# Patient Record
Sex: FEMALE | Race: BLACK OR AFRICAN AMERICAN | Employment: UNEMPLOYED | URBAN - METROPOLITAN AREA
[De-identification: names, ages, dates, MRNs, and addresses within clinical notes are randomized per-mention and may not be internally consistent; named-entity substitution may affect disease eponyms.]

---

## 2017-07-18 ENCOUNTER — HOSPITAL ENCOUNTER (INPATIENT)
Age: 52
LOS: 6 days | Discharge: HOME | DRG: 854 | End: 2017-07-24
Attending: EMERGENCY MEDICINE | Admitting: FAMILY MEDICINE
Payer: SUBSIDIZED

## 2017-07-18 ENCOUNTER — APPOINTMENT (OUTPATIENT)
Dept: GENERAL RADIOLOGY | Age: 52
DRG: 854 | End: 2017-07-18
Attending: PHYSICIAN ASSISTANT
Payer: SUBSIDIZED

## 2017-07-18 DIAGNOSIS — M86.9 TOE OSTEOMYELITIS, LEFT (HCC): Primary | ICD-10-CM

## 2017-07-18 DIAGNOSIS — Z79.4 TYPE 2 DIABETES MELLITUS WITH COMPLICATION, WITH LONG-TERM CURRENT USE OF INSULIN (HCC): ICD-10-CM

## 2017-07-18 DIAGNOSIS — E11.8 TYPE 2 DIABETES MELLITUS WITH COMPLICATION, WITH LONG-TERM CURRENT USE OF INSULIN (HCC): ICD-10-CM

## 2017-07-18 LAB
ANION GAP BLD CALC-SCNC: 11 MMOL/L (ref 3–18)
APPEARANCE UR: CLEAR
BASOPHILS # BLD AUTO: 0 K/UL (ref 0–0.06)
BASOPHILS # BLD: 0 % (ref 0–2)
BILIRUB UR QL: NEGATIVE
BUN SERPL-MCNC: 20 MG/DL (ref 7–18)
BUN/CREAT SERPL: 18 (ref 12–20)
CALCIUM SERPL-MCNC: 8.9 MG/DL (ref 8.5–10.1)
CHLORIDE SERPL-SCNC: 105 MMOL/L (ref 100–108)
CO2 SERPL-SCNC: 25 MMOL/L (ref 21–32)
COLOR UR: YELLOW
CREAT SERPL-MCNC: 1.13 MG/DL (ref 0.6–1.3)
DIFFERENTIAL METHOD BLD: ABNORMAL
EOSINOPHIL # BLD: 0.1 K/UL (ref 0–0.4)
EOSINOPHIL NFR BLD: 1 % (ref 0–5)
ERYTHROCYTE [DISTWIDTH] IN BLOOD BY AUTOMATED COUNT: 12.1 % (ref 11.6–14.5)
GLUCOSE BLD STRIP.AUTO-MCNC: 306 MG/DL (ref 70–110)
GLUCOSE SERPL-MCNC: 297 MG/DL (ref 74–99)
GLUCOSE UR STRIP.AUTO-MCNC: >1000 MG/DL
HCT VFR BLD AUTO: 30.2 % (ref 35–45)
HGB BLD-MCNC: 10.1 G/DL (ref 12–16)
HGB UR QL STRIP: NEGATIVE
KETONES UR QL STRIP.AUTO: NEGATIVE MG/DL
LACTATE BLD-SCNC: 1.4 MMOL/L (ref 0.4–2)
LEUKOCYTE ESTERASE UR QL STRIP.AUTO: NEGATIVE
LYMPHOCYTES # BLD AUTO: 22 % (ref 21–52)
LYMPHOCYTES # BLD: 3 K/UL (ref 0.9–3.6)
MCH RBC QN AUTO: 28.7 PG (ref 24–34)
MCHC RBC AUTO-ENTMCNC: 33.4 G/DL (ref 31–37)
MCV RBC AUTO: 85.8 FL (ref 74–97)
MONOCYTES # BLD: 0.7 K/UL (ref 0.05–1.2)
MONOCYTES NFR BLD AUTO: 5 % (ref 3–10)
NEUTS SEG # BLD: 10 K/UL (ref 1.8–8)
NEUTS SEG NFR BLD AUTO: 72 % (ref 40–73)
NITRITE UR QL STRIP.AUTO: NEGATIVE
PH UR STRIP: 5.5 [PH] (ref 5–8)
PLATELET # BLD AUTO: 281 K/UL (ref 135–420)
PMV BLD AUTO: 10.5 FL (ref 9.2–11.8)
POTASSIUM SERPL-SCNC: 4.1 MMOL/L (ref 3.5–5.5)
PROT UR STRIP-MCNC: NEGATIVE MG/DL
RBC # BLD AUTO: 3.52 M/UL (ref 4.2–5.3)
SODIUM SERPL-SCNC: 141 MMOL/L (ref 136–145)
SP GR UR REFRACTOMETRY: 1.02 (ref 1–1.03)
UROBILINOGEN UR QL STRIP.AUTO: 0.2 EU/DL (ref 0.2–1)
WBC # BLD AUTO: 13.9 K/UL (ref 4.6–13.2)

## 2017-07-18 PROCEDURE — 73630 X-RAY EXAM OF FOOT: CPT

## 2017-07-18 PROCEDURE — 87186 SC STD MICRODIL/AGAR DIL: CPT

## 2017-07-18 PROCEDURE — 80048 BASIC METABOLIC PNL TOTAL CA: CPT

## 2017-07-18 PROCEDURE — 65270000029 HC RM PRIVATE

## 2017-07-18 PROCEDURE — 74011250636 HC RX REV CODE- 250/636: Performed by: PHYSICIAN ASSISTANT

## 2017-07-18 PROCEDURE — 83605 ASSAY OF LACTIC ACID: CPT

## 2017-07-18 PROCEDURE — 74011000258 HC RX REV CODE- 258: Performed by: PHYSICIAN ASSISTANT

## 2017-07-18 PROCEDURE — 87077 CULTURE AEROBIC IDENTIFY: CPT

## 2017-07-18 PROCEDURE — 87040 BLOOD CULTURE FOR BACTERIA: CPT

## 2017-07-18 PROCEDURE — 85025 COMPLETE CBC W/AUTO DIFF WBC: CPT

## 2017-07-18 PROCEDURE — 99283 EMERGENCY DEPT VISIT LOW MDM: CPT

## 2017-07-18 PROCEDURE — 74011636637 HC RX REV CODE- 636/637: Performed by: PHYSICIAN ASSISTANT

## 2017-07-18 PROCEDURE — 81003 URINALYSIS AUTO W/O SCOPE: CPT

## 2017-07-18 PROCEDURE — 87086 URINE CULTURE/COLONY COUNT: CPT

## 2017-07-18 PROCEDURE — 82962 GLUCOSE BLOOD TEST: CPT

## 2017-07-18 PROCEDURE — 87070 CULTURE OTHR SPECIMN AEROBIC: CPT

## 2017-07-18 PROCEDURE — 87184 SC STD DISK METHOD PER PLATE: CPT

## 2017-07-18 RX ORDER — PAROXETINE 30 MG/1
30 TABLET, FILM COATED ORAL DAILY
Status: ON HOLD | COMMUNITY
End: 2017-07-23

## 2017-07-18 RX ORDER — HEPARIN SODIUM 5000 [USP'U]/ML
5000 INJECTION, SOLUTION INTRAVENOUS; SUBCUTANEOUS ONCE
Status: DISPENSED | OUTPATIENT
Start: 2017-07-19 | End: 2017-07-19

## 2017-07-18 RX ORDER — SODIUM CHLORIDE 450 MG/100ML
125 INJECTION, SOLUTION INTRAVENOUS CONTINUOUS
Status: DISCONTINUED | OUTPATIENT
Start: 2017-07-19 | End: 2017-07-19

## 2017-07-18 RX ORDER — DEXTROSE 50 % IN WATER (D50W) INTRAVENOUS SYRINGE
25-50 AS NEEDED
Status: DISCONTINUED | OUTPATIENT
Start: 2017-07-18 | End: 2017-07-21 | Stop reason: SDUPTHER

## 2017-07-18 RX ORDER — INSULIN LISPRO 100 [IU]/ML
INJECTION, SOLUTION INTRAVENOUS; SUBCUTANEOUS EVERY 6 HOURS
Status: DISCONTINUED | OUTPATIENT
Start: 2017-07-19 | End: 2017-07-21

## 2017-07-18 RX ORDER — QUINAPRIL 20 MG/1
20 TABLET ORAL
Status: DISCONTINUED | OUTPATIENT
Start: 2017-07-19 | End: 2017-07-21

## 2017-07-18 RX ORDER — MAGNESIUM SULFATE 100 %
4 CRYSTALS MISCELLANEOUS AS NEEDED
Status: DISCONTINUED | OUTPATIENT
Start: 2017-07-18 | End: 2017-07-21 | Stop reason: SDUPTHER

## 2017-07-18 RX ORDER — PANTOPRAZOLE SODIUM 40 MG/1
40 TABLET, DELAYED RELEASE ORAL DAILY
Status: DISCONTINUED | OUTPATIENT
Start: 2017-07-19 | End: 2017-07-24 | Stop reason: HOSPADM

## 2017-07-18 RX ORDER — ACETAMINOPHEN 650 MG/1
650 SUPPOSITORY RECTAL
Status: DISCONTINUED | OUTPATIENT
Start: 2017-07-18 | End: 2017-07-19

## 2017-07-18 RX ADMIN — INSULIN HUMAN 7 UNITS: 100 INJECTION, SOLUTION PARENTERAL at 18:16

## 2017-07-18 RX ADMIN — SODIUM CHLORIDE 1000 MG: 900 INJECTION, SOLUTION INTRAVENOUS at 18:56

## 2017-07-18 RX ADMIN — SODIUM CHLORIDE 1000 ML: 900 INJECTION, SOLUTION INTRAVENOUS at 18:11

## 2017-07-18 RX ADMIN — PIPERACILLIN AND TAZOBACTAM 3.38 G: 3; .375 INJECTION, POWDER, LYOPHILIZED, FOR SOLUTION INTRAVENOUS; PARENTERAL at 18:17

## 2017-07-18 NOTE — PROGRESS NOTES
SW received a voicemail from Salty Lai NP with [facility]. She stated they have been attempting to get the patient enrolled in the [facility] due to her financial status. She is a self-pay [place] resident. Patient has been non-complaint due to finances. Patient is currently at [facility] at the recommendation of PCP. Per chart they are consulting for admission as the patient appears to need surgery on her toe. Patient can also contact Dr. Tye Chin for further care as he sees self-pay patients at a reduced rate.      Grant Cavazos, [address], [phone]

## 2017-07-18 NOTE — IP AVS SNAPSHOT
Iain Vuong 
 
 
 [address] Patient: Otto Niño MRN: [id number] :1965 You are allergic to the following Allergen Reactions Promethazine Nausea and Vomiting \"the phenergan made me more nauseated\" Recent Documentation Height Weight BMI OB Status Smoking Status 1.727 m 81.2 kg 27.22 kg/m2 Menopause Former Smoker Unresulted Labs Order Current Status CULTURE, ANAEROBIC Preliminary result CULTURE, BLOOD Preliminary result CULTURE, BLOOD Preliminary result CULTURE, SURGICAL WOUND W GRAM STAIN Preliminary result Emergency Contacts Name Discharge Info Relation Home Work Mobile Fran Londono)  Other Relative [6] [id number] Aletha Console  Mother [14] [phone] About your hospitalization You were admitted on:  2017 You last received care in the:  [facility] [address] You were discharged on:  2017 Unit phone number:  [phone] Why you were hospitalized Your primary diagnosis was:  Sepsis (Hcc) Your diagnoses also included:  Osteomyelitis (Hcc), Foot Ulcer Due To Secondary Dm (Hcc), Essential Hypertension, Benign, Uncontrolled Type 2 Diabetes Mellitus With Chronic Kidney Disease (Hcc), Diabetic Retinopathy (Hcc), Hld (Hyperlipidemia), Gerd (Gastroesophageal Reflux Disease), Anxiety, Leukocytosis, Renal Insufficiency Providers Seen During Your Hospitalizations Provider Role Specialty Primary office phone Kahlil Espino MD Attending Provider Emergency Medicine [phone] Dede Martin MD Attending Provider Family Practice [phone] Joanne Zapata MD Attending Provider [facility] [phone] Your Primary Care Physician (PCP) Primary Care Physician Office Phone Office Fax José Child [phone] Follow-up Information Follow up With Details Comments Contact Info Joanne Watson NP  New patient. Patient has to bring proof of income and proof of where they reside. The patient will be set up with an follow-up appointment [address] [facility] 
[phone] Yeimi Pelaez DPM On 7/31/2017 Arrive by 8:00AM, Appt is at 8:30AM please bring $200 upfront for co-pay. 908 [facility] [address] [facility] 
[phone] Yeimi Pelaez DPM Go on 7/28/2017 For suture removal [address] [facility] [address] [facility] 
[phone] Current Discharge Medication List  
  
START taking these medications Dose & Instructions Dispensing Information Comments Morning Noon Evening Bedtime  
 acetaminophen 325 mg tablet Commonly known as:  TYLENOL Your last dose was: Your next dose is:    
   
   
 Dose:  650 mg Take 2 Tabs by mouth every six (6) hours as needed for up to 30 days. Quantity:  120 Tab Refills:  0  
     
   
   
   
  
 amoxicillin-clavulanate 875-125 mg per tablet Commonly known as:  AUGMENTIN Your last dose was: Your next dose is:    
   
   
 Dose:  1 Tab Take 1 Tab by mouth two (2) times a day for 7 days. Quantity:  14 Tab Refills:  0  
     
   
   
   
  
 atorvastatin 20 mg tablet Commonly known as:  LIPITOR Your last dose was: Your next dose is:    
   
   
 Dose:  20 mg Take 1 Tab by mouth daily for 30 days. Quantity:  30 Tab Refills:  0 Saccharomyces boulardii 250 mg capsule Commonly known as:  Yoan Controls Your last dose was: Your next dose is:    
   
   
 Dose:  250 mg Take 1 Cap by mouth two (2) times a day for 7 days. Quantity:  14 Cap Refills:  0  
     
   
   
   
  
 sodium hypochlorite 0.125 % Soln external solution Commonly known as:  [address]  
   
 Your last dose was: Your next dose is:    
   
   
 Dose:  1 Bottle Apply 473 mL to affected area two (2) times a day. Quantity:  1 Bottle Refills:  0  
     
   
   
   
  
 trimethoprim-sulfamethoxazole 160-800 mg per tablet Commonly known as:  BACTRIM DS Your last dose was: Your next dose is:    
   
   
 Dose:  1 Tab Take 1 Tab by mouth two (2) times a day for 7 days. Quantity:  14 Tab Refills:  0 CONTINUE these medications which have NOT CHANGED Dose & Instructions Dispensing Information Comments Morning Noon Evening Bedtime  
 esomeprazole 20 mg capsule Commonly known as:  NexIUM Your last dose was: Your next dose is:    
   
   
 Dose:  40 mg Take 2 Caps by mouth daily for 30 days. Quantity:  60 Cap Refills:  0  
     
   
   
   
  
 insulin NPH/insulin regular 100 unit/mL (70-30) injection Commonly known as:  NovoLIN 70/30 Your last dose was: Your next dose is:    
   
   
 Dose:  40 Units 40 Units by SubCUTAneous route two (2) times a day. Quantity:  2 Vial  
Refills:  2 PARoxetine 30 mg tablet Commonly known as:  PAXIL Your last dose was: Your next dose is:    
   
   
 Dose:  30 mg Take 1 Tab by mouth daily for 30 days. Quantity:  30 Tab Refills:  0  
     
   
   
   
  
 quinapril 20 mg tablet Commonly known as:  ACCUPRIL Your last dose was: Your next dose is:    
   
   
 Dose:  20 mg Take 1 Tab by mouth daily for 30 days. Quantity:  30 Tab Refills:  0 Where to Get Your Medications Information on where to get these meds will be given to you by the nurse or doctor. ! Ask your nurse or doctor about these medications  
  acetaminophen 325 mg tablet  
 amoxicillin-clavulanate 875-125 mg per tablet  
 atorvastatin 20 mg tablet  
 esomeprazole 20 mg capsule insulin NPH/insulin regular 100 unit/mL (70-30) injection PARoxetine 30 mg tablet  
 quinapril 20 mg tablet Saccharomyces boulardii 250 mg capsule  
 sodium hypochlorite 0.125 % Soln external solution  
 trimethoprim-sulfamethoxazole 160-800 mg per tablet Discharge Instructions Learning About Foot and Toenail Care Checking your loved one's feet and keeping them clean and soft can help prevent cracks and infection in the skin. This is especially important for people who have diabetes. Keeping toenails trimmedand polished if that's what the person likesalso helps the person feel well-groomed. If the person you care for has diabetes or has foot problems, such as bad bunions and corns, think about taking them to see a podiatrist. This is a doctor who specializes in the care of the feet. Sometimes a podiatrist will come to the home if the person can't go out for visits. Try to take the person for salon pedicures if that is what they want. It's a chance to get out and see people and continue a favorite activity. You can do basic nail care at home. Usually all you need to do is keep the nails clean and at a safe length. How do you trim someone's toenails? Try to trim the person's nails every week. Or check the nails each week to see if they need to be trimmed. It's easiest to trim nails after the person has had a shower or foot bath. It makes the nails softer and easier to trim. Start by gathering your supplies. You will need toenail clippers and a nail file. You may also need nail polish and nail polish remover. To trim the nails: 
1. Wash and dry your hands. You don't need to wear gloves. 2. Use nail polish remover to take off any polish. 3. Hold the person's foot and toe steady with one hand while you trim the nail with your other hand. Trim the nails straight across. Leave the nails a little longer at the corners so that the sharp ends don't cut into the skin. 4. Keep the nails no longer than the tip of the toes. 5. Let the nails dry if they are still damp and soft. 6. Use a nail file to gently smooth the edges of the nails, especially at the corners. They may be sharp after the nails are cut straight. 7. Apply nail polish, if the person wants it. If the person's nails are thick and discolored, it may be safest to have a podiatrist cut them. What else do you need to know? When you're caring for someone's nails, it is important to remember not to trim or cut the cuticles. A minor cut in a cuticle could lead to an infection. Wash the feet daily in the shower or bath or in a basin made for washing feet. It's extra important to wash the feet carefully if the person has diabetes. After washing the feet, dry gently. Put lotion on the feet, especially on the heels. But don't put it between the toes. If the person doesn't have diabetes and you see signs of athlete's foot (such as dry, cracking, or itchy skin between the toes), you can try an over-the-counter medicine. These medicines can kill the fungus that causes athlete's foot. If the problem doesn't go away, talk to the person's doctor. Look every day for cuts or signs of infection, such as pain, swelling, redness, or warmth. If you see any of these signsespecially in someone who has diabetescall the doctor. Where can you learn more? Go to http://linda-sivan.info/. Enter A726 in the search box to learn more about \"Learning About Foot and Toenail Care. \" Current as of: March 17, 2017 Content Version: 11.3 © 4649-2517 [employer]. Care instructions adapted under license by [employer] (which disclaims liability or warranty for this information). If you have questions about a medical condition or this instruction, always ask your healthcare professional. [place] [address] any warranty or liability for your use of this information. Diabetes Foot Health: Care Instructions Your Care Instructions When you have diabetes, your feet need extra care and attention. Diabetes can damage the nerve endings and blood vessels in your feet, making you less likely to notice when your feet are injured. Diabetes also limits your body's ability to fight infection and get blood to areas that need it. If you get a minor foot injury, it could become an ulcer or a serious infection. With good foot care, you can prevent most of these problems. Caring for your feet can be quick and easy. Most of the care can be done when you are bathing or getting ready for bed. Follow-up care is a key part of your treatment and safety. Be sure to make and go to all appointments, and call your doctor if you are having problems. Its also a good idea to know your test results and keep a list of the medicines you take. How can you care for yourself at home? · Keep your blood sugar close to normal by watching what and how much you eat, monitoring blood sugar, taking medicines if prescribed, and getting regular exercise. · Do not smoke. Smoking affects blood flow and can make foot problems worse. If you need help quitting, talk to your doctor about stop-smoking programs and medicines. These can increase your chances of quitting for good. · Eat a diet that is low in fats. High fat intake can cause fat to build up in your blood vessels and decrease blood flow. · Inspect your feet daily for blisters, cuts, cracks, or sores. If you cannot see well, use a mirror or have someone help you. · Take care of your feet: 
[facility] your feet every day. Use warm (not hot) water. Check the water temperature with your wrists or other part of your body, not your feet. ¨ Dry your feet well. Pat them dry. Do not rub the skin on your feet too hard. Dry well between your toes. If the skin on your feet stays moist, bacteria or a fungus can grow, which can lead to infection. ¨ Keep your skin soft. Use moisturizing skin cream to keep the skin on your feet soft and prevent calluses and cracks. But do not put the cream between your toes, and stop using any cream that causes a rash. ¨ Clean underneath your toenails carefully. Do not use a sharp object to clean underneath your toenails. Use the blunt end of a nail file or other rounded tool. ¨ Trim and file your toenails straight across to prevent ingrown toenails. Use a nail clipper, not scissors. Use an emery board to smooth the edges. · Change socks daily. Socks without seams are best, because seams often rub the feet. You can find socks for people with diabetes from specialty catalogs. · Look inside your shoes every day for things like gravel or torn linings, which could cause blisters or sores. · Buy shoes that fit well: 
¨ Look for shoes that have plenty of space around the toes. This helps prevent bunions and blisters. ¨ Try on shoes while wearing the kind of socks you will usually wear with the shoes. ¨ Avoid plastic shoes. They may rub your feet and cause blisters. Good shoes should be made of materials that are flexible and breathable, such as leather or cloth. ¨ Break in new shoes slowly by wearing them for no more than an hour a day for several days. Take extra time to check your feet for red areas, blisters, or other problems after you wear new shoes. · Do not go barefoot. Do not wear sandals, and do not wear shoes with very thin soles. Thin soles are easy to puncture. They also do not protect your feet from hot pavement or cold weather. · Have your doctor check your feet during each visit. If you have a foot problem, see your doctor. Do not try to treat an early foot problem at home. Home remedies or treatments that you can buy without a prescription (such as corn removers) can be harmful. · Always get early treatment for foot problems. A minor irritation can lead to a major problem if not properly cared for early. When should you call for help? Call your doctor now or seek immediate medical care if: 
· You have a foot sore, an ulcer or break in the skin that is not healing after 4 days, bleeding corns or calluses, or an ingrown toenail. · You have blue or black areas, which can mean bruising or blood flow problems. · You have peeling skin or tiny blisters between your toes or cracking or oozing of the skin. · You have a fever for more than 24 hours and a foot sore. · You have new numbness or tingling in your feet that does not go away after you move your feet or change positions. · You have unexplained or unusual swelling of the foot or ankle. Watch closely for changes in your health, and be sure to contact your doctor if: 
· You cannot do proper foot care. Where can you learn more? Go to http://linda-sivan.info/. Enter A739 in the search box to learn more about \"Diabetes Foot Health: Care Instructions. \" Current as of: March 13, 2017 Content Version: 11.3 © 6621-3324 [employer]. Care instructions adapted under license by [email or web address] (which disclaims liability or warranty for this information). If you have questions about a medical condition or this instruction, always ask your healthcare professional. [address] any warranty or liability for your use of this information. Giving a Mixed-Dose Insulin Shot: Care Instructions Your Care Instructions Insulin is normally made by the pancreas, a gland behind the stomach. In people with diabetes, the pancreas no longer makes enough insulin or it stops making it. Without insulin, your blood sugar level rises to dangerous levels. When this happens, you need insulin shots to keep your blood sugar in your target range. You may be nervous giving a shot at first. But soon, giving yourself a shot will become routine.  It is quite easy to learn how to draw up insulin into a syringe and give the shot. The needles you use to give the insulin injections are very thin, and most people who have diabetes say that they do not even feel the needle enter the skin. Even if you do feel the injection, the sting of the shot is not bad and does not last long. More than half a million people do it every day. You can too. Follow-up care is a key part of your treatment and safety. Be sure to make and go to all appointments, and call your doctor if you are having problems. It's also a good idea to know your test results and keep a list of the medicines you take. How can you care for yourself at home? Getting started · Gather your supplies. You will need an insulin syringe, your bottles of insulin, and an alcohol wipe or a cotton ball dipped in alcohol. Keep your supplies in a bag or kit so you can carry the supplies wherever you go. · Check the labels on the bottles and contents. Read and follow all instructions on the label, including how to store the insulin and how long the insulin will last. 
· Wash your hands with soap and running water. Dry them well. Preparing the shot For a mixed-dose insulin shot: 1. Roll the insulin bottles gently between your hands. This will warm the insulin if you have kept the bottle in the refrigerator. Roll the cloudy insulin bottle until the white powder has dissolved and the insulin is mixed. 2. Wipe the rubber lid of both insulin bottles with an alcohol wipe or a cotton ball dipped in alcohol. (If you are using a bottle for the first time, remove the protective cover over the rubber lid.) Let the top dry before you remove any insulin. 3. Remove the plastic cap from the needle on your insulin syringe. Take care not to touch the needle. 4. Pull the plunger back on your insulin syringe, and draw air into the syringe equal to the number of units of cloudy insulin to be given.  
5. Push the needle of the syringe into the rubber lid of the cloudy insulin bottle. Push the plunger of the syringe to force the air into the bottle. This equalizes the pressure in the bottle when you later remove the dose of insulin. Remove the needle from the bottle, but do not draw up any insulin. 6. Pull the plunger of the syringe back and draw air into the syringe equal to the number of units of clear insulin to be given. 7. Push the needle of the syringe into the rubber lid of the clear insulin bottle. Push the plunger to force the air into the bottle. Leave the needle in the bottle. 8. Turn the bottle and syringe upside down, and hold them in one hand. Position the tip of the needle so that it is below the surface of insulin in the bottle. Pull back the plunger to fill the syringe with slightly more than the correct number of units of clear insulin to be given. 9. Tap the outside (barrel) of the syringe so that trapped air bubbles move into the needle area. Push the air bubbles back into the bottle. Make sure that you have the correct number of units of insulin in your syringe. Remove the needle from the clear insulin bottle. 10. Insert the needle into the rubber lid of the cloudy insulin bottle. Do not push the plunger, because this would force clear insulin into your cloudy insulin bottle. If clear insulin is mixed in the bottle of cloudy, it will change the action of your other doses from that bottle. 11. Turn the bottle and syringe upside down and hold them in one hand. Position the tip of the needle so that it is below the surface of insulin in the bottle. Slowly pull back the plunger of the syringe to fill the syringe with the correct number of units of cloudy insulin to be given. This will keep air bubbles from entering the syringe. Remove the needle from the bottle. 12. You should now have the total number of units for the clear and cloudy insulin in your syringe.  For example, if 10 units of clear and 15 units of cloudy are needed, you should have 25 units in your syringe. Now you are ready to give the shot. Giving the shot Before giving your shot: 
1. Use alcohol to clean the skin before you give the shot. Let it dry. 2. Slightly pinch a fold of skin between your fingers and thumb of one hand. 3. Hold the syringe like a pencil close to the site, keeping your fingers off the plunger. It is usually recommended to place the syringe at a 90-degree angle to the shot site, standing straight up from the skin. 4. Bend your wrist, and quickly push the needle all the way into the pinched-up area. 5. Push the plunger of the syringe all the way in so the insulin goes into the fatty tissue. 6. Take the needle out at the same angle that you inserted it. If you bleed a little, apply pressure over the shot area with your finger, a cotton ball, or a piece of gauze. Do not rub the area. 7. Replace the cover over the needle and dispose of the needle safely. Do not use the same needle more than one time. Where to give the shot You can inject insulin into: · The belly, but at least 2 inches from the belly button. This is thought to be the best place to inject insulin. · The top outer part of the thighs. Insulin usually is absorbed more slowly from this site, unless you exercise soon after giving the shot. · The outside of the upper arms or the buttocks. You may need help giving shots in these areas. Your doctor may advise you to give your shots in different places on your body each day. This is called site rotation. Make sure you talk to your doctor about how to do this safely. If you rotate sites, use the same site at the same time of each day. For example, each day: · At breakfast, give the shot in one of your arms. · At lunch, give the shot in one of your legs. · At dinner, give the shot in your belly.  
Slightly change the spot where you give an insulin shot each time you do it. For example, use five different places on the right upper arm, then use five places on the left upper arm. Using the same spot every time can cause bumps or pits in the skin and make the shots hurt more. It may also slow down how the insulin is absorbed into your body. Where can you learn more? Go to http://linda-sivan.info/. Enter S197 in the search box to learn more about \"Giving a Mixed-Dose Insulin Shot: Care Instructions. \" Current as of: 2017 Content Version: 11.3 © 1808-9375 [employer]. Care instructions adapted under license by [employer] (which disclaims liability or warranty for this information). If you have questions about a medical condition or this instruction, always ask your healthcare professional. [address] any warranty or liability for your use of this information. Jugo Activation Thank you for requesting access to Jugo. Please follow the instructions below to securely access and download your online medical record. Jugo allows you to send messages to your doctor, view your test results, renew your prescriptions, schedule appointments, and more. How Do I Sign Up? 1. In your internet browser, go to [email or web address] 
2. Click on the First Time User? Click Here link in the Sign In box. You will be redirect to the New Member Sign Up page. 3. Enter your Jugo Access Code exactly as it appears below. You will not need to use this code after youve completed the sign-up process. If you do not sign up before the expiration date, you must request a new code. Jugo Access Code: [email or web address] Expires: 10/16/2017  4:37 PM (This is the date your Jugo access code will ) 4. Enter the last four digits of your Social Security Number (xxxx) and Date of Birth (mm/dd/yyyy) as indicated and click Submit. You will be taken to the next sign-up page. 5. Create a NoiseFreet ID. This will be your What the Trend login ID and cannot be changed, so think of one that is secure and easy to remember. 6. Create a What the Trend password. You can change your password at any time. 7. Enter your Password Reset Question and Answer. This can be used at a later time if you forget your password. 8. Enter your e-mail address. You will receive e-mail notification when new information is available in [address] 9. Click Sign Up. You can now view and download portions of your medical record. 10. Click the Download Summary menu link to download a portable copy of your medical information. Additional Information If you have questions, please visit the Frequently Asked Questions section of the What the Trend website at [email or web address] Remember, What the Trend is NOT to be used for urgent needs. For medical emergencies, dial 911. Patient armband removed and given to patient to take home. Patient was informed of the privacy risks if armband lost or stolen DISCHARGE SUMMARY from Nurse The following personal items are in your possession at time of discharge: 
 
Dental Appliances: None Visual Aid: None Home Medications: None Jewelry: None Clothing: Sarath Staggers Other Valuables: Purse (Umbrella) PATIENT INSTRUCTIONS: 
 
 
F-face looks uneven A-arms unable to move or move unevenly S-speech slurred or non-existent T-time-call 911 as soon as signs and symptoms begin-DO NOT go Back to bed or wait to see if you get better-TIME IS BRAIN. Warning Signs of HEART ATTACK Call 911 if you have these symptoms: ? Chest discomfort. Most heart attacks involve discomfort in the center of the chest that lasts more than a few minutes, or that goes away and comes back. It can feel like uncomfortable pressure, squeezing, fullness, or pain. ? Discomfort in other areas of the upper body. Symptoms can include pain or discomfort in one or both arms, the back, neck, jaw, or stomach. ? Shortness of breath with or without chest discomfort. ? Other signs may include breaking out in a cold sweat, nausea, or lightheadedness. Don't wait more than five minutes to call [address]! Fast action can save your life. Calling 911 is almost always the fastest way to get lifesaving treatment. Emergency Medical Services staff can begin treatment when they arrive  up to an hour sooner than if someone gets to the hospital by car. The discharge information has been reviewed with the patient. The patient verbalized understanding. Discharge medications reviewed with the patient and appropriate educational materials and side effects teaching were provided. Discharge Orders None [employer] Announcement We are excited to announce that we are making your provider's discharge notes available to you in [employer]. You will see these notes when they are completed and signed by the physician that discharged you from your recent hospital stay. If you have any questions or concerns about any information you see in [employer], please call the Health Information Department where you were seen or reach out to your Primary Care Provider for more information about your plan of care. Introducing [facility]! Yg Johnson introduces [employer] patient portal. Now you can access parts of your medical record, email your doctor's office, and request medication refills online. 1. In your internet browser, go to [email or web address] 2. Click on the First Time User? Click Here link in the Sign In box.  You will see the New Member Sign Up page. 3. Enter your [employer] Access Code exactly as it appears below. You will not need to use this code after youve completed the sign-up process. If you do not sign up before the expiration date, you must request a new code. · [employer] Access Code: [email or web address] Expires: 10/16/2017  4:37 PM 
 
4. Enter the last four digits of your Social Security Number (xxxx) and Date of Birth (mm/dd/yyyy) as indicated and click Submit. You will be taken to the next sign-up page. 5. Create a [employer]ikt ID. This will be your [employer]ikt login ID and cannot be changed, so think of one that is secure and easy to remember. 6. Create a [employer]ikt password. You can change your password at any time. 7. Enter your Password Reset Question and Answer. This can be used at a later time if you forget your password. 8. Enter your e-mail address. You will receive e-mail notification when new information is available in [address] 9. Click Sign Up. You can now view and download portions of your medical record. 10. Click the Download Summary menu link to download a portable copy of your medical information. If you have questions, please visit the Frequently Asked Questions section of the [employer] website. Remember, [employer] is NOT to be used for urgent needs. For medical emergencies, dial 911. Now available from your iPhone and Android! General Information Please provide this summary of care documentation to your next provider. Patient Signature:  ____________________________________________________________ Date:  ____________________________________________________________  
  
Shashank Adan Provider Signature:  ____________________________________________________________ Date:  ____________________________________________________________

## 2017-07-18 NOTE — H&P
Admission History and Physical  [facility]      Patient: Manpreet Pandya MRN: [id number]  CSN: [id number]    YOB: 1965  Age: 46 y.o. Sex: female      DOA: 7/18/2017       HPI:     Pt is 58 yo female with PMH of Diabetic foot ulcer, uncontrolled DM2, pancreatitis, and GERD present to the ED for left second toe swelling. Pt began to notice left second toe pain and swelling approx a week ago, and has progressively worsened, now with drainage from tip and erythema spreading to dorsum of foot. Toe is now turned purple. Foul smell from toe. Pt does not have podiatrist as [employer] does not provide this. ED Course: Afebrile on presentation with WBC:13.9.  1L NS Bolus, IV Zosyn 3.375G, IV VANC 1G    Review of Systems   Constitutional: Negative for chills, fever, malaise/fatigue and weight loss. HENT: Negative for hearing loss. Eyes: Positive for blurred vision. Negative for double vision, photophobia and pain. Hx of left retinal detachment with blindness   Respiratory: Negative for cough and hemoptysis. Cardiovascular: Negative for chest pain, palpitations and orthopnea. Gastrointestinal: Negative for abdominal pain, blood in stool, constipation, diarrhea, melena, nausea and vomiting. Last Bowel movement 2-3 days ago   Genitourinary: Negative for dysuria, flank pain and frequency. Skin: Negative for itching and rash. Neurological: Negative for sensory change, speech change, focal weakness and headaches. Endorses chronic problems with her balance due to her blindness, but no acute changes   Psychiatric/Behavioral: Negative for depression, hallucinations and substance abuse. The patient is nervous/anxious.          Nervous that she will lose her toe        Past Medical History:   Diagnosis Date    Diabetes ([address]     Gall stones     GERD (gastroesophageal reflux disease)     Hiatal hernia     Hypertension     Mass of abdomen     Pancreatitis        Past Surgical History:   Procedure Laterality Date    HX CHOLECYSTECTOMY  10/15/2014    HX HEENT      Sx for detached retina    HX MYOMECTOMY      x5 removed    HX OTHER SURGICAL      upper endoscopy       Family History   Problem Relation Age of Onset    Adopted: Yes    Heart Failure Mother        Social History     Social History    Marital status:      Spouse name: N/A    Number of children: N/A    Years of education: N/A     Social History Main Topics    Smoking status: Former Smoker     Packs/day: 0.20     Years: 8.00     Types: Cigarettes     Quit date: 12/3/1995    Smokeless tobacco: Never Used    Alcohol use No      Comment: Drinks 3-4xs year generally wine    Drug use: No    Sexual activity: Not Currently     Other Topics Concern    None     Social History Narrative    Lives with 16year old son         with ex         Allergies   Allergen Reactions    Promethazine Nausea and Vomiting     \"the phenergan made me more nauseated\"        Prior to Admission Medications   Prescriptions Last Dose Informant Patient Reported? Taking? PARoxetine (PAXIL) 30 mg tablet   Yes Yes   Sig: Take 30 mg by mouth daily. esomeprazole (NEXIUM) 20 mg capsule   Yes Yes   Sig: Take 40 mg by mouth daily. insulin NPH/insulin regular (NOVOLIN 70/30) 100 unit/mL (70-30) injection   Yes Yes   Si Units by SubCUTAneous route two (2) times a day. quinapril (ACCUPRIL) 20 mg tablet   No Yes   Sig: Take 1 Tab by mouth daily. Facility-Administered Medications: None       Physical Exam:     Patient Vitals for the past 24 hrs:   Temp Pulse Resp BP SpO2   17 1830 - - - (!) 167/91 100 %   17 1817 - - - - 100 %   17 1816 - - - 156/89 -   17 1518 99.6 °F (37.6 °C) 99 18 106/73 100 %       Physical Exam   Constitutional: She is oriented to person, place, and time. She appears well-developed and well-nourished.    HENT:   Head: Normocephalic and atraumatic. Mouth/Throat: Oropharynx is clear and moist.   Poor dentition with right molar deterioration and missing teeth   Eyes: Conjunctivae and lids are normal. Right eye exhibits no discharge. No scleral icterus. Right Eye PERRLA  Left eye: Does not react to light with pupillary reflex or subj sensation   Neck: Normal range of motion. Neck supple. No JVD present. No tracheal deviation present. No thyromegaly present. NEG Carotid Bruit   Cardiovascular: Normal rate, regular rhythm and normal heart sounds. Exam reveals no gallop and no friction rub. No murmur heard. Pulmonary/Chest: Effort normal and breath sounds normal. No stridor. No respiratory distress. She has no wheezes. She has no rales. Musculoskeletal: Normal range of motion. She exhibits no edema. Left 2nd toe: swollen toe with purpled skin changes wioth sausage appearance and necrotic tip of toe. Pitting Pedal edema 2+ with warmth. Painless second toe and with metatarsal manipulation   Neurological: She is alert and oriented to person, place, and time. No sensory deficit. B/L symmetric radial, DP, and PT pulses  Sensation intact to light touch in all extremities  Able to discriminate touch in both lower extremities   Skin: Skin is warm and dry. Psychiatric: She has a normal mood and affect. Her behavior is normal.   Denies SI/HI, denies periods of insomnia for 3-4 days        IMAGING:  Left Foot 3V Final IMPRESSION:     Marked soft tissue swelling with poor visualization of the cortex of the  terminal tuft of the second digit. Osteomyelitis is not excluded. As clinically  indicated follow-up MR of the foot or nuclear isotope infection scan could be  obtained for confirmation.     Recent Results (from the past 12 hour(s))   GLUCOSE, POC    Collection Time: 07/18/17  4:44 PM   Result Value Ref Range    Glucose (POC) 306 (H) 70 - 110 mg/dL   CBC WITH AUTOMATED DIFF    Collection Time: 07/18/17  4:55 PM   Result Value Ref Range    WBC 13.9 (H) 4.6 - 13.2 K/uL    RBC 3.52 (L) 4.20 - 5.30 M/uL    HGB 10.1 (L) 12.0 - 16.0 g/dL    HCT 30.2 (L) 35.0 - 45.0 %    MCV 85.8 74.0 - 97.0 FL    MCH 28.7 24.0 - 34.0 PG    MCHC 33.4 31.0 - 37.0 g/dL    RDW 12.1 11.6 - 14.5 %    PLATELET 816 816 - 305 K/uL    MPV 10.5 9.2 - 11.8 FL    NEUTROPHILS 72 40 - 73 %    LYMPHOCYTES 22 21 - 52 %    MONOCYTES 5 3 - 10 %    EOSINOPHILS 1 0 - 5 %    BASOPHILS 0 0 - 2 %    ABS. NEUTROPHILS 10.0 (H) 1.8 - 8.0 K/UL    ABS. LYMPHOCYTES 3.0 0.9 - 3.6 K/UL    ABS. MONOCYTES 0.7 0.05 - 1.2 K/UL    ABS. EOSINOPHILS 0.1 0.0 - 0.4 K/UL    ABS. BASOPHILS 0.0 0.0 - 0.06 K/UL    DF AUTOMATED     METABOLIC PANEL, BASIC    Collection Time: 07/18/17  4:55 PM   Result Value Ref Range    Sodium 141 136 - 145 mmol/L    Potassium 4.1 3.5 - 5.5 mmol/L    Chloride 105 100 - 108 mmol/L    CO2 25 21 - 32 mmol/L    Anion gap 11 3.0 - 18 mmol/L    Glucose 297 (H) 74 - 99 mg/dL    BUN 20 (H) 7.0 - 18 MG/DL    Creatinine 1.13 0.6 - 1.3 MG/DL    BUN/Creatinine ratio 18 12 - 20      GFR est AA >60 >60 ml/min/1.73m2    GFR est non-AA 50 (L) >60 ml/min/1.73m2    Calcium 8.9 8.5 - 10.1 MG/DL   POC LACTIC ACID    Collection Time: 07/18/17  5:42 PM   Result Value Ref Range    Lactic Acid (POC) 1.4 0.4 - 2.0 mmol/L   URINALYSIS W/ RFLX MICROSCOPIC    Collection Time: 07/18/17  5:55 PM   Result Value Ref Range    Color YELLOW      Appearance CLEAR      Specific gravity 1.018 1.005 - 1.030      pH (UA) 5.5 5.0 - 8.0      Protein NEGATIVE  NEG mg/dL    Glucose >1000 (A) NEG mg/dL    Ketone NEGATIVE  NEG mg/dL    Bilirubin NEGATIVE  NEG      Blood NEGATIVE  NEG      Urobilinogen 0.2 0.2 - 1.0 EU/dL    Nitrites NEGATIVE  NEG      Leukocyte Esterase NEGATIVE  NEG           Assessment/Plan:   Pt is 60 yo female with PMH of Diabetic left foot ulcer since 30 June 2017 , HTN, DM, pancreatitis, and GERD present to the ED for left second toe swelling.  Pt began to notice left second toe pain and swelling approx a week ago, and has progressively worsened, now with drainage from tip and erythema spreading to dorsum of foot. Toe is now turned purple. Foul smell from toe. Pt does not have podiatrist as [facility] does not provide this. ED Course: Afebrile on presentation with WBC:13.9.  1L NS Bolus, IV Zosyn 3.375G, IV VANC 1G    Sepsis with infected  Diabetic Foot Ulcer with ischemic features and osteomyelitis- Elevated WBC 13.9, but not tachycardic or hypotensive. 2/4 SIRS criteria. NL Lacate(1.4) Differential includes Osteomyelitis(cocnerning features on X-rays series) due to uncontrolled diabetes vs.  R/O limb ischemia. Will benefit from access to Podiatry. -MRSA Coverage Continue IV VANC at 20mg/kg Q 8-12H per UTD, but VANC renal dosing  per Yao 2013 is 1250mg Q12H. Will defer to Pharmacy for dosing.   -Gram Negative Coverage: Continue Zosyn 3.375   -SILVIA   -Daily CBC   -one time coags   -MRI to rule out Osteo   -F/U Blood cultures   -F/U Wound culture   -Podiatry Consult for possible amputation   -If non-operative vs. Post-amputaiton, Consider ID consult to evaluate need for 6 weeks IV  antibiotics vs. Oral alternative   -Care Management for Outpatient resources, incl wound care,Podiatry follow up, and  outpt  medication access   -NPO at midnight with maintenance fluids for possible procedure tomorrow    Uncontrolled Diabetes with limited access to outpatient care with chronic hx of foot ulcer with hx of BG in 300's and limited follow up.    -Hold Novolin 70/30 dosing   -Start Lantus 15IU QHS   -Accucheck AC and QHS   -ISS per protocol   -F/U A1c   -Microalbumin   -Consult Diabetes Education(enodorses stopping insulin when not feeling well wihtout  checking BG)   -Consult case management to assist with resources and patient car needs for long term  access to insulin    Renal Insufficiency with baseline Cr 1.5 and GFR 43.5 in 2014. Current GFR is 50. No signs of blood or protein on UA.    -Avoid Nephrotoxic agents   -Renally dose VANC per pharmacy   -daily bmp    HTN: BP Goal 140/90 per [facility] for diabetics. -CTD Quinapril 20mg    Anxiety with hx of BZD abuse. Denies sxs c/w SI, HI, or otis. -CTD Paroxetine 30mg    GERD   -Hold Nexium   -Use PO Protonix on Formulary until Discharge      Hyperlipidemia with last outpt lipids in April 2014 at LDL of 155 and HDL 62. ASCVD Risk of 15.2%   -Repeat lipid panel to assess CVD risk and mitigate risk of readmission for pancreatitis   - Start Atorvastatin 20mg per AAFP and BMJ guidelines for Statin therapy for diabetics over 40        Diet: Diabetic  DVT Prophylaxis: Heparin 5,000IU x 1 at midnight, SCDs  Code Status: FULL  Point of Contact: Lawrence Faria(Cousin) [phone]    Disposition and anticipated LOS: 2 midnights    [facility] PGY-1          Admission History and Physical  [facility]        Patient: Dung Otero MRN: [id number]  CSN: [id number]    YOB: 1965  Age: 46 y.o. Sex: female       DOA: 7/18/2017      HPI:      Dung Otero is a 46 y.o. female with PMH HTN, Insulin dependent Diabetes type 2, GERD, insomnia  now presenting with complaint of diabetic foot ulceration.     Patient repot that she is in the process of applying for [facility] and will likely be following up with them after discharge. She states that noticed changes in the 2nd toes of the left foot about June 30th. She reports since this date the lesion previously seen has grown to ulceration that has been draining fluid with foul odor. She denies any PO abx for treatment and has been using band aids and triple abx ointment. Has not followed with a podiatrist and never had a foot ulceration prior.  She was told by [facility] to come to ED to have toe evaluated because [facility] does not have podiatry that follows at the clinic.      Has not been taking 70/30 the past week due to hypoglycemia and she reports lows in the 60's with max 300's this week         ED Course: Xr, basic labs, 1L NS bolus      ROS   Review of Systems - General ROS: negative for  - chills, fever, night sweats  Psychological ROS: positive for - anxiety hx  Ophthalmic ROS: negative for - blurry vision, decreased vision. Reports chronic vision loss in the left eye  ENT ROS: negative for - headaches, hearing change or visual changes  Hematological and Lymphatic ROS: negative for - bruising, jaundice  Respiratory ROS: negative for - cough, hemoptysis, shortness of breath, orthopnea, paroxysmal dyspnea, or wheezing  Cardiovascular ROS: negative for - chest pain, dyspnea on exertion, edema, loss of consciousness, or palpitations   Gastrointestinal ROS: negative for - abdominal pain, blood in stools, change in stools, constipation, diarrhea, hematemesis, melena, nausea/vomiting or swallowing difficulty/pain  Genito-Urinary ROS: negative for - dysuria, hematuria or urinary frequency/urgency  Musculoskeletal ROS: negative for - joint pain, joint swelling or muscle pain  Neurological ROS: negative for - dizziness, headaches, numbness/tingling or weakness  Dermatological ROS: positive for - rash or skin lesion changes to left toe              Past Medical History:   Diagnosis Date    [address] [address]      Gall stones      GERD (gastroesophageal reflux disease)      Hiatal hernia      Hypertension      Mass of abdomen      Pancreatitis                 Past Surgical History:   Procedure Laterality Date    HX CHOLECYSTECTOMY   10/15/2014    HX HEENT         Sx for detached retina    HX MYOMECTOMY         x5 removed    HX OTHER SURGICAL         upper endoscopy               Family History   Problem Relation Age of Onset    Adopted:  Yes    Heart Failure Mother            Social History                Social History    Marital status:        Spouse name: N/A    Number of children: N/A    Years of education: N/A             Social History Main Topics    Smoking status: Former Smoker       Packs/day: 0.20       Years: 8.00       Types: Cigarettes       Quit date: 12/3/1995    Smokeless tobacco: Never Used    Alcohol use No         Comment: Drinks 3-4xs year generally wine    Drug use: No    Sexual activity: Not Currently           Other Topics Concern    None          Social History Narrative     Lives with 16year old son            with ex                    Allergies   Allergen Reactions    Promethazine Nausea and Vomiting       \"the phenergan made me more nauseated\"          Prior to Admission Medications   Prescriptions Last Dose Informant Patient Reported? Taking? PARoxetine (PAXIL) 30 mg tablet     Yes Yes   Sig: Take 30 mg by mouth daily. esomeprazole (NEXIUM) 20 mg capsule     Yes Yes   Sig: Take 40 mg by mouth daily. insulin NPH/insulin regular (NOVOLIN 70/30) 100 unit/mL (70-30) injection     Yes Yes   Si Units by SubCUTAneous route two (2) times a day. quinapril (ACCUPRIL) 20 mg tablet     No Yes   Sig: Take 1 Tab by mouth daily.       Facility-Administered Medications: None         Physical Exam:      Patient Vitals for the past 24 hrs:    Temp Pulse Resp BP SpO2   17 1830 - - - (!) 167/91 100 %   17 1817 - - - - 100 %   17 1816 - - - 156/89 -   17 1518 99.6 °F (37.6 °C) 99 18 106/73 100 %         Physical Exam   General:  Alert and Responsive and in No acute distress. HEENT: Conjunctiva pink, sclera anicteric. EOMI. Pharynx moist, nonerythematous. Moist mucous membranes. Thyroid not enlarged, no nodules. No cervical, supraclavicular, occipital or submandibular lymphadenopathy. No other gross abnormalities present. CV:  RRR, no murmurs. No visible pulsations or thrills. RESP:  Unlabored breathing. Lungs clear to auscultation. no wheeze, rales, or rhonchi. Equal expansion bilaterally. ABD:  Soft, nontender, nondistended. No hepatosplenomegaly. No suprapubic tenderness. MS:  No joint deformity or instability. No atrophy. Neuro:  moving upper extremities and lower extremities. Alert  Ext:  No edema. 2+ radial and dp pulses bilaterally. Skin:  Swollen 2nd toe, Diabetic wound on the posterior 2nd distal phalanx, hyperkeratotic without active drainage or erythema. Foul odor. About 1 x 2 cm         IMAGING:      XR foot, Left      IMPRESSION:      Marked soft tissue swelling with poor visualization of the cortex of the  terminal tuft of the second digit. Osteomyelitis is not excluded. As clinically  indicated follow-up MR of the foot or nuclear isotope infection scan could be  obtained for confirmation. Recent Results          Recent Results (from the past 12 hour(s))   GLUCOSE, POC     Collection Time: 07/18/17  4:44 PM   Result Value Ref Range     Glucose (POC) 306 (H) 70 - 110 mg/dL   CBC WITH AUTOMATED DIFF     Collection Time: 07/18/17  4:55 PM   Result Value Ref Range     WBC 13.9 (H) 4.6 - 13.2 K/uL     RBC 3.52 (L) 4.20 - 5.30 M/uL     HGB 10.1 (L) 12.0 - 16.0 g/dL     HCT 30.2 (L) 35.0 - 45.0 %     MCV 85.8 74.0 - 97.0 FL     MCH 28.7 24.0 - 34.0 PG     MCHC 33.4 31.0 - 37.0 g/dL     RDW 12.1 11.6 - 14.5 %     PLATELET 884 893 - 583 K/uL     MPV 10.5 9.2 - 11.8 FL     NEUTROPHILS 72 40 - 73 %     LYMPHOCYTES 22 21 - 52 %     MONOCYTES 5 3 - 10 %     EOSINOPHILS 1 0 - 5 %     BASOPHILS 0 0 - 2 %     ABS. NEUTROPHILS 10.0 (H) 1.8 - 8.0 K/UL     ABS. LYMPHOCYTES 3.0 0.9 - 3.6 K/UL     ABS. MONOCYTES 0.7 0.05 - 1.2 K/UL     ABS. EOSINOPHILS 0.1 0.0 - 0.4 K/UL     ABS.  BASOPHILS 0.0 0.0 - 0.06 K/UL     DF AUTOMATED      METABOLIC PANEL, BASIC     Collection Time: 07/18/17  4:55 PM   Result Value Ref Range     Sodium 141 136 - 145 mmol/L     Potassium 4.1 3.5 - 5.5 mmol/L     Chloride 105 100 - 108 mmol/L     CO2 25 21 - 32 mmol/L     Anion gap 11 3.0 - 18 mmol/L     Glucose 297 (H) 74 - 99 mg/dL     BUN 20 (H) 7.0 - 18 MG/DL     Creatinine 1.13 0.6 - 1.3 MG/DL     BUN/Creatinine ratio 18 12 - 20       GFR est AA >60 >60 ml/min/1.73m2     GFR est non-AA 50 (L) >60 ml/min/1.73m2     Calcium 8.9 8.5 - 10.1 MG/DL   POC LACTIC ACID     Collection Time: 07/18/17  5:42 PM   Result Value Ref Range     Lactic Acid (POC) 1.4 0.4 - 2.0 mmol/L   URINALYSIS W/ RFLX MICROSCOPIC     Collection Time: 07/18/17  5:55 PM   Result Value Ref Range     Color YELLOW        Appearance CLEAR        Specific gravity 1.018 1.005 - 1.030       pH (UA) 5.5 5.0 - 8.0       Protein NEGATIVE  NEG mg/dL     Glucose >1000 (A) NEG mg/dL     Ketone NEGATIVE  NEG mg/dL     Bilirubin NEGATIVE  NEG       Blood NEGATIVE  NEG       Urobilinogen 0.2 0.2 - 1.0 EU/dL     Nitrites NEGATIVE  NEG       Leukocyte Esterase NEGATIVE  NEG                 Assessment/Plan:   46 y.o. female with PMH HTN, Insulin dependent Diabetes type 2, GERD, insomnia  now presenting with complaint of diabetic foot ulceration.      Sepsis with source of diabetic foot ulceration: concerning for osteomyelitis with XR of the 2nd digit with marked swelling at the tuft and osteo not able to excluded. 2/4 SIRS criteria with leukocytosis and tachycardia, mild and normal lactic acid   - Podiatry to be consulted   - Blood cultures, wound culture ordered in the ED. Urine cultured ordered in the ED but urinalysis negative nitrites and leuks   - Continue broad spectrum with Vancomycin and Zosyn   - Daily CBC to monitor leukocytosis   - 1/2 Normal saline @ 125 cc/hr for maintenance while NPO  - will keep NPO @ midnight until able to discus with podiatry in the AM  - MRI of the foot ordered and SILVIA     DM type 2, insulin dependent and retinopathy: Patient unable to afford insulin and has been relying on samples from PCP note.  Has not used inulin in the past week due to hypoglycemia as low as in the 60's   - Holding novolog and starting lantus 15 units at reduced dosing due to NPO status   - Hgb A1C to be ordered   - Correctional scale with Glucose ACHS    Hypertension: Goal < 140/90   - Continue with quinapril 20 mg daily     Hx of CKD stage 3: Cr stable with GFR > 60   - Daily BMP      GERD: stable   - Holding nexium and continue with protonix while inpatient      Insomnia/anxiety:   - paxil to be continued nightly from home med      Remainder of chronic conditions refer to intern note         Diet: NPO at midnight   DVT Prophylaxis: heparin and SCD's         Disposition and anticipated LOS: 2+ midnights, patient unable to afford medications and has been referred to [place] [address] clinic.  Will consult CM to ensure acceptance for medications management and follow up   [address] [facility] PGY-3   [facility]

## 2017-07-18 NOTE — ED PROVIDER NOTES
HPI Comments: Pt is 58 yo female with PMH of HTN, DM, pancreatitis, and GERD present to the ED for left second toe swelling. Pt began to notice toe pain and swelling approx a week ago, and has progressively worsened, now with drainage from tip and erythema spreading to dorsum of foot. Toe is now turned purple. Foul smell from toe. Pt does not have podiatrist as [facility] does not provide this. Pt with poor foot care and long toe nails. Pt also endorses BG \"ok\", and that she self adjusts her insulin as needed. Pt denies any injury to the foot, fevers, chills, N/V, numbness, tingling, or any other complaints. Patient is a 46 y.o. female presenting with toe swelling. The history is provided by the patient. Toe Swelling           Past Medical History:   Diagnosis Date    Diabetes ([address]     Gall stones     GERD (gastroesophageal reflux disease)     Hiatal hernia     Hypertension     Mass of abdomen     Pancreatitis        Past Surgical History:   Procedure Laterality Date    HX CHOLECYSTECTOMY  10/15/2014    HX HEENT      Sx for detached retina    HX MYOMECTOMY      x5 removed    HX OTHER SURGICAL      upper endoscopy         Family History:   Problem Relation Age of Onset    Adopted: Yes    Heart Failure Mother        Social History     Social History    Marital status:      Spouse name: N/A    Number of children: N/A    Years of education: N/A     Occupational History    Not on file.      Social History Main Topics    Smoking status: Former Smoker     Packs/day: 0.20     Years: 8.00     Types: Cigarettes     Quit date: 12/3/1995    Smokeless tobacco: Never Used    Alcohol use No      Comment: Drinks 3-4xs year generally wine    Drug use: No    Sexual activity: Not Currently     Other Topics Concern    Not on file     Social History Narrative    Lives with 16year old son         with ex           ALLERGIES: Promethazine    Review of Systems Constitutional: Negative for chills and fever. HENT: Negative for ear pain, rhinorrhea and sore throat. Eyes: Negative for pain and redness. Respiratory: Negative for cough and shortness of breath. Cardiovascular: Negative for chest pain. Gastrointestinal: Negative for abdominal pain, constipation, diarrhea, nausea and vomiting. Genitourinary: Negative for dysuria. Musculoskeletal: Positive for arthralgias and joint swelling. Negative for neck stiffness. Skin: Positive for color change and wound. Neurological: Negative for dizziness, light-headedness and headaches. Psychiatric/Behavioral: Negative. Vitals:    07/18/17 1518   BP: 106/73   Pulse: 99   Resp: 18   Temp: 99.6 °F (37.6 °C)   SpO2: 100%   Weight: 81.2 kg (179 lb)   Height: 5' 8\" (1.727 m)            Physical Exam   Constitutional: She is oriented to person, place, and time. She appears well-developed and well-nourished. HENT:   Head: Normocephalic and atraumatic. Right Ear: Tympanic membrane, external ear and ear canal normal.   Left Ear: Tympanic membrane, external ear and ear canal normal.   Nose: Nose normal.   Mouth/Throat: Oropharynx is clear and moist and mucous membranes are normal.   Eyes: Conjunctivae and EOM are normal. Pupils are equal, round, and reactive to light. Neck: Normal range of motion. Cardiovascular: Normal rate, regular rhythm and normal heart sounds. Pulmonary/Chest: Effort normal and breath sounds normal.   Abdominal: Soft. Bowel sounds are normal. There is no tenderness. Musculoskeletal:        Left ankle: Normal.        Left foot: There is decreased range of motion, tenderness, bony tenderness, swelling and decreased capillary refill. There is no crepitus and no deformity.         Feet:    Left second toe swollen, purple in color, TTP, with nail overgrown, open ulcer on underside of toe, erythema extending on dorsum of foot as noted, foul smelling discharge, without fluctuance or induration. Neurological: She is alert and oriented to person, place, and time. Skin: Skin is warm and dry. Psychiatric: She has a normal mood and affect. Her behavior is normal. Judgment and thought content normal.   Nursing note and vitals reviewed. MDM  Number of Diagnoses or Management Options  Toe osteomyelitis, left ([address]): new and requires workup  Type 2 diabetes mellitus with complication, with long-term current use of insulin [facility]): new and requires workup  Diagnosis management comments: DDx: diabetic ulcer, gout, arthritis, overuse injury, osteomyelitis, septic joint, cellulitis, Weston's neuroma, stress Fx, plantar fasciitis, tendonitis, hammer toe, bunion, bunionette, neuropathy    Left foot xr reviewed, c/w with osteomyelitis of second toe. Juan Francisco packer hospitalist. Radha Gilbert PA-C 4:35 PM     Consult:    5:30 PM   Discussed care with Dr. Nirmal Dorman, hospitalist, and resident for [facility]. Standard discussion; including history of patients chief complaint, available diagnostic results, and treatment course. PLAN: Admit to [facility]. Order UA, urine culture, blood cultures x2, and wound culture. Then kindra morrow and Leslie Hernandez PA-C     Reviewed workup results, any meds, and admission plan with patient and any family present. Answered all questions. Patient agrees to plan for admission to [facility].  Radha Gilbert PA-C 5:31 PM          Amount and/or Complexity of Data Reviewed  Clinical lab tests: ordered and reviewed  Tests in the radiology section of CPT®: ordered and reviewed  Review and summarize past medical records: yes  Discuss the patient with other providers: yes  Independent visualization of images, tracings, or specimens: yes    Risk of Complications, Morbidity, and/or Mortality  Presenting problems: moderate  Diagnostic procedures: moderate  Management options: moderate    Patient Progress  Patient progress: stable    ED Course       Procedures  Labs Reviewed   CBC WITH AUTOMATED DIFF - Abnormal; Notable for the following:        Result Value    WBC 13.9 (*)     RBC 3.52 (*)     HGB 10.1 (*)     HCT 30.2 (*)     ABS. NEUTROPHILS 10.0 (*)     All other components within normal limits   METABOLIC PANEL, BASIC - Abnormal; Notable for the following:     Glucose 297 (*)     BUN 20 (*)     GFR est non-AA 50 (*)     All other components within normal limits   GLUCOSE, POC - Abnormal; Notable for the following:     Glucose (POC) 306 (*)     All other components within normal limits   CULTURE, BLOOD   CULTURE, BLOOD   CULTURE, URINE   CULTURE, WOUND W GRAM STAIN   URINALYSIS W/ RFLX MICROSCOPIC   POC GLUCOSE     Diagnosis:   1. Toe osteomyelitis, left ([facility]    2. Type 2 diabetes mellitus with complication, with long-term current use of insulin ([facility]          Disposition: Transfer and admit to [facility]. Follow-up Information     None          Patient's Medications   Start Taking    No medications on file   Continue Taking    ESOMEPRAZOLE (NEXIUM) 20 MG CAPSULE    Take 40 mg by mouth daily. INSULIN NPH/INSULIN REGULAR (NOVOLIN 70/30) 100 UNIT/ML (70-30) INJECTION    40 Units by SubCUTAneous route two (2) times a day. PAROXETINE (PAXIL) 30 MG TABLET    Take 30 mg by mouth daily. QUINAPRIL (ACCUPRIL) 20 MG TABLET    Take 1 Tab by mouth daily. These Medications have changed    No medications on file   Stop Taking    ALUMINUM & MAGNESIUM HYDROXIDE-SIMETHICONE (ANTACID-SIMETHICONE) 400-400-40 MG/5 ML SUSPENSION    Take 10 mL by mouth every six (6) hours as needed for Indigestion. ONDANSETRON (ZOFRAN ODT) 4 MG DISINTEGRATING TABLET    Take 1 tablet by mouth every eight (8) hours as needed for Nausea. ONDANSETRON HCL (ZOFRAN, AS HYDROCHLORIDE,) 4 MG TABLET    Take 2 Tabs by mouth every eight (8) hours as needed for Nausea. POLYETHYLENE GLYCOL (MIRALAX) 17 GRAM/DOSE POWDER    Take 17 g by mouth daily.

## 2017-07-18 NOTE — IP AVS SNAPSHOT
Kylah Lennon 
 
 
 [address] [facility] [address] [place] [address] Patient: Beto Kelly MRN: [id number] :1965 Current Discharge Medication List  
  
START taking these medications Dose & Instructions Dispensing Information Comments Morning Noon Evening Bedtime  
 acetaminophen 325 mg tablet Commonly known as:  TYLENOL Your last dose was: Your next dose is:    
   
   
 Dose:  650 mg Take 2 Tabs by mouth every six (6) hours as needed for up to 30 days. Quantity:  120 Tab Refills:  0  
     
   
   
   
  
 amoxicillin-clavulanate 875-125 mg per tablet Commonly known as:  AUGMENTIN Your last dose was: Your next dose is:    
   
   
 Dose:  1 Tab Take 1 Tab by mouth two (2) times a day for 7 days. Quantity:  14 Tab Refills:  0  
     
   
   
   
  
 atorvastatin 20 mg tablet Commonly known as:  LIPITOR Your last dose was: Your next dose is:    
   
   
 Dose:  20 mg Take 1 Tab by mouth daily for 30 days. Quantity:  30 Tab Refills:  0 Saccharomyces boulardii 250 mg capsule Commonly known as:  Yoan Controls Your last dose was: Your next dose is:    
   
   
 Dose:  250 mg Take 1 Cap by mouth two (2) times a day for 7 days. Quantity:  14 Cap Refills:  0  
     
   
   
   
  
 sodium hypochlorite 0.125 % Soln external solution Commonly known as:  [address] Your last dose was: Your next dose is:    
   
   
 Dose:  1 Bottle Apply 473 mL to affected area two (2) times a day. Quantity:  1 Bottle Refills:  0  
     
   
   
   
  
 trimethoprim-sulfamethoxazole 160-800 mg per tablet Commonly known as:  BACTRIM DS Your last dose was: Your next dose is:    
   
   
 Dose:  1 Tab Take 1 Tab by mouth two (2) times a day for 7 days. Quantity:  14 Tab Refills:  0 CONTINUE these medications which have NOT CHANGED Dose & Instructions Dispensing Information Comments Morning Noon Evening Bedtime  
 esomeprazole 20 mg capsule Commonly known as:  NexIUM Your last dose was: Your next dose is:    
   
   
 Dose:  40 mg Take 2 Caps by mouth daily for 30 days. Quantity:  60 Cap Refills:  0  
     
   
   
   
  
 insulin NPH/insulin regular 100 unit/mL (70-30) injection Commonly known as:  NovoLIN 70/30 Your last dose was: Your next dose is:    
   
   
 Dose:  40 Units 40 Units by SubCUTAneous route two (2) times a day. Quantity:  2 Vial  
Refills:  2 PARoxetine 30 mg tablet Commonly known as:  PAXIL Your last dose was: Your next dose is:    
   
   
 Dose:  30 mg Take 1 Tab by mouth daily for 30 days. Quantity:  30 Tab Refills:  0  
     
   
   
   
  
 quinapril 20 mg tablet Commonly known as:  ACCUPRIL Your last dose was: Your next dose is:    
   
   
 Dose:  20 mg Take 1 Tab by mouth daily for 30 days. Quantity:  30 Tab Refills:  0 Where to Get Your Medications Information on where to get these meds will be given to you by the nurse or doctor. ! Ask your nurse or doctor about these medications  
  acetaminophen 325 mg tablet  
 amoxicillin-clavulanate 875-125 mg per tablet  
 atorvastatin 20 mg tablet  
 esomeprazole 20 mg capsule  
 insulin NPH/insulin regular 100 unit/mL (70-30) injection PARoxetine 30 mg tablet  
 quinapril 20 mg tablet Saccharomyces boulardii 250 mg capsule  
 sodium hypochlorite 0.125 % Soln external solution  
 trimethoprim-sulfamethoxazole 160-800 mg per tablet

## 2017-07-18 NOTE — IP AVS SNAPSHOT
Summary of Care Report The Summary of Care report has been created to help improve care coordination. Users with access to [employer] or [address] (Web-based application) may access additional patient information including the Discharge Summary. If you are not currently a [facility] user and need more information, please call the number listed below in the [place] [address] section and ask to be connected with Medical Records. Facility Information Name Address Phone [address] [place] [address] [address] [facility] [address] [phone] Patient Information Patient Name Sex  Ekaterina Patient ([id number]) Female 1965 Discharge Information Admitting Provider Service Area Unit Hobson Castleman, MD / [address] / [phone] Discharge Provider Discharge Date/Time Discharge Disposition Destination (none) 2017 (Pending) AHR (none) Patient Language Language ENGLISH [13] Hospital Problems as of 2017  Reviewed: 2017  3:00 PM by Yony Garcia CRNA Class Noted - Resolved Last Modified POA Active Problems Essential hypertension, benign (Chronic)  12/3/2013 - Present 2017 by Kisha Nguyễn MD Yes Entered by Felecia Hardin MD  
  HLD (hyperlipidemia) (Chronic)  2014 - Present 2017 by Kisha Nguyễn MD Yes Entered by Codie Rojas MD  
  GERD (gastroesophageal reflux disease)  2014 - Present 2017 by Kisha Nguyễn MD Yes Entered by Carlita Damico MD  
  Osteomyelitis [facility])  2017 - Present 2017 by Kenyetta Abdi PA-C Unknown Entered by Kenyetta Abdi PA-C  
  * (Principal)Sepsis [facility])  2017 - Present 2017 by Kisha Nguyễn MD Unknown   Entered by Kisha Nguyễn MD  
 Foot ulcer due to secondary DM ([facility]  7/19/2017 - Present 7/19/2017 by Luis Armando Deluca MD Unknown Entered by Luis Armando Deluca MD  
  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  
  Diabetic retinopathy ([facility]  7/19/2017 - Present 7/19/2017 by Luis Armando Deluca MD Unknown Entered by Luis Armando Deluca MD  
  Anxiety  7/19/2017 - Present 7/19/2017 by Luis Armando Deluca MD Unknown Entered by Luis Armando Deluca MD  
  Leukocytosis  7/19/2017 - Present 7/19/2017 by Luis Armando Deluca MD Unknown Entered by Luis Armando Deluca MD  
  Renal insufficiency  7/19/2017 - Present 7/19/2017 by Luis Armando Deluca MD Unknown Entered by Luis Armando Deluca MD  
  
Non-Hospital Problems as of 7/24/2017  Reviewed: 7/21/2017  3:00 PM by Keith Angeles CRNA Class Noted - Resolved Last Modified Active Problems Type II or unspecified type diabetes mellitus without mention of complication, not stated as uncontrolled (Chronic)  12/3/2013 - Present 5/2/2014 Entered by Bridget Montez MD  
  Vitamin D deficiency (Chronic)  4/20/2014 - Present 5/2/2014 Entered by Tiffany Herrera MD  
  Dehydration  4/20/2014 - Present 4/21/2014 Entered by Evonnie Gosselin Systemic inflammatory response syndrome (SIRS) ([facility]  4/29/2014 - Present 5/2/2014 Entered by Estefanía Sagastume MD  
  Nausea with vomiting  10/22/2014 - Present 10/22/2014 Entered by Fatoumata Page You are allergic to the following Allergen Reactions Promethazine Nausea and Vomiting \"the phenergan made me more nauseated\" Current Discharge Medication List  
  
START taking these medications Dose & Instructions Dispensing Information Comments  
 acetaminophen 325 mg tablet Commonly known as:  TYLENOL Dose:  650 mg Take 2 Tabs by mouth every six (6) hours as needed for up to 30 days. Quantity:  120 Tab Refills:  0  
   
 amoxicillin-clavulanate 875-125 mg per tablet Commonly known as:  AUGMENTIN Dose:  1 Tab Take 1 Tab by mouth two (2) times a day for 7 days. Quantity:  14 Tab Refills:  0  
   
 atorvastatin 20 mg tablet Commonly known as:  LIPITOR Dose:  20 mg Take 1 Tab by mouth daily for 30 days. Quantity:  30 Tab Refills:  0 Saccharomyces boulardii 250 mg capsule Commonly known as:  Yoan Controls Dose:  250 mg Take 1 Cap by mouth two (2) times a day for 7 days. Quantity:  14 Cap Refills:  0  
   
 sodium hypochlorite 0.125 % Soln external solution Commonly known as:  [address] Dose:  1 Bottle Apply 473 mL to affected area two (2) times a day. Quantity:  1 Bottle Refills:  0  
   
 trimethoprim-sulfamethoxazole 160-800 mg per tablet Commonly known as:  BACTRIM DS Dose:  1 Tab Take 1 Tab by mouth two (2) times a day for 7 days. Quantity:  14 Tab Refills:  0 CONTINUE these medications which have NOT CHANGED Dose & Instructions Dispensing Information Comments  
 esomeprazole 20 mg capsule Commonly known as:  NexIUM Dose:  40 mg Take 2 Caps by mouth daily for 30 days. Quantity:  60 Cap Refills:  0  
   
 insulin NPH/insulin regular 100 unit/mL (70-30) injection Commonly known as:  NovoLIN 70/30 Dose:  40 Units 40 Units by SubCUTAneous route two (2) times a day. Quantity:  2 Vial  
Refills:  2 PARoxetine 30 mg tablet Commonly known as:  PAXIL Dose:  30 mg Take 1 Tab by mouth daily for 30 days. Quantity:  30 Tab Refills:  0  
   
 quinapril 20 mg tablet Commonly known as:  ACCUPRIL Dose:  20 mg Take 1 Tab by mouth daily for 30 days. 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 Patient has to bring proof of income and proof of where they reside. The patient will be set up with an follow-up appointment [address] 
[phone] Beronica Scott DPM On 7/31/2017 Arrive by 8:00AM, Appt is at 8:30AM please bring $200 upfront for co-pay. 9[address] [facility] [address] 
[phone] Zainabcarlos LUIZA Scott Go on 7/28/2017 For suture removal [address] 1500 [facility] 
[phone] Discharge Instructions Learning About Foot and Toenail Care Checking your loved one's feet and keeping them clean and soft can help prevent cracks and infection in the skin. This is especially important for people who have diabetes. Keeping toenails trimmedand polished if that's what the person likesalso helps the person feel well-groomed. If the person you care for has diabetes or has foot problems, such as bad bunions and corns, think about taking them to see a podiatrist. This is a doctor who specializes in the care of the feet. Sometimes a podiatrist will come to the home if the person can't go out for visits. Try to take the person for salon pedicures if that is what they want. It's a chance to get out and see people and continue a favorite activity. You can do basic nail care at home. Usually all you need to do is keep the nails clean and at a safe length. How do you trim someone's toenails? Try to trim the person's nails every week. Or check the nails each week to see if they need to be trimmed. It's easiest to trim nails after the person has had a shower or foot bath. It makes the nails softer and easier to trim. Start by gathering your supplies.  You will need toenail clippers and a nail file. You may also need nail polish and nail polish remover. To trim the nails: 
1. Wash and dry your hands. You don't need to wear gloves. 2. Use nail polish remover to take off any polish. 3. Hold the person's foot and toe steady with one hand while you trim the nail with your other hand. Trim the nails straight across. Leave the nails a little longer at the corners so that the sharp ends don't cut into the skin. 4. Keep the nails no longer than the tip of the toes. 5. Let the nails dry if they are still damp and soft. 6. Use a nail file to gently smooth the edges of the nails, especially at the corners. They may be sharp after the nails are cut straight. 7. Apply nail polish, if the person wants it. If the person's nails are thick and discolored, it may be safest to have a podiatrist cut them. What else do you need to know? When you're caring for someone's nails, it is important to remember not to trim or cut the cuticles. A minor cut in a cuticle could lead to an infection. Wash the feet daily in the shower or bath or in a basin made for washing feet. It's extra important to wash the feet carefully if the person has diabetes. After washing the feet, dry gently. Put lotion on the feet, especially on the heels. But don't put it between the toes. If the person doesn't have diabetes and you see signs of athlete's foot (such as dry, cracking, or itchy skin between the toes), you can try an over-the-counter medicine. These medicines can kill the fungus that causes athlete's foot. If the problem doesn't go away, talk to the person's doctor. Look every day for cuts or signs of infection, such as pain, swelling, redness, or warmth. If you see any of these signsespecially in someone who has diabetescall the doctor. Where can you learn more? Go to http://linda-sivan.info/. Enter K270 in the search box to learn more about \"Learning About Foot and Toenail Care. \" 
 Current as of: March 17, 2017 Content Version: 11.3 © 0301-2119 [employer]. Care instructions adapted under license by [employer] (which disclaims liability or warranty for this information). If you have questions about a medical condition or this instruction, always ask your healthcare professional. [address] any warranty or liability for your use of this information. Diabetes Foot Health: Care Instructions Your Care Instructions When you have diabetes, your feet need extra care and attention. Diabetes can damage the nerve endings and blood vessels in your feet, making you less likely to notice when your feet are injured. Diabetes also limits your body's ability to fight infection and get blood to areas that need it. If you get a minor foot injury, it could become an ulcer or a serious infection. With good foot care, you can prevent most of these problems. Caring for your feet can be quick and easy. Most of the care can be done when you are bathing or getting ready for bed. Follow-up care is a key part of your treatment and safety. Be sure to make and go to all appointments, and call your doctor if you are having problems. Its also a good idea to know your test results and keep a list of the medicines you take. How can you care for yourself at home? · Keep your blood sugar close to normal by watching what and how much you eat, monitoring blood sugar, taking medicines if prescribed, and getting regular exercise. · Do not smoke. Smoking affects blood flow and can make foot problems worse. If you need help quitting, talk to your doctor about stop-smoking programs and medicines. These can increase your chances of quitting for good. · Eat a diet that is low in fats. High fat intake can cause fat to build up in your blood vessels and decrease blood flow. · Inspect your feet daily for blisters, cuts, cracks, or sores.  If you cannot see well, use a mirror or have someone help you. · Take care of your feet: 
[facility] your feet every day. Use warm (not hot) water. Check the water temperature with your wrists or other part of your body, not your feet. ¨ Dry your feet well. Pat them dry. Do not rub the skin on your feet too hard. Dry well between your toes. If the skin on your feet stays moist, bacteria or a fungus can grow, which can lead to infection. ¨ Keep your skin soft. Use moisturizing skin cream to keep the skin on your feet soft and prevent calluses and cracks. But do not put the cream between your toes, and stop using any cream that causes a rash. ¨ Clean underneath your toenails carefully. Do not use a sharp object to clean underneath your toenails. Use the blunt end of a nail file or other rounded tool. ¨ Trim and file your toenails straight across to prevent ingrown toenails. Use a nail clipper, not scissors. Use an emery board to smooth the edges. · Change socks daily. Socks without seams are best, because seams often rub the feet. You can find socks for people with diabetes from specialty catalogs. · Look inside your shoes every day for things like gravel or torn linings, which could cause blisters or sores. · Buy shoes that fit well: 
¨ Look for shoes that have plenty of space around the toes. This helps prevent bunions and blisters. ¨ Try on shoes while wearing the kind of socks you will usually wear with the shoes. ¨ Avoid plastic shoes. They may rub your feet and cause blisters. Good shoes should be made of materials that are flexible and breathable, such as leather or cloth. ¨ Break in new shoes slowly by wearing them for no more than an hour a day for several days. Take extra time to check your feet for red areas, blisters, or other problems after you wear new shoes. · Do not go barefoot.  Do not wear sandals, and do not wear shoes with very thin soles. Thin soles are easy to puncture. They also do not protect your feet from hot pavement or cold weather. · Have your doctor check your feet during each visit. If you have a foot problem, see your doctor. Do not try to treat an early foot problem at home. Home remedies or treatments that you can buy without a prescription (such as corn removers) can be harmful. · Always get early treatment for foot problems. A minor irritation can lead to a major problem if not properly cared for early. When should you call for help? Call your doctor now or seek immediate medical care if: 
· You have a foot sore, an ulcer or break in the skin that is not healing after 4 days, bleeding corns or calluses, or an ingrown toenail. · You have blue or black areas, which can mean bruising or blood flow problems. · You have peeling skin or tiny blisters between your toes or cracking or oozing of the skin. · You have a fever for more than 24 hours and a foot sore. · You have new numbness or tingling in your feet that does not go away after you move your feet or change positions. · You have unexplained or unusual swelling of the foot or ankle. Watch closely for changes in your health, and be sure to contact your doctor if: 
· You cannot do proper foot care. Where can you learn more? Go to http://linda-sivan.info/. Enter A739 in the search box to learn more about \"Diabetes Foot Health: Care Instructions. \" Current as of: March 13, 2017 Content Version: 11.3 © 8019-9804 [employer]. Care instructions adapted under license by [employer] (which disclaims liability or warranty for this information). If you have questions about a medical condition or this instruction, always ask your healthcare professional. [place] [address] any warranty or liability for your use of this information. Giving a Mixed-Dose Insulin Shot: Care Instructions Your Care Instructions Insulin is normally made by the pancreas, a gland behind the stomach. In people with diabetes, the pancreas no longer makes enough insulin or it stops making it. Without insulin, your blood sugar level rises to dangerous levels. When this happens, you need insulin shots to keep your blood sugar in your target range. You may be nervous giving a shot at first. But soon, giving yourself a shot will become routine. It is quite easy to learn how to draw up insulin into a syringe and give the shot. The needles you use to give the insulin injections are very thin, and most people who have diabetes say that they do not even feel the needle enter the skin. Even if you do feel the injection, the sting of the shot is not bad and does not last long. More than half a million people do it every day. You can too. Follow-up care is a key part of your treatment and safety. Be sure to make and go to all appointments, and call your doctor if you are having problems. It's also a good idea to know your test results and keep a list of the medicines you take. How can you care for yourself at home? Getting started · Gather your supplies. You will need an insulin syringe, your bottles of insulin, and an alcohol wipe or a cotton ball dipped in alcohol. Keep your supplies in a bag or kit so you can carry the supplies wherever you go. · Check the labels on the bottles and contents. Read and follow all instructions on the label, including how to store the insulin and how long the insulin will last. 
· Wash your hands with soap and running water. Dry them well. Preparing the shot For a mixed-dose insulin shot: 1. Roll the insulin bottles gently between your hands. This will warm the insulin if you have kept the bottle in the refrigerator. Roll the cloudy insulin bottle until the white powder has dissolved and the insulin is mixed.  
2. Wipe the rubber lid of both insulin bottles with an alcohol wipe or a cotton ball dipped in alcohol. (If you are using a bottle for the first time, remove the protective cover over the rubber lid.) Let the top dry before you remove any insulin. 3. Remove the plastic cap from the needle on your insulin syringe. Take care not to touch the needle. 4. Pull the plunger back on your insulin syringe, and draw air into the syringe equal to the number of units of cloudy insulin to be given. 5. Push the needle of the syringe into the rubber lid of the cloudy insulin bottle. Push the plunger of the syringe to force the air into the bottle. This equalizes the pressure in the bottle when you later remove the dose of insulin. Remove the needle from the bottle, but do not draw up any insulin. 6. Pull the plunger of the syringe back and draw air into the syringe equal to the number of units of clear insulin to be given. 7. Push the needle of the syringe into the rubber lid of the clear insulin bottle. Push the plunger to force the air into the bottle. Leave the needle in the bottle. 8. Turn the bottle and syringe upside down, and hold them in one hand. Position the tip of the needle so that it is below the surface of insulin in the bottle. Pull back the plunger to fill the syringe with slightly more than the correct number of units of clear insulin to be given. 9. Tap the outside (barrel) of the syringe so that trapped air bubbles move into the needle area. Push the air bubbles back into the bottle. Make sure that you have the correct number of units of insulin in your syringe. Remove the needle from the clear insulin bottle. 10. Insert the needle into the rubber lid of the cloudy insulin bottle. Do not push the plunger, because this would force clear insulin into your cloudy insulin bottle. If clear insulin is mixed in the bottle of cloudy, it will change the action of your other doses from that bottle. 11. Turn the bottle and syringe upside down and hold them in one hand. Position the tip of the needle so that it is below the surface of insulin in the bottle. Slowly pull back the plunger of the syringe to fill the syringe with the correct number of units of cloudy insulin to be given. This will keep air bubbles from entering the syringe. Remove the needle from the bottle. 12. You should now have the total number of units for the clear and cloudy insulin in your syringe. For example, if 10 units of clear and 15 units of cloudy are needed, you should have 25 units in your syringe. Now you are ready to give the shot. Giving the shot Before giving your shot: 
1. Use alcohol to clean the skin before you give the shot. Let it dry. 2. Slightly pinch a fold of skin between your fingers and thumb of one hand. 3. Hold the syringe like a pencil close to the site, keeping your fingers off the plunger. It is usually recommended to place the syringe at a 90-degree angle to the shot site, standing straight up from the skin. 4. Bend your wrist, and quickly push the needle all the way into the pinched-up area. 5. Push the plunger of the syringe all the way in so the insulin goes into the fatty tissue. 6. Take the needle out at the same angle that you inserted it. If you bleed a little, apply pressure over the shot area with your finger, a cotton ball, or a piece of gauze. Do not rub the area. 7. Replace the cover over the needle and dispose of the needle safely. Do not use the same needle more than one time. Where to give the shot You can inject insulin into: · The belly, but at least 2 inches from the belly button. This is thought to be the best place to inject insulin. · The top outer part of the thighs. Insulin usually is absorbed more slowly from this site, unless you exercise soon after giving the shot. · The outside of the upper arms or the buttocks. You may need help giving shots in these areas.  
Your doctor may advise you to give your shots in different places on your body each day. This is called site rotation. Make sure you talk to your doctor about how to do this safely. If you rotate sites, use the same site at the same time of each day. For example, each day: · At breakfast, give the shot in one of your arms. · At lunch, give the shot in one of your legs. · At dinner, give the shot in your belly. Slightly change the spot where you give an insulin shot each time you do it. For example, use five different places on the right upper arm, then use five places on the left upper arm. Using the same spot every time can cause bumps or pits in the skin and make the shots hurt more. It may also slow down how the insulin is absorbed into your body. Where can you learn more? Go to http://linda-sivan.info/. Enter D725 in the search box to learn more about \"Giving a Mixed-Dose Insulin Shot: Care Instructions. \" Current as of: March 13, 2017 Content Version: 11.3 © 4937-5099 [employer]. Care instructions adapted under license by [employer] (which disclaims liability or warranty for this information). If you have questions about a medical condition or this instruction, always ask your healthcare professional. [address] any warranty or liability for your use of this information. [employer] Activation Thank you for requesting access to [employer]. Please follow the instructions below to securely access and download your online medical record. [employer] allows you to send messages to your doctor, view your test results, renew your prescriptions, schedule appointments, and more. How Do I Sign Up? 1. In your internet browser, go to [email or web address] 
2. Click on the First Time User? Click Here link in the Sign In box. You will be redirect to the New Member Sign Up page. 3. Enter your [employer] Access Code exactly as it appears below.  You will not need to use this code after youve completed the sign-up process. If you do not sign up before the expiration date, you must request a new code. BringIt Access Code: [email or web address] Expires: 10/16/2017  4:37 PM (This is the date your BringIt access code will ) 4. Enter the last four digits of your Social Security Number (xxxx) and Date of Birth (mm/dd/yyyy) as indicated and click Submit. You will be taken to the next sign-up page. 5. Create a BringIt ID. This will be your BringIt login ID and cannot be changed, so think of one that is secure and easy to remember. 6. Create a BringIt password. You can change your password at any time. 7. Enter your Password Reset Question and Answer. This can be used at a later time if you forget your password. 8. Enter your e-mail address. You will receive e-mail notification when new information is available in [address]. 9. Click Sign Up. You can now view and download portions of your medical record. 10. Click the Download Summary menu link to download a portable copy of your medical information. Additional Information If you have questions, please visit the Frequently Asked Questions section of the BringIt website at [email or web address] Remember, BringIt is NOT to be used for urgent needs. For medical emergencies, dial 911. Patient armband removed and given to patient to take home. Patient was informed of the privacy risks if armband lost or stolen DISCHARGE SUMMARY from Nurse The following personal items are in your possession at time of discharge: 
 
Dental Appliances: None Visual Aid: None Home Medications: None Jewelry: None Clothing: Nunu Figueroa Other Valuables: Purse (Umbrella) PATIENT INSTRUCTIONS: 
 
 
F-face looks uneven A-arms unable to move or move unevenly S-speech slurred or non-existent T-time-call 911 as soon as signs and symptoms begin-DO NOT go Back to bed or wait to see if you get better-TIME IS BRAIN. Warning Signs of HEART ATTACK Call 911 if you have these symptoms: 
? Chest discomfort. Most heart attacks involve discomfort in the center of the chest that lasts more than a few minutes, or that goes away and comes back. It can feel like uncomfortable pressure, squeezing, fullness, or pain. ? Discomfort in other areas of the upper body. Symptoms can include pain or discomfort in one or both arms, the back, neck, jaw, or stomach. ? Shortness of breath with or without chest discomfort. ? Other signs may include breaking out in a cold sweat, nausea, or lightheadedness. Don't wait more than five minutes to call [address]! Fast action can save your life. Calling 911 is almost always the fastest way to get lifesaving treatment. Emergency Medical Services staff can begin treatment when they arrive  up to an hour sooner than if someone gets to the hospital by car. The discharge information has been reviewed with the patient. The patient verbalized understanding. Discharge medications reviewed with the patient and appropriate educational materials and side effects teaching were provided. Chart Review Routing History Recipient Method Report Sent By Jessica lamas Fax: [phone] [facility] Anastasia See [[id number]] 12/1/2013  7:26 PM 12/01/2013 Gemma Casey MD  
Fax: [phone] Phone: [phone] Fax IP Auto Routed Harriet Mnuoz MD [[id number]] 12/5/2013 10:56 AM 12/05/2013 Isaiah Sauceda MD  
Fax: [phone] Phone: [phone] Fax IP Auto Routed Tanner Huang MD [[id number]] 12/5/2013 10:56 AM 12/05/2013 Dalila Urbina MD  
Phone: [phone] [employer] Routed Tanner Huang MD [[id number]] 12/5/2013 10:56 AM 12/05/2013 [facility] Fax: [phone] [facility] Janine Rodriguez [[id number]] 3/9/2014 10:01 PM 03/09/2014 Dalila Urbina MD  
Phone: [phone] In [employer] Routed Tanner Huang MD [[id number]] 4/21/2014 10:23 PM 04/21/2014 Jane Springer MD  
Fax: [phone] Phone: [phone] Fax IP Auto Routed Tanner Huang MD [[id number]] 4/21/2014 10:23 PM 04/21/2014 Beau Sofia MD  
Fax: [phone] Phone: [phone] Fax IP Auto Routed Tanner Huang MD [[id number]] 4/21/2014 10:23 PM 04/21/2014 Dalila Urbina MD  
Phone: [phone] In Basket Notes/Transcriptions Andrew Avalos [[id number]] 5/5/2014 11:51 AM 05/03/2014 Shilpa Su MD  
Phone: [phone] In Basket Notes/Transcriptions Andrew Avalos [[id number]] 5/5/2014 11:51 AM 05/03/2014 Bailey Huggins MD  
Fax: [phone] Phone: [phone] Fax Notes/Transcriptions Andrew Avalos [[id number]] 5/5/2014 11:51 AM 05/03/2014 Antonia Guan MD  
Fax: [phone] Phone: [phone] Fax Notes/Transcriptions Andrew Avalos [[id number]] 5/5/2014 11:51 AM 05/03/2014

## 2017-07-18 NOTE — ED TRIAGE NOTES
Patient presents on advice of her PCP for treatment of possible diabetic ulcer to left 2nd toe. Toe appears purple in appearance.

## 2017-07-18 NOTE — ED NOTES
Pt transported to  [facility] by [facility]

## 2017-07-19 ENCOUNTER — APPOINTMENT (OUTPATIENT)
Dept: MRI IMAGING | Age: 52
DRG: 854 | End: 2017-07-19
Attending: STUDENT IN AN ORGANIZED HEALTH CARE EDUCATION/TRAINING PROGRAM
Payer: SUBSIDIZED

## 2017-07-19 PROBLEM — A41.9 SEPSIS (HCC): Status: ACTIVE | Noted: 2017-07-19

## 2017-07-19 PROBLEM — F41.9 ANXIETY: Status: ACTIVE | Noted: 2017-07-19

## 2017-07-19 PROBLEM — N28.9 RENAL INSUFFICIENCY: Status: ACTIVE | Noted: 2017-07-19

## 2017-07-19 PROBLEM — E13.621 FOOT ULCER DUE TO SECONDARY DM (HCC): Status: ACTIVE | Noted: 2017-07-19

## 2017-07-19 PROBLEM — E11.319 DIABETIC RETINOPATHY (HCC): Status: ACTIVE | Noted: 2017-07-19

## 2017-07-19 PROBLEM — D72.829 LEUKOCYTOSIS: Status: ACTIVE | Noted: 2017-07-19

## 2017-07-19 PROBLEM — L97.509 FOOT ULCER DUE TO SECONDARY DM (HCC): Status: ACTIVE | Noted: 2017-07-19

## 2017-07-19 LAB
ANION GAP BLD CALC-SCNC: 8 MMOL/L (ref 3–18)
APTT PPP: 28.6 SEC (ref 23–36.4)
ATRIAL RATE: 77 BPM
BASOPHILS # BLD AUTO: 0 K/UL (ref 0–0.1)
BASOPHILS # BLD: 0 % (ref 0–2)
BUN SERPL-MCNC: 17 MG/DL (ref 7–18)
BUN/CREAT SERPL: 17 (ref 12–20)
CALCIUM SERPL-MCNC: 8.9 MG/DL (ref 8.5–10.1)
CALCULATED P AXIS, ECG09: 66 DEGREES
CALCULATED R AXIS, ECG10: 16 DEGREES
CALCULATED T AXIS, ECG11: 25 DEGREES
CHLORIDE SERPL-SCNC: 108 MMOL/L (ref 100–108)
CO2 SERPL-SCNC: 23 MMOL/L (ref 21–32)
CREAT SERPL-MCNC: 1 MG/DL (ref 0.6–1.3)
CRP SERPL-MCNC: 5.2 MG/DL (ref 0–0.3)
DIAGNOSIS, 93000: NORMAL
DIFFERENTIAL METHOD BLD: ABNORMAL
EOSINOPHIL # BLD: 0.1 K/UL (ref 0–0.4)
EOSINOPHIL NFR BLD: 1 % (ref 0–5)
ERYTHROCYTE [DISTWIDTH] IN BLOOD BY AUTOMATED COUNT: 12.4 % (ref 11.6–14.5)
ERYTHROCYTE [SEDIMENTATION RATE] IN BLOOD: 72 MM/HR (ref 0–30)
EST. AVERAGE GLUCOSE BLD GHB EST-MCNC: 243 MG/DL
GLUCOSE BLD STRIP.AUTO-MCNC: 132 MG/DL (ref 70–110)
GLUCOSE BLD STRIP.AUTO-MCNC: 141 MG/DL (ref 70–110)
GLUCOSE BLD STRIP.AUTO-MCNC: 175 MG/DL (ref 70–110)
GLUCOSE BLD STRIP.AUTO-MCNC: 271 MG/DL (ref 70–110)
GLUCOSE SERPL-MCNC: 128 MG/DL (ref 74–99)
HBA1C MFR BLD: 10.1 % (ref 4.2–5.6)
HCT VFR BLD AUTO: 28.9 % (ref 35–45)
HGB BLD-MCNC: 10 G/DL (ref 12–16)
INR PPP: 1.1 (ref 0.8–1.2)
LYMPHOCYTES # BLD AUTO: 28 % (ref 21–52)
LYMPHOCYTES # BLD: 4 K/UL (ref 0.9–3.6)
MAGNESIUM SERPL-MCNC: 1.8 MG/DL (ref 1.6–2.6)
MCH RBC QN AUTO: 29.2 PG (ref 24–34)
MCHC RBC AUTO-ENTMCNC: 34.6 G/DL (ref 31–37)
MCV RBC AUTO: 84.3 FL (ref 74–97)
MONOCYTES # BLD: 0.7 K/UL (ref 0.05–1.2)
MONOCYTES NFR BLD AUTO: 5 % (ref 3–10)
NEUTS SEG # BLD: 9.5 K/UL (ref 1.8–8)
NEUTS SEG NFR BLD AUTO: 66 % (ref 40–73)
P-R INTERVAL, ECG05: 162 MS
PLATELET # BLD AUTO: 301 K/UL (ref 135–420)
PMV BLD AUTO: 10.8 FL (ref 9.2–11.8)
POTASSIUM SERPL-SCNC: 3.1 MMOL/L (ref 3.5–5.5)
PROTHROMBIN TIME: 13.6 SEC (ref 11.5–15.2)
Q-T INTERVAL, ECG07: 404 MS
QRS DURATION, ECG06: 64 MS
QTC CALCULATION (BEZET), ECG08: 457 MS
RBC # BLD AUTO: 3.43 M/UL (ref 4.2–5.3)
SODIUM SERPL-SCNC: 139 MMOL/L (ref 136–145)
VENTRICULAR RATE, ECG03: 77 BPM
WBC # BLD AUTO: 14.2 K/UL (ref 4.6–13.2)

## 2017-07-19 PROCEDURE — 65270000029 HC RM PRIVATE

## 2017-07-19 PROCEDURE — 74011250637 HC RX REV CODE- 250/637: Performed by: FAMILY MEDICINE

## 2017-07-19 PROCEDURE — 85025 COMPLETE CBC W/AUTO DIFF WBC: CPT | Performed by: FAMILY MEDICINE

## 2017-07-19 PROCEDURE — 74011250636 HC RX REV CODE- 250/636: Performed by: STUDENT IN AN ORGANIZED HEALTH CARE EDUCATION/TRAINING PROGRAM

## 2017-07-19 PROCEDURE — 93005 ELECTROCARDIOGRAM TRACING: CPT

## 2017-07-19 PROCEDURE — 74011636637 HC RX REV CODE- 636/637: Performed by: FAMILY MEDICINE

## 2017-07-19 PROCEDURE — 86140 C-REACTIVE PROTEIN: CPT

## 2017-07-19 PROCEDURE — 80048 BASIC METABOLIC PNL TOTAL CA: CPT | Performed by: FAMILY MEDICINE

## 2017-07-19 PROCEDURE — 85610 PROTHROMBIN TIME: CPT | Performed by: STUDENT IN AN ORGANIZED HEALTH CARE EDUCATION/TRAINING PROGRAM

## 2017-07-19 PROCEDURE — 85652 RBC SED RATE AUTOMATED: CPT

## 2017-07-19 PROCEDURE — 36415 COLL VENOUS BLD VENIPUNCTURE: CPT | Performed by: FAMILY MEDICINE

## 2017-07-19 PROCEDURE — 83735 ASSAY OF MAGNESIUM: CPT | Performed by: FAMILY MEDICINE

## 2017-07-19 PROCEDURE — 83036 HEMOGLOBIN GLYCOSYLATED A1C: CPT | Performed by: FAMILY MEDICINE

## 2017-07-19 PROCEDURE — 93922 UPR/L XTREMITY ART 2 LEVELS: CPT

## 2017-07-19 PROCEDURE — 74011000258 HC RX REV CODE- 258: Performed by: STUDENT IN AN ORGANIZED HEALTH CARE EDUCATION/TRAINING PROGRAM

## 2017-07-19 PROCEDURE — 73718 MRI LOWER EXTREMITY W/O DYE: CPT

## 2017-07-19 PROCEDURE — 82962 GLUCOSE BLOOD TEST: CPT

## 2017-07-19 PROCEDURE — 74011636637 HC RX REV CODE- 636/637: Performed by: STUDENT IN AN ORGANIZED HEALTH CARE EDUCATION/TRAINING PROGRAM

## 2017-07-19 PROCEDURE — 85730 THROMBOPLASTIN TIME PARTIAL: CPT | Performed by: STUDENT IN AN ORGANIZED HEALTH CARE EDUCATION/TRAINING PROGRAM

## 2017-07-19 PROCEDURE — 74011250637 HC RX REV CODE- 250/637: Performed by: STUDENT IN AN ORGANIZED HEALTH CARE EDUCATION/TRAINING PROGRAM

## 2017-07-19 RX ORDER — ATORVASTATIN CALCIUM 20 MG/1
20 TABLET, FILM COATED ORAL DAILY
Status: DISCONTINUED | OUTPATIENT
Start: 2017-07-19 | End: 2017-07-24 | Stop reason: HOSPADM

## 2017-07-19 RX ORDER — POTASSIUM CHLORIDE 20 MEQ/1
40 TABLET, EXTENDED RELEASE ORAL ONCE
Status: COMPLETED | OUTPATIENT
Start: 2017-07-19 | End: 2017-07-19

## 2017-07-19 RX ORDER — ACETAMINOPHEN 325 MG/1
650 TABLET ORAL
Status: DISCONTINUED | OUTPATIENT
Start: 2017-07-18 | End: 2017-07-24 | Stop reason: HOSPADM

## 2017-07-19 RX ORDER — INSULIN GLARGINE 100 [IU]/ML
15 INJECTION, SOLUTION SUBCUTANEOUS
Status: DISCONTINUED | OUTPATIENT
Start: 2017-07-19 | End: 2017-07-21

## 2017-07-19 RX ORDER — SODIUM CHLORIDE 9 MG/ML
125 INJECTION, SOLUTION INTRAVENOUS CONTINUOUS
Status: DISCONTINUED | OUTPATIENT
Start: 2017-07-19 | End: 2017-07-21

## 2017-07-19 RX ADMIN — PIPERACILLIN SODIUM,TAZOBACTAM SODIUM 3.38 G: 3; .375 INJECTION, POWDER, FOR SOLUTION INTRAVENOUS at 11:14

## 2017-07-19 RX ADMIN — INSULIN LISPRO 6 UNITS: 100 INJECTION, SOLUTION INTRAVENOUS; SUBCUTANEOUS at 00:29

## 2017-07-19 RX ADMIN — PIPERACILLIN SODIUM,TAZOBACTAM SODIUM 3.38 G: 3; .375 INJECTION, POWDER, FOR SOLUTION INTRAVENOUS at 17:36

## 2017-07-19 RX ADMIN — PAROXETINE HYDROCHLORIDE 30 MG: 20 TABLET, FILM COATED ORAL at 00:31

## 2017-07-19 RX ADMIN — SODIUM CHLORIDE 125 ML/HR: 4.5 INJECTION, SOLUTION INTRAVENOUS at 00:35

## 2017-07-19 RX ADMIN — QUINAPRIL 20 MG: 20 TABLET ORAL at 00:45

## 2017-07-19 RX ADMIN — INSULIN LISPRO 3 UNITS: 100 INJECTION, SOLUTION INTRAVENOUS; SUBCUTANEOUS at 12:31

## 2017-07-19 RX ADMIN — PIPERACILLIN SODIUM,TAZOBACTAM SODIUM 3.38 G: 3; .375 INJECTION, POWDER, FOR SOLUTION INTRAVENOUS at 00:31

## 2017-07-19 RX ADMIN — SODIUM CHLORIDE 125 ML/HR: 900 INJECTION, SOLUTION INTRAVENOUS at 10:24

## 2017-07-19 RX ADMIN — POTASSIUM CHLORIDE 40 MEQ: 20 TABLET, EXTENDED RELEASE ORAL at 08:44

## 2017-07-19 RX ADMIN — VANCOMYCIN HYDROCHLORIDE 1500 MG: 10 INJECTION, POWDER, LYOPHILIZED, FOR SOLUTION INTRAVENOUS at 11:52

## 2017-07-19 RX ADMIN — PANTOPRAZOLE SODIUM 40 MG: 40 TABLET, DELAYED RELEASE ORAL at 08:44

## 2017-07-19 RX ADMIN — PIPERACILLIN SODIUM,TAZOBACTAM SODIUM 3.38 G: 3; .375 INJECTION, POWDER, FOR SOLUTION INTRAVENOUS at 05:39

## 2017-07-19 RX ADMIN — INSULIN GLARGINE 15 UNITS: 100 INJECTION, SOLUTION SUBCUTANEOUS at 00:27

## 2017-07-19 RX ADMIN — ATORVASTATIN CALCIUM 20 MG: 20 TABLET, FILM COATED ORAL at 08:44

## 2017-07-19 NOTE — PROGRESS NOTES
Intern Progress Note  [facility]       Patient: Dequan Angel MRN: [id number]  CSN: [id number]    YOB: 1965  Age: 46 y.o. Sex: female    DOA: 7/18/2017 LOS:  LOS: 1 day                    Subjective:     Ms. Yolanda Philip was resting in bed comfortably this morning. She had no complaints. Endorsed sensation in her feet and toes, denied pain. Endorsed occasional tingling in her hands and feet. Denied chills, fever. Review of Systems   Constitutional: Negative for chills and fever. Eyes: Negative for blurred vision and double vision. Respiratory: Negative for cough, shortness of breath and wheezing. Cardiovascular: Negative for chest pain and palpitations. Gastrointestinal: Negative for abdominal pain, constipation, diarrhea, nausea and vomiting. Genitourinary: Negative for dysuria. Skin: Negative for rash. Neurological: Negative for dizziness and headaches. Objective:      Patient Vitals for the past 24 hrs:   Temp Pulse Resp BP SpO2   07/19/17 0049 99.3 °F (37.4 °C) 84 18 (!) 154/93 100 %   07/18/17 2021 98.7 °F (37.1 °C) 91 18 144/89 100 %   07/18/17 1912 - - - - 100 %   07/18/17 1830 - - - (!) 167/91 100 %   07/18/17 1817 - - - - 100 %   07/18/17 1816 - - - 156/89 -   07/18/17 1518 99.6 °F (37.6 °C) 99 18 106/73 100 %       No intake or output data in the 24 hours ending 07/19/17 0727    Physical Exam   Constitutional: She appears well-developed and well-nourished. No distress. HENT:   Head: Normocephalic and atraumatic. Cardiovascular: Normal rate and regular rhythm. Exam reveals no gallop and no friction rub. No murmur heard. Distal pulses in foot palpable B/L   Pulmonary/Chest: Effort normal and breath sounds normal. No respiratory distress. She has no wheezes. She has no rales. Abdominal: Soft. Bowel sounds are normal. She exhibits no distension. There is no tenderness. Musculoskeletal: She exhibits no edema or deformity.    Neurological: She is alert. Skin: Skin is warm. She is not diaphoretic. Lab/Data Reviewed:  BMP:   Lab Results   Component Value Date/Time     07/19/2017 03:05 AM    K 3.1 (L) 07/19/2017 03:05 AM     07/19/2017 03:05 AM    CO2 23 07/19/2017 03:05 AM    AGAP 8 07/19/2017 03:05 AM     (H) 07/19/2017 03:05 AM    BUN 17 07/19/2017 03:05 AM    CREA 1.00 07/19/2017 03:05 AM    GFRAA >60 07/19/2017 03:05 AM    GFRNA 58 (L) 07/19/2017 03:05 AM     CBC:   Lab Results   Component Value Date/Time    WBC 14.2 (H) 07/19/2017 03:05 AM    HGB 10.0 (L) 07/19/2017 03:05 AM    HCT 28.9 (L) 07/19/2017 03:05 AM     07/19/2017 03:05 AM        Scheduled Medications Reviewed:  Current Facility-Administered Medications   Medication Dose Route Frequency    insulin glargine (LANTUS) injection 15 Units  15 Units SubCUTAneous QHS    atorvastatin (LIPITOR) tablet 20 mg  20 mg Oral DAILY    potassium chloride (K-DUR, KLOR-CON) SR tablet 40 mEq  40 mEq Oral ONCE    insulin lispro (HUMALOG) injection   SubCUTAneous Q6H    PARoxetine (PAXIL) tablet 30 mg  30 mg Oral QHS    quinapril (ACCUPRIL) tablet 20 mg  20 mg Oral QHS    0.45% sodium chloride infusion  125 mL/hr IntraVENous CONTINUOUS    heparin (porcine) injection 5,000 Units  5,000 Units SubCUTAneous ONCE    pantoprazole (PROTONIX) tablet 40 mg  40 mg Oral DAILY    piperacillin-tazobactam (ZOSYN) 3.375 g in 0.9% sodium chloride (MBP/ADV) 100 mL MBP  3.375 g IntraVENous Q6H         Imaging, microbiology, and EKG/Telemetry: Foot XR 07/19/2017  IMPRESSION:     Marked soft tissue swelling with poor visualization of the cortex of the  terminal tuft of the second digit. Osteomyelitis is not excluded. As clinically  indicated follow-up MR of the foot or nuclear isotope infection scan could be  obtained for confirmation. Assessment/Plan   46 y. o. female with PMH HTN, Insulin dependent Diabetes type 2, GERD, insomnia  now presenting with complaint of diabetic foot ulceration.       Sepsis with source of diabetic foot ulceration (2/4 SIRS criteria): Leukocytosis at 13.9, tachycardia @ 99. Lactate 1.4 on admission. XR on admission indicative of marked soft tissue swelling with poor visualization of the cortex of the terminal tuft of the second digit. - Podiatry to be consulted   - Follow up blood, wound and urine cultures. UA in ED not suggestive for infection  - Vancomycin dosed per pharmacy  - Zosyn 3.375 q 6H   - Daily CBC to monitor leukocytosis   - 1/2 Normal saline @ 125 cc/hr for maintenance while NPO  - NPO pending podiatry recommendations   - MRI of foot to further assess for osteomyelitis  - SILVIA to assess vasculature of foot and leg  - FU EKG, INR, PT, PTT in preparation for potential surgery       Uncontrolled DM type 2, insulin dependent and retinopathy: Patient is not able to afford insulin and has been dependent on samples from her PCP. She is getting connected to the [employer]. She has not used her insulin for the last week. - Holding novolog and starting lantus 15 units at reduced dosing due to NPO status   - A1C 10.1  - Correctional scale with Glucose ACHS       Hypertension: Goal < 140/90   - Continue with quinapril 20 mg daily      Renal insufficiency: Last outpatient Cr 1.5; Cr this admission 1.13>1.0. GFR  - Daily BMP       GERD: stable   - Holding nexium and continue with protonix while inpatient       Insomnia/anxiety:   - Paxil to be continued nightly from home med     Diet: NPO at midnight   DVT Prophylaxis: heparin and SCD's     Disposition and anticipated LOS: 2+ midnights, patient unable to afford medications and has been referred to [facility] [place] [id number] clinic.  Will consult CM to ensure acceptance for medications management and follow up      Jania Arana MD  PGY-1 [address] [facility]  07/19/17 7:27 AM

## 2017-07-19 NOTE — ROUTINE PROCESS
TRANSFER - IN REPORT:    Verbal report received from Claudia Lai RN(name) on Shawnee Dempsey  being received from  [facility] (unit) for routine progression of care      Report consisted of patients Situation, Background, Assessment and   Recommendations(SBAR). Information from the following report(s) SBAR and Kardex was reviewed with the receiving nurse. Opportunity for questions and clarification was provided. Patient arrived unit at 2008    Assessment completed upon patients arrival to unit and care assumed.

## 2017-07-19 NOTE — PROGRESS NOTES
Spoke with Attending PFM. He does not want to give pt diet order yet. In case Dr. Abhijit Lopez wants to take to surgery.

## 2017-07-19 NOTE — DIABETES MGMT
Diabetes Patient/Family Education Record    Factors That  May Influence Patients Ability  to Learn or  Comply With  Recommendations:    []   Language barrier    []   Cultural needs   []   Motivation    []   Cognitive limitation    []   Physical   [x]   Education    []   Physiological factors   []   Hearing/vision/speaking impairment   []   Samaritan beliefs    []   Financial factors   []  Other:   []  No factors identified at this time.      Person Instructed:   []   Patient   []   Family   []  Other     Preference for Learning:   [x]   Verbal   [x]   Written   []  Demonstration     Level of Comprehension & Competence:    []  Good                                      [x] Fair                                     []  Poor                             [x]  Needs Reinforcement   [x]  Teachback completed    Education Component:   [x]  Medication management,    [x]  Nutritional management including the role of carbohydrate intake   []  Exercise   []  Signs, symptoms, and treatment of hyperglycemia and hypoglycemia   [] Treatment of hyperglycemia and hypoglycemia   []  Importance of blood glucose monitoring and how to obtain a blood glucose meter    []  Instruction on use of blood glucose meter   [x]  Discuss the importance of HbA1C monitoring and provide patient with  results   []  Sick day guidelines   []  Proper use and disposal of lancets, needles, syringes or insulin pens (if appropriate)   []  Potential long-term complications (retinopathy, kidney disease, neuropathy, heart disease, stroke, vascular disease, foot care)   [x] Provide emergency contact number and contact number for more information    [x]  Goal:  Patient/family will demonstrate understanding of Diabetes Self Management Skills by: (date) _7/26/17______  Plan for post-discharge education or self-management support:    [x] Outpatient class schedule provided            [] Patient Declined    [] Scheduled for outpatient classes (date) _______        Jessika Beavers Kelly Wright, [address], [address]  Pager: [phone]

## 2017-07-19 NOTE — PROCEDURES
[facility]  *** [facility] ***    Name: Flaquito Luna  MRN: [id number]    Inpatient  : 12 Aug 1965  HIS Order #: [id number]  [id number] [facility] Visit #: [id number]  Date: 2017    TYPE OF TEST: Peripheral Arterial Testing    REASON FOR TEST    Right Leg  Doppler:    Normal  Ankle/Brachial: 1.16    Left Leg  Doppler:    Normal  Ankle/Brachial: 1.11    INTERPRETATION/FINDINGS  Physiologic testing was performed using continuous wave Doppler and  segmental pressures. 1. No evidence of arterial insufficiency in the bilateral lower  extremities at rest.  2. The right ankle/brachial index is 1.16 and the left ankle/brachial  index is 1. 11.    ADDITIONAL COMMENTS    I have personally reviewed the data relevant to the interpretation of  this  study.     TECHNOLOGIST: Shiv Linares RVT  Signed: 2017 10:27:21 AM    PHYSICIAN: Tracy Quinones MD  Signed: 2017 2:19:05 PM

## 2017-07-19 NOTE — CDMP QUERY
\"Uncontrolled diabetes\" has been documented. Please clarify:    =>Uncontrolled diabetes with hyperglycemia  or  =>Uncontrolled diabetes with hypoglycemia  =>Other Explanation of clinical findings  =>Unable to Determine (no explanation of clinical findings)    The medical record reflects the following:  Risk:  --admitted with uncontrolled DM with diabetic foot ulcer, osteomyelitis    Clinical Indicators:  --7/19 progress notes:  Uncontrolled DM type 2, insulin dependent and retinopathy: Patient is not able to afford insulin and has been dependent on samples from her PCP. She is getting connected to the [employer]. She has not used her insulin for the last week. - Holding novolog and starting lantus 15 units at reduced dosing due to NPO status   - A1C 10.1  - Correctional scale with Glucose ACHS     Treatment:   -- Holding novolog and starting lantus 15 units at reduced dosing due to NPO status   -- Correctional scale with Glucose ACHS     Please clarify and document your clinical opinion in the progress notes and discharge summary including the definitive and/or presumptive diagnosis, (suspected or probable), related to the above clinical findings. Please include clinical findings supporting your diagnosis. If you DECLINE this query or would like to communicate with [facility], please utilize the \"Kera message box\" at the TOP of the Progress Note on the right.       Thank you,  Robin Breen [address] [facility], [address] [place] [address]

## 2017-07-19 NOTE — PROGRESS NOTES
*ATTENTION:  This note has been created by a medical student for educational purposes only. Please do not refer to the content of this note for clinical decision-making, billing, or other purposes. Please see attending physicians note to obtain clinical information on this patient. *     Progress Note  PFM EVMS Service    Patient: Beverley Franco MRN: [id number]   SSN: [id number]  YOB: 1965   Age: 46 y.o. Sex: female      Admit Date: 7/18/2017    LOS: 1 day   Chief Complaint   Patient presents with    Toe Swelling       Subjective:     Patient feels fine this morning. She wants to save her toe. She denies any pain in her toe and endorses that she still has feeling in her toes. She denies fever, chills, chest pain, SOB, nausea, vomiting, constipation and diarrhea.        Review of Systems:  Review of Systems - History obtained from the patient  General ROS: negative for - chills, fatigue or fever  Ophthalmic ROS: positive for - loss of vision in left eye d/t diabetic retinopathy  negative for - decreased vision in right eye  Endocrine ROS: positive for - polyuria  negative for - palpitations  Respiratory ROS: no cough, shortness of breath, or wheezing  Cardiovascular ROS: no chest pain or dyspnea on exertion  Gastrointestinal ROS: no abdominal pain, change in bowel habits, or black or bloody stools  Genito-Urinary ROS: no dysuria, trouble voiding, or hematuria  Musculoskeletal ROS: negative for - muscle pain or muscular weakness  Neurological ROS: no TIA or stroke symptoms  Dermatological ROS: positive for - nail changes bilaterally on toes and swollen toe    Objective:     Vitals:  Visit Vitals    BP (!) 154/93 (BP 1 Location: Left arm, BP Patient Position: At rest)    Pulse 84    Temp 99.3 °F (37.4 °C)    Resp 18    Ht 5' 8\" (1.727 m)    Wt 81.2 kg (179 lb)    SpO2 100%    BMI 27.22 kg/m2       Telemetry NONE   Oxygen NONE     Physical Exam:   General appearance: alert, cooperative, no distress, appears stated age  Lungs: clear to auscultation bilaterally  Heart: regular rate and rhythm, S1, S2 normal, no murmur, click, rub or gallop  Abdomen: soft, non-tender. Bowel sounds normal. No masses,  no organomegaly  Pulses: 2+ and symmetric  Skin: Skin color, texture, turgor normal. No rashes or lesions,  Positive for swollen left 2nd toe with skin and nail changes. Trace odor of infection from toe  Neuro:  normal without focal findings  mental status, speech normal, alert and oriented x iii  CHERYL in right eye  sensation grossly normal  Left eye closed and patient not able to see from left eye when open    Intake and Output:  No intake or output data in the 24 hours ending 07/19/17 0733    Lab/Data Review:  Recent Results (from the past 12 hour(s))   GLUCOSE, POC    Collection Time: 07/19/17 12:27 AM   Result Value Ref Range    Glucose (POC) 271 (H) 70 - 487 mg/dL   METABOLIC PANEL, BASIC    Collection Time: 07/19/17  3:05 AM   Result Value Ref Range    Sodium 139 136 - 145 mmol/L    Potassium 3.1 (L) 3.5 - 5.5 mmol/L    Chloride 108 100 - 108 mmol/L    CO2 23 21 - 32 mmol/L    Anion gap 8 3.0 - 18 mmol/L    Glucose 128 (H) 74 - 99 mg/dL    BUN 17 7.0 - 18 MG/DL    Creatinine 1.00 0.6 - 1.3 MG/DL    BUN/Creatinine ratio 17 12 - 20      GFR est AA >60 >60 ml/min/1.73m2    GFR est non-AA 58 (L) >60 ml/min/1.73m2    Calcium 8.9 8.5 - 10.1 MG/DL   CBC WITH AUTOMATED DIFF    Collection Time: 07/19/17  3:05 AM   Result Value Ref Range    WBC 14.2 (H) 4.6 - 13.2 K/uL    RBC 3.43 (L) 4.20 - 5.30 M/uL    HGB 10.0 (L) 12.0 - 16.0 g/dL    HCT 28.9 (L) 35.0 - 45.0 %    MCV 84.3 74.0 - 97.0 FL    MCH 29.2 24.0 - 34.0 PG    MCHC 34.6 31.0 - 37.0 g/dL    RDW 12.4 11.6 - 14.5 %    PLATELET 585 080 - 381 K/uL    MPV 10.8 9.2 - 11.8 FL    NEUTROPHILS 66 40 - 73 %    LYMPHOCYTES 28 21 - 52 %    MONOCYTES 5 3 - 10 %    EOSINOPHILS 1 0 - 5 %    BASOPHILS 0 0 - 2 %    ABS. NEUTROPHILS 9.5 (H) 1.8 - 8.0 K/UL    ABS. LYMPHOCYTES 4.0 (H) 0.9 - 3.6 K/UL    ABS. MONOCYTES 0.7 0.05 - 1.2 K/UL    ABS. EOSINOPHILS 0.1 0.0 - 0.4 K/UL    ABS. BASOPHILS 0.0 0.0 - 0.1 K/UL    DF AUTOMATED     PROTHROMBIN TIME + INR    Collection Time: 07/19/17  3:05 AM   Result Value Ref Range    Prothrombin time 13.6 11.5 - 15.2 sec    INR 1.1 0.8 - 1.2     PTT    Collection Time: 07/19/17  3:05 AM   Result Value Ref Range    aPTT 28.6 23.0 - 36.4 SEC   HEMOGLOBIN A1C    Collection Time: 07/19/17  3:05 AM   Result Value Ref Range    Hemoglobin A1c 10.1 (H) 4.2 - 5.6 %    Est. average glucose 243 mg/dL   MAGNESIUM    Collection Time: 07/19/17  3:05 AM   Result Value Ref Range    Magnesium 1.8 1.6 - 2.6 mg/dL   GLUCOSE, POC    Collection Time: 07/19/17  5:39 AM   Result Value Ref Range    Glucose (POC) 141 (H) 70 - 110 mg/dL   EKG, 12 LEAD, INITIAL    Collection Time: 07/19/17  7:08 AM   Result Value Ref Range    Ventricular Rate 77 BPM    Atrial Rate 77 BPM    P-R Interval 162 ms    QRS Duration 64 ms    Q-T Interval 404 ms    QTC Calculation (Bezet) 457 ms    Calculated P Axis 66 degrees    Calculated R Axis 16 degrees    Calculated T Axis 25 degrees    Diagnosis       Normal sinus rhythm  Septal infarct (cited on or before 09-MAY-2014)  Abnormal ECG  When compared with ECG of 16-SEP-2015 15:42,  No significant change was found           Imaging  Left Foot Three Views on 7/18/17  HISTORY: Left second digit pain and swelling, history of diabetes, rule out  osteomyelitis.     COMPARISON: None.     FINDINGS:   Three views obtained. There is marked soft tissue swelling of the second digit. Cortical border of the terminal tuft of the distal phalanx is not sharply  defined. . The joint spaces are maintained. Mineralization is normal. There is no  radiopaque foreign body.     IMPRESSION:  Marked soft tissue swelling with poor visualization of the cortex of the  terminal tuft of the second digit. Osteomyelitis is not excluded.  As clinically  indicated follow-up MR of the foot or nuclear isotope infection scan could be  obtained for confirmation.         EKG: unchanged from previous tracings, normal sinus rhythm, with hx of septal infarct. Assessment and Plan:     46year old AA female with PMH of DM type 2, HTN and hx of pancreatitis that presents with diabetic foot ulcer on second toe of left foot with possible osteomyelitis. Diabetic foot ulcer of 2nd toe of left foot  X-ray shows soft tissue swelling with normal joint space. WBC is increased to 14.2 today from 13.9  Patient is afebrile and is on appropriate empiric coverage with Vanco and zosyn  MRI of left foot to confirm osteomyelitis  NPO and podiatry evaluation with debridement today  F/U wound culture and blood culture  CRP and ESR    HTN  Continue Quinapril 20mg PO daily  Monitor BMP for renal function and potassium    DM type II-not controlled  On novolin 70/30 insulin at home. Possibly non-compliant. She is working on getting set up with [facility] to afford medications. May benefit from diabetes education class, will talk with patient.    HgbA1c is 10.1%  Hold patient home insulin dose   Lantus 15 units SQ daily and correctional insulin coverage with accuchecks ACHS  Social work consult to facilitate assistance with medication costs and other needs          Diet NPO   DVT Prophylax SQ Heparin   GI Prophylaxis Protonix   Code status Full   Disposition Home in 1-2 days depending on MRI and need for amputation        Kanwal Cueto , MS4   July 19, 2017

## 2017-07-19 NOTE — DIABETES MGMT
NUTRITIONAL ASSESSMENT GLYCEMIC CONTROL/ PLAN OF CARE     Otto Niño           46 y.o.           7/18/2017                 Patient Active Problem List   Diagnosis Code    Essential hypertension, benign I10    Type II or unspecified type diabetes mellitus without mention of complication, not stated as uncontrolled E11.9    Vitamin D deficiency E55.9    HLD (hyperlipidemia) E78.5    Dehydration E86.0    Systemic inflammatory response syndrome (SIRS) (HCC) R65.10    GERD (gastroesophageal reflux disease) K21.9    Nausea with vomiting R11.2    Osteomyelitis ([address] M86.9    Sepsis ([facility] A41.9    Foot ulcer due to secondary DM ([facility] E13.621, L97.509    Uncontrolled type 2 diabetes mellitus with chronic kidney disease ([facility] E11.22, N18.9, E11.65    Diabetic retinopathy ([facility] E11.319    Anxiety F41.9    Leukocytosis D72.829    Renal insufficiency N28.9      INTERVENTIONS/PLAN:     Diabetes education. Recommend increase lantus to 20 units. Continue correctional lispro every 6 hours. Monitor for need to advance to [facility] once diet is advanced. Will continue to monitor inpatient for intervention. ASSESSMENT:   Pt is 46 yr old female admitted on 7/18/17 for treatment of left second toe pain and swelling resulting in a color change to purple. Pt with past medical history significant for T2DM, pancreatitis, GERD. Diabetes Management:     Recent blood glucose:   Results for Keily Chua (MRN [id number]) as of 7/19/2017 14:52   Ref.  Range 7/18/2017 16:44 7/19/2017 00:27 7/19/2017 05:39 7/19/2017 12:31   GLUCOSE,FAST - POC Latest Ref Range: 70 - 110 mg/dL 306 (H) 271 (H) 141 (H) 175 (H)     Within target range (non-ICU: <140; ICU<180): [] Yes   [x]  No    Current Insulin regimen: 15 units lantus, very insulin resistant scale lispro every 6 hours    Home medication/insulin regimen: pt reports taking 25 units of 70/30 in the morning with breakfast and 15 units in the evening with dinner    HbA1c: 10.1% estimated average blood glucose over the past 2-3 months of 243mg/dl    Adequate glycemic control PTA:  [] Yes  [x] No       SUBJECTIVE/OBJECTIVE:   Information obtained from: pt, chart review    Diet: NPO Except meds      Medications: [x]                Reviewed     Most Recent POC Glucose:   Recent Labs      07/19/17   0305  07/18/17   1655   GLU  128*  297*         Labs:   Lab Results   Component Value Date/Time    Hemoglobin A1c 10.1 07/19/2017 03:05 AM     Lab Results   Component Value Date/Time    Sodium 139 07/19/2017 03:05 AM    Potassium 3.1 07/19/2017 03:05 AM    Chloride 108 07/19/2017 03:05 AM    CO2 23 07/19/2017 03:05 AM    Anion gap 8 07/19/2017 03:05 AM    Glucose 128 07/19/2017 03:05 AM    BUN 17 07/19/2017 03:05 AM    Creatinine 1.00 07/19/2017 03:05 AM    Calcium 8.9 07/19/2017 03:05 AM    Magnesium 1.8 07/19/2017 03:05 AM    Phosphorus 7.3 03/19/2010 11:24 AM    Albumin 5.2 10/06/2015 04:45 AM       Anthropometrics:  ,  , BMI (calculated): 27.2  Wt Readings from Last 1 Encounters:   07/18/17 81.2 kg (179 lb)      Ht Readings from Last 1 Encounters:   07/18/17 5' 8\" (1.727 m)       Estimated Nutrition Needs:  4103-9923 kcal/day Protein: 65-81 g/day  Based on:   [x]          Actual BW    []          ABW   []            Adjusted BW        Nutrition Diagnoses: Altered nutrition related lab values related to HbA1c as evidenced by HbA1c of 10.1%.     Nutrition Interventions: diabetic education, coordination of care  Goal:    Blood glucose will be within target range of  mg/dL by 7/ 26/17        Nutrition Monitoring and Evaluation      []     Monitor po intake on meal rounds  [x]     Continue inpatient monitoring and intervention      Jinny Wylie MS, RD, CDE  Pager: [phone]

## 2017-07-19 NOTE — ROUTINE PROCESS
Bedside and Verbal shift change report given to Celia Johnson (oncoming nurse) by Alessandra Gregory RN (offgoing nurse). Report included the following information SBAR, Kardex, MAR and Recent Results.     SITUATION:    Code Status: Full Code   Reason for Admission: Osteomyelitis [facility])    [facility] day: 1   Problem List:       Hospital Problems  Date Reviewed: 11/17/2014          Codes Class Noted POA    * (Principal)Sepsis ([facility] ICD-10-CM: A41.9  ICD-9-CM: 038.9, 995.91  7/19/2017 Unknown        Foot ulcer due to secondary DM ([facility] ICD-10-CM: E13.621, L97.509  ICD-9-CM: 249.80, 707.15  7/19/2017 Unknown        Uncontrolled type 2 diabetes mellitus with chronic kidney disease ([facility] ICD-10-CM: E11.22, N18.9, E11.65  ICD-9-CM: 250.42, 585.9  7/19/2017 Unknown        Diabetic retinopathy ([facility] ICD-10-CM: E11.319  ICD-9-CM: 250.50, 362.01  7/19/2017 Unknown        Anxiety ICD-10-CM: F41.9  ICD-9-CM: 300.00  7/19/2017 Unknown        Leukocytosis ICD-10-CM: I20.296  ICD-9-CM: 288.60  7/19/2017 Unknown        Renal insufficiency ICD-10-CM: N28.9  ICD-9-CM: 593.9  7/19/2017 Unknown        Osteomyelitis ([facility] ICD-10-CM: M86.9  ICD-9-CM: 730.20  7/18/2017 Unknown        GERD (gastroesophageal reflux disease) ICD-10-CM: K21.9  ICD-9-CM: 530.81  6/18/2014 Yes        HLD (hyperlipidemia) (Chronic) ICD-10-CM: E78.5  ICD-9-CM: 272.4  4/20/2014 Yes        Essential hypertension, benign (Chronic) ICD-10-CM: I10  ICD-9-CM: 401.1  12/3/2013 Yes              BACKGROUND:    Past Medical History:   Past Medical History:   Diagnosis Date    Diabetes ([address]     Gall stones     GERD (gastroesophageal reflux disease)     Hiatal hernia     Hypertension     Mass of abdomen     Pancreatitis          Patient taking anticoagulants no     ASSESSMENT:    Changes in Assessment Throughout Shift: no     Patient has Central Line: no Reasons if yes: n/a   Patient has Mari Cath: no Reasons if yes: n/a      Last Vitals:     Vitals:    07/19/17 1138 07/19/17 0827 07/19/17 1232 07/19/17 1606   BP: (!) 154/93 123/74 126/71 117/77   Pulse: 84 78 76 76   Resp: 18 18 18 18   Temp: 99.3 °F (37.4 °C) 99 °F (37.2 °C) 98 °F (36.7 °C) 97.6 °F (36.4 °C)   SpO2: 100% 100% 100% 100%   Weight:       Height:            IV and DRAINS (will only show if present)   [REMOVED] Peripheral IV 07/18/17 Left Hand-Site Assessment: Clean, dry, & intact  Peripheral IV 07/18/17 Right Antecubital-Site Assessment: Clean, dry, & intact     WOUND (if present)   Wound Type:  none   Dressing present     Wound Concerns/Notes:  none     PAIN    Pain Assessment    Pain Intensity 1: 0 (07/19/17 1522)              Patient Stated Pain Goal: 0  o Interventions for Pain:  none  o Intervention effective: n/a  o Time of last intervention: none  o Reassessment Completed: n/a     Last 3 Weights:  Last 3 Recorded Weights in this Encounter    07/18/17 1518   Weight: 81.2 kg (179 lb)     Weight change:      INTAKE/OUPUT    Current Shift:      Last three shifts: 07/18 0701 - 07/19 1900  In: 1012.5 [I.V.:1012.5]  Out: 801 [Urine:800]     LAB RESULTS     Recent Labs      07/19/17   0305  07/18/17   1655   WBC  14.2*  13.9*   HGB  10.0*  10.1*   HCT  28.9*  30.2*   PLT  301  281        Recent Labs      07/19/17   0305  07/18/17   1655   NA  139  141   K  3.1*  4.1   GLU  128*  297*   BUN  17  20*   CREA  1.00  1.13   CA  8.9  8.9   MG  1.8   --    INR  1.1   --        RECOMMENDATIONS AND DISCHARGE PLANNING     1. Pending tests/procedures/ Plan of Care or Other Needs: MRI     2. Discharge plan for patient and Needs/Barriers: Home    3. Estimated Discharge Date: TBD Posted on Whiteboard in Patients Room: yes      4. The patient's care plan was reviewed with the oncoming nurse.        \"HEALS\" SAFETY CHECK      Fall Risk    Total Score: 2    Safety Measures: Safety Measures: Bed/Chair-Wheels locked, Bed in low position, Call light within reach    A safety check occurred in the patient's room between off going nurse and oncoming nurse listed above. The safety check included the below items  Area Items   H  High Alert Medications - Verify all high alert medication drips (heparin, PCA, etc.)   E  Equipment - Suction is set up for ALL patients (with arsenio)  - Red plugs utilized for all equipment (IV pumps, etc.)  - WOWs wiped down at end of shift.  - Room stocked with oxygen, suction, and other unit-specific supplies   A  Alarms - Bed alarm is set for fall risk patients  - Ensure chair alarm is in place and activated if patient is up in a chair   L  Lines - Check IV for any infiltration  - Mari bag is empty if patient has a Mari   - Tubing and IV bags are labeled   S  Safety   - Room is clean, patient is clean, and equipment is clean. - Hallways are clear from equipment besides carts. - Fall bracelet on for fall risk patients  - Ensure room is clear and free of clutter  - Suction is set up for ALL patients (with arsenio)  - Hallways are clear from equipment besides carts.    - Isolation precautions followed, supplies available outside room, sign posted     Anisha Ely RN

## 2017-07-19 NOTE — DIABETES MGMT
Diabetes Patient/Family Education Record    Factors That  May Influence Patients Ability  to Learn or  Comply With  Recommendations:    []   Language barrier    []   Cultural needs   []   Motivation    []   Cognitive limitation    []   Physical   [x]   Education    []   Physiological factors   []   Hearing/vision/speaking impairment   []   Religion beliefs    [x]   Financial factors: See notes under medication management. []  Other:   []  No factors identified at this time. Person Instructed:   []   Patient   []   Family   []  Other     Preference for Learning:   [x]   Verbal   [x]   Written   []  Demonstration     Level of Comprehension & Competence:    [x]  Good                                      [] Fair                                     []  Poor                             []  Needs Reinforcement   [x]  Teachback completed    Education Component:   [x]  Medication management, including how to administer insulin (if appropriate) and potential medication interactions: Patient stated that she is taking Novolin 70/30 mixed insulin 25 units daily with breakfast and 15 units daily with dinner. Educated patient about mixed insulin that it has a fast acting insulin (bottom number) and the importance of eating meal within 15 minutes of taking insulin. She did not know this but now verbalized understanding. Patient has no health insurance and limited financial resources. Stated that she get her Novolin 70/30 insulin at [facility] for $25 per bottle. Patient accepted [employer] voucher for 70/30 mixed insulin. Patient verbalized understanding that prescription is needed to get this insulin voucher filled. [x]  Nutritional management including the role of carbohydrate intake: Educated patient about the importance of eating 3 meals daily, serving size/portion control of carbs (starches, fruits, dairy), and limiting intake of concentrated sweets (beverages and food/snacks - containing a lot of sugar). Patient goes to [address] [place] [address] to purchase discount food because she has very little financial resource. Discussed the importance of portion control. Also discussed and encouraged to use artificial sweetener for beverages. She like drinking green tea from the bottle. Educated patient to choose green tea with artificial sweetener. She verbalized understanding. [x]  Exercise: Patient stated that she is physically active and able to tolerate walking exercise. [x]  Signs, symptoms, and treatment of hyperglycemia and hypoglycemia: Patient verbalized understanding. She was able to described symptoms of high blood and low blood sugar. Encouraged patient to call her provider in the future if she is experiencing high blood sugar and can't explain it. Also encouraged patient to call provider if she frequently experience low blood sugar and can't explain it. Patient verbalized understanding that low blood sugar is emergency and require prompt treatment. She verbalized understanding on how to treat hypoglycemia and when to call 911 for emergency medical assistance. [] Treatment of hyperglycemia and hypoglycemia   [x]  Importance of blood glucose monitoring and how to obtain a blood glucose meter: Patient stated that she has BG meter and testing supplies, and checks her blood sugar regularly. []  Instruction on use of blood glucose meter   [x]  Discuss the importance of HbA1C monitoring and provide patient with results: 10.1% (07/19/2017)is equivalent to average blood glucose of 243 mg/dL during the past 2-3 months. The recommended A1C for most people with diabetes is 7% or better. Explained to patient that high A1C is equivalent to high average blood sugar. Discussed list of potential chronic complications due to uncontrolled diabetes. Included discussion and education: nutrition and insulin. See notes above.    [x]  Sick day guidelines: Educated patient about the importance of taking insulin when sick because the blood sugar will be running high during times of illness. She verbalized understanding. Emphasized the importance of eating meals when taking 70/30 mixed insulin. Educated patient that mixed insulin has a fast acting insulin. She verbalized understanding.     [x]  Proper use and disposal of lancets, needles, syringes or insulin pens (if appropriate)   [x]  Potential long-term complications (retinopathy, kidney disease, neuropathy, heart disease, stroke, vascular disease, foot care)   [x] Provide emergency contact number and contact number for more information    [x]  Goal:  Patient/family will demonstrate understanding of Diabetes Self Management Skills by: 07/26/2017  Plan for post-discharge education or self-management support:    [x] Outpatient class schedule provided            [] Patient Declined    [] Scheduled for outpatient classes (date) _______         Annette Phelps RN

## 2017-07-20 ENCOUNTER — ANESTHESIA EVENT (OUTPATIENT)
Dept: SURGERY | Age: 52
DRG: 854 | End: 2017-07-20
Payer: SUBSIDIZED

## 2017-07-20 LAB
ANION GAP BLD CALC-SCNC: 7 MMOL/L (ref 3–18)
BACTERIA SPEC CULT: NORMAL
BASOPHILS # BLD AUTO: 0 K/UL (ref 0–0.06)
BASOPHILS # BLD: 0 % (ref 0–2)
BUN SERPL-MCNC: 14 MG/DL (ref 7–18)
BUN/CREAT SERPL: 12 (ref 12–20)
CALCIUM SERPL-MCNC: 8.3 MG/DL (ref 8.5–10.1)
CHLORIDE SERPL-SCNC: 111 MMOL/L (ref 100–108)
CO2 SERPL-SCNC: 22 MMOL/L (ref 21–32)
CREAT SERPL-MCNC: 1.2 MG/DL (ref 0.6–1.3)
DATE LAST DOSE: NORMAL
DIFFERENTIAL METHOD BLD: ABNORMAL
EOSINOPHIL # BLD: 0.2 K/UL (ref 0–0.4)
EOSINOPHIL NFR BLD: 1 % (ref 0–5)
ERYTHROCYTE [DISTWIDTH] IN BLOOD BY AUTOMATED COUNT: 12.6 % (ref 11.6–14.5)
GLUCOSE BLD STRIP.AUTO-MCNC: 134 MG/DL (ref 70–110)
GLUCOSE BLD STRIP.AUTO-MCNC: 170 MG/DL (ref 70–110)
GLUCOSE BLD STRIP.AUTO-MCNC: 171 MG/DL (ref 70–110)
GLUCOSE BLD STRIP.AUTO-MCNC: 256 MG/DL (ref 70–110)
GLUCOSE BLD STRIP.AUTO-MCNC: 267 MG/DL (ref 70–110)
GLUCOSE SERPL-MCNC: 211 MG/DL (ref 74–99)
HCT VFR BLD AUTO: 27.5 % (ref 35–45)
HGB BLD-MCNC: 9.3 G/DL (ref 12–16)
LYMPHOCYTES # BLD AUTO: 31 % (ref 21–52)
LYMPHOCYTES # BLD: 3.7 K/UL (ref 0.9–3.6)
MCH RBC QN AUTO: 28.8 PG (ref 24–34)
MCHC RBC AUTO-ENTMCNC: 33.8 G/DL (ref 31–37)
MCV RBC AUTO: 85.1 FL (ref 74–97)
MONOCYTES # BLD: 0.5 K/UL (ref 0.05–1.2)
MONOCYTES NFR BLD AUTO: 5 % (ref 3–10)
NEUTS SEG # BLD: 7.4 K/UL (ref 1.8–8)
NEUTS SEG NFR BLD AUTO: 63 % (ref 40–73)
PLATELET # BLD AUTO: 298 K/UL (ref 135–420)
PMV BLD AUTO: 10.5 FL (ref 9.2–11.8)
POTASSIUM SERPL-SCNC: 3.6 MMOL/L (ref 3.5–5.5)
RBC # BLD AUTO: 3.23 M/UL (ref 4.2–5.3)
REPORTED DOSE,DOSE: NORMAL UNITS
REPORTED DOSE/TIME,TMG: 400
SERVICE CMNT-IMP: NORMAL
SODIUM SERPL-SCNC: 140 MMOL/L (ref 136–145)
VANCOMYCIN TROUGH SERPL-MCNC: 14.9 UG/ML (ref 10–20)
WBC # BLD AUTO: 11.9 K/UL (ref 4.6–13.2)

## 2017-07-20 PROCEDURE — 36415 COLL VENOUS BLD VENIPUNCTURE: CPT | Performed by: STUDENT IN AN ORGANIZED HEALTH CARE EDUCATION/TRAINING PROGRAM

## 2017-07-20 PROCEDURE — 82962 GLUCOSE BLOOD TEST: CPT

## 2017-07-20 PROCEDURE — 74011636637 HC RX REV CODE- 636/637: Performed by: FAMILY MEDICINE

## 2017-07-20 PROCEDURE — 80202 ASSAY OF VANCOMYCIN: CPT | Performed by: STUDENT IN AN ORGANIZED HEALTH CARE EDUCATION/TRAINING PROGRAM

## 2017-07-20 PROCEDURE — 74011250636 HC RX REV CODE- 250/636: Performed by: STUDENT IN AN ORGANIZED HEALTH CARE EDUCATION/TRAINING PROGRAM

## 2017-07-20 PROCEDURE — 65270000029 HC RM PRIVATE

## 2017-07-20 PROCEDURE — 74011250637 HC RX REV CODE- 250/637: Performed by: STUDENT IN AN ORGANIZED HEALTH CARE EDUCATION/TRAINING PROGRAM

## 2017-07-20 PROCEDURE — 74011000250 HC RX REV CODE- 250: Performed by: PODIATRIST

## 2017-07-20 PROCEDURE — 74011000258 HC RX REV CODE- 258: Performed by: STUDENT IN AN ORGANIZED HEALTH CARE EDUCATION/TRAINING PROGRAM

## 2017-07-20 PROCEDURE — 85025 COMPLETE CBC W/AUTO DIFF WBC: CPT | Performed by: STUDENT IN AN ORGANIZED HEALTH CARE EDUCATION/TRAINING PROGRAM

## 2017-07-20 PROCEDURE — 80048 BASIC METABOLIC PNL TOTAL CA: CPT | Performed by: STUDENT IN AN ORGANIZED HEALTH CARE EDUCATION/TRAINING PROGRAM

## 2017-07-20 PROCEDURE — 74011636637 HC RX REV CODE- 636/637: Performed by: STUDENT IN AN ORGANIZED HEALTH CARE EDUCATION/TRAINING PROGRAM

## 2017-07-20 RX ADMIN — PAROXETINE HYDROCHLORIDE 30 MG: 20 TABLET, FILM COATED ORAL at 22:41

## 2017-07-20 RX ADMIN — QUINAPRIL 20 MG: 20 TABLET ORAL at 00:47

## 2017-07-20 RX ADMIN — SODIUM CHLORIDE 125 ML/HR: 900 INJECTION, SOLUTION INTRAVENOUS at 00:53

## 2017-07-20 RX ADMIN — INSULIN GLARGINE 15 UNITS: 100 INJECTION, SOLUTION SUBCUTANEOUS at 00:50

## 2017-07-20 RX ADMIN — PIPERACILLIN SODIUM,TAZOBACTAM SODIUM 3.38 G: 3; .375 INJECTION, POWDER, FOR SOLUTION INTRAVENOUS at 11:35

## 2017-07-20 RX ADMIN — VANCOMYCIN HYDROCHLORIDE 1250 MG: 10 INJECTION, POWDER, LYOPHILIZED, FOR SOLUTION INTRAVENOUS at 23:15

## 2017-07-20 RX ADMIN — PIPERACILLIN SODIUM,TAZOBACTAM SODIUM 3.38 G: 3; .375 INJECTION, POWDER, FOR SOLUTION INTRAVENOUS at 00:47

## 2017-07-20 RX ADMIN — PIPERACILLIN SODIUM,TAZOBACTAM SODIUM 3.38 G: 3; .375 INJECTION, POWDER, FOR SOLUTION INTRAVENOUS at 06:15

## 2017-07-20 RX ADMIN — PIPERACILLIN SODIUM,TAZOBACTAM SODIUM 3.38 G: 3; .375 INJECTION, POWDER, FOR SOLUTION INTRAVENOUS at 17:24

## 2017-07-20 RX ADMIN — INSULIN LISPRO 9 UNITS: 100 INJECTION, SOLUTION INTRAVENOUS; SUBCUTANEOUS at 18:20

## 2017-07-20 RX ADMIN — PAROXETINE HYDROCHLORIDE 30 MG: 20 TABLET, FILM COATED ORAL at 00:47

## 2017-07-20 RX ADMIN — ATORVASTATIN CALCIUM 20 MG: 20 TABLET, FILM COATED ORAL at 08:17

## 2017-07-20 RX ADMIN — INSULIN GLARGINE 15 UNITS: 100 INJECTION, SOLUTION SUBCUTANEOUS at 23:20

## 2017-07-20 RX ADMIN — DAKIN'S SOLUTION 0.125% (QUARTER STRENGTH): 0.12 SOLUTION at 22:44

## 2017-07-20 RX ADMIN — PANTOPRAZOLE SODIUM 40 MG: 40 TABLET, DELAYED RELEASE ORAL at 08:17

## 2017-07-20 RX ADMIN — QUINAPRIL 20 MG: 20 TABLET ORAL at 22:41

## 2017-07-20 RX ADMIN — VANCOMYCIN HYDROCHLORIDE 1250 MG: 10 INJECTION, POWDER, LYOPHILIZED, FOR SOLUTION INTRAVENOUS at 04:41

## 2017-07-20 RX ADMIN — INSULIN LISPRO 3 UNITS: 100 INJECTION, SOLUTION INTRAVENOUS; SUBCUTANEOUS at 23:21

## 2017-07-20 RX ADMIN — SODIUM CHLORIDE 125 ML/HR: 900 INJECTION, SOLUTION INTRAVENOUS at 13:28

## 2017-07-20 RX ADMIN — INSULIN LISPRO 9 UNITS: 100 INJECTION, SOLUTION INTRAVENOUS; SUBCUTANEOUS at 00:48

## 2017-07-20 RX ADMIN — INSULIN LISPRO 3 UNITS: 100 INJECTION, SOLUTION INTRAVENOUS; SUBCUTANEOUS at 06:24

## 2017-07-20 NOTE — ROUTINE PROCESS
Bedside and Verbal shift change report given to Avani Bueno RN (oncoming nurse) by Luis Clifton RN (offgoing nurse). Report included the following information SBAR, Kardex, MAR and Recent Results.     SITUATION:    Code Status: Full Code   Reason for Admission: Osteomyelitis [facility])    [facility] day: 1   Problem List:       Hospital Problems  Date Reviewed: 11/17/2014          Codes Class Noted POA    * (Principal)Sepsis ([facility] ICD-10-CM: A41.9  ICD-9-CM: 038.9, 995.91  7/19/2017 Unknown        Foot ulcer due to secondary DM ([facility] ICD-10-CM: E13.621, L97.509  ICD-9-CM: 249.80, 707.15  7/19/2017 Unknown        Uncontrolled type 2 diabetes mellitus with chronic kidney disease ([facility] ICD-10-CM: E11.22, N18.9, E11.65  ICD-9-CM: 250.42, 585.9  7/19/2017 Unknown        Diabetic retinopathy ([facility] ICD-10-CM: E11.319  ICD-9-CM: 250.50, 362.01  7/19/2017 Unknown        Anxiety ICD-10-CM: F41.9  ICD-9-CM: 300.00  7/19/2017 Unknown        Leukocytosis ICD-10-CM: Z68.756  ICD-9-CM: 288.60  7/19/2017 Unknown        Renal insufficiency ICD-10-CM: N28.9  ICD-9-CM: 593.9  7/19/2017 Unknown        Osteomyelitis ([facility] ICD-10-CM: M86.9  ICD-9-CM: 730.20  7/18/2017 Unknown        GERD (gastroesophageal reflux disease) ICD-10-CM: K21.9  ICD-9-CM: 530.81  6/18/2014 Yes        HLD (hyperlipidemia) (Chronic) ICD-10-CM: E78.5  ICD-9-CM: 272.4  4/20/2014 Yes        Essential hypertension, benign (Chronic) ICD-10-CM: I10  ICD-9-CM: 401.1  12/3/2013 Yes              BACKGROUND:    Past Medical History:   Past Medical History:   Diagnosis Date    Diabetes ([address]     Gall stones     GERD (gastroesophageal reflux disease)     Hiatal hernia     Hypertension     Mass of abdomen     Pancreatitis          Patient taking anticoagulants no     ASSESSMENT:    Changes in Assessment Throughout Shift: no     Patient has Central Line: no Reasons if yes: n/a   Patient has Mari Cath: no Reasons if yes: n/a      Last Vitals:     Vitals:    07/19/17 0827 07/19/17 1232 07/19/17 1606 07/19/17 2000   BP: 123/74 126/71 117/77 139/84   Pulse: 78 76 76 77   Resp: 18 18 18 18   Temp: 99 °F (37.2 °C) 98 °F (36.7 °C) 97.6 °F (36.4 °C) 97 °F (36.1 °C)   SpO2: 100% 100% 100% 100%   Weight:       Height:            IV and DRAINS (will only show if present)   [REMOVED] Peripheral IV 07/18/17 Left Hand-Site Assessment: Clean, dry, & intact  Peripheral IV 07/18/17 Right Antecubital-Site Assessment: Clean, dry, & intact     WOUND (if present)   Wound Type:  none   Dressing present Dressing Present : No   Wound Concerns/Notes:  none     PAIN    Pain Assessment    Pain Intensity 1: 0 (07/19/17 2020)              Patient Stated Pain Goal: 0  o Interventions for Pain:  none  o Intervention effective: n/a  o Time of last intervention: none  o Reassessment Completed: n/a     Last 3 Weights:  Last 3 Recorded Weights in this Encounter    07/18/17 1518   Weight: 81.2 kg (179 lb)     Weight change:      INTAKE/OUPUT    Current Shift:      Last three shifts: 07/18 0701 - 07/19 1900  In: 2085.4 [I.V.:2085.4]  Out: 801 [Urine:800]     LAB RESULTS     Recent Labs      07/19/17   0305  07/18/17   1655   WBC  14.2*  13.9*   HGB  10.0*  10.1*   HCT  28.9*  30.2*   PLT  301  281        Recent Labs      07/19/17   0305  07/18/17   1655   NA  139  141   K  3.1*  4.1   GLU  128*  297*   BUN  17  20*   CREA  1.00  1.13   CA  8.9  8.9   MG  1.8   --    INR  1.1   --        RECOMMENDATIONS AND DISCHARGE PLANNING     1. Pending tests/procedures/ Plan of Care or Other Needs: MRI     2. Discharge plan for patient and Needs/Barriers: Home    3. Estimated Discharge Date: TBD Posted on Whiteboard in Patients Room: yes      4. The patient's care plan was reviewed with the oncoming nurse.        \"HEALS\" SAFETY CHECK      Fall Risk    Total Score: 3    Safety Measures: Safety Measures: Bed/Chair alarm on, Bed/Chair-Wheels locked, Bed in low position, Call light within reach, Side rails X 3    A safety check occurred in the patient's room between off going nurse and oncoming nurse listed above. The safety check included the below items  Area Items   H  High Alert Medications - Verify all high alert medication drips (heparin, PCA, etc.)   E  Equipment - Suction is set up for ALL patients (with arsenio)  - Red plugs utilized for all equipment (IV pumps, etc.)  - WOWs wiped down at end of shift.  - Room stocked with oxygen, suction, and other unit-specific supplies   A  Alarms - Bed alarm is set for fall risk patients  - Ensure chair alarm is in place and activated if patient is up in a chair   L  Lines - Check IV for any infiltration  - Mari bag is empty if patient has a Mari   - Tubing and IV bags are labeled   S  Safety   - Room is clean, patient is clean, and equipment is clean. - Hallways are clear from equipment besides carts. - Fall bracelet on for fall risk patients  - Ensure room is clear and free of clutter  - Suction is set up for ALL patients (with arsenio)  - Hallways are clear from equipment besides carts.    - Isolation precautions followed, supplies available outside room, sign posted     Frances Angel RN

## 2017-07-20 NOTE — DIABETES MGMT
Glycemic Control Plan of Care    POC BG range on 07/19/2017: 132-271 mg/dL. POC BG report on 07/20/2017 at time of review: 256, 171 mg/dL. Patient is on Novolin 70/30 mixed insulin 25 units daily before breakfast and 15 units daily before dinner at home. Patient stated today that she is ready to eat. Noted order for diabetic diet 07/20/2017. Recommendation(s):  1.) Increase basal lantus insulin dose to 20 units daily at bedtime. 2.) Continue correctional lispro insulin as ordered. Assessment:  Patients is 46year old with past medical history including type 2 diabetes mellitus, diabetic retinopathy,hypertension, hyperlipidemia, GERD, CKD, anxiety, and diabetic foot ulcer - was admitted on 07/18/2017 via ED on advice of PCP for evaluation of diabetic foot ulcer to left 2nd toe. Noted:  Sepsis, diabetic left foot ulcer. Pending podiatry evaluation. NPO after midnight for possible surgery 07/21/2017. Type 2 diabetes mellitus with current A1C of 10.1% (07/19/2017). Completed assessment of home diabetes management and education 07/19/2017. See separate notes, 07/20/2017. Most recent blood glucose values:    Results for Tracey Vidal (MRN [id number]) as of 7/20/2017 12:08   Ref. Range 7/19/2017 00:27 7/19/2017 05:39 7/19/2017 12:31 7/19/2017 17:48   GLUCOSE,FAST - POC Latest Ref Range: 70 - 110 mg/dL 271 (H) 141 (H) 175 (H) 132 (H)     Results for Tracey Vidal (MRN [id number]) as of 7/20/2017 12:08   Ref. Range 7/20/2017 00:29 7/20/2017 06:23 7/20/2017 11:29   GLUCOSE,FAST - POC Latest Ref Range: 70 - 110 mg/dL 256 (H) 171 (H) 134 (H)     Current A1C: 10.1% (07/19/2017) is equivalent to average blood glucose of 243 mg/dL during the past 2-3 months. Current hospital diabetes medications:  Basal lantus insulin 15 units daily at bedtime. Correctional lispro insulin ACHS. Very resistant dose.     Total daily dose insulin requirement previous day: 07/19/2017  Lantus: 15 units  Lispro: 9 units  TDD: 24 units of insulin    Home diabetes medications: As stated by patient on 07/19/2017:  Novolin 70/30 mixed insulin 25 units daily before breakfast and 15 units daily before dinner. Diet: Diabetic consistent carb regular. Goals:  Blood glucose will be within target range of  mg/dL by 07/19/2017.      Education:  __X_  Refer to Diabetes Education Record: 07/19/2017             ___  Education not indicated at this time    Hung Salmeron RN

## 2017-07-20 NOTE — PROGRESS NOTES
conducted an initial consultation and Spiritual Assessment for Beto Kelly, who is a 46 y.o.,female. Patients Primary Language is: [place]. According to the patients EMR Hindu Affiliation is: [facility].     The reason the Patient came to the hospital is:   Patient Active Problem List    Diagnosis Date Noted    Sepsis ([facility] 07/19/2017    Foot ulcer due to secondary DM ([facility] 07/19/2017    Uncontrolled type 2 diabetes mellitus with chronic kidney disease ([facility] 07/19/2017    Diabetic retinopathy ([facility] 07/19/2017    Anxiety 07/19/2017    Leukocytosis 07/19/2017    Renal insufficiency 07/19/2017    Osteomyelitis (HCC) 07/18/2017    Nausea with vomiting 10/22/2014    GERD (gastroesophageal reflux disease) 06/18/2014    Systemic inflammatory response syndrome (SIRS) ([facility] 04/29/2014    Vitamin D deficiency 04/20/2014    HLD (hyperlipidemia) 04/20/2014    Dehydration 04/20/2014    Essential hypertension, benign 12/03/2013    Type II or unspecified type diabetes mellitus without mention of complication, not stated as uncontrolled 12/03/2013        The  provided the following Interventions:  Initiated a relationship of care and support. Provided information about Spiritual Care Services. Chart reviewed. The following outcomes where achieved:  Patient expressed gratitude for 's visit. Assessment:  Patient does not have any Taoism/cultural needs that will affect patients preferences in health care. There are no spiritual or Taoism issues which require intervention at this time. Plan:  Chaplains will continue to follow and will provide pastoral care on an as needed/requested basis.  recommends bedside caregivers page  on duty if patient shows signs of acute spiritual or emotional distress.     [address]  [phone])

## 2017-07-20 NOTE — PROGRESS NOTES
*ATTENTION:  This note has been created by a medical student for educational purposes only. Please do not refer to the content of this note for clinical decision-making, billing, or other purposes. Please see attending physicians note to obtain clinical information on this patient. *       Progress Note  [facility] EVMS Service    Patient: Jacquelin Gacria MRN: [id number]   SSN: [id number]  YOB: 1965   Age: 46 y.o. Sex: female      Admit Date: 7/18/2017    LOS: 2 days   Chief Complaint   Patient presents with    Toe Swelling       Subjective:     Patient feels fine this AM. She says she slept very well last night. She denies any pain in her foot or loss of sensation. She also denies nausea, vomiting, SOB, constipation or diarrhea. Review of Systems:  Review of Systems - History obtained from the patient  General ROS: negative for - fatigue or fever  Respiratory ROS: no cough, shortness of breath, or wheezing  Cardiovascular ROS: no chest pain or dyspnea on exertion  Gastrointestinal ROS: no abdominal pain, change in bowel habits, or black or bloody stools  Musculoskeletal ROS: negative for - joint pain or muscle pain  Neurological ROS: no TIA or stroke symptoms  Dermatological ROS: positive for - nail changes, skin lesion changes on 2nd toe on left foot      Objective:     Vitals:  Visit Vitals    BP 94/61 (BP 1 Location: Left arm, BP Patient Position: At rest)    Pulse 78    Temp 97.7 °F (36.5 °C)    Resp 18    Ht 5' 8\" (1.727 m)    Wt 81.2 kg (179 lb)    SpO2 100%    BMI 27.22 kg/m2       Telemetry NONE   Oxygen NONE     Physical Exam:   General appearance: alert, cooperative, no distress, appears stated age  Lungs: clear to auscultation bilaterally  Heart: regular rate and rhythm, S1, S2 normal, no murmur, click, rub or gallop  Abdomen: soft, non-tender.  Bowel sounds normal. No masses,  no organomegaly  Pulses: 2+ and symmetric  Skin: vascularity normal, no evidence of bleeding or bruising and no edema, fungal growth on toe nails bilaterally. Swollen 2nd toe of left foot with purulent smell and small drainage  Neuro:  normal without focal findings  mental status, speech normal, alert and oriented x iii  CHERYL  sensation grossly normal    Intake and Output:    Intake/Output Summary (Last 24 hours) at 07/20/17 0722  Last data filed at 07/19/17 1859   Gross per 24 hour   Intake          2085.42 ml   Output              801 ml   Net          1284.42 ml       Lab/Data Review:  Recent Results (from the past 12 hour(s))   GLUCOSE, POC    Collection Time: 07/20/17 12:29 AM   Result Value Ref Range    Glucose (POC) 256 (H) 70 - 613 mg/dL   METABOLIC PANEL, BASIC    Collection Time: 07/20/17  3:05 AM   Result Value Ref Range    Sodium 140 136 - 145 mmol/L    Potassium 3.6 3.5 - 5.5 mmol/L    Chloride 111 (H) 100 - 108 mmol/L    CO2 22 21 - 32 mmol/L    Anion gap 7 3.0 - 18 mmol/L    Glucose 211 (H) 74 - 99 mg/dL    BUN 14 7.0 - 18 MG/DL    Creatinine 1.20 0.6 - 1.3 MG/DL    BUN/Creatinine ratio 12 12 - 20      GFR est AA 57 (L) >60 ml/min/1.73m2    GFR est non-AA 47 (L) >60 ml/min/1.73m2    Calcium 8.3 (L) 8.5 - 10.1 MG/DL   CBC WITH AUTOMATED DIFF    Collection Time: 07/20/17  3:08 AM   Result Value Ref Range    WBC 11.9 4.6 - 13.2 K/uL    RBC 3.23 (L) 4.20 - 5.30 M/uL    HGB 9.3 (L) 12.0 - 16.0 g/dL    HCT 27.5 (L) 35.0 - 45.0 %    MCV 85.1 74.0 - 97.0 FL    MCH 28.8 24.0 - 34.0 PG    MCHC 33.8 31.0 - 37.0 g/dL    RDW 12.6 11.6 - 14.5 %    PLATELET 122 332 - 530 K/uL    MPV 10.5 9.2 - 11.8 FL    NEUTROPHILS 63 40 - 73 %    LYMPHOCYTES 31 21 - 52 %    MONOCYTES 5 3 - 10 %    EOSINOPHILS 1 0 - 5 %    BASOPHILS 0 0 - 2 %    ABS. NEUTROPHILS 7.4 1.8 - 8.0 K/UL    ABS. LYMPHOCYTES 3.7 (H) 0.9 - 3.6 K/UL    ABS. MONOCYTES 0.5 0.05 - 1.2 K/UL    ABS. EOSINOPHILS 0.2 0.0 - 0.4 K/UL    ABS.  BASOPHILS 0.0 0.0 - 0.06 K/UL    DF AUTOMATED     GLUCOSE, POC    Collection Time: 07/20/17  6:23 AM   Result Value Ref Range    Glucose (POC) 171 (H) 70 - 110 mg/dL         Imaging  Left Foot Three Views on 7/18/17  HISTORY: Left second digit pain and swelling, history of diabetes, rule out  osteomyelitis.      COMPARISON: None.      FINDINGS:   Three views obtained. There is marked soft tissue swelling of the second digit. Cortical border of the terminal tuft of the distal phalanx is not sharply  defined. . The joint spaces are maintained. Mineralization is normal. There is no  radiopaque foreign body.      IMPRESSION:  Marked soft tissue swelling with poor visualization of the cortex of the  terminal tuft of the second digit. Osteomyelitis is not excluded. As clinically  indicated follow-up MR of the foot or nuclear isotope infection scan could be  obtained for confirmation. Peripheral Arterial Testing on 7/19/17     Right Leg  Doppler:    Normal  Ankle/Brachial: 1.16     Left Leg  Doppler:    Normal  Ankle/Brachial: 1.11     INTERPRETATION/FINDINGS  Physiologic testing was performed using continuous wave Doppler and  segmental pressures. 1. No evidence of arterial insufficiency in the bilateral lower  extremities at rest.  2. The right ankle/brachial index is 1.16 and the left ankle/brachial  index is 1. 11. MRI left foot without contrast 7/20/17     INDICATION: Left second toe infection, possible osteomyelitis     COMPARISON: Radiographs 7/18/2017     TECHNIQUE: Multiplanar multiecho sequences of the left forefoot performed  without contrast.     FINDINGS:     Bones/joints: Second toe distal phalanx is poorly delineated, likely due to  extensive marrow infiltration and osseous destruction. Mild marrow edema in the  second toe middle phalanx with T1 marrow signal largely preserved. Extensive  associated second toe soft tissue edema. No suspicious marrow signal or acute  fracture elsewhere.     Ligaments: Lisfranc ligament intact.  Imaged collateral ligaments are  unremarkable.     Tendons: Ill-definition of the second toe flexor tendon near distal insertion. Otherwise flexor and extensor tendons in the forefoot appear largely intact.     Miscellaneous: Extensive soft tissue edema in the second toe, and mild patchy  soft tissue edema throughout the forefoot.     IMPRESSION:  Findings are consistent with osteomyelitis in the second toe distal phalanx. Marked soft tissue swelling in the second toe. See additional details above. Assessment and Plan:     46year old AA female with PMH of DM type 2, HTN and hx of pancreatitis that presents with diabetic foot ulcer on second toe of left foot with osteomyelitis confirmed by MRI.      Osteomyelitis of Diabetic foot ulcer of 2nd toe of left foot  X-ray shows soft tissue swelling with normal joint space, but MRI confirms osteomyelitis in distal phalanx of left 2nd toe  CRP and ESR elevated at 5.2 and 72 respectively  WBC is normal today at 11.9, down from 13.9  Patient is afebrile and is on appropriate empiric coverage with Vanco per pharmacy and zosyn 3.375gm IV q6hr  Vanco trough tonight at 2130, goal trough 15-20 for osteomyelitis  Will need abx therapy for at least 6-8 weeks  Prelim wound culture shows gm (-) rods; prelim BC x2  and urine culture are no growth  Will narrow therapy once culture grows with sensitivity   Podiatry evaluation with debridement tomorrow 7/21/17    DM type II-not controlled  On novolin 70/30 insulin at home. Possibly non-compliant at home. She is working on getting set up with [facility] to afford medications. Social work consulted to help facilitate getting access to medications  May benefit from diabetes education class, receiving diabetes education while inpatient.   HgbA1c is 10.1%  Hold patient home insulin dose Novolin 70/30 25 units in the AM and 15 units in the PM  Received 15 units of correctional lispro in the last 24 hours  Increase lantus to 20 units QHS with current correctional insulin coverage ACHS      HTN-appears controlled  Continue Quinapril 20mg PO daily  Monitor BMP for renal function and potassium         Diet NPO   DVT Prophylax None   GI Prophylaxis Protonix PO   Code status Full   Disposition Home possibly in 1-2 days        Tyson Smith , MS4   July 20, 2017

## 2017-07-20 NOTE — PROGRESS NOTES
Care Management Interventions  PCP Verified by CM: Yes (DR. GILBERT STOVER)  Palliative Care Consult (Criteria: CHF and RRAT>21): No  Reason for No Palliative Care Consult: Other (see comment) (NO ORDER )  Mode of Transport at Discharge: Other (see comment) (FAMILY WILL TRANSPORT)  Transition of Care Consult (CM Consult): Discharge Planning  Current Support Network: Own Home, Lives Alone, Family Lives Nearby (PT REPORTS THAT HER SON IS LIVING WITH HER AT THIS TIME)  Confirm Follow Up Transport: Self  Plan discussed with Pt/Family/Caregiver: Yes (PT REPORTS THAT HER PLAN IS TO RETURN HOME WITH FAMILY SUPPORT. PT REPORTS THATRECEIVES ASSISTANCE FROM  [employer], WHICH PROVIDES FOOD STAMPS AND MEDICAL SUPPORT AND MENTAL HEALTH SUPPORT SERVICES)  Discharge Location  Discharge Placement: Home with family assistance  Pt is a 46year old female in room 200. Pt was admitted due to Osteomyelitis. Pt is alert and oriented x 3. Pt reports that she has a supportive family, and her son is living with her now temporarily. Pt states she normally lives alone. Pt reports that she receives through the [facility] program. Pt reports that her plan is to be followed by the [facility]. Pt states she will need assistance with medication when ready for discharge, through the  indigent medication program.  Pt states she cannot afford pcp co-payment, pt provided a [facility] application. Pt states that her family will transport her home when ready for discharge.

## 2017-07-20 NOTE — PROGRESS NOTES
Intern Progress Note  [facility]       Patient: Tara Dotson MRN: [id number]  CSN: [id number]    YOB: 1965  Age: 46 y.o. Sex: female    DOA: 7/18/2017 LOS:  LOS: 2 days                    Subjective:       Patient was sitting up at the side of her bed comfortably. The patient had no complaints please see ROS for pertinent negatives. Denied pain. At this time, the patient appears to have a poor understanding of her illness. She had poor understanding of the potential for progression of her illness (discussed the possibility that this infection could be hard or impossible to resolve with antibiotics alone and that it could spread to her blood or her foot and further). Review of Systems   Constitutional: Negative for chills and fever. Eyes: Negative for blurred vision and double vision. Respiratory: Negative for cough, shortness of breath and wheezing. Cardiovascular: Negative for chest pain, palpitations and leg swelling. Gastrointestinal: Negative for constipation, diarrhea, nausea and vomiting. Genitourinary: Negative for dysuria and urgency. Skin: Negative for rash. Neurological: Negative for dizziness, sensory change and headaches. Objective:      Patient Vitals for the past 24 hrs:   Temp Pulse Resp BP SpO2   07/20/17 0400 97.7 °F (36.5 °C) 78 18 94/61 100 %   07/20/17 0000 98.8 °F (37.1 °C) 83 18 132/82 100 %   07/19/17 2000 97 °F (36.1 °C) 77 18 139/84 100 %   07/19/17 1606 97.6 °F (36.4 °C) 76 18 117/77 100 %   07/19/17 1232 98 °F (36.7 °C) 76 18 126/71 100 %   07/19/17 0827 99 °F (37.2 °C) 78 18 123/74 100 %         Intake/Output Summary (Last 24 hours) at 07/20/17 0726  Last data filed at 07/19/17 1859   Gross per 24 hour   Intake          2085.42 ml   Output              801 ml   Net          1284.42 ml       Physical Exam   Constitutional: She appears well-developed. Cardiovascular: Normal rate, regular rhythm and intact distal pulses.   Exam reveals no gallop and no friction rub. No murmur heard. Pulmonary/Chest: Effort normal. No respiratory distress. She has no wheezes. She has no rales. Abdominal: Soft. Bowel sounds are normal. She exhibits no distension. There is no tenderness. Musculoskeletal: Normal range of motion. Neurological: She is alert. Skin: Skin is warm. Lab/Data Reviewed:  CMP:   Lab Results   Component Value Date/Time     07/20/2017 03:05 AM    K 3.6 07/20/2017 03:05 AM     (H) 07/20/2017 03:05 AM    CO2 22 07/20/2017 03:05 AM    AGAP 7 07/20/2017 03:05 AM     (H) 07/20/2017 03:05 AM    BUN 14 07/20/2017 03:05 AM    CREA 1.20 07/20/2017 03:05 AM    GFRAA 57 (L) 07/20/2017 03:05 AM    GFRNA 47 (L) 07/20/2017 03:05 AM    CA 8.3 (L) 07/20/2017 03:05 AM     CBC:   Lab Results   Component Value Date/Time    WBC 11.9 07/20/2017 03:08 AM    HGB 9.3 (L) 07/20/2017 03:08 AM    HCT 27.5 (L) 07/20/2017 03:08 AM     07/20/2017 03:08 AM       Scheduled Medications Reviewed:  Current Facility-Administered Medications   Medication Dose Route Frequency    insulin glargine (LANTUS) injection 15 Units  15 Units SubCUTAneous QHS    atorvastatin (LIPITOR) tablet 20 mg  20 mg Oral DAILY    vancomycin (VANCOCIN) 1,250 mg in 0.9% sodium chloride 250 mL IVPB  1,250 mg IntraVENous Q18H    VANCOMYCIN TROUGH DUE   Other Daily    0.9% sodium chloride infusion  125 mL/hr IntraVENous CONTINUOUS    insulin lispro (HUMALOG) injection   SubCUTAneous Q6H    PARoxetine (PAXIL) tablet 30 mg  30 mg Oral QHS    quinapril (ACCUPRIL) tablet 20 mg  20 mg Oral QHS    pantoprazole (PROTONIX) tablet 40 mg  40 mg Oral DAILY    piperacillin-tazobactam (ZOSYN) 3.375 g in 0.9% sodium chloride (MBP/ADV) 100 mL MBP  3.375 g IntraVENous Q6H         Imaging, microbiology, and EKG/Telemetry:    MRI 07/19/2017  IMPRESSION:     1. Findings are consistent with osteomyelitis in the second toe distal phalanx.   Marked soft tissue swelling in the second toe. See additional details above.           Assessment/Plan     46 y. o. female with PMH HTN, Insulin dependent Diabetes type 2, GERD, insomnia  now presenting with complaint of diabetic foot ulceration.       Sepsis with source of diabetic foot ulceration (2/4 SIRS criteria): Leukocytosis at 13.9, tachycardia @ 99. Lactate 1.4 on admission. XR on admission indicative of marked soft tissue swelling with poor visualization of the cortex of the terminal tuft of the second digit. - Podiatry to be consulted. NPO tonight for possible surgery tomorrow. Diet given for today. - Blood, urine cultures NGTD  - Wound culture multimicrobial with e-coli, coag negative staph, hemolytic and non-hemolytic streptococci. Complete sensitivity pending.   - Vancomycin dosed per pharmacy  - Zosyn 3.375 q 6H   - Daily CBC to monitor leukocytosis. WBC 13.9>14.2>11.9  - Normal saline @ 125 cc/hr for maintenance while NPO  - NPO today pending podiatry recommendations   - SILVIA not suggestive of arterial disease in lower extremities  - CRP increased at 5.2, ESR increased at 72, which is what one would expect to see in osteomyelitis  - MRI 7/19 suggestive of osteomyelitis in the distal phalanx of the second toe (L foot)      Uncontrolled DM type 2, insulin dependent and retinopathy: Patient is not able to afford insulin and has been dependent on samples from her PCP. She is getting connected to the [facility]. She has not used her insulin for the last week. - A1C 10.1  - Correctional scale with Glucose ACHS   - Lantus 15U QHS. Received 15 units yesterday. BG range 132-256.  Will continue to monitor.      Hypertension: Goal < 140/90   - Continue with quinapril 20 mg daily       Renal insufficiency: Last outpatient Cr 1.5; Cr this admission 1.13>1.0>1.2.  - Daily BMP   - Will continue to monitor      GERD: stable   - Holding nexium and continue with protonix while inpatient       Insomnia/anxiety:   - Paxil to be continued nightly from home med      Diet: NPO at midnight   DVT Prophylaxis: heparin and SCD's     Disposition and anticipated LOS: 2+ midnights, patient unable to afford medications and has been referred to [facility].  Will consult CM to ensure acceptance for medications management and follow up        Rashad Calvillo MD  PGY-1 [address] [facility]  07/20/17 7:26 AM

## 2017-07-21 ENCOUNTER — ANESTHESIA (OUTPATIENT)
Dept: SURGERY | Age: 52
DRG: 854 | End: 2017-07-21
Payer: SUBSIDIZED

## 2017-07-21 LAB
ANION GAP BLD CALC-SCNC: 12 MMOL/L (ref 3–18)
BASOPHILS # BLD AUTO: 0 K/UL (ref 0–0.1)
BASOPHILS # BLD: 0 % (ref 0–2)
BUN SERPL-MCNC: 10 MG/DL (ref 7–18)
BUN/CREAT SERPL: 10 (ref 12–20)
CALCIUM SERPL-MCNC: 8.5 MG/DL (ref 8.5–10.1)
CHLORIDE SERPL-SCNC: 107 MMOL/L (ref 100–108)
CO2 SERPL-SCNC: 21 MMOL/L (ref 21–32)
CREAT SERPL-MCNC: 0.99 MG/DL (ref 0.6–1.3)
DIFFERENTIAL METHOD BLD: ABNORMAL
EOSINOPHIL # BLD: 0.2 K/UL (ref 0–0.4)
EOSINOPHIL NFR BLD: 1 % (ref 0–5)
ERYTHROCYTE [DISTWIDTH] IN BLOOD BY AUTOMATED COUNT: 12.3 % (ref 11.6–14.5)
GLUCOSE BLD STRIP.AUTO-MCNC: 139 MG/DL (ref 70–110)
GLUCOSE BLD STRIP.AUTO-MCNC: 177 MG/DL (ref 70–110)
GLUCOSE BLD STRIP.AUTO-MCNC: 183 MG/DL (ref 70–110)
GLUCOSE BLD STRIP.AUTO-MCNC: 196 MG/DL (ref 70–110)
GLUCOSE BLD STRIP.AUTO-MCNC: 196 MG/DL (ref 70–110)
GLUCOSE BLD STRIP.AUTO-MCNC: 278 MG/DL (ref 70–110)
GLUCOSE SERPL-MCNC: 170 MG/DL (ref 74–99)
HCG UR QL: NEGATIVE
HCT VFR BLD AUTO: 27 % (ref 35–45)
HGB BLD-MCNC: 9.3 G/DL (ref 12–16)
LYMPHOCYTES # BLD AUTO: 37 % (ref 21–52)
LYMPHOCYTES # BLD: 4.2 K/UL (ref 0.9–3.6)
MCH RBC QN AUTO: 29 PG (ref 24–34)
MCHC RBC AUTO-ENTMCNC: 34.4 G/DL (ref 31–37)
MCV RBC AUTO: 84.1 FL (ref 74–97)
MONOCYTES # BLD: 0.5 K/UL (ref 0.05–1.2)
MONOCYTES NFR BLD AUTO: 5 % (ref 3–10)
NEUTS SEG # BLD: 6.4 K/UL (ref 1.8–8)
NEUTS SEG NFR BLD AUTO: 57 % (ref 40–73)
PLATELET # BLD AUTO: 313 K/UL (ref 135–420)
PMV BLD AUTO: 10.5 FL (ref 9.2–11.8)
POTASSIUM SERPL-SCNC: 3.3 MMOL/L (ref 3.5–5.5)
RBC # BLD AUTO: 3.21 M/UL (ref 4.2–5.3)
SODIUM SERPL-SCNC: 140 MMOL/L (ref 136–145)
WBC # BLD AUTO: 11.3 K/UL (ref 4.6–13.2)

## 2017-07-21 PROCEDURE — 74011636637 HC RX REV CODE- 636/637: Performed by: FAMILY MEDICINE

## 2017-07-21 PROCEDURE — 74011250636 HC RX REV CODE- 250/636: Performed by: STUDENT IN AN ORGANIZED HEALTH CARE EDUCATION/TRAINING PROGRAM

## 2017-07-21 PROCEDURE — 74011000250 HC RX REV CODE- 250: Performed by: ANESTHESIOLOGY

## 2017-07-21 PROCEDURE — 77030020782 HC GWN BAIR PAWS FLX 3M -B: Performed by: PODIATRIST

## 2017-07-21 PROCEDURE — 77030002986 HC SUT PROL J&J -A: Performed by: PODIATRIST

## 2017-07-21 PROCEDURE — 77030018836 HC SOL IRR NACL ICUM -A: Performed by: PODIATRIST

## 2017-07-21 PROCEDURE — 87076 CULTURE ANAEROBE IDENT EACH: CPT | Performed by: PODIATRIST

## 2017-07-21 PROCEDURE — 81025 URINE PREGNANCY TEST: CPT

## 2017-07-21 PROCEDURE — 88305 TISSUE EXAM BY PATHOLOGIST: CPT | Performed by: PODIATRIST

## 2017-07-21 PROCEDURE — 87185 SC STD ENZYME DETCJ PER NZM: CPT | Performed by: PODIATRIST

## 2017-07-21 PROCEDURE — 87075 CULTR BACTERIA EXCEPT BLOOD: CPT | Performed by: PODIATRIST

## 2017-07-21 PROCEDURE — 74011250636 HC RX REV CODE- 250/636

## 2017-07-21 PROCEDURE — 74011250636 HC RX REV CODE- 250/636: Performed by: NURSE ANESTHETIST, CERTIFIED REGISTERED

## 2017-07-21 PROCEDURE — 74011250637 HC RX REV CODE- 250/637: Performed by: STUDENT IN AN ORGANIZED HEALTH CARE EDUCATION/TRAINING PROGRAM

## 2017-07-21 PROCEDURE — 88311 DECALCIFY TISSUE: CPT | Performed by: PODIATRIST

## 2017-07-21 PROCEDURE — 76210000063 HC OR PH I REC FIRST 0.5 HR: Performed by: PODIATRIST

## 2017-07-21 PROCEDURE — 74011000250 HC RX REV CODE- 250: Performed by: STUDENT IN AN ORGANIZED HEALTH CARE EDUCATION/TRAINING PROGRAM

## 2017-07-21 PROCEDURE — 65270000029 HC RM PRIVATE

## 2017-07-21 PROCEDURE — 85025 COMPLETE CBC W/AUTO DIFF WBC: CPT | Performed by: STUDENT IN AN ORGANIZED HEALTH CARE EDUCATION/TRAINING PROGRAM

## 2017-07-21 PROCEDURE — 77030011640 HC PAD GRND REM COVD -A: Performed by: PODIATRIST

## 2017-07-21 PROCEDURE — 36415 COLL VENOUS BLD VENIPUNCTURE: CPT | Performed by: STUDENT IN AN ORGANIZED HEALTH CARE EDUCATION/TRAINING PROGRAM

## 2017-07-21 PROCEDURE — 0Y6S0Z0 DETACHMENT AT LEFT 2ND TOE, COMPLETE, OPEN APPROACH: ICD-10-PCS | Performed by: PODIATRIST

## 2017-07-21 PROCEDURE — 87070 CULTURE OTHR SPECIMN AEROBIC: CPT | Performed by: PODIATRIST

## 2017-07-21 PROCEDURE — 76060000032 HC ANESTHESIA 0.5 TO 1 HR: Performed by: PODIATRIST

## 2017-07-21 PROCEDURE — 87186 SC STD MICRODIL/AGAR DIL: CPT | Performed by: PODIATRIST

## 2017-07-21 PROCEDURE — 74011000272 HC RX REV CODE- 272: Performed by: PODIATRIST

## 2017-07-21 PROCEDURE — 74011636637 HC RX REV CODE- 636/637: Performed by: STUDENT IN AN ORGANIZED HEALTH CARE EDUCATION/TRAINING PROGRAM

## 2017-07-21 PROCEDURE — 74011000250 HC RX REV CODE- 250: Performed by: PODIATRIST

## 2017-07-21 PROCEDURE — 76010000138 HC OR TIME 0.5 TO 1 HR: Performed by: PODIATRIST

## 2017-07-21 PROCEDURE — 77030008544 HC TBNG MON PRSS MRTM -B: Performed by: ANESTHESIOLOGY

## 2017-07-21 PROCEDURE — 74011000258 HC RX REV CODE- 258: Performed by: STUDENT IN AN ORGANIZED HEALTH CARE EDUCATION/TRAINING PROGRAM

## 2017-07-21 PROCEDURE — 87077 CULTURE AEROBIC IDENTIFY: CPT | Performed by: PODIATRIST

## 2017-07-21 PROCEDURE — 77030036687 HC SHOE PSTOP S2SG -A: Performed by: PODIATRIST

## 2017-07-21 PROCEDURE — 80048 BASIC METABOLIC PNL TOTAL CA: CPT | Performed by: STUDENT IN AN ORGANIZED HEALTH CARE EDUCATION/TRAINING PROGRAM

## 2017-07-21 PROCEDURE — 82962 GLUCOSE BLOOD TEST: CPT

## 2017-07-21 PROCEDURE — 74011000250 HC RX REV CODE- 250

## 2017-07-21 RX ORDER — SODIUM CHLORIDE, SODIUM LACTATE, POTASSIUM CHLORIDE, CALCIUM CHLORIDE 600; 310; 30; 20 MG/100ML; MG/100ML; MG/100ML; MG/100ML
75 INJECTION, SOLUTION INTRAVENOUS CONTINUOUS
Status: DISCONTINUED | OUTPATIENT
Start: 2017-07-21 | End: 2017-07-21 | Stop reason: HOSPADM

## 2017-07-21 RX ORDER — INSULIN LISPRO 100 [IU]/ML
INJECTION, SOLUTION INTRAVENOUS; SUBCUTANEOUS
Status: DISCONTINUED | OUTPATIENT
Start: 2017-07-21 | End: 2017-07-24 | Stop reason: HOSPADM

## 2017-07-21 RX ORDER — INSULIN LISPRO 100 [IU]/ML
INJECTION, SOLUTION INTRAVENOUS; SUBCUTANEOUS ONCE
Status: DISCONTINUED | OUTPATIENT
Start: 2017-07-21 | End: 2017-07-21 | Stop reason: HOSPADM

## 2017-07-21 RX ORDER — MAGNESIUM SULFATE 100 %
4 CRYSTALS MISCELLANEOUS AS NEEDED
Status: DISCONTINUED | OUTPATIENT
Start: 2017-07-21 | End: 2017-07-21 | Stop reason: SDUPTHER

## 2017-07-21 RX ORDER — HYDROMORPHONE HYDROCHLORIDE 1 MG/ML
0.5 INJECTION, SOLUTION INTRAMUSCULAR; INTRAVENOUS; SUBCUTANEOUS AS NEEDED
Status: DISCONTINUED | OUTPATIENT
Start: 2017-07-21 | End: 2017-07-21 | Stop reason: HOSPADM

## 2017-07-21 RX ORDER — SODIUM CHLORIDE, SODIUM LACTATE, POTASSIUM CHLORIDE, CALCIUM CHLORIDE 600; 310; 30; 20 MG/100ML; MG/100ML; MG/100ML; MG/100ML
75 INJECTION, SOLUTION INTRAVENOUS CONTINUOUS
Status: DISCONTINUED | OUTPATIENT
Start: 2017-07-21 | End: 2017-07-22

## 2017-07-21 RX ORDER — FENTANYL CITRATE 50 UG/ML
INJECTION, SOLUTION INTRAMUSCULAR; INTRAVENOUS AS NEEDED
Status: DISCONTINUED | OUTPATIENT
Start: 2017-07-21 | End: 2017-07-21 | Stop reason: HOSPADM

## 2017-07-21 RX ORDER — PROPOFOL 10 MG/ML
INJECTION, EMULSION INTRAVENOUS AS NEEDED
Status: DISCONTINUED | OUTPATIENT
Start: 2017-07-21 | End: 2017-07-21 | Stop reason: HOSPADM

## 2017-07-21 RX ORDER — LIDOCAINE HYDROCHLORIDE 10 MG/ML
0.1 INJECTION, SOLUTION EPIDURAL; INFILTRATION; INTRACAUDAL; PERINEURAL AS NEEDED
Status: DISCONTINUED | OUTPATIENT
Start: 2017-07-21 | End: 2017-07-21 | Stop reason: HOSPADM

## 2017-07-21 RX ORDER — INSULIN GLARGINE 100 [IU]/ML
25 INJECTION, SOLUTION SUBCUTANEOUS
Status: DISCONTINUED | OUTPATIENT
Start: 2017-07-21 | End: 2017-07-22

## 2017-07-21 RX ORDER — LIDOCAINE HYDROCHLORIDE 20 MG/ML
INJECTION, SOLUTION EPIDURAL; INFILTRATION; INTRACAUDAL; PERINEURAL AS NEEDED
Status: DISCONTINUED | OUTPATIENT
Start: 2017-07-21 | End: 2017-07-21 | Stop reason: HOSPADM

## 2017-07-21 RX ORDER — MAGNESIUM SULFATE 100 %
4 CRYSTALS MISCELLANEOUS AS NEEDED
Status: DISCONTINUED | OUTPATIENT
Start: 2017-07-21 | End: 2017-07-21 | Stop reason: HOSPADM

## 2017-07-21 RX ORDER — SODIUM CHLORIDE 450 MG/100ML
125 INJECTION, SOLUTION INTRAVENOUS CONTINUOUS
Status: DISCONTINUED | OUTPATIENT
Start: 2017-07-21 | End: 2017-07-21

## 2017-07-21 RX ORDER — DEXTROSE 50 % IN WATER (D50W) INTRAVENOUS SYRINGE
25-50 AS NEEDED
Status: DISCONTINUED | OUTPATIENT
Start: 2017-07-21 | End: 2017-07-21 | Stop reason: SDUPTHER

## 2017-07-21 RX ORDER — MIDAZOLAM HYDROCHLORIDE 1 MG/ML
INJECTION, SOLUTION INTRAMUSCULAR; INTRAVENOUS AS NEEDED
Status: DISCONTINUED | OUTPATIENT
Start: 2017-07-21 | End: 2017-07-21 | Stop reason: HOSPADM

## 2017-07-21 RX ORDER — SODIUM CHLORIDE, SODIUM LACTATE, POTASSIUM CHLORIDE, CALCIUM CHLORIDE 600; 310; 30; 20 MG/100ML; MG/100ML; MG/100ML; MG/100ML
INJECTION, SOLUTION INTRAVENOUS
Status: DISCONTINUED | OUTPATIENT
Start: 2017-07-21 | End: 2017-07-21 | Stop reason: HOSPADM

## 2017-07-21 RX ORDER — DEXTROSE 50 % IN WATER (D50W) INTRAVENOUS SYRINGE
25-50 AS NEEDED
Status: DISCONTINUED | OUTPATIENT
Start: 2017-07-21 | End: 2017-07-21 | Stop reason: HOSPADM

## 2017-07-21 RX ORDER — QUINAPRIL 20 MG/1
20 TABLET ORAL
Status: DISCONTINUED | OUTPATIENT
Start: 2017-07-22 | End: 2017-07-24 | Stop reason: HOSPADM

## 2017-07-21 RX ORDER — SODIUM CHLORIDE 0.9 % (FLUSH) 0.9 %
5-10 SYRINGE (ML) INJECTION AS NEEDED
Status: DISCONTINUED | OUTPATIENT
Start: 2017-07-21 | End: 2017-07-21 | Stop reason: HOSPADM

## 2017-07-21 RX ORDER — HYDROMORPHONE HYDROCHLORIDE 2 MG/ML
0.5 INJECTION, SOLUTION INTRAMUSCULAR; INTRAVENOUS; SUBCUTANEOUS AS NEEDED
Status: DISCONTINUED | OUTPATIENT
Start: 2017-07-21 | End: 2017-07-21 | Stop reason: SDUPTHER

## 2017-07-21 RX ADMIN — PIPERACILLIN SODIUM,TAZOBACTAM SODIUM 3.38 G: 3; .375 INJECTION, POWDER, FOR SOLUTION INTRAVENOUS at 19:53

## 2017-07-21 RX ADMIN — PROPOFOL 30 MG: 10 INJECTION, EMULSION INTRAVENOUS at 15:21

## 2017-07-21 RX ADMIN — INSULIN GLARGINE 25 UNITS: 100 INJECTION, SOLUTION SUBCUTANEOUS at 21:52

## 2017-07-21 RX ADMIN — LIDOCAINE HYDROCHLORIDE: 10 INJECTION, SOLUTION EPIDURAL; INFILTRATION; INTRACAUDAL; PERINEURAL at 10:23

## 2017-07-21 RX ADMIN — LIDOCAINE HYDROCHLORIDE: 10 INJECTION, SOLUTION EPIDURAL; INFILTRATION; INTRACAUDAL; PERINEURAL at 13:10

## 2017-07-21 RX ADMIN — PANTOPRAZOLE SODIUM 40 MG: 40 TABLET, DELAYED RELEASE ORAL at 09:21

## 2017-07-21 RX ADMIN — LIDOCAINE HYDROCHLORIDE 40 MG: 20 INJECTION, SOLUTION EPIDURAL; INFILTRATION; INTRACAUDAL; PERINEURAL at 15:19

## 2017-07-21 RX ADMIN — FAMOTIDINE 20 MG: 10 INJECTION, SOLUTION INTRAVENOUS at 17:13

## 2017-07-21 RX ADMIN — INSULIN LISPRO 9 UNITS: 100 INJECTION, SOLUTION INTRAVENOUS; SUBCUTANEOUS at 21:42

## 2017-07-21 RX ADMIN — PIPERACILLIN SODIUM,TAZOBACTAM SODIUM 3.38 G: 3; .375 INJECTION, POWDER, FOR SOLUTION INTRAVENOUS at 14:35

## 2017-07-21 RX ADMIN — INSULIN LISPRO 3 UNITS: 100 INJECTION, SOLUTION INTRAVENOUS; SUBCUTANEOUS at 07:23

## 2017-07-21 RX ADMIN — PROPOFOL 50 MG: 10 INJECTION, EMULSION INTRAVENOUS at 15:19

## 2017-07-21 RX ADMIN — PIPERACILLIN SODIUM,TAZOBACTAM SODIUM 3.38 G: 3; .375 INJECTION, POWDER, FOR SOLUTION INTRAVENOUS at 01:45

## 2017-07-21 RX ADMIN — SODIUM CHLORIDE 125 ML/HR: 900 INJECTION, SOLUTION INTRAVENOUS at 05:41

## 2017-07-21 RX ADMIN — DAKIN'S SOLUTION 0.125% (QUARTER STRENGTH): 0.12 SOLUTION at 09:00

## 2017-07-21 RX ADMIN — ATORVASTATIN CALCIUM 20 MG: 20 TABLET, FILM COATED ORAL at 09:21

## 2017-07-21 RX ADMIN — FENTANYL CITRATE 100 MCG: 50 INJECTION, SOLUTION INTRAMUSCULAR; INTRAVENOUS at 15:19

## 2017-07-21 RX ADMIN — SODIUM CHLORIDE 125 ML/HR: 450 INJECTION, SOLUTION INTRAVENOUS at 07:27

## 2017-07-21 RX ADMIN — VANCOMYCIN HYDROCHLORIDE 1400 MG: 10 INJECTION, POWDER, LYOPHILIZED, FOR SOLUTION INTRAVENOUS at 17:13

## 2017-07-21 RX ADMIN — PIPERACILLIN SODIUM,TAZOBACTAM SODIUM 3.38 G: 3; .375 INJECTION, POWDER, FOR SOLUTION INTRAVENOUS at 06:43

## 2017-07-21 RX ADMIN — PAROXETINE HYDROCHLORIDE 30 MG: 20 TABLET, FILM COATED ORAL at 21:41

## 2017-07-21 RX ADMIN — MIDAZOLAM HYDROCHLORIDE 2 MG: 1 INJECTION, SOLUTION INTRAMUSCULAR; INTRAVENOUS at 15:12

## 2017-07-21 RX ADMIN — LIDOCAINE HYDROCHLORIDE: 10 INJECTION, SOLUTION EPIDURAL; INFILTRATION; INTRACAUDAL; PERINEURAL at 09:34

## 2017-07-21 RX ADMIN — SODIUM CHLORIDE, SODIUM LACTATE, POTASSIUM CHLORIDE, AND CALCIUM CHLORIDE 75 ML/HR: 600; 310; 30; 20 INJECTION, SOLUTION INTRAVENOUS at 12:10

## 2017-07-21 RX ADMIN — LIDOCAINE HYDROCHLORIDE: 10 INJECTION, SOLUTION EPIDURAL; INFILTRATION; INTRACAUDAL; PERINEURAL at 11:30

## 2017-07-21 RX ADMIN — INSULIN LISPRO 3 UNITS: 100 INJECTION, SOLUTION INTRAVENOUS; SUBCUTANEOUS at 13:06

## 2017-07-21 RX ADMIN — PROPOFOL 30 MG: 10 INJECTION, EMULSION INTRAVENOUS at 15:45

## 2017-07-21 RX ADMIN — PROPOFOL 20 MG: 10 INJECTION, EMULSION INTRAVENOUS at 15:30

## 2017-07-21 RX ADMIN — SODIUM CHLORIDE, SODIUM LACTATE, POTASSIUM CHLORIDE, CALCIUM CHLORIDE: 600; 310; 30; 20 INJECTION, SOLUTION INTRAVENOUS at 15:11

## 2017-07-21 NOTE — PROGRESS NOTES
Bedside and Verbal shift change report given to Kishan Laws RN (oncoming nurse) by Molly Baumann RN (offgoing nurse). Report included the following information SBAR, Kardex, MAR and Recent Results.     SITUATION:  Code Status: Full Code  Reason for Admission: Osteomyelitis [facility])  dx  Hospital day: 3  Problem List:   [facility] Problems  Date Reviewed: 11/17/2014             Codes Class Noted POA     * (Principal)Sepsis ([facility] ICD-10-CM: A41.9  ICD-9-CM: 038.9, 995.91   7/19/2017 Unknown           Foot ulcer due to secondary DM ([facility] ICD-10-CM: H82.641, L97.509  ICD-9-CM: 249.80, 707.15   7/19/2017 Unknown           Uncontrolled type 2 diabetes mellitus with chronic kidney disease ([facility] ICD-10-CM: E11.22, N18.9, E11.65  ICD-9-CM: 250.42, 585. 9   7/19/2017 Unknown           Diabetic retinopathy ([facility] ICD-10-CM: E11.319  ICD-9-CM: 250.50, 362.01   7/19/2017 Unknown           Anxiety ICD-10-CM: F41.9  ICD-9-CM: 300.00   7/19/2017 Unknown           Leukocytosis ICD-10-CM: W09.430  ICD-9-CM: 288.60   7/19/2017 Unknown           Renal insufficiency ICD-10-CM: N28.9  ICD-9-CM: 593.9   7/19/2017 Unknown           Osteomyelitis (HCC) ICD-10-CM: M86.9  ICD-9-CM: 730.20   7/18/2017 Unknown           GERD (gastroesophageal reflux disease) ICD-10-CM: K21.9  ICD-9-CM: 530.81   6/18/2014 Yes           HLD (hyperlipidemia) (Chronic) ICD-10-CM: E78.5  ICD-9-CM: 272.4   4/20/2014 Yes           Essential hypertension, benign (Chronic) ICD-10-CM: I10  ICD-9-CM: 830. 1   12/3/2013 Yes                  BACKGROUND:                        Past Medical History:        Past Medical History:   Diagnosis Date    Diabetes ([address]      Gall stones      GERD (gastroesophageal reflux disease)      Hiatal hernia      Hypertension      Mass of abdomen      Pancreatitis                              Patient taking anticoagulants no                         Patient has a defibrillator: no                         History of shots NO for example, flu, pneumonia, tetanus                        Isolation History NO for example, MRSA, CDiff     ASSESSMENT:  Changes in Assessment Throughout Shift: NONE  Significant Changes in 24 hours (for example, RR/code, fall)  Patient has Central Line: no   Patient has Mari Cath: no   Mobility Issues  PT  IV Patency  OR Checklist  Pending Tests     Last Vitals:  Vitals w/ MEWS Score (last day)     Date/Time MEWS Score Pulse Resp Temp BP Level of Consciousness SpO2     07/21/17 0430 1 90 18 99 °F (37.2 °C) 107/70 Alert 100 %     07/21/17 0045 1 82 18 99.6 °F (37.6 °C) (!)  158/98 Alert 100 %     07/20/17 2000 1 80 18 99 °F (37.2 °C) (!)  155/98 Alert 100 %     07/20/17 1640 1 85 18 99 °F (37.2 °C) 134/83 Alert 100 %     07/20/17 1237 1 76 18 97.4 °F (36.3 °C) 125/76 Alert 100 %     07/20/17 0820 2 87 18 97.7 °F (36.5 °C) 90/57 Alert 100 %     07/20/17 0400 2 78 18 97.7 °F (36.5 °C) 94/61 Alert 100 %     07/20/17 0000 1 83 18 98.8 °F (37.1 °C) 132/82 Alert 100 %               PAIN                                              Pain Assessment                                              Pain Intensity 1: 0 (07/21/17 0045)                                                                                                                                            Patient Stated Pain Goal: 0  Intervention effective: yes  Time of last intervention: No c/o pain Reassessment Completed: yes   Other actions taken for pain: Distraction     Last 3 Weights:      Last 3 Recorded Weights in this Encounter     07/18/17 1518   Weight: 81.2 kg (179 lb)   Weight change:      INTAKE/OUPUT                                              Current Shift: 07/21 0701 - 07/21 1900  In: 216.7 [I.V.:216.7]  Out: -                                               Last three shifts: 07/19 1901 - 07/21 0700  In: 1480 [P.O.:480; I.V.:1000]  Out: 2400 [Urine:2400]     RECOMMENDATIONS AND DISCHARGE PLANNING  Patient needs and requests: nutrition, comfort and safety     Pending tests/procedures: labs      Discharge plan for patient: TBD     Discharge planning Needs or Barriers: NONE     Estimated Discharge Date: TBD Posted on Whiteboard in Patients Room: yes        \"HEALS\" SAFETY CHECK  A safety check occurred in the patient's room between off going nurse and oncoming nurse listed above.     The safety check included the below items:     H  High Alert Medications                      Verify all high alert medication drips (heparin, PCA, etc.)  E  Equipment                 Suction is set up for ALL patients (with arsenio)  Red plugs utilized for all equipment (IV pumps, etc.)  WOWs wiped down at end of shift. Room stocked with oxygen, suction, and other unit-specific supplies  A  Alarms           Bed alarm is set for fall risk patients  Ensure chair alarm is in place and activated if patient is up in a chair  L  Lines              Check IV for any infiltration  Mari bag is empty if patient has a Mari   Tubing and IV bags are labeled  S  Safety  Room is clean, patient is clean, and equipment is clean. Hallways are clear from equipment besides carts. Fall bracelet on for fall risk patients  Ensure room is clear and free of clutter  Suction is set up for ALL patients (with arsenio)  Hallways are clear from equipment besides carts.    Isolation precautions followed, supplies available outside room, sign

## 2017-07-21 NOTE — ROUTINE PROCESS
TRANSFER - OUT REPORT:    Verbal report given to Virgil on Dequan Abts  being transferred to room 452 for routine progression of care       Report consisted of patients Situation, Background, Assessment and   Recommendations(SBAR). Information from the following report(s) SBAR, OR Summary, Intake/Output, MAR and Recent Results was reviewed with the receiving nurse. Opportunity for questions and clarification was provided.       Patient transported with:   [employer]

## 2017-07-21 NOTE — PROGRESS NOTES
*ATTENTION:  This note has been created by a medical student for educational purposes only. Please do not refer to the content of this note for clinical decision-making, billing, or other purposes. Please see attending physicians note to obtain clinical information on this patient. *     Progress Note  PF EVMS Service    Patient: Kaylee Palm MRN: [id number]   SSN: [id number]  YOB: 1965   Age: 46 y.o. Sex: female      Admit Date: 7/18/2017    LOS: 3 days   Chief Complaint   Patient presents with    Toe Swelling       Subjective:       Patient feels ok this AM. She understands that she needs the surgery for her toe and expresses concerns about balance and cosmetic appearance following surgery. She has balance issues with her loss of vision in her left eye and is concerned about decreased balance following the procedure. She denies any chest pain, nausea, vomiting or headaches. Review of Systems:  Review of Systems - History obtained from the patient  General ROS: negative  Respiratory ROS: no cough, shortness of breath, or wheezing  Cardiovascular ROS: no chest pain or dyspnea on exertion  Gastrointestinal ROS: no abdominal pain, change in bowel habits, or black or bloody stools  Musculoskeletal ROS: negative  Dermatological ROS: positive for - nail changes        Objective:     Vitals:  Visit Vitals    /70 (BP 1 Location: Left arm, BP Patient Position: At rest)    Pulse 90    Temp 99 °F (37.2 °C)    Resp 18    Ht 5' 8\" (1.727 m)    Wt 81.2 kg (179 lb)    SpO2 100%    BMI 27.22 kg/m2       Telemetry NONE   Oxygen Room air     Physical Exam:   General appearance: alert, cooperative, no distress, appears stated age  Lungs: clear to auscultation bilaterally  Heart: regular rate and rhythm, S1, S2 normal, no murmur, click, rub or gallop  Abdomen: soft, non-tender.  Bowel sounds normal. No masses,  no organomegaly  Pulses: 2+ and symmetric  Skin: did not visualize left 2nd toe  Neuro:  normal without focal findings  mental status, speech normal, alert and oriented x iii  CHERYL  sensation grossly normal    Intake and Output:    Intake/Output Summary (Last 24 hours) at 07/21/17 0729  Last data filed at 07/21/17 0725   Gross per 24 hour   Intake          1696.67 ml   Output             2400 ml   Net          -703.33 ml       Lab/Data Review:  Recent Results (from the past 12 hour(s))   VANCOMYCIN, TROUGH    Collection Time: 07/20/17  9:36 PM   Result Value Ref Range    Vancomycin,trough 14.9 10.0 - 20.0 ug/mL    Reported dose date: 20170720      Reported dose time: 400      Reported dose: 1250 MG UNITS   GLUCOSE, POC    Collection Time: 07/20/17 11:08 PM   Result Value Ref Range    Glucose (POC) 170 (H) 70 - 110 mg/dL   CBC WITH AUTOMATED DIFF    Collection Time: 07/21/17  2:19 AM   Result Value Ref Range    WBC 11.3 4.6 - 13.2 K/uL    RBC 3.21 (L) 4.20 - 5.30 M/uL    HGB 9.3 (L) 12.0 - 16.0 g/dL    HCT 27.0 (L) 35.0 - 45.0 %    MCV 84.1 74.0 - 97.0 FL    MCH 29.0 24.0 - 34.0 PG    MCHC 34.4 31.0 - 37.0 g/dL    RDW 12.3 11.6 - 14.5 %    PLATELET 995 533 - 014 K/uL    MPV 10.5 9.2 - 11.8 FL    NEUTROPHILS 57 40 - 73 %    LYMPHOCYTES 37 21 - 52 %    MONOCYTES 5 3 - 10 %    EOSINOPHILS 1 0 - 5 %    BASOPHILS 0 0 - 2 %    ABS. NEUTROPHILS 6.4 1.8 - 8.0 K/UL    ABS. LYMPHOCYTES 4.2 (H) 0.9 - 3.6 K/UL    ABS. MONOCYTES 0.5 0.05 - 1.2 K/UL    ABS. EOSINOPHILS 0.2 0.0 - 0.4 K/UL    ABS.  BASOPHILS 0.0 0.0 - 0.1 K/UL    DF AUTOMATED     METABOLIC PANEL, BASIC    Collection Time: 07/21/17  2:19 AM   Result Value Ref Range    Sodium 140 136 - 145 mmol/L    Potassium 3.3 (L) 3.5 - 5.5 mmol/L    Chloride 107 100 - 108 mmol/L    CO2 21 21 - 32 mmol/L    Anion gap 12 3.0 - 18 mmol/L    Glucose 170 (H) 74 - 99 mg/dL    BUN 10 7.0 - 18 MG/DL    Creatinine 0.99 0.6 - 1.3 MG/DL    BUN/Creatinine ratio 10 (L) 12 - 20      GFR est AA >60 >60 ml/min/1.73m2    GFR est non-AA 59 (L) >60 ml/min/1.73m2    Calcium 8.5 8.5 - 10.1 MG/DL   GLUCOSE, POC    Collection Time: 07/21/17  6:46 AM   Result Value Ref Range    Glucose (POC) 183 (H) 70 - 110 mg/dL     Component Results      Component Value Flag Ref Range Units Status     Special Requests: NO SPECIAL REQUESTS     Preliminary     GRAM STAIN RARE WBC'S     Preliminary     GRAM STAIN      Preliminary     FEW GRAM POSITIVE COCCI IN PAIRS     Culture result: FEW ESCHERICHIA COLI (A)    Preliminary     Culture result:  (A)    Preliminary     MODERATE STAPHYLOCOCCUS SPECIES, COAGULASE NEGATIVE (TWO MORPHOTYPES)     Culture result:  (A)    Preliminary     FEW POSSIBLE STREPTOCOCCI,NON HEMOLYTIC     Culture result:  (A)    Preliminary     MODERATE POSSIBLE STREPTOCOCCI, ALPHA HEMOLYTIC       Culture & Susceptibility      ESCHERICHIA COLI      Antibiotic Sensitivity TANYA Unit Status     Ampicillin ($) Resistant >=32 ug/mL Preliminary     Method: TANYA     Ampicillin/sulbactam ($) Intermediate 16 ug/mL Preliminary     Method: TANYA     Cefazolin ($) Susceptible 8 ug/mL Preliminary     Method: TANYA     Cefepime ($$) Susceptible <=1 ug/mL Preliminary     Method: TANYA     Ceftazidime ($) Susceptible <=1 ug/mL Preliminary     Method: TANYA     Ceftriaxone ($) Susceptible <=1 ug/mL Preliminary     Method: TANYA     Ciprofloxacin ($) Susceptible <=0.25 ug/mL Preliminary     Method: TANYA     Gentamicin ($) Susceptible <=1 ug/mL Preliminary     Method: TANYA     Imipenem Susceptible <=0.25 ug/mL Preliminary     Method: TANYA     Levofloxacin ($) Susceptible <=0.12 ug/mL Preliminary     Method: TANYA     Piperacillin/Tazobac ($) Susceptible <=4 ug/mL Preliminary     Method: TANYA     Tobramycin ($) Susceptible <=1 ug/mL Preliminary     Method: TANYA     Trimeth-Sulfamethoxa Susceptible <=20 ug/mL Preliminary     Method: TANYA        BC x 2   Component Results      Component Value Flag Ref Range Units Status     Special Requests: NO SPECIAL REQUESTS     Preliminary     GRAM STAIN      Preliminary     ANAEROBIC BOTTLE GRAM POSITIVE COCCI IN CHAINS     GRAM STAIN      Preliminary     SMEAR CALLED TO AND CORRECTLY REPEATED BY: Swapna Armas, RN 4N ON 7/21 AT 1156 TO K     Culture result:      Preliminary     CULTURE IN [address] UPDATES TO FOLLOW             Imaging  Left Foot Three Views on 7/18/17  HISTORY: Left second digit pain and swelling, history of diabetes, rule out  osteomyelitis.      COMPARISON: None.      FINDINGS:   Three views obtained. There is marked soft tissue swelling of the second digit. Cortical border of the terminal tuft of the distal phalanx is not sharply  defined. . The joint spaces are maintained. Mineralization is normal. There is no  radiopaque foreign body.      IMPRESSION:  Marked soft tissue swelling with poor visualization of the cortex of the  terminal tuft of the second digit. Osteomyelitis is not excluded. As clinically  indicated follow-up MR of the foot or nuclear isotope infection scan could be  obtained for confirmation.        Peripheral Arterial Testing on 7/19/17      Right Leg  Doppler:    Normal  Ankle/Brachial: 1.16      Left Leg  Doppler:    Normal  Ankle/Brachial: 1.11      INTERPRETATION/FINDINGS  Physiologic testing was performed using continuous wave Doppler and  segmental pressures. 1. No evidence of arterial insufficiency in the bilateral lower  extremities at rest.  2. The right ankle/brachial index is 1.16 and the left ankle/brachial  index is 1. 11.     MRI left foot without contrast 7/20/17      INDICATION: Left second toe infection, possible osteomyelitis      COMPARISON: Radiographs 7/18/2017      TECHNIQUE: Multiplanar multiecho sequences of the left forefoot performed  without contrast.      FINDINGS:      Bones/joints: Second toe distal phalanx is poorly delineated, likely due to  extensive marrow infiltration and osseous destruction. Mild marrow edema in the  second toe middle phalanx with T1 marrow signal largely preserved.  Extensive  associated second toe soft tissue edema. No suspicious marrow signal or acute  fracture elsewhere.      Ligaments: Lisfranc ligament intact. Imaged collateral ligaments are  unremarkable.      Tendons: Ill-definition of the second toe flexor tendon near distal insertion. Otherwise flexor and extensor tendons in the forefoot appear largely intact.      Miscellaneous: Extensive soft tissue edema in the second toe, and mild patchy  soft tissue edema throughout the forefoot.      IMPRESSION:  Findings are consistent with osteomyelitis in the second toe distal phalanx. Marked soft tissue swelling in the second toe. See additional details above. Assessment and Plan:     46year old AA female with PMH of DM type 2, HTN and hx of pancreatitis that presents with diabetic foot ulcer on second toe of left foot with osteomyelitis confirmed by MRI.       Osteomyelitis of Diabetic foot ulcer of 2nd toe of left foot  X-ray shows soft tissue swelling with normal joint space, but MRI confirms osteomyelitis in distal phalanx of left 2nd toe  CRP and ESR elevated at 5.2 and 72 respectively  WBC is normal today and continuing to trend down, today at 11.3, down from 11.9  Patient is afebrile and is on appropriate empiric coverage with Vanco per pharmacy and zosyn 3.375gm IV q6hr  Vanco trough last night was 14.9 and was at goal 15-20. Prelim wound culture shows e.coli and coag neg staph, sensitivities still pending; prelim BC x2  is now growing gram + cocci in chains. Urine culture is no growth x 2 days  Will narrow therapy once culture grows with sensitivity  Podiatry evaluated and recommended amputation. Amputation scheduled today at 1430 if patient agrees to surgery. If patient still wants to save her toe, will need abx therapy for at least 6-8 weeks if she declines surgery  Anticipate PT?OT needs following surgery     DM type II-not controlled  On novolin 70/30 insulin at home. Possibly non-compliant at home.  She is working on getting set up with [address] [place] [address] to afford medications. Social work consulted to help facilitate getting access to medications  May benefit from diabetes education class, receiving diabetes education while inpatient. HgbA1c is 10.1%  Hold patient home insulin dose Novolin 70/30 25 units in the AM and 15 units in the PM  Received 15 units of correctional lispro in the last 24 hours  Continue Lantus 15 units QHS with correctional insulin scale ACHS. Will give larger BS range d/t surgery today 140-180     Hypokalemia  Potassium this AM is 3.3, will replace with 4 IV runs of potassium chloride 10mEq and recheck potassium 2 hours after last IV dose    HTN-appears controlled  Low BP this AM, though had elevated BP's overnight in 150s/90s.    Receives quinapril at bedtime and possible concern for hypotension following surgery this PM with quinapril  Hold Quinapril this evening and revaluate BP following surgery  Continue to monitor BMP for renal function and potassium          Diet NPO   DVT Prophylax Ambulating   GI Prophylaxis Protonix   Code status Full   Disposition Home 1-2 days following amputation        Jose Bang , MS4   July 21, 2017

## 2017-07-21 NOTE — BRIEF OP NOTE
BRIEF OPERATIVE NOTE    Date of Procedure: 7/21/2017   Preoperative Diagnosis: Osteomyelitis   Postoperative Diagnosis: Osteomyelitis     Procedure(s):  AMPUTATION OF second TOE on left foot  Surgeon(s) and Role:     * Mozelle Saint, DPM - Primary         Assistant Staff:       Surgical Staff:  Circ-1: Laura Cosby RN  Scrub Tech-1: Ronnie Hence  Surg Asst-1: Hussein French  Event Time In   Incision Start 1538   Incision Close 1607     Anesthesia: General   Estimated Blood Loss: min   Specimens:   ID Type Source Tests Collected by Time Destination   1 : infected toe #2 left  Preservative Toe  Mozelle Saint, DPM 7/21/2017 1558 Pathology   1 : infected toe #2 left Wound Foot, left CULTURE, ANAEROBIC, CULTURE, WOUND W Yovanny Aamdor [facility] 7/21/2017 1558 Microbiology      Findings: see not   Complications: none  Implants: * No implants in log *

## 2017-07-21 NOTE — PROGRESS NOTES
[facility] was paged. A return call was received from Dr. Deonna Kline. She was informed of Blood sugar at 183 mg/dl and was asked if due sliding scale of Humalog is to be given since patient is on NPO and surgery is scheduled at 1441. She said to give the due sliding scale of Humalog.

## 2017-07-21 NOTE — PROGRESS NOTES
Bedside and Verbal shift change report given to Michelle Olvera, RN (oncoming nurse) by Cortez Nyhan, RN (offgoing nurse). Report included the following information SBAR, Kardex, MAR and Recent Results.      SITUATION:  Code Status: Full Code  Reason for Admission: Osteomyelitis [facility])  dx  Hospital day: 3  Problem List:   [id number] Date Reviewed: 11/17/2014              Codes Class Noted POA      * (Principal)Sepsis ([facility] ICD-10-CM: A41.9  ICD-9-CM: 038.9, 995.91    7/19/2017 Unknown              Foot ulcer due to secondary DM ([facility] ICD-10-CM: Z33.333, G43.113  ICD-9-CM: 249.80, 707.15    7/19/2017 Unknown              Uncontrolled type 2 diabetes mellitus with chronic kidney disease ([facility] ICD-10-CM: E11.22, N18.9, E11.65  ICD-9-CM: 250.42, 585. 9    7/19/2017 Unknown              Diabetic retinopathy ([facility] ICD-10-CM: E11.319  ICD-9-CM: 250.50, 362.01    7/19/2017 Unknown              Anxiety ICD-10-CM: F41.9  ICD-9-CM: 300.00    7/19/2017 Unknown              Leukocytosis ICD-10-CM: Y71.508  ICD-9-CM: 288.60    7/19/2017 Unknown              Renal insufficiency ICD-10-CM: N28.9  ICD-9-CM: 593. 9    7/19/2017 Unknown              Osteomyelitis ([facility]) ICD-10-CM: M86.9  ICD-9-CM: 730.20    7/18/2017 Unknown              GERD (gastroesophageal reflux disease) ICD-10-CM: K21.9  ICD-9-CM: 530.81    6/18/2014 Yes              HLD (hyperlipidemia) (Chronic) ICD-10-CM: E78.5  ICD-9-CM: 272. 4    4/20/2014 Yes              Essential hypertension, benign (Chronic) ICD-10-CM: I10  ICD-9-CM: 401. 1    12/3/2013 Yes                       BACKGROUND:                        Past Medical History:           Past Medical History:   Diagnosis Date    Diabetes ([address]       Gall stones       GERD (gastroesophageal reflux disease)       Hiatal hernia       Hypertension       Mass of abdomen       Pancreatitis                                Patient taking anticoagulants no                         Patient has a defibrillator: no                         History of shots NO for example, flu, pneumonia, tetanus                        Isolation History NO for example, MRSA, CDiff      ASSESSMENT:  Changes in Assessment Throughout Shift: NONE  Significant Changes in 24 hours (for example, RR/code, fall)  Patient has Central Line: no   Patient has Mari Cath: no   Mobility Issues  PT  IV Patency  OR Checklist  Pending Tests      Last Vitals:              Vitals w/ MEWS Score (last day)      Date/Time MEWS Score Pulse Resp Temp BP Level of Consciousness SpO2      07/21/17 0430 1 90 18 99 °F (37.2 °C) 107/70 Alert 100 %       07/21/17 0045 1 82 18 99.6 °F (37.6 °C) (!)  158/98 Alert 100 %       07/20/17 2000 1 80 18 99 °F (37.2 °C) (!)  155/98 Alert 100 %       07/20/17 1640 1 85 18 99 °F (37.2 °C) 134/83 Alert 100 %       07/20/17 1237 1 76 18 97.4 °F (36.3 °C) 125/76 Alert 100 %       07/20/17 0820 2 87 18 97.7 °F (36.5 °C) 90/57 Alert 100 %       07/20/17 0400 2 78 18 97.7 °F (36.5 °C) 94/61 Alert 100 %       07/20/17 0000 1 83 18 98.8 °F (37.1 °C) 132/82 Alert 100 %                    PAIN                                              Pain Assessment                                              Pain Intensity 1: 0 (07/21/17 0045)                                                    Patient Stated Pain Goal: 0  Intervention effective: yes  Time of last intervention: No c/o pain Reassessment Completed: yes   Other actions taken for pain: Distraction      Last 3 Weights:        Last 3 Recorded Weights in this Encounter      07/18/17 1518   Weight: 81.2 kg (179 lb)   Weight change:       INTAKE/OUPUT                                              Current Shift: 07/21 0701 - 07/21 1900  In: 216.7 [I.V.:216.7]  Out: -                                               [facility] three shifts: 07/19 1901 - 07/21 0700  In: 1480 [P.O.:480; I.V.:1000]  Out: 2400 [Urine:2400]      RECOMMENDATIONS AND DISCHARGE PLANNING  Patient needs and requests: nutrition, comfort and safety      Pending tests/procedures: labs       Discharge plan for patient: TBD      Discharge planning Needs or Barriers: NONE      Estimated Discharge Date: TBD Posted on Whiteboard in Patients Room: yes        \"HEALS\" SAFETY CHECK  A safety check occurred in the patient's room between off going nurse and oncoming nurse listed above.      The safety check included the below items:      H  High Alert Medications                      Verify all high alert medication drips (heparin, PCA, etc.)  E  Equipment                 Suction is set up for ALL patients (with arsenio)  Red plugs utilized for all equipment (IV pumps, etc.)  WOWs wiped down at end of shift. Room stocked with oxygen, suction, and other unit-specific supplies  A  Alarms           Bed alarm is set for fall risk patients  Ensure chair alarm is in place and activated if patient is up in a chair  L  Lines              Check IV for any infiltration  Mari bag is empty if patient has a Mari   Tubing and IV bags are labeled  S  Safety  Room is clean, patient is clean, and equipment is clean. Hallways are clear from equipment besides carts. Fall bracelet on for fall risk patients  Ensure room is clear and free of clutter  Suction is set up for ALL patients (with arsenio)  Hallways are clear from equipment besides carts.    Isolation precautions followed, supplies available outside room, sign

## 2017-07-21 NOTE — ROUTINE PROCESS
Bedside and Verbal shift change report given to Barbie Gomez RN (oncoming nurse) by Monica Hutchins RN (offgoing nurse). Report included the following information SBAR, Kardex, MAR and Recent Results.     SITUATION:  Code Status: Full Code  Reason for Admission: Osteomyelitis [facility])  dx  Hospital day: 3  Problem List:       Hospital Problems  Date Reviewed: 11/17/2014          Codes Class Noted POA    * (Principal)Sepsis ([facility] ICD-10-CM: A41.9  ICD-9-CM: 038.9, 995.91  7/19/2017 Unknown        Foot ulcer due to secondary DM ([facility] ICD-10-CM: E13.621, L97.509  ICD-9-CM: 249.80, 707.15  7/19/2017 Unknown        Uncontrolled type 2 diabetes mellitus with chronic kidney disease ([facility] ICD-10-CM: E11.22, N18.9, E11.65  ICD-9-CM: 250.42, 585.9  7/19/2017 Unknown        Diabetic retinopathy ([facility] ICD-10-CM: E11.319  ICD-9-CM: 250.50, 362.01  7/19/2017 Unknown        Anxiety ICD-10-CM: F41.9  ICD-9-CM: 300.00  7/19/2017 Unknown        Leukocytosis ICD-10-CM: R82.752  ICD-9-CM: 288.60  7/19/2017 Unknown        Renal insufficiency ICD-10-CM: N28.9  ICD-9-CM: 593.9  7/19/2017 Unknown        Osteomyelitis ([facility] ICD-10-CM: M86.9  ICD-9-CM: 730.20  7/18/2017 Unknown        GERD (gastroesophageal reflux disease) ICD-10-CM: K21.9  ICD-9-CM: 530.81  6/18/2014 Yes        HLD (hyperlipidemia) (Chronic) ICD-10-CM: E78.5  ICD-9-CM: 272.4  4/20/2014 Yes        Essential hypertension, benign (Chronic) ICD-10-CM: I10  ICD-9-CM: 401.1  12/3/2013 Yes              BACKGROUND:   Past Medical History:   Past Medical History:   Diagnosis Date    Diabetes ([address]     Gall stones     GERD (gastroesophageal reflux disease)     Hiatal hernia     Hypertension     Mass of abdomen     Pancreatitis       Patient taking anticoagulants no    Patient has a defibrillator: no    History of shots NO for example, flu, pneumonia, tetanus   Isolation History NO for example, MRSA, CDiff    ASSESSMENT:  Changes in Assessment Throughout Shift: NONE  Significant Changes in 24 hours (for example, RR/code, fall)  Patient has Central Line: no   Patient has Mari Cath: no   Mobility Issues  PT  IV Patency  OR Checklist  Pending Tests    Last Vitals:  Vitals w/ MEWS Score (last day)     Date/Time MEWS Score Pulse Resp Temp BP Level of Consciousness SpO2    07/21/17 0430 1 90 18 99 °F (37.2 °C) 107/70 Alert 100 %    07/21/17 0045 1 82 18 99.6 °F (37.6 °C) (!)  158/98 Alert 100 %    07/20/17 2000 1 80 18 99 °F (37.2 °C) (!)  155/98 Alert 100 %    07/20/17 1640 1 85 18 99 °F (37.2 °C) 134/83 Alert 100 %    07/20/17 1237 1 76 18 97.4 °F (36.3 °C) 125/76 Alert 100 %    07/20/17 0820 2 87 18 97.7 °F (36.5 °C) 90/57 Alert 100 %    07/20/17 0400 2 78 18 97.7 °F (36.5 °C) 94/61 Alert 100 %    07/20/17 0000 1 83 18 98.8 °F (37.1 °C) 132/82 Alert 100 %            PAIN    Pain Assessment    Pain Intensity 1: 0 (07/21/17 0045)              Patient Stated Pain Goal: 0  Intervention effective: yes  Time of last intervention: No c/o pain Reassessment Completed: yes   Other actions taken for pain: Distraction    Last 3 Weights:  Last 3 Recorded Weights in this Encounter    07/18/17 1518   Weight: 81.2 kg (179 lb)   Weight change:     INTAKE/OUPUT    Current Shift: 07/21 0701 - 07/21 1900  In: 216.7 [I.V.:216.7]  Out: -     Last three shifts: 07/19 1901 - 07/21 0700  In: 1480 [P.O.:480; I.V.:1000]  Out: 2400 [Urine:2400]    RECOMMENDATIONS AND DISCHARGE PLANNING  Patient needs and requests: nutrition, comfort and safety    Pending tests/procedures: labs     Discharge plan for patient: TBD    Discharge planning Needs or Barriers: NONE    Estimated Discharge Date: TBD Posted on Whiteboard in Patients Room: yes       \"HEALS\" SAFETY CHECK  A safety check occurred in the patient's room between off going nurse and oncoming nurse listed above.     The safety check included the below items:    H  High Alert Medications Verify all high alert medication drips (heparin, PCA, etc.)  E  Equipment Suction is set up for ALL patients (with arsenio)  Red plugs utilized for all equipment (IV pumps, etc.)  WOWs wiped down at end of shift. Room stocked with oxygen, suction, and other unit-specific supplies  A  Alarms Bed alarm is set for fall risk patients  Ensure chair alarm is in place and activated if patient is up in a chair  L  Lines Check IV for any infiltration  Mari bag is empty if patient has a Mari   Tubing and IV bags are labeled  S  Safety  Room is clean, patient is clean, and equipment is clean. Hallways are clear from equipment besides carts. Fall bracelet on for fall risk patients  Ensure room is clear and free of clutter  Suction is set up for ALL patients (with arsenio)  Hallways are clear from equipment besides carts.    Isolation precautions followed, supplies available outside room, sign posted    Tanner Manuel RN

## 2017-07-21 NOTE — ANESTHESIA POSTPROCEDURE EVALUATION
Post-Anesthesia Evaluation and Assessment    Patient: Ramandeep Prajapati MRN: [id number]  SSN: [id number]    YOB: 1965  Age: 46 y.o. Sex: female       Cardiovascular Function/Vital Signs  Visit Vitals    /67    Pulse 79    Temp 36.7 °C (98 °F)    Resp 19    Ht 5' 8\" (1.727 m)    Wt 81.2 kg (179 lb)    SpO2 100%    BMI 27.22 kg/m2       Patient is status post MAC, general anesthesia for Procedure(s):  AMPUTATION OF second TOE on left foot. Nausea/Vomiting: None    Postoperative hydration reviewed and adequate. Pain:  Pain Scale 1: Numeric (0 - 10) (07/21/17 1616)  Pain Intensity 1: 0 (07/21/17 1616)   Managed    Neurological Status:   Neuro (WDL): Within Defined Limits (07/21/17 1616)   At baseline    Mental Status and Level of Consciousness: Arousable    Pulmonary Status:   O2 Device: Room air (07/21/17 1635)   Adequate oxygenation and airway patent    Complications related to anesthesia: None    Post-anesthesia assessment completed.  No concerns    Signed By: Eric Haywood MD     July 21, 2017

## 2017-07-21 NOTE — PROGRESS NOTES
Intern Progress Note  [facility]       Patient: Dequan Angel MRN: [id number]  CSN: [id number]    YOB: 1965  Age: 46 y.o. Sex: female    DOA: 7/18/2017 LOS:  LOS: 3 days                    Subjective:     Mrs. Mauro Ferrer was resting comfortably in bed. She had no complaints. We spoke at length regarding amputation benefits and risks. The patient appeared open to amputation at this time. Please see ROS for pertinent positives and negatives. Review of Systems   Eyes: Negative for blurred vision and double vision. Respiratory: Negative for cough, shortness of breath and wheezing. Cardiovascular: Negative for chest pain and palpitations. Gastrointestinal: Negative for constipation, diarrhea, nausea and vomiting. Genitourinary: Negative for dysuria, frequency and urgency. Skin: Positive for itching. Negative for rash. Neurological: Negative for dizziness and headaches. Objective:      Patient Vitals for the past 24 hrs:   Temp Pulse Resp BP SpO2   07/21/17 0430 99 °F (37.2 °C) 90 18 107/70 100 %   07/21/17 0045 99.6 °F (37.6 °C) 82 18 (!) 158/98 100 %   07/20/17 2000 99 °F (37.2 °C) 80 18 (!) 155/98 100 %   07/20/17 1640 99 °F (37.2 °C) 85 18 134/83 100 %   07/20/17 1237 97.4 °F (36.3 °C) 76 18 125/76 100 %   07/20/17 0820 97.7 °F (36.5 °C) 87 18 90/57 100 %         Intake/Output Summary (Last 24 hours) at 07/21/17 0716  Last data filed at 07/21/17 0514   Gross per 24 hour   Intake              480 ml   Output             2400 ml   Net            -1920 ml       Physical Exam   Constitutional: She appears well-developed and well-nourished. HENT:   Head: Normocephalic and atraumatic. Cardiovascular: Normal rate, regular rhythm and intact distal pulses. Exam reveals no gallop and no friction rub. No murmur heard. Pulmonary/Chest: Breath sounds normal. No respiratory distress. She has no wheezes. She has no rales. Abdominal: Soft.  Bowel sounds are normal. She exhibits no distension. There is no tenderness. Musculoskeletal: She exhibits no edema or tenderness. Neurological: She is alert. Skin: Skin is warm and dry. No erythema. Psychiatric: She has a normal mood and affect. Lab/Data Reviewed:  BMP:   Lab Results   Component Value Date/Time     07/21/2017 02:19 AM    K 3.3 (L) 07/21/2017 02:19 AM     07/21/2017 02:19 AM    CO2 21 07/21/2017 02:19 AM    AGAP 12 07/21/2017 02:19 AM     (H) 07/21/2017 02:19 AM    BUN 10 07/21/2017 02:19 AM    CREA 0.99 07/21/2017 02:19 AM    GFRAA >60 07/21/2017 02:19 AM    GFRNA 59 (L) 07/21/2017 02:19 AM     CBC:   Lab Results   Component Value Date/Time    WBC 11.3 07/21/2017 02:19 AM    HGB 9.3 (L) 07/21/2017 02:19 AM    HCT 27.0 (L) 07/21/2017 02:19 AM     07/21/2017 02:19 AM        Scheduled Medications Reviewed:  Current Facility-Administered Medications   Medication Dose Route Frequency    potassium chloride 10 mEq, lidocaine (PF) (XYLOCAINE) 10 mg/mL (1 %) 1 mL in 0.9% sodium chloride 100 mL IVPB   IntraVENous Q1H    0.45% sodium chloride infusion  125 mL/hr IntraVENous CONTINUOUS    sodium hypochlorite (QUARTER STRENGTH DAKIN'S) 0.125% irrigation (bottle)   Topical BID    insulin glargine (LANTUS) injection 15 Units  15 Units SubCUTAneous QHS    atorvastatin (LIPITOR) tablet 20 mg  20 mg Oral DAILY    vancomycin (VANCOCIN) 1,250 mg in 0.9% sodium chloride 250 mL IVPB  1,250 mg IntraVENous Q18H    VANCOMYCIN TROUGH DUE   Other Daily    insulin lispro (HUMALOG) injection   SubCUTAneous Q6H    PARoxetine (PAXIL) tablet 30 mg  30 mg Oral QHS    quinapril (ACCUPRIL) tablet 20 mg  20 mg Oral QHS    pantoprazole (PROTONIX) tablet 40 mg  40 mg Oral DAILY    piperacillin-tazobactam (ZOSYN) 3.375 g in 0.9% sodium chloride (MBP/ADV) 100 mL MBP  3.375 g IntraVENous Q6H         Imaging, microbiology, and EKG/Telemetry:  None new    Assessment/Plan     46 y. o. female with PMH HTN, Insulin dependent Diabetes type 2, GERD, insomnia  now presenting with complaint of diabetic foot ulceration.       Sepsis with source of diabetic foot ulceration (2/4 SIRS criteria): Leukocytosis at 13.9, tachycardia @ 99. Lactate 1.4 on admission. XR on admission indicative of marked soft tissue swelling with poor visualization of the cortex of the terminal tuft of the second digit. - Podiatry consulted and recommended amputation. NPO today for possible surgery at 1430. - Blood, urine cultures gram positive cocci in chains. FU speciation   - Wound culture multimicrobial with e-coli, coag negative staph, hemolytic and non-hemolytic streptococci. Complete sensitivity pending.   - Continue vancomycin dosed by pharmacy  - Zosyn 3.375 q 6H   - Daily CBC to monitor leukocytosis. WBC 13.9>14.2>11.9>11.3  - 1/2 Normal saline @ 125 cc/hr for maintenance while NPO  - SILVIA not suggestive of arterial disease in lower extremities  - CRP increased at 5.2, ESR increased at 72, which is what one would expect to see in osteomyelitis  - MRI 7/19 suggestive of osteomyelitis in the distal phalanx of the second toe (L foot)      Uncontrolled DM type 2, insulin dependent and retinopathy: Patient is not able to afford insulin and has been dependent on samples from her PCP. She is getting connected to the [employer]. She has not used her insulin for the last week. - A1C 10.1  - Correctional scale with Glucose ACHS   - Lantus 15U QHS. Received 15 units yesterday. BG range 134-267. Will continue to monitor. Consider increase the 20U Lantus QHS.     Hypertension: Goal < 140/90   - Hold quinapril 20 mg today as BP has been as low as 90/57 in the last 24 hours      Renal insufficiency: Last outpatient Cr 1.5; Cr this admission 1.13>1.0>1.2>0.99.  - Daily BMP   - Will continue to monitor  - Potassium 3.3.  Replacing with 10 meq x 4 potassium      GERD: stable   - Holding nexium and continue with protonix while inpatient     Insomnia/anxiety:   - Paxil to be continued nightly from home med       Diet: NPO at midnight   DVT Prophylaxis: heparin and SCD's     Disposition and anticipated LOS: 2+ midnights, patient unable to afford medications and has been referred to [place] [address] clinic.  Will consult CM to ensure acceptance for medications management and follow up  Breana Jonas MD  PGY-1 [facility] Resident  [facility]  07/21/17 7:16 AM

## 2017-07-21 NOTE — CONSULTS
Consult Date: 7/20/2017    IP CONSULT TO PODIATRY  Consult performed by: Nighat Mcgill ordered by: Teagan Urbano      Assessment:  Cellulitis/Osteomyelitis Toe 2L  Poorly DM2    Plan:  1. Recommended amputation of toe 2L through MPJ tomorrow under MAC/local anesthesia. The patients wishes to try and save the non-viable toe. Lengthy discussion. We will consent and schedule for 230p Friday. NPO>MN. The patient may decide to cancel case. Discussed po abx/dressings are likely to fail and may cost her a foot, leg, or life. 2. Abx as per cxs. Vanco/Zoysn for now. 3. Dakins/dsd bid. 4. NWB forefoot LLE. Please call with questions/concerns. Jose Srinivasan, [place]  C: [phone]      Subjective   This uninsured poorly controlled diabetic mildly obese female with HTN/retinopathy/nephropathy/neuropathy c/o worsening non-painful darkening swollen toe 2L for a few days, that now smells and is draining, in high-risk dysvascular neuropathic diabetic feet. No previous treatments attempted. Hopes to avoid complications. MRI is consistent with OM. Denies current NVD/trauma/chest pain/shortness of breath. Past Medical History:  No date: Diabetes ([address]  No date: Gall stones  No date: GERD (gastroesophageal reflux disease)  No date: Hiatal hernia  No date: Hypertension  No date:  Mass of abdomen  No date: Pancreatitis   Past Surgical History:  10/15/2014: HX CHOLECYSTECTOMY  No date: HX GI     Comment: Benign GI Stromal Tumor excision  No date: HX HEENT     Comment: Sx for detached retina  No date: HX MYOMECTOMY     Comment: x5 removed  No date: HX OTHER SURGICAL     Comment: upper endoscopy    Family History    Adopted: Yes    Heart Failure Mother          Smoking status: Former Smoker  0.20 Packs/day  For 8.00 Years     Types: Cigarettes    Quit date: 12/3/1995    Smokeless tobacco: Never Used    Alcohol use No    Comment: Drinks 3-4xs year generally wine       Current Facility-Administered Medications:  acetaminophen (TYLENOL) tablet 650 mg 650 mg Oral Q6H PRN Manjinder Romero MD     insulin glargine (LANTUS) injection 15 Units 15 Units SubCUTAneous QHS Manjinder Romero MD 15 Units at 07/20/17 0050    atorvastatin (LIPITOR) tablet 20 mg 20 mg Oral DAILY Manjinder Romero MD 20 mg at 07/20/17 0817    vancomycin (VANCOCIN) 1,250 mg in 0.9% sodium chloride 250 mL IVPB 1,250 mg IntraVENous Q18H Manjinder Romero MD Last Rate: 166.7 mL/hr at 07/20/17 0441 1,250 mg at 07/20/17 [address]  Other Daily Manjinder Romero MD     0.9% sodium chloride infusion 125 mL/hr IntraVENous CONTINUOUS Any Howe MD Last Rate: 125 mL/hr at 07/20/17 1328 125 mL/hr at 07/20/17 1328   insulin lispro (HUMALOG) injection  SubCUTAneous Q6H Kari Hernandez MD 9 Units at 07/20/17 1820    glucose chewable tablet 16 g 4 Tab Oral PRN Geovanni Liang MD     glucagon (GLUCAGEN) injection 1 mg 1 mg IntraMUSCular PRN Geovanni Liang MD     dextrose (D50W) injection syrg 12.5-25 g 25-50 mL IntraVENous PRN Geovanni Liang MD     PARoxetine (PAXIL) tablet 30 mg 30 mg Oral QHS Manjinder Romero MD 30 mg at 07/20/17 0047    quinapril (ACCUPRIL) tablet 20 mg 20 mg Oral QHS Manjinder Romero MD 20 mg at 07/20/17 0047    pantoprazole (PROTONIX) tablet 40 mg 40 mg Oral DAILY Manjinder Romero MD 40 mg at 07/20/17 0817    piperacillin-tazobactam (ZOSYN) 3.375 g in 0.9% sodium chloride (MBP/ADV) 100 mL MBP 3.375 g IntraVENous Q6H Manjinder Romero MD Last Rate: 200 mL/hr at 07/20/17 1724 3.375 g at 07/20/17 1724        Review of Systems    Objective    Vital signs for last 24 hours:  /83 (BP 1 Location: Left arm, BP Patient Position: At rest)  Pulse 85  Temp 99 °F (37.2 °C)  Resp 18  Ht 5' 8\" (1.727 m)  Wt 81.2 kg (179 lb)  SpO2 100%  BMI 27.22 kg/m2    Intake/Output this shift:  Current Shift:    Last 3 Shifts: 07/19 0701 - 07/20 1900  In: 2085.4 [I.V.:2085.4]  Out: 678 [Urine:800]    Data Review:   Recent Results (from the past 24 hour(s))  -GLUCOSE, POC   Collection Time: 07/20/17 12:29 AM   Result Value Ref Range   Glucose (POC) 256 (H) 70 - 760 mg/dL   -METABOLIC PANEL, BASIC   Collection Time: 07/20/17  3:05 AM   Result Value Ref Range   Sodium 140 136 - 145 mmol/L   Potassium 3.6 3.5 - 5.5 mmol/L   Chloride 111 (H) 100 - 108 mmol/L   CO2 22 21 - 32 mmol/L   Anion gap 7 3.0 - 18 mmol/L   Glucose 211 (H) 74 - 99 mg/dL   BUN 14 7.0 - 18 MG/DL   Creatinine 1.20 0.6 - 1.3 MG/DL   BUN/Creatinine ratio 12 12 - 20     GFR est AA 57 (L) >60 ml/min/1.73m2   GFR est non-AA 47 (L) >60 ml/min/1.73m2   Calcium 8.3 (L) 8.5 - 10.1 MG/DL   -CBC WITH AUTOMATED DIFF   Collection Time: 07/20/17  3:08 AM   Result Value Ref Range   WBC 11.9 4.6 - 13.2 K/uL   RBC 3.23 (L) 4.20 - 5.30 M/uL   HGB 9.3 (L) 12.0 - 16.0 g/dL   HCT 27.5 (L) 35.0 - 45.0 %   MCV 85.1 74.0 - 97.0 FL   MCH 28.8 24.0 - 34.0 PG   MCHC 33.8 31.0 - 37.0 g/dL   RDW 12.6 11.6 - 14.5 %   PLATELET 637 649 - 183 K/uL   MPV 10.5 9.2 - 11.8 FL   NEUTROPHILS 63 40 - 73 %   LYMPHOCYTES 31 21 - 52 %   MONOCYTES 5 3 - 10 %   EOSINOPHILS 1 0 - 5 %   BASOPHILS 0 0 - 2 %   ABS. NEUTROPHILS 7.4 1.8 - 8.0 K/UL   ABS. LYMPHOCYTES 3.7 (H) 0.9 - 3.6 K/UL   ABS. MONOCYTES 0.5 0.05 - 1.2 K/UL   ABS. EOSINOPHILS 0.2 0.0 - 0.4 K/UL   ABS. BASOPHILS 0.0 0.0 - 0.06 K/UL   DF AUTOMATED    -GLUCOSE, POC   Collection Time: 07/20/17  6:23 AM   Result Value Ref Range   Glucose (POC) 171 (H) 70 - 110 mg/dL   -GLUCOSE, POC   Collection Time: 07/20/17 11:29 AM   Result Value Ref Range   Glucose (POC) 134 (H) 70 - 110 mg/dL   -GLUCOSE, POC   Collection Time: 07/20/17  6:03 PM   Result Value Ref Range   Glucose (POC) 267 (H) 70 - 110 mg/dL       Physical Exam  General: This patient appears pleasant, well developed, well nourished and with good attention to hygiene and body habitus. Oriented to person, place and time. Mood and affect normal, appropriate to situation.     Cardiovascular: DP palpable b/l. PT non-palpable b/l. CFT delayed b/l. Mild non-pitting edema b/l. No varicosities b/l. Digital hair absent b/l. Neurological: Vibratory sensation is drastically diminished at the IPJ of the hallux b/l. Dermatological: The distal hammertoe 2L has a wet necrotic malodorous-fecal ulcer with large partially attached callus- no exposed bone, the entire toe is swollen and darkened. Fat pad atrophy RL. Skin is hyperpigmented, cool, and excessively dry with normal turgor and mobility b/l. Webspaces are clean and dry b/l. No ecchymosis, skin rash, subcutaneous nodules, lesions or other ulcers observed. Hyperkeratotic lesions are present RL. Toenails are thickened, discolored, dystrophic, incurvated, elongated [id number] and are most consistent with fungal contamination/infection. Lymphatic: Lymphatic palpation reveals no abnormalities, size is normal, and there is no tenderness. Musculoskeletal: Muscle strength is 5/5 for all groups tested b/l. Muscle tone is mildly atrophied b/l. No paralysis or spasticity b/l. Orthopaedic: Decreased arch heights with plantarflexed metatarsals b/l. Mild hallux abduction with semi-rigid digital contractures b/l. Dorsiflexion of the ankle joint with the subtalar joint locked is limited to -20 degrees b/l. No amputations present. Inspection and palpation of remaining bones, joints, and muscles are unremarkable. Gait & Device Analysis: not observed. MRI Images- I agree with OM and toe edema. WCxs- staph, strep, e.coli. Limited Vascular Studies- normal Dopplers/normal SILVIA- no segmental pressures.

## 2017-07-21 NOTE — PROGRESS NOTES
NUTRITION    Nutrition Screen      RECOMMENDATIONS / PLAN:     - Recommend resuming po diet when medically appropriate following procedure  - Diet education provided today  - Continue RD inpatient monitoring and evaluation. NUTRITION INTERVENTIONS & DIAGNOSIS:     [x] Meals/Snacks: NPO, recommend resuming po diet when medically appropriate  [x] Nutrition counseling: diabetic diet/ CHO counting on 7/21     Nutrition Diagnosis:  Inadequate oral intake related to planned procedure as evidenced by pt NPO  Food and nutrition related knowledge deficit related to pt with hx of diabetes, requesting education as evidenced by pt reporting needing additional education on CHO counting    ASSESSMENT:     Pt reported appetite and meal intake were good PTA and since admission when on po diet. Currently NPO for planned procedure/ toe amputation today. Pt requesting information on diabetic diet. Educational handout provided and reviewed with her; discussed CHO counting. Pt verbalized understanding; denied having any concerns at time of visit    Average po intake adequate to meet patients estimated nutritional needs:   [x] Yes (when on po diet)     [] No   [] Unable to determine at this time    Diet: DIET NPO Except Meds      Food Allergies:  None known   Current Appetite:   [x] Good     [] Fair     [] Poor     [] Other:  Appetite/meal intake prior to admission:   [x] Good     [] Fair     [] Poor     [] Other:  Feeding Limitations:  [] Swallowing difficulty    [] Chewing difficulty    [] Other:  Current Meal Intake: No data found.       BM:  7/20  Skin Integrity:  Diabetic wound on left 2nd toe  Edema:  1+ LLE  Pertinent Medications: Reviewed    Recent Labs      07/21/17   0219  07/20/17   0305  07/19/17   0305   NA  140  140  139   K  3.3*  3.6  3.1*   CL  107  111*  108   CO2  21  22  23   GLU  170*  211*  128*   BUN  10  14  17   CREA  0.99  1.20  1.00   CA  8.5  8.3*  8.9   MG   --    --   1.8       Intake/Output Summary (Last 24 hours) at 07/21/17 1337  Last data filed at 07/21/17 0725   Gross per 24 hour   Intake          1696.67 ml   Output             2400 ml   Net          -703.33 ml       Anthropometrics:  Ht Readings from Last 1 Encounters:   07/18/17 5' 8\" (1.727 m)     Last 3 Recorded Weights in this Encounter    07/18/17 1518   Weight: 81.2 kg (179 lb)     Body mass index is 27.22 kg/(m^2). Weight History:  Pt denied experiencing any recent changes in weight PTA. Noted weight loss of 14 lb in past 1 year and 3 months PTA per chart hx    Weight Metrics 7/18/2017 10/6/2015 9/16/2015 10/30/2014 10/22/2014 10/13/2014 10/3/2014   Weight 179 lb 195 lb 180 lb 169 lb 180 lb 183 lb 177 lb   BMI 27.22 kg/m2 29.66 kg/m2 27.38 kg/m2 25.7 kg/m2 27.38 kg/m2 27.83 kg/m2 26.92 kg/m2        Admitting Diagnosis: Osteomyelitis (HCC)  dx  Pertinent PMHx: DM, gall stones, GERD, HTN, pancreatitis    Education Needs:        [] None identified  [] Identified - Not appropriate at this time  [x]  Identified and addressed - refer to education log  Learning Limitations:   [x] None identified  [] Identified    Cultural, Hindu & ethnic food preferences:  [x] None identified    [] Identified and addressed     ESTIMATED NUTRITION NEEDS:     Calories: 6626-3371 kcal (25-30 kcal/kg) based on  [] Actual BW      [x] SBW: 67 kg   Protein: 54-60 gm (0.8-0.9 gm/kg) based on  [] Actual BW      [x] SBW   Fluid: 1 mL/kcal     MONITORING & EVALUATION:     Nutrition Goal(s):   1. Po intake of meals will meet >75% of patient estimated nutritional needs within the next 7 days. Outcome:  [] Met/Ongoing    []  Not Met    [x] New/Initial Goal   2. Patient will increase knowledge of appropriate food choices on a diabetic diet within 7 days.   Outcome:  [x] Met    []  Not Met    [x] New/Initial Goal     Monitoring:   [x] Diet tolerance   [x] Meal intake   [] Supplement intake   [] GI symptoms/ability to tolerate po diet   [] Respiratory status   [] Plan of care Previous Recommendations (for follow-up assessments only):     []   Implemented       []   Not Implemented (RD to address)     [] No Recommendation Made     Discharge Planning:  Diabetic diet   [x] Participated in care planning, discharge planning, & interdisciplinary rounds as appropriate      Kathy Castro, [address] [place] [address]   Pager: [phone]

## 2017-07-21 NOTE — PROGRESS NOTES
Post Op Check    S:   Mrs. Nabor Lacy is resting comfortably in bed after her surgery. She has no questions or concerns at this time. She has not ambulated or urinated. ROS: She denies chest pain, SOB, cough, dizziness, nausea or vomitting. Pain is currently controlled. O:  PE:   General: NAD, non-diaphoretic A&O  CV: RRR No m/r/g  Pulm: CTAB, good respiratory effort  GI: + BS, non distended, non tender  Extremities: No edema. Gauze and ACE wrap on R foot, bandage c/d/i    Assessment/Plan:   Mrs. Nabor Lacy is post-op day 0 from amputation of second toe of her left foot with post-operative diagnosis of osteomyelitis. Surgery with minimal blood loss. Order for PT/OT placed   Pain control with tylenol 650 mg Q6H  Follow wound cultures for narrowing antibiotics, continue broad spectrum for now. Wound care management per podiatry recommendations  Continue management of chronic conditions as per progress note.      Ricki Gerry  PGY-1 [address] [facility]

## 2017-07-22 LAB
ANION GAP BLD CALC-SCNC: 11 MMOL/L (ref 3–18)
BACTERIA SPEC CULT: ABNORMAL
BASOPHILS # BLD AUTO: 0 K/UL (ref 0–0.06)
BASOPHILS # BLD: 0 % (ref 0–2)
BUN SERPL-MCNC: 9 MG/DL (ref 7–18)
BUN/CREAT SERPL: 9 (ref 12–20)
CALCIUM SERPL-MCNC: 8.4 MG/DL (ref 8.5–10.1)
CHLORIDE SERPL-SCNC: 109 MMOL/L (ref 100–108)
CO2 SERPL-SCNC: 21 MMOL/L (ref 21–32)
CREAT SERPL-MCNC: 0.99 MG/DL (ref 0.6–1.3)
DIFFERENTIAL METHOD BLD: ABNORMAL
EOSINOPHIL # BLD: 0.1 K/UL (ref 0–0.4)
EOSINOPHIL NFR BLD: 2 % (ref 0–5)
ERYTHROCYTE [DISTWIDTH] IN BLOOD BY AUTOMATED COUNT: 12.6 % (ref 11.6–14.5)
GLUCOSE BLD STRIP.AUTO-MCNC: 165 MG/DL (ref 70–110)
GLUCOSE BLD STRIP.AUTO-MCNC: 198 MG/DL (ref 70–110)
GLUCOSE BLD STRIP.AUTO-MCNC: 249 MG/DL (ref 70–110)
GLUCOSE BLD STRIP.AUTO-MCNC: 314 MG/DL (ref 70–110)
GLUCOSE SERPL-MCNC: 186 MG/DL (ref 74–99)
GRAM STN SPEC: ABNORMAL
GRAM STN SPEC: ABNORMAL
HCT VFR BLD AUTO: 26.4 % (ref 35–45)
HGB BLD-MCNC: 9 G/DL (ref 12–16)
LYMPHOCYTES # BLD AUTO: 39 % (ref 21–52)
LYMPHOCYTES # BLD: 3.4 K/UL (ref 0.9–3.6)
MCH RBC QN AUTO: 28.9 PG (ref 24–34)
MCHC RBC AUTO-ENTMCNC: 34.1 G/DL (ref 31–37)
MCV RBC AUTO: 84.9 FL (ref 74–97)
MONOCYTES # BLD: 0.4 K/UL (ref 0.05–1.2)
MONOCYTES NFR BLD AUTO: 5 % (ref 3–10)
NEUTS SEG # BLD: 4.9 K/UL (ref 1.8–8)
NEUTS SEG NFR BLD AUTO: 54 % (ref 40–73)
PLATELET # BLD AUTO: 296 K/UL (ref 135–420)
PMV BLD AUTO: 10.1 FL (ref 9.2–11.8)
POTASSIUM SERPL-SCNC: 3.4 MMOL/L (ref 3.5–5.5)
RBC # BLD AUTO: 3.11 M/UL (ref 4.2–5.3)
SERVICE CMNT-IMP: ABNORMAL
SODIUM SERPL-SCNC: 141 MMOL/L (ref 136–145)
WBC # BLD AUTO: 8.9 K/UL (ref 4.6–13.2)

## 2017-07-22 PROCEDURE — 74011250637 HC RX REV CODE- 250/637: Performed by: STUDENT IN AN ORGANIZED HEALTH CARE EDUCATION/TRAINING PROGRAM

## 2017-07-22 PROCEDURE — 74011250636 HC RX REV CODE- 250/636: Performed by: ANESTHESIOLOGY

## 2017-07-22 PROCEDURE — 74011636637 HC RX REV CODE- 636/637: Performed by: STUDENT IN AN ORGANIZED HEALTH CARE EDUCATION/TRAINING PROGRAM

## 2017-07-22 PROCEDURE — 74011000258 HC RX REV CODE- 258: Performed by: STUDENT IN AN ORGANIZED HEALTH CARE EDUCATION/TRAINING PROGRAM

## 2017-07-22 PROCEDURE — 80048 BASIC METABOLIC PNL TOTAL CA: CPT | Performed by: STUDENT IN AN ORGANIZED HEALTH CARE EDUCATION/TRAINING PROGRAM

## 2017-07-22 PROCEDURE — 36415 COLL VENOUS BLD VENIPUNCTURE: CPT | Performed by: STUDENT IN AN ORGANIZED HEALTH CARE EDUCATION/TRAINING PROGRAM

## 2017-07-22 PROCEDURE — 74011250636 HC RX REV CODE- 250/636: Performed by: STUDENT IN AN ORGANIZED HEALTH CARE EDUCATION/TRAINING PROGRAM

## 2017-07-22 PROCEDURE — 85025 COMPLETE CBC W/AUTO DIFF WBC: CPT | Performed by: STUDENT IN AN ORGANIZED HEALTH CARE EDUCATION/TRAINING PROGRAM

## 2017-07-22 PROCEDURE — 97161 PT EVAL LOW COMPLEX 20 MIN: CPT

## 2017-07-22 PROCEDURE — 87040 BLOOD CULTURE FOR BACTERIA: CPT | Performed by: STUDENT IN AN ORGANIZED HEALTH CARE EDUCATION/TRAINING PROGRAM

## 2017-07-22 PROCEDURE — 97530 THERAPEUTIC ACTIVITIES: CPT

## 2017-07-22 PROCEDURE — 65270000029 HC RM PRIVATE

## 2017-07-22 PROCEDURE — 97116 GAIT TRAINING THERAPY: CPT

## 2017-07-22 PROCEDURE — 97165 OT EVAL LOW COMPLEX 30 MIN: CPT

## 2017-07-22 PROCEDURE — 82962 GLUCOSE BLOOD TEST: CPT

## 2017-07-22 RX ADMIN — INSULIN HUMAN 7 UNITS: 100 INJECTION, SUSPENSION SUBCUTANEOUS at 22:15

## 2017-07-22 RX ADMIN — INSULIN LISPRO 3 UNITS: 100 INJECTION, SOLUTION INTRAVENOUS; SUBCUTANEOUS at 09:04

## 2017-07-22 RX ADMIN — INSULIN LISPRO 12 UNITS: 100 INJECTION, SOLUTION INTRAVENOUS; SUBCUTANEOUS at 22:09

## 2017-07-22 RX ADMIN — VANCOMYCIN HYDROCHLORIDE 1400 MG: 10 INJECTION, POWDER, LYOPHILIZED, FOR SOLUTION INTRAVENOUS at 09:04

## 2017-07-22 RX ADMIN — PAROXETINE HYDROCHLORIDE 30 MG: 20 TABLET, FILM COATED ORAL at 22:06

## 2017-07-22 RX ADMIN — SODIUM CHLORIDE, SODIUM LACTATE, POTASSIUM CHLORIDE, AND CALCIUM CHLORIDE 75 ML/HR: 600; 310; 30; 20 INJECTION, SOLUTION INTRAVENOUS at 00:19

## 2017-07-22 RX ADMIN — PANTOPRAZOLE SODIUM 40 MG: 40 TABLET, DELAYED RELEASE ORAL at 09:04

## 2017-07-22 RX ADMIN — ATORVASTATIN CALCIUM 20 MG: 20 TABLET, FILM COATED ORAL at 09:04

## 2017-07-22 RX ADMIN — PIPERACILLIN SODIUM,TAZOBACTAM SODIUM 3.38 G: 3; .375 INJECTION, POWDER, FOR SOLUTION INTRAVENOUS at 12:44

## 2017-07-22 RX ADMIN — QUINAPRIL HYDROCHLORIDE 20 MG: 20 TABLET, FILM COATED ORAL at 22:06

## 2017-07-22 RX ADMIN — INSULIN LISPRO 6 UNITS: 100 INJECTION, SOLUTION INTRAVENOUS; SUBCUTANEOUS at 12:38

## 2017-07-22 RX ADMIN — PIPERACILLIN SODIUM,TAZOBACTAM SODIUM 3.38 G: 3; .375 INJECTION, POWDER, FOR SOLUTION INTRAVENOUS at 01:01

## 2017-07-22 RX ADMIN — INSULIN LISPRO 3 UNITS: 100 INJECTION, SOLUTION INTRAVENOUS; SUBCUTANEOUS at 16:23

## 2017-07-22 RX ADMIN — PIPERACILLIN SODIUM,TAZOBACTAM SODIUM 3.38 G: 3; .375 INJECTION, POWDER, FOR SOLUTION INTRAVENOUS at 17:25

## 2017-07-22 RX ADMIN — INSULIN HUMAN 3 UNITS: 100 INJECTION, SOLUTION PARENTERAL at 22:15

## 2017-07-22 RX ADMIN — PIPERACILLIN SODIUM,TAZOBACTAM SODIUM 3.38 G: 3; .375 INJECTION, POWDER, FOR SOLUTION INTRAVENOUS at 06:01

## 2017-07-22 NOTE — MED STUDENT NOTES
*ATTENTION:  This note has been created by a medical student for educational purposes only. Please do not refer to the content of this note for clinical decision-making, billing, or other purposes. Please see attending physicians note to obtain clinical information on this patient. *       Progress Note  [facility] EVMS Service    Patient: Kate Castro MRN: [id number]   SSN: [id number]  YOB: 1965   Age: 46 y.o. Sex: female      Admit Date: 7/18/2017    LOS: 4 days   Chief Complaint   Patient presents with    Toe Swelling       Subjective:     46year old AA female presents post-op amputation of second toe on left foot with a PMHx of DM Type 2, HTN and hx of pancreatitis. Ms. Leticia Graff is resting comfortably in bed this morning and appears in good spirits. She endorses no pain. She reports that she has ambulated to the restroom but has been advised by podiatry and PT that she is to walk on the ball of her left foot only to avoid pressure on her left toes. She endorses passing gas. Review of Systems:  Review of Systems - General ROS: negative for - chills, fever   Respiratory ROS: no cough, shortness of breath, or wheezing  Cardiovascular ROS: no chest pain or dyspnea on exertion  Gastrointestinal ROS: no abdominal pain, change in bowel habits.    Genito-Urinary ROS: no dysuria, trouble voiding, frequency or urgency   Neurological ROS: negative for - dizziness or headaches    Objective:     Vitals:  Patient Vitals for the past 24 hrs:   Temp Pulse Resp BP SpO2   07/22/17 0720 98.6 °F (37 °C) 75 18 119/70 93 %   07/22/17 0320 97.5 °F (36.4 °C) 87 18 118/68 99 %   07/22/17 0000 97.6 °F (36.4 °C) 85 20 138/86 100 %   07/21/17 1917 97.8 °F (36.6 °C) 79 18 123/81 100 %   07/21/17 1645 98 °F (36.7 °C) - - - -   07/21/17 1644 - 79 20 138/77 100 %   07/21/17 1635 - - - 126/67 100 %   07/21/17 1634 - 79 19 - 100 %   07/21/17 1626 - 81 15 106/69 100 %   07/21/17 1620 - 83 18 - 100 %   07/21/17 1618 98 °F (36.7 °C) 82 20 111/62 100 %   07/21/17 1616 98 °F (36.7 °C) 85 11 111/62 100 %   07/21/17 1311 98 °F (36.7 °C) 95 20 134/82 100 %       Physical Exam:   General appearance: alert, cooperative, no distress, appears stated age  Lungs: clear to auscultation bilaterally  Heart: regular rate and rhythm, S1, S2 normal, no murmur, click, rub or gallop  Skin: Skin color, texture, turgor normal. No rashes or lesions. Extremities: Wound dressing is clear, dry and intact. Dried sanguineous exudate visible on wound dressing. Intake and Output:    Intake/Output Summary (Last 24 hours) at 07/22/17 0855  Last data filed at 07/22/17 0720   Gross per 24 hour   Intake             1840 ml   Output             1200 ml   Net              640 ml       Lab/Data Review:  Recent Results (from the past 12 hour(s))   GLUCOSE, POC    Collection Time: 07/21/17  9:39 PM   Result Value Ref Range    Glucose (POC) 278 (H) 70 - 110 mg/dL   CBC WITH AUTOMATED DIFF    Collection Time: 07/22/17  2:23 AM   Result Value Ref Range    WBC 8.9 4.6 - 13.2 K/uL    RBC 3.11 (L) 4.20 - 5.30 M/uL    HGB 9.0 (L) 12.0 - 16.0 g/dL    HCT 26.4 (L) 35.0 - 45.0 %    MCV 84.9 74.0 - 97.0 FL    MCH 28.9 24.0 - 34.0 PG    MCHC 34.1 31.0 - 37.0 g/dL    RDW 12.6 11.6 - 14.5 %    PLATELET 223 117 - 122 K/uL    MPV 10.1 9.2 - 11.8 FL    NEUTROPHILS 54 40 - 73 %    LYMPHOCYTES 39 21 - 52 %    MONOCYTES 5 3 - 10 %    EOSINOPHILS 2 0 - 5 %    BASOPHILS 0 0 - 2 %    ABS. NEUTROPHILS 4.9 1.8 - 8.0 K/UL    ABS. LYMPHOCYTES 3.4 0.9 - 3.6 K/UL    ABS. MONOCYTES 0.4 0.05 - 1.2 K/UL    ABS. EOSINOPHILS 0.1 0.0 - 0.4 K/UL    ABS.  BASOPHILS 0.0 0.0 - 0.06 K/UL    DF AUTOMATED     METABOLIC PANEL, BASIC    Collection Time: 07/22/17  2:23 AM   Result Value Ref Range    Sodium 141 136 - 145 mmol/L    Potassium 3.4 (L) 3.5 - 5.5 mmol/L    Chloride 109 (H) 100 - 108 mmol/L    CO2 21 21 - 32 mmol/L    Anion gap 11 3.0 - 18 mmol/L    Glucose 186 (H) 74 - 99 mg/dL    BUN 9 7.0 - 18 MG/DL Creatinine 0.99 0.6 - 1.3 MG/DL    BUN/Creatinine ratio 9 (L) 12 - 20      GFR est AA >60 >60 ml/min/1.73m2    GFR est non-AA 59 (L) >60 ml/min/1.73m2    Calcium 8.4 (L) 8.5 - 10.1 MG/DL   GLUCOSE, POC    Collection Time: 07/22/17  7:26 AM   Result Value Ref Range    Glucose (POC) 198 (H) 70 - 110 mg/dL       7/22/2017  6:31 AM - Jg, Lab In [facility]       Component Results      Component Value Ref Range & Units Status     Special Requests: NO SPECIAL REQUESTS   Preliminary     GRAM STAIN    Preliminary     ANAEROBIC BOTTLE GRAM POSITIVE COCCI IN CHAINS     GRAM STAIN    Preliminary     SMEAR CALLED TO AND CORRECTLY REPEATED BY: John Blanc, RN 4N ON 7/21 AT 2074 TO [facility]     Culture result:    Preliminary     ANAEROBIC BOTTLE POSSIBLE STREPTOCOCCI, ALPHA HEMOLYTIC (A)       Result History      CULTURE, BLOOD on 7/22/2017       Order    CULTURE, BLOOD [[id number]] (Order [id number])         Reprint Requisition to [employer]      CULTURE, BLOOD (Order #[id number]) on 7/18/17       Order Providers      Authorizing     Valerie Stanton       Order Information      Order Date/Time Release Date/Time Start Date/Time End Date/Time     07/18/17 05:23 PM 07/18/17 05:23 PM 07/18/17 05:30 PM 07/18/17 05:30 PM       Order Details      Frequency Duration Priority Order Class     ONE TIME 1  occurrence STAT ER Collect       Reprint OrderRequisition - Outpatient Only      CULTURE, BLOOD (Order #[id number]) on 7/18/17       Original Order      Ordered On Ordered By        7/18/2017 5:23 PM Valerie Stanton PA-C               Collection Information      Accession #: [id number]    SpecimenType: Whole Blood    Blood        Collected: 7/18/2017  6:05 PM    Resulting Agency[facility]          Acknowledgement Info      For At Acknowledged By Acknowledged On     Placing Order 07/18/17 1082 Antoine Lopez 07/18/17 1809       Additional Information      Associated Reports     View Encounter     Priority and Order Details   Collection Information       Medication Detail         Disp Refills Start End        CULTURE, BLOOD   7/18/2017 7/18/2017      Sig: One time.      Class: ER Collect              Assessment and Plan:     46year old AA female with PMH of DM type 2, HTN and hx of pancreatitis that presents with diabetic foot ulcer on second toe of left foot with osteomyelitis confirmed by MRI.       Post-op amputation of 2nd toe of left foot/diabetic foot ulcer  Amputation 7/21. PT/OT following patient. Prelim wound culture shows e.coli and coag neg staph, sensitivities still pending; prelim BC x2  is now growing gram + cocci in chains. Urine culture no growth x 2 days. WBC is normal today and continuing to trend down, today at 8.9.  - Continue Vancomycin 1,400 mg every 18 hrs and Zosyn 3.375 g every 6 hours pending sensitivity results.    - F/U repeat blood culture (ordered 7/22)       Uncontrolled DM type II   Glucose - last 5 POC fast: 177 > 139 > 278 > 198 > 249  - Continue lispro before meals and nightly per schedule, 15 U rec'd in last 24 hours (900-900)  - Continue lantus (insulin glargine) 25 Units @ bedtime  - F/U with [facility] and case management to ensure access to medications upon discharge  - Consider change to Metformin on discharge due to oral administration       Hypokalemia - Potassium this AM is 3.4  - Replace with 10mEq potassium   - F/U K     Controlled HTN   - Resume Quinapril 20 mg this evening  - F/U julee Gates , MS3   July 22, 2017

## 2017-07-22 NOTE — PROGRESS NOTES
Podiatric Surgery    S: POD1 from toe amp 2L. Pain controlled. No new issues. Denies FCNVD/calf pain/sob/cp. Witnessed patient walking FWB from bathroom back to bed. O: AAO NAD  CFT immediate C/D/I. A: Cellulitis/Ulcer/OM of [place] 2L  DM2    P:  1. Po abx taped to cxs for two weeks. 2. No dressing changes for one week. 3. NWB LLE. PT/OT consulted. 4. Outpatient f/u in one week. Office: [phone].  5. Discharge as per primary team.    Please call with questions/concerns. Rosa Mauri  [address] [place] [address],[address]th Floor, [facility], [address]: [phone]

## 2017-07-22 NOTE — PROGRESS NOTES
Bedside shift change report given to Ernie Almazan (oncoming nurse) by Shae Helm RN   (offgoing nurse). Report included the following information SBAR, Kardex, Intake/Output, MAR and Recent Results.

## 2017-07-22 NOTE — ROUTINE PROCESS
Bedside and Verbal shift change report given to Christelle Burgess RN (oncoming nurse) by Yuri Meraz RN (offgoing nurse). Report included the following information SBAR, Kardex, MAR and Recent Results.     SITUATION:  Code Status: Full Code  Reason for Admission: Osteomyelitis [facility])  dx  Hospital day: 4  Problem List:       Hospital Problems  Date Reviewed: 7/21/2017          Codes Class Noted POA    * (Principal)Sepsis ([facility] ICD-10-CM: A41.9  ICD-9-CM: 038.9, 995.91  7/19/2017 Unknown        Foot ulcer due to secondary DM ([facility] ICD-10-CM: E13.621, L97.509  ICD-9-CM: 249.80, 707.15  7/19/2017 Unknown        Uncontrolled type 2 diabetes mellitus with chronic kidney disease ([facility] ICD-10-CM: E11.22, N18.9, E11.65  ICD-9-CM: 250.42, 585.9  7/19/2017 Unknown        Diabetic retinopathy ([facility] ICD-10-CM: E11.319  ICD-9-CM: 250.50, 362.01  7/19/2017 Unknown        Anxiety ICD-10-CM: F41.9  ICD-9-CM: 300.00  7/19/2017 Unknown        Leukocytosis ICD-10-CM: T41.116  ICD-9-CM: 288.60  7/19/2017 Unknown        Renal insufficiency ICD-10-CM: N28.9  ICD-9-CM: 593.9  7/19/2017 Unknown        Osteomyelitis ([facility] ICD-10-CM: M86.9  ICD-9-CM: 730.20  7/18/2017 Unknown        GERD (gastroesophageal reflux disease) ICD-10-CM: K21.9  ICD-9-CM: 530.81  6/18/2014 Yes        HLD (hyperlipidemia) (Chronic) ICD-10-CM: E78.5  ICD-9-CM: 272.4  4/20/2014 Yes        Essential hypertension, benign (Chronic) ICD-10-CM: I10  ICD-9-CM: 401.1  12/3/2013 Yes              BACKGROUND:   Past Medical History:   Past Medical History:   Diagnosis Date    Diabetes ([address]     Gall stones     GERD (gastroesophageal reflux disease)     Hiatal hernia     Hypertension     Mass of abdomen     Pancreatitis       Patient taking anticoagulants no    Patient has a defibrillator: no    History of shots NO for example, flu, pneumonia, tetanus   Isolation History NO for example, MRSA, CDiff    ASSESSMENT:  Changes in Assessment Throughout Shift: NONE  Significant Changes in 24 hours (for example, RR/code, fall)  Patient has Central Line: no   Patient has Mari Cath: no   Mobility Issues  PT  IV Patency  OR Checklist  Pending Tests    Last Vitals:  Vitals w/ MEWS Score (last day)     Date/Time MEWS Score Pulse Resp Temp BP Level of Consciousness SpO2    07/22/17 0720 1 75 18 98.6 °F (37 °C) 119/70 Alert 93 %    07/22/17 0320 1 87 18 97.5 °F (36.4 °C) 118/68 Alert 99 %    07/22/17 0000 1 85 20 97.6 °F (36.4 °C) 138/86 Alert 100 %    07/21/17 1917 1 79 18 97.8 °F (36.6 °C) 123/81 Alert 100 %    07/21/17 1645 -- -- -- 98 °F (36.7 °C) -- -- --    07/21/17 1644 -- 79 20 -- 138/77 -- 100 %    07/21/17 1635 -- -- -- -- 126/67 -- 100 %    07/21/17 1634 -- 79 19 -- -- -- 100 %    07/21/17 1626 -- 81 15 -- 106/69 -- 100 %    07/21/17 1620 -- 83 18 -- -- -- 100 %    07/21/17 1618 -- 82 20 98 °F (36.7 °C) 111/62 -- 100 %    07/21/17 1616 -- 85 11 98 °F (36.7 °C) 111/62 -- 100 %    07/21/17 1311 1 95 20 98 °F (36.7 °C) 134/82 Alert 100 %    07/21/17 0756 1 85 18 98.7 °F (37.1 °C) 103/66 Alert 100 %    07/21/17 0430 1 90 18 99 °F (37.2 °C) 107/70 Alert 100 %    07/21/17 0045 1 82 18 99.6 °F (37.6 °C) (!)  158/98 Alert 100 %            PAIN    Pain Assessment    Pain Intensity 1: 0 (07/22/17 0320)              Patient Stated Pain Goal: 0  Intervention effective: yes  Time of last intervention: No c/o pain Reassessment Completed: yes   Other actions taken for pain: Distraction    Last 3 Weights:  Last 3 Recorded Weights in this Encounter    07/18/17 1518   Weight: 81.2 kg (179 lb)   Weight change:     INTAKE/OUPUT    Current Shift: 07/22 0701 - 07/22 1900  In: 220 [P.O.:220]  Out: 400 [Urine:400]    Last three shifts: 07/20 1901 - 07/22 0700  In: 3316.7 [P.O.:600;  I.V.:2716.7]  Out: 3200 [Urine:3200]    RECOMMENDATIONS AND DISCHARGE PLANNING  Patient needs and requests: nutrition, comfort and safety    Pending tests/procedures: labs     Discharge plan for patient: TBD    Discharge planning Needs or Barriers: NONE    Estimated Discharge Date: TBD Posted on Whiteboard in Patients Room: yes       \"HEALS\" SAFETY CHECK  A safety check occurred in the patient's room between off going nurse and oncoming nurse listed above. The safety check included the below items:    H  High Alert Medications Verify all high alert medication drips (heparin, PCA, etc.)  E  Equipment Suction is set up for ALL patients (with arsenio)  Red plugs utilized for all equipment (IV pumps, etc.)  WOWs wiped down at end of shift. Room stocked with oxygen, suction, and other unit-specific supplies  A  Alarms Bed alarm is set for fall risk patients  Ensure chair alarm is in place and activated if patient is up in a chair  L  Lines Check IV for any infiltration  Mari bag is empty if patient has a Mari   Tubing and IV bags are labeled  S  Safety  Room is clean, patient is clean, and equipment is clean. Hallways are clear from equipment besides carts. Fall bracelet on for fall risk patients  Ensure room is clear and free of clutter  Suction is set up for ALL patients (with arsenio)  Hallways are clear from equipment besides carts.    Isolation precautions followed, supplies available outside room, sign posted    Grant Florez RN

## 2017-07-22 NOTE — PROGRESS NOTES
Problem: Self Care Deficits Care Plan (Adult)  Goal: *Acute Goals and Plan of Care (Insert Text)  Occupational Therapy Goals  Initiated 7/22/2017 within 7 day(s). 1. Patient will associate her NWB status to her home situation  2. Patient will associate her NWB status to her IADLs  OCCUPATIONAL THERAPY EVALUATION/DISCHARGE     Patient: Brittni Hammonds (1 y.o. female)  Date: 7/22/2017  Primary Diagnosis: Osteomyelitis (HCC)  dx  Procedure(s) (LRB):  AMPUTATION OF second TOE on left foot (Left) 1 Day Post-Op   Precautions: NWB, Fall      ASSESSMENT AND RECOMMENDATIONS:  Based on the objective data described below, the patient presents with decreased self care skills secondary to her NWB status on her LLE. Following training, she increased to relating her NWB status to her home situation, her IADL tasks and her ADL tasks (including showering). Following verbal cues, she is independent standing for LB clothes management simulation; therefore, further skilled occupational therapy is not indicated at this time. Discharge Recommendations: Home Health  Further Equipment Recommendations for Discharge: N/A       COMPLEXITY      Eval Complexity: History: LOW Complexity : Brief history review ; Examination: LOW Complexity : 1-3 performance deficits relating to physical, cognitive , or psychosocial skils that result in activity limitations and / or participation restrictions ; Decision Making:LOW Complexity : No comorbidities that affect functional and no verbal or physical assistance needed to complete eval tasks  Assessment: LOW Complexity          G-CODES:      Self Care  Current  CI= 1-19%   Goal  CI= 1-19%   D/C  CI= 1-19%. The severity rating is based on the Level of Assistance required for Functional Mobility and ADLs. SUBJECTIVE:   Patient stated Oh, I did not even think of these things.  she is referring to rleating her NWB status to her home situation and her IADL's (including carrying items at walker level      OBJECTIVE DATA SUMMARY:       Past Medical History:   Diagnosis Date    Diabetes ([address]      Gall stones      GERD (gastroesophageal reflux disease)      Hiatal hernia      Hypertension      Mass of abdomen      Pancreatitis       Past Surgical History:   Procedure Laterality Date    HX CHOLECYSTECTOMY   10/15/2014    HX GI         Benign GI Stromal Tumor excision    HX HEENT         Sx for detached retina    HX MYOMECTOMY         x5 removed    HX OTHER SURGICAL         upper endoscopy     Prior Level of Function/Home Situation:   Home Situation  Home Environment: Private residence ([facility])  One/Two Story Residence: Two story  Lift Chair Available: No  Living Alone: Yes  Support Systems: Child(avi), Family member(s)  Patient Expects to be Discharged to[de-identified] Private residence  Current DME Used/Available at Home: None  [X]     Right hand dominant       [ ]     Left hand dominant  Cognitive/Behavioral Status:  Neurologic State: Alert  Orientation Level: Oriented X4  Skin: no skin integrity issues noted during OT evaluation  Edema: no extremity edema noted  Vision/Perceptual:      tracking is WFL     Coordination:  BUE's WFL  Balance:  Sitting: Intact  Standing: Intact; With support   Independently standing at walker level for LB clothes management  Strength:  BUE's: 4/5  Tone & Sensation:  BUE's WFL  Range of Motion:  BUE's AROM is [facility]  Functional Mobility and Transfers for ADLs:  Bed Mobility:  Rolling: Modified independent  Supine to Sit: Stand-by asssistance  Sit to Supine: Stand-by asssistance  Scooting: Modified independent  Transfers:  Modified independent sit to stand  ADL Assessment:   self feeding: independent (simulated)  Grooming: independent (simulated at bed level)  UB bathing/dressing: independent  LB bathing/dressing: independent and CG standing during LB clothes management initially, improving to supervision   ADL Intervention:   as noted above  Pain:  Pt reports 0/10 pain or discomfort prior to treatment. Pt reports 0/10 pain or discomfort post treatment. Activity Tolerance:   No SOB or c/o fatigue noted  Please refer to the flowsheet for vital signs taken during this treatment. After treatment:   [ ]  Patient left in no apparent distress sitting up in chair  [X]  Patient left in no apparent distress sitting on the edge of the bed  [X]  Call bell left within reach  [ ]  Nursing notified  [ ]  Caregiver present  [ ]  Bed alarm activated      COMMUNICATION/EDUCATION:   Communication/Collaboration:  [ ]      Home safety education was provided and the patient/caregiver indicated understanding. [X]      Patient/family have participated as able and agree with findings and recommendations. [ ]      Patient is unable to participate in plan of care at this time.      Leisa Opitz, OTR/L  Time Calculation: 23 mins

## 2017-07-22 NOTE — PROGRESS NOTES
Intern Progress Note  [facility]       Patient: Donald Lerma MRN: [id number]  CSN: [id number]    YOB: 1965  Age: 46 y.o. Sex: female    DOA: 7/18/2017 LOS:  LOS: 4 days                    Subjective:       Mrs. Marquita Espino is doing well this morning, resting in bed comfortably with no concerns. She notes that she did ambulate today, careful to keep weight off of her toe. Endorsed passing gas. See ROS for pertinent positives and negatives. Review of Systems   Constitutional: Negative for chills and fever. Respiratory: Negative for cough, shortness of breath and wheezing. Cardiovascular: Negative for chest pain and palpitations. Gastrointestinal: Negative for constipation, diarrhea, nausea and vomiting. Genitourinary: Negative for dysuria. Skin: Negative for rash. Neurological: Negative for dizziness and headaches. Objective:      Patient Vitals for the past 24 hrs:   Temp Pulse Resp BP SpO2   07/22/17 0720 98.6 °F (37 °C) 75 18 119/70 93 %   07/22/17 0320 97.5 °F (36.4 °C) 87 18 118/68 99 %   07/22/17 0000 97.6 °F (36.4 °C) 85 20 138/86 100 %   07/21/17 1917 97.8 °F (36.6 °C) 79 18 123/81 100 %   07/21/17 1645 98 °F (36.7 °C) - - - -   07/21/17 1644 - 79 20 138/77 100 %   07/21/17 1635 - - - 126/67 100 %   07/21/17 1634 - 79 19 - 100 %   07/21/17 1626 - 81 15 106/69 100 %   07/21/17 1620 - 83 18 - 100 %   07/21/17 1618 98 °F (36.7 °C) 82 20 111/62 100 %   07/21/17 1616 98 °F (36.7 °C) 85 11 111/62 100 %   07/21/17 1311 98 °F (36.7 °C) 95 20 134/82 100 %         Intake/Output Summary (Last 24 hours) at 07/22/17 0834  Last data filed at 07/22/17 0720   Gross per 24 hour   Intake             1840 ml   Output             1200 ml   Net              640 ml       Physical Exam   Constitutional: She appears well-developed and well-nourished. Cardiovascular: Normal rate and regular rhythm. Exam reveals no gallop and no friction rub.     No murmur heard.  Pulmonary/Chest: Effort normal. No respiratory distress. She has no wheezes. She has no rales. Abdominal: Soft. Bowel sounds are normal. She exhibits no distension. There is no tenderness. Musculoskeletal: She exhibits no edema. Bandage on L foot and ankle, some dried sanguinous exudate at the location of the surgery (2nd toe of L foot)   Neurological: She is alert. Skin: Skin is warm. No rash noted. Lab/Data Reviewed:  BMP:   Lab Results   Component Value Date/Time     07/22/2017 02:23 AM    K 3.4 (L) 07/22/2017 02:23 AM     (H) 07/22/2017 02:23 AM    CO2 21 07/22/2017 02:23 AM    AGAP 11 07/22/2017 02:23 AM     (H) 07/22/2017 02:23 AM    BUN 9 07/22/2017 02:23 AM    CREA 0.99 07/22/2017 02:23 AM    GFRAA >60 07/22/2017 02:23 AM    GFRNA 59 (L) 07/22/2017 02:23 AM     CBC:   Lab Results   Component Value Date/Time    WBC 8.9 07/22/2017 02:23 AM    HGB 9.0 (L) 07/22/2017 02:23 AM    HCT 26.4 (L) 07/22/2017 02:23 AM     07/22/2017 02:23 AM        Scheduled Medications Reviewed:  Current Facility-Administered Medications   Medication Dose Route Frequency    quinapril (ACCUPRIL) tablet 20 mg  20 mg Oral QHS    vancomycin (VANCOCIN) 1,400 mg in 0.9% sodium chloride 250 mL IVPB  1,400 mg IntraVENous Q18H    insulin glargine (LANTUS) injection 25 Units  25 Units SubCUTAneous QHS    insulin lispro (HUMALOG) injection   SubCUTAneous AC&HS    sodium hypochlorite (QUARTER STRENGTH DAKIN'S) 0.125% irrigation (bottle)   Topical BID    atorvastatin (LIPITOR) tablet 20 mg  20 mg Oral DAILY    PARoxetine (PAXIL) tablet 30 mg  30 mg Oral QHS    pantoprazole (PROTONIX) tablet 40 mg  40 mg Oral DAILY    piperacillin-tazobactam (ZOSYN) 3.375 g in 0.9% sodium chloride (MBP/ADV) 100 mL MBP  3.375 g IntraVENous Q6H         Imaging, microbiology, and EKG/Telemetry:  None new    Assessment/Plan     46 y. o. female with PMH HTN, Insulin dependent Diabetes type 2, GERD, insomnia  now presenting with complaint of diabetic foot ulceration.       Sepsis with source of diabetic foot ulceration (2/4 SIRS criteria): Leukocytosis at 13.9, tachycardia @ 99. Lactate 1.4 on admission. XR on admission indicative of marked soft tissue swelling with poor visualization of the cortex of the terminal tuft of the second digit. MRI 7/19 suggestive of osteomyelitis in the distal phalanx of the second toe (L foot). SILVIA not suggestive of arterial disease in lower extremities. CRP increased at 5.2, ESR increased at 72, which is what one would expect to see in osteomyelitis  - Blood, urine cultures alpha hemolytic gram positive cocci in chains. FU speciation.   - F/u surgical culture  - Wound culture multimicrobial with e-coli, coag negative staph, hemolytic and non-hemolytic streptococci. Complete sensitivity pending.   - Continue vancomycin dosed by pharmacy  - Zosyn 3.375 q 6H   - Daily CBC to monitor leukocytosis. WBC 13.9>14.2>11.9>11.3>8. 9      Uncontrolled DM type 2, insulin dependent and retinopathy: Patient is not able to afford insulin and has been dependent on samples from her PCP. She is getting connected to the [facility]. She has not used her insulin for the last week. - A1C 10.1  - Correctional scale with Glucose ACHS   - Lantus increased to 25U QHS, first dose last night. Received 15 units lispro yesterday. BG range 139-278.       Hypertension: Goal < 140/90   - Quinapril 20 mg       Renal insufficiency: Last outpatient Cr 1.5; Cr this admission 1.13>1.0>1.2>0.99.  - Daily BMP   - Will continue to monitor  - Potassium 3.4.       GERD: stable   - Holding nexium and continue with protonix while inpatient       Insomnia/anxiety:   - Paxil to be continued nightly from home med       Diet: NPO at midnight   DVT Prophylaxis: heparin and SCD's     Disposition and anticipated LOS: 2+ midnights, patient unable to afford medications and has been referred to [place] [address] clinic.  Will consult DARA to ensure acceptance for medications management and follow up  Breana Jonas MD  PGY-1 [facility] Resident  [facility]  07/21/17 7:16 AM

## 2017-07-22 NOTE — PROGRESS NOTES
Problem: Mobility Impaired (Adult and Pediatric)  Goal: *Acute Goals and Plan of Care (Insert Text)  Physical Therapy Goals  Initiated 7/22/2017 and to be accomplished within 5-7 day(s)  1. Patient will move from supine to sit and sit to supine in bed with independence. 2. Patient will transfer from bed to chair and chair to bed with independence using the least restrictive device. 3. Patient will perform sit to stand with independence. 4. Patient will ambulate with supervision/set-up for 50 feet with the least restrictive device. 5. Patient will ascend/descend 4 stairs with 1-2 handrail(s) with minimal assistance/contact guard assist.  Outcome: Progressing Towards Goal  PHYSICAL THERAPY EVALUATION     Patient: Jensen Pretty (18 y.o. female)  Date: 7/22/2017  Primary Diagnosis: Osteomyelitis ([address]  dx  Procedure(s) (LRB):  AMPUTATION OF second TOE on left foot (Left) 1 Day Post-Op   Precautions:    NWB, Fall      ASSESSMENT :  Based on the objective data described below, the patient presents with decreased I functional mobility now P/O and NWB L ordered/seen in connect care note. She was supine in bed and reports she has been getting up to the bathroom with no AD and attempting heel walking. She states that the surgeon saw her and reitterated NWB L. She will need a RW/SW at discharge as she has no AD at home. She ambulated in the room with RW approx 12 feet with NWB L . She returned to bed. She will need stair training as she lives in a 2 story townhouse by herself. 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 Patient will benefit from skilled intervention to address the above impairments.   Patients rehabilitation potential is considered to be Good  Factors which may influence rehabilitation potential include:   [X]         None noted  [ ]         Mental ability/status  [ ]         Medical condition  [ ]         Home/family situation and support systems  [ ]         Safety awareness  [ ]         Pain tolerance/management  [ ]         Other:        PLAN :  Recommendations and Planned Interventions:  [X]           Bed Mobility Training             [X]    Neuromuscular Re-Education  [X]           Transfer Training                   [ ]    Orthotic/Prosthetic Training  [X]           Gait Training                          [ ]    Modalities  [X]           Therapeutic Exercises          [ ]    Edema Management/Control  [X]           Therapeutic Activities            [X]    Patient and Family Training/Education  [ ]           Other (comment):     Frequency/Duration: Patient will be followed by physical therapy 1-2 times per day/4-7 days per week to address goals. Discharge Recommendations: Home Health  Further Equipment Recommendations for Discharge: rolling walker       G-CODES       Mobility  Current  CI= 1-19%   Goal  CH= 0%. The severity rating is based on the Level of Assistance required for Functional Mobility and ADLs. Eval Complexity: History: MEDIUM  Complexity : 1-2 comorbidities / personal factors will impact the outcome/ POC Exam:MEDIUM Complexity : 3 Standardized tests and measures addressing body structure, function, activity limitation and / or participation in recreation  Presentation: LOW Complexity : Stable, uncomplicated  Clinical Decision Making:Other outcome measures level of assistance with functional mobility  LOW Overall Complexity:LOW       SUBJECTIVE:   Patient stated I live alone.       OBJECTIVE DATA SUMMARY:       Past Medical History:   Diagnosis Date    Diabetes ([facility] [address]      Gall stones      GERD (gastroesophageal reflux disease)      Hiatal hernia      Hypertension      Mass of abdomen      Pancreatitis       Past Surgical History:   Procedure Laterality Date    HX CHOLECYSTECTOMY   10/15/2014    HX GI         Benign GI Stromal Tumor excision    HX HEENT         Sx for detached retina    HX MYOMECTOMY         x5 removed    HX OTHER SURGICAL         upper endoscopy Prior Level of Function/Home Situation: I all areas and no AD use  Home Situation  Home Environment: Private residence ([facility])  One/Two Story Residence: Two story  Lift Chair Available: No  Living Alone: Yes  Support Systems: Child(avi), Family member(s)  Patient Expects to be Discharged to[de-identified] Private residence  Current DME Used/Available at Home: None  Critical Behavior:  Neurologic State: Alert  Orientation Level: Oriented X4  Cognition: Follows commands  Safety/Judgement: Fall prevention  Psychosocial  Patient Behaviors: Calm; Cooperative  Purposeful Interaction: Yes  Strength:    Strength: Within functional limits (B LE P/O L Foot)  Tone & Sensation:   Tone: Normal  Sensation: Intact     Range Of Motion:  AROM: Within functional limits (B LE P/O L foot)  Functional Mobility:  Bed Mobility:  Rolling: Modified independent  Supine to Sit: Stand-by asssistance  Sit to Supine: Stand-by asssistance  Scooting: Modified independent  Transfers:  Sit to Stand: Contact guard assistance  Stand to Sit: Contact guard assistance  Balance:   Sitting: Intact  Standing: Intact; With support  Ambulation/Gait Training:  Distance (ft): 12 Feet (ft) (in room)  Assistive Device: Walker, rolling (left a Standard walker in the room )  Ambulation - Level of Assistance: Contact guard assistance     Gait Description (WDL): Exceptions to WDL     Left Side Weight Bearing: Non-weight bearing  Base of Support: Narrowed  Stance: Right increased  Speed/Catrachita: Fluctuations  Step Length: Right shortened     Therapeutic Exercises:      Pain:  Pt reports 0/10 pain or discomfort prior to treatment. Pt reports 0/10 pain or discomfort post treatment. Activity Tolerance:   yady well with difficulty of NWB L reported  Please refer to the flowsheet for vital signs taken during this treatment.   After treatment:   [ ]         Patient left in no apparent distress sitting up in chair  [X]         Patient left in no apparent distress in bed  [X] Call bell left within reach  [ ]         Nursing notified  [ ]         Caregiver present  [ ]         Bed alarm activated      COMMUNICATION/EDUCATION:   [X]         Fall prevention education was provided and the patient/caregiver indicated understanding. [X]         Patient/family have participated as able in goal setting and plan of care. [X]         Patient/family agree to work toward stated goals and plan of care. [ ]         Patient understands intent and goals of therapy, but is neutral about his/her participation. [ ]         Patient is unable to participate in goal setting and plan of care.      Thank you for this referral.  Citlaly File, PT   Time Calculation: 14 mins

## 2017-07-23 LAB
ANION GAP BLD CALC-SCNC: 10 MMOL/L (ref 3–18)
BACTERIA SPEC CULT: ABNORMAL
BACTERIA SPEC CULT: ABNORMAL
BASOPHILS # BLD AUTO: 0 K/UL (ref 0–0.06)
BASOPHILS # BLD: 0 % (ref 0–2)
BUN SERPL-MCNC: 11 MG/DL (ref 7–18)
BUN/CREAT SERPL: 10 (ref 12–20)
CALCIUM SERPL-MCNC: 8.9 MG/DL (ref 8.5–10.1)
CHLORIDE SERPL-SCNC: 111 MMOL/L (ref 100–108)
CO2 SERPL-SCNC: 23 MMOL/L (ref 21–32)
CREAT SERPL-MCNC: 1.11 MG/DL (ref 0.6–1.3)
DATE LAST DOSE: NORMAL
DIFFERENTIAL METHOD BLD: ABNORMAL
EOSINOPHIL # BLD: 0.1 K/UL (ref 0–0.4)
EOSINOPHIL NFR BLD: 1 % (ref 0–5)
ERYTHROCYTE [DISTWIDTH] IN BLOOD BY AUTOMATED COUNT: 12.7 % (ref 11.6–14.5)
GLUCOSE BLD STRIP.AUTO-MCNC: 118 MG/DL (ref 70–110)
GLUCOSE BLD STRIP.AUTO-MCNC: 173 MG/DL (ref 70–110)
GLUCOSE BLD STRIP.AUTO-MCNC: 244 MG/DL (ref 70–110)
GLUCOSE BLD STRIP.AUTO-MCNC: 263 MG/DL (ref 70–110)
GLUCOSE BLD STRIP.AUTO-MCNC: 313 MG/DL (ref 70–110)
GLUCOSE BLD STRIP.AUTO-MCNC: 85 MG/DL (ref 70–110)
GLUCOSE SERPL-MCNC: 114 MG/DL (ref 74–99)
GRAM STN SPEC: ABNORMAL
HCT VFR BLD AUTO: 25.6 % (ref 35–45)
HGB BLD-MCNC: 8.7 G/DL (ref 12–16)
LYMPHOCYTES # BLD AUTO: 30 % (ref 21–52)
LYMPHOCYTES # BLD: 2.9 K/UL (ref 0.9–3.6)
MCH RBC QN AUTO: 29.1 PG (ref 24–34)
MCHC RBC AUTO-ENTMCNC: 34 G/DL (ref 31–37)
MCV RBC AUTO: 85.6 FL (ref 74–97)
MONOCYTES # BLD: 0.6 K/UL (ref 0.05–1.2)
MONOCYTES NFR BLD AUTO: 6 % (ref 3–10)
NEUTS SEG # BLD: 5.8 K/UL (ref 1.8–8)
NEUTS SEG NFR BLD AUTO: 63 % (ref 40–73)
PLATELET # BLD AUTO: 304 K/UL (ref 135–420)
PMV BLD AUTO: 10.1 FL (ref 9.2–11.8)
POTASSIUM SERPL-SCNC: 3.4 MMOL/L (ref 3.5–5.5)
RBC # BLD AUTO: 2.99 M/UL (ref 4.2–5.3)
REPORTED DOSE,DOSE: NORMAL UNITS
REPORTED DOSE/TIME,TMG: 2030
SERVICE CMNT-IMP: ABNORMAL
SERVICE CMNT-IMP: ABNORMAL
SODIUM SERPL-SCNC: 144 MMOL/L (ref 136–145)
VANCOMYCIN TROUGH SERPL-MCNC: 19.8 UG/ML (ref 10–20)
WBC # BLD AUTO: 9.4 K/UL (ref 4.6–13.2)

## 2017-07-23 PROCEDURE — 74011250637 HC RX REV CODE- 250/637: Performed by: STUDENT IN AN ORGANIZED HEALTH CARE EDUCATION/TRAINING PROGRAM

## 2017-07-23 PROCEDURE — 65270000029 HC RM PRIVATE

## 2017-07-23 PROCEDURE — 74011250637 HC RX REV CODE- 250/637: Performed by: FAMILY MEDICINE

## 2017-07-23 PROCEDURE — 74011250636 HC RX REV CODE- 250/636: Performed by: STUDENT IN AN ORGANIZED HEALTH CARE EDUCATION/TRAINING PROGRAM

## 2017-07-23 PROCEDURE — 36415 COLL VENOUS BLD VENIPUNCTURE: CPT | Performed by: STUDENT IN AN ORGANIZED HEALTH CARE EDUCATION/TRAINING PROGRAM

## 2017-07-23 PROCEDURE — 82962 GLUCOSE BLOOD TEST: CPT

## 2017-07-23 PROCEDURE — 85025 COMPLETE CBC W/AUTO DIFF WBC: CPT | Performed by: STUDENT IN AN ORGANIZED HEALTH CARE EDUCATION/TRAINING PROGRAM

## 2017-07-23 PROCEDURE — 74011000258 HC RX REV CODE- 258: Performed by: STUDENT IN AN ORGANIZED HEALTH CARE EDUCATION/TRAINING PROGRAM

## 2017-07-23 PROCEDURE — 80048 BASIC METABOLIC PNL TOTAL CA: CPT | Performed by: STUDENT IN AN ORGANIZED HEALTH CARE EDUCATION/TRAINING PROGRAM

## 2017-07-23 PROCEDURE — 74011636637 HC RX REV CODE- 636/637: Performed by: STUDENT IN AN ORGANIZED HEALTH CARE EDUCATION/TRAINING PROGRAM

## 2017-07-23 PROCEDURE — 80202 ASSAY OF VANCOMYCIN: CPT | Performed by: STUDENT IN AN ORGANIZED HEALTH CARE EDUCATION/TRAINING PROGRAM

## 2017-07-23 RX ORDER — HYDROGEN PEROXIDE 3 %
40 SOLUTION, NON-ORAL MISCELLANEOUS DAILY
Qty: 60 CAP | Refills: 0 | Status: SHIPPED | OUTPATIENT
Start: 2017-07-23 | End: 2017-08-22

## 2017-07-23 RX ORDER — ATORVASTATIN CALCIUM 20 MG/1
20 TABLET, FILM COATED ORAL DAILY
Qty: 30 TAB | Refills: 0 | Status: SHIPPED | OUTPATIENT
Start: 2017-07-23 | End: 2017-08-07 | Stop reason: ALTCHOICE

## 2017-07-23 RX ORDER — POTASSIUM CHLORIDE 1.5 G/1.77G
20 POWDER, FOR SOLUTION ORAL 2 TIMES DAILY WITH MEALS
Status: COMPLETED | OUTPATIENT
Start: 2017-07-23 | End: 2017-07-23

## 2017-07-23 RX ORDER — ACETAMINOPHEN 325 MG/1
650 TABLET ORAL
Qty: 120 TAB | Refills: 0 | Status: SHIPPED | OUTPATIENT
Start: 2017-07-23 | End: 2017-08-22

## 2017-07-23 RX ORDER — QUINAPRIL 20 MG/1
20 TABLET ORAL DAILY
Qty: 30 TAB | Refills: 0 | Status: SHIPPED | OUTPATIENT
Start: 2017-07-23 | End: 2017-08-07 | Stop reason: SDUPTHER

## 2017-07-23 RX ORDER — PAROXETINE 30 MG/1
30 TABLET, FILM COATED ORAL DAILY
Qty: 30 TAB | Refills: 0 | Status: SHIPPED | OUTPATIENT
Start: 2017-07-23 | End: 2017-08-07 | Stop reason: SDUPTHER

## 2017-07-23 RX ORDER — PANTOPRAZOLE SODIUM 40 MG/1
40 TABLET, DELAYED RELEASE ORAL DAILY
Qty: 30 TAB | Refills: 0 | Status: SHIPPED | OUTPATIENT
Start: 2017-07-23 | End: 2017-07-24

## 2017-07-23 RX ADMIN — PIPERACILLIN SODIUM,TAZOBACTAM SODIUM 3.38 G: 3; .375 INJECTION, POWDER, FOR SOLUTION INTRAVENOUS at 12:12

## 2017-07-23 RX ADMIN — INSULIN LISPRO 6 UNITS: 100 INJECTION, SOLUTION INTRAVENOUS; SUBCUTANEOUS at 23:13

## 2017-07-23 RX ADMIN — INSULIN HUMAN 3 UNITS: 100 INJECTION, SOLUTION PARENTERAL at 08:12

## 2017-07-23 RX ADMIN — POTASSIUM CHLORIDE 20 MEQ: 1.5 POWDER, FOR SOLUTION ORAL at 10:46

## 2017-07-23 RX ADMIN — PIPERACILLIN SODIUM,TAZOBACTAM SODIUM 3.38 G: 3; .375 INJECTION, POWDER, FOR SOLUTION INTRAVENOUS at 17:22

## 2017-07-23 RX ADMIN — INSULIN HUMAN 7 UNITS: 100 INJECTION, SUSPENSION SUBCUTANEOUS at 08:13

## 2017-07-23 RX ADMIN — VANCOMYCIN HYDROCHLORIDE 1400 MG: 10 INJECTION, POWDER, LYOPHILIZED, FOR SOLUTION INTRAVENOUS at 04:22

## 2017-07-23 RX ADMIN — VANCOMYCIN HYDROCHLORIDE 1400 MG: 10 INJECTION, POWDER, LYOPHILIZED, FOR SOLUTION INTRAVENOUS at 23:12

## 2017-07-23 RX ADMIN — PIPERACILLIN SODIUM,TAZOBACTAM SODIUM 3.38 G: 3; .375 INJECTION, POWDER, FOR SOLUTION INTRAVENOUS at 06:22

## 2017-07-23 RX ADMIN — INSULIN HUMAN 3 UNITS: 100 INJECTION, SOLUTION PARENTERAL at 17:21

## 2017-07-23 RX ADMIN — PIPERACILLIN SODIUM,TAZOBACTAM SODIUM 3.38 G: 3; .375 INJECTION, POWDER, FOR SOLUTION INTRAVENOUS at 00:58

## 2017-07-23 RX ADMIN — INSULIN LISPRO 9 UNITS: 100 INJECTION, SOLUTION INTRAVENOUS; SUBCUTANEOUS at 12:12

## 2017-07-23 RX ADMIN — PAROXETINE HYDROCHLORIDE 30 MG: 20 TABLET, FILM COATED ORAL at 23:13

## 2017-07-23 RX ADMIN — PANTOPRAZOLE SODIUM 40 MG: 40 TABLET, DELAYED RELEASE ORAL at 08:03

## 2017-07-23 RX ADMIN — INSULIN HUMAN 7 UNITS: 100 INJECTION, SUSPENSION SUBCUTANEOUS at 17:21

## 2017-07-23 RX ADMIN — POTASSIUM CHLORIDE 20 MEQ: 1.5 POWDER, FOR SOLUTION ORAL at 17:21

## 2017-07-23 RX ADMIN — ATORVASTATIN CALCIUM 20 MG: 20 TABLET, FILM COATED ORAL at 08:03

## 2017-07-23 RX ADMIN — QUINAPRIL HYDROCHLORIDE 20 MG: 20 TABLET, FILM COATED ORAL at 23:13

## 2017-07-23 NOTE — PROGRESS NOTES
Intern Progress Note  [facility]       Patient: Leslie Mendez MRN: [id number]  CSN: [id number]    YOB: 1965  Age: 46 y.o. Sex: female    DOA: 7/18/2017 LOS:  LOS: 5 days                    Subjective:       Mrs. Alona Artis seen this morning sitting up on the edge of her bed enjoying breakfast. She reports some tingling around her amputation site but denies pain, fevers, chills, nausea, vomiting, diarrhea. She has been learning to use a walker device and has been participating in PT. She plans to go home on discharge with Home Health. She does not feel ready to be discharged today but thinks she will feel ready tomorrow. Patient has been ambulating around the room without difficulty. She had no acute complaints or concern.s       Review of Systems   Constitutional: Negative for chills and fever. Respiratory: Negative for cough, shortness of breath and wheezing. Cardiovascular: Negative for chest pain and palpitations. Gastrointestinal: Negative for constipation, diarrhea, nausea and vomiting. Genitourinary: Negative for dysuria. Skin: Negative for rash. Neurological: Negative for dizziness and headaches. Objective:      Patient Vitals for the past 24 hrs:   Temp Pulse Resp BP SpO2   07/23/17 0745 98.6 °F (37 °C) 86 20 110/67 -   07/23/17 0023 98.9 °F (37.2 °C) 94 18 130/89 100 %   07/22/17 2030 99.3 °F (37.4 °C) 84 19 142/69 99 %   07/22/17 1515 98.7 °F (37.1 °C) 96 18 153/66 96 %   07/22/17 1106 98.6 °F (37 °C) 80 20 117/69 100 %         Intake/Output Summary (Last 24 hours) at 07/23/17 0913  Last data filed at 07/23/17 4691   Gross per 24 hour   Intake             1580 ml   Output             3650 ml   Net            -2070 ml       Physical Exam   Constitutional: She appears well-developed and well-nourished. Cardiovascular: Normal rate and regular rhythm. Exam reveals no gallop and no friction rub. No murmur heard.   Pulmonary/Chest: Effort normal. No respiratory distress. She has no wheezes. She has no rales. Abdominal: Soft. Bowel sounds are normal. She exhibits no distension. There is no tenderness. Musculoskeletal: She exhibits no edema. Left foot and ankle bandaged, great toe has normal cap refill and feels warm. No drainage on bandages. Neurological: She is alert. Skin: Skin is warm. No rash noted.        Lab/Data Reviewed:  BMP:   Lab Results   Component Value Date/Time     07/23/2017 06:24 AM    K 3.4 (L) 07/23/2017 06:24 AM     (H) 07/23/2017 06:24 AM    CO2 23 07/23/2017 06:24 AM    AGAP 10 07/23/2017 06:24 AM     (H) 07/23/2017 06:24 AM    BUN 11 07/23/2017 06:24 AM    CREA 1.11 07/23/2017 06:24 AM    GFRAA >60 07/23/2017 06:24 AM    GFRNA 51 (L) 07/23/2017 06:24 AM     CBC:   Lab Results   Component Value Date/Time    WBC 9.4 07/23/2017 06:24 AM    HGB 8.7 (L) 07/23/2017 06:24 AM    HCT 25.6 (L) 07/23/2017 06:24 AM     07/23/2017 06:24 AM        Scheduled Medications Reviewed:  Current Facility-Administered Medications   Medication Dose Route Frequency    insulin NPH (NOVOLIN N, HUMULIN N) injection 7 Units  7 Units SubCUTAneous BID    insulin regular (NOVOLIN R, HUMULIN R) injection 3 Units  3 Units SubCUTAneous BID    quinapril (ACCUPRIL) tablet 20 mg  20 mg Oral QHS    vancomycin (VANCOCIN) 1,400 mg in 0.9% sodium chloride 250 mL IVPB  1,400 mg IntraVENous Q18H    insulin lispro (HUMALOG) injection   SubCUTAneous AC&HS    sodium hypochlorite (QUARTER STRENGTH DAKIN'S) 0.125% irrigation (bottle)   Topical BID    atorvastatin (LIPITOR) tablet 20 mg  20 mg Oral DAILY    PARoxetine (PAXIL) tablet 30 mg  30 mg Oral QHS    pantoprazole (PROTONIX) tablet 40 mg  40 mg Oral DAILY    piperacillin-tazobactam (ZOSYN) 3.375 g in 0.9% sodium chloride (MBP/ADV) 100 mL MBP  3.375 g IntraVENous Q6H         Imaging, microbiology, and EKG/Telemetry:  CULTURE, Raheel Dial STAIN [id number] (Order [id number])   Microbiology Date: 7/18/2017   Department: [id number] [facility]   Released By/Authorizing: Sebastian Forrester PA-C (auto-released)             7/22/2017  7:01 AM - Jg, Lab In [facility]       Component Results      Component Value Flag Ref Range Units Status     Special Requests: NO SPECIAL REQUESTS     Final     GRAM STAIN RARE WBC'S     Final     GRAM STAIN      Final     FEW GRAM POSITIVE COCCI IN PAIRS     Culture result: FEW ESCHERICHIA COLI (A)    Final     Culture result:  (A)    Final     MODERATE STAPHYLOCOCCUS SPECIES, COAGULASE NEGATIVE (TWO MORPHOTYPES)     Culture result:  (A)    Final     FEW ENTEROCOCCUS FAECALIS GROUP D     Culture result:  (A)    Final     MODERATE STREPTOCOCCUS INTERMEDIUS     Culture result:  (A)    Final     MODERATE DIPHTHEROIDS (TWO MORPHOTYPES)       Culture & Susceptibility      ENTEROCOCCUS FAECALIS GROUP D      Antibiotic Sensitivity TANYA Unit Status     Ampicillin ($) Susceptible <=2 ug/mL Final     Method: TANYA     Gentamicin Synergy S Susceptible  ug/mL Final     Method: TANYA     Penicillin G ($$) Susceptible 4 ug/mL Final     Method: TANYA     Streptomycin Synergy Susceptible  ug/mL Final     Method: TANYA     Tigecycline ($$$$) Susceptible <=0.12 ug/mL Final     Method: TANYA     Vancomycin ($) Susceptible 1 ug/mL Final     Method: TANYA                 ESCHERICHIA COLI      Antibiotic Sensitivity TANYA Unit Status     Ampicillin ($) Resistant >=32 ug/mL Final     Method: TANYA     Ampicillin/sulbactam ($) Intermediate 16 ug/mL Final     Method: TANYA     Cefazolin ($) Susceptible 8 ug/mL Final     Method: TANYA     Cefepime ($$) Susceptible <=1 ug/mL Final     Method: TANYA     Ceftazidime ($) Susceptible <=1 ug/mL Final     Method: TANYA     Ceftriaxone ($) Susceptible <=1 ug/mL Final     Method: TANYA     Ciprofloxacin ($) Susceptible <=0.25 ug/mL Final     Method: TANYA     Gentamicin ($) Susceptible <=1 ug/mL Final     Method: TANYA     Imipenem Susceptible <=0.25 ug/mL Final     Method: TANYA     Levofloxacin ($) Susceptible <=0.12 ug/mL Final     Method: TANYA     Piperacillin/Tazobac ($) Susceptible <=4 ug/mL Final     Method: TANYA     Tobramycin ($) Susceptible <=1 ug/mL Final     Method: TANYA     Trimeth-Sulfamethoxa Susceptible <=20 ug/mL Final     Method: TANYA             CULTURE, BLOOD [[id number]] (Order [id number])   Microbiology    Date: 7/18/2017   Department: Patricia [facility] [address] [place] [address]   Released By/Authorizing: Yovani Jeffers PA-C (auto-released)             7/23/2017  7:01 AM - Jg, Lab In [facility] [facility]       Component Results      Component Value Flag Ref Range Units Status     Special Requests: NO SPECIAL REQUESTS     Final     GRAM STAIN      Final     ANAEROBIC BOTTLE GRAM POSITIVE COCCI IN CHAINS     GRAM STAIN      Final     SMEAR CALLED TO AND CORRECTLY REPEATED BY: Faviola Joshua RN 4N ON 7/21 AT 7988 TO [facility]     Culture result:  (A)    Final     ANAEROBIC BOTTLE STREPTOCOCCUS INTERMEDIUS       Culture & Susceptibility      STREPTOCOCCUS INTERMEDIUS      Antibiotic Sensitivity TANYA Unit Status     Cefepime ($$) Susceptible  ug/mL Final     Method: FAY AMOR     Cefotaxime Susceptible  ug/mL Final     Method: FAY AMOR     Ceftriaxone ($) Susceptible  ug/mL Final     Method: FAY AMOR     Clindamycin ($) Susceptible  ug/mL Final     Method: FAY AMOR     Erythromycin ($$$$) Susceptible  ug/mL Final     Method: FAY AMOR     Levofloxacin ($) Susceptible  ug/mL Final     Method: FAY AMOR     Penicillin G ($$) Susceptible 0.047 ug/mL Final     Method: TANYA     Tetracycline Susceptible  ug/mL Final     Method: FAY AMOR     Vancomycin ($) Susceptible  ug/mL Final     Method: Kai Wolfe                         Assessment/Plan     46 y. o. female with PMH HTN, Insulin dependent Diabetes type 2, GERD, insomnia  now presenting with complaint of diabetic foot ulceration.       POD#3 left second toe amputation, with now resolvedSepsis with source of diabetic foot ulceration (2/4 SIRS criteria): Leukocytosis at 13.9, tachycardia @ 99. Lactate 1.4 on admission. XR on admission indicative of marked soft tissue swelling with poor visualization of the cortex of the terminal tuft of the second digit. MRI 7/19 suggestive of osteomyelitis in the distal phalanx of the second toe (L foot). SILVIA not suggestive of arterial disease in lower extremities. CRP increased at 5.2, ESR increased at 72, which is what one would expect to see in osteomyelitis  - Blood cultures 07/18/2017 ANAEROBIC BOTTLE STREPTOCOCCUS INTERMEDIUS pan susceptible  - F/u surgical culture  -Blood cultures 07/22/2017 NGTD  - Wound culture multimicrobial with e-coli, coag negative staph, hemolytic and non-hemolytic streptococci. Continue vancomycin dosed by pharmacy &  Zosyn 3.375 q [facility]  Until 07/22/2017cultures return negative  - Daily CBC to monitor leukocytosis. WBC 13.9>14.2>11.9>11.3>8.9<9.4      Uncontrolled DM type 2, insulin dependent and retinopathy: Patient is not able to afford insulin and has been dependent on samples from her PCP. She is getting connected to the [facility]. She has not used her insulin for the last week. - A1C 10.1  - Correctional scale with Glucose ACHS   - Patient started on 70/30 in hospital, . Her BS goal is <180, she has had 2 BS outside of goal in the last 24 hours (249 & 314). - By weight her total daily insulin requirement is 40 units.   -We will continue to monitor her sugars today as Novolin N and R were just started yesterday evening and titrate accordingly.       Hypertension: Goal < 140/90. BS range over last 24 hours 117/69153/66  - Quinapril 20 mg       Renal insufficiency: Last outpatient Cr 1.5; Cr this admission 1.13>1.0>1.2>0.99>1. 11.  - Daily BMP   - Will continue to monitor  - Potassium 3.4.  Will replete per protocol       GERD: stable   - Holding nexium and continue with protonix while inpatient       Insomnia/anxiety:   - Paxil to be continued nightly from home med       Diet: Diabetic  DVT Prophylaxis: heparin and SCD's     Disposition and anticipated LOS: Home with Home Health tomorrow if repeat BCx negative and OK with R Chula Montaño [facility] [employer]   PGY-3 [facility]  07/23/17 9:21 AM        Anjelica Villafana

## 2017-07-23 NOTE — ROUTINE PROCESS
Bedside and Verbal shift change report given to Venessa Travis RN (oncoming nurse) by Ange Walls RN (offgoing nurse). Report included the following information SBAR, Kardex, MAR and Recent Results.     SITUATION:  Code Status: Full Code  Reason for Admission: Osteomyelitis [facility])  dx  Hospital day: 5  Problem List:       Hospital Problems  Date Reviewed: 7/21/2017          Codes Class Noted POA    * (Principal)Sepsis ([facility] ICD-10-CM: A41.9  ICD-9-CM: 038.9, 995.91  7/19/2017 Unknown        Foot ulcer due to secondary DM ([facility] ICD-10-CM: E13.621, L97.509  ICD-9-CM: 249.80, 707.15  7/19/2017 Unknown        Uncontrolled type 2 diabetes mellitus with chronic kidney disease ([facility] ICD-10-CM: E11.22, N18.9, E11.65  ICD-9-CM: 250.42, 585.9  7/19/2017 Unknown        Diabetic retinopathy ([facility] ICD-10-CM: E11.319  ICD-9-CM: 250.50, 362.01  7/19/2017 Unknown        Anxiety ICD-10-CM: F41.9  ICD-9-CM: 300.00  7/19/2017 Unknown        Leukocytosis ICD-10-CM: U00.826  ICD-9-CM: 288.60  7/19/2017 Unknown        Renal insufficiency ICD-10-CM: N28.9  ICD-9-CM: 593.9  7/19/2017 Unknown        Osteomyelitis ([facility] ICD-10-CM: M86.9  ICD-9-CM: 730.20  7/18/2017 Unknown        GERD (gastroesophageal reflux disease) ICD-10-CM: K21.9  ICD-9-CM: 530.81  6/18/2014 Yes        HLD (hyperlipidemia) (Chronic) ICD-10-CM: E78.5  ICD-9-CM: 272.4  4/20/2014 Yes        Essential hypertension, benign (Chronic) ICD-10-CM: I10  ICD-9-CM: 401.1  12/3/2013 Yes              BACKGROUND:   Past Medical History:   Past Medical History:   Diagnosis Date    Diabetes ([address]     Gall stones     GERD (gastroesophageal reflux disease)     Hiatal hernia     Hypertension     Mass of abdomen     Pancreatitis       Patient taking anticoagulants yes    Patient has a defibrillator: no    History of shots YES for example, flu, pneumonia, tetanus   Isolation History NO for example, MRSA, CDiff    ASSESSMENT:  Changes in Assessment Throughout Shift: None  Significant Changes in 24 hours (for example, RR/code, fall)  Patient has Central Line: no Reasons if yes: N/A  Patient has Mari Cath: no Reasons if yes: N/A   Mobility Issues  PT  IV Patency  OR Checklist  Pending Tests    Last Vitals:  Vitals w/ MEWS Score (last day)     Date/Time MEWS Score Pulse Resp Temp BP Level of Consciousness SpO2    07/23/17 0745 1 86 20 98.6 °F (37 °C) 110/67 Alert --    07/23/17 0023 1 94 18 98.9 °F (37.2 °C) 130/89 Alert 100 %    07/22/17 2030 1 84 19 99.3 °F (37.4 °C) 142/69 Alert 99 %    07/22/17 1515 1 96 18 98.7 °F (37.1 °C) 153/66 Alert 96 %    07/22/17 1106 1 80 20 98.6 °F (37 °C) 117/69 Alert 100 %    07/22/17 0720 1 75 18 98.6 °F (37 °C) 119/70 Alert 93 %    07/22/17 0320 1 87 18 97.5 °F (36.4 °C) 118/68 Alert 99 %    07/22/17 0000 1 85 20 97.6 °F (36.4 °C) 138/86 Alert 100 %            PAIN    Pain Assessment    Pain Intensity 1: 0 (07/23/17 0023)              Patient Stated Pain Goal: 0  Intervention effective: N/A  Time of last intervention: N/A Reassessment Completed: N/A   Other actions taken for pain: None    Last 3 Weights:  Last 3 Recorded Weights in this Encounter    07/18/17 1518   Weight: 81.2 kg (179 lb)   Weight change:     INTAKE/OUPUT    Current Shift: 07/23 0701 - 07/23 1900  In: 220 [P.O.:220]  Out: 400 [Urine:400]    Last three shifts: 07/21 1901 - 07/23 0700  In: 2480 [P.O.:1380; I.V.:1100]  Out: 4450 [Urine:4450]    RECOMMENDATIONS AND DISCHARGE PLANNING  Patient needs and requests: None    Pending tests/procedures: None     Discharge plan for patient: Home    Discharge planning Needs or Barriers: TBD    Estimated Discharge Date: TBD Posted on Whiteboard in Patients Room: no       \"HEALS\" SAFETY CHECK  A safety check occurred in the patient's room between off going nurse and oncoming nurse listed above.     The safety check included the below items:    H  High Alert Medications Verify all high alert medication drips (heparin, PCA, etc.)  E  Equipment Suction is set up for ALL patients (with arsenio)  Red plugs utilized for all equipment (IV pumps, etc.)  WOWs wiped down at end of shift. Room stocked with oxygen, suction, and other unit-specific supplies  A  Alarms Bed alarm is set for fall risk patients  Ensure chair alarm is in place and activated if patient is up in a chair  L  Lines Check IV for any infiltration  Mari bag is empty if patient has a Mari   Tubing and IV bags are labeled  S  Safety  Room is clean, patient is clean, and equipment is clean. Hallways are clear from equipment besides carts. Fall bracelet on for fall risk patients  Ensure room is clear and free of clutter  Suction is set up for ALL patients (with arsenio)  Hallways are clear from equipment besides carts.    Isolation precautions followed, supplies available outside room, sign posted    Maricel Lee RN

## 2017-07-23 NOTE — PROGRESS NOTES
Bedside shift change report given to Afshan Garcia (oncoming nurse) by Pedro Day RN   (offgoing nurse). Report included the following information SBAR, Kardex, Intake/Output, MAR, Accordion and Recent Results.

## 2017-07-23 NOTE — OP NOTES
[facility]  OPERATIVE REPORTS    Name:  Jaqueline Morrison  MR#:  [id number]  :  1965  Account #:  [de-identified]  Date of Adm:  2017  Date of Surgery:  2017      SURGEON: Ernie Del Real DPM    ASSISTANT: Medical student present. PROCEDURES PERFORMED: Amputation of toe 2 left through the  metatarsophalangeal joint, CODE 45657. PREOPERATIVE DIAGNOSES  1. Osteomyelitis of toe 2, left. 2. Cellulitis of toe 2, left. 3. Ulcer of toe 2, left. 4. Hammertoe of toe 2, left. POSTOPERATIVE DIAGNOSES  1. Osteomyelitis of toe 2, left. 2. Cellulitis of toe 2, left. 3. Ulcer of toe 2, left. 4. Hammertoe of toe 2, left. ANESTHESIA: MAC and local    INDICATIONS FOR PROCEDURE: This is a poorly controlled diabetic  female whose neuropathic hammertoe has yielded a distal ulceration,  complicated by diffuse toe cellulitis and osteomyelitis of the distal  phalanx. The patient's perfusion has been deemed adequate for  healing from surgery. The patient's blood cultures returned positive. All  risks and benefits were explained to the patient over numerous visits  and included, but were not limited to repeat infection, poor healing,  nonhealing, loss of foot, loss of leg, loss of life. No guarantees were  made as to the outcome of the surgery or the possible need for more  surgery. The goal for today is to efficiently obtain a clean closed  functional foot. BRIEF DESCRIPTION OF PROCEDURE: After informed consent was  obtained and all the patient questions answered, the patient was  brought to the operating room, placed on the table in the supine  position. A member of the anesthesia team administered MAC  anesthesia, while a member of the surgical team administered a field  block consisting of 10 mL of 0.5% Marcaine plain and 1% lidocaine  with epinephrine in a 50/50 mixture about the base of toe 2 left.   The left lower extremity was then prepped and draped in normal sterile  fashion to the mid leg. Attention was then directed to the left foot. The hyperpigmented,  circumferentially swollen, infected toe had no signs or symptoms of  necrosis or infection in the foot. There was an approximately 1 cm  necrotic malodorous distal ulceration with exposed bone at the end of  this hammertoe. Using a 10 blade, a circumferential curvilinear incision  was made just distal to the base of the second digit. This incision was  down to bone. Hemostasis was easily maintained using Bovie  electrocautery. The toe was disarticulated at the metatarsophalangeal  joint without complication and sent to Pathology. Tissue, as well as  wound cultures, were sent to microbiology for gram sensitivity culture  and speciation. The wound was copiously irrigated using normal saline  and a bulb syringe. At this level, the foot was deemed adequate for  closure with no overt signs of infection and seemingly adequate  perfusion. The cartilage cap of the metatarsal 2 appeared to be  infection free. The wound was then closed using 3-0 Prolene. At the  time of closure, there were no signs of infection, ischemia, flap tension  or suture ischemia. The wound was then dressed using bacitracin,  Adaptic, sterile 4 x 4's, and 4-inch Webril with only mild Ace wrap  compression. The patient was then transported to the post anesthesia  care unit with vital signs stable and neurovascular status intact at  baseline to the left foot. It should be noted Dr. Navjot Galdamez. Juana Vergara was  present and scrubbed for the entire procedure, all sponge, needle and  instrument counts deemed to be accurate. COMPLICATIONS: None. ESTIMATED BLOOD LOSS: minimal    SPECIMENS REMOVED: Toe 2 left for gross analysis to Pathology,  and tissue and wound swab cultures in 1 tube to Microbiology for  analysis. HEMOSTASIS: Bovie electrocautery. TOURNIQUET TIME: No tourniquet used.         Denae Gamez DPM    [address] / [employer]  D:  07/22/2017 11:40  T:  07/23/2017   08:37  Job #:  [id number]

## 2017-07-24 ENCOUNTER — HOME HEALTH ADMISSION (OUTPATIENT)
Dept: HOME HEALTH SERVICES | Facility: HOME HEALTH | Age: 52
End: 2017-07-24

## 2017-07-24 VITALS
HEART RATE: 81 BPM | SYSTOLIC BLOOD PRESSURE: 147 MMHG | RESPIRATION RATE: 16 BRPM | TEMPERATURE: 96.5 F | BODY MASS INDEX: 27.13 KG/M2 | DIASTOLIC BLOOD PRESSURE: 81 MMHG | OXYGEN SATURATION: 100 % | WEIGHT: 179 LBS | HEIGHT: 68 IN

## 2017-07-24 LAB
ANION GAP BLD CALC-SCNC: 7 MMOL/L (ref 3–18)
BASOPHILS # BLD AUTO: 0 K/UL (ref 0–0.1)
BASOPHILS # BLD: 0 % (ref 0–2)
BUN SERPL-MCNC: 13 MG/DL (ref 7–18)
BUN/CREAT SERPL: 12 (ref 12–20)
CALCIUM SERPL-MCNC: 8.6 MG/DL (ref 8.5–10.1)
CHLORIDE SERPL-SCNC: 112 MMOL/L (ref 100–108)
CO2 SERPL-SCNC: 23 MMOL/L (ref 21–32)
CREAT SERPL-MCNC: 1.12 MG/DL (ref 0.6–1.3)
DIFFERENTIAL METHOD BLD: ABNORMAL
EOSINOPHIL # BLD: 0.1 K/UL (ref 0–0.4)
EOSINOPHIL NFR BLD: 1 % (ref 0–5)
ERYTHROCYTE [DISTWIDTH] IN BLOOD BY AUTOMATED COUNT: 12.9 % (ref 11.6–14.5)
GLUCOSE BLD STRIP.AUTO-MCNC: 156 MG/DL (ref 70–110)
GLUCOSE BLD STRIP.AUTO-MCNC: 194 MG/DL (ref 70–110)
GLUCOSE BLD STRIP.AUTO-MCNC: 281 MG/DL (ref 70–110)
GLUCOSE SERPL-MCNC: 96 MG/DL (ref 74–99)
HCT VFR BLD AUTO: 26.5 % (ref 35–45)
HGB BLD-MCNC: 9.1 G/DL (ref 12–16)
LYMPHOCYTES # BLD AUTO: 29 % (ref 21–52)
LYMPHOCYTES # BLD: 3.1 K/UL (ref 0.9–3.6)
MAGNESIUM SERPL-MCNC: 1.8 MG/DL (ref 1.6–2.6)
MCH RBC QN AUTO: 29.3 PG (ref 24–34)
MCHC RBC AUTO-ENTMCNC: 34.3 G/DL (ref 31–37)
MCV RBC AUTO: 85.2 FL (ref 74–97)
MONOCYTES # BLD: 0.6 K/UL (ref 0.05–1.2)
MONOCYTES NFR BLD AUTO: 6 % (ref 3–10)
NEUTS SEG # BLD: 6.6 K/UL (ref 1.8–8)
NEUTS SEG NFR BLD AUTO: 64 % (ref 40–73)
PLATELET # BLD AUTO: 322 K/UL (ref 135–420)
PMV BLD AUTO: 9.9 FL (ref 9.2–11.8)
POTASSIUM SERPL-SCNC: 3.3 MMOL/L (ref 3.5–5.5)
RBC # BLD AUTO: 3.11 M/UL (ref 4.2–5.3)
SODIUM SERPL-SCNC: 142 MMOL/L (ref 136–145)
WBC # BLD AUTO: 10.5 K/UL (ref 4.6–13.2)

## 2017-07-24 PROCEDURE — 82962 GLUCOSE BLOOD TEST: CPT

## 2017-07-24 PROCEDURE — 74011250637 HC RX REV CODE- 250/637: Performed by: STUDENT IN AN ORGANIZED HEALTH CARE EDUCATION/TRAINING PROGRAM

## 2017-07-24 PROCEDURE — 74011636637 HC RX REV CODE- 636/637: Performed by: STUDENT IN AN ORGANIZED HEALTH CARE EDUCATION/TRAINING PROGRAM

## 2017-07-24 PROCEDURE — 74011250636 HC RX REV CODE- 250/636: Performed by: STUDENT IN AN ORGANIZED HEALTH CARE EDUCATION/TRAINING PROGRAM

## 2017-07-24 PROCEDURE — 74011250637 HC RX REV CODE- 250/637: Performed by: INTERNAL MEDICINE

## 2017-07-24 PROCEDURE — 83735 ASSAY OF MAGNESIUM: CPT

## 2017-07-24 PROCEDURE — 85025 COMPLETE CBC W/AUTO DIFF WBC: CPT

## 2017-07-24 PROCEDURE — 36415 COLL VENOUS BLD VENIPUNCTURE: CPT

## 2017-07-24 PROCEDURE — 74011000258 HC RX REV CODE- 258: Performed by: STUDENT IN AN ORGANIZED HEALTH CARE EDUCATION/TRAINING PROGRAM

## 2017-07-24 PROCEDURE — 97116 GAIT TRAINING THERAPY: CPT

## 2017-07-24 PROCEDURE — 80048 BASIC METABOLIC PNL TOTAL CA: CPT

## 2017-07-24 RX ORDER — SULFAMETHOXAZOLE AND TRIMETHOPRIM 800; 160 MG/1; MG/1
1 TABLET ORAL EVERY 12 HOURS
Status: DISCONTINUED | OUTPATIENT
Start: 2017-07-24 | End: 2017-07-24 | Stop reason: HOSPADM

## 2017-07-24 RX ORDER — AMOXICILLIN AND CLAVULANATE POTASSIUM 875; 125 MG/1; MG/1
1 TABLET, FILM COATED ORAL 2 TIMES DAILY
Qty: 14 TAB | Refills: 0 | Status: SHIPPED | OUTPATIENT
Start: 2017-07-24 | End: 2017-07-31

## 2017-07-24 RX ORDER — SAME BUTANEDISULFONATE/BETAINE 400-600 MG
250 POWDER IN PACKET (EA) ORAL 2 TIMES DAILY
Qty: 14 CAP | Refills: 0 | Status: SHIPPED | OUTPATIENT
Start: 2017-07-24 | End: 2017-07-31

## 2017-07-24 RX ORDER — SULFAMETHOXAZOLE AND TRIMETHOPRIM 800; 160 MG/1; MG/1
1 TABLET ORAL 2 TIMES DAILY
Qty: 14 TAB | Refills: 0 | Status: SHIPPED | OUTPATIENT
Start: 2017-07-24 | End: 2017-07-31

## 2017-07-24 RX ORDER — POTASSIUM CHLORIDE 20 MEQ/1
40 TABLET, EXTENDED RELEASE ORAL
Status: COMPLETED | OUTPATIENT
Start: 2017-07-24 | End: 2017-07-24

## 2017-07-24 RX ORDER — AMOXICILLIN AND CLAVULANATE POTASSIUM 875; 125 MG/1; MG/1
1 TABLET, FILM COATED ORAL EVERY 12 HOURS
Status: DISCONTINUED | OUTPATIENT
Start: 2017-07-24 | End: 2017-07-24 | Stop reason: HOSPADM

## 2017-07-24 RX ADMIN — AMOXICILLIN AND CLAVULANATE POTASSIUM 1 TABLET: 875; 125 TABLET, FILM COATED ORAL at 15:08

## 2017-07-24 RX ADMIN — PANTOPRAZOLE SODIUM 40 MG: 40 TABLET, DELAYED RELEASE ORAL at 08:30

## 2017-07-24 RX ADMIN — POTASSIUM CHLORIDE 40 MEQ: 20 TABLET, EXTENDED RELEASE ORAL at 06:53

## 2017-07-24 RX ADMIN — ATORVASTATIN CALCIUM 20 MG: 20 TABLET, FILM COATED ORAL at 08:30

## 2017-07-24 RX ADMIN — PIPERACILLIN SODIUM,TAZOBACTAM SODIUM 3.38 G: 3; .375 INJECTION, POWDER, FOR SOLUTION INTRAVENOUS at 12:45

## 2017-07-24 RX ADMIN — INSULIN HUMAN 7 UNITS: 100 INJECTION, SUSPENSION SUBCUTANEOUS at 08:28

## 2017-07-24 RX ADMIN — PIPERACILLIN SODIUM,TAZOBACTAM SODIUM 3.38 G: 3; .375 INJECTION, POWDER, FOR SOLUTION INTRAVENOUS at 00:56

## 2017-07-24 RX ADMIN — INSULIN LISPRO 3 UNITS: 100 INJECTION, SOLUTION INTRAVENOUS; SUBCUTANEOUS at 08:30

## 2017-07-24 RX ADMIN — INSULIN HUMAN 3 UNITS: 100 INJECTION, SOLUTION PARENTERAL at 08:29

## 2017-07-24 RX ADMIN — INSULIN LISPRO 9 UNITS: 100 INJECTION, SOLUTION INTRAVENOUS; SUBCUTANEOUS at 12:49

## 2017-07-24 RX ADMIN — SULFAMETHOXAZOLE AND TRIMETHOPRIM 1 TABLET: 800; 160 TABLET ORAL at 15:12

## 2017-07-24 RX ADMIN — INSULIN LISPRO 3 UNITS: 100 INJECTION, SOLUTION INTRAVENOUS; SUBCUTANEOUS at 17:14

## 2017-07-24 RX ADMIN — PIPERACILLIN SODIUM,TAZOBACTAM SODIUM 3.38 G: 3; .375 INJECTION, POWDER, FOR SOLUTION INTRAVENOUS at 06:56

## 2017-07-24 NOTE — DIABETES MGMT
NUTRITIONAL ASSESSMENT GLYCEMIC CONTROL/ PLAN OF CARE     Samara Brain           46 y.o.           7/18/2017                 Patient Active Problem List   Diagnosis Code    Essential hypertension, benign I10    Type II or unspecified type diabetes mellitus without mention of complication, not stated as uncontrolled E11.9    Vitamin D deficiency E55.9    HLD (hyperlipidemia) E78.5    Dehydration E86.0    Systemic inflammatory response syndrome (SIRS) (HCC) R65.10    GERD (gastroesophageal reflux disease) K21.9    Nausea with vomiting R11.2    Osteomyelitis ([facility] M86.9    Sepsis ([facility] A41.9    Foot ulcer due to secondary DM ([facility] E13.621, L97.509    Uncontrolled type 2 diabetes mellitus with chronic kidney disease ([facility] E11.22, N18.9, E11.65    Diabetic retinopathy ([facility] E11.319    Anxiety F41.9    Leukocytosis D72.829    Renal insufficiency N28.9        INTERVENTIONS/PLAN:     If pt not discharged today, consider changing 3 units regular for lispro and continue correctional lispro ACHS. Continue NPH. Will continue inpatient monitoring and intervention. ASSESSMENT:   Pt is 46 yr old female admitted on 7/18/17 for treatment of left second toe pain and swelling resulting in a color change to purple. Pt with past medical history significant for T2DM, pancreatitis, GERD. Pt is eating well and plans to be discharged today. Diabetes Management:     Recent blood glucose:  Results for Elton Weston (MRN [id number]) as of 7/24/2017 14:47   Ref.  Range 7/23/2017 17:49 7/23/2017 21:51 7/24/2017 07:35 7/24/2017 12:09   GLUCOSE,FAST - POC Latest Ref Range: 70 - 110 mg/dL 173 (H) 244 (H) 156 (H) 281 (H)      Within target range (non-ICU: <140; ICU<180): [] Yes   [x]  No    Current Insulin regimen: correctional lispro ACHS- very insulin resistant scale, 7 units NPH two times daily, 3 units regular two times daily    Home medication/insulin regimen:  pt reports taking 25 units of 70/30 in the morning with breakfast and 15 units in the evening with   HbA1c: 10.1% estimated average blood glucose over the past 2-3 months of 243mg/dl  Adequate glycemic control PTA:  [] Yes  [x] No       SUBJECTIVE/OBJECTIVE:   Information obtained from: pt, chart review    Diet: diabetic consistent carbohydrate    Patient Vitals for the past 100 hrs:   % Diet Eaten   07/24/17 1006 100 %   07/23/17 1804 100 %   07/23/17 1304 100 %   07/23/17 0903 100 %   07/22/17 1833 100 %   07/22/17 1400 100 %   07/22/17 0925 100 %         Medications: [x]                Reviewed     Most Recent POC Glucose: Recent Labs      07/24/17   0246  07/23/17   0624  07/22/17   0223   GLU  96  114*  186*         Labs:   Lab Results   Component Value Date/Time    Hemoglobin A1c 10.1 07/19/2017 03:05 AM     Lab Results   Component Value Date/Time    Sodium 142 07/24/2017 02:46 AM    Potassium 3.3 07/24/2017 02:46 AM    Chloride 112 07/24/2017 02:46 AM    CO2 23 07/24/2017 02:46 AM    Anion gap 7 07/24/2017 02:46 AM    Glucose 96 07/24/2017 02:46 AM    BUN 13 07/24/2017 02:46 AM    Creatinine 1.12 07/24/2017 02:46 AM    Calcium 8.6 07/24/2017 02:46 AM    Magnesium 1.8 07/24/2017 02:46 AM    Phosphorus 7.3 03/19/2010 11:24 AM    Albumin 5.2 10/06/2015 04:45 AM       Anthropometrics:  ,  , BMI (calculated): 27.2  Wt Readings from Last 1 Encounters:   07/18/17 81.2 kg (179 lb)    Ht Readings from Last 1 Encounters:   07/18/17 5' 8\" (1.727 m)       Estimated Nutrition Needs:   ,     6543-5819 kcal/day Protein: 65-81 g/day  Based on:   []          Actual BW    []          ABW   []            Adjusted BW        Nutrition Diagnoses:       Altered nutrition related lab values related to HbA1c as evidenced by HbA1c of 10.1%    Nutrition Interventions: diabetic education, coordination of care  Goal:   Blood glucose will be within target range of  mg/dL by 7/27/17        Nutrition Monitoring and Evaluation      [x]     Monitor po intake on meal rounds  [x]     Continue inpatient monitoring and intervention    Tj Herrera MS, RD, CDE  Pager: [phone]

## 2017-07-24 NOTE — DISCHARGE INSTRUCTIONS
Learning About Foot and Toenail Care  Checking your loved one's feet and keeping them clean and soft can help prevent cracks and infection in the skin. This is especially important for people who have diabetes. Keeping toenails trimmed--and polished if that's what the person likes--also helps the person feel well-groomed. If the person you care for has diabetes or has foot problems, such as bad bunions and corns, think about taking them to see a podiatrist. This is a doctor who specializes in the care of the feet. Sometimes a podiatrist will come to the home if the person can't go out for visits. Try to take the person for salon pedicures if that is what they want. It's a chance to get out and see people and continue a favorite activity. You can do basic nail care at home. Usually all you need to do is keep the nails clean and at a safe length. How do you trim someone's toenails? Try to trim the person's nails every week. Or check the nails each week to see if they need to be trimmed. It's easiest to trim nails after the person has had a shower or foot bath. It makes the nails softer and easier to trim. Start by gathering your supplies. You will need toenail clippers and a nail file. You may also need nail polish and nail polish remover. To trim the nails:  1. Wash and dry your hands. You don't need to wear gloves. 2. Use nail polish remover to take off any polish. 3. Hold the person's foot and toe steady with one hand while you trim the nail with your other hand. Trim the nails straight across. Leave the nails a little longer at the corners so that the sharp ends don't cut into the skin. 4. Keep the nails no longer than the tip of the toes. 5. Let the nails dry if they are still damp and soft. 6. Use a nail file to gently smooth the edges of the nails, especially at the corners. They may be sharp after the nails are cut straight. 7. Apply nail polish, if the person wants it.   If the person's nails are thick and discolored, it may be safest to have a podiatrist cut them. What else do you need to know? When you're caring for someone's nails, it is important to remember not to trim or cut the cuticles. A minor cut in a cuticle could lead to an infection. Wash the feet daily in the shower or bath or in a basin made for washing feet. It's extra important to wash the feet carefully if the person has diabetes. After washing the feet, dry gently. Put lotion on the feet, especially on the heels. But don't put it between the toes. If the person doesn't have diabetes and you see signs of athlete's foot (such as dry, cracking, or itchy skin between the toes), you can try an over-the-counter medicine. These medicines can kill the fungus that causes athlete's foot. If the problem doesn't go away, talk to the person's doctor. Look every day for cuts or signs of infection, such as pain, swelling, redness, or warmth. If you see any of these signs--especially in someone who has diabetes--call the doctor. Where can you learn more? Go to http://linda-sivan.info/. Enter A726 in the search box to learn more about \"Learning About Foot and Toenail Care. \"  Current as of: March 17, 2017  Content Version: 11.3  © 9960-6721 [employer]. Care instructions adapted under license by [employer] (which disclaims liability or warranty for this information). If you have questions about a medical condition or this instruction, always ask your healthcare professional. [place] [address] any warranty or liability for your use of this information. Diabetes Foot Health: Care Instructions  Your Care Instructions    When you have diabetes, your feet need extra care and attention. Diabetes can damage the nerve endings and blood vessels in your feet, making you less likely to notice when your feet are injured.  Diabetes also limits your body's ability to fight infection and get blood to areas that need it. If you get a minor foot injury, it could become an ulcer or a serious infection. With good foot care, you can prevent most of these problems. Caring for your feet can be quick and easy. Most of the care can be done when you are bathing or getting ready for bed. Follow-up care is a key part of your treatment and safety. Be sure to make and go to all appointments, and call your doctor if you are having problems. Its also a good idea to know your test results and keep a list of the medicines you take. How can you care for yourself at home? · Keep your blood sugar close to normal by watching what and how much you eat, monitoring blood sugar, taking medicines if prescribed, and getting regular exercise. · Do not smoke. Smoking affects blood flow and can make foot problems worse. If you need help quitting, talk to your doctor about stop-smoking programs and medicines. These can increase your chances of quitting for good. · Eat a diet that is low in fats. High fat intake can cause fat to build up in your blood vessels and decrease blood flow. · Inspect your feet daily for blisters, cuts, cracks, or sores. If you cannot see well, use a mirror or have someone help you. · Take care of your feet:  [facility] your feet every day. Use warm (not hot) water. Check the water temperature with your wrists or other part of your body, not your feet. ¨ Dry your feet well. Pat them dry. Do not rub the skin on your feet too hard. Dry well between your toes. If the skin on your feet stays moist, bacteria or a fungus can grow, which can lead to infection. ¨ Keep your skin soft. Use moisturizing skin cream to keep the skin on your feet soft and prevent calluses and cracks. But do not put the cream between your toes, and stop using any cream that causes a rash. ¨ Clean underneath your toenails carefully. Do not use a sharp object to clean underneath your toenails.  Use the blunt end of a nail file or other rounded tool. ¨ Trim and file your toenails straight across to prevent ingrown toenails. Use a nail clipper, not scissors. Use an emery board to smooth the edges. · Change socks daily. Socks without seams are best, because seams often rub the feet. You can find socks for people with diabetes from specialty catalogs. · Look inside your shoes every day for things like gravel or torn linings, which could cause blisters or sores. · Buy shoes that fit well:  ¨ Look for shoes that have plenty of space around the toes. This helps prevent bunions and blisters. ¨ Try on shoes while wearing the kind of socks you will usually wear with the shoes. ¨ Avoid plastic shoes. They may rub your feet and cause blisters. Good shoes should be made of materials that are flexible and breathable, such as leather or cloth. ¨ Break in new shoes slowly by wearing them for no more than an hour a day for several days. Take extra time to check your feet for red areas, blisters, or other problems after you wear new shoes. · Do not go barefoot. Do not wear sandals, and do not wear shoes with very thin soles. Thin soles are easy to puncture. They also do not protect your feet from hot pavement or cold weather. · Have your doctor check your feet during each visit. If you have a foot problem, see your doctor. Do not try to treat an early foot problem at home. Home remedies or treatments that you can buy without a prescription (such as corn removers) can be harmful. · Always get early treatment for foot problems. A minor irritation can lead to a major problem if not properly cared for early. When should you call for help? Call your doctor now or seek immediate medical care if:  · You have a foot sore, an ulcer or break in the skin that is not healing after 4 days, bleeding corns or calluses, or an ingrown toenail. · You have blue or black areas, which can mean bruising or blood flow problems.   · You have peeling skin or tiny blisters between your toes or cracking or oozing of the skin. · You have a fever for more than 24 hours and a foot sore. · You have new numbness or tingling in your feet that does not go away after you move your feet or change positions. · You have unexplained or unusual swelling of the foot or ankle. Watch closely for changes in your health, and be sure to contact your doctor if:  · You cannot do proper foot care. Where can you learn more? Go to http://linda-sivan.info/. Enter A739 in the search box to learn more about \"Diabetes Foot Health: Care Instructions. \"  Current as of: March 13, 2017  Content Version: 11.3  © 1374-1633 [employer]. Care instructions adapted under license by [employer] (which disclaims liability or warranty for this information). If you have questions about a medical condition or this instruction, always ask your healthcare professional. [place] [address] any warranty or liability for your use of this information. Giving a Mixed-Dose Insulin Shot: Care Instructions  Your Care Instructions    Insulin is normally made by the pancreas, a gland behind the stomach. In people with diabetes, the pancreas no longer makes enough insulin or it stops making it. Without insulin, your blood sugar level rises to dangerous levels. When this happens, you need insulin shots to keep your blood sugar in your target range. You may be nervous giving a shot at first. But soon, giving yourself a shot will become routine. It is quite easy to learn how to draw up insulin into a syringe and give the shot. The needles you use to give the insulin injections are very thin, and most people who have diabetes say that they do not even feel the needle enter the skin. Even if you do feel the injection, the sting of the shot is not bad and does not last long. More than half a million people do it every day. You can too.   Follow-up care is a key part of your treatment and safety. Be sure to make and go to all appointments, and call your doctor if you are having problems. It's also a good idea to know your test results and keep a list of the medicines you take. How can you care for yourself at home? Getting started  · Gather your supplies. You will need an insulin syringe, your bottles of insulin, and an alcohol wipe or a cotton ball dipped in alcohol. Keep your supplies in a bag or kit so you can carry the supplies wherever you go. · Check the labels on the bottles and contents. Read and follow all instructions on the label, including how to store the insulin and how long the insulin will last.  · Wash your hands with soap and running water. Dry them well. Preparing the shot  For a mixed-dose insulin shot:  1. Roll the insulin bottles gently between your hands. This will warm the insulin if you have kept the bottle in the refrigerator. Roll the cloudy insulin bottle until the white powder has dissolved and the insulin is mixed. 2. Wipe the rubber lid of both insulin bottles with an alcohol wipe or a cotton ball dipped in alcohol. (If you are using a bottle for the first time, remove the protective cover over the rubber lid.) Let the top dry before you remove any insulin. 3. Remove the plastic cap from the needle on your insulin syringe. Take care not to touch the needle. 4. Pull the plunger back on your insulin syringe, and draw air into the syringe equal to the number of units of cloudy insulin to be given. 5. Push the needle of the syringe into the rubber lid of the cloudy insulin bottle. Push the plunger of the syringe to force the air into the bottle. This equalizes the pressure in the bottle when you later remove the dose of insulin. Remove the needle from the bottle, but do not draw up any insulin. 6. Pull the plunger of the syringe back and draw air into the syringe equal to the number of units of clear insulin to be given.   7. Push the needle of the syringe into the rubber lid of the clear insulin bottle. Push the plunger to force the air into the bottle. Leave the needle in the bottle. 8. Turn the bottle and syringe upside down, and hold them in one hand. Position the tip of the needle so that it is below the surface of insulin in the bottle. Pull back the plunger to fill the syringe with slightly more than the correct number of units of clear insulin to be given. 9. Tap the outside (barrel) of the syringe so that trapped air bubbles move into the needle area. Push the air bubbles back into the bottle. Make sure that you have the correct number of units of insulin in your syringe. Remove the needle from the clear insulin bottle. 10. Insert the needle into the rubber lid of the cloudy insulin bottle. Do not push the plunger, because this would force clear insulin into your cloudy insulin bottle. If clear insulin is mixed in the bottle of cloudy, it will change the action of your other doses from that bottle. 11. Turn the bottle and syringe upside down and hold them in one hand. Position the tip of the needle so that it is below the surface of insulin in the bottle. Slowly pull back the plunger of the syringe to fill the syringe with the correct number of units of cloudy insulin to be given. This will keep air bubbles from entering the syringe. Remove the needle from the bottle. 12. You should now have the total number of units for the clear and cloudy insulin in your syringe. For example, if 10 units of clear and 15 units of cloudy are needed, you should have 25 units in your syringe. Now you are ready to give the shot. Giving the shot  Before giving your shot:  1. Use alcohol to clean the skin before you give the shot. Let it dry. 2. Slightly pinch a fold of skin between your fingers and thumb of one hand. 3. Hold the syringe like a pencil close to the site, keeping your fingers off the plunger.  It is usually recommended to place the syringe at a 90-degree angle to the shot site, standing straight up from the skin. 4. Bend your wrist, and quickly push the needle all the way into the pinched-up area. 5. Push the plunger of the syringe all the way in so the insulin goes into the fatty tissue. 6. Take the needle out at the same angle that you inserted it. If you bleed a little, apply pressure over the shot area with your finger, a cotton ball, or a piece of gauze. Do not rub the area. 7. Replace the cover over the needle and dispose of the needle safely. Do not use the same needle more than one time. Where to give the shot  You can inject insulin into:  · The belly, but at least 2 inches from the belly button. This is thought to be the best place to inject insulin. · The top outer part of the thighs. Insulin usually is absorbed more slowly from this site, unless you exercise soon after giving the shot. · The outside of the upper arms or the buttocks. You may need help giving shots in these areas. Your doctor may advise you to give your shots in different places on your body each day. This is called site rotation. Make sure you talk to your doctor about how to do this safely. If you rotate sites, use the same site at the same time of each day. For example, each day:  · At breakfast, give the shot in one of your arms. · At lunch, give the shot in one of your legs. · At dinner, give the shot in your belly. Slightly change the spot where you give an insulin shot each time you do it. For example, use five different places on the right upper arm, then use five places on the left upper arm. Using the same spot every time can cause bumps or pits in the skin and make the shots hurt more. It may also slow down how the insulin is absorbed into your body. Where can you learn more? Go to http://linda-sivan.info/. Enter O465 in the search box to learn more about \"Giving a Mixed-Dose Insulin Shot: Care Instructions. \"  Current as of: March 132017  Content Version: 11.3  © 0057-8483 [employer]. Care instructions adapted under license by [employer] (which disclaims liability or warranty for this information). If you have questions about a medical condition or this instruction, always ask your healthcare professional. [address] any warranty or liability for your use of this information. [employer] Activation    Thank you for requesting access to [employer]. Please follow the instructions below to securely access and download your online medical record. Energy allows you to send messages to your doctor, view your test results, renew your prescriptions, schedule appointments, and more. How Do I Sign Up? 1. In your internet browser, go to [email or web address]  2. Click on the First Time User? Click Here link in the Sign In box. You will be redirect to the New Member Sign Up page. 3. Enter your [employer] Access Code exactly as it appears below. You will not need to use this code after youve completed the sign-up process. If you do not sign up before the expiration date, you must request a new code. Energy Access Code: [email or web address]  Expires: 10/16/2017  4:37 PM (This is the date your Energy access code will )    4. Enter the last four digits of your Social Security Number (xxxx) and Date of Birth (mm/dd/yyyy) as indicated and click Submit. You will be taken to the next sign-up page. 5. Create a Energy ID. This will be your Energy login ID and cannot be changed, so think of one that is secure and easy to remember. 6. Create a Energy password. You can change your password at any time. 7. Enter your Password Reset Question and Answer. This can be used at a later time if you forget your password. 8. Enter your e-mail address. You will receive e-mail notification when new information is available in [address] 9. Click Sign Up.  You can now view and download portions of your medical record. 10. Click the Download Summary menu link to download a portable copy of your medical information. Additional Information    If you have questions, please visit the Frequently Asked Questions section of the [employer] website at [email or web address] Remember, MyChart is NOT to be used for urgent needs. For medical emergencies, dial 911. Patient armband removed and given to patient to take home. Patient was informed of the privacy risks if armband lost or stolen    DISCHARGE SUMMARY from Nurse    The following personal items are in your possession at time of discharge:    Dental Appliances: None  Visual Aid: None     Home Medications: None  Jewelry: None  Clothing: Pants, Shirt  Other Valuables: Purse (Umbrella)             PATIENT INSTRUCTIONS:    After general anesthesia or intravenous sedation, for 24 hours or while taking prescription Narcotics:  · Limit your activities  · Do not drive and operate hazardous machinery  · Do not make important personal or business decisions  · Do  not drink alcoholic beverages  · If you have not urinated within 8 hours after discharge, please contact your surgeon on call. Report the following to your surgeon:  · Excessive pain, swelling, redness or odor of or around the surgical area  · Temperature over 100.5  · Nausea and vomiting lasting longer than 4 hours or if unable to take medications  · Any signs of decreased circulation or nerve impairment to extremity: change in color, persistent  numbness, tingling, coldness or increase pain  · Any questions        What to do at Home:  Recommended activity: Activity as tolerated    If you experience any of the following symptoms redness, swelling, foul smelling puss, fever, pain that is not relieved by pain edications, please follow up with PCP. *  Please give a list of your current medications to your Primary Care Provider.     *  Please update this list whenever your medications are discontinued, doses are      changed, or new medications (including over-the-counter products) are added. *  Please carry medication information at all times in case of emergency situations. These are general instructions for a healthy lifestyle:    No smoking/ No tobacco products/ Avoid exposure to second hand smoke    Surgeon General's Warning:  Quitting smoking now greatly reduces serious risk to your health. Obesity, smoking, and sedentary lifestyle greatly increases your risk for illness    A healthy diet, regular physical exercise & weight monitoring are important for maintaining a healthy lifestyle    You may be retaining fluid if you have a history of heart failure or if you experience any of the following symptoms:  Weight gain of 3 pounds or more overnight or 5 pounds in a week, increased swelling in our hands or feet or shortness of breath while lying flat in bed. Please call your doctor as soon as you notice any of these symptoms; do not wait until your next office visit. Recognize signs and symptoms of STROKE:    F-face looks uneven    A-arms unable to move or move unevenly    S-speech slurred or non-existent    T-time-call 911 as soon as signs and symptoms begin-DO NOT go       Back to bed or wait to see if you get better-TIME IS BRAIN. Warning Signs of HEART ATTACK     Call 911 if you have these symptoms:   Chest discomfort. Most heart attacks involve discomfort in the center of the chest that lasts more than a few minutes, or that goes away and comes back. It can feel like uncomfortable pressure, squeezing, fullness, or pain.  Discomfort in other areas of the upper body. Symptoms can include pain or discomfort in one or both arms, the back, neck, jaw, or stomach.  Shortness of breath with or without chest discomfort.  Other signs may include breaking out in a cold sweat, nausea, or lightheadedness. Don't wait more than five minutes to call 911 - MINUTES MATTER!  Fast action can save your life. Calling 911 is almost always the fastest way to get lifesaving treatment. Emergency Medical Services staff can begin treatment when they arrive -- up to an hour sooner than if someone gets to the hospital by car. The discharge information has been reviewed with the patient. The patient verbalized understanding. Discharge medications reviewed with the patient and appropriate educational materials and side effects teaching were provided.

## 2017-07-24 NOTE — ROUTINE PROCESS
Bedside and Verbal shift change report given to Heena Richard RN (oncoming nurse) by Frankie Garces RN (offgoing nurse). Report included the following information SBAR, Kardex, MAR and Recent Results.     SITUATION:  Code Status: Full Code  Reason for Admission: Osteomyelitis [facility])  dx  Hospital day: 6  Problem List:       Hospital Problems  Date Reviewed: 7/21/2017          Codes Class Noted POA    * (Principal)Sepsis ([address] ICD-10-CM: A41.9  ICD-9-CM: 038.9, 995.91  7/19/2017 Unknown        Foot ulcer due to secondary DM ([address] ICD-10-CM: E13.621, L97.509  ICD-9-CM: 249.80, 707.15  7/19/2017 Unknown        Uncontrolled type 2 diabetes mellitus with chronic kidney disease ([address] ICD-10-CM: E11.22, N18.9, E11.65  ICD-9-CM: 250.42, 585.9  7/19/2017 Unknown        Diabetic retinopathy ([address] ICD-10-CM: E11.319  ICD-9-CM: 250.50, 362.01  7/19/2017 Unknown        Anxiety ICD-10-CM: F41.9  ICD-9-CM: 300.00  7/19/2017 Unknown        Leukocytosis ICD-10-CM: S87.537  ICD-9-CM: 288.60  7/19/2017 Unknown        Renal insufficiency ICD-10-CM: N28.9  ICD-9-CM: 593.9  7/19/2017 Unknown        Osteomyelitis ([address] ICD-10-CM: M86.9  ICD-9-CM: 730.20  7/18/2017 Unknown        GERD (gastroesophageal reflux disease) ICD-10-CM: K21.9  ICD-9-CM: 530.81  6/18/2014 Yes        HLD (hyperlipidemia) (Chronic) ICD-10-CM: E78.5  ICD-9-CM: 272.4  4/20/2014 Yes        Essential hypertension, benign (Chronic) ICD-10-CM: I10  ICD-9-CM: 401.1  12/3/2013 Yes              BACKGROUND:   Past Medical History:   Past Medical History:   Diagnosis Date    Diabetes ([address]     Gall stones     GERD (gastroesophageal reflux disease)     Hiatal hernia     Hypertension     Mass of abdomen     Pancreatitis       Patient taking anticoagulants no    Patient has a defibrillator: no    History of shots YES for example, flu, pneumonia, tetanus   Isolation History NO for example, MRSA, CDiff    ASSESSMENT:  Changes in Assessment Throughout Shift: None  Significant Changes in 24 hours (for example, RR/code, fall)  Patient has Central Line: no Reasons if yes: N/A  Patient has Mari Cath: no Reasons if yes: N/A   Mobility Issues  PT  IV Patency  OR Checklist  Pending Tests    Last Vitals:  Vitals w/ MEWS Score (last day)     Date/Time MEWS Score Pulse Resp Temp BP Level of Consciousness SpO2    07/24/17 0414 2 91 18 97.4 °F (36.3 °C) 97/62 Alert 97 %    07/24/17 0021 1 88 18 98 °F (36.7 °C) 139/81 Alert 98 %    07/23/17 2037 1 81 18 98.6 °F (37 °C) 136/83 Alert 100 %    07/23/17 1511 1 86 18 98.8 °F (37.1 °C) 129/83 Alert 100 %    07/23/17 1105 1 87 20 98.4 °F (36.9 °C) 111/70 Alert 100 %    07/23/17 0745 1 86 20 98.6 °F (37 °C) 110/67 Alert --    07/23/17 0023 1 94 18 98.9 °F (37.2 °C) 130/89 Alert 100 %            PAIN    Pain Assessment    Pain Intensity 1: 0 (07/24/17 0414)              Patient Stated Pain Goal: 0  Intervention effective: N/A  Time of last intervention: N/A Reassessment Completed: N/A  Other actions taken for pain: N/A    Last 3 Weights:  Last 3 Recorded Weights in this Encounter    07/18/17 1518   Weight: 81.2 kg (179 lb)   Weight change:     INTAKE/OUPUT    Current Shift: 07/23 1901 - 07/24 0700  In: -   Out: 400 [Urine:400]    Last three shifts: 07/22 0701 - 07/23 1900  In: 2360 [P.O.:2360]  Out: 5625 [Urine:5625]    RECOMMENDATIONS AND DISCHARGE PLANNING  Patient needs and requests: None    Pending tests/procedures: None     Discharge plan for patient: Home with home health    Discharge planning Needs or Barriers: Home health    Estimated Discharge Date: 7/24/17 Posted on Whiteboard in Patients Room: no       \"HEALS\" SAFETY CHECK  A safety check occurred in the patient's room between off going nurse and oncoming nurse listed above.     The safety check included the below items:    H  High Alert Medications Verify all high alert medication drips (heparin, PCA, etc.)  E  Equipment Suction is set up for ALL patients (with arsenio)  Red plugs utilized for all equipment (IV pumps, etc.)  WOWs wiped down at end of shift. Room stocked with oxygen, suction, and other unit-specific supplies  A  Alarms Bed alarm is set for fall risk patients  Ensure chair alarm is in place and activated if patient is up in a chair  L  Lines Check IV for any infiltration  Mari bag is empty if patient has a Mari   Tubing and IV bags are labeled  S  Safety  Room is clean, patient is clean, and equipment is clean. Hallways are clear from equipment besides carts. Fall bracelet on for fall risk patients  Ensure room is clear and free of clutter  Suction is set up for ALL patients (with arsenio)  Hallways are clear from equipment besides carts.    Isolation precautions followed, supplies available outside room, sign posted    Thais Perez RN

## 2017-07-24 NOTE — PROGRESS NOTES
This writer received two consults regarding a safe discharge for this pt. This writer called Lucy Cervantes regarding a a knee roller none in stock, pt placed on waiting list for if one should be donated. This writer called [employer] representative who reports that these items are 175.00 and 75.00 to rent. Pt's PT note recommends a walker for assistance for this pt to be safe at home with home health services as support. Pt provided indigent medications. Pt's family will provide transportation home and to her appointment with Dr. Toby Dasilva and the [facility].

## 2017-07-24 NOTE — PROGRESS NOTES
Problem: Mobility Impaired (Adult and Pediatric)  Goal: *Acute Goals and Plan of Care (Insert Text)  Physical Therapy Goals  Initiated 7/22/2017 and to be accomplished within 5-7 day(s)  1. Patient will move from supine to sit and sit to supine in bed with independence. 2. Patient will transfer from bed to chair and chair to bed with independence using the least restrictive device. 3. Patient will perform sit to stand with independence. 4. Patient will ambulate with supervision/set-up for 50 feet with the least restrictive device. 5. Patient will ascend/descend 4 stairs with 1-2 handrail(s) with minimal assistance/contact guard assist.   PHYSICAL THERAPY TREATMENT     Patient: Beverley Franco (33 y.o. female)  Date: 7/24/2017  Diagnosis: Osteomyelitis ([facility]  dx Sepsis ([facility] [facility]  Procedure(s) (LRB):  AMPUTATION OF second TOE on left foot (Left) 3 Days Post-Op  Precautions: NWB, Fall  Chart, physical therapy assessment, plan of care and goals were reviewed. ASSESSMENT:  Pt presents today alert and agreeable to therapy sitting at EOB. Pt transferred to standing and used standard walker to ambulated 20ft to a locked transport . Pt demonstrated rocking of walker and required cues to use walker appropriately and VC's to decrease pace of gait in order to improve safety and stability. Pt transferred into [facility] and was transported to [facility] to practice steps as pt has 13 steps at home. PT demonstrated method of bumping up and down steps on backside and pt then transferred to stand and sat on 2nd step. Pt able to bump up 10 steps with 2 seated rest breaks. Pt demonstrated difficulty coming to stand from the 2nd step at conclusion of stair training. Pt attempted x2 with instructions to use HR to pull to stand while maintaining NWB on LLE. Pt required Felipa from PT to come to stand and then transferred into locked  to be transported back to room.  Pt then ambulated from locked  to bed with walker and transferred to sitting. Pt instructed on energy conservation at home and having chair places to accommodate for fatigue with short distance ambulation. Pt also educated on PT's recommendation of having someone with her 24/7 until cleared by [facility] PT due to safety concerns and pt requiring supervision assist for walking and Felipa for steps. PT explained that if pt was unable to have 24/7 supervision, would recommend rehab to improve safety prior to returning home. Pt acknowledged understanding. Pt was left sitting up in bed with call bell by her side. Progression toward goals:  [ ]      Improving appropriately and progressing toward goals  [X]      Improving slowly and progressing toward goals  [ ]      Not making progress toward goals and plan of care will be adjusted       PLAN:  Patient continues to benefit from skilled intervention to address the above impairments. Continue treatment per established plan of care. Discharge Recommendations:  Home Health with 24/7 supervision vs Rehab  Further Equipment Recommendations for Discharge:  walker       G-CODES:      Mobility  Current  CJ= 20-39%   Goal  CI= 1-19%. The severity rating is based on the Level of Assistance required for Functional Mobility and ADLs. SUBJECTIVE:   Patient stated I'm a hyper person so I'm used to just. Pt educated on safety awareness training and risk of falls if pt rushed through activity. OBJECTIVE DATA SUMMARY:   Critical Behavior:  Neurologic State: Alert  Orientation Level: Oriented X4  Cognition: Appropriate decision making, Appropriate for age attention/concentration  Safety/Judgement: Fall prevention  Functional Mobility Training:  Bed Mobility:   Scooting: Modified independent   Transfers:  Sit to Stand: Supervision  Stand to Sit: Supervision      Balance:  Sitting: Intact  Standing: Impaired; With support  Standing - Static: Good  Standing - Dynamic : Fair  Ambulation/Gait Training:  Distance (ft): 40 Feet (ft) (20ft x2 with stair navigation and seated rest break between)  Assistive Device: Walker  Ambulation - Level of Assistance: Supervision  Gait Abnormalities: Decreased step clearance; Step to gait;Trunk sway increased  Left Side Weight Bearing: Non-weight bearing  Base of Support: Shift to right  Stance: Right increased  Speed/Catrachita: Slow  Step Length: Right shortened   Stairs:  Number of Stairs Trained: 10  Stairs - Level of Assistance: Minimum assistance  Rail Use: Right    Patient ascended and descended steps on her bottom for improve safety  Pt attempted x2 to stand independently from 2nd step from bottom and was unable without asst; pt stood with Felipa from PT  Pain:  Pt reports 0/10 pain or discomfort prior to treatment. Pt reports 0/10 pain or discomfort post treatment. Activity Tolerance:   Pt tolerated activity well and was able to maintain NWB status of LLE; pt demonstrated fatigue and decreased balance with activity and required rest breaks and cues to take her time during activities. Please refer to the flowsheet for vital signs taken during this treatment.   After treatment:   [X] Patient left in no apparent distress sitting up in chair  [X] Patient left in no apparent distress in bed  [X] Call bell left within reach  [ ] Nursing notified  [ ] Caregiver present  [ ] Bed alarm activated      Buzz Salmeron PT   Time Calculation: 38 mins

## 2017-07-24 NOTE — PROGRESS NOTES
Intern Progress Note  [facility]       Patient: Farhana Santos MRN: [id number]  CSN: [id number]    YOB: 1965  Age: 46 y.o. Sex: female    DOA: 7/18/2017 LOS:  LOS: 6 days                    Subjective:     Mrs. Bernie Sparrow was resting comfortably in bed this morning. She notes she feels ready to go home today. She endorses no pain. She states she was seen by PT, and feels comfortable going home with their recommendations. See ROS for remainder of pertinent positives and negatives. Review of Systems   Eyes: Negative for blurred vision and double vision. Respiratory: Negative for cough, sputum production, shortness of breath and wheezing. Cardiovascular: Negative for chest pain, palpitations and leg swelling. Gastrointestinal: Negative for abdominal pain, constipation, diarrhea, nausea and vomiting. Genitourinary: Negative for dysuria, frequency and urgency. Skin: Negative for rash. Neurological: Negative for dizziness and headaches. Objective:      Patient Vitals for the past 24 hrs:   Temp Pulse Resp BP SpO2   07/24/17 0414 97.4 °F (36.3 °C) 91 18 97/62 97 %   07/24/17 0021 98 °F (36.7 °C) 88 18 139/81 98 %   07/23/17 2037 98.6 °F (37 °C) 81 18 136/83 100 %   07/23/17 1511 98.8 °F (37.1 °C) 86 18 129/83 100 %   07/23/17 1105 98.4 °F (36.9 °C) 87 20 111/70 100 %   07/23/17 0745 98.6 °F (37 °C) 86 20 110/67 -         Intake/Output Summary (Last 24 hours) at 07/24/17 0739  Last data filed at 07/23/17 2312   Gross per 24 hour   Intake             1100 ml   Output             2375 ml   Net            -1275 ml       Physical Exam   Constitutional: She appears well-developed and well-nourished. Cardiovascular: Normal rate and regular rhythm. Exam reveals no gallop and no friction rub. No murmur heard. Pulmonary/Chest: Effort normal. She has no wheezes. She has no rales. Abdominal: Soft. Bowel sounds are normal. She exhibits no distension and no mass.  There is no tenderness. There is no rebound and no guarding. Musculoskeletal: She exhibits no edema. Gauze with overlying ace bandage on L foot and ankle with some dried sanguinous exudate around the area of amputation. Otherwise c/d/i   Skin: Skin is warm and dry. No rash noted. No erythema. Lab/Data Reviewed:  BMP:   Lab Results   Component Value Date/Time     07/24/2017 02:46 AM    K 3.3 (L) 07/24/2017 02:46 AM     (H) 07/24/2017 02:46 AM    CO2 23 07/24/2017 02:46 AM    AGAP 7 07/24/2017 02:46 AM    GLU 96 07/24/2017 02:46 AM    BUN 13 07/24/2017 02:46 AM    CREA 1.12 07/24/2017 02:46 AM    GFRAA >60 07/24/2017 02:46 AM    GFRNA 51 (L) 07/24/2017 02:46 AM     CBC:   Lab Results   Component Value Date/Time    WBC 10.5 07/24/2017 02:46 AM    HGB 9.1 (L) 07/24/2017 02:46 AM    HCT 26.5 (L) 07/24/2017 02:46 AM     07/24/2017 02:46 AM        Scheduled Medications Reviewed:  Current Facility-Administered Medications   Medication Dose Route Frequency    insulin NPH (NOVOLIN N, HUMULIN N) injection 7 Units  7 Units SubCUTAneous BID    insulin regular (NOVOLIN R, HUMULIN R) injection 3 Units  3 Units SubCUTAneous BID    quinapril (ACCUPRIL) tablet 20 mg  20 mg Oral QHS    vancomycin (VANCOCIN) 1,400 mg in 0.9% sodium chloride 250 mL IVPB  1,400 mg IntraVENous Q18H    insulin lispro (HUMALOG) injection   SubCUTAneous AC&HS    sodium hypochlorite (QUARTER STRENGTH DAKIN'S) 0.125% irrigation (bottle)   Topical BID    atorvastatin (LIPITOR) tablet 20 mg  20 mg Oral DAILY    PARoxetine (PAXIL) tablet 30 mg  30 mg Oral QHS    pantoprazole (PROTONIX) tablet 40 mg  40 mg Oral DAILY    piperacillin-tazobactam (ZOSYN) 3.375 g in 0.9% sodium chloride (MBP/ADV) 100 mL MBP  3.375 g IntraVENous Q6H         Imaging, microbiology, and EKG/Telemetry:  None new    Assessment/Plan     46 y. o. female with PMH HTN, Insulin dependent Diabetes type 2, GERD, insomnia  now presenting with complaint of diabetic foot ulceration.      POD3 left second toe amputation with resolved sepsis: On admission leukocytosis at 13.9, tachycardia @ 99, lactate 1.4. XR on admission indicative of marked soft tissue swelling with poor visualization of the cortex of the terminal tuft of the second digit. MRI 7/19 suggestive of osteomyelitis in the distal phalanx of the second toe (L foot). SILVIA not suggestive of arterial disease in lower extremities. CRP increased at 5.2, ESR increased at 72, which is what one would expect to see in osteomyelitis  - Wound culture 7/18/2017 with coagulase negative staph, few enterococcus faecalis, moderate s. Intermedius, e-coli and moderate diphteroids. Blood culture 7/18/2017, anaerobic bottle with streptococcus intermedius pan susceptible   - Surgical culture 07/23/2017 with e-coli, many coagulase negative staph, non-hemolytic and hemolytic streptococci. E-coli resistant to ampicillin and ampicillin/sublactam   - Blood culture 07/23/2017 NGTD  - Discharge today.   - Dr. Lillie Lerner performed the amputation. He would like to follow up with the patient on Friday for removal of her stitches and bandage change. He recommends macrobid double strength for 10 days on discharge. - Daily CBC to monitor leukocytosis. WBC 13.9>14.2>11.9>11.3>8.9>9.4>10.5. Increasing WBC likely due to stress after surgery      Uncontrolled DM type 2, insulin dependent and retinopathy: Patient is not able to afford insulin and has been dependent on samples from her PCP. She is getting connected to the [employer]. She has not used her insulin for the last week. - A1C 10.1  - Correctional scale with Glucose ACHS   - 7 NPH BID, 3 regular BID. Received 15 units lispro yesterday. BG range  in the last 24 hours.    - Case       Hypertension: Goal < 140/90   - Quinapril 20 mg, consider decreasing dose; as BP range 97//81      Renal insufficiency: Last outpatient Cr 1.5; Cr this admission 1.13>1.0>1.2>0.99>1.11>1.12.  - Daily BMP   - Will continue to monitor  - Potassium repletion with 40 meq PO this morning 3.3      GERD: stable   - Holding nexium and continue with protonix while inpatient       Insomnia/anxiety:   - Paxil to be continued nightly from home med       Diet: Diabetic   DVT Prophylaxis: heparin and SCD's     Disposition and anticipated LOS: 2+ midnights, patient unable to afford medications and has been referred to [facility] [place] [id number] clinic.  Will consult CM to ensure acceptance for medications management and follow up  Breana Jonas MD  PGY-1 [facility] Resident  [facility]

## 2017-07-24 NOTE — DIABETES MGMT
Glycemic Control Plan of Care    BG remains above target range. POC BG range on 07/23/2017:  mg/dL. POC BG report on 07/24/2017 at time of review: 156, 281 mg/dL. Patient is on Novolin 70/30 mixed insulin 25 units daily before breakfast and 15 units daily before dinner at home. Patient anticipate discharge to home soon. I called 120 Ruddy Jenkins at extension [phone] and received plan to discharge patient to home on 70/30 mixed insulin. Also noted plan to direct patient to [facility] for f/u. Patient stated that she has insulin syringes at home. Recommendation(s):  1.) Patient without health insurance coverage at this time. I called 120 Ruddy Jenkins at extension [phone] and informed them that patient has a [place] for Humulin 70/30 mixed insulin but this will require prescription. 2.) Humulin 70/30 mixed insulin twice daily before breakfast and before dinner. Patient was taking daily Novolin 70/30 mixed insulin 25 units before breakfast and 15 units before dinner prior to admission ([employer] brand from [place]). Assessment:  Patients is 46year old with past medical history including type 2 diabetes mellitus, diabetic retinopathy,hypertension, hyperlipidemia, GERD, CKD, anxiety, and diabetic foot ulcer - was admitted on 07/18/2017 via ED on advice of PCP for evaluation of diabetic foot ulcer to left 2nd toe. Noted:  Sepsis, diabetic left foot ulcer. Pending podiatry evaluation. NPO after midnight for possible surgery 07/21/2017. Type 2 diabetes mellitus with current A1C of 10.1% (07/19/2017). Completed assessment of home diabetes management and education 07/19/2017. See separate notes, 07/20/2017. Most recent blood glucose values:    Results for Ramesh Hollis (MRN [id number]) as of 7/24/2017 14:49   Ref.  Range 7/23/2017 07:52 7/23/2017 11:43 7/23/2017 15:57 7/23/2017 17:49 7/23/2017 21:51   GLUCOSE,FAST - POC Latest Ref Range: 70 - 110 mg/dL 118 (H) 263 (H) 85 173 (H) 244 (H)     Results for Breanne Simmons (MRN [id number]) as of 7/24/2017 14:49   Ref. Range 7/24/2017 07:35 7/24/2017 12:09   GLUCOSE,FAST - POC Latest Ref Range: 70 - 110 mg/dL 156 (H) 281 (H)     Current A1C: 10.1% (07/19/2017) is equivalent to average blood glucose of 243 mg/dL during the past 2-3 months. Current hospital diabetes medications:  Novolin NPH 7 units twice daily. Novolin regular insulin 3 units TID AC. Correctional lispro insulin ACHS. Very resistant dose. Total daily dose insulin requirement previous day: 07/23/2017  NPH: 14 units  Lispro: 15 units  Regular: 6  TDD: 35 units of insulin    Home diabetes medications: As stated by patient on 07/19/2017:  Novolin 70/30 mixed insulin 25 units daily before breakfast and 15 units daily before dinner. Diet: Diabetic consistent carb regular. Goals:  Blood glucose will be within target range of  mg/dL by 07/27/2017.      Education:  __X_  Refer to Diabetes Education Record: 07/19/2017             ___  Education not indicated at this time    Adrianne Moritz, RN

## 2017-07-24 NOTE — ROUTINE PROCESS
[facility] About Foot and Toenail Care  Checking your loved one's feet and keeping them clean and soft can help prevent cracks and infection in the skin. This is especially important for people who have diabetes. Keeping toenails trimmedand polished if that's what the person likesalso helps the person feel well-groomed. If the person you care for has diabetes or has foot problems, such as bad bunions and corns, think about taking them to see a podiatrist. This is a doctor who specializes in the care of the feet. Sometimes a podiatrist will come to the home if the person can't go out for visits. Try to take the person for salon pedicures if that is what they want. It's a chance to get out and see people and continue a favorite activity. You can do basic nail care at home. Usually all you need to do is keep the nails clean and at a safe length. How do you trim someone's toenails? Try to trim the person's nails every week. Or check the nails each week to see if they need to be trimmed. It's easiest to trim nails after the person has had a shower or foot bath. It makes the nails softer and easier to trim. Start by gathering your supplies. You will need toenail clippers and a nail file. You may also need nail polish and nail polish remover. To trim the nails:  1. Wash and dry your hands. You don't need to wear gloves. 2. Use nail polish remover to take off any polish. 3. Hold the person's foot and toe steady with one hand while you trim the nail with your other hand. Trim the nails straight across. Leave the nails a little longer at the corners so that the sharp ends don't cut into the skin. 4. Keep the nails no longer than the tip of the toes. 5. Let the nails dry if they are still damp and soft. 6. Use a nail file to gently smooth the edges of the nails, especially at the corners. They may be sharp after the nails are cut straight. 7. Apply nail polish, if the person wants it.   If the person's nails are thick and discolored, it may be safest to have a podiatrist cut them. What else do you need to know? When you're caring for someone's nails, it is important to remember not to trim or cut the cuticles. A minor cut in a cuticle could lead to an infection. Wash the feet daily in the shower or bath or in a basin made for washing feet. It's extra important to wash the feet carefully if the person has diabetes. After washing the feet, dry gently. Put lotion on the feet, especially on the heels. But don't put it between the toes. If the person doesn't have diabetes and you see signs of athlete's foot (such as dry, cracking, or itchy skin between the toes), you can try an over-the-counter medicine. These medicines can kill the fungus that causes athlete's foot. If the problem doesn't go away, talk to the person's doctor. Look every day for cuts or signs of infection, such as pain, swelling, redness, or warmth. If you see any of these signsespecially in someone who has diabetescall the doctor. Where can you learn more? Go to http://linda-sivan.info/. Enter A726 in the search box to learn more about \"Learning About Foot and Toenail Care. \"  Current as of: March 17, 2017  Content Version: 11.3  © 0244-2951 [employer]. Care instructions adapted under license by [employer] (which disclaims liability or warranty for this information). If you have questions about a medical condition or this instruction, always ask your healthcare professional. [place] [address] any warranty or liability for your use of this information. Diabetes Foot Health: Care Instructions  Your Care Instructions    When you have diabetes, your feet need extra care and attention. Diabetes can damage the nerve endings and blood vessels in your feet, making you less likely to notice when your feet are injured.  Diabetes also limits your body's ability to fight infection and get blood to areas that need it. If you get a minor foot injury, it could become an ulcer or a serious infection. With good foot care, you can prevent most of these problems. Caring for your feet can be quick and easy. Most of the care can be done when you are bathing or getting ready for bed. Follow-up care is a key part of your treatment and safety. Be sure to make and go to all appointments, and call your doctor if you are having problems. Its also a good idea to know your test results and keep a list of the medicines you take. How can you care for yourself at home? · Keep your blood sugar close to normal by watching what and how much you eat, monitoring blood sugar, taking medicines if prescribed, and getting regular exercise. · Do not smoke. Smoking affects blood flow and can make foot problems worse. If you need help quitting, talk to your doctor about stop-smoking programs and medicines. These can increase your chances of quitting for good. · Eat a diet that is low in fats. High fat intake can cause fat to build up in your blood vessels and decrease blood flow. · Inspect your feet daily for blisters, cuts, cracks, or sores. If you cannot see well, use a mirror or have someone help you. · Take care of your feet:  [facility] your feet every day. Use warm (not hot) water. Check the water temperature with your wrists or other part of your body, not your feet. ¨ Dry your feet well. Pat them dry. Do not rub the skin on your feet too hard. Dry well between your toes. If the skin on your feet stays moist, bacteria or a fungus can grow, which can lead to infection. ¨ Keep your skin soft. Use moisturizing skin cream to keep the skin on your feet soft and prevent calluses and cracks. But do not put the cream between your toes, and stop using any cream that causes a rash. ¨ Clean underneath your toenails carefully. Do not use a sharp object to clean underneath your toenails.  Use the blunt end of a nail file or other rounded tool.  ¨ Trim and file your toenails straight across to prevent ingrown toenails. Use a nail clipper, not scissors. Use an emery board to smooth the edges. · Change socks daily. Socks without seams are best, because seams often rub the feet. You can find socks for people with diabetes from specialty catalogs. · Look inside your shoes every day for things like gravel or torn linings, which could cause blisters or sores. · Buy shoes that fit well:  ¨ Look for shoes that have plenty of space around the toes. This helps prevent bunions and blisters. ¨ Try on shoes while wearing the kind of socks you will usually wear with the shoes. ¨ Avoid plastic shoes. They may rub your feet and cause blisters. Good shoes should be made of materials that are flexible and breathable, such as leather or cloth. ¨ Break in new shoes slowly by wearing them for no more than an hour a day for several days. Take extra time to check your feet for red areas, blisters, or other problems after you wear new shoes. · Do not go barefoot. Do not wear sandals, and do not wear shoes with very thin soles. Thin soles are easy to puncture. They also do not protect your feet from hot pavement or cold weather. · Have your doctor check your feet during each visit. If you have a foot problem, see your doctor. Do not try to treat an early foot problem at home. Home remedies or treatments that you can buy without a prescription (such as corn removers) can be harmful. · Always get early treatment for foot problems. A minor irritation can lead to a major problem if not properly cared for early. When should you call for help? Call your doctor now or seek immediate medical care if:  · You have a foot sore, an ulcer or break in the skin that is not healing after 4 days, bleeding corns or calluses, or an ingrown toenail. · You have blue or black areas, which can mean bruising or blood flow problems.   · You have peeling skin or tiny blisters between your toes or cracking or oozing of the skin. · You have a fever for more than 24 hours and a foot sore. · You have new numbness or tingling in your feet that does not go away after you move your feet or change positions. · You have unexplained or unusual swelling of the foot or ankle. Watch closely for changes in your health, and be sure to contact your doctor if:  · You cannot do proper foot care. Where can you learn more? Go to http://linda-sivan.info/. Enter A739 in the search box to learn more about \"Diabetes Foot Health: Care Instructions. \"  Current as of: March 13, 2017  Content Version: 11.3  © 2972-1426 [employer]. Care instructions adapted under license by [employer] (which disclaims liability or warranty for this information). If you have questions about a medical condition or this instruction, always ask your healthcare professional. [place] [address] any warranty or liability for your use of this information. Giving a Mixed-Dose Insulin Shot: Care Instructions  Your Care Instructions    Insulin is normally made by the pancreas, a gland behind the stomach. In people with diabetes, the pancreas no longer makes enough insulin or it stops making it. Without insulin, your blood sugar level rises to dangerous levels. When this happens, you need insulin shots to keep your blood sugar in your target range. You may be nervous giving a shot at first. But soon, giving yourself a shot will become routine. It is quite easy to learn how to draw up insulin into a syringe and give the shot. The needles you use to give the insulin injections are very thin, and most people who have diabetes say that they do not even feel the needle enter the skin. Even if you do feel the injection, the sting of the shot is not bad and does not last long. More than half a million people do it every day. You can too.   Follow-up care is a key part of your treatment and safety. Be sure to make and go to all appointments, and call your doctor if you are having problems. It's also a good idea to know your test results and keep a list of the medicines you take. How can you care for yourself at home? Getting started  · Gather your supplies. You will need an insulin syringe, your bottles of insulin, and an alcohol wipe or a cotton ball dipped in alcohol. Keep your supplies in a bag or kit so you can carry the supplies wherever you go. · Check the labels on the bottles and contents. Read and follow all instructions on the label, including how to store the insulin and how long the insulin will last.  · Wash your hands with soap and running water. Dry them well. Preparing the shot  For a mixed-dose insulin shot:  1. Roll the insulin bottles gently between your hands. This will warm the insulin if you have kept the bottle in the refrigerator. Roll the cloudy insulin bottle until the white powder has dissolved and the insulin is mixed. 2. Wipe the rubber lid of both insulin bottles with an alcohol wipe or a cotton ball dipped in alcohol. (If you are using a bottle for the first time, remove the protective cover over the rubber lid.) Let the top dry before you remove any insulin. 3. Remove the plastic cap from the needle on your insulin syringe. Take care not to touch the needle. 4. Pull the plunger back on your insulin syringe, and draw air into the syringe equal to the number of units of cloudy insulin to be given. 5. Push the needle of the syringe into the rubber lid of the cloudy insulin bottle. Push the plunger of the syringe to force the air into the bottle. This equalizes the pressure in the bottle when you later remove the dose of insulin. Remove the needle from the bottle, but do not draw up any insulin. 6. Pull the plunger of the syringe back and draw air into the syringe equal to the number of units of clear insulin to be given.   7. Push the needle of the syringe into the rubber lid of the clear insulin bottle. Push the plunger to force the air into the bottle. Leave the needle in the bottle. 8. Turn the bottle and syringe upside down, and hold them in one hand. Position the tip of the needle so that it is below the surface of insulin in the bottle. Pull back the plunger to fill the syringe with slightly more than the correct number of units of clear insulin to be given. 9. Tap the outside (barrel) of the syringe so that trapped air bubbles move into the needle area. Push the air bubbles back into the bottle. Make sure that you have the correct number of units of insulin in your syringe. Remove the needle from the clear insulin bottle. 10. Insert the needle into the rubber lid of the cloudy insulin bottle. Do not push the plunger, because this would force clear insulin into your cloudy insulin bottle. If clear insulin is mixed in the bottle of cloudy, it will change the action of your other doses from that bottle. 11. Turn the bottle and syringe upside down and hold them in one hand. Position the tip of the needle so that it is below the surface of insulin in the bottle. Slowly pull back the plunger of the syringe to fill the syringe with the correct number of units of cloudy insulin to be given. This will keep air bubbles from entering the syringe. Remove the needle from the bottle. 12. You should now have the total number of units for the clear and cloudy insulin in your syringe. For example, if 10 units of clear and 15 units of cloudy are needed, you should have 25 units in your syringe. Now you are ready to give the shot. Giving the shot  Before giving your shot:  1. Use alcohol to clean the skin before you give the shot. Let it dry. 2. Slightly pinch a fold of skin between your fingers and thumb of one hand. 3. Hold the syringe like a pencil close to the site, keeping your fingers off the plunger.  It is usually recommended to place the syringe at a 90-degree angle to the shot site, standing straight up from the skin. 4. Bend your wrist, and quickly push the needle all the way into the pinched-up area. 5. Push the plunger of the syringe all the way in so the insulin goes into the fatty tissue. 6. Take the needle out at the same angle that you inserted it. If you bleed a little, apply pressure over the shot area with your finger, a cotton ball, or a piece of gauze. Do not rub the area. 7. Replace the cover over the needle and dispose of the needle safely. Do not use the same needle more than one time. Where to give the shot  You can inject insulin into:  · The belly, but at least 2 inches from the belly button. This is thought to be the best place to inject insulin. · The top outer part of the thighs. Insulin usually is absorbed more slowly from this site, unless you exercise soon after giving the shot. · The outside of the upper arms or the buttocks. You may need help giving shots in these areas. Your doctor may advise you to give your shots in different places on your body each day. This is called site rotation. Make sure you talk to your doctor about how to do this safely. If you rotate sites, use the same site at the same time of each day. For example, each day:  · At breakfast, give the shot in one of your arms. · At lunch, give the shot in one of your legs. · At dinner, give the shot in your belly. Slightly change the spot where you give an insulin shot each time you do it. For example, use five different places on the right upper arm, then use five places on the left upper arm. Using the same spot every time can cause bumps or pits in the skin and make the shots hurt more. It may also slow down how the insulin is absorbed into your body. Where can you learn more? Go to http://linda-sivan.info/. Enter R756 in the search box to learn more about \"Giving a Mixed-Dose Insulin Shot: Care Instructions. \"  Current as of: March 13, 2017  Content Version: 11.3  © 8837-5308 [employer]. Care instructions adapted under license by [email or web address] (which disclaims liability or warranty for this information). If you have questions about a medical condition or this instruction, always ask your healthcare professional. [address] any warranty or liability for your use of this information. [employer] Activation    Thank you for requesting access to [employer]. Please follow the instructions below to securely access and download your online medical record. [employer] allows you to send messages to your doctor, view your test results, renew your prescriptions, schedule appointments, and more. How Do I Sign Up? 1. In your internet browser, go to [email or web address]  2. Click on the First Time User? Click Here link in the Sign In box. You will be redirect to the New Member Sign Up page. 3. Enter your [employer] Access Code exactly as it appears below. You will not need to use this code after youve completed the sign-up process. If you do not sign up before the expiration date, you must request a new code. [employer] Access Code: [email or web address]  Expires: 10/16/2017  4:37 PM (This is the date your [employer] access code will )    4. Enter the last four digits of your Social Security Number (xxxx) and Date of Birth (mm/dd/yyyy) as indicated and click Submit. You will be taken to the next sign-up page. 5. Create a [employer] ID. This will be your [employer] login ID and cannot be changed, so think of one that is secure and easy to remember. 6. Create a [employer] password. You can change your password at any time. 7. Enter your Password Reset Question and Answer. This can be used at a later time if you forget your password. 8. Enter your e-mail address. You will receive e-mail notification when new information is available in [address] 9. Click Sign Up. You can now view and download portions of your medical record.   10. Click the Download Summary menu link to download a portable copy of your medical information. Additional Information    If you have questions, please visit the Frequently Asked Questions section of the [employer] website at [email or web address] Remember, MyChart is NOT to be used for urgent needs. For medical emergencies, dial 911. Patient armband removed and given to patient to take home. Patient was informed of the privacy risks if armband lost or stolen    DISCHARGE SUMMARY from Nurse    The following personal items are in your possession at time of discharge:    Dental Appliances: None  Visual Aid: None     Home Medications: None  Jewelry: None  Clothing: Pants, Shirt  Other Valuables: Purse (Umbrella)             PATIENT INSTRUCTIONS:    After general anesthesia or intravenous sedation, for 24 hours or while taking prescription Narcotics:  · Limit your activities  · Do not drive and operate hazardous machinery  · Do not make important personal or business decisions  · Do  not drink alcoholic beverages  · If you have not urinated within 8 hours after discharge, please contact your surgeon on call. Report the following to your surgeon:  · Excessive pain, swelling, redness or odor of or around the surgical area  · Temperature over 100.5  · Nausea and vomiting lasting longer than 4 hours or if unable to take medications  · Any signs of decreased circulation or nerve impairment to extremity: change in color, persistent  numbness, tingling, coldness or increase pain  · Any questions        What to do at Home:  Recommended activity: Activity as tolerated    If you experience any of the following symptoms redness, swelling, foul smelling puss, fever, pain that is not relieved by pain edications, please follow up with PCP. *  Please give a list of your current medications to your Primary Care Provider.     *  Please update this list whenever your medications are discontinued, doses are changed, or new medications (including over-the-counter products) are added. *  Please carry medication information at all times in case of emergency situations. These are general instructions for a healthy lifestyle:    No smoking/ No tobacco products/ Avoid exposure to second hand smoke    Surgeon General's Warning:  Quitting smoking now greatly reduces serious risk to your health. Obesity, smoking, and sedentary lifestyle greatly increases your risk for illness    A healthy diet, regular physical exercise & weight monitoring are important for maintaining a healthy lifestyle    You may be retaining fluid if you have a history of heart failure or if you experience any of the following symptoms:  Weight gain of 3 pounds or more overnight or 5 pounds in a week, increased swelling in our hands or feet or shortness of breath while lying flat in bed. Please call your doctor as soon as you notice any of these symptoms; do not wait until your next office visit. Recognize signs and symptoms of STROKE:    F-face looks uneven    A-arms unable to move or move unevenly    S-speech slurred or non-existent    T-time-call 911 as soon as signs and symptoms begin-DO NOT go       Back to bed or wait to see if you get better-TIME IS BRAIN. Warning Signs of HEART ATTACK     Call 911 if you have these symptoms:   Chest discomfort. Most heart attacks involve discomfort in the center of the chest that lasts more than a few minutes, or that goes away and comes back. It can feel like uncomfortable pressure, squeezing, fullness, or pain.  Discomfort in other areas of the upper body. Symptoms can include pain or discomfort in one or both arms, the back, neck, jaw, or stomach.  Shortness of breath with or without chest discomfort.  Other signs may include breaking out in a cold sweat, nausea, or lightheadedness. Don't wait more than five minutes to call [address]! Fast action can save your life. Calling 911 is almost always the fastest way to get lifesaving treatment. Emergency Medical Services staff can begin treatment when they arrive  up to an hour sooner than if someone gets to the hospital by car. The discharge information has been reviewed with the patient. The patient verbalized understanding. Discharge medications reviewed with the patient and appropriate educational materials and side effects teaching were provided.

## 2017-07-24 NOTE — PROGRESS NOTES
NUTRITION    Nutrition Screen      RECOMMENDATIONS / PLAN:     - Continue with current nutrition interventions  - Continue RD inpatient monitoring and evaluation. NUTRITION INTERVENTIONS & DIAGNOSIS:     [x] Meals/Snacks: modified diet  [x] Nutrition counseling: diabetic diet/ CHO counting on 7/21     Nutrition Diagnosis:   Food and nutrition related knowledge deficit related to pt with hx of diabetes, requesting education as evidenced by pt reporting needing additional education on CHO counting    ASSESSMENT:     7/24: Pt s/p toe amputation 7/21; po diet resumed following surgery. Pt unavailable at time of visit. Has good appetite and excellent meal intake per chart. 7/21: Pt reported appetite and meal intake were good PTA and since admission when on po diet. Currently NPO for planned procedure/ toe amputation today. Pt requesting information on diabetic diet. Educational handout provided and reviewed with her; discussed CHO counting.  Pt verbalized understanding; denied having any concerns at time of visit    Average po intake adequate to meet patients estimated nutritional needs:   [x] Yes     [] No   [] Unable to determine at this time    Diet: DIET DIABETIC CONSISTENT CARB Regular      Food Allergies:  None known   Current Appetite:   [x] Good (per chart)     [] Fair     [] Poor     [] Other:  Appetite/meal intake prior to admission:   [x] Good     [] Fair     [] Poor     [] Other:  Feeding Limitations:  [] Swallowing difficulty    [] Chewing difficulty    [] Other:  Current Meal Intake:   Patient Vitals for the past 100 hrs:   % Diet Eaten   07/24/17 1006 100 %   07/23/17 1804 100 %   07/23/17 1304 100 %   07/23/17 0903 100 %   07/22/17 1833 100 %   07/22/17 1400 100 %   07/22/17 0925 100 %       BM:  7/20  Skin Integrity:  Diabetic wound on left 2nd toe  Edema:  1+ LLE  Pertinent Medications: Reviewed    Recent Labs      07/24/17   0246  07/23/17   0624  07/22/17   0223   NA  142  144  141   K  3.3* 3. 4*  3.4*   CL  112*  111*  109*   CO2  23  23  21   GLU  96  114*  186*   BUN  13  11  9   CREA  1.12  1.11  0.99   CA  8.6  8.9  8.4*   MG  1.8   --    --        Intake/Output Summary (Last 24 hours) at 07/24/17 1606  Last data filed at 07/24/17 1006   Gross per 24 hour   Intake              860 ml   Output             1825 ml   Net             -965 ml       Anthropometrics:  Ht Readings from Last 1 Encounters:   07/18/17 5' 8\" (1.727 m)     Last 3 Recorded Weights in this Encounter    07/18/17 1518   Weight: 81.2 kg (179 lb)     Body mass index is 27.22 kg/(m^2). Weight History:  Pt denied experiencing any recent changes in weight PTA. Noted weight loss of 14 lb in past 1 year and 3 months PTA per chart hx    Weight Metrics 7/18/2017 10/6/2015 9/16/2015 10/30/2014 10/22/2014 10/13/2014 10/3/2014   Weight 179 lb 195 lb 180 lb 169 lb 180 lb 183 lb 177 lb   BMI 27.22 kg/m2 29.66 kg/m2 27.38 kg/m2 25.7 kg/m2 27.38 kg/m2 27.83 kg/m2 26.92 kg/m2        Admitting Diagnosis: Osteomyelitis (HCC)  dx  Pertinent PMHx: DM, gall stones, GERD, HTN, pancreatitis    Education Needs:        [] None identified  [] Identified - Not appropriate at this time  [x]  Identified and addressed - refer to education log  Learning Limitations:   [x] None identified  [] Identified    Cultural, Hindu & ethnic food preferences:  [x] None identified    [] Identified and addressed     ESTIMATED NUTRITION NEEDS:     Calories: 7179-8232 kcal (25-30 kcal/kg) based on  [] Actual BW      [x] SBW: 67 kg   Protein: 54-60 gm (0.8-0.9 gm/kg) based on  [] Actual BW      [x] SBW   Fluid: 1 mL/kcal     MONITORING & EVALUATION:     Nutrition Goal(s):   1. Po intake of meals will meet >75% of patient estimated nutritional needs within the next 7 days. Outcome:  [x] Met/Ongoing    []  Not Met    [] New/Initial Goal   2. Patient will increase knowledge of appropriate food choices on a diabetic diet within 7 days.   Outcome:  [x] Met    []  Not Met [] New/Initial Goal     Monitoring:   [x] Diet tolerance   [x] Meal intake   [] Supplement intake   [] GI symptoms/ability to tolerate po diet   [] Respiratory status   [] Plan of care      Previous Recommendations (for follow-up assessments only):     [x]   Implemented       []   Not Implemented (RD to address)     [] No Recommendation Made     Discharge Planning:  Diabetic diet   [x] Participated in care planning, discharge planning, & interdisciplinary rounds as appropriate      Reji Patel, [address] [place] [address]   Pager: [phone]

## 2017-07-24 NOTE — HOME CARE
[facility] received referral for SN/diabetes management & education/disease education & instruction - PT/OT/MSW for community resources - noted patient is indigent and there is an order for a knee scooter. This nurse spoke with the , [employer], in regards to the status of the knee scooter. Per [employer], the patient is not receiving the DME since case management will not cover the cost of a knee scooter. This nurse met with the patient to verify address, contact number and any DME available to her prior to admission. Per patient, she does not have any AD in the home because she was independent with ambulation and no assist was needed - this nurse has obtained an order for a standard walker from Dr. Tejinder Bueno as recommended by Theresa Jiang, case management manager, has agreed per our telephone conversation to supply the standard walker from the indigent supply closet. Nikki Rueda, care manager specialist, has delivered the walker to this nurse on the unit and this nurse delivered the walker to the patient's room. The patient is able to distinguish between the 2 walker - the walker belonging the hospital had [id number] written on the metal. Patient verbally appreciative but would prefer the knee scooter so this nurse has made the patient aware of places knee scooters may be purchased or rented but she will have to do so out of pocket. Per patient, her son, Dannielle Ayala, will be staying with her temporarily and will assist her as much as he can. The patient verbally agrees to being homebound during her home care services and fully understands what that means. The patient told this nurse she plans on following up at the [facility] but has seen Dr. Blessing Ramos recently.  This nurse called and spoke with Serafin Flaherty at [address] [place] [address] Drive to verify this - and per Serafin Flaherty, the patient was last seen by Dr. Debora Leon in June but no follow up appointment has been scheduled due to the fact the patient is supposed to start following up at the [facility]. [employer] did leave a message for Dr. Misha Rowe nurse re: home care POC and whether she will follow if the patient does not follow up at the [facility] as planned - return called expected tomorrow, 7/25/17. Patient also stated she prefers a visit in 48 hours instead of tomorrow - this has been noted on the referral. Discharge orders have been placed - the patient is to follow up on Friday with Dr. Aldean Riedel for suture removal and no wound care orders are noted - dressing is to stay in place until then. Referral processed and patient verbalizes understanding of the services ordered - HERNESTO Camejo LPN

## 2017-07-24 NOTE — CONSULTS
Infectious Disease Consultation Note    Requested by: Dr. Anastasia Ness    Reason: left second toe osteomyelitis    Current abx Prior abx   Pip/tazo, vancomycin since 7/18/17      Lines:       Assessment :    58 yo female with PMH of Diabetic foot ulcer, uncontrolled DM2 (A1C 10.1), pancreatitis, and GERD presented to the ED on 7/18/17 for left second toe swelling. Clinical presentation consistent with diabetic left foot infection, left second toe distal phalanx acute on chronic osteomyelitis secondary to cons, enterococcus, e.coli, strep viridans. Streptococcus intermedius bloodstream infection (positive blood cultures 7/18, negative blood cultures 7/22) is likely due to left second toe osteomyelitis. Patient has been appropriately managed with amputation of left second toe on 7/21/17 - podiatry consult appreciated. Clean margins achieved at surgery    Recommendations:    1. D/c pip/tazo, vancomycin. Start po amoxicillin/clavulanate, trimeth/sulfa for 7 more days  2. Needs monitoring of potassium since combination of quinapril & trimeth/sulfa increases risk of hyperkalemia (d/w dr. Tejinder Bueno)      Thank you for consultation request. Above plan was discussed in details with patient,  and dr Tejinder Bueno. Please call me if any further questions or concerns. Will continue to participate in the care of this patient. HPI:    Pt is 58 yo female with PMH of Diabetic foot ulcer, uncontrolled DM2 (A1C 10.1), pancreatitis, and GERD presented to the ED on 7/18/17 for left second toe swelling. Pt began to notice left second toe pain and swelling approx a week prior to admission - it progressively worsened, and she started having drainage from tip and erythema spreading to dorsum of foot. Patient was started on broad spectrum abx. Was evaluated by podiatry. Underwent Amputation of toe 2 left through the metatarsophalangeal joint on 7/23/17.  Surgical cultures reveal enterococcus, e.coli, staph epidermidis, strep viridans, cons. Blood cultures 7/18 2/2 is positive for strep intermedius. I have been consulted for further recommendations. Patient denies any fever, chills, redness of left leg throughout this time. Patient denies headaches, visual disturbances, sore throat, runny nose, earaches, cp, sob, chills, cough, abdominal pain, diarrhea, burning micturition, pain or weakness in extremities. No known h/o MRSA colonization or infection in the past.      Past Medical History:   Diagnosis Date    Diabetes ([address]     Gall stones     GERD (gastroesophageal reflux disease)     Hiatal hernia     Hypertension     Mass of abdomen     Pancreatitis        Past Surgical History:   Procedure Laterality Date    HX CHOLECYSTECTOMY  10/15/2014    HX GI      Benign GI Stromal Tumor excision    HX HEENT      Sx for detached retina    HX MYOMECTOMY      x5 removed    HX OTHER SURGICAL      upper endoscopy       Home Medication List    Details   atorvastatin (LIPITOR) 20 mg tablet Take 1 Tab by mouth daily for 30 days. Qty: 30 Tab, Refills: 0      pantoprazole (PROTONIX) 40 mg tablet Take 1 Tab by mouth daily for 30 days. Indications: HEARTBURN  Qty: 30 Tab, Refills: 0      sodium hypochlorite (QUARTER STRENGTH DAKIN'S) 0.125 % soln external solution Apply 473 mL to affected area two (2) times a day. Qty: 1 Bottle, Refills: 0      acetaminophen (TYLENOL) 325 mg tablet Take 2 Tabs by mouth every six (6) hours as needed for up to 30 days. Qty: 120 Tab, Refills: 0       Details   esomeprazole (NEXIUM) 20 mg capsule Take 2 Caps by mouth daily for 30 days. Qty: 60 Cap, Refills: 0      PARoxetine (PAXIL) 30 mg tablet Take 1 Tab by mouth daily for 30 days. Qty: 30 Tab, Refills: 0      quinapril (ACCUPRIL) 20 mg tablet Take 1 Tab by mouth daily for 30 days. Qty: 30 Tab, Refills: 0      insulin NPH/insulin regular (NOVOLIN 70/30) 100 unit/mL (70-30) injection 40 Units by SubCUTAneous route two (2) times a day.   Qty: 2 Vial, Refills: 2             Current Facility-Administered Medications   Medication Dose Route Frequency    insulin NPH (NOVOLIN N, HUMULIN N) injection 7 Units  7 Units SubCUTAneous BID    insulin regular (NOVOLIN R, HUMULIN R) injection 3 Units  3 Units SubCUTAneous BID    quinapril (ACCUPRIL) tablet 20 mg  20 mg Oral QHS    vancomycin (VANCOCIN) 1,400 mg in 0.9% sodium chloride 250 mL IVPB  1,400 mg IntraVENous Q18H    insulin lispro (HUMALOG) injection   SubCUTAneous AC&HS    sodium hypochlorite (QUARTER STRENGTH DAKIN'S) 0.125% irrigation (bottle)   Topical BID    acetaminophen (TYLENOL) tablet 650 mg  650 mg Oral Q6H PRN    atorvastatin (LIPITOR) tablet 20 mg  20 mg Oral DAILY    PARoxetine (PAXIL) tablet 30 mg  30 mg Oral QHS    pantoprazole (PROTONIX) tablet 40 mg  40 mg Oral DAILY    piperacillin-tazobactam (ZOSYN) 3.375 g in 0.9% sodium chloride (MBP/ADV) 100 mL MBP  3.375 g IntraVENous Q6H       Allergies: Promethazine    Family History   Problem Relation Age of Onset    Adopted: Yes    Heart Failure Mother      Social History     Social History    Marital status:      Spouse name: N/A    Number of children: N/A    Years of education: N/A     Occupational History    Not on file.      Social History Main Topics    Smoking status: Former Smoker     Packs/day: 0.20     Years: 8.00     Types: Cigarettes     Quit date: 12/3/1995    Smokeless tobacco: Never Used    Alcohol use No      Comment: Drinks 3-4xs year generally wine    Drug use: No    Sexual activity: Not Currently     Other Topics Concern    Special Diet Yes     Social History Narrative    Lives with 16year old son         with ex          Does not have health insurance      History   Smoking Status    Former Smoker    Packs/day: 0.20    Years: 8.00    Types: Cigarettes    Quit date: 12/3/1995   Smokeless Tobacco    Never Used        Temp (24hrs), Av.1 °F (36.7 °C), Min:97.4 °F (36.3 °C), Max:98.8 °F (37.1 °C)    Visit Vitals    /80 (BP 1 Location: Left arm, BP Patient Position: Head of bed elevated (Comment degrees))    Pulse 79    Temp 98 °F (36.7 °C)    Resp 18    Ht 5' 8\" (1.727 m)    Wt 81.2 kg (179 lb)    SpO2 100%    BMI 27.22 kg/m2       ROS: 12 point ROS obtained in details. Pertinent positives as mentioned in HPI,   otherwise negative    Physical Exam:    General: Well developed, well nourished female laying on the bed AAOx3 in no acute distress. General:   awake alert and oriented   HEENT:  Normocephalic, atraumatic, PERRL, EOMI, no scleral icterus or pallor; no conjunctival hemmohage;  nasal and oral mucous are moist and without evidence of lesions. No thrush. Neck supple, no bruits. Lymph Nodes:   no cervical, axillary or inguinal adenopathy   Lungs:   non-labored, bilaterally clear to auscultation- no crackles wheezes rales or rhonchi   Heart:  RRR, s1 and s2; no rubs or gallops, no edema, + pedal pulses   Abdomen:  soft, non-distended, active bowel sounds, no hepatomegaly, no splenomegaly. Non-tender   Genitourinary:  deferred   Extremities:   no clubbing, cyanosis; no joint effusions or swelling; Full ROM of all large joints to the upper and lower extremities; muscle mass appropriate for age, left foot surgical dressing not opened   Neurologic:  No gross focal sensory abnormalities; 5/5 muscle strength to upper and lower extremities. Speech appropriate.  Cranial nerves intact                        Skin:  Surgical changes left foot per report   Back:  no spinal or paraspinal muscle tenderness or rigidity, no CVA tenderness     Psychiatric:  No suicidal or homicidal ideations, appropriate mood and affect         Labs: Results:   Chemistry Recent Labs      07/24/17   0246  07/23/17   0624  07/22/17   0223   GLU  96  114*  186*   NA  142  144  141   K  3.3*  3.4*  3.4*   CL  112*  111*  109*   CO2  23  23  21   BUN  13  11  9   CREA  1.12  1.11  0.99   CA  8.6  8.9 8.4*   AGAP  7  10  11   BUCR  12  10*  9*      CBC w/Diff Recent Labs      07/24/17   0246  07/23/17   0624  07/22/17   0223   WBC  10.5  9.4  8.9   RBC  3.11*  2.99*  3.11*   HGB  9.1*  8.7*  9.0*   HCT  26.5*  25.6*  26.4*   PLT  322  304  296   GRANS  64  63  54   LYMPH  29  30  39   EOS  1  1  2      Microbiology Recent Labs      07/22/17   1030  07/22/17   0745  07/21/17   1558   CULT  NO GROWTH 2 DAYS  NO GROWTH 2 DAYS  MODERATE ESCHERICHIA COLI*  MANY STAPHYLOCOCCUS EPIDERMIDIS*  MODERATE ENTEROCOCCUS FAECALIS GROUP D*  MODERATE STREPTOCOCCUS VIRIDANS*  FEW POSSIBLE 2ND STAPHYLOCOCCUS SPECIES, COAGULASE NEGATIVE*  ANAEROBES NOT RULED OUT. SCREENING IN PROGRESS.           RADIOLOGY:    All available imaging studies/reports in [facility] for this admission were reviewed    Dr. Derek Garcia, Infectious Disease Specialist  [phone]  July 24, 2017  12:37 PM

## 2017-07-24 NOTE — DISCHARGE SUMMARY
Discharge Summary  [facility]      Patient: Deniz Lopez Age: 46 y.o. Sex: female  : 1965    MRN: [id number]      DOA: 2017      Discharge Date: 2017      Attending:Rebel Simons MD      PCP: Daija Monroy MD        ================================================================    Reason for Admission:   Osteomyelitis [facility])  dx    Discharge Diagnoses:   Sepsis 2/2 osteomyelitis (resolved)  Osteomyelitis 2/2 diabetic foot wound (resolved)  Uncontrolled DM type 2, insulin dependent with retinopathy (ongoing)  Hypertension (controlled)  GERD (stable)  Insomnia/anxiety (stable)    Important notes to PCP/ follow-up studies and evaluations   - Please follow up on disability status, [facility] status  - Please follow up on blood sugar control, medication compliance secondary to cost  - Please follow up on possible substance use/abuse. - Per ID recommendation, please check serum K 3 days after discharge (check serum K on 2017), serum K may increase with the combination of quinapril and bactrim. - Please follow up on status of patients renal insufficiency, the patient noted she did not take her insulin at home because she was hypoglycemic. Worsening renal function may decrease clearance of insulin and predispose her to hypoglycemic episodes. Pending labs and studies:  None    Operative Procedures:   Amputation of toe 2 left through the metatarsophalangeal joint; SURGEON: José Manuel Baez DPM; Date of Surgery:  2017    Discharge medications:  Current Discharge Medication List      START taking these medications    Details   amoxicillin-clavulanate (AUGMENTIN) 875-125 mg per tablet Take 1 Tab by mouth two (2) times a day for 7 days. Qty: 14 Tab, Refills: 0      trimethoprim-sulfamethoxazole (BACTRIM DS) 160-800 mg per tablet Take 1 Tab by mouth two (2) times a day for 7 days.   Qty: 14 Tab, Refills: 0      Saccharomyces boulardii (FLORASTOR) 250 mg capsule Take 1 Cap by mouth two (2) times a day for 7 days. Qty: 14 Cap, Refills: 0      atorvastatin (LIPITOR) 20 mg tablet Take 1 Tab by mouth daily for 30 days. Qty: 30 Tab, Refills: 0      sodium hypochlorite (QUARTER STRENGTH DAKIN'S) 0.125 % soln external solution Apply 473 mL to affected area two (2) times a day. Qty: 1 Bottle, Refills: 0      acetaminophen (TYLENOL) 325 mg tablet Take 2 Tabs by mouth every six (6) hours as needed for up to 30 days. Qty: 120 Tab, Refills: 0         CONTINUE these medications which have CHANGED    Details   esomeprazole (NEXIUM) 20 mg capsule Take 2 Caps by mouth daily for 30 days. Qty: 60 Cap, Refills: 0      PARoxetine (PAXIL) 30 mg tablet Take 1 Tab by mouth daily for 30 days. Qty: 30 Tab, Refills: 0      quinapril (ACCUPRIL) 20 mg tablet Take 1 Tab by mouth daily for 30 days. Qty: 30 Tab, Refills: 0      insulin NPH/insulin regular (NOVOLIN 70/30) 100 unit/mL (70-30) injection 40 Units by SubCUTAneous route two (2) times a day. Qty: 2 Vial, Refills: 2               Disposition: Home    Consultants:    Dr. Angel Ballard, Infectious Disease [phone]  Dr. Corazon Reilly, Podiatry [phone]    [facility] Course (including pertinent history and physical findings)  Mrs. García Amin is a 46year old female with a PMH significant for HTN, insulin dependent DM II, GERD and insomnia who presented with a diabetic foot ulcer. S/P left second toe amputation with resolved sepsis: Admission labs and imaging included: 2/4 SIRS criteria with leukocytosis at 13.9, tachycardia at 99. XR indicative of marked soft tissue swelling with poor visualization of the cortex of the terminal tuft of the second digit. CRP increased at 5.2, ESR increased at 72, which is what one would expect to see in osteomyelitis. Lactate 1.4. Vancomycin and zosyn were started on admission. MRI 7/19 suggestive of osteomyelitis in the distal phalanx of the second toe (L foot).  SILVIA 7/19 not suggestive of arterial disease in lower extremities. Amputation of toe 2 left through the metatarsophalangeal joint was performed by Dr. Bairon Johnson, with apparent clear margins. The patient tolerated this procedure well with minimal pain and blood loss. Blood culture 7/18 with streptococcus intermedius. Surgical culture with e-coli, coagulase negative staph, non-hemolytic and hemolytic streptococci. Augmentin and bactrim were prescribed at discharge per ID recommendations. 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 She had not taken her insulin prior to admission due to her concerns about hypoglycemia. She is getting connected to the [facility] and received a voucher for 70/30 before discharge. Her A1C during this admission was 10.1. Summarized key findings and results (labs, imaging studies, ECHO, cardiac cath, endoscopies, etc):    07/18/2017 3 view XR left foot  IMPRESSION:     Marked soft tissue swelling with poor visualization of the cortex of the  terminal tuft of the second digit. Osteomyelitis is not excluded. As clinically  indicated follow-up MR of the foot or nuclear isotope infection scan could be  obtained for confirmation. 07/19/2017 SILVIA  INTERPRETATION/FINDINGS  Physiologic testing was performed using continuous wave Doppler and  segmental pressures. 1. No evidence of arterial insufficiency in the bilateral lower  extremities at rest.  2. The right ankle/brachial index is 1.16 and the left ankle/brachial  index is 1.11.    07/19/2017 MRI left foot  IMPRESSION:     1. Findings are consistent with osteomyelitis in the second toe distal phalanx. Marked soft tissue swelling in the second toe. See additional details above.     Culture Results  All Micro Results     Procedure Component Value Units Date/Time    CULTURE, SURGICAL WOUND W GRAM STAIN [[id number]]  (Abnormal)  (Susceptibility) Collected:  07/21/17 1558    Order Status:  Completed Specimen:  Surgical Specimen Updated:  07/24/17 1111     Special Requests: INFECTED LEFT TOE     GRAM STAIN FEW WBC'S                 MODERATE GRAM POSITIVE COCCI IN PAIRS              RARE GRAM POSITIVE COCCI IN GROUPS      FEW GRAM NEGATIVE RODS        Culture result:         MODERATE ESCHERICHIA COLI (A)              MANY STAPHYLOCOCCUS EPIDERMIDIS (A)              MODERATE ENTEROCOCCUS FAECALIS GROUP D (A)              MODERATE STREPTOCOCCUS VIRIDANS (A)              FEW POSSIBLE 2ND STAPHYLOCOCCUS SPECIES, COAGULASE NEGATIVE (A)    CULTURE, ANAEROBIC [[id number]] Collected:  07/21/17 1558    Order Status:  Completed Specimen:  Surgical Specimen Updated:  07/24/17 0737     Special Requests: INFECTED LEFT TOE     Culture result:         ANAEROBES NOT RULED OUT. SCREENING IN PROGRESS.     CULTURE, BLOOD [[id number]] Collected:  07/22/17 1030    Order Status:  Completed Specimen:  Blood from Blood Updated:  07/24/17 0724     Special Requests: NO SPECIAL REQUESTS        Culture result: NO GROWTH 2 DAYS       CULTURE, BLOOD [[id number]] Collected:  07/22/17 0745    Order Status:  Completed Specimen:  Blood from Blood Updated:  07/24/17 0724     Special Requests: NO SPECIAL REQUESTS        Culture result: NO GROWTH 2 DAYS       CULTURE, BLOOD [[id number]]  (Abnormal) Collected:  07/18/17 1743    Order Status:  Completed Specimen:  Whole Blood from Blood Updated:  07/23/17 0713     Special Requests: NO SPECIAL REQUESTS        GRAM STAIN         ANAEROBIC BOTTLE GRAM POSITIVE COCCI IN CHAINS              SMEAR CALLED TO AND CORRECTLY REPEATED BY: Roseanne Rios RN 4N ON 7/21 AT 2065 TO [facility]     Culture result:         ANAEROBIC BOTTLE POSSIBLE STREPTOCOCCUS INTERMEDIUS For Susceptibility Refer to Culture [id number] (A)    CULTURE, BLOOD [[id number]]  (Abnormal)  (Susceptibility) Collected:  07/18/17 1805    Order Status:  Completed Specimen:  Whole Blood from Blood Updated:  07/23/17 0701     Special Requests: NO SPECIAL REQUESTS        GRAM STAIN         ANAEROBIC BOTTLE GRAM POSITIVE COCCI IN CHAINS              SMEAR CALLED TO AND CORRECTLY REPEATED BY: David Beavers, RN 4N ON 7/21 AT 0822 TO [facility]     Culture result:         ANAEROBIC BOTTLE STREPTOCOCCUS INTERMEDIUS (A)    CULTURE, WOUND Katya Gunner STAIN [[id number]]  (Abnormal)  (Susceptibility) Collected:  07/18/17 1758    Order Status:  Completed Specimen:  Toe Updated:  07/22/17 0701     Special Requests: NO SPECIAL REQUESTS        GRAM STAIN RARE WBC'S                 FEW GRAM POSITIVE COCCI IN PAIRS     Culture result: FEW ESCHERICHIA COLI (A)                 MODERATE STAPHYLOCOCCUS SPECIES, COAGULASE NEGATIVE (TWO MORPHOTYPES) (A)              FEW ENTEROCOCCUS FAECALIS GROUP D (A)              MODERATE STREPTOCOCCUS INTERMEDIUS (A)              MODERATE DIPHTHEROIDS (TWO MORPHOTYPES) (A)    CULTURE, URINE [[id number]] Collected:  07/18/17 1755    Order Status:  Completed Specimen:  Urine from Clean catch Updated:  07/20/17 0954     Special Requests: NO SPECIAL REQUESTS        Culture result: NO GROWTH 2 DAYS               Functional status and cognitive function:    Ambulates with walker to avoid any weight bearing on her L foot per PT recommendations. Status: alert, cooperative, no distress, appears stated age    Diet: Recommend diabetic    Code status and advanced care plan: Full    Patient Education:  Patient was educated on the following topics prior to discharge: Management of diabetes including nail and foot care, complications of poor diabetic control. Follow-up:   Follow-up Information     Follow up With Details Comments [address], NP  New patient. Patient has to bring proof of income and proof of where they reside.  The patient will be set up with an follow-up appointment [address] [place] [address] On 7/31/2017 Arrive by Lizette Comment is at 8:30AM please bring $200 upfront for co-pay.   [address]      Tello Romero DPM Go on 7/28/2017 For suture removal [address]              ================================================================  Erwin Morrison MD

## 2017-07-25 ENCOUNTER — HOME CARE VISIT (OUTPATIENT)
Dept: HOME HEALTH SERVICES | Facility: HOME HEALTH | Age: 52
End: 2017-07-25

## 2017-07-25 ENCOUNTER — HOME CARE VISIT (OUTPATIENT)
Dept: SCHEDULING | Facility: HOME HEALTH | Age: 52
End: 2017-07-25

## 2017-07-25 LAB
BACTERIA SPEC CULT: ABNORMAL
GRAM STN SPEC: ABNORMAL
SERVICE CMNT-IMP: ABNORMAL
SERVICE CMNT-IMP: ABNORMAL

## 2017-07-25 NOTE — HOME CARE
Rec call back from Dr. Perla Ocampo office - she will not follow patient, patient has been referred to [facility] for follow up- - She will not sign POT - Central office notified to remove Dr. Perla Ocampo name from referral - sent POT to Dr. Gregoria Nolasco at clinic - D Mushtaq RUSS

## 2017-07-28 LAB
BACTERIA SPEC CULT: NORMAL
BACTERIA SPEC CULT: NORMAL
SERVICE CMNT-IMP: NORMAL
SERVICE CMNT-IMP: NORMAL

## 2017-08-07 ENCOUNTER — OFFICE VISIT (OUTPATIENT)
Dept: FAMILY MEDICINE CLINIC | Age: 52
End: 2017-08-07

## 2017-08-07 ENCOUNTER — HOSPITAL ENCOUNTER (OUTPATIENT)
Dept: LAB | Age: 52
Discharge: HOME | End: 2017-08-07

## 2017-08-07 VITALS
RESPIRATION RATE: 20 BRPM | BODY MASS INDEX: 28.16 KG/M2 | TEMPERATURE: 99.1 F | DIASTOLIC BLOOD PRESSURE: 70 MMHG | OXYGEN SATURATION: 100 % | WEIGHT: 185.8 LBS | HEIGHT: 68 IN | SYSTOLIC BLOOD PRESSURE: 128 MMHG | HEART RATE: 90 BPM

## 2017-08-07 DIAGNOSIS — E11.8 TYPE 2 DIABETES MELLITUS WITH COMPLICATION, WITH LONG-TERM CURRENT USE OF INSULIN (HCC): ICD-10-CM

## 2017-08-07 DIAGNOSIS — E11.319 DIABETIC RETINOPATHY ASSOCIATED WITH TYPE 2 DIABETES MELLITUS, MACULAR EDEMA PRESENCE UNSPECIFIED, UNSPECIFIED LATERALITY, UNSPECIFIED RETINOPATHY SEVERITY (HCC): ICD-10-CM

## 2017-08-07 DIAGNOSIS — Z12.39 BREAST CANCER SCREENING: ICD-10-CM

## 2017-08-07 DIAGNOSIS — Z00.00 ROUTINE HEALTH MAINTENANCE: ICD-10-CM

## 2017-08-07 DIAGNOSIS — Z79.4 TYPE 2 DIABETES MELLITUS WITH COMPLICATION, WITH LONG-TERM CURRENT USE OF INSULIN (HCC): ICD-10-CM

## 2017-08-07 DIAGNOSIS — Z76.89 ENCOUNTER TO ESTABLISH CARE: ICD-10-CM

## 2017-08-07 DIAGNOSIS — G62.9 PERIPHERAL POLYNEUROPATHY: ICD-10-CM

## 2017-08-07 DIAGNOSIS — Z76.89 ENCOUNTER TO ESTABLISH CARE: Primary | ICD-10-CM

## 2017-08-07 DIAGNOSIS — I10 ESSENTIAL HYPERTENSION: ICD-10-CM

## 2017-08-07 DIAGNOSIS — Z12.11 COLON CANCER SCREENING: ICD-10-CM

## 2017-08-07 DIAGNOSIS — F41.8 ANXIETY WITH DEPRESSION: ICD-10-CM

## 2017-08-07 LAB
ALBUMIN SERPL BCP-MCNC: 3.9 G/DL (ref 3.4–5)
ALBUMIN/GLOB SERPL: 1.1 {RATIO} (ref 0.8–1.7)
ALP SERPL-CCNC: 146 U/L (ref 45–117)
ALT SERPL-CCNC: 27 U/L (ref 13–56)
AMPHET UR QL SCN: NEGATIVE
ANION GAP BLD CALC-SCNC: 6 MMOL/L (ref 3–18)
APPEARANCE UR: CLEAR
AST SERPL W P-5'-P-CCNC: 15 U/L (ref 15–37)
BACTERIA URNS QL MICRO: ABNORMAL /HPF
BARBITURATES UR QL SCN: NEGATIVE
BASOPHILS # BLD AUTO: 0 K/UL (ref 0–0.1)
BASOPHILS # BLD: 0 % (ref 0–2)
BENZODIAZ UR QL: NEGATIVE
BILIRUB SERPL-MCNC: 0.7 MG/DL (ref 0.2–1)
BILIRUB UR QL: NEGATIVE
BUN SERPL-MCNC: 26 MG/DL (ref 7–18)
BUN/CREAT SERPL: 22 (ref 12–20)
CALCIUM SERPL-MCNC: 8.9 MG/DL (ref 8.5–10.1)
CANNABINOIDS UR QL SCN: NEGATIVE
CHLORIDE SERPL-SCNC: 109 MMOL/L (ref 100–108)
CHOLEST SERPL-MCNC: 170 MG/DL
CO2 SERPL-SCNC: 25 MMOL/L (ref 21–32)
COCAINE UR QL SCN: NEGATIVE
COLOR UR: YELLOW
CREAT SERPL-MCNC: 1.16 MG/DL (ref 0.6–1.3)
CREAT UR-MCNC: 77.7 MG/DL (ref 30–125)
DIFFERENTIAL METHOD BLD: ABNORMAL
EOSINOPHIL # BLD: 0.1 K/UL (ref 0–0.4)
EOSINOPHIL NFR BLD: 1 % (ref 0–5)
EPITH CASTS URNS QL MICRO: ABNORMAL /LPF (ref 0–5)
ERYTHROCYTE [DISTWIDTH] IN BLOOD BY AUTOMATED COUNT: 13.5 % (ref 11.6–14.5)
EST. AVERAGE GLUCOSE BLD GHB EST-MCNC: 220 MG/DL
ETHANOL SERPL-MCNC: <3 MG/DL (ref 0–3)
GLOBULIN SER CALC-MCNC: 3.6 G/DL (ref 2–4)
GLUCOSE SERPL-MCNC: 221 MG/DL (ref 74–99)
GLUCOSE UR STRIP.AUTO-MCNC: 100 MG/DL
HBA1C MFR BLD: 9.3 % (ref 4.2–5.6)
HCT VFR BLD AUTO: 27.7 % (ref 35–45)
HDLC SERPL-MCNC: 61 MG/DL (ref 40–60)
HDLC SERPL: 2.8 {RATIO} (ref 0–5)
HDSCOM,HDSCOM: NORMAL
HGB BLD-MCNC: 9.3 G/DL (ref 12–16)
HGB UR QL STRIP: NEGATIVE
HYALINE CASTS URNS QL MICRO: ABNORMAL /LPF (ref 0–2)
KETONES UR QL STRIP.AUTO: NEGATIVE MG/DL
LDLC SERPL CALC-MCNC: 79.6 MG/DL (ref 0–100)
LEUKOCYTE ESTERASE UR QL STRIP.AUTO: ABNORMAL
LIPID PROFILE,FLP: ABNORMAL
LYMPHOCYTES # BLD AUTO: 40 % (ref 21–52)
LYMPHOCYTES # BLD: 3.5 K/UL (ref 0.9–3.6)
MAGNESIUM SERPL-MCNC: 1.8 MG/DL (ref 1.6–2.6)
MCH RBC QN AUTO: 29.1 PG (ref 24–34)
MCHC RBC AUTO-ENTMCNC: 33.6 G/DL (ref 31–37)
MCV RBC AUTO: 86.6 FL (ref 74–97)
METHADONE UR QL: NEGATIVE
MICROALBUMIN UR-MCNC: 1.28 MG/DL (ref 0–3)
MICROALBUMIN/CREAT UR-RTO: 16 MG/G (ref 0–30)
MONOCYTES # BLD: 0.3 K/UL (ref 0.05–1.2)
MONOCYTES NFR BLD AUTO: 4 % (ref 3–10)
NEUTS SEG # BLD: 4.9 K/UL (ref 1.8–8)
NEUTS SEG NFR BLD AUTO: 55 % (ref 40–73)
NITRITE UR QL STRIP.AUTO: NEGATIVE
OPIATES UR QL: NEGATIVE
PCP UR QL: NEGATIVE
PH UR STRIP: 5.5 [PH] (ref 5–8)
PLATELET # BLD AUTO: 319 K/UL (ref 135–420)
PMV BLD AUTO: 10.4 FL (ref 9.2–11.8)
POTASSIUM SERPL-SCNC: 4.2 MMOL/L (ref 3.5–5.5)
PROT SERPL-MCNC: 7.5 G/DL (ref 6.4–8.2)
PROT UR STRIP-MCNC: NEGATIVE MG/DL
RBC # BLD AUTO: 3.2 M/UL (ref 4.2–5.3)
RBC #/AREA URNS HPF: NEGATIVE /HPF (ref 0–5)
SODIUM SERPL-SCNC: 140 MMOL/L (ref 136–145)
SP GR UR REFRACTOMETRY: 1.01 (ref 1–1.03)
TRIGL SERPL-MCNC: 147 MG/DL (ref ?–150)
TSH SERPL DL<=0.05 MIU/L-ACNC: 0.87 UIU/ML (ref 0.36–3.74)
UROBILINOGEN UR QL STRIP.AUTO: 0.2 EU/DL (ref 0.2–1)
VLDLC SERPL CALC-MCNC: 29.4 MG/DL
WBC # BLD AUTO: 8.8 K/UL (ref 4.6–13.2)
WBC URNS QL MICRO: ABNORMAL /HPF (ref 0–4)

## 2017-08-07 PROCEDURE — 80074 ACUTE HEPATITIS PANEL: CPT | Performed by: NURSE PRACTITIONER

## 2017-08-07 PROCEDURE — 85025 COMPLETE CBC W/AUTO DIFF WBC: CPT | Performed by: NURSE PRACTITIONER

## 2017-08-07 PROCEDURE — 80061 LIPID PANEL: CPT | Performed by: NURSE PRACTITIONER

## 2017-08-07 PROCEDURE — 82043 UR ALBUMIN QUANTITATIVE: CPT | Performed by: NURSE PRACTITIONER

## 2017-08-07 PROCEDURE — 81001 URINALYSIS AUTO W/SCOPE: CPT | Performed by: NURSE PRACTITIONER

## 2017-08-07 PROCEDURE — 80307 DRUG TEST PRSMV CHEM ANLYZR: CPT | Performed by: NURSE PRACTITIONER

## 2017-08-07 PROCEDURE — 80053 COMPREHEN METABOLIC PANEL: CPT | Performed by: NURSE PRACTITIONER

## 2017-08-07 PROCEDURE — 83735 ASSAY OF MAGNESIUM: CPT | Performed by: NURSE PRACTITIONER

## 2017-08-07 PROCEDURE — 87389 HIV-1 AG W/HIV-1&-2 AB AG IA: CPT | Performed by: NURSE PRACTITIONER

## 2017-08-07 PROCEDURE — 83036 HEMOGLOBIN GLYCOSYLATED A1C: CPT | Performed by: NURSE PRACTITIONER

## 2017-08-07 PROCEDURE — 84443 ASSAY THYROID STIM HORMONE: CPT | Performed by: NURSE PRACTITIONER

## 2017-08-07 RX ORDER — PAROXETINE 30 MG/1
30 TABLET, FILM COATED ORAL DAILY
Qty: 30 TAB | Refills: 0 | Status: SHIPPED | OUTPATIENT
Start: 2017-08-07 | End: 2017-10-03 | Stop reason: SDUPTHER

## 2017-08-07 RX ORDER — QUINAPRIL 20 MG/1
20 TABLET ORAL DAILY
Qty: 30 TAB | Refills: 0 | Status: SHIPPED | OUTPATIENT
Start: 2017-08-07 | End: 2017-09-06

## 2017-08-07 RX ORDER — ROSUVASTATIN CALCIUM 20 MG/1
20 TABLET, COATED ORAL
Qty: 90 TAB | Refills: 3 | Status: SHIPPED | COMMUNITY
Start: 2017-08-07 | End: 2017-08-22 | Stop reason: SDUPTHER

## 2017-08-07 RX ORDER — GUAIFENESIN 100 MG/5ML
81 LIQUID (ML) ORAL DAILY
COMMUNITY
Start: 2017-08-07 | End: 2017-11-14

## 2017-08-07 RX ORDER — METFORMIN HYDROCHLORIDE 500 MG/1
500 TABLET, EXTENDED RELEASE ORAL
Qty: 30 TAB | Refills: 3 | Status: SHIPPED | OUTPATIENT
Start: 2017-08-07 | End: 2017-12-21 | Stop reason: SDUPTHER

## 2017-08-07 NOTE — MR AVS SNAPSHOT
Visit Information Date & Time Provider Department Dept. Phone Encounter #  
 8/7/2017  1:30 PM Ping Matute NP [address] [id number] Follow-up Instructions Return in about 1 week (around 8/14/2017), or if symptoms worsen or fail to improve, for Nurse Navigator and review labs . Your Appointments 8/14/2017  1:00 PM  
CONSULT with [address] Anna Vasquez [facility] (Fitojasmin [place]) Appt Note: NPO  
 [address] [facility] [address]  
[address] [facility] [address]  
  
    
 9/5/2017  2:00 PM  
Follow Up with Ping Matute NP [facility] [facility] (Betsey Wisdom) Appt Note: 1 month follow up/ No labs needed [address] [place] [id number]  
[address] [facility] [address] Upcoming Health Maintenance Date Due Hepatitis C Screening 1965 MICROALBUMIN Q1 8/12/1973 EYE EXAM RETINAL OR DILATED Q1 8/12/1973 Pneumococcal 19-64 Highest Risk (1 of 3 - PCV13) 8/12/1982 DTaP/Tdap/Td series (1 - Tdap) 8/12/1984 PAP AKA CERVICAL CYTOLOGY 8/12/1984 LIPID PANEL Q1 5/1/2013 FOBT Q 1 YEAR AGE 50-75 12/3/2014 BREAST CANCER SCRN MAMMOGRAM 2/18/2015 INFLUENZA AGE 9 TO ADULT 8/1/2017 HEMOGLOBIN A1C Q6M 1/19/2018 FOOT EXAM Q1 7/20/2018 Allergies as of 8/7/2017  Review Complete On: 8/7/2017 By: Gilmar Lester Severity Noted Reaction Type Reactions Promethazine  10/03/2014    Nausea and Vomiting \"the phenergan made me more nauseated\" Current Immunizations  Never Reviewed No immunizations on file. Not reviewed this visit You Were Diagnosed With   
  
 Codes Comments Encounter to establish care    -  Primary ICD-10-CM: Z76.89 
ICD-9-CM: V65.8 Type 2 diabetes mellitus with complication, with long-term current use of insulin (HCC)     ICD-10-CM: E11.8, Z79.4 ICD-9-CM: 250.90, V58.67 Essential hypertension     ICD-10-CM: I10 
ICD-9-CM: 401.9 Colon cancer screening     ICD-10-CM: Z12.11 ICD-9-CM: V76.51 Breast cancer screening     ICD-10-CM: Z12.39 
ICD-9-CM: V76.10 Routine health maintenance     ICD-10-CM: Z00.00 ICD-9-CM: V70.0 Peripheral polyneuropathy (HCC)     ICD-10-CM: G62.9 ICD-9-CM: 356.9 Diabetic retinopathy associated with type 2 diabetes mellitus, macular edema presence unspecified, unspecified laterality, unspecified retinopathy severity ([facility] [address]     ICD-10-CM: E04.228 ICD-9-CM: 250.50, 362.01 Anxiety with depression     ICD-10-CM: F41.8 ICD-9-CM: 300.4 Vitals BP Pulse Temp Resp Height(growth percentile) Weight(growth percentile) 128/70 90 99.1 °F (37.3 °C) 20 5' 8\" (1.727 m) 185 lb 12.8 oz (84.3 kg) SpO2 BMI OB Status Smoking Status 100% 28.25 kg/m2 Menopause Former Smoker Vitals History BMI and BSA Data Body Mass Index Body Surface Area  
 28.25 kg/m 2 2.01 m 2 Preferred Pharmacy Pharmacy Name Phone [address], [place] [address] 6313 [facility] [phone] Your Updated Medication List  
  
   
This list is accurate as of: 8/7/17  2:43 PM.  Always use your most recent med list.  
  
  
  
  
 acetaminophen 325 mg tablet Commonly known as:  TYLENOL Take 2 Tabs by mouth every six (6) hours as needed for up to 30 days. aspirin 81 mg chewable tablet Take 1 Tab by mouth daily. esomeprazole 20 mg capsule Commonly known as:  NexIUM Take 2 Caps by mouth daily for 30 days. * insulin NPH/insulin regular 100 unit/mL (70-30) injection Commonly known as:  NOVOLIN 70/30, HUMULIN 70/30  
40 Units by SubCUTAneous route two (2) times a day. * insulin NPH/insulin regular 100 unit/mL (70-30) injection Commonly known as:  NOVOLIN 70/30, HUMULIN 70/30  
40 Units by SubCUTAneous route two (2) times a day. metFORMIN  mg tablet Commonly known as:  GLUCOPHAGE XR Take 1 Tab by mouth daily (with dinner). PARoxetine 30 mg tablet Commonly known as:  PAXIL Take 1 Tab by mouth daily for 30 days. quinapril 20 mg tablet Commonly known as:  ACCUPRIL Take 1 Tab by mouth daily for 30 days. rosuvastatin 20 mg tablet Commonly known as:  CRESTOR Take 1 Tab by mouth nightly for 90 days. sodium hypochlorite 0.125 % Soln external solution Commonly known as:  [address] Apply 473 mL to affected area two (2) times a day. * Notice: This list has 2 medication(s) that are the same as other medications prescribed for you. Read the directions carefully, and ask your doctor or other care provider to review them with you. Prescriptions Printed Refills  
 insulin NPH/insulin regular (NOVOLIN 70/30, HUMULIN 70/30) 100 unit/mL (70-30) injection 3 Si Units by SubCUTAneous route two (2) times a day. Class: Program  
 Route: SubCUTAneous Prescriptions Sent to Mail Order Refills  
 insulin NPH/insulin regular (NOVOLIN 70/30, HUMULIN 70/30) 100 unit/mL (70-30) injection 3 Si Units by SubCUTAneous route two (2) times a day. Class: Program  
 Pharmacy: [facility] [address] [place] [address] Ph #: [phone] Route: SubCUTAneous Prescriptions Sent to Pharmacy Refills  
 insulin NPH/insulin regular (NOVOLIN 70/30, HUMULIN 70/30) 100 unit/mL (70-30) injection 3 Si Units by SubCUTAneous route two (2) times a day. Class: Program  
 Pharmacy: [facility] [address] [place] [address] Ph #: [phone] Route: SubCUTAneous metFORMIN ER (GLUCOPHAGE XR) 500 mg tablet 3 Sig: Take 1 Tab by mouth daily (with dinner). Class: Normal  
 Pharmacy: [address] Ph #: [phone] Route: Oral  
 quinapril (ACCUPRIL) 20 mg tablet 0 Sig: Take 1 Tab by mouth daily for 30 days. Class: Normal  
 Pharmacy: [address] [place] [address] Ph #: [phone] Route: Oral  
 PARoxetine (PAXIL) 30 mg tablet 0 Sig: Take 1 Tab by mouth daily for 30 days. Class: Normal  
 Pharmacy: [address] [place] [address] Ph #: [phone] Route: Oral  
  
We Performed the Following  DIABETES FOOT EXAM [HM7 Custom] [address] [WUR16 Custom] Comments:  
 Referral to Every [address] [facility] Program 
Phone [phone] Fax [phone] EWL GUIDELINES 
 
18-64 y.o. with problem 38-51 y.o. screening available but on waitlist 
52-64 y.o. screening appt will be scheduled Patient calls for financial screening and appt. [phone] Follow-up Instructions Return in about 1 week (around 8/14/2017), or if symptoms worsen or fail to improve, for Nurse Navigator and review labs . To-Do List   
 08/07/2017 Lab:  OCCULT BLOOD, IMMUNOASSAY (FIT)   
  
 08/07/2017  2:45 PM  
  Appointment with [facility] LAB OUTREACH CLIENT at [address] [place] [address] ([phone]) Referral Information Referral ID Referred By Referred To  
  
 [id number] Alexander GUERRERO Not Available Visits Status Start Date End Date 1 New Request 8/7/17 8/7/18 If your referral has a status of pending review or denied, additional information will be sent to support the outcome of this decision. Patient Instructions [facility] reminders! Foundation Operating Hours: These may change without notice. Mon- Wed 7am to 5pm. Closed for lunch 12-1pm 
Thurs 7am to 12pm 
Fridays closed NO SHOW POLICY ~ If a patient has 3 no shows for an appointment with the Provider, Mental Health Provider, or the Nurse Navigator in 6 months, they will be discharged from the practice for 6 months. Medication ordering will also be suspended. If the patient is discharged from the [facility], they can go to the [place] [address] where they can be seen for the primary needs plus obtain the same types of medications as they receive at the [facility]. To avoid being discharged, the patient must call the office at [phone] 24 hours prior to their appointment if they need to cancel, arrive to their appointments on time and come to all scheduled appointments. If the patient is discharged from the practice, they can apply to be re-established after 12 months. Lab work:  Unless you are instructed differently, please return to the office between the hours of 7 am and 10:30 am Monday through Thursday to have your labs drawn one week before your next scheduled PROVIDER visit. If you do not have an appointment to follow up on these results, please make one or plan to call the office if you do not hear from us to get the results. No news does not mean good news. Medications: If your medications are new or have changed, and you get your medications from the clinic pharmacy ([facility]), you MUST talk to the pharmacy staff to sign the new prescription applications. If you don't sign the applications we cannot get the medications for you. It usually takes 6-8 weeks for your medications to arrive. The Pharmacy staff will call you when your medications are available. You will have 30 days to come in and  your medications.  If you don't  your medicines within those 30 days, those medicines will be placed on the self as samples and you will have to start all over again by completing the applications and waiting the 6-8 weeks for your medicines to arrive. This is firm and there will be no exceptions! ! The [facility] or [facility] will assist you with your medications if available but not all medications are available so some may be obtained through local pharmacies such as [facility] that has a large $4 list and [facility] that has many drugs for free or also for $4.  We will work hard to get the best medications for you that you can also afford. [facility] Medication pick-up times: 
Monday-Tuesday 1:00 -5:00 PM 
Wednesday- Thursday 8:00 -11:30 AM 
 
 
Feet Care: Local [facility] through [facility] Every second Tuesday of the month (except for holidays and election days) from 9am to 1 pm. The services provided by these ministry volunteers are free of charge with the option to donate. They will inspect your feet thoroughly, soak them for 10 minutes, cut and file your nails. They care for diabetics as well. Keep in mind this service is free and will be on a first come first serve basis. Bad teeth? Ask about the Dental Bus to get you in front of a local dentist. The bus leaves every other Wednesday for those on the list. (Ask about availability as these appointments are limited) Eye exams for Diabetics. Please let us know so we can add you to the list to see the eye doctor at [facility]. You will receive a free eye exam and free glasses if needed. Unfortunately, if you are not a diabetic, we do not have a free service for eye exams for you (yet!). We do have information on where to go to get a huge discount on eye exams and glasses. Sick visits: If you are sick and it is not an emergency call the office to see if you can schedule an appointment.   
 
Charges and cost items from the clinic:  Most of our orders are covered by [employer] but there ARE SOME CHARGES for items such as radiology interpretations and anesthesiology during procedures and surgeries. Please make sure you have contacted the Advanced Patient Advocacy (APA) group to check on your payment options: [email or web address]. or come in and talk to them in person: On 
Tuesdays, we have Advanced Patient Advocacy is available Mon - Fri 8-4pm at [facility] on the first floor by the information desk. Their number is [phone]. It is important that you are screened in order to qualify for assistance and to avoid huge medical charges. The [facility] is not responsible for ANY charges you may accrue regardless of who ordered the medication, procedure, treatment or test. If you go to the Emergency Room, you WILL be charged! Behavior and emotional issues! It is stressful to be sick, have an illness, take medications, not have a job, not have medical insurance, have family issues or just getting older! Schedule an appointment with our mental health provider. She is in the office Mondays and Wednesdays from 8am to [place] [address] can also contact the following: The national suicide hotline (3-805-415-ATWS or 4-417.394.6677) [facility] [address] [facility], [address] [facility] Walk- in hours Monday -Wednesday 8:30 am -11:15 AM 
2:15pm -4:15 pm 
 
[employer] (GIOVANNY) [facility] [address] [facility], [address] [facility]  
[phone] The [address] [place] [address] Aging and [facility], in collaboration with community partners, provides and advocates for resources and services to improve the employment, quality of life, security, and independence of older [address], [address] with disabilities, and their families. Department for Aging and Rehabilitative Services Street Location: [address] [facility] Voice: [phone] Toll Free Number:[phone] Toll Free TTY: 482.768.2792 E-mail: [email or web address] Immunizations/Vaccination Routine Immunizations are provided for infants, children, teens, and adults at the [facility] FOR [facility]ICAL [facility]ATION. Please bring all immunization records with you and present them at the time of registration. Phone [phone] Hours (Walk in) Monday, Tuesday, Wednesday and Friday 8:00 AM to 11:00 AM 
12:30 PM to 3:00 PM 
 
Want to Quit Smoking??? Continued tobacco use increases your risks of elevated blood pressure, vascular irritation with increased incidence of cardiovascular with stroke, heart attack, and/or peripheral vascular disease causing claudication. Smoking may also cause lung damage that could lead to COPD, cancer, and death. We encourage you to find a few healthy habits, write them down then plan to decrease your cigarette use by one each week till they are gone all together. Plan for ways to cope with stress for stress may cause you to want to restart. It is imperative to develop new coping mechanisms in order to be successful. 1-800-QUIT-NOW provided for counseling Drug and Alcohol Addiction Issues! It is hard to stop a poor habit but there is help out there. Please feel free to attend any other the following support groups to help you kick the habit or go to [facility] to be evaluated by the psychiatric team. Never give up!! AlAnon meetings: [place], [place], [place] MONDAY 7:30 PM JUST FOR TODAY AFG Tobin [facility] [address] [facility] [place] WEDNESDAY 10:30 AM NEW BEGINNINGS AFEDOUARD Rudd [facility] OF [facility], ROOM [address] [place] [address]  
 
[place] WEDNESDAY 8:00 PM Chavo Ruelas [facility] [address] [place] THURSDAY 8:00 PM KEEP IT SIMPLE AFG Tobin FLORES [facility] [address] [facility] 8:00 PM LET IT BEGIN WITH ME AFG Tobin BOWMAN [address]  
 
 [place] FRIDAY 6;30 PM [place] PARENTS AFG Parents [facility] [address] [facility]  [facility] MONDAY 10:30  [place] [address] [place] SATURDAY 8:00 PM LUZ MARIA REID SATURDAY NIGHT AFG Al-[employer]on [place] [address] [place] MONDAY 7:00 PM MONDAY NIGHT AFG Al-Anon N[facility] [address]  
 
[facility] WEDNES[facility] 8:00 PM SERENITY SEEKERS AF[employer] Al-Anon [facility] [address] [facility] & HEALTH SERVICES! [facility] introduces Wantr patient portal. Now you can access parts of your medical record, email your doctor's office, and request medication refills online. 1. In your internet browser, go to [email or web address] 2. Click on the First Time User? Click Here link in the Sign In box. You will see the New Member Sign Up page. 3. Enter your Wantr Access Code exactly as it appears below. You will not need to use this code after youve completed the sign-up process. If you do not sign up before the expiration date, you must request a new code. · Wantr Access Code: [email or web address] Expires: 10/16/2017  4:37 PM 
 
4. Enter the last four digits of your Social Security Number (xxxx) and Date of Birth (mm/dd/yyyy) as indicated and click Submit. You will be taken to the next sign-up page. 5. Create a Medigramt ID. This will be your Wantr login ID and cannot be changed, so think of one that is secure and easy to remember. 6. Create a Wantr password. You can change your password at any time. 7. Enter your Password Reset Question and Answer. This can be used at a later time if you forget your password. 8. Enter your e-mail address. You will receive e-mail notification when new information is available in [address] 9. Click Sign Up. You can now view and download portions of your medical record. 10. Click the Download Summary menu link to download a portable copy of your medical information. If you have questions, please visit the Frequently Asked Questions section of the Xockets website. Remember, Xockets is NOT to be used for urgent needs. For medical emergencies, dial 911. Now available from your iPhone and Android! Please provide this summary of care documentation to your next provider. Your primary care clinician is listed as Ping First. If you have any questions after today's visit, please call [phone].

## 2017-08-07 NOTE — PATIENT INSTRUCTIONS
The [facility] [place] reminders! Foundation Operating Hours: These may change without notice. Mon- Wed 7am to 5pm. Closed for lunch 12-1pm  Thurs 7am to 12pm  Fridays closed     NO SHOW POLICY ~ If a patient has 3 no shows for an appointment with the Provider, Mental Health Provider, or the Nurse Navigator in 6 months, they will be discharged from the practice for 6 months. Medication ordering will also be suspended. If the patient is discharged from the [facility], they can go to the [place] [id number] where they can be seen for the primary needs plus obtain the same types of medications as they receive at the [facility]. To avoid being discharged, the patient must call the office at [phone] 24 hours prior to their appointment if they need to cancel, arrive to their appointments on time and come to all scheduled appointments. If the patient is discharged from the [facility], they can apply to be re-established after 12 months. Lab work:  Unless you are instructed differently, please return to the office between the hours of 7 am and 10:30 am Monday through Thursday to have your labs drawn one week before your next scheduled PROVIDER visit. If you do not have an appointment to follow up on these results, please make one or plan to call the office if you do not hear from us to get the results. No news does not mean good news. Medications: If your medications are new or have changed, and you get your medications from the clinic pharmacy (C), you MUST talk to the pharmacy staff to sign the new prescription applications. If you don't sign the applications we cannot get the medications for you. It usually takes 6-8 weeks for your medications to arrive. The Pharmacy staff will call you when your medications are available. You will have 30 days to come in and  your medications.  If you don't  your medicines within those 30 days, those medicines will be placed on the self as samples and you will have to start all over again by completing the applications and waiting the 6-8 weeks for your medicines to arrive. This is firm and there will be no exceptions! ! The [facility] or [facility] will assist you with your medications if available but not all medications are available so some may be obtained through local pharmacies such as [facility] that has a large $4 list and [facility] that has many drugs for free or also for $4.  We will work hard to get the best medications for you that you can also afford. [facility] Medication pick-up times:  Monday-Tuesday 1:00 -5:00 PM  Wednesday- Thursday 8:00 -11:30 AM      Feet Care: Local [facility] through [facility]  Every second Tuesday of the month (except for holidays and election days) from 9am to 1 pm. The services provided by these ministry volunteers are free of charge with the option to donate. They will inspect your feet thoroughly, soak them for 10 minutes, cut and file your nails. They care for diabetics as well. Keep in mind this service is free and will be on a first come first serve basis. Bad teeth? Ask about the Dental Bus to get you in front of a local dentist. The bus leaves every other Wednesday for those on the list. (Ask about availability as these appointments are limited)    Eye exams for Diabetics. Please let us know so we can add you to the list to see the eye doctor at [facility]. You will receive a free eye exam and free glasses if needed. Unfortunately, if you are not a diabetic, we do not have a free service for eye exams for you (yet!). We do have information on where to go to get a huge discount on eye exams and glasses. Sick visits: If you are sick and it is not an emergency call the office to see if you can schedule an appointment.      Charges and cost items from the clinic:  Most of our orders are covered by [address] but there ARE SOME CHARGES for items such as radiology interpretations and anesthesiology during procedures and surgeries. Please make sure you have contacted the Advanced Patient Advocacy (APA) group to check on your payment options: [email or web address]. or come in and talk to them in person: On  Tuesdays, we have Advanced Patient Advocacy is available Mon - Fri 8-4pm [facility] on the first floor by the information desk. Their number is [phone]. It is important that you are screened in order to qualify for assistance and to avoid huge medical charges. The [facility] is not responsible for ANY charges you may accrue regardless of who ordered the medication, procedure, treatment or test. If you go to the Emergency Room, you WILL be charged! Behavior and emotional issues! It is stressful to be sick, have an illness, take medications, not have a job, not have medical insurance, have family issues or just getting older! Schedule an appointment with our mental health provider. She is in the office Mondays and Wednesdays from 8am to [phone] [facility] can also contact the following: The national suicide hotline (4-140-101-LWFU or 1-726.235.8312)    [address] [facility], [address] [place] [address]    Walk- in hours Monday -Wednesday   8:30 am -11:15 AM  2:15pm -4:15 pm    [employer] ([employer]) - [facility]  [address], [address]  [phone]        The [place] Department for A[facility] and The [facility], in collaboration with community partners, provides and advocates for resources and services to improve the employment, quality of life, security, and independence of older [address], [address] with disabilities, and their families. Department for Aging and Rehabilitative Services  [address] Location: [address]   Voice: [address] [place]: [phone]  E-mail: [email or web address]      Immunizations/Vaccination  Routine Immunizations are provided for infants, children, teens, and adults at the [facility] FOR [facility]ATION. Please bring all immunization records with you and present them at the time of registration. Phone [phone] extension [phone]  Hours (Walk in)  Monday, Tuesday, Wednesday and Friday  8:00 AM to 11:00 AM  12:30 PM to 3:00 PM    Want to Quit Smoking??? Continued tobacco use increases your risks of elevated blood pressure, vascular irritation with increased incidence of cardiovascular with stroke, heart attack, and/or peripheral vascular disease causing claudication. Smoking may also cause lung damage that could lead to COPD, cancer, and death. We encourage you to find a few healthy habits, write them down then plan to decrease your cigarette use by one each week till they are gone all together. Plan for ways to cope with stress for stress may cause you to want to restart. It is imperative to develop new coping mechanisms in order to be successful. 1-800-QUIT-NOW provided for counseling        Drug and Alcohol Addiction Issues! It is hard to stop a poor habit but there is help out there. Please feel free to attend any other the following support groups to help you kick the habit or go to [facility] Emergency Department to be evaluated by the psychiatric team. Never give up!!     AlAnon meetings: Isacc silva, [facility], [facility] MONDAY 7:30 PM JUST FOR TODAY [employer] Tobin MCDONALD Latter day [place] [id number] [facility]     [facility] WEDNESDAY 10:30 AM [employer] Tobin Spray [employer] OF [facility], ROOM [address] [place] [address] WEDNESDAY 8:00  Joel Zaragoza [address] [place] [address] 8:00  [address] [facility] [address] [facility] 8:00 PM LET IT BEGIN WITH ME [employer] Tobin BOWMAN Latter day Judaism Cameron Huff 17 6;30 PM [facility] PARENTS [employer] [place] Latter day [address] [facility]      [place] MONDAY 10:30 AM [employer] Tobin [facility] SATURDAY 8:00 PM [facility] SATURDAY NIGHT [employer] Tobin [facility] 96 [facility] MONDAY 7:00 PM MONDAY NIGHT [employer] [facility] [address] [facility] WEDNESDAY 8:00 PM [employer] [facility]

## 2017-08-07 NOTE — PROGRESS NOTES
[address]  [address] [facility]  [phone] [facility]/[phone] fax      8/7/2017    Reason for visit:   Chief Complaint   Patient presents with   [facility]    Diabetes    Hypertension    Cholesterol Problem    Anxiety       Patient: Tara Dotsno, 1965, [id number]       Primary MD: Yaa Son NP    Subjective:   Tara Dotson, a 46 y.o. female, who is right handed presents for Establish Care. She has a h/o GERD,HTN, Uncontrolled DM with retinopathy and peripheral Neuropathy and is s/p Left 2nd toe amputation for osteomyelitis. Reports she Had Stiches removed by Dr. Lillie Lerner today. She reports she was being seen at [facility] for primary care but was inconsistent with medications and visits due to lack of insurance and limited finances. Here to re-establish care at [facility] as she was once a patient here    Diabetes Mellitus:  She has diabetes mellitus, and  hypertension and hyperlipidemia. Diabetic ROS - medication compliance: noncompliant much of the time, diabetic diet compliance: noncompliant some of the time, home glucose monitoring: is performed sporadically, further diabetic ROS: no polyuria or polydipsia, no chest pain, dyspnea or TIA's, no numbness, tingling or pain in extremities, has dysesthesias in the feet, blurry vision, last eye exam approximately 1 week ago, acute symptoms are polyneuropathy and dM retinopathy. Lab review: orders written for new lab studies as appropriate; see orders. Reports she was on Metformin in the past and it upset her stomach, willing to restart if it will help control Blood glucose. Anxiety Review:  Patient is seen for anxiety disorder with depression. Multiple stressors include financial strain, health and comorbidities. Current treatment includes Paxil and no other therapies.    Ongoing symptoms include: racing thoughts, psychomotor agitation, side effects from the treatment: none. Patient denies: palpitations, sweating, chest pain, shortness of breath, dizziness, suicidal ideation, homocidal ideation. Reported side effects from the treatment: none. Patient refuses therapy with Mental health NP. Discussed switching the patient to cost efficient medications due to lack of insurance and she reports she would prefer to stay on Accupril.  Discussed other low cost options such as Lisinopril or benazipril    HPI   The patient was recently admitted 7/18/17-7/24/17 for osteomyelitis s/p amputation of toe 2 with the following d/c diagnoses:       Discharge Diagnoses:   Sepsis 2/2 osteomyelitis (resolved)  Osteomyelitis 2/2 diabetic foot wound (resolved)  Uncontrolled DM type 2, insulin dependent with retinopathy (ongoing)  Hypertension (controlled)  GERD (stable)  Insomnia/anxiety (stable)     Operative Procedures:   Amputation of toe 2 left through the metatarsophalangeal joint; SURGEON: Eladia Taylor DPM; Date of Surgery:  07/21/2017      ADV DIR:  None      HM:   PCP: Dr. Tiny Reeves at [facility], wasn't going regular due to no insurance  Eye exam : 8/2/17  Flu shot: refused   PNA: refused  Dtap: unknown   Shingles: refused  Mammogram: 2013   Colonoscopy: never   Pap: 2016, normal. Never had an abnormal per patient   Last A1C: 10.4, per patient when in hospital     Work status: unemployed       Past Medical History:   Diagnosis Date    Blind left eye     Diabetes ([address]     Diabetic retinopathy ([address]     Gall stones     GERD (gastroesophageal reflux disease)     Hiatal hernia     Hypertension     Mass of abdomen     Pancreatitis        Past Surgical History:   Procedure Laterality Date    HX AMPUTATION Left 07/21/2017    left 2nd toe    HX CHOLECYSTECTOMY  10/15/2014    HX GI      Benign GI Stromal Tumor excision    HX HEENT      Sx for detached retina    HX MYOMECTOMY      x5 removed    HX OTHER SURGICAL      upper endoscopy       Social History     Social History    Marital status: SINGLE     Spouse name: N/A    Number of children: N/A    Years of education: N/A     Occupational History    Not on file. Social History Main Topics    Smoking status: Former Smoker     Packs/day: 0.20     Years: 8.00     Types: Cigarettes     Quit date: 12/3/1995    Smokeless tobacco: Never Used    Alcohol use No      Comment: Drinks 3-4xs year generally wine    Drug use: No    Sexual activity: Not Currently     Other Topics Concern     Service No    Blood Transfusions No    Caffeine Concern No    Occupational Exposure No    Hobby Hazards No    Sleep Concern No    Stress Concern No    Weight Concern No    Special Diet Yes    Back Care No    Exercise No    Bike Helmet No    Seat Belt Yes    Self-Exams Yes     Social History Narrative    Lives with 16year old son         with ex          Does not have health insurance        Allergies   Allergen Reactions    Promethazine Nausea and Vomiting     \"the phenergan made me more nauseated\"        Current Outpatient Prescriptions on File Prior to Visit   Medication Sig Dispense Refill    esomeprazole (NEXIUM) 20 mg capsule Take 2 Caps by mouth daily for 30 days. 60 Cap 0    acetaminophen (TYLENOL) 325 mg tablet Take 2 Tabs by mouth every six (6) hours as needed for up to 30 days. 120 Tab 0    sodium hypochlorite (QUARTER STRENGTH DAKIN'S) 0.125 % soln external solution Apply 473 mL to affected area two (2) times a day. (Patient not taking: Reported on 2017) 1 Bottle 0     No current facility-administered medications on file prior to visit. Review of Systems   Constitutional: Negative. HENT: Negative. Eyes: Positive for blurred vision. Left eye blindness. Wears glasses. Eye exam up to date. Respiratory: Negative. Cardiovascular: Negative. Gastrointestinal: Negative. Genitourinary: Negative. Musculoskeletal: Negative.     Skin: Left incision to Left 2nd toe. S/p stitches removal today. No drainage, no pain    Neurological: Negative. Neuropathy in BLE    Endo/Heme/Allergies: Negative. Psychiatric/Behavioral: Positive for depression. The patient is nervous/anxious. Objective:   Visit Vitals    /70    Pulse 90    Temp 99.1 °F (37.3 °C)    Resp 20    Ht 5' 8\" (1.727 m)    Wt 185 lb 12.8 oz (84.3 kg)    SpO2 100%    BMI 28.25 kg/m2      Wt Readings from Last 3 Encounters:   08/07/17 185 lb 12.8 oz (84.3 kg)   07/18/17 179 lb (81.2 kg)   10/06/15 195 lb (88.5 kg)     Lab Results   Component Value Date/Time    Glucose 221 08/07/2017 01:27 PM    Glucose (POC) 194 07/24/2017 04:19 PM    Glucose,  09/03/2014 06:37 AM         Physical Exam   Constitutional: She is oriented to person, place, and time. She appears well-developed and well-nourished. HENT:   Head: Normocephalic. Left Ear: Hearing, tympanic membrane and external ear normal.   Mouth/Throat: Uvula is midline, oropharynx is clear and moist and mucous membranes are normal. She does not have dentures. Abnormal dentition. Right ear impacted with cerumen    Eyes: Conjunctivae are normal. Lids are everted and swept, no foreign bodies found. Right eye exhibits normal extraocular motion. Right pupil is round and reactive. Left pupil is not reactive. Left pupil is round. Fundoscopic exam:       The right eye shows red reflex. The left eye shows no red reflex. Left eye ptosis,   Neck: Normal range of motion. Neck supple. No JVD present. Carotid bruit is not present. Cardiovascular: Normal rate, regular rhythm, normal heart sounds and intact distal pulses. No murmur heard. Pulmonary/Chest: Effort normal and breath sounds normal. No respiratory distress. Abdominal: Soft. Bowel sounds are normal.   Musculoskeletal: Normal range of motion. Right ankle: She exhibits swelling. Left ankle: She exhibits swelling. Feet:    Second toe amputation. Healing, well approximated, no s/sx of infection. Pt wearing ortho shoe     Non pitting edema in ankles    Neurological: She is alert and oriented to person, place, and time. She has normal reflexes. Skin: Skin is warm and dry. Psychiatric: Her speech is normal. Judgment normal. Her mood appears anxious. She is hyperactive. Cognition and memory are normal.   Vitals reviewed. Diabetic foot exam:     Left: Reflexes 1+     Vibratory sensation diminished    Proprioception diminished   Sharp/dull discrimination diminished    Filament test reduced sensation with micro filament   Pulse DP: 2+ (normal)   Pulse PT: 2+ (normal)   Deformities: Left foot amputation  Right: Reflexes 1+   Vibratory sensation diminished   Proprioception diminished   Sharp/dull discrimination diminished   Filament test reduced sensation with micro filament   Pulse DP: 2+ (normal)   Pulse PT: 2+ (normal)   Deformities: Big toe with callus    Assessment:    Leslie Mendez who has risk factors including (see above previous medical hx) and:       ICD-10-CM ICD-9-CM    1. Encounter to establish care Z76.89 V65.8 HEPATITIS PANEL, ACUTE      LIPID PANEL      TSH 3RD GENERATION      DRUG SCREEN, URINE      HEMOGLOBIN A1C WITH EAG      MAGNESIUM      METABOLIC PANEL, COMPREHENSIVE      CBC WITH AUTOMATED DIFF      HIV 1/2 AG/AB, 4TH GENERATION,W RFLX CONFIRM      URINALYSIS W/ RFLX MICROSCOPIC      MICROALBUMIN, UR, RAND W/ MICROALBUMIN/CREA RATIO      ETHYL ALCOHOL   2.  Type 2 diabetes mellitus with complication, with long-term current use of insulin ([facility]) E11.8 250.90  DIABETES FOOT EXAM    Z79.4 V58.67 rosuvastatin (CRESTOR) 20 mg tablet      aspirin 81 mg chewable tablet      insulin NPH/insulin regular (NOVOLIN 70/30, HUMULIN 70/30) 100 unit/mL (70-30) injection      insulin NPH/insulin regular (NOVOLIN 70/30, HUMULIN 70/30) 100 unit/mL (70-30) injection      metFORMIN ER (GLUCOPHAGE XR) 500 mg tablet quinapril (ACCUPRIL) 20 mg tablet   3. Essential hypertension I10 401.9 rosuvastatin (CRESTOR) 20 mg tablet      aspirin 81 mg chewable tablet      quinapril (ACCUPRIL) 20 mg tablet   4. Colon cancer screening Z12.11 V76.51 OCCULT BLOOD, IMMUNOASSAY (FIT)   5. Breast cancer screening Z12.39 J68.50 REFERRAL TO PUBLIC HEALTH   6. Routine health maintenance Z00.00 V70.0    7. Peripheral polyneuropathy (HCC) G62.9 356.9  DIABETES FOOT EXAM      aspirin 81 mg chewable tablet   8. Diabetic retinopathy associated with type 2 diabetes mellitus, macular edema presence unspecified, unspecified laterality, unspecified retinopathy severity E11.319 250.50      362.01    9. Anxiety with depression F41.8 300.4 PARoxetine (PAXIL) 30 mg tablet     1. Encounter to establish care  - HEPATITIS PANEL, ACUTE; Future  - LIPID PANEL; Future  - TSH 3RD GENERATION; Future  - DRUG SCREEN, URINE; Future  - HEMOGLOBIN A1C WITH EAG; Future  - MAGNESIUM; Future  - METABOLIC PANEL, COMPREHENSIVE; Future  - CBC WITH AUTOMATED DIFF; Future  - HIV 1/2 AG/AB, 4TH GENERATION,W RFLX CONFIRM; Future  - URINALYSIS W/ RFLX MICROSCOPIC; Future  - MICROALBUMIN, UR, RAND W/ MICROALBUMIN/CREA RATIO; Future  - ETHYL ALCOHOL; Future    2. Type 2 diabetes mellitus with complication, with long-term current use of insulin (HCC)  -Will add Metformin to regimen to help glucose control   -  DIABETES FOOT EXAM  - rosuvastatin (CRESTOR) 20 mg tablet; Take 1 Tab by mouth nightly for 90 days. Dispense: 90 Tab; Refill: 3  - aspirin 81 mg chewable tablet; Take 1 Tab by mouth daily. - insulin NPH/insulin regular (NOVOLIN 70/30, HUMULIN 70/30) 100 unit/mL (70-30) injection; 40 Units by SubCUTAneous route two (2) times a day. Dispense: 10 mL; Refill: 0  - insulin NPH/insulin regular (NOVOLIN 70/30, HUMULIN 70/30) 100 unit/mL (70-30) injection; 40 Units by SubCUTAneous route two (2) times a day.   Dispense: 10 mL; Refill: 3  - metFORMIN ER (GLUCOPHAGE XR) 500 mg tablet; Take 1 Tab by mouth daily (with dinner). Dispense: 30 Tab; Refill: 3  - quinapril (ACCUPRIL) 20 mg tablet; Take 1 Tab by mouth daily for 30 days. Dispense: 30 Tab; Refill: 0    3. Essential hypertension  -The patient to switch from crestor to Lipitor.  -The patient wishes to remain on Accupril even though there are other inexpensive options    - rosuvastatin (CRESTOR) 20 mg tablet; Take 1 Tab by mouth nightly for 90 days. Dispense: 90 Tab; Refill: 3  - aspirin 81 mg chewable tablet; Take 1 Tab by mouth daily. - quinapril (ACCUPRIL) 20 mg tablet; Take 1 Tab by mouth daily for 30 days. Dispense: 30 Tab; Refill: 0    4. Colon cancer screening  - OCCULT BLOOD, IMMUNOASSAY (FIT); Future    5. Breast cancer screening  - REFERRAL TO PUBLIC HEALTH    6. Routine health maintenance  -Pap due 2019    7. [facility]  -Reviewed daily foot care with patient. Continue to see podiatry q 6 months - 1 year for care  -  DIABETES FOOT EXAM  - aspirin 81 mg chewable tablet; Take 1 Tab by mouth daily. 8. Diabetic retinopathy associated with type 2 diabetes mellitus, macular edema presence unspecified, unspecified laterality, unspecified retinopathy severity  -Discussed annual eye exams and importance of tight glucose control. 9. Anxiety with depression  -patient wishes to continue current regimen and declines counseling  - PARoxetine (PAXIL) 30 mg tablet; Take 1 Tab by mouth daily for 30 days. Dispense: 30 Tab; Refill: 0      Written instructions followed our verbal discussion of all information discussed above, pending tests ordered and future goals/plans. Patient expressed understanding of current diagnosis, planned testing, follow up and if needed to contact the office for any questions or concerns prior to the next visit. Plan:   Reviewed medication and completed the medication reconciliation with the patient. Reviewed side effects of medications with the patient.  Questions were answered and patient verb understanding. Pt is a 47 yo AA female. See Med hx for details. Pt in the office today to establish care, medication reconciliation. Labs obtained to establish baseline, evaluate metabolic health, nutritional status, vitamin deficiencies and screening for at risk items based on the demographics of the patient, previous medical history and current social practices.  Will contact the patient in when all labs are resulted by phone to review and make lifestyle and medication recommendations. Follow up labs will be completed to monitor improvement prior to their next visit.       Orders Placed This Encounter    HEPATITIS PANEL, ACUTE     Standing Status:   Future     Number of Occurrences:   1     Standing Expiration Date:   2/6/2018    LIPID PANEL     Standing Status:   Future     Number of Occurrences:   1     Standing Expiration Date:   8/8/2018    TSH 3RD GENERATION     Standing Status:   Future     Number of Occurrences:   1     Standing Expiration Date:   2/6/2018    DRUG SCREEN, URINE     Standing Status:   Future     Number of Occurrences:   1     Standing Expiration Date:   2/6/2018    HEMOGLOBIN A1C WITH EAG     Standing Status:   Future     Number of Occurrences:   1     Standing Expiration Date:   8/8/2018    MAGNESIUM     Standing Status:   Future     Number of Occurrences:   1     Standing Expiration Date:   1/2/4520    METABOLIC PANEL, COMPREHENSIVE     Standing Status:   Future     Number of Occurrences:   1     Standing Expiration Date:   2/6/2018    CBC WITH AUTOMATED DIFF     Standing Status:   Future     Number of Occurrences:   1     Standing Expiration Date:   1/4/2018    HIV 1/2 AG/AB, 4TH GENERATION,W RFLX CONFIRM     Standing Status:   Future     Number of Occurrences:   1     Standing Expiration Date:   2/6/2018    URINALYSIS W/ RFLX MICROSCOPIC     Standing Status:   Future     Number of Occurrences:   1     Standing Expiration Date:   2/7/2018    MICROALBUMIN, UR, RAND W/ MICROALBUMIN/CREA RATIO     Standing Status:   Future     Number of Occurrences:   1     Standing Expiration Date:   2018    ETHYL ALCOHOL     Standing Status:   Future     Number of Occurrences:   1     Standing Expiration Date:   2018    OCCULT BLOOD, IMMUNOASSAY (FIT)     Standing Status:   Future     Standing Expiration Date:   2018    REFERRAL TO [facility]     Referral Priority:   Routine     Referral Type:   Consultation     Referral Reason:   Specialty Services Required     DIABETES FOOT EXAM    rosuvastatin (CRESTOR) 20 mg tablet     Sig: Take 1 Tab by mouth nightly for 90 days. Dispense:  90 Tab     Refill:  3     Order Specific Question:   Expiration Date     Answer:   2019     Order Specific Question:   Lot#     Answer:   [id number]     Order Specific Question:        Answer:   AZ&ME    aspirin 81 mg chewable tablet     Sig: Take 1 Tab by mouth daily.  insulin NPH/insulin regular (NOVOLIN 70/30, HUMULIN 70/30) 100 unit/mL (70-30) injection     Si Units by SubCUTAneous route two (2) times a day. Dispense:  10 mL     Refill:  0     Order Specific Question:   Expiration Date     Answer:   10/31/2017     Order Specific Question:   Lot#     Answer:   [id number]     Order Specific Question:        Answer:   Satnam Bullock insulin NPH/insulin regular (NOVOLIN 70/30, HUMULIN 70/30) 100 unit/mL (70-30) injection     Si Units by SubCUTAneous route two (2) times a day. Dispense:  10 mL     Refill:  3    metFORMIN ER (GLUCOPHAGE XR) 500 mg tablet     Sig: Take 1 Tab by mouth daily (with dinner). Dispense:  30 Tab     Refill:  3    quinapril (ACCUPRIL) 20 mg tablet     Sig: Take 1 Tab by mouth daily for 30 days. Dispense:  30 Tab     Refill:  0    PARoxetine (PAXIL) 30 mg tablet     Sig: Take 1 Tab by mouth daily for 30 days.      Dispense:  30 Tab     Refill:  0     Current Outpatient Prescriptions   Medication Sig Dispense Refill    rosuvastatin (CRESTOR) 20 mg tablet Take 1 Tab by mouth nightly for 90 days. 90 Tab 3    aspirin 81 mg chewable tablet Take 1 Tab by mouth daily.  insulin NPH/insulin regular (NOVOLIN 70/30, HUMULIN 70/30) 100 unit/mL (70-30) injection 40 Units by SubCUTAneous route two (2) times a day. 10 mL 0    insulin NPH/insulin regular (NOVOLIN 70/30, HUMULIN 70/30) 100 unit/mL (70-30) injection 40 Units by SubCUTAneous route two (2) times a day. 10 mL 3    metFORMIN ER (GLUCOPHAGE XR) 500 mg tablet Take 1 Tab by mouth daily (with dinner). 30 Tab 3    quinapril (ACCUPRIL) 20 mg tablet Take 1 Tab by mouth daily for 30 days. 30 Tab 0    PARoxetine (PAXIL) 30 mg tablet Take 1 Tab by mouth daily for 30 days. 30 Tab 0    esomeprazole (NEXIUM) 20 mg capsule Take 2 Caps by mouth daily for 30 days. 60 Cap 0    acetaminophen (TYLENOL) 325 mg tablet Take 2 Tabs by mouth every six (6) hours as needed for up to 30 days. 120 Tab 0    sodium hypochlorite (QUARTER STRENGTH DAKIN'S) 0.125 % soln external solution Apply 473 mL to affected area two (2) times a day. (Patient not taking: Reported on 7/25/2017) 1 Bottle 0       Follow-up Disposition:  Return in about 1 week (around 8/14/2017), or if symptoms worsen or fail to improve, for Nurse Navigator and review labs . F/u in 1 month No labs with provider     See [facility] for financial assistance       \"No Show policy was reviewed with the patient. The services affected are the nurse navigator and the provider. No show appointments include missing labs for a future scheduled appointment, Pap/pelvics, arriving to appointment more than 10 minutes late, and calling to cancel appointment less than 24 hours in advance. After the 3rd No Show, the patient will be removed from the Foundation to include medications for 6 months. The patient will be referred to the [place] [id number] for their primary care needs. \"     Gaetano Platt, MSN, RN, FNP-C     [facility]    I spent 45 minutes with the patient in face-to-face consultation, of which greater than 50% was spent in counseling and coordination of care as described above.

## 2017-08-08 LAB
HAV IGM SERPL QL IA: NEGATIVE
HBV CORE IGM SER QL: NEGATIVE
HBV SURFACE AG SER QL: <0.1 INDEX
HBV SURFACE AG SER QL: NEGATIVE
HCV AB SER IA-ACNC: 0.11 INDEX
HCV AB SERPL QL IA: NEGATIVE
HCV COMMENT,HCGAC: NORMAL
HIV 1+2 AB+HIV1 P24 AG SERPL QL IA: NONREACTIVE
HIV12 RESULT COMMENT, HHIVC: NORMAL
SP1: NORMAL
SP2: NORMAL
SP3: NORMAL

## 2017-08-08 NOTE — PROGRESS NOTES
New patient Labs reviewed with the following interventions:  Haleigh,   Please review with patient at appt 8/14    1) A1C 9.3, pt has h/o of non compliance. Need to start DM pathway classes to better control DM   2) Hep panel and HIV negative   3) Cholesterol within goal, continue Lipitor until it runs out and then start Crestor from [facility]   4) Nephropathy, kidney function decreased related to uncontrolled dm   5) Anemia, most likely related to chronic disease and nephropathy, stable at the moment      Pt to check with [facility] After appt for any available medications and to sign applications for medications.  She did not do this at office visit so please make sure she sees Edwin

## 2017-08-10 ENCOUNTER — HOSPITAL ENCOUNTER (OUTPATIENT)
Dept: LAB | Age: 52
Discharge: HOME | End: 2017-08-10

## 2017-08-10 DIAGNOSIS — Z12.11 COLON CANCER SCREENING: ICD-10-CM

## 2017-08-10 PROCEDURE — 82274 ASSAY TEST FOR BLOOD FECAL: CPT | Performed by: NURSE PRACTITIONER

## 2017-08-13 LAB — HEMOCCULT STL QL IA: NEGATIVE

## 2017-08-14 ENCOUNTER — OFFICE VISIT (OUTPATIENT)
Dept: FAMILY MEDICINE CLINIC | Age: 52
End: 2017-08-14

## 2017-08-14 DIAGNOSIS — Z55.9 EDUCATIONAL CIRCUMSTANCE: Primary | ICD-10-CM

## 2017-08-14 DIAGNOSIS — R73.09 ELEVATED HEMOGLOBIN A1C MEASUREMENT: ICD-10-CM

## 2017-08-14 SDOH — EDUCATIONAL SECURITY - EDUCATION ATTAINMENT: PROBLEMS RELATED TO EDUCATION AND LITERACY, UNSPECIFIED: Z55.9

## 2017-08-14 NOTE — PROGRESS NOTES
Baljeet Daily is a 45 y.o. female is here for her initial visit with the Nurse Navigator for welcome and informative class on how the [facility] works and the services we can provide. We have reviewed [facility]:   Hours of operation and number to contact us. Inclement weather policy     No Show Policy     Lab work (Hours to have lab work drawn and appointment with [facility] to obtain results). Medication Policies including times to  meds, number to dial to reach your pharmacy technician and the paperwork that must be signed to obtain meds. Laura Diaz is your  med tech and you can reach her by 1 on [employer] phone. She is aware you have one month to  meds. Foot care thru the [facility] foot ministry hours as well as directions     Dental Clinic - I have notified Ms Vickie Maldonado to add patient to dental waiting list.     Diabetic eye exams     Sick visits     APA Program including location at [facility] and phone contact number. Pt has paper work but need assistance filling it out. She will make a one hour appointment with me to  complete these papers and start the DM Pathway class. Mental Health appointments with Ms. Michael Roper Number and how to contact AA/NA meetings for help with addictions      We have reviewed her lab work today. She will begin DM classes next for her A1C of 9.3. Has talked with Char Baum and signed [facility] paperwork.

## 2017-08-17 NOTE — PROGRESS NOTES
Pt called to get normal Fit test results. Pt verbalized understanding without any questions at this time.

## 2017-08-22 ENCOUNTER — OFFICE VISIT (OUTPATIENT)
Dept: FAMILY MEDICINE CLINIC | Age: 52
End: 2017-08-22

## 2017-08-22 DIAGNOSIS — Z71.89 DIABETES EDUCATION, ENCOUNTER FOR: Primary | ICD-10-CM

## 2017-08-22 DIAGNOSIS — Z79.4 TYPE 2 DIABETES MELLITUS WITH COMPLICATION, WITH LONG-TERM CURRENT USE OF INSULIN (HCC): ICD-10-CM

## 2017-08-22 DIAGNOSIS — I10 ESSENTIAL HYPERTENSION: ICD-10-CM

## 2017-08-22 DIAGNOSIS — E11.8 TYPE 2 DIABETES MELLITUS WITH COMPLICATION, WITH LONG-TERM CURRENT USE OF INSULIN (HCC): ICD-10-CM

## 2017-08-22 RX ORDER — ROSUVASTATIN CALCIUM 20 MG/1
20 TABLET, COATED ORAL
Qty: 90 TAB | Refills: 3 | Status: SHIPPED | COMMUNITY
Start: 2017-08-22 | End: 2017-11-20

## 2017-08-22 NOTE — PROGRESS NOTES
Eleanor Daly is a 45 y.o. female here for week one of the diabetic teaching module from [facility] clinic diabetic pathway. The goals of using the [facility] Diabetic Pathway are reviewed today. The issues noted below were reviewed at length with the patient. Diabetes is usually defined as two fasting blood sugar values over 126, although some patients are still diagnosed using the older glucose tolerance test. Normal blood sugar values in non-diabetic patients run approximately . The higher the sugars at the time of diagnosis, the greater the likelihood that the patient will have significant symptoms such as thirst, blurred vision, urinary frequency, and fatigue, but sometimes people with very high blood sugar values may feel surprisingly well, especially if the elevated blood sugars have been present for some time. The signs and symptoms of hyperglycemia were discussed with patient in detail. She has brought her meter and states that even with her altered vision she can read her meter and the readings. Her 7 day average was 168 and 14 day average was 178. Diabetic disease process was reviewed with patient: to have diabetes means that the patient's body is unable to properly regulate the level of sugar in the blood, and the mainstay of treatment for most patients involves regular physical exercise, weight loss, and dietary changes, especially lowering the consumption of foods containing high amounts of simple sugars. Without following these basic concepts it will be almost impossible to control the blood sugar adequately. When these lifestyle changes are inadequate, or if the initial blood sugar values are very high, the next step is to add one or more oral medications. Sometimes, in more severe or difficult to control cases of diabetes, insulin injections are necessary. Frequently patients are taught to monitor their own blood sugars at home. She was encouraged to record blood sugars BID.   She was provided with log for recording. It is crucial to remember that diabetes is a serious disease that can cause life threatening complications if it is not properly cared for; conversely, the lower that one can maintain one's blood sugar, the less the likelihood of developing serious problems. The hemoglobin A1C is a blood test which indicates, on average, what the patient's blood sugar has been running over the prior 8-10 weeks. Ideally, this value should be maintained at less than 7.0%. If over 9.0% the incidence of complications rises dramatically. This test will be done approximately two to four times a year, depending on the individual patient. Subjects reviewed in detail include Insulin administration, choosing and importance of rotating  injections sites, how to inject insulin and using insulin pens. Even though she states she has used the pens before she did not know how to put on a needle or dial her dosage of 40 units. She admits she has not been giving herself some of the injections ordered daily. When she does give herself insulin she gives only around 15 units. Ms. Hallie Radford aware and in to discuss with patient need to record BG, melas and need to not self adjust. She was taught how to dispose of needles and supplies. The patient was also given week one glucose monitoring guidelines. Pt has been performing home blood glucose testing and correctly states procedure for the same. I have used a portion of our visit today to assist patient with her [employer] paperwork (poor vision). Time provided questions and they are encouraged to call this nurse at any time with questions @ [phone].

## 2017-09-05 ENCOUNTER — OFFICE VISIT (OUTPATIENT)
Dept: FAMILY MEDICINE CLINIC | Age: 52
End: 2017-09-05

## 2017-09-05 VITALS
TEMPERATURE: 98.2 F | SYSTOLIC BLOOD PRESSURE: 135 MMHG | BODY MASS INDEX: 28.58 KG/M2 | DIASTOLIC BLOOD PRESSURE: 85 MMHG | RESPIRATION RATE: 20 BRPM | HEIGHT: 68 IN | OXYGEN SATURATION: 99 % | WEIGHT: 188.6 LBS | HEART RATE: 85 BPM

## 2017-09-05 DIAGNOSIS — E11.319 TYPE 2 DIABETES MELLITUS WITH RETINOPATHY, WITH LONG-TERM CURRENT USE OF INSULIN, MACULAR EDEMA PRESENCE UNSPECIFIED, UNSPECIFIED LATERALITY, UNSPECIFIED RETINOPATHY SEVERITY (HCC): Primary | ICD-10-CM

## 2017-09-05 DIAGNOSIS — Z00.00 ROUTINE HEALTH MAINTENANCE: ICD-10-CM

## 2017-09-05 DIAGNOSIS — Z79.4 TYPE 2 DIABETES MELLITUS WITH RETINOPATHY, WITH LONG-TERM CURRENT USE OF INSULIN, MACULAR EDEMA PRESENCE UNSPECIFIED, UNSPECIFIED LATERALITY, UNSPECIFIED RETINOPATHY SEVERITY (HCC): Primary | ICD-10-CM

## 2017-09-05 NOTE — PATIENT INSTRUCTIONS
Learning About Diabetes Food Guidelines  Your Care Instructions  Meal planning is important to manage diabetes. It helps keep your blood sugar at a target level (which you set with your doctor). You don't have to eat special foods. You can eat what your family eats, including sweets once in a while. But you do have to pay attention to how often you eat and how much you eat of certain foods. You may want to work with a dietitian or a certified diabetes educator (CDE) to help you plan meals and snacks. A dietitian or CDE can also help you lose weight if that is one of your goals. What should you know about eating carbs? Managing the amount of carbohydrate (carbs) you eat is an important part of healthy meals when you have diabetes. Carbohydrate is found in many foods. · Learn which foods have carbs. And learn the amounts of carbs in different foods. ¨ Bread, cereal, pasta, and rice have about 15 grams of carbs in a serving. A serving is 1 slice of bread (1 ounce), ½ cup of cooked cereal, or 1/3 cup of cooked pasta or rice. ¨ Fruits have 15 grams of carbs in a serving. A serving is 1 small fresh fruit, such as an apple or orange; ½ of a banana; ½ cup of cooked or canned fruit; ½ cup of fruit juice; 1 cup of melon or raspberries; or 2 tablespoons of dried fruit. ¨ Milk and no-sugar-added yogurt have 15 grams of carbs in a serving. A serving is 1 cup of milk or 2/3 cup of no-sugar-added yogurt. ¨ Starchy vegetables have 15 grams of carbs in a serving. A serving is ½ cup of mashed potatoes or sweet potato; 1 cup winter squash; ½ of a small baked potato; ½ cup of cooked beans; or ½ cup cooked corn or green peas. · Learn how much carbs to eat each day and at each meal. A dietitian or CDE can teach you how to keep track of the amount of carbs you eat. This is called carbohydrate counting. · If you are not sure how to count carbohydrate grams, use the Plate Method to plan meals.  It is a good, quick way to make sure that you have a balanced meal. It also helps you spread carbs throughout the day. ¨ Divide your plate by types of foods. Put non-starchy vegetables on half the plate, meat or other protein food on one-quarter of the plate, and a grain or starchy vegetable in the final quarter of the plate. To this you can add a small piece of fruit and 1 cup of milk or yogurt, depending on how many carbs you are supposed to eat at a meal.  · Try to eat about the same amount of carbs at each meal. Do not \"save up\" your daily allowance of carbs to eat at one meal.  · Proteins have very little or no carbs per serving. Examples of proteins are beef, chicken, turkey, fish, eggs, tofu, cheese, cottage cheese, and peanut butter. A serving size of meat is 3 ounces, which is about the size of a deck of cards. Examples of meat substitute serving sizes (equal to 1 ounce of meat) are 1/4 cup of cottage cheese, 1 egg, 1 tablespoon of peanut butter, and ½ cup of tofu. How can you eat out and still eat healthy? · Learn to estimate the serving sizes of foods that have carbohydrate. If you measure food at home, it will be easier to estimate the amount in a serving of restaurant food. · If the meal you order has too much carbohydrate (such as potatoes, corn, or baked beans), ask to have a low-carbohydrate food instead. Ask for a salad or green vegetables. · If you use insulin, check your blood sugar before and after eating out to help you plan how much to eat in the future. · If you eat more carbohydrate at a meal than you had planned, take a walk or do other exercise. This will help lower your blood sugar. What else should you know? · Limit saturated fat, such as the fat from meat and dairy products. This is a healthy choice because people who have diabetes are at higher risk of heart disease. So choose lean cuts of meat and nonfat or low-fat dairy products. Use olive or canola oil instead of butter or shortening when cooking.   · Don't skip meals. Your blood sugar may drop too low if you skip meals and take insulin or certain medicines for diabetes. · Check with your doctor before you drink alcohol. Alcohol can cause your blood sugar to drop too low. Alcohol can also cause a bad reaction if you take certain diabetes medicines. Follow-up care is a key part of your treatment and safety. Be sure to make and go to all appointments, and call your doctor if you are having problems. It's also a good idea to know your test results and keep a list of the medicines you take. Where can you learn more? Go to http://linda-sivan.info/. Enter B385 in the search box to learn more about \"Learning About Diabetes Food Guidelines. \"  Current as of: March 13, 2017  Content Version: 11.3  © 8006-5340 [employer]. Care instructions adapted under license by [employer] (which disclaims liability or warranty for this information). If you have questions about a medical condition or this instruction, always ask your healthcare professional. [place] [address] any warranty or liability for your use of this information. Diabetes Foot Health: Care Instructions  Your Care Instructions    When you have diabetes, your feet need extra care and attention. Diabetes can damage the nerve endings and blood vessels in your feet, making you less likely to notice when your feet are injured. Diabetes also limits your body's ability to fight infection and get blood to areas that need it. If you get a minor foot injury, it could become an ulcer or a serious infection. With good foot care, you can prevent most of these problems. Caring for your feet can be quick and easy. Most of the care can be done when you are bathing or getting ready for bed. Follow-up care is a key part of your treatment and safety. Be sure to make and go to all appointments, and call your doctor if you are having problems.  Its also a good idea to know your test results and keep a list of the medicines you take. How can you care for yourself at home? · Keep your blood sugar close to normal by watching what and how much you eat, monitoring blood sugar, taking medicines if prescribed, and getting regular exercise. · Do not smoke. Smoking affects blood flow and can make foot problems worse. If you need help quitting, talk to your doctor about stop-smoking programs and medicines. These can increase your chances of quitting for good. · Eat a diet that is low in fats. High fat intake can cause fat to build up in your blood vessels and decrease blood flow. · Inspect your feet daily for blisters, cuts, cracks, or sores. If you cannot see well, use a mirror or have someone help you. · Take care of your feet:  [facility] your feet every day. Use warm (not hot) water. Check the water temperature with your wrists or other part of your body, not your feet. ¨ Dry your feet well. Pat them dry. Do not rub the skin on your feet too hard. Dry well between your toes. If the skin on your feet stays moist, bacteria or a fungus can grow, which can lead to infection. ¨ Keep your skin soft. Use moisturizing skin cream to keep the skin on your feet soft and prevent calluses and cracks. But do not put the cream between your toes, and stop using any cream that causes a rash. ¨ Clean underneath your toenails carefully. Do not use a sharp object to clean underneath your toenails. Use the blunt end of a nail file or other rounded tool. ¨ Trim and file your toenails straight across to prevent ingrown toenails. Use a nail clipper, not scissors. Use an emery board to smooth the edges. · Change socks daily. Socks without seams are best, because seams often rub the feet. You can find socks for people with diabetes from specialty catalogs. · Look inside your shoes every day for things like gravel or torn linings, which could cause blisters or sores.   · Buy shoes that fit well:  ¨ Look for shoes that have plenty of space around the toes. This helps prevent bunions and blisters. ¨ Try on shoes while wearing the kind of socks you will usually wear with the shoes. ¨ Avoid plastic shoes. They may rub your feet and cause blisters. Good shoes should be made of materials that are flexible and breathable, such as leather or cloth. ¨ Break in new shoes slowly by wearing them for no more than an hour a day for several days. Take extra time to check your feet for red areas, blisters, or other problems after you wear new shoes. · Do not go barefoot. Do not wear sandals, and do not wear shoes with very thin soles. Thin soles are easy to puncture. They also do not protect your feet from hot pavement or cold weather. · Have your doctor check your feet during each visit. If you have a foot problem, see your doctor. Do not try to treat an early foot problem at home. Home remedies or treatments that you can buy without a prescription (such as corn removers) can be harmful. · Always get early treatment for foot problems. A minor irritation can lead to a major problem if not properly cared for early. When should you call for help? Call your doctor now or seek immediate medical care if:  · You have a foot sore, an ulcer or break in the skin that is not healing after 4 days, bleeding corns or calluses, or an ingrown toenail. · You have blue or black areas, which can mean bruising or blood flow problems. · You have peeling skin or tiny blisters between your toes or cracking or oozing of the skin. · You have a fever for more than 24 hours and a foot sore. · You have new numbness or tingling in your feet that does not go away after you move your feet or change positions. · You have unexplained or unusual swelling of the foot or ankle. Watch closely for changes in your health, and be sure to contact your doctor if:  · You cannot do proper foot care. Where can you learn more?   Go to http://linda-sivan.info/. Enter A739 in the search box to learn more about \"Diabetes Foot Health: Care Instructions. \"  Current as of: March 13, 2017  Content Version: 11.3  © 5151-7270 [employer]. Care instructions adapted under license by [employer] (which disclaims liability or warranty for this information). If you have questions about a medical condition or this instruction, always ask your healthcare professional. [place] [address] any warranty or liability for your use of this information.

## 2017-09-05 NOTE — PROGRESS NOTES
[facility]  [address] [facility], [address]  [phone] office/[phone] fax      9/5/2017    Reason for visit:   Chief Complaint   Patient presents with    Follow Up Chronic Condition     PETERSEN with opthamology        Patient: Dung Otero, 1965, [id number]       Primary MD: Ernestina Sandifer, LES    Subjective:   Dung Otero, a 45 y.o. female, with pmhx uncontrolled DM s/p amputation of Left second toe, retinopathy (followed by Dr. Kamran Leonard with opthalmology), HTN who presents for Follow Up for Diabetes. She is a newly established patient at [facility] and she was brought back for status check. She reports compliance with medication regimen, she has began her DM teaching classes with [facility], missed last week due to opthalmology complaints. Reports she had got a shot in her eye by opthalmology for her retinopathy. She still reports visual disturbances. She regularly takes her insulin, previously she was not taking full prescribed dose because she was scared that her BG would drop. Reports only 1 low BG 56 and others up to 250's. States she eats regularly. She is also, s/p left second toe amputation, wound well healed and approximated, denies any pain, drainage, or s/sx of systemic infection. Reports she does do daily fot inspections. She has been cleared from Dr. Aminta Berger. She states she will start going [facility] for routine maintenance of her feet. \"I don't want to lose another toe. \"       HPI    Past Medical History:   Diagnosis Date    Blind left eye     Diabetes ([address]     Diabetic retinopathy ([address]     Gall stones     GERD (gastroesophageal reflux disease)     Hiatal hernia     Hypertension     Mass of abdomen     Pancreatitis        Past Surgical History:   Procedure Laterality Date    HX AMPUTATION Left 07/21/2017    left 2nd toe    HX CHOLECYSTECTOMY  10/15/2014    HX GI      Benign GI Stromal Tumor excision    HX HEENT      Sx for detached retina    HX MYOMECTOMY      x5 removed    HX OTHER SURGICAL      upper endoscopy       Social History     Social History    Marital status: SINGLE     Spouse name: N/A    Number of children: N/A    Years of education: N/A     Occupational History    Not on file. Social History Main Topics    Smoking status: Former Smoker     Packs/day: 0.20     Years: 8.00     Types: Cigarettes     Quit date: 12/3/1995    Smokeless tobacco: Never Used    Alcohol use No      Comment: Drinks 3-4xs year generally wine    Drug use: No    Sexual activity: Not Currently     Other Topics Concern     Service No    Blood Transfusions No    Caffeine Concern No    Occupational Exposure No    Hobby Hazards No    Sleep Concern No    Stress Concern No    Weight Concern No    Special Diet Yes    Back Care No    Exercise No    Bike Helmet No    Seat Belt Yes    Self-Exams Yes     Social History Narrative    Lives with 16year old son         with ex          Does not have health insurance        Allergies   Allergen Reactions    Promethazine Nausea and Vomiting     \"the phenergan made me more nauseated\"        Current Outpatient Prescriptions on File Prior to Visit   Medication Sig Dispense Refill    rosuvastatin (CRESTOR) 20 mg tablet Take 1 Tab by mouth nightly for 90 days. 90 Tab 3    insulin NPH/insulin regular (NOVOLIN 70/30, HUMULIN 70/30) 100 unit/mL (70-30) injection 40 Units by SubCUTAneous route two (2) times a day. 10 mL 3    metFORMIN ER (GLUCOPHAGE XR) 500 mg tablet Take 1 Tab by mouth daily (with dinner). 30 Tab 3    quinapril (ACCUPRIL) 20 mg tablet Take 1 Tab by mouth daily for 30 days. 30 Tab 0    PARoxetine (PAXIL) 30 mg tablet Take 1 Tab by mouth daily for 30 days. 30 Tab 0    aspirin 81 mg chewable tablet Take 1 Tab by mouth daily.       sodium hypochlorite (QUARTER STRENGTH DAKIN'S) 0.125 % soln external solution Apply 473 mL to affected area two (2) times a day. (Patient not taking: Reported on 7/25/2017) 1 Bottle 0     No current facility-administered medications on file prior to visit. Review of Systems   Constitutional: Negative. Negative for fever. HENT: Negative. Eyes: Positive for blurred vision. Respiratory: Negative. Cardiovascular: Negative. Negative for chest pain. Gastrointestinal: Negative. Genitourinary: Negative. Neurological: Negative. Endo/Heme/Allergies: Negative. Psychiatric/Behavioral: Positive for depression (Pt declines ). Objective:   Visit Vitals    /85    Pulse 85    Temp 98.2 °F (36.8 °C)    Resp 20    Ht 5' 8\" (1.727 m)    Wt 188 lb 9.6 oz (85.5 kg)    SpO2 99%    BMI 28.68 kg/m2      Wt Readings from Last 3 Encounters:   09/05/17 188 lb 9.6 oz (85.5 kg)   08/07/17 185 lb 12.8 oz (84.3 kg)   07/18/17 179 lb (81.2 kg)     Lab Results   Component Value Date/Time    Glucose 221 08/07/2017 01:27 PM    Glucose (POC) 194 07/24/2017 04:19 PM    Glucose,  09/03/2014 06:37 AM       Pertinent Lab Results:    Physical Exam   Constitutional: She is oriented to person, place, and time. She appears well-developed and well-nourished. HENT:   Head: Normocephalic. Eyes: Conjunctivae are normal. Pupils are equal, round, and reactive to light. Lids are everted and swept, no foreign bodies found. Squinting eyes, wearing glasses   Neck: Normal range of motion. Neck supple. No JVD present. Carotid bruit is not present. Cardiovascular: Normal rate, regular rhythm, normal heart sounds and intact distal pulses. No murmur heard. Pulmonary/Chest: Effort normal and breath sounds normal. No respiratory distress. Abdominal: Soft. Bowel sounds are normal.   Musculoskeletal: Normal range of motion. Feet:    Healed wound    Neurological: She is alert and oriented to person, place, and time. She has normal reflexes. Skin: Skin is warm and dry.    Psychiatric: She has a normal mood and affect. Her behavior is normal.   Vitals reviewed. ICD-10-CM ICD-9-CM    1. Type 2 diabetes mellitus with retinopathy, with long-term current use of insulin, macular edema presence unspecified, unspecified laterality, unspecified retinopathy severity E11.319 250.50 HEMOGLOBIN A1C WITH EAG    J86.3 321.63 METABOLIC PANEL, COMPREHENSIVE     V58.67 LIPID PANEL      MAGNESIUM   2. Routine health maintenance Z00.00 V70.0      Diagnoses and all orders for this visit:    1. Type 2 diabetes mellitus with retinopathy, with long-term current use of insulin, macular edema presence unspecified, unspecified laterality, unspecified retinopathy severity  -     HEMOGLOBIN A1C WITH EAG; Future  -     METABOLIC PANEL, COMPREHENSIVE; Future  -     LIPID PANEL; Future  -     MAGNESIUM; Future    2. Routine health maintenance  - F/u with [facility] for routine mammo/pap per patient. She reports she has an appt next month. Follow-up Disposition:  Return in about 3 months (around 12/5/2017), or if symptoms worsen or fail to improve.   current treatment plan is effective, no change in therapy  lab results and schedule of future lab studies reviewed with patient  reviewed diet, exercise and weight control  cardiovascular risk and specific lipid/LDL goals reviewed  reviewed medications and side effects in detail  specific diabetic recommendations: referral to Diabetic Education department, low cholesterol diet, weight control and daily exercise discussed, home glucose monitoring emphasized, all medications, side effects and compliance discussed carefully, foot care discussed and Podiatry visits discussed, annual eye examinations at Ophthalmology discussed, glycohemoglobin and other lab monitoring discussed, long term diabetic complications discussed and labs immediately prior to next visit

## 2017-09-12 ENCOUNTER — OFFICE VISIT (OUTPATIENT)
Dept: FAMILY MEDICINE CLINIC | Age: 52
End: 2017-09-12

## 2017-09-12 DIAGNOSIS — Z71.89 DIABETES EDUCATION, ENCOUNTER FOR: Primary | ICD-10-CM

## 2017-09-12 NOTE — PROGRESS NOTES
Dung Otero is a 45 y.o. female is here for NN visit to review Week one and discuss week 2 of the [facility] Diabetic Pathway. Ms. Mike Saleh did not bring a meal log in for review. \" I ran out of strips about a week ago\". I have explained how important the log is for continuing care of her diabetes and she agrees to get strips today and record meals. She has been given the 2000 calorie ADA diet servings per V.O. Ms. Nereyda Tracey. Ms. Mike Saleh is used to counting carbohydrates. She has been eating 60 per meals with 30 for 3 snacks. She is shocked to hear she is now limited to 45 carbs per meal and only one snack at HS of 15 carbs. She thinks he BG readings were \"around 150\" in the AM's. She also states she has been giving herself 40 units BID and not self adjusting although during OV she did say she her BG was high, she got scared and she took the 40 units. We have reviewed the S/S of hyper/hypoglycemia, the food pyramid, Food choices for meal planning , Food choices to AVOID when planning meals and the measuring and estimating of portions. She is encouraged to measure the servings this week as she is used to eating and calculating a higher number of carbs. Time was provided for discussion and any questions. She is encouraged to call this nurse for any questions/concerns and/or write down any questions so we can review on the next visit.

## 2017-09-19 ENCOUNTER — TELEPHONE (OUTPATIENT)
Dept: FAMILY MEDICINE CLINIC | Age: 52
End: 2017-09-19

## 2017-09-19 NOTE — TELEPHONE ENCOUNTER
Medication: Humulin 70/30 , dose: 40, how often: BID , current number of medication days provided: 90, refill per application. Lot #: #[id number], EXP 10/2018. This medication was received and verified for the following 1. Correct Patient, 2. Correct Diagnosis, 3. Correct Drug, 4. Correct route, and no current allergy to medication. Please contact patient to come  their medications.      Kaitlyn Gallardo MSN, RN, FNP-C     [facility]

## 2017-10-07 ENCOUNTER — HOSPITAL ENCOUNTER (EMERGENCY)
Age: 52
Discharge: HOME | End: 2017-10-07
Attending: EMERGENCY MEDICINE | Admitting: EMERGENCY MEDICINE
Payer: MEDICAID

## 2017-10-07 ENCOUNTER — APPOINTMENT (OUTPATIENT)
Dept: GENERAL RADIOLOGY | Age: 52
End: 2017-10-07
Attending: PHYSICIAN ASSISTANT
Payer: MEDICAID

## 2017-10-07 VITALS
OXYGEN SATURATION: 98 % | SYSTOLIC BLOOD PRESSURE: 133 MMHG | HEIGHT: 68 IN | RESPIRATION RATE: 20 BRPM | WEIGHT: 180 LBS | TEMPERATURE: 98.9 F | DIASTOLIC BLOOD PRESSURE: 85 MMHG | HEART RATE: 93 BPM | BODY MASS INDEX: 27.28 KG/M2

## 2017-10-07 DIAGNOSIS — E08.621 DIABETIC ULCER OF TOE OF RIGHT FOOT ASSOCIATED WITH DIABETES MELLITUS DUE TO UNDERLYING CONDITION, LIMITED TO BREAKDOWN OF SKIN (HCC): ICD-10-CM

## 2017-10-07 DIAGNOSIS — L97.511 DIABETIC ULCER OF TOE OF RIGHT FOOT ASSOCIATED WITH DIABETES MELLITUS DUE TO UNDERLYING CONDITION, LIMITED TO BREAKDOWN OF SKIN (HCC): ICD-10-CM

## 2017-10-07 DIAGNOSIS — L03.115 CELLULITIS OF RIGHT LOWER EXTREMITY: Primary | ICD-10-CM

## 2017-10-07 LAB
ANION GAP SERPL CALC-SCNC: 8 MMOL/L (ref 3–18)
BASOPHILS # BLD: 0 K/UL (ref 0–0.06)
BASOPHILS NFR BLD: 0 % (ref 0–2)
BUN SERPL-MCNC: 24 MG/DL (ref 7–18)
BUN/CREAT SERPL: 27 (ref 12–20)
CALCIUM SERPL-MCNC: 9.5 MG/DL (ref 8.5–10.1)
CHLORIDE SERPL-SCNC: 106 MMOL/L (ref 100–108)
CO2 SERPL-SCNC: 25 MMOL/L (ref 21–32)
CREAT SERPL-MCNC: 0.9 MG/DL (ref 0.6–1.3)
DIFFERENTIAL METHOD BLD: ABNORMAL
EOSINOPHIL # BLD: 0.1 K/UL (ref 0–0.4)
EOSINOPHIL NFR BLD: 1 % (ref 0–5)
ERYTHROCYTE [DISTWIDTH] IN BLOOD BY AUTOMATED COUNT: 13.2 % (ref 11.6–14.5)
GLUCOSE SERPL-MCNC: 192 MG/DL (ref 74–99)
HCT VFR BLD AUTO: 32.4 % (ref 35–45)
HGB BLD-MCNC: 10.6 G/DL (ref 12–16)
LYMPHOCYTES # BLD: 2.6 K/UL (ref 0.9–3.6)
LYMPHOCYTES NFR BLD: 25 % (ref 21–52)
MCH RBC QN AUTO: 28.7 PG (ref 24–34)
MCHC RBC AUTO-ENTMCNC: 32.7 G/DL (ref 31–37)
MCV RBC AUTO: 87.8 FL (ref 74–97)
MONOCYTES # BLD: 0.7 K/UL (ref 0.05–1.2)
MONOCYTES NFR BLD: 7 % (ref 3–10)
NEUTS SEG # BLD: 6.9 K/UL (ref 1.8–8)
NEUTS SEG NFR BLD: 67 % (ref 40–73)
PLATELET # BLD AUTO: 252 K/UL (ref 135–420)
PMV BLD AUTO: 11 FL (ref 9.2–11.8)
POTASSIUM SERPL-SCNC: 5 MMOL/L (ref 3.5–5.5)
RBC # BLD AUTO: 3.69 M/UL (ref 4.2–5.3)
SODIUM SERPL-SCNC: 139 MMOL/L (ref 136–145)
WBC # BLD AUTO: 10.3 K/UL (ref 4.6–13.2)

## 2017-10-07 PROCEDURE — 74011250637 HC RX REV CODE- 250/637: Performed by: PHYSICIAN ASSISTANT

## 2017-10-07 PROCEDURE — 73660 X-RAY EXAM OF TOE(S): CPT

## 2017-10-07 PROCEDURE — 74011000250 HC RX REV CODE- 250: Performed by: PHYSICIAN ASSISTANT

## 2017-10-07 PROCEDURE — 85025 COMPLETE CBC W/AUTO DIFF WBC: CPT | Performed by: PHYSICIAN ASSISTANT

## 2017-10-07 PROCEDURE — 80048 BASIC METABOLIC PNL TOTAL CA: CPT | Performed by: PHYSICIAN ASSISTANT

## 2017-10-07 PROCEDURE — 99283 EMERGENCY DEPT VISIT LOW MDM: CPT

## 2017-10-07 RX ORDER — CEPHALEXIN 500 MG/1
500 CAPSULE ORAL 4 TIMES DAILY
Qty: 28 CAP | Refills: 0 | Status: SHIPPED | OUTPATIENT
Start: 2017-10-07 | End: 2017-10-14

## 2017-10-07 RX ORDER — BACITRACIN ZINC 500 UNIT/G
OINTMENT (GRAM) TOPICAL
Status: COMPLETED | OUTPATIENT
Start: 2017-10-07 | End: 2017-10-07

## 2017-10-07 RX ORDER — SULFAMETHOXAZOLE AND TRIMETHOPRIM 800; 160 MG/1; MG/1
1 TABLET ORAL 2 TIMES DAILY
Qty: 14 TAB | Refills: 0 | Status: SHIPPED | OUTPATIENT
Start: 2017-10-07 | End: 2017-10-14

## 2017-10-07 RX ORDER — SULFAMETHOXAZOLE AND TRIMETHOPRIM 800; 160 MG/1; MG/1
1 TABLET ORAL
Status: COMPLETED | OUTPATIENT
Start: 2017-10-07 | End: 2017-10-07

## 2017-10-07 RX ORDER — QUINAPRIL 20 MG/1
20 TABLET ORAL
COMMUNITY
End: 2018-11-14

## 2017-10-07 RX ORDER — CEPHALEXIN 250 MG/1
500 CAPSULE ORAL
Status: COMPLETED | OUTPATIENT
Start: 2017-10-07 | End: 2017-10-07

## 2017-10-07 RX ADMIN — SULFAMETHOXAZOLE AND TRIMETHOPRIM 1 TABLET: 800; 160 TABLET ORAL at 14:59

## 2017-10-07 RX ADMIN — CEPHALEXIN 500 MG: 250 CAPSULE ORAL at 14:59

## 2017-10-07 RX ADMIN — BACITRACIN ZINC: 500 OINTMENT TOPICAL at 14:59

## 2017-10-07 NOTE — ED NOTES
Written and verbal discharge instructions given. Patient verbalizes understanding of same. Patient denies  further questions about treatment and discharge instructions. Left ED with patent airway and steady gait. Arm band removed shredded. Patient left ED with 2 RX. Imp stressed of F/U on Monday with provider. Patient is aware to  Rx and take another dose of Keflex and Bactrim tonight.

## 2017-10-07 NOTE — ED TRIAGE NOTES
Pt. Noticed skin was peeling from right great toe last night. Entire toe appears covered in dry skin. States lost left 2nd toe in July.  Is diabetic

## 2017-10-07 NOTE — ED PROVIDER NOTES
HPI Comments: Dajuan Rojas is a 45 y.o. Diabetic female who presents to the ED c/o right great toe sore first noticed last night. Patient states she has had dry skin to that area for about a week but last night she noticed weeping from her foot. She decided to come in today for evaluation so it didn't get worse, stating she lost left second toe in July. She denies fevers, chills. Reports sugars have been in the 150's lately. Patient states she goes to the diabetic foot clinic offered through [facility] every other Tuesday, next clinic is this week. Patient is a 45 y.o. female presenting with toe pain. The history is provided by the patient. Toe Pain           Past Medical History:   Diagnosis Date    Blind left eye     Diabetes ([address]     Diabetic retinopathy ([address]     Gall stones     GERD (gastroesophageal reflux disease)     Hiatal hernia     Hypertension     Mass of abdomen     Pancreatitis        Past Surgical History:   Procedure Laterality Date    HX AMPUTATION Left 07/21/2017    left 2nd toe    HX CHOLECYSTECTOMY  10/15/2014    HX GI      Benign GI Stromal Tumor excision    HX HEENT      Sx for detached retina    HX MYOMECTOMY      x5 removed    HX OTHER SURGICAL      upper endoscopy         Family History:   Problem Relation Age of Onset    Adopted: Yes    Heart Failure Mother 76    Other Father 70     blood clot after surgery    Diabetes Paternal Aunt     Diabetes Paternal Uncle        Social History     Social History    Marital status: SINGLE     Spouse name: N/A    Number of children: N/A    Years of education: N/A     Occupational History    Not on file.      Social History Main Topics    Smoking status: Former Smoker     Packs/day: 0.20     Years: 8.00     Types: Cigarettes     Quit date: 12/3/1995    Smokeless tobacco: Never Used    Alcohol use No      Comment: Drinks 3-4xs year generally wine    Drug use: No    Sexual activity: Not Currently     Other Topics Concern     Service No    Blood Transfusions No    Caffeine Concern No    Occupational Exposure No    Hobby Hazards No    Sleep Concern No    Stress Concern No    Weight Concern No    Special Diet Yes    Back Care No    Exercise No    Bike Helmet No    Seat Belt Yes    Self-Exams Yes     Social History Narrative    Lives with 16year old son         with ex          Does not have health insurance          ALLERGIES: Promethazine    Review of Systems   Constitutional: Negative for chills and fever. Gastrointestinal: Negative for nausea and vomiting. Musculoskeletal:        + right toe pain and skin changes. All other systems reviewed and are negative. Vitals:    10/07/17 1155   BP: 133/85   Pulse: 93   Resp: 20   Temp: 98.9 °F (37.2 °C)   SpO2: 98%   Weight: 81.6 kg (180 lb)   Height: 5' 8\" (1.727 m)            Physical Exam   Constitutional: She is oriented to person, place, and time. She appears well-developed and well-nourished. No distress. HENT:   Head: Normocephalic and atraumatic. Eyes: Conjunctivae are normal.   Neck: Normal range of motion. Cardiovascular: Normal rate, regular rhythm and normal heart sounds. Pulmonary/Chest: Effort normal and breath sounds normal. No respiratory distress. Neurological: She is alert and oriented to person, place, and time. Skin: Skin is warm and dry. She is not diaphoretic. Right great toe: Ulceration to the plantar aspect with overlying thickened skin. No evidence tendon, bone, or joint. Patient reports sensation but denies pain with manipulation. Erythema and warmth along the great toe extending proximally up the mid- foot, suggestive of a localized cellulitis. No other wounds on the right food appreciated. Left foot: second toe amputation. No signs of open skin. Psychiatric: She has a normal mood and affect. Her behavior is normal.   Nursing note and vitals reviewed.        MDM  Number of Diagnoses or Management Options  Cellulitis of right lower extremity:   Diabetic ulcer of toe of right foot associated with diabetes mellitus due to underlying condition, limited to breakdown of skin [facility]):   Diagnosis management comments: DDx: eczema, allergic dermatitis, contact dermatitis, erysipelas, bug bites, abscess, cellulitis, viral exanthem, scabies,  skin eruption associated with life-threatening condition, diabetic foot, osteomyelitis     1:13 PM  X-ray read as no acute osseous involvement. CBC and BMP pending. Decision was made to forego cultures based on recommendations from Up To Date regarding mild diabetic foot infections without evidence of osteomyelitis. If no leukocytosis, plan to debride toe in the ED, place on oral antibiotics and have patient follow up closely outpatient. 1:57 PM Blood obtained by tech and send to the lab. Foot evaluated by Dr. Luci Hunter. Debridement performed in the ED - wound dressed with bacitracin and bulky dressing. Labs so no leukocytosis. Patient given dose of cephalexin and bactrim DS in the ED. Patient advised to call [facility] on Monday for appointment ASAP and to inform them she was evaluated in the ED and needs follow up. If they are unable to see her Monday, she is to follow up with the diabetic foot clinic at her [facility] on Tuesday. Patient verbalized understanding the importance of close follow up and keeping the wound clean to avoid amputation. She was advised to return to the ED for fevers, chills, worsening ulceration, necrosis of two. Amount and/or Complexity of Data Reviewed  Tests in the radiology section of CPT®: ordered and reviewed  Discuss the patient with other providers: yes (Case discussed with Dr. Luci Hunter.)      ED Course       Procedures   Wound debridement. Diagnosis:   1.  Diabetic ulcer of toe of right foot associated with diabetes mellitus due to underlying condition, limited to breakdown of skin ([address]          Disposition: Discharge to home with close outpatient follow up with [facility]. Follow-up Information     Follow up With Details Comments [address] Chad Munguia NP Schedule an appointment as soon as possible for a visit in 1 day  5 [facility] [id number] [phone]      Diabetic foot clinic In 3 days      [facility] EM[facility]  As needed, If symptoms worsen [facility]  [phone]          Patient's Medications   Start Taking    CEPHALEXIN (KEFLEX) 500 MG CAPSULE    Take 1 Cap by mouth four (4) times daily for 7 days. Indications: diabetic foot infection    TRIMETHOPRIM-SULFAMETHOXAZOLE (BACTRIM DS) 160-800 MG PER TABLET    Take 1 Tab by mouth two (2) times a day for 7 days. Indications: Diabetic Foot Infection   Continue Taking    ASPIRIN 81 MG CHEWABLE TABLET    Take 1 Tab by mouth daily. INSULIN NPH/INSULIN REGULAR (NOVOLIN 70/30, HUMULIN 70/30) 100 UNIT/ML (70-30) INJECTION    40 Units by SubCUTAneous route two (2) times a day. METFORMIN ER (GLUCOPHAGE XR) 500 MG TABLET    Take 1 Tab by mouth daily (with dinner). PAROXETINE (PAXIL) 30 MG TABLET    TAKE 1 TABLET BY MOUTH DAILY    QUINAPRIL (ACCUPRIL) 20 MG TABLET    Take 20 mg by mouth nightly. ROSUVASTATIN (CRESTOR) 20 MG TABLET    Take 1 Tab by mouth nightly for 90 days. SODIUM HYPOCHLORITE (QUARTER STRENGTH DAKIN'S) 0.125 % SOLN EXTERNAL SOLUTION    Apply 473 mL to affected area two (2) times a day.    These Medications have changed    No medications on file   Stop Taking    No medications on file     Sameer Juarez PA-C

## 2017-10-07 NOTE — DISCHARGE INSTRUCTIONS
Diabetic Foot Ulcer: Care Instructions  Your Care Instructions  Diabetes can damage the nerve endings and blood vessels in your feet. That means you are less likely to notice when your feet are injured. A small skin problem like a callus, blister, or cracked skin can turn into a larger sore, called a foot ulcer. Foot ulcers form most often on the pad (ball) of the foot or the bottom of the big toe. You can also get them on the top and bottom of each toe. Foot ulcers can get infected. If the infection is severe, then tissue in the foot can die. This is called gangrene. In that case, one or more of the toes, part or all of the foot, and sometimes part of the leg may have to be removed (amputated). Your doctor may have removed the dead tissue and cleaned the ulcer. Your foot wound may be wrapped in a protective bandage. It is very important to keep your weight off your injured foot. After a foot ulcer has formed, it will not heal as long as you keep putting weight on the area. Always get early treatment for foot problems. A minor irritation can lead to a major problem if it's not taken care of soon. Follow-up care is a key part of your treatment and safety. Be sure to make and go to all appointments, and call your doctor if you are having problems. It's also a good idea to know your test results and keep a list of the medicines you take. How can you care for yourself at home? · Follow your doctor's instructions about keeping pressure off the foot ulcer. You may need to use crutches or a wheelchair. Or you may wear a cast or a walking boot. · Follow your doctor's instructions on how to clean the ulcer and change the bandage. · If your doctor prescribed antibiotics, take them as directed. Do not stop taking them just because you feel better. You need to take the full course of antibiotics. To prevent foot ulcers  · Keep your blood sugar close to normal by watching what and how much you eat.  Track your blood sugar, take medicines if prescribed, and get regular exercise. · Do not smoke. Smoking affects blood flow and can make foot problems worse. If you need help quitting, talk to your doctor about stop-smoking programs and medicines. These can increase your chances of quitting for good. · Do not go barefoot. Protect your feet by wearing shoes that fit well. Choose shoes that are made of materials that are flexible and breathable, such as leather or cloth. · Inspect your feet daily for blisters, cuts, cracks, or sores. If you can't see well, use a mirror or have someone help you. · Have your doctor check your feet during each visit. If you have a foot problem, see your doctor. Do not try to treat your foot problem on your own. Home remedies or treatments that you can buy without a prescription (such as corn removers) can be harmful. When should you call for help? Call your doctor now or seek immediate medical care if:  · You have symptoms of infection, such as:  ¨ Increased pain, swelling, warmth, or redness. ¨ Red streaks leading from the area. ¨ Pus draining from the area. ¨ A fever. Watch closely for changes in your health, and be sure to contact your doctor if:  · You have a new problem with your feet, such as:  ¨ A new sore or ulcer. ¨ A break in the skin that is not healing after several days. ¨ Bleeding corns or calluses. ¨ An ingrown toenail. · You do not get better as expected. Where can you learn more? Go to http://linda-sivan.info/. Enter T131 in the search box to learn more about \"Diabetic Foot Ulcer: Care Instructions. \"  Current as of: March 13, 2017  Content Version: 11.3  © 2699-8092 [employer]. Care instructions adapted under license by [employer] (which disclaims liability or warranty for this information).  If you have questions about a medical condition or this instruction, always ask your healthcare professional. Lydia Rodriguez disclaims any warranty or liability for your use of this information.

## 2017-10-09 ENCOUNTER — OFFICE VISIT (OUTPATIENT)
Dept: FAMILY MEDICINE CLINIC | Age: 52
End: 2017-10-09

## 2017-10-09 VITALS
WEIGHT: 195 LBS | HEIGHT: 68 IN | SYSTOLIC BLOOD PRESSURE: 116 MMHG | DIASTOLIC BLOOD PRESSURE: 76 MMHG | TEMPERATURE: 99.2 F | OXYGEN SATURATION: 100 % | BODY MASS INDEX: 29.55 KG/M2 | RESPIRATION RATE: 20 BRPM | HEART RATE: 89 BPM

## 2017-10-09 DIAGNOSIS — E11.621 DIABETIC ULCER OF TOE OF LEFT FOOT ASSOCIATED WITH TYPE 2 DIABETES MELLITUS, UNSPECIFIED ULCER STAGE (HCC): Primary | ICD-10-CM

## 2017-10-09 DIAGNOSIS — L97.529 DIABETIC ULCER OF TOE OF LEFT FOOT ASSOCIATED WITH TYPE 2 DIABETES MELLITUS, UNSPECIFIED ULCER STAGE (HCC): Primary | ICD-10-CM

## 2017-10-09 DIAGNOSIS — L08.9 TOE INFECTION: ICD-10-CM

## 2017-10-09 NOTE — MR AVS SNAPSHOT
Visit Information Date & Time Provider Department Dept. Phone Encounter #  
 10/9/2017  2:30 PM Moni Sanford NP [address] [id number] Follow-up Instructions Return if symptoms worsen or fail to improve. Your Appointments 12/5/2017  2:00 PM  
Follow Up with Moni Sanford NP [address] [facility] ([facility]) Appt Note: 3 mth follow up  
 [address] [facility] [id number]  
[address] [facility] [address] Upcoming Health Maintenance Date Due DTaP/Tdap/Td series (1 - Tdap) 8/12/1984 PAP AKA CERVICAL CYTOLOGY 8/12/1984 BREAST CANCER SCRN MAMMOGRAM 2/18/2015 INFLUENZA AGE 9 TO ADULT 8/1/2017 Pneumococcal 19-64 Highest Risk (1 of 3 - PCV13) 9/5/2018* EYE EXAM RETINAL OR DILATED Q1 9/5/2018* HEMOGLOBIN A1C Q6M 2/7/2018 FOOT EXAM Q1 8/7/2018 MICROALBUMIN Q1 8/7/2018 LIPID PANEL Q1 8/7/2018 FOBT Q 1 YEAR AGE 50-75 8/10/2018 *Topic was postponed. The date shown is not the original due date. Allergies as of 10/9/2017  Review Complete On: 10/9/2017 By: Hernan Patel Severity Noted Reaction Type Reactions Promethazine  10/03/2014    Nausea and Vomiting \"the phenergan made me more nauseated\" Current Immunizations  Never Reviewed No immunizations on file. Not reviewed this visit You Were Diagnosed With   
  
 Codes Comments Diabetic ulcer of toe of left foot associated with type 2 diabetes mellitus, unspecified ulcer stage [facility])    -  Primary ICD-10-CM: E11.621, H39.795 ICD-9-CM: 250.80, 707.15 Toe infection     ICD-10-CM: L08.9 ICD-9-CM: 095. 9 Vitals BP Pulse Temp Resp Height(growth percentile) Weight(growth percentile) 116/76 89 99.2 °F (37.3 °C) 20 5' 8\" (1.727 m) 195 lb (88.5 kg) SpO2 BMI OB Status Smoking Status 100% 29.65 kg/m2 Menopause Former Smoker BMI and BSA Data Body Mass Index Body Surface Area  
 29.65 kg/m 2 2.06 m 2 Preferred Pharmacy Pharmacy Name Phone [address] [place] [address] [phone] [phone] Your Updated Medication List  
  
   
This list is accurate as of: 10/9/17  3:43 PM.  Always use your most recent med list.  
  
  
  
  
 ACCUPRIL 20 mg tablet Generic drug:  quinapril Take 20 mg by mouth nightly. aspirin 81 mg chewable tablet Take 1 Tab by mouth daily. cephALEXin 500 mg capsule Commonly known as:  Apurva Other Take 1 Cap by mouth four (4) times daily for 7 days. Indications: diabetic foot infection  
  
 insulin NPH/insulin regular 100 unit/mL (70-30) injection Commonly known as:  NOVOLIN 70/30, HUMULIN 70/30  
40 Units by SubCUTAneous route two (2) times a day. metFORMIN  mg tablet Commonly known as:  GLUCOPHAGE XR Take 1 Tab by mouth daily (with dinner). PARoxetine 30 mg tablet Commonly known as:  PAXIL TAKE 1 TABLET BY MOUTH DAILY  
  
 rosuvastatin 20 mg tablet Commonly known as:  CRESTOR Take 1 Tab by mouth nightly for 90 days. sodium hypochlorite 0.125 % Soln external solution Commonly known as:  [address] Apply 473 mL to affected area two (2) times a day. trimethoprim-sulfamethoxazole 160-800 mg per tablet Commonly known as:  BACTRIM DS Take 1 Tab by mouth two (2) times a day for 7 days. Indications: Diabetic Foot Infection We Performed the Following REFERRAL TO PODIATRY [REF90 Custom] Comments:  
 Please evaluate patient for toe ulcer in diabetic patient. Follow-up Instructions Return if symptoms worsen or fail to improve. Referral Information  Referral ID Referred By Referred To  
  
 [id number] Wei Fortune DPM   
 [address] [facility], [facility] [place] Phone: [phone] Fax: [phone] Visits Status Start Date End Date 1 New Request 10/9/17 10/9/18 If your referral has a status of pending review or denied, additional information will be sent to support the outcome of this decision. Patient Instructions Diabetes Foot Health: Care Instructions Your Care Instructions When you have diabetes, your feet need extra care and attention. Diabetes can damage the nerve endings and blood vessels in your feet, making you less likely to notice when your feet are injured. Diabetes also limits your body's ability to fight infection and get blood to areas that need it. If you get a minor foot injury, it could become an ulcer or a serious infection. With good foot care, you can prevent most of these problems. Caring for your feet can be quick and easy. Most of the care can be done when you are bathing or getting ready for bed. Follow-up care is a key part of your treatment and safety. Be sure to make and go to all appointments, and call your doctor if you are having problems. Its also a good idea to know your test results and keep a list of the medicines you take. How can you care for yourself at home? · Keep your blood sugar close to normal by watching what and how much you eat, monitoring blood sugar, taking medicines if prescribed, and getting regular exercise. · Do not smoke. Smoking affects blood flow and can make foot problems worse. If you need help quitting, talk to your doctor about stop-smoking programs and medicines. These can increase your chances of quitting for good. · Eat a diet that is low in fats. High fat intake can cause fat to build up in your blood vessels and decrease blood flow. · Inspect your feet daily for blisters, cuts, cracks, or sores. If you cannot see well, use a mirror or have someone help you. · Take care of your feet: ¨ Wash your feet every day. Use warm (not hot) water. Check the water temperature with your wrists or other part of your body, not your feet. ¨ Dry your feet well. Pat them dry. Do not rub the skin on your feet too hard. Dry well between your toes. If the skin on your feet stays moist, bacteria or a fungus can grow, which can lead to infection. ¨ Keep your skin soft. Use moisturizing skin cream to keep the skin on your feet soft and prevent calluses and cracks. But do not put the cream between your toes, and stop using any cream that causes a rash. ¨ Clean underneath your toenails carefully. Do not use a sharp object to clean underneath your toenails. Use the blunt end of a nail file or other rounded tool. ¨ Trim and file your toenails straight across to prevent ingrown toenails. Use a nail clipper, not scissors. Use an emery board to smooth the edges. · Change socks daily. Socks without seams are best, because seams often rub the feet. You can find socks for people with diabetes from specialty catalogs. · Look inside your shoes every day for things like gravel or torn linings, which could cause blisters or sores. · Buy shoes that fit well: 
¨ Look for shoes that have plenty of space around the toes. This helps prevent bunions and blisters. ¨ Try on shoes while wearing the kind of socks you will usually wear with the shoes. ¨ Avoid plastic shoes. They may rub your feet and cause blisters. Good shoes should be made of materials that are flexible and breathable, such as leather or cloth. ¨ Break in new shoes slowly by wearing them for no more than an hour a day for several days. Take extra time to check your feet for red areas, blisters, or other problems after you wear new shoes. · Do not go barefoot. Do not wear sandals, and do not wear shoes with very thin soles. Thin soles are easy to puncture. They also do not protect your feet from hot pavement or cold weather. · Have your doctor check your feet during each visit. If you have a foot problem, see your doctor. Do not try to treat an early foot problem at home. Home remedies or treatments that you can buy without a prescription (such as corn removers) can be harmful. · Always get early treatment for foot problems. A minor irritation can lead to a major problem if not properly cared for early. When should you call for help? Call your doctor now or seek immediate medical care if: 
· You have a foot sore, an ulcer or break in the skin that is not healing after 4 days, bleeding corns or calluses, or an ingrown toenail. · You have blue or black areas, which can mean bruising or blood flow problems. · You have peeling skin or tiny blisters between your toes or cracking or oozing of the skin. · You have a fever for more than 24 hours and a foot sore. · You have new numbness or tingling in your feet that does not go away after you move your feet or change positions. · You have unexplained or unusual swelling of the foot or ankle. Watch closely for changes in your health, and be sure to contact your doctor if: 
· You cannot do proper foot care. Where can you learn more? Go to http://linda-sivan.info/. Enter A739 in the search box to learn more about \"Diabetes Foot Health: Care Instructions. \" Current as of: March 13, 2017 Content Version: 11.3 © 9612-1174 [employer]. Care instructions adapted under license by [employer] (which disclaims liability or warranty for this information). If you have questions about a medical condition or this instruction, always ask your healthcare professional. [address] any warranty or liability for your use of this information. Introducing [facility]!    
 [facility] [employer] introduces [employer]ala patient portal. Now you can access parts of your medical record, email your doctor's office, and request medication refills online. 1. In your internet browser, go to [email or web address] 2. Click on the First Time User? Click Here link in the Sign In box. You will see the New Member Sign Up page. 3. Enter your Crispify Access Code exactly as it appears below. You will not need to use this code after youve completed the sign-up process. If you do not sign up before the expiration date, you must request a new code. · Crispify Access Code: [email or web address] Expires: 10/16/2017  4:37 PM 
 
4. Enter the last four digits of your Social Security Number (xxxx) and Date of Birth (mm/dd/yyyy) as indicated and click Submit. You will be taken to the next sign-up page. 5. Create a Crispify ID. This will be your Crispify login ID and cannot be changed, so think of one that is secure and easy to remember. 6. Create a Crispify password. You can change your password at any time. 7. Enter your Password Reset Question and Answer. This can be used at a later time if you forget your password. 8. Enter your e-mail address. You will receive e-mail notification when new information is available in [address] 9. Click Sign Up. You can now view and download portions of your medical record. 10. Click the Download Summary menu link to download a portable copy of your medical information. If you have questions, please visit the Frequently Asked Questions section of the Crispify website. Remember, Crispify is NOT to be used for urgent needs. For medical emergencies, dial 911. Now available from your iPhone and Android! Please provide this summary of care documentation to your next provider. Your primary care clinician is listed as Ivelisse Hutchison. If you have any questions after today's visit, please call [phone].

## 2017-10-09 NOTE — PATIENT INSTRUCTIONS
Diabetes Foot Health: Care Instructions  Your Care Instructions    When you have diabetes, your feet need extra care and attention. Diabetes can damage the nerve endings and blood vessels in your feet, making you less likely to notice when your feet are injured. Diabetes also limits your body's ability to fight infection and get blood to areas that need it. If you get a minor foot injury, it could become an ulcer or a serious infection. With good foot care, you can prevent most of these problems. Caring for your feet can be quick and easy. Most of the care can be done when you are bathing or getting ready for bed. Follow-up care is a key part of your treatment and safety. Be sure to make and go to all appointments, and call your doctor if you are having problems. Its also a good idea to know your test results and keep a list of the medicines you take. How can you care for yourself at home? · Keep your blood sugar close to normal by watching what and how much you eat, monitoring blood sugar, taking medicines if prescribed, and getting regular exercise. · Do not smoke. Smoking affects blood flow and can make foot problems worse. If you need help quitting, talk to your doctor about stop-smoking programs and medicines. These can increase your chances of quitting for good. · Eat a diet that is low in fats. High fat intake can cause fat to build up in your blood vessels and decrease blood flow. · Inspect your feet daily for blisters, cuts, cracks, or sores. If you cannot see well, use a mirror or have someone help you. · Take care of your feet:  [facility] your feet every day. Use warm (not hot) water. Check the water temperature with your wrists or other part of your body, not your feet. ¨ Dry your feet well. Pat them dry. Do not rub the skin on your feet too hard. Dry well between your toes. If the skin on your feet stays moist, bacteria or a fungus can grow, which can lead to infection.   ¨ Keep your skin soft. Use moisturizing skin cream to keep the skin on your feet soft and prevent calluses and cracks. But do not put the cream between your toes, and stop using any cream that causes a rash. ¨ Clean underneath your toenails carefully. Do not use a sharp object to clean underneath your toenails. Use the blunt end of a nail file or other rounded tool. ¨ Trim and file your toenails straight across to prevent ingrown toenails. Use a nail clipper, not scissors. Use an emery board to smooth the edges. · Change socks daily. Socks without seams are best, because seams often rub the feet. You can find socks for people with diabetes from specialty catalogs. · Look inside your shoes every day for things like gravel or torn linings, which could cause blisters or sores. · Buy shoes that fit well:  ¨ Look for shoes that have plenty of space around the toes. This helps prevent bunions and blisters. ¨ Try on shoes while wearing the kind of socks you will usually wear with the shoes. ¨ Avoid plastic shoes. They may rub your feet and cause blisters. Good shoes should be made of materials that are flexible and breathable, such as leather or cloth. ¨ Break in new shoes slowly by wearing them for no more than an hour a day for several days. Take extra time to check your feet for red areas, blisters, or other problems after you wear new shoes. · Do not go barefoot. Do not wear sandals, and do not wear shoes with very thin soles. Thin soles are easy to puncture. They also do not protect your feet from hot pavement or cold weather. · Have your doctor check your feet during each visit. If you have a foot problem, see your doctor. Do not try to treat an early foot problem at home. Home remedies or treatments that you can buy without a prescription (such as corn removers) can be harmful. · Always get early treatment for foot problems. A minor irritation can lead to a major problem if not properly cared for early.   When should you call for help? Call your doctor now or seek immediate medical care if:  · You have a foot sore, an ulcer or break in the skin that is not healing after 4 days, bleeding corns or calluses, or an ingrown toenail. · You have blue or black areas, which can mean bruising or blood flow problems. · You have peeling skin or tiny blisters between your toes or cracking or oozing of the skin. · You have a fever for more than 24 hours and a foot sore. · You have new numbness or tingling in your feet that does not go away after you move your feet or change positions. · You have unexplained or unusual swelling of the foot or ankle. Watch closely for changes in your health, and be sure to contact your doctor if:  · You cannot do proper foot care. Where can you learn more? Go to http://linda-sivan.info/. Enter A739 in the search box to learn more about \"Diabetes Foot Health: Care Instructions. \"  Current as of: March 13, 2017  Content Version: 11.3  © 3892-9818 [employer]. Care instructions adapted under license by [employer] (which disclaims liability or warranty for this information). If you have questions about a medical condition or this instruction, always ask your healthcare professional. [address] any warranty or liability for your use of this information.

## 2017-10-09 NOTE — PROGRESS NOTES
[address]  [address] [facility], [address] [place] [address]  [phone] office/[phone] fax      10/10/2017    Reason for visit:   Chief Complaint   Patient presents with    Follow-up     ER follow up with rt diabetic foot ulcer        Patient: Tom Echols, 1965, [id number]       Primary MD: Tyrone Franks, NP    Subjective:   Tom Echols, a 45 y.o. female, with pmhx of poorly controlled DM, s/p Left foot second toe amputation by Dr. Leola Espinosa, DM retinopathy, GERD who presents for Follow-up from ER for Cellulitis/Ulcer of the  Right great toe. The patient was seen in [facility] ED 10/7 for ulceration of Left great toe and cellulitis of toe. She had toe debrided in ED and dressed. She was given Keflex and bactrim and reports she is taking as prescribed. Reports swelling and warmth. Denies any numbness/tingling/ or significant pain in toe. Reports minimal drainage. States she has been dressing it daily using dakins solution for irrigation, followed by bacitracin and secured by kerlix. Denies fevers, myalgias, chills, N/V. Xray did not show evidence of osteomyelitis. The patient reports that she was going to diabetic foot clinic tomorrow at a local [facility].        HPI    Past Medical History:   Diagnosis Date    Blind left eye     Diabetes ([address]     Diabetic retinopathy ([address]     Gall stones     GERD (gastroesophageal reflux disease)     Hiatal hernia     Hypertension     Mass of abdomen     Pancreatitis        Past Surgical History:   Procedure Laterality Date    HX AMPUTATION Left 07/21/2017    left 2nd toe    HX CHOLECYSTECTOMY  10/15/2014    HX GI      Benign GI Stromal Tumor excision    HX HEENT      Sx for detached retina    HX MYOMECTOMY      x5 removed    HX OTHER SURGICAL      upper endoscopy       Social History     Social History    Marital status: SINGLE     Spouse name: N/A    Number of children: N/A    Years of education: N/A     Occupational History    Not on file. Social History Main Topics    Smoking status: Former Smoker     Packs/day: 0.20     Years: 8.00     Types: Cigarettes     Quit date: 12/3/1995    Smokeless tobacco: Never Used    Alcohol use No      Comment: Drinks 3-4xs year generally wine    Drug use: No    Sexual activity: Not Currently     Other Topics Concern     Service No    Blood Transfusions No    Caffeine Concern No    Occupational Exposure No    Hobby Hazards No    Sleep Concern No    Stress Concern No    Weight Concern No    Special Diet Yes    Back Care No    Exercise No    Bike Helmet No    Seat Belt Yes    Self-Exams Yes     Social History Narrative    Lives with 16year old son         with ex          Does not have health insurance        Allergies   Allergen Reactions    Promethazine Nausea and Vomiting     \"the phenergan made me more nauseated\"        Current Outpatient Prescriptions on File Prior to Visit   Medication Sig Dispense Refill    quinapril (ACCUPRIL) 20 mg tablet Take 20 mg by mouth nightly.  cephALEXin (KEFLEX) 500 mg capsule Take 1 Cap by mouth four (4) times daily for 7 days. Indications: diabetic foot infection 28 Cap 0    trimethoprim-sulfamethoxazole (BACTRIM DS) 160-800 mg per tablet Take 1 Tab by mouth two (2) times a day for 7 days. Indications: Diabetic Foot Infection 14 Tab 0    PARoxetine (PAXIL) 30 mg tablet TAKE 1 TABLET BY MOUTH DAILY 30 Tab 3    rosuvastatin (CRESTOR) 20 mg tablet Take 1 Tab by mouth nightly for 90 days. 90 Tab 3    insulin NPH/insulin regular (NOVOLIN 70/30, HUMULIN 70/30) 100 unit/mL (70-30) injection 40 Units by SubCUTAneous route two (2) times a day. 10 mL 3    metFORMIN ER (GLUCOPHAGE XR) 500 mg tablet Take 1 Tab by mouth daily (with dinner). 30 Tab 3    sodium hypochlorite (QUARTER STRENGTH DAKIN'S) 0.125 % soln external solution Apply 473 mL to affected area two (2) times a day.  1 Bottle 0    aspirin 81 mg chewable tablet Take 1 Tab by mouth daily. No current facility-administered medications on file prior to visit. ROS    Objective:   Visit Vitals    /76    Pulse 89    Temp 99.2 °F (37.3 °C)    Resp 20    Ht 5' 8\" (1.727 m)    Wt 195 lb (88.5 kg)    SpO2 100%    BMI 29.65 kg/m2      Wt Readings from Last 3 Encounters:   10/09/17 195 lb (88.5 kg)   10/07/17 180 lb (81.6 kg)   09/05/17 188 lb 9.6 oz (85.5 kg)     Lab Results   Component Value Date/Time    Glucose 192 10/07/2017 01:50 PM    Glucose (POC) 194 07/24/2017 04:19 PM    Glucose,  09/03/2014 06:37 AM       Pertinent Lab Results:    Physical Exam   Constitutional: She is oriented to person, place, and time. She appears well-developed and well-nourished. No distress. Cardiovascular: Normal rate, regular rhythm and normal heart sounds. Pulmonary/Chest: Effort normal and breath sounds normal.   Musculoskeletal:        Right foot: There is swelling. Feet:    Ulcer to plantar surface of right great toe. Area of debridement pink with a hypervascular area that is slightly bleeding. Great toe with significant swelling, warm to touch area slightly pink but not with significant erythema,. Denies sever pain with manipulation. Epithelial tissue sloughing and appears boggy. Toe nails with onchomycosis. Area of dark tissue to the distal portion of toe consistent with necrosis. Neurological: She is alert and oriented to person, place, and time. Skin: She is not diaphoretic. ICD-10-CM ICD-9-CM    1. Diabetic ulcer of toe of left foot associated with type 2 diabetes mellitus, unspecified ulcer stage ([facility] E11.621 250.80 REFERRAL TO PODIATRY    L97.529 707.15 REFERRAL TO WOUND CARE   2. Toe infection L08.9 686.9 REFERRAL TO PODIATRY      REFERRAL TO WOUND CARE     Diagnoses and all orders for this visit:    1.  Diabetic ulcer of toe of left foot associated with type 2 diabetes mellitus, unspecified ulcer stage ([facility]  -     [address] CARE    2. Toe infection  -     REFERRAL TO PODIATRY  -     REFERRAL TO WOUND CARE    Discussed with the patient that given her h/o of osteomyelitis she needs an urgent f/u with a Podiatrists to further prevent complications. I do not believe that the diabetic foot clinic will give her the sufficient care that she will need, the clinic is mostly for maintenance and routine care not acute issues. Attempted to call Dr. Dontae Wright office closed at the moment but will send urgent referral for patient to be seen within the next week, will try again tommorrow. The patient to continue daily dressing and foot care as she has been with the quarter strength Dakins solution, bacitracin and application of gauze/kerlix. Report to ED if symptoms get worse. The patient given extra supplies to do foot care at home. Follow-up Disposition:  Return if symptoms worsen or fail to improve.

## 2017-10-09 NOTE — LETTER
10/9/2017 [facility] [address] [facility] A[place], [address] Kenyetta rPingle, 1965, is picking up the following medications ordered from the [facility]: HUMULIN 70/30 KWIKPEN #7 MOLLY KAUFMAN SALLY Patient's Signature: _____________________________ Today's Date: 10/9/2017

## 2017-10-12 ENCOUNTER — TELEPHONE (OUTPATIENT)
Dept: FAMILY MEDICINE CLINIC | Age: 52
End: 2017-10-12

## 2017-10-12 NOTE — TELEPHONE ENCOUNTER
Patient would like a refill on the antibotics that you prescribed for her toe, she will be out this by the end of the weekend. Her appt for podiatrist is 10/25/17.

## 2017-10-16 ENCOUNTER — DOCUMENTATION ONLY (OUTPATIENT)
Dept: FAMILY MEDICINE CLINIC | Age: 52
End: 2017-10-16

## 2017-10-16 NOTE — PROGRESS NOTES
Patient called. Appointment w/ podiatrist moved up to 10/18 at 56 am w/ Dr Dayana Gonzalez who amputated last toe. Patient is strongly enc to go to ED for evaluation of wound for any fever, drainage, discoloration. Patient cannot feel pain in the foot due to neuropathy. She declines an ED eval despite providers wishes at this time. She denies odor and drainage \"that's what was there when I lost my toe before\".

## 2017-10-26 ENCOUNTER — CLINICAL SUPPORT (OUTPATIENT)
Dept: FAMILY MEDICINE CLINIC | Age: 52
End: 2017-10-26

## 2017-10-26 VITALS
TEMPERATURE: 98.3 F | BODY MASS INDEX: 29.89 KG/M2 | RESPIRATION RATE: 20 BRPM | OXYGEN SATURATION: 100 % | WEIGHT: 197.2 LBS | DIASTOLIC BLOOD PRESSURE: 86 MMHG | SYSTOLIC BLOOD PRESSURE: 132 MMHG | HEART RATE: 96 BPM | HEIGHT: 68 IN

## 2017-10-26 DIAGNOSIS — E11.319 TYPE 2 DIABETES MELLITUS WITH RETINOPATHY, WITH LONG-TERM CURRENT USE OF INSULIN, MACULAR EDEMA PRESENCE UNSPECIFIED, UNSPECIFIED LATERALITY, UNSPECIFIED RETINOPATHY SEVERITY (HCC): Primary | ICD-10-CM

## 2017-10-26 DIAGNOSIS — Z79.4 TYPE 2 DIABETES MELLITUS WITH RETINOPATHY, WITH LONG-TERM CURRENT USE OF INSULIN, MACULAR EDEMA PRESENCE UNSPECIFIED, UNSPECIFIED LATERALITY, UNSPECIFIED RETINOPATHY SEVERITY (HCC): Primary | ICD-10-CM

## 2017-10-26 LAB — GLUCOSE POC: 152 MG/DL

## 2017-11-07 ENCOUNTER — TELEPHONE (OUTPATIENT)
Dept: FAMILY MEDICINE CLINIC | Age: 52
End: 2017-11-07

## 2017-11-07 NOTE — TELEPHONE ENCOUNTER
Patient returned my call and she informs that she had podiatry follow-up yesterday with Dr Trevon Evangelista. Patient informs that she is to have toe amputation (right) as soon as pre-op testing, clearance and malik application complete. Called Dr Trevon Evangelista office and spoke with Milo Phalen, who informs pre-op clearance can be done up to 30 days prior and patient will need to have surgery as soon as possible. She has given patient the order to have EKG, CXR, CBC, CMP. Requested Milo Phalen fax clearance form to this clinic. E-mailed [facility] to have malik approval as soon as possible. Patient scheduled for Pre-Op clearance physical Thurs 11/9/17 at 1030. Called patient and left message to return call.

## 2017-11-09 ENCOUNTER — OFFICE VISIT (OUTPATIENT)
Dept: FAMILY MEDICINE CLINIC | Age: 52
End: 2017-11-09

## 2017-11-09 ENCOUNTER — HOSPITAL ENCOUNTER (OUTPATIENT)
Dept: LAB | Age: 52
Discharge: HOME | End: 2017-11-09

## 2017-11-09 ENCOUNTER — HOSPITAL ENCOUNTER (OUTPATIENT)
Dept: PREADMISSION TESTING | Age: 52
Discharge: HOME | End: 2017-11-09

## 2017-11-09 ENCOUNTER — HOSPITAL ENCOUNTER (OUTPATIENT)
Dept: GENERAL RADIOLOGY | Age: 52
Discharge: HOME | End: 2017-11-09

## 2017-11-09 VITALS
HEART RATE: 95 BPM | DIASTOLIC BLOOD PRESSURE: 72 MMHG | OXYGEN SATURATION: 99 % | RESPIRATION RATE: 16 BRPM | HEIGHT: 68 IN | BODY MASS INDEX: 30.62 KG/M2 | SYSTOLIC BLOOD PRESSURE: 106 MMHG | WEIGHT: 202 LBS | TEMPERATURE: 98.7 F

## 2017-11-09 DIAGNOSIS — E11.319 TYPE 2 DIABETES MELLITUS WITH RETINOPATHY, WITH LONG-TERM CURRENT USE OF INSULIN, MACULAR EDEMA PRESENCE UNSPECIFIED, UNSPECIFIED LATERALITY, UNSPECIFIED RETINOPATHY SEVERITY (HCC): ICD-10-CM

## 2017-11-09 DIAGNOSIS — M86.171: ICD-10-CM

## 2017-11-09 DIAGNOSIS — L03.031 CELLULITIS OF GREAT TOE OF RIGHT FOOT: ICD-10-CM

## 2017-11-09 DIAGNOSIS — Z79.4 TYPE 2 DIABETES MELLITUS WITH RETINOPATHY, WITH LONG-TERM CURRENT USE OF INSULIN, MACULAR EDEMA PRESENCE UNSPECIFIED, UNSPECIFIED LATERALITY, UNSPECIFIED RETINOPATHY SEVERITY (HCC): ICD-10-CM

## 2017-11-09 DIAGNOSIS — L97.519 ULCER OF TOE OF RIGHT FOOT, UNSPECIFIED ULCER STAGE (HCC): ICD-10-CM

## 2017-11-09 DIAGNOSIS — Z01.818 PRE-OP EVALUATION: Primary | ICD-10-CM

## 2017-11-09 DIAGNOSIS — Z01.818 PRE-OP EVALUATION: ICD-10-CM

## 2017-11-09 LAB
ALBUMIN SERPL-MCNC: 3.8 G/DL (ref 3.4–5)
ALBUMIN/GLOB SERPL: 1 {RATIO} (ref 0.8–1.7)
ALP SERPL-CCNC: 153 U/L (ref 45–117)
ALT SERPL-CCNC: 22 U/L (ref 13–56)
ANION GAP SERPL CALC-SCNC: 7 MMOL/L (ref 3–18)
AST SERPL-CCNC: 17 U/L (ref 15–37)
ATRIAL RATE: 74 BPM
BASOPHILS # BLD: 0 K/UL (ref 0–0.06)
BASOPHILS NFR BLD: 0 % (ref 0–2)
BILIRUB SERPL-MCNC: 0.4 MG/DL (ref 0.2–1)
BUN SERPL-MCNC: 29 MG/DL (ref 7–18)
BUN/CREAT SERPL: 24 (ref 12–20)
CALCIUM SERPL-MCNC: 8.7 MG/DL (ref 8.5–10.1)
CALCULATED P AXIS, ECG09: 61 DEGREES
CALCULATED R AXIS, ECG10: 41 DEGREES
CALCULATED T AXIS, ECG11: 65 DEGREES
CHLORIDE SERPL-SCNC: 110 MMOL/L (ref 100–108)
CO2 SERPL-SCNC: 24 MMOL/L (ref 21–32)
CREAT SERPL-MCNC: 1.23 MG/DL (ref 0.6–1.3)
DIAGNOSIS, 93000: NORMAL
DIFFERENTIAL METHOD BLD: ABNORMAL
EOSINOPHIL # BLD: 0.1 K/UL (ref 0–0.4)
EOSINOPHIL NFR BLD: 1 % (ref 0–5)
ERYTHROCYTE [DISTWIDTH] IN BLOOD BY AUTOMATED COUNT: 13.5 % (ref 11.6–14.5)
GLOBULIN SER CALC-MCNC: 4 G/DL (ref 2–4)
GLUCOSE SERPL-MCNC: 125 MG/DL (ref 74–99)
HCT VFR BLD AUTO: 30.3 % (ref 35–45)
HGB BLD-MCNC: 9.8 G/DL (ref 12–16)
LYMPHOCYTES # BLD: 2.5 K/UL (ref 0.9–3.6)
LYMPHOCYTES NFR BLD: 37 % (ref 21–52)
MCH RBC QN AUTO: 28.7 PG (ref 24–34)
MCHC RBC AUTO-ENTMCNC: 32.3 G/DL (ref 31–37)
MCV RBC AUTO: 88.6 FL (ref 74–97)
MONOCYTES # BLD: 0.4 K/UL (ref 0.05–1.2)
MONOCYTES NFR BLD: 6 % (ref 3–10)
NEUTS SEG # BLD: 3.8 K/UL (ref 1.8–8)
NEUTS SEG NFR BLD: 56 % (ref 40–73)
P-R INTERVAL, ECG05: 154 MS
PLATELET # BLD AUTO: 286 K/UL (ref 135–420)
PMV BLD AUTO: 10.5 FL (ref 9.2–11.8)
POTASSIUM SERPL-SCNC: 5 MMOL/L (ref 3.5–5.5)
PROT SERPL-MCNC: 7.8 G/DL (ref 6.4–8.2)
Q-T INTERVAL, ECG07: 400 MS
QRS DURATION, ECG06: 74 MS
QTC CALCULATION (BEZET), ECG08: 444 MS
RBC # BLD AUTO: 3.42 M/UL (ref 4.2–5.3)
SODIUM SERPL-SCNC: 141 MMOL/L (ref 136–145)
VENTRICULAR RATE, ECG03: 74 BPM
WBC # BLD AUTO: 6.7 K/UL (ref 4.6–13.2)

## 2017-11-09 PROCEDURE — 83036 HEMOGLOBIN GLYCOSYLATED A1C: CPT | Performed by: NURSE PRACTITIONER

## 2017-11-09 PROCEDURE — 93005 ELECTROCARDIOGRAM TRACING: CPT

## 2017-11-09 PROCEDURE — 80053 COMPREHEN METABOLIC PANEL: CPT | Performed by: NURSE PRACTITIONER

## 2017-11-09 PROCEDURE — 85025 COMPLETE CBC W/AUTO DIFF WBC: CPT | Performed by: NURSE PRACTITIONER

## 2017-11-09 PROCEDURE — 71020 XR CHEST PA LAT: CPT

## 2017-11-09 NOTE — PROGRESS NOTES
Preoperative Evaluation    Date of Exam: 2017    Rashel Gamble is a 45 y.o. female (:1965) who presents for preoperative evaluation. Procedure/Surgery: Left Great to Amputation  Date of Procedure/Surgery: TBD  Surgeon: Dr. Larkin Pay: [facility]  Primary Physician: Ivelisse Hutchison NP  Latex Allergy: no    Medical History:     Past Medical History:   Diagnosis Date    Blind left eye     Cellulitis of great toe of right foot 10/19/2017    Diabetes ([address]     Diabetic retinopathy ([address]     Gall stones     GERD (gastroesophageal reflux disease)     Hammertoe     Hiatal hernia     History of mammogram 10/03/2017    No evidence of malignancy    Hypertension     Mass of abdomen     Neuropathy due to type 2 diabetes mellitus ([address]     Osteomyelitis of toe of right foot ([address] 10/19/2017    Pancreatitis      Allergies: Allergies   Allergen Reactions    Promethazine Nausea and Vomiting     \"the phenergan made me more nauseated\"       Medications:     Current Outpatient Prescriptions   Medication Sig    quinapril (ACCUPRIL) 20 mg tablet Take 20 mg by mouth nightly.  PARoxetine (PAXIL) 30 mg tablet TAKE 1 TABLET BY MOUTH DAILY    rosuvastatin (CRESTOR) 20 mg tablet Take 1 Tab by mouth nightly for 90 days.  insulin NPH/insulin regular (NOVOLIN 70/30, HUMULIN 70/30) 100 unit/mL (70-30) injection 40 Units by SubCUTAneous route two (2) times a day.  metFORMIN ER (GLUCOPHAGE XR) 500 mg tablet Take 1 Tab by mouth daily (with dinner).  sodium hypochlorite (QUARTER STRENGTH DAKIN'S) 0.125 % soln external solution Apply 473 mL to affected area two (2) times a day.  aspirin 81 mg chewable tablet Take 1 Tab by mouth daily. No current facility-administered medications for this visit.       Surgical History:     Past Surgical History:   Procedure Laterality Date    HX AMPUTATION Left 2017    left 2nd toe    HX CHOLECYSTECTOMY  10/15/2014    HX GI      Benign GI Stromal Tumor excision    HX HEENT      Sx for detached retina    HX MYOMECTOMY      x5 removed    HX OTHER SURGICAL      upper endoscopy     Social History:     Social History     Social History    Marital status: SINGLE     Spouse name: N/A    Number of children: N/A    Years of education: N/A     Social History Main Topics    Smoking status: Former Smoker     Packs/day: 0.20     Years: 8.00     Types: Cigarettes     Quit date: 12/3/1995    Smokeless tobacco: Never Used    Alcohol use No      Comment: Drinks 3-4xs year generally wine    Drug use: No    Sexual activity: Not Currently     Other Topics Concern     Service No    Blood Transfusions No    Caffeine Concern No    Occupational Exposure No    Hobby Hazards No    Sleep Concern No    Stress Concern No    Weight Concern No    Special Diet Yes    Back Care No    Exercise No    Bike Helmet No    Seat Belt Yes    Self-Exams Yes     Social History Narrative    Lives with 16year old son         with ex          Does not have health insurance        Recent use of: ASA, of which the patient currently is not taking     Tetanus up to date: tetanus status unknown to the patient and is not available today at the [facility].       Anesthesia Complications: None  History of abnormal bleeding : None  History of Blood Transfusions: no  Health Care Directive or Living Will: no    REVIEW OF SYSTEMS:  Constitutional: negative  Eyes: positive for visual disturbance  Ears, nose, mouth, throat, and face: negative  Respiratory: negative  Cardiovascular: negative  Gastrointestinal: negative  Genitourinary:negative  Hematologic/lymphatic: negative  Musculoskeletal:positive for Previous amputated Left foot toe  Neurological: negative  Behavioral/Psych: positive for Patient is very down about having to get her toe amputated,  Endocrine: positive for diabetic symptoms including poor wound healing    EXAM:   Visit Vitals    /72 (BP 1 Location: Left arm, BP Patient Position: Sitting)    Pulse 95    Temp 98.7 °F (37.1 °C) (Oral)    Resp 16    Ht 5' 8\" (1.727 m)    Wt 202 lb (91.6 kg)    SpO2 99%    BMI 30.71 kg/m2     General appearance - alert, well appearing, and in no distress  Mental status - alert, oriented to person, place, and time, anxious  Eyes - pupils equal and reactive, extraocular eye movements intact. Left eye visual disturbance. Wears glasses  Ears - bilateral TM's and external ear canals normal  Nose - normal and patent, no erythema, discharge or polyps  Lymphatics - no palpable lymphadenopathy, no hepatosplenomegaly  Chest - clear to auscultation, no wheezes, rales or rhonchi, symmetric air entry  Heart - normal rate, regular rhythm, normal S1, S2, no murmurs, rubs, clicks or gallops  Abdomen - soft, nontender, nondistended, no masses or organomegaly  Neurological - alert, oriented, normal speech, no focal findings or movement disorder noted  Extremities - abnormal exam of Pedal pulses very diminished Left great toe with swelling and necrotic tissue with limited to no sensation and compromised circulation. Cap refill exremely delayed. Plantar surface of Left toe with ulcer. Skin - normal coloration and turgor, no rashes, no suspicious skin lesions noted      DIAGNOSTICS:   1. EKG: EKG FINDINGS - normal EKG, normal sinus rhythm, unchanged from previous tracings  2. CXR: was negative for infiltrate, effusion, pneumothorax, or wide mediastinum  3.  Labs:   Lab Results  Component Value Date/Time   WBC 6.7 11/09/2017 11:34 AM   HGB 9.8 11/09/2017 11:34 AM   Hemoglobin, POC 11.9 09/03/2014 06:37 AM   HCT 30.3 11/09/2017 11:34 AM   Hematocrit, POC 35 09/03/2014 06:37 AM   PLATELET 427 70/23/7984 11:34 AM   MCV 88.6 11/09/2017 11:34 AM     Lab Results   Component Value Date/Time    Sodium 141 11/09/2017 11:34 AM    Potassium 5.0 11/09/2017 11:34 AM    Chloride 110 11/09/2017 11:34 AM    CO2 24 11/09/2017 11:34 AM    Anion gap 7 11/09/2017 11:34 AM    Glucose 125 11/09/2017 11:34 AM    BUN 29 11/09/2017 11:34 AM    Creatinine 1.23 11/09/2017 11:34 AM    BUN/Creatinine ratio 24 11/09/2017 11:34 AM    GFR est AA 55 11/09/2017 11:34 AM    GFR est non-AA 46 11/09/2017 11:34 AM    Calcium 8.7 11/09/2017 11:34 AM    Bilirubin, total 0.4 11/09/2017 11:34 AM    ALT (SGPT) 22 11/09/2017 11:34 AM    AST (SGOT) 17 11/09/2017 11:34 AM    Alk. phosphatase 153 11/09/2017 11:34 AM    Protein, total 7.8 11/09/2017 11:34 AM    Albumin 3.8 11/09/2017 11:34 AM    Globulin 4.0 11/09/2017 11:34 AM    A-G Ratio 1.0 11/09/2017 11:34 AM      Lab Results   Component Value Date/Time    Hemoglobin A1c 6.5 11/09/2017 11:34 AM        IMPRESSION:   Pre- Op Risk include: Anemia and Diabetes mellitus  The patient may need OT/PT as the patient is blind in one eye with retinopathy and she drives with right foot. She also has 13 stairs in her townhouse which may present a problem. No contraindications to planned surgeryCalli Ulloa NP   11/9/2017      Will fax over paperwork to Dr. Bhagat Query office. ICD-10-CM ICD-9-CM    1. Pre-op evaluation Z01.818 V72.84 HEMOGLOBIN A1C WITH EAG      CBC WITH AUTOMATED DIFF      METABOLIC PANEL, COMPREHENSIVE      EKG, 12 LEAD, INITIAL      XR CHEST PA LAT      COLLECTION VENOUS BLOOD,VENIPUNCTURE      CANCELED: XR CHEST SNGL V   2. Acute osteomyelitis of phalanx of foot, right (HCC) M86.171 730.07 HEMOGLOBIN A1C WITH EAG      CBC WITH AUTOMATED DIFF      METABOLIC PANEL, COMPREHENSIVE      EKG, 12 LEAD, INITIAL      XR CHEST PA LAT      COLLECTION VENOUS BLOOD,VENIPUNCTURE   3. Cellulitis of great toe of right foot L03.031 681.10 HEMOGLOBIN A1C WITH EAG      CBC WITH AUTOMATED DIFF      METABOLIC PANEL, COMPREHENSIVE      EKG, 12 LEAD, INITIAL      XR CHEST PA LAT      COLLECTION VENOUS BLOOD,VENIPUNCTURE   4.  Ulcer of toe of right foot, unspecified ulcer stage ([facility] L97.519 707.15

## 2017-11-10 LAB
EST. AVERAGE GLUCOSE BLD GHB EST-MCNC: 140 MG/DL
HBA1C MFR BLD: 6.5 % (ref 4.2–5.6)

## 2017-11-13 ENCOUNTER — DOCUMENTATION ONLY (OUTPATIENT)
Dept: FAMILY MEDICINE CLINIC | Age: 52
End: 2017-11-13

## 2017-11-13 NOTE — PROGRESS NOTES
Called Dr Wandra Soulier, Podiatry office and left message for his nurse [facility] with  informing that Pre-Op physical has been faxed to their office and the patient has been approved for 100% malki at [facility]. Podiatry is able to schedule the patient's foot surgery at [facility].

## 2017-11-15 ENCOUNTER — ANESTHESIA EVENT (OUTPATIENT)
Dept: SURGERY | Age: 52
End: 2017-11-15
Payer: MEDICAID

## 2017-11-16 ENCOUNTER — ANESTHESIA (OUTPATIENT)
Dept: SURGERY | Age: 52
End: 2017-11-16
Payer: MEDICAID

## 2017-11-16 ENCOUNTER — HOSPITAL ENCOUNTER (OUTPATIENT)
Age: 52
Setting detail: OUTPATIENT SURGERY
Discharge: HOME | End: 2017-11-16
Attending: PODIATRIST | Admitting: PODIATRIST
Payer: MEDICAID

## 2017-11-16 VITALS
RESPIRATION RATE: 17 BRPM | SYSTOLIC BLOOD PRESSURE: 140 MMHG | DIASTOLIC BLOOD PRESSURE: 78 MMHG | HEIGHT: 68 IN | WEIGHT: 184 LBS | TEMPERATURE: 97.8 F | OXYGEN SATURATION: 100 % | HEART RATE: 84 BPM | BODY MASS INDEX: 27.89 KG/M2

## 2017-11-16 DIAGNOSIS — E11.22 UNCONTROLLED TYPE 2 DIABETES MELLITUS WITH CHRONIC KIDNEY DISEASE, WITH LONG-TERM CURRENT USE OF INSULIN, UNSPECIFIED CKD STAGE: Primary | ICD-10-CM

## 2017-11-16 DIAGNOSIS — E11.65 UNCONTROLLED TYPE 2 DIABETES MELLITUS WITH CHRONIC KIDNEY DISEASE, WITH LONG-TERM CURRENT USE OF INSULIN, UNSPECIFIED CKD STAGE: Primary | ICD-10-CM

## 2017-11-16 DIAGNOSIS — Z79.4 UNCONTROLLED TYPE 2 DIABETES MELLITUS WITH CHRONIC KIDNEY DISEASE, WITH LONG-TERM CURRENT USE OF INSULIN, UNSPECIFIED CKD STAGE: Primary | ICD-10-CM

## 2017-11-16 LAB
GLUCOSE BLD STRIP.AUTO-MCNC: 86 MG/DL (ref 70–110)
GLUCOSE BLD STRIP.AUTO-MCNC: 95 MG/DL (ref 70–110)

## 2017-11-16 PROCEDURE — 76210000006 HC OR PH I REC 0.5 TO 1 HR: Performed by: PODIATRIST

## 2017-11-16 PROCEDURE — 77030020782 HC GWN BAIR PAWS FLX 3M -B: Performed by: PODIATRIST

## 2017-11-16 PROCEDURE — 74011250636 HC RX REV CODE- 250/636: Performed by: NURSE ANESTHETIST, CERTIFIED REGISTERED

## 2017-11-16 PROCEDURE — 88311 DECALCIFY TISSUE: CPT | Performed by: PODIATRIST

## 2017-11-16 PROCEDURE — 76060000032 HC ANESTHESIA 0.5 TO 1 HR: Performed by: PODIATRIST

## 2017-11-16 PROCEDURE — 77030013179 HC SHOE PSTOP OPN DJOR -A: Performed by: PODIATRIST

## 2017-11-16 PROCEDURE — 76010000138 HC OR TIME 0.5 TO 1 HR: Performed by: PODIATRIST

## 2017-11-16 PROCEDURE — 74011250636 HC RX REV CODE- 250/636

## 2017-11-16 PROCEDURE — 82962 GLUCOSE BLOOD TEST: CPT

## 2017-11-16 PROCEDURE — 74011250637 HC RX REV CODE- 250/637: Performed by: NURSE ANESTHETIST, CERTIFIED REGISTERED

## 2017-11-16 PROCEDURE — 74011000250 HC RX REV CODE- 250: Performed by: PODIATRIST

## 2017-11-16 PROCEDURE — 77030018836 HC SOL IRR NACL ICUM -A: Performed by: PODIATRIST

## 2017-11-16 PROCEDURE — 76210000021 HC REC RM PH II 0.5 TO 1 HR: Performed by: PODIATRIST

## 2017-11-16 PROCEDURE — 88305 TISSUE EXAM BY PATHOLOGIST: CPT | Performed by: PODIATRIST

## 2017-11-16 PROCEDURE — 74011250636 HC RX REV CODE- 250/636: Performed by: PODIATRIST

## 2017-11-16 PROCEDURE — 77030013480 HC CUF TRNQT J&J -B: Performed by: PODIATRIST

## 2017-11-16 PROCEDURE — 77030006773 HC BLD SAW OSC BRSM -A: Performed by: PODIATRIST

## 2017-11-16 PROCEDURE — 77030011640 HC PAD GRND REM COVD -A: Performed by: PODIATRIST

## 2017-11-16 RX ORDER — INSULIN LISPRO 100 [IU]/ML
INJECTION, SOLUTION INTRAVENOUS; SUBCUTANEOUS ONCE
Status: DISCONTINUED | OUTPATIENT
Start: 2017-11-16 | End: 2017-11-16 | Stop reason: HOSPADM

## 2017-11-16 RX ORDER — MAGNESIUM SULFATE 100 %
4 CRYSTALS MISCELLANEOUS AS NEEDED
Status: DISCONTINUED | OUTPATIENT
Start: 2017-11-16 | End: 2017-11-16 | Stop reason: HOSPADM

## 2017-11-16 RX ORDER — SODIUM CHLORIDE, SODIUM LACTATE, POTASSIUM CHLORIDE, CALCIUM CHLORIDE 600; 310; 30; 20 MG/100ML; MG/100ML; MG/100ML; MG/100ML
75 INJECTION, SOLUTION INTRAVENOUS CONTINUOUS
Status: DISCONTINUED | OUTPATIENT
Start: 2017-11-16 | End: 2017-11-16 | Stop reason: HOSPADM

## 2017-11-16 RX ORDER — NALOXONE HYDROCHLORIDE 0.4 MG/ML
0.1 INJECTION, SOLUTION INTRAMUSCULAR; INTRAVENOUS; SUBCUTANEOUS ONCE
Status: DISCONTINUED | OUTPATIENT
Start: 2017-11-16 | End: 2017-11-16 | Stop reason: HOSPADM

## 2017-11-16 RX ORDER — FENTANYL CITRATE 50 UG/ML
INJECTION, SOLUTION INTRAMUSCULAR; INTRAVENOUS AS NEEDED
Status: DISCONTINUED | OUTPATIENT
Start: 2017-11-16 | End: 2017-11-16 | Stop reason: HOSPADM

## 2017-11-16 RX ORDER — HYDROMORPHONE HYDROCHLORIDE 2 MG/ML
0.5 INJECTION, SOLUTION INTRAMUSCULAR; INTRAVENOUS; SUBCUTANEOUS AS NEEDED
Status: DISCONTINUED | OUTPATIENT
Start: 2017-11-16 | End: 2017-11-16 | Stop reason: HOSPADM

## 2017-11-16 RX ORDER — DEXTROSE 50 % IN WATER (D50W) INTRAVENOUS SYRINGE
25-50 AS NEEDED
Status: DISCONTINUED | OUTPATIENT
Start: 2017-11-16 | End: 2017-11-16 | Stop reason: HOSPADM

## 2017-11-16 RX ORDER — SODIUM CHLORIDE 0.9 % (FLUSH) 0.9 %
5-10 SYRINGE (ML) INJECTION AS NEEDED
Status: DISCONTINUED | OUTPATIENT
Start: 2017-11-16 | End: 2017-11-16 | Stop reason: HOSPADM

## 2017-11-16 RX ORDER — SODIUM CHLORIDE 0.9 % (FLUSH) 0.9 %
5-10 SYRINGE (ML) INJECTION EVERY 8 HOURS
Status: DISCONTINUED | OUTPATIENT
Start: 2017-11-16 | End: 2017-11-16 | Stop reason: HOSPADM

## 2017-11-16 RX ORDER — FAMOTIDINE 20 MG/1
20 TABLET, FILM COATED ORAL ONCE
Status: COMPLETED | OUTPATIENT
Start: 2017-11-16 | End: 2017-11-16

## 2017-11-16 RX ORDER — BUPIVACAINE HYDROCHLORIDE 2.5 MG/ML
INJECTION, SOLUTION EPIDURAL; INFILTRATION; INTRACAUDAL AS NEEDED
Status: DISCONTINUED | OUTPATIENT
Start: 2017-11-16 | End: 2017-11-16 | Stop reason: HOSPADM

## 2017-11-16 RX ORDER — PROPOFOL 10 MG/ML
INJECTION, EMULSION INTRAVENOUS AS NEEDED
Status: DISCONTINUED | OUTPATIENT
Start: 2017-11-16 | End: 2017-11-16 | Stop reason: HOSPADM

## 2017-11-16 RX ORDER — CEFAZOLIN SODIUM 2 G/50ML
2 SOLUTION INTRAVENOUS ONCE
Status: COMPLETED | OUTPATIENT
Start: 2017-11-16 | End: 2017-11-16

## 2017-11-16 RX ORDER — MIDAZOLAM HYDROCHLORIDE 1 MG/ML
INJECTION, SOLUTION INTRAMUSCULAR; INTRAVENOUS AS NEEDED
Status: DISCONTINUED | OUTPATIENT
Start: 2017-11-16 | End: 2017-11-16 | Stop reason: HOSPADM

## 2017-11-16 RX ADMIN — PROPOFOL 20 MG: 10 INJECTION, EMULSION INTRAVENOUS at 16:25

## 2017-11-16 RX ADMIN — CEFAZOLIN SODIUM 2 G: 2 SOLUTION INTRAVENOUS at 15:51

## 2017-11-16 RX ADMIN — PROPOFOL 100 MG: 10 INJECTION, EMULSION INTRAVENOUS at 15:58

## 2017-11-16 RX ADMIN — MIDAZOLAM HYDROCHLORIDE 2 MG: 1 INJECTION, SOLUTION INTRAMUSCULAR; INTRAVENOUS at 15:51

## 2017-11-16 RX ADMIN — PROPOFOL 30 MG: 10 INJECTION, EMULSION INTRAVENOUS at 16:15

## 2017-11-16 RX ADMIN — SODIUM CHLORIDE, SODIUM LACTATE, POTASSIUM CHLORIDE, AND CALCIUM CHLORIDE 75 ML/HR: 600; 310; 30; 20 INJECTION, SOLUTION INTRAVENOUS at 14:33

## 2017-11-16 RX ADMIN — FAMOTIDINE 20 MG: 20 TABLET, FILM COATED ORAL at 14:34

## 2017-11-16 RX ADMIN — FENTANYL CITRATE 50 MCG: 50 INJECTION, SOLUTION INTRAMUSCULAR; INTRAVENOUS at 16:15

## 2017-11-16 RX ADMIN — FENTANYL CITRATE 50 MCG: 50 INJECTION, SOLUTION INTRAMUSCULAR; INTRAVENOUS at 16:21

## 2017-11-16 RX ADMIN — PROPOFOL 100 MG: 10 INJECTION, EMULSION INTRAVENOUS at 15:55

## 2017-11-16 RX ADMIN — SODIUM CHLORIDE, SODIUM LACTATE, POTASSIUM CHLORIDE, AND CALCIUM CHLORIDE: 600; 310; 30; 20 INJECTION, SOLUTION INTRAVENOUS at 16:30

## 2017-11-16 NOTE — ANESTHESIA PREPROCEDURE EVALUATION
Anesthetic History     PONV          Review of Systems / Medical History  Patient summary reviewed and pertinent labs reviewed    Pulmonary  Within defined limits                 Neuro/Psych   Within defined limits           Cardiovascular    Hypertension                   GI/Hepatic/Renal     GERD           Endo/Other    Diabetes: type 2, using insulin    Obesity and anemia     Other Findings   Comments:   Risk Factors for Postoperative nausea/vomiting:       History of postoperative nausea/vomiting? NO       Female? YES       Motion sickness? NO       Intended opioid administration for postoperative analgesia? YES      Smoking Abstinence  Current Smoker? NO  Elective Surgery? YES  Seen preoperatively by anesthesiologist or proxy prior to day of surgery? YES  Pt abstained from smoking 24 hours prior to anesthesia?  N/A         Physical Exam    Airway  Mallampati: III  TM Distance: 4 - 6 cm  Neck ROM: normal range of motion   Mouth opening: Diminished (comment)     Cardiovascular    Rhythm: irregular  Rate: normal         Dental    Dentition: Poor dentition  Comments: Broken teeth   Pulmonary  Breath sounds clear to auscultation               Abdominal  GI exam deferred       Other Findings            Anesthetic Plan    ASA: 3  Anesthesia type: MAC          Induction: Intravenous  Anesthetic plan and risks discussed with: Patient

## 2017-11-16 NOTE — PERIOP NOTES
Patient discharged from facility via wheelchair. Patient's cousin has discharge instructions in hand. Patient armband removed and shredded.

## 2017-11-16 NOTE — ANESTHESIA POSTPROCEDURE EVALUATION
Post-Anesthesia Evaluation and Assessment    Patient: Lauren Ojeda MRN: [id number]  SSN: [id number]    YOB: 1965  Age: 45 y.o. Sex: female       Cardiovascular Function/Vital Signs  Visit Vitals    /72    Pulse 86    Temp 36.6 °C (97.8 °F)    Resp 15    Ht 5' 8\" (1.727 m)    Wt 83.5 kg (184 lb)    SpO2 100%    BMI 27.98 kg/m2       Patient is status post general anesthesia for Procedure(s):  TOE AMPUTATION FIRST GREAT  TOE RIGHT FOOT. Nausea/Vomiting: None    Postoperative hydration reviewed and adequate. Pain:  Pain Scale 1: Visual (11/16/17 1707)  Pain Intensity 1: 0 (11/16/17 1707)   Managed    Neurological Status:   Neuro (WDL): Within Defined Limits (11/16/17 1640)   At baseline    Mental Status and Level of Consciousness: Arousable    Pulmonary Status:   O2 Device: Nasal cannula (11/16/17 1640)   Adequate oxygenation and airway patent    Complications related to anesthesia: None    Post-anesthesia assessment completed.  No concerns      Signed By: Nae Alejandra MD     November 16, 2017

## 2017-11-16 NOTE — DISCHARGE INSTRUCTIONS
Toe Amputation: What to Expect at [address] [place] [address] had amputation surgery to remove one or more of your toes. For most people, pain improves within a week after surgery. You may have stitches or sutures. The doctor will probably take these out about 10 days after the surgery. You may need to wear a cast or a special type of shoe for about 2 to 4 weeks. You may think you have feeling or pain where your toe had been. This is called phantom pain. It is common, and it may come and go for a year or longer. If you have this kind of pain, your doctor may prescribe medicine to treat it. This care sheet gives you a general idea about how long it will take for you to recover. But each person recovers at a different pace. Follow the steps below to get better as quickly as possible. How can you care for yourself at home? Activity  ? · Rest when you feel tired. Getting enough sleep will help you recover. ? · You may notice some changes in your balance when you walk. Your balance will improve over time. ? · Try to walk each day if you are able. Start by walking a little more than you did the day before. Bit by bit, increase the amount you walk. Walking boosts blood flow and helps prevent blood clots. ? · Prop up your foot and leg on a pillow when you ice it or anytime you sit or lie down during the next 3 days. Try to keep it above the level of your heart. This will help reduce swelling. ? · Ask your doctor when you can drive again. ? · Dressing is to stay clean, dry and intact. ? · You will probably need to take about 4 weeks off from work or your normal routine. How much time you need to take off depends on the type of work you do and your overall health. Diet  ? · You can eat your normal diet. If your stomach is upset, try bland, low-fat foods like plain rice, broiled chicken, toast, and yogurt. ? · You may notice that your bowel movements are not regular right after your surgery.  This is common. Try to avoid constipation and straining with bowel movements. You may want to take a fiber supplement every day. If you have not had a bowel movement after a couple of days, ask your doctor about taking a mild laxative. Medicines  ? · Your doctor will tell you if and when you can restart your medicines. He or she will also give you instructions about taking any new medicines. ? · If you take blood thinners, such as warfarin (Coumadin), clopidogrel (Plavix), or aspirin, be sure to talk to your doctor. He or she will tell you if and when to start taking those medicines again. Make sure that you understand exactly what your doctor wants you to do. ? · Take pain medicines exactly as directed. ¨ If the doctor gave you a prescription medicine for pain, take it as prescribed. ¨ If you are not taking a prescription pain medicine, ask your doctor if you can take an over-the-counter medicine. ? · If your doctor prescribed antibiotics, take them as directed. Do not stop taking them just because you feel better. You need to take the full course of antibiotics. ? · If you think your pain medicine is making you sick to your stomach:  ¨ Take your medicine after meals (unless your doctor has told you not to). ¨ Ask your doctor for a different pain medicine. Incision care  ? · Your doctor will probably remove the bandages after several days. Or your doctor may have you remove your bandages at home. Do not touch the surgery area. Keep it dry. ? · Do not soak your foot until your doctor says it is okay. Follow-up care is a key part of your treatment and safety. Be sure to make and go to all appointments, and call your doctor if you are having problems. It's also a good idea to know your test results and keep a list of the medicines you take. When should you call for help? Call 911 anytime you think you may need emergency care. For example, call if:  ? · You passed out (lost consciousness).    ? · You have sudden chest pain and shortness of breath, or you cough up blood. ? · You have severe trouble breathing. ?Call your doctor now or seek immediate medical care if:  ? · Your foot is cool or pale or changes color. ? · You have numbness, tingling, or less feeling in your foot or your toes. ? · You have pain that does not get better after you take pain medicine. ? · You have loose stitches, or your incision comes open. ? · Bright red blood has soaked through the bandage over your incision. ? · You have signs of infection, such as:  ¨ Increased pain, swelling, warmth, or redness. ¨ Red streaks leading from the incision. ¨ Pus draining from the incision. ¨ A fever. ? Watch closely for any changes in your health, and be sure to contact your doctor if:  ? · You do not have a bowel movement after taking a laxative. Where can you learn more? Go to http://linda-sivan.info/. Enter C217 in the search box to learn more about \"Toe Amputation: What to Expect at Home. \"  Current as of: March 21, 2017  Content Version: 11.4  © 5820-3531 [employer] Care instructions adapted under license by [employer] (which disclaims liability or warranty for this information). If you have questions about a medical condition or this instruction, always ask your healthcare professional. [address] any warranty or liability for your use of this information. Patient is to be non weight bearing on operative foot. DISCHARGE SUMMARY from Nurse    PATIENT INSTRUCTIONS:    After general anesthesia or intravenous sedation, for 24 hours or while taking prescription Narcotics:  · Limit your activities  · Do not drive and operate hazardous machinery  · Do not make important personal or business decisions  · Do  not drink alcoholic beverages  · If you have not urinated within 8 hours after discharge, please contact your surgeon on call.     Report the following to your surgeon:  · Excessive pain, swelling, redness or odor of or around the surgical area  · Temperature over 100.5  · Nausea and vomiting lasting longer than 4 hours or if unable to take medications  · Any signs of decreased circulation or nerve impairment to extremity: change in color, persistent  numbness, tingling, coldness or increase pain  · Any questions    What to do at Home:  Recommended activity: Activity as tolerated and no driving for today. *  Please give a list of your current medications to your Primary Care Provider. *  Please update this list whenever your medications are discontinued, doses are      changed, or new medications (including over-the-counter products) are added. *  Please carry medication information at all times in case of emergency situations. These are general instructions for a healthy lifestyle:    No smoking/ No tobacco products/ Avoid exposure to second hand smoke  Surgeon General's Warning:  Quitting smoking now greatly reduces serious risk to your health. Obesity, smoking, and sedentary lifestyle greatly increases your risk for illness    A healthy diet, regular physical exercise & weight monitoring are important for maintaining a healthy lifestyle    You may be retaining fluid if you have a history of heart failure or if you experience any of the following symptoms:  Weight gain of 3 pounds or more overnight or 5 pounds in a week, increased swelling in our hands or feet or shortness of breath while lying flat in bed. Please call your doctor as soon as you notice any of these symptoms; do not wait until your next office visit. Recognize signs and symptoms of STROKE:    F-face looks uneven    A-arms unable to move or move unevenly    S-speech slurred or non-existent    T-time-call 911 as soon as signs and symptoms begin-DO NOT go       Back to bed or wait to see if you get better-TIME IS BRAIN.     Warning Signs of HEART ATTACK     Call 911 if you have these symptoms:   Chest discomfort. Most heart attacks involve discomfort in the center of the chest that lasts more than a few minutes, or that goes away and comes back. It can feel like uncomfortable pressure, squeezing, fullness, or pain.  Discomfort in other areas of the upper body. Symptoms can include pain or discomfort in one or both arms, the back, neck, jaw, or stomach.  Shortness of breath with or without chest discomfort.  Other signs may include breaking out in a cold sweat, nausea, or lightheadedness. Don't wait more than five minutes to call 911 - MINUTES MATTER! Fast action can save your life. Calling 911 is almost always the fastest way to get lifesaving treatment. Emergency Medical Services staff can begin treatment when they arrive -- up to an hour sooner than if someone gets to the hospital by car. The discharge information has been reviewed with the patient.   The patient verbalized understanding.    ___________________________________________________________________________________________________________________________________

## 2017-11-16 NOTE — IP AVS SNAPSHOT
[address] [address] 
[phone] Patient: Jody Ch MRN: [id number] :1965 About your hospitalization You were admitted on:  2017 You last received care in the:  [facility] PACU You were discharged on:  2017 Why you were hospitalized Your primary diagnosis was:  Not on File Things You Need To Do (next 8 weeks) Schedule an appointment with Mera Solis DPM as soon as possible for a visit in 1 week(s) Assistant will cll to make follow up appointment Phone:  [phone] Where:  [facility], [address] [place] [address] Se Tuesday Dec 05, 2017 Follow Up with Ki Israel NP at  2:00 PM  
Where: [facility] Discharge Orders None A check evan indicates which time of day the medication should be taken. My Medications ASK your physician about these medications Instructions Each Dose to Equal  
 Morning Noon Evening Bedtime ACCUPRIL 20 mg tablet Generic drug:  quinapril Your last dose was: Your next dose is: Take 20 mg by mouth nightly. 20 mg  
    
   
   
   
  
 insulin NPH/insulin regular 100 unit/mL (70-30) injection Commonly known as:  NOVOLIN 70/30, HUMULIN 70/30 Your last dose was: Your next dose is:    
   
   
 40 Units by SubCUTAneous route two (2) times a day. 40 Units  
    
   
   
   
  
 metFORMIN  mg tablet Commonly known as:  GLUCOPHAGE XR Your last dose was: Your next dose is: Take 1 Tab by mouth daily (with dinner). 500 mg PARoxetine 30 mg tablet Commonly known as:  PAXIL Your last dose was: Your next dose is: TAKE 1 TABLET BY MOUTH DAILY rosuvastatin 20 mg tablet Commonly known as:  CRESTOR Your last dose was: Your next dose is: Take 1 Tab by mouth nightly for 90 days. 20 mg Discharge Instructions Toe Amputation: What to Expect at [facility] Your Recovery You had amputation surgery to remove one or more of your toes. For most people, pain improves within a week after surgery. You may have stitches or sutures. The doctor will probably take these out about 10 days after the surgery. You may need to wear a cast or a special type of shoe for about 2 to 4 weeks. You may think you have feeling or pain where your toe had been. This is called phantom pain. It is common, and it may come and go for a year or longer. If you have this kind of pain, your doctor may prescribe medicine to treat it. This care sheet gives you a general idea about how long it will take for you to recover. But each person recovers at a different pace. Follow the steps below to get better as quickly as possible. How can you care for yourself at home? Activity ? · Rest when you feel tired. Getting enough sleep will help you recover. ? · You may notice some changes in your balance when you walk. Your balance will improve over time. ? · Try to walk each day if you are able. Start by walking a little more than you did the day before. Bit by bit, increase the amount you walk. Walking boosts blood flow and helps prevent blood clots. ? · Prop up your foot and leg on a pillow when you ice it or anytime you sit or lie down during the next 3 days. Try to keep it above the level of your heart. This will help reduce swelling. ? · Ask your doctor when you can drive again. ? · Dressing is to stay clean, dry and intact. ? · You will probably need to take about 4 weeks off from work or your normal routine. How much time you need to take off depends on the type of work you do and your overall health. Diet ? · You can eat your normal diet. If your stomach is upset, try bland, low-fat foods like plain rice, broiled chicken, toast, and yogurt. ? · You may notice that your bowel movements are not regular right after your surgery. This is common. Try to avoid constipation and straining with bowel movements. You may want to take a fiber supplement every day. If you have not had a bowel movement after a couple of days, ask your doctor about taking a mild laxative. Medicines ? · Your doctor will tell you if and when you can restart your medicines. He or she will also give you instructions about taking any new medicines. ? · If you take blood thinners, such as warfarin (Coumadin), clopidogrel (Plavix), or aspirin, be sure to talk to your doctor. He or she will tell you if and when to start taking those medicines again. Make sure that you understand exactly what your doctor wants you to do. ? · Take pain medicines exactly as directed. ¨ If the doctor gave you a prescription medicine for pain, take it as prescribed. ¨ If you are not taking a prescription pain medicine, ask your doctor if you can take an over-the-counter medicine. ? · If your doctor prescribed antibiotics, take them as directed. Do not stop taking them just because you feel better. You need to take the full course of antibiotics. ? · If you think your pain medicine is making you sick to your stomach: 
¨ Take your medicine after meals (unless your doctor has told you not to). ¨ Ask your doctor for a different pain medicine. Incision care ? · Your doctor will probably remove the bandages after several days. Or your doctor may have you remove your bandages at home. Do not touch the surgery area. Keep it dry. ? · Do not soak your foot until your doctor says it is okay. Follow-up care is a key part of your treatment and safety.  Be sure to make and go to all appointments, and call your doctor if you are having problems. It's also a good idea to know your test results and keep a list of the medicines you take. When should you call for help? Call 911 anytime you think you may need emergency care. For example, call if: 
? · You passed out (lost consciousness). ? · You have sudden chest pain and shortness of breath, or you cough up blood. ? · You have severe trouble breathing. ?Call your doctor now or seek immediate medical care if: 
? · Your foot is cool or pale or changes color. ? · You have numbness, tingling, or less feeling in your foot or your toes. ? · You have pain that does not get better after you take pain medicine. ? · You have loose stitches, or your incision comes open. ? · Bright red blood has soaked through the bandage over your incision. ? · You have signs of infection, such as: 
¨ Increased pain, swelling, warmth, or redness. ¨ Red streaks leading from the incision. ¨ Pus draining from the incision. ¨ A fever. ? Watch closely for any changes in your health, and be sure to contact your doctor if: 
? · You do not have a bowel movement after taking a laxative. Where can you learn more? Go to http://linda-sivan.info/. Enter N317 in the search box to learn more about \"Toe Amputation: What to Expect at Home. \" Current as of: March 21, 2017 Content Version: 11.4 © 6820-4343 [employer]. Care instructions adapted under license by [employer] (which disclaims liability or warranty for this information). If you have questions about a medical condition or this instruction, always ask your healthcare professional. [place] [address] any warranty or liability for your use of this information. Patient is to be non weight bearing on operative foot. DISCHARGE SUMMARY from Nurse PATIENT INSTRUCTIONS: 
 
 
Recognize signs and symptoms of STROKE: 
 
 F-face looks uneven A-arms unable to move or move unevenly S-speech slurred or non-existent T-time-call 911 as soon as signs and symptoms begin-DO NOT go Back to bed or wait to see if you get better-TIME IS BRAIN. Warning Signs of HEART ATTACK Call 911 if you have these symptoms: 
? Chest discomfort. Most heart attacks involve discomfort in the center of the chest that lasts more than a few minutes, or that goes away and comes back. It can feel like uncomfortable pressure, squeezing, fullness, or pain. ? Discomfort in other areas of the upper body. Symptoms can include pain or discomfort in one or both arms, the back, neck, jaw, or stomach. ? Shortness of breath with or without chest discomfort. ? Other signs may include breaking out in a cold sweat, nausea, or lightheadedness. Don't wait more than five minutes to call [address]! Fast action can save your life. Calling 911 is almost always the fastest way to get lifesaving treatment. Emergency Medical Services staff can begin treatment when they arrive  up to an hour sooner than if someone gets to the hospital by car. The discharge information has been reviewed with the patient. The patient verbalized understanding. 
 
___________________________________________________________________________________________________________________________________ Introducing [facility]! [employer] introduces [employer] patient portal. Now you can access parts of your medical record, email your doctor's office, and request medication refills online. 1. In your internet browser, go to [email or web address] 2. Click on the First Time User? Click Here link in the Sign In box. You will see the New Member Sign Up page. 3. Enter your MyChart Access Code exactly as it appears below. You will not need to use this code after youve completed the sign-up process.  If you do not sign up before the expiration date, you must request a new code. · NovaPlanner Access Code: [email or web address] Expires: 2/7/2018 11:54 AM 
 
4. Enter the last four digits of your Social Security Number (xxxx) and Date of Birth (mm/dd/yyyy) as indicated and click Submit. You will be taken to the next sign-up page. 5. Create a NovaPlanner ID. This will be your [employer]anner login ID and cannot be changed, so think of one that is secure and easy to remember. 6. Create a [employer] password. You can change your password at any time. 7. Enter your Password Reset Question and Answer. This can be used at a later time if you forget your password. 8. Enter your e-mail address. You will receive e-mail notification when new information is available in [address] 9. Click Sign Up. You can now view and download portions of your medical record. 10. Click the Download Summary menu link to download a portable copy of your medical information. If you have questions, please visit the Frequently Asked Questions section of the Nova[employer]anner website. Remember, NovaPlanner is NOT to be used for urgent needs. For medical emergencies, dial 911. Now available from your iPhone and Android! Providers Seen During Your Hospitalization Provider Specialty Primary office phone [facility] [phone] Your Primary Care Physician (PCP) Primary Care Physician Office Phone Office Fax Noemy Mendoza [phone] You are allergic to the following Allergen Reactions Promethazine Nausea and Vomiting \"the phenergan made me more nauseated\" Recent Documentation Height Weight BMI OB Status Smoking Status 1.727 m 83.5 kg 27.98 kg/m2 Postmenopausal Former Smoker Emergency Contacts Name Discharge Info Relation Home Work Mobile Neymar Clay DISCHARGE CAREGIVER [3] Son [22] [phone] Scootereen Hosteller)  Other Relative [6] [id number] Rafi Cloud  Mother [14] [phone] Rafi Cloud  Parent [1] [phone] Patient Belongings The following personal items are in your possession at time of discharge: 
  Dental Appliances: None  Visual Aid: Glasses      Home Medications: None   Jewelry: None  Clothing: Pants, Shirt, Footwear, Socks, Undergarments, Jacket/Coat    Other Valuables: Purse Please provide this summary of care documentation to your next provider. Signatures-by signing, you are acknowledging that this After Visit Summary has been reviewed with you and you have received a copy. Patient Signature:  ____________________________________________________________ Date:  ____________________________________________________________  
  
Chaya Deiters Provider Signature:  ____________________________________________________________ Date:  ____________________________________________________________

## 2017-11-22 ENCOUNTER — TELEPHONE (OUTPATIENT)
Dept: FAMILY MEDICINE CLINIC | Age: 52
End: 2017-11-22

## 2017-11-22 NOTE — TELEPHONE ENCOUNTER
Patient came in to  Crestor and it has been closed out in her medical record. Is the patient suppose to continue taking the medication?

## 2017-11-22 NOTE — OP NOTES
[address]    Name:  Yakov Nettles  MR#:  [id number]  :  1965  Account #:  [de-identified]  Date of Adm:  2017  Date of Surgery:  2017      SURGEON: Favian Lehman DPM.    ASSISTANT: Present. PROCEDURES PERFORMED  1. Toe amputation through the metatarsophalangeal joint, toe 1 right,  code 32316.  2. Percutaneous flexor tenotomy of hammertoes, 2, 3, 4, 5, right, code  35077. PREOPERATIVE DIAGNOSES  1. Ulcer and osteomyelitis of toe 1, right. 2. Hammertoes of right foot. POSTOPERATIVE DIAGNOSES  1. Ulcer and osteomyelitis of toe 1, right. 2. Hammertoes of right foot. ANESTHESIA: MAC.    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 There is a  strong likelihood of osteomyelitis of the hallux. The patient now wishes  for surgical intervention to officially obtain a clean closed functional  foot. All risks and benefits were explained to the patient, over  numerous visits and included, but were not limited to infection, poor  healing, nonhealing, the need for more surgery, loss of toe, foot, leg or  life, possible clots including DVT and pulmonary embolism. No  guarantees were made as to the outcome of the surgery or the  possible need for more surgery. DESCRIPTION OF PROCEDURE: After informed consent was  obtained and all of the patient's questions answered, the patient was  brought to the operating room, placed on the table in the supine  position. There, a member of the anesthesia team administered MAC  anesthesia, and a member of the surgical team administered a field  block consisting of lidocaine and Marcaine.  The right lower extremity  was then prepped and draped in normal sterile fashion to the mid leg. Attention was then directed to the right foot. On gross inspection, there  was a partially gangrenous chronic ulcer of the plantar right hallux and  progressive semirigid hammertoe contractures that will likely sometime  in this patient's life cause of her a problem. Using a 10 blade, 2  curvilinear converging incisions were made down to bone just distal to  the metatarsophalangeal joint of the hallux. This incision was carried  around circumferentially. Hemostasis was easily maintained using  Bovie electrocautery. A circumferential capsulotomy of the  metatarsophalangeal joint was then completed and the toe was  disarticulated and sent to the back table. Gross examination of the  metatarsophalangeal joint and first metatarsal head revealed no further  signs of infection or ischemia. The wound was copiously irrigated using  normal saline and a bulb syringe. The wound was then closed using 3-  0 Prolene. At the time of closure, there were no signs of infection,  ischemia, flap tension or suture ischemia. Attention was then directed  to the semi-rigid digital contractures 2 through 5 of the right foot. Using  a #15 blade, 4 percutaneous stab incisions were made in the sulcus of  the right foot with subsequent release of the flexor tendons and  instantaneous derotation of the toes. Once the correction was deemed  adequate, each stab incision was closed using 4-0 Prolene. The foot  was then dressed using bacitracin, Adaptic, sterile 4 x 4's, 4-inch  Webril, and a 4-inch Ace wrap with only mild compression. The patient  was then transported to the postanesthesia care unit with vital signs  stable and neurovascular status intact at baseline to the right foot. It is  to be noted that Dr. Jordin Hanson was present and scrubbed  throughout the procedure, all sponge counts deemed to be accurate. COMPLICATIONS: None.     SPECIMENS REMOVED: Right hallux, pathology for gross analysis.     ESTIMATED BLOOD LOSS: Minimal.        Rome Rojo DPM    [address] /   D:  11/22/2017   07:53  T:  11/22/2017   12:51  Job #:  [id number]

## 2017-11-27 RX ORDER — ROSUVASTATIN CALCIUM 20 MG/1
20 TABLET, COATED ORAL
Qty: 90 TAB | Refills: 3 | Status: SHIPPED | COMMUNITY
Start: 2017-11-27 | End: 2020-01-01

## 2017-12-21 DIAGNOSIS — E11.8 TYPE 2 DIABETES MELLITUS WITH COMPLICATION, WITH LONG-TERM CURRENT USE OF INSULIN (HCC): ICD-10-CM

## 2017-12-21 DIAGNOSIS — Z79.4 TYPE 2 DIABETES MELLITUS WITH COMPLICATION, WITH LONG-TERM CURRENT USE OF INSULIN (HCC): ICD-10-CM

## 2017-12-21 RX ORDER — METFORMIN HYDROCHLORIDE 500 MG/1
TABLET, EXTENDED RELEASE ORAL
Qty: 30 TAB | Refills: 0 | Status: SHIPPED | OUTPATIENT
Start: 2017-12-21 | End: 2019-01-30

## 2018-02-08 ENCOUNTER — TELEPHONE (OUTPATIENT)
Dept: FAMILY MEDICINE CLINIC | Age: 53
End: 2018-02-08

## 2018-02-08 NOTE — TELEPHONE ENCOUNTER
Medication: Humulin 70/30 , dose: 40 units, how often: BID , current number of medication days provided: 90, refill per application. Lot #: [id number], EXP 11/2018. This medication was received and verified for the following 1. Correct Patient, 2. Correct Diagnosis, 3. Correct Drug, 4. Correct route, and no current allergy to medication.      Pt needs to have appt prior to getting any meds    Estuardo Karla, ROBLES, RN, [facility]

## 2018-04-09 ENCOUNTER — TELEPHONE (OUTPATIENT)
Dept: FAMILY MEDICINE CLINIC | Age: 53
End: 2018-04-09

## 2018-11-10 ENCOUNTER — APPOINTMENT (OUTPATIENT)
Dept: GENERAL RADIOLOGY | Age: 53
DRG: 872 | End: 2018-11-10
Attending: EMERGENCY MEDICINE
Payer: SELF-PAY

## 2018-11-10 ENCOUNTER — APPOINTMENT (OUTPATIENT)
Dept: CT IMAGING | Age: 53
DRG: 872 | End: 2018-11-10
Attending: EMERGENCY MEDICINE
Payer: SELF-PAY

## 2018-11-10 ENCOUNTER — HOSPITAL ENCOUNTER (INPATIENT)
Age: 53
LOS: 3 days | Discharge: HOME | DRG: 872 | End: 2018-11-14
Attending: EMERGENCY MEDICINE | Admitting: INTERNAL MEDICINE
Payer: SELF-PAY

## 2018-11-10 DIAGNOSIS — R10.84 ABDOMINAL PAIN, GENERALIZED: ICD-10-CM

## 2018-11-10 DIAGNOSIS — A41.9 SEPSIS, DUE TO UNSPECIFIED ORGANISM: Primary | ICD-10-CM

## 2018-11-10 LAB
ALBUMIN SERPL-MCNC: 5 G/DL (ref 3.4–5)
ALBUMIN/GLOB SERPL: 1 {RATIO} (ref 0.8–1.7)
ALP SERPL-CCNC: 181 U/L (ref 45–117)
ALT SERPL-CCNC: 26 U/L (ref 13–56)
ANION GAP SERPL CALC-SCNC: 14 MMOL/L (ref 3–18)
APPEARANCE UR: CLEAR
AST SERPL-CCNC: 25 U/L (ref 15–37)
BACTERIA URNS QL MICRO: ABNORMAL /HPF
BASOPHILS # BLD: 0 K/UL (ref 0–0.06)
BASOPHILS NFR BLD: 0 % (ref 0–3)
BILIRUB SERPL-MCNC: 2.2 MG/DL (ref 0.2–1)
BILIRUB UR QL: NEGATIVE
BUN SERPL-MCNC: 25 MG/DL (ref 7–18)
BUN/CREAT SERPL: 14 (ref 12–20)
CALCIUM SERPL-MCNC: 10.1 MG/DL (ref 8.5–10.1)
CHLORIDE SERPL-SCNC: 107 MMOL/L (ref 100–108)
CO2 SERPL-SCNC: 21 MMOL/L (ref 21–32)
COLOR UR: YELLOW
CREAT SERPL-MCNC: 1.76 MG/DL (ref 0.6–1.3)
DIFFERENTIAL METHOD BLD: ABNORMAL
EOSINOPHIL # BLD: 0 K/UL (ref 0–0.4)
EOSINOPHIL NFR BLD: 0 % (ref 0–5)
EPITH CASTS URNS QL MICRO: ABNORMAL /LPF (ref 0–5)
ERYTHROCYTE [DISTWIDTH] IN BLOOD BY AUTOMATED COUNT: 13.9 % (ref 11.6–14.5)
GLOBULIN SER CALC-MCNC: 5 G/DL (ref 2–4)
GLUCOSE SERPL-MCNC: 267 MG/DL (ref 74–99)
GLUCOSE UR STRIP.AUTO-MCNC: 250 MG/DL
HCT VFR BLD AUTO: 37.5 % (ref 35–45)
HGB BLD-MCNC: 12.9 G/DL (ref 12–16)
HGB UR QL STRIP: ABNORMAL
KETONES UR QL STRIP.AUTO: NEGATIVE MG/DL
LACTATE BLD-SCNC: 3.89 MMOL/L (ref 0.4–2)
LACTATE BLD-SCNC: 5.36 MMOL/L (ref 0.4–2)
LEUKOCYTE ESTERASE UR QL STRIP.AUTO: NEGATIVE
LIPASE SERPL-CCNC: 100 U/L (ref 73–393)
LYMPHOCYTES # BLD: 2.8 K/UL (ref 0.8–3.5)
LYMPHOCYTES NFR BLD: 14 % (ref 20–51)
MCH RBC QN AUTO: 28.9 PG (ref 24–34)
MCHC RBC AUTO-ENTMCNC: 34.4 G/DL (ref 31–37)
MCV RBC AUTO: 84.1 FL (ref 74–97)
MONOCYTES # BLD: 1.8 K/UL (ref 0–1)
MONOCYTES NFR BLD: 9 % (ref 2–9)
NEUTS SEG # BLD: 15.1 K/UL (ref 1.8–8)
NEUTS SEG NFR BLD: 77 % (ref 42–75)
NITRITE UR QL STRIP.AUTO: NEGATIVE
PH UR STRIP: 8 [PH] (ref 5–8)
PLATELET # BLD AUTO: 311 K/UL (ref 135–420)
PLATELET COMMENTS,PCOM: ABNORMAL
PMV BLD AUTO: 10.6 FL (ref 9.2–11.8)
POTASSIUM SERPL-SCNC: 3.5 MMOL/L (ref 3.5–5.5)
PROT SERPL-MCNC: 10 G/DL (ref 6.4–8.2)
PROT UR STRIP-MCNC: 300 MG/DL
RBC # BLD AUTO: 4.46 M/UL (ref 4.2–5.3)
RBC #/AREA URNS HPF: ABNORMAL /HPF (ref 0–5)
RBC MORPH BLD: ABNORMAL
SODIUM SERPL-SCNC: 142 MMOL/L (ref 136–145)
SP GR UR REFRACTOMETRY: 1.01 (ref 1–1.03)
UROBILINOGEN UR QL STRIP.AUTO: 0.2 EU/DL (ref 0.2–1)
WBC # BLD AUTO: 19.7 K/UL (ref 4.6–13.2)
WBC URNS QL MICRO: ABNORMAL /HPF (ref 0–4)

## 2018-11-10 PROCEDURE — 96374 THER/PROPH/DIAG INJ IV PUSH: CPT

## 2018-11-10 PROCEDURE — 80053 COMPREHEN METABOLIC PANEL: CPT

## 2018-11-10 PROCEDURE — 93005 ELECTROCARDIOGRAM TRACING: CPT

## 2018-11-10 PROCEDURE — 71045 X-RAY EXAM CHEST 1 VIEW: CPT

## 2018-11-10 PROCEDURE — 87040 BLOOD CULTURE FOR BACTERIA: CPT

## 2018-11-10 PROCEDURE — 96361 HYDRATE IV INFUSION ADD-ON: CPT

## 2018-11-10 PROCEDURE — 83605 ASSAY OF LACTIC ACID: CPT

## 2018-11-10 PROCEDURE — 74176 CT ABD & PELVIS W/O CONTRAST: CPT

## 2018-11-10 PROCEDURE — 99285 EMERGENCY DEPT VISIT HI MDM: CPT

## 2018-11-10 PROCEDURE — 74011250636 HC RX REV CODE- 250/636: Performed by: EMERGENCY MEDICINE

## 2018-11-10 PROCEDURE — 96375 TX/PRO/DX INJ NEW DRUG ADDON: CPT

## 2018-11-10 PROCEDURE — 74011250636 HC RX REV CODE- 250/636: Performed by: PHYSICIAN ASSISTANT

## 2018-11-10 PROCEDURE — 74011000250 HC RX REV CODE- 250: Performed by: EMERGENCY MEDICINE

## 2018-11-10 PROCEDURE — 83690 ASSAY OF LIPASE: CPT

## 2018-11-10 PROCEDURE — 85025 COMPLETE CBC W/AUTO DIFF WBC: CPT

## 2018-11-10 PROCEDURE — 81001 URINALYSIS AUTO W/SCOPE: CPT

## 2018-11-10 RX ORDER — SODIUM CHLORIDE 9 MG/ML
1000 INJECTION, SOLUTION INTRAVENOUS ONCE
Status: COMPLETED | OUTPATIENT
Start: 2018-11-10 | End: 2018-11-10

## 2018-11-10 RX ORDER — FENTANYL CITRATE 50 UG/ML
50 INJECTION, SOLUTION INTRAMUSCULAR; INTRAVENOUS
Status: COMPLETED | OUTPATIENT
Start: 2018-11-10 | End: 2018-11-10

## 2018-11-10 RX ORDER — DIPHENHYDRAMINE HYDROCHLORIDE 50 MG/ML
12.5 INJECTION, SOLUTION INTRAMUSCULAR; INTRAVENOUS ONCE
Status: COMPLETED | OUTPATIENT
Start: 2018-11-10 | End: 2018-11-10

## 2018-11-10 RX ORDER — SODIUM CHLORIDE 0.9 % (FLUSH) 0.9 %
5-10 SYRINGE (ML) INJECTION AS NEEDED
Status: DISCONTINUED | OUTPATIENT
Start: 2018-11-10 | End: 2018-11-14 | Stop reason: HOSPADM

## 2018-11-10 RX ORDER — ONDANSETRON 2 MG/ML
4 INJECTION INTRAMUSCULAR; INTRAVENOUS
Status: COMPLETED | OUTPATIENT
Start: 2018-11-10 | End: 2018-11-10

## 2018-11-10 RX ADMIN — FENTANYL CITRATE 50 MCG: 50 INJECTION INTRAMUSCULAR; INTRAVENOUS at 21:52

## 2018-11-10 RX ADMIN — SODIUM CHLORIDE 1000 ML: 900 INJECTION, SOLUTION INTRAVENOUS at 19:54

## 2018-11-10 RX ADMIN — SODIUM CHLORIDE 1000 ML: 900 INJECTION, SOLUTION INTRAVENOUS at 21:11

## 2018-11-10 RX ADMIN — ONDANSETRON 4 MG: 2 INJECTION INTRAMUSCULAR; INTRAVENOUS at 19:54

## 2018-11-10 RX ADMIN — SODIUM CHLORIDE 517 ML: 900 INJECTION, SOLUTION INTRAVENOUS at 22:30

## 2018-11-10 RX ADMIN — DIPHENHYDRAMINE HYDROCHLORIDE 12.5 MG: 50 INJECTION, SOLUTION INTRAMUSCULAR; INTRAVENOUS at 21:54

## 2018-11-10 RX ADMIN — MEROPENEM 1 G: 1 INJECTION, POWDER, FOR SOLUTION INTRAVENOUS at 21:56

## 2018-11-11 LAB
ANION GAP SERPL CALC-SCNC: 11 MMOL/L (ref 3–18)
ATRIAL RATE: 105 BPM
BUN SERPL-MCNC: 26 MG/DL (ref 7–18)
BUN/CREAT SERPL: 14 (ref 12–20)
CALCIUM SERPL-MCNC: 8.4 MG/DL (ref 8.5–10.1)
CALCULATED P AXIS, ECG09: 67 DEGREES
CALCULATED R AXIS, ECG10: 11 DEGREES
CALCULATED T AXIS, ECG11: 47 DEGREES
CHLORIDE SERPL-SCNC: 113 MMOL/L (ref 100–108)
CO2 SERPL-SCNC: 23 MMOL/L (ref 21–32)
CREAT SERPL-MCNC: 1.81 MG/DL (ref 0.6–1.3)
DIAGNOSIS, 93000: NORMAL
GLUCOSE BLD STRIP.AUTO-MCNC: 127 MG/DL (ref 70–110)
GLUCOSE BLD STRIP.AUTO-MCNC: 152 MG/DL (ref 70–110)
GLUCOSE BLD STRIP.AUTO-MCNC: 164 MG/DL (ref 70–110)
GLUCOSE BLD STRIP.AUTO-MCNC: 184 MG/DL (ref 70–110)
GLUCOSE BLD STRIP.AUTO-MCNC: 213 MG/DL (ref 70–110)
GLUCOSE SERPL-MCNC: 182 MG/DL (ref 74–99)
LACTATE BLD-SCNC: 1.95 MMOL/L (ref 0.4–2)
P-R INTERVAL, ECG05: 152 MS
POTASSIUM SERPL-SCNC: 3.7 MMOL/L (ref 3.5–5.5)
Q-T INTERVAL, ECG07: 388 MS
QRS DURATION, ECG06: 70 MS
QTC CALCULATION (BEZET), ECG08: 512 MS
SODIUM SERPL-SCNC: 147 MMOL/L (ref 136–145)
VENTRICULAR RATE, ECG03: 105 BPM

## 2018-11-11 PROCEDURE — 74011250637 HC RX REV CODE- 250/637: Performed by: NURSE PRACTITIONER

## 2018-11-11 PROCEDURE — 74011250637 HC RX REV CODE- 250/637: Performed by: INTERNAL MEDICINE

## 2018-11-11 PROCEDURE — 80048 BASIC METABOLIC PNL TOTAL CA: CPT

## 2018-11-11 PROCEDURE — 74011636637 HC RX REV CODE- 636/637: Performed by: INTERNAL MEDICINE

## 2018-11-11 PROCEDURE — 74011250636 HC RX REV CODE- 250/636: Performed by: INTERNAL MEDICINE

## 2018-11-11 PROCEDURE — 36415 COLL VENOUS BLD VENIPUNCTURE: CPT

## 2018-11-11 PROCEDURE — 74011250637 HC RX REV CODE- 250/637: Performed by: EMERGENCY MEDICINE

## 2018-11-11 PROCEDURE — 82962 GLUCOSE BLOOD TEST: CPT

## 2018-11-11 PROCEDURE — 65660000000 HC RM CCU STEPDOWN

## 2018-11-11 PROCEDURE — 74011000250 HC RX REV CODE- 250: Performed by: INTERNAL MEDICINE

## 2018-11-11 PROCEDURE — 74011250636 HC RX REV CODE- 250/636: Performed by: EMERGENCY MEDICINE

## 2018-11-11 PROCEDURE — 83605 ASSAY OF LACTIC ACID: CPT

## 2018-11-11 RX ORDER — LEVOFLOXACIN 5 MG/ML
750 INJECTION, SOLUTION INTRAVENOUS
Status: COMPLETED | OUTPATIENT
Start: 2018-11-11 | End: 2018-11-11

## 2018-11-11 RX ORDER — ACETAMINOPHEN 500 MG
500 TABLET ORAL
Status: DISCONTINUED | OUTPATIENT
Start: 2018-11-11 | End: 2018-11-14 | Stop reason: HOSPADM

## 2018-11-11 RX ORDER — ONDANSETRON 2 MG/ML
4 INJECTION INTRAMUSCULAR; INTRAVENOUS
Status: COMPLETED | OUTPATIENT
Start: 2018-11-11 | End: 2018-11-11

## 2018-11-11 RX ORDER — HEPARIN SODIUM 5000 [USP'U]/ML
5000 INJECTION, SOLUTION INTRAVENOUS; SUBCUTANEOUS EVERY 8 HOURS
Status: DISCONTINUED | OUTPATIENT
Start: 2018-11-11 | End: 2018-11-14 | Stop reason: HOSPADM

## 2018-11-11 RX ORDER — ACETAMINOPHEN 325 MG/1
975 TABLET ORAL
Status: COMPLETED | OUTPATIENT
Start: 2018-11-11 | End: 2018-11-11

## 2018-11-11 RX ORDER — INSULIN GLARGINE 100 [IU]/ML
30 INJECTION, SOLUTION SUBCUTANEOUS
Status: DISCONTINUED | OUTPATIENT
Start: 2018-11-11 | End: 2018-11-12

## 2018-11-11 RX ORDER — METRONIDAZOLE 500 MG/1
500 TABLET ORAL EVERY 8 HOURS
Status: DISCONTINUED | OUTPATIENT
Start: 2018-11-11 | End: 2018-11-14 | Stop reason: HOSPADM

## 2018-11-11 RX ORDER — PAROXETINE 30 MG/1
30 TABLET, FILM COATED ORAL DAILY
Status: DISCONTINUED | OUTPATIENT
Start: 2018-11-11 | End: 2018-11-14 | Stop reason: HOSPADM

## 2018-11-11 RX ORDER — VANCOMYCIN/0.9 % SOD CHLORIDE 1 G/100 ML
1000 PLASTIC BAG, INJECTION (ML) INTRAVENOUS
Status: COMPLETED | OUTPATIENT
Start: 2018-11-11 | End: 2018-11-11

## 2018-11-11 RX ORDER — HYDROCODONE BITARTRATE AND ACETAMINOPHEN 10; 325 MG/1; MG/1
2 TABLET ORAL
Status: DISCONTINUED | OUTPATIENT
Start: 2018-11-11 | End: 2018-11-12

## 2018-11-11 RX ORDER — INSULIN LISPRO 100 [IU]/ML
INJECTION, SOLUTION INTRAVENOUS; SUBCUTANEOUS
Status: DISCONTINUED | OUTPATIENT
Start: 2018-11-11 | End: 2018-11-14 | Stop reason: HOSPADM

## 2018-11-11 RX ORDER — ROSUVASTATIN CALCIUM 20 MG/1
20 TABLET, COATED ORAL
Status: DISCONTINUED | OUTPATIENT
Start: 2018-11-11 | End: 2018-11-14 | Stop reason: HOSPADM

## 2018-11-11 RX ORDER — VANCOMYCIN 1.75 GRAM/500 ML IN 0.9 % SODIUM CHLORIDE INTRAVENOUS
1750
Status: DISCONTINUED | OUTPATIENT
Start: 2018-11-11 | End: 2018-11-11

## 2018-11-11 RX ORDER — MORPHINE SULFATE 2 MG/ML
1 INJECTION, SOLUTION INTRAMUSCULAR; INTRAVENOUS
Status: DISCONTINUED | OUTPATIENT
Start: 2018-11-11 | End: 2018-11-12

## 2018-11-11 RX ORDER — SODIUM CHLORIDE 9 MG/ML
125 INJECTION, SOLUTION INTRAVENOUS CONTINUOUS
Status: DISCONTINUED | OUTPATIENT
Start: 2018-11-11 | End: 2018-11-14 | Stop reason: HOSPADM

## 2018-11-11 RX ORDER — METRONIDAZOLE 500 MG/100ML
500 INJECTION, SOLUTION INTRAVENOUS EVERY 12 HOURS
Status: DISCONTINUED | OUTPATIENT
Start: 2018-11-11 | End: 2018-11-11

## 2018-11-11 RX ADMIN — VANCOMYCIN HYDROCHLORIDE 1000 MG: 10 INJECTION, POWDER, LYOPHILIZED, FOR SOLUTION INTRAVENOUS at 03:57

## 2018-11-11 RX ADMIN — ONDANSETRON 4 MG: 2 INJECTION INTRAMUSCULAR; INTRAVENOUS at 05:26

## 2018-11-11 RX ADMIN — PAROXETINE HYDROCHLORIDE 30 MG: 30 TABLET, FILM COATED ORAL at 09:12

## 2018-11-11 RX ADMIN — INSULIN LISPRO 2 UNITS: 100 INJECTION, SOLUTION INTRAVENOUS; SUBCUTANEOUS at 09:12

## 2018-11-11 RX ADMIN — HYDROCODONE BITARTRATE AND ACETAMINOPHEN 2 TABLET: 10; 325 TABLET ORAL at 14:44

## 2018-11-11 RX ADMIN — SODIUM CHLORIDE 1000 ML: 900 INJECTION, SOLUTION INTRAVENOUS at 03:21

## 2018-11-11 RX ADMIN — HEPARIN SODIUM 5000 UNITS: 5000 INJECTION, SOLUTION INTRAVENOUS; SUBCUTANEOUS at 21:14

## 2018-11-11 RX ADMIN — INSULIN GLARGINE 30 UNITS: 100 INJECTION, SOLUTION SUBCUTANEOUS at 21:13

## 2018-11-11 RX ADMIN — INSULIN LISPRO 6 UNITS: 100 INJECTION, SOLUTION INTRAVENOUS; SUBCUTANEOUS at 17:14

## 2018-11-11 RX ADMIN — HEPARIN SODIUM 5000 UNITS: 5000 INJECTION, SOLUTION INTRAVENOUS; SUBCUTANEOUS at 08:26

## 2018-11-11 RX ADMIN — SODIUM CHLORIDE 100 ML/HR: 900 INJECTION, SOLUTION INTRAVENOUS at 22:39

## 2018-11-11 RX ADMIN — HEPARIN SODIUM 5000 UNITS: 5000 INJECTION, SOLUTION INTRAVENOUS; SUBCUTANEOUS at 14:45

## 2018-11-11 RX ADMIN — CEFTRIAXONE SODIUM 1 G: 1 INJECTION, POWDER, FOR SOLUTION INTRAMUSCULAR; INTRAVENOUS at 12:30

## 2018-11-11 RX ADMIN — Medication 10 ML: at 01:59

## 2018-11-11 RX ADMIN — ROSUVASTATIN CALCIUM 20 MG: 20 TABLET, FILM COATED ORAL at 21:13

## 2018-11-11 RX ADMIN — ACETAMINOPHEN 975 MG: 325 TABLET ORAL at 01:55

## 2018-11-11 RX ADMIN — LEVOFLOXACIN 750 MG: 5 INJECTION, SOLUTION INTRAVENOUS at 01:58

## 2018-11-11 RX ADMIN — METRONIDAZOLE 500 MG: 500 TABLET ORAL at 14:44

## 2018-11-11 RX ADMIN — INSULIN LISPRO 4 UNITS: 100 INJECTION, SOLUTION INTRAVENOUS; SUBCUTANEOUS at 12:30

## 2018-11-11 RX ADMIN — SODIUM CHLORIDE 100 ML/HR: 900 INJECTION, SOLUTION INTRAVENOUS at 08:27

## 2018-11-11 RX ADMIN — CEFEPIME HYDROCHLORIDE 2 G: 2 INJECTION, POWDER, FOR SOLUTION INTRAVENOUS at 08:26

## 2018-11-11 RX ADMIN — VANCOMYCIN HYDROCHLORIDE 1750 MG: 10 INJECTION, POWDER, LYOPHILIZED, FOR SOLUTION INTRAVENOUS at 08:27

## 2018-11-11 RX ADMIN — METRONIDAZOLE 500 MG: 500 TABLET ORAL at 21:15

## 2018-11-11 NOTE — H&P
History & Physical 
Patient: Poly Miller MRN: [id number]  CSN: [id number] YOB: 1965  Age: 52 y.o. Sex: female DOA: 11/10/2018 Chief Complaint Patient presents with  Abdominal Pain  Vomiting  Nausea HPI:  
 
Poly Miller is a 52 y.o. female who has a h/o DM, HTN was doing well until yesterday morning when woke up sick in the stomach. The pain was acute, diffuse, abdominal pain that started at 8 AM yesterday. Patient was unable to describe the pain but did state \"it really hurts. \" She rates her pain a 10/10. The pain is not exacerbated by pressure. She also reports associated N/V/D, chills, urinary frequency, and notes that her diarrhea was white-colored. Patient does report having a history of a cholecystectomy. She takes Diabetic medications and BP medications. She notes that she checked her BG today and it was 311. Otherwise no other complaints or concerns at this time. She took a course of antibiotics in later part of July from the Dentist for her dental extractions. Her WBC count was 19.7, lactic acid was 5.36. Past Medical History:  
Diagnosis Date  Blind left eye  Cellulitis of great toe of right foot 10/19/2017  Diabetes ([address]  Diabetic retinopathy ([address]  Gall stones  GERD (gastroesophageal reflux disease)  Hammertoe  Hiatal hernia  History of mammogram 10/03/2017 No evidence of malignancy  Hypertension  Mass of abdomen  Neuropathy due to type 2 diabetes mellitus ([address]  Osteomyelitis of toe of right foot ([address] 10/19/2017  Pancreatitis Past Surgical History:  
Procedure Laterality Date  HX AMPUTATION Left 07/21/2017  
 left 2nd toe  HX CHOLECYSTECTOMY  10/15/2014  HX GI Benign GI Stromal Tumor excision  HX HEENT Sx for detached retina  HX MYOMECTOMY x5 removed  HX OTHER SURGICAL    
 upper endoscopy Family History Adopted:  Yes  
 Problem Relation Age of Onset  Heart Failure Mother 76  
 Other Father 70  
     blood clot after surgery  Diabetes Paternal Aunt  Diabetes Paternal Uncle Social History Socioeconomic History  Marital status: SINGLE Spouse name: Not on file  Number of children: Not on file  Years of education: Not on file  Highest education level: Not on file Social Needs  Financial resource strain: Not on file  Food insecurity - worry: Not on file  Food insecurity - inability: Not on file  Transportation needs - medical: Not on file  Transportation needs - non-medical: Not on file Occupational History  Not on file Tobacco Use  Smoking status: Former Smoker Packs/day: 0.20 Years: 8.00 Pack years: 1.60 Types: Cigarettes Last attempt to quit: 12/3/1995 Years since quittin.9  Smokeless tobacco: Never Used Substance and Sexual Activity  Alcohol use: No  
  Comment: Drinks 3-4xs year generally wine  Drug use: No  
 Sexual activity: Not Currently Other Topics Concern   Service No  
 Blood Transfusions No  
 Caffeine Concern No  
 Occupational Exposure No  
 Hobby Hazards No  
 Sleep Concern No  
 Stress Concern No  
 Weight Concern No  
 Special Diet Yes  Back Care No  
 Exercise No  
 Bike Helmet No  
 Seat Belt Yes  Self-Exams Yes Social History Narrative Lives with 16year old son  with ex   Does not have health insurance Prior to Admission medications Medication Sig Start Date End Date Taking? Authorizing Provider  
metFORMIN ER (GLUCOPHAGE XR) 500 mg tablet TAKE 1 TABLET BY MOUTH DAILY WITH DINNER 17   Jesse GUERRERO NP  
rosuvastatin (CRESTOR) 20 mg tablet Take 1 Tab by mouth nightly. 17   Jesse GUERRERO NP  
quinapril (ACCUPRIL) 20 mg tablet Take 20 mg by mouth nightly.     Other, MD Preethi  
 PARoxetine (PAXIL) 30 mg tablet TAKE 1 TABLET BY MOUTH DAILY 10/4/17   Devika GUERRERO NP  
insulin NPH/insulin regular (NOVOLIN 70/30, HUMULIN 70/30) 100 unit/mL (70-30) injection 40 Units by SubCUTAneous route two (2) times a day. 17   Avelina Shelton NP Allergies Allergen Reactions  Levaquin [Levofloxacin] Hives  Promethazine Nausea and Vomiting \"the phenergan made me more nauseated\" Review of Systems GENERAL: No fevers or chills. HEENT: No change in vision, no earache, tinnitus, sore throat or sinus congestion. NECK: No pain or stiffness. CARDIOVASCULAR: No chest pain or pressure. No palpitations. PULMONARY: No shortness of breath, cough or wheeze. GASTROINTESTINAL: + abdominal pain, nausea, vomiting and diarrhea, no melena or bright red blood per rectum. GENITOURINARY: No urinary frequency, urgency, hesitancy or dysuria. MUSCULOSKELETAL: No joint or muscle pain, no back pain, no recent trauma. DERMATOLOGIC: No rash, no itching, no lesions. ENDOCRINE: No polyuria, polydipsia, no heat or cold intolerance. No recent change in    weight. HEMATOLOGICAL: No anemia or easy bruising or bleeding. NEUROLOGIC: No headache, seizures, numbness, tingling or weakness. PSYCHIATRIC: No depression, anxiety, mood disorder, no loss of interest in normal       activity or change in sleep pattern. Physical Exam:  
 
Physical Exam: 
Visit Vitals /63 Pulse (!) 108 Temp 100.3 °F (37.9 °C) Resp 22 Ht 5' 8\" (1.727 m) Wt 83.9 kg (185 lb) LMP 10/06/2015 (Exact Date) SpO2 99% BMI 28.13 kg/m² O2 Device: Room air Temp (24hrs), Av.8 °F (37.7 °C), Min:97.5 °F (36.4 °C), Max:101.3 °F (38.5 °C) No intake/output data recorded. No intake/output data recorded. General:  Alert, cooperative, no distress, appears stated age. Head:  Normocephalic, without obvious abnormality, atraumatic. Eyes:  Conjunctivae/corneas clear. PERRL, EOMs intact. Nose: Nares normal. No drainage or sinus tenderness. Throat: Lips, mucosa, and tongue normal. Teeth and gums normal.  
Neck: Supple, symmetrical, trachea midline, no adenopathy, thyroid: no enlargement/tenderness/nodules, no carotid bruit and no JVD. Back:   ROM normal. No CVA tenderness. Lungs:   Clear to auscultation bilaterally. Chest wall:  No tenderness or deformity. Heart:  Regular rate and rhythm, S1, S2 normal, no murmur, click, rub or gallop. Abdomen: Soft, tender all over the abdomen. Bowel sounds normal. No masses,  No organomegaly. Extremities: Extremities normal, atraumatic, no cyanosis or edema. Pulses: 2+ and symmetric all extremities. Skin: Skin color, texture, turgor normal. No rashes or lesions Neurologic: CNII-XII intact. No focal motor or sensory deficit. Labs Reviewed: 
 
Recent Results (from the past 24 hour(s)) URINALYSIS W/ RFLX MICROSCOPIC Collection Time: 11/10/18  7:27 PM  
Result Value Ref Range Color YELLOW Appearance CLEAR Specific gravity 1.015 1.005 - 1.030    
 pH (UA) 8.0 5.0 - 8.0 Protein 300 (A) NEG mg/dL Glucose 250 (A) NEG mg/dL Ketone NEGATIVE  NEG mg/dL Bilirubin NEGATIVE  NEG Blood MODERATE (A) NEG Urobilinogen 0.2 0.2 - 1.0 EU/dL Nitrites NEGATIVE  NEG Leukocyte Esterase NEGATIVE  NEG    
URINE MICROSCOPIC ONLY Collection Time: 11/10/18  7:27 PM  
Result Value Ref Range WBC NONE 0 - 4 /hpf  
 RBC 3 to 5 0 - 5 /hpf Epithelial cells FEW 0 - 5 /lpf Bacteria FEW (A) NEG /hpf  
CBC WITH AUTOMATED DIFF Collection Time: 11/10/18  7:40 PM  
Result Value Ref Range WBC 19.7 (H) 4.6 - 13.2 K/uL  
 RBC 4.46 4.20 - 5.30 M/uL  
 HGB 12.9 12.0 - 16.0 g/dL HCT 37.5 35.0 - 45.0 % MCV 84.1 74.0 - 97.0 FL  
 MCH 28.9 24.0 - 34.0 PG  
 MCHC 34.4 31.0 - 37.0 g/dL  
 RDW 13.9 11.6 - 14.5 % PLATELET 055 066 - 821 K/uL MPV 10.6 9.2 - 11.8 FL  
 NEUTROPHILS 77 (H) 42 - 75 % LYMPHOCYTES 14 (L) 20 - 51 % MONOCYTES 9 2 - 9 % EOSINOPHILS 0 0 - 5 % BASOPHILS 0 0 - 3 %  
 ABS. NEUTROPHILS 15.1 (H) 1.8 - 8.0 K/UL  
 ABS. LYMPHOCYTES 2.8 0.8 - 3.5 K/UL  
 ABS. MONOCYTES 1.8 (H) 0 - 1.0 K/UL  
 ABS. EOSINOPHILS 0.0 0.0 - 0.4 K/UL  
 ABS. BASOPHILS 0.0 0.0 - 0.06 K/UL  
 DF MANUAL PLATELET COMMENTS ADEQUATE PLATELETS    
 RBC COMMENTS NORMOCYTIC, NORMOCHROMIC METABOLIC PANEL, COMPREHENSIVE Collection Time: 11/10/18  7:40 PM  
Result Value Ref Range Sodium 142 136 - 145 mmol/L Potassium 3.5 3.5 - 5.5 mmol/L Chloride 107 100 - 108 mmol/L  
 CO2 21 21 - 32 mmol/L Anion gap 14 3.0 - 18 mmol/L Glucose 267 (H) 74 - 99 mg/dL BUN 25 (H) 7.0 - 18 MG/DL Creatinine 1.76 (H) 0.6 - 1.3 MG/DL  
 BUN/Creatinine ratio 14 12 - 20 GFR est AA 36 (L) >60 ml/min/1.73m2 GFR est non-AA 30 (L) >60 ml/min/1.73m2 Calcium 10.1 8.5 - 10.1 MG/DL Bilirubin, total 2.2 (H) 0.2 - 1.0 MG/DL  
 ALT (SGPT) 26 13 - 56 U/L  
 AST (SGOT) 25 15 - 37 U/L Alk. phosphatase 181 (H) 45 - 117 U/L Protein, total 10.0 (H) 6.4 - 8.2 g/dL Albumin 5.0 3.4 - 5.0 g/dL Globulin 5.0 (H) 2.0 - 4.0 g/dL A-G Ratio 1.0 0.8 - 1.7 LIPASE Collection Time: 11/10/18  7:40 PM  
Result Value Ref Range Lipase 100 73 - 393 U/L  
POC LACTIC ACID Collection Time: 11/10/18  9:19 PM  
Result Value Ref Range Lactic Acid (POC) 5.36 (HH) 0.40 - 2.00 mmol/L  
EKG, 12 LEAD, INITIAL Collection Time: 11/10/18 10:12 PM  
Result Value Ref Range Ventricular Rate 105 BPM  
 Atrial Rate 105 BPM  
 P-R Interval 152 ms QRS Duration 70 ms Q-T Interval 388 ms QTC Calculation (Bezet) 512 ms Calculated P Axis 67 degrees Calculated R Axis 11 degrees Calculated T Axis 47 degrees Diagnosis Sinus tachycardia Cannot rule out Inferior infarct , age undetermined Abnormal ECG 
 When compared with ECG of 09-NOV-2017 12:24, Minimal criteria for Inferior infarct are now present ST now depressed in Inferior leads QT has lengthened POC LACTIC ACID Collection Time: 11/10/18 11:24 PM  
Result Value Ref Range Lactic Acid (POC) 3.89 (HH) 0.40 - 2.00 mmol/L POC LACTIC ACID Collection Time: 11/11/18  4:17 AM  
Result Value Ref Range Lactic Acid (POC) 1.95 0.40 - 2.00 mmol/L  
GLUCOSE, POC Collection Time: 11/11/18  4:53 AM  
Result Value Ref Range Glucose (POC) 164 (H) 70 - 110 mg/dL Procedures/imaging: see electronic medical records for all procedures/Xrays and details which were not copied into this note but were reviewed prior to creation of Plan Assessment/Plan Active Problems: 1. Sepsis ([facility] (7/19/2017) - possibly abdominal source. - On vanco and Cefepime. 2. CRYSTAL 
- IV hydration. - possibly from sepsis, will follow the numbers. 3. DM 
- ISS. 4. HTN 
- continue home meds. - will follow closely. 5. Code status 
- full code 6. DVT prophylaxis 
- SQ heparin. Christiano Burnett MD 
November 11, 2018 Patient/Caregiver provided printed discharge information.

## 2018-11-11 NOTE — ED NOTES
Patient resting on stretcher, patient states she's feeling slightly better but pain to abdomen is starting to come back. Patient denies any other complaints at this time. Patient is febrile at 101.3f orally. Will inform provider.

## 2018-11-11 NOTE — PROGRESS NOTES
Pt re-evaluated on same day admission. She reports she feels better. Came in w/ sepsis unclear source, possibly GI. Has had abx use a few months ago, has levaquin allergy. Informally discussed w/ ID consultant. Would cover with rocephin and oral flagyl, send stool for C.diff (discussed w/ nurse) and cont to closely monitor.

## 2018-11-11 NOTE — PROGRESS NOTES
[facility] Progress Note Subjective:  
Minimal abdominal pain, no bowel movement today. Denies N/V, denies fever or chills. Objective:  
VS: BP (!) 133/96 (BP 1 Location: Left arm, BP Patient Position: Sitting) Comment: Notified Nurse tahira Mcginnis  Pulse (!) 122 Comment: Notified Nurse Salvador Breanna  Temp 99.8 °F (37.7 °C)   Resp 17   Ht 5' 8\" (1.727 m)   Wt 97.5 kg (214 lb 14.4 oz)   LMP 10/06/2015 (Exact Date)   SpO2 100%   Breastfeeding? No   BMI 32.68 kg/m² General:  Alert, NAD Cardiovascular:  RRR Pulmonary:  LSC throughout; respiratory effort WNL 
GI:  +BS in all four quadrants, soft, non-tender Extremities:  No edema; 2+ dorsalis pedis pulses bilaterally Neuro: alert and oriented Assessment/Plan:  
Assessment 1. Abdominal pain, N/V/D 
2. Fever of unknown origin 3. Leukocytosis 4. Tachycardia d/t fever 5. CRYSTAL 6. HTN 7. DM II 
8. HLD 9. Mood disorder Plan 1. CT abdomen/pelvis, no acute pathophysiology. Monitor S/S, prn zofran, IVF. miguel for culture 2. Blood cultures pending. IV abx initiated, flagyl and rocephin 3. Ace held d/t elevated creatinine, monitor BP, prn if needed 4. Monitor VS, monitor CBC 5. IVF, monitor metabolic panel for CRYSTAL, avoid nephrotoxic medications 6. POC glucose, SSI, lantus, metformin held d/t elevated creatinine 7. Continue paxil

## 2018-11-11 NOTE — PROGRESS NOTES
Patient Mews is a 3 Due to a HR of 122 and BP of 133/96. Physician aware, patient is being treated for Sepsis.

## 2018-11-11 NOTE — ED NOTES
Patient resting comfortably on stretcher, complaining of feeling slightly nauseated. Received RBVO for zofran 4 mg IV. Will administer now.

## 2018-11-11 NOTE — ED NOTES
Patient ambulated to bathroom, steady gait noted. Patient states abdominal pain is 2/10. Patient is very tachy upon sitting up in bed with heart rate jumping to 160bpm. Patient denies palpitations or other complaints at this time.

## 2018-11-11 NOTE — ED NOTES
TRANSFER - OUT REPORT: 
 
Verbal report given to PETRONA muse(name) on Destiny Benjamin  being transferred to [address] [place] [address](unit) for routine progression of care Report consisted of patients Situation, Background, Assessment and  
Recommendations(SBAR). Information from the following report(s) SBAR was reviewed with the receiving nurse. Lines:  
Peripheral IV 11/10/18 Left Forearm (Active) Site Assessment Clean, dry, & intact 11/10/2018  7:42 PM  
Phlebitis Assessment 0 11/10/2018  7:42 PM  
Infiltration Assessment 0 11/10/2018  7:42 PM  
Dressing Type Transparent 11/10/2018  7:42 PM  
Hub Color/Line Status Patent 11/10/2018  7:42 PM  
   
Peripheral IV 11/10/18 Right Antecubital (Active) Site Assessment Clean, dry, & intact 11/10/2018  9:38 PM  
Phlebitis Assessment 0 11/10/2018  9:38 PM  
Infiltration Assessment 0 11/10/2018  9:38 PM  
Dressing Status Clean, dry, & intact 11/10/2018  9:38 PM  
Dressing Type Transparent 11/10/2018  9:38 PM  
Hub Color/Line Status Patent 11/10/2018  9:38 PM  
  
 
Opportunity for questions and clarification was provided. Patient transported with: 
 Monitor Registered Nurse

## 2018-11-11 NOTE — ED PROVIDER NOTES
EMERGENCY DEPARTMENT HISTORY AND PHYSICAL EXAM 
 
8:06 PM 
 
 
Date: 11/10/2018 Patient Name: Meron Bernabe History of Presenting Illness Chief Complaint Patient presents with  Abdominal Pain  Vomiting  Nausea History Provided By: Patient Chief Complaint: abdominal pain Duration: since 8 AM today Timing:  Acute Location: diffuse abdomen Quality: \"it really hurts\" Severity: 10/10 Modifying Factors: not exacerbated by pressure. Associated Symptoms: N/V/D, chills. Diarrhea was white. Urinary frequency. Additional History (Context): Meron Bernabe is a 52 y.o. female presenting to the ED for the evaluation of acute, diffuse, abdominal pain that started at 8 AM today. Patient was unable to describe the pain but did state \"it really hurts. \" She rates her pain a 10/10. The pain is not exacerbated by pressure. She also reports associated N/V/D, chills, urinary frequency, and notes that her diarrhea was white-colored. Patient does report having a history of a cholecystectomy. She takes Diabetic medications and BP medications. She notes that she checked her BG today and it was 311. Otherwise no other complaints or concerns at this time. PCP: Andrea Dinh NP Current Facility-Administered Medications Medication Dose Route Frequency Provider Last Rate Last Dose  diatrizoate jadon-diatrizoat sod (MD-GASTROVIEW,GASTROGRAFIN) 66-10 % contrast solution 30 mL  30 mL Oral ONCE Patrica Weaver A, DO      
 sodium chloride (NS) flush 5-10 mL  5-10 mL IntraVENous PRN Patrica Weaver A, DO      
 sodium chloride 0.9 % bolus infusion 1,000 mL  1,000 mL IntraVENous ONCE Wodowski, Patrica A, DO Followed by  
 sodium chloride 0.9 % bolus infusion 1,000 mL  1,000 mL IntraVENous ONCE Wodokatina, Patrica A, DO  Followed by  
 sodium chloride 0.9 % bolus infusion 517 mL  517 mL IntraVENous ONCE Wokarri, Patrica A, DO      
 
 Current Outpatient Medications Medication Sig Dispense Refill  metFORMIN ER (GLUCOPHAGE XR) 500 mg tablet TAKE 1 TABLET BY MOUTH DAILY WITH DINNER 30 Tab 0  
 rosuvastatin (CRESTOR) 20 mg tablet Take 1 Tab by mouth nightly. 90 Tab 3  
 quinapril (ACCUPRIL) 20 mg tablet Take 20 mg by mouth nightly.  PARoxetine (PAXIL) 30 mg tablet TAKE 1 TABLET BY MOUTH DAILY 30 Tab 3  
 insulin NPH/insulin regular (NOVOLIN 70/30, HUMULIN 70/30) 100 unit/mL (70-30) injection 40 Units by SubCUTAneous route two (2) times a day. 10 mL 3 Past History Past Medical History: 
Past Medical History:  
Diagnosis Date  Blind left eye  Cellulitis of great toe of right foot 10/19/2017  Diabetes ([address]  Diabetic retinopathy ([address]  Gall stones  GERD (gastroesophageal reflux disease)  Hammertoe  Hiatal hernia  History of mammogram 10/03/2017 No evidence of malignancy  Hypertension  Mass of abdomen  Neuropathy due to type 2 diabetes mellitus ([address]  Osteomyelitis of toe of right foot ([address] 10/19/2017  Pancreatitis Past Surgical History: 
Past Surgical History:  
Procedure Laterality Date  HX AMPUTATION Left 2017  
 left 2nd toe  HX CHOLECYSTECTOMY  10/15/2014  HX GI Benign GI Stromal Tumor excision  HX HEENT Sx for detached retina  HX MYOMECTOMY x5 removed  HX OTHER SURGICAL    
 upper endoscopy Family History: 
Family History Adopted: Yes  
Problem Relation Age of Onset  Heart Failure Mother 76  
 Other Father 70  
     blood clot after surgery  Diabetes Paternal Aunt  Diabetes Paternal Uncle Social History: 
Social History Tobacco Use  Smoking status: Former Smoker Packs/day: 0.20 Years: 8.00 Pack years: 1.60 Types: Cigarettes Last attempt to quit: 12/3/1995 Years since quittin.9  Smokeless tobacco: Never Used Substance Use Topics  Alcohol use:  No  
 Comment: Drinks 3-4xs year generally wine  Drug use: No  
 
 
Allergies: Allergies Allergen Reactions  Promethazine Nausea and Vomiting \"the phenergan made me more nauseated\" Review of Systems Review of Systems Constitutional: Positive for chills. Negative for fever. Respiratory: Negative for shortness of breath. Cardiovascular: Negative for chest pain. Gastrointestinal: Positive for abdominal pain, diarrhea, nausea and vomiting. All other systems reviewed and are negative. Physical Exam  
 
Visit Vitals /87 Pulse (!) 111 Temp 99 °F (37.2 °C) Resp 20 Ht 5' 8\" (1.727 m) Wt 83.9 kg (185 lb) LMP 10/06/2015 (Exact Date) SpO2 100% BMI 28.13 kg/m² Physical Exam  
Constitutional: She is oriented to person, place, and time. She appears well-developed and well-nourished. HENT:  
Head: Normocephalic and atraumatic. Mouth/Throat: Mucous membranes are dry. Eyes: EOM are normal. Pupils are equal, round, and reactive to light. Cardiovascular: Regular rhythm. Tachycardia present. Pulmonary/Chest: Effort normal and breath sounds normal.  
Abdominal: Soft. Bowel sounds are normal. She exhibits no distension. There is generalized tenderness. Musculoskeletal: Normal range of motion. She exhibits no deformity. Neurological: She is alert and oriented to person, place, and time. Skin: Skin is dry. Skin was hot Psychiatric: She has a normal mood and affect. Thought content normal.  
Nursing note and vitals reviewed. Diagnostic Study Results Labs - Recent Results (from the past 12 hour(s)) URINALYSIS W/ RFLX MICROSCOPIC Collection Time: 11/10/18  7:27 PM  
Result Value Ref Range Color YELLOW Appearance CLEAR Specific gravity 1.015 1.005 - 1.030    
 pH (UA) 8.0 5.0 - 8.0 Protein 300 (A) NEG mg/dL Glucose 250 (A) NEG mg/dL Ketone NEGATIVE  NEG mg/dL  Bilirubin NEGATIVE  NEG    
 Blood MODERATE (A) NEG Urobilinogen 0.2 0.2 - 1.0 EU/dL Nitrites NEGATIVE  NEG Leukocyte Esterase NEGATIVE  NEG    
URINE MICROSCOPIC ONLY Collection Time: 11/10/18  7:27 PM  
Result Value Ref Range WBC NONE 0 - 4 /hpf  
 RBC 3 to 5 0 - 5 /hpf Epithelial cells FEW 0 - 5 /lpf Bacteria FEW (A) NEG /hpf  
CBC WITH AUTOMATED DIFF Collection Time: 11/10/18  7:40 PM  
Result Value Ref Range WBC 19.7 (H) 4.6 - 13.2 K/uL  
 RBC 4.46 4.20 - 5.30 M/uL  
 HGB 12.9 12.0 - 16.0 g/dL HCT 37.5 35.0 - 45.0 % MCV 84.1 74.0 - 97.0 FL  
 MCH 28.9 24.0 - 34.0 PG  
 MCHC 34.4 31.0 - 37.0 g/dL  
 RDW 13.9 11.6 - 14.5 % PLATELET 218 934 - 145 K/uL MPV 10.6 9.2 - 11.8 FL  
 NEUTROPHILS 77 (H) 42 - 75 % LYMPHOCYTES 14 (L) 20 - 51 % MONOCYTES 9 2 - 9 % EOSINOPHILS 0 0 - 5 % BASOPHILS 0 0 - 3 %  
 ABS. NEUTROPHILS 15.1 (H) 1.8 - 8.0 K/UL  
 ABS. LYMPHOCYTES 2.8 0.8 - 3.5 K/UL  
 ABS. MONOCYTES 1.8 (H) 0 - 1.0 K/UL  
 ABS. EOSINOPHILS 0.0 0.0 - 0.4 K/UL  
 ABS. BASOPHILS 0.0 0.0 - 0.06 K/UL  
 DF MANUAL PLATELET COMMENTS ADEQUATE PLATELETS    
 RBC COMMENTS NORMOCYTIC, NORMOCHROMIC METABOLIC PANEL, COMPREHENSIVE Collection Time: 11/10/18  7:40 PM  
Result Value Ref Range Sodium 142 136 - 145 mmol/L Potassium 3.5 3.5 - 5.5 mmol/L Chloride 107 100 - 108 mmol/L  
 CO2 21 21 - 32 mmol/L Anion gap 14 3.0 - 18 mmol/L Glucose 267 (H) 74 - 99 mg/dL BUN 25 (H) 7.0 - 18 MG/DL Creatinine 1.76 (H) 0.6 - 1.3 MG/DL  
 BUN/Creatinine ratio 14 12 - 20 GFR est AA 36 (L) >60 ml/min/1.73m2 GFR est non-AA 30 (L) >60 ml/min/1.73m2 Calcium 10.1 8.5 - 10.1 MG/DL Bilirubin, total 2.2 (H) 0.2 - 1.0 MG/DL  
 ALT (SGPT) 26 13 - 56 U/L  
 AST (SGOT) 25 15 - 37 U/L Alk. phosphatase 181 (H) 45 - 117 U/L Protein, total 10.0 (H) 6.4 - 8.2 g/dL Albumin 5.0 3.4 - 5.0 g/dL Globulin 5.0 (H) 2.0 - 4.0 g/dL A-G Ratio 1.0 0.8 - 1.7 LIPASE Collection Time: 11/10/18  7:40 PM  
Result Value Ref Range Lipase 100 73 - 393 U/L  
POC LACTIC ACID Collection Time: 11/10/18  9:19 PM  
Result Value Ref Range Lactic Acid (POC) 5.36 (HH) 0.40 - 2.00 mmol/L Radiologic Studies -  
CT ABD PELV WO CONT    (Results Pending) XR CHEST PORT    (Results Pending) Medical Decision Making I am the first provider for this patient. I reviewed the vital signs, available nursing notes, past medical history, past surgical history, family history and social history. Vital Signs-Reviewed the patient's vital signs. Pulse Oximetry Analysis -  100% RA (Interpretation) non-hypoxic Cardiac Monitor: 
Rate: 110 bpm  
Rhythm: sinus tachycardia EKG: Interpreted by the EP. Time Interpreted: 22:12 Rate: 105 bpm  
 Rhythm: Sinus Tachycardia Interpretation: No acute changes Records Reviewed: Nursing Notes (Time of Review: 8:06 PM) Provider Notes (Medical Decision Making):  
Patient is a 61-year-old female who comes in with tachycardia. Patient with abdominal pain and vomiting. Patient was unable to tolerate by mouth contrast due to nausea. Elevated creatinine to unable to receive IV contrast for CT. Patient with leukocytosis, tachycardia and fever of 101.3 in the emergency department Unable to locate source of infection. Chest x-ray clear Abdomen nonfocal 
CT without acute findings Urine without infection Specifically, feet without any source of infection. Skin within normal limits Cultures have been sent Patient has been fluid resuscitated Discussed with hospitalist who will admit the  To stepdown Patient is  Agreeable to  This plan Feeling better after fluids and medication. Stable at time of admission ED Course: Progress Notes, Reevaluation, and Consults: 
9:50 PM - Patient states that she is feeling better but cannot tolerate the oral contrast.  
 
 1: 27 AM - Consult:  Discussed care with Dr. Jarred Gilliam, Hospitalist. Standard discussion; including history of patients chief complaint, available diagnostic results, and treatment course. Will accept the patient for admission.  
 
-------------------------------------------------------------------------------------------------------------------------- 
 
9:26 PM - I suspect that this patient has an active infection. The patient met criteria for severe sepsis at this time. PROVIDER SEPSIS PHYSICAL EXAM EVAL Vital signs reviewed (see nursing documentation for further details): Vitals:  
 11/10/18 2119 11/10/18 2200 11/10/18 2215 11/10/18 2223 BP:  135/73 158/87 Pulse: (!) 113 (!) 109 (!) 111 Resp: 27 24 20 Temp:    99 °F (37.2 °C) SpO2: 100% 100% 100% Cardiac exam:Tachycardic Pulmonary exam:Normal 
 
Peripheral pulses:Normal 
 
Capillary refill:Normal 
 
Skin exam: Skin was hot Exam performed by Yolanda Knott, DO 
 
SEP-1 Core Measure Exclusion Criteria Has the patient/family refused IV fluids? no 
 
 
-------------------------------------------------------------------------------------------------------------------------- For Hospitalized Patients: 
 
Critical Care Time: The services I provided to this patient were to treat and/or prevent clinically significant deterioration that could result in the failure of one or more body systems and/or organ systems due to sepsis. Services included the following: 
-reviewing nursing notes and old charts 
-vital sign assessments 
-direct patient care 
-medication orders and management 
-interpreting and reviewing diagnostic studies/labs 
-re-evaluations 
-documentation time Aggregate critical care time was 60 minutes, which includes only time during which I was engaged in work directly related to the patient's care as described above, whether I was at bedside or elsewhere in the Emergency Department. It did not include time spent performing other reported procedures or the services of residents, students, nurses, or advance practice providers. Patrica Weaver, DO 
 
9:29 PM 
 
 
2. Hospitalization Decision Time: The decision to hospitalize the patient was made by Dr. Cari Hallman at 1:27 AM on 11/11/2018 Diagnosis Clinical Impression: No diagnosis found. Disposition: Admit Follow-up Information None Medication List  
  
ASK your doctor about these medications ACCUPRIL 20 mg tablet Generic drug:  quinapril 
  
insulin NPH/insulin regular 100 unit/mL (70-30) injection Commonly known as:  NOVOLIN 70/30, HUMULIN 70/30 
40 Units by SubCUTAneous route two (2) times a day. metFORMIN  mg tablet Commonly known as:  GLUCOPHAGE XR 
TAKE 1 TABLET BY MOUTH DAILY WITH DINNER 
  
PARoxetine 30 mg tablet Commonly known as:  PAXIL TAKE 1 TABLET BY MOUTH DAILY 
  
rosuvastatin 20 mg tablet Commonly known as:  CRESTOR Take 1 Tab by mouth nightly. 
  
  
 
_______________________________ Attestations: 
Scribe Attestation Kingsley Cody acting as a scribe for and in the presence of [place] [employer], DO November 10, 2018 at 8:06 PM 
    
Provider Attestation:     
I personally performed the services described in the documentation, reviewed the documentation, as recorded by the scribe in my presence, and it accurately and completely records my words and actions. November 10, 2018 at 8:06 PM - [place] [employer], DO  
_______________________________

## 2018-11-11 NOTE — PROGRESS NOTES
Problem: Falls - Risk of 
Goal: *Absence of Falls Document Priya Carlton Fall Risk and appropriate interventions in the flowsheet. Outcome: Progressing Towards Goal 
Fall Risk Interventions: 
Mobility Interventions: Patient to call before getting OOB Medication Interventions: Patient to call before getting OOB Elimination Interventions: Call light in reach

## 2018-11-11 NOTE — ED NOTES
Received nursing report from Suraj Pratt, [facility]. Patient shivering, complaining of abdominal pain. Vital signs show elevated heart rate, respiratory rate and B/P. Patient states pain is 7/10. Will carry out orders as prescribed by provider.

## 2018-11-11 NOTE — ED NOTES
Levaquin 750 mg IV has been stopped due to patient itching and having hives. Patient denies SOB, tongue swelling, difficulty breathing or other complaints at this time.

## 2018-11-12 LAB
ALBUMIN SERPL-MCNC: 3.4 G/DL (ref 3.4–5)
ALBUMIN/GLOB SERPL: 0.9 {RATIO} (ref 0.8–1.7)
ALP SERPL-CCNC: 120 U/L (ref 45–117)
ALT SERPL-CCNC: 21 U/L (ref 13–56)
AMPHET UR QL SCN: NEGATIVE
ANION GAP SERPL CALC-SCNC: 7 MMOL/L (ref 3–18)
AST SERPL-CCNC: 26 U/L (ref 15–37)
BARBITURATES UR QL SCN: NEGATIVE
BASOPHILS # BLD: 0 K/UL (ref 0–0.1)
BASOPHILS NFR BLD: 0 % (ref 0–2)
BENZODIAZ UR QL: NEGATIVE
BILIRUB SERPL-MCNC: 1.6 MG/DL (ref 0.2–1)
BUN SERPL-MCNC: 24 MG/DL (ref 7–18)
BUN/CREAT SERPL: 14 (ref 12–20)
CALCIUM SERPL-MCNC: 8 MG/DL (ref 8.5–10.1)
CANNABINOIDS UR QL SCN: NEGATIVE
CHLORIDE SERPL-SCNC: 113 MMOL/L (ref 100–108)
CO2 SERPL-SCNC: 24 MMOL/L (ref 21–32)
COCAINE UR QL SCN: NEGATIVE
CREAT SERPL-MCNC: 1.66 MG/DL (ref 0.6–1.3)
DIFFERENTIAL METHOD BLD: ABNORMAL
EOSINOPHIL # BLD: 0 K/UL (ref 0–0.4)
EOSINOPHIL NFR BLD: 0 % (ref 0–5)
ERYTHROCYTE [DISTWIDTH] IN BLOOD BY AUTOMATED COUNT: 14.2 % (ref 11.6–14.5)
GLOBULIN SER CALC-MCNC: 3.8 G/DL (ref 2–4)
GLUCOSE BLD STRIP.AUTO-MCNC: 126 MG/DL (ref 70–110)
GLUCOSE BLD STRIP.AUTO-MCNC: 135 MG/DL (ref 70–110)
GLUCOSE BLD STRIP.AUTO-MCNC: 228 MG/DL (ref 70–110)
GLUCOSE BLD STRIP.AUTO-MCNC: 257 MG/DL (ref 70–110)
GLUCOSE SERPL-MCNC: 199 MG/DL (ref 74–99)
HCT VFR BLD AUTO: 30.3 % (ref 35–45)
HDSCOM,HDSCOM: ABNORMAL
HGB BLD-MCNC: 10.1 G/DL (ref 12–16)
LYMPHOCYTES # BLD: 4.5 K/UL (ref 0.9–3.6)
LYMPHOCYTES NFR BLD: 28 % (ref 21–52)
MCH RBC QN AUTO: 28.5 PG (ref 24–34)
MCHC RBC AUTO-ENTMCNC: 33.3 G/DL (ref 31–37)
MCV RBC AUTO: 85.6 FL (ref 74–97)
METHADONE UR QL: NEGATIVE
MONOCYTES # BLD: 0.8 K/UL (ref 0.05–1.2)
MONOCYTES NFR BLD: 5 % (ref 3–10)
NEUTS SEG # BLD: 10.9 K/UL (ref 1.8–8)
NEUTS SEG NFR BLD: 67 % (ref 40–73)
OPIATES UR QL: POSITIVE
PCP UR QL: NEGATIVE
PLATELET # BLD AUTO: 245 K/UL (ref 135–420)
PMV BLD AUTO: 10.2 FL (ref 9.2–11.8)
POTASSIUM SERPL-SCNC: 3.7 MMOL/L (ref 3.5–5.5)
PROT SERPL-MCNC: 7.2 G/DL (ref 6.4–8.2)
RBC # BLD AUTO: 3.54 M/UL (ref 4.2–5.3)
SODIUM SERPL-SCNC: 144 MMOL/L (ref 136–145)
WBC # BLD AUTO: 16.4 K/UL (ref 4.6–13.2)

## 2018-11-12 PROCEDURE — 74011250637 HC RX REV CODE- 250/637: Performed by: INTERNAL MEDICINE

## 2018-11-12 PROCEDURE — 74011636637 HC RX REV CODE- 636/637: Performed by: INTERNAL MEDICINE

## 2018-11-12 PROCEDURE — 85025 COMPLETE CBC W/AUTO DIFF WBC: CPT

## 2018-11-12 PROCEDURE — 82962 GLUCOSE BLOOD TEST: CPT

## 2018-11-12 PROCEDURE — 74011636637 HC RX REV CODE- 636/637: Performed by: FAMILY MEDICINE

## 2018-11-12 PROCEDURE — 74011250636 HC RX REV CODE- 250/636: Performed by: NURSE PRACTITIONER

## 2018-11-12 PROCEDURE — 80307 DRUG TEST PRSMV CHEM ANLYZR: CPT

## 2018-11-12 PROCEDURE — 74011250636 HC RX REV CODE- 250/636: Performed by: INTERNAL MEDICINE

## 2018-11-12 PROCEDURE — 74011250636 HC RX REV CODE- 250/636: Performed by: FAMILY MEDICINE

## 2018-11-12 PROCEDURE — 36415 COLL VENOUS BLD VENIPUNCTURE: CPT

## 2018-11-12 PROCEDURE — 80053 COMPREHEN METABOLIC PANEL: CPT

## 2018-11-12 PROCEDURE — 74011250637 HC RX REV CODE- 250/637: Performed by: NURSE PRACTITIONER

## 2018-11-12 PROCEDURE — 74011000250 HC RX REV CODE- 250: Performed by: INTERNAL MEDICINE

## 2018-11-12 PROCEDURE — 65660000000 HC RM CCU STEPDOWN

## 2018-11-12 PROCEDURE — 87086 URINE CULTURE/COLONY COUNT: CPT

## 2018-11-12 RX ORDER — HYDRALAZINE HYDROCHLORIDE 20 MG/ML
10 INJECTION INTRAMUSCULAR; INTRAVENOUS
Status: DISCONTINUED | OUTPATIENT
Start: 2018-11-12 | End: 2018-11-14 | Stop reason: HOSPADM

## 2018-11-12 RX ORDER — HYDROMORPHONE HYDROCHLORIDE 1 MG/ML
0.25 INJECTION, SOLUTION INTRAMUSCULAR; INTRAVENOUS; SUBCUTANEOUS EVERY 6 HOURS
Status: DISCONTINUED | OUTPATIENT
Start: 2018-11-12 | End: 2018-11-14 | Stop reason: ALTCHOICE

## 2018-11-12 RX ORDER — METOCLOPRAMIDE HYDROCHLORIDE 5 MG/ML
5 INJECTION INTRAMUSCULAR; INTRAVENOUS
Status: DISCONTINUED | OUTPATIENT
Start: 2018-11-12 | End: 2018-11-14 | Stop reason: HOSPADM

## 2018-11-12 RX ORDER — ONDANSETRON 2 MG/ML
4 INJECTION INTRAMUSCULAR; INTRAVENOUS
Status: DISCONTINUED | OUTPATIENT
Start: 2018-11-12 | End: 2018-11-12

## 2018-11-12 RX ORDER — HYDROMORPHONE HYDROCHLORIDE 1 MG/ML
0.5 INJECTION, SOLUTION INTRAMUSCULAR; INTRAVENOUS; SUBCUTANEOUS EVERY 6 HOURS
Status: DISCONTINUED | OUTPATIENT
Start: 2018-11-12 | End: 2018-11-12

## 2018-11-12 RX ORDER — INSULIN GLARGINE 100 [IU]/ML
10 INJECTION, SOLUTION SUBCUTANEOUS
Status: DISCONTINUED | OUTPATIENT
Start: 2018-11-12 | End: 2018-11-14 | Stop reason: HOSPADM

## 2018-11-12 RX ADMIN — CEFTRIAXONE SODIUM 1 G: 1 INJECTION, POWDER, FOR SOLUTION INTRAMUSCULAR; INTRAVENOUS at 13:06

## 2018-11-12 RX ADMIN — SODIUM CHLORIDE 125 ML/HR: 900 INJECTION, SOLUTION INTRAVENOUS at 23:32

## 2018-11-12 RX ADMIN — METRONIDAZOLE 500 MG: 500 TABLET ORAL at 22:06

## 2018-11-12 RX ADMIN — HYDROMORPHONE HYDROCHLORIDE 0.25 MG: 1 INJECTION, SOLUTION INTRAMUSCULAR; INTRAVENOUS; SUBCUTANEOUS at 18:13

## 2018-11-12 RX ADMIN — INSULIN LISPRO 6 UNITS: 100 INJECTION, SOLUTION INTRAVENOUS; SUBCUTANEOUS at 18:12

## 2018-11-12 RX ADMIN — METRONIDAZOLE 500 MG: 500 TABLET ORAL at 06:36

## 2018-11-12 RX ADMIN — HEPARIN SODIUM 5000 UNITS: 5000 INJECTION, SOLUTION INTRAVENOUS; SUBCUTANEOUS at 16:02

## 2018-11-12 RX ADMIN — HYDROCODONE BITARTRATE AND ACETAMINOPHEN 2 TABLET: 10; 325 TABLET ORAL at 13:08

## 2018-11-12 RX ADMIN — METOCLOPRAMIDE 5 MG: 5 INJECTION, SOLUTION INTRAMUSCULAR; INTRAVENOUS at 23:26

## 2018-11-12 RX ADMIN — INSULIN LISPRO 8 UNITS: 100 INJECTION, SOLUTION INTRAVENOUS; SUBCUTANEOUS at 13:06

## 2018-11-12 RX ADMIN — SODIUM CHLORIDE 100 ML/HR: 900 INJECTION, SOLUTION INTRAVENOUS at 08:59

## 2018-11-12 RX ADMIN — ONDANSETRON 4 MG: 2 INJECTION INTRAMUSCULAR; INTRAVENOUS at 08:57

## 2018-11-12 RX ADMIN — ROSUVASTATIN CALCIUM 20 MG: 20 TABLET, FILM COATED ORAL at 22:06

## 2018-11-12 RX ADMIN — MORPHINE SULFATE 1 MG: 2 INJECTION, SOLUTION INTRAMUSCULAR; INTRAVENOUS at 08:57

## 2018-11-12 RX ADMIN — INSULIN GLARGINE 10 UNITS: 100 INJECTION, SOLUTION SUBCUTANEOUS at 22:06

## 2018-11-12 RX ADMIN — HYDROMORPHONE HYDROCHLORIDE 0.25 MG: 1 INJECTION, SOLUTION INTRAMUSCULAR; INTRAVENOUS; SUBCUTANEOUS at 23:26

## 2018-11-12 RX ADMIN — HEPARIN SODIUM 5000 UNITS: 5000 INJECTION, SOLUTION INTRAVENOUS; SUBCUTANEOUS at 22:08

## 2018-11-12 RX ADMIN — METRONIDAZOLE 500 MG: 500 TABLET ORAL at 16:02

## 2018-11-12 RX ADMIN — HEPARIN SODIUM 5000 UNITS: 5000 INJECTION, SOLUTION INTRAVENOUS; SUBCUTANEOUS at 06:36

## 2018-11-12 RX ADMIN — PAROXETINE HYDROCHLORIDE 30 MG: 30 TABLET, FILM COATED ORAL at 13:11

## 2018-11-12 NOTE — PROGRESS NOTES
Pt C/O pain and vomiting. The patient didn't have nausea med's ordered. The NP was called to obtain nausea medication. In the mean time, the patient was taken 2 pain pill, but the patient was unable to take the medication due to vomiting. The patient had a green emesis bag on the table that was filled with undigested food. The pain pills were returned and the patient was given Morphine iv instead. The patient's IV was infiltrated and a new 22  IV was started before the nausea and pain medications were given. The patient began to cry and say,\"  my stomach and feet are hurting. \"  The patient was given the medications and instructed not to get out of the bed without calling for help due to IV med's given. The patient was up and down in the bed and constantly calling. The aide, , and nurse have been in the room several times. The patient has been yelling out for no apparent reason.

## 2018-11-12 NOTE — PROGRESS NOTES
NUTRITION Nursing Referral: [facility] 
  
RECOMMENDATIONS / PLAN:  
 
- Add supplement: Glucerna Shake once daily 
- Continue po diet and monitor meal intake and diet tolerance. - Continue RD inpatient monitoring and evaluation. NUTRITION INTERVENTIONS & DIAGNOSIS:  
 
[x] Meals/snacks: modify composition 
[x] Medical food supplement therapy:  initiate Nutrition Diagnosis:  Predicted inadequate energy intake related to c/o abdominal pain and nausea and vomiting PTA as evidenced by pt with variable po intake of current meals. ASSESSMENT:  
 
Pt unavailable at time of visit. C/o abdominal pain and nausea/vomiting PTA. Has had variable meal intake x 2 meals per chart documentation. Average po intake adequate to meet patients estimated nutritional needs:   [] Yes     [x] No   [] Unable to determine at this time Diet: DIET DIABETIC CONSISTENT CARB Regular Food Allergies:  None known Current Appetite:   [] Good     [] Fair     [] Poor     [x] Other: unknown Appetite/meal intake prior to admission:   [] Good     [] Fair     [] Poor     [x] Other: c/o abdominal pain, nausea/vomiting since morning of 11/10 Feeding Limitations:  [] Swallowing difficulty    [] Chewing difficulty    [] Other: 
Current Meal Intake:  
Patient Vitals for the past 100 hrs: 
 % Diet Eaten 11/12/18 0819 5 % 11/11/18 0802 95 % BM: 11/10 - C diff positive Skin Integrity: no pressure injury or wound noted Edema:   [x] No     [] Yes Pertinent Medications: Reviewed: NS at 100 mL/hr, lantus, SSI, zofran Recent Labs 11/12/18 
0109 11/11/18 
1140 11/10/18 
1940  147* 142  
K 3.7 3.7 3.5 * 113* 107 CO2 24 23 21 * 182* 267* BUN 24* 26* 25* CREA 1.66* 1.81* 1.76* CA 8.0* 8.4* 10.1 ALB 3.4  --  5.0 SGOT 26  --  25 ALT 21  --  26 Intake/Output Summary (Last 24 hours) at 11/12/2018 1123 Last data filed at 11/12/2018 1030 Gross per 24 hour Intake 1235 ml Output 4000 ml Net -2765 ml Anthropometrics: 
Ht Readings from Last 1 Encounters:  
11/11/18 5' 8\" (1.727 m) Last 3 Recorded Weights in this Encounter 11/10/18 1843 11/11/18 9606 Weight: 83.9 kg (185 lb) 97.5 kg (214 lb 14.4 oz) Body mass index is 32.68 kg/m². Weight History:   Noted weight fluctuations PTA per chart hx; weight gain of 30 lb in past 1 year PTA. Pt denied experiencing any recent unplanned wt loss PTA per nursing screen Weight Metrics 11/11/2018 11/16/2017 11/9/2017 10/26/2017 10/9/2017 10/7/2017 9/5/2017 Weight 214 lb 14.4 oz 184 lb 202 lb 197 lb 3.2 oz 195 lb 180 lb 188 lb 9.6 oz BMI 32.68 kg/m2 27.98 kg/m2 30.71 kg/m2 29.98 kg/m2 29.65 kg/m2 27.37 kg/m2 28.68 kg/m2 Admitting Diagnosis: Sepsis ([facility] Sepsis ([facility] [address] Pertinent PMHx:  DM, gall stones, GERD, HTN, pancreatitis, cholecystectomy Education Needs:        [x] None identified  [] Identified - Not appropriate at this time  []  Identified and addressed - refer to education log Learning Limitations:   [x] None identified  [] Identified Cultural, Mormonism & ethnic food preferences:  [x] None identified    [] Identified and addressed ESTIMATED NUTRITION NEEDS:  
 
Calories: 5466-6612 kcal (25-30 kcal/kg) based on  [] Actual BW      [x] SBW: 79 kg Protein:  gm (0.8-1.2 gm/kg) based on  [x] Actual BW: 98 kg     [] IBW Fluid: 1 mL/kcal 
  
MONITORING & EVALUATION:  
 
Nutrition Goal(s): 1. Po intake of meals will meet >75% of patient estimated nutritional needs within the next 7 days. Outcome:  [] Met/Ongoing    []  Not Met    [x] New/Initial Goal  
 
Monitoring:   [x] Food and beverage intake   [x] Diet order   [x] Nutrition-focused physical findings   [x] Treatment/therapy   [] Weight   [] Enteral nutrition intake Previous Recommendations (for follow-up assessments only):     []   Implemented       []   Not Implemented (RD to address)      [] No Longer Appropriate     [] No Recommendation Made Discharge Planning:  Diabetic diet [x] Participated in care planning, discharge planning, & interdisciplinary rounds as appropriate Simone Graham RD Pager: [phone]

## 2018-11-12 NOTE — PROGRESS NOTES
Admission History and Physical 
[address] [facility] Patient: Meron Bernabe MRN: [id number]  CSN: [id number] YOB: 1965  Age: 52 y.o. Sex: female DOA: 11/10/2018 HPI:  
 
Meron Bernabe is a 52 y.o. female with PMH of HTN, DM, and anxiety, who presented to the ER on the morning of 11/11 after waking up with sudden nausea, vomiting, diarrhea, and fevers with associated abdominal pain. On presentation to the ED, she was febrile to 101 and tachycardic to the 120s. She was admitted to the medicine service and started on Flagyl and Rocephin for antibiotic coverage. She was transferred to the PFM service on HD2. Patient states she is not feeling any better and is still having a great deal of nausea with vomiting and abdominal pain. She was afebrile overnight and has not had a BM since admission. Her nurse has documented two bouts of emesis. Review of Systems - General ROS: positive for  - chills and fevers Psychological ROS: negative for - depression, Positive for anxiety Ophthalmic ROS: negative for - new onset blurry vision, decreased vision or loss of vision ENT ROS: negative for - headaches, hearing change or visual changes Respiratory ROS: Positive for - SOB. No cough, hemoptysis, or wheezing Cardiovascular ROS: Positive for - chest pain, Negative for dyspnea on exertion, edema, loss of consciousness, or palpitations Gastrointestinal ROS: Positive for - abdominal pain, diarrhea, and nausea/vomiting. Genito-Urinary ROS: negative for - dysuria, hematuria or urinary frequency/urgency Musculoskeletal ROS: negative for - joint pain, joint swelling or muscle pain Neurological ROS: negative for - dizziness, headaches Past Medical History:  
Diagnosis Date  Blind left eye  Cellulitis of great toe of right foot 10/19/2017  Diabetes ([facility] [address]  Diabetic retinopathy ([facility] [address]  Gall stones  GERD (gastroesophageal reflux disease)  Hemantertoe  Hiatal hernia  History of mammogram 10/03/2017 No evidence of malignancy  Hypertension  Mass of abdomen  Neuropathy due to type 2 diabetes mellitus ([facility] [address]  Osteomyelitis of toe of right foot ([facility] [address] 10/19/2017  Pancreatitis Past Surgical History:  
Procedure Laterality Date  HX AMPUTATION Left 2017  
 left 2nd toe  HX CHOLECYSTECTOMY  10/15/2014  HX GI Benign GI Stromal Tumor excision  HX HEENT Sx for detached retina  HX MYOMECTOMY x5 removed  HX OTHER SURGICAL    
 upper endoscopy Family History Adopted: Yes  
Problem Relation Age of Onset  Heart Failure Mother 76  
 Other Father 70  
     blood clot after surgery  Diabetes Paternal Aunt  Diabetes Paternal Uncle Social History Socioeconomic History  Marital status: SINGLE Spouse name: Not on file  Number of children: Not on file  Years of education: Not on file  Highest education level: Not on file Social Needs  Financial resource strain: Not on file  Food insecurity - worry: Not on file  Food insecurity - inability: Not on file  Transportation needs - medical: Not on file  Transportation needs - non-medical: Not on file Occupational History  Not on file Tobacco Use  Smoking status: Former Smoker Packs/day: 0.20 Years: 8.00 Pack years: 1.60 Types: Cigarettes Last attempt to quit: 12/3/1995 Years since quittin.9  Smokeless tobacco: Never Used Substance and Sexual Activity  Alcohol use: No  
  Comment: Drinks 3-4xs year generally wine  Drug use: No  
 Sexual activity: Not Currently Other Topics Concern   Service No  
 Blood Transfusions No  
 Caffeine Concern No  
 Occupational Exposure No  
 Hobby Hazards No  
 Sleep Concern No  
 Stress Concern No  
 Weight Concern No  
 Special Diet Yes  Back Care No  
 Exercise No  
 Bike Helmet No  
 [facility] Yes  Self-Exams Yes Social History Narrative Lives with 16year old son  with ex   Does not have health insurance Allergies Allergen Reactions  Levaquin [Levofloxacin] Hives  Promethazine Nausea and Vomiting \"the phenergan made me more nauseated\" Prior to Admission Medications Prescriptions Last Dose Informant Patient Reported? Taking? PARoxetine (PAXIL) 30 mg tablet 2018  No No  
Sig: TAKE 1 TABLET BY MOUTH DAILY  
insulin NPH/insulin regular (NOVOLIN 70/30, HUMULIN 70/30) 100 unit/mL (70-30) injection 2018  No No  
Si Units by SubCUTAneous route two (2) times a day. metFORMIN ER (GLUCOPHAGE XR) 500 mg tablet 2018  No No  
Sig: TAKE 1 TABLET BY MOUTH DAILY WITH DINNER  
quinapril (ACCUPRIL) 20 mg tablet 2018  Yes No  
Sig: Take 20 mg by mouth nightly. rosuvastatin (CRESTOR) 20 mg tablet 2018  No No  
Sig: Take 1 Tab by mouth nightly. Facility-Administered Medications: None Physical Exam:  
 
Patient Vitals for the past 24 hrs: 
 Temp Pulse Resp BP SpO2  
18 1145 98.8 °F (37.1 °C) 97 17 (!) 182/100 100 % 18 0758 99.7 °F (37.6 °C) (!) 108 17 (!) 188/93 98 % 18 0430 98.3 °F (36.8 °C) 84 16 107/70 98 % 18 0010 98.7 °F (37.1 °C) 89 18 110/70 98 % 18 1950 97.7 °F (36.5 °C) 88 18 114/72 100 % 18 1643 98.3 °F (36.8 °C) 90 18 107/69 98 % Physical Exam:  
General:  Alert and Responsive and in No acute distress. HEENT: Conjunctiva pink, sclera anicteric. EOMI. Pharynx moist, nonerythematous. Moist mucous membranes. No cervical, supraclavicular, occipital or submandibular lymphadenopathy. No other gross abnormalities present. CV:  RRR, no murmurs. No visible pulsations or thrills. RESP:  Unlabored breathing. Lungs clear to auscultation. no wheeze, rales, or rhonchi. Equal expansion bilaterally. ABD:  Soft with diffuse tenderness, no guarding, nondistended. No hepatosplenomegaly. No CVA tenderness. MS:  No joint deformity or instability. No atrophy. Right great toe amputated. Left 2nd toe amputated. Neuro:  5/5 strength bilateral upper extremities and lower extremities. A+O. Ext:  No edema. 2+ radial and dp pulses bilaterally. Skin:  No rashes, lesions, or ulcers. Good turgor. IMAGING:  
Ct Abd Pelv Wo Cont Result Date: 11/11/2018 IMPRESSION: 1. No acute pathology in the abdomen or pelvis. If continued clinical concern, consider postcontrast imaging. 2.  Fibroid uterus. Small hiatal hernia. Xr Chest [facility] Result Date: 11/11/2018 IMPRESSION: No acute cardiopulmonary process. Recent Results (from the past 12 hour(s)) GLUCOSE, POC Collection Time: 11/12/18  6:18 AM  
Result Value Ref Range Glucose (POC) 126 (H) 70 - 110 mg/dL GLUCOSE, POC Collection Time: 11/12/18 12:05 PM  
Result Value Ref Range Glucose (POC) 257 (H) 70 - 110 mg/dL Assessment/Plan:  
52 y.o. female with PMH of diabetes, HTN, and anxiety, admitted for sepsis with a possible abdominal source. Sepsis w/ persistent nausea/vomiting, diarrhea upon presentation: Last fever was on 11/11 at 0400, no bowel movement since admission, she does have persistent nausea with vomiting and abdominal pain. Non-con abdominal CT was negative. Pt cannot tolerate contrast PO or IV due to CRYSTAL. - Blood culture- NGTD 
- Reglan IV scheduled  
- avoid  Zofran prn for prolonged QT interval of 512 
- 0.5mg Dilaudid IV q6hr for abdominal pain 
- Continue Rocephin and Flagyl for now. - stool cultures ordered 
- NS 125ml/hr - Clear liquid diet 
  
CRYSTAL: Likely 2/2 to dehydration and/or sepsis. Baseline Cr approx 0.9. 
- receiving IV hydration, NS @ 125cc/hr 
- hold nephrotoxic medications 
  
DM 
- history if diabetic retinopathy- blind in left eye - Lantus 10Units at bedtime - Use SSI. - Hemoglobin A1C ordered  
  
HTN 
- Hold lisinopril due to CRYSTAL. - Use hydralazine po prn. Anxiety:  
- Continue Paxil.  
- Concerned this may not be well-controlled. Diet: Diabetic DVT Prophylaxis: [facility] Code Status: Full Point of Contact: Madelyn Munoz [phone]  (Relationship: cousin) Disposition and anticipated LOS: <2 nights John Colunga MD  
[address] [facility] PGY-1 
11/12/18 2:08 PM 
 
 
 
 
  
Senior Addendum to History and Physical 
[facility] 
  
  
Patient: Naomi Roy MRN: [id number]  CSN: [id number] YOB: 1965  Age: 52 y.o. Sex: female   
  
DOA: 11/10/2018 HPI:  
  
Naomi Roy is a 52 y.o. female with PMH T2DM with neuropathy, HTN, HLD, Anxiety, now presenting with complaint of abdominal pain, nausea, and vomiting.  
  
Patient stated she was well up until Saturday morning when she woke up with nausea, vomiting, and severed abdominal pain. Patient rated pain 10/10, states pain is \"all over\". She came to the ED. In the ED she was found to be tachycardic, dehydrated. She was given a fluid bolus and started on IV antibiotics. CT of the abdomen without contrast negative for acute pathology. CXR negative. Labs revealed leukocytosis with left shift, elevated creatinine from baseline. UA was not consistent with UTI. Labs otherwise unremarkable.  
  
Patient was initially admitted to the hospitalist service. She was started in rocephin and flagyl for abx coverage. Fever and tachycardia have improved. Leukocytosis trending down Patient states that she is not feeling well today, with continued nausea and vomiting as well as abdominal pain. Patient is also complaining of neuropathic diabetic pain and a headache.  
  
HPI, ROS, PMH, PSH, Family Hx, Social Hx, Home medications, and allergies as above in intern H&P. Which has been reviewed in full.  Additional comments to subjective history include: 
  
 Physical Exam:  
  
Physical Exam: 
General:  Alert and Responsive and in No acute distress. HEENT: Conjunctiva pink, sclera anicteric. PERRL. EOMI. Pharynx moist, nonerythematous. Moist mucous membranes. Thyroid not enlarged, no nodules. No cervical, supraclavicular, occipital or submandibular lymphadenopathy. No other gross abnormalities present. CV:  RRR, no murmurs. PMI not displaced. No visible pulsations or thrills. No carotid bruits. RESP:  Unlabored breathing. Lungs clear to auscultation. no wheeze, rales, or rhonchi. Equal expansion bilaterally. ABD:  Soft. Generalized diffuse abdominal tenderness. Normoactive bowel sounds. No hepatosplenomegaly. No suprapubic tenderness. No CVA tenderness. RECTAL:  No masses or hemorrhoids. Guaiac negative. MS:  No joint deformity or instability. No atrophy. Neuro:  5/5 strength bilateral upper extremities and lower extremities. CN II-XII intact. Sensation grossly intact. 2+ patellar reflexes bilaterally. A+Ox3. Ext:  No edema. 2+ radial and dp pulses bilaterally. Skin:  No rashes, lesions, or ulcers. Good turgor. 
  
Labs and imaging reviewed  
  
Assessment/Plan:  
52 y.o. female with PMH as noted above, now admitted with sepsis 2/2 suspected abdominal source. 
  
Sepsis 2/2 suspected abdominal source - generalized abdominal tenderness, non-focal, patient without source noted on imaging thus far. WBC trending down with abx and hydration. Ddx includes diverticulitis, pancreatitis, gastroenteritis, urinary tract infection. Lipase WNL. UA negative. Bcx with no growth Still with some nausea, vomiting, and abdominal pain. - Remain on medical 
- D/C zofran due to prolonged QT 
- Start IV reglan 5 mg Q6H for nausea - Dilaudid 0.25 mg IV q6hrs for pain - Clear liquid diet 
- Continue rocephin and flagyl - Stool culture - Urine drug screen  
- Urine culture - Aspiration and fall precautions -  consult for discharge planning - Trend daily CBC 
- Follow blood cultures 
  
  
CRYSTAL - creatinine 1.66, elevated from baseline of 1.0 -1.2. Likely 2/2 dehydration - Increased fluids NS @ 125 ccs/hr 
- Hold home lisinopril - Avoid nephrotoxic medications - Trend daily BMP  
  
  
Active Problems: 
  Sepsis ([facility] [address] (7/19/2017)   
  
  
For additional problem list, assessment, and plan see intern note 
  
Rohit Zheng MD 
11/12/2018, 2:02 PM

## 2018-11-12 NOTE — ROUTINE PROCESS
Staff have been in the room helping the patient back and forth to the bathroom. The patient requested that the he be turned on and in about 15 minutes, she wanted the heat turned off. She began calling out again and when asked what is wrong, she says that she hurts. She is frequently up and down to the bathroom, so she was given a bedside commode to prevent her from falling.

## 2018-11-12 NOTE — PROGRESS NOTES
Problem: Falls - Risk of 
Goal: *Absence of Falls Document Clifm Furth Fall Risk and appropriate interventions in the flowsheet. Outcome: Progressing Towards Goal 
Fall Risk Interventions: 
Mobility Interventions: Patient to call before getting OOB Medication Interventions: Teach patient to arise slowly Elimination Interventions: Call light in reach

## 2018-11-12 NOTE — PROGRESS NOTES
TRANSFER - IN REPORT: 
 
Verbal report received from PETRONA Cooney(name) on Naty Soto  being received from [employer]) for routine progression of care Report consisted of patients Situation, Background, Assessment and  
Recommendations(SBAR). Information from the following report(s) SBAR, Kardex, OR Summary, Procedure Summary and Recent Results was reviewed with the receiving nurse. Opportunity for questions and clarification was provided. Assessment completed upon patients arrival to unit and care assumed.

## 2018-11-12 NOTE — PROGRESS NOTES
Problem: Falls - Risk of 
Goal: *Absence of Falls Document Ana Cristina Evens Fall Risk and appropriate interventions in the flowsheet. Outcome: Progressing Towards Goal 
Fall Risk Interventions: 
Mobility Interventions: Patient to call before getting OOB Medication Interventions: Patient to call before getting OOB Elimination Interventions: Call light in reach

## 2018-11-12 NOTE — ROUTINE PROCESS
Was informed by  that pt was saying she couldn't breathe. Upon entering pt room, pt was screaming, \"i can't breathe\", she was waving her arms in the air and hunched over yelling, \"i cant breathe. \" I informed the pt that sitting hunched over on the bed and screaming is not going to help her breathe better. Pt was subsequently given nasal cannula at 2L. Pt then layed down in bed and said she was going to sleep. Pt primary nurse Marc Deng informed.

## 2018-11-12 NOTE — PROGRESS NOTES
Rohit Gannon is a 52 y.o. female with PMH of HTN, DM, and anxiety, who presented to the ER with c/o ABD pain associated with diarrhea, n/v and fevers. Upon interview with Pt, it was discovered pt is followed by Dr. Patricia Bullock of PFM. Discussed case with Dr. Jewel De La Garza of PFM. PFM to assume care of patient at this time.

## 2018-11-12 NOTE — ROUTINE PROCESS
Bedside and Verbal shift change report given to Josefina Graff (oncoming nurse) by JACKLYN Carlos RN (offgoing nurse). Report given with SBAR, Kardex, MAR and Recent Results.

## 2018-11-12 NOTE — PROGRESS NOTES
met with patient, completed the initial Spiritual Assessment of the patient, and offered Pastoral Care, see flow sheets for interventions. Patient said she is not feeling well and implied this illness is very challenging to her life right now. Pastoral support provided. Patient does not have any Christianity/cultural needs that will affect patients preferences in health care. Chart reviewed. Chaplains will continue to follow and will provide pastoral care on an as needed/as requested basis. Han Ram MDiv. Board Certified [employer] [phone]

## 2018-11-12 NOTE — PROGRESS NOTES
Reason for Admission:  Sepsis ([facility] [address] Sepsis ([facility] [address] RRAT Score:    17 Plan for utilizing home health: To be determined Likelihood of Readmission:   moderate Transition of Care Plan:         
 
 
Initial assessment completed with patient. Face sheet information confirmed:  yes. The patient requests we communicate her son Pratik Mcintyre [phone] in reference to medical care. Pt states that she has had medicaid insurance in the past but it is due for re eligibility screening which she has not completed. This patient lives in a second floor apartment alone. Patient is able to navigate steps as needed. Prior to hospitalization, patient was considered to be independent : yes . Cognitive status of patient:  Lucid. Patient has a current ACP document on file: no The patient and friend will be available to transport patient home upon discharge. The patient already denies any durable medical equipment in the home. Patient is not currently active with home health. Patient has not ever stayed in a skilled nursing facility or rehab. Prior home health  
services with [email or web address] Currently the discharge plan is Home. Care Management Interventions PCP Verified by CM: Yes(LES Salmeron last appointment 6 months ago) Mode of Transport at Discharge: Self Transition of Care Consult (CM Consult): Discharge Planning Physical Therapy Consult: No 
Occupational Therapy Consult: No 
Speech Therapy Consult: No 
Current Support Network: Own Home, Lives Alone Confirm Follow Up Transport: Family Plan discussed with Pt/Family/Caregiver: Yes Discharge Location Discharge Placement: [address] RN, BSN 
[phone]

## 2018-11-12 NOTE — ROUTINE PROCESS
Pt is back in the bed and she requested to have the covers placed on her. She is still back and forth, and up and down in the bed. She is frequently urinating, but has not had a bowel movement at this time. She is on C-diff precautions.

## 2018-11-12 NOTE — PROGRESS NOTES
Senior Addendum to History and Physical 
[facility] Patient: Sarah Short MRN: [id number]  [facility]: [id number] YOB: 1965  Age: 52 y.o. Sex: female DOA: 11/10/2018 HPI:  
 
Sarah Short is a 52 y.o. female with PMH T2DM with neuropathy, HTN, HLD, Anxiety, now presenting with complaint of abdominal pain, nausea, and vomiting. Patient stated she was well up until Saturday morning when she woke up with nausea, vomiting, and severed abdominal pain. Patient rated pain 10/10, states pain is \"all over\". She came to the ED. In the ED she was found to be tachycardic, dehydrated. She was given a fluid bolus and started on IV antibiotics. CT of the abdomen without contrast negative for acute pathology. CXR negative. Labs revealed leukocytosis with left shift, elevated creatinine from baseline. UA was not consistent with UTI. Labs otherwise unremarkable. Patient was initially admitted to the hospitalist service. She was started in rocephin and flagyl for abx coverage. Fever and tachycardia have improved. Leukocytosis trending down Patient states that she is not feeling well today, with continued nausea and vomiting as well as abdominal pain. Patient is also complaining of neuropathic diabetic pain and a headache. HPI, ROS, PMH, PSH, Family Hx, Social Hx, Home medications, and allergies as above in intern H&P. Which has been reviewed in full. Additional comments to subjective history include: 
 
Physical Exam:  
 
Physical Exam: 
General:  Alert and Responsive and in No acute distress. HEENT: Conjunctiva pink, sclera anicteric. PERRL. EOMI. Pharynx moist, nonerythematous. Moist mucous membranes. Thyroid not enlarged, no nodules. No cervical, supraclavicular, occipital or submandibular lymphadenopathy. No other gross abnormalities present. CV:  RRR, no murmurs. PMI not displaced. No visible pulsations or thrills. No carotid bruits. RESP:  Unlabored breathing. Lungs clear to auscultation. no wheeze, rales, or rhonchi. Equal expansion bilaterally. ABD:  Soft. Generalized diffuse abdominal tenderness. Normoactive bowel sounds. No hepatosplenomegaly. No suprapubic tenderness. No CVA tenderness. RECTAL:  No masses or hemorrhoids. Guaiac negative. MS:  No joint deformity or instability. No atrophy. Neuro:  5/5 strength bilateral upper extremities and lower extremities. CN II-XII intact. Sensation grossly intact. 2+ patellar reflexes bilaterally. A+Ox3. Ext:  No edema. 2+ radial and dp pulses bilaterally. Skin:  No rashes, lesions, or ulcers. Good turgor. Labs and imaging reviewed Assessment/Plan:  
52 y.o. female with PMH as noted above, now admitted with sepsis 2/2 suspected abdominal source. Sepsis 2/2 suspected abdominal source - generalized abdominal tenderness, non-focal, patient without source noted on imaging thus far. WBC trending down with abx and hydration. Ddx includes diverticulitis, pancreatitis, gastroenteritis, urinary tract infection. Lipase WNL. UA negative. Bcx with no growth Still with some nausea, vomiting, and abdominal pain. - Remain on medical 
- D/C zofran due to prolonged QT 
- Start IV reglan 5 mg Q6H for nausea - Dilaudid 0.25 mg IV q6hrs for pain - Clear liquid diet 
- Continue rocephin and flagyl - Stool culture - Urine drug screen  
- Urine culture - Aspiration and fall precautions -  consult for discharge planning - Trend daily CBC 
- Follow blood cultures CRYSTAL - creatinine 1.66, elevated from baseline of 1.0 -1.2. Likely 2/2 dehydration - Increased fluids NS @ 125 ccs/hr 
- Hold home lisinopril - Avoid nephrotoxic medications - Trend daily BMP Active Problems: 
  Sepsis ([address] (7/19/2017) For additional problem list, assessment, and plan see intern note Kahlil Erwin MD 
11/12/2018, 2:02 PM 
 
 
 
 I have personally seen and evaluated this patient. I have personally reviewed the resident note. I have personally discussed the management and plan of care of this patient. I agree with the note as written .

## 2018-11-12 NOTE — PROGRESS NOTES
Writer has confirmed through the [employer] [phone], that pt is NOT covered under medicaid at this time. Med assist notified to see patient. Jamar Schwartz RN, BSN 
[phone]

## 2018-11-13 LAB
ANION GAP SERPL CALC-SCNC: 10 MMOL/L (ref 3–18)
BASOPHILS # BLD: 0 K/UL (ref 0–0.1)
BASOPHILS NFR BLD: 0 % (ref 0–2)
BUN SERPL-MCNC: 29 MG/DL (ref 7–18)
BUN/CREAT SERPL: 13 (ref 12–20)
CALCIUM SERPL-MCNC: 8.2 MG/DL (ref 8.5–10.1)
CHLORIDE SERPL-SCNC: 110 MMOL/L (ref 100–108)
CO2 SERPL-SCNC: 24 MMOL/L (ref 21–32)
CREAT SERPL-MCNC: 2.15 MG/DL (ref 0.6–1.3)
DIFFERENTIAL METHOD BLD: ABNORMAL
EOSINOPHIL # BLD: 0 K/UL (ref 0–0.4)
EOSINOPHIL NFR BLD: 0 % (ref 0–5)
ERYTHROCYTE [DISTWIDTH] IN BLOOD BY AUTOMATED COUNT: 14.3 % (ref 11.6–14.5)
GLUCOSE BLD STRIP.AUTO-MCNC: 149 MG/DL (ref 70–110)
GLUCOSE BLD STRIP.AUTO-MCNC: 159 MG/DL (ref 70–110)
GLUCOSE BLD STRIP.AUTO-MCNC: 171 MG/DL (ref 70–110)
GLUCOSE BLD STRIP.AUTO-MCNC: 200 MG/DL (ref 70–110)
GLUCOSE SERPL-MCNC: 172 MG/DL (ref 74–99)
HCT VFR BLD AUTO: 31.6 % (ref 35–45)
HGB BLD-MCNC: 10.7 G/DL (ref 12–16)
LYMPHOCYTES # BLD: 4.6 K/UL (ref 0.9–3.6)
LYMPHOCYTES NFR BLD: 28 % (ref 21–52)
MCH RBC QN AUTO: 28.8 PG (ref 24–34)
MCHC RBC AUTO-ENTMCNC: 33.9 G/DL (ref 31–37)
MCV RBC AUTO: 85.2 FL (ref 74–97)
MONOCYTES # BLD: 0.5 K/UL (ref 0.05–1.2)
MONOCYTES NFR BLD: 3 % (ref 3–10)
NEUTS SEG # BLD: 11.1 K/UL (ref 1.8–8)
NEUTS SEG NFR BLD: 69 % (ref 40–73)
PLATELET # BLD AUTO: 241 K/UL (ref 135–420)
PMV BLD AUTO: 10.3 FL (ref 9.2–11.8)
POTASSIUM SERPL-SCNC: 3.5 MMOL/L (ref 3.5–5.5)
RBC # BLD AUTO: 3.71 M/UL (ref 4.2–5.3)
SODIUM SERPL-SCNC: 144 MMOL/L (ref 136–145)
WBC # BLD AUTO: 16.3 K/UL (ref 4.6–13.2)

## 2018-11-13 PROCEDURE — 74011250637 HC RX REV CODE- 250/637: Performed by: NURSE PRACTITIONER

## 2018-11-13 PROCEDURE — 74011636637 HC RX REV CODE- 636/637: Performed by: INTERNAL MEDICINE

## 2018-11-13 PROCEDURE — 74011250637 HC RX REV CODE- 250/637: Performed by: INTERNAL MEDICINE

## 2018-11-13 PROCEDURE — 74011250636 HC RX REV CODE- 250/636: Performed by: FAMILY MEDICINE

## 2018-11-13 PROCEDURE — 74011636637 HC RX REV CODE- 636/637: Performed by: FAMILY MEDICINE

## 2018-11-13 PROCEDURE — 36415 COLL VENOUS BLD VENIPUNCTURE: CPT

## 2018-11-13 PROCEDURE — 74011250636 HC RX REV CODE- 250/636: Performed by: INTERNAL MEDICINE

## 2018-11-13 PROCEDURE — 65660000000 HC RM CCU STEPDOWN

## 2018-11-13 PROCEDURE — 85025 COMPLETE CBC W/AUTO DIFF WBC: CPT

## 2018-11-13 PROCEDURE — 82962 GLUCOSE BLOOD TEST: CPT

## 2018-11-13 PROCEDURE — 80048 BASIC METABOLIC PNL TOTAL CA: CPT

## 2018-11-13 PROCEDURE — 74011000250 HC RX REV CODE- 250: Performed by: INTERNAL MEDICINE

## 2018-11-13 RX ORDER — SODIUM CHLORIDE 9 MG/ML
250 INJECTION, SOLUTION INTRAVENOUS ONCE
Status: COMPLETED | OUTPATIENT
Start: 2018-11-13 | End: 2018-11-13

## 2018-11-13 RX ORDER — SODIUM CHLORIDE 9 MG/ML
1000 INJECTION, SOLUTION INTRAVENOUS ONCE
Status: COMPLETED | OUTPATIENT
Start: 2018-11-13 | End: 2018-11-13

## 2018-11-13 RX ADMIN — ACETAMINOPHEN 500 MG: 500 TABLET ORAL at 23:59

## 2018-11-13 RX ADMIN — ROSUVASTATIN CALCIUM 20 MG: 20 TABLET, FILM COATED ORAL at 21:10

## 2018-11-13 RX ADMIN — INSULIN LISPRO 4 UNITS: 100 INJECTION, SOLUTION INTRAVENOUS; SUBCUTANEOUS at 09:02

## 2018-11-13 RX ADMIN — PAROXETINE HYDROCHLORIDE 30 MG: 30 TABLET, FILM COATED ORAL at 09:02

## 2018-11-13 RX ADMIN — METRONIDAZOLE 500 MG: 500 TABLET ORAL at 06:20

## 2018-11-13 RX ADMIN — INSULIN GLARGINE 10 UNITS: 100 INJECTION, SOLUTION SUBCUTANEOUS at 21:18

## 2018-11-13 RX ADMIN — HEPARIN SODIUM 5000 UNITS: 5000 INJECTION, SOLUTION INTRAVENOUS; SUBCUTANEOUS at 14:30

## 2018-11-13 RX ADMIN — HEPARIN SODIUM 5000 UNITS: 5000 INJECTION, SOLUTION INTRAVENOUS; SUBCUTANEOUS at 21:11

## 2018-11-13 RX ADMIN — INSULIN LISPRO 2 UNITS: 100 INJECTION, SOLUTION INTRAVENOUS; SUBCUTANEOUS at 21:18

## 2018-11-13 RX ADMIN — SODIUM CHLORIDE 125 ML/HR: 900 INJECTION, SOLUTION INTRAVENOUS at 06:23

## 2018-11-13 RX ADMIN — INSULIN LISPRO 2 UNITS: 100 INJECTION, SOLUTION INTRAVENOUS; SUBCUTANEOUS at 17:00

## 2018-11-13 RX ADMIN — SODIUM CHLORIDE 1000 ML: 900 INJECTION, SOLUTION INTRAVENOUS at 20:30

## 2018-11-13 RX ADMIN — HYDROMORPHONE HYDROCHLORIDE 0.25 MG: 1 INJECTION, SOLUTION INTRAMUSCULAR; INTRAVENOUS; SUBCUTANEOUS at 23:38

## 2018-11-13 RX ADMIN — SODIUM CHLORIDE 125 ML/HR: 900 INJECTION, SOLUTION INTRAVENOUS at 21:09

## 2018-11-13 RX ADMIN — METRONIDAZOLE 500 MG: 500 TABLET ORAL at 14:31

## 2018-11-13 RX ADMIN — HYDROMORPHONE HYDROCHLORIDE 0.25 MG: 1 INJECTION, SOLUTION INTRAMUSCULAR; INTRAVENOUS; SUBCUTANEOUS at 12:10

## 2018-11-13 RX ADMIN — HYDROMORPHONE HYDROCHLORIDE 0.25 MG: 1 INJECTION, SOLUTION INTRAMUSCULAR; INTRAVENOUS; SUBCUTANEOUS at 18:40

## 2018-11-13 RX ADMIN — HYDROMORPHONE HYDROCHLORIDE 0.25 MG: 1 INJECTION, SOLUTION INTRAMUSCULAR; INTRAVENOUS; SUBCUTANEOUS at 06:19

## 2018-11-13 RX ADMIN — CEFTRIAXONE SODIUM 1 G: 1 INJECTION, POWDER, FOR SOLUTION INTRAMUSCULAR; INTRAVENOUS at 12:09

## 2018-11-13 RX ADMIN — SODIUM CHLORIDE 125 ML/HR: 900 INJECTION, SOLUTION INTRAVENOUS at 12:29

## 2018-11-13 RX ADMIN — HEPARIN SODIUM 5000 UNITS: 5000 INJECTION, SOLUTION INTRAVENOUS; SUBCUTANEOUS at 06:20

## 2018-11-13 RX ADMIN — SODIUM CHLORIDE 250 ML: 900 INJECTION, SOLUTION INTRAVENOUS at 12:29

## 2018-11-13 RX ADMIN — METRONIDAZOLE 500 MG: 500 TABLET ORAL at 21:10

## 2018-11-13 NOTE — ROUTINE PROCESS
Bedside and Verbal shift change report given to PETRONA Chakraborty (oncoming nurse) by Nahed Chinchilla (offgoing nurse). Report included the following information SBAR, Kardex and Recent Results.

## 2018-11-13 NOTE — PROGRESS NOTES
Problem: Falls - Risk of 
Goal: *Absence of Falls Document Radha Lobo Fall Risk and appropriate interventions in the flowsheet. Outcome: Progressing Towards Goal 
Fall Risk Interventions: 
Mobility Interventions: Patient to call before getting OOB Medication Interventions: Teach patient to arise slowly Elimination Interventions: Call light in reach

## 2018-11-13 NOTE — PROGRESS NOTES
Intern Progress Note 120 [facility] Patient: Naty Soto MRN: [id number]  CSN: [id number] YOB: 1965  Age: 52 y.o. Sex: female DOA: 11/10/2018 LOS:  LOS: 2 days Subjective:  
 
Acute events: No overnight events. She did have approx 200ml of emesis overnight. No BMs. Patient was resting comfortably in bed and reports feeling better, but not ready to go home. ROS: No fever or chills, no chest pain or SOB, + abdominal pain, nausea, and vomiting. No diarrhea. Objective:  
  
Patient Vitals for the past 24 hrs: 
 Temp Pulse Resp BP SpO2  
11/13/18 0759 98.5 °F (36.9 °C) 91 16 125/81 99 % 11/13/18 0426 98.7 °F (37.1 °C) (!) 105 16 101/68 98 % 11/12/18 2339 99.7 °F (37.6 °C) (!) 103 20 109/76 97 % 11/12/18 2010 98.8 °F (37.1 °C) (!) 113 20 99/63 99 % 11/12/18 1558 98.3 °F (36.8 °C) (!) 120 20 115/72 98 % 11/12/18 1145 98.8 °F (37.1 °C) 97 17 (!) 182/100 100 % Intake/Output Summary (Last 24 hours) at 11/13/2018 0900 Last data filed at 11/12/2018 2340 Gross per 24 hour Intake 1908.75 ml Output 1450 ml Net 458.75 ml Physical Exam:  
General:  Alert and Responsive and in No acute distress. HEENT: Conjunctiva pink, sclera anicteric. EOMI. Pharynx moist, nonerythematous. Moist mucous membranes. No cervical, supraclavicular, occipital or submandibular lymphadenopathy. No other gross abnormalities present. CV:  RRR, no murmurs. No visible pulsations or thrills. RESP:  Unlabored breathing. Lungs clear to auscultation. no wheeze, rales, or rhonchi. Equal expansion bilaterally. ABD:  Soft, nondistended, with diffuse, mild tenderness to palpation. No guarding. MS:  No joint deformity or instability. No atrophy. Neuro:  5/5 strength bilateral upper extremities. A+O. Ext:  No edema. 2+ radial and dp pulses bilaterally. Skin:  No rashes, lesions, or ulcers. Good turgor. Lab/Data Reviewed: All lab results for the last 24 hours reviewed. Scheduled Medications Reviewed: 
Current Facility-Administered Medications Medication Dose Route Frequency  HYDROmorphone (DILAUDID) syringe 0.25 mg  0.25 mg IntraVENous Q6H  
 insulin glargine (LANTUS) injection 10 Units  10 Units SubCUTAneous QHS  PARoxetine (PAXIL) tablet 30 mg  30 mg Oral DAILY  rosuvastatin (CRESTOR) tablet 20 mg  20 mg Oral QHS  
 0.9% sodium chloride infusion  125 mL/hr IntraVENous CONTINUOUS  
 heparin (porcine) injection 5,000 Units  5,000 Units SubCUTAneous Q8H  
 insulin lispro (HUMALOG) injection   SubCUTAneous AC&HS  cefTRIAXone (ROCEPHIN) 1 g in sterile water (preservative free) 10 mL IV syringe  1 g IntraVENous Q24H  
 metroNIDAZOLE (FLAGYL) tablet 500 mg  500 mg Oral Q8H Imaging, microbiology, and EKG/Telemetry: No new imaging. Assessment/Plan  
 
52 y.o. female with PMH of diabetes, HTN, and anxiety, admitted for sepsis with a possible abdominal source.  
  
Sepsis w/ persistent nausea/vomiting, diarrhea upon presentation: Last fever was on 11/11 at 0400, no bowel movement since admission, she does have persistent nausea with vomiting and abdominal pain that is improved from yesterday. She did require a dose of Reglan last night. - Blood culture- NGTD 
- Urine Cx- NGTD 
- Reglan IV scheduled  
- avoid  Zofran prn for prolonged QT interval of 512 
- 0.25mg Dilaudid IV q6hr for abdominal pain- well managed - Continue Rocephin and Flagyl for now. - stool cultures ordered- no BM yet 
- NS 125ml/hr - Clear liquid diet 
  
CRYSTAL: Likely 2/2 to dehydration and/or sepsis. Baseline Cr approx 0.9. 
- receiving IV hydration, NS @ 125cc/hr 
- hold nephrotoxic medications 
  
DM- Fasting glucose 200 this morning 
- history if diabetic retinopathy- blind in left eye 
- Consider increasing Lantus to 20Units at bedtime - Use SSI. 
  
HTN- well controlled. - Hold lisinopril due to CRYSTAL. - Use hydralazine po prn.  
  
Anxiety:  
- Continue Paxil.  
- Concerned this may not be well-controlled. 
  
  
Diet: Clear liquid DVT Prophylaxis: [facility] Code Status: Full Point of Contact: Roe Presser [phone]  (Relationship: cousin) 
  
Disposition and anticipated LOS: <2 nights MD Janak Farooq PGY-1 
11/13/18 9:00 AM

## 2018-11-13 NOTE — ROUTINE PROCESS
Bedside and Verbal shift change report given to Wing Moreno RN (oncoming nurse) by JACKLYN Carlos RN (offgoing nurse). Report given with RODRIGUE, Yaz, MAR and Recent Results.

## 2018-11-13 NOTE — PROGRESS NOTES
2010 MEWS score 4 due to heart rate of 113 and b/p 99/63. Patient resting in bed with eyes closed. Asymptomatic. No c/o voiced at this time. No distress noted. MD aware of increased heart rated.

## 2018-11-14 VITALS
RESPIRATION RATE: 17 BRPM | SYSTOLIC BLOOD PRESSURE: 137 MMHG | HEIGHT: 68 IN | DIASTOLIC BLOOD PRESSURE: 82 MMHG | HEART RATE: 94 BPM | WEIGHT: 213.6 LBS | BODY MASS INDEX: 32.37 KG/M2 | OXYGEN SATURATION: 100 % | TEMPERATURE: 98.7 F

## 2018-11-14 LAB
ANION GAP SERPL CALC-SCNC: 9 MMOL/L (ref 3–18)
BACTERIA SPEC CULT: NORMAL
BASOPHILS # BLD: 0 K/UL (ref 0–0.1)
BASOPHILS NFR BLD: 0 % (ref 0–2)
BUN SERPL-MCNC: 20 MG/DL (ref 7–18)
BUN/CREAT SERPL: 13 (ref 12–20)
CALCIUM SERPL-MCNC: 7.8 MG/DL (ref 8.5–10.1)
CHLORIDE SERPL-SCNC: 112 MMOL/L (ref 100–108)
CO2 SERPL-SCNC: 23 MMOL/L (ref 21–32)
CREAT SERPL-MCNC: 1.49 MG/DL (ref 0.6–1.3)
DIFFERENTIAL METHOD BLD: ABNORMAL
EOSINOPHIL # BLD: 0.1 K/UL (ref 0–0.4)
EOSINOPHIL NFR BLD: 0 % (ref 0–5)
ERYTHROCYTE [DISTWIDTH] IN BLOOD BY AUTOMATED COUNT: 14.2 % (ref 11.6–14.5)
GLUCOSE BLD STRIP.AUTO-MCNC: 153 MG/DL (ref 70–110)
GLUCOSE BLD STRIP.AUTO-MCNC: 194 MG/DL (ref 70–110)
GLUCOSE SERPL-MCNC: 211 MG/DL (ref 74–99)
HCT VFR BLD AUTO: 29.1 % (ref 35–45)
HGB BLD-MCNC: 9.9 G/DL (ref 12–16)
LYMPHOCYTES # BLD: 3.8 K/UL (ref 0.9–3.6)
LYMPHOCYTES NFR BLD: 32 % (ref 21–52)
MCH RBC QN AUTO: 28.9 PG (ref 24–34)
MCHC RBC AUTO-ENTMCNC: 34 G/DL (ref 31–37)
MCV RBC AUTO: 84.8 FL (ref 74–97)
MONOCYTES # BLD: 0.5 K/UL (ref 0.05–1.2)
MONOCYTES NFR BLD: 4 % (ref 3–10)
NEUTS SEG # BLD: 7.5 K/UL (ref 1.8–8)
NEUTS SEG NFR BLD: 64 % (ref 40–73)
PLATELET # BLD AUTO: 235 K/UL (ref 135–420)
PMV BLD AUTO: 10.2 FL (ref 9.2–11.8)
POTASSIUM SERPL-SCNC: 3.5 MMOL/L (ref 3.5–5.5)
RBC # BLD AUTO: 3.43 M/UL (ref 4.2–5.3)
SERVICE CMNT-IMP: NORMAL
SODIUM SERPL-SCNC: 144 MMOL/L (ref 136–145)
WBC # BLD AUTO: 11.8 K/UL (ref 4.6–13.2)

## 2018-11-14 PROCEDURE — 74011250637 HC RX REV CODE- 250/637: Performed by: FAMILY MEDICINE

## 2018-11-14 PROCEDURE — 74011250637 HC RX REV CODE- 250/637: Performed by: INTERNAL MEDICINE

## 2018-11-14 PROCEDURE — 74011250636 HC RX REV CODE- 250/636: Performed by: FAMILY MEDICINE

## 2018-11-14 PROCEDURE — 80048 BASIC METABOLIC PNL TOTAL CA: CPT

## 2018-11-14 PROCEDURE — 74011636637 HC RX REV CODE- 636/637: Performed by: INTERNAL MEDICINE

## 2018-11-14 PROCEDURE — 36415 COLL VENOUS BLD VENIPUNCTURE: CPT

## 2018-11-14 PROCEDURE — 74011250636 HC RX REV CODE- 250/636: Performed by: INTERNAL MEDICINE

## 2018-11-14 PROCEDURE — 85025 COMPLETE CBC W/AUTO DIFF WBC: CPT

## 2018-11-14 PROCEDURE — 74011000250 HC RX REV CODE- 250: Performed by: INTERNAL MEDICINE

## 2018-11-14 PROCEDURE — 82962 GLUCOSE BLOOD TEST: CPT

## 2018-11-14 RX ORDER — CEFDINIR 300 MG/1
300 CAPSULE ORAL 2 TIMES DAILY
Qty: 6 CAP | Refills: 0 | Status: SHIPPED | OUTPATIENT
Start: 2018-11-14 | End: 2019-01-09

## 2018-11-14 RX ORDER — DOCUSATE SODIUM 100 MG/1
100 CAPSULE, LIQUID FILLED ORAL DAILY
Status: DISCONTINUED | OUTPATIENT
Start: 2018-11-14 | End: 2018-11-14 | Stop reason: HOSPADM

## 2018-11-14 RX ORDER — METRONIDAZOLE 500 MG/1
500 TABLET ORAL EVERY 8 HOURS
Qty: 9 TAB | Refills: 0 | Status: SHIPPED | OUTPATIENT
Start: 2018-11-14 | End: 2019-01-09

## 2018-11-14 RX ORDER — DOCUSATE SODIUM 100 MG/1
100 CAPSULE, LIQUID FILLED ORAL DAILY
Qty: 30 CAP | Refills: 0 | Status: SHIPPED | OUTPATIENT
Start: 2018-11-14 | End: 2018-12-14

## 2018-11-14 RX ORDER — DOCUSATE SODIUM 100 MG/1
100 CAPSULE, LIQUID FILLED ORAL 2 TIMES DAILY
Qty: 60 CAP | Refills: 2 | Status: SHIPPED | OUTPATIENT
Start: 2018-11-14 | End: 2018-11-14

## 2018-11-14 RX ORDER — METOCLOPRAMIDE 10 MG/1
10 TABLET ORAL
Qty: 8 TAB | Refills: 0 | Status: SHIPPED | OUTPATIENT
Start: 2018-11-14 | End: 2018-11-16

## 2018-11-14 RX ADMIN — HEPARIN SODIUM 5000 UNITS: 5000 INJECTION, SOLUTION INTRAVENOUS; SUBCUTANEOUS at 06:16

## 2018-11-14 RX ADMIN — HEPARIN SODIUM 5000 UNITS: 5000 INJECTION, SOLUTION INTRAVENOUS; SUBCUTANEOUS at 14:25

## 2018-11-14 RX ADMIN — DOCUSATE SODIUM 100 MG: 100 CAPSULE, LIQUID FILLED ORAL at 12:39

## 2018-11-14 RX ADMIN — PAROXETINE HYDROCHLORIDE 30 MG: 30 TABLET, FILM COATED ORAL at 08:59

## 2018-11-14 RX ADMIN — CEFTRIAXONE SODIUM 1 G: 1 INJECTION, POWDER, FOR SOLUTION INTRAMUSCULAR; INTRAVENOUS at 12:39

## 2018-11-14 RX ADMIN — METRONIDAZOLE 500 MG: 500 TABLET ORAL at 06:16

## 2018-11-14 RX ADMIN — HYDROMORPHONE HYDROCHLORIDE 0.25 MG: 1 INJECTION, SOLUTION INTRAMUSCULAR; INTRAVENOUS; SUBCUTANEOUS at 06:17

## 2018-11-14 RX ADMIN — SODIUM CHLORIDE 125 ML/HR: 900 INJECTION, SOLUTION INTRAVENOUS at 04:38

## 2018-11-14 RX ADMIN — INSULIN LISPRO 4 UNITS: 100 INJECTION, SOLUTION INTRAVENOUS; SUBCUTANEOUS at 12:39

## 2018-11-14 RX ADMIN — INSULIN LISPRO 4 UNITS: 100 INJECTION, SOLUTION INTRAVENOUS; SUBCUTANEOUS at 09:00

## 2018-11-14 RX ADMIN — METRONIDAZOLE 500 MG: 500 TABLET ORAL at 14:25

## 2018-11-14 NOTE — DISCHARGE SUMMARY
Discharge Summary [address] [facility] Patient: Shay Garcia Age: 52 y.o. Sex: female  : 1965 MRN: [id number] DOA: 11/10/2018 Discharge Date: 18 Candace Sexton MD     
PCP: Isaias Alcocer MD     
 
================================================================ Reason for Admission: Sepsis ([facility] [address] Sepsis ([facility] [address] Discharge Diagnoses:  
Gastroenteritis CRYSTAL Diabetes Mellitus HTN Anxiety Important notes to PCP/ follow-up studies and evaluations CRYSTAL on admission- check Creatinine DM- elevated glucose on admission Pending labs and studies: 
Urine Culture ()- NGTD Blood Culture (11/10)- NGTD Operative Procedures:  
None Discharge Medications:    
Current Discharge Medication List  
  
START taking these medications Details  
docusate sodium (COLACE) 100 mg capsule Take 1 Cap by mouth daily for 30 days. Qty: 30 Cap, Refills: 0  
  
metroNIDAZOLE (FLAGYL) 500 mg tablet Take 1 Tab by mouth every eight (8) hours. Qty: 9 Tab, Refills: 0  
  
cefdinir (OMNICEF) 300 mg capsule Take 1 Cap by mouth two (2) times a day. Qty: 6 Cap, Refills: 0  
  
metoclopramide HCl (REGLAN) 10 mg tablet Take 1 Tab by mouth Before breakfast, lunch, dinner and at bedtime for 2 days. Qty: 8 Tab, Refills: 0 CONTINUE these medications which have NOT CHANGED Details  
metFORMIN ER (GLUCOPHAGE XR) 500 mg tablet TAKE 1 TABLET BY MOUTH DAILY WITH DINNER Qty: 30 Tab, Refills: 0 Associated Diagnoses: Type 2 diabetes mellitus with complication, with long-term current use of insulin (HCC)  
  
rosuvastatin (CRESTOR) 20 mg tablet Take 1 Tab by mouth nightly. Qty: 90 Tab, Refills: 3 PARoxetine (PAXIL) 30 mg tablet TAKE 1 TABLET BY MOUTH DAILY Qty: 30 Tab, Refills: 3  Associated Diagnoses: Anxiety with depression  
  
insulin NPH/insulin regular (NOVOLIN 70/30, HUMULIN 70/30) 100 unit/mL (70-30) injection 40 Units by SubCUTAneous route two (2) times a day. Qty: 10 mL, Refills: 3 Associated Diagnoses: Type 2 diabetes mellitus with complication, with long-term current use of insulin ([address] STOP taking these medications  
  
 quinapril (ACCUPRIL) 20 mg tablet Comments:  
Reason for Stopping:   
   
  
 
 
Disposition: Home Consultants:   
None Brief Hospital Course (including pertinent history and physical findings) Alonso Tobar is a 52 y.o. female with PMH of HTN, DM, and anxiety, who presented to the ER on the morning of 11/11 after waking up with sudden nausea, vomiting, diarrhea, and fevers with associated abdominal pain. On presentation to the ED, she was febrile to 101 and tachycardic to the 120s. She was admitted to the medicine service and started on Flagyl and Rocephin for antibiotic coverage and IV fluids for maintenance. She was transferred to the PFM service on HD2. When [facility] took over management, the patient was still having some abdominal pain with nausea and vomiting. IV antibiotics were continued, her diet was changed to clear liquid, and her maintenance fluids were continued. Nasuea/vomiting was well-controlled with Reglan IV prn and pain was managed with Diludid 0.25mg tid since she could not tolerate po pain meds or morphine. Her pain and nausea with vomiting improved over the next two days. Her diet was advanced as tolerated and she clinically improved. Her WBC count trended down from 19.7 to 11.8. She did have an increase in her creatinine on her third day, which improved with IVF boluses. On HD 4, she was tolerating PO, ambulating well with good pain control. We transitioned her to oral antibiotics (Omnicef, FLagyl), for three more days. She was given precautions of when to return. She was discharged home with [facility] follow up in 48 hours.   
 
 
Summarized key findings and results (labs, imaging studies, ECHO, cardiac cath, endoscopies, etc): 
 CBC w/Diff Lab Results Component Value Date/Time WBC 11.8 11/14/2018 03:17 AM  
 Hemoglobin, POC 11.9 (L) 09/03/2014 06:37 AM  
 HGB 9.9 (L) 11/14/2018 03:17 AM  
 Hematocrit, POC 35 (L) 09/03/2014 06:37 AM  
 HCT 29.1 (L) 11/14/2018 03:17 AM  
 PLATELET 273 09/43/3377 03:17 AM  
 MCV 84.8 11/14/2018 03:17 AM  
   
 
 
Chemistry Lab Results Component Value Date/Time Sodium 144 11/14/2018 03:17 AM  
 Potassium 3.5 11/14/2018 03:17 AM  
 Chloride 112 (H) 11/14/2018 03:17 AM  
 CO2 23 11/14/2018 03:17 AM  
 Anion gap 9 11/14/2018 03:17 AM  
 Glucose 211 (H) 11/14/2018 03:17 AM  
 BUN 20 (H) 11/14/2018 03:17 AM  
 Creatinine 1.49 (H) 11/14/2018 03:17 AM  
 BUN/Creatinine ratio 13 11/14/2018 03:17 AM  
 GFR est AA 44 (L) 11/14/2018 03:17 AM  
 GFR est non-AA 36 (L) 11/14/2018 03:17 AM  
 Calcium 7.8 (L) 11/14/2018 03:17 AM  
   
 
 
Functional status and cognitive function:   
Ambulates on her own Status: alert, cooperative, no distress, appears stated age Diet:General Diet, Diabetic Code status and advanced care plan: Full [address] of Contact: Shu Finley [phone]  (Relationship: cousin) Patient Education:  Patient was educated on the following topics prior to discharge: Abdominal pain, Diabetes Management Follow-up:  
Follow-up Information Follow up With Specialties Details Why Contact Info Jose Cheney MD Family Practice Schedule an appointment as soon as possible for a visit in 2 days  [address] [facility] 
[phone] 
  
  
 
 
 
================================================================ Tigist Edwards MD  
[facility] PGY-1 
11/14/18 11:49 AM

## 2018-11-14 NOTE — ROUTINE PROCESS
Bedside and Verbal shift change report given to PETRONA Chakraborty (oncoming nurse) by Bettina Grady (offgoing nurse). Report included the following information SBAR, Kardex and Recent Results.

## 2018-11-14 NOTE — DISCHARGE INSTRUCTIONS

## 2018-11-14 NOTE — PROGRESS NOTES
S: Patient resting comfortably in bed. She was been tolerating clear liquid diet without nausea or vomiting today. Patient reports flatus, but no BM. Her pain is well managed with medications. O:  
Vitals:  
 11/13/18 0630 11/13/18 0759 11/13/18 1200 11/13/18 1433 BP:  125/81 129/77 141/86 Pulse:  91 90 (!) 101 Resp:  16 16 12 Temp:  98.5 °F (36.9 °C) 99.2 °F (37.3 °C) 97.9 °F (36.6 °C) SpO2:  99% 100% 99% Weight: 96.9 kg (213 lb 9.6 oz) Height:      
 
Constitutional: Awake, alert and in no acute distress HEENT: Conjunctiva pink, sclera anicteric. Strabismus in left eye. Pharynx moist, non-erythematous CV: RRR, no m/r/g, 2+ radial pulses bilaterally Resp: CTAB. No wheeze or rhonchi Abd: +BS, soft, non-distended, mild diffuse tenderness to palpation Ext: No edema. 2+ DP pulses bilaterally Skin: No rashes, lesions or ulcers A/P: Mariah Leger is a 52 y. o. female with PMH of diabetes, HTN, and anxiety, admitted for sepsis with a possible abdominal source.  
-continue primary care treatment plan 
-Reglan PRN for nausea/vomiting 
-continue to monitor for fluid overload Lisa Escobar MD, PGY-1 
KHURRAM CHOUDHURYM

## 2018-11-14 NOTE — ROUTINE PROCESS
Bedside and Verbal shift change report given to Aubry Favre RN (oncoming nurse) by JACKLYN Carlos RN (offgoing nurse). Report given with SBAR, Yaz, MAR and Recent Results.

## 2018-11-14 NOTE — PROGRESS NOTES
Pt to be discharged today. Indigent medications faxed to outpatient pharmacy. Pt confirms she has available transportation when discharged. No additional need identified. Paolo De La Cruz RN, BSN 
[phone]

## 2018-11-14 NOTE — PROGRESS NOTES
Intern Progress Note 120 [facility] Patient: Smion Stacy MRN: [id number]  CSN: [id number] YOB: 1965  Age: 52 y.o. Sex: female DOA: 11/10/2018 LOS:  LOS: 3 days Subjective:  
 
Acute events: Patient did have a low fever last night to 100.7 around 2300, which improved with Tylenol. Otherwise, no overnight events. She has not had any episodes of emesis in the past 24 hours. No BMs. Patient was resting comfortably in bed this morning and reports feeling better, with minimal abdominal pain. She would like for her diet to be advanced. ROS: No fever or chills, no chest pain or SOB, + abdominal pain, nausea, and vomiting. No diarrhea. Objective:  
  
Patient Vitals for the past 24 hrs: 
 Temp Pulse Resp BP SpO2  
11/14/18 0725 98.4 °F (36.9 °C) 89 16 125/77 98 % 11/14/18 0439 98.5 °F (36.9 °C) 84 14 146/87 97 % 11/13/18 2341 (!) 100.7 °F (38.2 °C) 100 18 158/90 100 % 11/13/18 2030 99.6 °F (37.6 °C) 91 16 130/85 99 % 11/13/18 1433 97.9 °F (36.6 °C) (!) 101 12 141/86 99 % 11/13/18 1200 99.2 °F (37.3 °C) 90 16 129/77 100 % Intake/Output Summary (Last 24 hours) at 11/14/2018 8189 Last data filed at 11/14/2018 8344 Gross per 24 hour Intake 4812.5 ml Output 3800 ml Net 1012.5 ml Physical Exam:  
General:  Alert and Responsive and in No acute distress. HEENT: Conjunctiva pink, sclera anicteric. EOMI. Pharynx moist, nonerythematous. Moist mucous membranes. No cervical, supraclavicular, occipital or submandibular lymphadenopathy. No other gross abnormalities present. CV:  RRR, no murmurs. No visible pulsations or thrills. RESP:  Unlabored breathing. Lungs clear to auscultation. no wheeze, rales, or rhonchi. Equal expansion bilaterally. ABD:  Soft, nondistended, with mild tenderness to palpation. No guarding. MS:  No joint deformity or instability. No atrophy. Neuro:  5/5 strength bilateral upper extremities. A+O. Ext:  No edema. 2+ radial and dp pulses bilaterally. Skin:  No rashes, lesions, or ulcers. Good turgor. Lab/Data Reviewed: All lab results for the last 24 hours reviewed. Scheduled Medications Reviewed: 
Current Facility-Administered Medications Medication Dose Route Frequency  HYDROmorphone (DILAUDID) syringe 0.25 mg  0.25 mg IntraVENous Q6H  
 insulin glargine (LANTUS) injection 10 Units  10 Units SubCUTAneous QHS  PARoxetine (PAXIL) tablet 30 mg  30 mg Oral DAILY  rosuvastatin (CRESTOR) tablet 20 mg  20 mg Oral QHS  
 0.9% sodium chloride infusion  125 mL/hr IntraVENous CONTINUOUS  
 heparin (porcine) injection 5,000 Units  5,000 Units SubCUTAneous Q8H  
 insulin lispro (HUMALOG) injection   SubCUTAneous AC&HS  cefTRIAXone (ROCEPHIN) 1 g in sterile water (preservative free) 10 mL IV syringe  1 g IntraVENous Q24H  
 metroNIDAZOLE (FLAGYL) tablet 500 mg  500 mg Oral Q8H Imaging, microbiology, and EKG/Telemetry: No new imaging. Assessment/Plan  
 
52 y.o. female with PMH of diabetes, HTN, and anxiety, admitted for sepsis with a possible abdominal source.  
  
Sepsis w/ persistent nausea/vomiting, diarrhea upon presentation: VSS, no bowel movement since admission, nausea has improved and she is tolerating po - Blood culture- NGTD 
- Urine Cx- NGTD 
- No Reglan needed in the past 24 hours  
- transition to PO medications 
 
  
CRYSTAL: Likely 2/2 to dehydration and/or sepsis. Baseline Cr approx 0.9. 
- receiving IV hydration, NS @ 125cc/hr 
- hold nephrotoxic medications - Cr improved today 
  
DM- Follow up with PCM after discharge 
- history if diabetic retinopathy- blind in left eye 
- Continue Lantus - Use SSI. 
  
HTN- well controlled. - Hold lisinopril due to CRYSTAL. - Use hydralazine po prn.  
  
Anxiety:  
- Continue Paxil.  
- Concerned this may not be well-controlled. 
  
  
Diet: Clear Diabetic DVT Prophylaxis: [facility] Code Status: Full Point of Contact: Donn Mcbride [phone]  (Relationship: cousin) 
  
Disposition and anticipated LOS: <2 nights MD Janak Arellano PGY-1 
11/14/18 9:00 AM

## 2018-11-14 NOTE — PROGRESS NOTES
[phone] Patient discharged home. IV removed. Discharge instructions provided and reviewed. Discharge medications reviewed with Patient and appropriate educational materials and side effects teaching were provided. Patient stated it would be a few hours before her ride could come and pick her up.

## 2018-11-16 ENCOUNTER — APPOINTMENT (OUTPATIENT)
Dept: CT IMAGING | Age: 53
DRG: 682 | End: 2018-11-16
Attending: EMERGENCY MEDICINE
Payer: SELF-PAY

## 2018-11-16 ENCOUNTER — HOSPITAL ENCOUNTER (INPATIENT)
Age: 53
LOS: 5 days | Discharge: HOME | DRG: 682 | End: 2018-11-22
Attending: EMERGENCY MEDICINE | Admitting: FAMILY MEDICINE
Payer: SELF-PAY

## 2018-11-16 DIAGNOSIS — E11.8 TYPE 2 DIABETES MELLITUS WITH COMPLICATION, WITH LONG-TERM CURRENT USE OF INSULIN (HCC): ICD-10-CM

## 2018-11-16 DIAGNOSIS — Z79.4 TYPE 2 DIABETES MELLITUS WITH COMPLICATION, WITH LONG-TERM CURRENT USE OF INSULIN (HCC): ICD-10-CM

## 2018-11-16 LAB
ALBUMIN SERPL-MCNC: 4 G/DL (ref 3.4–5)
ALBUMIN/GLOB SERPL: 1 {RATIO} (ref 0.8–1.7)
ALP SERPL-CCNC: 129 U/L (ref 45–117)
ALT SERPL-CCNC: 90 U/L (ref 13–56)
ANION GAP SERPL CALC-SCNC: 11 MMOL/L (ref 3–18)
AST SERPL-CCNC: 112 U/L (ref 15–37)
BACTERIA SPEC CULT: NORMAL
BACTERIA SPEC CULT: NORMAL
BASOPHILS # BLD: 0 K/UL (ref 0–0.06)
BASOPHILS NFR BLD: 0 % (ref 0–3)
BILIRUB SERPL-MCNC: 2 MG/DL (ref 0.2–1)
BUN SERPL-MCNC: 50 MG/DL (ref 7–18)
BUN/CREAT SERPL: 15 (ref 12–20)
CALCIUM SERPL-MCNC: 8.7 MG/DL (ref 8.5–10.1)
CHLORIDE SERPL-SCNC: 96 MMOL/L (ref 100–108)
CO2 SERPL-SCNC: 26 MMOL/L (ref 21–32)
CREAT SERPL-MCNC: 3.33 MG/DL (ref 0.6–1.3)
DIFFERENTIAL METHOD BLD: ABNORMAL
EOSINOPHIL # BLD: 0 K/UL (ref 0–0.4)
EOSINOPHIL NFR BLD: 0 % (ref 0–5)
ERYTHROCYTE [DISTWIDTH] IN BLOOD BY AUTOMATED COUNT: 13.8 % (ref 11.6–14.5)
GLOBULIN SER CALC-MCNC: 4.2 G/DL (ref 2–4)
GLUCOSE SERPL-MCNC: 339 MG/DL (ref 74–99)
HCT VFR BLD AUTO: 35.9 % (ref 35–45)
HGB BLD-MCNC: 12.8 G/DL (ref 12–16)
LIPASE SERPL-CCNC: 178 U/L (ref 73–393)
LYMPHOCYTES # BLD: 3.3 K/UL (ref 0.8–3.5)
LYMPHOCYTES NFR BLD: 15 % (ref 20–51)
MCH RBC QN AUTO: 29.5 PG (ref 24–34)
MCHC RBC AUTO-ENTMCNC: 35.7 G/DL (ref 31–37)
MCV RBC AUTO: 82.7 FL (ref 74–97)
MONOCYTES # BLD: 0.9 K/UL (ref 0–1)
MONOCYTES NFR BLD: 4 % (ref 2–9)
NEUTS SEG # BLD: 17.6 K/UL (ref 1.8–8)
NEUTS SEG NFR BLD: 81 % (ref 42–75)
PLATELET # BLD AUTO: 307 K/UL (ref 135–420)
PLATELET COMMENTS,PCOM: ABNORMAL
PMV BLD AUTO: 11.1 FL (ref 9.2–11.8)
POTASSIUM SERPL-SCNC: 3 MMOL/L (ref 3.5–5.5)
PROT SERPL-MCNC: 8.2 G/DL (ref 6.4–8.2)
RBC # BLD AUTO: 4.34 M/UL (ref 4.2–5.3)
RBC MORPH BLD: ABNORMAL
SERVICE CMNT-IMP: NORMAL
SERVICE CMNT-IMP: NORMAL
SODIUM SERPL-SCNC: 133 MMOL/L (ref 136–145)
TROPONIN I SERPL-MCNC: 0.03 NG/ML (ref 0–0.04)
TSH SERPL DL<=0.05 MIU/L-ACNC: 1.38 UIU/ML (ref 0.36–3.74)
WBC # BLD AUTO: 21.8 K/UL (ref 4.6–13.2)

## 2018-11-16 PROCEDURE — 94761 N-INVAS EAR/PLS OXIMETRY MLT: CPT

## 2018-11-16 PROCEDURE — 96361 HYDRATE IV INFUSION ADD-ON: CPT

## 2018-11-16 PROCEDURE — 85025 COMPLETE CBC W/AUTO DIFF WBC: CPT

## 2018-11-16 PROCEDURE — 74011250636 HC RX REV CODE- 250/636: Performed by: EMERGENCY MEDICINE

## 2018-11-16 PROCEDURE — 93005 ELECTROCARDIOGRAM TRACING: CPT

## 2018-11-16 PROCEDURE — 96365 THER/PROPH/DIAG IV INF INIT: CPT

## 2018-11-16 PROCEDURE — 70450 CT HEAD/BRAIN W/O DYE: CPT

## 2018-11-16 PROCEDURE — 72125 CT NECK SPINE W/O DYE: CPT

## 2018-11-16 PROCEDURE — 84443 ASSAY THYROID STIM HORMONE: CPT

## 2018-11-16 PROCEDURE — 99285 EMERGENCY DEPT VISIT HI MDM: CPT

## 2018-11-16 PROCEDURE — 80053 COMPREHEN METABOLIC PANEL: CPT

## 2018-11-16 PROCEDURE — 84484 ASSAY OF TROPONIN QUANT: CPT

## 2018-11-16 PROCEDURE — 96375 TX/PRO/DX INJ NEW DRUG ADDON: CPT

## 2018-11-16 PROCEDURE — 83690 ASSAY OF LIPASE: CPT

## 2018-11-16 RX ADMIN — SODIUM CHLORIDE 1000 ML: 900 INJECTION, SOLUTION INTRAVENOUS at 22:56

## 2018-11-16 NOTE — LETTER
[facility] 
[facility] EMERGENCY DEPT 
[address] [facility] [address] 
[phone] Work/School Note Date: 11/16/2018 To Whom It May concern: 
 
Curtis Perezpe was present in the emergency room tonight. Please excuse his tardy/ absence. Sincerely, James Gonzalez RN

## 2018-11-17 ENCOUNTER — APPOINTMENT (OUTPATIENT)
Dept: GENERAL RADIOLOGY | Age: 53
DRG: 682 | End: 2018-11-17
Attending: EMERGENCY MEDICINE
Payer: SELF-PAY

## 2018-11-17 PROBLEM — N17.9 AKI (ACUTE KIDNEY INJURY) (HCC): Status: ACTIVE | Noted: 2018-11-17

## 2018-11-17 LAB
AMORPH CRY URNS QL MICRO: ABNORMAL
ANION GAP SERPL CALC-SCNC: 10 MMOL/L (ref 3–18)
APPEARANCE UR: CLEAR
ATRIAL RATE: 117 BPM
BACTERIA SPEC CULT: NORMAL
BACTERIA URNS QL MICRO: NEGATIVE /HPF
BASOPHILS # BLD: 0 K/UL (ref 0–0.1)
BASOPHILS NFR BLD: 0 % (ref 0–2)
BILIRUB UR QL: NEGATIVE
BUN SERPL-MCNC: 40 MG/DL (ref 7–18)
BUN/CREAT SERPL: 17 (ref 12–20)
CALCIUM SERPL-MCNC: 7.8 MG/DL (ref 8.5–10.1)
CALCULATED P AXIS, ECG09: 63 DEGREES
CALCULATED R AXIS, ECG10: 50 DEGREES
CALCULATED T AXIS, ECG11: 78 DEGREES
CHLORIDE SERPL-SCNC: 104 MMOL/L (ref 100–108)
CO2 SERPL-SCNC: 25 MMOL/L (ref 21–32)
COLOR UR: YELLOW
CREAT SERPL-MCNC: 2.35 MG/DL (ref 0.6–1.3)
DIAGNOSIS, 93000: NORMAL
DIFFERENTIAL METHOD BLD: ABNORMAL
EOSINOPHIL # BLD: 0 K/UL (ref 0–0.4)
EOSINOPHIL NFR BLD: 0 % (ref 0–5)
EPITH CASTS URNS QL MICRO: ABNORMAL /LPF (ref 0–5)
ERYTHROCYTE [DISTWIDTH] IN BLOOD BY AUTOMATED COUNT: 14.2 % (ref 11.6–14.5)
EST. AVERAGE GLUCOSE BLD GHB EST-MCNC: 180 MG/DL
GLUCOSE BLD STRIP.AUTO-MCNC: 156 MG/DL (ref 70–110)
GLUCOSE BLD STRIP.AUTO-MCNC: 212 MG/DL (ref 70–110)
GLUCOSE BLD STRIP.AUTO-MCNC: 276 MG/DL (ref 70–110)
GLUCOSE BLD STRIP.AUTO-MCNC: 56 MG/DL (ref 70–110)
GLUCOSE BLD STRIP.AUTO-MCNC: 59 MG/DL (ref 70–110)
GLUCOSE BLD STRIP.AUTO-MCNC: 76 MG/DL (ref 70–110)
GLUCOSE BLD STRIP.AUTO-MCNC: 98 MG/DL (ref 70–110)
GLUCOSE SERPL-MCNC: 300 MG/DL (ref 74–99)
GLUCOSE UR STRIP.AUTO-MCNC: 100 MG/DL
HBA1C MFR BLD: 7.9 % (ref 4.2–5.6)
HCT VFR BLD AUTO: 31.5 % (ref 35–45)
HGB BLD-MCNC: 10.9 G/DL (ref 12–16)
HGB UR QL STRIP: ABNORMAL
HYALINE CASTS URNS QL MICRO: ABNORMAL /LPF (ref 0–2)
KETONES UR QL STRIP.AUTO: ABNORMAL MG/DL
LACTATE BLD-SCNC: 0.91 MMOL/L (ref 0.4–2)
LACTATE BLD-SCNC: 2.39 MMOL/L (ref 0.4–2)
LEUKOCYTE ESTERASE UR QL STRIP.AUTO: NEGATIVE
LYMPHOCYTES # BLD: 2.9 K/UL (ref 0.9–3.6)
LYMPHOCYTES NFR BLD: 15 % (ref 21–52)
MCH RBC QN AUTO: 28.6 PG (ref 24–34)
MCHC RBC AUTO-ENTMCNC: 34.6 G/DL (ref 31–37)
MCV RBC AUTO: 82.7 FL (ref 74–97)
MONOCYTES # BLD: 1.4 K/UL (ref 0.05–1.2)
MONOCYTES NFR BLD: 7 % (ref 3–10)
MUCOUS THREADS URNS QL MICRO: ABNORMAL /LPF
NEUTS SEG # BLD: 14.7 K/UL (ref 1.8–8)
NEUTS SEG NFR BLD: 78 % (ref 40–73)
NITRITE UR QL STRIP.AUTO: NEGATIVE
P-R INTERVAL, ECG05: 140 MS
PH UR STRIP: 5 [PH] (ref 5–8)
PLATELET # BLD AUTO: 238 K/UL (ref 135–420)
PMV BLD AUTO: 10.7 FL (ref 9.2–11.8)
POTASSIUM SERPL-SCNC: 2.9 MMOL/L (ref 3.5–5.5)
POTASSIUM SERPL-SCNC: 3 MMOL/L (ref 3.5–5.5)
PROT UR STRIP-MCNC: 100 MG/DL
Q-T INTERVAL, ECG07: 348 MS
QRS DURATION, ECG06: 68 MS
QTC CALCULATION (BEZET), ECG08: 485 MS
RBC # BLD AUTO: 3.81 M/UL (ref 4.2–5.3)
RBC #/AREA URNS HPF: ABNORMAL /HPF (ref 0–5)
SERVICE CMNT-IMP: NORMAL
SODIUM SERPL-SCNC: 139 MMOL/L (ref 136–145)
SP GR UR REFRACTOMETRY: 1.01 (ref 1–1.03)
UROBILINOGEN UR QL STRIP.AUTO: 0.2 EU/DL (ref 0.2–1)
VENTRICULAR RATE, ECG03: 117 BPM
WBC # BLD AUTO: 19.1 K/UL (ref 4.6–13.2)
WBC URNS QL MICRO: ABNORMAL /HPF (ref 0–4)

## 2018-11-17 PROCEDURE — 87491 CHLMYD TRACH DNA AMP PROBE: CPT

## 2018-11-17 PROCEDURE — 74011250636 HC RX REV CODE- 250/636: Performed by: STUDENT IN AN ORGANIZED HEALTH CARE EDUCATION/TRAINING PROGRAM

## 2018-11-17 PROCEDURE — 81001 URINALYSIS AUTO W/SCOPE: CPT

## 2018-11-17 PROCEDURE — 36415 COLL VENOUS BLD VENIPUNCTURE: CPT

## 2018-11-17 PROCEDURE — 87086 URINE CULTURE/COLONY COUNT: CPT

## 2018-11-17 PROCEDURE — 96361 HYDRATE IV INFUSION ADD-ON: CPT

## 2018-11-17 PROCEDURE — 74011000250 HC RX REV CODE- 250

## 2018-11-17 PROCEDURE — 74011250636 HC RX REV CODE- 250/636: Performed by: EMERGENCY MEDICINE

## 2018-11-17 PROCEDURE — 87046 STOOL CULTR AEROBIC BACT EA: CPT

## 2018-11-17 PROCEDURE — 87040 BLOOD CULTURE FOR BACTERIA: CPT

## 2018-11-17 PROCEDURE — 74011000258 HC RX REV CODE- 258: Performed by: STUDENT IN AN ORGANIZED HEALTH CARE EDUCATION/TRAINING PROGRAM

## 2018-11-17 PROCEDURE — 84132 ASSAY OF SERUM POTASSIUM: CPT

## 2018-11-17 PROCEDURE — 85025 COMPLETE CBC W/AUTO DIFF WBC: CPT

## 2018-11-17 PROCEDURE — 74011636637 HC RX REV CODE- 636/637: Performed by: STUDENT IN AN ORGANIZED HEALTH CARE EDUCATION/TRAINING PROGRAM

## 2018-11-17 PROCEDURE — 96365 THER/PROPH/DIAG IV INF INIT: CPT

## 2018-11-17 PROCEDURE — 82962 GLUCOSE BLOOD TEST: CPT

## 2018-11-17 PROCEDURE — 74011250637 HC RX REV CODE- 250/637: Performed by: FAMILY MEDICINE

## 2018-11-17 PROCEDURE — 65660000000 HC RM CCU STEPDOWN

## 2018-11-17 PROCEDURE — 96375 TX/PRO/DX INJ NEW DRUG ADDON: CPT

## 2018-11-17 PROCEDURE — 87493 C DIFF AMPLIFIED PROBE: CPT

## 2018-11-17 PROCEDURE — 83036 HEMOGLOBIN GLYCOSYLATED A1C: CPT

## 2018-11-17 PROCEDURE — 71045 X-RAY EXAM CHEST 1 VIEW: CPT

## 2018-11-17 PROCEDURE — 74011000250 HC RX REV CODE- 250: Performed by: EMERGENCY MEDICINE

## 2018-11-17 PROCEDURE — 74011250637 HC RX REV CODE- 250/637: Performed by: STUDENT IN AN ORGANIZED HEALTH CARE EDUCATION/TRAINING PROGRAM

## 2018-11-17 PROCEDURE — 83605 ASSAY OF LACTIC ACID: CPT

## 2018-11-17 RX ORDER — VANCOMYCIN 1.75 GRAM/500 ML IN 0.9 % SODIUM CHLORIDE INTRAVENOUS
1750 ONCE
Status: COMPLETED | OUTPATIENT
Start: 2018-11-17 | End: 2018-11-17

## 2018-11-17 RX ORDER — HEPARIN SODIUM 5000 [USP'U]/ML
5000 INJECTION, SOLUTION INTRAVENOUS; SUBCUTANEOUS EVERY 8 HOURS
Status: DISCONTINUED | OUTPATIENT
Start: 2018-11-17 | End: 2018-11-22 | Stop reason: HOSPADM

## 2018-11-17 RX ORDER — DEXTROSE 50 % IN WATER (D50W) INTRAVENOUS SYRINGE
Status: COMPLETED
Start: 2018-11-17 | End: 2018-11-17

## 2018-11-17 RX ORDER — VANCOMYCIN/0.9 % SOD CHLORIDE 1 G/100 ML
1000 PLASTIC BAG, INJECTION (ML) INTRAVENOUS ONCE
Status: DISCONTINUED | OUTPATIENT
Start: 2018-11-17 | End: 2018-11-17 | Stop reason: DRUGHIGH

## 2018-11-17 RX ORDER — PAROXETINE HYDROCHLORIDE 20 MG/1
30 TABLET, FILM COATED ORAL DAILY
Status: DISCONTINUED | OUTPATIENT
Start: 2018-11-17 | End: 2018-11-22 | Stop reason: HOSPADM

## 2018-11-17 RX ORDER — ACETAMINOPHEN 325 MG/1
650 TABLET ORAL
Status: DISCONTINUED | OUTPATIENT
Start: 2018-11-17 | End: 2018-11-22 | Stop reason: HOSPADM

## 2018-11-17 RX ORDER — INSULIN LISPRO 100 [IU]/ML
8 INJECTION, SOLUTION INTRAVENOUS; SUBCUTANEOUS
Status: DISCONTINUED | OUTPATIENT
Start: 2018-11-17 | End: 2018-11-22 | Stop reason: HOSPADM

## 2018-11-17 RX ORDER — METOCLOPRAMIDE HYDROCHLORIDE 5 MG/ML
5 INJECTION INTRAMUSCULAR; INTRAVENOUS EVERY 6 HOURS
Status: DISCONTINUED | OUTPATIENT
Start: 2018-11-17 | End: 2018-11-22 | Stop reason: HOSPADM

## 2018-11-17 RX ORDER — TRAMADOL HYDROCHLORIDE 50 MG/1
50 TABLET ORAL
Status: DISCONTINUED | OUTPATIENT
Start: 2018-11-17 | End: 2018-11-17

## 2018-11-17 RX ORDER — ONDANSETRON 2 MG/ML
4 INJECTION INTRAMUSCULAR; INTRAVENOUS
Status: COMPLETED | OUTPATIENT
Start: 2018-11-17 | End: 2018-11-17

## 2018-11-17 RX ORDER — DEXTROSE, SODIUM CHLORIDE, AND POTASSIUM CHLORIDE 5; .45; .075 G/100ML; G/100ML; G/100ML
125 INJECTION INTRAVENOUS CONTINUOUS
Status: DISCONTINUED | OUTPATIENT
Start: 2018-11-17 | End: 2018-11-22 | Stop reason: HOSPADM

## 2018-11-17 RX ORDER — INSULIN LISPRO 100 [IU]/ML
INJECTION, SOLUTION INTRAVENOUS; SUBCUTANEOUS
Status: DISCONTINUED | OUTPATIENT
Start: 2018-11-17 | End: 2018-11-22 | Stop reason: HOSPADM

## 2018-11-17 RX ORDER — INSULIN GLARGINE 100 [IU]/ML
45 INJECTION, SOLUTION SUBCUTANEOUS DAILY
Status: DISCONTINUED | OUTPATIENT
Start: 2018-11-17 | End: 2018-11-18

## 2018-11-17 RX ORDER — HYDROXYZINE 25 MG/1
25 TABLET, FILM COATED ORAL
Status: DISCONTINUED | OUTPATIENT
Start: 2018-11-17 | End: 2018-11-22 | Stop reason: HOSPADM

## 2018-11-17 RX ORDER — MORPHINE SULFATE 2 MG/ML
1 INJECTION, SOLUTION INTRAMUSCULAR; INTRAVENOUS
Status: DISCONTINUED | OUTPATIENT
Start: 2018-11-17 | End: 2018-11-22 | Stop reason: HOSPADM

## 2018-11-17 RX ORDER — SODIUM CHLORIDE 0.9 % (FLUSH) 0.9 %
5-10 SYRINGE (ML) INJECTION AS NEEDED
Status: DISCONTINUED | OUTPATIENT
Start: 2018-11-17 | End: 2018-11-22 | Stop reason: HOSPADM

## 2018-11-17 RX ORDER — SODIUM CHLORIDE 9 MG/ML
125 INJECTION, SOLUTION INTRAVENOUS CONTINUOUS
Status: DISCONTINUED | OUTPATIENT
Start: 2018-11-17 | End: 2018-11-17

## 2018-11-17 RX ORDER — POTASSIUM CHLORIDE 7.45 MG/ML
10 INJECTION INTRAVENOUS
Status: COMPLETED | OUTPATIENT
Start: 2018-11-17 | End: 2018-11-17

## 2018-11-17 RX ADMIN — INSULIN GLARGINE 45 UNITS: 100 INJECTION, SOLUTION SUBCUTANEOUS at 10:06

## 2018-11-17 RX ADMIN — MORPHINE SULFATE 1 MG: 2 INJECTION, SOLUTION INTRAMUSCULAR; INTRAVENOUS at 21:59

## 2018-11-17 RX ADMIN — DEXTROSE MONOHYDRATE: 25 INJECTION, SOLUTION INTRAVENOUS at 17:00

## 2018-11-17 RX ADMIN — VANCOMYCIN HYDROCHLORIDE 1750 MG: 10 INJECTION, POWDER, LYOPHILIZED, FOR SOLUTION INTRAVENOUS at 07:56

## 2018-11-17 RX ADMIN — MORPHINE SULFATE 1 MG: 2 INJECTION, SOLUTION INTRAMUSCULAR; INTRAVENOUS at 12:46

## 2018-11-17 RX ADMIN — HYDROXYZINE HYDROCHLORIDE 25 MG: 25 TABLET, FILM COATED ORAL at 12:36

## 2018-11-17 RX ADMIN — LIDOCAINE HYDROCHLORIDE: 10 INJECTION, SOLUTION EPIDURAL; INFILTRATION; INTRACAUDAL; PERINEURAL at 23:22

## 2018-11-17 RX ADMIN — SODIUM CHLORIDE 1000 ML: 900 INJECTION, SOLUTION INTRAVENOUS at 00:49

## 2018-11-17 RX ADMIN — LIDOCAINE HYDROCHLORIDE: 10 INJECTION, SOLUTION EPIDURAL; INFILTRATION; INTRACAUDAL; PERINEURAL at 08:00

## 2018-11-17 RX ADMIN — HEPARIN SODIUM 5000 UNITS: 5000 INJECTION, SOLUTION INTRAVENOUS; SUBCUTANEOUS at 20:51

## 2018-11-17 RX ADMIN — METOCLOPRAMIDE 5 MG: 5 INJECTION, SOLUTION INTRAMUSCULAR; INTRAVENOUS at 23:23

## 2018-11-17 RX ADMIN — TRAMADOL HYDROCHLORIDE 50 MG: 50 TABLET, FILM COATED ORAL at 08:07

## 2018-11-17 RX ADMIN — INSULIN LISPRO 8 UNITS: 100 INJECTION, SOLUTION INTRAVENOUS; SUBCUTANEOUS at 16:30

## 2018-11-17 RX ADMIN — LIDOCAINE HYDROCHLORIDE: 10 INJECTION, SOLUTION EPIDURAL; INFILTRATION; INTRACAUDAL; PERINEURAL at 11:00

## 2018-11-17 RX ADMIN — SODIUM CHLORIDE 125 ML/HR: 900 INJECTION, SOLUTION INTRAVENOUS at 07:00

## 2018-11-17 RX ADMIN — LIDOCAINE HYDROCHLORIDE: 10 INJECTION, SOLUTION EPIDURAL; INFILTRATION; INTRACAUDAL; PERINEURAL at 23:36

## 2018-11-17 RX ADMIN — SODIUM CHLORIDE 721 ML: 900 INJECTION, SOLUTION INTRAVENOUS at 00:50

## 2018-11-17 RX ADMIN — INSULIN LISPRO 8 UNITS: 100 INJECTION, SOLUTION INTRAVENOUS; SUBCUTANEOUS at 10:06

## 2018-11-17 RX ADMIN — INSULIN LISPRO 4 UNITS: 100 INJECTION, SOLUTION INTRAVENOUS; SUBCUTANEOUS at 12:38

## 2018-11-17 RX ADMIN — DEXTROSE MONOHYDRATE, SODIUM CHLORIDE, AND POTASSIUM CHLORIDE 125 ML/HR: 50; 4.5; .745 INJECTION, SOLUTION INTRAVENOUS at 21:07

## 2018-11-17 RX ADMIN — INSULIN LISPRO 6 UNITS: 100 INJECTION, SOLUTION INTRAVENOUS; SUBCUTANEOUS at 10:05

## 2018-11-17 RX ADMIN — METOCLOPRAMIDE 5 MG: 5 INJECTION, SOLUTION INTRAMUSCULAR; INTRAVENOUS at 17:54

## 2018-11-17 RX ADMIN — METOCLOPRAMIDE 5 MG: 5 INJECTION, SOLUTION INTRAMUSCULAR; INTRAVENOUS at 12:36

## 2018-11-17 RX ADMIN — LIDOCAINE HYDROCHLORIDE: 10 INJECTION, SOLUTION EPIDURAL; INFILTRATION; INTRACAUDAL; PERINEURAL at 20:59

## 2018-11-17 RX ADMIN — LIDOCAINE HYDROCHLORIDE: 10 INJECTION, SOLUTION EPIDURAL; INFILTRATION; INTRACAUDAL; PERINEURAL at 09:00

## 2018-11-17 RX ADMIN — METOCLOPRAMIDE 5 MG: 5 INJECTION, SOLUTION INTRAMUSCULAR; INTRAVENOUS at 10:09

## 2018-11-17 RX ADMIN — INSULIN LISPRO 8 UNITS: 100 INJECTION, SOLUTION INTRAVENOUS; SUBCUTANEOUS at 12:37

## 2018-11-17 RX ADMIN — LIDOCAINE HYDROCHLORIDE: 10 INJECTION, SOLUTION EPIDURAL; INFILTRATION; INTRACAUDAL; PERINEURAL at 01:30

## 2018-11-17 RX ADMIN — LIDOCAINE HYDROCHLORIDE: 10 INJECTION, SOLUTION EPIDURAL; INFILTRATION; INTRACAUDAL; PERINEURAL at 10:08

## 2018-11-17 RX ADMIN — ONDANSETRON HYDROCHLORIDE 4 MG: 2 SOLUTION INTRAMUSCULAR; INTRAVENOUS at 04:33

## 2018-11-17 RX ADMIN — SODIUM CHLORIDE 125 ML/HR: 900 INJECTION, SOLUTION INTRAVENOUS at 10:41

## 2018-11-17 RX ADMIN — POTASSIUM CHLORIDE 10 MEQ: 10 INJECTION, SOLUTION INTRAVENOUS at 00:57

## 2018-11-17 RX ADMIN — WATER 1 G: 1 INJECTION INTRAMUSCULAR; INTRAVENOUS; SUBCUTANEOUS at 23:27

## 2018-11-17 RX ADMIN — WATER 1 G: 1 INJECTION INTRAMUSCULAR; INTRAVENOUS; SUBCUTANEOUS at 01:11

## 2018-11-17 RX ADMIN — PAROXETINE HYDROCHLORIDE 30 MG: 20 TABLET, FILM COATED ORAL at 10:07

## 2018-11-17 RX ADMIN — HEPARIN SODIUM 5000 UNITS: 5000 INJECTION, SOLUTION INTRAVENOUS; SUBCUTANEOUS at 07:55

## 2018-11-17 RX ADMIN — HEPARIN SODIUM 5000 UNITS: 5000 INJECTION, SOLUTION INTRAVENOUS; SUBCUTANEOUS at 12:36

## 2018-11-17 NOTE — ED NOTES
Pt removed herself from the monitor and ambulated to the restroom after ED tech insisted she waited for a bed pan. ED tech with pt in restroom.

## 2018-11-17 NOTE — ROUTINE PROCESS
Bedside and Verbal shift change report given to AYAD Alarcon (oncoming nurse) by Chapo Munoz (offgoing nurse). Report included the following information KIRSTIEAR MAR.

## 2018-11-17 NOTE — ED NOTES
TRANSFER - OUT REPORT: 
 
Verbal report given to Merlin Righter RN(name) on Wilmer Ape  being transferred to 2 S(unit) forroutine progression of care Report consisted of patients Situation, Background, Assessment and  
Recommendations(SBAR). Information from the following report(s) SBAR, ED Summary, Procedure Summary, Intake/Output, MAR, Recent Results and Cardiac Rhythm STach was reviewed with the receiving nurse. Opportunity for questions and clarification was provided. Patient transported with: [employer]

## 2018-11-17 NOTE — PROGRESS NOTES
Patient had BM, same was soft but formed and was verified by a second RN. CDiff lab order discontinued. MD informed and agreed.

## 2018-11-17 NOTE — ED NOTES
Pt arrived to the eD by EMS with co NV x 1 week. Pt was seen in ED this week and DC with medications for symptoms. Hx GERD, pancreatitis.  Pt A&Ox4

## 2018-11-17 NOTE — ED PROVIDER NOTES
EMERGENCY DEPARTMENT HISTORY AND PHYSICAL EXAM 
 
10:36 PM 
 
 
Date: 11/16/2018 Patient Name: Vera Barfield History of Presenting Illness Chief Complaint Patient presents with  Nausea  Vomiting History Provided By: Patient Chief Complaint: Nausea Duration:  1 Week Timing:  Constant Location: Generalized Quality: N/A Severity: Moderate Modifying Factors: N/A Associated Symptoms: Vomiting, chills, fatigue, neck pain, back pain, and occipital head pain 10:48 PM Vera Barfield is a 52 y.o. female with h/o DM, HTN, pancreatitis, GERD, DM, and other PMHx who presents to ED complaining of constant, moderate nausea x one week, which is associated with vomiting, chills, fatigue, and pain in her neck, back pain, and occipital head pain. The patient was previously admitted to the hospital on 11/10/18 for sepsis, but the patient does not recall why she was admitted. She is taking cholesterol medication. The patient denies MI, dysuria, thyroid disease, liver disease or kidney disease. No other concerns or symptoms at this time. PCP: Leonora Hallman MD 
 
Current Facility-Administered Medications Medication Dose Route Frequency Provider Last Rate Last Dose  sodium chloride (NS) flush 5-10 mL  5-10 mL IntraVENous PRN Ellen Morales MD      
 cefTRIAXone (ROCEPHIN) 1 g in sterile water (preservative free) 10 mL IV syringe  1 g IntraVENous Q24H Ellen Morales MD   1 g at 11/17/18 0111  
 vancomycin (VANCOCIN) 1750 mg in  ml infusion  1,750 mg IntraVENous ONCE Ellen Morales MD      
 VANCOMYCIN INFORMATION NOTE   Other Rx Dosing/Monitoring Ellen Morales MD      
 
Current Outpatient Medications Medication Sig Dispense Refill  docusate sodium (COLACE) 100 mg capsule Take 1 Cap by mouth daily for 30 days. 30 Cap 0  
 metroNIDAZOLE (FLAGYL) 500 mg tablet Take 1 Tab by mouth every eight (8) hours.  9 Tab 0  
  cefdinir (OMNICEF) 300 mg capsule Take 1 Cap by mouth two (2) times a day. 6 Cap 0  
 metFORMIN ER (GLUCOPHAGE XR) 500 mg tablet TAKE 1 TABLET BY MOUTH DAILY WITH DINNER 30 Tab 0  
 rosuvastatin (CRESTOR) 20 mg tablet Take 1 Tab by mouth nightly. 90 Tab 3  
 PARoxetine (PAXIL) 30 mg tablet TAKE 1 TABLET BY MOUTH DAILY 30 Tab 3  
 insulin NPH/insulin regular (NOVOLIN 70/30, HUMULIN 70/30) 100 unit/mL (70-30) injection 40 Units by SubCUTAneous route two (2) times a day. 10 mL 3 Past History Past Medical History: 
Past Medical History:  
Diagnosis Date  Blind left eye  Cellulitis of great toe of right foot 10/19/2017  Diabetes ([address]  Diabetic retinopathy ([address]  Gall stones  GERD (gastroesophageal reflux disease)  Hammertoe  Hiatal hernia  History of mammogram 10/03/2017 No evidence of malignancy  Hypertension  Mass of abdomen  Neuropathy due to type 2 diabetes mellitus ([address]  Osteomyelitis of toe of right foot ([address] 10/19/2017  Pancreatitis Past Surgical History: 
Past Surgical History:  
Procedure Laterality Date  HX AMPUTATION Left 2017  
 left 2nd toe  HX CHOLECYSTECTOMY  10/15/2014  HX GI Benign GI Stromal Tumor excision  HX HEENT Sx for detached retina  HX MYOMECTOMY x5 removed  HX OTHER SURGICAL    
 upper endoscopy Family History: 
Family History Adopted: Yes  
Problem Relation Age of Onset  Heart Failure Mother 76  
 Other Father 70  
     blood clot after surgery  Diabetes Paternal Aunt  Diabetes Paternal Uncle Social History: 
Social History Tobacco Use  Smoking status: Former Smoker Packs/day: 0.20 Years: 8.00 Pack years: 1.60 Types: Cigarettes Last attempt to quit: 12/3/1995 Years since quittin.9  Smokeless tobacco: Never Used Substance Use Topics  Alcohol use: No  
  Comment: Drinks 3-4xs year generally wine  Drug use: No  
 
 
Allergies: Allergies Allergen Reactions  Levaquin [Levofloxacin] Hives  Promethazine Nausea and Vomiting \"the phenergan made me more nauseated\" Review of Systems Review of Systems Constitutional: Positive for chills and fatigue. HENT:  
     Occipital head pain Gastrointestinal: Positive for nausea and vomiting. Genitourinary: Negative for dysuria. Musculoskeletal: Positive for back pain and neck pain. All other systems reviewed and are negative. Physical Exam  
 
Visit Vitals /84 Pulse (!) 120 Temp 98.6 °F (37 °C) Resp 24 Wt 90.7 kg (200 lb) LMP 10/06/2015 (Exact Date) SpO2 91% BMI 30.41 kg/m² Physical Exam 
. Patient Vitals for the past 12 hrs: 
 Temp Pulse Resp BP SpO2  
11/17/18 0215  (!) 120 24  91 % 11/17/18 0200  (!) 104 16  98 % 11/17/18 0145  (!) 105 17  100 % 11/17/18 0130  (!) 108 22  98 % 11/17/18 0115  (!) 117 23    
11/17/18 0100  (!) 112 25    
11/17/18 0045  (!) 110 19  99 % 11/17/18 0030  (!) 112 17 132/84 100 % 11/17/18 0000    112/76   
11/16/18 2345  (!) 117 20 114/80 100 % 11/16/18 2341  (!) 117 17  100 % 11/16/18 2340    98/59   
11/16/18 2300  (!) 113 21 134/86 100 % 11/16/18 2245  (!) 114 26 135/72 100 % 11/16/18 2230  (!) 120 22 126/66 100 % 11/16/18 2215  (!) 117 20 (!) 135/107   
11/16/18 2200  (!) 115 23 124/86   
11/16/18 2145  (!) 114 19 101/74 100 % 11/16/18 2134 98.6 °F (37 °C) (!) 108 14 143/84 100 % Gen: Well developed, well nourished 52 y.o. female HEENT: Normocephalic, atraumatic Respiratory: No accessory muscle use No wheeze, No rales, No rhonchi. Normal chest wall excursion. No subcutaneous air, no rib crepitus Cardiovascular: Regular rhythm and rate, Normal pulses, Normal perfusion. No edema Gastrointestinal: Non distended, Non tender, No masses. No ascites. No organomegaly. No evidence of trauma Musculoskeletal: Full range of motion at all other tested joints. No joint effusions. Neuro: Normal strength, Normal sensation. Normal speech. No ataxia. Cranial Nerves II-XII normal as tested. Skin: No rash, petechia or purpura. Warm and dry Psyche: No suicidal ideation, No homicidal ideation. No hallucinations. Organized thoughts. Heme: Normal 
: Deferred Diagnostic Study Results Labs - Recent Results (from the past 12 hour(s)) TSH 3RD GENERATION Collection Time: 11/16/18  9:38 PM  
Result Value Ref Range TSH 1.38 0.36 - 3.74 uIU/mL  
LIPASE Collection Time: 11/16/18  9:38 PM  
Result Value Ref Range Lipase 178 73 - 393 U/L  
CBC WITH AUTOMATED DIFF Collection Time: 11/16/18  9:38 PM  
Result Value Ref Range WBC 21.8 (H) 4.6 - 13.2 K/uL  
 RBC 4.34 4.20 - 5.30 M/uL  
 HGB 12.8 12.0 - 16.0 g/dL HCT 35.9 35.0 - 45.0 % MCV 82.7 74.0 - 97.0 FL  
 MCH 29.5 24.0 - 34.0 PG  
 MCHC 35.7 31.0 - 37.0 g/dL  
 RDW 13.8 11.6 - 14.5 % PLATELET 670 619 - 066 K/uL MPV 11.1 9.2 - 11.8 FL  
 NEUTROPHILS 81 (H) 42 - 75 % LYMPHOCYTES 15 (L) 20 - 51 % MONOCYTES 4 2 - 9 % EOSINOPHILS 0 0 - 5 % BASOPHILS 0 0 - 3 %  
 ABS. NEUTROPHILS 17.6 (H) 1.8 - 8.0 K/UL  
 ABS. LYMPHOCYTES 3.3 0.8 - 3.5 K/UL  
 ABS. MONOCYTES 0.9 0 - 1.0 K/UL  
 ABS. EOSINOPHILS 0.0 0.0 - 0.4 K/UL  
 ABS. BASOPHILS 0.0 0.0 - 0.06 K/UL  
 DF MANUAL PLATELET COMMENTS ADEQUATE PLATELETS    
 RBC COMMENTS NORMOCYTIC, NORMOCHROMIC METABOLIC PANEL, COMPREHENSIVE Collection Time: 11/16/18  9:38 PM  
Result Value Ref Range Sodium 133 (L) 136 - 145 mmol/L Potassium 3.0 (L) 3.5 - 5.5 mmol/L Chloride 96 (L) 100 - 108 mmol/L  
 CO2 26 21 - 32 mmol/L Anion gap 11 3.0 - 18 mmol/L Glucose 339 (H) 74 - 99 mg/dL BUN 50 (H) 7.0 - 18 MG/DL Creatinine 3.33 (H) 0.6 - 1.3 MG/DL  
 BUN/Creatinine ratio 15 12 - 20 GFR est AA 17 (L) >60 ml/min/1.73m2 GFR est non-AA 14 (L) >60 ml/min/1.73m2 Calcium 8.7 8.5 - 10.1 MG/DL Bilirubin, total 2.0 (H) 0.2 - 1.0 MG/DL  
 ALT (SGPT) 90 (H) 13 - 56 U/L  
 AST (SGOT) 112 (H) 15 - 37 U/L Alk. phosphatase 129 (H) 45 - 117 U/L Protein, total 8.2 6.4 - 8.2 g/dL Albumin 4.0 3.4 - 5.0 g/dL Globulin 4.2 (H) 2.0 - 4.0 g/dL A-G Ratio 1.0 0.8 - 1.7    
TROPONIN I Collection Time: 11/16/18  9:38 PM  
Result Value Ref Range Troponin-I, Qt. 0.03 0.0 - 0.045 NG/ML  
EKG, 12 LEAD, INITIAL Collection Time: 11/16/18 10:56 PM  
Result Value Ref Range Ventricular Rate 117 BPM  
 Atrial Rate 117 BPM  
 P-R Interval 140 ms QRS Duration 68 ms Q-T Interval 348 ms QTC Calculation (Bezet) 485 ms Calculated P Axis 63 degrees Calculated R Axis 50 degrees Calculated T Axis 78 degrees Diagnosis Sinus tachycardia Possible Left atrial enlargement Septal infarct , age undetermined Abnormal ECG When compared with ECG of 10-NOV-2018 22:12, Minimal criteria for Inferior infarct are no longer present Nonspecific T wave abnormality now evident in Inferior leads Nonspecific T wave abnormality, worse in Lateral leads POC LACTIC ACID Collection Time: 11/17/18  1:18 AM  
Result Value Ref Range Lactic Acid (POC) 2.39 (HH) 0.40 - 2.00 mmol/L  
URINALYSIS W/ RFLX MICROSCOPIC Collection Time: 11/17/18  2:45 AM  
Result Value Ref Range Color YELLOW Appearance CLEAR Specific gravity 1.013 1.005 - 1.030    
 pH (UA) 5.0 5.0 - 8.0 Protein 100 (A) NEG mg/dL Glucose 100 (A) NEG mg/dL Ketone TRACE (A) NEG mg/dL Bilirubin NEGATIVE  NEG Blood MODERATE (A) NEG Urobilinogen 0.2 0.2 - 1.0 EU/dL Nitrites NEGATIVE  NEG Leukocyte Esterase NEGATIVE  NEG Radiologic Studies -  
CT SPINE CERV WO CONT Final Result CT HEAD WO CONT Final Result XR CHEST SNGL V    (Results Pending) CT Head: 
 IMPRESSION: 
 
Negative CT scan of the brain.  There is no hemorrhage, mass, nor acute infarct. Report provided to the emergency department at 2340 hrs. CT Spine: 
 IMPRESSION: 
 
No evidence of acute fracture or dislocation. Stable degenerative disc disease. Stable mild AP offset C4/5. Stable central canal stenosis Report provided to the emergency department at 2344 hrs. Medical Decision Making I am the first provider for this patient. I reviewed the vital signs, available nursing notes, past medical history, past surgical history, family history and social history. Vital Signs-Reviewed the patient's vital signs. Pulse Oximetry Analysis -  100% on room air, Normal. 
 
EKG: Interpreted by the EP. Time Interpreted: 10:58 PM 
 Rate: 117 PM 
 Rhythm: Sinus Tachycardia Interpretation: 
 
Records Reviewed: Nursing Notes (Time of Review: 10:36 PM) 
 
ED Course: Progress Notes, Reevaluation, and Consults: 
 
Provider Notes (Medical Decision Making): For Hospitalized Patients: 
 
1. Hospitalization Decision Time: The decision to hospitalize the patient was made by Dr. Denver Mccallum at 4:17 AM on 11/16/2018 2. Aspirin: Aspirin was not given because the patient did not present with a stroke at the time of their Emergency Department evaluation Diagnosis Clinical Impression: No diagnosis found. Disposition: Admitted Follow-up Information None Medication List  
  
ASK your doctor about these medications   
cefdinir 300 mg capsule Commonly known as:  OMNICEF Take 1 Cap by mouth two (2) times a day. docusate sodium 100 mg capsule Commonly known as:  Louis Fearing Take 1 Cap by mouth daily for 30 days. insulin NPH/insulin regular 100 unit/mL (70-30) injection Commonly known as:  NOVOLIN 70/30, HUMULIN 70/30 
40 Units by SubCUTAneous route two (2) times a day. metFORMIN  mg tablet Commonly known as:  GLUCOPHAGE XR 
TAKE 1 TABLET BY MOUTH DAILY WITH DINNER 
  
metoclopramide HCl 10 mg tablet Commonly known as:  REGLAN 
 Take 1 Tab by mouth Before breakfast, lunch, dinner and at bedtime for 2 days. Ask about: Should I take this medication? 
  
metroNIDAZOLE 500 mg tablet Commonly known as:  FLAGYL Take 1 Tab by mouth every eight (8) hours. PARoxetine 30 mg tablet Commonly known as:  PAXIL TAKE 1 TABLET BY MOUTH DAILY 
  
rosuvastatin 20 mg tablet Commonly known as:  CRESTOR Take 1 Tab by mouth nightly. 
  
  
 
_______________________________ Scribe Attestation Dilma Hsu acting as a scribe for and in the presence of Wendy Morales MD     
November 16, 2018 at 10:36 PM 
    
Provider Attestation:     
I personally performed the services described in the documentation, reviewed the documentation, as recorded by the scribe in my presence, and it accurately and completely records my words and actions. November 16, 2018 at 10:36 PM - Wendy Morales MD   
 
 
_______________________________

## 2018-11-17 NOTE — ED NOTES
Pt also co back and neck pain and stated, \"I have been falling all day. \" Pt repositioned on stretcher and provided a pillow per request

## 2018-11-17 NOTE — MED STUDENT NOTES
*ATTENTION:  This note has been created by a medical student for educational purposes only. Please do not refer to the content of this note for clinical decision-making, billing, or other purposes. Please see attending physicians note to obtain clinical information on this patient. * Progress Note Patient: Pilar Zapata          Sex: Female       DOA: 11/17/2018 YOB: 1965      Age:  52       LOS:  LOS: 0 days Subjective:  
Ms. Shreyas Schaeffer is a 52year old  Female who presented this morning with abdominal pain, nausea and vomiting. This morning she reports feeling a little better, however her nausea/vomting and her abdominal pain have not changed. She is requesting IV pain medicine because she feels that she is vomiting up her pain medicine before it can take effect. She is also requesting medication for anxiety. She denies any other concerns at this time ROS- 
Neurological- Endorses weakness and fatigue, denies any lightheadedness or dizziness Cardiovascular-denies any palpitations Respiratory-denies any shortness of breath or coughing Gastrointestinal- Endorses nausea, vomiting and abdominal pain Genitourinary- denies any dysuria, frequency or urgency Objective:  
  
Visit Vitals /74 Pulse (!) 107 Temp 97.6 °F (36.4 °C) Resp 18 Wt 90.7 kg (200 lb) SpO2 100% BMI 30.41 kg/m² Physical Exam: 
General:  Alert, anxious fatigued, cooperative, no distress, appears stated age Cardiovascular:RRR, no murmurs rubs or gallops normal S1 and S2 Respiratory- CTAB Gastrointestinal-bowel sounds present, soft, non distended, tenderness to palpation in all 4 quadrants Musculoskeletal- No pitting edema, missing great toe on right foot and second toe on left foot Skin-no rashes or lesions Intake and Output: 
Current Shift:  No intake/output data recorded. Last three shifts:  11/15 1901 - 11/17 0700 In: 1800 [I.V.:3821] Out: - Recent Results (from the past 48 hour(s)) TSH 3RD GENERATION Collection Time: 11/16/18  9:38 PM  
Result Value Ref Range TSH 1.38 0.36 - 3.74 uIU/mL  
LIPASE Collection Time: 11/16/18  9:38 PM  
Result Value Ref Range Lipase 178 73 - 393 U/L  
CBC WITH AUTOMATED DIFF Collection Time: 11/16/18  9:38 PM  
Result Value Ref Range WBC 21.8 (H) 4.6 - 13.2 K/uL  
 RBC 4.34 4.20 - 5.30 M/uL  
 HGB 12.8 12.0 - 16.0 g/dL HCT 35.9 35.0 - 45.0 % MCV 82.7 74.0 - 97.0 FL  
 MCH 29.5 24.0 - 34.0 PG  
 MCHC 35.7 31.0 - 37.0 g/dL  
 RDW 13.8 11.6 - 14.5 % PLATELET 352 634 - 266 K/uL MPV 11.1 9.2 - 11.8 FL  
 NEUTROPHILS 81 (H) 42 - 75 % LYMPHOCYTES 15 (L) 20 - 51 % MONOCYTES 4 2 - 9 % EOSINOPHILS 0 0 - 5 % BASOPHILS 0 0 - 3 %  
 ABS. NEUTROPHILS 17.6 (H) 1.8 - 8.0 K/UL  
 ABS. LYMPHOCYTES 3.3 0.8 - 3.5 K/UL  
 ABS. MONOCYTES 0.9 0 - 1.0 K/UL  
 ABS. EOSINOPHILS 0.0 0.0 - 0.4 K/UL  
 ABS. BASOPHILS 0.0 0.0 - 0.06 K/UL  
 DF MANUAL PLATELET COMMENTS ADEQUATE PLATELETS    
 RBC COMMENTS NORMOCYTIC, NORMOCHROMIC METABOLIC PANEL, COMPREHENSIVE Collection Time: 11/16/18  9:38 PM  
Result Value Ref Range Sodium 133 (L) 136 - 145 mmol/L Potassium 3.0 (L) 3.5 - 5.5 mmol/L Chloride 96 (L) 100 - 108 mmol/L  
 CO2 26 21 - 32 mmol/L Anion gap 11 3.0 - 18 mmol/L Glucose 339 (H) 74 - 99 mg/dL BUN 50 (H) 7.0 - 18 MG/DL Creatinine 3.33 (H) 0.6 - 1.3 MG/DL  
 BUN/Creatinine ratio 15 12 - 20 GFR est AA 17 (L) >60 ml/min/1.73m2 GFR est non-AA 14 (L) >60 ml/min/1.73m2 Calcium 8.7 8.5 - 10.1 MG/DL Bilirubin, total 2.0 (H) 0.2 - 1.0 MG/DL  
 ALT (SGPT) 90 (H) 13 - 56 U/L  
 AST (SGOT) 112 (H) 15 - 37 U/L Alk. phosphatase 129 (H) 45 - 117 U/L Protein, total 8.2 6.4 - 8.2 g/dL Albumin 4.0 3.4 - 5.0 g/dL Globulin 4.2 (H) 2.0 - 4.0 g/dL A-G Ratio 1.0 0.8 - 1.7    
TROPONIN I  Collection Time: 11/16/18  9:38 PM  
 Result Value Ref Range Troponin-I, Qt. 0.03 0.0 - 0.045 NG/ML  
EKG, 12 LEAD, INITIAL Collection Time: 11/16/18 10:56 PM  
Result Value Ref Range Ventricular Rate 117 BPM  
 Atrial Rate 117 BPM  
 P-R Interval 140 ms QRS Duration 68 ms Q-T Interval 348 ms QTC Calculation (Bezet) 485 ms Calculated P Axis 63 degrees Calculated R Axis 50 degrees Calculated T Axis 78 degrees Diagnosis Sinus tachycardia Possible Left atrial enlargement Septal infarct , age undetermined Abnormal ECG Confirmed by Curry Hanson MD, John Joseph ([id number]) on 11/17/2018 8:35:34 AM 
  
CULTURE, BLOOD Collection Time: 11/17/18 12:50 AM  
Result Value Ref Range Special Requests: NO SPECIAL REQUESTS Culture result: NO GROWTH AFTER 4 HOURS    
CULTURE, BLOOD Collection Time: 11/17/18  1:12 AM  
Result Value Ref Range Special Requests: NO SPECIAL REQUESTS Culture result: NO GROWTH AFTER 4 HOURS    
POC LACTIC ACID Collection Time: 11/17/18  1:18 AM  
Result Value Ref Range Lactic Acid (POC) 2.39 (HH) 0.40 - 2.00 mmol/L  
C. DIFFICILE/EPI PCR Collection Time: 11/17/18  1:42 AM  
Result Value Ref Range Special Requests: NO SPECIAL REQUESTS Culture result: Toxigenic C. difficile NEGATIVE                         C. difficile target DNA sequences are not detected. URINALYSIS W/ RFLX MICROSCOPIC Collection Time: 11/17/18  2:45 AM  
Result Value Ref Range Color YELLOW Appearance CLEAR Specific gravity 1.013 1.005 - 1.030    
 pH (UA) 5.0 5.0 - 8.0 Protein 100 (A) NEG mg/dL Glucose 100 (A) NEG mg/dL Ketone TRACE (A) NEG mg/dL Bilirubin NEGATIVE  NEG Blood MODERATE (A) NEG Urobilinogen 0.2 0.2 - 1.0 EU/dL Nitrites NEGATIVE  NEG Leukocyte Esterase NEGATIVE  NEG    
URINE MICROSCOPIC ONLY Collection Time: 11/17/18  2:45 AM  
Result Value Ref Range WBC 0 to 3 0 - 4 /hpf  
 RBC 0 to 3 0 - 5 /hpf Epithelial cells 1+ 0 - 5 /lpf Bacteria NEGATIVE  NEG /hpf Mucus 1+ (A) NEG /lpf Amorphous Crystals 1+ (A) NEG Hyaline cast 4 to 10 0 - 2 /lpf POC LACTIC ACID Collection Time: 11/17/18  4:20 AM  
Result Value Ref Range Lactic Acid (POC) 0.91 0.40 - 2.00 mmol/L Lab/Data Reviewed: All lab results for the last 24 hours reviewed. Medications Reviewed Continued hospitalization is indicated due to elevated WBC count and severe nausea, vomiting and abdominal pain. Assessment/Plan Ms Danetta Leventhal is a 52year old female with pmhx significant for T2DM with retinopathy and neuropathy, osteomyeltis, cholecystectomy, pancreatitis, HTN and anxiety who was admitted today with SIRS criteria and intractable nausea and vomiting 2/2 likely diabetic paresis. SIRS, nausea, vomiting, abdominal pain - Currently, only has 2/4 SIRIS criteria (elevated HR and leukocytosis), temperature has resolved. Lactic acid is now wnl, 0.91. Cxray AP unremarkable. CT abd/pelvis shows small hiatal hernia, no small bowel obstruction. No inflammation observed. CT spine showed some narrowing of  AP spinal canal to 7 mm. No evidence of herniated disc. No C diff detected. Urine and stool culture pending.  
- Continue Rocephin, Vancomycin 
- Continue Reglan 5 mg IV  
- Continue Phenergan 25 mg IV q6 
- Continue NPO 
-  
Hypokalemia- K+ 3.0  
- Give IV K+ 
- Monitor K+ levels Essential HTN 
- well controlled 
- Hold Lisinopril HLD  
 -Hold Crestor Anxiety 
- Continue Paxil 
-Hydroxyzine 25 mg PO 
- Encourage Deep Breathing Diabetes - Last A1C was 6.5 on 11/9/18 
- Hold metformin in setting of CRYSTAL, Hold Insulin 70/30 40 units BID 
-switch accucheck and SSI to ACHS when tolerating PO Pauline Rosanna November 17, 2018

## 2018-11-17 NOTE — H&P
Admission History and Physical 
[facility] Patient: Alonso Tobar MRN: [id number]  [facility]: [id number] YOB: 1965  Age: 52 y.o. Sex: female DOA: 11/16/2018 HPI:  
 
Alonso Tobar is a 52 y.o. female with PMH T2DM with neuropathy, HTN, HLD and Anxiety, now presenting with complaint of n/v and abdominal pain. Patient reports continued abdominal pain since discharge on 11/11-14 for sepsis secondary to gastroenteritis on Omnicef and Flagyl. She endorses nausea, NBNB emesis daily, lower abdominal pain, diarrhea and heartburn. Patient reports poor PO intake and poor medication compliance due to intractable n/v and abdominal pain. Patient last had [place] one week ago, otherwise denies tobacco and recreational drug use. Patient has had resection of gastric fundal (stromal) tumor and cholecystectomy in 2014. ED Course: Patient has WBC 21.8 and Lactic Acid 2.39. Her Troponin 0.03 and lipase 178. CXR Pending. Patient started on Rocephin and vancomycin. Patient given fluid bolus 4L, potassium and zofran. Review of Systems - General ROS: negative for  - chills, fever Psychological ROS: anxiety Ophthalmic ROS: Blind in left eye, negative for - blurry vision or loss of vision ENT ROS: negative for - headaches, hearing change or visual changes Hematological and Lymphatic ROS: negative for - bruising, jaundice Respiratory ROS: negative for - cough, hemoptysis, shortness of breath or wheezing Cardiovascular ROS:Heartburn, negative for - edema, loss of consciousness, or palpitations Gastrointestinal ROS: n/v, abd pain, diarrhea, negative for - blood in stools, constipation,  hematemesis, melena or swallowing difficulty/pain Genito-Urinary ROS: negative for - dysuria, hematuria or urinary frequency/urgency Musculoskeletal ROS: negative for - joint pain, joint swelling or muscle pain Neurological ROS: negative for - dizziness, headaches, numbness/tingling or weakness Dermatological ROS: negative for - rash or skin lesion changes Past Medical History:  
Diagnosis Date  Blind left eye  Cellulitis of great toe of right foot 10/19/2017  Diabetes ([facility]  Diabetic retinopathy ([facility]  Gall stones  GERD (gastroesophageal reflux disease)  Hammertoe  Hiatal hernia  History of mammogram 10/03/2017 No evidence of malignancy  Hypertension  Mass of abdomen  Neuropathy due to type 2 diabetes mellitus ([facility]  Osteomyelitis of toe of right foot ([facility] 10/19/2017  Pancreatitis Past Surgical History:  
Procedure Laterality Date  HX AMPUTATION Left 2017  
 left 2nd toe  HX CHOLECYSTECTOMY  10/15/2014  HX GI Benign GI Stromal Tumor excision  HX HEENT Sx for detached retina  HX MYOMECTOMY x5 removed  HX OTHER SURGICAL    
 upper endoscopy Family History Adopted: Yes  
Problem Relation Age of Onset  Heart Failure Mother 76  
 Other Father 70  
     blood clot after surgery  Diabetes Paternal Aunt  Diabetes Paternal Uncle Social History Socioeconomic History  Marital status: SINGLE Spouse name: Not on file  Number of children: Not on file  Years of education: Not on file  Highest education level: Not on file Social Needs  Financial resource strain: Not on file  Food insecurity - worry: Not on file  Food insecurity - inability: Not on file  Transportation needs - medical: Not on file  Transportation needs - non-medical: Not on file Occupational History  Not on file Tobacco Use  Smoking status: Former Smoker Packs/day: 0.20 Years: 8.00 Pack years: 1.60 Types: Cigarettes Last attempt to quit: 12/3/1995 Years since quittin.9  Smokeless tobacco: Never Used Substance and Sexual Activity  Alcohol use: No  
  Comment: Drinks 3-4xs year generally wine  Drug use:  No  
  Sexual activity: Not Currently Other Topics Concern   Service No  
 Blood Transfusions No  
 Caffeine Concern No  
 Occupational Exposure No  
 Hobby Hazards No  
 Sleep Concern No  
 Stress Concern No  
 Weight Concern No  
 Special Diet Yes  Back Care No  
 Exercise No  
 Bike Helmet No  
 Seat Belt Yes  Self-Exams Yes Social History Narrative Lives with 16year old son  with ex   Does not have health insurance Allergies Allergen Reactions  Levaquin [Levofloxacin] Hives  Promethazine Nausea and Vomiting \"the phenergan made me more nauseated\" Prior to Admission Medications Prescriptions Last Dose Informant Patient Reported? Taking? PARoxetine (PAXIL) 30 mg tablet   No No  
Sig: TAKE 1 TABLET BY MOUTH DAILY  
cefdinir (OMNICEF) 300 mg capsule   No No  
Sig: Take 1 Cap by mouth two (2) times a day. docusate sodium (COLACE) 100 mg capsule   No No  
Sig: Take 1 Cap by mouth daily for 30 days. insulin NPH/insulin regular (NOVOLIN 70/30, HUMULIN 70/30) 100 unit/mL (70-30) injection   No No  
Si Units by SubCUTAneous route two (2) times a day. metFORMIN ER (GLUCOPHAGE XR) 500 mg tablet   No No  
Sig: TAKE 1 TABLET BY MOUTH DAILY WITH DINNER  
metoclopramide HCl (REGLAN) 10 mg tablet   No No  
Sig: Take 1 Tab by mouth Before breakfast, lunch, dinner and at bedtime for 2 days. metroNIDAZOLE (FLAGYL) 500 mg tablet   No No  
Sig: Take 1 Tab by mouth every eight (8) hours. rosuvastatin (CRESTOR) 20 mg tablet   No No  
Sig: Take 1 Tab by mouth nightly. Facility-Administered Medications: None Physical Exam:  
 
Patient Vitals for the past 24 hrs: 
 Temp Pulse Resp BP SpO2  
18 0400  (!) 144 25  97 % 18 0345  (!) 104 17  97 % 18 0315  97 20  97 % 18 0300  (!) 115 22 138/66   
18 0245  (!) 104 23  100 % 18 0230  (!) 108 19  100 % 11/17/18 0215  (!) 120 24  91 % 11/17/18 0200  (!) 104 16  98 % 11/17/18 0145  (!) 105 17  100 % 11/17/18 0130  (!) 108 22  98 % 11/17/18 0115  (!) 117 23    
11/17/18 0100  (!) 112 25    
11/17/18 0045  (!) 110 19  99 % 11/17/18 0030  (!) 112 17 132/84 100 % 11/17/18 0000    112/76   
11/16/18 2345  (!) 117 20 114/80 100 % 11/16/18 2341  (!) 117 17  100 % 11/16/18 2340    98/59   
11/16/18 2300  (!) 113 21 134/86 100 % 11/16/18 2245  (!) 114 26 135/72 100 % 11/16/18 2230  (!) 120 22 126/66 100 % 11/16/18 2215  (!) 117 20 (!) 135/107   
11/16/18 2200  (!) 115 23 124/86   
11/16/18 2145  (!) 114 19 101/74 100 % 11/16/18 2134 98.6 °F (37 °C) (!) 108 14 143/84 100 % Physical Exam:  
General:  Alert and Responsive and anxious. HEENT: Conjunctiva pink, sclera anicteric. EOMI. Pharynx moist, nonerythematous. Moist mucous membranes. Thyroid not enlarged, no nodules. No cervical, supraclavicular, occipital or submandibular lymphadenopathy. No other gross abnormalities present. CV:  Regular rhythm, tachycardia, no murmurs. No visible pulsations or thrills. RESP:  Unlabored breathing. Lungs clear to auscultation. no wheeze, rales, or rhonchi. Equal expansion bilaterally. ABD:  Hypoactive BS, Soft, TTP in epigastric and lower abd, nondistended. MS:  No joint deformity or instability. No atrophy. Neuro:  5/5 strength bilateral upper extremities and lower extremities. A+Ox3. Ext:  No edema. 2+ radial and dp pulses bilaterally. Patient has amputated R foot first digit and L foot second digit due to osteomyelitis. Skin:  No rashes, lesions, or ulcers. Good turgor. IMAGING:  
Ct Head Wo Cont Result Date: 11/16/2018 IMPRESSION: Negative CT scan of the brain. There is no hemorrhage, mass, nor acute infarct. Report provided to the emergency department at 2340 hrs. Ct Spine Cerv Wo Cont Result Date: 11/16/2018 IMPRESSION: No evidence of acute fracture or dislocation. Stable degenerative disc disease. Stable mild AP offset C4/5. Stable central canal stenosis Report provided to the emergency department at 2344 hrs. Recent Results (from the past 12 hour(s)) TSH 3RD GENERATION Collection Time: 11/16/18  9:38 PM  
Result Value Ref Range TSH 1.38 0.36 - 3.74 uIU/mL  
LIPASE Collection Time: 11/16/18  9:38 PM  
Result Value Ref Range Lipase 178 73 - 393 U/L  
CBC WITH AUTOMATED DIFF Collection Time: 11/16/18  9:38 PM  
Result Value Ref Range WBC 21.8 (H) 4.6 - 13.2 K/uL  
 RBC 4.34 4.20 - 5.30 M/uL  
 HGB 12.8 12.0 - 16.0 g/dL HCT 35.9 35.0 - 45.0 % MCV 82.7 74.0 - 97.0 FL  
 MCH 29.5 24.0 - 34.0 PG  
 MCHC 35.7 31.0 - 37.0 g/dL  
 RDW 13.8 11.6 - 14.5 % PLATELET 246 815 - 783 K/uL MPV 11.1 9.2 - 11.8 FL  
 NEUTROPHILS 81 (H) 42 - 75 % LYMPHOCYTES 15 (L) 20 - 51 % MONOCYTES 4 2 - 9 % EOSINOPHILS 0 0 - 5 % BASOPHILS 0 0 - 3 %  
 ABS. NEUTROPHILS 17.6 (H) 1.8 - 8.0 K/UL  
 ABS. LYMPHOCYTES 3.3 0.8 - 3.5 K/UL  
 ABS. MONOCYTES 0.9 0 - 1.0 K/UL  
 ABS. EOSINOPHILS 0.0 0.0 - 0.4 K/UL  
 ABS. BASOPHILS 0.0 0.0 - 0.06 K/UL  
 DF MANUAL PLATELET COMMENTS ADEQUATE PLATELETS    
 RBC COMMENTS NORMOCYTIC, NORMOCHROMIC METABOLIC PANEL, COMPREHENSIVE Collection Time: 11/16/18  9:38 PM  
Result Value Ref Range Sodium 133 (L) 136 - 145 mmol/L Potassium 3.0 (L) 3.5 - 5.5 mmol/L Chloride 96 (L) 100 - 108 mmol/L  
 CO2 26 21 - 32 mmol/L Anion gap 11 3.0 - 18 mmol/L Glucose 339 (H) 74 - 99 mg/dL BUN 50 (H) 7.0 - 18 MG/DL Creatinine 3.33 (H) 0.6 - 1.3 MG/DL  
 BUN/Creatinine ratio 15 12 - 20 GFR est AA 17 (L) >60 ml/min/1.73m2 GFR est non-AA 14 (L) >60 ml/min/1.73m2 Calcium 8.7 8.5 - 10.1 MG/DL Bilirubin, total 2.0 (H) 0.2 - 1.0 MG/DL  
 ALT (SGPT) 90 (H) 13 - 56 U/L  
 AST (SGOT) 112 (H) 15 - 37 U/L Alk.  phosphatase 129 (H) 45 - 117 U/L  
 Protein, total 8.2 6.4 - 8.2 g/dL Albumin 4.0 3.4 - 5.0 g/dL Globulin 4.2 (H) 2.0 - 4.0 g/dL A-G Ratio 1.0 0.8 - 1.7    
TROPONIN I Collection Time: 11/16/18  9:38 PM  
Result Value Ref Range Troponin-I, Qt. 0.03 0.0 - 0.045 NG/ML  
EKG, 12 LEAD, INITIAL Collection Time: 11/16/18 10:56 PM  
Result Value Ref Range Ventricular Rate 117 BPM  
 Atrial Rate 117 BPM  
 P-R Interval 140 ms QRS Duration 68 ms Q-T Interval 348 ms QTC Calculation (Bezet) 485 ms Calculated P Axis 63 degrees Calculated R Axis 50 degrees Calculated T Axis 78 degrees Diagnosis Sinus tachycardia Possible Left atrial enlargement Septal infarct , age undetermined Abnormal ECG When compared with ECG of 10-NOV-2018 22:12, Minimal criteria for Inferior infarct are no longer present Nonspecific T wave abnormality now evident in Inferior leads Nonspecific T wave abnormality, worse in Lateral leads POC LACTIC ACID Collection Time: 11/17/18  1:18 AM  
Result Value Ref Range Lactic Acid (POC) 2.39 (HH) 0.40 - 2.00 mmol/L  
URINALYSIS W/ RFLX MICROSCOPIC Collection Time: 11/17/18  2:45 AM  
Result Value Ref Range Color YELLOW Appearance CLEAR Specific gravity 1.013 1.005 - 1.030    
 pH (UA) 5.0 5.0 - 8.0 Protein 100 (A) NEG mg/dL Glucose 100 (A) NEG mg/dL Ketone TRACE (A) NEG mg/dL Bilirubin NEGATIVE  NEG Blood MODERATE (A) NEG Urobilinogen 0.2 0.2 - 1.0 EU/dL Nitrites NEGATIVE  NEG Leukocyte Esterase NEGATIVE  NEG    
URINE MICROSCOPIC ONLY Collection Time: 11/17/18  2:45 AM  
Result Value Ref Range WBC 0 to 3 0 - 4 /hpf  
 RBC 0 to 3 0 - 5 /hpf Epithelial cells 1+ 0 - 5 /lpf Bacteria NEGATIVE  NEG /hpf Mucus 1+ (A) NEG /lpf Amorphous Crystals 1+ (A) NEG Hyaline cast 4 to 10 0 - 2 /lpf Assessment/Plan:  
52 y.o. female with PMH T2DM with neuropathy, HTN, HLD and Anxiety, now admitted with SIRS with unknown source. SIRS (3/4) with unknown source DDX: Diabetic gastroparesis, Gastroenteritis Patient recently admitted on 11/11-14 for sepsis secondary to suspected gastroenteritis that improved on Flagyl and Rocephin. UA shows moderate blood, no bacteria or leukocyte esterase. Recent 11/16 CT Head, Cervical Spine and Abd was negative. 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 Possibly due to gastroparesis given n/v and abdominal pain. Must consider infectious process due to elevated WBC and 3/4 SIRS criteria. - Admit to medical 
- Start IV reglan 5 mg Q6H for nausea - Phenergan 25 mg q6h 
- Tramadol 50 mg q6h PRN for pain 
- NPO except medications - Continue rocephin and vancomycin - Stool culture - Urine culture - Aspiration and fall precautions 
- daily CBC, BMP 
- Follow blood cultures 
- NS @ 125cc/hr Hypokalemia K 3 
-Given Potassium 10 mEq x1 in ED 
-Continue Potassium 10 mEq x4 
-Repeat K Transaminitis Possibly due to antibiotics. -Repeat CMP CRYSTAL: Likely 2/2 to dehydration and/or sepsis. Baseline Cr approx 0.9. On admission, Cr 3.33. 
- NS @ 125cc/hr 
- hold nephrotoxic medications 
  
DM2 
- history of diabetic retinopathy- blind in left eye - Lantus 45 Units at bedtime - Humalog 8 units with meals 
- SSI 
- Hemoglobin A1C  
- Hold metformin 500 mg qd 
  
HTN, HLD 
- Hold lisinopril due to CRYSTAL. - Hold Crestor 20 mg qd   
Anxiety:  
- Continue Paxil - Hydroxyzine 25 mg IV Diet: NPO except medication DVT Prophylaxis: [facility] Code Status: Full Point of Contact: Nini Borrego (Relationship: Friend) [phone] Disposition and anticipated LOS: <2 midnights Wally Shannon MD  
[address] PGY-1 
11/17/18 4:18 AM 
 
 
  
Senior Resident History and Physical 
[facility] 
  
HPI:  
  
Nadia Samuel is a 52 y.o. female with a PMHx of Type II DM w/retinopathy and neuropathy, GI stromal tumor s/p resection 2014, osteomyelitis, cholecystectomy, pancreatitis, HTN and anxiety who came into the ED with complaints of nausea and vomiting.  
  
Ms. Den Rai c/o nausea, vomiting, along with chills, fatigue and neck pain for the past week. One week ago she was admitted for the same complaints and treated for sepsis and CRYSTAL with Abx and IV fluids. After 4 days she had reportedly improved and was discharged home with Abx and Reglan. When interviewing Ms. Den Rai, she states she doesn't know why she was discharged in the first place and that she was never better. 
  
Since being home she has only gotten worse with continued nausea and vomiting. Per nursing, tonight she has reportedly been extremely upset and anxious. She has been yelling out and begging for pain and anxiety medication for most of the night, and has also fallen on the floor. When the PFM team saw her she vomited on the floor approx 30 minutes after receiving IV Zofran. She was also extremely upset and was yelling and asking for pain and anxiety medication.  
  
Ms. Den Rai met SIRS criteria with HR in the 110-140's, RR 20's, and lactic acid 2.7. Other notable work-up included Na 133, K 3.0, glucose 339, BUN 50, Cr 3.33 (baseline 1-1.2), , ALT 90, Alk phos 129. A UA showed moderate blood but was otherwise reassuring. A CXR and CT head were negative and a CT C- spine was reassuring. Of note, she had a CT Abd/Pelv on 11/11/18 that was negative for acute pathology. Ms. Den Rai was started on IV Rocephin and Vancomycin, given NS 3.7 L IV and Zofran for nausea.  
  
Ms. Den Rai is now admitted with complaint of SIRS criteria and intractable nausea and vomiting 2/2 likely diabetic gastroparesis.   
  
ROS, PMH, PSH, Family Hx, Social Hx, home medications, and allergies as above in intern H&P. I agree with history as above.  
  
Physical Exam:  
  
Visit Vitals /66 Pulse (!) 144 Temp 98.6 °F (37 °C) Resp 25 Wt 90.7 kg (200 lb) SpO2 97% BMI 30.41 kg/m²  
  
  
General:  WD, WN, extremely anxious HEENT:  NCAT. Conjunctiva pink, sclera anicteric. EOMI. Blind in L eye. CV:  RRR, no mumurs. RESP:  Unlabored breathing. CTAB, no wheeze, rales, or rhonchi. ABD: Obese. Soft, nontender, nondistended. Normoactive bowel sounds. No suprapubic tenderness. MS:  No joint deformity or instability. No atrophy. Neuro:  5/5 strength bilateral upper extremities and lower extremities. A+Ox3. Ext: No edema. 2+ radial and dp pulses bilaterally. R 1st toe amputation, L 2nd toe amputation Skin:  No rashes, lesions, or ulcers. Good turgor. 
  
Imaging 
  
Ct Head Wo Cont 
  
Result Date: 11/16/2018 IMPRESSION: Negative CT scan of the brain. There is no hemorrhage, mass, nor acute infarct. Report provided to the emergency department at 2340 hrs. 
  
Ct Spine Cerv Wo Cont 
  
Result Date: 11/16/2018 IMPRESSION: No evidence of acute fracture or dislocation. Stable degenerative disc disease. Stable mild AP offset C4/5. Stable central canal stenosis Report provided to the emergency department at 2344 hrs. 
  
  
Assessment/Plan:  
Sydney Tolliver is a 52 y.o. female with a PMHx of Type II DM w/retinopathy and neuropathy, GI stromal tumor s/p resection 2014, osteomyelitis, cholecystectomy, pancreatitis, HTN and anxiety is now admitted with SIRS criteria and intractable nausea and vomiting 2/2 likely diabetic gastroparesis. 
  
SIRS, Intractable nausea and vomiting 2/2 likely diabetic gastroparesis- Pt w/o known source of infection; 3/4 SIRS criteria in ED w/-140's, RR 20's; WBC's 21.8; UA showed moderate blood but no bacteria, nitrites or leuk esterase; CXR negative; CT Abd/Pelv 11/11/18 showed no acute pathology; lactic acid 2.7>0.91 after 3.7 L IV NS  
- Admit to medicine - Aspiration and fall precautions - Daily CBC, BMP  
- NPO except meds - Blood Cx's pending  
- Urine Cx pending - Stool Cx pending - Reglan 5 mg IV ACHS  
- Phenergan 25 mg IV q6hrs PRN  
- Tyl PRN mild pain, Tramadol 50 mg IV q6hrs PRN mod-severe pain  
- NS IV at 125 mL/hr  
- Continue Ceftriaxone 1 g daily for now - Continue Vancomycin, dosing per pharmacy, for now  
  
CRYSTAL- Likely 2/2 to dehydration; Baseline Cr 1-1.2  
- Daily BMP  
- Continue NS IV at 125 mL/hr 
  
Type II DM w/retinopathy and neuropathy- Last HbA1c 6.5 11/9/18  
- Hold home Metformin and Insulin 70/30 40 units BID  
- Accuchecks q6hrs while NPO; will switch to ACHS when tolerating PO   
- Insulin Lantus 45 units daily and Humalog 8 units - Correctional scale lispro q6hrs while NPO; will switch to ACHS when tolerating PO  
  
Hypokalemia- K+ 3.0 in ED  
- Replete per protocol   
  
Elevated LFT's- , ALT 90, Alk phos 129 in ED 
- Repeat CMP  
  
HTN- well controlled 
- Hold lisinopril  
  
Anxiety 
- Continue home Paxil  
- Start Hydroxyzine 25 mg IM or PO q6hrs PRN  
  
  
  
*Please see intern note above for additional chronic disease mgmt. 
  
H. DO KHURRAM [facility] 11/17/2018

## 2018-11-18 LAB
ANION GAP SERPL CALC-SCNC: 8 MMOL/L (ref 3–18)
BACTERIA SPEC CULT: NORMAL
BASOPHILS # BLD: 0 K/UL (ref 0–0.1)
BASOPHILS NFR BLD: 0 % (ref 0–2)
BUN SERPL-MCNC: 37 MG/DL (ref 7–18)
BUN/CREAT SERPL: 18 (ref 12–20)
CALCIUM SERPL-MCNC: 8.4 MG/DL (ref 8.5–10.1)
CHLORIDE SERPL-SCNC: 112 MMOL/L (ref 100–108)
CO2 SERPL-SCNC: 22 MMOL/L (ref 21–32)
CREAT SERPL-MCNC: 2.07 MG/DL (ref 0.6–1.3)
DIFFERENTIAL METHOD BLD: ABNORMAL
EOSINOPHIL # BLD: 0.2 K/UL (ref 0–0.4)
EOSINOPHIL NFR BLD: 1 % (ref 0–5)
ERYTHROCYTE [DISTWIDTH] IN BLOOD BY AUTOMATED COUNT: 14.4 % (ref 11.6–14.5)
GLUCOSE BLD STRIP.AUTO-MCNC: 193 MG/DL (ref 70–110)
GLUCOSE BLD STRIP.AUTO-MCNC: 279 MG/DL (ref 70–110)
GLUCOSE BLD STRIP.AUTO-MCNC: 308 MG/DL (ref 70–110)
GLUCOSE BLD STRIP.AUTO-MCNC: 332 MG/DL (ref 70–110)
GLUCOSE BLD STRIP.AUTO-MCNC: 49 MG/DL (ref 70–110)
GLUCOSE BLD STRIP.AUTO-MCNC: 72 MG/DL (ref 70–110)
GLUCOSE SERPL-MCNC: 187 MG/DL (ref 74–99)
HCT VFR BLD AUTO: 31.4 % (ref 35–45)
HGB BLD-MCNC: 10.4 G/DL (ref 12–16)
LYMPHOCYTES # BLD: 3.6 K/UL (ref 0.9–3.6)
LYMPHOCYTES NFR BLD: 24 % (ref 21–52)
MCH RBC QN AUTO: 28.7 PG (ref 24–34)
MCHC RBC AUTO-ENTMCNC: 33.1 G/DL (ref 31–37)
MCV RBC AUTO: 86.5 FL (ref 74–97)
MONOCYTES # BLD: 1.2 K/UL (ref 0.05–1.2)
MONOCYTES NFR BLD: 8 % (ref 3–10)
NEUTS SEG # BLD: 9.7 K/UL (ref 1.8–8)
NEUTS SEG NFR BLD: 67 % (ref 40–73)
PLATELET # BLD AUTO: 209 K/UL (ref 135–420)
PMV BLD AUTO: 10.6 FL (ref 9.2–11.8)
POTASSIUM SERPL-SCNC: 4.2 MMOL/L (ref 3.5–5.5)
RBC # BLD AUTO: 3.63 M/UL (ref 4.2–5.3)
SERVICE CMNT-IMP: NORMAL
SODIUM SERPL-SCNC: 142 MMOL/L (ref 136–145)
T PALLIDUM AB SER QL IA: NONREACTIVE
VANCOMYCIN SERPL-MCNC: 14.7 UG/ML (ref 5–40)
WBC # BLD AUTO: 14.7 K/UL (ref 4.6–13.2)

## 2018-11-18 PROCEDURE — 74011000258 HC RX REV CODE- 258: Performed by: STUDENT IN AN ORGANIZED HEALTH CARE EDUCATION/TRAINING PROGRAM

## 2018-11-18 PROCEDURE — 87389 HIV-1 AG W/HIV-1&-2 AB AG IA: CPT

## 2018-11-18 PROCEDURE — 80048 BASIC METABOLIC PNL TOTAL CA: CPT

## 2018-11-18 PROCEDURE — 74011250636 HC RX REV CODE- 250/636: Performed by: STUDENT IN AN ORGANIZED HEALTH CARE EDUCATION/TRAINING PROGRAM

## 2018-11-18 PROCEDURE — 36415 COLL VENOUS BLD VENIPUNCTURE: CPT

## 2018-11-18 PROCEDURE — 74011250636 HC RX REV CODE- 250/636: Performed by: FAMILY MEDICINE

## 2018-11-18 PROCEDURE — 80202 ASSAY OF VANCOMYCIN: CPT

## 2018-11-18 PROCEDURE — 80074 ACUTE HEPATITIS PANEL: CPT

## 2018-11-18 PROCEDURE — 82962 GLUCOSE BLOOD TEST: CPT

## 2018-11-18 PROCEDURE — 86780 TREPONEMA PALLIDUM: CPT

## 2018-11-18 PROCEDURE — 74011250637 HC RX REV CODE- 250/637: Performed by: FAMILY MEDICINE

## 2018-11-18 PROCEDURE — 85025 COMPLETE CBC W/AUTO DIFF WBC: CPT

## 2018-11-18 PROCEDURE — 74011250637 HC RX REV CODE- 250/637: Performed by: STUDENT IN AN ORGANIZED HEALTH CARE EDUCATION/TRAINING PROGRAM

## 2018-11-18 PROCEDURE — 74011636637 HC RX REV CODE- 636/637: Performed by: STUDENT IN AN ORGANIZED HEALTH CARE EDUCATION/TRAINING PROGRAM

## 2018-11-18 PROCEDURE — 65660000000 HC RM CCU STEPDOWN

## 2018-11-18 PROCEDURE — 74011000258 HC RX REV CODE- 258: Performed by: FAMILY MEDICINE

## 2018-11-18 RX ORDER — INSULIN GLARGINE 100 [IU]/ML
20 INJECTION, SOLUTION SUBCUTANEOUS DAILY
Status: DISCONTINUED | OUTPATIENT
Start: 2018-11-19 | End: 2018-11-22 | Stop reason: HOSPADM

## 2018-11-18 RX ORDER — INSULIN GLARGINE 100 [IU]/ML
22 INJECTION, SOLUTION SUBCUTANEOUS DAILY
Status: DISCONTINUED | OUTPATIENT
Start: 2018-11-19 | End: 2018-11-18

## 2018-11-18 RX ORDER — FAMOTIDINE 20 MG/1
20 TABLET, FILM COATED ORAL DAILY
Status: DISCONTINUED | OUTPATIENT
Start: 2018-11-18 | End: 2018-11-20

## 2018-11-18 RX ORDER — INSULIN GLARGINE 100 [IU]/ML
45 INJECTION, SOLUTION SUBCUTANEOUS DAILY
Status: DISCONTINUED | OUTPATIENT
Start: 2018-11-19 | End: 2018-11-18

## 2018-11-18 RX ORDER — DEXTROSE 50 % IN WATER (D50W) INTRAVENOUS SYRINGE
Status: DISPENSED
Start: 2018-11-18 | End: 2018-11-19

## 2018-11-18 RX ORDER — VANCOMYCIN HYDROCHLORIDE
1250 ONCE
Status: COMPLETED | OUTPATIENT
Start: 2018-11-18 | End: 2018-11-18

## 2018-11-18 RX ADMIN — VANCOMYCIN HYDROCHLORIDE 1250 MG: 10 INJECTION, POWDER, LYOPHILIZED, FOR SOLUTION INTRAVENOUS at 10:30

## 2018-11-18 RX ADMIN — FAMOTIDINE 20 MG: 20 TABLET ORAL at 14:00

## 2018-11-18 RX ADMIN — LIDOCAINE HYDROCHLORIDE: 10 INJECTION, SOLUTION EPIDURAL; INFILTRATION; INTRACAUDAL; PERINEURAL at 02:30

## 2018-11-18 RX ADMIN — HYDROXYZINE HYDROCHLORIDE 25 MG: 25 TABLET, FILM COATED ORAL at 05:23

## 2018-11-18 RX ADMIN — INSULIN LISPRO 6 UNITS: 100 INJECTION, SOLUTION INTRAVENOUS; SUBCUTANEOUS at 13:00

## 2018-11-18 RX ADMIN — METOCLOPRAMIDE 5 MG: 5 INJECTION, SOLUTION INTRAMUSCULAR; INTRAVENOUS at 18:34

## 2018-11-18 RX ADMIN — INSULIN GLARGINE 45 UNITS: 100 INJECTION, SOLUTION SUBCUTANEOUS at 10:27

## 2018-11-18 RX ADMIN — DEXTROSE MONOHYDRATE, SODIUM CHLORIDE, AND POTASSIUM CHLORIDE 125 ML/HR: 50; 4.5; .745 INJECTION, SOLUTION INTRAVENOUS at 18:32

## 2018-11-18 RX ADMIN — MORPHINE SULFATE 1 MG: 2 INJECTION, SOLUTION INTRAMUSCULAR; INTRAVENOUS at 12:58

## 2018-11-18 RX ADMIN — MORPHINE SULFATE 1 MG: 2 INJECTION, SOLUTION INTRAMUSCULAR; INTRAVENOUS at 04:08

## 2018-11-18 RX ADMIN — INSULIN LISPRO 8 UNITS: 100 INJECTION, SOLUTION INTRAVENOUS; SUBCUTANEOUS at 13:00

## 2018-11-18 RX ADMIN — PAROXETINE HYDROCHLORIDE 30 MG: 20 TABLET, FILM COATED ORAL at 10:27

## 2018-11-18 RX ADMIN — INSULIN LISPRO 8 UNITS: 100 INJECTION, SOLUTION INTRAVENOUS; SUBCUTANEOUS at 10:26

## 2018-11-18 RX ADMIN — HEPARIN SODIUM 5000 UNITS: 5000 INJECTION, SOLUTION INTRAVENOUS; SUBCUTANEOUS at 12:00

## 2018-11-18 RX ADMIN — HYDROXYZINE HYDROCHLORIDE 25 MG: 25 TABLET, FILM COATED ORAL at 12:00

## 2018-11-18 RX ADMIN — METOCLOPRAMIDE 5 MG: 5 INJECTION, SOLUTION INTRAMUSCULAR; INTRAVENOUS at 12:59

## 2018-11-18 RX ADMIN — METOCLOPRAMIDE 5 MG: 5 INJECTION, SOLUTION INTRAMUSCULAR; INTRAVENOUS at 05:23

## 2018-11-18 RX ADMIN — HEPARIN SODIUM 5000 UNITS: 5000 INJECTION, SOLUTION INTRAVENOUS; SUBCUTANEOUS at 22:00

## 2018-11-18 RX ADMIN — HEPARIN SODIUM 5000 UNITS: 5000 INJECTION, SOLUTION INTRAVENOUS; SUBCUTANEOUS at 04:08

## 2018-11-18 RX ADMIN — PROMETHAZINE HYDROCHLORIDE 12.5 MG: 25 INJECTION, SOLUTION INTRAMUSCULAR; INTRAVENOUS at 22:23

## 2018-11-18 NOTE — PROGRESS NOTES
Cady Singer Critical Results (Potassium lab value 2.9). Patient is asymptomatic. Will continue to monitor Md luciano; new orders given to start Potassium IVPB per Mykel Luke MD.

## 2018-11-18 NOTE — PROGRESS NOTES
Joao Barfield Patient's blood sugar was 46 mg/dL during routine blood sugar checks; patient treated with 4 oz cup of orange juice, gram crackers, chocolate jello pudding. Patient has no complaints of nausea/vomiting; skin is cool to touch; no complaints of pain, ha/dizziness. All other vital signs are stable. Patient's blood sugar rechecked within 15 minutes; blood sugar increased to 72 mg/dL, will continue to treat, recheck, and monitor patient.

## 2018-11-18 NOTE — PROGRESS NOTES
Armani Hsieh Patient received in report from Karyn Bach RN resting quietly; no complaints of pain, nausea/vomiting. Vital signs are stable.

## 2018-11-18 NOTE — PROGRESS NOTES
Patient blood glucose was 59 mmol/dL, patient was given Ensure 237 mls. MD paged who was informed about same who stated that it was ok to give her the ensure. Continuous fluids will be changed to D5% in 0.9% sodium chloride.

## 2018-11-18 NOTE — ROUTINE PROCESS
Ariela Garcia Bedside and Verbal shift change report given to Patricia Mas, RN (oncoming nurse) by Jeffry Bhatti RN (offgoing nurse). Report included the following information SBAR, Kardex, MAR and Recent Results. Patient alert and oriented, resting quietly, no signs of acute distress. Vital signs are stable.

## 2018-11-18 NOTE — PROGRESS NOTES
Cady Singer Informed by Hernando Singh CNA patient's blood sugar was 59 mg/dL. Treated patient with 25 mg of Dextrose IV push; rechecked blood sugar within 15 minutes; new blood sugar reading after dextrose administration was 98 mg/dL. Patient has no complaints of dizziness, nausea/vomiting, shortness of breath, fever or chills. All other vital signs are stable. Will continue to monitor.

## 2018-11-18 NOTE — ROUTINE PROCESS
Bedside and Verbal shift change report given to AYAD Alarcon (oncoming nurse) by Meron Munoz (offgoing nurse). Report included the following information KIRSTIEAR, MAR.

## 2018-11-19 PROBLEM — R79.89 ABNORMAL LFTS: Status: ACTIVE | Noted: 2018-11-19

## 2018-11-19 LAB
ANION GAP SERPL CALC-SCNC: 8 MMOL/L (ref 3–18)
BACTERIA SPEC CULT: ABNORMAL
BASOPHILS # BLD: 0 K/UL (ref 0–0.1)
BASOPHILS NFR BLD: 0 % (ref 0–2)
BUN SERPL-MCNC: 26 MG/DL (ref 7–18)
BUN/CREAT SERPL: 15 (ref 12–20)
C TRACH RRNA SPEC QL NAA+PROBE: NEGATIVE
CALCIUM SERPL-MCNC: 8.2 MG/DL (ref 8.5–10.1)
CHLORIDE SERPL-SCNC: 105 MMOL/L (ref 100–108)
CO2 SERPL-SCNC: 28 MMOL/L (ref 21–32)
CREAT SERPL-MCNC: 1.73 MG/DL (ref 0.6–1.3)
DIFFERENTIAL METHOD BLD: ABNORMAL
EOSINOPHIL # BLD: 0.3 K/UL (ref 0–0.4)
EOSINOPHIL NFR BLD: 2 % (ref 0–5)
ERYTHROCYTE [DISTWIDTH] IN BLOOD BY AUTOMATED COUNT: 14.3 % (ref 11.6–14.5)
GLUCOSE BLD STRIP.AUTO-MCNC: 134 MG/DL (ref 70–110)
GLUCOSE BLD STRIP.AUTO-MCNC: 221 MG/DL (ref 70–110)
GLUCOSE BLD STRIP.AUTO-MCNC: 297 MG/DL (ref 70–110)
GLUCOSE SERPL-MCNC: 257 MG/DL (ref 74–99)
HAV IGM SER QL: NEGATIVE
HBV CORE IGM SER QL: NEGATIVE
HBV SURFACE AG SER QL: <0.1 INDEX
HBV SURFACE AG SER QL: NEGATIVE
HCT VFR BLD AUTO: 29 % (ref 35–45)
HCV AB SER IA-ACNC: 0.08 INDEX
HCV AB SERPL QL IA: NEGATIVE
HCV COMMENT,HCGAC: NORMAL
HGB BLD-MCNC: 9.8 G/DL (ref 12–16)
HIV 1+2 AB+HIV1 P24 AG SERPL QL IA: NONREACTIVE
HIV12 RESULT COMMENT, HHIVC: NORMAL
LYMPHOCYTES # BLD: 4.8 K/UL (ref 0.9–3.6)
LYMPHOCYTES NFR BLD: 33 % (ref 21–52)
MCH RBC QN AUTO: 28.7 PG (ref 24–34)
MCHC RBC AUTO-ENTMCNC: 33.8 G/DL (ref 31–37)
MCV RBC AUTO: 85 FL (ref 74–97)
MONOCYTES # BLD: 0.7 K/UL (ref 0.05–1.2)
MONOCYTES NFR BLD: 5 % (ref 3–10)
N GONORRHOEA RRNA SPEC QL NAA+PROBE: NEGATIVE
NEUTS SEG # BLD: 8.8 K/UL (ref 1.8–8)
NEUTS SEG NFR BLD: 60 % (ref 40–73)
PLATELET # BLD AUTO: 235 K/UL (ref 135–420)
PMV BLD AUTO: 10.7 FL (ref 9.2–11.8)
POTASSIUM SERPL-SCNC: 3.6 MMOL/L (ref 3.5–5.5)
RBC # BLD AUTO: 3.41 M/UL (ref 4.2–5.3)
SERVICE CMNT-IMP: ABNORMAL
SODIUM SERPL-SCNC: 141 MMOL/L (ref 136–145)
SP1: NORMAL
SP2: NORMAL
SP3: NORMAL
SPECIMEN SOURCE: NORMAL
VANCOMYCIN SERPL-MCNC: 17.4 UG/ML (ref 5–40)
WBC # BLD AUTO: 14.6 K/UL (ref 4.6–13.2)

## 2018-11-19 PROCEDURE — 74011250636 HC RX REV CODE- 250/636: Performed by: EMERGENCY MEDICINE

## 2018-11-19 PROCEDURE — 74011000250 HC RX REV CODE- 250: Performed by: EMERGENCY MEDICINE

## 2018-11-19 PROCEDURE — 74011250636 HC RX REV CODE- 250/636: Performed by: FAMILY MEDICINE

## 2018-11-19 PROCEDURE — 80048 BASIC METABOLIC PNL TOTAL CA: CPT

## 2018-11-19 PROCEDURE — 65660000000 HC RM CCU STEPDOWN

## 2018-11-19 PROCEDURE — 74011250636 HC RX REV CODE- 250/636: Performed by: STUDENT IN AN ORGANIZED HEALTH CARE EDUCATION/TRAINING PROGRAM

## 2018-11-19 PROCEDURE — 74011250637 HC RX REV CODE- 250/637: Performed by: FAMILY MEDICINE

## 2018-11-19 PROCEDURE — 74011636637 HC RX REV CODE- 636/637: Performed by: STUDENT IN AN ORGANIZED HEALTH CARE EDUCATION/TRAINING PROGRAM

## 2018-11-19 PROCEDURE — 36415 COLL VENOUS BLD VENIPUNCTURE: CPT

## 2018-11-19 PROCEDURE — 74011636637 HC RX REV CODE- 636/637: Performed by: FAMILY MEDICINE

## 2018-11-19 PROCEDURE — 80202 ASSAY OF VANCOMYCIN: CPT

## 2018-11-19 PROCEDURE — 74011250637 HC RX REV CODE- 250/637: Performed by: STUDENT IN AN ORGANIZED HEALTH CARE EDUCATION/TRAINING PROGRAM

## 2018-11-19 PROCEDURE — 82962 GLUCOSE BLOOD TEST: CPT

## 2018-11-19 PROCEDURE — 85025 COMPLETE CBC W/AUTO DIFF WBC: CPT

## 2018-11-19 PROCEDURE — 74011000258 HC RX REV CODE- 258: Performed by: FAMILY MEDICINE

## 2018-11-19 RX ORDER — SODIUM CHLORIDE 9 MG/ML
1000 INJECTION, SOLUTION INTRAVENOUS ONCE
Status: COMPLETED | OUTPATIENT
Start: 2018-11-19 | End: 2018-11-19

## 2018-11-19 RX ORDER — VANCOMYCIN/0.9 % SOD CHLORIDE 1.5G/250ML
1500 PLASTIC BAG, INJECTION (ML) INTRAVENOUS ONCE
Status: DISCONTINUED | OUTPATIENT
Start: 2018-11-19 | End: 2018-11-19

## 2018-11-19 RX ADMIN — HYDROXYZINE HYDROCHLORIDE 25 MG: 25 TABLET, FILM COATED ORAL at 13:55

## 2018-11-19 RX ADMIN — MORPHINE SULFATE 1 MG: 2 INJECTION, SOLUTION INTRAMUSCULAR; INTRAVENOUS at 22:14

## 2018-11-19 RX ADMIN — WATER 1 G: 1 INJECTION INTRAMUSCULAR; INTRAVENOUS; SUBCUTANEOUS at 00:50

## 2018-11-19 RX ADMIN — PROMETHAZINE HYDROCHLORIDE 12.5 MG: 25 INJECTION, SOLUTION INTRAMUSCULAR; INTRAVENOUS at 21:41

## 2018-11-19 RX ADMIN — METOCLOPRAMIDE 5 MG: 5 INJECTION, SOLUTION INTRAMUSCULAR; INTRAVENOUS at 00:50

## 2018-11-19 RX ADMIN — HEPARIN SODIUM 5000 UNITS: 5000 INJECTION, SOLUTION INTRAVENOUS; SUBCUTANEOUS at 11:52

## 2018-11-19 RX ADMIN — METOCLOPRAMIDE 5 MG: 5 INJECTION, SOLUTION INTRAMUSCULAR; INTRAVENOUS at 17:55

## 2018-11-19 RX ADMIN — DEXTROSE MONOHYDRATE, SODIUM CHLORIDE, AND POTASSIUM CHLORIDE 125 ML/HR: 50; 4.5; .745 INJECTION, SOLUTION INTRAVENOUS at 04:21

## 2018-11-19 RX ADMIN — HEPARIN SODIUM 5000 UNITS: 5000 INJECTION, SOLUTION INTRAVENOUS; SUBCUTANEOUS at 21:15

## 2018-11-19 RX ADMIN — METOCLOPRAMIDE 5 MG: 5 INJECTION, SOLUTION INTRAMUSCULAR; INTRAVENOUS at 05:55

## 2018-11-19 RX ADMIN — HEPARIN SODIUM 5000 UNITS: 5000 INJECTION, SOLUTION INTRAVENOUS; SUBCUTANEOUS at 04:26

## 2018-11-19 RX ADMIN — PROMETHAZINE HYDROCHLORIDE 12.5 MG: 25 INJECTION, SOLUTION INTRAMUSCULAR; INTRAVENOUS at 08:08

## 2018-11-19 RX ADMIN — METOCLOPRAMIDE 5 MG: 5 INJECTION, SOLUTION INTRAMUSCULAR; INTRAVENOUS at 11:52

## 2018-11-19 RX ADMIN — HYDROXYZINE HYDROCHLORIDE 25 MG: 25 TABLET, FILM COATED ORAL at 21:15

## 2018-11-19 RX ADMIN — DEXTROSE MONOHYDRATE, SODIUM CHLORIDE, AND POTASSIUM CHLORIDE 125 ML/HR: 50; 4.5; .745 INJECTION, SOLUTION INTRAVENOUS at 17:55

## 2018-11-19 RX ADMIN — INSULIN LISPRO 8 UNITS: 100 INJECTION, SOLUTION INTRAVENOUS; SUBCUTANEOUS at 12:01

## 2018-11-19 RX ADMIN — INSULIN LISPRO 6 UNITS: 100 INJECTION, SOLUTION INTRAVENOUS; SUBCUTANEOUS at 12:01

## 2018-11-19 RX ADMIN — SODIUM CHLORIDE 1000 ML: 900 INJECTION, SOLUTION INTRAVENOUS at 08:37

## 2018-11-19 RX ADMIN — INSULIN GLARGINE 20 UNITS: 100 INJECTION, SOLUTION SUBCUTANEOUS at 08:09

## 2018-11-19 NOTE — PROGRESS NOTES
Intern Progress Note 120 [facility] Patient: Sharath Butler MRN: [id number]  CSN: [id number] YOB: 1965  Age: 52 y.o. Sex: female DOA: 11/16/2018 LOS:  LOS: 2 days Subjective:  
 
Acute events: Patient had hypoglycemia of 46 overnight and was treated with snacks and juice, which brought her BG up to 72. She was asymptomatic and her vitals were stable. Later that night patient had one episode of emesis of an unknown amount and was treated with Phenergan. This morning patient had another episode of emesis. Per nurse, 500cc of non-bloody fluid. Tele also called and stated patient had 8 secs of a flutter with a HR of 168. Review of Systems Constitutional: Negative for chills and fever. Eyes: Negative for blurred vision. Respiratory: Negative for cough and shortness of breath. Cardiovascular: Negative for chest pain and leg swelling. Gastrointestinal: Positive for abdominal pain, heartburn, nausea and vomiting. Negative for diarrhea. Neurological: Negative for dizziness and headaches. Objective:  
  
Patient Vitals for the past 24 hrs: 
 Temp Pulse Resp BP SpO2  
11/19/18 0647  81 18    
11/19/18 0338 98.9 °F (37.2 °C) (!) 111 18 154/83 99 % 11/18/18 2348 99.1 °F (37.3 °C) (!) 111 18 151/83 98 % 11/18/18 2040 99.2 °F (37.3 °C) 99 18 (!) 146/91 98 % 11/18/18 1633 97.2 °F (36.2 °C) 95 18 112/74 99 % 11/18/18 1134 97.9 °F (36.6 °C) (!) 108 18 118/79 100 % 11/18/18 0741 98.3 °F (36.8 °C) (!) 101 18 101/55 98 % Intake/Output Summary (Last 24 hours) at 11/19/2018 0533 Last data filed at 11/19/2018 0211 Gross per 24 hour Intake  Output 1000 ml Net -1000 ml Physical Exam:  
General:  Alert and Responsive and in no acute distress. HEENT: Conjunctiva pink, sclera anicteric. EOMI. Pharynx moist, nonerythematous. Moist mucous membranes.   Thyroid not enlarged, no nodules. No cervical, supraclavicular, occipital or submandibular lymphadenopathy. No other gross abnormalities present. CV:  Tachycardic, no murmurs. No visible pulsations or thrills. RESP:  Unlabored breathing. Lungs clear to auscultation. no wheeze, rales, or rhonchi. Equal expansion bilaterally. ABD:  Diffuse tenderness LQ>UQ, no rebound or guarding. Soft,nondistended. No hepatosplenomegaly. No suprapubic tenderness. No CVA tenderness. MS:  No joint deformity or instability. No atrophy. Neuro:  5/5 strength bilateral upper extremities and lower extremities. A+Ox3. Ext:  No edema. 2+ radial and dp pulses bilaterally. Amputated left great toe. Skin:  No rashes, lesions, or ulcers. Good turgor. Lab/Data Reviewed: 
Recent Results (from the past 12 hour(s)) GLUCOSE, POC Collection Time: 11/18/18 10:09 PM  
Result Value Ref Range Glucose (POC) 308 (H) 70 - 110 mg/dL METABOLIC PANEL, BASIC Collection Time: 11/19/18  1:57 AM  
Result Value Ref Range Sodium 141 136 - 145 mmol/L Potassium 3.6 3.5 - 5.5 mmol/L Chloride 105 100 - 108 mmol/L  
 CO2 28 21 - 32 mmol/L Anion gap 8 3.0 - 18 mmol/L Glucose 257 (H) 74 - 99 mg/dL BUN 26 (H) 7.0 - 18 MG/DL Creatinine 1.73 (H) 0.6 - 1.3 MG/DL  
 BUN/Creatinine ratio 15 12 - 20 GFR est AA 37 (L) >60 ml/min/1.73m2 GFR est non-AA 31 (L) >60 ml/min/1.73m2 Calcium 8.2 (L) 8.5 - 10.1 MG/DL  
CBC WITH AUTOMATED DIFF Collection Time: 11/19/18  1:57 AM  
Result Value Ref Range WBC 14.6 (H) 4.6 - 13.2 K/uL  
 RBC 3.41 (L) 4.20 - 5.30 M/uL HGB 9.8 (L) 12.0 - 16.0 g/dL HCT 29.0 (L) 35.0 - 45.0 % MCV 85.0 74.0 - 97.0 FL  
 MCH 28.7 24.0 - 34.0 PG  
 MCHC 33.8 31.0 - 37.0 g/dL  
 RDW 14.3 11.6 - 14.5 % PLATELET 046 666 - 499 K/uL MPV 10.7 9.2 - 11.8 FL  
 NEUTROPHILS 60 40 - 73 % LYMPHOCYTES 33 21 - 52 % MONOCYTES 5 3 - 10 % EOSINOPHILS 2 0 - 5 % BASOPHILS 0 0 - 2 % ABS. NEUTROPHILS 8.8 (H) 1.8 - 8.0 K/UL  
 ABS. LYMPHOCYTES 4.8 (H) 0.9 - 3.6 K/UL  
 ABS. MONOCYTES 0.7 0.05 - 1.2 K/UL  
 ABS. EOSINOPHILS 0.3 0.0 - 0.4 K/UL  
 ABS. BASOPHILS 0.0 0.0 - 0.1 K/UL  
 DF AUTOMATED    
VANCOMYCIN, RANDOM Collection Time: 11/19/18  1:57 AM  
Result Value Ref Range Vancomycin, random 17.4 5.0 - 40.0 UG/ML Scheduled Medications Reviewed: 
Current Facility-Administered Medications Medication Dose Route Frequency  famotidine (PEPCID) tablet 20 mg  20 mg Oral DAILY  insulin glargine (LANTUS) injection 20 Units  20 Units SubCUTAneous DAILY  cefTRIAXone (ROCEPHIN) 1 g in sterile water (preservative free) 10 mL IV syringe  1 g IntraVENous Q24H  VANCOMYCIN INFORMATION NOTE   Other Rx Dosing/Monitoring  heparin (porcine) injection 5,000 Units  5,000 Units SubCUTAneous Q8H  
 metoclopramide HCl (REGLAN) injection 5 mg  5 mg IntraVENous Q6H  
 PARoxetine (PAXIL) tablet 30 mg  30 mg Oral DAILY  insulin lispro (HUMALOG) injection 8 Units  8 Units SubCUTAneous TIDAC  insulin lispro (HUMALOG) injection   SubCUTAneous TIDAC  dextrose 5% - 0.45% NaCl with KCl 10 mEq/L infusion  125 mL/hr IntraVENous CONTINUOUS Imaging, microbiology, and EKG/Telemetry: No results found. Assessment/Plan  
52 y. o. female with PMH Type II DM w/retinopathy and neuropathy, GI stromal tumor s/p resection 2014, osteomyelitis, cholecystectomy, pancreatitis, HTN and anxiety,  now admitted with SIRS with unknown source. 
  
SIRS (3/4) of Unknown Source:  Possibly due to gastroparesis given n/v and abdominal pain. Must consider infectious process due to elevated WBC and 3/4 SIRS criteria (HR: 110-140's, RR: 20's, and WBC: 21.8). - Advance diet to clears - Continue rocephin  
- Continue D5,1/2NaCl @125cc/hr 
- Stool culture: Negative for C. Diff - Follow up on Urine culture - Aspiration and fall precautions - Follow blood cultures - Continue Phenergan and Reglan 
  
 Hypoglycemia/T2DM: Patient has had 2 episodes of hypoglycemia during her admission. Patient has a history of diabetic retinopathy and is blind in left eye. A1c 7.9 on 11/18.  
- 1/2 home dose of Lantus 45 Units at bedtime 
- Hold Humalog 8 units given decreased PO intake 
- SSI  
- Hold metformin 500 mg qd Transaminitis: 11/16 showed ALT of 90 and AST of 112 - F/u HIV, RPR, and Hepatitis Panel  
  
CRYSTAL: Likely 2/2 to dehydration and/or sepsis. Baseline Cr approx 0.9. On admission, Cr 3.33. Improving: Cr: 2.07 and BUN: 37 
- Continue D5,1/2NaCl @125cc/hr HTN, HLD: 
- Hold lisinopril due to CRYSTAL.   
- Hold Crestor 20 mg qd   
Anxiety:  
- Continue home Paxil 
- Continue home Hydroxyzine 25 mg IV 
  
  
Diet: NPO except medication DVT Prophylaxis: SQH Code Status: Full Point of Contact: Saima Aguilar (Relationship: Friend) [phone] 
  
Disposition and anticipated LOS: <2 midnights MD Janak Spencer PGY-1 
11/19/18 6:38 AM

## 2018-11-19 NOTE — PROGRESS NOTES
Pt vomited approx 500mL clear/tan emesis. Pt hr and bp somewhat elevated at 101 and 152/85. Pt given phenergan and Dr. Baldev Oliveros with [facility] notified. Order for 1000 mL NS bolus received and carried out. Pt resting quietly in bed, will continue to monitor.

## 2018-11-19 NOTE — PROGRESS NOTES
Patient vomited an unmeasurable amount, complains of still feeling nauseous. MD contacted who states that he will place an order.

## 2018-11-19 NOTE — PROGRESS NOTES
Reason for Admission:   CRYSTAL, sepsis RRAT Score:     21 Resources/supports as identified by patient/family:  Pt is self pay Top Challenges facing patient (as identified by patient/family and CM): Finances/Medication cost?    No insurance Transportation?  self Support system or lack thereof? She has a son Janie Glover, a brother Sabrina Owen and her cousin Curtis Schmidt [phone] whom she states is her emergency contact Living arrangements? Lives alone with other family members living close by 
        
Self-care/ADLs/Cognition? AOx4, independent Current Advanced Directive/Advance Care Plan:   
                       
Plan for utilizing home health: To be determined Likelihood of readmission: red/high Transition of Care Plan:   As per pt she was independent prior to admission. She has machine to monitor her blood sugar at home. She verified her pcp and demographic info. She states she does not have any concerns for discharge home and her cousin or any other member of her family will be picking her up. Care Management Interventions PCP Verified by CM: Yes 
Palliative Care Criteria Met (RRAT>21 & CHF Dx)?: No 
Mode of Transport at Discharge: Other (see comment)(family) Transition of Care Consult (CM Consult): Discharge Planning Physical Therapy Consult: No 
Occupational Therapy Consult: No 
Speech Therapy Consult: No 
Current Support Network: Lives Alone, Family Lives [place] Confirm Follow Up Transport: Self Plan discussed with Pt/Family/Caregiver:  Yes

## 2018-11-19 NOTE — CONSULTS
[email or web address] Impression: 1. N/V-? PUD,gastroparesis,recurrent neoplasm S/P resection of GIST in 2014 Elevated LFTs - etiology to be det'd  
 
 
Plan:  
Hydrate Antiemetic IV PPI 
EGD in   
Viral and autoimmune studies Chief Complaint: N/V and epig pain HPI: 
Laura Carey is a 52 y.o. female who is being seen on consult for the above. She has had epig pain w/o melena. Pain began ~ 1wk ago. She has a >20 yr Hx of IDDM. I note that a GIST was resected in 2014; No mets at time of surgery. Lab noted below. .Pt denies ETOH. PMH:  
Past Medical History:  
Diagnosis Date  Blind left eye  Cellulitis of great toe of right foot 10/19/2017  Diabetes ([address]  Diabetic retinopathy ([address]  Gall stones  GERD (gastroesophageal reflux disease)  Hammertoe  Hiatal hernia  History of mammogram 10/03/2017 No evidence of malignancy  Hypertension  Mass of abdomen  Neuropathy due to type 2 diabetes mellitus ([address]  Osteomyelitis of toe of right foot ([address] 10/19/2017  Pancreatitis PSH:  
Past Surgical History:  
Procedure Laterality Date  HX AMPUTATION Left 07/21/2017  
 left 2nd toe  HX CHOLECYSTECTOMY  10/15/2014  HX GI Benign GI Stromal Tumor excision  HX HEENT Sx for detached retina  HX MYOMECTOMY x5 removed  HX OTHER SURGICAL    
 upper endoscopy Social HX:  
Social History Socioeconomic History  Marital status: SINGLE Spouse name: Not on file  Number of children: Not on file  Years of education: Not on file  Highest education level: Not on file Social Needs  Financial resource strain: Not on file  Food insecurity - worry: Not on file  Food insecurity - inability: Not on file  Transportation needs - medical: Not on file  Transportation needs - non-medical: Not on file Occupational History  Not on file Tobacco Use  Smoking status: Former Smoker Packs/day: 0.20 Years: 8.00 Pack years: 1.60 Types: Cigarettes Last attempt to quit: 12/3/1995 Years since quittin.9  Smokeless tobacco: Never Used Substance and Sexual Activity  Alcohol use: No  
  Comment: Drinks 3-4xs year generally wine  Drug use: No  
 Sexual activity: Not Currently Other Topics Concern   Service No  
 Blood Transfusions No  
 Caffeine Concern No  
 Occupational Exposure No  
 Hobby Hazards No  
 Sleep Concern No  
 Stress Concern No  
 Weight Concern No  
 Special Diet Yes  Back Care No  
 Exercise No  
 Bike Helmet No  
 Seat Belt Yes  Self-Exams Yes Social History Narrative Lives with 16year old son  with ex   Does not have health insurance FHX:  
Family History Adopted: Yes  
Problem Relation Age of Onset  Heart Failure Mother 76  
 Other Father 70  
     blood clot after surgery  Diabetes Paternal Aunt  Diabetes Paternal Uncle Allergy: Allergies Allergen Reactions  Levaquin [Levofloxacin] Hives  Promethazine Nausea and Vomiting \"the phenergan made me more nauseated\" Home Medications:  
 
Medications Prior to Admission Medication Sig  
 docusate sodium (COLACE) 100 mg capsule Take 1 Cap by mouth daily for 30 days.  metroNIDAZOLE (FLAGYL) 500 mg tablet Take 1 Tab by mouth every eight (8) hours.  cefdinir (OMNICEF) 300 mg capsule Take 1 Cap by mouth two (2) times a day.  [] metoclopramide HCl (REGLAN) 10 mg tablet Take 1 Tab by mouth Before breakfast, lunch, dinner and at bedtime for 2 days.  metFORMIN ER (GLUCOPHAGE XR) 500 mg tablet TAKE 1 TABLET BY MOUTH DAILY WITH DINNER  
 rosuvastatin (CRESTOR) 20 mg tablet Take 1 Tab by mouth nightly.  PARoxetine (PAXIL) 30 mg tablet TAKE 1 TABLET BY MOUTH DAILY  insulin NPH/insulin regular (NOVOLIN 70/30, HUMULIN 70/30) 100 unit/mL (70-30) injection 40 Units by SubCUTAneous route two (2) times a day. Review of Systems:  
 
Constitutional: No fevers, chills, weight loss, fatigue. Skin: No rashes, pruritis, jaundice, ulcerations, erythema. HENT: No headaches, nosebleeds, sinus pressure, rhinorrhea, sore throat. Eyes: No visual changes, blurred vision, eye pain, photophobia, jaundice. Cardiovascular: No chest pain, heart palpitations. Respiratory: No cough, SOB, wheezing, chest discomfort, orthopnea. Gastrointestinal: pain Genitourinary: No dysuria, bleeding, discharge, pyuria. Musculoskeletal: No weakness, arthralgias, wasting. Endo: No sweats. Heme: No bruising, easy bleeding. Allergies: As noted. Neurological: Cranial nerves intact. Alert and oriented. Gait not assessed. Psychiatric:  No anxiety, depression, hallucinations. Visit Vitals /85 (BP 1 Location: Left arm, BP Patient Position: At rest) Pulse 96 Temp 99.4 °F (37.4 °C) Resp 18 Wt 98.4 kg (216 lb 14.4 oz) LMP 10/06/2015 (Exact Date) SpO2 98% Breastfeeding? No  
BMI 32.98 kg/m² Physical Assessment:  
 
constitutional: appearance: well developed, well nourished, normal habitus, no deformities, in no acute distress. skin: inspection: no rashes, ulcers, icterus or other lesions; no clubbing or telangiectasias. palpation: no induration or subcutaneos nodules. eyes: inspection: normal conjunctivae and lids; no jaundice pupils: symmetrical, normoreactive to light, normal accommodation and size. ENMT: mouth: normal oral mucosa,lips and gums; good dentition. oropharynx: normal tongue, hard and soft palate; posterior pharynx without erithema, exudate or lesions. neck: thyroid: normal size, consistency and position; no masses or tenderness. respiratory: effort: normal chest excursion; no intercostal retraction or accessory muscle use. cardiovascular: abdominal aorta: normal size and position; no bruits. palpation: PMI of normal size and position; normal rhythm; no thrill or murmurs. abdominal: abdomen: normal consistency; no tenderness or masses. hernias: no hernias appreciated. liver: normal size and consistency. spleen: not palpable. rectal: hemoccult/guaiac: not performed. musculoskeletal: digits and nails: no clubbing, cyanosis, petechiae or other inflammatory conditions. gait: normal gait and station head and neck: normal range of motion; no pain, crepitation or contracture. spine/ribs/pelvis: normal range of motion; no pain, deformity or contracture. lymphatic: axilae: not palpable. groin: not palpable. neck: within normal limits. other: not palpable. neurologic: cranial nerves: II-XII normal.  
psychiatric: judgement/insight: within normal limits. memory: within normal limits for recent and remote events. mood and affect: no evidence of depression, anxiety or agitation. orientation: oriented to time, space and person. Basic Metabolic Profile Recent Labs 11/19/18 
0157   
K 3.6  CO2 28 BUN 26* * CA 8.2*  
  
  
CBC w/Diff Recent Labs 11/19/18 
0157 WBC 14.6*  
RBC 3.41* HGB 9.8* HCT 29.0* MCV 85.0 MCH 28.7 MCHC 33.8  
RDW 14.3  Recent Labs 11/19/18 
0157 GRANS 60  
LYMPH 33 EOS 2 Hepatic Function Recent Labs 11/16/18 
2138 ALB 4.0 TP 8.2 TBILI 2.0*  
SGOT 112* * LPSE 178 Boni Cabrera MD, M.D. [facility], [address] [place] [address] 
[email or web address]

## 2018-11-19 NOTE — ROUTINE PROCESS
Bedside and Verbal shift change report given to AYAD Navaror (oncoming nurse) by Belle Tristan (offgoing nurse). Report included the following information KIRSTIEAR, MAR.

## 2018-11-19 NOTE — ROUTINE PROCESS
Becki Berkley Bedside and Verbal shift change report given to Mauro Bustamante RN (oncoming nurse) by Micky Leung RN (offgoing nurse). Report included the following information SBAR, Kardex, Intake/Output and Recent Results. Patient alert/oriented; no nausea/vomiting, shortness of breath/wheezing, no complaints of pain. Vital signs are stable.

## 2018-11-19 NOTE — PROGRESS NOTES
11/19/18 8328 Vitals Temp 98.9 °F (37.2 °C) Temp Source Oral  
Pulse (Heart Rate) (!) 111 Heart Rate Source Monitor Resp Rate 18  
O2 Sat (%) 99 % Level of Consciousness Alert /83 MAP (Calculated) 107 BP 1 Location Left arm BP 1 Method Automatic  
BP Patient Position At rest  
MEWS Score 3 Patient Observation Special Appliances/Equipment Bedside Commode Repositioned Head of bed elevated (degrees) Patient Turned Turns self Observations vitals Ambulate No (Comment) Activity In bed;Resting quietly Height/Weight Weight 98.4 kg (216 lb 14.4 oz) Heart rate is now at 80 bpm. Patient resting quietly in the bed.

## 2018-11-20 ENCOUNTER — ANESTHESIA EVENT (OUTPATIENT)
Dept: ENDOSCOPY | Age: 53
DRG: 682 | End: 2018-11-20
Payer: SELF-PAY

## 2018-11-20 ENCOUNTER — ANESTHESIA (OUTPATIENT)
Dept: ENDOSCOPY | Age: 53
DRG: 682 | End: 2018-11-20
Payer: SELF-PAY

## 2018-11-20 LAB
ANION GAP SERPL CALC-SCNC: 11 MMOL/L (ref 3–18)
BASOPHILS # BLD: 0 K/UL (ref 0–0.06)
BASOPHILS NFR BLD: 0 % (ref 0–3)
BUN BLD-MCNC: 15 MG/DL (ref 7–18)
BUN SERPL-MCNC: 14 MG/DL (ref 7–18)
BUN/CREAT SERPL: 10 (ref 12–20)
CALCIUM SERPL-MCNC: 8.4 MG/DL (ref 8.5–10.1)
CHLORIDE BLD-SCNC: 99 MMOL/L (ref 100–108)
CHLORIDE SERPL-SCNC: 101 MMOL/L (ref 100–108)
CO2 SERPL-SCNC: 27 MMOL/L (ref 21–32)
CREAT SERPL-MCNC: 1.45 MG/DL (ref 0.6–1.3)
DIFFERENTIAL METHOD BLD: ABNORMAL
EOSINOPHIL # BLD: 0 K/UL (ref 0–0.4)
EOSINOPHIL NFR BLD: 0 % (ref 0–5)
ERYTHROCYTE [DISTWIDTH] IN BLOOD BY AUTOMATED COUNT: 14.2 % (ref 11.6–14.5)
ERYTHROCYTE [SEDIMENTATION RATE] IN BLOOD: 39 MM/HR (ref 0–30)
GLUCOSE BLD STRIP.AUTO-MCNC: 171 MG/DL (ref 70–110)
GLUCOSE BLD STRIP.AUTO-MCNC: 181 MG/DL (ref 70–110)
GLUCOSE BLD STRIP.AUTO-MCNC: 189 MG/DL (ref 74–106)
GLUCOSE BLD STRIP.AUTO-MCNC: 219 MG/DL (ref 70–110)
GLUCOSE SERPL-MCNC: 150 MG/DL (ref 74–99)
HCG SERPL QL: NEGATIVE
HCT VFR BLD AUTO: 30.9 % (ref 35–45)
HCT VFR BLD CALC: 40 % (ref 36–49)
HGB BLD-MCNC: 10.5 G/DL (ref 12–16)
HGB BLD-MCNC: 13.6 G/DL (ref 12–16)
LYMPHOCYTES # BLD: 4.9 K/UL (ref 0.8–3.5)
LYMPHOCYTES NFR BLD: 36 % (ref 20–51)
MCH RBC QN AUTO: 28.5 PG (ref 24–34)
MCHC RBC AUTO-ENTMCNC: 34 G/DL (ref 31–37)
MCV RBC AUTO: 83.7 FL (ref 74–97)
MONOCYTES # BLD: 0.7 K/UL (ref 0–1)
MONOCYTES NFR BLD: 5 % (ref 2–9)
NEUTS SEG # BLD: 8.1 K/UL (ref 1.8–8)
NEUTS SEG NFR BLD: 59 % (ref 42–75)
PLATELET # BLD AUTO: 241 K/UL (ref 135–420)
PLATELET COMMENTS,PCOM: ABNORMAL
PMV BLD AUTO: 10.6 FL (ref 9.2–11.8)
POTASSIUM BLD-SCNC: 3.5 MMOL/L (ref 3.5–5.5)
POTASSIUM SERPL-SCNC: 2.8 MMOL/L (ref 3.5–5.5)
POTASSIUM SERPL-SCNC: 3.6 MMOL/L (ref 3.5–5.5)
RBC # BLD AUTO: 3.69 M/UL (ref 4.2–5.3)
RBC MORPH BLD: ABNORMAL
SODIUM BLD-SCNC: 139 MMOL/L (ref 136–145)
SODIUM SERPL-SCNC: 139 MMOL/L (ref 136–145)
VANCOMYCIN SERPL-MCNC: 9.5 UG/ML (ref 5–40)
WBC # BLD AUTO: 13.7 K/UL (ref 4.6–13.2)

## 2018-11-20 PROCEDURE — 74011250637 HC RX REV CODE- 250/637: Performed by: INTERNAL MEDICINE

## 2018-11-20 PROCEDURE — 65660000000 HC RM CCU STEPDOWN

## 2018-11-20 PROCEDURE — 77030018846 HC SOL IRR STRL H20 ICUM -A: Performed by: INTERNAL MEDICINE

## 2018-11-20 PROCEDURE — 77030008565 HC TBNG SUC IRR ERBE -B: Performed by: INTERNAL MEDICINE

## 2018-11-20 PROCEDURE — 85652 RBC SED RATE AUTOMATED: CPT

## 2018-11-20 PROCEDURE — 86038 ANTINUCLEAR ANTIBODIES: CPT

## 2018-11-20 PROCEDURE — 74011000250 HC RX REV CODE- 250: Performed by: EMERGENCY MEDICINE

## 2018-11-20 PROCEDURE — 74011250636 HC RX REV CODE- 250/636: Performed by: STUDENT IN AN ORGANIZED HEALTH CARE EDUCATION/TRAINING PROGRAM

## 2018-11-20 PROCEDURE — 88305 TISSUE EXAM BY PATHOLOGIST: CPT

## 2018-11-20 PROCEDURE — 82962 GLUCOSE BLOOD TEST: CPT

## 2018-11-20 PROCEDURE — 76060000031 HC ANESTHESIA FIRST 0.5 HR: Performed by: INTERNAL MEDICINE

## 2018-11-20 PROCEDURE — 74011636637 HC RX REV CODE- 636/637: Performed by: STUDENT IN AN ORGANIZED HEALTH CARE EDUCATION/TRAINING PROGRAM

## 2018-11-20 PROCEDURE — 0DB38ZX EXCISION OF LOWER ESOPHAGUS, VIA NATURAL OR ARTIFICIAL OPENING ENDOSCOPIC, DIAGNOSTIC: ICD-10-PCS | Performed by: INTERNAL MEDICINE

## 2018-11-20 PROCEDURE — 77030019988 HC FCPS ENDOSC DISP BSC -B: Performed by: INTERNAL MEDICINE

## 2018-11-20 PROCEDURE — 74011000258 HC RX REV CODE- 258: Performed by: FAMILY MEDICINE

## 2018-11-20 PROCEDURE — 85025 COMPLETE CBC W/AUTO DIFF WBC: CPT

## 2018-11-20 PROCEDURE — 0DB68ZX EXCISION OF STOMACH, VIA NATURAL OR ARTIFICIAL OPENING ENDOSCOPIC, DIAGNOSTIC: ICD-10-PCS | Performed by: INTERNAL MEDICINE

## 2018-11-20 PROCEDURE — 74011250636 HC RX REV CODE- 250/636: Performed by: FAMILY MEDICINE

## 2018-11-20 PROCEDURE — 36415 COLL VENOUS BLD VENIPUNCTURE: CPT

## 2018-11-20 PROCEDURE — 80202 ASSAY OF VANCOMYCIN: CPT

## 2018-11-20 PROCEDURE — 88312 SPECIAL STAINS GROUP 1: CPT

## 2018-11-20 PROCEDURE — 84703 CHORIONIC GONADOTROPIN ASSAY: CPT

## 2018-11-20 PROCEDURE — 74011636637 HC RX REV CODE- 636/637: Performed by: FAMILY MEDICINE

## 2018-11-20 PROCEDURE — 83516 IMMUNOASSAY NONANTIBODY: CPT

## 2018-11-20 PROCEDURE — 77030010545

## 2018-11-20 PROCEDURE — 84295 ASSAY OF SERUM SODIUM: CPT

## 2018-11-20 PROCEDURE — 74011250637 HC RX REV CODE- 250/637: Performed by: FAMILY MEDICINE

## 2018-11-20 PROCEDURE — 84132 ASSAY OF SERUM POTASSIUM: CPT

## 2018-11-20 PROCEDURE — 76040000019: Performed by: INTERNAL MEDICINE

## 2018-11-20 PROCEDURE — 74011250636 HC RX REV CODE- 250/636

## 2018-11-20 PROCEDURE — 74011250636 HC RX REV CODE- 250/636: Performed by: EMERGENCY MEDICINE

## 2018-11-20 PROCEDURE — 88342 IMHCHEM/IMCYTCHM 1ST ANTB: CPT

## 2018-11-20 RX ORDER — SODIUM CHLORIDE 0.9 % (FLUSH) 0.9 %
5-10 SYRINGE (ML) INJECTION AS NEEDED
Status: DISCONTINUED | OUTPATIENT
Start: 2018-11-20 | End: 2018-11-20 | Stop reason: HOSPADM

## 2018-11-20 RX ORDER — DEXTROSE 50 % IN WATER (D50W) INTRAVENOUS SYRINGE
25-50 AS NEEDED
Status: DISCONTINUED | OUTPATIENT
Start: 2018-11-20 | End: 2018-11-20 | Stop reason: HOSPADM

## 2018-11-20 RX ORDER — SUCRALFATE 1 G/10ML
1 SUSPENSION ORAL
Status: DISCONTINUED | OUTPATIENT
Start: 2018-11-20 | End: 2018-11-20

## 2018-11-20 RX ORDER — SODIUM CHLORIDE, SODIUM LACTATE, POTASSIUM CHLORIDE, CALCIUM CHLORIDE 600; 310; 30; 20 MG/100ML; MG/100ML; MG/100ML; MG/100ML
100 INJECTION, SOLUTION INTRAVENOUS CONTINUOUS
Status: DISCONTINUED | OUTPATIENT
Start: 2018-11-20 | End: 2018-11-20 | Stop reason: HOSPADM

## 2018-11-20 RX ORDER — INSULIN LISPRO 100 [IU]/ML
INJECTION, SOLUTION INTRAVENOUS; SUBCUTANEOUS ONCE
Status: DISCONTINUED | OUTPATIENT
Start: 2018-11-20 | End: 2018-11-20 | Stop reason: HOSPADM

## 2018-11-20 RX ORDER — PANTOPRAZOLE SODIUM 40 MG/1
40 TABLET, DELAYED RELEASE ORAL
Status: DISCONTINUED | OUTPATIENT
Start: 2018-11-21 | End: 2018-11-22 | Stop reason: HOSPADM

## 2018-11-20 RX ORDER — DIPHENHYDRAMINE HYDROCHLORIDE 50 MG/ML
12.5 INJECTION, SOLUTION INTRAMUSCULAR; INTRAVENOUS
Status: DISCONTINUED | OUTPATIENT
Start: 2018-11-20 | End: 2018-11-20 | Stop reason: HOSPADM

## 2018-11-20 RX ORDER — MAGNESIUM SULFATE 100 %
4 CRYSTALS MISCELLANEOUS AS NEEDED
Status: DISCONTINUED | OUTPATIENT
Start: 2018-11-20 | End: 2018-11-20 | Stop reason: HOSPADM

## 2018-11-20 RX ORDER — SUCRALFATE 1 G/10ML
1 SUSPENSION ORAL
Status: DISCONTINUED | OUTPATIENT
Start: 2018-11-20 | End: 2018-11-22 | Stop reason: HOSPADM

## 2018-11-20 RX ORDER — PROPOFOL 10 MG/ML
INJECTION, EMULSION INTRAVENOUS AS NEEDED
Status: DISCONTINUED | OUTPATIENT
Start: 2018-11-20 | End: 2018-11-20 | Stop reason: HOSPADM

## 2018-11-20 RX ORDER — OXYCODONE AND ACETAMINOPHEN 5; 325 MG/1; MG/1
1 TABLET ORAL AS NEEDED
Status: DISCONTINUED | OUTPATIENT
Start: 2018-11-20 | End: 2018-11-20 | Stop reason: HOSPADM

## 2018-11-20 RX ORDER — SODIUM CHLORIDE, SODIUM LACTATE, POTASSIUM CHLORIDE, CALCIUM CHLORIDE 600; 310; 30; 20 MG/100ML; MG/100ML; MG/100ML; MG/100ML
INJECTION, SOLUTION INTRAVENOUS
Status: DISCONTINUED | OUTPATIENT
Start: 2018-11-20 | End: 2018-11-20 | Stop reason: HOSPADM

## 2018-11-20 RX ADMIN — HYDROXYZINE HYDROCHLORIDE 25 MG: 25 TABLET, FILM COATED ORAL at 16:08

## 2018-11-20 RX ADMIN — HEPARIN SODIUM 5000 UNITS: 5000 INJECTION, SOLUTION INTRAVENOUS; SUBCUTANEOUS at 21:39

## 2018-11-20 RX ADMIN — METOCLOPRAMIDE 5 MG: 5 INJECTION, SOLUTION INTRAMUSCULAR; INTRAVENOUS at 06:08

## 2018-11-20 RX ADMIN — LIDOCAINE HYDROCHLORIDE: 10 INJECTION, SOLUTION EPIDURAL; INFILTRATION; INTRACAUDAL; PERINEURAL at 05:40

## 2018-11-20 RX ADMIN — INSULIN LISPRO 2 UNITS: 100 INJECTION, SOLUTION INTRAVENOUS; SUBCUTANEOUS at 11:18

## 2018-11-20 RX ADMIN — WATER 1 G: 1 INJECTION INTRAMUSCULAR; INTRAVENOUS; SUBCUTANEOUS at 00:23

## 2018-11-20 RX ADMIN — LIDOCAINE HYDROCHLORIDE: 10 INJECTION, SOLUTION EPIDURAL; INFILTRATION; INTRACAUDAL; PERINEURAL at 08:53

## 2018-11-20 RX ADMIN — SODIUM CHLORIDE, SODIUM LACTATE, POTASSIUM CHLORIDE, CALCIUM CHLORIDE: 600; 310; 30; 20 INJECTION, SOLUTION INTRAVENOUS at 11:36

## 2018-11-20 RX ADMIN — INSULIN GLARGINE 20 UNITS: 100 INJECTION, SOLUTION SUBCUTANEOUS at 08:56

## 2018-11-20 RX ADMIN — LIDOCAINE HYDROCHLORIDE: 10 INJECTION, SOLUTION EPIDURAL; INFILTRATION; INTRACAUDAL; PERINEURAL at 07:00

## 2018-11-20 RX ADMIN — METOCLOPRAMIDE 5 MG: 5 INJECTION, SOLUTION INTRAMUSCULAR; INTRAVENOUS at 00:23

## 2018-11-20 RX ADMIN — PROMETHAZINE HYDROCHLORIDE 12.5 MG: 25 INJECTION, SOLUTION INTRAMUSCULAR; INTRAVENOUS at 06:07

## 2018-11-20 RX ADMIN — INSULIN LISPRO 4 UNITS: 100 INJECTION, SOLUTION INTRAVENOUS; SUBCUTANEOUS at 17:51

## 2018-11-20 RX ADMIN — SUCRALFATE 1 G: 1 SUSPENSION ORAL at 17:37

## 2018-11-20 RX ADMIN — METOCLOPRAMIDE 5 MG: 5 INJECTION, SOLUTION INTRAMUSCULAR; INTRAVENOUS at 17:39

## 2018-11-20 RX ADMIN — LIDOCAINE HYDROCHLORIDE: 10 INJECTION, SOLUTION EPIDURAL; INFILTRATION; INTRACAUDAL; PERINEURAL at 04:35

## 2018-11-20 RX ADMIN — LIDOCAINE HYDROCHLORIDE: 10 INJECTION, SOLUTION EPIDURAL; INFILTRATION; INTRACAUDAL; PERINEURAL at 06:50

## 2018-11-20 RX ADMIN — PROPOFOL 130 MG: 10 INJECTION, EMULSION INTRAVENOUS at 11:45

## 2018-11-20 RX ADMIN — SUCRALFATE 1 G: 1 SUSPENSION ORAL at 21:39

## 2018-11-20 NOTE — PROGRESS NOTES
Intern Progress Note 120 [facility] Patient: Rohit Gannon MRN: [id number]  CSN: [id number] YOB: 1965  Age: 52 y.o. Sex: female DOA: 11/16/2018 LOS:  LOS: 3 days Subjective:  
 
Acute events: Patient had one episode of emesis this morning, but is otherwise feeling a lot better than yesterday. She denies headache, SOB, CP. Review of Systems Constitutional: Negative for chills and fever. Eyes: Negative for blurred vision. Respiratory: Negative for shortness of breath. Cardiovascular: Negative for chest pain, palpitations and leg swelling. Gastrointestinal: Positive for abdominal pain, heartburn, nausea and vomiting. Neurological: Negative for dizziness and headaches. Objective:  
  
Patient Vitals for the past 24 hrs: 
 Temp Pulse Resp BP SpO2  
11/20/18 0748 98.5 °F (36.9 °C) 99 20 119/83 98 % 11/20/18 0330 98.9 °F (37.2 °C) (!) 101 20 116/78 98 % 11/19/18 2313 99.7 °F (37.6 °C) 95 20 125/84 99 % 11/19/18 1936 98.4 °F (36.9 °C) (!) 104 20 124/81 94 % 11/19/18 1440 99.4 °F (37.4 °C) 96 18 143/85 98 % 11/19/18 1150 99.3 °F (37.4 °C) (!) 103 16 148/84 99 % Intake/Output Summary (Last 24 hours) at 11/20/2018 0957 Last data filed at 11/20/2018 5862 Gross per 24 hour Intake 3160 ml Output 300 ml Net 2860 ml Physical Exam:  
General:  Alert and Responsive and in no acute distress. HEENT: Conjunctiva pink, sclera anicteric. EOMI.  Pharynx moist, nonerythematous.  Moist mucous membranes.  Thyroid not enlarged, no nodules.  No cervical, supraclavicular, occipital or submandibular lymphadenopathy.  No other gross abnormalities present. CV:  Tachycardic, no murmurs. No visible pulsations or thrills.   
RESP:  Unlabored breathing.  Lungs clear to auscultation. no wheeze, rales, or rhonchi.  Equal expansion bilaterally.   
ABD:  Diffuse tenderness LQ>UQ, no rebound or guarding. Soft,nondistended.   No hepatosplenomegaly.  No suprapubic tenderness.  No CVA tenderness. MS:  No joint deformity or instability.  No atrophy. Neuro:  5/5 strength bilateral upper extremities and lower extremities.  A+Ox3. Ext:  No edema.  2+ radial and dp pulses bilaterally. Amputated left great toe. Skin:  No rashes, lesions, or ulcers.  Good turgor. Lab/Data Reviewed: 
Recent Results (from the past 12 hour(s)) METABOLIC PANEL, BASIC Collection Time: 11/20/18  1:06 AM  
Result Value Ref Range Sodium 139 136 - 145 mmol/L Potassium 2.8 (LL) 3.5 - 5.5 mmol/L Chloride 101 100 - 108 mmol/L  
 CO2 27 21 - 32 mmol/L Anion gap 11 3.0 - 18 mmol/L Glucose 150 (H) 74 - 99 mg/dL BUN 14 7.0 - 18 MG/DL Creatinine 1.45 (H) 0.6 - 1.3 MG/DL  
 BUN/Creatinine ratio 10 (L) 12 - 20 GFR est AA 45 (L) >60 ml/min/1.73m2 GFR est non-AA 37 (L) >60 ml/min/1.73m2 Calcium 8.4 (L) 8.5 - 10.1 MG/DL  
CBC WITH AUTOMATED DIFF Collection Time: 11/20/18  1:06 AM  
Result Value Ref Range WBC 13.7 (H) 4.6 - 13.2 K/uL  
 RBC 3.69 (L) 4.20 - 5.30 M/uL  
 HGB 10.5 (L) 12.0 - 16.0 g/dL HCT 30.9 (L) 35.0 - 45.0 % MCV 83.7 74.0 - 97.0 FL  
 MCH 28.5 24.0 - 34.0 PG  
 MCHC 34.0 31.0 - 37.0 g/dL  
 RDW 14.2 11.6 - 14.5 % PLATELET 471 204 - 196 K/uL MPV 10.6 9.2 - 11.8 FL  
 NEUTROPHILS 59 42 - 75 % LYMPHOCYTES 36 20 - 51 % MONOCYTES 5 2 - 9 % EOSINOPHILS 0 0 - 5 % BASOPHILS 0 0 - 3 %  
 ABS. NEUTROPHILS 8.1 (H) 1.8 - 8.0 K/UL  
 ABS. LYMPHOCYTES 4.9 (H) 0.8 - 3.5 K/UL  
 ABS. MONOCYTES 0.7 0 - 1.0 K/UL  
 ABS. EOSINOPHILS 0.0 0.0 - 0.4 K/UL  
 ABS. BASOPHILS 0.0 0.0 - 0.06 K/UL  
 DF MANUAL PLATELET COMMENTS ADEQUATE PLATELETS    
 RBC COMMENTS POLYCHROMASIA 1+ 
    
 RBC COMMENTS HYPOCHROMIA 1+ 
    
 RBC COMMENTS STOMATOCYTES 1+ [place] Collection Time: 11/20/18  1:06 AM  
Result Value Ref Range Vancomycin, random 9.5 5.0 - 40.0 UG/ML  
SED RATE (ESR) Collection Time: 11/20/18  1:06 AM  
Result Value Ref Range Sed rate, automated 39 (H) 0 - 30 mm/hr GLUCOSE, POC Collection Time: 11/20/18  8:11 AM  
Result Value Ref Range Glucose (POC) 181 (H) 70 - 110 mg/dL Scheduled Medications Reviewed: 
Current Facility-Administered Medications Medication Dose Route Frequency  famotidine (PEPCID) tablet 20 mg  20 mg Oral DAILY  insulin glargine (LANTUS) injection 20 Units  20 Units SubCUTAneous DAILY  cefTRIAXone (ROCEPHIN) 1 g in sterile water (preservative free) 10 mL IV syringe  1 g IntraVENous Q24H  
 heparin (porcine) injection 5,000 Units  5,000 Units SubCUTAneous Q8H  
 metoclopramide HCl (REGLAN) injection 5 mg  5 mg IntraVENous Q6H  
 PARoxetine (PAXIL) tablet 30 mg  30 mg Oral DAILY  insulin lispro (HUMALOG) injection 8 Units  8 Units SubCUTAneous TIDAC  insulin lispro (HUMALOG) injection   SubCUTAneous TIDAC  dextrose 5% - 0.45% NaCl with KCl 10 mEq/L infusion  125 mL/hr IntraVENous CONTINUOUS Imaging, microbiology, and EKG/Telemetry: No results found. Assessment/Plan  
52 y. o. female with PMH Type II DM w/retinopathy and neuropathy, GI stromal tumor s/p resection 2014, osteomyelitis, cholecystectomy, pancreatitis, HTN and anxiety,  now admitted with SIRS with unknown source. 
  
SIRS (3/4) of Unknown Source:  Possibly due to gastroparesis given n/v and abdominal pain. Must consider infectious process due to elevated WBC and 3/4 SIRS criteria (HR: 110-140's, RR: 20's, and WBC: 21.8). Patient continues to have nausea and vomiting 
- Continue Rocephin  
- Continue [email or web address] 
- Continue Phenergan and Reglan 
- NPO 
- EGD today 
  
Hypoglycemia/T2DM: Patient has had 2 episodes of hypoglycemia during her admission. Patient has a history of diabetic retinopathy and is blind in left eye.  A1c 7.9 on 11/18.  
- 1/2 home dose of Lantus 45 Units at bedtime 
- Hold Humalog 8 units given decreased PO intake 
- SSI  
 - Hold metformin 500 mg qd 
  
Transaminitis: 11/16 showed ALT of 90 and AST of 112 
- F/u HIV, RPR, and Hepatitis Panel  
  
CRYSTAL: Likely 2/2 to dehydration and/or sepsis. Baseline Cr approx 0.9. On admission, Cr 3.33. Improving: Cr: 2.07 and BUN: 37 
- Continue [email or web address] 
  
HTN, HLD: 
- Hold lisinopril due to CRYSTAL.   
- Hold Crestor 20 mg qd   
Anxiety:  
- Continue home Paxil 
- Continue home Hydroxyzine 25 mg IV 
  
  
Diet: NPO except medication DVT Prophylaxis: SQH Code Status: Full Point of Contact: Saima Mcfrank (Relationship: Friend) [phone] 
  
Disposition and anticipated LOS: < 2 midnights Geovanny Arana MD  
[facility] Medicine PGY-1 
11/20/18 8:51 AM

## 2018-11-20 NOTE — PROCEDURES
[address] [facility], [address] Endoscopic Gastroduodenoscopy Procedure Note Yvette Devine 1965 
[id number] Indication:  Gastrointestinal hemorrhage, unspecified, Hematemesis : Maisha Morelos MD 
 
Referring Provider:  Rebel Bowman MD 
 
Anesthesia/Sedation:  MAC anesthesia Propofol Procedure Details After infomed consent was obtained for the procedure, with all risks and benefits of procedure explained the patient was taken to the endoscopy suite and placed in the left lateral decubitus position. Following sequential administration of sedation as per above, the endoscope was inserted into the mouth and advanced under direct vision to third portion of the duodenum. A careful inspection was made as the gastroscope was withdrawn, including a retroflexed view of the proximal stomach; findings and interventions are described below. Findings:  
Esophagus:broad ulcerations w/o active bleeding Stomach: friable mucosa in the fundus and body, w/o active bleeding Duodenum/jejunum: normal 
 
Therapies:  Biopsies of lower 1/3 of esophagus and antral /body Bxs Specimens:  
ID Type Source Tests Collected by Time Destination 1 : Gastric Bx's Preservative Gastric  Curry Baltazar MD 11/20/2018 1148 Pathology 2 : Lower 3rd esophagus bx Preservative Esophagus  Curry Baltazar MD 11/20/2018 1150 Pathology Complications:   None; patient tolerated the procedure well. EBL:  None. Impression:    hiatal hernia, esophagitis, gastritis Recommendations: 
-Continue acid suppression. , -Await pathology. , -Await SUSAN test result and treat for Helicobacter pylori if positive. Advance diet as tolerated PPI, 2 wk course of carafate No NSAIDs Maisha Morelos MD 
11/20/2018  11:55 AM

## 2018-11-20 NOTE — PERIOP NOTES
TRANSFER - OUT REPORT: 
 
Verbal report given to Kimberly Rm on Lucinda Lam  being transferred to  for routine post - op Report consisted of patients Situation, Background, Assessment and  
Recommendations(SBAR). Information from the following report(s) SBAR and MAR was reviewed with the receiving nurse. Lines:  
Peripheral IV 11/19/18 Anterior; Left Antecubital (Active) Site Assessment Clean, dry, & intact 11/20/2018 11:57 AM  
Phlebitis Assessment 0 11/20/2018 11:57 AM  
Infiltration Assessment 0 11/20/2018 11:57 AM  
Dressing Status Clean, dry, & intact 11/20/2018 11:57 AM  
Dressing Type Tape;Transparent 11/20/2018 11:57 AM  
Hub Color/Line Status Yellow; Infusing 11/20/2018 11:57 AM  
 Discussed KCL 10meq IVPB still not infused; night shift RN unable to administer on her shift so 1st KCL was started at 0853, 1st dose not charted for 0700 dose. 2nd dose started and charted at 1230. Opportunity for questions and clarification was provided. Patient transported with: 
 I-DISPO

## 2018-11-20 NOTE — ANESTHESIA POSTPROCEDURE EVALUATION
Procedure(s): ENDOSCOPY w bx's. Anesthesia Post Evaluation Multimodal analgesia: multimodal analgesia used between 6 hours prior to anesthesia start to PACU discharge Patient location during evaluation: PACU Patient participation: complete - patient participated Level of consciousness: awake Pain score: 0 Pain management: satisfactory to patient Airway patency: patent Anesthetic complications: no 
Cardiovascular status: acceptable Respiratory status: acceptable Hydration status: acceptable Post anesthesia nausea and vomiting:  controlled Visit Vitals /82 (BP 1 Location: Right arm, BP Patient Position: At rest) Pulse 96 Temp 37.4 °C (99.4 °F) Resp 18 Wt 97.1 kg (214 lb) SpO2 100% Breastfeeding? No  
BMI 32.54 kg/m²

## 2018-11-20 NOTE — ROUTINE PROCESS
Bedside and Verbal shift change report given to Nicole Travis RN (oncoming nurse) by Buzz Zepeda RN 
 (offgoing nurse). Report included the following information SBAR, Kardex, MAR and Accordion.

## 2018-11-20 NOTE — ROUTINE PROCESS
Bedside shift change report given to Cassy Jeong RN (oncoming nurse) by Semaj Webster RN (offgoing nurse). Report included the following information SBAR, Kardex, Recent Results and Cardiac Rhythm NSR/ST.

## 2018-11-20 NOTE — ANESTHESIA PREPROCEDURE EVALUATION
Anesthetic History Review of Systems / Medical History Patient summary reviewed and pertinent labs reviewed Pulmonary Within defined limits Neuro/Psych Within defined limits Cardiovascular Hypertension: well controlled Exercise tolerance: >4 METS 
  
GI/Hepatic/Renal 
  
GERD: poorly controlled Comments: Abdominal pain,Nausea and vomiting Endo/Other Diabetes: using insulin Comments: H/O GIST resection Other Findings Physical Exam 
 
Airway Mallampati: III 
TM Distance: 4 - 6 cm Neck ROM: normal range of motion Mouth opening: Normal 
 
 Cardiovascular Regular rate and rhythm,  S1 and S2 normal,  no murmur, click, rub, or gallop Dental 
No notable dental hx Pulmonary Breath sounds clear to auscultation Abdominal 
GI exam deferred Other Findings Anesthetic Plan ASA: 3 Anesthesia type: MAC Induction: Intravenous Anesthetic plan and risks discussed with: Patient

## 2018-11-21 LAB
ACTIN IGG SERPL-ACNC: 12 UNITS (ref 0–19)
ANA SER QL: NEGATIVE
ANION GAP SERPL CALC-SCNC: 9 MMOL/L (ref 3–18)
BASOPHILS # BLD: 0 K/UL (ref 0–0.1)
BASOPHILS NFR BLD: 0 % (ref 0–2)
BUN SERPL-MCNC: 15 MG/DL (ref 7–18)
BUN/CREAT SERPL: 8 (ref 12–20)
CALCIUM SERPL-MCNC: 8.1 MG/DL (ref 8.5–10.1)
CHLORIDE SERPL-SCNC: 103 MMOL/L (ref 100–108)
CO2 SERPL-SCNC: 25 MMOL/L (ref 21–32)
CREAT SERPL-MCNC: 1.77 MG/DL (ref 0.6–1.3)
DIFFERENTIAL METHOD BLD: ABNORMAL
EOSINOPHIL # BLD: 0.2 K/UL (ref 0–0.4)
EOSINOPHIL NFR BLD: 2 % (ref 0–5)
ERYTHROCYTE [DISTWIDTH] IN BLOOD BY AUTOMATED COUNT: 14.3 % (ref 11.6–14.5)
GLUCOSE BLD STRIP.AUTO-MCNC: 161 MG/DL (ref 70–110)
GLUCOSE BLD STRIP.AUTO-MCNC: 234 MG/DL (ref 70–110)
GLUCOSE BLD STRIP.AUTO-MCNC: 237 MG/DL (ref 70–110)
GLUCOSE BLD STRIP.AUTO-MCNC: 247 MG/DL (ref 70–110)
GLUCOSE SERPL-MCNC: 208 MG/DL (ref 74–99)
HCT VFR BLD AUTO: 28.1 % (ref 35–45)
HGB BLD-MCNC: 9.6 G/DL (ref 12–16)
LYMPHOCYTES # BLD: 3.5 K/UL (ref 0.9–3.6)
LYMPHOCYTES NFR BLD: 24 % (ref 21–52)
MCH RBC QN AUTO: 29.1 PG (ref 24–34)
MCHC RBC AUTO-ENTMCNC: 34.2 G/DL (ref 31–37)
MCV RBC AUTO: 85.2 FL (ref 74–97)
MONOCYTES # BLD: 0.9 K/UL (ref 0.05–1.2)
MONOCYTES NFR BLD: 6 % (ref 3–10)
NEUTS SEG # BLD: 9.9 K/UL (ref 1.8–8)
NEUTS SEG NFR BLD: 68 % (ref 40–73)
PLATELET # BLD AUTO: 212 K/UL (ref 135–420)
PMV BLD AUTO: 10.4 FL (ref 9.2–11.8)
POTASSIUM SERPL-SCNC: 3.4 MMOL/L (ref 3.5–5.5)
RBC # BLD AUTO: 3.3 M/UL (ref 4.2–5.3)
SODIUM SERPL-SCNC: 137 MMOL/L (ref 136–145)
WBC # BLD AUTO: 14.5 K/UL (ref 4.6–13.2)

## 2018-11-21 PROCEDURE — 80048 BASIC METABOLIC PNL TOTAL CA: CPT

## 2018-11-21 PROCEDURE — 74011250636 HC RX REV CODE- 250/636: Performed by: FAMILY MEDICINE

## 2018-11-21 PROCEDURE — 74011250637 HC RX REV CODE- 250/637: Performed by: INTERNAL MEDICINE

## 2018-11-21 PROCEDURE — 74011000258 HC RX REV CODE- 258: Performed by: FAMILY MEDICINE

## 2018-11-21 PROCEDURE — 36415 COLL VENOUS BLD VENIPUNCTURE: CPT

## 2018-11-21 PROCEDURE — 74011250636 HC RX REV CODE- 250/636: Performed by: STUDENT IN AN ORGANIZED HEALTH CARE EDUCATION/TRAINING PROGRAM

## 2018-11-21 PROCEDURE — 74011636637 HC RX REV CODE- 636/637: Performed by: FAMILY MEDICINE

## 2018-11-21 PROCEDURE — 74011636637 HC RX REV CODE- 636/637: Performed by: STUDENT IN AN ORGANIZED HEALTH CARE EDUCATION/TRAINING PROGRAM

## 2018-11-21 PROCEDURE — 85025 COMPLETE CBC W/AUTO DIFF WBC: CPT

## 2018-11-21 PROCEDURE — 74011250637 HC RX REV CODE- 250/637: Performed by: STUDENT IN AN ORGANIZED HEALTH CARE EDUCATION/TRAINING PROGRAM

## 2018-11-21 PROCEDURE — 65660000000 HC RM CCU STEPDOWN

## 2018-11-21 PROCEDURE — 74011000250 HC RX REV CODE- 250: Performed by: FAMILY MEDICINE

## 2018-11-21 PROCEDURE — 74011250637 HC RX REV CODE- 250/637: Performed by: FAMILY MEDICINE

## 2018-11-21 PROCEDURE — 82962 GLUCOSE BLOOD TEST: CPT

## 2018-11-21 RX ADMIN — METOCLOPRAMIDE 5 MG: 5 INJECTION, SOLUTION INTRAMUSCULAR; INTRAVENOUS at 23:39

## 2018-11-21 RX ADMIN — METOCLOPRAMIDE 5 MG: 5 INJECTION, SOLUTION INTRAMUSCULAR; INTRAVENOUS at 12:24

## 2018-11-21 RX ADMIN — HEPARIN SODIUM 5000 UNITS: 5000 INJECTION, SOLUTION INTRAVENOUS; SUBCUTANEOUS at 23:35

## 2018-11-21 RX ADMIN — INSULIN LISPRO 6 UNITS: 100 INJECTION, SOLUTION INTRAVENOUS; SUBCUTANEOUS at 17:51

## 2018-11-21 RX ADMIN — INSULIN LISPRO 4 UNITS: 100 INJECTION, SOLUTION INTRAVENOUS; SUBCUTANEOUS at 12:23

## 2018-11-21 RX ADMIN — SUCRALFATE 1 G: 1 SUSPENSION ORAL at 09:00

## 2018-11-21 RX ADMIN — MORPHINE SULFATE 1 MG: 2 INJECTION, SOLUTION INTRAMUSCULAR; INTRAVENOUS at 01:15

## 2018-11-21 RX ADMIN — PROMETHAZINE HYDROCHLORIDE 12.5 MG: 25 INJECTION, SOLUTION INTRAMUSCULAR; INTRAVENOUS at 00:44

## 2018-11-21 RX ADMIN — SUCRALFATE 1 G: 1 SUSPENSION ORAL at 17:51

## 2018-11-21 RX ADMIN — HYDROXYZINE HYDROCHLORIDE 25 MG: 25 TABLET, FILM COATED ORAL at 23:39

## 2018-11-21 RX ADMIN — HYDROXYZINE HYDROCHLORIDE 25 MG: 25 TABLET, FILM COATED ORAL at 00:26

## 2018-11-21 RX ADMIN — DEXTROSE MONOHYDRATE, SODIUM CHLORIDE, AND POTASSIUM CHLORIDE 125 ML/HR: 50; 4.5; .745 INJECTION, SOLUTION INTRAVENOUS at 21:19

## 2018-11-21 RX ADMIN — METOCLOPRAMIDE 5 MG: 5 INJECTION, SOLUTION INTRAMUSCULAR; INTRAVENOUS at 00:24

## 2018-11-21 RX ADMIN — DEXTROSE MONOHYDRATE, SODIUM CHLORIDE, AND POTASSIUM CHLORIDE 125 ML/HR: 50; 4.5; .745 INJECTION, SOLUTION INTRAVENOUS at 06:26

## 2018-11-21 RX ADMIN — WATER 1 G: 1 INJECTION INTRAMUSCULAR; INTRAVENOUS; SUBCUTANEOUS at 00:24

## 2018-11-21 RX ADMIN — PAROXETINE HYDROCHLORIDE 30 MG: 20 TABLET, FILM COATED ORAL at 09:00

## 2018-11-21 RX ADMIN — METOCLOPRAMIDE 5 MG: 5 INJECTION, SOLUTION INTRAMUSCULAR; INTRAVENOUS at 17:51

## 2018-11-21 RX ADMIN — HEPARIN SODIUM 5000 UNITS: 5000 INJECTION, SOLUTION INTRAVENOUS; SUBCUTANEOUS at 12:24

## 2018-11-21 RX ADMIN — SUCRALFATE 1 G: 1 SUSPENSION ORAL at 12:35

## 2018-11-21 RX ADMIN — INSULIN LISPRO 4 UNITS: 100 INJECTION, SOLUTION INTRAVENOUS; SUBCUTANEOUS at 08:59

## 2018-11-21 RX ADMIN — METOCLOPRAMIDE 5 MG: 5 INJECTION, SOLUTION INTRAMUSCULAR; INTRAVENOUS at 06:29

## 2018-11-21 RX ADMIN — INSULIN GLARGINE 20 UNITS: 100 INJECTION, SOLUTION SUBCUTANEOUS at 08:59

## 2018-11-21 RX ADMIN — PANTOPRAZOLE SODIUM 40 MG: 40 TABLET, DELAYED RELEASE ORAL at 09:01

## 2018-11-21 RX ADMIN — SUCRALFATE 1 G: 1 SUSPENSION ORAL at 23:35

## 2018-11-21 RX ADMIN — HEPARIN SODIUM 5000 UNITS: 5000 INJECTION, SOLUTION INTRAVENOUS; SUBCUTANEOUS at 06:29

## 2018-11-21 NOTE — PROGRESS NOTES
GI Attending note: 
-Pt seen, examined and evaluated independently. Findings as outlined by NP confirmed. Agree with assessment and plan. Impression: 
GI bleed, due to esophagitis Kristina Grijalva Plan: W/u in progress 
   
 
 
WWW. [employer] 
[phone] Gastroenterology follow up-Progress note Impression: 1. Epigastric pain and melena - EGD with esophagitis and gastritis, biopsies pending 2. S/p resection of GIST in 2014 3. Elevated LFTs - Viral hepatitis panel negative - GRACE pending 
- RUQ US ordered Plan: 1. Continue PPI 2. H/H daily 3. Recheck LFTs 4. Await RUQ US, GRACE Chief Complaint: epigastric pain Subjective:  No further melena, only mild epigastric pain, tolerating diet. ROS: Denies any fevers, chills, rash. Eyes: conjunctiva normal, EOM normal  
Neck: ROM normal, supple and trachea normal  
Cardiovascular: heart normal, intact distal pulses, normal rate and regular rhythm Pulmonary/Chest Wall: breath sounds normal and effort normal  
Abdominal: appearance normal, bowel sounds normal and soft, non-acute, non-tender Patient Active Problem List  
Diagnosis Code  Essential hypertension, benign I10  Type II or unspecified type diabetes mellitus without mention of complication, not stated as uncontrolled E11.9  Vitamin D deficiency E55.9  
 HLD (hyperlipidemia) E78.5  Dehydration E86.0  Nausea and vomiting R11.2  
 SIRS (systemic inflammatory response syndrome) (HCC) R65.10  GERD (gastroesophageal reflux disease) K21.9  Nausea with vomiting R11.2  
 Osteomyelitis (HCC) M86.9  Sepsis ([address] A41.9  Foot ulcer due to secondary DM ([address] E13.621, L97.509  Uncontrolled type 2 diabetes mellitus with chronic kidney disease (HCC) E11.22, E11.65  Diabetic retinopathy ([address] E11.319  
 Anxiety F41.9  Leukocytosis D72.829  
 Renal insufficiency N28.9  Diabetes ([facility] E11.9  Hypertension I10  
 Mass of abdomen R19.00  
 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 No  
BMI 32.58 kg/m² No intake or output data in the 24 hours ending 11/21/18 1516 CBC w/Diff Lab Results Component Value Date/Time WBC 14.5 (H) 11/21/2018 05:10 AM  
 RBC 3.30 (L) 11/21/2018 05:10 AM  
 HGB 9.6 (L) 11/21/2018 05:10 AM  
 HCT 28.1 (L) 11/21/2018 05:10 AM  
 MCV 85.2 11/21/2018 05:10 AM  
 MCH 29.1 11/21/2018 05:10 AM  
 MCHC 34.2 11/21/2018 05:10 AM  
 RDW 14.3 11/21/2018 05:10 AM  
  11/21/2018 05:10 AM  
 Lab Results Component Value Date/Time GRANS 68 11/21/2018 05:10 AM  
 LYMPH 24 11/21/2018 05:10 AM  
 EOS 2 11/21/2018 05:10 AM  
 BANDS 2 04/29/2014 05:12 PM  
 BASOS 0 11/21/2018 05:10 AM  
  
Basic Metabolic Profile Recent Labs  
  11/21/18 
0510   
K 3.4*  
 CO2 25 BUN 15  
CA 8.1* Hepatic Function Lab Results Component Value Date/Time ALB 4.0 11/16/2018 09:38 PM  
 TP 8.2 11/16/2018 09:38 PM  
  (H) 11/16/2018 09:38 PM  
 Lab Results Component Value Date/Time SGOT 112 (H) 11/16/2018 09:38 PM  
  
 
 
Coags No results for input(s): PTP, INR, APTT in the last 72 hours. No lab exists for component: INREXT ESTEBAN Joiner 
11/21/18, 3:21 PM  
[facility]. [email or web address] Phone: [phone] Pager: [phone]

## 2018-11-21 NOTE — PROGRESS NOTES
Intern Progress Note [address] Patient: Sarthak Blair MRN: [id number]  CSN: [id number] YOB: 1965  Age: 52 y.o. Sex: female DOA: 11/16/2018 LOS:  LOS: 4 days Subjective:  
 
Acute events: Patient continues to have nausea and vomiting. She states that she is feeling better than yesterday, but does not feel ready to go home. She denies CP, headache, blurry vision, or bloody stools. Review of Systems Constitutional: Negative for chills and fever. Eyes: Negative for blurred vision. Respiratory: Negative for shortness of breath. Cardiovascular: Negative for chest pain, palpitations and leg swelling. Gastrointestinal: Positive for abdominal pain, heartburn, nausea and vomiting. Neurological: Negative for dizziness and headaches. Objective:  
  
Patient Vitals for the past 24 hrs: 
 Temp Pulse Resp BP SpO2  
11/21/18 0414 98.9 °F (37.2 °C) 98 18 109/72 99 % 11/20/18 2312 100 °F (37.8 °C) (!) 112 20 136/81 100 % 11/20/18 1945 99.6 °F (37.6 °C) 100 18 133/75 100 % 11/20/18 1523 99.4 °F (37.4 °C) 96 18 119/82 100 % 11/20/18 1315 99 °F (37.2 °C) (!) 103 18 123/81 100 % 11/20/18 1226  78 15  100 % 11/20/18 1217  76 15 133/63 100 % 11/20/18 1207  90 13 138/81 100 % 11/20/18 1200  93 14  100 % 11/20/18 1158 98.6 °F (37 °C) 93 16 98/58 100 % 11/20/18 1157 98.6 °F (37 °C) 93 16 98/58 100 % 11/20/18 1100 99 °F (37.2 °C) 100 18 (!) 143/96 100 % 11/20/18 0748 98.5 °F (36.9 °C) 99 20 119/83 98 % Intake/Output Summary (Last 24 hours) at 11/21/2018 3038 Last data filed at 11/20/2018 1150 Gross per 24 hour Intake 50 ml Output  Net 50 ml Physical Exam:  
General:  Alert and Responsive and in no acute distress. HEENT: Conjunctiva pink, sclera anicteric.  EOMI.  Pharynx moist, nonerythematous.  Moist mucous membranes.  Thyroid not enlarged, no nodules.  No cervical, supraclavicular, occipital or submandibular lymphadenopathy.  No other gross abnormalities present. CV:  Tachycardic, no murmurs. No visible pulsations or thrills.   
RESP:  Unlabored breathing.  Lungs clear to auscultation. no wheeze, rales, or rhonchi.  Equal expansion bilaterally.   
ABD:  Diffuse tenderness LQ>UQ, no rebound or guarding better than yesterday. Soft,nondistended.   No hepatosplenomegaly.  No suprapubic tenderness.  No CVA tenderness. MS:  No joint deformity or instability.  No atrophy. Neuro:  5/5 strength bilateral upper extremities and lower extremities.  A+Ox3. Ext:  No edema.  2+ radial and dp pulses bilaterally. Amputated left great toe. Skin:  No rashes, lesions, or ulcers.  Good turgor. Lab/Data Reviewed: 
Recent Results (from the past 12 hour(s)) METABOLIC PANEL, BASIC Collection Time: 11/21/18  5:10 AM  
Result Value Ref Range Sodium 137 136 - 145 mmol/L Potassium 3.4 (L) 3.5 - 5.5 mmol/L Chloride 103 100 - 108 mmol/L  
 CO2 25 21 - 32 mmol/L Anion gap 9 3.0 - 18 mmol/L Glucose 208 (H) 74 - 99 mg/dL BUN 15 7.0 - 18 MG/DL Creatinine 1.77 (H) 0.6 - 1.3 MG/DL  
 BUN/Creatinine ratio 8 (L) 12 - 20 GFR est AA 36 (L) >60 ml/min/1.73m2 GFR est non-AA 30 (L) >60 ml/min/1.73m2 Calcium 8.1 (L) 8.5 - 10.1 MG/DL  
CBC WITH AUTOMATED DIFF Collection Time: 11/21/18  5:10 AM  
Result Value Ref Range WBC 14.5 (H) 4.6 - 13.2 K/uL  
 RBC 3.30 (L) 4.20 - 5.30 M/uL HGB 9.6 (L) 12.0 - 16.0 g/dL HCT 28.1 (L) 35.0 - 45.0 % MCV 85.2 74.0 - 97.0 FL  
 MCH 29.1 24.0 - 34.0 PG  
 MCHC 34.2 31.0 - 37.0 g/dL  
 RDW 14.3 11.6 - 14.5 % PLATELET 907 745 - 360 K/uL MPV 10.4 9.2 - 11.8 FL  
 NEUTROPHILS 68 40 - 73 % LYMPHOCYTES 24 21 - 52 % MONOCYTES 6 3 - 10 % EOSINOPHILS 2 0 - 5 % BASOPHILS 0 0 - 2 %  
 ABS. NEUTROPHILS 9.9 (H) 1.8 - 8.0 K/UL  
 ABS. LYMPHOCYTES 3.5 0.9 - 3.6 K/UL ABS. MONOCYTES 0.9 0.05 - 1.2 K/UL  
 ABS. EOSINOPHILS 0.2 0.0 - 0.4 K/UL  
 ABS. BASOPHILS 0.0 0.0 - 0.1 K/UL  
 DF AUTOMATED Scheduled Medications Reviewed: 
Current Facility-Administered Medications Medication Dose Route Frequency  pantoprazole (PROTONIX) tablet 40 mg  40 mg Oral ACB  sucralfate (CARAFATE) 100 mg/mL oral suspension 1 g  1 g Oral AC&HS  insulin glargine (LANTUS) injection 20 Units  20 Units SubCUTAneous DAILY  cefTRIAXone (ROCEPHIN) 1 g in sterile water (preservative free) 10 mL IV syringe  1 g IntraVENous Q24H  
 heparin (porcine) injection 5,000 Units  5,000 Units SubCUTAneous Q8H  
 metoclopramide HCl (REGLAN) injection 5 mg  5 mg IntraVENous Q6H  
 PARoxetine (PAXIL) tablet 30 mg  30 mg Oral DAILY  insulin lispro (HUMALOG) injection 8 Units  8 Units SubCUTAneous TIDAC  insulin lispro (HUMALOG) injection   SubCUTAneous TIDAC  dextrose 5% - 0.45% NaCl with KCl 10 mEq/L infusion  125 mL/hr IntraVENous CONTINUOUS Imaging, microbiology, and EKG/Telemetry: Xr Chest Sngl V Result Date: 11/17/2018 IMPRESSION: Negative chest. Thank you for this referral. 
 
Ct Head Wo Cont Result Date: 11/16/2018 IMPRESSION: Negative CT scan of the brain. There is no hemorrhage, mass, nor acute infarct. Report provided to the emergency department at 2340 hrs. Ct Spine Cerv Wo Cont Result Date: 11/16/2018 IMPRESSION: No evidence of acute fracture or dislocation. Stable degenerative disc disease. Stable mild AP offset C4/5. Stable central canal stenosis Report provided to the emergency department at 2344 hrs. EGD (11/21/18): Findings:  
Esophagus:broad ulcerations w/o active bleeding Stomach: friable mucosa in the fundus and body, w/o active bleeding Duodenum/jejunum: normal 
 
 
 
Assessment/Plan  
52 y. o. female with PMH Type II DM w/retinopathy and neuropathy, GI stromal tumor s/p resection 2014, osteomyelitis, cholecystectomy, pancreatitis, HTN and anxiety,  now admitted with SIRS with unknown source. 
  
SIRS (3/4) of Unknown Source:  Possibly due to gastroparesis given n/v and abdominal pain. Must consider infectious process due to elevated WBC and 3/4 SIRS criteria (HR: 110-140's, RR: 20's, and WBC: 21.8). Patient continues to have nausea and vomiting 
- Discontinue Rocephin - Discontinue [email or web address] 
- Continue Phenergan and Reglan Esophageal and Stomach Ulcerations: Patient continues to have heartburn. GI recommended protonix and Carafate, with continued use outpatient therapy for 2 weeks. - Continue Protonix 40 mg 
- Continue Carafate 100 mg/mL 
  
Hypoglycemia/T2DM: Patient has had 2 episodes of hypoglycemia during her admission. Patient has a history of diabetic retinopathy and is blind in left eye. A1c 7.9 on 11/18.  
- Restart home dose of Lantus 45 Units at bedtime - Restart Humalog 8U - SSI  
- Hold metformin 500 mg qd 
  
Transaminitis: 11/16 showed ALT of 90 and AST of 112 
- Negative Hepatitis Panel 
  
CRYSTAL: Likely 2/2 to dehydration and/or sepsis. Baseline Cr approx 0.9. On admission, Cr 3.33. Improved: Cr. 1.7 
- Discontinue [email or web address] 
  
HTN, HLD: 
- Hold lisinopril due to CRYSTAL.   
- Hold Crestor 20 mg qd   
Anxiety:  
- Continue home Paxil 
- Continue home Hydroxyzine 25 mg IV 
  
  
Diet: Diabetic DVT Prophylaxis: [facility] Code Status: Full Point of Contact: Saima Mcfrank (Relationship: Friend) [phone] 
  
Disposition and anticipated LOS: < 2 midnights Yue Boggs MD  
[facility] Medicine PGY-1 
11/21/18 8:51 AM

## 2018-11-21 NOTE — ROUTINE PROCESS
Bedside and Verbal shift change report given to Ofe Hagen (oncoming nurse) by Randy Goff (offgoing nurse). Report included the following information SBAR, Kardex and Procedure Summary.

## 2018-11-21 NOTE — ROUTINE PROCESS
Bedside and Verbal shift change report given to [address] (oncoming nurse) by Fredrick Leader (offgoing nurse). Report included the following information SBAR, Kardex and ED Summary.

## 2018-11-21 NOTE — ROUTINE PROCESS
Bedside and Verbal shift change report given to Babs Irene RN (oncoming nurse) by Aniya Roman RN 
 (offgoing nurse). Report included the following information SBAR, Kardex, Intake/Output and MAR.

## 2018-11-21 NOTE — PROGRESS NOTES
Care Management Interventions PCP Verified by CM: Yes 
Palliative Care Criteria Met (RRAT>21 & CHF Dx)?: No 
Mode of Transport at Discharge: Other (see comment)(family) Transition of Care Consult (CM Consult): Discharge Planning Physical Therapy Consult: No 
Occupational Therapy Consult: No 
Speech Therapy Consult: No 
Current Support Network: Lives Alone, Family Lives [place] Confirm Follow Up Transport: Self Plan discussed with Pt/Family/Caregiver: Yes In addition, Ms. Jayleen Hook will need assistance obtaining her medications. Ms. Jayleen Hook explained she has the packet for Medicaid at home she needs to \"put in the mail\". Encouraged her to do so once home. Discussed follow up potential with PCP, she stated \"I might go one day, I like my Doctor\". Discussed the option of the [employer] and she stated \"I will think about it\". Richa PEREIRAN, RN, -BC Care Management [phone]

## 2018-11-21 NOTE — DIABETES MGMT
GLYCEMIC CONTROL PLAN OF CARE Assessment/Recommendations: 
Blood glucose elevated above targets. Noted pt continues to have nausea and vomiting. Continue lantus insulin, monitor need to increase dose. Pt is receiving D5 1/2NS at 125 mL/hr. Recommend discontinuing prandial Lispro insulin while pt continues to have nausea and vomiting (prandial Lispro insulin is ordered but has not been given yesterday or today) Continue correctional Lispro insulin, consider changing frequency to 4 times daily ACHS. Advance to the very insulin resistant scale per protocol Most recent blood glucose values: 
11/20/2018 11:00 11/20/2018 11:19 11/20/2018 15:25 11/21/2018 08:12 11/21/2018 11:15  
171 (H) 189 (H) 219 (H) 234 (H) 237 (H) Current A1C of 7.9% is equivalent to average blood glucose of 180 mg/dl over the past 2-3 months. Current hospital diabetes medications:  
Lantus insulin 20 units daily Prandial Lispro insulin 8 units TID Correctional Lispro insulin TID before meals Previous day's insulin requirements:  
20 units of Lantus insulin 6 units of Lispro insulin Home diabetes medications: 
Novolin 70/30 insulin 40 units BID Metformin  mg daily with dinner Diet:  Diabetic consistent carbohydrate [place], [address], [address] [facility]

## 2018-11-21 NOTE — PROGRESS NOTES
Over the last 2 nights patient has had extreme mood changes. She goes from crying hysterically, to smiling and laughing, to screaming to the top of her lungs. When asked why she was crying, she responded \"Someone said there were rodents running around here\". After nursing staff reassured her that there were no rodents she started laughing as if nothing happened. She has had several moments of crying and sadness both nights until she is finally able to fall asleep. Patient may benefit from a psych eval prior to discharge.

## 2018-11-22 VITALS
TEMPERATURE: 98.8 F | BODY MASS INDEX: 32.81 KG/M2 | DIASTOLIC BLOOD PRESSURE: 70 MMHG | HEART RATE: 90 BPM | WEIGHT: 215.8 LBS | RESPIRATION RATE: 18 BRPM | SYSTOLIC BLOOD PRESSURE: 114 MMHG | OXYGEN SATURATION: 100 %

## 2018-11-22 LAB
ALBUMIN SERPL-MCNC: 3.5 G/DL (ref 3.4–5)
ALBUMIN/GLOB SERPL: 1 {RATIO} (ref 0.8–1.7)
ALP SERPL-CCNC: 120 U/L (ref 45–117)
ALT SERPL-CCNC: 36 U/L (ref 13–56)
ANION GAP SERPL CALC-SCNC: 13 MMOL/L (ref 3–18)
AST SERPL-CCNC: 43 U/L (ref 15–37)
BASOPHILS # BLD: 0 K/UL (ref 0–0.1)
BASOPHILS NFR BLD: 0 % (ref 0–2)
BILIRUB SERPL-MCNC: 1.2 MG/DL (ref 0.2–1)
BUN SERPL-MCNC: 16 MG/DL (ref 7–18)
BUN/CREAT SERPL: 9 (ref 12–20)
CALCIUM SERPL-MCNC: 7.7 MG/DL (ref 8.5–10.1)
CHLORIDE SERPL-SCNC: 102 MMOL/L (ref 100–108)
CO2 SERPL-SCNC: 18 MMOL/L (ref 21–32)
CREAT SERPL-MCNC: 1.86 MG/DL (ref 0.6–1.3)
DIFFERENTIAL METHOD BLD: ABNORMAL
EOSINOPHIL # BLD: 0.2 K/UL (ref 0–0.4)
EOSINOPHIL NFR BLD: 2 % (ref 0–5)
ERYTHROCYTE [DISTWIDTH] IN BLOOD BY AUTOMATED COUNT: 14.2 % (ref 11.6–14.5)
GLOBULIN SER CALC-MCNC: 3.6 G/DL (ref 2–4)
GLUCOSE BLD STRIP.AUTO-MCNC: 241 MG/DL (ref 70–110)
GLUCOSE BLD STRIP.AUTO-MCNC: 67 MG/DL (ref 70–110)
GLUCOSE BLD STRIP.AUTO-MCNC: 82 MG/DL (ref 70–110)
GLUCOSE BLD STRIP.AUTO-MCNC: 94 MG/DL (ref 70–110)
GLUCOSE SERPL-MCNC: 275 MG/DL (ref 74–99)
HCT VFR BLD AUTO: 27.7 % (ref 35–45)
HGB BLD-MCNC: 9.6 G/DL (ref 12–16)
LYMPHOCYTES # BLD: 3.6 K/UL (ref 0.9–3.6)
LYMPHOCYTES NFR BLD: 25 % (ref 21–52)
MCH RBC QN AUTO: 29 PG (ref 24–34)
MCHC RBC AUTO-ENTMCNC: 34.7 G/DL (ref 31–37)
MCV RBC AUTO: 83.7 FL (ref 74–97)
MONOCYTES # BLD: 0.8 K/UL (ref 0.05–1.2)
MONOCYTES NFR BLD: 6 % (ref 3–10)
NEUTS SEG # BLD: 9.8 K/UL (ref 1.8–8)
NEUTS SEG NFR BLD: 67 % (ref 40–73)
PLATELET # BLD AUTO: 178 K/UL (ref 135–420)
PMV BLD AUTO: 11.8 FL (ref 9.2–11.8)
POTASSIUM SERPL-SCNC: 4.1 MMOL/L (ref 3.5–5.5)
PROT SERPL-MCNC: 7.1 G/DL (ref 6.4–8.2)
RBC # BLD AUTO: 3.31 M/UL (ref 4.2–5.3)
SODIUM SERPL-SCNC: 133 MMOL/L (ref 136–145)
WBC # BLD AUTO: 14.5 K/UL (ref 4.6–13.2)

## 2018-11-22 PROCEDURE — 74011250637 HC RX REV CODE- 250/637: Performed by: FAMILY MEDICINE

## 2018-11-22 PROCEDURE — 74011250637 HC RX REV CODE- 250/637: Performed by: STUDENT IN AN ORGANIZED HEALTH CARE EDUCATION/TRAINING PROGRAM

## 2018-11-22 PROCEDURE — 74011250636 HC RX REV CODE- 250/636: Performed by: STUDENT IN AN ORGANIZED HEALTH CARE EDUCATION/TRAINING PROGRAM

## 2018-11-22 PROCEDURE — 82962 GLUCOSE BLOOD TEST: CPT

## 2018-11-22 PROCEDURE — 85025 COMPLETE CBC W/AUTO DIFF WBC: CPT

## 2018-11-22 PROCEDURE — 36415 COLL VENOUS BLD VENIPUNCTURE: CPT

## 2018-11-22 PROCEDURE — 74011636637 HC RX REV CODE- 636/637: Performed by: STUDENT IN AN ORGANIZED HEALTH CARE EDUCATION/TRAINING PROGRAM

## 2018-11-22 PROCEDURE — 74011636637 HC RX REV CODE- 636/637: Performed by: FAMILY MEDICINE

## 2018-11-22 PROCEDURE — 74011250637 HC RX REV CODE- 250/637: Performed by: INTERNAL MEDICINE

## 2018-11-22 PROCEDURE — 80053 COMPREHEN METABOLIC PANEL: CPT

## 2018-11-22 RX ORDER — SUCRALFATE 1 G/10ML
1 SUSPENSION ORAL
Qty: 120 ML | Refills: 0 | Status: SHIPPED | OUTPATIENT
Start: 2018-11-22 | End: 2019-01-09

## 2018-11-22 RX ORDER — PANTOPRAZOLE SODIUM 40 MG/1
40 TABLET, DELAYED RELEASE ORAL
Qty: 30 TAB | Refills: 0 | Status: SHIPPED | OUTPATIENT
Start: 2018-11-23 | End: 2018-11-22

## 2018-11-22 RX ORDER — PANTOPRAZOLE SODIUM 40 MG/1
40 TABLET, DELAYED RELEASE ORAL
Qty: 30 TAB | Refills: 0 | Status: SHIPPED | OUTPATIENT
Start: 2018-11-23 | End: 2019-01-09

## 2018-11-22 RX ADMIN — SUCRALFATE 1 G: 1 SUSPENSION ORAL at 08:47

## 2018-11-22 RX ADMIN — DEXTROSE MONOHYDRATE, SODIUM CHLORIDE, AND POTASSIUM CHLORIDE 125 ML/HR: 50; 4.5; .745 INJECTION, SOLUTION INTRAVENOUS at 06:43

## 2018-11-22 RX ADMIN — METOCLOPRAMIDE 5 MG: 5 INJECTION, SOLUTION INTRAMUSCULAR; INTRAVENOUS at 12:11

## 2018-11-22 RX ADMIN — HEPARIN SODIUM 5000 UNITS: 5000 INJECTION, SOLUTION INTRAVENOUS; SUBCUTANEOUS at 06:43

## 2018-11-22 RX ADMIN — INSULIN LISPRO 8 UNITS: 100 INJECTION, SOLUTION INTRAVENOUS; SUBCUTANEOUS at 08:50

## 2018-11-22 RX ADMIN — INSULIN LISPRO 8 UNITS: 100 INJECTION, SOLUTION INTRAVENOUS; SUBCUTANEOUS at 12:11

## 2018-11-22 RX ADMIN — INSULIN GLARGINE 20 UNITS: 100 INJECTION, SOLUTION SUBCUTANEOUS at 08:49

## 2018-11-22 RX ADMIN — PAROXETINE HYDROCHLORIDE 30 MG: 20 TABLET, FILM COATED ORAL at 08:48

## 2018-11-22 RX ADMIN — HEPARIN SODIUM 5000 UNITS: 5000 INJECTION, SOLUTION INTRAVENOUS; SUBCUTANEOUS at 12:11

## 2018-11-22 RX ADMIN — PANTOPRAZOLE SODIUM 40 MG: 40 TABLET, DELAYED RELEASE ORAL at 08:48

## 2018-11-22 RX ADMIN — INSULIN LISPRO 6 UNITS: 100 INJECTION, SOLUTION INTRAVENOUS; SUBCUTANEOUS at 08:51

## 2018-11-22 RX ADMIN — SUCRALFATE 1 G: 1 SUSPENSION ORAL at 12:10

## 2018-11-22 RX ADMIN — METOCLOPRAMIDE 5 MG: 5 INJECTION, SOLUTION INTRAMUSCULAR; INTRAVENOUS at 06:44

## 2018-11-22 NOTE — PROGRESS NOTES
Intern Progress Note [facility] Patient: Kassandra Mayberry MRN: [id number]  CSN: [id number] YOB: 1965  Age: 52 y.o. Sex: female DOA: 11/16/2018 LOS:  LOS: 5 days Subjective:  
 
Acute events: Patient states that she tolerated her dinner last evening and had no episodes of nausea or vomiting. She continues to have abdominal pain, but has improved significantly. She denies headache, SOB, and CP. Review of Systems Constitutional: Negative for chills and fever. Eyes: Negative for blurred vision. Respiratory: Negative for shortness of breath. Cardiovascular: Negative for chest pain, palpitations and leg swelling. Gastrointestinal: Positive for abdominal pain and heartburn. Negative for nausea and vomiting. Neurological: Negative for dizziness and headaches. Objective:  
  
Patient Vitals for the past 24 hrs: 
 Temp Pulse Resp BP SpO2  
11/22/18 0402 99.3 °F (37.4 °C) (!) 110 20 120/79 100 % 11/21/18 2329 100 °F (37.8 °C) (!) 101 20 136/87 100 % 11/21/18 2022 99.5 °F (37.5 °C) 100 20 130/83 100 % 11/21/18 1116 98.7 °F (37.1 °C) 91 18 106/69 99 % 11/21/18 0749 98.5 °F (36.9 °C) 84 18 114/78 99 % No intake or output data in the 24 hours ending 11/22/18 4063 Physical Exam:  
General:  Alert and Responsive and in no acute distress. HEENT: Conjunctiva pink, sclera anicteric. EOMI.  Pharynx moist, nonerythematous.  Moist mucous membranes.  Thyroid not enlarged, no nodules.  No cervical, supraclavicular, occipital or submandibular lymphadenopathy.  No other gross abnormalities present. CV:  Tachycardic, no murmurs. No visible pulsations or thrills.   
RESP:  Unlabored breathing.  Lungs clear to auscultation. no wheeze, rales, or rhonchi.  Equal expansion bilaterally.   
ABD:  Diffuse tenderness LQ>UQ, no rebound or guarding improved from yesterday.  Soft,nondistended.   No hepatosplenomegaly.  No suprapubic tenderness.  No CVA tenderness. MS:  No joint deformity or instability.  No atrophy. Neuro:  5/5 strength bilateral upper extremities and lower extremities.  A+Ox3. Ext:  No edema.  2+ radial and dp pulses bilaterally. Amputated left great toe. Skin:  No rashes, lesions, or ulcers.  Good turgor. Lab/Data Reviewed: 
Recent Results (from the past 12 hour(s)) GLUCOSE, POC Collection Time: 11/21/18 11:27 PM  
Result Value Ref Range Glucose (POC) 161 (H) 70 - 110 mg/dL CBC WITH AUTOMATED DIFF Collection Time: 11/22/18  2:33 AM  
Result Value Ref Range WBC 14.5 (H) 4.6 - 13.2 K/uL  
 RBC 3.31 (L) 4.20 - 5.30 M/uL HGB 9.6 (L) 12.0 - 16.0 g/dL HCT 27.7 (L) 35.0 - 45.0 % MCV 83.7 74.0 - 97.0 FL  
 MCH 29.0 24.0 - 34.0 PG  
 MCHC 34.7 31.0 - 37.0 g/dL  
 RDW 14.2 11.6 - 14.5 % PLATELET 059 017 - 155 K/uL MPV 11.8 9.2 - 11.8 FL  
 NEUTROPHILS 67 40 - 73 % LYMPHOCYTES 25 21 - 52 % MONOCYTES 6 3 - 10 % EOSINOPHILS 2 0 - 5 % BASOPHILS 0 0 - 2 %  
 ABS. NEUTROPHILS 9.8 (H) 1.8 - 8.0 K/UL  
 ABS. LYMPHOCYTES 3.6 0.9 - 3.6 K/UL  
 ABS. MONOCYTES 0.8 0.05 - 1.2 K/UL  
 ABS. EOSINOPHILS 0.2 0.0 - 0.4 K/UL  
 ABS. BASOPHILS 0.0 0.0 - 0.1 K/UL  
 DF AUTOMATED METABOLIC PANEL, COMPREHENSIVE Collection Time: 11/22/18  2:33 AM  
Result Value Ref Range Sodium 133 (L) 136 - 145 mmol/L Potassium 4.1 3.5 - 5.5 mmol/L Chloride 102 100 - 108 mmol/L  
 CO2 18 (L) 21 - 32 mmol/L Anion gap 13 3.0 - 18 mmol/L Glucose 275 (H) 74 - 99 mg/dL BUN 16 7.0 - 18 MG/DL Creatinine 1.86 (H) 0.6 - 1.3 MG/DL  
 BUN/Creatinine ratio 9 (L) 12 - 20 GFR est AA 34 (L) >60 ml/min/1.73m2 GFR est non-AA 28 (L) >60 ml/min/1.73m2 Calcium 7.7 (L) 8.5 - 10.1 MG/DL Bilirubin, total 1.2 (H) 0.2 - 1.0 MG/DL  
 ALT (SGPT) 36 13 - 56 U/L  
 AST (SGOT) 43 (H) 15 - 37 U/L Alk. phosphatase 120 (H) 45 - 117 U/L Protein, total 7.1 6.4 - 8.2 g/dL Albumin 3.5 3.4 - 5.0 g/dL Globulin 3.6 2.0 - 4.0 g/dL A-G Ratio 1.0 0.8 - 1.7 Scheduled Medications Reviewed: 
Current Facility-Administered Medications Medication Dose Route Frequency  pantoprazole (PROTONIX) tablet 40 mg  40 mg Oral ACB  sucralfate (CARAFATE) 100 mg/mL oral suspension 1 g  1 g Oral AC&HS  insulin glargine (LANTUS) injection 20 Units  20 Units SubCUTAneous DAILY  heparin (porcine) injection 5,000 Units  5,000 Units SubCUTAneous Q8H  
 metoclopramide HCl (REGLAN) injection 5 mg  5 mg IntraVENous Q6H  
 PARoxetine (PAXIL) tablet 30 mg  30 mg Oral DAILY  insulin lispro (HUMALOG) injection 8 Units  8 Units SubCUTAneous TIDAC  insulin lispro (HUMALOG) injection   SubCUTAneous TIDAC  dextrose 5% - 0.45% NaCl with KCl 10 mEq/L infusion  125 mL/hr IntraVENous CONTINUOUS Imaging, microbiology, and EKG/Telemetry: Xr Chest Sngl V Result Date: 11/17/2018 IMPRESSION: Negative chest. Thank you for this referral. 
 
Ct Head Wo Cont Result Date: 11/16/2018 IMPRESSION: Negative CT scan of the brain. There is no hemorrhage, mass, nor acute infarct. Report provided to the emergency department at 2340 hrs. Ct Spine Cerv Wo Cont Result Date: 11/16/2018 IMPRESSION: No evidence of acute fracture or dislocation. Stable degenerative disc disease. Stable mild AP offset C4/5. Stable central canal stenosis Report provided to the emergency department at 2344 hrs. EGD (11/21/18): Findings:  
Esophagus:broad ulcerations w/o active bleeding Stomach: friable mucosa in the fundus and body, w/o active bleeding Duodenum/jejunum: normal 
 
 
Assessment/Plan  
52 y. o. female with PMH Type II DM w/retinopathy and neuropathy, GI stromal tumor s/p resection 2014, osteomyelitis, cholecystectomy, pancreatitis, HTN and anxiety,  now admitted with SIRS with unknown source. 
  
SIRS (3/4) of Unknown Source:  Possibly due to gastroparesis given n/v and abdominal pain. Must consider infectious process due to elevated WBC and 3/4 SIRS criteria (HR: 110-140's, RR: 20's, and WBC: 21.8). Patient continues to have nausea and vomiting 
- Discontinue [email or web address] 
- Continue Phenergan and Reglan Esophageal and Stomach Ulcerations: Patient continues to have heartburn. GI recommended protonix and Carafate, with continued use outpatient therapy for 2 weeks. - Continue Protonix 40 mg 
- Continue Carafate 100 mg/mL 
  
Hypoglycemia/T2DM: Patient has had 2 episodes of hypoglycemia during her admission. Patient has a history of diabetic retinopathy and is blind in left eye. A1c 7.9 on 11/18.  
- Continue home dose of Lantus 45 Units at bedtime 
- Continue home Humalog 8U - SSI  
- Hold metformin 500 mg qd 
  
Transaminitis: 11/16 showed ALT of 90 and AST of 112, improved to 36 and 43. Hepatitis Panel is Negative. 
- RUQ US per GI today 
  
CRYSTAL: Likely 2/2 to dehydration and/or sepsis. Baseline Cr approx 0.9. On admission, Cr 3.33. Improved: Cr. 1.7 
- Discontinue [email or web address] 
  
HTN, HLD: 
- Hold lisinopril due to CRYSTAL.   
- Hold Crestor 20 mg qd   
Anxiety:  
- Continue home Paxil 
- Continue home Hydroxyzine 25 mg IV 
  
  
Diet: Diabetic DVT Prophylaxis: [facility] Code Status: Full Point of Contact: Saima Aguilar (Relationship: Friend) [phone] 
  
Disposition and anticipated LOS: Home today.  
 
Taiwo Deal MD  
[address] [facility] Medicine PGY-1 
11/22/18 8:51 AM

## 2018-11-22 NOTE — ROUTINE PROCESS
Bedside and Verbal shift change report given to PETRONA Cantrell (oncoming nurse) by Donn Duran (offgoing nurse). Report included the following information SBAR, Kardex, MAR, Recent Results and Cardiac Rhythm ST/SR. Patient quietly resting on rounds.

## 2018-11-22 NOTE — PROGRESS NOTES
Patient discharged home via private vehicle with son at bedside; medication list reviewed using teach-back method, no further questions at this time; iv removed; monitor returned; skin is intact; pain denied; vitals stable; patient escorted off unit with belongings and discharge paperwork

## 2018-11-22 NOTE — PROGRESS NOTES
2000: Han Valentin about the order of 8 units of Insulin in addition to the sliding scale. It was a concern because the patient has had episodes of hypoglycemia a few days ago and she is not eating much. The doctor was paged and the message was sent. 2015: Doctor called back and confirmed the order. The 8 units should be given in addition to the sliding.

## 2018-11-22 NOTE — PROGRESS NOTES
Patient's blood sugar 67; rechecked 15 minutes later after given orange juice and crackers blood sugar 82; patient states she did not eat much of lunch; notified [place] family; patient still ok for d/c

## 2018-11-22 NOTE — PROGRESS NOTES
[email or web address] 
[phone] Gastroenterology follow up-Progress note Impression: 1. Epigastric pain and melena - EGD with esophagitis and gastritis, biopsies pending 2. S/p resection of GIST in 2014 3. Elevated LFTs - now normalized - Viral hepatitis panel negative - GRACE negative - RUQ US ordered Plan: 1. Continue PPI 2. Does not have to remain inpatient to obtain ultrasound, may be scheduled as outpatient OK for discharge if tolerating diet today. Will plan to see patient in office 6-8 weeks. Thank you for this consultation and the opportunity to participate in the care of this patient. Please do not hesitate to call with any questions or concerns, or should event occur that may necessitate additional GI evaluation. Aditya Luke [facility] 1949, [address] Office/pager - [phone] cell - [phone] 
[email or web address] Chief Complaint: epigastric pain Subjective:  Feeling well this morning, anticipating breakfast. Denies melena or epigastric pain. ROS: Denies any fevers, chills, rash. Eyes: conjunctiva normal, EOM normal  
Neck: ROM normal, supple and trachea normal  
Cardiovascular: heart normal, intact distal pulses, normal rate and regular rhythm Pulmonary/Chest Wall: breath sounds normal and effort normal  
Abdominal: appearance normal, bowel sounds normal and soft, non-acute, non-tender Patient Active Problem List  
Diagnosis Code  Essential hypertension, benign I10  Type II or unspecified type diabetes mellitus without mention of complication, not stated as uncontrolled E11.9  Vitamin D deficiency E55.9  
 HLD (hyperlipidemia) E78.5  Dehydration E86.0  Nausea and vomiting R11.2  
 SIRS (systemic inflammatory response syndrome) (HCC) R65.10  GERD (gastroesophageal reflux disease) K21.9  Nausea with vomiting R11.2  
 Osteomyelitis (HCC) M86.9  Sepsis ([address] A41.9  Foot ulcer due to secondary DM ([facility] E13.621, L97.509  Uncontrolled type 2 diabetes mellitus with chronic kidney disease (HCC) E11.22, E11.65  Diabetic retinopathy ([facility] E11.319  
 Anxiety F41.9  Leukocytosis D72.829  
 Renal insufficiency N28.9  Diabetes ([facility] E11.9  Hypertension I10  
 Mass of abdomen R19.00  Blind left eye H54.40  Type 2 diabetes mellitus with retinopathy, with long-term current use of insulin ([facility]) E11.319, Z79.4  CRYSTAL (acute kidney injury) ([facility] N17.9  Abnormal LFTs R94.5 Visit Vitals /62 (BP 1 Location: Left arm, BP Patient Position: At rest) Pulse (!) 115 Temp 98.9 °F (37.2 °C) Resp 20 Wt 97.9 kg (215 lb 12.8 oz) LMP 10/06/2015 (Exact Date) SpO2 98% Breastfeeding? No  
BMI 32.81 kg/m² No intake or output data in the 24 hours ending 11/22/18 9316 CBC w/Diff Lab Results Component Value Date/Time WBC 14.5 (H) 11/22/2018 02:33 AM  
 RBC 3.31 (L) 11/22/2018 02:33 AM  
 HGB 9.6 (L) 11/22/2018 02:33 AM  
 HCT 27.7 (L) 11/22/2018 02:33 AM  
 MCV 83.7 11/22/2018 02:33 AM  
 MCH 29.0 11/22/2018 02:33 AM  
 MCHC 34.7 11/22/2018 02:33 AM  
 RDW 14.2 11/22/2018 02:33 AM  
  11/22/2018 02:33 AM  
 Lab Results Component Value Date/Time GRANS 67 11/22/2018 02:33 AM  
 LYMPH 25 11/22/2018 02:33 AM  
 EOS 2 11/22/2018 02:33 AM  
 BANDS 2 04/29/2014 05:12 PM  
 BASOS 0 11/22/2018 02:33 AM  
  
Basic Metabolic Profile Recent Labs  
  11/22/18 
5299 * K 4.1  CO2 18* BUN 16  
CA 7.7* Hepatic Function Lab Results Component Value Date/Time ALB 3.5 11/22/2018 02:33 AM  
 TP 7.1 11/22/2018 02:33 AM  
  (H) 11/22/2018 02:33 AM  
 Lab Results Component Value Date/Time SGOT 43 (H) 11/22/2018 02:33 AM  
  
 
 
Coags No results for input(s): PTP, INR, APTT in the last 72 hours. No lab exists for component: INREXT, INREXT Coral Johnson MD 
11/22/18, 3:21 PM  
 [facility] [email or web address] Phone: [phone] Pager: [phone]

## 2018-11-22 NOTE — PROGRESS NOTES
This writer faxed prescriptions to [facility] [address]. She understands that the pharmacy is closed today and will  tomorrow. She has the insulin at home. Jorge Wilhelm RN,BSN  [phone]

## 2018-11-22 NOTE — DISCHARGE INSTRUCTIONS
Learning About Diabetes and Your Teeth  How does diabetes affect your teeth and gums? When you have diabetes, managing blood sugar levels and taking good care of your teeth and gums are both important. When blood sugar levels are high, there's a greater risk for:  · Gum (periodontal) disease. · Tooth decay. · Fungal infections in the mouth, like thrush. · Dry mouth, or xerostomia (say \"mario-elmerh-STO-jean marie-uh\"). The mouth needs saliva to neutralize the acids in your mouth. These acids can lead to gum disease and tooth decay. Keeping your blood sugar levels in your target range can help prevent problems with the teeth and gums. If you have any problems with your teeth or gums, see your dentist.  How do you care for your teeth and gums when you have diabetes? · Brush your teeth twice a day. · Floss daily. Make sure to press the floss against your teeth and not your gums. · Check each day for areas where your gums might be red or painful. Be sure to let your dentist know of any sores in your mouth. · See your dentist regularly for professional cleaning of your teeth and to look for gum problems. Many dentists recommend getting checkups twice a year. Remind your dentist that you have diabetes before any work is done. · Don't smoke or use smokeless tobacco. Tobacco use with diabetes can lead to a greater risk of severe gum disease. If you need help quitting, talk to your doctor about stop-smoking programs and medicines. These can increase your chances of quitting for good. Follow-up care is a key part of your treatment and safety. Be sure to make and go to all appointments, and call your doctor if you are having problems. It's also a good idea to know your test results and keep a list of the medicines you take. Where can you learn more? Go to [facility]  Enter H523 in the search box to learn more about \"Learning About Diabetes and Your Teeth. \"   © 6245-9612 [employer]. Care instructions adapted under license by [employer] (which disclaims liability or warranty for this information). This care instruction is for use with your licensed healthcare professional. If you have questions about a medical condition or this instruction, always ask your healthcare professional. [address] any warranty or liability for your use of this information. Content Version: 05.8.267981; Current as of: May 22, 2015        DISCHARGE SUMMARY from Nurse    PATIENT INSTRUCTIONS:    After general anesthesia or intravenous sedation, for 24 hours or while taking prescription Narcotics:  · Limit your activities  · Do not drive and operate hazardous machinery  · Do not make important personal or business decisions  · Do  not drink alcoholic beverages  · If you have not urinated within 8 hours after discharge, please contact your surgeon on call. Report the following to your surgeon:  · Excessive pain, swelling, redness or odor of or around the surgical area  · Temperature over 100.5  · Nausea and vomiting lasting longer than 4 hours or if unable to take medications  · Any signs of decreased circulation or nerve impairment to extremity: change in color, persistent  numbness, tingling, coldness or increase pain  · Any questions    What to do at Home:  Recommended activity: Activity as tolerated,     If you experience any of the following symptoms excessive vomiting, chest pain, shortness of breath please call 911. *  Please give a list of your current medications to your Primary Care Provider. *  Please update this list whenever your medications are discontinued, doses are      changed, or new medications (including over-the-counter products) are added. *  Please carry medication information at all times in case of emergency situations.     These are general instructions for a healthy lifestyle:    No smoking/ No tobacco products/ Avoid exposure to second hand smoke  Surgeon General's Warning:  Quitting smoking now greatly reduces serious risk to your health. Obesity, smoking, and sedentary lifestyle greatly increases your risk for illness    A healthy diet, regular physical exercise & weight monitoring are important for maintaining a healthy lifestyle    You may be retaining fluid if you have a history of heart failure or if you experience any of the following symptoms:  Weight gain of 3 pounds or more overnight or 5 pounds in a week, increased swelling in our hands or feet or shortness of breath while lying flat in bed. Please call your doctor as soon as you notice any of these symptoms; do not wait until your next office visit. Recognize signs and symptoms of STROKE:    F-face looks uneven    A-arms unable to move or move unevenly    S-speech slurred or non-existent    T-time-call 911 as soon as signs and symptoms begin-DO NOT go       Back to bed or wait to see if you get better-TIME IS BRAIN. Warning Signs of HEART ATTACK     Call 911 if you have these symptoms:   Chest discomfort. Most heart attacks involve discomfort in the center of the chest that lasts more than a few minutes, or that goes away and comes back. It can feel like uncomfortable pressure, squeezing, fullness, or pain.  Discomfort in other areas of the upper body. Symptoms can include pain or discomfort in one or both arms, the back, neck, jaw, or stomach.  Shortness of breath with or without chest discomfort.  Other signs may include breaking out in a cold sweat, nausea, or lightheadedness. Don't wait more than five minutes to call 911 - MINUTES MATTER! Fast action can save your life. Calling 911 is almost always the fastest way to get lifesaving treatment. Emergency Medical Services staff can begin treatment when they arrive -- up to an hour sooner than if someone gets to the hospital by car. The discharge information has been reviewed with the patient.   The patient verbalized understanding. Discharge medications reviewed with the patient and appropriate educational materials and side effects teaching were provided. ___________________________________________________________________________________________________________________________________       Gastritis: Care Instructions  Your Care Instructions    Gastritis is a sore and upset stomach. It happens when something irritates the stomach lining. Many things can cause it. These include an infection such as the flu or something you ate or drank. Medicines or a sore on the lining of the stomach (ulcer) also can cause it. Your belly may bloat and ache. You may belch, vomit, and feel sick to your stomach. You should be able to relieve the problem by taking medicine. And it may help to change your diet. If gastritis lasts, your doctor may prescribe medicine. Follow-up care is a key part of your treatment and safety. Be sure to make and go to all appointments, and call your doctor if you are having problems. It's also a good idea to know your test results and keep a list of the medicines you take. How can you care for yourself at home? · If your doctor prescribed antibiotics, take them as directed. Do not stop taking them just because you feel better. You need to take the full course of antibiotics. · Be safe with medicines. If your doctor prescribed medicine to decrease stomach acid, take it as directed. Call your doctor if you think you are having a problem with your medicine. · Do not take any other medicine, including over-the-counter pain relievers, without talking to your doctor first.  · If your doctor recommends over-the-counter medicine to reduce stomach acid, such as Pepcid AC, Prilosec, Tagamet HB, or Zantac 75, follow the directions on the label. · Drink plenty of fluids (enough so that your urine is light yellow or clear like water) to prevent dehydration. Choose water and other caffeine-free clear liquids.  If you have kidney, heart, or liver disease and have to limit fluids, talk with your doctor before you increase the amount of fluids you drink. · Limit how much alcohol you drink. · Avoid coffee, tea, cola drinks, chocolate, and other foods with caffeine. They increase stomach acid. When should you call for help? Call 911 anytime you think you may need emergency care. For example, call if:    · You vomit blood or what looks like coffee grounds.     · You pass maroon or very bloody stools.    Call your doctor now or seek immediate medical care if:    · You start breathing fast and have not produced urine in the last 8 hours.     · You cannot keep fluids down.    Watch closely for changes in your health, and be sure to contact your doctor if:    · You do not get better as expected. Where can you learn more? Go to http://linda-sivan.info/. Enter [id number] in the search box to learn more about \"Gastritis: Care Instructions. \"  Current as of: March 28, 2018  Content Version: 11.8  © 1957-3603 [employer]. Care instructions adapted under license by [employer] (which disclaims liability or warranty for this information). If you have questions about a medical condition or this instruction, always ask your healthcare professional. [address] any warranty or liability for your use of this information.           Esophagitis: Care Instructions  Your Care Instructions    Esophagitis (say \"ih-sof-uh-JY-tus\") is irritation of the esophagus, the tube that carries food from your throat to your stomach. Acid reflux is the most common cause of this condition. When you have reflux, stomach acid and juices flow upward. This can cause pain or a burning feeling in your chest. You may have a sore throat. It may be hard to swallow. Other causes of this condition include some medicines and supplements. Allergies or an infection can also cause it.   Your doctor will ask about your symptoms and past health. He or she might do tests to find the cause of your symptoms. Treatment depends on what is causing the problem. Treatment might include changing your diet or taking medicine to relieve your symptoms. It might also include changing a medicine that is causing your symptoms. If you have reflux, medicine that reduces the stomach acid helps your body heal. It might take 1 to 3 weeks to heal.  Follow-up care is a key part of your treatment and safety. Be sure to make and go to all appointments, and call your doctor if you are having problems. It's also a good idea to know your test results and keep a list of the medicines you take. How can you care for yourself at home? · If you have acid reflux, your doctor may recommend that you:  ? Eat several small meals instead of two or three large meals. After you eat, wait 2 to 3 hours before you lie down. ? Avoid chocolate, mint, alcohol, and spicy foods. ? Don't smoke or use smokeless tobacco. Smoking can make this condition worse. If you need help quitting, talk to your doctor about stop-smoking programs and medicines. These can increase your chances of quitting for good. ? Raise the head of your bed 6 to 8 inches if you have symptoms at night. ? Lose weight if you are overweight. ? Take an over-the-counter antacid, such as Maalox, Mylanta, or Tums. Be careful when you take over-the-counter antacid medicines. Many of these medicines have aspirin in them. Read the label to make sure that you are not taking more than the recommended dose. Too much aspirin can be harmful. ? Take stronger acid reducers. Examples are famotidine (such as Pepcid), omeprazole (such as Prilosec), and ranitidine (such as Zantac). · If your condition is caused by infection, allergy, or other problems, use the medicine or treatments that your doctor recommends. · Be safe with medicines. Take your medicines exactly as prescribed.  Call your doctor if you think you are having a problem with your medicine. When should you call for help? Call your doctor now or seek immediate medical care if:    · You have new or worse belly pain.     · You are vomiting.    Watch closely for changes in your health, and be sure to contact your doctor if:    · You have new or worse symptoms of reflux.     · You have trouble or pain swallowing.     · You are losing weight.     · You do not get better as expected. Where can you learn more? Go to http://linda-sivan.info/. Enter G414 in the search box to learn more about \"Esophagitis: Care Instructions. \"  Current as of: March 28, 2018  Content Version: 11.8  © 2391-1119 [employer]. Care instructions adapted under license by [employer] (which disclaims liability or warranty for this information). If you have questions about a medical condition or this instruction, always ask your healthcare professional. [place] [address] any warranty or liability for your use of this information.           Counting Carbohydrates: Care Instructions  Your Care Instructions    You don't have to eat special foods when you have diabetes. You just have to be careful to eat healthy foods. Carbohydrates (carbs) raise blood sugar higher and quicker than any other nutrient. Carbs are found in desserts, breads and cereals, and fruit. They're also in starchy vegetables. These include potatoes, corn, and grains such as rice and pasta. Carbs are also in milk and yogurt. The more carbs you eat at one time, the higher your blood sugar will rise. Spreading carbs all through the day helps keep your blood sugar levels within your target range. Counting carbs is one of the best ways to keep your blood sugar under control. If you use insulin, counting carbs helps you match the right amount of insulin to the number of grams of carbs in a meal. Then you can change your diet and insulin dose as needed.  Testing your blood sugar several times a day can help you learn how carbs affect your blood sugar. A registered dietitian or certified diabetes educator can help you plan meals and snacks. Follow-up care is a key part of your treatment and safety. Be sure to make and go to all appointments, and call your doctor if you are having problems. It's also a good idea to know your test results and keep a list of the medicines you take. How can you care for yourself at home? Know your daily amount of carbohydrates  Your daily amount depends on several things, such as your weight, how active you are, which diabetes medicines you take, and what your goals are for your blood sugar levels. A registered dietitian or certified diabetes educator can help you plan how many carbs to include in each meal and snack. For most adults, a guideline for the daily amount of carbs is:  · 45 to 60 grams at each meal. That's about the same as 3 to 4 carbohydrate servings. · 15 to 20 grams at each snack. That's about the same as 1 carbohydrate serving. Count carbs  Counting carbs lets you know how much rapid-acting insulin to take before you eat. If you use an insulin pump, you get a constant rate of insulin during the day. So the pump must be programmed at meals. This gives you extra insulin to cover the rise in blood sugar after meals. If you take insulin:  · Learn your own insulin-to-carb ratio. You and your diabetes health professional will figure out the ratio. You can do this by testing your blood sugar after meals. For example, you may need a certain amount of insulin for every 15 grams of carbs. · Add up the carb grams in a meal. Then you can figure out how many units of insulin to take based on your insulin-to-carb ratio. · Exercise lowers blood sugar. You can use less insulin than you would if you were not doing exercise. Keep in mind that timing matters.  If you exercise within 1 hour after a meal, your body may need less insulin for that meal than it would if you exercised 3 hours after the meal. Test your blood sugar to find out how exercise affects your need for insulin. If you do or don't take insulin:  · Look at labels on packaged foods. This can tell you how many carbs are in a serving. You can also use guides from the American Diabetes Association. · Be aware of portions, or serving sizes. If a package has two servings and you eat the whole package, you need to double the number of grams of carbohydrate listed for one serving. · Protein, fat, and fiber do not raise blood sugar as much as carbs do. If you eat a lot of these nutrients in a meal, your blood sugar will rise more slowly than it would otherwise. Eat from all food groups  · Eat at least three meals a day. · Plan meals to include food from all the food groups. The food groups include grains, fruits, dairy, proteins, and vegetables. · Talk to your dietitian or diabetes educator about ways to add limited amounts of sweets into your meal plan. · If you drink alcohol, talk to your doctor. It may not be recommended when you are taking certain diabetes medicines. Where can you learn more? Go to http://linda-sivan.info/. Enter U098 in the search box to learn more about \"Counting Carbohydrates: Care Instructions. \"  Current as of: December 7, 2017  Content Version: 11.8  © 1052-3787 [employer]. Care instructions adapted under license by [employer] (which disclaims liability or warranty for this information). If you have questions about a medical condition or this instruction, always ask your healthcare professional. [place] [address] any warranty or liability for your use of this information. Nausea and Vomiting: Care Instructions  Your Care Instructions    When you are nauseated, you may feel weak and sweaty and notice a lot of saliva in your mouth. Nausea often leads to vomiting.  Most of the time you do not need to worry about nausea and vomiting, but they can be signs of other illnesses. Two common causes of nausea and vomiting are stomach flu and food poisoning. Nausea and vomiting from viral stomach flu will usually start to improve within 24 hours. Nausea and vomiting from food poisoning may last from 12 to 48 hours. The doctor has checked you carefully, but problems can develop later. If you notice any problems or new symptoms, get medical treatment right away. Follow-up care is a key part of your treatment and safety. Be sure to make and go to all appointments, and call your doctor if you are having problems. It's also a good idea to know your test results and keep a list of the medicines you take. How can you care for yourself at home? · To prevent dehydration, drink plenty of fluids, enough so that your urine is light yellow or clear like water. Choose water and other caffeine-free clear liquids until you feel better. If you have kidney, heart, or liver disease and have to limit fluids, talk with your doctor before you increase the amount of fluids you drink. · Rest in bed until you feel better. · When you are able to eat, try clear soups, mild foods, and liquids until all symptoms are gone for 12 to 48 hours. Other good choices include dry toast, crackers, cooked cereal, and gelatin dessert, such as Jell-O. When should you call for help? Call 911 anytime you think you may need emergency care. For example, call if:    · You passed out (lost consciousness).    Call your doctor now or seek immediate medical care if:    · You have symptoms of dehydration, such as:  ? Dry eyes and a dry mouth. ? Passing only a little dark urine. ?  Feeling thirstier than usual.     · You have new or worsening belly pain.     · You have a new or higher fever.     · You vomit blood or what looks like coffee grounds.    Watch closely for changes in your health, and be sure to contact your doctor if:    · You have ongoing nausea and vomiting.     · Your vomiting is getting worse.     · Your vomiting lasts longer than 2 days.     · You are not getting better as expected. Where can you learn more? Go to http://linda-sivan.info/. Enter 25 [id number] in the search box to learn more about \"Nausea and Vomiting: Care Instructions. \"  Current as of: November 20, 2017  Content Version: 11.8  © 4708-6339 [employer]. Care instructions adapted under license by [employer] (which disclaims liability or warranty for this information). If you have questions about a medical condition or this instruction, always ask your healthcare professional. [place] [address] any warranty or liability for your use of this information. Your A1C  was   Lab Results   Component Value Date/Time    Hemoglobin A1c 7.9 (H) 11/17/2018 09:32 AM   .    This lab test reflects that your blood sugar averaged 180 mg/dL over the past 3 months. It is important to follow up with your provider on a routine basis to continue to evaluate your blood sugar discuss any necessary changes in treatment.

## 2018-11-22 NOTE — ROUTINE PROCESS
Bedside verbal report received  from [address] [place] [address]. Report reviewed SBAR, VS, labs and summary of care. Patient up and about in room, denies discomforts. Patient requesting 'food'. [id number]  Atarax po given for c/o anxiousness.

## 2018-11-26 NOTE — DISCHARGE SUMMARY
Discharge Summary [address] Patient: Wilmer Bailon Age: 52 y.o. Sex: female  : 1965 MRN: [id number] DOA: 2018 Discharge Date: 2018 Attending: Dr. Sarika Schreiber PCP: Gareth Copeland MD     
 
================================================================ Reason for Admission: 
 CRYSTAL (acute kidney injury) ([facility] [address] SIRS Discharge Diagnoses:  
T2DM w/neuropathy HTN 
HLD Anxiety Gastritis/Esophagitis Important notes to PCP/ follow-up studies and evaluations - Patient had EGD inpatient which showed esophagitis and gastritis. - She started two new medications: Protonix and Carafate - Patient had two episodes of hypoglycemia so her lantus was cut in half during admission. Pending labs and studies: 
None. Operative Procedures:  
Upper GI Endoscopy (18): Rusty Hawk MD 
 
Discharge Medications:    
Discharge Medication List as of 2018  2:39 PM  
  
START taking these medications Details  
sucralfate (CARAFATE) 100 mg/mL suspension Take 10 mL by mouth Before breakfast, lunch, dinner and at bedtime. , Print, Disp-120 mL, R-0  
  
  
CONTINUE these medications which have CHANGED Details  
pantoprazole (PROTONIX) 40 mg tablet Take 1 Tab by mouth Daily (before breakfast). , Print, Disp-30 Tab, R-0  
  
insulin NPH/insulin regular (NOVOLIN 70/30, HUMULIN 70/30) 100 unit/mL (70-30) injection 40 Units by SubCUTAneous route two (2) times a day., Print, Disp-10 mL, R-3  
  
  
CONTINUE these medications which have NOT CHANGED Details  
docusate sodium (COLACE) 100 mg capsule Take 1 Cap by mouth daily for 30 days. , Print, Disp-30 Cap, R-0  
  
metroNIDAZOLE (FLAGYL) 500 mg tablet Take 1 Tab by mouth every eight (8) hours. , Print, Disp-9 Tab, R-0  
  
cefdinir (OMNICEF) 300 mg capsule Take 1 Cap by mouth two (2) times a day., Print, Disp-6 Cap, R-0  
  
 metFORMIN ER (GLUCOPHAGE XR) 500 mg tablet TAKE 1 TABLET BY MOUTH DAILY WITH DINNER, Normal, Disp-30 Tab, R-0  
  
rosuvastatin (CRESTOR) 20 mg tablet Take 1 Tab by mouth nightly., Program, Disp-90 Tab, R-3 PARoxetine (PAXIL) 30 mg tablet TAKE 1 TABLET BY MOUTH DAILY, Normal, Disp-30 Tab, R-3 STOP taking these medications  
  
 metoclopramide HCl (REGLAN) 10 mg tablet Comments:  
Reason for Stopping:   
   
  
 
 
Disposition: Home Consultants:   
Gastroenterology: Dr. Harpal Sosa Brief Hospital Course (including pertinent history and physical findings) Ms. Mc Vargas presented to the ED with intractable N/V and abdominal pain that continued after her discharge from a previous hospitalization on 11/14. She stated that she had decreased PO intake as well. In the ED, she had an elevated WBC of 21.8 and a lactic acid 2.39. She was started on Rocephin, Vancomycin, Zofran, and fluids. During her admission, she also had a EGD done that showed gastritis and esophagitis, but no bleeding ulcers. Her hospitalization was complicated by two episodes of hypoglycemia, so here Lantus was decreased to half of her home dose. Her Humalog was also held some days given patient's decreased. Patient also experienced a few nightly episodes of hallucinations and emotional outbursts. When addressed on rounds in the morning, patient stated that that never happens to her and that she did not know why she thought rodents were crawling on the floor. Patient was able to tolerate meals without N/V and had stable vital signs. Therefore, she was discharged home with close follow-up with her PCP. Summarized key findings and results (labs, imaging studies, ECHO, cardiac cath, endoscopies, etc): CBC w/Diff Lab Results Component Value Date/Time  WBC 14.5 (H) 11/22/2018 02:33 AM  
 Hemoglobin, POC 13.6 11/20/2018 11:19 AM  
 HGB 9.6 (L) 11/22/2018 02:33 AM  
 Hematocrit, POC 40 11/20/2018 11:19 AM  
 HCT 27.7 (L) 11/22/2018 02:33 AM  
 PLATELET 976 03/86/6261 02:33 AM  
 MCV 83.7 11/22/2018 02:33 AM  
   
 
 
Chemistry Lab Results Component Value Date/Time Sodium 133 (L) 11/22/2018 02:33 AM  
 Potassium 4.1 11/22/2018 02:33 AM  
 Chloride 102 11/22/2018 02:33 AM  
 CO2 18 (L) 11/22/2018 02:33 AM  
 Anion gap 13 11/22/2018 02:33 AM  
 Glucose 275 (H) 11/22/2018 02:33 AM  
 BUN 16 11/22/2018 02:33 AM  
 Creatinine 1.86 (H) 11/22/2018 02:33 AM  
 BUN/Creatinine ratio 9 (L) 11/22/2018 02:33 AM  
 GFR est AA 34 (L) 11/22/2018 02:33 AM  
 GFR est non-AA 28 (L) 11/22/2018 02:33 AM  
 Calcium 7.7 (L) 11/22/2018 02:33 AM  
   
 
 
Functional status and cognitive function:   
Ambulates with: No Assistance Status: alert, cooperative, no distress, appears stated age Diet: General Diet Code status and advanced care plan: Full Power Of [employer] Group of Contact: 
Mallory Gar: AYADqgnqo) [phone] Patient Education:  Patient was educated on the following topics prior to discharge: Gastritis, Esophagitis, Counting Carbs, Nausea and Vomiting, Diabetes Follow-up:  
Follow-up Information Follow up With Specialties Details Why Contact Info Edie Matt MD Family Practice In 3 days  [address] [place] [address] 
[phone] Ria Barreto MD Gastroenterology Schedule an appointment as soon as possible for a visit in 1 month  [address] [place] [address] [address] [id number] [address] [address] [phone] 
  
  
 
 
 
================================================================ 
 
Brian Brown MD  
[id number] [facility] Medicine PGY-1 
11/24/18 3:38 PM

## 2019-01-03 ENCOUNTER — HOSPITAL ENCOUNTER (INPATIENT)
Age: 54
LOS: 5 days | Discharge: HOME | DRG: 720 | End: 2019-01-09
Attending: EMERGENCY MEDICINE | Admitting: FAMILY MEDICINE
Payer: MEDICAID

## 2019-01-03 ENCOUNTER — APPOINTMENT (OUTPATIENT)
Dept: GENERAL RADIOLOGY | Age: 54
DRG: 720 | End: 2019-01-03
Attending: PHYSICIAN ASSISTANT
Payer: MEDICAID

## 2019-01-03 DIAGNOSIS — R10.84 GENERALIZED ABDOMINAL PAIN: ICD-10-CM

## 2019-01-03 DIAGNOSIS — N17.9 AKI (ACUTE KIDNEY INJURY) (HCC): ICD-10-CM

## 2019-01-03 DIAGNOSIS — R65.10 SIRS (SYSTEMIC INFLAMMATORY RESPONSE SYNDROME) (HCC): Primary | ICD-10-CM

## 2019-01-03 DIAGNOSIS — R79.89 ABNORMAL LFTS: ICD-10-CM

## 2019-01-03 LAB
ALBUMIN SERPL-MCNC: 4.3 G/DL (ref 3.4–5)
ALBUMIN/GLOB SERPL: 0.9 {RATIO} (ref 0.8–1.7)
ALP SERPL-CCNC: 173 U/L (ref 45–117)
ALT SERPL-CCNC: 18 U/L (ref 13–56)
ANION GAP SERPL CALC-SCNC: 15 MMOL/L (ref 3–18)
AST SERPL-CCNC: 44 U/L (ref 15–37)
BASOPHILS # BLD: 0 K/UL (ref 0–0.1)
BASOPHILS NFR BLD: 0 % (ref 0–2)
BILIRUB SERPL-MCNC: 1.9 MG/DL (ref 0.2–1)
BUN SERPL-MCNC: 59 MG/DL (ref 7–18)
BUN/CREAT SERPL: 16 (ref 12–20)
CALCIUM SERPL-MCNC: 9.6 MG/DL (ref 8.5–10.1)
CHLORIDE SERPL-SCNC: 92 MMOL/L (ref 100–108)
CO2 SERPL-SCNC: 24 MMOL/L (ref 21–32)
CREAT SERPL-MCNC: 3.72 MG/DL (ref 0.6–1.3)
DIFFERENTIAL METHOD BLD: ABNORMAL
EOSINOPHIL # BLD: 0 K/UL (ref 0–0.4)
EOSINOPHIL NFR BLD: 0 % (ref 0–5)
ERYTHROCYTE [DISTWIDTH] IN BLOOD BY AUTOMATED COUNT: 13.5 % (ref 11.6–14.5)
GLOBULIN SER CALC-MCNC: 4.6 G/DL (ref 2–4)
GLUCOSE SERPL-MCNC: 172 MG/DL (ref 74–99)
HCT VFR BLD AUTO: 39.4 % (ref 35–45)
HGB BLD-MCNC: 14.2 G/DL (ref 12–16)
LACTATE BLD-SCNC: 2.45 MMOL/L (ref 0.4–2)
LYMPHOCYTES # BLD: 2.5 K/UL (ref 0.9–3.6)
LYMPHOCYTES NFR BLD: 13 % (ref 21–52)
MCH RBC QN AUTO: 29.4 PG (ref 24–34)
MCHC RBC AUTO-ENTMCNC: 36 G/DL (ref 31–37)
MCV RBC AUTO: 81.6 FL (ref 74–97)
MONOCYTES # BLD: 0.9 K/UL (ref 0.05–1.2)
MONOCYTES NFR BLD: 4 % (ref 3–10)
NEUTS SEG # BLD: 16.6 K/UL (ref 1.8–8)
NEUTS SEG NFR BLD: 83 % (ref 40–73)
PLATELET # BLD AUTO: 250 K/UL (ref 135–420)
PMV BLD AUTO: 11.2 FL (ref 9.2–11.8)
POTASSIUM SERPL-SCNC: 3.3 MMOL/L (ref 3.5–5.5)
PROT SERPL-MCNC: 8.9 G/DL (ref 6.4–8.2)
RBC # BLD AUTO: 4.83 M/UL (ref 4.2–5.3)
SODIUM SERPL-SCNC: 131 MMOL/L (ref 136–145)
WBC # BLD AUTO: 20 K/UL (ref 4.6–13.2)

## 2019-01-03 PROCEDURE — 51701 INSERT BLADDER CATHETER: CPT

## 2019-01-03 PROCEDURE — 99285 EMERGENCY DEPT VISIT HI MDM: CPT

## 2019-01-03 PROCEDURE — 87040 BLOOD CULTURE FOR BACTERIA: CPT

## 2019-01-03 PROCEDURE — 85025 COMPLETE CBC W/AUTO DIFF WBC: CPT

## 2019-01-03 PROCEDURE — 83690 ASSAY OF LIPASE: CPT

## 2019-01-03 PROCEDURE — 77030005563 HC CATH URETH INT MMGH -A

## 2019-01-03 PROCEDURE — 80053 COMPREHEN METABOLIC PANEL: CPT

## 2019-01-03 PROCEDURE — 83605 ASSAY OF LACTIC ACID: CPT

## 2019-01-03 PROCEDURE — 71045 X-RAY EXAM CHEST 1 VIEW: CPT

## 2019-01-03 PROCEDURE — 96361 HYDRATE IV INFUSION ADD-ON: CPT

## 2019-01-03 RX ORDER — SODIUM CHLORIDE 0.9 % (FLUSH) 0.9 %
5-10 SYRINGE (ML) INJECTION AS NEEDED
Status: DISCONTINUED | OUTPATIENT
Start: 2019-01-03 | End: 2019-01-10 | Stop reason: HOSPADM

## 2019-01-03 RX ORDER — MORPHINE SULFATE 4 MG/ML
4 INJECTION INTRAVENOUS
Status: COMPLETED | OUTPATIENT
Start: 2019-01-04 | End: 2019-01-04

## 2019-01-03 RX ORDER — ONDANSETRON 2 MG/ML
4 INJECTION INTRAMUSCULAR; INTRAVENOUS ONCE
Status: COMPLETED | OUTPATIENT
Start: 2019-01-03 | End: 2019-01-04

## 2019-01-04 ENCOUNTER — APPOINTMENT (OUTPATIENT)
Dept: CT IMAGING | Age: 54
DRG: 720 | End: 2019-01-04
Attending: PHYSICIAN ASSISTANT
Payer: MEDICAID

## 2019-01-04 ENCOUNTER — APPOINTMENT (OUTPATIENT)
Dept: NON INVASIVE DIAGNOSTICS | Age: 54
DRG: 720 | End: 2019-01-04
Attending: STUDENT IN AN ORGANIZED HEALTH CARE EDUCATION/TRAINING PROGRAM
Payer: MEDICAID

## 2019-01-04 ENCOUNTER — APPOINTMENT (OUTPATIENT)
Dept: CT IMAGING | Age: 54
DRG: 720 | End: 2019-01-04
Attending: STUDENT IN AN ORGANIZED HEALTH CARE EDUCATION/TRAINING PROGRAM
Payer: MEDICAID

## 2019-01-04 ENCOUNTER — APPOINTMENT (OUTPATIENT)
Dept: INTERVENTIONAL RADIOLOGY/VASCULAR | Age: 54
DRG: 720 | End: 2019-01-04
Attending: STUDENT IN AN ORGANIZED HEALTH CARE EDUCATION/TRAINING PROGRAM
Payer: MEDICAID

## 2019-01-04 PROBLEM — R10.9 ABDOMINAL PAIN: Status: ACTIVE | Noted: 2019-01-04

## 2019-01-04 LAB
ALBUMIN SERPL-MCNC: 3.3 G/DL (ref 3.4–5)
ALBUMIN/GLOB SERPL: 1 {RATIO} (ref 0.8–1.7)
ALP SERPL-CCNC: 127 U/L (ref 45–117)
ALT SERPL-CCNC: 17 U/L (ref 13–56)
AMORPH CRY URNS QL MICRO: ABNORMAL
AMPHET UR QL SCN: NEGATIVE
ANION GAP SERPL CALC-SCNC: 9 MMOL/L (ref 3–18)
APPEARANCE UR: ABNORMAL
AST SERPL-CCNC: 44 U/L (ref 15–37)
ATRIAL RATE: 107 BPM
BACTERIA URNS QL MICRO: ABNORMAL /HPF
BARBITURATES UR QL SCN: NEGATIVE
BASOPHILS # BLD: 0 K/UL (ref 0–0.1)
BASOPHILS NFR BLD: 0 % (ref 0–2)
BENZODIAZ UR QL: NEGATIVE
BILIRUB SERPL-MCNC: 1.9 MG/DL (ref 0.2–1)
BILIRUB UR QL: NEGATIVE
BUN SERPL-MCNC: 59 MG/DL (ref 7–18)
BUN/CREAT SERPL: 17 (ref 12–20)
CALCIUM SERPL-MCNC: 7.7 MG/DL (ref 8.5–10.1)
CALCULATED P AXIS, ECG09: 70 DEGREES
CALCULATED R AXIS, ECG10: 11 DEGREES
CALCULATED T AXIS, ECG11: 31 DEGREES
CANNABINOIDS UR QL SCN: NEGATIVE
CHLORIDE SERPL-SCNC: 104 MMOL/L (ref 100–108)
CK MB CFR SERPL CALC: 1.1 % (ref 0–4)
CK MB SERPL-MCNC: 13.7 NG/ML (ref 5–25)
CK SERPL-CCNC: 1237 U/L (ref 26–192)
CO2 SERPL-SCNC: 26 MMOL/L (ref 21–32)
COCAINE UR QL SCN: NEGATIVE
COLOR UR: ABNORMAL
CREAT SERPL-MCNC: 3.54 MG/DL (ref 0.6–1.3)
DIAGNOSIS, 93000: NORMAL
DIFFERENTIAL METHOD BLD: ABNORMAL
ECHO AO ROOT DIAM: 2.6 CM
ECHO LA VOL BP: 39.05 ML (ref 22–52)
ECHO LA VOL/BSA BIPLANE: 19.19 ML/M2 (ref 16–28)
ECHO LV E' LATERAL VELOCITY: 7.9 CM/S
ECHO LV E' SEPTAL VELOCITY: 7.78 CM/S
ECHO PULMONARY ARTERY SYSTOLIC PRESSURE (PASP): 35 MMHG
ECHO RV TAPSE: 2.22 CM (ref 1.5–2)
EOSINOPHIL # BLD: 0.1 K/UL (ref 0–0.4)
EOSINOPHIL NFR BLD: 1 % (ref 0–5)
EPITH CASTS URNS QL MICRO: ABNORMAL /LPF (ref 0–5)
ERYTHROCYTE [DISTWIDTH] IN BLOOD BY AUTOMATED COUNT: 13.9 % (ref 11.6–14.5)
FOLATE SERPL-MCNC: >20 NG/ML (ref 3.1–17.5)
GLOBULIN SER CALC-MCNC: 3.4 G/DL (ref 2–4)
GLUCOSE BLD STRIP.AUTO-MCNC: 121 MG/DL (ref 70–110)
GLUCOSE BLD STRIP.AUTO-MCNC: 146 MG/DL (ref 70–110)
GLUCOSE SERPL-MCNC: 111 MG/DL (ref 74–99)
GLUCOSE UR STRIP.AUTO-MCNC: NEGATIVE MG/DL
GRAN CASTS URNS QL MICRO: ABNORMAL /LPF
HCT VFR BLD AUTO: 29.6 % (ref 35–45)
HDSCOM,HDSCOM: NORMAL
HGB BLD-MCNC: 10.2 G/DL (ref 12–16)
HGB UR QL STRIP: NEGATIVE
KETONES UR QL STRIP.AUTO: NEGATIVE MG/DL
LACTATE SERPL-SCNC: 1 MMOL/L (ref 0.4–2)
LEUKOCYTE ESTERASE UR QL STRIP.AUTO: NEGATIVE
LIPASE SERPL-CCNC: 141 U/L (ref 73–393)
LYMPHOCYTES # BLD: 2.6 K/UL (ref 0.9–3.6)
LYMPHOCYTES NFR BLD: 21 % (ref 21–52)
MAGNESIUM SERPL-MCNC: 2 MG/DL (ref 1.6–2.6)
MCH RBC QN AUTO: 28.9 PG (ref 24–34)
MCHC RBC AUTO-ENTMCNC: 34.5 G/DL (ref 31–37)
MCV RBC AUTO: 83.9 FL (ref 74–97)
METHADONE UR QL: NEGATIVE
MONOCYTES # BLD: 0.5 K/UL (ref 0.05–1.2)
MONOCYTES NFR BLD: 4 % (ref 3–10)
NEUTS SEG # BLD: 8.9 K/UL (ref 1.8–8)
NEUTS SEG NFR BLD: 74 % (ref 40–73)
NITRITE UR QL STRIP.AUTO: NEGATIVE
OPIATES UR QL: NEGATIVE
P-R INTERVAL, ECG05: 144 MS
PCP UR QL: NEGATIVE
PH UR STRIP: 5 [PH] (ref 5–8)
PLATELET # BLD AUTO: 192 K/UL (ref 135–420)
PMV BLD AUTO: 11 FL (ref 9.2–11.8)
POTASSIUM SERPL-SCNC: 3.1 MMOL/L (ref 3.5–5.5)
PROT SERPL-MCNC: 6.7 G/DL (ref 6.4–8.2)
PROT UR STRIP-MCNC: ABNORMAL MG/DL
Q-T INTERVAL, ECG07: 386 MS
QRS DURATION, ECG06: 74 MS
QTC CALCULATION (BEZET), ECG08: 515 MS
RBC # BLD AUTO: 3.53 M/UL (ref 4.2–5.3)
SODIUM SERPL-SCNC: 139 MMOL/L (ref 136–145)
SP GR UR REFRACTOMETRY: 1.02 (ref 1–1.03)
TROPONIN I SERPL-MCNC: <0.02 NG/ML (ref 0–0.04)
UROBILINOGEN UR QL STRIP.AUTO: 1 EU/DL (ref 0.2–1)
VENTRICULAR RATE, ECG03: 107 BPM
VIT B12 SERPL-MCNC: 905 PG/ML (ref 211–911)
WBC # BLD AUTO: 12 K/UL (ref 4.6–13.2)

## 2019-01-04 PROCEDURE — 83605 ASSAY OF LACTIC ACID: CPT

## 2019-01-04 PROCEDURE — 93306 TTE W/DOPPLER COMPLETE: CPT

## 2019-01-04 PROCEDURE — 74011250636 HC RX REV CODE- 250/636: Performed by: STUDENT IN AN ORGANIZED HEALTH CARE EDUCATION/TRAINING PROGRAM

## 2019-01-04 PROCEDURE — 74011000258 HC RX REV CODE- 258: Performed by: PHYSICIAN ASSISTANT

## 2019-01-04 PROCEDURE — 81001 URINALYSIS AUTO W/SCOPE: CPT

## 2019-01-04 PROCEDURE — 83735 ASSAY OF MAGNESIUM: CPT

## 2019-01-04 PROCEDURE — 96366 THER/PROPH/DIAG IV INF ADDON: CPT

## 2019-01-04 PROCEDURE — 87086 URINE CULTURE/COLONY COUNT: CPT

## 2019-01-04 PROCEDURE — 80307 DRUG TEST PRSMV CHEM ANLYZR: CPT

## 2019-01-04 PROCEDURE — 36415 COLL VENOUS BLD VENIPUNCTURE: CPT

## 2019-01-04 PROCEDURE — 02HV33Z INSERTION OF INFUSION DEVICE INTO SUPERIOR VENA CAVA, PERCUTANEOUS APPROACH: ICD-10-PCS | Performed by: RADIOLOGY

## 2019-01-04 PROCEDURE — 82962 GLUCOSE BLOOD TEST: CPT

## 2019-01-04 PROCEDURE — 74011636637 HC RX REV CODE- 636/637: Performed by: STUDENT IN AN ORGANIZED HEALTH CARE EDUCATION/TRAINING PROGRAM

## 2019-01-04 PROCEDURE — 82607 VITAMIN B-12: CPT

## 2019-01-04 PROCEDURE — 85025 COMPLETE CBC W/AUTO DIFF WBC: CPT

## 2019-01-04 PROCEDURE — 74011250636 HC RX REV CODE- 250/636: Performed by: PHYSICIAN ASSISTANT

## 2019-01-04 PROCEDURE — 82550 ASSAY OF CK (CPK): CPT

## 2019-01-04 PROCEDURE — 96361 HYDRATE IV INFUSION ADD-ON: CPT

## 2019-01-04 PROCEDURE — 87040 BLOOD CULTURE FOR BACTERIA: CPT

## 2019-01-04 PROCEDURE — 72125 CT NECK SPINE W/O DYE: CPT

## 2019-01-04 PROCEDURE — 36569 INSJ PICC 5 YR+ W/O IMAGING: CPT

## 2019-01-04 PROCEDURE — 74176 CT ABD & PELVIS W/O CONTRAST: CPT

## 2019-01-04 PROCEDURE — 65660000000 HC RM CCU STEPDOWN

## 2019-01-04 PROCEDURE — 70450 CT HEAD/BRAIN W/O DYE: CPT

## 2019-01-04 PROCEDURE — 74011000258 HC RX REV CODE- 258: Performed by: STUDENT IN AN ORGANIZED HEALTH CARE EDUCATION/TRAINING PROGRAM

## 2019-01-04 PROCEDURE — 80053 COMPREHEN METABOLIC PANEL: CPT

## 2019-01-04 PROCEDURE — 96365 THER/PROPH/DIAG IV INF INIT: CPT

## 2019-01-04 PROCEDURE — 96375 TX/PRO/DX INJ NEW DRUG ADDON: CPT

## 2019-01-04 PROCEDURE — 93005 ELECTROCARDIOGRAM TRACING: CPT

## 2019-01-04 RX ORDER — MAGNESIUM SULFATE 100 %
16 CRYSTALS MISCELLANEOUS AS NEEDED
Status: DISCONTINUED | OUTPATIENT
Start: 2019-01-04 | End: 2019-01-10 | Stop reason: HOSPADM

## 2019-01-04 RX ORDER — PAROXETINE HYDROCHLORIDE 40 MG/1
40 TABLET, FILM COATED ORAL DAILY
COMMUNITY
End: 2020-01-01

## 2019-01-04 RX ORDER — MORPHINE SULFATE 2 MG/ML
1-2 INJECTION, SOLUTION INTRAMUSCULAR; INTRAVENOUS
Status: DISCONTINUED | OUTPATIENT
Start: 2019-01-04 | End: 2019-01-04

## 2019-01-04 RX ORDER — DEXTROSE MONOHYDRATE AND SODIUM CHLORIDE 5; .45 G/100ML; G/100ML
125 INJECTION, SOLUTION INTRAVENOUS CONTINUOUS
Status: DISCONTINUED | OUTPATIENT
Start: 2019-01-04 | End: 2019-01-04

## 2019-01-04 RX ORDER — HEPARIN SODIUM 5000 [USP'U]/ML
5000 INJECTION, SOLUTION INTRAVENOUS; SUBCUTANEOUS EVERY 8 HOURS
Status: DISCONTINUED | OUTPATIENT
Start: 2019-01-04 | End: 2019-01-06

## 2019-01-04 RX ORDER — INSULIN LISPRO 100 [IU]/ML
INJECTION, SOLUTION INTRAVENOUS; SUBCUTANEOUS EVERY 6 HOURS
Status: DISCONTINUED | OUTPATIENT
Start: 2019-01-04 | End: 2019-01-05

## 2019-01-04 RX ORDER — SODIUM CHLORIDE, SODIUM LACTATE, POTASSIUM CHLORIDE, CALCIUM CHLORIDE 600; 310; 30; 20 MG/100ML; MG/100ML; MG/100ML; MG/100ML
1000 INJECTION, SOLUTION INTRAVENOUS ONCE
Status: COMPLETED | OUTPATIENT
Start: 2019-01-04 | End: 2019-01-04

## 2019-01-04 RX ORDER — POTASSIUM CHLORIDE 14.9 MG/ML
10 INJECTION INTRAVENOUS
Status: DISCONTINUED | OUTPATIENT
Start: 2019-01-04 | End: 2019-01-04

## 2019-01-04 RX ORDER — INSULIN GLARGINE 100 [IU]/ML
15 INJECTION, SOLUTION SUBCUTANEOUS DAILY
Status: DISCONTINUED | OUTPATIENT
Start: 2019-01-04 | End: 2019-01-07

## 2019-01-04 RX ORDER — DEXTROSE 50 % IN WATER (D50W) INTRAVENOUS SYRINGE
25-50 AS NEEDED
Status: DISCONTINUED | OUTPATIENT
Start: 2019-01-04 | End: 2019-01-10 | Stop reason: HOSPADM

## 2019-01-04 RX ORDER — NALOXONE HYDROCHLORIDE 0.4 MG/ML
0.4 INJECTION, SOLUTION INTRAMUSCULAR; INTRAVENOUS; SUBCUTANEOUS AS NEEDED
Status: DISCONTINUED | OUTPATIENT
Start: 2019-01-04 | End: 2019-01-10 | Stop reason: HOSPADM

## 2019-01-04 RX ORDER — PANTOPRAZOLE SODIUM 40 MG/10ML
40 INJECTION, POWDER, LYOPHILIZED, FOR SOLUTION INTRAVENOUS DAILY
Status: DISCONTINUED | OUTPATIENT
Start: 2019-01-04 | End: 2019-01-07 | Stop reason: CLARIF

## 2019-01-04 RX ORDER — POTASSIUM CHLORIDE 7.45 MG/ML
10 INJECTION INTRAVENOUS
Status: COMPLETED | OUTPATIENT
Start: 2019-01-04 | End: 2019-01-04

## 2019-01-04 RX ORDER — SODIUM CHLORIDE 9 MG/ML
125 INJECTION, SOLUTION INTRAVENOUS CONTINUOUS
Status: DISCONTINUED | OUTPATIENT
Start: 2019-01-04 | End: 2019-01-04

## 2019-01-04 RX ORDER — SODIUM CHLORIDE 9 MG/ML
500 INJECTION, SOLUTION INTRAVENOUS ONCE
Status: COMPLETED | OUTPATIENT
Start: 2019-01-04 | End: 2019-01-04

## 2019-01-04 RX ORDER — METOCLOPRAMIDE HYDROCHLORIDE 5 MG/ML
5 INJECTION INTRAMUSCULAR; INTRAVENOUS
Status: DISCONTINUED | OUTPATIENT
Start: 2019-01-04 | End: 2019-01-05

## 2019-01-04 RX ORDER — DEXTROSE MONOHYDRATE AND SODIUM CHLORIDE 5; .9 G/100ML; G/100ML
125 INJECTION, SOLUTION INTRAVENOUS CONTINUOUS
Status: DISCONTINUED | OUTPATIENT
Start: 2019-01-04 | End: 2019-01-05

## 2019-01-04 RX ORDER — MORPHINE SULFATE 2 MG/ML
1-2 INJECTION, SOLUTION INTRAMUSCULAR; INTRAVENOUS
Status: DISCONTINUED | OUTPATIENT
Start: 2019-01-04 | End: 2019-01-05

## 2019-01-04 RX ADMIN — HEPARIN SODIUM 5000 UNITS: 5000 INJECTION INTRAVENOUS; SUBCUTANEOUS at 13:55

## 2019-01-04 RX ADMIN — SODIUM CHLORIDE, SODIUM LACTATE, POTASSIUM CHLORIDE, AND CALCIUM CHLORIDE 1000 ML: 600; 310; 30; 20 INJECTION, SOLUTION INTRAVENOUS at 20:00

## 2019-01-04 RX ADMIN — HEPARIN SODIUM 5000 UNITS: 5000 INJECTION INTRAVENOUS; SUBCUTANEOUS at 22:34

## 2019-01-04 RX ADMIN — POTASSIUM CHLORIDE 10 MEQ: 7.46 INJECTION, SOLUTION INTRAVENOUS at 12:00

## 2019-01-04 RX ADMIN — MORPHINE SULFATE 2 MG: 2 INJECTION, SOLUTION INTRAMUSCULAR; INTRAVENOUS at 23:29

## 2019-01-04 RX ADMIN — INSULIN GLARGINE 15 UNITS: 100 INJECTION, SOLUTION SUBCUTANEOUS at 11:14

## 2019-01-04 RX ADMIN — POTASSIUM CHLORIDE 10 MEQ: 7.46 INJECTION, SOLUTION INTRAVENOUS at 14:00

## 2019-01-04 RX ADMIN — PIPERACILLIN SODIUM,TAZOBACTAM SODIUM 3.38 G: 3; .375 INJECTION, POWDER, FOR SOLUTION INTRAVENOUS at 00:33

## 2019-01-04 RX ADMIN — MORPHINE SULFATE 2 MG: 2 INJECTION, SOLUTION INTRAMUSCULAR; INTRAVENOUS at 13:55

## 2019-01-04 RX ADMIN — SODIUM CHLORIDE 1000 ML: 900 INJECTION, SOLUTION INTRAVENOUS at 01:11

## 2019-01-04 RX ADMIN — SODIUM CHLORIDE 500 ML: 900 INJECTION, SOLUTION INTRAVENOUS at 09:05

## 2019-01-04 RX ADMIN — SODIUM CHLORIDE 125 ML/HR: 900 INJECTION, SOLUTION INTRAVENOUS at 09:08

## 2019-01-04 RX ADMIN — SODIUM CHLORIDE 682 ML: 900 INJECTION, SOLUTION INTRAVENOUS at 07:15

## 2019-01-04 RX ADMIN — ONDANSETRON 4 MG: 2 INJECTION INTRAMUSCULAR; INTRAVENOUS at 00:24

## 2019-01-04 RX ADMIN — PIPERACILLIN SODIUM,TAZOBACTAM SODIUM 3.38 G: 3; .375 INJECTION, POWDER, FOR SOLUTION INTRAVENOUS at 07:12

## 2019-01-04 RX ADMIN — DEXTROSE MONOHYDRATE AND SODIUM CHLORIDE 125 ML/HR: 5; .9 INJECTION, SOLUTION INTRAVENOUS at 20:12

## 2019-01-04 RX ADMIN — SODIUM CHLORIDE 1000 ML: 900 INJECTION, SOLUTION INTRAVENOUS at 00:24

## 2019-01-04 RX ADMIN — PIPERACILLIN SODIUM,TAZOBACTAM SODIUM 2.25 G: 2; .25 INJECTION, POWDER, FOR SOLUTION INTRAVENOUS at 18:55

## 2019-01-04 RX ADMIN — MORPHINE SULFATE 4 MG: 4 INJECTION INTRAVENOUS at 00:24

## 2019-01-04 RX ADMIN — POTASSIUM CHLORIDE 10 MEQ: 7.46 INJECTION, SOLUTION INTRAVENOUS at 11:14

## 2019-01-04 RX ADMIN — POTASSIUM CHLORIDE 10 MEQ: 7.46 INJECTION, SOLUTION INTRAVENOUS at 13:00

## 2019-01-04 NOTE — DIABETES MGMT
Diabetes Patient/Family Education RecordFactors That  May Influence Patients Ability  to Learn or  Comply with Recommendations []   Language barrier    []   Cultural needs   []   Motivation  
 []   Cognitive limitation    []   Physical   [x]   Education  
 []   Physiological factors   []   Hearing/vision/speaking impairment   []   Rastafarian beliefs []   Financial factors   []  Other:   []  No factors identified at this time. Person Instructed: [x]   Patient   []   Family   []  Other Preference for Learning: 
 [x]   Verbal   [x]   Written   []  Demonstration Level of Comprehension & Competence:   
[x]  Good                                      [] Fair                                     []  Poor                             []  Needs Reinforcement  
[x]  Teachback completed Education Component:  
[x]  Medication management, including how to administer insulin (if appropriate) and potential medication interactions: Yes. Patient reported home diabetes medications: 
Novolin 70/30 mix insulin 40 units daily before breakfast and 40 units daily before dinner. Educated patient that she's on mix insulin: 70% medium acting insulin and 30% fast acting insulin therefore it is important to prepare her meals so that she won't forget to eat. Patient stated that she experienced low blood sugar in the past when she forgot to eat. Patient also reported that she's legally blind on the left and that is why she was placed on pen insulin because she's able to listen to the \"click\" when dialing the dose. Metformin 500 mg once daily with breakfast. Educated patient that this medicine helps for liver to control the amount of sugar released and also helps insulin to work better. Patient stated that she didn't know about this before. [x]  Nutritional management -obtain usual meal pattern: Yes.  Patient reported that she's able to \"eat right\" most of the time to help prevent her blood sugar from going up, but she had some challenges recently during the holidays. Patient reported that she is now back to her regular diet and better in serving size/portion control of carbs (starches, fruits, dairy). [x]  Exercise: Yes. Patient reported that she's able to tolerate short/household distance walking exercise when not feeling sick. She reported legally blind on the left eye and that is why she is careful when walking around. Discussed her report of falls at home recently. Patient explained that it was not because of low blood sugar but due to feeling weak and she has been experiencing abdominal pain, nausea, and vomiting. Patient denies injury related to fall. Patient also verbalized understanding not to get up and walk without assistance when she is feeling unsteady. [x]  Signs, symptoms, and treatment of hyperglycemia and hypoglycemia: Yes. Patient verbalized  Understanding of high blood sugar, symptoms, causes, treatment, prevention, and when to contact her medical provider or call for emergency medical if she cannot explain persistently elevated blood sugar readings. Patient reported that she's able to keep fasting blood sugar within the recommended target of  most of the time, but she knows the reason when it is higher because of over eating. [x] Prevention, recognition and treatment of hyperglycemia and hypoglycemia: Yes. Patient verbalized understanding of low blood sugar, symptoms, causes, treatment, prevention, and when to contact her medical provider or call 911 when unsuccessful in treating low blood sugar above 70%. Patient reported that she rarely experience low blood sugar and it was because she intentionally did not eat enough. [x]  Importance of blood glucose monitoring and how to obtain a blood glucose meter: Yes. Patient reported that she has BG meter and testing supplies. She is monitoring her blood glucose at least 2x daily. [x]  Instruction on use of the blood glucose meter:Yes. Verified her ability to use BG meter because she is legally blind on the left. Patient reported that she has no problem with one eye only when using the meter. [x]  Discuss the importance of HbA1C monitoring: Yes. Patient verbalized understanding that her current A1c level is 7.9% (11/17/2018) which is equivalent to estimated average blood glucose of 180 mg/dL during the previous 2-3 months. Discussed with patient that her current A1c level is above the recommended range of <7%. Educated patient and encouraged to follow her diabetes treatment plan: medications, diet, and recommended regular exercise activity by her medical provider to help achieve and maintain A1c of <7%. [x]  Sick day guidelines: Yes. Patient was educated and verbalized understanding that blood sugar will be running high when sick/ill therefore it is recommended to continue to take her diabetes medications, eat regular food, drink plenty of water or sugar free beverages, and monitor blood sugar additional times. If unable to eat - then consume serving size of carbs such as toast, crackers, sugar free pudding/jello. Get emergency medical help when blood sugar is 300 and higher and symptoms include nausea, vomiting, abd pain, shortness of breath, confusion. [x]  Proper use and disposal of lancets, needles, syringes or insulin pens (if appropriate): Yes. [x]  Potential long-term complications (retinopathy, kidney disease, neuropathy, foot care): Yes. [x] Information about whom to contact in case of emergency or for more information: Yes. [x]  Goal:  Patient/family will demonstrate understanding of Diabetes Self Management Skills by: 01/11/2018 Plan for post-discharge education or self-management support: 
  [x] Outpatient class schedule provided            [] Patient Declined 
  [] Scheduled for outpatient classes (date) _______ Verify: Does patient understand how diabetes medications work? Yes. Does patient know what their most recent A1c is? Yes. Does patient monitor glucose at home? Yes. Does patient have a glucometer, testing supplies or difficulty obtaining diabetes medications? See notes below. Does patient have BG meter and testing supplies at home? Yes. Does patient have difficulty obtaining diabetes testing supplies and medications? No. Patient reported that she is getting them locally from [facility] pharmacy. Dano Baxter RN 
[facility] [phone]

## 2019-01-04 NOTE — PROGRESS NOTES
Reason for Admission:   Sepsis ([facility] [address] Abdominal pain Sepsis ([facility] [address] RRAT Score:     21 Resources/supports as identified by patient/family:   The patient has Medicaid for insurance. Top Challenges facing patient (as identified by patient/family and CM): Finances/Medication cost?     The patient did not report any financial problems. She states that she can obtain her medications from the pharmacy, and take her medications as directed. Transportation      Family or friends Support system or lack thereof? Family and friends as per patient Living arrangements? Lives alone. Self-care/ADLs/Cognition? A/Ox4. She reports independence with her ADL/IADLs. Current Advanced Directive/Advance Care Plan (ACP):   not interested in completing an ACP. Plan for utilizing home health:    no home health orders Likelihood of readmission:   HIGH Transition of Care Plan:              Initial assessment completed with patient. Face sheet information confirmed:  yes. This patient lives alone. She was independent of her ADL/IADL's prior to admission. Her family or friends will provide transportation upon discharge. She ambulated without assistance. She has no DME at home. The patient states that she can obtain her medications from the pharmacy, and take her medications as directed. She has no concerns for discharge. Patient is not currently active with home health. Patient has not stayed in a skilled nursing facility or rehab recently. Currently, the discharge plan is Home. Care Management Interventions PCP Verified by CM: Yes(Last seen by PCP approx. 1 month ago) Mode of Transport at Discharge: Self Transition of Care Consult (CM Consult): Discharge Planning Discharge Durable Medical Equipment: No 
Physical Therapy Consult: No 
Occupational Therapy Consult: No 
Current Support Network: Lives Alone Confirm Follow Up Transport: Other (see comment)(Family or friends) Plan discussed with Pt/Family/Caregiver: Yes Discharge Location Discharge Placement: Home with family assistance Shantanu Cuff MSN, RN, [facility] Care Management [phone]

## 2019-01-04 NOTE — PROGRESS NOTES
At 1205 pt IV infiltrated. Called [facility] to report loss of iv access. Also called to report low BP, hr 104 and MEWS of 3. Called ICU to request US guided iv insertion and they can not. Spoke to  again and a PICC line is going to be ordered. Can not finish KCL infusions or hang any iv abt due to no iv access. Will give when iv access obtained.

## 2019-01-04 NOTE — ED NOTES
Talked to provider that patient still need IV access. Dr. Viry Lemons and Provider not willing to do ultrasound guided or other forms of access

## 2019-01-04 NOTE — ED NOTES
Iv infiltrated, provider aware NO new access obtained due to the patient being a very hard stick . Provider aware.

## 2019-01-04 NOTE — ED NOTES
Talked to [facility] resident about patient lack of IV access, and the need for some type of access. The resident said that he would contact the ICU for a possible central line or ultrasound guided iv

## 2019-01-04 NOTE — PROGRESS NOTES
attempted to visit patient and was not able to do a spiritual assessment. Patient was tired and wanted to rest. Will follow up with patient. Betito Valdez MA  Spiritual Care Department 
[phone]

## 2019-01-04 NOTE — DIABETES MGMT
Glycemic Control Plan of Care 
 
T2DM with current A1c of 7.9% (11/17/2018). See separate notes, 01/04/2019, for assessment of home diabetes management and education. Home diabetes medications: Novolin 70/30 mix insulin 40 units twice daily before meals (breakfast and dinner) and Metformin 500 mg once daily with breakfast. 
 
POC BG report on 01/03/2019: None. POC BG report on 01/04/2019 at time of review: 146 mg/dL. Patient is currently on 15 units of lantus insulin daily. She's NPO at this time. IVF: NS at 125 ml/hr, continuous infusion. Recommendation(s): 
1.) Continue inpatient glycemic monitoring and current insulin orders: basal lantus and correctional lispro. 2.) Diabetic consistent carb diet when ready to feed patient. Then monitor po intake and BG pattern - and evaluate need to increase lantus insulin dose. Can consider up to 40 units of lantus daily based on calculation of her 70/30 mix insulin, but recommend lower dose first and then increase daily based on BG pattern to prevent hypoglycemia. Assessment: 
Patient is 52year old with past medical history including type 2 diabetes mellitus with retinopathy/neuropathy, blind left eye, CKD, GERD, hyperlipidemia, and hypertension - was admitted on 01/03/2019 with report of nausea, vomiting, abdominal pain, weakness, and falls at home. Noted: 
SIRS. Abnormal LFTs. CRYSTAL. T2DM with current A1c of 7.9% (11/17/2018). Most recent blood glucose values: 
 
No POC BG report for 01/0/2019 Results for Humphrey Pacheco (MRN [id number]) as of 1/4/2019 11:12 Ref. Range 1/4/2019 04:33 GLUCOSE,FAST - POC Latest Ref Range: 70 - 110 mg/dL 146 (H) Current A1C: 7.9% (11/17/2018) which is equivalent to estimated average blood glucose of 180 mg/dL during the past 2-3 months. Current hospital diabetes medications: 
Basal lantus insulin 15 units daily, first dose ordered 01/04/2019. Correctional lispro insulin every 6 hours. Normal sensitivity dose. Total daily dose insulin requirement previous day: 01/03/2019 None. Home diabetes medications: Patient reported on 01/04/2018: 
Novolin 70/30 mix insulin (pen) 40 units daily before breakfast and 40 units daily before dinner. Metformin 500 mg once daily with breakfast. 
 
Diet: Patient is currently NPO. Goals:  Blood glucose will be within target range of  mg/dL by 01/07/2019. Education:  _X__  Refer to Diabetes Education Record: 01/04/2019 
           ___  Education not indicated at this time Loras Leyden, RN [facility] Pager: [phone]

## 2019-01-04 NOTE — ED PROVIDER NOTES
EMERGENCY DEPARTMENT HISTORY AND PHYSICAL EXAM 
 
4:13 AM 
 
 
Date: 1/3/2019 Patient Name: Brittni Hammonds History of Presenting Illness Chief Complaint Patient presents with  Vomiting  Nausea  Abdominal Pain History Provided By: Patient Chief Complaint: abdominal pain Duration: 2 Weeks Timing:  Worsening Location: diffuse Quality: Aching and Cramping Severity: Severe Modifying Factors:   
Associated Symptoms: nausea, vomiting, diarrhea, constipation Additional History (Context): Brittni Hammonds is a 52 y.o. female with diabetes and hypertension who presents with abdominal pain for the past two weeks, but has been intermittent since November. She was admitted in November for sepsis and abdominal pain, with no identified source. She had recent endoscopy with Dr. Dl Nguyen. She said she is weak and lightheaded and has been falling recently. She denies fevers. Denies dysuria. She had a few episodes of diarrhea, but has not had a bowel movement in the past 2 days. PCP: Aime Petty MD 
 
Current Facility-Administered Medications Medication Dose Route Frequency Provider Last Rate Last Dose  sodium chloride (NS) flush 5-10 mL  5-10 mL IntraVENous PRN Juan Miguel Fletcher PA-C      
 piperacillin-tazobactam (ZOSYN) 3.375 g in 0.9% sodium chloride (MBP/ADV) 100 mL MBP  3.375 g IntraVENous Q6H Juan Miguel Fletcher PA-C   Stopped at 01/04/19 1508  sodium chloride 0.9 % bolus infusion 682 mL  682 mL IntraVENous ONCE Juan Miguel Fletcher PA-C Current Outpatient Medications Medication Sig Dispense Refill  sucralfate (CARAFATE) 100 mg/mL suspension Take 10 mL by mouth Before breakfast, lunch, dinner and at bedtime. 120 mL 0  
 pantoprazole (PROTONIX) 40 mg tablet Take 1 Tab by mouth Daily (before breakfast).  30 Tab 0  
 insulin NPH/insulin regular (NOVOLIN 70/30, HUMULIN 70/30) 100 unit/mL (70-30) injection 40 Units by SubCUTAneous route two (2) times a day. 10 mL 3  
 metroNIDAZOLE (FLAGYL) 500 mg tablet Take 1 Tab by mouth every eight (8) hours. 9 Tab 0  
 cefdinir (OMNICEF) 300 mg capsule Take 1 Cap by mouth two (2) times a day. 6 Cap 0  
 metFORMIN ER (GLUCOPHAGE XR) 500 mg tablet TAKE 1 TABLET BY MOUTH DAILY WITH DINNER 30 Tab 0  
 rosuvastatin (CRESTOR) 20 mg tablet Take 1 Tab by mouth nightly. 90 Tab 3  
 PARoxetine (PAXIL) 30 mg tablet TAKE 1 TABLET BY MOUTH DAILY 30 Tab 3 Past History Past Medical History: 
Past Medical History:  
Diagnosis Date  Blind left eye  Cellulitis of great toe of right foot 10/19/2017  Diabetes ([address]  Diabetic retinopathy ([address]  Gall stones  GERD (gastroesophageal reflux disease)  Hammertoe  Hiatal hernia  History of mammogram 10/03/2017 No evidence of malignancy  Hypertension  Mass of abdomen  Neuropathy due to type 2 diabetes mellitus ([address]  Osteomyelitis of toe of right foot ([address] 10/19/2017  Pancreatitis Past Surgical History: 
Past Surgical History:  
Procedure Laterality Date  HX AMPUTATION Left 2017  
 left 2nd toe  HX CHOLECYSTECTOMY  10/15/2014  HX GI Benign GI Stromal Tumor excision  HX HEENT Sx for detached retina  HX MYOMECTOMY x5 removed  HX OTHER SURGICAL    
 upper endoscopy Family History: 
Family History Adopted: Yes  
Problem Relation Age of Onset  Heart Failure Mother 76  
 Other Father 70  
     blood clot after surgery  Diabetes Paternal Aunt  Diabetes Paternal Uncle Social History: 
Social History Tobacco Use  Smoking status: Former Smoker Packs/day: 0.20 Years: 8.00 Pack years: 1.60 Types: Cigarettes Last attempt to quit: 12/3/1995 Years since quittin.1  Smokeless tobacco: Never Used Substance Use Topics  Alcohol use:  No  
 Comment: Drinks 3-4xs year generally wine  Drug use: No  
 
 
Allergies: Allergies Allergen Reactions  Levaquin [Levofloxacin] Hives  Promethazine Nausea and Vomiting \"the phenergan made me more nauseated\" Review of Systems Review of Systems Constitutional: Negative for fever. HENT: Negative for facial swelling. Eyes: Negative for visual disturbance. Respiratory: Negative for shortness of breath. Cardiovascular: Negative for chest pain. Gastrointestinal: Positive for abdominal pain, constipation, diarrhea, nausea and vomiting. Genitourinary: Negative for dysuria. Musculoskeletal: Negative for neck pain. Skin: Negative for rash. Neurological: Positive for light-headedness. Negative for dizziness. Psychiatric/Behavioral: Negative for confusion. All other systems reviewed and are negative. Physical Exam  
 
Visit Vitals BP 93/77 Pulse 95 Temp 97.3 °F (36.3 °C) Resp 16 Wt 89.4 kg (197 lb 2 oz) LMP 10/06/2015 (Exact Date) SpO2 100% BMI 29.97 kg/m² Physical Exam  
Constitutional: She is oriented to person, place, and time. She appears well-developed and well-nourished. She appears distressed. HENT:  
Head: Normocephalic and atraumatic. Eyes: Conjunctivae are normal.  
Neck: Normal range of motion. Cardiovascular: Normal rate, regular rhythm and normal heart sounds. Pulmonary/Chest: Effort normal and breath sounds normal. She has no wheezes. She has no rales. Abdominal: Soft. Bowel sounds are normal. She exhibits no distension. There is generalized tenderness. There is guarding. Musculoskeletal: Normal range of motion. Neurological: She is alert and oriented to person, place, and time. Skin: Skin is warm and dry. She is not diaphoretic. Psychiatric: She has a normal mood and affect. Nursing note and vitals reviewed. Diagnostic Study Results Labs - Recent Results (from the past 12 hour(s)) CBC WITH AUTOMATED DIFF Collection Time: 01/03/19 11:26 PM  
Result Value Ref Range WBC 20.0 (H) 4.6 - 13.2 K/uL  
 RBC 4.83 4.20 - 5.30 M/uL  
 HGB 14.2 12.0 - 16.0 g/dL HCT 39.4 35.0 - 45.0 % MCV 81.6 74.0 - 97.0 FL  
 MCH 29.4 24.0 - 34.0 PG  
 MCHC 36.0 31.0 - 37.0 g/dL  
 RDW 13.5 11.6 - 14.5 % PLATELET 388 719 - 602 K/uL MPV 11.2 9.2 - 11.8 FL  
 NEUTROPHILS 83 (H) 40 - 73 % LYMPHOCYTES 13 (L) 21 - 52 % MONOCYTES 4 3 - 10 % EOSINOPHILS 0 0 - 5 % BASOPHILS 0 0 - 2 %  
 ABS. NEUTROPHILS 16.6 (H) 1.8 - 8.0 K/UL  
 ABS. LYMPHOCYTES 2.5 0.9 - 3.6 K/UL  
 ABS. MONOCYTES 0.9 0.05 - 1.2 K/UL  
 ABS. EOSINOPHILS 0.0 0.0 - 0.4 K/UL  
 ABS. BASOPHILS 0.0 0.0 - 0.1 K/UL  
 DF AUTOMATED METABOLIC PANEL, COMPREHENSIVE Collection Time: 01/03/19 11:26 PM  
Result Value Ref Range Sodium 131 (L) 136 - 145 mmol/L Potassium 3.3 (L) 3.5 - 5.5 mmol/L Chloride 92 (L) 100 - 108 mmol/L  
 CO2 24 21 - 32 mmol/L Anion gap 15 3.0 - 18 mmol/L Glucose 172 (H) 74 - 99 mg/dL BUN 59 (H) 7.0 - 18 MG/DL Creatinine 3.72 (H) 0.6 - 1.3 MG/DL  
 BUN/Creatinine ratio 16 12 - 20 GFR est AA 15 (L) >60 ml/min/1.73m2 GFR est non-AA 13 (L) >60 ml/min/1.73m2 Calcium 9.6 8.5 - 10.1 MG/DL Bilirubin, total 1.9 (H) 0.2 - 1.0 MG/DL  
 ALT (SGPT) 18 13 - 56 U/L  
 AST (SGOT) 44 (H) 15 - 37 U/L Alk. phosphatase 173 (H) 45 - 117 U/L Protein, total 8.9 (H) 6.4 - 8.2 g/dL Albumin 4.3 3.4 - 5.0 g/dL Globulin 4.6 (H) 2.0 - 4.0 g/dL A-G Ratio 0.9 0.8 - 1.7 POC LACTIC ACID Collection Time: 01/03/19 11:26 PM  
Result Value Ref Range Lactic Acid (POC) 2.45 (HH) 0.40 - 2.00 mmol/L  
LIPASE Collection Time: 01/03/19 11:26 PM  
Result Value Ref Range Lipase 141 73 - 393 U/L  
EKG, 12 LEAD, INITIAL Collection Time: 01/04/19 12:34 AM  
Result Value Ref Range Ventricular Rate 107 BPM  
 Atrial Rate 107 BPM  
 P-R Interval 144 ms QRS Duration 74 ms Q-T Interval 386 ms QTC Calculation (Bezet) 515 ms Calculated P Axis 70 degrees Calculated R Axis 11 degrees Calculated T Axis 31 degrees Diagnosis Sinus tachycardia Anteroseptal infarct (cited on or before 09-MAY-2014) Abnormal ECG When compared with ECG of 16-NOV-2018 22:56, 
Questionable change in initial forces of Septal leads Nonspecific T wave abnormality no longer evident in Lateral leads URINALYSIS W/ RFLX MICROSCOPIC Collection Time: 01/04/19  1:41 AM  
Result Value Ref Range Color DARK YELLOW Appearance CLOUDY Specific gravity 1.016 1.005 - 1.030    
 pH (UA) 5.0 5.0 - 8.0 Protein TRACE (A) NEG mg/dL Glucose NEGATIVE  NEG mg/dL Ketone NEGATIVE  NEG mg/dL Bilirubin NEGATIVE  NEG Blood NEGATIVE  NEG Urobilinogen 1.0 0.2 - 1.0 EU/dL Nitrites NEGATIVE  NEG Leukocyte Esterase NEGATIVE  NEG    
URINE MICROSCOPIC ONLY Collection Time: 01/04/19  1:41 AM  
Result Value Ref Range Epithelial cells FEW 0 - 5 /lpf Bacteria 2+ (A) NEG /hpf Amorphous Crystals 2+ (A) NEG  
 Granular cast 2 to 4 NEG /lpf Radiologic Studies -  
CT ABD PELV WO CONT Final Result IMPRESSION[de-identified]  
  
1.  No acute findings within the abdomen or pelvis. 2. Previous hiatal hernia repair. No bowel obstruction. 3. Fibroid uterus. Thank you for this referral.  
  
XR CHEST PORT    (Results Pending) Medical Decision Making I am the first provider for this patient. I reviewed the vital signs, available nursing notes, past medical history, past surgical history, family history and social history. Vital Signs-Reviewed the patient's vital signs. Records Reviewed: Nursing Notes, Old Medical Records, Previous electrocardiograms, Previous Radiology Studies and Previous Laboratory Studies (Time of Review: 4:13 AM) ED Course: Progress Notes, Reevaluation, and Consults: 
 
Provider Notes (Medical Decision Making):  CHAYA 
 Number of Diagnoses or Management Options Abnormal LFTs:  
CRYSTAL (acute kidney injury) [facility]):  
Generalized abdominal pain: SIRS (systemic inflammatory response syndrome) [facility]):  
Diagnosis management comments: 49yo F with abd pain , N/V/D. Sepsis work up initiated ue to tachypnea and tachycardia. WBC count elevated today as well as lactic. Fluids and zosyn were given. New onset ARF today. CT abd negative, was not able to use contrast.  No source of infection, meets criteria for SIRS but not sepsis. Discussed with Dr. Lorie Burris, attending with [facility], who accepts patient for admit. Discussed concerns for ischemic bowel, hepatic obstruction, gastroparesis, obstruction. Consulted [facility] residents as well. Diagnosis Clinical Impression: 1. SIRS (systemic inflammatory response syndrome) (HCC) 2. Generalized abdominal pain 3. Abnormal LFTs 4. CRYSTAL (acute kidney injury) ([facility] [facility] Disposition: admit Follow-up Information None Medication List  
  
ASK your doctor about these medications   
cefdinir 300 mg capsule Commonly known as:  OMNICEF Take 1 Cap by mouth two (2) times a day. insulin NPH/insulin regular 100 unit/mL (70-30) injection Commonly known as:  NOVOLIN 70/30, HUMULIN 70/30 
40 Units by SubCUTAneous route two (2) times a day. metFORMIN  mg tablet Commonly known as:  GLUCOPHAGE XR 
TAKE 1 TABLET BY MOUTH DAILY WITH DINNER 
  
metroNIDAZOLE 500 mg tablet Commonly known as:  FLAGYL Take 1 Tab by mouth every eight (8) hours. pantoprazole 40 mg tablet Commonly known as:  PROTONIX Take 1 Tab by mouth Daily (before breakfast). PARoxetine 30 mg tablet Commonly known as:  PAXIL TAKE 1 TABLET BY MOUTH DAILY 
  
rosuvastatin 20 mg tablet Commonly known as:  CRESTOR Take 1 Tab by mouth nightly. sucralfate 100 mg/mL suspension Commonly known as:  William Footman Take 10 mL by mouth Before breakfast, lunch, dinner and at bedtime. _______________________________ Attestations: 
Scribe Attestation Juan Miguel JUNO Fletcher PA-C acting as a scribe for and in the presence of [employer], [employer] January 04, 2019 at 4:25 AM 
    
Provider Attestation:     
I personally performed the services described in the documentation, reviewed the documentation, as recorded by the scribe in my presence, and it accurately and completely records my words and actions. January 04, 2019 at 4:25 AM - JUANITO Riddle 
_______________________________

## 2019-01-04 NOTE — ED TRIAGE NOTES
Pt arrived via medic with N/V abdominal pain since thanksgiving, pt states she has been feeling dizzy when she stands and has not been able to eat well. Pt states she fell on Friday trying to go to the bathroom and hit the back of her head. Pt is tachycardic and tachypnic at this time, otherwise normal vital signs. No fevers

## 2019-01-04 NOTE — ED NOTES
Talked to Dr. Obdulia De Jesus about patient's need for a central line or ultra sound guided IV. Dr. Obdulia De Jesus refuses to place iv .

## 2019-01-04 NOTE — ED NOTES
Charge nurse and other skilled staff looked for possible EJ and other available access with no success

## 2019-01-04 NOTE — PROGRESS NOTES
7811. Gene Devi paged [facility] d/t pt c/o 9/10 abdominal pain. No orders for pain or nausea meds ordered. 1077. .per Dr Kristine Gunderson, orders are currently being placed for pt

## 2019-01-04 NOTE — PROGRESS NOTES
Verbal shift change report given to Andrzej Thomas RN (oncoming nurse) by Yuriy Chen RN (offgoing nurse). Report included the following information SBAR, Intake/Output, MAR and Recent Results.

## 2019-01-04 NOTE — ED NOTES
Talked to provider about no IV access after several attempts by several staff members . No new orders

## 2019-01-05 LAB
ALBUMIN SERPL-MCNC: 2.6 G/DL (ref 3.4–5)
ALBUMIN SERPL-MCNC: 3.2 G/DL (ref 3.4–5)
ALBUMIN/GLOB SERPL: 1 {RATIO} (ref 0.8–1.7)
ALBUMIN/GLOB SERPL: ABNORMAL {RATIO} (ref 0.8–1.7)
ALP SERPL-CCNC: 129 U/L (ref 45–117)
ALP SERPL-CCNC: ABNORMAL U/L (ref 45–117)
ALT SERPL-CCNC: 18 U/L (ref 13–56)
ALT SERPL-CCNC: ABNORMAL U/L (ref 13–56)
ANION GAP SERPL CALC-SCNC: 6 MMOL/L (ref 3–18)
ANION GAP SERPL CALC-SCNC: 9 MMOL/L (ref 3–18)
AST SERPL-CCNC: 43 U/L (ref 15–37)
AST SERPL-CCNC: ABNORMAL U/L (ref 15–37)
ATRIAL RATE: 95 BPM
BACTERIA SPEC CULT: NORMAL
BASOPHILS # BLD: 0 K/UL (ref 0–0.1)
BASOPHILS NFR BLD: 0 % (ref 0–2)
BILIRUB SERPL-MCNC: 1.9 MG/DL (ref 0.2–1)
BILIRUB SERPL-MCNC: ABNORMAL MG/DL (ref 0.2–1)
BUN SERPL-MCNC: 39 MG/DL (ref 7–18)
BUN SERPL-MCNC: 43 MG/DL (ref 7–18)
BUN/CREAT SERPL: 17 (ref 12–20)
BUN/CREAT SERPL: 19 (ref 12–20)
CALCIUM SERPL-MCNC: 6.6 MG/DL (ref 8.5–10.1)
CALCIUM SERPL-MCNC: 7.9 MG/DL (ref 8.5–10.1)
CALCULATED P AXIS, ECG09: 50 DEGREES
CALCULATED R AXIS, ECG10: -6 DEGREES
CALCULATED T AXIS, ECG11: 1 DEGREES
CHLORIDE SERPL-SCNC: 109 MMOL/L (ref 100–108)
CHLORIDE SERPL-SCNC: 112 MMOL/L (ref 100–108)
CK MB CFR SERPL CALC: 0.5 % (ref 0–4)
CK MB CFR SERPL CALC: 0.6 % (ref 0–4)
CK MB CFR SERPL CALC: 0.7 % (ref 0–4)
CK MB SERPL-MCNC: 4.8 NG/ML (ref 5–25)
CK MB SERPL-MCNC: 5.4 NG/ML (ref 5–25)
CK MB SERPL-MCNC: 8 NG/ML (ref 5–25)
CK SERPL-CCNC: 1145 U/L (ref 26–192)
CK SERPL-CCNC: 953 U/L (ref 26–192)
CK SERPL-CCNC: 955 U/L (ref 26–192)
CO2 SERPL-SCNC: 24 MMOL/L (ref 21–32)
CO2 SERPL-SCNC: 25 MMOL/L (ref 21–32)
CREAT SERPL-MCNC: 2.24 MG/DL (ref 0.6–1.3)
CREAT SERPL-MCNC: 2.31 MG/DL (ref 0.6–1.3)
DIAGNOSIS, 93000: NORMAL
DIFFERENTIAL METHOD BLD: ABNORMAL
EOSINOPHIL # BLD: 0.1 K/UL (ref 0–0.4)
EOSINOPHIL NFR BLD: 1 % (ref 0–5)
ERYTHROCYTE [DISTWIDTH] IN BLOOD BY AUTOMATED COUNT: 13.9 % (ref 11.6–14.5)
GLOBULIN SER CALC-MCNC: 3.2 G/DL (ref 2–4)
GLOBULIN SER CALC-MCNC: ABNORMAL G/DL (ref 2–4)
GLUCOSE BLD STRIP.AUTO-MCNC: 131 MG/DL (ref 70–110)
GLUCOSE BLD STRIP.AUTO-MCNC: 198 MG/DL (ref 70–110)
GLUCOSE BLD STRIP.AUTO-MCNC: 203 MG/DL (ref 70–110)
GLUCOSE BLD STRIP.AUTO-MCNC: 209 MG/DL (ref 70–110)
GLUCOSE BLD STRIP.AUTO-MCNC: 57 MG/DL (ref 70–110)
GLUCOSE BLD STRIP.AUTO-MCNC: 92 MG/DL (ref 70–110)
GLUCOSE SERPL-MCNC: 197 MG/DL (ref 74–99)
GLUCOSE SERPL-MCNC: ABNORMAL MG/DL (ref 74–99)
HCT VFR BLD AUTO: 26 % (ref 35–45)
HGB BLD-MCNC: 8.8 G/DL (ref 12–16)
LACTATE SERPL-SCNC: 0.8 MMOL/L (ref 0.4–2)
LACTATE SERPL-SCNC: 2.3 MMOL/L (ref 0.4–2)
LACTATE SERPL-SCNC: 4.2 MMOL/L (ref 0.4–2)
LYMPHOCYTES # BLD: 3.5 K/UL (ref 0.9–3.6)
LYMPHOCYTES NFR BLD: 35 % (ref 21–52)
MCH RBC QN AUTO: 28.8 PG (ref 24–34)
MCHC RBC AUTO-ENTMCNC: 33.8 G/DL (ref 31–37)
MCV RBC AUTO: 85 FL (ref 74–97)
MONOCYTES # BLD: 0.6 K/UL (ref 0.05–1.2)
MONOCYTES NFR BLD: 6 % (ref 3–10)
NEUTS SEG # BLD: 5.9 K/UL (ref 1.8–8)
NEUTS SEG NFR BLD: 58 % (ref 40–73)
P-R INTERVAL, ECG05: 158 MS
PLATELET # BLD AUTO: 174 K/UL (ref 135–420)
PMV BLD AUTO: 10.9 FL (ref 9.2–11.8)
POTASSIUM SERPL-SCNC: 3 MMOL/L (ref 3.5–5.5)
POTASSIUM SERPL-SCNC: 3.5 MMOL/L (ref 3.5–5.5)
PROT SERPL-MCNC: 6.4 G/DL (ref 6.4–8.2)
PROT SERPL-MCNC: ABNORMAL G/DL (ref 6.4–8.2)
Q-T INTERVAL, ECG07: 350 MS
QRS DURATION, ECG06: 64 MS
QTC CALCULATION (BEZET), ECG08: 439 MS
RBC # BLD AUTO: 3.06 M/UL (ref 4.2–5.3)
SERVICE CMNT-IMP: NORMAL
SODIUM SERPL-SCNC: 142 MMOL/L (ref 136–145)
SODIUM SERPL-SCNC: 143 MMOL/L (ref 136–145)
TROPONIN I SERPL-MCNC: 0.02 NG/ML (ref 0–0.04)
TROPONIN I SERPL-MCNC: 0.02 NG/ML (ref 0–0.04)
TROPONIN I SERPL-MCNC: 0.06 NG/ML (ref 0–0.04)
VENTRICULAR RATE, ECG03: 95 BPM
WBC # BLD AUTO: 10.2 K/UL (ref 4.6–13.2)

## 2019-01-05 PROCEDURE — 77030037878 HC DRSG MEPILEX >48IN BORD MOLN -B

## 2019-01-05 PROCEDURE — 74011250636 HC RX REV CODE- 250/636: Performed by: FAMILY MEDICINE

## 2019-01-05 PROCEDURE — 83605 ASSAY OF LACTIC ACID: CPT

## 2019-01-05 PROCEDURE — 85025 COMPLETE CBC W/AUTO DIFF WBC: CPT

## 2019-01-05 PROCEDURE — 82962 GLUCOSE BLOOD TEST: CPT

## 2019-01-05 PROCEDURE — C9113 INJ PANTOPRAZOLE SODIUM, VIA: HCPCS | Performed by: STUDENT IN AN ORGANIZED HEALTH CARE EDUCATION/TRAINING PROGRAM

## 2019-01-05 PROCEDURE — 36415 COLL VENOUS BLD VENIPUNCTURE: CPT

## 2019-01-05 PROCEDURE — 74011250636 HC RX REV CODE- 250/636: Performed by: STUDENT IN AN ORGANIZED HEALTH CARE EDUCATION/TRAINING PROGRAM

## 2019-01-05 PROCEDURE — 74011636637 HC RX REV CODE- 636/637: Performed by: STUDENT IN AN ORGANIZED HEALTH CARE EDUCATION/TRAINING PROGRAM

## 2019-01-05 PROCEDURE — 74011250637 HC RX REV CODE- 250/637: Performed by: FAMILY MEDICINE

## 2019-01-05 PROCEDURE — 65660000000 HC RM CCU STEPDOWN

## 2019-01-05 PROCEDURE — 74011000258 HC RX REV CODE- 258: Performed by: STUDENT IN AN ORGANIZED HEALTH CARE EDUCATION/TRAINING PROGRAM

## 2019-01-05 PROCEDURE — 77030011943

## 2019-01-05 PROCEDURE — 74011000258 HC RX REV CODE- 258: Performed by: FAMILY MEDICINE

## 2019-01-05 PROCEDURE — 80053 COMPREHEN METABOLIC PANEL: CPT

## 2019-01-05 PROCEDURE — 74011000258 HC RX REV CODE- 258

## 2019-01-05 PROCEDURE — 93005 ELECTROCARDIOGRAM TRACING: CPT

## 2019-01-05 PROCEDURE — 82550 ASSAY OF CK (CPK): CPT

## 2019-01-05 PROCEDURE — 51798 US URINE CAPACITY MEASURE: CPT

## 2019-01-05 RX ORDER — SODIUM CHLORIDE 900 MG/100ML
INJECTION INTRAVENOUS
Status: COMPLETED
Start: 2019-01-05 | End: 2019-01-05

## 2019-01-05 RX ORDER — METOCLOPRAMIDE 5 MG/1
5 TABLET ORAL
Status: DISCONTINUED | OUTPATIENT
Start: 2019-01-05 | End: 2019-01-10 | Stop reason: HOSPADM

## 2019-01-05 RX ORDER — SODIUM CHLORIDE 9 MG/ML
125 INJECTION, SOLUTION INTRAVENOUS CONTINUOUS
Status: DISCONTINUED | OUTPATIENT
Start: 2019-01-05 | End: 2019-01-06

## 2019-01-05 RX ORDER — INSULIN LISPRO 100 [IU]/ML
INJECTION, SOLUTION INTRAVENOUS; SUBCUTANEOUS
Status: DISCONTINUED | OUTPATIENT
Start: 2019-01-05 | End: 2019-01-10 | Stop reason: HOSPADM

## 2019-01-05 RX ADMIN — SODIUM CHLORIDE, SODIUM LACTATE, POTASSIUM CHLORIDE, AND CALCIUM CHLORIDE 1000 ML: 600; 310; 30; 20 INJECTION, SOLUTION INTRAVENOUS at 12:29

## 2019-01-05 RX ADMIN — SODIUM CHLORIDE 50 ML: 900 INJECTION INTRAVENOUS at 13:05

## 2019-01-05 RX ADMIN — INSULIN LISPRO 6 UNITS: 100 INJECTION, SOLUTION INTRAVENOUS; SUBCUTANEOUS at 17:38

## 2019-01-05 RX ADMIN — INSULIN LISPRO 3 UNITS: 100 INJECTION, SOLUTION INTRAVENOUS; SUBCUTANEOUS at 05:52

## 2019-01-05 RX ADMIN — METOCLOPRAMIDE HYDROCHLORIDE 5 MG: 5 TABLET ORAL at 17:38

## 2019-01-05 RX ADMIN — INSULIN GLARGINE 15 UNITS: 100 INJECTION, SOLUTION SUBCUTANEOUS at 10:53

## 2019-01-05 RX ADMIN — HEPARIN SODIUM 5000 UNITS: 5000 INJECTION INTRAVENOUS; SUBCUTANEOUS at 05:53

## 2019-01-05 RX ADMIN — PANTOPRAZOLE SODIUM 40 MG: 40 INJECTION, POWDER, FOR SOLUTION INTRAVENOUS at 10:54

## 2019-01-05 RX ADMIN — PROMETHAZINE HYDROCHLORIDE 12.5 MG: 25 INJECTION, SOLUTION INTRAMUSCULAR; INTRAVENOUS at 13:05

## 2019-01-05 RX ADMIN — HEPARIN SODIUM 5000 UNITS: 5000 INJECTION INTRAVENOUS; SUBCUTANEOUS at 23:56

## 2019-01-05 RX ADMIN — SODIUM CHLORIDE 125 ML/HR: 900 INJECTION, SOLUTION INTRAVENOUS at 17:42

## 2019-01-05 RX ADMIN — PIPERACILLIN SODIUM,TAZOBACTAM SODIUM 2.25 G: 2; .25 INJECTION, POWDER, FOR SOLUTION INTRAVENOUS at 00:44

## 2019-01-05 RX ADMIN — INSULIN LISPRO 6 UNITS: 100 INJECTION, SOLUTION INTRAVENOUS; SUBCUTANEOUS at 12:37

## 2019-01-05 RX ADMIN — PIPERACILLIN SODIUM,TAZOBACTAM SODIUM 2.25 G: 2; .25 INJECTION, POWDER, FOR SOLUTION INTRAVENOUS at 12:32

## 2019-01-05 RX ADMIN — HEPARIN SODIUM 5000 UNITS: 5000 INJECTION INTRAVENOUS; SUBCUTANEOUS at 14:37

## 2019-01-05 RX ADMIN — PIPERACILLIN SODIUM,TAZOBACTAM SODIUM 2.25 G: 2; .25 INJECTION, POWDER, FOR SOLUTION INTRAVENOUS at 20:50

## 2019-01-05 RX ADMIN — PIPERACILLIN SODIUM,TAZOBACTAM SODIUM 2.25 G: 2; .25 INJECTION, POWDER, FOR SOLUTION INTRAVENOUS at 06:04

## 2019-01-05 NOTE — PROGRESS NOTES
Intern Progress Note [address] Patient: Perry Gallo MRN: [id number]  CSN: [id number] YOB: 1965  Age: 52 y.o. Sex: female DOA: 1/3/2019 LOS:  LOS: 1 day   PCP: Campos Julian MD  
             
Subjective:  
 
Acute events: No acute events overnight. Patient still reporting abdominal pain and nausea. Patient upset and crying stating \"I just want to feel better\". I don't want this to be my new normal\". Brief ROS: Negative for chest pain, shortness of breath, fevers, chills Objective:  
  
Patient Vitals for the past 24 hrs: 
 Temp Pulse Resp BP SpO2  
01/05/19 0050 97.8 °F (36.6 °C) 81 18 106/67 100 % 01/04/19 2017 97.7 °F (36.5 °C) 83 17 102/65 99 % 01/04/19 1658 97.2 °F (36.2 °C) 84 16 90/70   
01/04/19 1550 97.4 °F (36.3 °C) 90 16 (!) 86/54   
01/04/19 1413    (!) 87/63   
01/04/19 1214 97.5 °F (36.4 °C) (!) 104 20 (!) 87/63 99 % 01/04/19 0902 97.7 °F (36.5 °C) 96 20 (!) 87/59 100 % 01/04/19 0640 97.2 °F (36.2 °C) 99 20 (!) 89/58 100 % 01/04/19 0545  93 16 91/66 96 % 01/04/19 0500 98.3 °F (36.8 °C) 98 18 105/50 100 % 01/04/19 0400  95 16 93/77   
01/04/19 0356  94 19  100 % 01/04/19 0354  91 24    
01/04/19 0352  93 20    
01/04/19 0350  93 19    
01/04/19 0348  93 21  97 % 01/04/19 0330  96 20 98/82   
01/04/19 0315  96 27 92/77   
01/04/19 0300  96 29 (!) 89/51   
01/04/19 0245  94 13 93/76 100 % 01/04/19 0230  93 19 110/54   
01/04/19 0215  95 (!) 31 114/73   
01/04/19 0200  93 17 100/76   
01/04/19 0145  98 25 (!) 112/96  Physical Exam:  
General: alert, patient crying Cardiovascular: RRR w/o MRGs Respiratory: CTAB no rales, rhonchi, wheezes Abdomen: Soft, +BS, diffuse tenderness to palpation, no rebound tenderness or guarding, Non-Distended Extremities: no peripheral edema, no tenderness to palpation Neuro: Cranial nerves grossly intact, grossly moving upper and lower extremities Skin: Negative for lesions, ulcers, rashes Lab/Data Reviewed: All lab results for the last 24 hours reviewed. CBC w/Diff Recent Labs 01/04/19 
1630 01/03/19 2326 WBC 12.0 20.0*  
RBC 3.53* 4.83  
HGB 10.2* 14.2 HCT 29.6* 39.4  250 GRANS 74* 83* LYMPH 21 13* EOS 1 0 Chemistry Recent Labs 01/05/19 
0043 01/04/19 
1630  01/03/19 
2326 GLUCPOC 131*  --    < >  --   
GLU  --  111*  --  172* NA  --  139  --  131* K  --  3.1*  --  3.3*  
CL  --  104  --  92* CO2  --  26  --  24 BUN  --  59*  --  59* CREA  --  3.54*  --  3.72* CA  --  7.7*  --  9.6 MG  --  2.0  --   --   
AGAP  --  9  --  15  
BUCR  --  17  --  16  
AP  --  127*  --  173* TP  --  6.7  --  8.9* ALB  --  3.3*  --  4.3 GLOB  --  3.4  --  4.6* AGRAT  --  1.0  --  0.9  
 < > = values in this interval not displayed. Microbiology Recent Labs 01/04/19 
0024 01/03/19 
2324 CULT NO GROWTH AFTER 5 HOURS NO GROWTH AFTER 7 HOURS Recent Labs 01/04/19 
0024 01/03/19 
2324 CULT NO GROWTH AFTER 5 HOURS NO GROWTH AFTER 7 HOURS  
  
I/O Intake/Output Summary (Last 24 hours) at 1/5/2019 0137 Last data filed at 1/5/2019 0003 Gross per 24 hour Intake 1100 ml Output 225 ml Net 875 ml Scheduled Medications Reviewed: 
Current Facility-Administered Medications Medication Dose Route Frequency  pantoprazole (PROTONIX) injection 40 mg  40 mg IntraVENous DAILY  insulin glargine (LANTUS) injection 15 Units  15 Units SubCUTAneous DAILY  insulin lispro (HUMALOG) injection   SubCUTAneous Q6H  
 piperacillin-tazobactam (ZOSYN) 2.25 g in 0.9% sodium chloride (MBP/ADV) 50 mL MBP  2.25 g IntraVENous Q6H  
 heparin (porcine) injection 5,000 Units  5,000 Units SubCUTAneous Q8H  
 dextrose 5% and 0.9% NaCl infusion  125 mL/hr IntraVENous CONTINUOUS Assessment/Plan  
 
52 y.o. female with PMH Type 2 DM with neuropathy, retinopathy, GIST resected in 2014. HTN, now admitted with concern for sepsis with nasuea, vomiting, abdominal pain.  
  
Sepsis with presumed GI etiology Improving. Still reporting abdominal pain, received PRN morphine once yesterday. WBC improved to 12. Patient has remained afebrile. Currently HD stable. Possible source of infection includes gastritis. Patient has history of gastritis with several recent episodes of admission for similar presentation. Patient has history of GIST resected in 2014 Last episode found to have gastritis and esophagitis which was treated with PPI and carafate. Lactic acid 2.45. Lipase wnl. Initial cardiac panel neg trop, CK-MB 13.7, CK 1237 > trop >0.06 CK>1,145 CK-MB 8.0. Patient not complaining of chest pain. - will advance diet to clear liquids - D5 NS at 125 cc/hour 
- continue renally dosed zosyn 2.25 mg q6 hours  
- reglan for nasuea 5 mg IV TID as needed - trend cardiac panel  
- daily CBC, CMP  
- can start back on carafate when taking PO  
- will d/c morphine 
- fall precautions  
- aspiration precautions  
- strict I/O  
- protonix 40 mg IV daily  
- consider surgery/GI consult  
- if pain not improved consider ct with contrast, mri or US of mesentery  
   
Possible syncope/presyncope with fall and head trauma Multiple falls at home. Etiology unclear may be due to peripheral neuropathy, cardiac etiology, visual impairment. Now with area of dried blood on posterior scalp, headaches, and neck pain. CT head no acute intracranial process. CT C spine negative for acute sequelae of truama. UDS negative. Vit B 12, folate wnl 
- neurochecks q4 hours   
  
Hyponatremia 131 on admission. Likely in setting of nausea and vomiting. Improved to 143. - Fluids as above  
- daily BMP 
  
Hypokalemia 3.3 on admission. Repeat to 3.5.  
- daily BMP  
  
CRYSTAL Cr 3.72 > 3.54.>2.31 Baseline appears to be 1.5-2.0  
- fluids as above  
- avoid nephrotoxic medications  
- renally dose antibiotics   
DM with retinopathy and neuropathy  
- accu checks q6 hours while NPO, switch to ACHS when taking PO  
- Lantus 15 daily - correctional scale lispro q6 hours while NPO 
- adjust insulin based on requirements  
- hold home metformin 
- hold home novolog mix 70/30   
  
HTN  
BP have been low normal during admission  
- hold home lisinopril 20 mg daily while NPO  
  
Anxiety  
- hold home Paxil 40 mg daily while NPO   
  
  
Diet: Clear Liquids DVT Prophylaxis: [facility] Code Status: Full Code Point of Contact: Ajay Taylor: CHANDNI [phone]; Enoc Hrenandez (son) [phone] 
  
  
Disposition and anticipated LOS: 2 midnights Aurelio Deluca MD PGY-1 
1/5/2019, 1:37 AM

## 2019-01-05 NOTE — ROUTINE PROCESS
Bedside shift change report given to Chuy Alonzo RN (oncoming nurse) by Denilson Ann RN (offgoing nurse). Report included the following information SBAR, Kardex, Intake/Output, MAR, Recent Results and Quality Measures.

## 2019-01-05 NOTE — PROGRESS NOTES
Problem: Falls - Risk of 
Goal: *Absence of Falls Document Christinporsche Homa Fall Risk and appropriate interventions in the flowsheet. Outcome: Progressing Towards Goal 
Fall Risk Interventions: 
Mobility Interventions: Bed/chair exit alarm, Communicate number of staff needed for ambulation/transfer, Patient to call before getting OOB, Utilize walker, cane, or other assistive device Medication Interventions: Bed/chair exit alarm, Patient to call before getting OOB, Teach patient to arise slowly Elimination Interventions: Bed/chair exit alarm, Call light in reach, Patient to call for help with toileting needs History of Falls Interventions: Bed/chair exit alarm, Door open when patient unattended, Investigate reason for fall Problem: Pressure Injury - Risk of 
Goal: *Prevention of pressure injury Document Vasile Scale and appropriate interventions in the flowsheet. Outcome: Progressing Towards Goal 
Pressure Injury Interventions: Activity Interventions: Increase time out of bed, Pressure redistribution bed/mattress(bed type) Mobility Interventions: HOB 30 degrees or less, Pressure redistribution bed/mattress (bed type) Nutrition Interventions: Document food/fluid/supplement intake

## 2019-01-05 NOTE — MED STUDENT NOTES
*ATTENTION:  This note has been created by a medical student for educational purposes only. Please do not refer to the content of this note for clinical decision-making, billing, or other purposes. Please see attending physicians note to obtain clinical information on this patient. * Intern Progress Note [address] Patient: Tara Dotson MRN: [id number]  CSN: [id number] YOB: 1965  Age: 52 y.o. Sex: female DOA: 1/3/2019 LOS:  LOS: 1 day   PCP: Santa Fleischer, MD  
 
Tara Dotson is a 52 y.o. 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 Subjective:  
 
Acute events: No acute events overnight. Prior to entering room for interview, nurse mentioned patient became tachycardic to 150s when she tried to get up to use the restroom and tele showed A fib with RVR. Review of tele after interview showed normal sinus rhythm with HR of 95. Patient complaining of mild nausea this morning, has had no emesis or BM since being admitted. Continues to endorse abdominal pain that she rates as 5/10. Denies need for pain medication but wants something to help with her nausea. Patient continues to endorse weakness similar to during initial presentation. Brief ROS: 
Constitutional: Negative for fever, chills and diaphoresis. HENT: Negative for hearing loss and sore throat. Eyes: Negative for blurred vision and double vision. Respiratory: Negative for cough and hemoptysis. Cardiovascular: Negative for chest pain and palpitations. Gastrointestinal: Positive for nausea and abdominal pain. , negative for vomiting Genitourinary: Negative for dysuria and hematuria. Musculoskeletal: Positive for weakness. Skin: Negative for itching and rash. Neurological: Negative for and headaches. Objective:  
  
Patient Vitals for the past 24 hrs: 
 Temp Pulse Resp BP SpO2  
01/05/19 0430 97.6 °F (36.4 °C) 79 18 110/69 100 % 01/05/19 0050 97.8 °F (36.6 °C) 81 18 106/67 100 % 01/04/19 2017 97.7 °F (36.5 °C) 83 17 102/65 99 % 01/04/19 1658 97.2 °F (36.2 °C) 84 16 90/70   
01/04/19 1550 97.4 °F (36.3 °C) 90 16 (!) 86/54   
01/04/19 1413    (!) 87/63   
01/04/19 1214 97.5 °F (36.4 °C) (!) 104 20 (!) 87/63 99 % Physical Exam: 
General: Awake, alert, orient, in no acute distress. HEENT: dried blood still present on posterior aspect of scalp Cardiovascular: Regular rate and rhythm, HR in the 90s at time of interview. Respiratory: CTAB w/o rales, rhonchi, wheezes, no increased work of breathing Abdomen: Soft, +BS, Non-Distended, tender to palpation Extremities: No edema in lower extremities bilaterally, 2+ radial pulses intact bilaterally Neuro: Cranial nerves grossly intact, grossly moving upper and lower extremities Skin: Lesions/scabs present on upper and lower extremities. Lab/Data Reviewed: 
 
Recent Results (from the past 24 hour(s)) GLUCOSE, POC Collection Time: 01/04/19 12:50 PM  
Result Value Ref Range Glucose (POC) 121 (H) 70 - 110 mg/dL ECHO ADULT COMPLETE Collection Time: 01/04/19  2:35 PM  
Result Value Ref Range LA Volume 39.05 22 - 52 mL  
 LV E' Lateral Velocity 7.90 cm/s LV E' Septal Velocity 7.78 cm/s Tapse 2.22 (A) 1.5 - 2.0 cm Ao Root D 2.60 cm LA Vol Index 19.19 16 - 28 ml/m2 PASP 35.0 mmHg VITAMIN B12 & FOLATE Collection Time: 01/04/19  4:30 PM  
Result Value Ref Range Vitamin B12 905 211 - 911 pg/mL Folate >20.0 (H) 3.10 - 17.50 ng/mL CBC WITH AUTOMATED DIFF Collection Time: 01/04/19  4:30 PM  
Result Value Ref Range WBC 12.0 4.6 - 13.2 K/uL  
 RBC 3.53 (L) 4.20 - 5.30 M/uL HGB 10.2 (L) 12.0 - 16.0 g/dL HCT 29.6 (L) 35.0 - 45.0 % MCV 83.9 74.0 - 97.0 FL  
 MCH 28.9 24.0 - 34.0 PG  
 MCHC 34.5 31.0 - 37.0 g/dL  
 RDW 13.9 11.6 - 14.5 % PLATELET 312 785 - 935 K/uL MPV 11.0 9.2 - 11.8 FL  
 NEUTROPHILS 74 (H) 40 - 73 % LYMPHOCYTES 21 21 - 52 % MONOCYTES 4 3 - 10 % EOSINOPHILS 1 0 - 5 % BASOPHILS 0 0 - 2 %  
 ABS. NEUTROPHILS 8.9 (H) 1.8 - 8.0 K/UL  
 ABS. LYMPHOCYTES 2.6 0.9 - 3.6 K/UL  
 ABS. MONOCYTES 0.5 0.05 - 1.2 K/UL  
 ABS. EOSINOPHILS 0.1 0.0 - 0.4 K/UL  
 ABS. BASOPHILS 0.0 0.0 - 0.1 K/UL  
 DF AUTOMATED METABOLIC PANEL, COMPREHENSIVE Collection Time: 01/04/19  4:30 PM  
Result Value Ref Range Sodium 139 136 - 145 mmol/L Potassium 3.1 (L) 3.5 - 5.5 mmol/L Chloride 104 100 - 108 mmol/L  
 CO2 26 21 - 32 mmol/L Anion gap 9 3.0 - 18 mmol/L Glucose 111 (H) 74 - 99 mg/dL BUN 59 (H) 7.0 - 18 MG/DL Creatinine 3.54 (H) 0.6 - 1.3 MG/DL  
 BUN/Creatinine ratio 17 12 - 20 GFR est AA 16 (L) >60 ml/min/1.73m2 GFR est non-AA 13 (L) >60 ml/min/1.73m2 Calcium 7.7 (L) 8.5 - 10.1 MG/DL Bilirubin, total 1.9 (H) 0.2 - 1.0 MG/DL  
 ALT (SGPT) 17 13 - 56 U/L  
 AST (SGOT) 44 (H) 15 - 37 U/L Alk. phosphatase 127 (H) 45 - 117 U/L Protein, total 6.7 6.4 - 8.2 g/dL Albumin 3.3 (L) 3.4 - 5.0 g/dL Globulin 3.4 2.0 - 4.0 g/dL A-G Ratio 1.0 0.8 - 1.7 CARDIAC PANEL,(CK, CKMB & TROPONIN) Collection Time: 01/04/19  4:30 PM  
Result Value Ref Range CK 1,237 (H) 26 - 192 U/L  
 CK - MB 13.7 (H) <3.6 ng/ml CK-MB Index 1.1 0.0 - 4.0 % Troponin-I, QT <0.02 0.0 - 0.045 NG/ML  
MAGNESIUM Collection Time: 01/04/19  4:30 PM  
Result Value Ref Range Magnesium 2.0 1.6 - 2.6 mg/dL LACTIC ACID Collection Time: 01/04/19  5:46 PM  
Result Value Ref Range Lactic acid 1.0 0.4 - 2.0 MMOL/L  
GLUCOSE, POC Collection Time: 01/05/19 12:43 AM  
Result Value Ref Range Glucose (POC) 131 (H) 70 - 110 mg/dL GLUCOSE, POC Collection Time: 01/05/19  5:51 AM  
Result Value Ref Range Glucose (POC) 198 (H) 70 - 110 mg/dL METABOLIC PANEL, COMPREHENSIVE Collection Time: 01/05/19  6:00 AM  
Result Value Ref Range Sodium 142 136 - 145 mmol/L Potassium 3.0 (L) 3.5 - 5.5 mmol/L Chloride 112 (H) 100 - 108 mmol/L  
 CO2 24 21 - 32 mmol/L Anion gap 6 3.0 - 18 mmol/L Glucose QUESTIONABLE RESULTS REDRAW REQUESTED 74 - 99 mg/dL BUN 39 (H) 7.0 - 18 MG/DL Creatinine 2.24 (H) 0.6 - 1.3 MG/DL  
 BUN/Creatinine ratio 17 12 - 20 GFR est AA 28 (L) >60 ml/min/1.73m2 GFR est non-AA 23 (L) >60 ml/min/1.73m2 Calcium 6.6 (L) 8.5 - 10.1 MG/DL Bilirubin, total QUESTIONABLE RESULTS REDRAW REQUESTED 0.2 - 1.0 MG/DL  
 ALT (SGPT) QUESTIONABLE RESULTS REDRAW REQUESTED 13 - 56 U/L  
 AST (SGOT) QUESTIONABLE RESULTS REDRAW REQUESTED 15 - 37 U/L Alk. phosphatase QUESTIONABLE RESULTS REDRAW REQUESTED 45 - 117 U/L Protein, total QUESTIONABLE RESULTS REDRAW REQUESTED 6.4 - 8.2 g/dL Albumin 2.6 (L) 3.4 - 5.0 g/dL Globulin Cannot be calculated 2.0 - 4.0 g/dL A-G Ratio Cannot be calculated 0.8 - 1.7 LACTIC ACID Collection Time: 01/05/19  7:10 AM  
Result Value Ref Range Lactic acid 0.8 0.4 - 2.0 MMOL/L  
METABOLIC PANEL, COMPREHENSIVE Collection Time: 01/05/19  7:40 AM  
Result Value Ref Range Sodium 143 136 - 145 mmol/L Potassium 3.5 3.5 - 5.5 mmol/L Chloride 109 (H) 100 - 108 mmol/L  
 CO2 25 21 - 32 mmol/L Anion gap 9 3.0 - 18 mmol/L Glucose 197 (H) 74 - 99 mg/dL BUN 43 (H) 7.0 - 18 MG/DL Creatinine 2.31 (H) 0.6 - 1.3 MG/DL  
 BUN/Creatinine ratio 19 12 - 20 GFR est AA 27 (L) >60 ml/min/1.73m2 GFR est non-AA 22 (L) >60 ml/min/1.73m2 Calcium 7.9 (L) 8.5 - 10.1 MG/DL Bilirubin, total 1.9 (H) 0.2 - 1.0 MG/DL  
 ALT (SGPT) 18 13 - 56 U/L  
 AST (SGOT) 43 (H) 15 - 37 U/L Alk. phosphatase 129 (H) 45 - 117 U/L Protein, total 6.4 6.4 - 8.2 g/dL Albumin 3.2 (L) 3.4 - 5.0 g/dL Globulin 3.2 2.0 - 4.0 g/dL A-G Ratio 1.0 0.8 - 1.7 CARDIAC PANEL,(CK, CKMB & TROPONIN) Collection Time: 01/05/19  7:40 AM  
Result Value Ref Range CK 1,145 (H) 26 - 192 U/L  
 CK - MB 8.0 (H) <3.6 ng/ml CK-MB Index 0.7 0.0 - 4.0 % Troponin-I, QT 0.06 (H) 0.0 - 0.045 NG/ML Scheduled Medications Reviewed: 
Current Facility-Administered Medications Medication Dose Route Frequency  pantoprazole (PROTONIX) injection 40 mg  40 mg IntraVENous DAILY  insulin glargine (LANTUS) injection 15 Units  15 Units SubCUTAneous DAILY  insulin lispro (HUMALOG) injection   SubCUTAneous Q6H  
 piperacillin-tazobactam (ZOSYN) 2.25 g in 0.9% sodium chloride (MBP/ADV) 50 mL MBP  2.25 g IntraVENous Q6H  
 heparin (porcine) injection 5,000 Units  5,000 Units SubCUTAneous Q8H  
 dextrose 5% and 0.9% NaCl infusion  125 mL/hr IntraVENous CONTINUOUS Imaging, EKG/Telemetry: 
Echo showed EF of 64-18%, mild LV diastolic dysfunction, mild TV regurg. Assessment/Plan Donald Lerma is a 52 y.o. female with PMH significant for G6II complicated by neuropathy and retinopathy, GIST s/p resection in 2014, HTN, anxiety, hyperlipidemia and gastritis/esophagitis who presents with abdominal pain, nausea and vomiting that has been ocurring since November, as well as significant weakness and multiple falls in the past few weeks. 
  
Sepsis 
- Originally SIRS 3/4 but no focus of infection - WBC now at 12, down from 20 
- Started on zosyn 2.25g q6h, continue d/c CRYSTAL Likely due to decreased PO intake as well as significant emesis. - BUN/Cr at ED was 59/3.72 
- BUN/Cr at 43/2.31 today - Baseline BUN/Cr appears to be approximately 10/1 
- Received 1L NS bolus x2 in ED, 682 NS at 1/4 0700 and 500 mL NS at 0900 - Advance diet to [facility]D 
- Due to hyponatremia, started on D5-NS at 125 ml/hr on 1/4 at 20:00, continue until diet improves Hypotension - Received 1L NS bolus x2 in ED, 682 NS at 1/4 0700 and 500 mL NS at 0900 
- Now resolved Falls Broadly, differential includes diabetic neuropathy, decreased vision, possible drug/alcohol usage, decreased proprioception 
- Head CT negative 
- B12 and folate within normal limits 
- UDS negative - Increased likelihood of neuropathy as cause - Nerve EMG studies 
  
Abdominal pain, nausea and vomiting Differential includes gastritis, diabetic gastroparesis, bowel obstruction though less likely since she does endorse bowel movements and the CT does not show obstruction, viral gastroenteritis though less likely due to her having these symptoms for approximately 2 months, possible recurrence of GIST though less likely due to negative findings on CT abdomen/pelvis. - Patient has prolonged QTc, zofran was d/c and started on reglan IV 10 mg - D/c morphine PRN 
- Patient says she is able to tolerate abdominal pain without need for pain meds 
- Continue to monitor abdominal pain with advanced diet to [facility] 
  
Hyponatremia 
- D5-NS at 125 ml/hr, continue until diet improves - Advance diet to [facility] 
  
Hypokalemia - Repletion protocol, 10 mEq IV over 1 hr x4, recheck 2 hours after infusion is complete was done on 1/4 - K at 3.5 now, continue to monitor 
  
Diabetes - Glucose at 197 on last reading - Increase lantus to 20 units as diet will be advanced to [facility] 
  
Diet: NPO 
DVT Prophylaxis: Subq heparin Code Status: Full Adrienne Villeda, MS3 
KHURRAM PFM 
01/05/19 
9:33 AM

## 2019-01-05 NOTE — CONSULTS
[email or web address] 
[phone] GASTROENTEROLOGY CONSULT Impression: 1. Nausea/vomiting - s/p EGD on 11/20/18 - ulcerations in esophagus, friable mucosa in stomach. Gastric bx - H.pylori negative gastritis. Esoph bx - acute esophagitis, neg for candida 2. Abd pain 3. Poorly controlled diabetes 4. CKD 5. GERD with esophagitis - see last EGD note Plan: 1. Likely has gastroparesis given poorly controlled diabetes and h/o diabetic neuropathy and other complications of diabetes. 2. Recommend small, frequent meals throughout the day. 3. Nutritionist/dietitian consult for diabetic gastroparesis (would continue this outpatient). 4. Recommend strict blood glucose control and f/u with Endocrinologist as outpatient. 5. Increase PPI to BID - 30 minutes before breakfast and dinner. 6. Add Carafate liquid TID-AC 7. Caution with Reglan regarding tardive dyskinesia (TD) as possible adverse effect. Had extensive discussion with patient. TD is a serious movement disorder that is often irreversible with no known treatment; the risk of developing tardive dyskinesia increases with duration of treatment and total cumulative dose. Advised to discontinue metoclopramide if develop signs/symptoms of tardive dyskinesia. In some patients, symptoms may lessen or resolve after metoclopramide is stopped. Avoid treatment with metoclopramide for longer than 12 weeks because of the increased risk of developing tardive dyskinesia with longer-term use. If she must continue Reglan as outpatient, would recommend limiting use to 8-10 weeks and then take drug holidays (ie, stop for 2 weeks and re-assess whether to continue or not). 8. Can consider gastric emptying study - must do 4-hour study but will have to be off of Reglan and narcotics 24-48 hrs prior.  
 
 
Chief Complaint: N/V 
 
 
HPI: 
Ramandeep Prajapati is a 52 y.o. female who I am being asked to see in consultation for an opinion regarding nausea, vomiting and abd pain. Pt has type 2 diabetes mellitus with retinopathy/neuropathy, blind left eye, CKD, GERD, hyperlipidemia, and hypertension - was admitted on 2019 with report of nausea, vomiting, abdominal pain, weakness, and falls at home. CT A/P on admission was negative for acute findings. Lipase normal. WBC elevated to 20 on admission and now normal. 
 
 
 
PMH:  
Past Medical History:  
Diagnosis Date  Blind left eye  Cellulitis of great toe of right foot 10/19/2017  Diabetes ([address]  Diabetic retinopathy ([address]  Gall stones  GERD (gastroesophageal reflux disease)  Hammertoe  Hiatal hernia  History of mammogram 10/03/2017 No evidence of malignancy  Hypertension  Mass of abdomen  Neuropathy due to type 2 diabetes mellitus ([address]  Osteomyelitis of toe of right foot ([address] 10/19/2017  Pancreatitis PSH:  
Past Surgical History:  
Procedure Laterality Date  HX AMPUTATION Left 2017  
 left 2nd toe  HX CHOLECYSTECTOMY  10/15/2014  HX GI Benign GI Stromal Tumor excision  HX HEENT Sx for detached retina  HX MYOMECTOMY x5 removed  HX OTHER SURGICAL    
 upper endoscopy Social HX:  
Social History Socioeconomic History  Marital status: SINGLE Spouse name: Not on file  Number of children: Not on file  Years of education: Not on file  Highest education level: Not on file Social Needs  Financial resource strain: Not on file  Food insecurity - worry: Not on file  Food insecurity - inability: Not on file  Transportation needs - medical: Not on file  Transportation needs - non-medical: Not on file Occupational History  Not on file Tobacco Use  Smoking status: Former Smoker Packs/day: 0.20 Years: 8.00 Pack years: 1.60 Types: Cigarettes Last attempt to quit: 12/3/1995 Years since quittin.1  Smokeless tobacco: Never Used Substance and Sexual Activity  Alcohol use: No  
  Comment: Drinks 3-4xs year generally wine  Drug use: No  
 Sexual activity: Not Currently Other Topics Concern   Service No  
 Blood Transfusions No  
 Caffeine Concern No  
 Occupational Exposure No  
 Hobby Hazards No  
 Sleep Concern No  
 Stress Concern No  
 Weight Concern No  
 Special Diet Yes  Back Care No  
 Exercise No  
 Bike Helmet No  
 Seat Belt Yes  Self-Exams Yes Social History Narrative Lives with 16year old son  with ex   Does not have health insurance FHX:  
Family History Adopted: Yes  
Problem Relation Age of Onset  Heart Failure Mother 76  
 Other Father 70  
     blood clot after surgery  Diabetes Paternal Aunt  Diabetes Paternal Uncle Allergy: Allergies Allergen Reactions  Levaquin [Levofloxacin] Hives  Promethazine Nausea and Vomiting \"the phenergan made me more nauseated\" Patient Active Problem List  
Diagnosis Code  Essential hypertension, benign I10  
 Diabetes mellitus type 2, controlled ([facility] E11.9  Vitamin D deficiency E55.9  
 HLD (hyperlipidemia) E78.5  Dehydration E86.0  Nausea and vomiting R11.2  
 SIRS (systemic inflammatory response syndrome) ([facility]) R65.10  GERD (gastroesophageal reflux disease) K21.9  Nausea with vomiting R11.2  
 Osteomyelitis ([facility]) M86.9  Sepsis ([facility] A41.9  Foot ulcer due to secondary DM ([address] E13.621, L97.509  Uncontrolled type 2 diabetes mellitus with chronic kidney disease ([facility]) E11.22, E11.65  Diabetic retinopathy ([facility] E11.319  
 Anxiety F41.9  Leukocytosis D72.829  
 Renal insufficiency N28.9  Diabetes ([facility] E11.9  Hypertension I10  
 Mass of abdomen R19.00  Blind left eye H54.40  Type 2 diabetes mellitus with retinopathy, with long-term current use of insulin ([facility]) E11.319, Z79.4  CRYSTAL (acute kidney injury) ([facility] N17.9  Abnormal LFTs R94.5  Abdominal pain R10.9 Home Medications:  
 
Medications Prior to Admission Medication Sig  
 sucralfate (CARAFATE) 100 mg/mL suspension Take 10 mL by mouth Before breakfast, lunch, dinner and at bedtime.  pantoprazole (PROTONIX) 40 mg tablet Take 1 Tab by mouth Daily (before breakfast).  insulin NPH/insulin regular (NOVOLIN 70/30, HUMULIN 70/30) 100 unit/mL (70-30) injection 40 Units by SubCUTAneous route two (2) times a day.  metFORMIN ER (GLUCOPHAGE XR) 500 mg tablet TAKE 1 TABLET BY MOUTH DAILY WITH DINNER  
 PARoxetine (PAXIL) 40 mg tablet Take 40 mg by mouth daily.  metroNIDAZOLE (FLAGYL) 500 mg tablet Take 1 Tab by mouth every eight (8) hours.  cefdinir (OMNICEF) 300 mg capsule Take 1 Cap by mouth two (2) times a day.  rosuvastatin (CRESTOR) 20 mg tablet Take 1 Tab by mouth nightly. Review of Systems:  
 
Constitutional: No fevers, chills, weight loss, fatigue. Skin: No rashes, pruritis, jaundice, ulcerations, erythema. HENT: No headaches, nosebleeds, sinus pressure, rhinorrhea, sore throat. Eyes: No visual changes, blurred vision, eye pain, photophobia, jaundice. Cardiovascular: No chest pain, heart palpitations. Respiratory: No cough, SOB, wheezing, chest discomfort, orthopnea. Gastrointestinal:   
Genitourinary: No dysuria, bleeding, discharge, pyuria. Musculoskeletal: No weakness, arthralgias, wasting. Endo: No sweats. Heme: No bruising, easy bleeding. Allergies: As noted. Neurological: Cranial nerves intact. Alert and oriented. Gait not assessed. Psychiatric:  No anxiety, depression, hallucinations. Visit Vitals /85 (BP 1 Location: Left arm, BP Patient Position: At rest) Pulse 98 Temp 98.2 °F (36.8 °C) Resp 18 Ht 5' 8\" (1.727 m) Wt 89.8 kg (198 lb) LMP 10/06/2015 (Exact Date) SpO2 99% Breastfeeding? No  
BMI 30.11 kg/m² Physical Assessment: General: alert, cooperative, no acute distress, appears stated age. HEENT: normocephalic, blind in left eye, moist mucous membranes, EOMs intact, no neck masses noted. Respiratory: lungs clear to auscultation bilaterally. Cardiovascular: regular rate and rhythm, no murmurs, rubs or gallops. Abdomen: normal bowel sounds, soft, non-tender to palpation. Extremities: no lower extremity edema, no cyanosis or clubbing. Neuro: alert and oriented x 3; non-focal exam. 
Skin: no rashes. Psych: normal mood and affect. Basic Metabolic Profile Recent Labs 01/05/19 
0740  01/04/19 
1630    < > 139  
K 3.5   < > 3.1*  
*   < > 104 CO2 25   < > 26 BUN 43*   < > 59* *   < > 111* CA 7.9*   < > 7.7* MG  --   --  2.0  
 < > = values in this interval not displayed. CBC w/Diff Recent Labs 01/04/19 
1630 WBC 12.0  
RBC 3.53* HGB 10.2* HCT 29.6* MCV 83.9 MCH 28.9 MCHC 34.5  
RDW 13.9  Recent Labs 01/04/19 
1630 GRANS 74* LYMPH 21 EOS 1 Hepatic Function Recent Labs 01/05/19 
0740  01/03/19 
2326 ALB 3.2*   < > 4.3 TP 6.4   < > 8.9* TBILI 1.9*   < > 1.9*  
SGOT 43*   < > 44* *   < > 173* LPSE  --   --  141  
 < > = values in this interval not displayed. Coags No results for input(s): PTP, INR, APTT in the last 72 hours. No lab exists for component: INREXT Adair Power MD 
[facility] 
[email or web address] Phone: [phone] Pager: [phone]

## 2019-01-06 LAB
ALBUMIN SERPL-MCNC: 3 G/DL (ref 3.4–5)
ALBUMIN/GLOB SERPL: 1 {RATIO} (ref 0.8–1.7)
ALP SERPL-CCNC: 108 U/L (ref 45–117)
ALT SERPL-CCNC: 16 U/L (ref 13–56)
ANION GAP SERPL CALC-SCNC: 6 MMOL/L (ref 3–18)
AST SERPL-CCNC: 32 U/L (ref 15–37)
BASOPHILS # BLD: 0 K/UL (ref 0–0.1)
BASOPHILS NFR BLD: 0 % (ref 0–2)
BILIRUB SERPL-MCNC: 1.5 MG/DL (ref 0.2–1)
BUN SERPL-MCNC: 17 MG/DL (ref 7–18)
BUN/CREAT SERPL: 13 (ref 12–20)
CALCIUM SERPL-MCNC: 7.6 MG/DL (ref 8.5–10.1)
CHLORIDE SERPL-SCNC: 109 MMOL/L (ref 100–108)
CO2 SERPL-SCNC: 26 MMOL/L (ref 21–32)
CREAT SERPL-MCNC: 1.32 MG/DL (ref 0.6–1.3)
DIFFERENTIAL METHOD BLD: ABNORMAL
EOSINOPHIL # BLD: 0.1 K/UL (ref 0–0.4)
EOSINOPHIL NFR BLD: 1 % (ref 0–5)
ERYTHROCYTE [DISTWIDTH] IN BLOOD BY AUTOMATED COUNT: 13.6 % (ref 11.6–14.5)
GLOBULIN SER CALC-MCNC: 3 G/DL (ref 2–4)
GLUCOSE BLD STRIP.AUTO-MCNC: 110 MG/DL (ref 70–110)
GLUCOSE BLD STRIP.AUTO-MCNC: 131 MG/DL (ref 70–110)
GLUCOSE BLD STRIP.AUTO-MCNC: 195 MG/DL (ref 70–110)
GLUCOSE BLD STRIP.AUTO-MCNC: 244 MG/DL (ref 70–110)
GLUCOSE SERPL-MCNC: 145 MG/DL (ref 74–99)
HCT VFR BLD AUTO: 25.4 % (ref 35–45)
HGB BLD-MCNC: 8.5 G/DL (ref 12–16)
LYMPHOCYTES # BLD: 3.6 K/UL (ref 0.9–3.6)
LYMPHOCYTES NFR BLD: 42 % (ref 21–52)
MCH RBC QN AUTO: 28.4 PG (ref 24–34)
MCHC RBC AUTO-ENTMCNC: 33.5 G/DL (ref 31–37)
MCV RBC AUTO: 84.9 FL (ref 74–97)
MONOCYTES # BLD: 0.4 K/UL (ref 0.05–1.2)
MONOCYTES NFR BLD: 5 % (ref 3–10)
NEUTS SEG # BLD: 4.5 K/UL (ref 1.8–8)
NEUTS SEG NFR BLD: 52 % (ref 40–73)
PLATELET # BLD AUTO: 187 K/UL (ref 135–420)
PMV BLD AUTO: 10.7 FL (ref 9.2–11.8)
POTASSIUM SERPL-SCNC: 3.2 MMOL/L (ref 3.5–5.5)
PROT SERPL-MCNC: 6 G/DL (ref 6.4–8.2)
RBC # BLD AUTO: 2.99 M/UL (ref 4.2–5.3)
SODIUM SERPL-SCNC: 141 MMOL/L (ref 136–145)
WBC # BLD AUTO: 8.6 K/UL (ref 4.6–13.2)

## 2019-01-06 PROCEDURE — 94760 N-INVAS EAR/PLS OXIMETRY 1: CPT

## 2019-01-06 PROCEDURE — 85025 COMPLETE CBC W/AUTO DIFF WBC: CPT

## 2019-01-06 PROCEDURE — 74011000258 HC RX REV CODE- 258: Performed by: STUDENT IN AN ORGANIZED HEALTH CARE EDUCATION/TRAINING PROGRAM

## 2019-01-06 PROCEDURE — 82962 GLUCOSE BLOOD TEST: CPT

## 2019-01-06 PROCEDURE — 74011250636 HC RX REV CODE- 250/636: Performed by: STUDENT IN AN ORGANIZED HEALTH CARE EDUCATION/TRAINING PROGRAM

## 2019-01-06 PROCEDURE — 65660000000 HC RM CCU STEPDOWN

## 2019-01-06 PROCEDURE — 74011636637 HC RX REV CODE- 636/637: Performed by: FAMILY MEDICINE

## 2019-01-06 PROCEDURE — 74011636637 HC RX REV CODE- 636/637: Performed by: STUDENT IN AN ORGANIZED HEALTH CARE EDUCATION/TRAINING PROGRAM

## 2019-01-06 PROCEDURE — 74011250637 HC RX REV CODE- 250/637: Performed by: FAMILY MEDICINE

## 2019-01-06 PROCEDURE — 74011250636 HC RX REV CODE- 250/636: Performed by: FAMILY MEDICINE

## 2019-01-06 PROCEDURE — 87040 BLOOD CULTURE FOR BACTERIA: CPT

## 2019-01-06 PROCEDURE — 36415 COLL VENOUS BLD VENIPUNCTURE: CPT

## 2019-01-06 PROCEDURE — 80053 COMPREHEN METABOLIC PANEL: CPT

## 2019-01-06 PROCEDURE — C9113 INJ PANTOPRAZOLE SODIUM, VIA: HCPCS | Performed by: STUDENT IN AN ORGANIZED HEALTH CARE EDUCATION/TRAINING PROGRAM

## 2019-01-06 PROCEDURE — 77010033678 HC OXYGEN DAILY

## 2019-01-06 PROCEDURE — 74011250637 HC RX REV CODE- 250/637: Performed by: STUDENT IN AN ORGANIZED HEALTH CARE EDUCATION/TRAINING PROGRAM

## 2019-01-06 PROCEDURE — 74011000258 HC RX REV CODE- 258: Performed by: FAMILY MEDICINE

## 2019-01-06 RX ORDER — SUCRALFATE 1 G/1
1 TABLET ORAL
Status: DISCONTINUED | OUTPATIENT
Start: 2019-01-07 | End: 2019-01-10 | Stop reason: HOSPADM

## 2019-01-06 RX ORDER — PAROXETINE HYDROCHLORIDE 20 MG/1
40 TABLET, FILM COATED ORAL
Status: DISCONTINUED | OUTPATIENT
Start: 2019-01-06 | End: 2019-01-10 | Stop reason: HOSPADM

## 2019-01-06 RX ORDER — SUCRALFATE 1 G/1
1 TABLET ORAL
Status: DISCONTINUED | OUTPATIENT
Start: 2019-01-06 | End: 2019-01-06 | Stop reason: SDUPTHER

## 2019-01-06 RX ADMIN — PIPERACILLIN SODIUM,TAZOBACTAM SODIUM 2.25 G: 2; .25 INJECTION, POWDER, FOR SOLUTION INTRAVENOUS at 12:52

## 2019-01-06 RX ADMIN — PIPERACILLIN SODIUM,TAZOBACTAM SODIUM 3.38 G: 3; .375 INJECTION, POWDER, FOR SOLUTION INTRAVENOUS at 17:47

## 2019-01-06 RX ADMIN — PAROXETINE HYDROCHLORIDE 40 MG: 20 TABLET, FILM COATED ORAL at 22:19

## 2019-01-06 RX ADMIN — INSULIN GLARGINE 15 UNITS: 100 INJECTION, SOLUTION SUBCUTANEOUS at 09:47

## 2019-01-06 RX ADMIN — HEPARIN SODIUM 5000 UNITS: 5000 INJECTION INTRAVENOUS; SUBCUTANEOUS at 06:15

## 2019-01-06 RX ADMIN — METOCLOPRAMIDE HYDROCHLORIDE 5 MG: 5 TABLET ORAL at 09:47

## 2019-01-06 RX ADMIN — METOCLOPRAMIDE HYDROCHLORIDE 5 MG: 5 TABLET ORAL at 12:52

## 2019-01-06 RX ADMIN — PIPERACILLIN SODIUM,TAZOBACTAM SODIUM 2.25 G: 2; .25 INJECTION, POWDER, FOR SOLUTION INTRAVENOUS at 00:00

## 2019-01-06 RX ADMIN — METOCLOPRAMIDE HYDROCHLORIDE 5 MG: 5 TABLET ORAL at 17:47

## 2019-01-06 RX ADMIN — PANTOPRAZOLE SODIUM 40 MG: 40 INJECTION, POWDER, FOR SOLUTION INTRAVENOUS at 09:47

## 2019-01-06 RX ADMIN — INSULIN LISPRO 6 UNITS: 100 INJECTION, SOLUTION INTRAVENOUS; SUBCUTANEOUS at 22:17

## 2019-01-06 RX ADMIN — PIPERACILLIN SODIUM,TAZOBACTAM SODIUM 2.25 G: 2; .25 INJECTION, POWDER, FOR SOLUTION INTRAVENOUS at 06:14

## 2019-01-06 RX ADMIN — INSULIN LISPRO 3 UNITS: 100 INJECTION, SOLUTION INTRAVENOUS; SUBCUTANEOUS at 15:37

## 2019-01-06 RX ADMIN — HEPARIN SODIUM 5000 UNITS: 5000 INJECTION INTRAVENOUS; SUBCUTANEOUS at 14:00

## 2019-01-06 NOTE — PROGRESS NOTES
NUTRITION Nutrition Consult: Diet Education RECOMMENDATIONS / PLAN:  
 
- Recommend follow up with outpatient dietitian for diabetic diet compliance and managing gastroparesis. Outpatient dietitian information provided to patient for scheduling. NUTRITION INTERVENTIONS:  
 
[x] Nutrition counseling/education 
[x] Collaboration and referral of nutrition care: referral to outpatient dietitian ASSESSMENT:  
 
Diet: DIET DIABETIC CONSISTENT CARB Regular Po intake:  [x]  Good    []  Fair    []  Poor Weight History:  [x] No unintentional weight loss PTA    [] Had weight change:   
Nutrition Risk: None identified at this time Provided Education On: Diabetic diet, gastroparesis Person(s) Instructed:  [x]  Patient    [] Family    [] Other: 
Education Methods Used:  [x] Explanation    [x] Handout    [] Other:  
Patients Readiness:  [] Greer Mendes    [x] Acceptance    [] Non-Acceptance    [] Refused Learning Limitations:   [x] None identified    [] Identified: 
Level of Comprehension:  [x] Good    [x] Needs Reinforcement Additional Concerns/Comments:  Pt receptive to education. Per recall of foods consumed, pt is choosing high sugar, high fat items and skipping meals  Pt receptive to avoiding those items and eating more frequent small meals. Discussed limiting fiber during gastroparesis. Reviewed preparing healthier versions of inappropriate items at home. Pt acknowledged the need to change, but was concerned about the difficulty. Reviewed ways to meal plan and prep at home. Recommended follow up outpatient dietitian counseling, pt initially declined, then became more receptive once further discussing reasoning for recommendation. Follow-up education indicated:   [] No    [x] Yes Discharge needs: [] None identified    [x] Identified and addressed Moon Marino RD Pager: [phone]

## 2019-01-06 NOTE — ROUTINE PROCESS
0840 notified by monitor tech that pt's heart rate was Afib in 150s, pt up in rest room, assisted pt back to bed and will continue to monitor

## 2019-01-06 NOTE — ROUTINE PROCESS
3017 assumed care of pt after bedside verbal report was given by off going nurse, pt awake in bed, no acute distress noted pt denies c/o pain, D5 NS infusing at 125 ml/hr, will monitor, call bell at pt's bedside

## 2019-01-06 NOTE — PROGRESS NOTES
Intern Progress Note [address] Patient: Miar Cuevas MRN: [id number]  CSN: [id number] YOB: 1965  Age: 52 y.o. Sex: female DOA: 1/3/2019 LOS:  LOS: 2 days   PCP: Morgan Mirza MD  
             
Subjective:  
 
Acute events: No acute events overnight. Patient still reporting abdominal pain and nausea, but feeling better than yesterday. Brief ROS: No fevers or chills, Negative for chest pain, shortness of breath. She is having some persistent abdominal pain with nausea. No diarrhea. Objective:  
  
Patient Vitals for the past 24 hrs: 
 Temp Pulse Resp BP SpO2  
01/06/19 1144 (!) 100.9 °F (38.3 °C) 94 18 107/75 100 % 01/06/19 0840 100 °F (37.8 °C) 96 20 135/90 99 % 01/06/19 0400 99.5 °F (37.5 °C) 95 19 117/74 100 % 01/06/19 0000 97.1 °F (36.2 °C) 89 18 119/65 100 % 01/05/19 2038 98.6 °F (37 °C) 92 16 104/67 100 % 01/05/19 1619 99 °F (37.2 °C) 93 17 124/81 99 % Physical Exam:  
General: alert and oriented Cardiovascular: RRR w/o MRGs Respiratory: CTAB no rales, rhonchi, wheezes Abdomen: Soft, +BS, diffuse tenderness to palpation, no rebound tenderness or guarding, Non-Distended Extremities: no peripheral edema, no tenderness to palpation Neuro: Cranial nerves grossly intact, grossly moving upper and lower extremities Skin: Negative for lesions, ulcers, rashes Lab/Data Reviewed: All lab results for the last 24 hours reviewed. CBC w/Diff Recent Labs 01/06/19 
0510 01/05/19 
1542 01/04/19 
1630 WBC 8.6 10.2 12.0  
RBC 2.99* 3.06* 3.53* HGB 8.5* 8.8* 10.2* HCT 25.4* 26.0* 29.6*  
 174 192 GRANS 52 58 74* LYMPH 42 35 21 EOS 1 1 1 Chemistry Recent Labs 01/06/19 
1145  01/06/19 
0510  01/05/19 
0740 01/05/19 
0600  01/04/19 
1630 GLUCPOC 131*   < >  --    < >  --   --    < >  --   
GLU  --   --  145*  --  197* QUESTIONABLE RESULTS REDRAW REQUESTED  --  111* NA  --   --  141  --  143 142  --  139 K  --   --  3.2*  --  3.5 3.0*  --  3.1*  
CL  --   --  109*  --  109* 112*  --  104 CO2  --   --  26  --  25 24  --  26 BUN  --   --  17  --  43* 39*  --  59* CREA  --   --  1.32*  --  2.31* 2.24*  --  3.54* CA  --   --  7.6*  --  7.9* 6.6*  --  7.7* MG  --   --   --   --   --   --   --  2.0 AGAP  --   --  6  --  9 6  --  9  
BUCR  --   --  13  --  19 17  --  17  
AP  --   --  108  --  129* QUESTIONABLE RESULTS REDRAW REQUESTED  --  127* TP  --   --  6.0*  --  6.4 QUESTIONABLE RESULTS REDRAW REQUESTED  --  6.7 ALB  --   --  3.0*  --  3.2* 2.6*  --  3.3*  
GLOB  --   --  3.0  --  3.2 Cannot be calculated  --  3.4 AGRAT  --   --  1.0  --  1.0 Cannot be calculated  --  1.0  
 < > = values in this interval not displayed. Microbiology Recent Labs 01/04/19 
0141 01/04/19 
0024 01/03/19 
2324 CULT NO GROWTH 1 DAY NO GROWTH 2 DAYS NO GROWTH 3 DAYS Recent Labs 01/04/19 
0141 01/04/19 
0024 01/03/19 
2324 CULT NO GROWTH 1 DAY NO GROWTH 2 DAYS NO GROWTH 3 DAYS  
  
I/O Intake/Output Summary (Last 24 hours) at 1/6/2019 1412 Last data filed at 1/6/2019 1101 Gross per 24 hour Intake 740 ml Output 4200 ml Net -3460 ml Scheduled Medications Reviewed: 
Current Facility-Administered Medications Medication Dose Route Frequency  metoclopramide HCl (REGLAN) tablet 5 mg  5 mg Oral TIDAC  
 0.9% sodium chloride infusion  125 mL/hr IntraVENous CONTINUOUS  
 insulin lispro (HUMALOG) injection   SubCUTAneous AC&HS  pantoprazole (PROTONIX) injection 40 mg  40 mg IntraVENous DAILY  insulin glargine (LANTUS) injection 15 Units  15 Units SubCUTAneous DAILY  piperacillin-tazobactam (ZOSYN) 2.25 g in 0.9% sodium chloride (MBP/ADV) 50 mL MBP  2.25 g IntraVENous Q6H  
 heparin (porcine) injection 5,000 Units  5,000 Units SubCUTAneous Q8H Assessment/Plan  
 
52 y.o. female with PMH Type 2 DM with neuropathy, retinopathy, GIST resected in 2014. HTN, now admitted with concern for sepsis with nasuea, vomiting, abdominal pain.  
  
Sepsis with presumed GI etiology Improving. Still reporting abdominal pain. WBC improved to 12. Patient has remained afebrile. Currently HD stable. - will advance diet to today - D5 NS at 125 cc/hour 
- continue renally dosed zosyn 2.25 mg q6 hours  
- reglan for nasuea 5 mg IV TID as needed--> monitor for tardive dyskinesia 
- daily CBC, CMP  
- can start back on carafate when taking PO   
- strict I/O  
- protonix 40 mg IV daily  
- consider surgery/GI consult  
- if pain not improved consider ct with contrast, mri or US of mesentery  
   
Possible syncope/presyncope with fall and head trauma Multiple falls at home. Etiology unclear may be due to peripheral neuropathy, cardiac etiology, visual impairment. Now with area of dried blood on posterior scalp, headaches, and neck pain. CT head no acute intracranial process. CT C spine negative for acute sequelae of truama. UDS negative. Vit B 12, folate wnl 
- neurochecks    
  
Hyponatremia 131 on admission. Likely in setting of nausea and vomiting. Now resolved. - Fluids as above  
- daily BMP 
  
Hypokalemia 3.3 on admission. 
- replete as indicated 
- daily BMP  
  
CRYSTAL Cr 3.72 > 3.54.>2.31 Baseline appears to be 1.5-2.0. Now resolved. - fluids as above  
- avoid nephrotoxic medications  
- renally dose antibiotics  
  
DM with retinopathy and neuropathy  
- switch to ACHS when taking PO  
- Lantus 15 daily - correctional scale lispro q6 hours while NPO 
- adjust insulin based on requirements  
- hold home metformin 
- hold home novolog mix 70/30   
  
HTN  
BP have been low normal during admission  
- hold home lisinopril 20 mg daily while NPO  
  
Anxiety  
- hold home Paxil 40 mg daily while NPO   
  
  
Diet: Diabetic diet DVT Prophylaxis: [facility] Code Status: Full Code Point of Contact: Yvrose Laws: ymypwe) [phone]; Forrest Ospina (son) [phone] 
  
  
Disposition and anticipated LOS: 2 midnights Jono Laird DO, PGY-1 
1/6/2019, 1:37 AM

## 2019-01-06 NOTE — CDMP QUERY
This patient has been diagnosed with Sepsis. Please document in the progress notes the Clinical Indicators and any associated Acute Organ Dysfunction that supports this diagnosis or state that the diagnosis has been ruled out. Current documentation:    CRYSTAL Cr 3.72 > 3.54.>2.31 Baseline appears to be 1.5-2.0 Sepsis  (BSV Approved definition) Documented Source of suspected or confirmed infection as manifested by 2 or more of the following, not easily explained by another co-existing condition: 
Temperature:  >38C (100.9F)   -OR-  <36C  (96.8F) Heart Rate:  > 90 bpm 
Respiratory Rate:  > 20 / min  -OR-  PaCO2 < 32 WBC:  > 12K  -OR- < 4K  -OR- Bands > 10 Explicitly link all related/associated Acute Organ Dysfunction to Sepsis:     
Encephalopathy Sepsis-induced Hypotension (or)  Septic Shock [SBP < 90 (or) MAP <65 (or) SBP drop > 40 points from baseline] Hypoxemia (or)  Acute Respiratory Failure [need for invasive or non-invasive ventilation OR- pO2 < 60 mmHg] Acute Kidney Injury (or) Acute Renal Failure OR- Oliguria [serum Creatinine > 2.0 OR- Urine Output < 0.5ml/kg/hr for 2 hours] Ileus (absent bowel sounds) Coagulopathy  DIC  Thrombocytopenia [Platelet Count < 398,238 OR- INR > 1.5 OR- aPTT >60 sec] Hyperbilirubinemia     [Bilirubin > 2 mg/dL] Hyperlactatemia   [Lactic Acid > 2.0] Critical-Illness Myopathy or Polyneuropathy Severe Sepsis = Sepsis with associated Acute Organ Dysfunction Thank you,   Richrd Dubin RN   CCDS  x [phone]

## 2019-01-06 NOTE — ROUTINE PROCESS
Bedside shift change report given to Chantal Dillon RN (oncoming nurse) by Mhaendra Morelos RN (offgoing nurse). Report included the following information SBAR, Kardex, Intake/Output, MAR, Recent Results and Quality Measures.

## 2019-01-06 NOTE — ROUTINE PROCESS
1950 Bedside and Verbal shift change report given to [address] [place] [address] (oncoming nurse) by Joleen RUSS (offgoing nurse). Report included the following information SBAR, Kardex and MAR.

## 2019-01-07 LAB
25(OH)D3 SERPL-MCNC: 12.5 NG/ML (ref 30–100)
ALBUMIN SERPL-MCNC: 3.2 G/DL (ref 3.4–5)
ALBUMIN/GLOB SERPL: 1 {RATIO} (ref 0.8–1.7)
ALP SERPL-CCNC: 109 U/L (ref 45–117)
ALT SERPL-CCNC: 16 U/L (ref 13–56)
ANION GAP SERPL CALC-SCNC: 9 MMOL/L (ref 3–18)
AST SERPL-CCNC: 27 U/L (ref 15–37)
BASOPHILS # BLD: 0 K/UL (ref 0–0.1)
BASOPHILS NFR BLD: 0 % (ref 0–2)
BILIRUB SERPL-MCNC: 1.6 MG/DL (ref 0.2–1)
BUN SERPL-MCNC: 11 MG/DL (ref 7–18)
BUN/CREAT SERPL: 8 (ref 12–20)
CALCIUM SERPL-MCNC: 7.6 MG/DL (ref 8.5–10.1)
CHLORIDE SERPL-SCNC: 106 MMOL/L (ref 100–108)
CO2 SERPL-SCNC: 23 MMOL/L (ref 21–32)
CREAT SERPL-MCNC: 1.33 MG/DL (ref 0.6–1.3)
DIFFERENTIAL METHOD BLD: ABNORMAL
EOSINOPHIL # BLD: 0.1 K/UL (ref 0–0.4)
EOSINOPHIL NFR BLD: 1 % (ref 0–5)
ERYTHROCYTE [DISTWIDTH] IN BLOOD BY AUTOMATED COUNT: 13.4 % (ref 11.6–14.5)
GLOBULIN SER CALC-MCNC: 3.3 G/DL (ref 2–4)
GLUCOSE BLD STRIP.AUTO-MCNC: 108 MG/DL (ref 70–110)
GLUCOSE BLD STRIP.AUTO-MCNC: 128 MG/DL (ref 70–110)
GLUCOSE BLD STRIP.AUTO-MCNC: 237 MG/DL (ref 70–110)
GLUCOSE BLD STRIP.AUTO-MCNC: 286 MG/DL (ref 70–110)
GLUCOSE BLD STRIP.AUTO-MCNC: 74 MG/DL (ref 70–110)
GLUCOSE SERPL-MCNC: 113 MG/DL (ref 74–99)
HCT VFR BLD AUTO: 25.7 % (ref 35–45)
HGB BLD-MCNC: 8.8 G/DL (ref 12–16)
LYMPHOCYTES # BLD: 3.4 K/UL (ref 0.9–3.6)
LYMPHOCYTES NFR BLD: 31 % (ref 21–52)
MCH RBC QN AUTO: 28.8 PG (ref 24–34)
MCHC RBC AUTO-ENTMCNC: 34.2 G/DL (ref 31–37)
MCV RBC AUTO: 84 FL (ref 74–97)
MONOCYTES # BLD: 0.7 K/UL (ref 0.05–1.2)
MONOCYTES NFR BLD: 7 % (ref 3–10)
NEUTS SEG # BLD: 6.8 K/UL (ref 1.8–8)
NEUTS SEG NFR BLD: 61 % (ref 40–73)
PLATELET # BLD AUTO: 187 K/UL (ref 135–420)
PMV BLD AUTO: 10.7 FL (ref 9.2–11.8)
POTASSIUM SERPL-SCNC: 3.3 MMOL/L (ref 3.5–5.5)
PROT SERPL-MCNC: 6.5 G/DL (ref 6.4–8.2)
RBC # BLD AUTO: 3.06 M/UL (ref 4.2–5.3)
SODIUM SERPL-SCNC: 138 MMOL/L (ref 136–145)
WBC # BLD AUTO: 10.9 K/UL (ref 4.6–13.2)

## 2019-01-07 PROCEDURE — 74011250637 HC RX REV CODE- 250/637: Performed by: FAMILY MEDICINE

## 2019-01-07 PROCEDURE — 74011250637 HC RX REV CODE- 250/637: Performed by: NURSE PRACTITIONER

## 2019-01-07 PROCEDURE — 74011250636 HC RX REV CODE- 250/636: Performed by: STUDENT IN AN ORGANIZED HEALTH CARE EDUCATION/TRAINING PROGRAM

## 2019-01-07 PROCEDURE — 82306 VITAMIN D 25 HYDROXY: CPT

## 2019-01-07 PROCEDURE — 85025 COMPLETE CBC W/AUTO DIFF WBC: CPT

## 2019-01-07 PROCEDURE — 74011250637 HC RX REV CODE- 250/637: Performed by: STUDENT IN AN ORGANIZED HEALTH CARE EDUCATION/TRAINING PROGRAM

## 2019-01-07 PROCEDURE — 74011636637 HC RX REV CODE- 636/637: Performed by: STUDENT IN AN ORGANIZED HEALTH CARE EDUCATION/TRAINING PROGRAM

## 2019-01-07 PROCEDURE — 65660000000 HC RM CCU STEPDOWN

## 2019-01-07 PROCEDURE — 82962 GLUCOSE BLOOD TEST: CPT

## 2019-01-07 PROCEDURE — C9113 INJ PANTOPRAZOLE SODIUM, VIA: HCPCS | Performed by: STUDENT IN AN ORGANIZED HEALTH CARE EDUCATION/TRAINING PROGRAM

## 2019-01-07 PROCEDURE — 80053 COMPREHEN METABOLIC PANEL: CPT

## 2019-01-07 PROCEDURE — 74011250636 HC RX REV CODE- 250/636: Performed by: FAMILY MEDICINE

## 2019-01-07 PROCEDURE — 74011000258 HC RX REV CODE- 258: Performed by: FAMILY MEDICINE

## 2019-01-07 PROCEDURE — 74011636637 HC RX REV CODE- 636/637: Performed by: FAMILY MEDICINE

## 2019-01-07 RX ORDER — PANTOPRAZOLE SODIUM 40 MG/1
40 TABLET, DELAYED RELEASE ORAL
Status: DISCONTINUED | OUTPATIENT
Start: 2019-01-08 | End: 2019-01-10 | Stop reason: HOSPADM

## 2019-01-07 RX ORDER — LISINOPRIL 20 MG/1
20 TABLET ORAL DAILY
Status: DISCONTINUED | OUTPATIENT
Start: 2019-01-07 | End: 2019-01-07

## 2019-01-07 RX ORDER — LORAZEPAM 0.5 MG/1
0.5 TABLET ORAL ONCE
Status: COMPLETED | OUTPATIENT
Start: 2019-01-07 | End: 2019-01-07

## 2019-01-07 RX ORDER — LORAZEPAM 0.5 MG/1
TABLET ORAL
Status: DISPENSED
Start: 2019-01-07 | End: 2019-01-07

## 2019-01-07 RX ORDER — INSULIN GLARGINE 100 [IU]/ML
18 INJECTION, SOLUTION SUBCUTANEOUS DAILY
Status: DISCONTINUED | OUTPATIENT
Start: 2019-01-08 | End: 2019-01-10 | Stop reason: HOSPADM

## 2019-01-07 RX ADMIN — INSULIN GLARGINE 15 UNITS: 100 INJECTION, SOLUTION SUBCUTANEOUS at 09:00

## 2019-01-07 RX ADMIN — PIPERACILLIN SODIUM,TAZOBACTAM SODIUM 3.38 G: 3; .375 INJECTION, POWDER, FOR SOLUTION INTRAVENOUS at 17:59

## 2019-01-07 RX ADMIN — PIPERACILLIN SODIUM,TAZOBACTAM SODIUM 3.38 G: 3; .375 INJECTION, POWDER, FOR SOLUTION INTRAVENOUS at 01:42

## 2019-01-07 RX ADMIN — PAROXETINE HYDROCHLORIDE 40 MG: 20 TABLET, FILM COATED ORAL at 21:30

## 2019-01-07 RX ADMIN — METOCLOPRAMIDE HYDROCHLORIDE 5 MG: 5 TABLET ORAL at 09:16

## 2019-01-07 RX ADMIN — SUCRALFATE 1 G: 1 TABLET ORAL at 09:16

## 2019-01-07 RX ADMIN — PANTOPRAZOLE SODIUM 40 MG: 40 INJECTION, POWDER, FOR SOLUTION INTRAVENOUS at 09:16

## 2019-01-07 RX ADMIN — LORAZEPAM 0.5 MG: 0.5 TABLET ORAL at 01:43

## 2019-01-07 RX ADMIN — LISINOPRIL 20 MG: 20 TABLET ORAL at 09:16

## 2019-01-07 RX ADMIN — SUCRALFATE 1 G: 1 TABLET ORAL at 17:59

## 2019-01-07 RX ADMIN — LOPERAMIDE HYDROCHLORIDE 2 MG: 1 SOLUTION ORAL at 12:25

## 2019-01-07 RX ADMIN — PIPERACILLIN SODIUM,TAZOBACTAM SODIUM 3.38 G: 3; .375 INJECTION, POWDER, FOR SOLUTION INTRAVENOUS at 12:25

## 2019-01-07 RX ADMIN — METOCLOPRAMIDE HYDROCHLORIDE 5 MG: 5 TABLET ORAL at 12:25

## 2019-01-07 RX ADMIN — PIPERACILLIN SODIUM,TAZOBACTAM SODIUM 3.38 G: 3; .375 INJECTION, POWDER, FOR SOLUTION INTRAVENOUS at 05:35

## 2019-01-07 RX ADMIN — INSULIN LISPRO 9 UNITS: 100 INJECTION, SOLUTION INTRAVENOUS; SUBCUTANEOUS at 12:24

## 2019-01-07 RX ADMIN — METOCLOPRAMIDE HYDROCHLORIDE 5 MG: 5 TABLET ORAL at 17:59

## 2019-01-07 NOTE — PROGRESS NOTES
Problem: Falls - Risk of 
Goal: *Absence of Falls Document Contreras Liz Fall Risk and appropriate interventions in the flowsheet. Outcome: Progressing Towards Goal 
Fall Risk Interventions: 
Mobility Interventions: Bed/chair exit alarm, Utilize walker, cane, or other assistive device Medication Interventions: Bed/chair exit alarm, Patient to call before getting OOB Elimination Interventions: Bed/chair exit alarm, Call light in reach, Patient to call for help with toileting needs History of Falls Interventions: Bed/chair exit alarm, Door open when patient unattended, Investigate reason for fall

## 2019-01-07 NOTE — PROGRESS NOTES
[address] Brief PM Progress Note Subjective/Objective Pt resting comfortably in bed. Has no complaints. Visit Vitals /70 (BP 1 Location: Left arm, BP Patient Position: At rest) Pulse 93 Temp 98 °F (36.7 °C) Resp 17 Ht 5' 8\" (1.727 m) Wt 95.4 kg (210 lb 5.1 oz) SpO2 100% Breastfeeding? No  
BMI 31.98 kg/m² Physical Exam 
Gen: NAD Cadiac RRR, no MRG Resp: CTA BL, no increased WOB Ext:  No swelling, no tenderness Skin: lesion on salp, tender, no erythema, not warm. Multiple bruises on LE in different stages. No ulcerations or new skin lesions. Assessment/Plan Continue treatment as per previous notes.  
 
Mendez Dorsey DO, PGY I 
[facility] 
01/06/19 
10:31 PM

## 2019-01-07 NOTE — PROGRESS NOTES
Intern Progress Note [address] Patient: Eleanor Daly MRN: [id number]  CSN: [id number] YOB: 1965  Age: 52 y.o. Sex: female DOA: 1/3/2019 LOS:  LOS: 3 days   PCP: Corry Hatfield MD  
             
Subjective:  
 
Acute events: Night resident called as patient was anxious overnight; given Paxil and Ativan. T max of 100.9 at 1144 but patient denies any fevers, chills, or new symptoms. Patient now comfortable and without complaints. Brief ROS: No fevers or chills, Negative for chest pain, shortness of breath. She is having some persistent abdominal pain with nausea. No diarrhea. Objective:  
  
Patient Vitals for the past 24 hrs: 
 Temp Pulse Resp BP SpO2  
01/07/19 0454 98.8 °F (37.1 °C) 96 19 128/80 100 % 01/07/19 0022 99.2 °F (37.3 °C) 98 18 133/82 100 % 01/06/19 2007 98 °F (36.7 °C) 93 17 108/70 100 % 01/06/19 1522 97.9 °F (36.6 °C) 87 18 96/58 100 % 01/06/19 1144 (!) 100.9 °F (38.3 °C) 94 18 107/75 100 % 01/06/19 0840 100 °F (37.8 °C) 96 20 135/90 99 % Physical Exam:  
General: alert and oriented Cardiovascular: RRR w/o MRGs Respiratory: CTAB no rales, rhonchi, wheezes Abdomen: Soft, +BS, diffuse tenderness to palpation, no rebound tenderness or guarding, Non-Distended Extremities: no peripheral edema, no tenderness to palpation Neuro: Cranial nerves grossly intact, grossly moving upper and lower extremities Skin: Negative for lesions, ulcers, rashes Lab/Data Reviewed: All lab results for the last 24 hours reviewed. CBC w/Diff Recent Labs 01/07/19 
0150 01/06/19 
0510 01/05/19 
1542 WBC 10.9 8.6 10.2 RBC 3.06* 2.99* 3.06* HGB 8.8* 8.5* 8.8* HCT 25.7* 25.4* 26.0*  
 187 174 GRANS 61 52 58 LYMPH 31 42 35 EOS 1 1 1 Chemistry Recent Labs 01/07/19 
4012 01/07/19 
0150  01/06/19 
0510  01/05/19 
0740  01/04/19 
1630 GLUCPOC 128*  --    < >  --    < >  --    < >  --   
 GLU  --  113*  --  145*  --  197*   < > 111* NA  --  138  --  141  --  143   < > 139 K  --  3.3*  --  3.2*  --  3.5   < > 3.1*  
CL  --  106  --  109*  --  109*   < > 104 CO2  --  23  --  26  --  25   < > 26 BUN  --  11  --  17  --  43*   < > 59* CREA  --  1.33*  --  1.32*  --  2.31*   < > 3.54* CA  --  7.6*  --  7.6*  --  7.9*   < > 7.7* MG  --   --   --   --   --   --   --  2.0 AGAP  --  9  --  6  --  9   < > 9  
BUCR  --  8*  --  13  --  19   < > 17 AP  --  109  --  108  --  129*   < > 127* TP  --  6.5  --  6.0*  --  6.4   < > 6.7 ALB  --  3.2*  --  3.0*  --  3.2*   < > 3.3*  
GLOB  --  3.3  --  3.0  --  3.2   < > 3.4 AGRAT  --  1.0  --  1.0  --  1.0   < > 1.0  
 < > = values in this interval not displayed. Microbiology No results for input(s): SDES, CULT in the last 72 hours. No results for input(s): CULT in the last 72 hours. I/O Intake/Output Summary (Last 24 hours) at 1/7/2019 8877 Last data filed at 1/6/2019 1101 Gross per 24 hour Intake 740 ml Output 700 ml Net 40 ml Scheduled Medications Reviewed: 
Current Facility-Administered Medications Medication Dose Route Frequency  LORazepam (ATIVAN) 0.5 mg tablet  piperacillin-tazobactam (ZOSYN) 3.375 g in 0.9% sodium chloride (MBP/ADV) 100 mL MBP  3.375 g IntraVENous Q6H  
 PARoxetine (PAXIL) tablet 40 mg  40 mg Oral QHS  sucralfate (CARAFATE) tablet 1 g  1 g Oral ACB&D  
 metoclopramide HCl (REGLAN) tablet 5 mg  5 mg Oral TIDAC  insulin lispro (HUMALOG) injection   SubCUTAneous AC&HS  pantoprazole (PROTONIX) injection 40 mg  40 mg IntraVENous DAILY  insulin glargine (LANTUS) injection 15 Units  15 Units SubCUTAneous DAILY Assessment/Plan  
 
52 y.o. female with PMH Type 2 DM with neuropathy, retinopathy, GIST resected in 2014.  HTN, now admitted with concern for sepsis with nasuea, vomiting, abdominal pain.  
  
 Sepsis with presumed GI etiology - Improving. Still reporting abdominal pain. WBC improved to 10.9. Patient has remained afebrile. Currently HD stable. - continue renally dosed zosyn 2.25 mg q6 hours - Continue reglan for nausea 5 mg IV TID as needed--> monitor for tardive dyskinesia, prolonged QT 
- daily CBC, CMP  
- Consider starting Carafate  
- strict I/O  
- protonix 40 mg IV daily  
- consider surgery/GI consult  
- if pain not improved consider ct with contrast, mri or US of mesentery  
   
Possible syncope/presyncope with fall and head trauma Multiple falls at home. Etiology unclear may be due to peripheral neuropathy, cardiac etiology, visual impairment. Now with area of dried blood on posterior scalp, headaches, and neck pain. CT head no acute intracranial process. CT C spine negative for acute sequelae of truama. UDS negative. Vit B 12, folate wnl 
- neurochecks    
  
Hyponatremia - 131 on admission. Likely in setting of nausea and vomiting. Now resolved. - Fluids as above  
- daily BMP 
  
Hypokalemia  -3.3 today 
- replete as indicated 
- daily BMP  
  
CRYSTAL - Cr 3.72 > 3.54.>2.31 Baseline appears to be 1.5-2.0. Now resolved. - avoid nephrotoxic medications  
- renally dose antibiotics  
  
DM with retinopathy and neuropathy - Glucose check ACHS; SSI  
- Lantus 15 daily  
- adjust insulin based on requirements  
- hold home metformin 
- hold home novolog mix 70/30   
  
HTN  - BP have been low normal during admission  
- Start home lisinopril 20 mg daily  
  
Anxiety  
- Start home Paxil 40 mg daily  
  
  
Diet: Diabetic diet DVT Prophylaxis: [facility] Code Status: Full Code Point of Contact: Naomy Diane: AUTQWO) [phone]; Mi Campos (son) [phone] 
  
  
Disposition and anticipated LOS: 2 midnights Shayne Del Castillo DO, PGY-1 
1/7/2019, 1:37 AM

## 2019-01-07 NOTE — DISCHARGE SUMMARY
Discharge Summary [address] Patient: Samara Engle Age: 52 y.o. Sex: female  : 1965 MRN: [id number] DOA: 1/3/2019 Discharge Date: 1/10/2019 Attending:No att. providers found PCP: Epi Bonilla MD     
 
================================================================ Reason for Admission:  
Sepsis Abdominal Pain Discharge Diagnoses:  
Sepsis with presumed GI etiology Hyponatremia Hypokalemia Hypomagnesium Acute Kidney Injury DM with retinopathy, neuropathy, and gastroparesis Important notes to PCP/ follow-up studies and evaluations  
-Patient may desire to see endocrinol Pending labs and studies: 
None Operative Procedures:  
None Discharge Medications:    
Discharge Medication List as of 2019  6:59 PM  
  
START taking these medications Details  
sucralfate (CARAFATE) 1 gram tablet Take 1 Tab by mouth Before breakfast and dinner., Print, Disp-60 Tab, R-2  
  
amoxicillin-clavulanate (AUGMENTIN) 500-125 mg per tablet Take 1 Tab by mouth two (2) times daily (with meals). , Print, Disp-12 Tab, R-0  
  
cholecalciferol (VITAMIN D3) 1,000 unit tablet Take 1 Tab by mouth daily. , Print, Disp-30 Tab, R-1  
  
metoclopramide HCl (REGLAN) 5 mg tablet Take 1 Tab by mouth Before breakfast, lunch, and dinner for 10 days. , Normal, Disp-30 Tab, R-0  
  
potassium chloride (K-DUR, KLOR-CON) 20 mEq tablet Take 1 Tab by mouth daily for 7 days. , Print, Disp-7 Tab, R-0  
  
magnesium 250 mg tab Take 1 Tab by mouth daily for 7 days. , Print, Disp-7 Tab, R-0  
  
  
CONTINUE these medications which have CHANGED Details  
pantoprazole (PROTONIX) 40 mg tablet Take 1 Tab by mouth Daily (before breakfast). , Print, Disp-30 Tab, R-2  
  
  
CONTINUE these medications which have NOT CHANGED  Details  
insulin NPH/insulin regular (NOVOLIN 70/30, HUMULIN 70/30) 100 unit/mL (70-30) injection 40 Units by SubCUTAneous route two (2) times a day., Print, Disp-10 mL, R-3  
  
metFORMIN ER (GLUCOPHAGE XR) 500 mg tablet TAKE 1 TABLET BY MOUTH DAILY WITH DINNER, Normal, Disp-30 Tab, R-0 PARoxetine (PAXIL) 40 mg tablet Take 40 mg by mouth daily. , Historical Med  
  
rosuvastatin (CRESTOR) 20 mg tablet Take 1 Tab by mouth nightly., Program, Disp-90 Tab, R-3 STOP taking these medications  
  
 sucralfate (CARAFATE) 100 mg/mL suspension Comments:  
Reason for Stopping: medication was changed to tablet form in hospital   
   
 metroNIDAZOLE (FLAGYL) 500 mg tablet Comments:  
Reason for Stopping: not a current medication   
   
 cefdinir (OMNICEF) 300 mg capsule Comments:  
Reason for Stopping: not a current medication Disposition: Home Consultants:   
Gastroenterology - Dr. Olu Waite MD  
 
[address] Course (including pertinent history and physical findings) At admission, patient stated she had been having nausea, vomiting, and abdominal pain for several weeks with inability to keep food down. Patient with past admissions for similar experiences in the fall of 2018. EGD November 2018 showed gastritis and esophagitis (treated with PPI and carafate). She has had multiple falls with the last being the day of her admission where she hit her head. In the ED, patient was tachycardic, tachypnic, saturating at 93% on room air. Lactic acid 2.45, WBC 20, Na 131, K 3.3, Cr 3.72, T bili 1.9, Alk phos 173. Lipase 141. Patient was started on Zosyn. Sepsis with presumed GI etiology Sepsis criteria met in ED based on findings of tachycardia, tachypnea, WBC 20. WBC improved over course of admission with final of 7.9. Ms. Judithann Aschoff had persistent abdominal pain throughout her stay which improved but did not fully resolve with treatment of carafate and Protonix. GI was consulted with advisement to continue PPI, carafate, and Reglan.  They will perform EGD in 2 months. No pathologic findings were found on abdominal CT, blood cultures were negative. She was transitioned from Zosyn to a 7 day course of Augmentin to finish upon discharge. Lactic Acid had trended down from 4.2 to 3.0. Hyponatremia, Hypokalemia, Hypomagnesium Ms. Paola Peraza was admitted with sodium of 131, potassium 3.3, magnesium of 2.0. Sodium levels returned to normal as Ms. Clay's diet was transitioned and she was able to intake more food. However, potassium and magnesium required repletion nearly every day of her stay. She was discharged with magnesium of 1.2 and potassium 3.5. Prescriptions for oral repletion was given. The root causes are uncertain but could be a sequelae of her diabetes and gastroparesis. Acute Kidney Injury Cr 3.72 at admission; baseline 1.5-2.0. Resolved with hydration. Antibiotics renally dosed. DM with retinopathy, neuropathy, gastroparesis Last recorded Hgb A1c 7.9 (Nov. 2018). Sliding scale insulin and Lantus 15 U (later increased to 18 U) initiated for glucose control. Patient was discharged on her home regimen of 40 U Novolin 70/30 and Metformin 500 mg. Gastroparesis likely cause to much of her stomach discomfort. Retinopathy may play some part in her frequent falls as she already has left sided blindness. Gastroenterology suggested patient may need to establish with an endocrinologist upon discharge. Summarized key findings and results (labs, imaging studies, ECHO, cardiac cath, endoscopies, etc): CT abd/pelvis (1/4/19): No acute findings within the abdomen or pelvis. Previous hiatal hernia repair. No bowel obstruction. Fibroid uterus. CT head (1/4/19): No pathologic calcifications, fluid collections or acute intracranial process. CT spine (1/4/19): No fracture or sublaxation; atraumatic IR fluoro picc line placement (1/4/19): dual lumen picc line in place (ordered due to difficult IV access) CBC w/Diff Lab Results Component Value Date/Time WBC 12.4 01/12/2019 11:20 AM  
 Hemoglobin, POC 13.6 11/20/2018 11:19 AM  
 HGB 10.9 (L) 01/12/2019 11:20 AM  
 Hematocrit, POC 40 11/20/2018 11:19 AM  
 HCT 32.2 (L) 01/12/2019 11:20 AM  
 PLATELET 334 14/93/9798 11:20 AM  
 MCV 85.9 01/12/2019 11:20 AM  
   
 
 
Chemistry Lab Results Component Value Date/Time Sodium 139 01/12/2019 11:20 AM  
 Potassium 3.6 01/12/2019 11:20 AM  
 Chloride 100 01/12/2019 11:20 AM  
 CO2 23 01/12/2019 11:20 AM  
 Anion gap 16 01/12/2019 11:20 AM  
 Glucose 307 (H) 01/12/2019 11:20 AM  
 BUN 16 01/12/2019 11:20 AM  
 Creatinine 1.22 01/12/2019 11:20 AM  
 BUN/Creatinine ratio 13 01/12/2019 11:20 AM  
 GFR est AA 55 (L) 01/12/2019 11:20 AM  
 GFR est non-AA 46 (L) 01/12/2019 11:20 AM  
 Calcium 9.9 01/12/2019 11:20 AM  
   
 
Functional status and cognitive function:   
Ambulates unassisted Status: alert, cooperative, no distress, appears stated age Diet:General Diet Code status and advanced care plan: Full Power Of [employer] Group of Contact: Ann-Marie Stanley: RENATA) [phone]; Kacey Jones (son) [phone] Patient Education:  Patient was educated on the following topics prior to discharge: taking their medications correctly; importance of keeping follow up appointments. Follow-up:  
Follow-up Information Follow up With Specialties Details Why Contact Info Jun Pierce MD Gastroenterology On 3/13/2019 @9:40AM * No co-pay for the visit* [address] [place] [address] [address] [place] [id number] Poonam Colón MD Family Practice On 1/14/2019 @2:30PM [address] [place] [address] 
[phone] 
  
  
 
================================================================ Nael Mackey 35 Family Medicine PGY-1 
01/13/19 5:16 PM

## 2019-01-07 NOTE — PROGRESS NOTES
Bedside and Verbal shift change report given to Christiano Moore RN (oncoming nurse) by Senia Gray RN (offgoing nurse). Report included the following information SBAR, Kardex, Intake/Output, MAR, Recent Results and Med Rec Status.

## 2019-01-07 NOTE — PROGRESS NOTES
[email or web address] 
[phone] Gastroenterology follow up-Progress note Impression: 1. Nausea/vomiting - s/p EGD on 11/20/18 - ulcerations in esophagus, friable mucosa in stomach. Gastric bx - H.pylori negative gastritis. Esoph bx - acute esophagitis, neg for candida 2. Abd pain 3. Poorly controlled diabetes 4. CKD 5. GERD with esophagitis - see last EGD note Plan: 1. Likely has gastroparesis given poorly controlled diabetes and h/o diabetic neuropathy and other complications of diabetes. 2. Recommend small, frequent meals throughout the day. 3. Nutritionist/dietitian consult for diabetic gastroparesis (would continue this outpatient). 4. Recommend strict blood glucose control and f/u with Endocrinologist as outpatient. 5. Continue PPI  BID - 30 minutes before breakfast and dinner. 6. Continue Carafate liquid TID-AC 7. Continue with Reglan caution (ie. Tardive dyskinesia). 8. Begin Imodium PRN for diarrhea. 9. F/U with GI upon discharge for EGD in 2 months.  
  
 
  
 
 
Chief Complaint: Nausea/vomiting Subjective:  Pt states she is feeling better today. She does have diarrhea since being admitted; no blood noted. Denies abdominal pain; has had some nausea but is tolerating small frequent meals. ROS: Denies any fevers, chills, rash. Eyes: conjunctiva normal, EOM normal  
Neck: ROM normal, supple and trachea normal  
Cardiovascular: heart normal, intact distal pulses, normal rate and regular rhythm Pulmonary/Chest Wall: breath sounds normal and effort normal  
Abdominal: appearance normal, bowel sounds normal and soft, non-acute, non-tender Patient Active Problem List  
Diagnosis Code  Essential hypertension, benign I10  
 Diabetes mellitus type 2, controlled ([facility] E11.9  Vitamin D deficiency E55.9  
 HLD (hyperlipidemia) E78.5  Dehydration E86.0  Nausea and vomiting R11.2  
 SIRS (systemic inflammatory response syndrome) (HCC) R65.10  GERD (gastroesophageal reflux disease) K21.9  Nausea with vomiting R11.2  
 Osteomyelitis ([facility]) M86.9  Sepsis ([facility] A41.9  Foot ulcer due to secondary DM ([facility] E13.621, L97.509  Uncontrolled type 2 diabetes mellitus with chronic kidney disease ([facility]) E11.22, E11.65  Diabetic retinopathy ([facility] E11.319  
 Anxiety F41.9  Leukocytosis D72.829  
 Renal insufficiency N28.9  Diabetes ([facility] E11.9  Hypertension I10  
 Mass of abdomen R19.00  Blind left eye H54.40  Type 2 diabetes mellitus with retinopathy, with long-term current use of insulin ([facility]) E11.319, Z79.4  CRYSTAL (acute kidney injury) ([facility] N17.9  Abnormal LFTs R94.5  Abdominal pain R10.9 Visit Vitals /73 (BP 1 Location: Left arm, BP Patient Position: At rest) Pulse 89 Temp 98.9 °F (37.2 °C) Resp 19 Ht 5' 8\" (1.727 m) Wt 95.4 kg (210 lb 5.1 oz) LMP 10/06/2015 (Exact Date) SpO2 100% Breastfeeding? No  
BMI 31.98 kg/m² No intake or output data in the 24 hours ending 01/07/19 1122 CBC w/Diff Lab Results Component Value Date/Time WBC 10.9 01/07/2019 01:50 AM  
 RBC 3.06 (L) 01/07/2019 01:50 AM  
 HGB 8.8 (L) 01/07/2019 01:50 AM  
 HCT 25.7 (L) 01/07/2019 01:50 AM  
 MCV 84.0 01/07/2019 01:50 AM  
 MCH 28.8 01/07/2019 01:50 AM  
 MCHC 34.2 01/07/2019 01:50 AM  
 RDW 13.4 01/07/2019 01:50 AM  
  01/07/2019 01:50 AM  
 Lab Results Component Value Date/Time GRANS 61 01/07/2019 01:50 AM  
 LYMPH 31 01/07/2019 01:50 AM  
 EOS 1 01/07/2019 01:50 AM  
 BANDS 2 04/29/2014 05:12 PM  
 BASOS 0 01/07/2019 01:50 AM  
  
Basic Metabolic Profile Recent Labs 01/07/19 
0150  01/04/19 
1630    < > 139  
K 3.3*   < > 3.1*  
   < > 104 CO2 23   < > 26 BUN 11   < > 59* CA 7.6*   < > 7.7* MG  --   --  2.0  
 < > = values in this interval not displayed. Hepatic Function Lab Results Component Value Date/Time  ALB 3.2 (L) 01/07/2019 01:50 AM  
 TP 6.5 01/07/2019 01:50 AM  
  01/07/2019 01:50 AM  
 Lab Results Component Value Date/Time SGOT 27 01/07/2019 01:50 AM  
  
 
 
Coags No results for input(s): PTP, INR, APTT in the last 72 hours. No lab exists for component: INREXT   
 
 
 
 
Toyin Mistry NP [facility]. [email or web address] Phone: [phone] Pager: [phone] Attending:   Agree w/ current plan/management for ulcerative esophagitis and gastroparesis.

## 2019-01-07 NOTE — DIABETES MGMT
Glycemic Control Plan of Care 
 
T2DM with current A1c of 7.9% (11/17/2018). See separate notes, 01/04/2019, for assessment of home diabetes management and education. Home diabetes medications: Novolin 70/30 mix insulin 40 units twice daily before meals (breakfast and dinner) and Metformin 500 mg once daily with breakfast. 
 
POC BG range on 01/06/2019: 110-224 mg/dL. POC BG report on 01/07/2019 at time of review: 68, 74, 128, 286 mg/dL. Patient reported that she felt shaky at midnight when her blood sugar dropped and she was given orange juice to drink. Recommendation(s): 
1.) Continue inpatient glycemic monitoring and intervention. 2.) Add bedtime diabetic snack. Assessment: 
Patient is 52year old with past medical history including type 2 diabetes mellitus with retinopathy/neuropathy, blind left eye, CKD, GERD, hyperlipidemia, and hypertension - was admitted on 01/03/2019 with report of nausea, vomiting, abdominal pain, weakness, and falls at home. Noted: 
Likely gastroparesis. Sepsis. CRYSTAL, resolved. T2DM with current A1c of 7.9% (11/17/2018). Most recent blood glucose values: 
 
Results for Lin Diallo (MRN [id number]) as of 1/7/2019 12:04 Ref. Range 1/6/2019 08:41 1/6/2019 11:45 1/6/2019 15:30 1/6/2019 21:36  
GLUCOSE,FAST - POC Latest Ref Range: 70 - 110 mg/dL 110 131 (H) 195 (H) 244 (H) Results for Lin Diallo (MRN [id number]) as of 1/7/2019 12:04 Ref. Range 1/7/2019 01:11 1/7/2019 01:17 1/7/2019 08:33 1/7/2019 11:42 GLUCOSE,FAST - POC Latest Ref Range: 70 - 110 mg/dL 68 (L) 74 128 (H) 286 (H) Current A1C: 7.9% (11/17/2018) which is equivalent to estimated average blood glucose of 180 mg/dL during the past 2-3 months. Current hospital diabetes medications: 
Basal lantus insulin 18 units daily. Correctional lispro insulin every 6 hours. Very resistant dose. Total daily dose insulin requirement previous day: 01/06/2019 Lantus: 15 units Lispro: 9 units TDD insulin: 24 units Home diabetes medications: Patient reported on 01/04/2018: 
Novolin 70/30 mix insulin (pen) 40 units daily before breakfast and 40 units daily before dinner. Metformin 500 mg once daily with breakfast. 
 
Diet: Diabetic consistent carb regular; HS diabetic snack. Goals:  Blood glucose will be within target range of  mg/dL by 01/10/2019. Education:  _X__  Refer to Diabetes Education Record: 01/04/2019 
           ___  Education not indicated at this time Nicolette Sandoval RN [facility] Pager: [phone]

## 2019-01-07 NOTE — PROGRESS NOTES
0715-  Bedside and Verbal shift change report given to Sachi Melendez RN (oncoming nurse) by Darren Boggs RN (offgoing nurse). Report included the following information SBAR, Kardex, Intake/Output, MAR and Recent Results. 1915- Bedside and Verbal shift change report given to Deanna Hudson RN (oncoming nurse) by Sachi Melendez RN (offgoing nurse). Report included the following information SBAR, Kardex, Intake/Output, MAR and Recent Results.

## 2019-01-08 LAB
ALBUMIN SERPL-MCNC: 3 G/DL (ref 3.4–5)
ALBUMIN/GLOB SERPL: 1 {RATIO} (ref 0.8–1.7)
ALP SERPL-CCNC: 103 U/L (ref 45–117)
ALT SERPL-CCNC: 16 U/L (ref 13–56)
ANION GAP SERPL CALC-SCNC: 8 MMOL/L (ref 3–18)
ANION GAP SERPL CALC-SCNC: 9 MMOL/L (ref 3–18)
AST SERPL-CCNC: 20 U/L (ref 15–37)
BASOPHILS # BLD: 0 K/UL (ref 0–0.1)
BASOPHILS NFR BLD: 0 % (ref 0–2)
BILIRUB SERPL-MCNC: 1 MG/DL (ref 0.2–1)
BUN SERPL-MCNC: 11 MG/DL (ref 7–18)
BUN SERPL-MCNC: 13 MG/DL (ref 7–18)
BUN/CREAT SERPL: 8 (ref 12–20)
BUN/CREAT SERPL: 9 (ref 12–20)
CALCIUM SERPL-MCNC: 7.2 MG/DL (ref 8.5–10.1)
CALCIUM SERPL-MCNC: 7.3 MG/DL (ref 8.5–10.1)
CHLORIDE SERPL-SCNC: 107 MMOL/L (ref 100–108)
CHLORIDE SERPL-SCNC: 111 MMOL/L (ref 100–108)
CK SERPL-CCNC: 299 U/L (ref 26–192)
CO2 SERPL-SCNC: 24 MMOL/L (ref 21–32)
CO2 SERPL-SCNC: 24 MMOL/L (ref 21–32)
CREAT SERPL-MCNC: 1.36 MG/DL (ref 0.6–1.3)
CREAT SERPL-MCNC: 1.46 MG/DL (ref 0.6–1.3)
DIFFERENTIAL METHOD BLD: ABNORMAL
EOSINOPHIL # BLD: 0.1 K/UL (ref 0–0.4)
EOSINOPHIL NFR BLD: 1 % (ref 0–5)
ERYTHROCYTE [DISTWIDTH] IN BLOOD BY AUTOMATED COUNT: 13.5 % (ref 11.6–14.5)
FOLATE SERPL-MCNC: 9.1 NG/ML (ref 3.1–17.5)
GLOBULIN SER CALC-MCNC: 3.1 G/DL (ref 2–4)
GLUCOSE BLD STRIP.AUTO-MCNC: 181 MG/DL (ref 70–110)
GLUCOSE BLD STRIP.AUTO-MCNC: 231 MG/DL (ref 70–110)
GLUCOSE BLD STRIP.AUTO-MCNC: 268 MG/DL (ref 70–110)
GLUCOSE BLD STRIP.AUTO-MCNC: 68 MG/DL (ref 70–110)
GLUCOSE BLD STRIP.AUTO-MCNC: 85 MG/DL (ref 70–110)
GLUCOSE SERPL-MCNC: 103 MG/DL (ref 74–99)
GLUCOSE SERPL-MCNC: 175 MG/DL (ref 74–99)
HCT VFR BLD AUTO: 24.1 % (ref 35–45)
HGB BLD-MCNC: 8.3 G/DL (ref 12–16)
LACTATE SERPL-SCNC: 3.2 MMOL/L (ref 0.4–2)
LYMPHOCYTES # BLD: 2.8 K/UL (ref 0.9–3.6)
LYMPHOCYTES NFR BLD: 28 % (ref 21–52)
MAGNESIUM SERPL-MCNC: 1.1 MG/DL (ref 1.6–2.6)
MCH RBC QN AUTO: 28.9 PG (ref 24–34)
MCHC RBC AUTO-ENTMCNC: 34.4 G/DL (ref 31–37)
MCV RBC AUTO: 84 FL (ref 74–97)
MONOCYTES # BLD: 0.5 K/UL (ref 0.05–1.2)
MONOCYTES NFR BLD: 5 % (ref 3–10)
NEUTS SEG # BLD: 6.5 K/UL (ref 1.8–8)
NEUTS SEG NFR BLD: 66 % (ref 40–73)
PLATELET # BLD AUTO: 154 K/UL (ref 135–420)
PMV BLD AUTO: 10.9 FL (ref 9.2–11.8)
POTASSIUM SERPL-SCNC: 3.3 MMOL/L (ref 3.5–5.5)
POTASSIUM SERPL-SCNC: 3.4 MMOL/L (ref 3.5–5.5)
PROT SERPL-MCNC: 6.1 G/DL (ref 6.4–8.2)
RBC # BLD AUTO: 2.87 M/UL (ref 4.2–5.3)
SODIUM SERPL-SCNC: 139 MMOL/L (ref 136–145)
SODIUM SERPL-SCNC: 144 MMOL/L (ref 136–145)
VIT B12 SERPL-MCNC: 594 PG/ML (ref 211–911)
WBC # BLD AUTO: 9.9 K/UL (ref 4.6–13.2)

## 2019-01-08 PROCEDURE — 82550 ASSAY OF CK (CPK): CPT

## 2019-01-08 PROCEDURE — 82607 VITAMIN B-12: CPT

## 2019-01-08 PROCEDURE — 74011250637 HC RX REV CODE- 250/637: Performed by: STUDENT IN AN ORGANIZED HEALTH CARE EDUCATION/TRAINING PROGRAM

## 2019-01-08 PROCEDURE — 74011636637 HC RX REV CODE- 636/637: Performed by: STUDENT IN AN ORGANIZED HEALTH CARE EDUCATION/TRAINING PROGRAM

## 2019-01-08 PROCEDURE — 85025 COMPLETE CBC W/AUTO DIFF WBC: CPT

## 2019-01-08 PROCEDURE — 65660000000 HC RM CCU STEPDOWN

## 2019-01-08 PROCEDURE — 82962 GLUCOSE BLOOD TEST: CPT

## 2019-01-08 PROCEDURE — 74011250637 HC RX REV CODE- 250/637: Performed by: FAMILY MEDICINE

## 2019-01-08 PROCEDURE — 74011250636 HC RX REV CODE- 250/636: Performed by: FAMILY MEDICINE

## 2019-01-08 PROCEDURE — 83735 ASSAY OF MAGNESIUM: CPT

## 2019-01-08 PROCEDURE — 80053 COMPREHEN METABOLIC PANEL: CPT

## 2019-01-08 PROCEDURE — 83605 ASSAY OF LACTIC ACID: CPT

## 2019-01-08 PROCEDURE — 74011636637 HC RX REV CODE- 636/637: Performed by: FAMILY MEDICINE

## 2019-01-08 PROCEDURE — 36415 COLL VENOUS BLD VENIPUNCTURE: CPT

## 2019-01-08 PROCEDURE — 74011000258 HC RX REV CODE- 258: Performed by: FAMILY MEDICINE

## 2019-01-08 PROCEDURE — 74011250636 HC RX REV CODE- 250/636: Performed by: STUDENT IN AN ORGANIZED HEALTH CARE EDUCATION/TRAINING PROGRAM

## 2019-01-08 RX ORDER — SODIUM CHLORIDE 9 MG/ML
500 INJECTION, SOLUTION INTRAVENOUS ONCE
Status: COMPLETED | OUTPATIENT
Start: 2019-01-08 | End: 2019-01-08

## 2019-01-08 RX ORDER — MELATONIN
1000 DAILY
Status: DISCONTINUED | OUTPATIENT
Start: 2019-01-08 | End: 2019-01-10 | Stop reason: HOSPADM

## 2019-01-08 RX ORDER — SODIUM CHLORIDE 9 MG/ML
50 INJECTION, SOLUTION INTRAVENOUS CONTINUOUS
Status: DISCONTINUED | OUTPATIENT
Start: 2019-01-08 | End: 2019-01-10 | Stop reason: HOSPADM

## 2019-01-08 RX ORDER — ACETAMINOPHEN 325 MG/1
650 TABLET ORAL
Status: DISCONTINUED | OUTPATIENT
Start: 2019-01-08 | End: 2019-01-10 | Stop reason: HOSPADM

## 2019-01-08 RX ORDER — AMOXICILLIN AND CLAVULANATE POTASSIUM 500; 125 MG/1; MG/1
1 TABLET, FILM COATED ORAL 2 TIMES DAILY WITH MEALS
Status: DISCONTINUED | OUTPATIENT
Start: 2019-01-08 | End: 2019-01-10 | Stop reason: HOSPADM

## 2019-01-08 RX ADMIN — PIPERACILLIN SODIUM,TAZOBACTAM SODIUM 3.38 G: 3; .375 INJECTION, POWDER, FOR SOLUTION INTRAVENOUS at 01:25

## 2019-01-08 RX ADMIN — VITAMIN D, TAB 1000IU (100/BT) 1000 UNITS: 25 TAB at 16:48

## 2019-01-08 RX ADMIN — SUCRALFATE 1 G: 1 TABLET ORAL at 08:33

## 2019-01-08 RX ADMIN — PANTOPRAZOLE SODIUM 40 MG: 40 TABLET, DELAYED RELEASE ORAL at 08:33

## 2019-01-08 RX ADMIN — METOCLOPRAMIDE HYDROCHLORIDE 5 MG: 5 TABLET ORAL at 08:33

## 2019-01-08 RX ADMIN — INSULIN LISPRO 3 UNITS: 100 INJECTION, SOLUTION INTRAVENOUS; SUBCUTANEOUS at 08:33

## 2019-01-08 RX ADMIN — PAROXETINE HYDROCHLORIDE 40 MG: 20 TABLET, FILM COATED ORAL at 21:57

## 2019-01-08 RX ADMIN — INSULIN LISPRO 9 UNITS: 100 INJECTION, SOLUTION INTRAVENOUS; SUBCUTANEOUS at 12:07

## 2019-01-08 RX ADMIN — PIPERACILLIN SODIUM,TAZOBACTAM SODIUM 3.38 G: 3; .375 INJECTION, POWDER, FOR SOLUTION INTRAVENOUS at 12:07

## 2019-01-08 RX ADMIN — INSULIN GLARGINE 18 UNITS: 100 INJECTION, SOLUTION SUBCUTANEOUS at 08:33

## 2019-01-08 RX ADMIN — METOCLOPRAMIDE HYDROCHLORIDE 5 MG: 5 TABLET ORAL at 12:07

## 2019-01-08 RX ADMIN — SODIUM CHLORIDE 50 ML/HR: 900 INJECTION, SOLUTION INTRAVENOUS at 18:45

## 2019-01-08 RX ADMIN — SUCRALFATE 1 G: 1 TABLET ORAL at 16:48

## 2019-01-08 RX ADMIN — SODIUM CHLORIDE 500 ML: 900 INJECTION, SOLUTION INTRAVENOUS at 12:00

## 2019-01-08 RX ADMIN — METOCLOPRAMIDE HYDROCHLORIDE 5 MG: 5 TABLET ORAL at 16:48

## 2019-01-08 RX ADMIN — INSULIN LISPRO 6 UNITS: 100 INJECTION, SOLUTION INTRAVENOUS; SUBCUTANEOUS at 21:56

## 2019-01-08 RX ADMIN — PIPERACILLIN SODIUM,TAZOBACTAM SODIUM 3.38 G: 3; .375 INJECTION, POWDER, FOR SOLUTION INTRAVENOUS at 06:51

## 2019-01-08 RX ADMIN — AMOXICILLIN AND CLAVULANATE POTASSIUM 1 TABLET: 500; 125 TABLET, FILM COATED ORAL at 16:48

## 2019-01-08 NOTE — PROGRESS NOTES
9832- Bedside and Verbal shift change report given to Fatemeh Nowak RN (oncoming nurse) by Toni Castillo RN (offgoing nurse). Report included the following information SBAR, Kardex, Intake/Output, MAR and Recent Results.

## 2019-01-08 NOTE — PROGRESS NOTES
I spoke w Dr. Javier Chou at [facility] to protocol this exam.  After reviewing the order and the chart Dr. Bárbara Mon asked that I call the ordering physician to get a better

## 2019-01-08 NOTE — PROGRESS NOTES
Problem: Falls - Risk of 
Goal: *Absence of Falls Document Leona Montilla Fall Risk and appropriate interventions in the flowsheet. Outcome: Progressing Towards Goal 
Fall Risk Interventions: 
Mobility Interventions: Bed/chair exit alarm, Patient to call before getting OOB Medication Interventions: Bed/chair exit alarm, Patient to call before getting OOB, Teach patient to arise slowly Elimination Interventions: Bed/chair exit alarm, Call light in reach, Patient to call for help with toileting needs History of Falls Interventions: Bed/chair exit alarm, Door open when patient unattended, Investigate reason for fall

## 2019-01-08 NOTE — DIABETES MGMT
Glycemic Control Plan of Care 
 
T2DM with current A1c of 7.9% (11/17/2018). See separate notes, 01/04/2019, for assessment of home diabetes management and education. Home diabetes medications: Novolin 70/30 mix insulin 40 units twice daily before meals (breakfast and dinner) and Metformin 500 mg once daily with breakfast.  
 
POC BG range on 01/07/2019:  mg/dL. Patient reported that she felt shaky at midnight when her blood sugar dropped and she was given orange juice to drink. POC BG report on 01/08/2019 at time of review: 181 mg/dL. Noted basal lantus insulin dose increased from 15 to 18 units daily. Current Meal Intake: recorded. Patient Vitals for the past 100 hrs: 
 % Diet Eaten 01/08/19 0844 100 % 01/06/19 0845 100 % Recommendation(s): 
1.) Continue to monitor BG pattern and titrate lantus insulin dose if BG is still above target range. Assessment: 
Patient is 52year old with past medical history including type 2 diabetes mellitus with retinopathy/neuropathy, blind left eye, CKD, GERD, hyperlipidemia, and hypertension - was admitted on 01/03/2019 with report of nausea, vomiting, abdominal pain, weakness, and falls at home. Noted: 
Likely gastroparesis. Sepsis. CRYSTAL, resolved. T2DM with current A1c of 7.9% (11/17/2018). Most recent blood glucose values: 
 
Results for Johnna Feliz (MRN [id number]) as of 1/8/2019 11:27 Ref. Range 1/7/2019 01:11 1/7/2019 01:17 1/7/2019 08:33 1/7/2019 11:42 1/7/2019 17:29 1/7/2019 21:45 GLUCOSE,FAST - POC Latest Ref Range: 70 - 110 mg/dL 68 (L) 74 128 (H) 286 (H) 108 237 (H) Results for Johnna Feliz (MRN [id number]) as of 1/8/2019 11:27 Ref. Range 1/8/2019 07:10 1/8/2019 11:17 GLUCOSE,FAST - POC Latest Ref Range: 70 - 110 mg/dL 181 (H) 268 (H) Current A1C: 7.9% (11/17/2018) which is equivalent to estimated average blood glucose of 180 mg/dL during the past 2-3 months. Current hospital diabetes medications: Basal lantus insulin 18 units daily. Correctional lispro insulin ACHS. Very resistant dose. Total daily dose insulin requirement previous day: 01/07/2019 Lantus: 15 units Lispro: 9 units TDD insulin: 24 units Home diabetes medications: Patient reported on 01/04/2018: 
Novolin 70/30 mix insulin (pen) 40 units daily before breakfast and 40 units daily before dinner. Metformin 500 mg once daily with breakfast. 
 
Diet: Diabetic consistent carb regular; HS diabetic snack. Goals:  Blood glucose will be within target range of  mg/dL by 01/11/2019. Education:  _X__  Refer to Diabetes Education Record: 01/04/2019 
           ___  Education not indicated at this time Gael Qureshi RN [facility] Pager: [phone]

## 2019-01-08 NOTE — PROGRESS NOTES
Intern Progress Note [address] [facility] Patient: Leslie Mendez MRN: [id number]  CSN: [id number] YOB: 1965  Age: 52 y.o. Sex: female DOA: 1/3/2019 LOS:  LOS: 4 days   PCP: Katelyn Warner MD  
             
Subjective:  
 
Acute events: Night resident called as patient was anxious overnight; given Paxil and Ativan. T max of 100.9 at 1144 but patient denies any fevers, chills, or new symptoms. Patient now comfortable and without complaints. Brief ROS: No fevers or chills, Negative for chest pain, shortness of breath. She is having some persistent abdominal pain with nausea. No diarrhea. Objective:  
  
Patient Vitals for the past 24 hrs: 
 Temp Pulse Resp BP SpO2  
01/08/19 0705 99.7 °F (37.6 °C) 96 18 98/64 98 % 01/08/19 0358 99.8 °F (37.7 °C) 83 17 92/65 100 % 01/08/19 0010 99 °F (37.2 °C) 88 17 123/75 100 % 01/07/19 1730 98.2 °F (36.8 °C) 95 18 99/65 100 % 01/07/19 1142 98 °F (36.7 °C) 96 18 102/73 98 % Physical Exam:  
General: alert and oriented Head - small area of tenderness and dried blood at back of skull Cardiovascular: RRR w/o MRGs Respiratory: CTAB no rales, rhonchi, wheezes Abdomen: Soft, +BS, diffuse tenderness to palpation, no rebound tenderness or guarding, Non-Distended Extremities: no peripheral edema, no tenderness to palpation Neuro: Cranial nerves grossly intact, grossly moving upper and lower extremities Lab/Data Reviewed: All lab results for the last 24 hours reviewed. CBC w/Diff Recent Labs 01/08/19 
8509 01/07/19 
0150 01/06/19 
0510 WBC 9.9 10.9 8.6  
RBC 2.87* 3.06* 2.99* HGB 8.3* 8.8* 8.5* HCT 24.1* 25.7* 25.4*  
 187 187 GRANS 66 61 52 LYMPH 28 31 42 EOS 1 1 1 Chemistry Recent Labs 01/08/19 
0710  01/07/19 
0150 01/06/19 
0510 GLUCPOC 181*   < >  --    < >  --   
GLU  --   --  113*  --  145* NA  --   --  138  --  141 K  --   --  3.3*  --  3.2*  
 CL  --   --  106  --  109* CO2  --   --  23  --  26 BUN  --   --  11  --  17  
CREA  --   --  1.33*  --  1.32* CA  --   --  7.6*  --  7.6* AGAP  --   --  9  --  6  
BUCR  --   --  8*  --  13  
AP  --   --  109  --  108 TP  --   --  6.5  --  6.0* ALB  --   --  3.2*  --  3.0*  
GLOB  --   --  3.3  --  3.0 AGRAT  --   --  1.0  --  1.0  
 < > = values in this interval not displayed. Microbiology Recent Labs 01/06/19 
1345 CULT NO GROWTH 2 DAYS Recent Labs 01/06/19 
1345 CULT NO GROWTH 2 DAYS  
  
I/O Intake/Output Summary (Last 24 hours) at 1/8/2019 7327 Last data filed at 1/8/2019 3206 Gross per 24 hour Intake 340 ml Output  Net 340 ml Scheduled Medications Reviewed: 
Current Facility-Administered Medications Medication Dose Route Frequency  insulin glargine (LANTUS) injection 18 Units  18 Units SubCUTAneous DAILY  pantoprazole (PROTONIX) tablet 40 mg  40 mg Oral ACB  piperacillin-tazobactam (ZOSYN) 3.375 g in 0.9% sodium chloride (MBP/ADV) 100 mL MBP  3.375 g IntraVENous Q6H  
 PARoxetine (PAXIL) tablet 40 mg  40 mg Oral QHS  sucralfate (CARAFATE) tablet 1 g  1 g Oral ACB&D  
 metoclopramide HCl (REGLAN) tablet 5 mg  5 mg Oral TIDAC  insulin lispro (HUMALOG) injection   SubCUTAneous AC&HS Assessment/Plan  
 
52 y.o. female with PMH Type 2 DM with neuropathy, retinopathy, GIST resected in 2014. HTN, now admitted with concern for sepsis with nasuea, vomiting, abdominal pain.  
  
Sepsis with presumed GI etiology - Improving. Still reporting abdominal pain. WBC improved to 9.9. Patient has remained afebrile. Currently HD stable. - continue renally dosed zosyn 2.25 mg q6 hours - Continue reglan for nausea 5 mg IV TID as needed--> monitor for tardive dyskinesia, prolonged QT 
- daily CBC, CMP  
- strict I/O  
- protonix 40 mg IV daily  
-GI consulted: recommends small, frequent meals; PPI BID, carafate liquid, reglan with caution, imodium PRN for diarrhea, GI FU in 2 months - MRA abdomen and pelvis ordered; not yet completed  
   
Possible syncope/presyncope with fall and head trauma Multiple falls at home. Etiology unclear may be due to peripheral neuropathy, cardiac etiology, visual impairment. Now with area of dried blood on posterior scalp, headaches, and neck pain. CT head no acute intracranial process. CT C spine negative for acute sequelae of truama. UDS negative. Vit B 12, folate wnl  
- neurochecks    
-Scheduled tylenol 650 mg q6 hours as needed for headache 
  
Hyponatremia, resolved - 131 on admission; improved at 138. Likely occurred in setting of nausea and vomiting. Now resolved. - Fluids as above  
- daily BMP 
-encourage oral intake  
  
Hypokalemia  -3.3 today 
- replete as indicated 
- daily BMP  
  
CRYSTAL, resolved. Cr 3.72 > 3.54.>2.3>1. 33. Baseline appears to be 1.5-2.0. Now resolved. - avoid nephrotoxic medications  
- renally dose antibiotics  
  
DM with retinopathy and neuropathy - Glucose check ACHS; SSI  
- Lantus 15 daily (increased from 12 daily)  
- adjust insulin based on requirements  
- hold home metformin 
- hold home novolog mix 70/30   
  
HTN  - BP have been low normal during admission  
- Start home lisinopril 20 mg daily  
  
Anxiety  
- Start home Paxil 40 mg daily  
  
  
Diet: Diabetic diet DVT Prophylaxis: [facility] Code Status: Full Code Point of Contact: Franci Cynthia: QZBREEZY) [phone]; Neli Mesa (son) [phone] 
  
  
Disposition and anticipated LOS: 2 midnights Andria Pollock DO, PGY-1 
1/8/2019, 1:37 AM

## 2019-01-08 NOTE — PROGRESS NOTES
[email or web address] 
[phone] Gastroenterology follow up-Progress note Impression: 1. Nausea/vomiting - s/p EGD on 11/20/18 - ulcerations in esophagus, friable mucosa in stomach. Gastric bx - H.pylori negative gastritis. Esoph bx - acute esophagitis, neg for candida 2. Abd pain 3. Poorly controlled diabetes 4. CKD 5. GERD with esophagitis - see last EGD note Plan: 1. Likely has gastroparesis given poorly controlled diabetes and h/o diabetic neuropathy and other complications of diabetes. 2. Tolerating small, frequent meals throughout the day. 3. Nutritionist/dietitian consult for diabetic gastroparesis (would continue this outpatient). 4. DM- f/u with Endocrinologist as outpatient. 5. Continue PPI  BID - 30 minutes before breakfast and dinner. 6. Continue Carafate liquid TID-AC 7. Continue with Reglan caution (ie. Tardive dyskinesia). 8. Begin Imodium PRN for diarrhea. 9. Will sign off: F/U with GI upon discharge for EGD in 2 months. Chief Complaint: nausea Subjective:  Pt feels much better today. Denies breakthrough GERD; denies abdominal pain. ROS: Denies any fevers, chills, rash. Eyes: conjunctiva normal, EOM normal  
Neck: ROM normal, supple and trachea normal  
Cardiovascular: heart normal, intact distal pulses, normal rate and regular rhythm Pulmonary/Chest Wall: breath sounds normal and effort normal  
Abdominal: appearance normal, bowel sounds normal and soft, non-acute, non-tender Patient Active Problem List  
Diagnosis Code  Essential hypertension, benign I10  
 Diabetes mellitus type 2, controlled ([facility] E11.9  Vitamin D deficiency E55.9  
 HLD (hyperlipidemia) E78.5  Dehydration E86.0  Nausea and vomiting R11.2  
 SIRS (systemic inflammatory response syndrome) ([facility]) R65.10  GERD (gastroesophageal reflux disease) K21.9  Nausea with vomiting R11.2  
 Osteomyelitis ([facility]) M86.9  Sepsis ([facility] A41.9  Foot ulcer due to secondary DM ([facility] E13.621, L97.509  Uncontrolled type 2 diabetes mellitus with chronic kidney disease (HCC) E11.22, E11.65  Diabetic retinopathy ([facility] E11.319  
 Anxiety F41.9  Leukocytosis D72.829  
 Renal insufficiency N28.9  Diabetes ([facility] E11.9  Hypertension I10  
 Mass of abdomen R19.00  Blind left eye H54.40  Type 2 diabetes mellitus with retinopathy, with long-term current use of insulin ([facility]) E11.319, Z79.4  CRYSTAL (acute kidney injury) ([facility] N17.9  Abnormal LFTs R94.5  Abdominal pain R10.9 Visit Vitals BP 98/64 (BP 1 Location: Right arm, BP Patient Position: At rest) Pulse 96 Temp 99.7 °F (37.6 °C) Resp 18 Ht 5' 8\" (1.727 m) Wt 90.3 kg (199 lb) LMP 10/06/2015 (Exact Date) SpO2 98% Breastfeeding? No  
BMI 30.26 kg/m² Intake/Output Summary (Last 24 hours) at 1/8/2019 1030 Last data filed at 1/8/2019 1001 Gross per 24 hour Intake 340 ml Output  Net 340 ml CBC w/Diff Lab Results Component Value Date/Time WBC 9.9 01/08/2019 05:13 AM  
 RBC 2.87 (L) 01/08/2019 05:13 AM  
 HGB 8.3 (L) 01/08/2019 05:13 AM  
 HCT 24.1 (L) 01/08/2019 05:13 AM  
 MCV 84.0 01/08/2019 05:13 AM  
 MCH 28.9 01/08/2019 05:13 AM  
 MCHC 34.4 01/08/2019 05:13 AM  
 RDW 13.5 01/08/2019 05:13 AM  
  01/08/2019 05:13 AM  
 Lab Results Component Value Date/Time GRANS 66 01/08/2019 05:13 AM  
 LYMPH 28 01/08/2019 05:13 AM  
 EOS 1 01/08/2019 05:13 AM  
 BANDS 2 04/29/2014 05:12 PM  
 BASOS 0 01/08/2019 05:13 AM  
  
Basic Metabolic Profile Recent Labs 01/08/19 
1058   
K 3.3*  
 CO2 24 BUN 13  
CA 7.3* Hepatic Function Lab Results Component Value Date/Time ALB 3.0 (L) 01/08/2019 05:13 AM  
 TP 6.1 (L) 01/08/2019 05:13 AM  
  01/08/2019 05:13 AM  
 Lab Results Component Value Date/Time SGOT 20 01/08/2019 05:13 AM  
  
 
 
Roberta No results for input(s): PTP, INR, APTT in the last 72 hours. No lab exists for component: INREXT Will sign off-Thank you for this consultation and the opportunity to participate in the care of this patient. Please do not hesitate to call with any questions or concerns, or should event occur that may necessitate additional GI evaluation. Aminata Carter NP [facility]. [employer] Phone: [phone] Pager: [phone]

## 2019-01-08 NOTE — PROGRESS NOTES
attempted to visit patient and was not able to do a spiritual assessment. Patient was not communicative. She wanted to sleep and not have a visitor. Will follow up with patient as needed. Risa Chang MA  Spiritual Care Department 
[phone]

## 2019-01-09 VITALS
TEMPERATURE: 97.6 F | DIASTOLIC BLOOD PRESSURE: 68 MMHG | OXYGEN SATURATION: 99 % | WEIGHT: 205.47 LBS | RESPIRATION RATE: 20 BRPM | SYSTOLIC BLOOD PRESSURE: 112 MMHG | BODY MASS INDEX: 31.14 KG/M2 | HEIGHT: 68 IN | HEART RATE: 90 BPM

## 2019-01-09 LAB
ALBUMIN SERPL-MCNC: 2.6 G/DL (ref 3.4–5)
ALBUMIN/GLOB SERPL: 0.8 {RATIO} (ref 0.8–1.7)
ALP SERPL-CCNC: 90 U/L (ref 45–117)
ALT SERPL-CCNC: 13 U/L (ref 13–56)
ANION GAP SERPL CALC-SCNC: 7 MMOL/L (ref 3–18)
AST SERPL-CCNC: 14 U/L (ref 15–37)
BACTERIA SPEC CULT: NORMAL
BASOPHILS # BLD: 0 K/UL (ref 0–0.1)
BASOPHILS NFR BLD: 0 % (ref 0–2)
BILIRUB SERPL-MCNC: 0.6 MG/DL (ref 0.2–1)
BUN SERPL-MCNC: 10 MG/DL (ref 7–18)
BUN/CREAT SERPL: 10 (ref 12–20)
CALCIUM SERPL-MCNC: 7 MG/DL (ref 8.5–10.1)
CHLORIDE SERPL-SCNC: 112 MMOL/L (ref 100–108)
CK SERPL-CCNC: 168 U/L (ref 26–192)
CO2 SERPL-SCNC: 23 MMOL/L (ref 21–32)
CREAT SERPL-MCNC: 1.03 MG/DL (ref 0.6–1.3)
DIFFERENTIAL METHOD BLD: ABNORMAL
EOSINOPHIL # BLD: 0.1 K/UL (ref 0–0.4)
EOSINOPHIL NFR BLD: 2 % (ref 0–5)
ERYTHROCYTE [DISTWIDTH] IN BLOOD BY AUTOMATED COUNT: 13.7 % (ref 11.6–14.5)
GLOBULIN SER CALC-MCNC: 3.1 G/DL (ref 2–4)
GLUCOSE BLD STRIP.AUTO-MCNC: 133 MG/DL (ref 70–110)
GLUCOSE BLD STRIP.AUTO-MCNC: 234 MG/DL (ref 70–110)
GLUCOSE BLD STRIP.AUTO-MCNC: 278 MG/DL (ref 70–110)
GLUCOSE SERPL-MCNC: 142 MG/DL (ref 74–99)
HCT VFR BLD AUTO: 22.5 % (ref 35–45)
HGB BLD-MCNC: 7.6 G/DL (ref 12–16)
LACTATE SERPL-SCNC: 3 MMOL/L (ref 0.4–2)
LYMPHOCYTES # BLD: 2.8 K/UL (ref 0.9–3.6)
LYMPHOCYTES NFR BLD: 35 % (ref 21–52)
MAGNESIUM SERPL-MCNC: 1.2 MG/DL (ref 1.6–2.6)
MCH RBC QN AUTO: 28.6 PG (ref 24–34)
MCHC RBC AUTO-ENTMCNC: 33.8 G/DL (ref 31–37)
MCV RBC AUTO: 84.6 FL (ref 74–97)
MONOCYTES # BLD: 0.2 K/UL (ref 0.05–1.2)
MONOCYTES NFR BLD: 3 % (ref 3–10)
NEUTS SEG # BLD: 4.8 K/UL (ref 1.8–8)
NEUTS SEG NFR BLD: 60 % (ref 40–73)
PLATELET # BLD AUTO: 153 K/UL (ref 135–420)
PMV BLD AUTO: 9.8 FL (ref 9.2–11.8)
POTASSIUM SERPL-SCNC: 3.5 MMOL/L (ref 3.5–5.5)
PROT SERPL-MCNC: 5.7 G/DL (ref 6.4–8.2)
RBC # BLD AUTO: 2.66 M/UL (ref 4.2–5.3)
SERVICE CMNT-IMP: NORMAL
SODIUM SERPL-SCNC: 142 MMOL/L (ref 136–145)
TSH SERPL DL<=0.05 MIU/L-ACNC: 0.34 UIU/ML (ref 0.36–3.74)
WBC # BLD AUTO: 7.9 K/UL (ref 4.6–13.2)

## 2019-01-09 PROCEDURE — 74011250637 HC RX REV CODE- 250/637: Performed by: STUDENT IN AN ORGANIZED HEALTH CARE EDUCATION/TRAINING PROGRAM

## 2019-01-09 PROCEDURE — 82962 GLUCOSE BLOOD TEST: CPT

## 2019-01-09 PROCEDURE — 80053 COMPREHEN METABOLIC PANEL: CPT

## 2019-01-09 PROCEDURE — 74011636637 HC RX REV CODE- 636/637: Performed by: FAMILY MEDICINE

## 2019-01-09 PROCEDURE — 82550 ASSAY OF CK (CPK): CPT

## 2019-01-09 PROCEDURE — 84443 ASSAY THYROID STIM HORMONE: CPT

## 2019-01-09 PROCEDURE — 74011636637 HC RX REV CODE- 636/637: Performed by: STUDENT IN AN ORGANIZED HEALTH CARE EDUCATION/TRAINING PROGRAM

## 2019-01-09 PROCEDURE — 83605 ASSAY OF LACTIC ACID: CPT

## 2019-01-09 PROCEDURE — 74011250637 HC RX REV CODE- 250/637: Performed by: FAMILY MEDICINE

## 2019-01-09 PROCEDURE — 83735 ASSAY OF MAGNESIUM: CPT

## 2019-01-09 PROCEDURE — 74011250636 HC RX REV CODE- 250/636: Performed by: STUDENT IN AN ORGANIZED HEALTH CARE EDUCATION/TRAINING PROGRAM

## 2019-01-09 PROCEDURE — 85025 COMPLETE CBC W/AUTO DIFF WBC: CPT

## 2019-01-09 RX ORDER — MELATONIN
1000 DAILY
Qty: 30 TAB | Refills: 1 | Status: SHIPPED | OUTPATIENT
Start: 2019-01-10 | End: 2020-01-01

## 2019-01-09 RX ORDER — POTASSIUM CHLORIDE 20 MEQ/1
20 TABLET, EXTENDED RELEASE ORAL DAILY
Qty: 7 TAB | Refills: 0 | Status: SHIPPED | OUTPATIENT
Start: 2019-01-09 | End: 2019-01-16

## 2019-01-09 RX ORDER — PANTOPRAZOLE SODIUM 40 MG/1
40 TABLET, DELAYED RELEASE ORAL
Qty: 30 TAB | Refills: 2 | Status: ON HOLD | OUTPATIENT
Start: 2019-01-10 | End: 2019-01-30 | Stop reason: SDUPTHER

## 2019-01-09 RX ORDER — ZINC GLUCONATE 10 MG
1 LOZENGE ORAL DAILY
Qty: 7 TAB | Refills: 0 | Status: SHIPPED | OUTPATIENT
Start: 2019-01-09 | End: 2019-01-16

## 2019-01-09 RX ORDER — MAGNESIUM SULFATE HEPTAHYDRATE 40 MG/ML
2 INJECTION, SOLUTION INTRAVENOUS ONCE
Status: COMPLETED | OUTPATIENT
Start: 2019-01-09 | End: 2019-01-09

## 2019-01-09 RX ORDER — METOCLOPRAMIDE 5 MG/1
5 TABLET ORAL
Qty: 30 TAB | Refills: 0 | Status: SHIPPED | OUTPATIENT
Start: 2019-01-09 | End: 2019-01-12

## 2019-01-09 RX ORDER — SUCRALFATE 1 G/1
1 TABLET ORAL
Qty: 60 TAB | Refills: 2 | Status: SHIPPED | OUTPATIENT
Start: 2019-01-09 | End: 2019-01-30

## 2019-01-09 RX ORDER — AMOXICILLIN AND CLAVULANATE POTASSIUM 500; 125 MG/1; MG/1
1 TABLET, FILM COATED ORAL 2 TIMES DAILY WITH MEALS
Qty: 12 TAB | Refills: 0 | Status: ON HOLD | OUTPATIENT
Start: 2019-01-09 | End: 2019-01-23

## 2019-01-09 RX ADMIN — INSULIN LISPRO 6 UNITS: 100 INJECTION, SOLUTION INTRAVENOUS; SUBCUTANEOUS at 17:01

## 2019-01-09 RX ADMIN — MAGNESIUM SULFATE HEPTAHYDRATE 2 G: 40 INJECTION, SOLUTION INTRAVENOUS at 11:47

## 2019-01-09 RX ADMIN — SUCRALFATE 1 G: 1 TABLET ORAL at 09:07

## 2019-01-09 RX ADMIN — METOCLOPRAMIDE HYDROCHLORIDE 5 MG: 5 TABLET ORAL at 11:48

## 2019-01-09 RX ADMIN — VITAMIN D, TAB 1000IU (100/BT) 1000 UNITS: 25 TAB at 09:07

## 2019-01-09 RX ADMIN — AMOXICILLIN AND CLAVULANATE POTASSIUM 1 TABLET: 500; 125 TABLET, FILM COATED ORAL at 09:07

## 2019-01-09 RX ADMIN — METOCLOPRAMIDE HYDROCHLORIDE 5 MG: 5 TABLET ORAL at 16:59

## 2019-01-09 RX ADMIN — METOCLOPRAMIDE HYDROCHLORIDE 5 MG: 5 TABLET ORAL at 09:07

## 2019-01-09 RX ADMIN — INSULIN GLARGINE 18 UNITS: 100 INJECTION, SOLUTION SUBCUTANEOUS at 09:08

## 2019-01-09 RX ADMIN — PANTOPRAZOLE SODIUM 40 MG: 40 TABLET, DELAYED RELEASE ORAL at 09:07

## 2019-01-09 RX ADMIN — AMOXICILLIN AND CLAVULANATE POTASSIUM 1 TABLET: 500; 125 TABLET, FILM COATED ORAL at 16:59

## 2019-01-09 RX ADMIN — MAGNESIUM SULFATE HEPTAHYDRATE 2 G: 40 INJECTION, SOLUTION INTRAVENOUS at 12:51

## 2019-01-09 RX ADMIN — INSULIN LISPRO 9 UNITS: 100 INJECTION, SOLUTION INTRAVENOUS; SUBCUTANEOUS at 11:50

## 2019-01-09 RX ADMIN — SUCRALFATE 1 G: 1 TABLET ORAL at 16:59

## 2019-01-09 NOTE — DISCHARGE INSTRUCTIONS
Patient Education   Patient Education   Patient Education   Patient Education   Patient Education   Patient Education   Sucralfate (By mouth)   Sucralfate (qzx-IQYC-ryqt)  Treats ulcers. Brand Name(s): Carafate   There may be other brand names for this medicine. When This Medicine Should Not Be Used: This medicine is not right for everyone. Do not use it if you had an allergic reaction to sucralfate. How to Use This Medicine:   Liquid, Tablet  · Your doctor will tell you how much medicine to use. Do not use more than directed. · It is best to take this medicine on an empty stomach. · Do not stop taking the medicine unless your doctor tells you to, even if you feel better. · Tablet: Swallow with a glass of water. Take it 1 hour before meals and at bedtime. · Oral liquid: Measure the oral liquid medicine with a marked measuring spoon, oral syringe, or medicine cup. Shake it well before each use. · Missed dose: Take a dose as soon as you remember. If it is almost time for your next dose, wait until then and take a regular dose. Do not take extra medicine to make up for a missed dose. · Store the medicine in a closed container at room temperature, away from heat, moisture, and direct light. Do not freeze the oral liquid. Drugs and Foods to Avoid:   Ask your doctor or pharmacist before using any other medicine, including over-the-counter medicines, vitamins, and herbal products. · Some medicines can affect how sucralfate works. Tell your doctor if you are using any of the following:  ¨ Cimetidine, digoxin, levothyroxine, phenytoin, quinidine, ranitidine, theophylline  ¨ Medicine to treat infection (including fluoroquinolones, ketoconazole, tetracycline)  · Take antacids more than one-half hour before or after taking sucralfate oral liquid. · Take cimetidine, ciprofloxacin, digoxin, norfloxacin, ofloxacin, or ranitidine 2 hours before you take sucralfate oral liquid.   Warnings While Using This Medicine: · Tell your doctor if you are pregnant or breastfeeding, or if you have kidney disease or diabetes. · This medicine may cause the following problems:  ¨ Blood clot in the lungs or brain, which could be life-threatening  ¨ High blood sugar  · Your doctor will check your progress and the effects of this medicine at regular visits. Keep all appointments. · Keep all medicine out of the reach of children. Never share your medicine with anyone. Possible Side Effects While Using This Medicine:   Call your doctor right away if you notice any of these side effects:  · Allergic reaction: Itching or hives, swelling in your face or hands, swelling or tingling in your mouth or throat, chest tightness, trouble breathing  · Chest pain, trouble breathing, coughing up blood  · Increased hunger or thirst, change in how much or how often you urinate, unusual weight loss  · Numbness or weakness in your arm or leg, or on one side of your body  · Pain in your lower leg (calf)  · Sudden or severe headache, problems with vision, speech, or walking  If you notice these less serious side effects, talk with your doctor:   · Constipation  · Dizziness  · Dry mouth  · Mild stomach cramps, diarrhea  · Stomach upset, passing gas  If you notice other side effects that you think are caused by this medicine, tell your doctor. Call your doctor for medical advice about side effects. You may report side effects to FDA at 2-128-FDA-2537  © 2017 [facility] Information is for End User's use only and may not be sold, redistributed or otherwise used for commercial purposes. The above information is an  only. It is not intended as medical advice for individual conditions or treatments. Talk to your doctor, nurse or pharmacist before following any medical regimen to see if it is safe and effective for you.      Potassium Chloride (By mouth)   Potassium Chloride (zml-OLR-za-um KLOR-jhoan)  Prevents and treats low potassium levels in the blood. Brand Name(s): K-Tab, [address] [place], Klor-Con, Klor-Con 10, Klor-Con 8, Klor-Con M10, Klor-Con M15, Klor-Con M20, Klor-Con Sprinkle   There may be other brand names for this medicine. When This Medicine Should Not Be Used: This medicine is not right for everyone. Do not use if you had an allergic reaction to potassium. How to Use This Medicine:   Tablet, Long Acting Capsule, Powder, Liquid, Long Acting Tablet, Granule  · Your doctor will tell you how much medicine to use. Do not use more than directed. · Take this medicine with food or right after eating, to avoid stomach upset. · Powder or oral liquid:  You must mix with at least one-half cup (4 ounces) of water or juice. You could damage your stomach if you take the medicine without mixing it with water or juice. · Tablet or capsule: Do not chew, crush, or break. Swallow it whole with full glass of water. · Extended-release capsule: Swallow whole with a full glass of water. If you cannot swallow the extended-release capsule, you may open it and pour the medicine into a small amount of soft food such as pudding, yogurt, or applesauce. Stir this mixture well and swallow it without chewing. · Extended-release tablet:  Swallow whole with a full glass of water. If you have trouble swallowing, ask your doctor or pharmacist if you may crush the tablet. Some brands of this medicine must be swallowed whole, but other brands may be crushed. · If you mix the medicine in water or soft food, do not mix until you are ready to take your dose. Do not save mixed medicine for later use. · Carefully follow your doctor's instructions about any special diet. · Missed dose: Take a dose as soon as you remember. If it is almost time for your next dose, wait until then and take a regular dose. Do not take extra medicine to make up for a missed dose.   · Store the medicine in a closed container at room temperature, away from heat, moisture, and direct light.  Drugs and Foods to Avoid:   Ask your doctor or pharmacist before using any other medicine, including over-the-counter medicines, vitamins, and herbal products. · Some foods and medicines can affect how potassium chloride works. Tell you doctor if you are using any of the following:  ¨ Potassium-sparing diuretic (water pill)  ¨ ACE inhibitor blood pressure medicine  ¨ Salt substitute  ¨ Digoxin  Warnings While Using This Medicine:   · Tell your doctor if you are pregnant or breastfeeding or if you have kidney disease, heart disease, or problems with your digestive system. · This medicine may cause the following problems:  ¨ Bleeding or ulcers in the digestive system  ¨ Potassium levels that are too high  · Your doctor will do lab tests at regular visits to check on the effects of this medicine. Keep all appointments. · Keep all medicine out of the reach of children. Never share your medicine with anyone. Possible Side Effects While Using This Medicine:   Call your doctor right away if you notice any of these side effects:  · Allergic reaction: Itching or hives, swelling in your face or hands, swelling or tingling in your mouth or throat, chest tightness, trouble breathing  · Bloody or black, tarry stools  · Confusion, weakness, uneven heartbeat, trouble breathing, numbness in your hands, feet, or lips  · Severe stomach pain or vomiting  · Throat pain, feeling as if pill is stuck in the throat  If you notice these less serious side effects, talk with your doctor:   · Mild nausea, diarrhea, gas  If you notice other side effects that you think are caused by this medicine, tell your doctor. Call your doctor for medical advice about side effects. You may report side effects to FDA at 9-672-DLH-6269  © 2017 [facility] Information is for End User's use only and may not be sold, redistributed or otherwise used for commercial purposes. The above information is an  only.  It is not intended as medical advice for individual conditions or treatments. Talk to your doctor, nurse or pharmacist before following any medical regimen to see if it is safe and effective for you. Metoclopramide (By mouth)   Metoclopramide (met-oh-KLOE-pra-mide)  Relieves symptoms of gastroesophageal reflux disease (GERD). Also relieves symptoms of gastroparesis in patients with diabetes. Brand Name(s): PCP Kahlil, Reglan   There may be other brand names for this medicine. When This Medicine Should Not Be Used: You should not use this medicine if you have had an allergic reaction to metoclopramide. You should not use this medicine if you have epilepsy (seizures), bleeding or a blockage in the stomach or intestines, or a pheochromocytoma (adrenal gland tumor). How to Use This Medicine:   Liquid, Tablet, Dissolving Tablet  · Take your medicine as directed. Your dose may need to be changed several times to find what works best for you. · Take this medicine on an empty stomach, 30 minutes before each meal and at bedtime, unless your doctor tells you differently. · Make sure your hands are dry before you handle the disintegrating tablet. Peel back the foil from the blister pack, then remove the tablet. Do not push the tablet through the foil. Place the tablet in your mouth. After it has melted, swallow or take a drink of water. · Measure the oral liquid medicine with a marked measuring spoon, oral syringe, or medicine cup. · This medicine is not for long-term use. · This medicine should come with a Medication Guide. Ask your pharmacist for a copy if you do not have one. If a dose is missed:   · Take a dose as soon as you remember. If it is almost time for your next dose, wait until then and take a regular dose. Do not take extra medicine to make up for a missed dose.   How to Store and Dispose of This Medicine:   · Store the medicine in a closed container at room temperature, away from heat, moisture, and direct light. Do not freeze. · Ask your pharmacist, doctor, or health caregiver about the best way to dispose of any outdated medicine or medicine no longer needed. · Keep all medicine out of the reach of children. Never share your medicine with anyone. Drugs and Foods to Avoid:   Ask your doctor or pharmacist before using any other medicine, including over-the-counter medicines, vitamins, and herbal products. · Make sure your doctor knows if you are also using acetaminophen (Tylenol®), cyclosporine (Gengraf®, Neoral®, Sandimmune®), digoxin (Lanoxin®), levodopa ([place]), or tetracycline (Sumycin®). Tell your doctor if you are also using an MAO inhibitor [MAOI] (such as isocarboxazid, selegiline, tranylcypromine, Eldepryl®, Marplan®, Nardil®, or Parnate®), narcotic pain killers, or medicine for depression. · Tell your doctor if you use anything else that makes you sleepy. Some examples are allergy medicine, narcotic pain medicine, and alcohol. · If you use insulin for diabetes, ask your doctor if you need to adjust your dose while using metoclopramide. · Do not drink alcohol while you are using this medicine. Warnings While Using This Medicine:   · Make sure your doctor knows if you are pregnant or breastfeeding, or if you have kidney disease, liver disease, heart disease, congestive heart failure, heart rhythm problems, diabetes, Parkinson's disease, high blood pressure, or a history of depression, or had recent surgery on your stomach. · This medicine may cause tardive dyskinesia (a movement disorder). Check with your doctor right away if you have any of the following symptoms while taking this medicine: lip smacking or puckering, puffing of the cheeks, rapid or worm-like movements of the tongue, uncontrolled chewing movements, or uncontrolled movements of the arms and legs. The risk of tardive dyskinesia is higher if you take this medicine longer than 12 weeks.  Treatment for longer than 12 weeks should be avoided in all but rare cases. · This medicine may make you dizzy or drowsy. Avoid driving, using machines, or doing anything else that could be dangerous if you are not alert. · Check with your doctor right away if you have any of the following symptoms while using this medicine: convulsions (seizures); difficulty with breathing; a fast heartbeat; a high fever; high or low blood pressure; increased sweating; loss of bladder control; severe muscle stiffness; unusually pale skin; or tiredness. These could be symptoms of a serious condition called neuroleptic malignant syndrome (NMS). · Do not stop using this medicine suddenly. Your doctor will need to slowly decrease your dose before you stop it completely. · Your doctor will check your progress and the effects of this medicine at regular visits. Keep all appointments. Possible Side Effects While Using This Medicine:   Call your doctor right away if you notice any of these side effects:  · Allergic reaction: Itching or hives, swelling in your face or hands, swelling or tingling in your mouth or throat, chest tightness, trouble breathing  · Depression or thoughts of hurting oneself. · Fast, slow, or uneven heartbeat. · Lightheadedness or fainting. · Problems with balance or walking. · Seizures. · Severe muscle stiffness, tremors, or twitching. · Swelling in your hands, arms, legs, or feet. · Trouble breathing. · Twitching or muscle movements you cannot control. · Uncontrolled movement of your face, tongue, eyes, neck, or head. · Yellowing of your skin or the whites of your eyes  If you notice these less serious side effects, talk with your doctor:   · Breast swelling or tenderness. · Constipation, diarrhea, nausea, or stomach cramps. · Headache. · Irregular menstrual periods. · Problems having sex. · Restlessness, confusion, or trouble sleeping. · Skin rash or itching.   If you notice other side effects that you think are caused by this medicine, tell your doctor. Call your doctor for medical advice about side effects. You may report side effects to FDA at 0-343-FDA-8779  © 2017 [facility] Information is for End User's use only and may not be sold, redistributed or otherwise used for commercial purposes. The above information is an  only. It is not intended as medical advice for individual conditions or treatments. Talk to your doctor, nurse or pharmacist before following any medical regimen to see if it is safe and effective for you. Magnesium Oxide (By mouth)   Magnesium Oxide (mag-NEE-zee-um OX-jhoan)  Treats acid indigestion and upset stomach. Also used as a mineral supplement to add to the magnesium oxide in your daily diet. Brand Name(s): Chloe Carlson Naturals Magnesium, Uro-Mag   There may be other brand names for this medicine. When This Medicine Should Not Be Used:   Unless your doctor tells you otherwise, there is no reason for you to not use this medicine. How to Use This Medicine:   · Your doctor will tell you how much medicine to use. Do not use more than directed. · Follow the instructions on the medicine label if you are using this medicine without a prescription. · If you are using this medicine as an antacid, do not use the medicine for longer than 2 weeks in a row. · Drink at least six to eight (8 ounce) cups of liquid each day, unless your doctor tells you otherwise. If a dose is missed:   · Take a dose as soon as you remember. If it is almost time for your next dose, wait until then and take a regular dose. Do not take extra medicine to make up for a missed dose. How to Store and Dispose of This Medicine:   · Store the medicine in a closed container at room temperature, away from heat, moisture, and direct light. · Keep all medicine out of the reach of children. Never share your medicine with anyone.   · Ask your pharmacist, doctor, or health caregiver about the best way to dispose of any outdated medicine or medicine no longer needed. Drugs and Foods to Avoid:   Ask your doctor or pharmacist before using any other medicine, including over-the-counter medicines, vitamins, and herbal products. · There are many other medicines that may not work properly if you use them together with magnesium oxide. Make sure your doctor knows about all other medicines you are using. Warnings While Using This Medicine:   · Make sure your doctor knows if you are pregnant or breast feeding, or if you have kidney disease. · Tell your doctor if your acid indigestion does not improve after using the medicine for 1 to 2 weeks. Possible Side Effects While Using This Medicine: If you notice these less serious side effects, talk with your doctor:  · Diarrhea. If you notice other side effects that you think are caused by this medicine, tell your doctor. Call your doctor for medical advice about side effects. You may report side effects to FDA at 9-478-FDA-7581  © 2017 [facility] [place] Information is for End User's use only and may not be sold, redistributed or otherwise used for commercial purposes. The above information is an  only. It is not intended as medical advice for individual conditions or treatments. Talk to your doctor, nurse or pharmacist before following any medical regimen to see if it is safe and effective for you. Cholecalciferol (By mouth)   Cholecalciferol (yyg-jm-qsz-SIF-er-ol)  Treats vitamin D deficiency and maintains bone strength. This is a dietary supplement. Brand Name(s): JOAQUINA Malik Ddrops, Josh Slaughter Ddrops, Kassie Avilad for Kids Ddrops, D-3, D-Paula Drops, D3-5, D3-50, Decara, Delta D3, Dialyvite Vitamin D3 Max, Leader Vitamin D3, Alejandra Jha , Bobby Natural Vitamin D   There may be other brand names for this medicine. When This Medicine Should Not Be Used:    This medicine is generally considered safe for most people. Talk to your doctor if you have concerns. How to Use This Medicine:   Capsule, Liquid Filled Capsule, Liquid, Tablet, Chewable Tablet, Wafer, Drop  · Your doctor will tell you how much medicine to use. Do not use more than directed. · Follow the instructions on the medicine label if you are using this medicine without a prescription. · Oral liquid: Use the dropper that comes with the package to measure the dose. ¨ Adults and adolescents: Drop the liquid directly into the mouth or mix it with food or other liquids (water or juice). ¨ Children 3years of age and older: Drop the liquid directly into the mouth, mix it with food or other liquids (water or juice), or take it from a spoon. ¨ Children younger than 3years of age: Place one drop of the liquid on the pacifier, mother's nipple, or bottle nipple and allow baby to suck for at least 30 seconds. · Wafer: Chew or crush. Do not swallow whole. · Missed dose: Take a dose as soon as you remember. If it is almost time for your next dose, wait until then and take a regular dose. Do not take extra medicine to make up for a missed dose. · Store the medicine in a closed container at room temperature, away from heat, moisture, and direct light. Drugs and Foods to Avoid:      Ask your doctor or pharmacist before using any other medicine, including over-the-counter medicines, vitamins, and herbal products. Warnings While Using This Medicine:   · Tell your doctor if you are pregnant or breastfeeding. · Your doctor will check your progress and the effects of this medicine at regular visits. Keep all appointments. · Keep all medicine out of the reach of children. Never share your medicine with anyone.   Possible Side Effects While Using This Medicine:   Call your doctor right away if you notice any of these side effects:  · Allergic reaction: Itching or hives, swelling in your face or hands, swelling or tingling in your mouth or throat, chest tightness, trouble breathing  If you notice other side effects that you think are caused by this medicine, tell your doctor. Call your doctor for medical advice about side effects. You may report side effects to FDA at 4-517-FDA-4737  © 2017 [address] Information is for End User's use only and may not be sold, redistributed or otherwise used for commercial purposes. The above information is an  only. It is not intended as medical advice for individual conditions or treatments. Talk to your doctor, nurse or pharmacist before following any medical regimen to see if it is safe and effective for you. Amoxicillin/Clavulanate Potassium (By mouth)   Amoxicillin (x-awq-v-TRAN-in), Clavulanate Potassium (UBAS-wf-mr-jenna ssk-GMZ-la-um)  Treats infections. This medicine is a penicillin antibiotic. Brand Name(s): Augmentin, Augmentin ES-600, Augmentin XR   There may be other brand names for this medicine. When This Medicine Should Not Be Used: This medicine is not right for everyone. Do not use it if you had an allergic reaction to amoxicillin, clavulanate, or a similar antibiotic (penicillin or cephalosporin), or if you had liver problems caused by Augmentin®. How to Use This Medicine:   Liquid, Tablet, Chewable Tablet, Long Acting Tablet  · Your doctor will tell you how much medicine to use. Do not use more than directed. · Take this medicine with a snack or at the beginning of a meal to help prevent nausea. · Chewable tablets: Chew the tablet completely before you swallow it. · Measure the oral liquid medicine with a marked measuring spoon, oral syringe, or medicine cup. Shake the medicine well just before you measure each dose. Rinse the spoon or dropper after each use. · Swallow the extended-release tablet whole. Do not crush, break, or chew it. · Take all of the medicine in your prescription to clear up your infection, even if you feel better after the first few doses.   · Missed dose: Take a dose as soon as you remember. If it is almost time for your next dose, wait until then and take a regular dose. Do not take extra medicine to make up for a missed dose. · Tablet, extended-release tablet, chewable tablet: Store at room temperature, away from heat, moisture, and direct light. · Oral liquid: Store in the refrigerator. Do not freeze. · Throw away any unused oral liquid after 10 days. Drugs and Foods to Avoid:   Ask your doctor or pharmacist before using any other medicine, including over-the-counter medicines, vitamins, and herbal products. · Some medicines can affect how this medicine works. Tell your doctor if you are taking a blood thinner (such as warfarin), allopurinol, or probenecid. Warnings While Using This Medicine:   · Tell your doctor if you are pregnant or breastfeeding, or if you have kidney disease, liver disease, or mononucleosis (mono). · Birth control pills may not work as well while you are taking this medicine. Use another form of birth control to prevent pregnancy. · This medicine can cause diarrhea. Call your doctor if the diarrhea becomes severe, does not stop, or is bloody. Do not take any medicine to stop diarrhea until you have talked to your doctor. Diarrhea can occur 2 months or more after you stop taking this medicine. · Tell any doctor or dentist who treats you that you are using this medicine. This medicine may affect certain medical test results. · Call your doctor if your symptoms do not improve or if they get worse. · The chewable tablet and oral liquid contain phenylalanine. Talk to your doctor before you use this medicine if you have phenylketonuria (PKU). · Keep all medicine out of the reach of children. Never share your medicine with anyone.   Possible Side Effects While Using This Medicine:   Call your doctor right away if you notice any of these side effects:  · Allergic reaction: Itching or hives, swelling in your face or hands, swelling or tingling in your mouth or throat, chest tightness, trouble breathing  · Blistering, peeling, red skin rash  · Change in how much or how often you urinate  · Dark urine or pale stools, nausea, vomiting, loss of appetite, stomach pain, yellow eyes or skin  · Diarrhea that may contain blood, stomach cramps  If you notice these less serious side effects, talk with your doctor:   · Diaper rash  · Mild diarrhea, nausea, vomiting  · Tooth discoloration (in children)  If you notice other side effects that you think are caused by this medicine, tell your doctor. Call your doctor for medical advice about side effects. You may report side effects to FDA at 2-391-WVF-9750  © 2017 [facility] Information is for End User's use only and may not be sold, redistributed or otherwise used for commercial purposes. The above information is an  only. It is not intended as medical advice for individual conditions or treatments. Talk to your doctor, nurse or pharmacist before following any medical regimen to see if it is safe and effective for you. Patient Education        Sepsis: Care Instructions  Your Care Instructions    Sepsis is an intense reaction to an infection. It can cause deadly damage to the body and lead to a dangerously low blood pressure. You may have inflammation across large areas of your body. It can damage tissue and even go deep into your organs. Infections that can lead to sepsis include:  · A skin infection such as from a cut. · A lung infection like pneumonia. · A kidney infection. · A gut infection such as E. coli. It's important to care for yourself and try to avoid infections so that you don't get sepsis again. Follow-up care is a key part of your treatment and safety. Be sure to make and go to all appointments, and call your doctor if you are having problems. It's also a good idea to know your test results and keep a list of the medicines you take.   How can you care for yourself at home? · If your doctor prescribed antibiotics, take them as directed. Do not stop taking them just because you feel better. You need to take the full course of antibiotics. · Help prevent infections that could lead to sepsis:  ? Try to avoid colds and flu. If you must be around people who have a cold or the flu, wash your hands often. And get a flu vaccine every year. ? Get a pneumococcal vaccine shot (to prevent pneumonia, meningitis, and other infections). If you have had one before, ask your doctor if you need another dose. ? Clean any wounds or scrapes. · Do not smoke or use other tobacco products. When you quit smoking, you are less likely to get a cold, the flu, bronchitis, and pneumonia. If you need help quitting, talk to your doctor about stop-smoking programs and medicines. These can increase your chances of quitting for good. · To prevent dehydration, drink plenty of fluids. Choose water and other caffeine-free clear liquids until you feel better. If you have kidney, heart, or liver disease and have to limit fluids, talk with your doctor before you increase the amount of fluids you drink. · Eat a healthy diet. Include fruits, vegetables, and whole grains in your diet every day. · If your doctor recommends it, try doing some physical activity. Walking is a good choice. Bit by bit, increase the amount you walk every day. When should you call for help? [id number] anytime you think you may need emergency care. For example, call if:    · You passed out (lost consciousness).    Call your doctor now or seek immediate medical care if:    · You have symptoms of sepsis. These may include:  ? Shortness of breath. ? A fast heart rate. ? Cool, pale, or clammy skin. ?  Feeling confused.     · You are dizzy or lightheaded, or you feel like you may faint.     · You have a fever or chills.    Watch closely for changes in your health, and be sure to contact your doctor if:    · You do not get better as expected. Where can you learn more? Go to http://linda-sivan.info/. Enter C280 in the search box to learn more about \"Sepsis: Care Instructions. \"  Current as of: November 20, 2017  Content Version: 11.8  © 3676-1390 [employer]. Care instructions adapted under license by [employer] (which disclaims liability or warranty for this information). If you have questions about a medical condition or this instruction, always ask your healthcare professional. [address] any warranty or liability for your use of this information. Abdominal Pain: Care Instructions  Your Care Instructions    Abdominal pain has many possible causes. Some aren't serious and get better on their own in a few days. Others need more testing and treatment. If your pain continues or gets worse, you need to be rechecked and may need more tests to find out what is wrong. You may need surgery to correct the problem. Don't ignore new symptoms, such as fever, nausea and vomiting, urination problems, pain that gets worse, and dizziness. These may be signs of a more serious problem. Your doctor may have recommended a follow-up visit in the next 8 to 12 hours. If you are not getting better, you may need more tests or treatment. The doctor has checked you carefully, but problems can develop later. If you notice any problems or new symptoms, get medical treatment right away. Follow-up care is a key part of your treatment and safety. Be sure to make and go to all appointments, and call your doctor if you are having problems. It's also a good idea to know your test results and keep a list of the medicines you take. How can you care for yourself at home? · Rest until you feel better. · To prevent dehydration, drink plenty of fluids, enough so that your urine is light yellow or clear like water. Choose water and other caffeine-free clear liquids until you feel better.  If you have kidney, heart, or liver disease and have to limit fluids, talk with your doctor before you increase the amount of fluids you drink. · If your stomach is upset, eat mild foods, such as rice, dry toast or crackers, bananas, and applesauce. Try eating several small meals instead of two or three large ones. · Wait until 48 hours after all symptoms have gone away before you have spicy foods, alcohol, and drinks that contain caffeine. · Do not eat foods that are high in fat. · Avoid anti-inflammatory medicines such as aspirin, ibuprofen (Advil, Motrin), and naproxen (Aleve). These can cause stomach upset. Talk to your doctor if you take daily aspirin for another health problem. When should you call for help? Call 911 anytime you think you may need emergency care. For example, call if:    · You passed out (lost consciousness).     · You pass maroon or very bloody stools.     · You vomit blood or what looks like coffee grounds.     · You have new, severe belly pain.    Call your doctor now or seek immediate medical care if:    · Your pain gets worse, especially if it becomes focused in one area of your belly.     · You have a new or higher fever.     · Your stools are black and look like tar, or they have streaks of blood.     · You have unexpected vaginal bleeding.     · You have symptoms of a urinary tract infection. These may include:  ? Pain when you urinate. ? Urinating more often than usual.  ? Blood in your urine.     · You are dizzy or lightheaded, or you feel like you may faint.    Watch closely for changes in your health, and be sure to contact your doctor if:    · You are not getting better after 1 day (24 hours). Where can you learn more? Go to http://linda-sivan.info/. Enter Q626 in the search box to learn more about \"Abdominal Pain: Care Instructions. \"  Current as of: November 20, 2017  Content Version: 11.8  © 2090-0013 [employer].  Care instructions adapted under license by [employer] (which disclaims liability or warranty for this information). If you have questions about a medical condition or this instruction, always ask your healthcare professional. [address] any warranty or liability for your use of this information.

## 2019-01-09 NOTE — PROGRESS NOTES
Intern Progress Note [address] Patient: Donald Lerma MRN: [id number]  CSN: [id number] YOB: 1965  Age: 52 y.o. Sex: female DOA: 1/3/2019 LOS:  LOS: 5 days   PCP: Zunilda Givens MD  
             
Subjective: No acute events overnight. Patient more comfortable this morning and feels able to go home. Brief ROS: No fevers or chills, Negative for chest pain, shortness of breath. She is having some mild abdominal pain. Objective:  
  
Patient Vitals for the past 24 hrs: 
 Temp Pulse Resp BP SpO2  
01/09/19 0815 98.6 °F (37 °C) 87 20 92/63 100 % 01/09/19 0357 98.2 °F (36.8 °C) 85 18 136/76 98 % 01/08/19 2351 98.6 °F (37 °C) 90 20 150/86 99 % 01/08/19 1937 98.4 °F (36.9 °C) 86 20 155/89 98 % 01/08/19 1526 98.5 °F (36.9 °C) 77 20 94/54 99 % 01/08/19 1110 98.8 °F (37.1 °C) 88 20 111/69 97 % Physical Exam:  
General: alert and oriented Head - small area of tenderness and dried blood at back of skull Cardiovascular: RRR w/o MRGs Respiratory: CTAB no rales, rhonchi, wheezes Abdomen: Soft, +BS, diffuse tenderness to palpation, no rebound tenderness or guarding, Non-Distended Extremities: no peripheral edema, no tenderness to palpation Neuro: Cranial nerves grossly intact, grossly moving upper and lower extremities Lab/Data Reviewed: All lab results for the last 24 hours reviewed. CBC w/Diff Recent Labs 01/09/19 
0471 01/08/19 
1519 01/07/19 
0150 WBC 7.9 9.9 10.9 RBC 2.66* 2.87* 3.06* HGB 7.6* 8.3* 8.8* HCT 22.5* 24.1* 25.7*  
 154 187 GRANS 60 66 61 LYMPH 35 28 31 EOS 2 1 1 Chemistry Recent Labs 01/09/19 
0815 01/09/19 
5024  01/08/19 
1315  01/08/19 
0513  01/07/19 
0150 GLUCPOC 133*  --    < >  --    < >  --    < >  --   
GLU  --  142*  --  103*  --  175*  --  113* NA  --  142  --  144  --  139  --  138 K  --  3.5  --  3.4*  --  3.3*  --  3.3*  
 CL  --  112*  --  111*  --  107  --  106 CO2  --  23  --  24  --  24  --  23 BUN  --  10  --  11  --  13  --  11  
CREA  --  1.03  --  1.36*  --  1.46*  --  1.33* CA  --  7.0*  --  7.2*  --  7.3*  --  7.6*  
MG  --  1.2*  --  1.1*  --   --   --   --   
AGAP  --  7  --  9  --  8  --  9  
BUCR  --  10*  --  8*  --  9*  --  8* AP  --  90  --   --   --  103  --  109 TP  --  5.7*  --   --   --  6.1*  --  6.5 ALB  --  2.6*  --   --   --  3.0*  --  3.2*  
GLOB  --  3.1  --   --   --  3.1  --  3.3 AGRAT  --  0.8  --   --   --  1.0  --  1.0  
 < > = values in this interval not displayed. Microbiology Recent Labs 01/06/19 
1345 CULT NO GROWTH 3 DAYS Recent Labs 01/06/19 
1345 CULT NO GROWTH 3 DAYS  
  
I/O Intake/Output Summary (Last 24 hours) at 1/9/2019 0901 Last data filed at 1/9/2019 6697 Gross per 24 hour Intake 1000 ml Output  Net 1000 ml Scheduled Medications Reviewed: 
Current Facility-Administered Medications Medication Dose Route Frequency  cholecalciferol (VITAMIN D3) tablet 1,000 Units  1,000 Units Oral DAILY  amoxicillin-clavulanate (AUGMENTIN) 500-125 mg per tablet 1 Tab  1 Tab Oral BID WITH MEALS  
 0.9% sodium chloride infusion  50 mL/hr IntraVENous CONTINUOUS  
 insulin glargine (LANTUS) injection 18 Units  18 Units SubCUTAneous DAILY  pantoprazole (PROTONIX) tablet 40 mg  40 mg Oral ACB  PARoxetine (PAXIL) tablet 40 mg  40 mg Oral QHS  sucralfate (CARAFATE) tablet 1 g  1 g Oral ACB&D  
 metoclopramide HCl (REGLAN) tablet 5 mg  5 mg Oral TIDAC  insulin lispro (HUMALOG) injection   SubCUTAneous AC&HS Assessment/Plan  
 
52 y.o. female with PMH Type 2 DM with neuropathy, retinopathy, GIST resected in 2014. HTN, now admitted with concern for sepsis with nasuea, vomiting, abdominal pain.  
  
Sepsis with presumed GI etiology - Improving.  Still reporting abdominal pain. WBC improved to 7.9. Patient has remained afebrile. Currently HD stable. - Transitioned to PO Augmentin (day 2 of 7) - Continue reglan for nausea 5 mg IV TID as needed--> monitor for tardive dyskinesia, prolonged QT 
- daily CBC, CMP  
- strict I/O  
- protonix 40 mg IV daily  
-GI consulted: recommends small, frequent meals; PPI BID, carafate liquid, reglan with caution, imodium PRN for diarrhea, GI FU in 2 months. They have signed off on care. - Repeat lactic acid pending. Possible syncope/presyncope with fall and head trauma - Multiple falls at home. Etiology unclear may be due to peripheral neuropathy, cardiac etiology, visual impairment. Denies use of alcohol. Now with area of dried blood on posterior scalp, headaches, and neck pain. CT head no acute intracranial process. CT C spine negative for acute sequelae of truama. UDS negative. Vit B 12, folate wnl  
-Scheduled tylenol 650 mg q6 hours as needed for headache 
  
Hyponatremia, resolved - 131 on admission; improved at 142. Likely occurred in setting of nausea and vomiting. Now resolved. - Fluids as above  
- daily BMP 
-encourage oral intake  
  
Hypokalemia, resolved 3.5 today 
- replete as indicated 
- daily BMP  
  
CRYSTAL, resolved. Cr 3.72 > 3.54.>2.3>1.33>1. 03. Baseline appears to be 1.5-2.0. Now resolved. - avoid nephrotoxic medications  
- renally dose antibiotics  
  
DM with retinopathy and neuropathy - Glucose check ACHS; SSI  
- Lantus 15 daily (increased from 12 daily)  
- adjust insulin based on requirements  
- hold home metformin 
- hold home novolog mix 70/30   
  
HTN  - BP have been low normal during admission  
- Start home lisinopril 20 mg daily  
  
Anxiety  
- Start home Paxil 40 mg daily  
  
  
Diet: Diabetic diet DVT Prophylaxis: [facility] Code Status: Full Code Point of Contact: Yashira Piper: [id number]) [phone]; Trevon Strong (son) [phone] 
  
  
Disposition and anticipated LOS: 2 midnights Ayala See DO 
9:09 AM 
01/09/19

## 2019-01-09 NOTE — ROUTINE PROCESS
Critical Lactic acid of 3.0. Down from previous level of 3.2. Paged [facility] to report critical level.

## 2019-01-10 LAB
BACTERIA SPEC CULT: NORMAL
SERVICE CMNT-IMP: NORMAL

## 2019-01-12 ENCOUNTER — HOSPITAL ENCOUNTER (EMERGENCY)
Age: 54
Discharge: HOME | End: 2019-01-12
Attending: EMERGENCY MEDICINE | Admitting: EMERGENCY MEDICINE
Payer: MEDICAID

## 2019-01-12 ENCOUNTER — APPOINTMENT (OUTPATIENT)
Dept: GENERAL RADIOLOGY | Age: 54
End: 2019-01-12
Attending: PHYSICIAN ASSISTANT
Payer: MEDICAID

## 2019-01-12 VITALS
DIASTOLIC BLOOD PRESSURE: 79 MMHG | WEIGHT: 210 LBS | HEIGHT: 68 IN | OXYGEN SATURATION: 100 % | TEMPERATURE: 98.1 F | HEART RATE: 99 BPM | SYSTOLIC BLOOD PRESSURE: 124 MMHG | RESPIRATION RATE: 15 BRPM | BODY MASS INDEX: 31.83 KG/M2

## 2019-01-12 DIAGNOSIS — R11.2 NON-INTRACTABLE VOMITING WITH NAUSEA, UNSPECIFIED VOMITING TYPE: ICD-10-CM

## 2019-01-12 DIAGNOSIS — R73.9 HYPERGLYCEMIA: ICD-10-CM

## 2019-01-12 DIAGNOSIS — R10.84 GENERALIZED ABDOMINAL PAIN: Primary | ICD-10-CM

## 2019-01-12 DIAGNOSIS — E11.8 TYPE 2 DIABETES MELLITUS WITH COMPLICATION, WITH LONG-TERM CURRENT USE OF INSULIN (HCC): ICD-10-CM

## 2019-01-12 DIAGNOSIS — Z79.4 TYPE 2 DIABETES MELLITUS WITH COMPLICATION, WITH LONG-TERM CURRENT USE OF INSULIN (HCC): ICD-10-CM

## 2019-01-12 DIAGNOSIS — E86.0 DEHYDRATION: ICD-10-CM

## 2019-01-12 LAB
ALBUMIN SERPL-MCNC: 4.3 G/DL (ref 3.4–5)
ALBUMIN/GLOB SERPL: 1 {RATIO} (ref 0.8–1.7)
ALP SERPL-CCNC: 140 U/L (ref 45–117)
ALT SERPL-CCNC: 19 U/L (ref 13–56)
ANION GAP SERPL CALC-SCNC: 16 MMOL/L (ref 3–18)
APPEARANCE UR: ABNORMAL
AST SERPL-CCNC: 16 U/L (ref 15–37)
ATRIAL RATE: 112 BPM
BACTERIA SPEC CULT: NORMAL
BASOPHILS # BLD: 0 K/UL (ref 0–0.1)
BASOPHILS NFR BLD: 0 % (ref 0–2)
BILIRUB SERPL-MCNC: 1.1 MG/DL (ref 0.2–1)
BILIRUB UR QL: NEGATIVE
BUN SERPL-MCNC: 16 MG/DL (ref 7–18)
BUN/CREAT SERPL: 13 (ref 12–20)
CALCIUM SERPL-MCNC: 9.9 MG/DL (ref 8.5–10.1)
CALCULATED P AXIS, ECG09: 77 DEGREES
CALCULATED R AXIS, ECG10: 31 DEGREES
CALCULATED T AXIS, ECG11: 50 DEGREES
CHLORIDE SERPL-SCNC: 100 MMOL/L (ref 100–108)
CK MB CFR SERPL CALC: NORMAL % (ref 0–4)
CK MB SERPL-MCNC: <1 NG/ML (ref 5–25)
CK SERPL-CCNC: 106 U/L (ref 26–192)
CO2 SERPL-SCNC: 23 MMOL/L (ref 21–32)
COLOR UR: YELLOW
CREAT SERPL-MCNC: 1.22 MG/DL (ref 0.6–1.3)
DIAGNOSIS, 93000: NORMAL
DIFFERENTIAL METHOD BLD: ABNORMAL
EOSINOPHIL # BLD: 0.1 K/UL (ref 0–0.4)
EOSINOPHIL NFR BLD: 1 % (ref 0–5)
ERYTHROCYTE [DISTWIDTH] IN BLOOD BY AUTOMATED COUNT: 13.8 % (ref 11.6–14.5)
GLOBULIN SER CALC-MCNC: 4.4 G/DL (ref 2–4)
GLUCOSE BLD STRIP.AUTO-MCNC: 276 MG/DL (ref 70–110)
GLUCOSE SERPL-MCNC: 307 MG/DL (ref 74–99)
GLUCOSE UR STRIP.AUTO-MCNC: >1000 MG/DL
HCT VFR BLD AUTO: 32.2 % (ref 35–45)
HGB BLD-MCNC: 10.9 G/DL (ref 12–16)
HGB UR QL STRIP: NEGATIVE
KETONES UR QL STRIP.AUTO: ABNORMAL MG/DL
LACTATE BLD-SCNC: 1.65 MMOL/L (ref 0.4–2)
LACTATE BLD-SCNC: 2.05 MMOL/L (ref 0.4–2)
LEUKOCYTE ESTERASE UR QL STRIP.AUTO: NEGATIVE
LIPASE SERPL-CCNC: 241 U/L (ref 73–393)
LYMPHOCYTES # BLD: 2.2 K/UL (ref 0.9–3.6)
LYMPHOCYTES NFR BLD: 17 % (ref 21–52)
MCH RBC QN AUTO: 29.1 PG (ref 24–34)
MCHC RBC AUTO-ENTMCNC: 33.9 G/DL (ref 31–37)
MCV RBC AUTO: 85.9 FL (ref 74–97)
MONOCYTES # BLD: 0.5 K/UL (ref 0.05–1.2)
MONOCYTES NFR BLD: 4 % (ref 3–10)
NEUTS SEG # BLD: 9.6 K/UL (ref 1.8–8)
NEUTS SEG NFR BLD: 78 % (ref 40–73)
NITRITE UR QL STRIP.AUTO: NEGATIVE
P-R INTERVAL, ECG05: 154 MS
PH UR STRIP: 8 [PH] (ref 5–8)
PLATELET # BLD AUTO: 355 K/UL (ref 135–420)
PMV BLD AUTO: 10.5 FL (ref 9.2–11.8)
POTASSIUM SERPL-SCNC: 3.6 MMOL/L (ref 3.5–5.5)
PROT SERPL-MCNC: 8.7 G/DL (ref 6.4–8.2)
PROT UR STRIP-MCNC: NEGATIVE MG/DL
Q-T INTERVAL, ECG07: 336 MS
QRS DURATION, ECG06: 58 MS
QTC CALCULATION (BEZET), ECG08: 458 MS
RBC # BLD AUTO: 3.75 M/UL (ref 4.2–5.3)
SERVICE CMNT-IMP: NORMAL
SODIUM SERPL-SCNC: 139 MMOL/L (ref 136–145)
SP GR UR REFRACTOMETRY: 1.01 (ref 1–1.03)
TROPONIN I SERPL-MCNC: <0.02 NG/ML (ref 0–0.06)
UROBILINOGEN UR QL STRIP.AUTO: 0.2 EU/DL (ref 0.2–1)
VENTRICULAR RATE, ECG03: 112 BPM
WBC # BLD AUTO: 12.4 K/UL (ref 4.6–13.2)

## 2019-01-12 PROCEDURE — 81003 URINALYSIS AUTO W/O SCOPE: CPT

## 2019-01-12 PROCEDURE — 93005 ELECTROCARDIOGRAM TRACING: CPT

## 2019-01-12 PROCEDURE — 99285 EMERGENCY DEPT VISIT HI MDM: CPT

## 2019-01-12 PROCEDURE — 82550 ASSAY OF CK (CPK): CPT

## 2019-01-12 PROCEDURE — 74011250636 HC RX REV CODE- 250/636: Performed by: PHYSICIAN ASSISTANT

## 2019-01-12 PROCEDURE — 83690 ASSAY OF LIPASE: CPT

## 2019-01-12 PROCEDURE — 71045 X-RAY EXAM CHEST 1 VIEW: CPT

## 2019-01-12 PROCEDURE — 96361 HYDRATE IV INFUSION ADD-ON: CPT

## 2019-01-12 PROCEDURE — 85025 COMPLETE CBC W/AUTO DIFF WBC: CPT

## 2019-01-12 PROCEDURE — 96365 THER/PROPH/DIAG IV INF INIT: CPT

## 2019-01-12 PROCEDURE — 82962 GLUCOSE BLOOD TEST: CPT

## 2019-01-12 PROCEDURE — 87040 BLOOD CULTURE FOR BACTERIA: CPT

## 2019-01-12 PROCEDURE — 83605 ASSAY OF LACTIC ACID: CPT

## 2019-01-12 PROCEDURE — 80053 COMPREHEN METABOLIC PANEL: CPT

## 2019-01-12 PROCEDURE — 96375 TX/PRO/DX INJ NEW DRUG ADDON: CPT

## 2019-01-12 PROCEDURE — 74011000258 HC RX REV CODE- 258: Performed by: PHYSICIAN ASSISTANT

## 2019-01-12 RX ORDER — ONDANSETRON 2 MG/ML
4 INJECTION INTRAMUSCULAR; INTRAVENOUS
Status: DISCONTINUED | OUTPATIENT
Start: 2019-01-12 | End: 2019-01-12

## 2019-01-12 RX ORDER — MORPHINE SULFATE 4 MG/ML
4 INJECTION INTRAVENOUS
Status: COMPLETED | OUTPATIENT
Start: 2019-01-12 | End: 2019-01-12

## 2019-01-12 RX ORDER — HALOPERIDOL 5 MG/ML
2 INJECTION INTRAMUSCULAR
Status: COMPLETED | OUTPATIENT
Start: 2019-01-12 | End: 2019-01-12

## 2019-01-12 RX ORDER — MORPHINE SULFATE 4 MG/ML
4 INJECTION, SOLUTION INTRAMUSCULAR; INTRAVENOUS
Status: DISCONTINUED | OUTPATIENT
Start: 2019-01-12 | End: 2019-01-12 | Stop reason: RX

## 2019-01-12 RX ORDER — ONDANSETRON 4 MG/1
4 TABLET, ORALLY DISINTEGRATING ORAL
Status: DISCONTINUED | OUTPATIENT
Start: 2019-01-12 | End: 2019-01-12

## 2019-01-12 RX ORDER — LISINOPRIL 20 MG/1
TABLET ORAL
COMMUNITY
Start: 2019-01-07 | End: 2019-01-25

## 2019-01-12 RX ORDER — HALOPERIDOL 5 MG/ML
3 INJECTION INTRAMUSCULAR ONCE
Status: COMPLETED | OUTPATIENT
Start: 2019-01-12 | End: 2019-01-12

## 2019-01-12 RX ORDER — SODIUM CHLORIDE 0.9 % (FLUSH) 0.9 %
5-10 SYRINGE (ML) INJECTION AS NEEDED
Status: DISCONTINUED | OUTPATIENT
Start: 2019-01-12 | End: 2019-01-12 | Stop reason: HOSPADM

## 2019-01-12 RX ORDER — MORPHINE SULFATE 4 MG/ML
4 INJECTION INTRAVENOUS
Status: DISCONTINUED | OUTPATIENT
Start: 2019-01-12 | End: 2019-01-12

## 2019-01-12 RX ADMIN — HALOPERIDOL LACTATE 2 MG: 5 INJECTION, SOLUTION INTRAMUSCULAR at 11:17

## 2019-01-12 RX ADMIN — HALOPERIDOL LACTATE 3 MG: 5 INJECTION, SOLUTION INTRAMUSCULAR at 11:20

## 2019-01-12 RX ADMIN — PIPERACILLIN SODIUM,TAZOBACTAM SODIUM 3.38 G: 3; .375 INJECTION, POWDER, FOR SOLUTION INTRAVENOUS at 12:34

## 2019-01-12 RX ADMIN — SODIUM CHLORIDE 1000 ML: 900 INJECTION, SOLUTION INTRAVENOUS at 13:38

## 2019-01-12 RX ADMIN — MORPHINE SULFATE 4 MG: 4 INJECTION INTRAVENOUS at 11:17

## 2019-01-12 RX ADMIN — SODIUM CHLORIDE 1000 ML: 900 INJECTION, SOLUTION INTRAVENOUS at 11:23

## 2019-01-12 NOTE — ED NOTES
Beverley Franco is a 52 y.o. female that was discharged in good condition. The patients diagnosis, condition and treatment were explained to  patient and aftercare instructions were given. The patient verbalized understanding. Patient armband removed and shredded.

## 2019-01-12 NOTE — DISCHARGE INSTRUCTIONS
Patient Education        Abdominal Pain: Care Instructions  Your Care Instructions    Abdominal pain has many possible causes. Some aren't serious and get better on their own in a few days. Others need more testing and treatment. If your pain continues or gets worse, you need to be rechecked and may need more tests to find out what is wrong. You may need surgery to correct the problem. Don't ignore new symptoms, such as fever, nausea and vomiting, urination problems, pain that gets worse, and dizziness. These may be signs of a more serious problem. Your doctor may have recommended a follow-up visit in the next 8 to 12 hours. If you are not getting better, you may need more tests or treatment. The doctor has checked you carefully, but problems can develop later. If you notice any problems or new symptoms, get medical treatment right away. Follow-up care is a key part of your treatment and safety. Be sure to make and go to all appointments, and call your doctor if you are having problems. It's also a good idea to know your test results and keep a list of the medicines you take. How can you care for yourself at home? · Rest until you feel better. · To prevent dehydration, drink plenty of fluids, enough so that your urine is light yellow or clear like water. Choose water and other caffeine-free clear liquids until you feel better. If you have kidney, heart, or liver disease and have to limit fluids, talk with your doctor before you increase the amount of fluids you drink. · If your stomach is upset, eat mild foods, such as rice, dry toast or crackers, bananas, and applesauce. Try eating several small meals instead of two or three large ones. · Wait until 48 hours after all symptoms have gone away before you have spicy foods, alcohol, and drinks that contain caffeine. · Do not eat foods that are high in fat. · Avoid anti-inflammatory medicines such as aspirin, ibuprofen (Advil, Motrin), and naproxen (Aleve). These can cause stomach upset. Talk to your doctor if you take daily aspirin for another health problem. When should you call for help? Call 911 anytime you think you may need emergency care. For example, call if:    · You passed out (lost consciousness).     · You pass maroon or very bloody stools.     · You vomit blood or what looks like coffee grounds.     · You have new, severe belly pain.    Call your doctor now or seek immediate medical care if:    · Your pain gets worse, especially if it becomes focused in one area of your belly.     · You have a new or higher fever.     · Your stools are black and look like tar, or they have streaks of blood.     · You have unexpected vaginal bleeding.     · You have symptoms of a urinary tract infection. These may include:  ? Pain when you urinate. ? Urinating more often than usual.  ? Blood in your urine.     · You are dizzy or lightheaded, or you feel like you may faint.    Watch closely for changes in your health, and be sure to contact your doctor if:    · You are not getting better after 1 day (24 hours). Where can you learn more? Go to http://lindaCivicSciencesivan.info/. Enter E267 in the search box to learn more about \"Abdominal Pain: Care Instructions. \"  Current as of: November 20, 2017  Content Version: 11.8  © 6918-2928 [employer]. Care instructions adapted under license by [employer] (which disclaims liability or warranty for this information). If you have questions about a medical condition or this instruction, always ask your healthcare professional. [place] [address] any warranty or liability for your use of this information. Patient Education        Nausea and Vomiting: Care Instructions  Your Care Instructions    When you are nauseated, you may feel weak and sweaty and notice a lot of saliva in your mouth. Nausea often leads to vomiting.  Most of the time you do not need to worry about nausea and vomiting, but they can be signs of other illnesses. Two common causes of nausea and vomiting are stomach flu and food poisoning. Nausea and vomiting from viral stomach flu will usually start to improve within 24 hours. Nausea and vomiting from food poisoning may last from 12 to 48 hours. The doctor has checked you carefully, but problems can develop later. If you notice any problems or new symptoms, get medical treatment right away. Follow-up care is a key part of your treatment and safety. Be sure to make and go to all appointments, and call your doctor if you are having problems. It's also a good idea to know your test results and keep a list of the medicines you take. How can you care for yourself at home? · To prevent dehydration, drink plenty of fluids, enough so that your urine is light yellow or clear like water. Choose water and other caffeine-free clear liquids until you feel better. If you have kidney, heart, or liver disease and have to limit fluids, talk with your doctor before you increase the amount of fluids you drink. · Rest in bed until you feel better. · When you are able to eat, try clear soups, mild foods, and liquids until all symptoms are gone for 12 to 48 hours. Other good choices include dry toast, crackers, cooked cereal, and gelatin dessert, such as Jell-O. When should you call for help? Call 911 anytime you think you may need emergency care. For example, call if:    · You passed out (lost consciousness).    Call your doctor now or seek immediate medical care if:    · You have symptoms of dehydration, such as:  ? Dry eyes and a dry mouth. ? Passing only a little dark urine. ?  Feeling thirstier than usual.     · You have new or worsening belly pain.     · You have a new or higher fever.     · You vomit blood or what looks like coffee grounds.    Watch closely for changes in your health, and be sure to contact your doctor if:    · You have ongoing nausea and vomiting.     · Your vomiting is getting worse.     · Your vomiting lasts longer than 2 days.     · You are not getting better as expected. Where can you learn more? Go to http://linda-sivan.info/. Enter 25 [id number] in the search box to learn more about \"Nausea and Vomiting: Care Instructions. \"  Current as of: November 20, 2017  Content Version: 11.8  © 0899-3871 [employer]. Care instructions adapted under license by [employer] (which disclaims liability or warranty for this information). If you have questions about a medical condition or this instruction, always ask your healthcare professional. [place] [address] any warranty or liability for your use of this information.

## 2019-01-12 NOTE — ED PROVIDER NOTES
EMERGENCY DEPARTMENT HISTORY AND PHYSICAL EXAM 
 
Date: 1/12/2019 Patient Name: Otto Niño History of Presenting Illness Chief Complaint Patient presents with  Abdominal Pain  Vomiting  High Blood Sugar History Provided By: Patient Chief Complaint: elevated BS, abdominal pain, NV Duration: this morning Timing: Location: abdomen Quality: dull Severity: mild Modifying Factors: gastroparesis Associated Symptoms: urinary frequency, sweats Additional History (Context): Otto Niño is a 52 y.o. female with h/o DM, gastroparesis, SIRS, pancreatitis, GERD, cellulitis, osteomyelitis who presents with abdominal pain, NV, urinary frequency since waking up this morning. States BS was over 300 this morning. Reports continuous nausea and abdominal cramping with some vomiting, not bilious or bloody. BM this morning. Reports urinary frequency as well without dysuria or hematuria. Was admitted to [facility] 1/3 for the same complaint. Denies fever, chills, CP, SOB, back pain or any other complaints. States sx today are c/w previous episodes of gastroparesis. PCP: Lisa Sandovla MD 
 
Current Facility-Administered Medications Medication Dose Route Frequency Provider Last Rate Last Dose  sodium chloride (NS) flush 5-10 mL  5-10 mL IntraVENous PRN Albert Irving PA-C      
 sodium chloride 0.9 % bolus infusion 859 mL  859 mL IntraVENous ONCE Albert Irving PA-C      
 piperacillin-tazobactam (ZOSYN) 3.375 g in 0.9% sodium chloride (MBP/ADV) 100 mL MBP  3.375 g IntraVENous Q6H Albert Irving PA-C   Stopped at 01/12/19 1305 Current Outpatient Medications Medication Sig Dispense Refill  pantoprazole (PROTONIX) 40 mg tablet Take 1 Tab by mouth Daily (before breakfast). 30 Tab 2  
 sucralfate (CARAFATE) 1 gram tablet Take 1 Tab by mouth Before breakfast and dinner.  60 Tab 2  
 amoxicillin-clavulanate (AUGMENTIN) 500-125 mg per tablet Take 1 Tab by mouth two (2) times daily (with meals). 12 Tab 0  cholecalciferol (VITAMIN D3) 1,000 unit tablet Take 1 Tab by mouth daily. 30 Tab 1  potassium chloride (K-DUR, KLOR-CON) 20 mEq tablet Take 1 Tab by mouth daily for 7 days. 7 Tab 0  
 magnesium 250 mg tab Take 1 Tab by mouth daily for 7 days. 7 Tab 0  
 PARoxetine (PAXIL) 40 mg tablet Take 40 mg by mouth daily.  metFORMIN ER (GLUCOPHAGE XR) 500 mg tablet TAKE 1 TABLET BY MOUTH DAILY WITH DINNER 30 Tab 0  
 rosuvastatin (CRESTOR) 20 mg tablet Take 1 Tab by mouth nightly. 90 Tab 3  
 lisinopril (PRINIVIL, ZESTRIL) 20 mg tablet  insulin NPH/insulin regular (NOVOLIN 70/30, HUMULIN 70/30) 100 unit/mL (70-30) injection 40 Units by SubCUTAneous route two (2) times a day. 10 mL 3 Past History Past Medical History: 
Past Medical History:  
Diagnosis Date  Blind left eye  Cellulitis of great toe of right foot 10/19/2017  Diabetes ([address]  Diabetic retinopathy ([address]  Gall stones  GERD (gastroesophageal reflux disease)  Hammertoe  Hiatal hernia  History of mammogram 10/03/2017 No evidence of malignancy  Hypertension  Mass of abdomen  Neuropathy due to type 2 diabetes mellitus ([address]  Osteomyelitis of toe of right foot ([address] 10/19/2017  Pancreatitis Past Surgical History: 
Past Surgical History:  
Procedure Laterality Date  HX AMPUTATION Left 07/21/2017  
 left 2nd toe  HX CHOLECYSTECTOMY  10/15/2014  HX GI Benign GI Stromal Tumor excision  HX HEENT Sx for detached retina  HX MYOMECTOMY x5 removed  HX OTHER SURGICAL    
 upper endoscopy Family History: 
Family History Adopted: Yes  
Problem Relation Age of Onset  Heart Failure Mother 76  
 Other Father 70  
     blood clot after surgery  Diabetes Paternal Aunt  Diabetes Paternal Uncle Social History: 
Social History Tobacco Use  Smoking status: Former Smoker Packs/day: 0.20 Years: 8.00 Pack years: 1.60 Types: Cigarettes Last attempt to quit: 12/3/1995 Years since quittin.1  Smokeless tobacco: Never Used Substance Use Topics  Alcohol use: No  
  Comment: Drinks 3-4xs year generally wine  Drug use: No  
 
 
Allergies: Allergies Allergen Reactions  Levaquin [Levofloxacin] Hives  Promethazine Nausea and Vomiting \"the phenergan made me more nauseated\" Review of Systems Review of Systems Constitutional: Negative for chills and fever. HENT: Negative. Eyes: Negative. Respiratory: Negative for cough and shortness of breath. Cardiovascular: Negative for chest pain and palpitations. Gastrointestinal: Positive for abdominal pain, nausea and vomiting. Negative for constipation and diarrhea. Genitourinary: Negative. Musculoskeletal: Negative for back pain and neck pain. Skin: Negative. Allergic/Immunologic: Negative. Neurological: Negative for dizziness, weakness, numbness and headaches. Psychiatric/Behavioral: Negative. All other systems reviewed and are negative. All Other Systems Negative Physical Exam  
 
Vitals:  
 19 1237 19 1238 19 1300 19 1330 BP: 113/71  122/74 114/58 Pulse:  97 (!) 107 96 Resp:  23 30 14 Temp:      
SpO2:  100% 100% 99% Weight:      
Height:      
 
Physical Exam  
Constitutional: She is oriented to person, place, and time. She appears well-developed and well-nourished. She appears distressed. uncomfortable HENT:  
Head: Normocephalic and atraumatic. Nose: Nose normal.  
Mouth/Throat: Oropharynx is clear and moist. No oropharyngeal exudate. Eyes: Conjunctivae and EOM are normal. Pupils are equal, round, and reactive to light. Neck: Normal range of motion. Neck supple. Cardiovascular: Normal rate, regular rhythm and normal heart sounds. No murmur heard. Pulmonary/Chest: Effort normal and breath sounds normal. No respiratory distress. She has no wheezes. She has no rales. Abdominal: Soft. She exhibits no distension. There is tenderness. There is no guarding. Generalized Musculoskeletal: Normal range of motion. Lymphadenopathy:  
  She has no cervical adenopathy. Neurological: She is alert and oriented to person, place, and time. No cranial nerve deficit. Coordination normal.  
Skin: Skin is warm. No rash noted. She is not diaphoretic. Psychiatric: She has a normal mood and affect. Her behavior is normal.  
Nursing note and vitals reviewed. Diagnostic Study Results Labs - Recent Results (from the past 12 hour(s)) GLUCOSE, POC Collection Time: 01/12/19 10:31 AM  
Result Value Ref Range Glucose (POC) 276 (H) 70 - 110 mg/dL POC LACTIC ACID Collection Time: 01/12/19 10:49 AM  
Result Value Ref Range Lactic Acid (POC) 2.05 (HH) 0.40 - 2.00 mmol/L  
EKG, 12 LEAD, INITIAL Collection Time: 01/12/19 10:58 AM  
Result Value Ref Range Ventricular Rate 112 BPM  
 Atrial Rate 112 BPM  
 P-R Interval 154 ms QRS Duration 58 ms Q-T Interval 336 ms  
 QTC Calculation (Bezet) 458 ms Calculated P Axis 77 degrees Calculated R Axis 31 degrees Calculated T Axis 50 degrees Diagnosis Sinus tachycardia with frequent premature ventricular complexes Possible Left atrial enlargement Septal infarct (cited on or before 09-MAY-2014) Abnormal ECG When compared with ECG of 05-JAN-2019 14:18, 
premature ventricular complexes are now present Borderline criteria for Inferior infarct are no longer present CBC WITH AUTOMATED DIFF Collection Time: 01/12/19 11:20 AM  
Result Value Ref Range WBC 12.4 4.6 - 13.2 K/uL  
 RBC 3.75 (L) 4.20 - 5.30 M/uL  
 HGB 10.9 (L) 12.0 - 16.0 g/dL HCT 32.2 (L) 35.0 - 45.0 % MCV 85.9 74.0 - 97.0 FL  
 MCH 29.1 24.0 - 34.0 PG  
 MCHC 33.9 31.0 - 37.0 g/dL RDW 13.8 11.6 - 14.5 % PLATELET 179 230 - 242 K/uL MPV 10.5 9.2 - 11.8 FL  
 NEUTROPHILS 78 (H) 40 - 73 % LYMPHOCYTES 17 (L) 21 - 52 % MONOCYTES 4 3 - 10 % EOSINOPHILS 1 0 - 5 % BASOPHILS 0 0 - 2 %  
 ABS. NEUTROPHILS 9.6 (H) 1.8 - 8.0 K/UL  
 ABS. LYMPHOCYTES 2.2 0.9 - 3.6 K/UL  
 ABS. MONOCYTES 0.5 0.05 - 1.2 K/UL  
 ABS. EOSINOPHILS 0.1 0.0 - 0.4 K/UL  
 ABS. BASOPHILS 0.0 0.0 - 0.1 K/UL  
 DF AUTOMATED    
LIPASE Collection Time: 01/12/19 11:20 AM  
Result Value Ref Range Lipase 241 73 - 837 U/L  
METABOLIC PANEL, COMPREHENSIVE Collection Time: 01/12/19 11:20 AM  
Result Value Ref Range Sodium 139 136 - 145 mmol/L Potassium 3.6 3.5 - 5.5 mmol/L Chloride 100 100 - 108 mmol/L  
 CO2 23 21 - 32 mmol/L Anion gap 16 3.0 - 18 mmol/L Glucose 307 (H) 74 - 99 mg/dL BUN 16 7.0 - 18 MG/DL Creatinine 1.22 0.6 - 1.3 MG/DL  
 BUN/Creatinine ratio 13 12 - 20 GFR est AA 55 (L) >60 ml/min/1.73m2 GFR est non-AA 46 (L) >60 ml/min/1.73m2 Calcium 9.9 8.5 - 10.1 MG/DL Bilirubin, total 1.1 (H) 0.2 - 1.0 MG/DL  
 ALT (SGPT) 19 13 - 56 U/L  
 AST (SGOT) 16 15 - 37 U/L Alk. phosphatase 140 (H) 45 - 117 U/L Protein, total 8.7 (H) 6.4 - 8.2 g/dL Albumin 4.3 3.4 - 5.0 g/dL Globulin 4.4 (H) 2.0 - 4.0 g/dL A-G Ratio 1.0 0.8 - 1.7 CARDIAC PANEL,(CK, CKMB & TROPONIN) Collection Time: 01/12/19 11:20 AM  
Result Value Ref Range  26 - 192 U/L  
 CK - MB <1.0 <3.6 ng/ml CK-MB Index  0.0 - 4.0 % CALCULATION NOT PERFORMED WHEN RESULT IS BELOW LINEAR LIMIT Troponin-I, QT <0.02 0.00 - 0.06 NG/ML  
URINALYSIS W/ RFLX MICROSCOPIC Collection Time: 01/12/19 11:24 AM  
Result Value Ref Range Color YELLOW Appearance CLOUDY Specific gravity 1.011 1.005 - 1.030    
 pH (UA) 8.0 5.0 - 8.0 Protein NEGATIVE  NEG mg/dL Glucose >1,000 (A) NEG mg/dL Ketone TRACE (A) NEG mg/dL Bilirubin NEGATIVE  NEG  Blood NEGATIVE  NEG    
 Urobilinogen 0.2 0.2 - 1.0 EU/dL Nitrites NEGATIVE  NEG Leukocyte Esterase NEGATIVE  NEG Radiologic Studies -  
XR CHEST PORT Final Result IMPRESSION:    
1. Unremarkable chest  
  
   
  
  
 
CT Results  (Last 48 hours) None CXR Results  (Last 48 hours) 01/12/19 1127  XR CHEST PORT Final result Impression:  IMPRESSION:    
1. Unremarkable chest  
   
   
   
  
 Narrative:  EXAM: XR CHEST PORT  
   
CLINICAL INDICATION/HISTORY : meets SIRS criteria. Abdominal pain with nausea  
and vomiting COMPARISON: January 3, 2019 TECHNIQUE: 1 VIEWS  
   
FINDINGS:   
EKG leads and wires overlie the chest  
The lungs are clear. The heart and mediastinum are unremarkable. No evidence of pleural effusion. Medical Decision Making I am the first provider for this patient. I reviewed the vital signs, available nursing notes, past medical history, past surgical history, family history and social history. Vital Signs-Reviewed the patient's vital signs. Pulse Oximetry Analysis - 99% on RA Cardiac Monitor: 
Rate: 96 
Rhythm:NSR 
 
EKG: Interpreted by the EP. Beatriz Whipple Time Interpreted: 5060 Rate: 112 Rhythm: sinus tachy with PVCs Interpretation: no stemi Comparison: 
 
Records Reviewed: Nursing Notes, Old Medical Records, Ambulance Run Sheet, Previous Radiology Studies and Previous Laboratory Studies Procedures: 
Procedures Provider Notes (Medical Decision Making):  
60LY F with h/o gastroparesis, DM, GERD, OM< pancreatitis, SIRS without sepsis here with abdominal pain, NV [id number]. Urinary frequency without dysuria/hematuria. Recent admission for same. Sepsis bundle initiated due to tachycardia, tachypnea. Lactic elevated. IVF, zosyn ordered as concern for abdominal cause of SIRS. Plan to recheck lactic after fluids, reassess pain/nausea.   
  
1:00 PM 
Labs reviewed-  
WBC normal 
 CMP glucose mildly elevated otherwise normal including Cr, BUN Cardiac labs WNL 
ua negative for UTI 
CXR clear 1:37 PM 
Pt is sleeping, once woken up, smiling and states pain has resolved. Abdomen soft, nTTP. Doubt need for CT or imaging today. No localized pain and resolution after meds. VSS. HR and RR normalized. Will recheck lactic and plan to discharge home. 2:30 PM 
Lactic 1.65. Not septic, elevated lactic c/w dehydration, vomiting. Pt appropriate for d/c home at this time. MED RECONCILIATION: 
Current Facility-Administered Medications Medication Dose Route Frequency  sodium chloride (NS) flush 5-10 mL  5-10 mL IntraVENous PRN  
 sodium chloride 0.9 % bolus infusion 859 mL  859 mL IntraVENous ONCE  piperacillin-tazobactam (ZOSYN) 3.375 g in 0.9% sodium chloride (MBP/ADV) 100 mL MBP  3.375 g IntraVENous Q6H Current Outpatient Medications Medication Sig  pantoprazole (PROTONIX) 40 mg tablet Take 1 Tab by mouth Daily (before breakfast).  sucralfate (CARAFATE) 1 gram tablet Take 1 Tab by mouth Before breakfast and dinner.  amoxicillin-clavulanate (AUGMENTIN) 500-125 mg per tablet Take 1 Tab by mouth two (2) times daily (with meals).  cholecalciferol (VITAMIN D3) 1,000 unit tablet Take 1 Tab by mouth daily.  potassium chloride (K-DUR, KLOR-CON) 20 mEq tablet Take 1 Tab by mouth daily for 7 days.  magnesium 250 mg tab Take 1 Tab by mouth daily for 7 days.  PARoxetine (PAXIL) 40 mg tablet Take 40 mg by mouth daily.  metFORMIN ER (GLUCOPHAGE XR) 500 mg tablet TAKE 1 TABLET BY MOUTH DAILY WITH DINNER  
 rosuvastatin (CRESTOR) 20 mg tablet Take 1 Tab by mouth nightly.  lisinopril (PRINIVIL, ZESTRIL) 20 mg tablet  insulin NPH/insulin regular (NOVOLIN 70/30, HUMULIN 70/30) 100 unit/mL (70-30) injection 40 Units by SubCUTAneous route two (2) times a day. Disposition: home DISCHARGE NOTE:  
 
 Pt has been reexamined. Patient has no new complaints, changes, or physical findings. Care plan outlined and precautions discussed. Results of work up were reviewed with the patient. All medications were reviewed with the patient; will d/c home with pcp f/u. All of pt's questions and concerns were addressed. Patient was instructed and agrees to follow up with GI and PCP f/u, as well as to return to the ED upon further deterioration. Patient is ready to go home. Follow-up Information Follow up With Specialties Details Why Contact Info Pascale Cha MD [place] [address] 
[phone] [id number] [facility] EMERGENCY DEPT Emergency Medicine   [facility] [address] [facility] [place] [id number] [phone] Current Discharge Medication List  
  
 
 
 
 
 
 
Diagnosis Clinical Impression: 1. Generalized abdominal pain 2. Non-intractable vomiting with nausea, unspecified vomiting type

## 2019-01-22 ENCOUNTER — HOSPITAL ENCOUNTER (INPATIENT)
Age: 54
LOS: 2 days | Discharge: HOME | DRG: 048 | End: 2019-01-25
Attending: EMERGENCY MEDICINE | Admitting: FAMILY MEDICINE
Payer: MEDICAID

## 2019-01-22 ENCOUNTER — APPOINTMENT (OUTPATIENT)
Dept: GENERAL RADIOLOGY | Age: 54
DRG: 048 | End: 2019-01-22
Attending: EMERGENCY MEDICINE
Payer: MEDICAID

## 2019-01-22 DIAGNOSIS — R19.7 NAUSEA VOMITING AND DIARRHEA: ICD-10-CM

## 2019-01-22 DIAGNOSIS — R11.2 NAUSEA VOMITING AND DIARRHEA: ICD-10-CM

## 2019-01-22 DIAGNOSIS — M79.10 MYALGIA: Primary | ICD-10-CM

## 2019-01-22 LAB
ALBUMIN SERPL-MCNC: 4.4 G/DL (ref 3.4–5)
ALBUMIN/GLOB SERPL: 0.9 {RATIO} (ref 0.8–1.7)
ALP SERPL-CCNC: 163 U/L (ref 45–117)
ALT SERPL-CCNC: 17 U/L (ref 13–56)
ANION GAP SERPL CALC-SCNC: 11 MMOL/L (ref 3–18)
AST SERPL-CCNC: 21 U/L (ref 15–37)
BASOPHILS # BLD: 0 K/UL (ref 0–0.1)
BASOPHILS NFR BLD: 0 % (ref 0–2)
BILIRUB SERPL-MCNC: 2 MG/DL (ref 0.2–1)
BUN SERPL-MCNC: 12 MG/DL (ref 7–18)
BUN/CREAT SERPL: 7 (ref 12–20)
CALCIUM SERPL-MCNC: 9.4 MG/DL (ref 8.5–10.1)
CHLORIDE SERPL-SCNC: 105 MMOL/L (ref 100–108)
CK SERPL-CCNC: 84 U/L (ref 26–192)
CO2 SERPL-SCNC: 21 MMOL/L (ref 21–32)
CREAT SERPL-MCNC: 1.7 MG/DL (ref 0.6–1.3)
DIFFERENTIAL METHOD BLD: ABNORMAL
EOSINOPHIL # BLD: 0 K/UL (ref 0–0.4)
EOSINOPHIL NFR BLD: 0 % (ref 0–5)
ERYTHROCYTE [DISTWIDTH] IN BLOOD BY AUTOMATED COUNT: 14.3 % (ref 11.6–14.5)
FLUAV AG NPH QL IA: NEGATIVE
FLUBV AG NOSE QL IA: NEGATIVE
GLOBULIN SER CALC-MCNC: 4.7 G/DL (ref 2–4)
GLUCOSE SERPL-MCNC: 285 MG/DL (ref 74–99)
HCT VFR BLD AUTO: 33.3 % (ref 35–45)
HGB BLD-MCNC: 11.5 G/DL (ref 12–16)
LYMPHOCYTES # BLD: 1.6 K/UL (ref 0.9–3.6)
LYMPHOCYTES NFR BLD: 12 % (ref 21–52)
MCH RBC QN AUTO: 29.3 PG (ref 24–34)
MCHC RBC AUTO-ENTMCNC: 34.5 G/DL (ref 31–37)
MCV RBC AUTO: 84.9 FL (ref 74–97)
MONOCYTES # BLD: 0.4 K/UL (ref 0.05–1.2)
MONOCYTES NFR BLD: 3 % (ref 3–10)
NEUTS SEG # BLD: 11.6 K/UL (ref 1.8–8)
NEUTS SEG NFR BLD: 85 % (ref 40–73)
PLATELET # BLD AUTO: 416 K/UL (ref 135–420)
PMV BLD AUTO: 9.7 FL (ref 9.2–11.8)
POTASSIUM SERPL-SCNC: 3.8 MMOL/L (ref 3.5–5.5)
PROT SERPL-MCNC: 9.1 G/DL (ref 6.4–8.2)
RBC # BLD AUTO: 3.92 M/UL (ref 4.2–5.3)
SODIUM SERPL-SCNC: 137 MMOL/L (ref 136–145)
T3FREE SERPL-MCNC: 3.4 PG/ML (ref 2.18–3.98)
TROPONIN I SERPL-MCNC: <0.02 NG/ML (ref 0–0.04)
TSH SERPL DL<=0.05 MIU/L-ACNC: 1.22 UIU/ML (ref 0.36–3.74)
WBC # BLD AUTO: 13.5 K/UL (ref 4.6–13.2)

## 2019-01-22 PROCEDURE — 87804 INFLUENZA ASSAY W/OPTIC: CPT

## 2019-01-22 PROCEDURE — 84481 FREE ASSAY (FT-3): CPT

## 2019-01-22 PROCEDURE — 74011250637 HC RX REV CODE- 250/637: Performed by: EMERGENCY MEDICINE

## 2019-01-22 PROCEDURE — 74011250636 HC RX REV CODE- 250/636: Performed by: EMERGENCY MEDICINE

## 2019-01-22 PROCEDURE — 85025 COMPLETE CBC W/AUTO DIFF WBC: CPT

## 2019-01-22 PROCEDURE — 82550 ASSAY OF CK (CPK): CPT

## 2019-01-22 PROCEDURE — 93005 ELECTROCARDIOGRAM TRACING: CPT

## 2019-01-22 PROCEDURE — 74011250637 HC RX REV CODE- 250/637

## 2019-01-22 PROCEDURE — 96375 TX/PRO/DX INJ NEW DRUG ADDON: CPT

## 2019-01-22 PROCEDURE — 84443 ASSAY THYROID STIM HORMONE: CPT

## 2019-01-22 PROCEDURE — 99285 EMERGENCY DEPT VISIT HI MDM: CPT

## 2019-01-22 PROCEDURE — 96374 THER/PROPH/DIAG INJ IV PUSH: CPT

## 2019-01-22 PROCEDURE — 96361 HYDRATE IV INFUSION ADD-ON: CPT

## 2019-01-22 PROCEDURE — 80053 COMPREHEN METABOLIC PANEL: CPT

## 2019-01-22 PROCEDURE — 84484 ASSAY OF TROPONIN QUANT: CPT

## 2019-01-22 PROCEDURE — 71045 X-RAY EXAM CHEST 1 VIEW: CPT

## 2019-01-22 RX ORDER — LORAZEPAM 2 MG/ML
1 INJECTION INTRAMUSCULAR
Status: COMPLETED | OUTPATIENT
Start: 2019-01-22 | End: 2019-01-22

## 2019-01-22 RX ORDER — NITROGLYCERIN 0.4 MG/1
TABLET SUBLINGUAL
Status: COMPLETED
Start: 2019-01-22 | End: 2019-01-22

## 2019-01-22 RX ORDER — ONDANSETRON 2 MG/ML
4 INJECTION INTRAMUSCULAR; INTRAVENOUS
Status: COMPLETED | OUTPATIENT
Start: 2019-01-22 | End: 2019-01-22

## 2019-01-22 RX ORDER — GUAIFENESIN 100 MG/5ML
324 LIQUID (ML) ORAL
Status: COMPLETED | OUTPATIENT
Start: 2019-01-22 | End: 2019-01-22

## 2019-01-22 RX ORDER — NITROGLYCERIN 0.4 MG/1
0.4 TABLET SUBLINGUAL
Status: COMPLETED | OUTPATIENT
Start: 2019-01-22 | End: 2019-01-22

## 2019-01-22 RX ADMIN — NITROGLYCERIN 0.4 MG: 0.4 TABLET SUBLINGUAL at 22:20

## 2019-01-22 RX ADMIN — SODIUM CHLORIDE 1000 ML: 900 INJECTION, SOLUTION INTRAVENOUS at 21:26

## 2019-01-22 RX ADMIN — NITROGLYCERIN 0.4 MG: 0.4 TABLET SUBLINGUAL at 19:08

## 2019-01-22 RX ADMIN — NITROGLYCERIN 0.4 MG: 0.4 TABLET SUBLINGUAL at 20:18

## 2019-01-22 RX ADMIN — SODIUM CHLORIDE 1000 ML: 900 INJECTION, SOLUTION INTRAVENOUS at 17:53

## 2019-01-22 RX ADMIN — ONDANSETRON 4 MG: 2 INJECTION INTRAMUSCULAR; INTRAVENOUS at 20:01

## 2019-01-22 RX ADMIN — ASPIRIN 81 MG CHEWABLE TABLET 324 MG: 81 TABLET CHEWABLE at 20:00

## 2019-01-22 RX ADMIN — LORAZEPAM 1 MG: 2 INJECTION INTRAMUSCULAR; INTRAVENOUS at 22:17

## 2019-01-22 NOTE — ED NOTES
6:59 PM :Pt care assumed from Dr. Mirna Nguyễn , ED provider. Pt complaint(s), current treatment plan, progression and available diagnostic results have been discussed thoroughly. Rounding occurred: yes Intended Disposition: pending results Pending diagnostic reports and/or labs (please list): CT and Troponin 7:13 PM Pt turnover at bedside. Pt states she has had this CP since 10 AM and would like something for pain. 12:54 AM Re-evaluated the pt. Heart rate is in the 150s.  
 
1:01 AM Spoke with the hospitalist who is accepting the pt for admission. 1:05 AM Discussed the pt with [facility] Resident.  
 
1:06 AM Patient does not meet sepsis requirement at this time Scribe Attestation [employer] acting as a scribe for and in the presence of Jammie Hernandez DO    
January 22, 2019 at 7:00 PM 
    
Provider Attestation:     
I personally performed the services described in the documentation, reviewed the documentation, as recorded by the scribe in my presence, and it accurately and completely records my words and actions.  January 22, 2019 at 7:00 PM - Jose MunozShickshinny

## 2019-01-22 NOTE — ED PROVIDER NOTES
Emergency Department Treatment Report Patient: Aneesh Mercer Age: 52 y.o. Sex: female YOB: 1965 Admit Date: 1/22/2019 PCP: Felisa Hampton MD  
MRN: [id number]  CSN: [id number] Room: [place] [address] Time Dictated: 5:27 PM   
 
Chief Complaint Nausea/Vomiting; body aches History of Present Illness 52 y.o. female, PMH of gastroparesis, poorly controlled DM, HTN, presents with acute symptoms of nausea/vomiting since 14:00 today. The patient states that her whole body hurts, generalized body aches, and that she has been having chills today. The patient has a history of chronic blindness and multiple toe amputations associated with poorly controlled diabetes. While patient was in the ER, she started to complain of chest pressure. No other concerns or symptoms at this time. Review of Systems Constitutional: Positive for chills. No fever. Eyes: No visual symptoms. Positive for chronic blindness. ENT: No sore throat, runny nose or ear pain. Respiratory: No cough, dyspnea or wheezing. Cardiovascular: +chest pressure Gastrointestinal: Positive for nausea, vomiting with history of gastroparesis. Genitourinary: No dysuria, frequency, or urgency. Musculoskeletal: No joint pain or swelling. Integumentary: No rashes. Neurological: No headaches, sensory or motor symptoms. Denies complaints in all other systems. Past Medical/Surgical History Past Medical History:  
Diagnosis Date  Blind left eye  Cellulitis of great toe of right foot 10/19/2017  Diabetes ([address]  Diabetic retinopathy ([address]  Gall stones  GERD (gastroesophageal reflux disease)  Hammertoe  Hiatal hernia  History of mammogram 10/03/2017 No evidence of malignancy  Hypertension  Mass of abdomen  Neuropathy due to type 2 diabetes mellitus ([address]  Osteomyelitis of toe of right foot ([address] 10/19/2017  Pancreatitis Past Surgical History: Procedure Laterality Date  HX AMPUTATION Left 2017  
 left 2nd toe  HX CHOLECYSTECTOMY  10/15/2014  HX GI Benign GI Stromal Tumor excision  HX HEENT Sx for detached retina  HX MYOMECTOMY x5 removed  HX OTHER SURGICAL    
 upper endoscopy Social History Social History Socioeconomic History  Marital status: SINGLE Spouse name: Not on file  Number of children: Not on file  Years of education: Not on file  Highest education level: Not on file Tobacco Use  Smoking status: Former Smoker Packs/day: 0.20 Years: 8.00 Pack years: 1.60 Types: Cigarettes Last attempt to quit: 12/3/1995 Years since quittin.1  Smokeless tobacco: Never Used Substance and Sexual Activity  Alcohol use: No  
  Comment: Drinks 3-4xs year generally wine  Drug use: No  
 Sexual activity: Not Currently Other Topics Concern   Service No  
 Blood Transfusions No  
 Caffeine Concern No  
 Occupational Exposure No  
 Hobby Hazards No  
 Sleep Concern No  
 Stress Concern No  
 Weight Concern No  
 Special Diet Yes  Back Care No  
 Exercise No  
 Bike Helmet No  
 Seat Belt Yes  Self-Exams Yes Social History Narrative Lives with 16year old son  with ex   Does not have health insurance Family History Family History Adopted: Yes  
Problem Relation Age of Onset  Heart Failure Mother 76  
 Other Father 70  
     blood clot after surgery  Diabetes Paternal Aunt  Diabetes Paternal Uncle Home Medications Prior to Admission Medications Prescriptions Last Dose Informant Patient Reported? Taking? PARoxetine (PAXIL) 40 mg tablet   Yes No  
Sig: Take 40 mg by mouth daily. amoxicillin-clavulanate (AUGMENTIN) 500-125 mg per tablet   No No  
Sig: Take 1 Tab by mouth two (2) times daily (with meals). cholecalciferol (VITAMIN D3) 1,000 unit tablet   No No  
Sig: Take 1 Tab by mouth daily. insulin NPH/insulin regular (NOVOLIN 70/30, HUMULIN 70/30) 100 unit/mL (70-30) injection   No No  
Si Units by SubCUTAneous route two (2) times a day. lisinopril (PRINIVIL, ZESTRIL) 20 mg tablet   Yes No  
metFORMIN ER (GLUCOPHAGE XR) 500 mg tablet   No No  
Sig: TAKE 1 TABLET BY MOUTH DAILY WITH DINNER  
pantoprazole (PROTONIX) 40 mg tablet   No No  
Sig: Take 1 Tab by mouth Daily (before breakfast). rosuvastatin (CRESTOR) 20 mg tablet   No No  
Sig: Take 1 Tab by mouth nightly. sucralfate (CARAFATE) 1 gram tablet   No No  
Sig: Take 1 Tab by mouth Before breakfast and dinner. Facility-Administered Medications: None Allergies Allergies Allergen Reactions  Levaquin [Levofloxacin] Hives  Promethazine Nausea and Vomiting \"the phenergan made me more nauseated\" Physical Exam  
 
ED Triage Vitals ED Encounter Vitals Group /93 Pulse 65 bpm  
   Resp 20 Temp 97.4 Temp src SpO2 100% on RA Weight Height 5'8\" Constitutional: Patient appears well developed and well nourished. Appearance and behavior are age and situation appropriate. HEENT: Conjunctiva clear. PERRLA. Noted chronic blindness. Mucous membranes moist, non-erythematous. Surface of the pharynx, palate, and tongue are pink, moist and without lesions. Neck: supple, non tender, symmetrical, no masses or JVD. Respiratory: lungs clear to auscultation, nonlabored respirations. No tachypnea or accessory muscle use. Cardiovascular: heart regular tachycardic, regular rhythm. Calves soft and non-tender. Distal pulses 2+ and equal bilaterally. No peripheral edema. Gastrointestinal:  Abdomen soft, nontender without complaint of pain to palpation Musculoskeletal: Nail beds pink with prompt capillary refill Integumentary: warm and dry without rashes or lesions Neurologic: alert and oriented, Sensation intact, motor strength equal and symmetric. No facial asymmetry or dysarthria. Diagnostic Studies Lab:  
Recent Results (from the past 12 hour(s)) METABOLIC PANEL, COMPREHENSIVE Collection Time: 01/22/19  5:22 PM  
Result Value Ref Range Sodium 137 136 - 145 mmol/L Potassium 3.8 3.5 - 5.5 mmol/L Chloride 105 100 - 108 mmol/L  
 CO2 21 21 - 32 mmol/L Anion gap 11 3.0 - 18 mmol/L Glucose 285 (H) 74 - 99 mg/dL BUN 12 7.0 - 18 MG/DL Creatinine 1.70 (H) 0.6 - 1.3 MG/DL  
 BUN/Creatinine ratio 7 (L) 12 - 20 GFR est AA 38 (L) >60 ml/min/1.73m2 GFR est non-AA 31 (L) >60 ml/min/1.73m2 Calcium 9.4 8.5 - 10.1 MG/DL Bilirubin, total 2.0 (H) 0.2 - 1.0 MG/DL  
 ALT (SGPT) 17 13 - 56 U/L  
 AST (SGOT) 21 15 - 37 U/L Alk. phosphatase 163 (H) 45 - 117 U/L Protein, total 9.1 (H) 6.4 - 8.2 g/dL Albumin 4.4 3.4 - 5.0 g/dL Globulin 4.7 (H) 2.0 - 4.0 g/dL A-G Ratio 0.9 0.8 - 1.7    
CBC WITH AUTOMATED DIFF Collection Time: 01/22/19  6:22 PM  
Result Value Ref Range WBC 13.5 (H) 4.6 - 13.2 K/uL  
 RBC 3.92 (L) 4.20 - 5.30 M/uL  
 HGB 11.5 (L) 12.0 - 16.0 g/dL HCT 33.3 (L) 35.0 - 45.0 % MCV 84.9 74.0 - 97.0 FL  
 MCH 29.3 24.0 - 34.0 PG  
 MCHC 34.5 31.0 - 37.0 g/dL  
 RDW 14.3 11.6 - 14.5 % PLATELET 712 272 - 961 K/uL MPV 9.7 9.2 - 11.8 FL  
 NEUTROPHILS 85 (H) 40 - 73 % LYMPHOCYTES 12 (L) 21 - 52 % MONOCYTES 3 3 - 10 % EOSINOPHILS 0 0 - 5 % BASOPHILS 0 0 - 2 %  
 ABS. NEUTROPHILS 11.6 (H) 1.8 - 8.0 K/UL  
 ABS. LYMPHOCYTES 1.6 0.9 - 3.6 K/UL  
 ABS. MONOCYTES 0.4 0.05 - 1.2 K/UL  
 ABS. EOSINOPHILS 0.0 0.0 - 0.4 K/UL  
 ABS. BASOPHILS 0.0 0.0 - 0.1 K/UL  
 DF AUTOMATED    
CK Collection Time: 01/22/19  6:22 PM  
Result Value Ref Range CK 84 26 - 192 U/L  
EKG, 12 LEAD, INITIAL Collection Time: 01/22/19  6:44 PM  
Result Value Ref Range  Ventricular Rate 115 BPM  
 Atrial Rate 115 BPM  
 P-R Interval 148 ms QRS Duration 68 ms Q-T Interval 344 ms QTC Calculation (Bezet) 475 ms Calculated P Axis 78 degrees Calculated R Axis 55 degrees Calculated T Axis 26 degrees Diagnosis Sinus tachycardia Nonspecific T wave abnormality Abnormal ECG When compared with ECG of 12-JAN-2019 10:58, 
premature ventricular complexes are no longer present Nonspecific T wave abnormality now evident in Lateral leads Imaging: No results found. EKG: 
Time: 1844 Rate: 115 bpm 
Rhythm: sinus tachycardia Interpretation: QTc 475, no STEMI  
 
ED Course/Medical Decision Making Patient appears well. Her abdomen is completely soft and non-tender. Tolerating oral fluids. Blood work shows a mild CRYSTAL. While in the ER, she started to develop chest pressure. EKG shows sinus tachycardia without ST changes. CXR and troponin added. Will continue to monitor. 6:58 PM : Pt care transferred to Dr. Lloyd Pavon ,ED provider. History of patient complaint(s), available diagnostic reports and current treatment plan has been discussed thoroughly. Bedside rounding on patient occured : yes . Intended disposition of patient : pending results Pending diagnostics reports and/or labs (please list): CT and Troponin Medications  
sodium chloride 0.9 % bolus infusion 1,000 mL (0 mL IntraVENous IV Completed 1/22/19 1837) Final Diagnosis ICD-10-CM ICD-9-CM 1. Myalgia M79.10 729.1 2. Nausea vomiting and diarrhea R11.2 787.91  
 R19.7 787.01 Disposition Pending My Medications ASK your doctor about these medications Instructions Each Dose to Equal Morning Noon Evening Bedtime  
amoxicillin-clavulanate 500-125 mg per tablet Commonly known as:  AUGMENTIN Your last dose was: Your next dose is: Take 1 Tab by mouth two (2) times daily (with meals). 1 Tab 
  
  
  
  
  
cholecalciferol 1,000 unit tablet Commonly known as:  VITAMIN D3 
 
 Your last dose was: Your next dose is: Take 1 Tab by mouth daily. 1000 Units 
  
  
  
  
  
insulin NPH/insulin regular 100 unit/mL (70-30) injection Commonly known as:  NOVOLIN 70/30, HUMULIN 70/30 Your last dose was: Your next dose is:   
 
  
 40 Units by SubCUTAneous route two (2) times a day. 40 Units 
  
  
  
  
  
lisinopril 20 mg tablet Commonly known as:  Elia Newton Your last dose was: Your next dose is:   
 
  
    
  
  
  
  
metFORMIN  mg tablet Commonly known as:  GLUCOPHAGE XR Your last dose was: Your next dose is: TAKE 1 TABLET BY MOUTH DAILY WITH DINNER 
   
  
  
  
  
pantoprazole 40 mg tablet Commonly known as:  PROTONIX Your last dose was: Your next dose is: Take 1 Tab by mouth Daily (before breakfast). 40 mg PAXIL 40 mg tablet Generic drug:  PARoxetine Your last dose was: Your next dose is: Take 40 mg by mouth daily. 40 mg 
  
  
  
  
  
rosuvastatin 20 mg tablet Commonly known as:  CRESTOR Your last dose was: Your next dose is: Take 1 Tab by mouth nightly. 20 mg 
  
  
  
  
  
sucralfate 1 gram tablet Commonly known as:  Loren Bushy Your last dose was: Your next dose is: Take 1 Tab by mouth Before breakfast and dinner. 1 g Jared Cross MD 
January 22, 2019 My signature above authenticates this document and my orders, the final   
diagnosis (es), discharge prescription (s), and instructions in the Epic   
record. If you have any questions please contact [phone]. 
  
Nursing notes have been reviewed by the physician/ advanced practice   
Clinician. Scribe Attestation Chalino Sorenson and Edda Robertson acting as a scribe for and in the presence of Ricky Mackey MD     
January 22, 2019 at 5:33 PM 
    
Provider Attestation: I personally performed the services described in the documentation, reviewed the documentation, as recorded by the scribe in my presence, and it accurately and completely records my words and actions.  January 22, 2019 at 5:33 PM - Monica Simms MD

## 2019-01-23 PROBLEM — R65.10 SIRS (SYSTEMIC INFLAMMATORY RESPONSE SYNDROME) (HCC): Status: ACTIVE | Noted: 2019-01-23

## 2019-01-23 PROBLEM — R00.0 TACHYCARDIA: Status: ACTIVE | Noted: 2019-01-23

## 2019-01-23 LAB
AMPHET UR QL SCN: NEGATIVE
ANION GAP SERPL CALC-SCNC: 11 MMOL/L (ref 3–18)
ANION GAP SERPL CALC-SCNC: 13 MMOL/L (ref 3–18)
APAP SERPL-MCNC: <2 UG/ML (ref 10–30)
APPEARANCE UR: CLEAR
ATRIAL RATE: 104 BPM
ATRIAL RATE: 115 BPM
ATRIAL RATE: 121 BPM
BACTERIA URNS QL MICRO: ABNORMAL /HPF
BARBITURATES UR QL SCN: NEGATIVE
BASOPHILS # BLD: 0 K/UL (ref 0–0.1)
BASOPHILS NFR BLD: 0 % (ref 0–2)
BENZODIAZ UR QL: NEGATIVE
BILIRUB UR QL: NEGATIVE
BUN SERPL-MCNC: 11 MG/DL (ref 7–18)
BUN SERPL-MCNC: 9 MG/DL (ref 7–18)
BUN/CREAT SERPL: 6 (ref 12–20)
BUN/CREAT SERPL: 7 (ref 12–20)
CALCIUM SERPL-MCNC: 8.1 MG/DL (ref 8.5–10.1)
CALCIUM SERPL-MCNC: 9.9 MG/DL (ref 8.5–10.1)
CALCULATED P AXIS, ECG09: 34 DEGREES
CALCULATED P AXIS, ECG09: 66 DEGREES
CALCULATED P AXIS, ECG09: 78 DEGREES
CALCULATED R AXIS, ECG10: 10 DEGREES
CALCULATED R AXIS, ECG10: 51 DEGREES
CALCULATED R AXIS, ECG10: 55 DEGREES
CALCULATED T AXIS, ECG11: -2 DEGREES
CALCULATED T AXIS, ECG11: 26 DEGREES
CALCULATED T AXIS, ECG11: 29 DEGREES
CANNABINOIDS UR QL SCN: NEGATIVE
CHLORIDE SERPL-SCNC: 108 MMOL/L (ref 100–108)
CHLORIDE SERPL-SCNC: 116 MMOL/L (ref 100–108)
CO2 SERPL-SCNC: 19 MMOL/L (ref 21–32)
CO2 SERPL-SCNC: 20 MMOL/L (ref 21–32)
COCAINE UR QL SCN: NEGATIVE
COLOR UR: YELLOW
CREAT SERPL-MCNC: 1.44 MG/DL (ref 0.6–1.3)
CREAT SERPL-MCNC: 1.64 MG/DL (ref 0.6–1.3)
DIAGNOSIS, 93000: NORMAL
DIFFERENTIAL METHOD BLD: ABNORMAL
EOSINOPHIL # BLD: 0 K/UL (ref 0–0.4)
EOSINOPHIL NFR BLD: 0 % (ref 0–5)
EPITH CASTS URNS QL MICRO: ABNORMAL /LPF (ref 0–5)
ERYTHROCYTE [DISTWIDTH] IN BLOOD BY AUTOMATED COUNT: 14.2 % (ref 11.6–14.5)
EST. AVERAGE GLUCOSE BLD GHB EST-MCNC: 160 MG/DL
ETHANOL SERPL-MCNC: <3 MG/DL (ref 0–3)
GLUCOSE BLD STRIP.AUTO-MCNC: 154 MG/DL (ref 70–110)
GLUCOSE BLD STRIP.AUTO-MCNC: 181 MG/DL (ref 70–110)
GLUCOSE BLD STRIP.AUTO-MCNC: 189 MG/DL (ref 70–110)
GLUCOSE BLD STRIP.AUTO-MCNC: 82 MG/DL (ref 70–110)
GLUCOSE BLD STRIP.AUTO-MCNC: 89 MG/DL (ref 70–110)
GLUCOSE SERPL-MCNC: 173 MG/DL (ref 74–99)
GLUCOSE SERPL-MCNC: 99 MG/DL (ref 74–99)
GLUCOSE UR STRIP.AUTO-MCNC: 500 MG/DL
HBA1C MFR BLD: 7.2 % (ref 4.2–5.6)
HCT VFR BLD AUTO: 33.3 % (ref 35–45)
HDSCOM,HDSCOM: NORMAL
HGB BLD-MCNC: 11.5 G/DL (ref 12–16)
HGB UR QL STRIP: ABNORMAL
KETONES UR QL STRIP.AUTO: ABNORMAL MG/DL
LACTATE BLD-SCNC: 2.03 MMOL/L (ref 0.4–2)
LACTATE BLD-SCNC: 2.34 MMOL/L (ref 0.4–2)
LACTATE SERPL-SCNC: 1.7 MMOL/L (ref 0.4–2)
LEUKOCYTE ESTERASE UR QL STRIP.AUTO: NEGATIVE
LIPASE SERPL-CCNC: 88 U/L (ref 73–393)
LYMPHOCYTES # BLD: 1.3 K/UL (ref 0.9–3.6)
LYMPHOCYTES NFR BLD: 11 % (ref 21–52)
MAGNESIUM SERPL-MCNC: 1.6 MG/DL (ref 1.6–2.6)
MCH RBC QN AUTO: 28.8 PG (ref 24–34)
MCHC RBC AUTO-ENTMCNC: 34.5 G/DL (ref 31–37)
MCV RBC AUTO: 83.5 FL (ref 74–97)
METHADONE UR QL: NEGATIVE
MONOCYTES # BLD: 0.4 K/UL (ref 0.05–1.2)
MONOCYTES NFR BLD: 3 % (ref 3–10)
NEUTS SEG # BLD: 10.6 K/UL (ref 1.8–8)
NEUTS SEG NFR BLD: 86 % (ref 40–73)
NITRITE UR QL STRIP.AUTO: NEGATIVE
OPIATES UR QL: NEGATIVE
P-R INTERVAL, ECG05: 140 MS
P-R INTERVAL, ECG05: 148 MS
P-R INTERVAL, ECG05: 150 MS
PCP UR QL: NEGATIVE
PH UR STRIP: 6.5 [PH] (ref 5–8)
PHOSPHATE SERPL-MCNC: 2.3 MG/DL (ref 2.5–4.9)
PLATELET # BLD AUTO: 423 K/UL (ref 135–420)
PMV BLD AUTO: 9.7 FL (ref 9.2–11.8)
POTASSIUM SERPL-SCNC: 3.5 MMOL/L (ref 3.5–5.5)
POTASSIUM SERPL-SCNC: 3.9 MMOL/L (ref 3.5–5.5)
PROT UR STRIP-MCNC: ABNORMAL MG/DL
Q-T INTERVAL, ECG07: 324 MS
Q-T INTERVAL, ECG07: 344 MS
Q-T INTERVAL, ECG07: 378 MS
QRS DURATION, ECG06: 62 MS
QRS DURATION, ECG06: 68 MS
QRS DURATION, ECG06: 68 MS
QTC CALCULATION (BEZET), ECG08: 460 MS
QTC CALCULATION (BEZET), ECG08: 475 MS
QTC CALCULATION (BEZET), ECG08: 497 MS
RBC # BLD AUTO: 3.99 M/UL (ref 4.2–5.3)
RBC #/AREA URNS HPF: ABNORMAL /HPF (ref 0–5)
SALICYLATES SERPL-MCNC: <2.8 MG/DL (ref 2.8–20)
SODIUM SERPL-SCNC: 140 MMOL/L (ref 136–145)
SODIUM SERPL-SCNC: 147 MMOL/L (ref 136–145)
SP GR UR REFRACTOMETRY: 1.01 (ref 1–1.03)
TROPONIN I SERPL-MCNC: <0.02 NG/ML (ref 0–0.04)
UROBILINOGEN UR QL STRIP.AUTO: 0.2 EU/DL (ref 0.2–1)
VENTRICULAR RATE, ECG03: 104 BPM
VENTRICULAR RATE, ECG03: 115 BPM
VENTRICULAR RATE, ECG03: 121 BPM
WBC # BLD AUTO: 12.3 K/UL (ref 4.6–13.2)
WBC URNS QL MICRO: ABNORMAL /HPF (ref 0–4)

## 2019-01-23 PROCEDURE — 83690 ASSAY OF LIPASE: CPT

## 2019-01-23 PROCEDURE — 74011636637 HC RX REV CODE- 636/637: Performed by: STUDENT IN AN ORGANIZED HEALTH CARE EDUCATION/TRAINING PROGRAM

## 2019-01-23 PROCEDURE — 74011000250 HC RX REV CODE- 250: Performed by: STUDENT IN AN ORGANIZED HEALTH CARE EDUCATION/TRAINING PROGRAM

## 2019-01-23 PROCEDURE — 80048 BASIC METABOLIC PNL TOTAL CA: CPT

## 2019-01-23 PROCEDURE — 36415 COLL VENOUS BLD VENIPUNCTURE: CPT

## 2019-01-23 PROCEDURE — 74011250636 HC RX REV CODE- 250/636: Performed by: EMERGENCY MEDICINE

## 2019-01-23 PROCEDURE — 92610 EVALUATE SWALLOWING FUNCTION: CPT

## 2019-01-23 PROCEDURE — 74011250636 HC RX REV CODE- 250/636: Performed by: STUDENT IN AN ORGANIZED HEALTH CARE EDUCATION/TRAINING PROGRAM

## 2019-01-23 PROCEDURE — 93005 ELECTROCARDIOGRAM TRACING: CPT

## 2019-01-23 PROCEDURE — 83036 HEMOGLOBIN GLYCOSYLATED A1C: CPT

## 2019-01-23 PROCEDURE — 81001 URINALYSIS AUTO W/SCOPE: CPT

## 2019-01-23 PROCEDURE — C9113 INJ PANTOPRAZOLE SODIUM, VIA: HCPCS | Performed by: STUDENT IN AN ORGANIZED HEALTH CARE EDUCATION/TRAINING PROGRAM

## 2019-01-23 PROCEDURE — 74011000258 HC RX REV CODE- 258: Performed by: STUDENT IN AN ORGANIZED HEALTH CARE EDUCATION/TRAINING PROGRAM

## 2019-01-23 PROCEDURE — 83605 ASSAY OF LACTIC ACID: CPT

## 2019-01-23 PROCEDURE — 83735 ASSAY OF MAGNESIUM: CPT

## 2019-01-23 PROCEDURE — 82962 GLUCOSE BLOOD TEST: CPT

## 2019-01-23 PROCEDURE — 65660000004 HC RM CVT STEPDOWN

## 2019-01-23 PROCEDURE — 87040 BLOOD CULTURE FOR BACTERIA: CPT

## 2019-01-23 PROCEDURE — 80307 DRUG TEST PRSMV CHEM ANLYZR: CPT

## 2019-01-23 PROCEDURE — 85025 COMPLETE CBC W/AUTO DIFF WBC: CPT

## 2019-01-23 PROCEDURE — 87086 URINE CULTURE/COLONY COUNT: CPT

## 2019-01-23 PROCEDURE — 84100 ASSAY OF PHOSPHORUS: CPT

## 2019-01-23 RX ORDER — DEXTROSE, SODIUM CHLORIDE, AND POTASSIUM CHLORIDE 5; .45; .15 G/100ML; G/100ML; G/100ML
130 INJECTION INTRAVENOUS CONTINUOUS
Status: DISCONTINUED | OUTPATIENT
Start: 2019-01-23 | End: 2019-01-24

## 2019-01-23 RX ORDER — DEXTROSE 50 % IN WATER (D50W) INTRAVENOUS SYRINGE
25-50 AS NEEDED
Status: DISCONTINUED | OUTPATIENT
Start: 2019-01-23 | End: 2019-01-25 | Stop reason: HOSPADM

## 2019-01-23 RX ORDER — HYDROMORPHONE HYDROCHLORIDE 1 MG/ML
0.5 INJECTION, SOLUTION INTRAMUSCULAR; INTRAVENOUS; SUBCUTANEOUS ONCE
Status: COMPLETED | OUTPATIENT
Start: 2019-01-23 | End: 2019-01-23

## 2019-01-23 RX ORDER — INSULIN GLARGINE 100 [IU]/ML
40 INJECTION, SOLUTION SUBCUTANEOUS 2 TIMES DAILY
Status: DISCONTINUED | OUTPATIENT
Start: 2019-01-23 | End: 2019-01-23

## 2019-01-23 RX ORDER — VANCOMYCIN 1.75 GRAM/500 ML IN 0.9 % SODIUM CHLORIDE INTRAVENOUS
1750 ONCE
Status: COMPLETED | OUTPATIENT
Start: 2019-01-23 | End: 2019-01-23

## 2019-01-23 RX ORDER — ONDANSETRON 2 MG/ML
4 INJECTION INTRAMUSCULAR; INTRAVENOUS
Status: COMPLETED | OUTPATIENT
Start: 2019-01-23 | End: 2019-01-23

## 2019-01-23 RX ORDER — SODIUM CHLORIDE 0.9 % (FLUSH) 0.9 %
5-40 SYRINGE (ML) INJECTION EVERY 8 HOURS
Status: DISCONTINUED | OUTPATIENT
Start: 2019-01-23 | End: 2019-01-25 | Stop reason: HOSPADM

## 2019-01-23 RX ORDER — INSULIN GLARGINE 100 [IU]/ML
15 INJECTION, SOLUTION SUBCUTANEOUS EVERY 12 HOURS
Status: DISCONTINUED | OUTPATIENT
Start: 2019-01-23 | End: 2019-01-24

## 2019-01-23 RX ORDER — INSULIN GLARGINE 100 [IU]/ML
15 INJECTION, SOLUTION SUBCUTANEOUS 2 TIMES DAILY
Status: DISCONTINUED | OUTPATIENT
Start: 2019-01-23 | End: 2019-01-23

## 2019-01-23 RX ORDER — METOCLOPRAMIDE HYDROCHLORIDE 5 MG/5ML
10 SOLUTION ORAL
Status: DISCONTINUED | OUTPATIENT
Start: 2019-01-23 | End: 2019-01-23

## 2019-01-23 RX ORDER — MAGNESIUM SULFATE HEPTAHYDRATE 40 MG/ML
2 INJECTION, SOLUTION INTRAVENOUS ONCE
Status: COMPLETED | OUTPATIENT
Start: 2019-01-23 | End: 2019-01-23

## 2019-01-23 RX ORDER — METOCLOPRAMIDE HYDROCHLORIDE 5 MG/ML
10 INJECTION INTRAMUSCULAR; INTRAVENOUS 4 TIMES DAILY
Status: DISCONTINUED | OUTPATIENT
Start: 2019-01-23 | End: 2019-01-24

## 2019-01-23 RX ORDER — POTASSIUM CHLORIDE 20 MEQ/1
20 TABLET, EXTENDED RELEASE ORAL 2 TIMES DAILY
COMMUNITY
End: 2020-01-01

## 2019-01-23 RX ORDER — INSULIN LISPRO 100 [IU]/ML
INJECTION, SOLUTION INTRAVENOUS; SUBCUTANEOUS
Status: DISCONTINUED | OUTPATIENT
Start: 2019-01-23 | End: 2019-01-25 | Stop reason: HOSPADM

## 2019-01-23 RX ORDER — HEPARIN SODIUM 5000 [USP'U]/ML
5000 INJECTION, SOLUTION INTRAVENOUS; SUBCUTANEOUS EVERY 12 HOURS
Status: DISCONTINUED | OUTPATIENT
Start: 2019-01-23 | End: 2019-01-25 | Stop reason: HOSPADM

## 2019-01-23 RX ORDER — SODIUM CHLORIDE 0.9 % (FLUSH) 0.9 %
5-40 SYRINGE (ML) INJECTION AS NEEDED
Status: DISCONTINUED | OUTPATIENT
Start: 2019-01-23 | End: 2019-01-25 | Stop reason: HOSPADM

## 2019-01-23 RX ORDER — NALOXONE HYDROCHLORIDE 0.4 MG/ML
0.4 INJECTION, SOLUTION INTRAMUSCULAR; INTRAVENOUS; SUBCUTANEOUS AS NEEDED
Status: DISCONTINUED | OUTPATIENT
Start: 2019-01-23 | End: 2019-01-25 | Stop reason: HOSPADM

## 2019-01-23 RX ORDER — MAGNESIUM SULFATE HEPTAHYDRATE 500 MG/ML
2 INJECTION, SOLUTION INTRAMUSCULAR; INTRAVENOUS ONCE
Status: DISCONTINUED | OUTPATIENT
Start: 2019-01-23 | End: 2019-01-23

## 2019-01-23 RX ORDER — SODIUM CHLORIDE 9 MG/ML
1000 INJECTION, SOLUTION INTRAVENOUS ONCE
Status: COMPLETED | OUTPATIENT
Start: 2019-01-23 | End: 2019-01-23

## 2019-01-23 RX ORDER — HYDRALAZINE HYDROCHLORIDE 20 MG/ML
10 INJECTION INTRAMUSCULAR; INTRAVENOUS ONCE
Status: COMPLETED | OUTPATIENT
Start: 2019-01-23 | End: 2019-01-23

## 2019-01-23 RX ORDER — ZINC GLUCONATE 10 MG
250 LOZENGE ORAL
COMMUNITY
End: 2020-01-01

## 2019-01-23 RX ORDER — METOPROLOL TARTRATE 5 MG/5ML
5 INJECTION INTRAVENOUS
Status: COMPLETED | OUTPATIENT
Start: 2019-01-23 | End: 2019-01-23

## 2019-01-23 RX ORDER — VANCOMYCIN HYDROCHLORIDE
1250 EVERY 24 HOURS
Status: DISCONTINUED | OUTPATIENT
Start: 2019-01-24 | End: 2019-01-24

## 2019-01-23 RX ORDER — MAGNESIUM SULFATE 100 %
4 CRYSTALS MISCELLANEOUS AS NEEDED
Status: DISCONTINUED | OUTPATIENT
Start: 2019-01-23 | End: 2019-01-25 | Stop reason: HOSPADM

## 2019-01-23 RX ORDER — ONDANSETRON 2 MG/ML
4-8 INJECTION INTRAMUSCULAR; INTRAVENOUS
Status: DISCONTINUED | OUTPATIENT
Start: 2019-01-23 | End: 2019-01-25 | Stop reason: HOSPADM

## 2019-01-23 RX ORDER — PANTOPRAZOLE SODIUM 40 MG/10ML
40 INJECTION, POWDER, LYOPHILIZED, FOR SOLUTION INTRAVENOUS EVERY 24 HOURS
Status: DISCONTINUED | OUTPATIENT
Start: 2019-01-23 | End: 2019-01-24

## 2019-01-23 RX ADMIN — PIPERACILLIN SODIUM,TAZOBACTAM SODIUM 3.38 G: 3; .375 INJECTION, POWDER, FOR SOLUTION INTRAVENOUS at 16:25

## 2019-01-23 RX ADMIN — DEXTROSE MONOHYDRATE, SODIUM CHLORIDE, AND POTASSIUM CHLORIDE 150 ML/HR: 50; 4.5; 1.49 INJECTION, SOLUTION INTRAVENOUS at 12:07

## 2019-01-23 RX ADMIN — SODIUM CHLORIDE 1000 ML: 900 INJECTION, SOLUTION INTRAVENOUS at 09:49

## 2019-01-23 RX ADMIN — Medication 10 ML: at 21:48

## 2019-01-23 RX ADMIN — PIPERACILLIN SODIUM,TAZOBACTAM SODIUM 3.38 G: 3; .375 INJECTION, POWDER, FOR SOLUTION INTRAVENOUS at 22:41

## 2019-01-23 RX ADMIN — Medication 10 ML: at 12:52

## 2019-01-23 RX ADMIN — METOPROLOL TARTRATE 5 MG: 1 INJECTION, SOLUTION INTRAVENOUS at 03:45

## 2019-01-23 RX ADMIN — HEPARIN SODIUM 5000 UNITS: 5000 INJECTION INTRAVENOUS; SUBCUTANEOUS at 13:40

## 2019-01-23 RX ADMIN — INSULIN GLARGINE 15 UNITS: 100 INJECTION, SOLUTION SUBCUTANEOUS at 08:57

## 2019-01-23 RX ADMIN — DEXTROSE MONOHYDRATE, SODIUM CHLORIDE, AND POTASSIUM CHLORIDE 125 ML/HR: 50; 4.5; 1.49 INJECTION, SOLUTION INTRAVENOUS at 03:54

## 2019-01-23 RX ADMIN — PANTOPRAZOLE SODIUM 40 MG: 40 INJECTION, POWDER, FOR SOLUTION INTRAVENOUS at 08:58

## 2019-01-23 RX ADMIN — SODIUM CHLORIDE 1000 ML: 900 INJECTION, SOLUTION INTRAVENOUS at 03:43

## 2019-01-23 RX ADMIN — VANCOMYCIN HYDROCHLORIDE 1750 MG: 10 INJECTION, POWDER, LYOPHILIZED, FOR SOLUTION INTRAVENOUS at 13:00

## 2019-01-23 RX ADMIN — Medication 10 ML: at 05:50

## 2019-01-23 RX ADMIN — METOCLOPRAMIDE 10 MG: 5 INJECTION, SOLUTION INTRAMUSCULAR; INTRAVENOUS at 19:36

## 2019-01-23 RX ADMIN — DEXTROSE MONOHYDRATE, SODIUM CHLORIDE, AND POTASSIUM CHLORIDE 150 ML/HR: 50; 4.5; 1.49 INJECTION, SOLUTION INTRAVENOUS at 19:36

## 2019-01-23 RX ADMIN — POTASSIUM PHOSPHATE, MONOBASIC AND POTASSIUM PHOSPHATE, DIBASIC: 224; 236 INJECTION, SOLUTION INTRAVENOUS at 12:01

## 2019-01-23 RX ADMIN — ONDANSETRON 4 MG: 2 INJECTION INTRAMUSCULAR; INTRAVENOUS at 03:36

## 2019-01-23 RX ADMIN — INSULIN LISPRO 2 UNITS: 100 INJECTION, SOLUTION INTRAVENOUS; SUBCUTANEOUS at 16:30

## 2019-01-23 RX ADMIN — MAGNESIUM SULFATE HEPTAHYDRATE 2 G: 40 INJECTION, SOLUTION INTRAVENOUS at 10:33

## 2019-01-23 RX ADMIN — HEPARIN SODIUM 5000 UNITS: 5000 INJECTION INTRAVENOUS; SUBCUTANEOUS at 03:39

## 2019-01-23 RX ADMIN — SODIUM CHLORIDE 1000 ML: 900 INJECTION, SOLUTION INTRAVENOUS at 03:58

## 2019-01-23 RX ADMIN — HYDROMORPHONE HYDROCHLORIDE 0.5 MG: 1 INJECTION, SOLUTION INTRAMUSCULAR; INTRAVENOUS; SUBCUTANEOUS at 08:58

## 2019-01-23 RX ADMIN — METOCLOPRAMIDE 10 MG: 5 INJECTION, SOLUTION INTRAMUSCULAR; INTRAVENOUS at 16:25

## 2019-01-23 RX ADMIN — PIPERACILLIN SODIUM,TAZOBACTAM SODIUM 3.38 G: 3; .375 INJECTION, POWDER, FOR SOLUTION INTRAVENOUS at 10:31

## 2019-01-23 RX ADMIN — Medication 10 ML: at 14:00

## 2019-01-23 RX ADMIN — METOCLOPRAMIDE 10 MG: 5 INJECTION, SOLUTION INTRAMUSCULAR; INTRAVENOUS at 08:58

## 2019-01-23 RX ADMIN — HYDRALAZINE HYDROCHLORIDE 10 MG: 20 INJECTION INTRAMUSCULAR; INTRAVENOUS at 05:57

## 2019-01-23 NOTE — PROGRESS NOTES
Assumed care of patient in bed restless and c/o nausea and chest pain. Assisted to bedside commode. Meds given as ordered. Bolus of nss placed on pump for completion. Technician attempting to start another PIV. MD Sorto @ bedside. Remains on telemetry, tachycardic and tachypnic 
8:12 PM 
Patient vomited approxmiately 50cc of greenish contents. Patient stated chest pain was 10/10 and is now 7/10.

## 2019-01-23 NOTE — PROGRESS NOTES
Speech Therapy: Dysphagia eval completed. Pt safe for reg solid with thin liquids when cleared by MD (pt with vomiting this AM). Full report to follow. Thank you for this referral. 
 
Nolan Villafuerte M.S. CCC-SLP/L Speech-Language Pathologist

## 2019-01-23 NOTE — DISCHARGE SUMMARY
[address] [facility] Discharge Summary Patient: Dequan Angel MRN: [id number]  CSN: [id number] YOB: 1965  Age: 52 y.o. Sex: female Admission Date: 1/22/2019 Discharge Date: 1/25/19 Attending: No att. providers found PCP: Edel Diaz MD  
 
================================================================ Reason for Admission: Tachycardia SIRS (systemic inflammatory response syndrome) (HCC) Discharge Diagnoses: SIRS (3/4) T2DM with neuropathy and retinopathy Gastroparesis CRYSTAL in setting of CKD2 HTN 
HLD 
GIST s/p resection (2014) Anxiety Important notes to PCP/ follow-up studies and evaluations  
- Held lisinopril due to BP 97/-80 until follow up PCP visit - Recommend FU with GI for gastric emptying scan - Discussed and recommended [employer] VOICE Blood Glucose Monitoring System with patient due to decreased visual acuity (will read aloud blood sugar) - Physical therapy recommend home health with 24/7 assist and a rolling walker. Patient declined walker and physical therapy. Ordered home health for assistance with medications and evaluation of home safety due to impaired vision.  
- Start Erythromycin 250 mg TID for four weeks for gastroparesis Pending labs and studies: 
None Operative Procedures:  
2/45 PICC Right Basilic Vein Discharge Medications:    
Discharge Medication List as of 1/25/2019  4:27 PM  
  
START taking these medications Details  
metoclopramide HCl (REGLAN) 5 mg tablet Take 1 Tab by mouth three (3) times daily as needed for up to 10 days. Take before meals. , Print, Disp-30 Tab, R-3  
  
  
CONTINUE these medications which have CHANGED Details  
erythromycin base (E-MYCIN) 250 mg tablet Take 1 Tab by mouth Before breakfast, lunch, and dinner for 28 days. , PrintFor gastroparesisiDisp-84 Tab, R-0  
  
  
CONTINUE these medications which have NOT CHANGED Details magnesium 250 mg tab Take 250 mg by mouth., Historical Med  
  
potassium chloride (KLOR-CON M20) 20 mEq tablet Take 20 mEq by mouth two (2) times a day., Historical Med  
  
insulin NPH/insulin regular (NOVOLIN 70/30, HUMULIN 70/30) 100 unit/mL (70-30) injection 40 Units by SubCUTAneous route two (2) times a day., Print, Disp-10 mL, R-3  
  
cholecalciferol (VITAMIN D3) 1,000 unit tablet Take 1 Tab by mouth daily. , Print, Disp-30 Tab, R-1  
  
pantoprazole (PROTONIX) 40 mg tablet Take 1 Tab by mouth Daily (before breakfast). , Print, Disp-30 Tab, R-2  
  
sucralfate (CARAFATE) 1 gram tablet Take 1 Tab by mouth Before breakfast and dinner., Print, Disp-60 Tab, R-2 PARoxetine (PAXIL) 40 mg tablet Take 40 mg by mouth daily. , Historical Med  
  
metFORMIN ER (GLUCOPHAGE XR) 500 mg tablet TAKE 1 TABLET BY MOUTH DAILY WITH DINNER, Normal, Disp-30 Tab, R-0  
  
rosuvastatin (CRESTOR) 20 mg tablet Take 1 Tab by mouth nightly., Program, Disp-90 Tab, R-3 STOP taking these medications  
  
 lisinopril (PRINIVIL, ZESTRIL) 20 mg tablet Comments:  
Reason for Stopping: normal blood pressure Disposition: Home Consultants:   
None Brief Hospital Course (including pertinent history and physical findings) 52 y. o. female with PMH T2DM with neuropathy, gastroparesis and retinopathy, HTN/HLD, GIST s/p resection (2014) and h/o pancreatitis, now admitted with SIRS (3/4), abdominal pain, nausea and vomiting. SIRS (3/4), Gastroparesis Patient had recurrent admission for gastroparesis for the past two years and had endoscopy on 11/2018 showing gastritis and esophagitis. Patient reports having a sudden episode of nausea, vomiting and abdominal pain with no inciting factors. In ED, patient was tachycardic, tachypneic and had elevated WBC and lactic acid. She was given fluids and antibiotics. UA and CXR were unremarkable. Patient blood and urine cultures had no growth. Patient improved on reglan, carafate and protonix. Patient abdominal ultrasound and mesenteric ultrasound were unremarkable. Patient lactic acid quickly improved to 1.7. Abdominal pain most likely due to gastroparesis, given negative infectious and vascular work up. Patient was stable upon discharge with resolution of abdominal pain. Acute on Chronic Kidney Injury 2/2 dehydration. H/O CKD Stage 2. Patient had elevated Cr 1.7 on admission that down trended over the hospital course. Patient given 5L of NS bolus on admission and had subsequent maintenance fluids until she could tolerate PO intake. Patient stable upon discharge. T2DM with retinopathy and neuropathy. Patient had HgbA1c of 7.2. Her home medications were held and she was started on lantus 15 units BID. Her lantus was decreased to 8 units and blood sugars were controlled during hospitalization. Patient to return to home regimen upon discharge. Of note, recommended a glucommander that would voice aloud her blood sugars due the patient's decreased visual acuity. HTN/HLD Patient hypertensive medications were held while she was NPO and had an CRYSTAL. Then they were held due to her low normal blood pressures. Patient to resume home medications upon discharge. Anxiety Patient Paxil was held due to NPO status and then due to interaction with Reglan. Summarized key findings and results (labs, imaging studies, ECHO, cardiac cath, endoscopies, etc): 
1/25 Mesenteric Ultrasound: Mesenteric arteries are without evidence of significant stenosis. 1/24 US ABD: Mild heterogeneous echogenicity of the liver is nonspecific. This is commonly seen with steatosis hepatic disease. 1/22 CXR: No acute findings. Chemistry Recent Labs  
  01/30/19 
0225 01/29/19 
3925 * 125*  140  
K 4.2 4.2  112* CO2 24 22 BUN 9 5* CREA 1.30 1.07  
CA 8.6 8.1*  
MG 2.0 1.9 AGAP 7 6 BUCR 7* 5* CBC w/Diff Recent Labs  
  01/30/19 0225 01/29/19 
0531 WBC 9.9 9.8  
RBC 3.07* 2.90* HGB 9.0* 8.5* HCT 26.4* 25.2*  
 244 GRANS 56 52 LYMPH 35 42 EOS 1 0 Liver Enzymes Protein, total  
Date Value Ref Range Status 01/27/2019 9.0 (H) 6.4 - 8.2 g/dL Final  
 
Albumin Date Value Ref Range Status 01/27/2019 4.6 3.4 - 5.0 g/dL Final  
 
Globulin Date Value Ref Range Status 01/27/2019 4.4 (H) 2.0 - 4.0 g/dL Final  
 
A-G Ratio Date Value Ref Range Status 01/27/2019 1.0 0.8 - 1.7   Final  
 
AST (SGOT) Date Value Ref Range Status 01/27/2019 18 15 - 37 U/L Final  
 
Alk. phosphatase Date Value Ref Range Status 01/27/2019 150 (H) 45 - 117 U/L Final  
 
No results for input(s): TP, ALB, GLOB, AGRAT, SGOT, GPT, AP, TBIL in the last 72 hours. No lab exists for component: DBIL Lactic Acid Lactic acid Date Value Ref Range Status 01/29/2019 1.3 0.4 - 2.0 MMOL/L Final  
 
Recent Labs  
  01/29/19 
1555 01/29/19 
0930 01/29/19 
0318 LAC 1.3 2.0 1.1 Thyroid  Lab Results Component Value Date/Time TSH 1.22 01/22/2019 06:22 PM  
    
 
Lipid Panel Lab Results Component Value Date/Time Cholesterol, total 170 08/07/2017 01:27 PM  
 HDL Cholesterol 61 (H) 08/07/2017 01:27 PM  
 LDL, calculated 79.6 08/07/2017 01:27 PM  
 VLDL, calculated 29.4 08/07/2017 01:27 PM  
 Triglyceride 147 08/07/2017 01:27 PM  
 CHOL/HDL Ratio 2.8 08/07/2017 01:27 PM  
  
 
Urinalysis Lab Results Component Value Date/Time  Color YELLOW 01/27/2019 10:32 PM  
 Appearance CLEAR 01/27/2019 10:32 PM  
 Specific gravity 1.008 01/27/2019 10:32 PM  
 pH (UA) 8.0 01/27/2019 10:32 PM  
 Protein TRACE (A) 01/27/2019 10:32 PM  
 Glucose 250 (A) 01/27/2019 10:32 PM  
 Ketone TRACE (A) 01/27/2019 10:32 PM  
 Bilirubin NEGATIVE  01/27/2019 10:32 PM  
 Urobilinogen 0.2 01/27/2019 10:32 PM  
 Nitrites NEGATIVE  01/27/2019 10:32 PM  
 Leukocyte Esterase NEGATIVE  01/27/2019 10:32 PM  
 Epithelial cells 1+ 01/27/2019 10:32 PM  
 Bacteria NEGATIVE  01/27/2019 10:32 PM  
 WBC 0 to 3 01/27/2019 10:32 PM  
 RBC NEGATIVE  01/27/2019 10:32 PM  
  
 
Functional status and cognitive function:   
Ambulatory, Declined rolling walker recommended by Physical Therapy Status: alert, cooperative, no distress, appears stated age Diet: Diabetic Code status and advanced care plan: Full [facility] Point of Contact: Noel Mcdermott Relationship: Friend [phone], Neymar Relationship: Son [phone] Patient Education:  Patient was educated on the following topics prior to discharge: Nausea and Vomiting, Gastoparesis Follow-up:  
Follow-up Information Follow up With Specialties Details Why Contact Info Lisa Sandoval MD Family Practice On 2/4/2019 @ 11:30 [address] [place] [id number] [phone] Zelda Tyler MD Gastroenterology  for gastric emptying scan [address] [place] [address] 
  
  
 
 
================================================================ Devorah Adam MD, PGY-1  
Janak Jackson Intern Pager: [phone] January 31, 2019, 3:21 AM

## 2019-01-23 NOTE — PROGRESS NOTES
Patient is not available to be assessed at this time. Patient is sleeping. [address] [facility]  
[phone]

## 2019-01-23 NOTE — ROUTINE PROCESS
TRANSFER - OUT REPORT: 
 
Verbal report given to Camille Mcghee RN(name) on Tashi Cutler  being transferred to 2308(unit) for routine progression of care Report consisted of patients Situation, Background, Assessment and  
Recommendations(SBAR). Information from the following report(s) SBAR, Kardex, ED Summary, MAR and Med Rec Status was reviewed with the receiving nurse. Lines:  
Peripheral IV 01/23/19 Left Wrist (Active) Site Assessment Clean, dry, & intact 1/23/2019 10:46 AM  
Phlebitis Assessment 0 1/23/2019 10:46 AM  
Infiltration Assessment 0 1/23/2019 10:46 AM  
Dressing Status Clean, dry, & intact 1/23/2019 10:46 AM  
Dressing Type Transparent 1/23/2019 10:46 AM  
Hub Color/Line Status Pink 1/23/2019 10:46 AM  
   
Peripheral IV 01/23/19 Left Hand (Active) Site Assessment Clean, dry, & intact 1/23/2019 10:46 AM  
Phlebitis Assessment 0 1/23/2019 10:46 AM  
Infiltration Assessment 0 1/23/2019 10:46 AM  
Dressing Status Clean, dry, & intact 1/23/2019 10:46 AM  
Dressing Type Transparent 1/23/2019 10:46 AM  
Hub Color/Line Status Pink 1/23/2019 10:46 AM  
  
 
Opportunity for questions and clarification was provided. Patient transported with: 
 Registered Nurse Tech

## 2019-01-23 NOTE — PROGRESS NOTES
Problem: Dysphagia (Adult) Goal: *Acute Goals and Plan of Care (Insert Text) Patient will: 1. Tolerate PO trials with 0 s/s overt distress in 4/5 trials 2. Utilize compensatory swallow strategies/maneuvers (decrease bite/sip, size/rate, alt. liq/sol) with min cues in 4/5 trials Rec:    
Reg solid with thin liquids when cleared by MD for PO Aspiration precautions HOB >45 during po intake, remain >30 for 30-45 minutes after po Small bites/sips; alternate liquid/solid with slow feeding rate Oral care TID Meds per pt preference Speech LAnguage Pathology bedside swallow evaluation Patient: Blondell Colder (46 y.o. female) Date: 1/23/2019 Primary Diagnosis: Tachycardia SIRS (systemic inflammatory response syndrome) (HCC) Precautions: aspiration ASSESSMENT : 
Based on the objective data described below, the patient presents with oropharyngeal swallow fxn essentially [facility]. Pt drowsy upon SLP arrival. Per chart review: pt with emesis event this AM.  Strength, ROM, and coordination of the orofacial musculature were all found to be [facility]. All structures were intact and symmetrical. The pt presented with adequate oral transit times on all consistencies. Mastication time was appropriate. No s/sx of aspiration noted; hyolaryngeal elevation and excursion appeared adequate on all consistencies. No oral stasis noted post swallow. The pt denied globus sensation post swallow. Rec reg solid diet with thin liquids when cleared by MD, aspiration precautions, oral care TID, meds as tolerated. ST will continue to follow x 1-2 visits to ensure safety of diet tolerance. Patient will benefit from skilled intervention to address the above impairments. Patients rehabilitation potential is considered to be Fair Factors which may influence rehabilitation potential include:  
[]            None noted []            Mental ability/status []            Medical condition []            Home/family situation and support systems 
[]            Safety awareness 
[]            Pain tolerance/management []            Other: PLAN : 
Recommendations and Planned Interventions: See above Frequency/Duration: Patient will be followed by speech-language pathology x 1-2 more visits to address goals. Discharge Recommendations: Outpatient and To Be Determined SUBJECTIVE:  
Patient stated I don't feel good this morning. OBJECTIVE:  
 
Past Medical History:  
Diagnosis Date  Blind left eye  Cellulitis of great toe of right foot 10/19/2017  Diabetes ([address]  Diabetic retinopathy ([address]  Gall stones  GERD (gastroesophageal reflux disease)  Hammertoe  Hiatal hernia  History of mammogram 10/03/2017 No evidence of malignancy  Hypertension  Mass of abdomen  Neuropathy due to type 2 diabetes mellitus ([address]  Osteomyelitis of toe of right foot ([address] 10/19/2017  Pancreatitis Past Surgical History:  
Procedure Laterality Date  HX AMPUTATION Left 07/21/2017  
 left 2nd toe  HX CHOLECYSTECTOMY  10/15/2014  HX GI Benign GI Stromal Tumor excision  HX HEENT Sx for detached retina  HX MYOMECTOMY x5 removed  HX OTHER SURGICAL    
 upper endoscopy Prior Level of Function/Home Situation: unknown Diet prior to admission: reg solid with thin Current Diet:  Reg solid with thin  Cognitive and Communication Status: 
Neurologic State: Drowsy Orientation Level: Oriented to person, Oriented to place Cognition: Follows commands Oral Assessment: 
Oral Assessment Labial: No impairment Dentition: Natural;Intact Oral Hygiene: adequate Lingual: No impairment Velum: No impairment Mandible: No impairment P.O. Trials: 
Patient Position: [facility] Vocal quality prior to P.O.: No impairment Consistency Presented: Thin liquid; Solid;Puree How Presented: Self-fed/presented;Cup/sip;Spoon;Straw Bolus Acceptance: No impairment Bolus Formation/Control: No impairment Propulsion: No impairment Oral Residue: None Initiation of Swallow: No impairment Laryngeal Elevation: Functional 
Aspiration Signs/Symptoms: None Pharyngeal Phase Characteristics: No impairment, issues, or problems Effective Modifications: None Cues for Modifications: None Oral Phase Severity: No impairment Pharyngeal Phase Severity : No impairment The severity rating is based on the following outcomes:   
Clinical Judgment Pain: 
Pt c/o 0/10 pain prior to evaluation. Pt c/o 0/10 pain post evaluation. After treatment:  
[]            Patient left in no apparent distress sitting up in chair 
[x]            Patient left in no apparent distress in bed 
[x]            Call bell left within reach [x]            Nursing notified 
[]            Caregiver present 
[]            Bed alarm activated COMMUNICATION/EDUCATION:  
[x]            SLP educated pt with regard to compensatory swallow strategies and 
     aspiration/reflux precautions including: small bites/sips, 
     alternate liquids/solids, decrease feeding rate, HOB > 45 with all po, and  
     upright in bed at 30 degrees after po for at least 45 minutes. [x]            Patient/family have participated as able in goal setting and plan of care. Thank you for this referral. 
 
Talib Castillo M.S. CCC-SLP/L Speech-Language Pathologist

## 2019-01-23 NOTE — ED NOTES
Spoke with [address] about pt's elevated blood, MD recommended evaluating after an hour. Pt lying on left side, resting without any distress.

## 2019-01-23 NOTE — NURSE NAVIGATOR
Reason for Admission:   Tachycardia SIRS (systemic inflammatory response syndrome) ([facility]) RRAT Score:     24 Resources/supports as identified by patient/family:    
 
Top Challenges facing patient (as identified by patient/family and CM):     See below Finances/Medication cost?     No problem Transportation      Uses family, [facility] Support system or lack thereof?   son Living arrangements? Lives alone Self-care/ADLs/Cognition? States she is independent Current Advanced Directive/Advance Care Plan:   no 
                       
Plan for utilizing home health: To be determined Likelihood of readmission:   HIGH Transition of Care Plan:               
 
Initial assessment completed with patient. Cognitive status of patient: oriented to time, place, person and situation.- but falls asleep easily between questions. Face sheet information confirmed:  yes. This patient lives alone in an apartment on the 2nd floor. She states she is able to navigate steps as needed. Prior to hospitalization, patient was considered to be independent with ADLs/IADLS : yes . Patient has a current ACP document on file: no The friend will be available to transport patient home upon discharge, if not available, may need assistance scheduling a ride. The patient already has [address] equipment available in the home. Patient is not currently active with home health. Patient has not stayed in a skilled nursing facility or rehab. This patient is on dialysis :no 
 
List of available Home Health agencies were left with the patient. Freedom of choice signed: no. Currently, the discharge plan is Home vs Home with [address]. The patient states thatshe can obtain her medications from the pharmacy, and take her medications as directed. Patient's current insurance is [facility] Medicaid. Care Management Interventions PCP Verified by CM: Yes(December per patient) Mode of Transport at Discharge: Self Physical Therapy Consult: No 
Occupational Therapy Consult: No 
Current Support Network: Lives Alone Confirm Follow Up Transport: Family Discharge Location Discharge Placement: Home [facility] Needs. ALBERTO Saeed, RN Care Management [phone]

## 2019-01-23 NOTE — ED NOTES
MD paged about patient, heart rate,no changes made. Nursing supervisor paged and will check on patient,s vital signs and assign bed if stable.

## 2019-01-23 NOTE — ED NOTES
Assumed care of pt from [facility]. Alert and oriented x 4. Report included SBAR, MAR, kardex. Frequent use items within reach. Bed locked in lowest position. Call bell within reach. Pt verbalizes understanding to call for assistance.

## 2019-01-23 NOTE — ED TRIAGE NOTES
Diabetes Patient/Family Education RecordFactors That  May Influence Patients Ability  to Learn or  Comply with Recommendations []   Language barrier    []   Cultural needs   []   Motivation  
 []   Cognitive limitation    []   Physical   [x]   Education  
 []   Physiological factors   []   Hearing/vision/speaking impairment   []   Mosque beliefs []   Financial factors   []  Other:   []  No factors identified at this time. Person Instructed: [x]   Patient   []   Family   []  Other Preference for Learning: 
 [x]   Verbal   [x]   Written   []  Demonstration Level of Comprehension & Competence:   
[x]  Good                                      [] Fair                                     []  Poor                             []  Needs Reinforcement  
[x]  Teachback completed Education Component:  
[x]  Medication management, including how to administer insulin (if appropriate) and potential medication interactions: Yes. Patient reported that she get her insulin prescription refill at [facility] pharmacy for Novolin 70/30 mix insulin. She takes 40 units twice daily before meals (breakfast and dinner). Patient verbalized understanding that this mix insulin has 40% rapid acting insulin therefore it is important to start eating within 30 minutes. Patient reported low blood sugar episode a while back when she forgot to eat her breakfast and her blood sugar dropped. She felt shaky and sweaty and had to drink sugar water. Patient is blind on the left eye. I asked how she's able to take the insulin. Patient stated that she is using pen insulin. She's able to use her right eye and also able to listen to the click of the pen insulin. She repeat dialing the dose as needed. Patient stated that she cannot remember the last time she had low blood sugar because it's been a while.  
Patient also reported that she is on Metformin 500 mg once daily with dinner. Explained to patient that this medicine helps control the amount of sugar released by the liver and also helps insulin work better. [x]  Nutritional management -obtain usual meal pattern: Yes. Patient reported that she usually only eat two meals a day: breakfast and dinner because she's not usually hungry at lunch time. Discussed the importance of eating 3 meals daily. [x]  Exercise: Yes. Patient stated that she's blind on left eye. She's able to walk around without any problem when she's not feeling sick. [x]  Signs, symptoms, and treatment of hyperglycemia and hypoglycemia: Yes. Patient stated that she cannot remember the last time she had high blood sugar because it has been a while but she remember the symptoms of feeling thirsty all the time and had to urinate often. Patient stated that she will continue to monitor her blood sugar at home and will contact her primary medical provider if she experience persistently elevated blood sugar readings that she cannot explain if she is not over eating, not sick, and not skipping her insulin. [x] Prevention, recognition and treatment of hyperglycemia and hypoglycemia: Yes. See notes above under medication about symptoms of low blood sugar and how she treat it. Encouraged patient to contact her primary medical provider if she experience low blood sugar 2-3x week. Discussed with patient the importance of calling 911 if she is unsuccessful in treating low blood sugar above 70. [x]  Importance of blood glucose monitoring and how to obtain a blood glucose meter: Yes. Patient reported that she is using [employer]'s generic sugar meter called ReliOn Prime. She is checking her blood sugar at least 2x daily. Patient is blind on the left eye but she stated that she has no problem reading the number on her sugar meter.  
[]  Instruction on use of the blood glucose meter [x]  Discuss the importance of HbA1C monitoring: Yes.  Patient verbalized knowledge/understanding of A1c and her current level is 7.2% (1/23/2019) which is equivalent to estimated average blood glucose of 160 mg/dL during the past 2-3 months. Patient stated, \"Oh, that went up from just above 6% last time. I need to make sure that I stick to my diet, medication, and exercise. \"  Patient doesn't want her A1c to go up further. [x]  Sick day guidelines: Yes. Educated patient that blood sugar will be going up when sick/ill therefore it is recommended to take diabetes medications. She also verbalized understanding to get emergency medical help if her blood sugar is 300 and higher and symptoms include nausea, vomiting, abd pain, and shortness of breath. [x]  Proper use and disposal of lancets, needles, syringes or insulin pens (if appropriate): Yes. [x]  Potential long-term complications (retinopathy, kidney disease, neuropathy, foot care): Yes. [x] Information about whom to contact in case of emergency or for more information: Yes. [x]  Goal:  Patient/family will demonstrate understanding of Diabetes Self Management Skills by: 01/29/2019 Plan for post-discharge education or self-management support: 
  [x] Outpatient class schedule provided            [] Patient Declined 
  [] Scheduled for outpatient classes (date) _______ Verify: 
Does patient understand how diabetes medications work? Yes. Does patient know what their most recent A1c is? Yes. Does patient monitor glucose at home? Yes. Does patient have a glucometer, testing supplies or difficulty obtaining diabetes medications? See notes below: 
Does patient have BG meter and testing supplies? Yes. Does patient have difficulty obtaining diabetes testing supplies and medications? No. 
  
Iqra Andrade RN 
[facility] [phone]

## 2019-01-23 NOTE — PROGRESS NOTES
[facility] Brief Progress Note SUBJECTIVE Pt seen at bedside after reports from RN that patient was actively vomiting and HR in the 140s. Patient having abdominal pain and producing yellow vomitus. HR sustained for 45 minutes in upper 140s. Blood pressure remains stable but elevated. OBJECTIVE Visit Vitals BP (!) 163/109 Pulse (!) 141 Temp 98.7 °F (37.1 °C) Resp (!) 35 SpO2 100% Recent Results (from the past 12 hour(s)) METABOLIC PANEL, COMPREHENSIVE Collection Time: 01/22/19  5:22 PM  
Result Value Ref Range Sodium 137 136 - 145 mmol/L Potassium 3.8 3.5 - 5.5 mmol/L Chloride 105 100 - 108 mmol/L  
 CO2 21 21 - 32 mmol/L Anion gap 11 3.0 - 18 mmol/L Glucose 285 (H) 74 - 99 mg/dL BUN 12 7.0 - 18 MG/DL Creatinine 1.70 (H) 0.6 - 1.3 MG/DL  
 BUN/Creatinine ratio 7 (L) 12 - 20 GFR est AA 38 (L) >60 ml/min/1.73m2 GFR est non-AA 31 (L) >60 ml/min/1.73m2 Calcium 9.4 8.5 - 10.1 MG/DL Bilirubin, total 2.0 (H) 0.2 - 1.0 MG/DL  
 ALT (SGPT) 17 13 - 56 U/L  
 AST (SGOT) 21 15 - 37 U/L Alk. phosphatase 163 (H) 45 - 117 U/L Protein, total 9.1 (H) 6.4 - 8.2 g/dL Albumin 4.4 3.4 - 5.0 g/dL Globulin 4.7 (H) 2.0 - 4.0 g/dL A-G Ratio 0.9 0.8 - 1.7 INFLUENZA A & B AG (RAPID TEST) Collection Time: 01/22/19  5:55 PM  
Result Value Ref Range Influenza A Antigen NEGATIVE  NEG Influenza B Antigen NEGATIVE  NEG    
CBC WITH AUTOMATED DIFF Collection Time: 01/22/19  6:22 PM  
Result Value Ref Range WBC 13.5 (H) 4.6 - 13.2 K/uL  
 RBC 3.92 (L) 4.20 - 5.30 M/uL  
 HGB 11.5 (L) 12.0 - 16.0 g/dL HCT 33.3 (L) 35.0 - 45.0 % MCV 84.9 74.0 - 97.0 FL  
 MCH 29.3 24.0 - 34.0 PG  
 MCHC 34.5 31.0 - 37.0 g/dL  
 RDW 14.3 11.6 - 14.5 % PLATELET 772 171 - 346 K/uL MPV 9.7 9.2 - 11.8 FL  
 NEUTROPHILS 85 (H) 40 - 73 % LYMPHOCYTES 12 (L) 21 - 52 % MONOCYTES 3 3 - 10 % EOSINOPHILS 0 0 - 5 % BASOPHILS 0 0 - 2 % ABS. NEUTROPHILS 11.6 (H) 1.8 - 8.0 K/UL  
 ABS. LYMPHOCYTES 1.6 0.9 - 3.6 K/UL  
 ABS. MONOCYTES 0.4 0.05 - 1.2 K/UL  
 ABS. EOSINOPHILS 0.0 0.0 - 0.4 K/UL  
 ABS. BASOPHILS 0.0 0.0 - 0.1 K/UL  
 DF AUTOMATED    
CK Collection Time: 01/22/19  6:22 PM  
Result Value Ref Range CK 84 26 - 192 U/L  
TROPONIN I Collection Time: 01/22/19  6:22 PM  
Result Value Ref Range Troponin-I, QT <0.02 0.0 - 0.045 NG/ML  
TSH 3RD GENERATION Collection Time: 01/22/19  6:22 PM  
Result Value Ref Range TSH 1.22 0.36 - 3.74 uIU/mL T3, FREE Collection Time: 01/22/19  6:22 PM  
Result Value Ref Range Triiodothyronine (T3), free 3.4 2.18 - 3.98 PG/ML  
EKG, 12 LEAD, INITIAL Collection Time: 01/22/19  6:44 PM  
Result Value Ref Range Ventricular Rate 115 BPM  
 Atrial Rate 115 BPM  
 P-R Interval 148 ms QRS Duration 68 ms Q-T Interval 344 ms QTC Calculation (Bezet) 475 ms Calculated P Axis 78 degrees Calculated R Axis 55 degrees Calculated T Axis 26 degrees Diagnosis Sinus tachycardia Nonspecific T wave abnormality Abnormal ECG When compared with ECG of 12-JAN-2019 10:58, 
premature ventricular complexes are no longer present Nonspecific T wave abnormality now evident in Lateral leads EKG, 12 LEAD, SUBSEQUENT Collection Time: 01/22/19  8:20 PM  
Result Value Ref Range Ventricular Rate 121 BPM  
 Atrial Rate 121 BPM  
 P-R Interval 140 ms QRS Duration 68 ms Q-T Interval 324 ms QTC Calculation (Bezet) 460 ms Calculated P Axis 66 degrees Calculated R Axis 51 degrees Calculated T Axis -2 degrees Diagnosis Sinus tachycardia Possible Left atrial enlargement Nonspecific T wave abnormality Abnormal ECG When compared with ECG of 22-JAN-2019 18:44, No significant change was found TROPONIN I Collection Time: 01/22/19 11:34 PM  
Result Value Ref Range Troponin-I, QT <0.02 0.0 - 0.045 NG/ML  
ETHYL ALCOHOL Collection Time: 01/22/19 11:34 PM  
Result Value Ref Range ALCOHOL(ETHYL),SERUM <3 0 - 3 MG/DL  
ACETAMINOPHEN Collection Time: 01/22/19 11:34 PM  
Result Value Ref Range Acetaminophen level <2 (L) 10.0 - 30.0 ug/mL SALICYLATE Collection Time: 01/22/19 11:34 PM  
Result Value Ref Range Salicylate level <9.7 (L) 2.8 - 20.0 MG/DL  
DRUG SCREEN, URINE Collection Time: 01/23/19 12:25 AM  
Result Value Ref Range BENZODIAZEPINES NEGATIVE  NEG    
 BARBITURATES NEGATIVE  NEG    
 THC (TH-CANNABINOL) NEGATIVE  NEG    
 OPIATES NEGATIVE  NEG    
 PCP(PHENCYCLIDINE) NEGATIVE  NEG    
 COCAINE NEGATIVE  NEG    
 AMPHETAMINES NEGATIVE  NEG METHADONE NEGATIVE  NEG HDSCOM (NOTE) URINALYSIS W/ RFLX MICROSCOPIC Collection Time: 01/23/19 12:25 AM  
Result Value Ref Range Color YELLOW Appearance CLEAR Specific gravity 1.009 1.005 - 1.030    
 pH (UA) 6.5 5.0 - 8.0 Protein TRACE (A) NEG mg/dL Glucose 500 (A) NEG mg/dL Ketone TRACE (A) NEG mg/dL Bilirubin NEGATIVE  NEG Blood TRACE (A) NEG Urobilinogen 0.2 0.2 - 1.0 EU/dL Nitrites NEGATIVE  NEG Leukocyte Esterase NEGATIVE  NEG    
URINE MICROSCOPIC ONLY Collection Time: 01/23/19 12:25 AM  
Result Value Ref Range WBC 0 to 2 0 - 4 /hpf  
 RBC 0 to 2 0 - 5 /hpf Epithelial cells 1+ 0 - 5 /lpf Bacteria 1+ (A) NEG /hpf METABOLIC PANEL, BASIC Collection Time: 01/23/19  3:19 AM  
Result Value Ref Range Sodium 140 136 - 145 mmol/L Potassium 3.5 3.5 - 5.5 mmol/L Chloride 108 100 - 108 mmol/L  
 CO2 19 (L) 21 - 32 mmol/L Anion gap 13 3.0 - 18 mmol/L Glucose 173 (H) 74 - 99 mg/dL BUN 11 7.0 - 18 MG/DL Creatinine 1.64 (H) 0.6 - 1.3 MG/DL  
 BUN/Creatinine ratio 7 (L) 12 - 20 GFR est AA 39 (L) >60 ml/min/1.73m2 GFR est non-AA 33 (L) >60 ml/min/1.73m2 Calcium 9.9 8.5 - 10.1 MG/DL MAGNESIUM Collection Time: 01/23/19  3:19 AM  
Result Value Ref Range Magnesium 1.6 1.6 - 2.6 mg/dL PHOSPHORUS Collection Time: 01/23/19  3:19 AM  
Result Value Ref Range Phosphorus 2.3 (L) 2.5 - 4.9 MG/DL  
CBC WITH AUTOMATED DIFF Collection Time: 01/23/19  3:19 AM  
Result Value Ref Range WBC 12.3 4.6 - 13.2 K/uL  
 RBC 3.99 (L) 4.20 - 5.30 M/uL  
 HGB 11.5 (L) 12.0 - 16.0 g/dL HCT 33.3 (L) 35.0 - 45.0 % MCV 83.5 74.0 - 97.0 FL  
 MCH 28.8 24.0 - 34.0 PG  
 MCHC 34.5 31.0 - 37.0 g/dL  
 RDW 14.2 11.6 - 14.5 % PLATELET 854 (H) 434 - 420 K/uL MPV 9.7 9.2 - 11.8 FL  
 NEUTROPHILS 86 (H) 40 - 73 % LYMPHOCYTES 11 (L) 21 - 52 % MONOCYTES 3 3 - 10 % EOSINOPHILS 0 0 - 5 % BASOPHILS 0 0 - 2 %  
 ABS. NEUTROPHILS 10.6 (H) 1.8 - 8.0 K/UL  
 ABS. LYMPHOCYTES 1.3 0.9 - 3.6 K/UL  
 ABS. MONOCYTES 0.4 0.05 - 1.2 K/UL  
 ABS. EOSINOPHILS 0.0 0.0 - 0.4 K/UL  
 ABS. BASOPHILS 0.0 0.0 - 0.1 K/UL  
 DF AUTOMATED    
LIPASE Collection Time: 01/23/19  3:19 AM  
Result Value Ref Range Lipase 88 73 - 393 U/L  
POC LACTIC ACID Collection Time: 01/23/19  3:21 AM  
Result Value Ref Range Lactic Acid (POC) 2.34 (HH) 0.40 - 2.00 mmol/L  
GLUCOSE, POC Collection Time: 01/23/19  3:26 AM  
Result Value Ref Range Glucose (POC) 181 (H) 70 - 110 mg/dL ASSESSMENT/PLAN Persistent tachycardia. Likely stress response to acute N/V.  
- Lopressor 5mg push once Nausea/vomiting refractory to multiple dosages of zofran - Patient received another push of zofran by ED provider 
- Scheduled metoclopramide 10 mg QID 
- May consider NG tube for decompression given refractory symptoms - May consider haloperidol for refractory symptoms - F/U with repeated EKG in the am to look for changes in QTc Elevated lactic acid - Additional 1L fluid bolus now - Trend lactic acid - Reviewed CT abdomen/pelvis from 01/04/2019 which did not show acute pathology at that time Kavita Estrada DO, PGY-1  
[facility] Medicine Intern Pager: [phone] January 23, 2019, 3:56 AM

## 2019-01-23 NOTE — ED NOTES
Patient agitated and restless. Jumped off stretcher and pulled out PIV. Placed on Bedside commode. Returned to bed and replaced on telemetry. Ativan and third dose of nitroglycerin given. Bolus continues. Continue to monitor.

## 2019-01-23 NOTE — DIABETES MGMT
Glycemic Control Plan of Care 
 
T2DM with current A1c of 7.2% (1/23/2019). See separate notes, 01/23/2019 for assessment of home diabetes management and education. Home diabetes medications: Novolin 70/30 mix insulin 40 units twice daily before meals (breakfast and dinner) and Metformin  mg once daily with dinner. POC BG report on 01/23/2019 at time of review: 181, 189, 82 mg/dL. Clear liquid diet at time of review. IVF: D5 1/2 NS with 20 mEq kcl cont infusion at 150 ml/hr. Recommendation(s): 
1.) Monitor inpatient BG pattern for lantus insulin dose adjustment as needed. Modified frequency of lantus insulin from BID to every 12 hours. 2.) Modified clear liquid diet by adding no concentrated sweets. Assessment: 
Patient is 52year old with past medical history including type 2 diabetes mellitus with neuropathy and retinopathy, blind on left eye, gastroparesis, GERD, hypertension, and pancreatitis - was admitted on 01/23/2019 with report of nausea, vomiting, and body aches. Noted: 
Sepsis. Acute on chronic kidney with CKD stage 2. T2DM with current A1c of 7.2% (1/23/2019). Most recent blood glucose values: 
 
Results for Saskhi Callahan (MRN [id number]) as of 1/23/2019 16:05 Ref. Range 1/23/2019 03:26 1/23/2019 07:48 1/23/2019 10:13 1/23/2019 13:54 GLUCOSE,FAST - POC Latest Ref Range: 70 - 110 mg/dL 181 (H) 189 (H)  82  
 
Current A1C: 7.2% (1/23/2019) which is equivalent to estimated average blood glucose of 160 mg/dL during the past 2-3 months. Current hospital diabetes medications: 
Basal lantus insulin 15 units every 12 hours. Correctional lispro insulin ACHS. Normal sensitivity dose. Total daily dose insulin requirement previous day: Patient was admitted on 01/23/2019. Home diabetes medications: Patient reported on 01/23/2019: 
Novolin 70/30 mix insulin 40 units twice daily before meals (breakfast and dinner). Metformin 500 mg once daily. Diet: Clear liquid; no concentrated sweets. Goals:  Blood glucose will be within target range of  mg/dL by 01/26/2019. Education:  _X__  Refer to Diabetes Education Record: 01/23/2019 
           ___  Education not indicated at this time Loras Leyden, RN [facility] Pager: [phone]

## 2019-01-23 NOTE — H&P
Intern Admission History and Physical 
[facility] Patient: Leslie Mendez MRN: [id number]  CSN: [id number] YOB: 1965  Age: 52 y.o. Sex: female Admission Date: 1/22/2019 HPI:  
 
Leslie Mendez is a 52 y.o. female with PMH T2DM with neuropathy, gastroparesis and retinopathy, HTN/HLD, GIST s/p resection (2014) and h/o pancreatitis, now presenting with complaint of myalgias and N/V. Patient seen after given dose of ativan in ED and although arousable and able to follows commands, frequently falls asleep during encounter. HPI in large part gathered via EMR and in discussions with ED attending, Dr. Toro Wylie. Patient brought into ED via EMS with complaints of flu-like symptoms including N/V and diarrhea. Patient with earlier complaints of nausea with 50cc of ?bilious vomitus and chest pain rated 10/10 improved to 7/10 after multiple nitroglycerin patches. Initial vital signs significant for persistent tachycardia and hypertension with tachypnea but otherwise without fever and saturating well on RA. She received an exhaustive ED workup to include CBC, significant for leukocytosis of 13.5, CMP with evidence of CRYSTAL and hyperglycemia, negative lipase and cardiac panelx2, negative tox panel/EtOH/ASA/tylenol, normal thyroid function and negative flu. A CXR has yet to be read and a EKG demonstrated sinus tachycardia. Tachycardia and blood pressures improved with IVFs and patient without complaints of chest pain or abdominal pain at time of this encounter. Review of Systems Constitutional: Negative for chills, fever and malaise/fatigue. Eyes: Negative for pain. Blind left eye Respiratory: Negative for cough and wheezing. Cardiovascular: Positive for chest pain. Negative for palpitations and leg swelling. Gastrointestinal: Positive for abdominal pain, nausea and vomiting. Genitourinary: Negative for dysuria and hematuria. Musculoskeletal: Positive for myalgias. Negative for joint pain. Skin: Negative for itching and rash. Neurological: Negative for dizziness and loss of consciousness. Psychiatric/Behavioral: The patient is nervous/anxious. The patient does not have insomnia. Past Medical History:  
Diagnosis Date  Blind left eye  Cellulitis of great toe of right foot 10/19/2017  Diabetes ([address]  Diabetic retinopathy ([address]  Gall stones  GERD (gastroesophageal reflux disease)  Hammertoe  Hiatal hernia  History of mammogram 10/03/2017 No evidence of malignancy  Hypertension  Mass of abdomen  Neuropathy due to type 2 diabetes mellitus ([address]  Osteomyelitis of toe of right foot ([address] 10/19/2017  Pancreatitis Past Surgical History:  
Procedure Laterality Date  HX AMPUTATION Left 2017  
 left 2nd toe  HX CHOLECYSTECTOMY  10/15/2014  HX GI Benign GI Stromal Tumor excision  HX HEENT Sx for detached retina  HX MYOMECTOMY x5 removed  HX OTHER SURGICAL    
 upper endoscopy Family History Adopted: Yes  
Problem Relation Age of Onset  Heart Failure Mother 76  
 Other Father 70  
     blood clot after surgery  Diabetes Paternal Aunt  Diabetes Paternal Uncle Social History Socioeconomic History  Marital status: SINGLE Spouse name: Not on file  Number of children: Not on file  Years of education: Not on file  Highest education level: Not on file Tobacco Use  Smoking status: Former Smoker Packs/day: 0.20 Years: 8.00 Pack years: 1.60 Types: Cigarettes Last attempt to quit: 12/3/1995 Years since quittin.1  Smokeless tobacco: Never Used Substance and Sexual Activity  Alcohol use: No  
  Comment: Drinks 3-4xs year generally wine  Drug use: No  
 Sexual activity: Not Currently Other Topics Concern   Service No  
 Blood Transfusions No  
  Caffeine Concern No  
 Occupational Exposure No  
 Hobby Hazards No  
 Sleep Concern No  
 Stress Concern No  
 Weight Concern No  
 Special Diet Yes  Back Care No  
 Exercise No  
 Bike Helmet No  
 Seat Belt Yes  Self-Exams Yes Social History Narrative Lives with 16year old son  with ex   Does not have health insurance Allergies Allergen Reactions  Levaquin [Levofloxacin] Hives  Promethazine Nausea and Vomiting \"the phenergan made me more nauseated\" Prior to Admission Medications Prescriptions Last Dose Informant Patient Reported? Taking? PARoxetine (PAXIL) 40 mg tablet   Yes No  
Sig: Take 40 mg by mouth daily. amoxicillin-clavulanate (AUGMENTIN) 500-125 mg per tablet   No No  
Sig: Take 1 Tab by mouth two (2) times daily (with meals). cholecalciferol (VITAMIN D3) 1,000 unit tablet   No No  
Sig: Take 1 Tab by mouth daily. insulin NPH/insulin regular (NOVOLIN 70/30, HUMULIN 70/30) 100 unit/mL (70-30) injection   No No  
Si Units by SubCUTAneous route two (2) times a day. lisinopril (PRINIVIL, ZESTRIL) 20 mg tablet   Yes No  
metFORMIN ER (GLUCOPHAGE XR) 500 mg tablet   No No  
Sig: TAKE 1 TABLET BY MOUTH DAILY WITH DINNER  
pantoprazole (PROTONIX) 40 mg tablet   No No  
Sig: Take 1 Tab by mouth Daily (before breakfast). rosuvastatin (CRESTOR) 20 mg tablet   No No  
Sig: Take 1 Tab by mouth nightly. sucralfate (CARAFATE) 1 gram tablet   No No  
Sig: Take 1 Tab by mouth Before breakfast and dinner. Facility-Administered Medications: None Physical Exam:  
 
Patient Vitals for the past 24 hrs: 
 Temp Pulse Resp BP SpO2  
19 0030 98.3 °F (36.8 °C) (!) 133 22 (!) 173/104 100 % 19 2303 98.4 °F (36.9 °C) (!) 122 22 (!) 174/93 100 % 19 2231  (!) 120 16 156/85 100 % 19 2224 98.1 °F (36.7 °C) (!) 139 26 154/85 100 % 19 2130 98.3 °F (36.8 °C) (!) 112 25 (!) 181/105 100 % 01/22/19 2006 97.8 °F (36.6 °C) (!) 134  162/75 100 % 01/22/19 1938 97.8 °F (36.6 °C) (!) 118 29 (!) 170/113 100 % 01/22/19 1800    (!) 121/98 100 % 01/22/19 1745    144/82 100 % 01/22/19 1730 97.4 °F (36.3 °C) 65 20 (!) 191/93 100 % Physical Exam:  
General:  Arousable, oriented to person and place; able to follow commands and freely moving arms/legs but somnolent throughout examination HEENT: Conjunctiva pink, sclera anicteric. Pupils 3-4mm b/l. Ambyplopia. EOMI. CV:  RRR, no murmurs. No JVD. RESP:  Unlabored breathing. Lungs clear to auscultation without adventitious breath sounds. Equal expansion bilaterally. ABD:  Soft, nontender, nondistended. MS:  No joint deformity or instability. No atrophy. Neuro:  AOx2, freely moves upper and lower extremities but does not participate in full neuro examination. Ext:  No edema. S/p numerous toe amputations b/l feet Skin:  No rashes, lesions, or ulcers. Good turgor. Psych: Mood/affect congruent. Responds to questions with \"yes\" or \"no\" phrases only. Hygiene appropriate Chemistry Recent Labs  
  01/22/19 
1722 *   
K 3.8  CO2 21 BUN 12  
CREA 1.70* CA 9.4 AGAP 11 BUCR 7* * TP 9.1* ALB 4.4  
GLOB 4.7* AGRAT 0.9  
  
 
CBC w/Diff Recent Labs  
  01/22/19 
1822 WBC 13.5*  
RBC 3.92* HGB 11.5* HCT 33.3*  
 GRANS 85* LYMPH 12* EOS 0 Liver Enzymes Protein, total  
Date Value Ref Range Status 01/22/2019 9.1 (H) 6.4 - 8.2 g/dL Final  
 
Albumin Date Value Ref Range Status 01/22/2019 4.4 3.4 - 5.0 g/dL Final  
 
Globulin Date Value Ref Range Status 01/22/2019 4.7 (H) 2.0 - 4.0 g/dL Final  
 
A-G Ratio Date Value Ref Range Status 01/22/2019 0.9 0.8 - 1.7   Final  
 
AST (SGOT) Date Value Ref Range Status 01/22/2019 21 15 - 37 U/L Final  
 
Alk. phosphatase Date Value Ref Range Status 01/22/2019 163 (H) 45 - 117 U/L Final  
 
Recent Labs  
  01/22/19 1722  
TP 9.1* ALB 4.4  
GLOB 4.7* AGRAT 0.9 SGOT 21 * Cardiac Enzymes Lab Results Component Value Date/Time CPK 84 01/22/2019 06:22 PM  
 TROIQ <0.02 01/22/2019 11:34 PM  
 TROIQ <0.02 01/22/2019 06:22 PM  
  
 
Thyroid  Lab Results Component Value Date/Time TSH 1.22 01/22/2019 06:22 PM  
    
 
Imaging: 
XR (Most Recent). Read pending ECHO 01/04/2019 · Estimated left ventricular ejection fraction is 61 - 65%. Left ventricular mild concentric hypertrophy. Mild (grade 1) left ventricular diastolic dysfunction. · Mild tricuspid valve regurgitation is present. EKG Ventricular Rate 121  BPM Preliminary Atrial Rate 121  BPM Preliminary P-R Interval 140  ms Preliminary QRS Duration 68  ms Preliminary Q-T Interval 324  ms Preliminary QTC Calculation (Bezet) 460  ms Preliminary Calculated P Axis 66  degrees Preliminary Calculated R Axis 51  degrees Preliminary Calculated T Axis -2  degrees Preliminary Diagnosis   Preliminary Sinus tachycardia Possible Left atrial enlargement Nonspecific T wave abnormality Abnormal ECG When compared with ECG of 22-JAN-2019 18:44, No significant change was found Recent Results (from the past 12 hour(s)) METABOLIC PANEL, COMPREHENSIVE Collection Time: 01/22/19  5:22 PM  
Result Value Ref Range Sodium 137 136 - 145 mmol/L Potassium 3.8 3.5 - 5.5 mmol/L Chloride 105 100 - 108 mmol/L  
 CO2 21 21 - 32 mmol/L Anion gap 11 3.0 - 18 mmol/L Glucose 285 (H) 74 - 99 mg/dL BUN 12 7.0 - 18 MG/DL Creatinine 1.70 (H) 0.6 - 1.3 MG/DL  
 BUN/Creatinine ratio 7 (L) 12 - 20 GFR est AA 38 (L) >60 ml/min/1.73m2 GFR est non-AA 31 (L) >60 ml/min/1.73m2 Calcium 9.4 8.5 - 10.1 MG/DL Bilirubin, total 2.0 (H) 0.2 - 1.0 MG/DL  
 ALT (SGPT) 17 13 - 56 U/L  
 AST (SGOT) 21 15 - 37 U/L Alk. phosphatase 163 (H) 45 - 117 U/L Protein, total 9.1 (H) 6.4 - 8.2 g/dL Albumin 4.4 3.4 - 5.0 g/dL Globulin 4.7 (H) 2.0 - 4.0 g/dL A-G Ratio 0.9 0.8 - 1.7 INFLUENZA A & B AG (RAPID TEST) Collection Time: 01/22/19  5:55 PM  
Result Value Ref Range Influenza A Antigen NEGATIVE  NEG Influenza B Antigen NEGATIVE  NEG    
CBC WITH AUTOMATED DIFF Collection Time: 01/22/19  6:22 PM  
Result Value Ref Range WBC 13.5 (H) 4.6 - 13.2 K/uL  
 RBC 3.92 (L) 4.20 - 5.30 M/uL  
 HGB 11.5 (L) 12.0 - 16.0 g/dL HCT 33.3 (L) 35.0 - 45.0 % MCV 84.9 74.0 - 97.0 FL  
 MCH 29.3 24.0 - 34.0 PG  
 MCHC 34.5 31.0 - 37.0 g/dL  
 RDW 14.3 11.6 - 14.5 % PLATELET 878 438 - 584 K/uL MPV 9.7 9.2 - 11.8 FL  
 NEUTROPHILS 85 (H) 40 - 73 % LYMPHOCYTES 12 (L) 21 - 52 % MONOCYTES 3 3 - 10 % EOSINOPHILS 0 0 - 5 % BASOPHILS 0 0 - 2 %  
 ABS. NEUTROPHILS 11.6 (H) 1.8 - 8.0 K/UL  
 ABS. LYMPHOCYTES 1.6 0.9 - 3.6 K/UL  
 ABS. MONOCYTES 0.4 0.05 - 1.2 K/UL  
 ABS. EOSINOPHILS 0.0 0.0 - 0.4 K/UL  
 ABS. BASOPHILS 0.0 0.0 - 0.1 K/UL  
 DF AUTOMATED    
CK Collection Time: 01/22/19  6:22 PM  
Result Value Ref Range CK 84 26 - 192 U/L  
TROPONIN I Collection Time: 01/22/19  6:22 PM  
Result Value Ref Range Troponin-I, QT <0.02 0.0 - 0.045 NG/ML  
TSH 3RD GENERATION Collection Time: 01/22/19  6:22 PM  
Result Value Ref Range TSH 1.22 0.36 - 3.74 uIU/mL T3, FREE Collection Time: 01/22/19  6:22 PM  
Result Value Ref Range Triiodothyronine (T3), free 3.4 2.18 - 3.98 PG/ML  
EKG, 12 LEAD, INITIAL Collection Time: 01/22/19  6:44 PM  
Result Value Ref Range Ventricular Rate 115 BPM  
 Atrial Rate 115 BPM  
 P-R Interval 148 ms QRS Duration 68 ms Q-T Interval 344 ms QTC Calculation (Bezet) 475 ms Calculated P Axis 78 degrees Calculated R Axis 55 degrees Calculated T Axis 26 degrees Diagnosis Sinus tachycardia Nonspecific T wave abnormality Abnormal ECG When compared with ECG of 12-JAN-2019 10:58, 
premature ventricular complexes are no longer present Nonspecific T wave abnormality now evident in Lateral leads EKG, 12 LEAD, SUBSEQUENT Collection Time: 01/22/19  8:20 PM  
Result Value Ref Range Ventricular Rate 121 BPM  
 Atrial Rate 121 BPM  
 P-R Interval 140 ms QRS Duration 68 ms Q-T Interval 324 ms QTC Calculation (Bezet) 460 ms Calculated P Axis 66 degrees Calculated R Axis 51 degrees Calculated T Axis -2 degrees Diagnosis Sinus tachycardia Possible Left atrial enlargement Nonspecific T wave abnormality Abnormal ECG When compared with ECG of 22-JAN-2019 18:44, No significant change was found TROPONIN I Collection Time: 01/22/19 11:34 PM  
Result Value Ref Range Troponin-I, QT <0.02 0.0 - 0.045 NG/ML  
ETHYL ALCOHOL Collection Time: 01/22/19 11:34 PM  
Result Value Ref Range ALCOHOL(ETHYL),SERUM <3 0 - 3 MG/DL  
ACETAMINOPHEN Collection Time: 01/22/19 11:34 PM  
Result Value Ref Range Acetaminophen level <2 (L) 10.0 - 30.0 ug/mL SALICYLATE Collection Time: 01/22/19 11:34 PM  
Result Value Ref Range Salicylate level <1.1 (L) 2.8 - 20.0 MG/DL  
DRUG SCREEN, URINE Collection Time: 01/23/19 12:25 AM  
Result Value Ref Range BENZODIAZEPINES NEGATIVE  NEG    
 BARBITURATES NEGATIVE  NEG    
 THC (TH-CANNABINOL) NEGATIVE  NEG    
 OPIATES NEGATIVE  NEG    
 PCP(PHENCYCLIDINE) NEGATIVE  NEG    
 COCAINE NEGATIVE  NEG    
 AMPHETAMINES NEGATIVE  NEG METHADONE NEGATIVE  NEG HDSCOM (NOTE) Assessment/Plan:  
52 y.o. female with PMH T2DM with neuropathy, gastroparesis and retinopathy, HTN/HLD, GIST s/p resection (2014) and h/o pancreatitis, now admitted with sepsis with unclear etiology. 1. Sepsis (SIRS 3/4) without evidence end-organ dysfunction, lactic acidosis or hypotension believed to be 2/2 GI etiology. Nausea, Vomiting. H/O gastroparesis DDx to include leukemoid reaction to gastritis 2/2 h/o gastroparesis. Patient with tachycardia, tachypnea and leukocytosis and left shift on differential. Tachycardia improved from 150 following 3L fluid resuscitation but remains elevated in 100s-120. Afebrile and tachypneic. CXR read pending but by my interpretation appears wnl. In reviewing EMR, it appears patient has had several admissions where she had tachycardia and additional SIRS criteria including her last admissions to this service in December 2018 and January 2019. Patient's last EGD during 12/2018 admission which showed esophagitis and gastritis and was recommended protonix and carafate. 
- Admit to general medical floor with telemetry monitoring 
- NPO until bedside swallow eval passed - IVFs, ie D51/2NS w/20mEq KCl @ 125cc/hr - Will h/o abx until suspected source of infection is identified and response to fluid resuscitation - Blood cx, urinalysis and urine culture - F/U cxr 
- Daily labs to include CBC, BMP with mag/phos - Additional labs to include lactic acid, lipase 
- Hold carafate and oral protonix while NPO 
- IV protonix 40 mg daily - Avoid nephrotoxic medications and renally dose all meds - Fall and aspiration precautions - Bedside swallow eval before advancing diet - Monitor I/O  
- Consider GI consult  
- Consider additional imaging to include U/S, CT as indicated by clinical course - Avoid sedating medications 2. Tachycardia 2/2 #1 and dehydration. Patient tachycardic with previous admissions as well. As high as 150 in the ED. 
- Goal to maintain HR <110-120 
 
3. Acute on Chronic Kidney Injury 2/2 dehydration. H/O CKD Stage 2. Creatinine 1.70 represents greater than 0.3mg/dL increase in serum creatinine and a Stage 1 CRYSTAL (KDIGO guidelines) Patient's baseline creatinine approximately 1.17 and GFR of 61. 
- IVFs as above 
- avoid nephrotoxic medications 
- renally dose antibiotics - Trend BMP 
  
4. T2DM with retinopathy and neuropathy. Most recent hemoglobin A1c 6.5 (03/30/2018) - accuchecks achs with SSI 
- Lantus 15U bid (patient rx'd humulin 70/30 40 units bid but unable to verify med list with patient) 
- adjust insulin based on requirements 
- hemoglobin A1c, diabetic education consult 
- hold home metformin 500 mg po daily while NPO and 2/2 CRYSTAL 
- hold home lisinopril 
- hold home humulin mix 70/30   
  
5. H/O HTN/HLD. BPs improved since initial ER presentation 
- hold home lisinopril 20 mg daily while NPO and 2/2 CRYSTAL 
- Goal to maintain BP <180/100 
  
6. H/O Anxiety. - hold home Paxil 40 mg daily while NPO Diet  NPO  
DVT Prophylaxis  SQH  
GI Prophylaxis  Omeprazole Code status  FULL Disposition  Anticipate LOS>2 [place] Relationship: Friend 
[phone] Mirian Willett Relationship: Son 
[phone] Yazminsimone [place], DO , PGY-1  
[facility] Intern Pager: [phone] January 23, 2019, 1:09 AM  
 
 
Admission History and Physical 
[facility] 
  
  
Patient: Beverley Franco MRN: [id number]  CSN: [id number] YOB: 1965  Age: 52 y.o. Sex: female   
  
DOA: 1/22/2019 HPI:  
  
Beverley Franco is a 52 y.o. female with PMH of IDDM2, HTN, HLD, anxiety, gastritis/esophagitis, GI stromal tumor s/p resection 2185, [id number], [id number], [id number], grade 1 diastolic dysfunction, now presenting with complaint of myalgia for several days. History was obtained from ER record and staff as patient was too drowsy to answer questions in greater detail. Patient also endorsed N/V and while in the ED, she started to complain of chest pain but denied any problems with heart issues in the past. 
To note, the patient was seen and admitted to inpatient service earlier this month for similar reason.  She was treated for possible GI source infection and was discharged with her abd pain slightly diminished but not completely resolved.  
  
 ED Course: Troponin x2, EKG, CXR, ASA, nitroglycerin 0.4mg SL x2, ativan, zofran 
  
ROS Unable to obtain due to patient's status. However, patient declined pain anywhere, denied chest pain, abd pain, nausea. 
  
ROS, PMH, PSH, Family Hx, Social Hx, home medications, and allergies as above in intern H&P. I agree with history as above.  
   
Physical Exam:  
  
Patient Vitals for the past 24 hrs: 
  Temp Pulse Resp BP SpO2  
01/23/19 0130 98.6 °F (37 °C) (!) 112 21 105/64 97 % 01/23/19 0115  (!) 116 15 121/59 99 % 01/23/19 0100  (!) 112 21 97/53 96 % 01/23/19 0045  (!) 123  175/89 99 % 01/23/19 0030 98.3 °F (36.8 °C) (!) 124 16 (!) 180/98 100 % 01/23/19 0015  (!) 120 24 (!) 176/97 98 % 01/22/19 2303 98.4 °F (36.9 °C) (!) 122 22 (!) 174/93 100 % 01/22/19 2231  (!) 120 16 156/85 100 % 01/22/19 2224 98.1 °F (36.7 °C) (!) 139 26 154/85 100 % 01/22/19 2130 98.3 °F (36.8 °C) (!) 112 25 (!) 181/105 100 % 01/22/19 2006 97.8 °F (36.6 °C) (!) 134  162/75 100 % 01/22/19 1938 97.8 °F (36.6 °C) (!) 118 29 (!) 170/113 100 % 01/22/19 1800    (!) 121/98 100 % 01/22/19 1745    144/82 100 % 01/22/19 1730 97.4 °F (36.3 °C) 65 20 (!) 191/93 100 %   
  
Physical Exam  
Constitutional: She appears well-developed and well-nourished. No distress. HENT:  
Head: Normocephalic and atraumatic. Eyes: Conjunctivae and EOM are normal. No scleral icterus. exotropia Neck: Normal range of motion. Neck supple. Cardiovascular: Regular rhythm and normal heart sounds. Tachycardic Pulmonary/Chest: Effort normal and breath sounds normal. No respiratory distress. Musculoskeletal: She exhibits no edema. 2nd digit amputation on left foot. 1st digit amputation on right foot all well healed Neurological:  
Frequently falling asleep but aroused by voice or tactile stimuli Skin: Skin is warm. She is not diaphoretic.  
  
  
IMAGING:  
CXR 1/22/2019: Pending official read 
  
Recent Results Recent Results (from the past 12 hour(s)) METABOLIC PANEL, COMPREHENSIVE  
  Collection Time: 01/22/19  5:22 PM  
Result Value Ref Range  
  Sodium 137 136 - 145 mmol/L  
  Potassium 3.8 3.5 - 5.5 mmol/L  
  Chloride 105 100 - 108 mmol/L  
  CO2 21 21 - 32 mmol/L  
  Anion gap 11 3.0 - 18 mmol/L  
  Glucose 285 (H) 74 - 99 mg/dL  
  BUN 12 7.0 - 18 MG/DL  
  Creatinine 1.70 (H) 0.6 - 1.3 MG/DL  
  BUN/Creatinine ratio 7 (L) 12 - 20    
  GFR est AA 38 (L) >60 ml/min/1.73m2  
  GFR est non-AA 31 (L) >60 ml/min/1.73m2  
  Calcium 9.4 8.5 - 10.1 MG/DL  
  Bilirubin, total 2.0 (H) 0.2 - 1.0 MG/DL  
  ALT (SGPT) 17 13 - 56 U/L  
  AST (SGOT) 21 15 - 37 U/L  
  Alk. phosphatase 163 (H) 45 - 117 U/L  
  Protein, total 9.1 (H) 6.4 - 8.2 g/dL  
  Albumin 4.4 3.4 - 5.0 g/dL  
  Globulin 4.7 (H) 2.0 - 4.0 g/dL  
  A-G Ratio 0.9 0.8 - 1.7 INFLUENZA A & B AG (RAPID TEST)  
  Collection Time: 01/22/19  5:55 PM  
Result Value Ref Range  
  Influenza A Antigen NEGATIVE  NEG    
  Influenza B Antigen NEGATIVE  NEG    
CBC WITH AUTOMATED DIFF  
  Collection Time: 01/22/19  6:22 PM  
Result Value Ref Range  
  WBC 13.5 (H) 4.6 - 13.2 K/uL  
  RBC 3.92 (L) 4.20 - 5.30 M/uL  
  HGB 11.5 (L) 12.0 - 16.0 g/dL  
  HCT 33.3 (L) 35.0 - 45.0 %  
  MCV 84.9 74.0 - 97.0 FL  
  MCH 29.3 24.0 - 34.0 PG  
  MCHC 34.5 31.0 - 37.0 g/dL  
  RDW 14.3 11.6 - 14.5 %  
  PLATELET 213 110 - 161 K/uL  
  MPV 9.7 9.2 - 11.8 FL  
  NEUTROPHILS 85 (H) 40 - 73 %  
  LYMPHOCYTES 12 (L) 21 - 52 %  
  MONOCYTES 3 3 - 10 %  
  EOSINOPHILS 0 0 - 5 %  
  BASOPHILS 0 0 - 2 %  
  ABS. NEUTROPHILS 11.6 (H) 1.8 - 8.0 K/UL  
  ABS. LYMPHOCYTES 1.6 0.9 - 3.6 K/UL  
  ABS. MONOCYTES 0.4 0.05 - 1.2 K/UL  
  ABS. EOSINOPHILS 0.0 0.0 - 0.4 K/UL  
  ABS. BASOPHILS 0.0 0.0 - 0.1 K/UL  
  DF AUTOMATED CK  
  Collection Time: 01/22/19  6:22 PM  
Result Value Ref Range  
  CK 84 26 - 192 U/L  
TROPONIN I  
  Collection Time: 01/22/19  6:22 PM  
Result Value Ref Range   Troponin-I, QT <0.02 0.0 - 0.045 NG/ML  
TSH 3RD GENERATION  
  Collection Time: 01/22/19  6:22 PM  
Result Value Ref Range  
  TSH 1.22 0.36 - 3.74 uIU/mL T3, FREE  
  Collection Time: 01/22/19  6:22 PM  
Result Value Ref Range  
  Triiodothyronine (T3), free 3.4 2.18 - 3.98 PG/ML  
EKG, 12 LEAD, INITIAL  
  Collection Time: 01/22/19  6:44 PM  
Result Value Ref Range  
  Ventricular Rate 115 BPM  
  Atrial Rate 115 BPM  
  P-R Interval 148 ms  
  QRS Duration 68 ms  
  Q-T Interval 344 ms  
  QTC Calculation (Bezet) 475 ms  
  Calculated P Axis 78 degrees  
  Calculated R Axis 55 degrees  
  Calculated T Axis 26 degrees  
  Diagnosis      
    Sinus tachycardia Nonspecific T wave abnormality Abnormal ECG When compared with ECG of 12-JAN-2019 10:58, 
premature ventricular complexes are no longer present Nonspecific T wave abnormality now evident in Lateral leads 
   
EKG, 12 LEAD, SUBSEQUENT  
  Collection Time: 01/22/19  8:20 PM  
Result Value Ref Range  
  Ventricular Rate 121 BPM  
  Atrial Rate 121 BPM  
  P-R Interval 140 ms  
  QRS Duration 68 ms  
  Q-T Interval 324 ms  
  QTC Calculation (Bezet) 460 ms  
  Calculated P Axis 66 degrees  
  Calculated R Axis 51 degrees  
  Calculated T Axis -2 degrees  
  Diagnosis      
    Sinus tachycardia Possible Left atrial enlargement Nonspecific T wave abnormality Abnormal ECG When compared with ECG of 22-JAN-2019 18:44, No significant change was found 
   
TROPONIN I  
  Collection Time: 01/22/19 11:34 PM  
Result Value Ref Range  
  Troponin-I, QT <0.02 0.0 - 0.045 NG/ML  
ETHYL ALCOHOL  
  Collection Time: 01/22/19 11:34 PM  
Result Value Ref Range  
  ALCOHOL(ETHYL),SERUM <3 0 - 3 MG/DL  
ACETAMINOPHEN  
  Collection Time: 01/22/19 11:34 PM  
Result Value Ref Range  
  Acetaminophen level <2 (L) 10.0 - 30.0 ug/mL SALICYLATE  
  Collection Time: 01/22/19 11:34 PM  
Result Value Ref Range  
  Salicylate level <6.3 (L) 2.8 - 20.0 MG/DL  
 DRUG SCREEN, URINE  
  Collection Time: 01/23/19 12:25 AM  
Result Value Ref Range  
  BENZODIAZEPINES NEGATIVE  NEG    
  BARBITURATES NEGATIVE  NEG    
  THC (TH-CANNABINOL) NEGATIVE  NEG    
  OPIATES NEGATIVE  NEG    
  PCP(PHENCYCLIDINE) NEGATIVE  NEG    
  COCAINE NEGATIVE  NEG    
  AMPHETAMINES NEGATIVE  NEG    
  METHADONE NEGATIVE  NEG    
  HDSCOM (NOTE)    
  
  
  
  
Assessment/Plan:  
52 y.o. female with PMH of IDDM2, HTN, HLD, anxiety, gastritis/esophagitis, GI stromal tumor s/p resection 7728, [id number], [id number], [id number], grade 1 diastolic dysfunction, now admitted with SIRS. 
  
SIRS w/o clear source of infection. On admit, was found to have WBC 13.5, tachycardia at 112, tachypnea at 20-29. Patient was afebrile on admit. 3 out of 4 SIRS criteria met. Patient had similar presentation on last admission earlier this month. UDS on admit was negative with ethyl alcohol <3, unremarkable salicylate and acetaminophen levels. Influenza A & B negative. Possible stress reaction from emesis causing mild elevation of WBC vs infection (viral and unlikely bacterial). Tachycardia likely 2/2 volume depletion due to her h/o multiple emesis. - Admit to medicine on remote tele - C/w maintenance IVF at 125mL/hr 
- FU lactic acid and lipase 
- Daily CBC to look for resolution of leukocytosis - FU UA and BCx 
- Will hold off on abx for now - FU CXR official report 
  
Chest pain with tachycardia. Chest pain is now resolved. Serial troponin x2 negative. EKG unremarkable except for tachycardia. Patient had 2D Echo early this month that showed normal LVEF at 65% with mild left ventricular diastolic dysfunction and tricuspid regurgitation. Tachycardia likely from hypovolemia 2/2 emesis. TSH/T3 WNL 
- Monitor VS 
  
Nausea and vomiting 2/2 likely diabetic gastroparesis. Patient was admitted early Jan 2019 for similar presentation.  She was found to have WBC 20 with increased lactic acid, which were trended down prior to discharge. - FU lipase - C/w carafate, reglan, and PPI 
- blood glucose control with Lantus 15u and SSI ACHS 
  
CRYSTAL- Likely from dehydration 2/2 emesis; Baseline Cr 1-1.2. On admit, Cr was 1.7. 
- Daily BMP 
- Continue IVF as above 
  
  
*Please see intern note above for additional chronic disease mgmt. 
  
  
Wilfredo Carrasquillo Getting 
PGY-2 [facility] [place] Medicine Resident [employer] 01/23/19 1:35 AM

## 2019-01-24 ENCOUNTER — APPOINTMENT (OUTPATIENT)
Dept: ULTRASOUND IMAGING | Age: 54
DRG: 048 | End: 2019-01-24
Attending: STUDENT IN AN ORGANIZED HEALTH CARE EDUCATION/TRAINING PROGRAM
Payer: MEDICAID

## 2019-01-24 ENCOUNTER — APPOINTMENT (OUTPATIENT)
Dept: INTERVENTIONAL RADIOLOGY/VASCULAR | Age: 54
DRG: 048 | End: 2019-01-24
Attending: STUDENT IN AN ORGANIZED HEALTH CARE EDUCATION/TRAINING PROGRAM
Payer: MEDICAID

## 2019-01-24 LAB
ANION GAP SERPL CALC-SCNC: 9 MMOL/L (ref 3–18)
BACTERIA SPEC CULT: ABNORMAL
BASOPHILS # BLD: 0 K/UL (ref 0–0.1)
BASOPHILS NFR BLD: 0 % (ref 0–2)
BUN SERPL-MCNC: 5 MG/DL (ref 7–18)
BUN/CREAT SERPL: 4 (ref 12–20)
CALCIUM SERPL-MCNC: 8.4 MG/DL (ref 8.5–10.1)
CHLORIDE SERPL-SCNC: 112 MMOL/L (ref 100–108)
CO2 SERPL-SCNC: 20 MMOL/L (ref 21–32)
CREAT SERPL-MCNC: 1.34 MG/DL (ref 0.6–1.3)
DIFFERENTIAL METHOD BLD: ABNORMAL
EOSINOPHIL # BLD: 0 K/UL (ref 0–0.4)
EOSINOPHIL NFR BLD: 0 % (ref 0–5)
ERYTHROCYTE [DISTWIDTH] IN BLOOD BY AUTOMATED COUNT: 14.7 % (ref 11.6–14.5)
GLUCOSE BLD STRIP.AUTO-MCNC: 116 MG/DL (ref 70–110)
GLUCOSE BLD STRIP.AUTO-MCNC: 128 MG/DL (ref 70–110)
GLUCOSE BLD STRIP.AUTO-MCNC: 150 MG/DL (ref 70–110)
GLUCOSE BLD STRIP.AUTO-MCNC: 152 MG/DL (ref 70–110)
GLUCOSE BLD STRIP.AUTO-MCNC: 157 MG/DL (ref 70–110)
GLUCOSE BLD STRIP.AUTO-MCNC: 158 MG/DL (ref 70–110)
GLUCOSE SERPL-MCNC: 151 MG/DL (ref 74–99)
HCT VFR BLD AUTO: 26.3 % (ref 35–45)
HGB BLD-MCNC: 8.9 G/DL (ref 12–16)
LYMPHOCYTES # BLD: 2.9 K/UL (ref 0.9–3.6)
LYMPHOCYTES NFR BLD: 30 % (ref 21–52)
MAGNESIUM SERPL-MCNC: 1.4 MG/DL (ref 1.6–2.6)
MAGNESIUM SERPL-MCNC: 1.7 MG/DL (ref 1.6–2.6)
MCH RBC QN AUTO: 29.2 PG (ref 24–34)
MCHC RBC AUTO-ENTMCNC: 33.8 G/DL (ref 31–37)
MCV RBC AUTO: 86.2 FL (ref 74–97)
MONOCYTES # BLD: 0.7 K/UL (ref 0.05–1.2)
MONOCYTES NFR BLD: 7 % (ref 3–10)
NEUTS SEG # BLD: 5.8 K/UL (ref 1.8–8)
NEUTS SEG NFR BLD: 63 % (ref 40–73)
PHOSPHATE SERPL-MCNC: 3 MG/DL (ref 2.5–4.9)
PLATELET # BLD AUTO: 344 K/UL (ref 135–420)
PMV BLD AUTO: 10.3 FL (ref 9.2–11.8)
POTASSIUM SERPL-SCNC: 3.7 MMOL/L (ref 3.5–5.5)
RBC # BLD AUTO: 3.05 M/UL (ref 4.2–5.3)
SERVICE CMNT-IMP: ABNORMAL
SODIUM SERPL-SCNC: 141 MMOL/L (ref 136–145)
WBC # BLD AUTO: 9.4 K/UL (ref 4.6–13.2)

## 2019-01-24 PROCEDURE — 76705 ECHO EXAM OF ABDOMEN: CPT

## 2019-01-24 PROCEDURE — 74011250636 HC RX REV CODE- 250/636: Performed by: STUDENT IN AN ORGANIZED HEALTH CARE EDUCATION/TRAINING PROGRAM

## 2019-01-24 PROCEDURE — 97165 OT EVAL LOW COMPLEX 30 MIN: CPT

## 2019-01-24 PROCEDURE — 74011636637 HC RX REV CODE- 636/637: Performed by: STUDENT IN AN ORGANIZED HEALTH CARE EDUCATION/TRAINING PROGRAM

## 2019-01-24 PROCEDURE — 97161 PT EVAL LOW COMPLEX 20 MIN: CPT

## 2019-01-24 PROCEDURE — 36415 COLL VENOUS BLD VENIPUNCTURE: CPT

## 2019-01-24 PROCEDURE — 74011250637 HC RX REV CODE- 250/637: Performed by: STUDENT IN AN ORGANIZED HEALTH CARE EDUCATION/TRAINING PROGRAM

## 2019-01-24 PROCEDURE — 80048 BASIC METABOLIC PNL TOTAL CA: CPT

## 2019-01-24 PROCEDURE — 85025 COMPLETE CBC W/AUTO DIFF WBC: CPT

## 2019-01-24 PROCEDURE — 84100 ASSAY OF PHOSPHORUS: CPT

## 2019-01-24 PROCEDURE — 83735 ASSAY OF MAGNESIUM: CPT

## 2019-01-24 PROCEDURE — 74011250636 HC RX REV CODE- 250/636: Performed by: FAMILY MEDICINE

## 2019-01-24 PROCEDURE — 74011000258 HC RX REV CODE- 258: Performed by: STUDENT IN AN ORGANIZED HEALTH CARE EDUCATION/TRAINING PROGRAM

## 2019-01-24 PROCEDURE — 82962 GLUCOSE BLOOD TEST: CPT

## 2019-01-24 PROCEDURE — 74011636637 HC RX REV CODE- 636/637: Performed by: FAMILY MEDICINE

## 2019-01-24 PROCEDURE — 65660000000 HC RM CCU STEPDOWN

## 2019-01-24 PROCEDURE — 02HV33Z INSERTION OF INFUSION DEVICE INTO SUPERIOR VENA CAVA, PERCUTANEOUS APPROACH: ICD-10-PCS | Performed by: RADIOLOGY

## 2019-01-24 PROCEDURE — 36569 INSJ PICC 5 YR+ W/O IMAGING: CPT

## 2019-01-24 PROCEDURE — C9113 INJ PANTOPRAZOLE SODIUM, VIA: HCPCS | Performed by: STUDENT IN AN ORGANIZED HEALTH CARE EDUCATION/TRAINING PROGRAM

## 2019-01-24 RX ORDER — TALC
250 POWDER (GRAM) TOPICAL DAILY
Status: DISCONTINUED | OUTPATIENT
Start: 2019-01-25 | End: 2019-01-25 | Stop reason: HOSPADM

## 2019-01-24 RX ORDER — MAGNESIUM SULFATE HEPTAHYDRATE 40 MG/ML
2 INJECTION, SOLUTION INTRAVENOUS ONCE
Status: COMPLETED | OUTPATIENT
Start: 2019-01-24 | End: 2019-01-25

## 2019-01-24 RX ORDER — PANTOPRAZOLE SODIUM 40 MG/1
40 TABLET, DELAYED RELEASE ORAL
Status: DISCONTINUED | OUTPATIENT
Start: 2019-01-24 | End: 2019-01-25 | Stop reason: HOSPADM

## 2019-01-24 RX ORDER — MELATONIN
1000 DAILY
Status: DISCONTINUED | OUTPATIENT
Start: 2019-01-25 | End: 2019-01-25 | Stop reason: HOSPADM

## 2019-01-24 RX ORDER — MAGNESIUM SULFATE HEPTAHYDRATE 40 MG/ML
2 INJECTION, SOLUTION INTRAVENOUS ONCE
Status: DISCONTINUED | OUTPATIENT
Start: 2019-01-24 | End: 2019-01-24

## 2019-01-24 RX ORDER — VANCOMYCIN HYDROCHLORIDE
1250
Status: DISCONTINUED | OUTPATIENT
Start: 2019-01-24 | End: 2019-01-24

## 2019-01-24 RX ORDER — VANCOMYCIN/0.9 % SOD CHLORIDE 1.5G/250ML
1500 PLASTIC BAG, INJECTION (ML) INTRAVENOUS
Status: DISCONTINUED | OUTPATIENT
Start: 2019-01-24 | End: 2019-01-25

## 2019-01-24 RX ORDER — INSULIN GLARGINE 100 [IU]/ML
8 INJECTION, SOLUTION SUBCUTANEOUS EVERY 12 HOURS
Status: DISCONTINUED | OUTPATIENT
Start: 2019-01-24 | End: 2019-01-25 | Stop reason: HOSPADM

## 2019-01-24 RX ORDER — POTASSIUM CHLORIDE 20 MEQ/1
20 TABLET, EXTENDED RELEASE ORAL 2 TIMES DAILY
Status: DISCONTINUED | OUTPATIENT
Start: 2019-01-24 | End: 2019-01-25 | Stop reason: HOSPADM

## 2019-01-24 RX ORDER — METOCLOPRAMIDE 5 MG/1
10 TABLET ORAL
Status: DISCONTINUED | OUTPATIENT
Start: 2019-01-24 | End: 2019-01-25

## 2019-01-24 RX ORDER — VANCOMYCIN/0.9 % SOD CHLORIDE 1.5G/250ML
1500 PLASTIC BAG, INJECTION (ML) INTRAVENOUS
Status: DISCONTINUED | OUTPATIENT
Start: 2019-01-24 | End: 2019-01-24

## 2019-01-24 RX ORDER — ROSUVASTATIN CALCIUM 10 MG/1
20 TABLET, COATED ORAL
Status: DISCONTINUED | OUTPATIENT
Start: 2019-01-24 | End: 2019-01-25 | Stop reason: HOSPADM

## 2019-01-24 RX ORDER — SUCRALFATE 1 G/1
1 TABLET ORAL
Status: DISCONTINUED | OUTPATIENT
Start: 2019-01-24 | End: 2019-01-25 | Stop reason: HOSPADM

## 2019-01-24 RX ORDER — MAGNESIUM SULFATE HEPTAHYDRATE 40 MG/ML
2 INJECTION, SOLUTION INTRAVENOUS ONCE
Status: COMPLETED | OUTPATIENT
Start: 2019-01-24 | End: 2019-01-24

## 2019-01-24 RX ADMIN — INSULIN GLARGINE 15 UNITS: 100 INJECTION, SOLUTION SUBCUTANEOUS at 09:00

## 2019-01-24 RX ADMIN — METOCLOPRAMIDE HYDROCHLORIDE 10 MG: 10 TABLET ORAL at 23:14

## 2019-01-24 RX ADMIN — PANTOPRAZOLE SODIUM 40 MG: 40 INJECTION, POWDER, FOR SOLUTION INTRAVENOUS at 08:11

## 2019-01-24 RX ADMIN — INSULIN LISPRO 2 UNITS: 100 INJECTION, SOLUTION INTRAVENOUS; SUBCUTANEOUS at 08:11

## 2019-01-24 RX ADMIN — INSULIN LISPRO 2 UNITS: 100 INJECTION, SOLUTION INTRAVENOUS; SUBCUTANEOUS at 23:11

## 2019-01-24 RX ADMIN — METOCLOPRAMIDE HYDROCHLORIDE 10 MG: 10 TABLET ORAL at 11:58

## 2019-01-24 RX ADMIN — SUCRALFATE 1 G: 1 TABLET ORAL at 09:00

## 2019-01-24 RX ADMIN — PIPERACILLIN SODIUM,TAZOBACTAM SODIUM 3.38 G: 3; .375 INJECTION, POWDER, FOR SOLUTION INTRAVENOUS at 23:15

## 2019-01-24 RX ADMIN — METOCLOPRAMIDE 10 MG: 5 INJECTION, SOLUTION INTRAMUSCULAR; INTRAVENOUS at 08:11

## 2019-01-24 RX ADMIN — Medication 10 ML: at 14:00

## 2019-01-24 RX ADMIN — SUCRALFATE 1 G: 1 TABLET ORAL at 17:39

## 2019-01-24 RX ADMIN — PIPERACILLIN SODIUM,TAZOBACTAM SODIUM 3.38 G: 3; .375 INJECTION, POWDER, FOR SOLUTION INTRAVENOUS at 14:59

## 2019-01-24 RX ADMIN — Medication 10 ML: at 23:15

## 2019-01-24 RX ADMIN — VANCOMYCIN HYDROCHLORIDE 1500 MG: 10 INJECTION, POWDER, LYOPHILIZED, FOR SOLUTION INTRAVENOUS at 14:58

## 2019-01-24 RX ADMIN — INSULIN GLARGINE 8 UNITS: 100 INJECTION, SOLUTION SUBCUTANEOUS at 23:19

## 2019-01-24 RX ADMIN — MAGNESIUM SULFATE HEPTAHYDRATE 2 G: 40 INJECTION, SOLUTION INTRAVENOUS at 09:00

## 2019-01-24 RX ADMIN — Medication 10 ML: at 05:00

## 2019-01-24 RX ADMIN — ROSUVASTATIN CALCIUM 20 MG: 10 TABLET, FILM COATED ORAL at 23:14

## 2019-01-24 RX ADMIN — DEXTROSE MONOHYDRATE, SODIUM CHLORIDE, AND POTASSIUM CHLORIDE 130 ML/HR: 50; 4.5; 1.49 INJECTION, SOLUTION INTRAVENOUS at 11:58

## 2019-01-24 RX ADMIN — POTASSIUM CHLORIDE 20 MEQ: 20 TABLET, EXTENDED RELEASE ORAL at 17:39

## 2019-01-24 RX ADMIN — METOCLOPRAMIDE HYDROCHLORIDE 10 MG: 10 TABLET ORAL at 17:39

## 2019-01-24 RX ADMIN — PIPERACILLIN SODIUM,TAZOBACTAM SODIUM 3.38 G: 3; .375 INJECTION, POWDER, FOR SOLUTION INTRAVENOUS at 04:56

## 2019-01-24 RX ADMIN — HEPARIN SODIUM 5000 UNITS: 5000 INJECTION INTRAVENOUS; SUBCUTANEOUS at 14:58

## 2019-01-24 RX ADMIN — MAGNESIUM SULFATE HEPTAHYDRATE 2 G: 40 INJECTION, SOLUTION INTRAVENOUS at 23:14

## 2019-01-24 RX ADMIN — DEXTROSE MONOHYDRATE, SODIUM CHLORIDE, AND POTASSIUM CHLORIDE 150 ML/HR: 50; 4.5; 1.49 INJECTION, SOLUTION INTRAVENOUS at 02:56

## 2019-01-24 RX ADMIN — HEPARIN SODIUM 5000 UNITS: 5000 INJECTION INTRAVENOUS; SUBCUTANEOUS at 02:56

## 2019-01-24 NOTE — DIABETES MGMT
GLYCEMIC CONTROL: 
 
Follow-up this afternoon. 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 Searched for talking meter and found ReliOn Primier VOICE Blood Glucose Monitoring System for $14.98 at [facility], 2-day shipping. I offered this information to the patient and she accepted. Durga Martinez RN Pager: [phone]

## 2019-01-24 NOTE — MED STUDENT NOTES
*ATTENTION:  This note has been created by a medical student for educational purposes only. Please do not refer to the content of this note for clinical decision-making, billing, or other purposes. Please see attending physicians note to obtain clinical information on this patient. * SUBJECTIVE:  
Miss Wiley Block reports doing \"better\" today with improved nausea, no vomiting since yesterday, reduced body aches, and reduced sharp abdominal pain 6/10, and some chest pain. She was able to tolerate fluid intake yesterday and asked about switching to solids this morning. No BM since Tuesday, which was loose and watery. When asked about duration of symptoms prior to presentation, Miss Wiley Block said they had started on Tuesday, but she had prior episodes of similar symptoms 1-2x every month since late last year. Patient does have a hard time managing her blood glucose due to her vision difficulties and inability to read the glucometer. She does not use any marijuana and rarely drinks alcohol. Review of Systems Constitutional: Negative for chills, fever, Positive for malaise/fatigue. Eyes: Negative for pain. Blind left eye Respiratory: Negative for cough and wheezing. Cardiovascular: Positive for chest pain. Negative for palpitations and leg swelling. Gastrointestinal: Positive for abdominal pain, nausea and vomiting. Genitourinary: Negative for dysuria and hematuria. Musculoskeletal: Positive for myalgias. Negative for joint pain. Skin: Negative for itching and rash. Neurological: Negative for dizziness and loss of consciousness. Psychiatric/Behavioral: The patient is nervous/anxious. The patient does not have insomnia. OBJECTIVE: 
Visit Vitals /79 Pulse 96 Temp 99.8 °F (37.7 °C) Resp 20 Wt 91.1 kg (200 lb 12.8 oz) SpO2 99% BMI 30.53 kg/m² CV: S1 and S2 audible with no murmurs, rubs, or gallops.  RRR, Pedal pulses 2+, radial pulse 1+, no lower extremity edema Resp: breathing non-labored, all fields clear to auscultation Abd: Hypoactive bowel sounds, diffuse tenderness to light palpation, no rebound tenderness Ext: No lesions to feet outside of previous amputations, capillary refill present, sensation to light touch reduced but intact Labs: CBC WITH AUTOMATED DIFF Collection Time: 01/24/19  4:53 AM  
Result Value Ref Range WBC 9.4 4.6 - 13.2 K/uL  
 RBC 3.05 (L) 4.20 - 5.30 M/uL HGB 8.9 (L) 12.0 - 16.0 g/dL HCT 26.3 (L) 35.0 - 45.0 % MCV 86.2 74.0 - 97.0 FL  
 MCH 29.2 24.0 - 34.0 PG  
 MCHC 33.8 31.0 - 37.0 g/dL  
 RDW 14.7 (H) 11.6 - 14.5 % PLATELET 931 561 - 063 K/uL MPV 10.3 9.2 - 11.8 FL  
 NEUTROPHILS 63 40 - 73 % LYMPHOCYTES 30 21 - 52 % MONOCYTES 7 3 - 10 % EOSINOPHILS 0 0 - 5 % BASOPHILS 0 0 - 2 %  
 ABS. NEUTROPHILS 5.8 1.8 - 8.0 K/UL  
 ABS. LYMPHOCYTES 2.9 0.9 - 3.6 K/UL  
 ABS. MONOCYTES 0.7 0.05 - 1.2 K/UL  
 ABS. EOSINOPHILS 0.0 0.0 - 0.4 K/UL  
 ABS. BASOPHILS 0.0 0.0 - 0.1 K/UL  
 DF AUTOMATED Recent Glucose Results:  
Lab Results Component Value Date/Time  (H) 01/24/2019 04:53 AM  
 GLU 99 01/23/2019 02:51 PM  
 
Lactic Acid 2.34>2.03 (1/23 2200) BMP:  
Lab Results Component Value Date/Time  01/24/2019 04:53 AM  
 K 3.7 01/24/2019 04:53 AM  
  (H) 01/24/2019 04:53 AM  
 CO2 20 (L) 01/24/2019 04:53 AM  
 AGAP 9 01/24/2019 04:53 AM  
  (H) 01/24/2019 04:53 AM  
 BUN 5 (L) 01/24/2019 04:53 AM  
 CREA 1.34 (H) 01/24/2019 04:53 AM  
 GFRAA 50 (L) 01/24/2019 04:53 AM  
 GFRNA 41 (L) 01/24/2019 04:53 AM  
 
 
Intake/Output Summary (Last 24 hours) at 1/24/2019 9433 Last data filed at 1/23/2019 0496 Gross per 24 hour Intake 2500 ml Output  Net 2500 ml  
 
 
 
ASSESSMENT/PLAN: 
 
Principal Problem: 
Systemic inflammatory response syndrome (SIRS) ([facility]) - 1/4 currently (tachycardia) - Currently afebrile with no growth in blood or urine cultures at 24hrs - Continue Vanc/Zosyn 
 - Consider procalcitonin levels - Consider f/u blood culture to guide antibiotic course 
  
Nausea/Vomiting/Abdominal Pain 
 - continue metoclopramide injections 10mg QID 
 - continue ondansetron 4-8mg q8h as needed 
 - continue pantoprazole 40mg every day Active Problems: 
CRYSTAL (acute kidney injury) due to dehydration ([facility] [address] (11/17/2018) - Continue IVF and encourage PO intake - Continue to monitor UOP Tachycardia improved with fluids (1/23/2019) 
- heart rate continues to be elevated, suspected due to pain, but continue hydration and monitoring 
   
DM 
- continue Lantus 15 units q12hr 
- continue Lispro QID before meals and nightly 
- Continue POC glucose checks Hypertension  
 - BP has been well controlled in past 24hr Diabetic retinopathy, neuropathy and gastropathy 
  
Visually impaired.

## 2019-01-24 NOTE — PROGRESS NOTES
Intern Progress Note [address] Patient: Jhon Jacques MRN: [id number]  CSN: [id number] YOB: 1965  Age: 52 y.o. Sex: female DOA: 1/22/2019 LOS:  LOS: 1 day Subjective:  
 
Acute events: Patient reports improved nausea and lower abdominal pain. Patient denies emesis overnight. No BM or flatus. Review of Systems Constitutional: Negative for chills and fever. Respiratory: Negative for cough and shortness of breath. Cardiovascular: Negative for chest pain and palpitations. Gastrointestinal: Positive for abdominal pain (lower) and nausea. Negative for vomiting. Objective:  
  
Patient Vitals for the past 24 hrs: 
 Temp Pulse Resp BP SpO2  
01/24/19 0736 99.8 °F (37.7 °C) 96 20 121/79 99 % 01/24/19 0400 99.5 °F (37.5 °C) 95 20 146/84 100 % 01/24/19 0000 98.6 °F (37 °C) 93 18 99/70 98 % 01/23/19 2000 98.5 °F (36.9 °C) 88 18 120/82 100 % 01/23/19 1633 99.1 °F (37.3 °C) 95 19 119/84 100 % 01/23/19 1313 99.1 °F (37.3 °C) 97 15 111/69 99 % 01/23/19 1230  92 16 112/65 97 % 01/23/19 1215  93 11 116/69 99 % 01/23/19 1200  (!) 110 17 113/73 94 % 01/23/19 1145  (!) 107 18 (!) 105/92 100 % 01/23/19 1130  (!) 103 27 104/56   
01/23/19 1115  (!) 108 19 116/68 100 % 01/23/19 1101     100 % 01/23/19 1100  (!) 104 17 100/56 99 % 01/23/19 1045  (!) 104 17 105/54 99 % 01/23/19 1030  (!) 102 17 96/53 98 % 01/23/19 1000    91/43   
01/23/19 0949 99.6 °F (37.6 °C) (!) 103 16 92/54 100 % 01/23/19 0900  (!) 108  90/52   
01/23/19 0859  (!) 111     
01/23/19 0858  (!) 111 13    
01/23/19 0857  (!) 114 15    
01/23/19 0856  (!) 115 15    
01/23/19 0855  (!) 116 15    
01/23/19 0854  (!) 118 22    
01/23/19 0853  (!) 122 (!) 34  99 % 01/23/19 0852  (!) 124 29    
01/23/19 0851  (!) 122 16    
01/23/19 0850  (!) 127 23    
01/23/19 0849  (!) 131 22   01/23/19 0848  (!) 140 21    
01/23/19 0847  (!) 128 29    
01/23/19 0846  (!) 137 25    
01/23/19 0845  (!) 128 24 (!) 157/98   
01/23/19 0844  (!) 130 (!) 31    
01/23/19 0843  (!) 129 27    
01/23/19 0842  (!) 134 22    
01/23/19 0841  (!) 132 17    
01/23/19 0840  (!) 133 (!) 33    
01/23/19 0839  (!) 135 24    
01/23/19 0838  (!) 139 20    
01/23/19 0837  (!) 144 27    
01/23/19 0836  (!) 158 (!) 33    
01/23/19 0835  (!) 158 19    
01/23/19 0834  (!) 157 23    
01/23/19 0833  (!) 149 28    
01/23/19 0832  (!) 142 16    
01/23/19 0831  (!) 142 25    
01/23/19 0830  (!) 139 16 128/79   
01/23/19 0829  (!) 135 22    
01/23/19 0828  (!) 136 17    
01/23/19 0827  (!) 138 23    
01/23/19 0826  (!) 135 21    
01/23/19 0825  (!) 132 24    
01/23/19 0824  (!) 128 19    
01/23/19 0823  (!) 126 17    
01/23/19 0822  (!) 132 26    
01/23/19 0821  (!) 127 20    
01/23/19 0820  (!) 130 30    
01/23/19 0819  (!) 129 21    
01/23/19 0818  (!) 127 18    
01/23/19 0817  (!) 129 19    
01/23/19 0816  (!) 130 18    
01/23/19 0815  (!) 132 26 (!) 163/103   
01/23/19 0814  (!) 137 18    
01/23/19 0813  (!) 143 25    
01/23/19 0812  (!) 145 19    
01/23/19 0811  (!) 143 24    
01/23/19 0810  (!) 132 27    
01/23/19 0809  (!) 130 24   Intake/Output Summary (Last 24 hours) at 1/24/2019 0142 Last data filed at 1/23/2019 4161 Gross per 24 hour Intake 2500 ml Output  Net 2500 ml Physical Exam:  
General:  Alert and Responsive and in No acute distress. HEENT: Conjunctiva pink, sclera anicteric. Exophoria of right eye. EOMI. Pharynx moist, nonerythematous. Moist mucous membranes. Thyroid not enlarged, no nodules. No cervical, supraclavicular, occipital or submandibular lymphadenopathy. No other gross abnormalities present. CV:  RRR, no murmurs. No visible pulsations or thrills. RESP:  Unlabored breathing. Lungs clear to auscultation. no wheeze, rales, or rhonchi. Equal expansion bilaterally. ABD:  Soft, bilateral lower abdominal tenderness, nondistended. MS:  No joint deformity or instability. No atrophy. Neuro:  5/5 strength bilateral upper extremities and lower extremities. Ext:  No edema. 2+ radial and +1 dp pulses bilaterally. Amputated 1st digit of right foot and 2nd digit of left foot. Skin:  No rashes, lesions, or ulcers. Good turgor. Lab/Data Reviewed: 
Recent Results (from the past 12 hour(s)) GLUCOSE, POC Collection Time: 01/23/19  9:33 PM  
Result Value Ref Range Glucose (POC) 89 70 - 110 mg/dL METABOLIC PANEL, BASIC Collection Time: 01/24/19  4:53 AM  
Result Value Ref Range Sodium 141 136 - 145 mmol/L Potassium 3.7 3.5 - 5.5 mmol/L Chloride 112 (H) 100 - 108 mmol/L  
 CO2 20 (L) 21 - 32 mmol/L Anion gap 9 3.0 - 18 mmol/L Glucose 151 (H) 74 - 99 mg/dL BUN 5 (L) 7.0 - 18 MG/DL Creatinine 1.34 (H) 0.6 - 1.3 MG/DL  
 BUN/Creatinine ratio 4 (L) 12 - 20 GFR est AA 50 (L) >60 ml/min/1.73m2 GFR est non-AA 41 (L) >60 ml/min/1.73m2 Calcium 8.4 (L) 8.5 - 10.1 MG/DL MAGNESIUM Collection Time: 01/24/19  4:53 AM  
Result Value Ref Range Magnesium 1.4 (L) 1.6 - 2.6 mg/dL PHOSPHORUS Collection Time: 01/24/19  4:53 AM  
Result Value Ref Range Phosphorus 3.0 2.5 - 4.9 MG/DL  
CBC WITH AUTOMATED DIFF Collection Time: 01/24/19  4:53 AM  
Result Value Ref Range WBC 9.4 4.6 - 13.2 K/uL  
 RBC 3.05 (L) 4.20 - 5.30 M/uL HGB 8.9 (L) 12.0 - 16.0 g/dL HCT 26.3 (L) 35.0 - 45.0 % MCV 86.2 74.0 - 97.0 FL  
 MCH 29.2 24.0 - 34.0 PG  
 MCHC 33.8 31.0 - 37.0 g/dL  
 RDW 14.7 (H) 11.6 - 14.5 % PLATELET 116 820 - 869 K/uL MPV 10.3 9.2 - 11.8 FL  
 NEUTROPHILS 63 40 - 73 % LYMPHOCYTES 30 21 - 52 % MONOCYTES 7 3 - 10 % EOSINOPHILS 0 0 - 5 % BASOPHILS 0 0 - 2 %  
 ABS. NEUTROPHILS 5.8 1.8 - 8.0 K/UL ABS. LYMPHOCYTES 2.9 0.9 - 3.6 K/UL  
 ABS. MONOCYTES 0.7 0.05 - 1.2 K/UL  
 ABS. EOSINOPHILS 0.0 0.0 - 0.4 K/UL  
 ABS. BASOPHILS 0.0 0.0 - 0.1 K/UL  
 DF AUTOMATED    
GLUCOSE, POC Collection Time: 01/24/19  7:37 AM  
Result Value Ref Range Glucose (POC) 158 (H) 70 - 110 mg/dL Scheduled Medications Reviewed: 
Current Facility-Administered Medications Medication Dose Route Frequency  vancomycin (VANCOCIN) 1500 mg in  ml infusion  1,500 mg IntraVENous Q18H  
 magnesium sulfate 2 g/50 ml IVPB (premix or compounded)  2 g IntraVENous ONCE  
 magnesium sulfate 2 g/50 ml IVPB (premix or compounded)  2 g IntraVENous ONCE  
 sodium chloride (NS) flush 5-40 mL  5-40 mL IntraVENous Q8H  
 heparin (porcine) injection 5,000 Units  5,000 Units SubCUTAneous Q12H  
 insulin lispro (HUMALOG) injection   SubCUTAneous AC&HS  pantoprazole (PROTONIX) injection 40 mg  40 mg IntraVENous Q24H  
 dextrose 5% - 0.45% NaCl with KCl 20 mEq/L infusion  150 mL/hr IntraVENous CONTINUOUS  
 metoclopramide HCl (REGLAN) injection 10 mg  10 mg IntraVENous QID  piperacillin-tazobactam (ZOSYN) 3.375 g in 0.9% sodium chloride (MBP/ADV) 100 mL MBP  3.375 g IntraVENous Q6H  
 insulin glargine (LANTUS) injection 15 Units  15 Units SubCUTAneous Q12H Imaging, microbiology, and EKG/Telemetry: No results found. Assessment/Plan  
52 y.o. female with PMH T2DM with neuropathy, gastroparesis and retinopathy, HTN/HLD, GIST s/p resection (2014) and h/o pancreatitis, now admitted with sepsis with unclear etiology. 
  
1. Sepsis (SIRS 3/4) without evidence end-organ dysfunction, lactic acidosis or hypotension believed to be 2/2 GI etiology. Nausea, Vomiting. H/O gastroparesis DDx to include leukemoid reaction to gastritis 2/2 h/o gastroparesis. VSS. CXRwnl. Lipase 88.  In reviewing EMR, it appears patient has had several admissions where she had tachycardia and additional SIRS criteria including her last admissions to this service in December 2018 and January 2019. Patient's last EGD during 12/2018 admission which showed esophagitis and gastritis and was recommended protonix and carafate. 
- Admit to general medical floor with telemetry monitoring 
- diabetic 
- IVFs, ie D51/2NS w/20mEq KCl @ 130cc/hr - Will h/o abx until suspected source of infection is identified and response to fluid resuscitation - Blood cx, urine culture NGTD 
- UA glucose 500, trace ketone and blood, 1+ bacteria 
- Daily labs to include CBC, BMP with mag/phos 
- lactic acid down to 1.7 
- carafate and oral protonix while NPO 
- Avoid nephrotoxic medications and renally dose all meds - Fall and aspiration precautions - Bedside swallow eval before advancing diet - Monitor I/O  
- Consider GI consult  
- Consider additional imaging to include U/S, CT as indicated by clinical course - Avoid sedating medications 
  
2. Tachycardia 2/2 #1 and dehydration. Patient tachycardic with previous admissions as well. As high as 150 in the ED. 
- Goal to maintain HR <110-120 
  
3. Acute on Chronic Kidney Injury 2/2 dehydration. H/O CKD Stage 2. Creatinine 1.70 represents greater than 0.3mg/dL increase in serum creatinine and a Stage 1 CRYSTAL (KDIGO guidelines). Patient's baseline creatinine approximately 1.17 and GFR of 61. 
- IVFs as above 
- avoid nephrotoxic medications 
- renally dose antibiotics - Trend BMP 
  
4. T2DM with retinopathy and neuropathy. HgbA1c 7.2. Most recent hemoglobin A1c 6.5 (03/30/2018) - accuchecks achs with SSI 
- held Lantus 15U bid (patient rx'd humulin 70/30 40 units bid but unable to verify med list with patient) 
- adjust insulin based on requirements 
- diabetic education consult 
- hold home metformin 500 mg po daily while NPO and 2/2 CRYSTAL 
- hold home humulin mix 70/30   
  
5. H/O HTN/HLD. BPs improved since initial ER presentation 
- hold home lisinopril 20 mg daily - Goal to maintain BP <180/100 
  
6. H/O Anxiety. 
- hold home Paxil 40 mg daily while NPO 7. Anemia Most likely dilutional, patient given 5L of fluid yesterday. 
- daily CBC 8. Hypomagnesemia  
-replete 
  
Diet  Diabetic DVT Prophylaxis  SQH  
GI Prophylaxis  Omeprazole Code status  FULL Disposition  Anticipate LOS>2 MN  
  
Point of Contact Sander Funeznison Relationship: Friend 
[phone] 
  
Betito Delacruz Relationship: Son 
[phone] MD Janak Lira PGY-1 
01/24/19 8:08 AM 
Pager: [phone]

## 2019-01-24 NOTE — DIABETES MGMT
Glycemic Control Plan of Care 
 
T2DM with current A1c of 7.2% (1/23/2019). See separate notes, 01/23/2019 for assessment of home diabetes management and education. Home diabetes medications: Novolin 70/30 mix insulin 40 units twice daily before meals (breakfast and dinner) and Metformin  mg once daily with dinner. POC BG range on 01/23/2019:  mg/dL. POC BG report on 01/24/2019 at time of review: 158 mg/dL. Seen patient this morning and stated that she's tolerating the clear liquid diet since yesterday. Noted diet changed today from clear liquid to diabetic consistent carb. IVF: D5 1/2 NS with 20 mEq kcl cont infusion at 130 ml/hr. Current hospital diabetes medications: 
Basal lantus insulin 15 units every 12 hours. Correctional lispro insulin ACHS. Normal sensitivity dose. Recommendation(s): 
1.) Cont to monitor BG pattern and consider lantus insulin dose titration if BG is above target range. 2.) Discontinue dextrose in IVF when medically appropriate. Assessment: 
Patient is 52year old with past medical history including type 2 diabetes mellitus with neuropathy and retinopathy, blind on left eye, gastroparesis, GERD, hypertension, and pancreatitis - was admitted on 01/23/2019 with report of nausea, vomiting, and body aches. Noted: 
Sepsis. Acute on chronic kidney with CKD stage 2. T2DM with current A1c of 7.2% (1/23/2019). Most recent blood glucose values: 
 
Results for Debbie Horton (MRN [id number]) as of 1/24/2019 10:45 Ref. Range 1/23/2019 03:26 1/23/2019 07:48 1/23/2019 10:13 1/23/2019 13:54 1/23/2019 17:41 1/23/2019 21:33 GLUCOSE,FAST - POC Latest Ref Range: 70 - 110 mg/dL 181 (H) 189 (H)  82 154 (H) 89 Results for Debbie Horton (MRN [id number]) as of 1/24/2019 10:45 Ref. Range 1/24/2019 07:37 GLUCOSE,FAST - POC Latest Ref Range: 70 - 110 mg/dL 158 (H) Current A1C: 7.2% (1/23/2019) which is equivalent to estimated average blood glucose of 160 mg/dL during the past 2-3 months. Current hospital diabetes medications: 
Basal lantus insulin 15 units every 12 hours. Correctional lispro insulin ACHS. Normal sensitivity dose. Total daily dose insulin requirement previous day: Patient was admitted on 01/23/2019. Home diabetes medications: Patient reported on 01/23/2019: 
Novolin 70/30 mix insulin 40 units twice daily before meals (breakfast and dinner). Metformin 500 mg once daily. Diet: Diabetic consistent carb regular Goals:  Blood glucose will be within target range of  mg/dL by 01/27/2019. Education:  _X__  Refer to Diabetes Education Record: 01/23/2019 
           ___  Education not indicated at this time Eunice Velazco RN [facility] Pager: [phone]

## 2019-01-24 NOTE — PROCEDURES
INTERVENTIONAL RADIOLOGY POST PICC LINE NOTE January 24, 2019       12:48 PM  
 
Preoperative Diagnosis:   IV Access Postoperative Diagnosis:  Same. :  Giorgi Rocha PA-C Assistant:  None. Attending Physician: Dr. Claudeen Bur Type of Anesthesia:  1% Lidocaine local 
 
Procedure/Description: right basilic upper extremity PICC Line. Findings:  right basilic 5 Kiswahili catheter placed. Tip at SVC/RA junction. OK to use. Length 46 cm. Estimated blood Loss: Minimal 
 
Specimen Removed: None Drains: None Complications:  None. Condition:  Stable. Discharge Plan:  continue present therapy

## 2019-01-24 NOTE — ROUTINE PROCESS
Spoke to Anjum and requested that this patient be made NPO for everything including po meds after midnight tonight for a mesenteric duplex exam tomorrow morning.

## 2019-01-24 NOTE — PROGRESS NOTES
Problem: Mobility Impaired (Adult and Pediatric) Goal: *Acute Goals and Plan of Care (Insert Text) Outcome: Not Met 
physical Therapy EVALUATION & Discharge Patient: Ramandeep Prajapati (65 y.o. female) Date: 1/24/2019 Primary Diagnosis: Tachycardia SIRS (systemic inflammatory response syndrome) (HCC) Precautions: Fall ASSESSMENT AND RECOMMENDATIONS: 
Patient is 47yo F admitted to hospital for SIRS and presents today alert and agreeable to therapy and was supine in bed upon arrival. Patient transferred to EOB for objective assessment and demonstrated decreased B leg strength. Patient then transferred to standing at supervision/SBA. Patient ambulated 10ft towards hallway with bilateral decreased step length and increased trunk sway. Patient given RW and was agreeable to trying it despite initially stating, \"Oh no I don't need this. \" Patient ambulated total 75ft with decreased trunk sway and slight improvement in step length with RW. Patient transferred to sitting EOB and therapist educated patient on recommendation of [place] [facility] with 24/7 assist at home for a few days and on recommendation of RW. Patient again repeats that she doesn't need RW and therapist offered education again on safety and RW use. Therapist asked patient if she would use RW at home as therapist is recommending this AD and she states, \"No, I won't use it. \" Patient then educated on role and goals of therapy and on [place] [facility] and therapist asked if therapist could return while here in hospital to continue with therapy for strengthening and safety and patient reports, \"No I don't want to use a walker and I don't even think I need any therapy here. I'm fine. \" Despite education, patient continues to refuse therapy recommendations and does not wish to participate in further therapy at this time. Notified PETRONA Ann. Though patient would benefit from PT, she is not agreeable with continued therapy services. Discharge Recommendations: Home Health with 24/7 assist for safety Further Equipment Recommendations for Discharge: rolling walker (patient refusing) Evaluation Complexity Eval Complexity: History: MEDIUM  Complexity : 1-2 comorbidities / personal factors will impact the outcome/ POC Exam:MEDIUM Complexity : 3 Standardized tests and measures addressing body structure, function, activity limitation and / or participation in recreation  Presentation: LOW Complexity : Stable, uncomplicated  Clinical Decision Making:Low Complexity   Overall Complexity:LOW SUBJECTIVE:  
Patient stated I don't think I need therapy. I feel like I'm at my baseline.  OBJECTIVE DATA SUMMARY:  
 
Past Medical History:  
Diagnosis Date  Blind left eye  Cellulitis of great toe of right foot 10/19/2017  Diabetes ([address]  Diabetic retinopathy ([address]  Gall stones  GERD (gastroesophageal reflux disease)  Hammertoe  Hiatal hernia  History of mammogram 10/03/2017 No evidence of malignancy  Hypertension  Mass of abdomen  Neuropathy due to type 2 diabetes mellitus ([address]  Osteomyelitis of toe of right foot ([address] 10/19/2017  Pancreatitis Past Surgical History:  
Procedure Laterality Date  HX AMPUTATION Left 07/21/2017  
 left 2nd toe  HX CHOLECYSTECTOMY  10/15/2014  HX GI Benign GI Stromal Tumor excision  HX HEENT Sx for detached retina  HX MYOMECTOMY x5 removed  HX OTHER SURGICAL    
 upper endoscopy Barriers to Learning/Limitations: yes;  other self Compensate with: Visual Cues, Verbal Cues, Tactile Cues and Kinesthetic Cues Prior Level of Function/Home Situation: Patient lives alone in 2nd floor apartment with son close by. She reports she is able to negotiate steps and walks without AD for in home distances. Patient has RW at home and reports being independent with I/ADL's. Home Situation Home Environment: Apartment # Steps to Enter: 2 One/Two Story Residence: One story Living Alone: Yes Support Systems: Family member(s), Child(avi) Patient Expects to be [facility]de[facility] Current DME Used/Available at Home: NoneCritical Behavior: A&Ox4 Strength:   
Strength: Generally decreased, functional 
 Tone & Sensation:  
Tone: Normal 
 Sensation: Intact Range Of Motion: 
AROM: Within functional limits Functional Mobility: 
Bed Mobility: 
Rolling: Modified independent Supine to Sit: Modified independent Sit to Supine: Modified independent Transfers: 
Sit to Stand: Supervision Stand to Sit: Supervision;Stand-by assistance Balance:  
Sitting: Intact Standing: Impaired; With support Standing - Static: Good Standing - Dynamic : FairAmbulation/Gait Training: 
Distance (ft): 75 Feet (ft) Assistive Device: Walker, rolling Ambulation - Level of Assistance: Stand-by assistance Base of Support: Narrowed Speed/Catrachita: Pace decreased (<100 feet/min); Slow Interventions: Tactile cues; Verbal cues; Visual/Demos Pain: 
Pt reports 0/10 pain or discomfort prior to treatment.   
Pt reports 0/10 pain or discomfort post treatment. Activity Tolerance:  
Patient tolerated activity fair; no dizziness, SOB, or chest pain. Patient not receptive to therapy interventions. Please refer to the flowsheet for vital signs taken during this treatment. After treatment:  
[]         Patient left in no apparent distress sitting up in chair 
[x]         Patient left in no apparent distress in bed 
[x]         Call bell left within reach [x]         Nursing notified Chalino Ramos) []         Caregiver present 
[]         Bed alarm activated 
[]         SCDs applied to B LE 
 
COMMUNICATION/EDUCATION:  
[]         Fall prevention education was provided and the patient/caregiver indicated understanding. []         Patient/family have participated as able in goal setting and plan of care. []         Patient/family agree to work toward stated goals and plan of care. [x]         Patient understands intent and goals of therapy, but is refusing participation. []         Patient is unable to participate in goal setting and plan of care. Thank you for this referral. 
Latasha Weiss, PT Time Calculation: 19 mins

## 2019-01-24 NOTE — PROGRESS NOTES
Problem: Falls - Risk of 
Goal: *Absence of Falls Document Tamia Tay Fall Risk and appropriate interventions in the flowsheet. Outcome: Progressing Towards Goal 
Fall Risk Interventions: 
Mobility Interventions: Bed/chair exit alarm Medication Interventions: Bed/chair exit alarm History of Falls Interventions: Bed/chair exit alarm

## 2019-01-25 ENCOUNTER — APPOINTMENT (OUTPATIENT)
Dept: VASCULAR SURGERY | Age: 54
DRG: 048 | End: 2019-01-25
Attending: STUDENT IN AN ORGANIZED HEALTH CARE EDUCATION/TRAINING PROGRAM
Payer: MEDICAID

## 2019-01-25 VITALS
RESPIRATION RATE: 17 BRPM | BODY MASS INDEX: 30.53 KG/M2 | DIASTOLIC BLOOD PRESSURE: 63 MMHG | OXYGEN SATURATION: 98 % | WEIGHT: 200.8 LBS | HEART RATE: 103 BPM | TEMPERATURE: 99.4 F | SYSTOLIC BLOOD PRESSURE: 100 MMHG

## 2019-01-25 LAB
ABDOMINAL PROX AORTA VEL: 85.85 CM/S
ANION GAP SERPL CALC-SCNC: 5 MMOL/L (ref 3–18)
BASOPHILS # BLD: 0 K/UL (ref 0–0.1)
BASOPHILS NFR BLD: 1 % (ref 0–2)
BUN SERPL-MCNC: 6 MG/DL (ref 7–18)
BUN/CREAT SERPL: 5 (ref 12–20)
CALCIUM SERPL-MCNC: 8.4 MG/DL (ref 8.5–10.1)
CELIAC EDV: 51.32 CM/S
CELIAC PSV: 183.8 CM/S
CHLORIDE SERPL-SCNC: 111 MMOL/L (ref 100–108)
CO2 SERPL-SCNC: 24 MMOL/L (ref 21–32)
COMMON HEPATIC EDV: 29.95 CM/S
COMMON HEPATIC PSV: 113.54 CM/S
CREAT SERPL-MCNC: 1.26 MG/DL (ref 0.6–1.3)
DIFFERENTIAL METHOD BLD: ABNORMAL
DIST SMA EDV: 18.08 CM/S
DIST SMA PSV: 94.79 CM/S
EOSINOPHIL # BLD: 0 K/UL (ref 0–0.4)
EOSINOPHIL NFR BLD: 1 % (ref 0–5)
ERYTHROCYTE [DISTWIDTH] IN BLOOD BY AUTOMATED COUNT: 14.2 % (ref 11.6–14.5)
GLUCOSE BLD STRIP.AUTO-MCNC: 125 MG/DL (ref 70–110)
GLUCOSE BLD STRIP.AUTO-MCNC: 139 MG/DL (ref 70–110)
GLUCOSE BLD STRIP.AUTO-MCNC: 183 MG/DL (ref 70–110)
GLUCOSE SERPL-MCNC: 130 MG/DL (ref 74–99)
HCT VFR BLD AUTO: 25.3 % (ref 35–45)
HGB BLD-MCNC: 8.4 G/DL (ref 12–16)
LYMPHOCYTES # BLD: 2.9 K/UL (ref 0.9–3.6)
LYMPHOCYTES NFR BLD: 38 % (ref 21–52)
MAGNESIUM SERPL-MCNC: 2.2 MG/DL (ref 1.6–2.6)
MCH RBC QN AUTO: 28.4 PG (ref 24–34)
MCHC RBC AUTO-ENTMCNC: 33.2 G/DL (ref 31–37)
MCV RBC AUTO: 85.5 FL (ref 74–97)
MID SMA EDV: 23.07 CM/S
MID SMA PSV: 129.33 CM/S
MONOCYTES # BLD: 0.4 K/UL (ref 0.05–1.2)
MONOCYTES NFR BLD: 5 % (ref 3–10)
NEUTS SEG # BLD: 4.2 K/UL (ref 1.8–8)
NEUTS SEG NFR BLD: 55 % (ref 40–73)
ORIGIN SMA EDV: 27.1 CM/S
ORIGIN SMA PSV: 242.9 CM/S
PHOSPHATE SERPL-MCNC: 2.8 MG/DL (ref 2.5–4.9)
PLATELET # BLD AUTO: 276 K/UL (ref 135–420)
PMV BLD AUTO: 9.7 FL (ref 9.2–11.8)
POTASSIUM SERPL-SCNC: 4.3 MMOL/L (ref 3.5–5.5)
PROX AORTIC AP: 1.51 CM
PROX AORTIC TRANS: 1.44 CM
PROX SMA EDV: 20.7 CM/S
PROX SMA PSV: 206.66 CM/S
RBC # BLD AUTO: 2.96 M/UL (ref 4.2–5.3)
SODIUM SERPL-SCNC: 140 MMOL/L (ref 136–145)
SPLENIC EDV: 21.9 CM/S
SPLENIC PSV: 67.26 CM/S
WBC # BLD AUTO: 7.6 K/UL (ref 4.6–13.2)

## 2019-01-25 PROCEDURE — 84100 ASSAY OF PHOSPHORUS: CPT

## 2019-01-25 PROCEDURE — 74011636637 HC RX REV CODE- 636/637: Performed by: STUDENT IN AN ORGANIZED HEALTH CARE EDUCATION/TRAINING PROGRAM

## 2019-01-25 PROCEDURE — 85025 COMPLETE CBC W/AUTO DIFF WBC: CPT

## 2019-01-25 PROCEDURE — 74011250636 HC RX REV CODE- 250/636: Performed by: STUDENT IN AN ORGANIZED HEALTH CARE EDUCATION/TRAINING PROGRAM

## 2019-01-25 PROCEDURE — 93975 VASCULAR STUDY: CPT

## 2019-01-25 PROCEDURE — 74011250637 HC RX REV CODE- 250/637: Performed by: STUDENT IN AN ORGANIZED HEALTH CARE EDUCATION/TRAINING PROGRAM

## 2019-01-25 PROCEDURE — 74011000258 HC RX REV CODE- 258: Performed by: STUDENT IN AN ORGANIZED HEALTH CARE EDUCATION/TRAINING PROGRAM

## 2019-01-25 PROCEDURE — 92526 ORAL FUNCTION THERAPY: CPT

## 2019-01-25 PROCEDURE — 82962 GLUCOSE BLOOD TEST: CPT

## 2019-01-25 PROCEDURE — 80048 BASIC METABOLIC PNL TOTAL CA: CPT

## 2019-01-25 PROCEDURE — 83735 ASSAY OF MAGNESIUM: CPT

## 2019-01-25 RX ORDER — ERYTHROMYCIN 250 MG/1
250 TABLET, COATED ORAL
Qty: 84 TAB | Refills: 0 | Status: ON HOLD | OUTPATIENT
Start: 2019-01-25 | End: 2019-01-30 | Stop reason: SDUPTHER

## 2019-01-25 RX ORDER — METOCLOPRAMIDE 5 MG/1
5 TABLET ORAL
Qty: 30 TAB | Refills: 3 | Status: ON HOLD | OUTPATIENT
Start: 2019-01-25 | End: 2019-01-30 | Stop reason: SDUPTHER

## 2019-01-25 RX ORDER — PAROXETINE HYDROCHLORIDE 20 MG/1
40 TABLET, FILM COATED ORAL DAILY
Status: DISCONTINUED | OUTPATIENT
Start: 2019-01-25 | End: 2019-01-25 | Stop reason: HOSPADM

## 2019-01-25 RX ORDER — ERYTHROMYCIN 250 MG/1
250 TABLET, COATED ORAL
Qty: 84 TAB | Refills: 0 | Status: SHIPPED | OUTPATIENT
Start: 2019-01-25 | End: 2019-01-25 | Stop reason: SDUPTHER

## 2019-01-25 RX ADMIN — HEPARIN SODIUM 5000 UNITS: 5000 INJECTION INTRAVENOUS; SUBCUTANEOUS at 14:54

## 2019-01-25 RX ADMIN — SUCRALFATE 1 G: 1 TABLET ORAL at 10:20

## 2019-01-25 RX ADMIN — INSULIN GLARGINE 8 UNITS: 100 INJECTION, SOLUTION SUBCUTANEOUS at 10:25

## 2019-01-25 RX ADMIN — Medication 250 MG: at 10:20

## 2019-01-25 RX ADMIN — POTASSIUM CHLORIDE 20 MEQ: 20 TABLET, EXTENDED RELEASE ORAL at 10:19

## 2019-01-25 RX ADMIN — INSULIN LISPRO 2 UNITS: 100 INJECTION, SOLUTION INTRAVENOUS; SUBCUTANEOUS at 13:19

## 2019-01-25 RX ADMIN — Medication 10 ML: at 06:09

## 2019-01-25 RX ADMIN — PANTOPRAZOLE SODIUM 40 MG: 40 TABLET, DELAYED RELEASE ORAL at 10:20

## 2019-01-25 RX ADMIN — Medication 10 ML: at 13:21

## 2019-01-25 RX ADMIN — HEPARIN SODIUM 5000 UNITS: 5000 INJECTION INTRAVENOUS; SUBCUTANEOUS at 02:31

## 2019-01-25 RX ADMIN — SUCRALFATE 1 G: 1 TABLET ORAL at 16:35

## 2019-01-25 RX ADMIN — VITAMIN D, TAB 1000IU (100/BT) 1000 UNITS: 25 TAB at 10:20

## 2019-01-25 RX ADMIN — PAROXETINE HYDROCHLORIDE 40 MG: 20 TABLET, FILM COATED ORAL at 10:24

## 2019-01-25 RX ADMIN — POTASSIUM CHLORIDE 20 MEQ: 20 TABLET, EXTENDED RELEASE ORAL at 17:34

## 2019-01-25 RX ADMIN — PIPERACILLIN SODIUM,TAZOBACTAM SODIUM 3.38 G: 3; .375 INJECTION, POWDER, FOR SOLUTION INTRAVENOUS at 03:31

## 2019-01-25 NOTE — PROGRESS NOTES
NUTRITION Nutrition Consult: Diet Education RECOMMENDATIONS / PLAN:  
 
No additional nutritional interventions indicated at this time. Please consult nutrition if further nutrtion intervention is needed. Will re-screen this patient per policy. NUTRITION INTERVENTIONS:  
 
[x] Nutrition counseling/education ASSESSMENT:  
 
Diet: DIET DIABETIC CONSISTENT CARB Regular Po intake:  [x]  Good    []  Fair    []  Poor Weight History:  [x] No unintentional weight loss PTA    [] Had weight change:   
Nutrition Risk: None identified at this time Provided Education On:  Dietary considerations for gastroparesis Person(s) Instructed:  [x]  Patient    [] Family    [] Other: 
Education Methods Used:  [x] Explanation    [x] Handout    [] Other:  
Patients Readiness:  [x] Jim Bailon    [x] Acceptance    [] Non-Acceptance    [] Refused Learning Limitations:   [x] None identified    [] Identified: 
Level of Comprehension:  [x] Good    [] Needs Reinforcement Additional Concerns/Comments:  Pt with gastroparesis. Educated her on dietary considerations. Educational handouts provided/reviewed with her. Discussed limiting fat/greasy/fried foods and fiber intake and consuming cooked/soft foods. Discussed consuming small, frequent meals in the day, like 5-6 small meals. Questions answered to her satisfaction. Pt verbalized understanding the information provided. Pt reported good appetite and meal intake. Follow-up education indicated:   [x] No    [] Yes Discharge needs: [x] None identified    [] Identified and addressed Fly Ballard RD Pager: [phone]

## 2019-01-25 NOTE — PROGRESS NOTES
Problem: Falls - Risk of 
Goal: *Absence of Falls Document Jennifer Murray Fall Risk and appropriate interventions in the flowsheet. Outcome: Progressing Towards Goal 
Fall Risk Interventions: 
Mobility Interventions: Patient to call before getting OOB, Utilize walker, cane, or other assistive device, Bed/chair exit alarm Medication Interventions: Bed/chair exit alarm History of Falls Interventions: Door open when patient unattended, Bed/chair exit alarm

## 2019-01-25 NOTE — ROUTINE PROCESS
D/c instructions along with prescprtions given to patient with verbal understanding of instructions. Caterina Matute

## 2019-01-25 NOTE — PROGRESS NOTES
Problem: Dysphagia (Adult) Goal: *Acute Goals and Plan of Care (Insert Text) Patient will: 1. Tolerate PO trials with 0 s/s overt distress in 4/5 trials 2. Utilize compensatory swallow strategies/maneuvers (decrease bite/sip, size/rate, alt. liq/sol) with min cues in 4/5 trials Rec:    
Reg solid with thin liquids Aspiration precautions HOB >45 during po intake, remain >30 for 30-45 minutes after po Small bites/sips; alternate liquid/solid with slow feeding rate Oral care TID Meds per pt preference Outcome: Resolved/Met Date Met: 01/25/19 Speech language pathology dysphagia treatment & discharge Patient: Marilee Linares (37 y.o. female) Date: 1/25/2019 Diagnosis: Tachycardia SIRS (systemic inflammatory response syndrome) (HCC) Systemic inflammatory response syndrome (SIRS) (HCC) Precautions:Fall PLOF: Independent ASSESSMENT: 
Followed up this day with dysphagia tx. A&Ox4. Observed self-feeding thin liquid + straw and solids. Pt exhibited adequate bolus cohesion, manipulation and A-P transit. Further exhibited adequate swallow timing/reflex and hyolaryngeal excursion. Able to manipulate and clear with 0 clinical s/s aspiration and/or oropharyngeal dysphagia. Pt safe for regular solid, thin liquid diet. Maximum therapeutic gains met; safest, least restrictive diet achieved in current in-patient/acute setting. Accordingly, SLP to d/c intervention at this time. Progression toward goals: 
[x]         Improving appropriately - goals met/approximated 
[]         Not making progress/Not appropriate - will d/c POC PLAN: 
Recommendations and Planned Interventions: 
Maximum therapeutic gains met; safest, least restrictive diet achieved. D/C ST intervention at this time. Discharge Recommendations:  None SUBJECTIVE:  
Patient stated Gumaro Zamora do you want me to do with this? Just eat it? . OBJECTIVE:  
Cognitive and Communication Status: 
Neurologic State: Alert Orientation Level: Oriented X4 Cognition: Follows commands Perception: Appears intact Perseveration: No perseveration noted Safety/Judgement: Fall prevention Dysphagia Treatment: 
Oral Assessment: 
Oral Assessment Labial: No impairment Dentition: Natural, Intact Oral Hygiene: adequate Lingual: No impairment Velum: No impairment Mandible: No impairment P.O. Trials: 
 Patient Position: 40 at [facility] Vocal quality prior to P.O.: No impairment Consistency Presented: Thin liquid, Solid, Puree How Presented: Self-fed/presented, Cup/sip, Spoon, Straw Bolus Acceptance: No impairment Bolus Formation/Control: No impairment Propulsion: No impairment Oral Residue: None Initiation of Swallow: No impairment Laryngeal Elevation: Functional 
 Aspiration Signs/Symptoms: None Pharyngeal Phase Characteristics: No impairment, issues, or problems Effective Modifications: None Cues for Modifications: None Oral Phase Severity: No impairment Pharyngeal Phase Severity : No impairment PAIN: 
Start of Tx: 0 End of Tx: 0 The severity rating is based on the following outcomes: KIRTI Noms Swallow Level 7 Clinical Judgement After treatment:  
[]              Patient left in no apparent distress sitting up in chair 
[x]              Patient left in no apparent distress in bed 
[x]              Call bell left within reach 
[]              Nursing notified 
[]              Caregiver present 
[]              Bed alarm activated COMMUNICATION/EDUCATION:  
[x] Safe swallowing guidelines; compensatory techniques []        Patient unable to participate in education; education ongoing with staff 
[]  Posted safety precautions in patient's room. [] Oral-motor/laryngeal strengthening exercises KELLEN Bradford Time Calculation: 9 mins

## 2019-01-25 NOTE — MED STUDENT NOTES
*ATTENTION:  This note has been created by a medical student for educational purposes only. Please do not refer to the content of this note for clinical decision-making, billing, or other purposes. Please see attending physicians note to obtain clinical information on this patient. * Subjective: No acute events overnight. No apparent distress. She did request to be started on Paxil again due to feeling strange and uneasy. Miss Mike Saleh was doing \"better\" this morning with some nausea, no vomiting, 3/10 aching abdominal pain, no myalgias. She tolerated solid PO intake well yesterday with some heartburn after lunch. No BM since Tuesday. On further discussion about disposition and home medications, she reported that she had not been taking Raglan at home after her last admission, only the Protonic and Carafate. She reports beign fully capable of ADLs and IADLs despite her vision. Diet is balanced, but consists of usually 2 and sometimes 3 meals a day. Review of Systems Constitutional: Negative for chills, fever, Positive for malaise/fatigue. Eyes: Negative for pain.  
     Blind left eye  
Respiratory: Negative for cough and wheezing.   
Cardiovascular: Negative for chest pain. Negative for palpitations and leg swelling. Gastrointestinal: Positive for abdominal pain, nausea and vomiting. Genitourinary: Negative for dysuria and hematuria. Musculoskeletal: Negative for myalgias. Negative for joint pain. Skin: Negative for itching and rash. Neurological: Negative for dizziness and loss of consciousness. Psychiatric/Behavioral: The patient is nervous/anxious. The patient does not have insomnia.   
  
Objective: 
Temp:  [97 °F (36.1 °C)-99.8 °F (37.7 °C)] Pulse (Heart Rate):  [] BP: ()/() Resp Rate:  [11-44] O2 Sat (%):  [94 %-100 %] Weight:  [91.1 kg (200 lb 12.8 oz)] No intake/output data recorded. 01/23 1901 - 01/25 0700 In: 360 [P.O.:360] Out: 1600 [Urine:1600] Physical Exam: 
CV: S1 and S2 audible with no murmurs, rubs, or gallops. RRR, Pedal pulses 2+, radial pulse 2+, no lower extremity edema Resp: breathing non-labored, all fields clear to auscultation Abd: Hypoactive bowel sounds, diffuse tenderness to light palpation more pronounced in lower abdomen, no rebound tenderness Ext: No lesions to feet outside of previous amputations, capillary refill present, sensation to light touch reduced but intact Labs: 
Recent Glucose Results:  
Lab Results Component Value Date/Time  (H) 01/25/2019 05:50 AM  
 
BMP:  
Lab Results Component Value Date/Time  01/25/2019 05:50 AM  
 K 4.3 01/25/2019 05:50 AM  
  (H) 01/25/2019 05:50 AM  
 CO2 24 01/25/2019 05:50 AM  
 AGAP 5 01/25/2019 05:50 AM  
  (H) 01/25/2019 05:50 AM  
 BUN 6 (L) 01/25/2019 05:50 AM  
 CREA 1.26 01/25/2019 05:50 AM  
 GFRAA 53 (L) 01/25/2019 05:50 AM  
 GFRNA 44 (L) 01/25/2019 05:50 AM  
  
   
CBC WITH AUTOMATED DIFF Collection Time: 01/25/19  5:50 AM  
Result Value Ref Range WBC 7.6 4.6 - 13.2 K/uL  
 RBC 2.96 (L) 4.20 - 5.30 M/uL HGB 8.4 (L) 12.0 - 16.0 g/dL HCT 25.3 (L) 35.0 - 45.0 % MCV 85.5 74.0 - 97.0 FL  
 MCH 28.4 24.0 - 34.0 PG  
 MCHC 33.2 31.0 - 37.0 g/dL  
 RDW 14.2 11.6 - 14.5 % PLATELET 192 085 - 642 K/uL MPV 9.7 9.2 - 11.8 FL  
 NEUTROPHILS 55 40 - 73 % LYMPHOCYTES 38 21 - 52 % MONOCYTES 5 3 - 10 % EOSINOPHILS 1 0 - 5 % BASOPHILS 1 0 - 2 %  
 ABS. NEUTROPHILS 4.2 1.8 - 8.0 K/UL  
 ABS. LYMPHOCYTES 2.9 0.9 - 3.6 K/UL  
 ABS. MONOCYTES 0.4 0.05 - 1.2 K/UL  
 ABS. EOSINOPHILS 0.0 0.0 - 0.4 K/UL  
 ABS. BASOPHILS 0.0 0.0 - 0.1 K/UL  
 DF AUTOMATED ASSESSMENT/PLAN: 
Principal Problem: 
  Systemic inflammatory response syndrome (SIRS) ([facility]) (4/29/2014) - currently 1/4 SIRS with tachycardia, respirations, blood pressure, and wbc count have improved since admission - blood culture no growth, urine culture <10,000 gram negative rods 
 - reassess for UTI symptoms, discontinue Vanc/Zosyn today Nausea/Vomiting/Abdominal Pain - suspected due to gastroparesis, patient would not like to take [facility] outpatient in favor of Paxil due to possible EPS 
- RUQ US unremarkable except for steatosis in the liver 
- continue metoclopramide injections 10mg QID until discharge 
- continue ondansetron 4-8mg q8h as needed 
- continue pantoprazole 40mg every day 
- continue sulfacrate 
- resume regular diet after mesenteric US 
  
 
Active Problems: 
CRYSTAL (acute kidney injury) ([facility] (11/17/2018) - encourage PO intake - Continue to monitor UOP Tachycardia (1/23/2019) - currently improved DM 
- continue Lantus 15 units q12hr 
- continue Lispro QID before meals and nightly 
- Continue POC glucose checks - Patient educated about sugar monitoring and control, working to provide her with the resources to obtain a read-out glucometer 
  
Hypertension  
 - BP has been well controlled in past 24hr  
 - consider restarting lisinopril on an outpatient basis depending on PCP reccomendations Anxiety 
- restarted Paxil to be continued outpatient 
  
Diabetic retinopathy, neuropathy and gastropathy 
  
Visually impaired.

## 2019-01-25 NOTE — ROUTINE PROCESS
Bedside and Verbal shift change report given to Lisandra Kenyon LPN (oncoming nurse) by Lenin Tidwell RN (offgoing nurse). Report included the following information SBAR, Kardex, MAR and Recent Results. SITUATION: 
Code Status: Full Code Reason for Admission: Tachycardia SIRS (systemic inflammatory response syndrome) ([facility]) Hospital day: 2 Problem List:  
   
Hospital Problems  Date Reviewed: 11/16/2017 Codes Class Noted POA Tachycardia ICD-10-CM: R00.0 ICD-9-CM: 785.0  1/23/2019 Unknown SIRS (systemic inflammatory response syndrome) ([facility]) ICD-10-CM: R65.10 ICD-9-CM: 995.90  1/23/2019 Unknown CRYSTAL (acute kidney injury) ([facility] ICD-10-CM: N17.9 ICD-9-CM: 584.9  11/17/2018 Yes * (Principal) Systemic inflammatory response syndrome (SIRS) ([facility]) ICD-10-CM: R65.10 ICD-9-CM: 995.90  4/29/2014 Unknown BACKGROUND: 
 Past Medical History:  
Past Medical History:  
Diagnosis Date  Blind left eye  Cellulitis of great toe of right foot 10/19/2017  Diabetes ([address]  Diabetic retinopathy ([address]  Gall stones  GERD (gastroesophageal reflux disease)  Hammertoe  Hiatal hernia  History of mammogram 10/03/2017 No evidence of malignancy  Hypertension  Mass of abdomen  Neuropathy due to type 2 diabetes mellitus ([address]  Osteomyelitis of toe of right foot ([address] 10/19/2017  Pancreatitis Patient taking anticoagulants yes Patient has a defibrillator: no  
 History of shots YES for example, flu, pneumonia, tetanus Isolation History NO for example, MRSA, CDiff ASSESSMENT: 
Changes in Assessment Throughout Shift: None Significant Changes in 24 hours (for example, RR/code, fall) Patient has Central Line: yes Reasons if yes: Long term antibiotics Patient has Mari Cath: no Reasons if yes: N/A Mobility Issues PT 
IV Patency OR Checklist 
Pending Tests Last Vitals: 
Vitals w/ MEWS Score (last day) Date/Time MEWS Score Pulse Resp Temp BP Level of Consciousness SpO2  
 01/25/19 0723  1  91  17  98 °F (36.7 °C)  101/63  Alert  100 % 01/25/19 0427  1  85  18  97.5 °F (36.4 °C)  122/79  Alert  100 % 01/24/19 2359  1  90  20  97 °F (36.1 °C)  125/82  Alert  100 % 01/24/19 1958  1  95  18  97.3 °F (36.3 °C)  124/81  Alert  100 % 01/24/19 1641  1  92  20  99.3 °F (37.4 °C)  121/75  Alert  100 % 01/24/19 1550  2  87  20  98.8 °F (37.1 °C)  97/64  Alert  100 % 01/24/19 1147  1  93  20  98.9 °F (37.2 °C)  130/80  Alert  100 % 01/24/19 0736  1  96  20  99.8 °F (37.7 °C)  121/79  Alert  99 % 01/24/19 0400  1  95  20  99.5 °F (37.5 °C)  146/84  Alert  100 % 01/24/19 0000  2  93  18  98.6 °F (37 °C)  99/70  Alert  98 % PAIN Pain Assessment Pain Intensity 1: 0 (01/25/19 0427) Pain Location 1: Abdomen Patient Stated Pain Goal: 0 Intervention effective: N/A Time of last intervention: N/A Reassessment Completed: N/A Other actions taken for pain: N/A Last 3 Weights: 
Last 3 Recorded Weights in this Encounter 01/24/19 0400 Weight: 91.1 kg (200 lb 12.8 oz) Weight change: INTAKE/OUPUT Current Shift: No intake/output data recorded. Last three shifts: 01/23 1901 - 01/25 0700 In: 360 [P.O.:360] Out: 1600 [Urine:1600] RECOMMENDATIONS AND DISCHARGE PLANNING Patient needs and requests: None Pending tests/procedures: Duplex abd 1/25/19 Discharge plan for patient: TBD Discharge planning Needs or Barriers: None Estimated Discharge Date: TBD Posted on Whiteboard in Patients Room: no   
  
\"HEALS\" SAFETY CHECK A safety check occurred in the patient's room between off going nurse and oncoming nurse listed above. The safety check included the below items: 
 
H High Alert Medications Verify all high alert medication drips (heparin, PCA, etc.) E Equipment Suction is set up for ALL patients (with arsenio) Red plugs utilized for all equipment (IV pumps, etc.) WOWs wiped down at end of shift. Room stocked with oxygen, suction, and other unit-specific supplies A Alarms Bed alarm is set for fall risk patients Ensure chair alarm is in place and activated if patient is up in a chair L Lines Check IV for any infiltration Mari bag is empty if patient has a Mari Tubing and IV bags are labeled Efren Leaver Safety Room is clean, patient is clean, and equipment is clean. Hallways are clear from equipment besides carts. Fall bracelet on for fall risk patients Ensure room is clear and free of clutter Suction is set up for ALL patients (with arsenio) Hallways are clear from equipment besides carts. Isolation precautions followed, supplies available outside room, sign posted London Bazan RN

## 2019-01-25 NOTE — PROGRESS NOTES
Intern Progress Note [address] Patient: Donald Lerma MRN: [id number]  CSN: [id number] YOB: 1965  Age: 52 y.o. Sex: female DOA: 1/22/2019 LOS:  LOS: 2 days Subjective:  
 
Acute events: Patient reports improved nausea and lower abdominal pain. Patient denies emesis overnight. No BM or flatus. Review of Systems Constitutional: Negative for chills and fever. Respiratory: Negative for cough and shortness of breath. Cardiovascular: Negative for chest pain and palpitations. Gastrointestinal: Positive for abdominal pain (lower) and nausea. Negative for vomiting. Objective:  
  
Patient Vitals for the past 24 hrs: 
 Temp Pulse Resp BP SpO2  
01/25/19 0723 98 °F (36.7 °C) 91 17 101/63 100 % 01/25/19 0427 97.5 °F (36.4 °C) 85 18 122/79 100 % 01/24/19 2359 97 °F (36.1 °C) 90 20 125/82 100 % 01/24/19 1958 97.3 °F (36.3 °C) 95 18 124/81 100 % 01/24/19 1641 99.3 °F (37.4 °C) 92 20 121/75 100 % 01/24/19 1550 98.8 °F (37.1 °C) 87 20 97/64 100 % 01/24/19 1147 98.9 °F (37.2 °C) 93 20 130/80 100 % Intake/Output Summary (Last 24 hours) at 1/25/2019 9573 Last data filed at 1/25/2019 0000 Gross per 24 hour Intake 360 ml Output 1600 ml Net -1240 ml Physical Exam:  
General:  Alert and Responsive and in No acute distress. HEENT: Conjunctiva pink, sclera anicteric. Exophoria of right eye. EOMI. Pharynx moist, nonerythematous. Moist mucous membranes. Thyroid not enlarged, no nodules. No cervical, supraclavicular, occipital or submandibular lymphadenopathy. No other gross abnormalities present. CV:  RRR, no murmurs. No visible pulsations or thrills. RESP:  Unlabored breathing. Lungs clear to auscultation. no wheeze, rales, or rhonchi. Equal expansion bilaterally. ABD:  Soft, bilateral lower abdominal tenderness, nondistended. MS:  No joint deformity or instability. No atrophy. Neuro:  5/5 strength bilateral upper extremities and lower extremities. Ext:  No edema. 2+ radial and +1 dp pulses bilaterally. Amputated 1st digit of right foot and 2nd digit of left foot. Skin:  No rashes, lesions, or ulcers. Good turgor. Lab/Data Reviewed: 
Recent Results (from the past 12 hour(s)) GLUCOSE, POC Collection Time: 01/24/19 10:14 PM  
Result Value Ref Range Glucose (POC) 150 (H) 70 - 110 mg/dL METABOLIC PANEL, BASIC Collection Time: 01/25/19  5:50 AM  
Result Value Ref Range Sodium 140 136 - 145 mmol/L Potassium 4.3 3.5 - 5.5 mmol/L Chloride 111 (H) 100 - 108 mmol/L  
 CO2 24 21 - 32 mmol/L Anion gap 5 3.0 - 18 mmol/L Glucose 130 (H) 74 - 99 mg/dL BUN 6 (L) 7.0 - 18 MG/DL Creatinine 1.26 0.6 - 1.3 MG/DL  
 BUN/Creatinine ratio 5 (L) 12 - 20 GFR est AA 53 (L) >60 ml/min/1.73m2 GFR est non-AA 44 (L) >60 ml/min/1.73m2 Calcium 8.4 (L) 8.5 - 10.1 MG/DL MAGNESIUM Collection Time: 01/25/19  5:50 AM  
Result Value Ref Range Magnesium 2.2 1.6 - 2.6 mg/dL PHOSPHORUS Collection Time: 01/25/19  5:50 AM  
Result Value Ref Range Phosphorus 2.8 2.5 - 4.9 MG/DL  
CBC WITH AUTOMATED DIFF Collection Time: 01/25/19  5:50 AM  
Result Value Ref Range WBC 7.6 4.6 - 13.2 K/uL  
 RBC 2.96 (L) 4.20 - 5.30 M/uL HGB 8.4 (L) 12.0 - 16.0 g/dL HCT 25.3 (L) 35.0 - 45.0 % MCV 85.5 74.0 - 97.0 FL  
 MCH 28.4 24.0 - 34.0 PG  
 MCHC 33.2 31.0 - 37.0 g/dL  
 RDW 14.2 11.6 - 14.5 % PLATELET 636 820 - 656 K/uL MPV 9.7 9.2 - 11.8 FL  
 NEUTROPHILS 55 40 - 73 % LYMPHOCYTES 38 21 - 52 % MONOCYTES 5 3 - 10 % EOSINOPHILS 1 0 - 5 % BASOPHILS 1 0 - 2 %  
 ABS. NEUTROPHILS 4.2 1.8 - 8.0 K/UL  
 ABS. LYMPHOCYTES 2.9 0.9 - 3.6 K/UL  
 ABS. MONOCYTES 0.4 0.05 - 1.2 K/UL  
 ABS. EOSINOPHILS 0.0 0.0 - 0.4 K/UL  
 ABS. BASOPHILS 0.0 0.0 - 0.1 K/UL  
 DF AUTOMATED    
GLUCOSE, POC Collection Time: 01/25/19  7:25 AM  
Result Value Ref Range Glucose (POC) 125 (H) 70 - 110 mg/dL Scheduled Medications Reviewed: 
Current Facility-Administered Medications Medication Dose Route Frequency  PARoxetine (PAXIL) tablet 40 mg  40 mg Oral DAILY  pantoprazole (PROTONIX) tablet 40 mg  40 mg Oral ACB  sucralfate (CARAFATE) tablet 1 g  1 g Oral ACB&D  
 insulin glargine (LANTUS) injection 8 Units  8 Units SubCUTAneous Q12H  
 magnesium oxide tablet 250 mg  250 mg Oral DAILY  potassium chloride (K-DUR, KLOR-CON) SR tablet 20 mEq  20 mEq Oral BID  rosuvastatin (CRESTOR) tablet 20 mg  20 mg Oral QHS  cholecalciferol (VITAMIN D3) tablet 1,000 Units  1,000 Units Oral DAILY  sodium chloride (NS) flush 5-40 mL  5-40 mL IntraVENous Q8H  
 heparin (porcine) injection 5,000 Units  5,000 Units SubCUTAneous Q12H  
 insulin lispro (HUMALOG) injection   SubCUTAneous AC&HS Imaging, microbiology, and EKG/Telemetry: 
Ir Picc Insert Wo Port Over 5 Years Wo Img Result Date: 1/24/2019 IMPRESSION: Successful placement dual lumen PICC catheter. [address] Result Date: 1/24/2019 IMPRESSION: Mild heterogeneous echogenicity of the liver is nonspecific. This is commonly seen with steatosis hepatic disease. Limited visualization of the pancreas. Assessment/Plan  
52 y.o. female with PMH T2DM with neuropathy, gastroparesis and retinopathy, HTN/HLD, GIST s/p resection (2014) and h/o pancreatitis, now admitted with sepsis with unclear etiology. 
  
1. SIRS 3/4 without evidence end-organ dysfunction, lactic acidosis or hypotension believed to be 2/2 GI etiology. Nausea, Vomiting. H/O gastroparesis DDx to include leukemoid reaction to gastritis 2/2 h/o gastroparesis. VSS. CXRwnl. Lipase 88.  In reviewing EMR, it appears patient has had several admissions where she had tachycardia and additional SIRS criteria including her last admissions to this service in December 2018 and January 2019. Patient's last EGD during 12/2018 admission which showed esophagitis and gastritis and was recommended protonix and carafate. 
- On general medical floor with telemetry monitoring 
- diabetic - Blood cx, urine culture NGTD 
- UA glucose 500, trace ketone and blood, 1+ bacteria 
- Daily labs to include CBC, BMP with mag/phos 
- lactic acid down to 1.7 
- carafate and oral protonix - Avoid nephrotoxic medications and renally dose all meds - Fall and aspiration precautions - Bedside swallow eval before advancing diet - Monitor I/O  
- Npo for mesenteric ultrasound today, if negative, d/c today - Avoid sedating medications 
- nutrition consult, appreciate recs 
  
2. Tachycardia 2/2 #1 and dehydration. Patient tachycardic with previous admissions as well. As high as 150 in the ED. 
- Goal to maintain HR <110-120 
  
3. Acute on Chronic Kidney Injury 2/2 dehydration. H/O CKD Stage 2. Creatinine 1.70 represents greater than 0.3mg/dL increase in serum creatinine and a Stage 1 CRYSTAL (KDIGO guidelines). Patient's baseline creatinine approximately 1.17 and GFR of 61. 
- IVFs as above 
- avoid nephrotoxic medications 
- renally dose antibiotics - Trend BMP 
  
4. T2DM with retinopathy and neuropathy. HgbA1c 7.2. Most recent hemoglobin A1c 6.5 (03/30/2018) - accuchecks achs with SSI 
- held Lantus 8U bid (patient rx'd humulin 70/30 40 units bid but unable to verify med list with patient) 
- adjust insulin based on requirements 
- diabetic education consult 
- hold home metformin 500 mg po daily while NPO and 2/2 CRYSTAL 
- hold home humulin mix 70/30   
  
5. H/O HTN/HLD. BPs improved since initial ER presentation 
- hold home lisinopril 20 mg daily  
- Goal to maintain BP <180/100 
  
6. H/O Anxiety. 
- Paxil 40 mg daily 7. Anemia Most likely dilutional, patient given 5L of fluid yesterday. Stable. - daily CBC 
 
  
Diet  Diabetic, NPO for ultrasound today DVT Prophylaxis  SQH  
GI Prophylaxis  Omeprazole Code status  FULL Disposition  Anticipate LOS>2 MN  
  
Point of Contact Sander Gunderson Relationship: Friend 
[phone] 
  
Kaceymelyssa Jones Relationship: Son 
[phone] Jessica Vera MD  
[address] PGY-1 
01/25/19 8:08 AM 
Pager: [phone]

## 2019-01-25 NOTE — DISCHARGE INSTRUCTIONS
Patient Education     DISCHARGE SUMMARY from Nurse    PATIENT INSTRUCTIONS:    After general anesthesia or intravenous sedation, for 24 hours or while taking prescription Narcotics:  · Limit your activities  · Do not drive and operate hazardous machinery  · Do not make important personal or business decisions  · Do  not drink alcoholic beverages  · If you have not urinated within 8 hours after discharge, please contact your surgeon on call. Report the following to your surgeon:  · Excessive pain, swelling, redness or odor of or around the surgical area  · Temperature over 100.5  · Nausea and vomiting lasting longer than 4 hours or if unable to take medications  · Any signs of decreased circulation or nerve impairment to extremity: change in color, persistent  numbness, tingling, coldness or increase pain  · Any questions    What to do at Home:  Recommended activity: Activity as tolerated. If you experience any of the following symptoms fever 101 or greater, nausea, vomiting, diarrhea, shortness of breath, increased pain, any problems or concerns. *  Please give a list of your current medications to your Primary Care Provider. *  Please update this list whenever your medications are discontinued, doses are      changed, or new medications (including over-the-counter products) are added. *  Please carry medication information at all times in case of emergency situations. These are general instructions for a healthy lifestyle:    No smoking/ No tobacco products/ Avoid exposure to second hand smoke  Surgeon General's Warning:  Quitting smoking now greatly reduces serious risk to your health.     Obesity, smoking, and sedentary lifestyle greatly increases your risk for illness    A healthy diet, regular physical exercise & weight monitoring are important for maintaining a healthy lifestyle    You may be retaining fluid if you have a history of heart failure or if you experience any of the following symptoms:  Weight gain of 3 pounds or more overnight or 5 pounds in a week, increased swelling in our hands or feet or shortness of breath while lying flat in bed. Please call your doctor as soon as you notice any of these symptoms; do not wait until your next office visit. Recognize signs and symptoms of STROKE:    F-face looks uneven    A-arms unable to move or move unevenly    S-speech slurred or non-existent    T-time-call 911 as soon as signs and symptoms begin-DO NOT go       Back to bed or wait to see if you get better-TIME IS BRAIN. Warning Signs of HEART ATTACK     Call 911 if you have these symptoms:   Chest discomfort. Most heart attacks involve discomfort in the center of the chest that lasts more than a few minutes, or that goes away and comes back. It can feel like uncomfortable pressure, squeezing, fullness, or pain.  Discomfort in other areas of the upper body. Symptoms can include pain or discomfort in one or both arms, the back, neck, jaw, or stomach.  Shortness of breath with or without chest discomfort.  Other signs may include breaking out in a cold sweat, nausea, or lightheadedness. Don't wait more than five minutes to call 911 - MINUTES MATTER! Fast action can save your life. Calling 911 is almost always the fastest way to get lifesaving treatment. Emergency Medical Services staff can begin treatment when they arrive -- up to an hour sooner than if someone gets to the hospital by car. The discharge information has been reviewed with the patient. The patient verbalized understanding. Discharge medications reviewed with the patient and appropriate educational materials and side effects teaching were provided.   ___________________________________________________________________________________________________________________________________  General Discharge Instructions    Patient ID:  Jensen Morganer  [id number]  42 y.o.  1965    Discharge to: Home with Home health  Discharge Diagnosis: Gastroparesis    Discharge Condition: Stable  Allergies: Allergies   Allergen Reactions    Levaquin [Levofloxacin] Hives    Promethazine Nausea and Vomiting     \"the phenergan made me more nauseated\"         Patient Instructions  Hold Lisinopril until you see Dr Everardo Roy. Recommend voice activated glucose meter, such as [email or web address] VOICE Blood Glucose Monitoring System. Please take all medications as prescribed. Please go to follow-up appointments as described below. What to do at Home    Recommended diet: Diabetic Diet    Recommended activity: Activity as tolerated    Please see a doctor if you experience any of the following symptoms:   · Fever greater than 101 F  · Lightheadedness, dizziness, or fainting  · Sudden change in vision or difficulty speaking  · Sudden weakness or numbness on one side of your body   · Severe headache that does not get better with medications  · Chest pain   · Difficulty breathing   · Severe back or abdominal pain   · Nausea and vomiting that does not get better in 1 day  · Decreased amount of urine   · Pain with urination or blood in urine   · Blood in stool or black tarry stools  · New or worsened leg swelling    Follow-up with Dr Everardo Roy within one week. General dietary recommendations for gastroparesis include:  Eat smaller, more frequent meals   Eat less fatty foods   Avoid fiber   Avoid foods that cannot be chewed well   Foods that are generally encouraged include:   Breads, cereals, crackers, ground or pureed meats   Vegetables - cooked and, if necessary, blenderized/strained   Fruits - cooked and, if necessary, blenderized/strained   Juices, beverages, milk products, if tolerated   Small, frequent meals  Reducing the meal size reduces the distention of the stomach from the meal. By eating smaller meals, you may not feel as full or bloated and the stomach may empty faster.  With the reduction in meal size, increasing the number of meals to 4?6 per day is needed to maintain adequate nutritional intake. Avoid foods high in fat  Fat can delay emptying of the stomach. Eating less fat-containing foods will decrease the amount of time food stays in the stomach. However, fat containing liquids, such as milkshakes, may be tolerated and provide needed calories. A diet low in fiber is suggested  Fiber delays gastric emptying. In addition, fiber may bind together and cause a blockage of the stomach (called a bezoar). Examples of high fiber foods that should be avoided include:  Fruits - apples, berries, coconuts, figs, oranges, persimmons   Vegetables - [place] sprouts, green beans, green peas, lettuce, potato peels, sauerkraut   Bran/whole grain cereals   Nuts and seeds   Legumes/Dried Beans - baked beans, lentils, soy beans  Fiber supplements for treatment of constipation should also be discontinued if possible. Avoid foods that may not be easily chewed  Examples of hard to chew foods include:  Broccoli   Corn   Popcorn,   Nuts   Seeds  Chew food well before swallowing  Solid food in the stomach does not empty well. Dental problems, such as missing or broken teeth, may lead to poorly chewed food. This may add to the problem of inadequate breakdown of food into smaller particles in the stomach for passage into the small intestine for absorption. Position  Taking fluids throughout the meal and sitting upright or walking for 1?2 hours after meals may help in the emptying of the meal from the stomach. Vitamins and minerals  A daily multivitamin/mineral supplement can be taken if dietary intake is inadequate.     Adapted from IFFGD Publication: Dietary and Nutritional Recommendations for Patients with Gastroparesis by Connor Arana RD, MS, Nutrition , [facility]; Myra Maki MD, Co-Director of the GI Motility Laboratory, Division of Gastroenterology, [address] [facility] [place]; and Nya Chung MD, JULIO BARROS [facility], [place], [address] Nausea and Vomiting: Care Instructions  Your Care Instructions    When you are nauseated, you may feel weak and sweaty and notice a lot of saliva in your mouth. Nausea often leads to vomiting. Most of the time you do not need to worry about nausea and vomiting, but they can be signs of other illnesses. Two common causes of nausea and vomiting are stomach flu and food poisoning. Nausea and vomiting from viral stomach flu will usually start to improve within 24 hours. Nausea and vomiting from food poisoning may last from 12 to 48 hours. The doctor has checked you carefully, but problems can develop later. If you notice any problems or new symptoms, get medical treatment right away. Follow-up care is a key part of your treatment and safety. Be sure to make and go to all appointments, and call your doctor if you are having problems. It's also a good idea to know your test results and keep a list of the medicines you take. How can you care for yourself at home? · To prevent dehydration, drink plenty of fluids, enough so that your urine is light yellow or clear like water. Choose water and other caffeine-free clear liquids until you feel better. If you have kidney, heart, or liver disease and have to limit fluids, talk with your doctor before you increase the amount of fluids you drink. · Rest in bed until you feel better. · When you are able to eat, try clear soups, mild foods, and liquids until all symptoms are gone for 12 to 48 hours. Other good choices include dry toast, crackers, cooked cereal, and gelatin dessert, such as Jell-O. When should you call for help? Call 911 anytime you think you may need emergency care.  For example, call if:    · You passed out (lost consciousness).    Call your doctor now or seek immediate medical care if:    · You have symptoms of dehydration, such as:  ? Dry eyes and a dry mouth.  ? Passing only a little dark urine. ? Feeling thirstier than usual.     · You have new or worsening belly pain.     · You have a new or higher fever.     · You vomit blood or what looks like coffee grounds.    Watch closely for changes in your health, and be sure to contact your doctor if:    · You have ongoing nausea and vomiting.     · Your vomiting is getting worse.     · Your vomiting lasts longer than 2 days.     · You are not getting better as expected. Where can you learn more? Go to http://linda-sivan.info/. Enter 25 [id number] in the search box to learn more about \"Nausea and Vomiting: Care Instructions. \"  Current as of: September 23, 2018  Content Version: 11.9  © 5514-7656 [employer], [employer]. Care instructions adapted under license by [employer] (which disclaims liability or warranty for this information). If you have questions about a medical condition or this instruction, always ask your healthcare professional. [place] [address] any warranty or liability for your use of this information.

## 2019-01-25 NOTE — PROGRESS NOTES
PT eval order received and chart reviewed. Patient evaluated and discharged 1/24/2019 from PT caseload as she declined further participation in therapy despite maximal attempts at education. Spoke with patient today upon re-order from PF and patient reports she has not changed her mind and does not wish to participate with therapy in hospital. Will sign off. Thank you for this referral. Sara Pinzon, PT, DPT.

## 2019-01-25 NOTE — PROGRESS NOTES
Problem: Self Care Deficits Care Plan (Adult) Goal: *Acute Goals and Plan of Care (Insert Text) Outcome: Resolved/Met Date Met: 01/24/19 Occupational Therapy EVALUATION/discharge Patient: Eleanor Daly (1 y.o. female) Date: 1/24/2019 Primary Diagnosis: Tachycardia SIRS (systemic inflammatory response syndrome) ([facility]) Precautions:  Fall ASSESSMENT AND RECOMMENDATIONS: 
Based on the objective data described below, the patient is able to perform basic self care tasks without assistance. Supervision given for functional standing and transfers secondary to lethargy. Patient adamantly declines need for skilled OT, stating she can perform ADLs without assistance. She lives alone and has no DME. Skilled occupational therapy is not indicated at this time. Discharge Recommendations: None Further Equipment Recommendations for Discharge: N/A Barriers to Learning/Limitations: None Compensate with: visual, verbal, tactile, kinesthetic cues/model COMPLEXITY Eval Complexity: History: LOW Complexity : Brief history review ; Examination: LOW Complexity : 1-3 performance deficits relating to physical, cognitive , or psychosocial skils that result in activity limitations and / or participation restrictions ; Decision Making:LOW Complexity : No comorbidities that affect functional and no verbal or physical assistance needed to complete eval tasks  Assessment: Low Complexity SUBJECTIVE:  
Patient stated I can do everything by myself.  OBJECTIVE DATA SUMMARY:  
 
Past Medical History:  
Diagnosis Date  Blind left eye  Cellulitis of great toe of right foot 10/19/2017  Diabetes ([address]  Diabetic retinopathy ([address]  Gall stones  GERD (gastroesophageal reflux disease)  Hammertoe  Hiatal hernia  History of mammogram 10/03/2017 No evidence of malignancy  Hypertension  Mass of abdomen  Neuropathy due to type 2 diabetes mellitus ([address]  Osteomyelitis of toe of right foot ([facility] 10/19/2017  Pancreatitis Past Surgical History:  
Procedure Laterality Date  HX AMPUTATION Left 07/21/2017  
 left 2nd toe  HX CHOLECYSTECTOMY  10/15/2014  HX GI Benign GI Stromal Tumor excision  HX HEENT Sx for detached retina  HX MYOMECTOMY x5 removed  HX OTHER SURGICAL    
 upper endoscopy Prior Level of Function/Home Situation: Pt was independent with basic self care tasks and functional mobility PTA. Home Situation Home Environment: Apartment # Steps to Enter: 2 One/Two Story Residence: One story Living Alone: Yes Support Systems: Family member(s), Child(avi) Patient Expects to be Discharged to[de-identified] [facility] Current DME Used/Available at Home: None Tub or Shower Type: Tub/Shower combination 
[x]     Right hand dominant   []     Left hand dominantCognitive/Behavioral Status: 
Neurologic State: Alert Orientation Level: Oriented X4 Cognition: Follows commands Safety/Judgement: Fall prevention Skin: Intact on UEs Edema: None noted in UEs Vision/Perceptual:   
Acuity: Within Defined Limits L eye; R eye blind Coordination: 
Coordination: Within functional limits Fine Motor Skills-Upper: Left Intact; Right Intact Gross Motor Skills-Upper: Left Intact; Right Intact Balance: 
Sitting: Intact Standing: Impaired; With support Standing - Static: Good Standing - Dynamic : Fair Strength: 
Strength: Within functional limits(UEs) Tone & Sensation: 
Tone: Normal(UEs) Sensation: Intact(UEs) Range of Motion: 
AROM: Within functional limits(UEs) Functional Mobility and Transfers for ADLs: 
Bed Mobility: 
Rolling: Modified independent Supine to Sit: Modified independent Sit to Supine: Modified independent Transfers: 
Sit to Stand: Supervision Toilet Transfer : Supervision ADL Assessment: 
Feeding: Independent Oral Facial Hygiene/Grooming: Independent Bathing: Modified independent Upper Body Dressing: Independent Lower Body Dressing: Supervision Toileting: Modified independent Pain: 
Pt reports 0/10 pain or discomfort prior to treatment.   
Pt reports 0/10 pain or discomfort post treatment. Activity Tolerance:  
Fair Please refer to the flowsheet for vital signs taken during this treatment. After treatment:  
[]  Patient left in no apparent distress sitting up in chair 
[x]  Patient left in no apparent distress in bed 
[x]  Call bell left within reach 
[]  Nursing notified 
[]  Caregiver present 
[]  Bed alarm activated COMMUNICATION/EDUCATION:  
Communication/Collaboration: 
[x]      Home safety education was provided and the patient/caregiver indicated understanding. [x]      Patient/family have participated as able and agree with findings and recommendations. []      Patient is unable to participate in plan of care at this time. Ubaldo Long MS OTR/L Time Calculation: 10 mins

## 2019-01-25 NOTE — PROGRESS NOTES
Met with patient at bedside. Explained benefits of home health care. Patient refusing home health care at this time. Liv Alvarado [facility] Care Management [phone]

## 2019-01-27 ENCOUNTER — HOSPITAL ENCOUNTER (INPATIENT)
Age: 54
LOS: 2 days | Discharge: HOME HEALTH | DRG: 422 | End: 2019-01-30
Attending: EMERGENCY MEDICINE | Admitting: FAMILY MEDICINE
Payer: MEDICAID

## 2019-01-27 DIAGNOSIS — R11.2 INTRACTABLE VOMITING WITH NAUSEA, UNSPECIFIED VOMITING TYPE: ICD-10-CM

## 2019-01-27 DIAGNOSIS — R03.0 ELEVATED BLOOD PRESSURE READING: ICD-10-CM

## 2019-01-27 DIAGNOSIS — K31.84 GASTROPARESIS: Primary | ICD-10-CM

## 2019-01-27 DIAGNOSIS — E86.0 DEHYDRATION: ICD-10-CM

## 2019-01-27 DIAGNOSIS — E87.6 HYPOKALEMIA: ICD-10-CM

## 2019-01-27 LAB
ALBUMIN SERPL-MCNC: 4.6 G/DL (ref 3.4–5)
ALBUMIN/GLOB SERPL: 1 {RATIO} (ref 0.8–1.7)
ALP SERPL-CCNC: 150 U/L (ref 45–117)
ALT SERPL-CCNC: 15 U/L (ref 13–56)
ANION GAP SERPL CALC-SCNC: 13 MMOL/L (ref 3–18)
APPEARANCE UR: CLEAR
AST SERPL-CCNC: 18 U/L (ref 15–37)
BACTERIA URNS QL MICRO: NEGATIVE /HPF
BASOPHILS # BLD: 0 K/UL (ref 0–0.1)
BASOPHILS NFR BLD: 0 % (ref 0–2)
BILIRUB SERPL-MCNC: 1.4 MG/DL (ref 0.2–1)
BILIRUB UR QL: NEGATIVE
BUN SERPL-MCNC: 11 MG/DL (ref 7–18)
BUN/CREAT SERPL: 7 (ref 12–20)
CALCIUM SERPL-MCNC: 10 MG/DL (ref 8.5–10.1)
CHLORIDE SERPL-SCNC: 107 MMOL/L (ref 100–108)
CK MB CFR SERPL CALC: NORMAL % (ref 0–4)
CK MB SERPL-MCNC: <1 NG/ML (ref 5–25)
CK SERPL-CCNC: 131 U/L (ref 26–192)
CO2 SERPL-SCNC: 20 MMOL/L (ref 21–32)
COLOR UR: YELLOW
CREAT SERPL-MCNC: 1.53 MG/DL (ref 0.6–1.3)
DIFFERENTIAL METHOD BLD: ABNORMAL
EOSINOPHIL # BLD: 0 K/UL (ref 0–0.4)
EOSINOPHIL NFR BLD: 0 % (ref 0–5)
EPITH CASTS URNS QL MICRO: NORMAL /LPF (ref 0–5)
ERYTHROCYTE [DISTWIDTH] IN BLOOD BY AUTOMATED COUNT: 14.1 % (ref 11.6–14.5)
GLOBULIN SER CALC-MCNC: 4.4 G/DL (ref 2–4)
GLUCOSE SERPL-MCNC: 170 MG/DL (ref 74–99)
GLUCOSE UR STRIP.AUTO-MCNC: 250 MG/DL
HCT VFR BLD AUTO: 32.6 % (ref 35–45)
HGB BLD-MCNC: 11 G/DL (ref 12–16)
HGB UR QL STRIP: NEGATIVE
KETONES UR QL STRIP.AUTO: ABNORMAL MG/DL
LEUKOCYTE ESTERASE UR QL STRIP.AUTO: NEGATIVE
LYMPHOCYTES # BLD: 2 K/UL (ref 0.9–3.6)
LYMPHOCYTES NFR BLD: 17 % (ref 21–52)
MAGNESIUM SERPL-MCNC: 1.7 MG/DL (ref 1.6–2.6)
MCH RBC QN AUTO: 28.6 PG (ref 24–34)
MCHC RBC AUTO-ENTMCNC: 33.7 G/DL (ref 31–37)
MCV RBC AUTO: 84.9 FL (ref 74–97)
MONOCYTES # BLD: 0.5 K/UL (ref 0.05–1.2)
MONOCYTES NFR BLD: 4 % (ref 3–10)
NEUTS SEG # BLD: 9.4 K/UL (ref 1.8–8)
NEUTS SEG NFR BLD: 79 % (ref 40–73)
NITRITE UR QL STRIP.AUTO: NEGATIVE
PH UR STRIP: 8 [PH] (ref 5–8)
PLATELET # BLD AUTO: 364 K/UL (ref 135–420)
PMV BLD AUTO: 10.3 FL (ref 9.2–11.8)
POTASSIUM SERPL-SCNC: 3.4 MMOL/L (ref 3.5–5.5)
PROT SERPL-MCNC: 9 G/DL (ref 6.4–8.2)
PROT UR STRIP-MCNC: ABNORMAL MG/DL
RBC # BLD AUTO: 3.84 M/UL (ref 4.2–5.3)
RBC #/AREA URNS HPF: NEGATIVE /HPF (ref 0–5)
SODIUM SERPL-SCNC: 140 MMOL/L (ref 136–145)
SP GR UR REFRACTOMETRY: 1.01 (ref 1–1.03)
TROPONIN I SERPL-MCNC: <0.02 NG/ML (ref 0–0.04)
UROBILINOGEN UR QL STRIP.AUTO: 0.2 EU/DL (ref 0.2–1)
WBC # BLD AUTO: 12 K/UL (ref 4.6–13.2)
WBC URNS QL MICRO: NORMAL /HPF (ref 0–5)

## 2019-01-27 PROCEDURE — 93005 ELECTROCARDIOGRAM TRACING: CPT

## 2019-01-27 PROCEDURE — 85025 COMPLETE CBC W/AUTO DIFF WBC: CPT

## 2019-01-27 PROCEDURE — 96360 HYDRATION IV INFUSION INIT: CPT

## 2019-01-27 PROCEDURE — 99285 EMERGENCY DEPT VISIT HI MDM: CPT

## 2019-01-27 PROCEDURE — 96372 THER/PROPH/DIAG INJ SC/IM: CPT

## 2019-01-27 PROCEDURE — 87077 CULTURE AEROBIC IDENTIFY: CPT

## 2019-01-27 PROCEDURE — 81001 URINALYSIS AUTO W/SCOPE: CPT

## 2019-01-27 PROCEDURE — 87186 SC STD MICRODIL/AGAR DIL: CPT

## 2019-01-27 PROCEDURE — 82550 ASSAY OF CK (CPK): CPT

## 2019-01-27 PROCEDURE — 74011250636 HC RX REV CODE- 250/636

## 2019-01-27 PROCEDURE — 87086 URINE CULTURE/COLONY COUNT: CPT

## 2019-01-27 PROCEDURE — 74011250636 HC RX REV CODE- 250/636: Performed by: EMERGENCY MEDICINE

## 2019-01-27 PROCEDURE — 94762 N-INVAS EAR/PLS OXIMTRY CONT: CPT

## 2019-01-27 PROCEDURE — 80053 COMPREHEN METABOLIC PANEL: CPT

## 2019-01-27 PROCEDURE — 83735 ASSAY OF MAGNESIUM: CPT

## 2019-01-27 RX ORDER — KETOROLAC TROMETHAMINE 30 MG/ML
30 INJECTION, SOLUTION INTRAMUSCULAR; INTRAVENOUS
Status: COMPLETED | OUTPATIENT
Start: 2019-01-27 | End: 2019-01-27

## 2019-01-27 RX ORDER — HALOPERIDOL 5 MG/ML
5 INJECTION INTRAMUSCULAR
Status: COMPLETED | OUTPATIENT
Start: 2019-01-27 | End: 2019-01-28

## 2019-01-27 RX ORDER — METOCLOPRAMIDE HYDROCHLORIDE 5 MG/ML
10 INJECTION INTRAMUSCULAR; INTRAVENOUS
Status: DISCONTINUED | OUTPATIENT
Start: 2019-01-27 | End: 2019-01-27

## 2019-01-27 RX ORDER — KETOROLAC TROMETHAMINE 30 MG/ML
15 INJECTION, SOLUTION INTRAMUSCULAR; INTRAVENOUS ONCE
Status: DISCONTINUED | OUTPATIENT
Start: 2019-01-27 | End: 2019-01-27

## 2019-01-27 RX ORDER — DICYCLOMINE HYDROCHLORIDE 10 MG/ML
20 INJECTION INTRAMUSCULAR
Status: COMPLETED | OUTPATIENT
Start: 2019-01-27 | End: 2019-01-27

## 2019-01-27 RX ORDER — DIPHENHYDRAMINE HYDROCHLORIDE 50 MG/ML
INJECTION, SOLUTION INTRAMUSCULAR; INTRAVENOUS
Status: COMPLETED
Start: 2019-01-27 | End: 2019-01-27

## 2019-01-27 RX ORDER — METOCLOPRAMIDE HYDROCHLORIDE 5 MG/ML
10 INJECTION INTRAMUSCULAR; INTRAVENOUS
Status: COMPLETED | OUTPATIENT
Start: 2019-01-27 | End: 2019-01-27

## 2019-01-27 RX ADMIN — SODIUM CHLORIDE 1000 ML: 900 INJECTION, SOLUTION INTRAVENOUS at 22:00

## 2019-01-27 RX ADMIN — DIPHENHYDRAMINE HYDROCHLORIDE: 50 INJECTION INTRAMUSCULAR; INTRAVENOUS at 22:00

## 2019-01-27 RX ADMIN — KETOROLAC TROMETHAMINE 30 MG: 30 INJECTION, SOLUTION INTRAMUSCULAR at 21:35

## 2019-01-27 RX ADMIN — DICYCLOMINE HYDROCHLORIDE 20 MG: 20 INJECTION, SOLUTION INTRAMUSCULAR at 23:00

## 2019-01-27 RX ADMIN — METOCLOPRAMIDE 10 MG: 5 INJECTION, SOLUTION INTRAMUSCULAR; INTRAVENOUS at 21:30

## 2019-01-28 ENCOUNTER — APPOINTMENT (OUTPATIENT)
Dept: GENERAL RADIOLOGY | Age: 54
DRG: 422 | End: 2019-01-28
Attending: STUDENT IN AN ORGANIZED HEALTH CARE EDUCATION/TRAINING PROGRAM
Payer: MEDICAID

## 2019-01-28 PROBLEM — K31.84 GASTROPARESIS: Status: ACTIVE | Noted: 2019-01-28

## 2019-01-28 PROBLEM — E87.6 HYPOKALEMIA: Status: ACTIVE | Noted: 2019-01-28

## 2019-01-28 PROBLEM — R11.2 INTRACTABLE NAUSEA AND VOMITING: Status: ACTIVE | Noted: 2019-01-28

## 2019-01-28 LAB
ANION GAP SERPL CALC-SCNC: 11 MMOL/L (ref 3–18)
ATRIAL RATE: 113 BPM
BASOPHILS # BLD: 0 K/UL (ref 0–0.1)
BASOPHILS NFR BLD: 0 % (ref 0–2)
BUN SERPL-MCNC: 11 MG/DL (ref 7–18)
BUN/CREAT SERPL: 7 (ref 12–20)
CALCIUM SERPL-MCNC: 9.7 MG/DL (ref 8.5–10.1)
CALCULATED P AXIS, ECG09: 68 DEGREES
CALCULATED R AXIS, ECG10: 12 DEGREES
CALCULATED T AXIS, ECG11: -34 DEGREES
CHLORIDE SERPL-SCNC: 105 MMOL/L (ref 100–108)
CO2 SERPL-SCNC: 23 MMOL/L (ref 21–32)
CREAT SERPL-MCNC: 1.47 MG/DL (ref 0.6–1.3)
DIAGNOSIS, 93000: NORMAL
DIFFERENTIAL METHOD BLD: ABNORMAL
EOSINOPHIL # BLD: 0 K/UL (ref 0–0.4)
EOSINOPHIL NFR BLD: 0 % (ref 0–5)
ERYTHROCYTE [DISTWIDTH] IN BLOOD BY AUTOMATED COUNT: 14.3 % (ref 11.6–14.5)
FLUAV AG NPH QL IA: NEGATIVE
FLUBV AG NOSE QL IA: NEGATIVE
GLUCOSE BLD STRIP.AUTO-MCNC: 114 MG/DL (ref 70–110)
GLUCOSE BLD STRIP.AUTO-MCNC: 175 MG/DL (ref 70–110)
GLUCOSE SERPL-MCNC: 128 MG/DL (ref 74–99)
HCT VFR BLD AUTO: 30.7 % (ref 35–45)
HGB BLD-MCNC: 10.8 G/DL (ref 12–16)
LACTATE BLD-SCNC: 1.13 MMOL/L (ref 0.4–2)
LACTATE SERPL-SCNC: 1.4 MMOL/L (ref 0.4–2)
LACTATE SERPL-SCNC: 1.6 MMOL/L (ref 0.4–2)
LIPASE SERPL-CCNC: 84 U/L (ref 73–393)
LYMPHOCYTES # BLD: 1.6 K/UL (ref 0.9–3.6)
LYMPHOCYTES NFR BLD: 13 % (ref 21–52)
MAGNESIUM SERPL-MCNC: 1.6 MG/DL (ref 1.6–2.6)
MCH RBC QN AUTO: 29.5 PG (ref 24–34)
MCHC RBC AUTO-ENTMCNC: 35.2 G/DL (ref 31–37)
MCV RBC AUTO: 83.9 FL (ref 74–97)
MONOCYTES # BLD: 0.4 K/UL (ref 0.05–1.2)
MONOCYTES NFR BLD: 3 % (ref 3–10)
NEUTS SEG # BLD: 10.4 K/UL (ref 1.8–8)
NEUTS SEG NFR BLD: 84 % (ref 40–73)
P-R INTERVAL, ECG05: 144 MS
PLATELET # BLD AUTO: 353 K/UL (ref 135–420)
PMV BLD AUTO: 10.3 FL (ref 9.2–11.8)
POTASSIUM SERPL-SCNC: 3.3 MMOL/L (ref 3.5–5.5)
Q-T INTERVAL, ECG07: 310 MS
QRS DURATION, ECG06: 60 MS
QTC CALCULATION (BEZET), ECG08: 425 MS
RBC # BLD AUTO: 3.66 M/UL (ref 4.2–5.3)
SODIUM SERPL-SCNC: 139 MMOL/L (ref 136–145)
VENTRICULAR RATE, ECG03: 113 BPM
WBC # BLD AUTO: 12.3 K/UL (ref 4.6–13.2)

## 2019-01-28 PROCEDURE — 77010033678 HC OXYGEN DAILY

## 2019-01-28 PROCEDURE — 87804 INFLUENZA ASSAY W/OPTIC: CPT

## 2019-01-28 PROCEDURE — 74011250636 HC RX REV CODE- 250/636: Performed by: STUDENT IN AN ORGANIZED HEALTH CARE EDUCATION/TRAINING PROGRAM

## 2019-01-28 PROCEDURE — 74011000250 HC RX REV CODE- 250: Performed by: STUDENT IN AN ORGANIZED HEALTH CARE EDUCATION/TRAINING PROGRAM

## 2019-01-28 PROCEDURE — 80048 BASIC METABOLIC PNL TOTAL CA: CPT

## 2019-01-28 PROCEDURE — 85025 COMPLETE CBC W/AUTO DIFF WBC: CPT

## 2019-01-28 PROCEDURE — 87040 BLOOD CULTURE FOR BACTERIA: CPT

## 2019-01-28 PROCEDURE — 83605 ASSAY OF LACTIC ACID: CPT

## 2019-01-28 PROCEDURE — 83735 ASSAY OF MAGNESIUM: CPT

## 2019-01-28 PROCEDURE — 36415 COLL VENOUS BLD VENIPUNCTURE: CPT

## 2019-01-28 PROCEDURE — 82962 GLUCOSE BLOOD TEST: CPT

## 2019-01-28 PROCEDURE — C9113 INJ PANTOPRAZOLE SODIUM, VIA: HCPCS | Performed by: STUDENT IN AN ORGANIZED HEALTH CARE EDUCATION/TRAINING PROGRAM

## 2019-01-28 PROCEDURE — 94760 N-INVAS EAR/PLS OXIMETRY 1: CPT

## 2019-01-28 PROCEDURE — 74011250637 HC RX REV CODE- 250/637: Performed by: STUDENT IN AN ORGANIZED HEALTH CARE EDUCATION/TRAINING PROGRAM

## 2019-01-28 PROCEDURE — 74011250636 HC RX REV CODE- 250/636: Performed by: EMERGENCY MEDICINE

## 2019-01-28 PROCEDURE — 65270000029 HC RM PRIVATE

## 2019-01-28 PROCEDURE — 83690 ASSAY OF LIPASE: CPT

## 2019-01-28 PROCEDURE — 71046 X-RAY EXAM CHEST 2 VIEWS: CPT

## 2019-01-28 PROCEDURE — 74011250636 HC RX REV CODE- 250/636: Performed by: FAMILY MEDICINE

## 2019-01-28 RX ORDER — DEXTROSE, SODIUM CHLORIDE, AND POTASSIUM CHLORIDE 5; .45; .15 G/100ML; G/100ML; G/100ML
150 INJECTION INTRAVENOUS CONTINUOUS
Status: DISCONTINUED | OUTPATIENT
Start: 2019-01-28 | End: 2019-01-29

## 2019-01-28 RX ORDER — DEXTROSE 50 % IN WATER (D50W) INTRAVENOUS SYRINGE
25-50 AS NEEDED
Status: DISCONTINUED | OUTPATIENT
Start: 2019-01-28 | End: 2019-01-30 | Stop reason: HOSPADM

## 2019-01-28 RX ORDER — POTASSIUM CHLORIDE 7.45 MG/ML
10 INJECTION INTRAVENOUS
Status: DISPENSED | OUTPATIENT
Start: 2019-01-28 | End: 2019-01-28

## 2019-01-28 RX ORDER — LORAZEPAM 2 MG/ML
1 INJECTION INTRAMUSCULAR ONCE
Status: COMPLETED | OUTPATIENT
Start: 2019-01-28 | End: 2019-01-28

## 2019-01-28 RX ORDER — MAGNESIUM SULFATE HEPTAHYDRATE 40 MG/ML
4 INJECTION, SOLUTION INTRAVENOUS ONCE
Status: COMPLETED | OUTPATIENT
Start: 2019-01-28 | End: 2019-01-28

## 2019-01-28 RX ORDER — ONDANSETRON 2 MG/ML
4 INJECTION INTRAMUSCULAR; INTRAVENOUS ONCE
Status: COMPLETED | OUTPATIENT
Start: 2019-01-28 | End: 2019-01-28

## 2019-01-28 RX ORDER — MAGNESIUM SULFATE 100 %
4 CRYSTALS MISCELLANEOUS AS NEEDED
Status: DISCONTINUED | OUTPATIENT
Start: 2019-01-28 | End: 2019-01-30 | Stop reason: HOSPADM

## 2019-01-28 RX ORDER — ACETAMINOPHEN 650 MG/1
500 SUPPOSITORY RECTAL
Status: DISCONTINUED | OUTPATIENT
Start: 2019-01-28 | End: 2019-01-30 | Stop reason: HOSPADM

## 2019-01-28 RX ORDER — SODIUM CHLORIDE 9 MG/ML
1000 INJECTION, SOLUTION INTRAVENOUS CONTINUOUS
Status: DISCONTINUED | OUTPATIENT
Start: 2019-01-28 | End: 2019-01-29

## 2019-01-28 RX ORDER — SODIUM CHLORIDE 9 MG/ML
1000 INJECTION, SOLUTION INTRAVENOUS ONCE
Status: COMPLETED | OUTPATIENT
Start: 2019-01-28 | End: 2019-01-28

## 2019-01-28 RX ORDER — ONDANSETRON 4 MG/1
4 TABLET, ORALLY DISINTEGRATING ORAL
Status: DISCONTINUED | OUTPATIENT
Start: 2019-01-28 | End: 2019-01-30 | Stop reason: HOSPADM

## 2019-01-28 RX ORDER — HEPARIN SODIUM 5000 [USP'U]/ML
5000 INJECTION, SOLUTION INTRAVENOUS; SUBCUTANEOUS EVERY 12 HOURS
Status: DISCONTINUED | OUTPATIENT
Start: 2019-01-28 | End: 2019-01-29

## 2019-01-28 RX ORDER — DEXTROSE, SODIUM CHLORIDE, AND POTASSIUM CHLORIDE 5; .45; .15 G/100ML; G/100ML; G/100ML
125 INJECTION INTRAVENOUS CONTINUOUS
Status: DISCONTINUED | OUTPATIENT
Start: 2019-01-28 | End: 2019-01-28

## 2019-01-28 RX ORDER — ONDANSETRON 2 MG/ML
4 INJECTION INTRAMUSCULAR; INTRAVENOUS
Status: COMPLETED | OUTPATIENT
Start: 2019-01-28 | End: 2019-01-28

## 2019-01-28 RX ORDER — METOCLOPRAMIDE HYDROCHLORIDE 5 MG/ML
5 INJECTION INTRAMUSCULAR; INTRAVENOUS EVERY 6 HOURS
Status: DISCONTINUED | OUTPATIENT
Start: 2019-01-28 | End: 2019-01-28

## 2019-01-28 RX ORDER — INSULIN LISPRO 100 [IU]/ML
INJECTION, SOLUTION INTRAVENOUS; SUBCUTANEOUS EVERY 6 HOURS
Status: DISCONTINUED | OUTPATIENT
Start: 2019-01-28 | End: 2019-01-29

## 2019-01-28 RX ORDER — METOCLOPRAMIDE HYDROCHLORIDE 5 MG/ML
10 INJECTION INTRAMUSCULAR; INTRAVENOUS EVERY 6 HOURS
Status: DISCONTINUED | OUTPATIENT
Start: 2019-01-28 | End: 2019-01-29

## 2019-01-28 RX ADMIN — SODIUM CHLORIDE 1000 ML: 900 INJECTION, SOLUTION INTRAVENOUS at 08:35

## 2019-01-28 RX ADMIN — METOCLOPRAMIDE 10 MG: 5 INJECTION, SOLUTION INTRAMUSCULAR; INTRAVENOUS at 14:42

## 2019-01-28 RX ADMIN — SODIUM CHLORIDE 1000 ML: 900 INJECTION, SOLUTION INTRAVENOUS at 05:15

## 2019-01-28 RX ADMIN — MAGNESIUM SULFATE IN WATER 4 G: 40 INJECTION, SOLUTION INTRAVENOUS at 08:35

## 2019-01-28 RX ADMIN — SODIUM CHLORIDE 40 MG: 9 INJECTION, SOLUTION INTRAMUSCULAR; INTRAVENOUS; SUBCUTANEOUS at 08:26

## 2019-01-28 RX ADMIN — ACETAMINOPHEN 500 MG: 650 SUPPOSITORY RECTAL at 23:40

## 2019-01-28 RX ADMIN — DEXTROSE MONOHYDRATE, SODIUM CHLORIDE, AND POTASSIUM CHLORIDE 150 ML/HR: 50; 4.5; 1.49 INJECTION, SOLUTION INTRAVENOUS at 18:17

## 2019-01-28 RX ADMIN — METOCLOPRAMIDE 10 MG: 5 INJECTION, SOLUTION INTRAMUSCULAR; INTRAVENOUS at 18:17

## 2019-01-28 RX ADMIN — SODIUM CHLORIDE 1000 ML: 900 INJECTION, SOLUTION INTRAVENOUS at 14:32

## 2019-01-28 RX ADMIN — ONDANSETRON 4 MG: 2 INJECTION INTRAMUSCULAR; INTRAVENOUS at 05:15

## 2019-01-28 RX ADMIN — HALOPERIDOL LACTATE 5 MG: 5 INJECTION, SOLUTION INTRAMUSCULAR at 00:00

## 2019-01-28 RX ADMIN — DEXTROSE MONOHYDRATE, SODIUM CHLORIDE, AND POTASSIUM CHLORIDE 125 ML/HR: 50; 4.5; 1.49 INJECTION, SOLUTION INTRAVENOUS at 09:17

## 2019-01-28 RX ADMIN — HEPARIN SODIUM 5000 UNITS: 5000 INJECTION INTRAVENOUS; SUBCUTANEOUS at 08:38

## 2019-01-28 RX ADMIN — ONDANSETRON 4 MG: 2 INJECTION INTRAMUSCULAR; INTRAVENOUS at 08:34

## 2019-01-28 RX ADMIN — HEPARIN SODIUM 5000 UNITS: 5000 INJECTION INTRAVENOUS; SUBCUTANEOUS at 18:17

## 2019-01-28 RX ADMIN — LORAZEPAM 1 MG: 2 INJECTION INTRAMUSCULAR; INTRAVENOUS at 08:38

## 2019-01-28 RX ADMIN — ACETAMINOPHEN 500 MG: 650 SUPPOSITORY RECTAL at 09:17

## 2019-01-28 RX ADMIN — METOCLOPRAMIDE 10 MG: 5 INJECTION, SOLUTION INTRAMUSCULAR; INTRAVENOUS at 23:32

## 2019-01-28 RX ADMIN — POTASSIUM CHLORIDE 10 MEQ: 7.46 INJECTION, SOLUTION INTRAVENOUS at 05:15

## 2019-01-28 NOTE — DISCHARGE SUMMARY
Discharge Summary [address] [place] [place] Patient: Shawn Case Age: 52 y.o. Sex: female  : 1965 MRN: [id number] DOA: 2019 Discharge Date: 19 Attending: Abarhan Wilson MD     
PCP: Eleonora Rashid MD     
 
================================================================ Reason for Admission: Intractable nausea and vomiting Dehydration Hypokalemia Gastroparesis Intractable nausea and vomiting Intractable nausea and vomiting Dehydration Hypokalemia Gastroparesis Discharge Diagnoses:  
Dehydration Intractable Nausea/Vomiting Hypokalemia UTI CRYSTAL 
CKD2 DMII with retinopathy and neuropathy HTN 
HLD Anxiety Vitamin D Deficiency Important notes to PCP/ follow-up studies and evaluations Finish five day course of Keflex for UTI. Take Reglan before every meal, three times a day. Continue to take protonix. Take zofran as needed. Stop taking metformin due to nausea, vomiting and lactic acidosis. Discuss diabetes management. Continue to take lisinopril 10 mg. Nutrition recommended Glucerna twice a day with meals. Follow up with GI for repeat EGD and gastric emptying scan. Recommend frequent office visits. Pending labs and studies: 
None Operative Procedures:  
None Discharge Medications:    
Discharge Medication List as of 2019  4:42 PM  
  
START taking these medications Details  
ondansetron (ZOFRAN ODT) 4 mg disintegrating tablet Take 1 Tab by mouth every eight (8) hours as needed. , Print, Disp-20 Tab, R-0  
  
cephALEXin (KEFLEX) 500 mg capsule Take 1 Cap by mouth every twelve (12) hours every twelve (12) hours. , Print, Disp-10 Cap, R-0  
  
  
CONTINUE these medications which have CHANGED Details  
metoclopramide HCl (REGLAN) 5 mg tablet Take 1 Tab by mouth Before breakfast, lunch, and dinner. Take before meals. , Print, Disp-90 Tab, R-3  
  
pantoprazole (PROTONIX) 40 mg tablet Take 1 Tab by mouth Daily (before breakfast). , Print, Disp-30 Tab, R-2  
  
  
CONTINUE these medications which have NOT CHANGED Details  
magnesium 250 mg tab Take 250 mg by mouth., Historical Med  
  
potassium chloride (KLOR-CON M20) 20 mEq tablet Take 20 mEq by mouth two (2) times a day., Historical Med  
  
insulin NPH/insulin regular (NOVOLIN 70/30, HUMULIN 70/30) 100 unit/mL (70-30) injection 40 Units by SubCUTAneous route two (2) times a day., Print, Disp-10 mL, R-3  
  
cholecalciferol (VITAMIN D3) 1,000 unit tablet Take 1 Tab by mouth daily. , Print, Disp-30 Tab, R-1 PARoxetine (PAXIL) 40 mg tablet Take 40 mg by mouth daily. , Historical Med  
  
rosuvastatin (CRESTOR) 20 mg tablet Take 1 Tab by mouth nightly., Program, Disp-90 Tab, R-3 STOP taking these medications  
  
 erythromycin base (E-MYCIN) 250 mg tablet Comments:  
Reason for Stopping: Patient symptoms controlled by Reglan. sucralfate (CARAFATE) 1 gram tablet Comments:  
Reason for Stopping: Not needed. metFORMIN ER (GLUCOPHAGE XR) 500 mg tablet Comments:  
Reason for Stopping: May contribute to nausea, vomiting and lactic acidosis. Disposition: Home Consultants:   
None Brief Hospital Course (including pertinent history and physical findings) Tashi Cutler is a 52 y. o. 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 Dehydration. Intractable N/V. Hypokalemia. Acute on Chronic Kidney Injury Stage 2. UTI. Patient has had recurrent admissions with recent admission on 1/25/19 for similar symptoms. Patient had extensive workup with CT A/P, mesenteric doppler, RUQ U/S, CT head, UDS, lipase, alcohol screen (all done in Jan 2019) which were unremarkable.  The patient has h/o esophagitis and gastritis found on upper endoscopy in Nov 2018. Patient admitted with nausea, non-bloody, non-bilious vomiting and diffuse abdominal pain. Patient unsure if of which medications she was taking and had been discharged on Reglan and erythromycin. Patient started on IVF, Reglan, PPI and Zofran. She was found to be hypokalemic to 3.4 most likely due to emesis. Patient had CRYSTAL with creatinine of 1.53 (baseline 1.17), which improved to normal with fluids. Patient had normal WBC and her UA and CXR were unremarkable. However her urine culture positive for Klebsiella Oxytoca and patient started on ceftriaxone. Patient abdominal pain improved the next day and she was able to tolerate a diabetic diet. Patient started on Carafate and erythromycin which were not continued upon discharge due to the unlikelihood that it provided benefit. Patient no longer had nausea, vomiting or abdominal pain upon discharge. She is to take Reglan three times a day with meals, Zofran as needed and to stop taking metformin. Patient also discharged with five days of Keflex to treat UTI. She was stable upon discharge to home with home health. Recommend frequent office visits. T2DM with retinopathy and neuropathy. Patient had HgbA1c 7.2 (1/23/19) and controlled on correctional insulin scale while hospitalized. Patient home diabetes medications were held inpatient. Her metformin was discontinued to due concern that it is a contributing factor to her nausea, vomiting and lactic acidosis seen on previous admissions. H/O HTN/HLD. Patient lisinopril and Crestor were held while patient was NPO. Patient to continue medications at home. Anxiety Patient Paxil was held due to its interaction with Reglan. Discussed the risks and benefits of taking Reglan with Paxil and patient agreed to continue both medications, given that Reglan helps with her abdominal pain. Vitamin D Deficiency Patient continued on home medications once she tolerated a diet. Patient to continue medications upon discharge. Summarized key findings and results (labs, imaging studies, ECHO, cardiac cath, endoscopies, etc): Xr Chest Pa Lat: 1/28/2019 IMPRESSION: Negative study. CBC w/Diff Lab Results Component Value Date/Time WBC 9.9 01/30/2019 02:25 AM  
 Hemoglobin, POC 13.6 11/20/2018 11:19 AM  
 HGB 9.0 (L) 01/30/2019 02:25 AM  
 Hematocrit, POC 40 11/20/2018 11:19 AM  
 HCT 26.4 (L) 01/30/2019 02:25 AM  
 PLATELET 457 01/31/9004 02:25 AM  
 MCV 86.0 01/30/2019 02:25 AM  
   
 
 
Chemistry Lab Results Component Value Date/Time Sodium 136 01/30/2019 02:25 AM  
 Potassium 4.2 01/30/2019 02:25 AM  
 Chloride 105 01/30/2019 02:25 AM  
 CO2 24 01/30/2019 02:25 AM  
 Anion gap 7 01/30/2019 02:25 AM  
 Glucose 164 (H) 01/30/2019 02:25 AM  
 BUN 9 01/30/2019 02:25 AM  
 Creatinine 1.30 01/30/2019 02:25 AM  
 BUN/Creatinine ratio 7 (L) 01/30/2019 02:25 AM  
 GFR est AA 52 (L) 01/30/2019 02:25 AM  
 GFR est non-AA 43 (L) 01/30/2019 02:25 AM  
 Calcium 8.6 01/30/2019 02:25 AM  
   
 
 
Functional status and cognitive function:   
Ambulates unassisted Status: alert, cooperative, no distress, appears stated age Diet: Low fat low fiber Code status and advanced care plan: Full Power Of [employer] Group of Contact:  
Noé Grimes Relationship: Friend 
[phone] 
  
Alex Santos Relationship: Son 
[phone] Patient Education:  Patient was educated on the following topics prior to discharge: taking their medications correctly; gastroparesis, dehydration, elevated blood pressure, hypokalemia, Nausea and vomiting, oral rehydration Follow-up:  
Follow-up Information Follow up With Specialties Details Why Contact Faviola Diaz MD Family Practice On 2/4/2019 at 11:30 am for hospital follow up [address] [place] [id number] [phone] ================================================================ Sarai Hannon MD  
[address] [place] [address] Medicine PGY-1 
01/31/19 4:38 AM

## 2019-01-28 NOTE — PROGRESS NOTES
Patient is not available to be assessed at this time.  [facility]  
[phone]

## 2019-01-28 NOTE — H&P
Admission History and Physical 
[address] [facility] Patient: Eve Martin MRN: [id number]  [facility]: [id number] YOB: 1965  Age: 52 y.o. Sex: female DOA: 1/27/2019 HPI:  
 
Eve Martin is a 52 y.o. female with PMH of IDDM2, HTN, HLD, anxiety, gastritis/esophagitis, GI stromal tumor s/p resection 0866, BADSHAJQTLTZA, GLGOYATDUJPNSHZ, KLXDHJRRUEIB, IKSTF 1 diastolic dysfunction, now presenting with complaint of sudden onset of N/V with abdominal pain and generalized pain. The patient was admitted on 1/23 for similar complaints of N/V, abd pain. She was discharged on 1/25 with erythromycin and reglan. The patient is a poor historian and unsure which medication she is taking. Her medication is managed by her cousin. Patient states she was doing well after her discharge from the hospital till the day of ER admission. Denies any sick contacts. She complains of multiple emesis (non bilious, non bloody) as well as several loose stools. But denies any dysuria. Also denies tobacco, alcohol, or rec drug usage. Patient lives alone but has several family members who visits. ED Course: EKG, UA, cardiac panel, 1L NS bolus, Bentyl, benadryl, haldol 5mg x1, Toradol 30mg, reglan, zofran Review of Systems Constitutional: Negative for chills, fever and malaise/fatigue. Eyes: Negative for pain. Blind left eye Respiratory: Negative for cough and wheezing. Cardiovascular: Positive for chest pain. Negative for palpitations and leg swelling. Gastrointestinal: Positive for abdominal pain, nausea and vomiting. Genitourinary: Negative for dysuria and hematuria. Musculoskeletal: Positive for myalgias. Negative for joint pain. Skin: Negative for itching and rash. Neurological: Negative for dizziness and loss of consciousness. Psychiatric/Behavioral: The patient is nervous/anxious. The patient does not have insomnia. Past Medical History: Diagnosis Date  Blind left eye  Cellulitis of great toe of right foot 10/19/2017  Diabetes ([facility]  Diabetic retinopathy ([facility]  Gall stones  GERD (gastroesophageal reflux disease)  Hammertoe  Hiatal hernia  History of mammogram 10/03/2017 No evidence of malignancy  Hypertension  Mass of abdomen  Neuropathy due to type 2 diabetes mellitus ([facility]  Osteomyelitis of toe of right foot ([facility] 10/19/2017  Pancreatitis Past Surgical History:  
Procedure Laterality Date  HX AMPUTATION Left 2017  
 left 2nd toe  HX CHOLECYSTECTOMY  10/15/2014  HX GI Benign GI Stromal Tumor excision  HX HEENT Sx for detached retina  HX MYOMECTOMY x5 removed  HX OTHER SURGICAL    
 upper endoscopy Family History Adopted: Yes  
Problem Relation Age of Onset  Heart Failure Mother 76  
 Other Father 70  
     blood clot after surgery  Diabetes Paternal Aunt  Diabetes Paternal Uncle Social History Socioeconomic History  Marital status: SINGLE Spouse name: Not on file  Number of children: Not on file  Years of education: Not on file  Highest education level: Not on file Tobacco Use  Smoking status: Former Smoker Packs/day: 0.20 Years: 8.00 Pack years: 1.60 Types: Cigarettes Last attempt to quit: 12/3/1995 Years since quittin.1  Smokeless tobacco: Never Used Substance and Sexual Activity  Alcohol use: No  
  Comment: Drinks 3-4xs year generally wine  Drug use: No  
 Sexual activity: Not Currently Other Topics Concern   Service No  
 Blood Transfusions No  
 Caffeine Concern No  
 Occupational Exposure No  
 Hobby Hazards No  
 Sleep Concern No  
 Stress Concern No  
 Weight Concern No  
 Special Diet Yes  Back Care No  
 Exercise No  
 Bike Helmet No  
 Seat Belt Yes  Self-Exams Yes Social History Narrative Lives with 16year old son  with ex   Does not have health insurance Allergies Allergen Reactions  Levaquin [Levofloxacin] Hives  Promethazine Nausea and Vomiting \"the phenergan made me more nauseated\" Prior to Admission Medications Prescriptions Last Dose Informant Patient Reported? Taking? PARoxetine (PAXIL) 40 mg tablet   Yes No  
Sig: Take 40 mg by mouth daily. cholecalciferol (VITAMIN D3) 1,000 unit tablet   No No  
Sig: Take 1 Tab by mouth daily. erythromycin base (E-MYCIN) 250 mg tablet   No No  
Sig: Take 1 Tab by mouth Before breakfast, lunch, and dinner for 28 days. insulin NPH/insulin regular (NOVOLIN 70/30, HUMULIN 70/30) 100 unit/mL (70-30) injection   No No  
Si Units by SubCUTAneous route two (2) times a day. magnesium 250 mg tab   Yes No  
Sig: Take 250 mg by mouth.  
metFORMIN ER (GLUCOPHAGE XR) 500 mg tablet   No No  
Sig: TAKE 1 TABLET BY MOUTH DAILY WITH DINNER  
metoclopramide HCl (REGLAN) 5 mg tablet   No No  
Sig: Take 1 Tab by mouth three (3) times daily as needed for up to 10 days. Take before meals. pantoprazole (PROTONIX) 40 mg tablet   No No  
Sig: Take 1 Tab by mouth Daily (before breakfast). potassium chloride (KLOR-CON M20) 20 mEq tablet   Yes No  
Sig: Take 20 mEq by mouth two (2) times a day. rosuvastatin (CRESTOR) 20 mg tablet   No No  
Sig: Take 1 Tab by mouth nightly. sucralfate (CARAFATE) 1 gram tablet   No No  
Sig: Take 1 Tab by mouth Before breakfast and dinner. Facility-Administered Medications: None Physical Exam:  
 
Patient Vitals for the past 24 hrs: 
 Temp Pulse Resp BP SpO2  
19 0323  (!) 155 (!) 35  100 % 19 0315  (!) 132 29 (!) 207/111 100 % 19 0300  (!) 139 27 (!) 188/95 100 % 19 0115  (!) 123 23 (!) 198/117 99 % 19 0045  (!) 119 25 (!) 173/95 98 % 19 0030  (!) 117 (!) 31 (!) 174/99 98 % 01/28/19 0015  (!) 115 19 (!) 169/98 98 % 01/28/19 0000    (!) 169/96   
01/27/19 2330  (!) 116 25 (!) 178/102 100 % 01/27/19 2315  (!) 118 27 (!) 180/100 99 % 01/27/19 2300    (!) 192/126 100 % 01/27/19 2254  (!) 116 20    
01/27/19 2245    (!) 172/134 100 % 01/27/19 2236  (!) 117 12    
01/27/19 2215  (!) 110 22 (!) 186/112 100 % 01/27/19 2200  (!) 120 22 (!) 199/113 100 % 01/27/19 2145  (!) 132 23 (!) 162/110 100 % 01/27/19 2142 97.8 °F (36.6 °C) (!) 130 25 (!) 158/120 100 % Physical Exam 
General:  Arousable, oriented to person and place; able to follow commands and freely moving arms/legs but somnolent throughout examination HEENT: Conjunctiva pink, sclera anicteric. Pupils 3-4mm b/l. Ambyplopia. EOMI. CV:  RRR, no murmurs. No JVD. RESP:  Unlabored breathing. Lungs clear to auscultation without adventitious breath sounds. Equal expansion bilaterally. ABD:  Soft, nontender, nondistended. MS:  No joint deformity or instability. No atrophy. Neuro:  AOx2, freely moves upper and lower extremities but does not participate in full neuro examination. Ext:  No edema. S/p numerous toe amputations b/l feet Skin:  No rashes, lesions, or ulcers. Good turgor. Psych: Mood/affect appropriate IMAGING: None Recent Results (from the past 12 hour(s)) CBC WITH AUTOMATED DIFF Collection Time: 01/27/19  8:34 PM  
Result Value Ref Range WBC 12.0 4.6 - 13.2 K/uL  
 RBC 3.84 (L) 4.20 - 5.30 M/uL  
 HGB 11.0 (L) 12.0 - 16.0 g/dL HCT 32.6 (L) 35.0 - 45.0 % MCV 84.9 74.0 - 97.0 FL  
 MCH 28.6 24.0 - 34.0 PG  
 MCHC 33.7 31.0 - 37.0 g/dL  
 RDW 14.1 11.6 - 14.5 % PLATELET 057 395 - 886 K/uL MPV 10.3 9.2 - 11.8 FL  
 NEUTROPHILS 79 (H) 40 - 73 % LYMPHOCYTES 17 (L) 21 - 52 % MONOCYTES 4 3 - 10 % EOSINOPHILS 0 0 - 5 % BASOPHILS 0 0 - 2 %  
 ABS. NEUTROPHILS 9.4 (H) 1.8 - 8.0 K/UL  
 ABS. LYMPHOCYTES 2.0 0.9 - 3.6 K/UL ABS. MONOCYTES 0.5 0.05 - 1.2 K/UL  
 ABS. EOSINOPHILS 0.0 0.0 - 0.4 K/UL  
 ABS. BASOPHILS 0.0 0.0 - 0.1 K/UL  
 DF AUTOMATED METABOLIC PANEL, COMPREHENSIVE Collection Time: 01/27/19  8:34 PM  
Result Value Ref Range Sodium 140 136 - 145 mmol/L Potassium 3.4 (L) 3.5 - 5.5 mmol/L Chloride 107 100 - 108 mmol/L  
 CO2 20 (L) 21 - 32 mmol/L Anion gap 13 3.0 - 18 mmol/L Glucose 170 (H) 74 - 99 mg/dL BUN 11 7.0 - 18 MG/DL Creatinine 1.53 (H) 0.6 - 1.3 MG/DL  
 BUN/Creatinine ratio 7 (L) 12 - 20 GFR est AA 43 (L) >60 ml/min/1.73m2 GFR est non-AA 35 (L) >60 ml/min/1.73m2 Calcium 10.0 8.5 - 10.1 MG/DL Bilirubin, total 1.4 (H) 0.2 - 1.0 MG/DL  
 ALT (SGPT) 15 13 - 56 U/L  
 AST (SGOT) 18 15 - 37 U/L Alk. phosphatase 150 (H) 45 - 117 U/L Protein, total 9.0 (H) 6.4 - 8.2 g/dL Albumin 4.6 3.4 - 5.0 g/dL Globulin 4.4 (H) 2.0 - 4.0 g/dL A-G Ratio 1.0 0.8 - 1.7 MAGNESIUM Collection Time: 01/27/19  8:34 PM  
Result Value Ref Range Magnesium 1.7 1.6 - 2.6 mg/dL CARDIAC PANEL,(CK, CKMB & TROPONIN) Collection Time: 01/27/19  8:34 PM  
Result Value Ref Range  26 - 192 U/L  
 CK - MB <1.0 <3.6 ng/ml CK-MB Index  0.0 - 4.0 % CALCULATION NOT PERFORMED WHEN RESULT IS BELOW LINEAR LIMIT Troponin-I, QT <0.02 0.0 - 0.045 NG/ML  
EKG, 12 LEAD, INITIAL Collection Time: 01/27/19  9:55 PM  
Result Value Ref Range Ventricular Rate 113 BPM  
 Atrial Rate 113 BPM  
 P-R Interval 144 ms QRS Duration 60 ms  
 Q-T Interval 310 ms QTC Calculation (Bezet) 425 ms Calculated P Axis 68 degrees Calculated R Axis 12 degrees Calculated T Axis -34 degrees Diagnosis Sinus tachycardia Nonspecific ST and T wave abnormality Abnormal ECG When compared with ECG of 23-JAN-2019 09:50, 
ST now depressed in Inferior leads Non-specific change in ST segment in Anterior leads Nonspecific T wave abnormality, worse in Inferior leads URINALYSIS W/ RFLX MICROSCOPIC Collection Time: 01/27/19 10:32 PM  
Result Value Ref Range Color YELLOW Appearance CLEAR Specific gravity 1.008 1.005 - 1.030    
 pH (UA) 8.0 5.0 - 8.0 Protein TRACE (A) NEG mg/dL Glucose 250 (A) NEG mg/dL Ketone TRACE (A) NEG mg/dL Bilirubin NEGATIVE  NEG Blood NEGATIVE  NEG Urobilinogen 0.2 0.2 - 1.0 EU/dL Nitrites NEGATIVE  NEG Leukocyte Esterase NEGATIVE  NEG    
URINE MICROSCOPIC ONLY Collection Time: 01/27/19 10:32 PM  
Result Value Ref Range WBC 0 to 3 0 - 5 /hpf  
 RBC NEGATIVE  0 - 5 /hpf Epithelial cells 1+ 0 - 5 /lpf Bacteria NEGATIVE  NEG /hpf Assessment/Plan:  
52 y.o. female with PMH of IDDM2, HTN, HLD, anxiety, gastritis/esophagitis, GI stromal tumor s/p resection 2832, CRQTOPOVENAGK, ASBOHLDGRXBLOMG, XTVZTCABBBXB, ZEJIG 1 diastolic dysfunction, now admitted with dehydration, intractable N/V. Dehydration. Intractable N/V. Patient admitted multiple times with recent admission on 1/25/19 for same symptoms. Except patient is currently endorsing loose BM this time. Patient had extensive workup with CT A/P, mesenteric doppler, RUQ U/S, CT head, UDS, lipase, alcohol screen (all done in Jan 2019) which were unremarkable. The patient has h/o esophagitis and gastritis found on upper endoscopy in Nov 2018 and was to have repeat EGD Vitamin D was low on 1/7 at 12.5 On admit, WBC 12, UA negative, K 3.4, Cr 1.53, trop <0.02 
- Admit to medicine on tele 
- NPO until bedside swallow eval passed - IVF D5 1/2NS w/ 20mEq K @ 125cc/hr 
- Daily lab w/ CBC, BMP 
- Consider restarting Reglan - Switch to IV PPI en lieu of home protonix while NPO. Hold home med Carafate while NPO  
- Consider GI consult - Zofran PRN 
- FU BCx, C. Diff stool and culture - Advance diet as tolerated Tachycardia/Elevated BP. Likely from dehydration and intractable N/V.  
- Hydralazine PRN for BP >200/100 - Will maintain sustained HR <120 Acute on Chronic Kidney Injury 2/2 dehydration. H/O CKD Stage 2. Creatinine 1.53 represents greater than 0.3mg/dL increase in serum creatinine and a Stage 1 CRYSTAL (KDIGO guidelines) Patient's baseline creatinine approximately 1.17 and GFR of 61. 
- IVFs as above 
- avoid nephrotoxic medications 
- renally dose antibiotics - Trend BMP 
 
T2DM with retinopathy and neuropathy. Most recent hemoglobin A1c 7.2 on 1/23/19 
- accuchecks q6hr with SSI while NPO 
- holding basal insulin (patient rx'd humulin 70/30 40 units bid but unable to verify med list with patient). Consider starting Lantus while inpatient and tolerating PO intake 
- adjust insulin based on requirements 
- diabetic education consult 
- hold home metformin 500 mg po daily while NPO and 2/2 CRYSTAL 
- hold home lisinopril 
- hold home humulin mix 70/30   
 
H/O HTN/HLD. BPs improved since initial ER presentation - Lisinopril was held - Goal to maintain BP <180/100 
- Hold crestor 20mg qHS while NPO 
  
H/O Anxiety. 
- Paxil 40mg was held on previous admission due to interaction w/ Reglan. - Will hold Paxil while on Reglan Vit D deficiency 
- Hold home med Vit D3 1000unit while NPO Diet: NPO for now DVT Prophylaxis: [facility] Code Status: FULL Point of Contact: Ashanti Shelton: HGILZRBeverly [phone] Yeni Hammonds, [phone] Disposition and anticipated LOS: +/= 2 [facility] In D. Clydene Krabbe 
PGY-2 [facility] Medicine Resident [employer] 01/28/19 4:37 AM

## 2019-01-28 NOTE — ED NOTES
Patient brought in by medic complaining of nausea vomiting and diarrhea patient was discharged from the hospital on Friday. Patient is blind in one eye and have very limited vision in the other.

## 2019-01-28 NOTE — ED NOTES
Patient is awake and alert very anxious is heard yelling often climbs out of bed with all the monitoring equipment in place. Patient with very difficult IV access line is placed in right hand positional at times and patient is very restless.

## 2019-01-28 NOTE — ED NOTES
TRANSFER - OUT REPORT: 
 
Verbal report given to Audelia Epperson(name) on Jacquelin Garcia  being transferred to 4N(unit) for routine progression of care Report consisted of patients Situation, Background, Assessment and  
Recommendations(SBAR). Information from the following report(s) SBAR, Kardex, Intake/Output, MAR, Recent Results and Cardiac Rhythm Sinus tach was reviewed with the receiving nurse. Opportunity for questions and clarification was provided. Patient transported with: 
Patient Transport Belongings

## 2019-01-28 NOTE — ED NOTES
Pt restless in room, concerned for MEWs score of 5, [place] family paged, side rails up, safety intact, given ativan and NS bolus

## 2019-01-28 NOTE — PROGRESS NOTES
Pnt arrived to unit via stretcher escorted by transporter. Resting quietly in bed Verbal alert and oriented RR even and unlabored No complaints voiced @ this time Pnt stable Oriented to room/ unit; understanding voiced Will continue to monitor for safety ELIZABETH

## 2019-01-28 NOTE — ED NOTES
Pt more calm at this time, rectal tylenol given, unable to obtain 2nd IV line, IV fluids infusing, flu swab collected.

## 2019-01-28 NOTE — ED NOTES
Patient's heart rate mostly 130's most of the night noted to now be 105 BP much better now patient asleep. To be admitted.

## 2019-01-28 NOTE — ED PROVIDER NOTES
Nik Mora is a 53 y/o female with DM, GERD, HTN, cholecystectomy who presents to the ED via EMS for c/o acute nausea onset approximately 12 hours PTA. Patient confirms associated sx of diffuse abdominal pain with lower>upper and diarrhea x4 today. She was admitted for these sx recently and discharged on 1/25 when she felt normal up until today. She states she is not sure if she was given a home ABX but takes all other medications as directed. She sees Dr. Tony Bazan for PCP. No other concerns or symptoms at this time. Past Medical History:  
Diagnosis Date  Blind left eye  Cellulitis of great toe of right foot 10/19/2017  Diabetes ([address]  Diabetic retinopathy ([address]  Gall stones  GERD (gastroesophageal reflux disease)  Hammertoe  Hiatal hernia  History of mammogram 10/03/2017 No evidence of malignancy  Hypertension  Mass of abdomen  Neuropathy due to type 2 diabetes mellitus ([address]  Osteomyelitis of toe of right foot ([address] 10/19/2017  Pancreatitis Past Surgical History:  
Procedure Laterality Date  HX AMPUTATION Left 07/21/2017  
 left 2nd toe  HX CHOLECYSTECTOMY  10/15/2014  HX GI Benign GI Stromal Tumor excision  HX HEENT Sx for detached retina  HX MYOMECTOMY x5 removed  HX OTHER SURGICAL    
 upper endoscopy Family History: Adopted: Yes  
Problem Relation Age of Onset  Heart Failure Mother 76  
 Other Father 70  
     blood clot after surgery  Diabetes Paternal Aunt  Diabetes Paternal Uncle Social History Socioeconomic History  Marital status: SINGLE Spouse name: Not on file  Number of children: Not on file  Years of education: Not on file  Highest education level: Not on file Social Needs  Financial resource strain: Not on file  Food insecurity - worry: Not on file  Food insecurity - inability: Not on file  Transportation needs - medical: Not on file  Transportation needs - non-medical: Not on file Occupational History  Not on file Tobacco Use  Smoking status: Former Smoker Packs/day: 0.20 Years: 8.00 Pack years: 1.60 Types: Cigarettes Last attempt to quit: 12/3/1995 Years since quittin.1  Smokeless tobacco: Never Used Substance and Sexual Activity  Alcohol use: No  
  Comment: Drinks 3-4xs year generally wine  Drug use: No  
 Sexual activity: Not Currently Other Topics Concern   Service No  
 Blood Transfusions No  
 Caffeine Concern No  
 Occupational Exposure No  
 Hobby Hazards No  
 Sleep Concern No  
 Stress Concern No  
 Weight Concern No  
 Special Diet Yes  Back Care No  
 Exercise No  
 Bike Helmet No  
 Seat Belt Yes  Self-Exams Yes Social History Narrative Lives with 16year old son  with ex   Does not have health insurance ALLERGIES: Levaquin [levofloxacin] and Promethazine Review of Systems Constitutional: Negative for chills and fever. HENT: Negative. Negative for congestion. Eyes: Negative. Respiratory: Negative. Negative for cough and shortness of breath. Cardiovascular: Negative. Negative for chest pain and leg swelling. Gastrointestinal: Positive for abdominal pain, diarrhea, nausea and vomiting. Negative for blood in stool. Genitourinary: Negative. Negative for difficulty urinating and dysuria. Musculoskeletal: Negative. Negative for back pain and myalgias. Skin: Negative. Negative for rash and wound. Neurological: Positive for weakness. Negative for dizziness and light-headedness. Psychiatric/Behavioral: Negative. All other systems reviewed and are negative. Vitals:  
 19 2254 19 2300 19 2315 19 4285 BP:  (!) 192/126 (!) 180/100 Pulse: (!) 116  (!) 118 (!) 155 Resp: 20  27 (!) 35  
 Temp:      
SpO2:  100% 99% 100% Physical Exam  
Constitutional: She is oriented to person, place, and time. She appears well-developed and well-nourished. No distress. HENT:  
Head: Normocephalic and atraumatic. Mouth/Throat: Oropharynx is clear and moist. Mucous membranes are dry. Eyes: Conjunctivae and EOM are normal. Pupils are equal, round, and reactive to light. No scleral icterus. Neck: Trachea normal and normal range of motion. Neck supple. No JVD present. No thyromegaly present. Cardiovascular: Regular rhythm, S1 normal and S2 normal. Tachycardia present. Exam reveals no gallop and no friction rub. No murmur heard. Pulmonary/Chest: Effort normal and breath sounds normal. No accessory muscle usage. No respiratory distress. Abdominal: Soft. Normal appearance. She exhibits no distension. There is tenderness (diffuse). There is no rigidity, no rebound and no guarding. Retching on exam  
Musculoskeletal: Normal range of motion. She exhibits no edema or tenderness. Neurological: She is alert and oriented to person, place, and time. She has normal strength. No cranial nerve deficit or sensory deficit. Coordination normal.  
Skin: Skin is warm and intact. No rash noted. Psychiatric: Her speech is normal and behavior is normal. Her mood appears anxious. Vitals reviewed. MDM Number of Diagnoses or Management Options Dehydration:  
Elevated blood pressure reading:  
Gastroparesis: Hypokalemia:  
Intractable vomiting with nausea, unspecified vomiting type:  
Diagnosis management comments: Donald Lerma is a 52 y.o. Female coming in with abd pain, N/V/D. No fever, no peritoneal signs. Appears very dehydrated. Will provide antiemetics, IVF and try to control vomiting. No evidence of surgical emergency. Vitals: 
Patient Vitals for the past 12 hrs: 
 Temp Pulse Resp BP SpO2  
01/28/19 0323  (!) 155 (!) 35  100 % 01/27/19 2315  (!) 118 27 (!) 180/100 99 % 01/27/19 2300    (!) 192/126 100 % 01/27/19 2254  (!) 116 20    
01/27/19 2245    (!) 172/134 100 % 01/27/19 2236  (!) 117 12    
01/27/19 2215  (!) 110 22 (!) 186/112 100 % 01/27/19 2200  (!) 120 22 (!) 199/113 100 % 01/27/19 2145  (!) 132 23 (!) 162/110 100 % 01/27/19 2142 97.8 °F (36.6 °C) (!) 130 25 (!) 158/120 100 % Medications Ordered: 
Medications  
sodium chloride 0.9 % bolus infusion 1,000 mL (not administered) dicyclomine (BENTYL) 10 mg/mL injection 20 mg (not administered) metoclopramide HCl (REGLAN) injection 10 mg (not administered)  
ketorolac tromethamine (TORADOL) 60 mg/2 mL injection 30 mg (not administered) diphenhydrAMINE (BENADRYL) 50 mg/mL injection (not administered)  
sodium chloride 0.9 % bolus infusion 1,000 mL (not administered)  
haloperidol lactate (HALDOL) injection 5 mg (not administered)  
ondansetron (ZOFRAN) injection 4 mg (not administered)  
sodium chloride 0.9 % bolus infusion 1,000 mL (not administered) potassium chloride 10 mEq in 100 ml IVPB (not administered) Lab Findings: 
Recent Results (from the past 12 hour(s)) CBC WITH AUTOMATED DIFF Collection Time: 01/27/19  8:34 PM  
Result Value Ref Range WBC 12.0 4.6 - 13.2 K/uL  
 RBC 3.84 (L) 4.20 - 5.30 M/uL  
 HGB 11.0 (L) 12.0 - 16.0 g/dL HCT 32.6 (L) 35.0 - 45.0 % MCV 84.9 74.0 - 97.0 FL  
 MCH 28.6 24.0 - 34.0 PG  
 MCHC 33.7 31.0 - 37.0 g/dL  
 RDW 14.1 11.6 - 14.5 % PLATELET 736 021 - 902 K/uL MPV 10.3 9.2 - 11.8 FL  
 NEUTROPHILS 79 (H) 40 - 73 % LYMPHOCYTES 17 (L) 21 - 52 % MONOCYTES 4 3 - 10 % EOSINOPHILS 0 0 - 5 % BASOPHILS 0 0 - 2 %  
 ABS. NEUTROPHILS 9.4 (H) 1.8 - 8.0 K/UL  
 ABS. LYMPHOCYTES 2.0 0.9 - 3.6 K/UL  
 ABS. MONOCYTES 0.5 0.05 - 1.2 K/UL  
 ABS. EOSINOPHILS 0.0 0.0 - 0.4 K/UL  
 ABS. BASOPHILS 0.0 0.0 - 0.1 K/UL  
 DF AUTOMATED METABOLIC PANEL, COMPREHENSIVE Collection Time: 01/27/19  8:34 PM  
Result Value Ref Range Sodium 140 136 - 145 mmol/L Potassium 3.4 (L) 3.5 - 5.5 mmol/L Chloride 107 100 - 108 mmol/L  
 CO2 20 (L) 21 - 32 mmol/L Anion gap 13 3.0 - 18 mmol/L Glucose 170 (H) 74 - 99 mg/dL BUN 11 7.0 - 18 MG/DL Creatinine 1.53 (H) 0.6 - 1.3 MG/DL  
 BUN/Creatinine ratio 7 (L) 12 - 20 GFR est AA 43 (L) >60 ml/min/1.73m2 GFR est non-AA 35 (L) >60 ml/min/1.73m2 Calcium 10.0 8.5 - 10.1 MG/DL Bilirubin, total 1.4 (H) 0.2 - 1.0 MG/DL  
 ALT (SGPT) 15 13 - 56 U/L  
 AST (SGOT) 18 15 - 37 U/L Alk. phosphatase 150 (H) 45 - 117 U/L Protein, total 9.0 (H) 6.4 - 8.2 g/dL Albumin 4.6 3.4 - 5.0 g/dL Globulin 4.4 (H) 2.0 - 4.0 g/dL A-G Ratio 1.0 0.8 - 1.7 MAGNESIUM Collection Time: 01/27/19  8:34 PM  
Result Value Ref Range Magnesium 1.7 1.6 - 2.6 mg/dL CARDIAC PANEL,(CK, CKMB & TROPONIN) Collection Time: 01/27/19  8:34 PM  
Result Value Ref Range  26 - 192 U/L  
 CK - MB <1.0 <3.6 ng/ml CK-MB Index  0.0 - 4.0 % CALCULATION NOT PERFORMED WHEN RESULT IS BELOW LINEAR LIMIT Troponin-I, QT <0.02 0.0 - 0.045 NG/ML  
EKG, 12 LEAD, INITIAL Collection Time: 01/27/19  9:55 PM  
Result Value Ref Range Ventricular Rate 113 BPM  
 Atrial Rate 113 BPM  
 P-R Interval 144 ms QRS Duration 60 ms  
 Q-T Interval 310 ms QTC Calculation (Bezet) 425 ms Calculated P Axis 68 degrees Calculated R Axis 12 degrees Calculated T Axis -34 degrees Diagnosis Sinus tachycardia Nonspecific ST and T wave abnormality Abnormal ECG When compared with ECG of 23-JAN-2019 09:50, 
ST now depressed in Inferior leads Non-specific change in ST segment in Anterior leads Nonspecific T wave abnormality, worse in Inferior leads URINALYSIS W/ RFLX MICROSCOPIC Collection Time: 01/27/19 10:32 PM  
Result Value Ref Range Color YELLOW Appearance CLEAR Specific gravity 1.008 1.005 - 1.030    
 pH (UA) 8.0 5.0 - 8.0 Protein TRACE (A) NEG mg/dL Glucose 250 (A) NEG mg/dL Ketone TRACE (A) NEG mg/dL Bilirubin NEGATIVE  NEG Blood NEGATIVE  NEG Urobilinogen 0.2 0.2 - 1.0 EU/dL Nitrites NEGATIVE  NEG Leukocyte Esterase NEGATIVE  NEG    
URINE MICROSCOPIC ONLY Collection Time: 01/27/19 10:32 PM  
Result Value Ref Range WBC 0 to 3 0 - 5 /hpf  
 RBC NEGATIVE  0 - 5 /hpf Epithelial cells 1+ 0 - 5 /lpf Bacteria NEGATIVE  NEG /hpf EKG Interpretation by ED physician: 
 
Time: 22:02 Rhythm: sinus tachycardia Rate: 113 bpm 
Interpretation: non-specific T wave changes in inferior and lateral leads. EKG interpret by Collette Creeks, MD  
 
X-ray, CT or radiology findings or impressions: No orders to display Progress notes, consult notes, or additional procedure notes: 
 
Patient continues to vomit despite reglan, zofran and haldol. Continues to be tachycardic into the 150s despite multiple liters of IVF. Do not feel that patient is stable for discharge and will plan for admit. 3:27 AM 
Consult:  Discussed care with Dr. Matthew Montoya, Specialty: provider at [address] [place] [address]. Standard discussion; including history of patients chief complaint, available diagnostic results, and treatment course. Agrees with plan for admission and continued hydration with antiemetics. Diagnosis:  
1. Gastroparesis 2. Nausea, vomiting, and diarrhea Disposition: Admit Follow-up Information None Medication List  
  
ASK your doctor about these medications   
cholecalciferol 1,000 unit tablet Commonly known as:  VITAMIN D3 Take 1 Tab by mouth daily. erythromycin base 250 mg tablet Commonly known as:  E-MYCIN Take 1 Tab by mouth Before breakfast, lunch, and dinner for 28 days. insulin NPH/insulin regular 100 unit/mL (70-30) injection Commonly known as:  NOVOLIN 70/30, HUMULIN 70/30 
40 Units by SubCUTAneous route two (2) times a day. KLOR-CON M20 20 mEq tablet Generic drug:  potassium chloride 
  
magnesium 250 mg Tab 
  
metFORMIN  mg tablet Commonly known as:  GLUCOPHAGE XR 
TAKE 1 TABLET BY MOUTH DAILY WITH DINNER 
  
metoclopramide HCl 5 mg tablet Commonly known as:  REGLAN Take 1 Tab by mouth three (3) times daily as needed for up to 10 days. Take before meals. pantoprazole 40 mg tablet Commonly known as:  PROTONIX Take 1 Tab by mouth Daily (before breakfast). PAXIL 40 mg tablet Generic drug:  PARoxetine 
  
rosuvastatin 20 mg tablet Commonly known as:  CRESTOR Take 1 Tab by mouth nightly. sucralfate 1 gram tablet Commonly known as:  Rana June Take 1 Tab by mouth Before breakfast and dinner. Scribe Attestation Valentin Foley acting as a scribe for and in the presence of Tessie Angeles MD     
January 27, 2019 at 8:06 PM 
    
Provider Attestation:     
I personally performed the services described in the documentation, reviewed the documentation, as recorded by the scribe in my presence, and it accurately and completely records my words and actions.  January 27, 2019 at 8:06 PM - Tessie Angeles MD

## 2019-01-28 NOTE — ED NOTES
Patient's line not functioning in right hand new line placed in right forearm with much difficulty line with good flow patient with PICC on last admission.

## 2019-01-28 NOTE — ED NOTES
Spoke with PFR on telephone, verbal order for rectal tylenol given, spoke regarding c-diff orders - pt is having fully formed stools that are not frequent - verbal order to hold off on c-diff testing, also given verbal order for 2nd line and to start IV maintenance fluids of d51/2NS with 20meq potassium @ 125ml/hr, Cornelia RUSS at bedside obtaining 2nd line

## 2019-01-29 LAB
ANION GAP SERPL CALC-SCNC: 6 MMOL/L (ref 3–18)
BACTERIA SPEC CULT: NORMAL
BASOPHILS # BLD: 0 K/UL (ref 0–0.1)
BASOPHILS NFR BLD: 0 % (ref 0–2)
BUN SERPL-MCNC: 5 MG/DL (ref 7–18)
BUN/CREAT SERPL: 5 (ref 12–20)
CALCIUM SERPL-MCNC: 8.1 MG/DL (ref 8.5–10.1)
CHLORIDE SERPL-SCNC: 112 MMOL/L (ref 100–108)
CO2 SERPL-SCNC: 22 MMOL/L (ref 21–32)
CREAT SERPL-MCNC: 1.07 MG/DL (ref 0.6–1.3)
DIFFERENTIAL METHOD BLD: ABNORMAL
EOSINOPHIL # BLD: 0 K/UL (ref 0–0.4)
EOSINOPHIL NFR BLD: 0 % (ref 0–5)
ERYTHROCYTE [DISTWIDTH] IN BLOOD BY AUTOMATED COUNT: 14.8 % (ref 11.6–14.5)
GLUCOSE BLD STRIP.AUTO-MCNC: 107 MG/DL (ref 70–110)
GLUCOSE BLD STRIP.AUTO-MCNC: 148 MG/DL (ref 70–110)
GLUCOSE BLD STRIP.AUTO-MCNC: 173 MG/DL (ref 70–110)
GLUCOSE SERPL-MCNC: 125 MG/DL (ref 74–99)
HCT VFR BLD AUTO: 25.2 % (ref 35–45)
HGB BLD-MCNC: 8.5 G/DL (ref 12–16)
LACTATE SERPL-SCNC: 1.1 MMOL/L (ref 0.4–2)
LACTATE SERPL-SCNC: 1.3 MMOL/L (ref 0.4–2)
LACTATE SERPL-SCNC: 2 MMOL/L (ref 0.4–2)
LYMPHOCYTES # BLD: 4.1 K/UL (ref 0.9–3.6)
LYMPHOCYTES NFR BLD: 42 % (ref 21–52)
MAGNESIUM SERPL-MCNC: 1.9 MG/DL (ref 1.6–2.6)
MCH RBC QN AUTO: 29.3 PG (ref 24–34)
MCHC RBC AUTO-ENTMCNC: 33.7 G/DL (ref 31–37)
MCV RBC AUTO: 86.9 FL (ref 74–97)
MONOCYTES # BLD: 0.6 K/UL (ref 0.05–1.2)
MONOCYTES NFR BLD: 6 % (ref 3–10)
NEUTS SEG # BLD: 5 K/UL (ref 1.8–8)
NEUTS SEG NFR BLD: 52 % (ref 40–73)
PLATELET # BLD AUTO: 244 K/UL (ref 135–420)
PMV BLD AUTO: 9.9 FL (ref 9.2–11.8)
POTASSIUM SERPL-SCNC: 4.2 MMOL/L (ref 3.5–5.5)
RBC # BLD AUTO: 2.9 M/UL (ref 4.2–5.3)
SERVICE CMNT-IMP: NORMAL
SODIUM SERPL-SCNC: 140 MMOL/L (ref 136–145)
WBC # BLD AUTO: 9.8 K/UL (ref 4.6–13.2)

## 2019-01-29 PROCEDURE — 83605 ASSAY OF LACTIC ACID: CPT

## 2019-01-29 PROCEDURE — 74011250636 HC RX REV CODE- 250/636: Performed by: STUDENT IN AN ORGANIZED HEALTH CARE EDUCATION/TRAINING PROGRAM

## 2019-01-29 PROCEDURE — 80048 BASIC METABOLIC PNL TOTAL CA: CPT

## 2019-01-29 PROCEDURE — 83735 ASSAY OF MAGNESIUM: CPT

## 2019-01-29 PROCEDURE — 74011636637 HC RX REV CODE- 636/637: Performed by: STUDENT IN AN ORGANIZED HEALTH CARE EDUCATION/TRAINING PROGRAM

## 2019-01-29 PROCEDURE — 36415 COLL VENOUS BLD VENIPUNCTURE: CPT

## 2019-01-29 PROCEDURE — 74011250637 HC RX REV CODE- 250/637: Performed by: STUDENT IN AN ORGANIZED HEALTH CARE EDUCATION/TRAINING PROGRAM

## 2019-01-29 PROCEDURE — 85025 COMPLETE CBC W/AUTO DIFF WBC: CPT

## 2019-01-29 PROCEDURE — 65270000029 HC RM PRIVATE

## 2019-01-29 PROCEDURE — 77030038269 HC DRN EXT URIN PURWCK BARD -A

## 2019-01-29 PROCEDURE — 97165 OT EVAL LOW COMPLEX 30 MIN: CPT

## 2019-01-29 PROCEDURE — 82962 GLUCOSE BLOOD TEST: CPT

## 2019-01-29 PROCEDURE — 74011000250 HC RX REV CODE- 250: Performed by: STUDENT IN AN ORGANIZED HEALTH CARE EDUCATION/TRAINING PROGRAM

## 2019-01-29 RX ORDER — HEPARIN SODIUM 5000 [USP'U]/ML
5000 INJECTION, SOLUTION INTRAVENOUS; SUBCUTANEOUS 3 TIMES DAILY
Status: DISCONTINUED | OUTPATIENT
Start: 2019-01-29 | End: 2019-01-30 | Stop reason: HOSPADM

## 2019-01-29 RX ORDER — SUCRALFATE 1 G/1
1 TABLET ORAL
Status: DISCONTINUED | OUTPATIENT
Start: 2019-01-29 | End: 2019-01-30 | Stop reason: HOSPADM

## 2019-01-29 RX ORDER — MELATONIN
1000 DAILY
Status: DISCONTINUED | OUTPATIENT
Start: 2019-01-29 | End: 2019-01-30 | Stop reason: HOSPADM

## 2019-01-29 RX ORDER — MAGNESIUM SULFATE HEPTAHYDRATE 40 MG/ML
2 INJECTION, SOLUTION INTRAVENOUS ONCE
Status: COMPLETED | OUTPATIENT
Start: 2019-01-29 | End: 2019-01-29

## 2019-01-29 RX ORDER — INSULIN LISPRO 100 [IU]/ML
INJECTION, SOLUTION INTRAVENOUS; SUBCUTANEOUS
Status: DISCONTINUED | OUTPATIENT
Start: 2019-01-29 | End: 2019-01-30 | Stop reason: HOSPADM

## 2019-01-29 RX ORDER — ERYTHROMYCIN 250 MG/1
250 TABLET, COATED ORAL
Status: DISCONTINUED | OUTPATIENT
Start: 2019-01-29 | End: 2019-01-30 | Stop reason: HOSPADM

## 2019-01-29 RX ORDER — ROSUVASTATIN CALCIUM 10 MG/1
20 TABLET, COATED ORAL
Status: DISCONTINUED | OUTPATIENT
Start: 2019-01-29 | End: 2019-01-30 | Stop reason: HOSPADM

## 2019-01-29 RX ORDER — PANTOPRAZOLE SODIUM 40 MG/1
40 TABLET, DELAYED RELEASE ORAL
Status: DISCONTINUED | OUTPATIENT
Start: 2019-01-29 | End: 2019-01-30 | Stop reason: HOSPADM

## 2019-01-29 RX ORDER — METOCLOPRAMIDE 5 MG/1
10 TABLET ORAL
Status: DISCONTINUED | OUTPATIENT
Start: 2019-01-29 | End: 2019-01-30 | Stop reason: HOSPADM

## 2019-01-29 RX ADMIN — METOCLOPRAMIDE HYDROCHLORIDE 10 MG: 5 TABLET ORAL at 10:04

## 2019-01-29 RX ADMIN — HEPARIN SODIUM 5000 UNITS: 5000 INJECTION INTRAVENOUS; SUBCUTANEOUS at 22:14

## 2019-01-29 RX ADMIN — METOCLOPRAMIDE 10 MG: 5 INJECTION, SOLUTION INTRAMUSCULAR; INTRAVENOUS at 05:09

## 2019-01-29 RX ADMIN — METOCLOPRAMIDE HYDROCHLORIDE 10 MG: 5 TABLET ORAL at 13:13

## 2019-01-29 RX ADMIN — INSULIN LISPRO 3 UNITS: 100 INJECTION, SOLUTION INTRAVENOUS; SUBCUTANEOUS at 12:03

## 2019-01-29 RX ADMIN — PANTOPRAZOLE SODIUM 40 MG: 40 TABLET, DELAYED RELEASE ORAL at 10:04

## 2019-01-29 RX ADMIN — HEPARIN SODIUM 5000 UNITS: 5000 INJECTION INTRAVENOUS; SUBCUTANEOUS at 05:14

## 2019-01-29 RX ADMIN — SUCRALFATE 1 G: 1 TABLET ORAL at 16:14

## 2019-01-29 RX ADMIN — ROSUVASTATIN CALCIUM 20 MG: 10 TABLET, FILM COATED ORAL at 22:14

## 2019-01-29 RX ADMIN — VITAMIN D, TAB 1000IU (100/BT) 1000 UNITS: 25 TAB at 10:18

## 2019-01-29 RX ADMIN — METOCLOPRAMIDE HYDROCHLORIDE 10 MG: 5 TABLET ORAL at 16:14

## 2019-01-29 RX ADMIN — ERYTHROMYCIN 250 MG: 250 TABLET, FILM COATED ORAL at 16:38

## 2019-01-29 RX ADMIN — MAGNESIUM SULFATE HEPTAHYDRATE 2 G: 40 INJECTION, SOLUTION INTRAVENOUS at 10:18

## 2019-01-29 RX ADMIN — DEXTROSE MONOHYDRATE, SODIUM CHLORIDE, AND POTASSIUM CHLORIDE 150 ML/HR: 50; 4.5; 1.49 INJECTION, SOLUTION INTRAVENOUS at 10:05

## 2019-01-29 RX ADMIN — HEPARIN SODIUM 5000 UNITS: 5000 INJECTION INTRAVENOUS; SUBCUTANEOUS at 16:14

## 2019-01-29 RX ADMIN — DEXTROSE MONOHYDRATE, SODIUM CHLORIDE, AND POTASSIUM CHLORIDE 150 ML/HR: 50; 4.5; 1.49 INJECTION, SOLUTION INTRAVENOUS at 02:51

## 2019-01-29 RX ADMIN — CEFTRIAXONE SODIUM 1 G: 1 INJECTION, POWDER, FOR SOLUTION INTRAMUSCULAR; INTRAVENOUS at 16:14

## 2019-01-29 RX ADMIN — SUCRALFATE 1 G: 1 TABLET ORAL at 10:04

## 2019-01-29 NOTE — PROGRESS NOTES
Reason for Admission:   Intractable nausea and vomiting Dehydration Hypokalemia Gastroparesis Intractable nausea and vomiting Intractable nausea and vomiting Dehydration Hypokalemia Gastroparesis RRAT Score:     29 Resources/supports as identified by patient/family:   The patient reports having a supportive family. Top Challenges facing patient (as identified by patient/family and CM): Finances/Medication cost?     The patient did not report any financial problems. She states that she can obtain her medications from her pharmacy, and takes her medications as directed. Transportation      Family Support system or lack thereof? Family (son and cousin) Living arrangements? Lives alone, but she has family locally. Self-care/ADLs/Cognition? A/Ox4. She reports independence with her ADL/IADLs. She ambulates w/o assistance or DME. Current Advanced Directive/Advance Care Plan:   no 
                       
Plan for utilizing home health: To be determined, but not likely. Pending PT/OT evaluation. Likelihood of readmission:   HIGH Transition of Care Plan:              Initial assessment completed with patient. Cognitive status of patient: oriented to time, place, person and situation. Face sheet information confirmed:  yes, emergency contact telephone number was updated. This patient lives in an apartment alone. Patient is able to navigate steps as needed. Prior to hospitalization, patient was considered to be independent with ADLs/IADLS : yes. Patient has a current ACP document on file: no The patient's family will be available to transport patient home upon discharge. The patient already has glucometer at home; no home oxygen. Patient is not currently active with home health. Patient has not stayed in a skilled nursing facility or rehab. Currently, the discharge plan is Home. She has no concerns for discharge. Patient's current insurance is [employer]. Care Management Interventions PCP Verified by CM: Yes(Last seen by PCP in Dec. 2018) Palliative Care Criteria Met (RRAT>21 & CHF Dx)?: No 
Mode of Transport at Discharge: Self Transition of Care Consult (CM Consult): Discharge Planning Discharge Durable Medical Equipment: No 
Physical Therapy Consult: Yes Occupational Therapy Consult: Yes Speech Therapy Consult: No 
Current Support Network: Lives Alone, Family Lives [place] Confirm Follow Up Transport: Family Plan discussed with Pt/Family/Caregiver: Yes Discharge Location Discharge Placement: Kamila ARBOLEDA, RN, [facility] Care Management [phone]

## 2019-01-29 NOTE — PROGRESS NOTES
*ATTENTION:  This note has been created by a medical student for educational purposes only. Please do not refer to the content of this note for clinical decision-making, billing, or other purposes. Please see attending physicians note to obtain clinical information on this patient. * MEDICAL STUDENT Progress Note [address] THIS IS A MEDICAL STUDENT NOTE FOR EDUCATIONAL PURPOSES ONLY. DO NOT USE FOR MEDICAL MANAGEMENT OR BILLING PURPOSES. SEE RESIDENT OR ATTENDING NOTE FOR CARE. Patient: Mariana Parish MRN: [id number]  CSN: [id number] YOB: 1965  Age: 52 y.o. Sex: female DOA: 1/27/2019 LOS:  LOS: 1 day Subjective:  
 
Ms. Fili Sanchez is a 52 y.o. female with PMH significant for T2DM, HTN, HLD, anxiety, gastritis/esophagitis, GI stromal tumor s/p resection in 2014, cholecystectomy, pancreatitis, per H&P HPI, who presented with N/V and abdominal pain/heartburn that began on Saturday per pt. Pt was recently discharged for similar symptoms on 1/25/19.  
  
This AM pt states she is doing very well and feels much better. Did not have any complaints overnight. Objective:  
  
Patient Vitals for the past 24 hrs: 
 Temp Pulse Resp BP SpO2  
01/29/19 0748 99.2 °F (37.3 °C) 100 18 112/71 100 % 01/29/19 0405 99 °F (37.2 °C) 89 20 113/74 98 % 01/28/19 2330 100 °F (37.8 °C) (!) 104 18 132/84 100 % 01/28/19 2018 99.2 °F (37.3 °C) 100 18 127/77 99 % 01/28/19 1644 98 °F (36.7 °C) 98 19 125/80 100 % 01/28/19 1412 98 °F (36.7 °C) 98 18 104/70 98 % 01/28/19 1124 99.5 °F (37.5 °C) (!) 116 14 99/66 99 % 01/28/19 1045  (!) 115 13 130/76 99 % 01/28/19 1030  (!) 116 19 130/70 99 % 01/28/19 1015  (!) 113 18 120/68 99 % 01/28/19 1003 99.5 °F (37.5 °C)      
01/28/19 1000 99.5 °F (37.5 °C) (!) 116 18 136/74 98 % 01/28/19 0945  (!) 113  102/61 99 % 01/28/19 0931   11    
01/28/19 0930  (!) 109  92/50 98 % 19 0915 (!) 101.2 °F (38.4 °C) (!) 105 18 97/53 98 % 19 0900  (!) 108 17 (!) 129/109 97 % 19 0845  (!) 135 17 (!) 188/93 100 % 19 0840 (!) 101.2 °F (38.4 °C) (!) 133 14 (!) 178/98 100 % 19 0824  (!) 137 24 (!) 162/115 100 % 19 0800   22 (!) 183/100  Intake/Output Summary (Last 24 hours) at 2019 0757 Last data filed at 2019 4244 Gross per 24 hour Intake 0 ml Output 1525 ml Net -1525 ml Scheduled Medications Reviewed: 
Current Facility-Administered Medications Medication Dose Route Frequency  heparin (porcine) injection 5,000 Units  5,000 Units SubCUTAneous Q12H  pantoprazole (PROTONIX) 40 mg in sodium chloride 0.9% 10 mL injection  40 mg IntraVENous DAILY  insulin lispro (HUMALOG) injection   SubCUTAneous Q6H  
 0.9% sodium chloride infusion 1,000 mL  1,000 mL IntraVENous CONTINUOUS  
 dextrose 5% - 0.45% NaCl with KCl 20 mEq/L infusion  150 mL/hr IntraVENous CONTINUOUS  
 metoclopramide HCl (REGLAN) injection 10 mg  10 mg IntraVENous Q6H Vital Signs:   
Visit Vitals /71 (BP 1 Location: Left arm, BP Patient Position: At rest) Pulse 100 Temp 99.2 °F (37.3 °C) Resp 18 Wt 86.2 kg (190 lb) LMP 10/06/2015 (Exact Date) SpO2 100% Breastfeeding? No  
BMI 28.89 kg/m² O2 Device: Room air Temp (24hrs), Av.5 °F (37.5 °C), Min:98 °F (36.7 °C), Max:101.2 °F (38.4 °C) Intake/Output:  
Last shift:      701 - 1900 In: 0 Out: 325 [Urine:325] Last 3 shifts: 1901 - 700 In: -  
Out: 6807 [ZINOA:[id number]] Intake/Output Summary (Last 24 hours) at 2019 0757 Last data filed at 2019 5327 Gross per 24 hour Intake 0 ml Output 1525 ml Net -1525 ml Physical Exam: 
 
General: NAD, AO x 3, appears stated age HEENT:  Anicteric sclerae; pink palpebral conjunctivae; mucosa moist 
Resp:  Symmetrical chest expansion, no accessory muscle use; good airway entry; no rales/ wheezing/ rhonchi noted CV:  S1, S2 present; regular rate and rhythm GI:  Abdomen soft, non-tender; (+) active bowel sounds Extremities:  +1 pulses on all extremities; no edema/ cyanosis/ clubbing noted; R first & L 2nd toe amputation, well healed scars Skin:  Warm; no rashes/ lesions noted Neurologic:  Non-focal 
 
DATA:  
 
Current Facility-Administered Medications Medication Dose Route Frequency  heparin (porcine) injection 5,000 Units  5,000 Units SubCUTAneous Q12H  pantoprazole (PROTONIX) 40 mg in sodium chloride 0.9% 10 mL injection  40 mg IntraVENous DAILY  ondansetron (ZOFRAN ODT) tablet 4 mg  4 mg Oral Q8H PRN  
 insulin lispro (HUMALOG) injection   SubCUTAneous Q6H  
 glucose chewable tablet 16 g  4 Tab Oral PRN  
 glucagon (GLUCAGEN) injection 1 mg  1 mg IntraMUSCular PRN  
 dextrose (D50W) injection syrg 12.5-25 g  25-50 mL IntraVENous PRN  
 0.9% sodium chloride infusion 1,000 mL  1,000 mL IntraVENous CONTINUOUS  
 dextrose 5% - 0.45% NaCl with KCl 20 mEq/L infusion  150 mL/hr IntraVENous CONTINUOUS  
 acetaminophen (TYLENOL) suppository 500 mg  500 mg Rectal Q6H PRN  
 metoclopramide HCl (REGLAN) injection 10 mg  10 mg IntraVENous Q6H Labs: Results:  
   
Chemistry Recent Labs  
  01/29/19 
5790 01/28/19 0515 01/27/19 2034 * 128* 170*  139 140  
K 4.2 3.3* 3.4*  
* 105 107 CO2 22 23 20* BUN 5* 11 11 CREA 1.07 1.47* 1.53* CA 8.1* 9.7 10.0 AGAP 6 11 13 BUCR 5* 7* 7* AP  --   --  150* TP  --   --  9.0* ALB  --   --  4.6 GLOB  --   --  4.4* AGRAT  --   --  1.0  
  
CBC w/Diff Recent Labs  
  01/29/19 
0531 01/28/19 
0515 01/27/19 
2034 WBC 9.8 12.3 12.0  
RBC 2.90* 3.66* 3.84* HGB 8.5* 10.8* 11.0*  
HCT 25.2* 30.7* 32.6*  
 353 364 GRANS 52 84* 79* LYMPH 42 13* 17* EOS 0 0 0 Coagulation No results for input(s): PTP, INR, APTT in the last 72 hours.  
 
No lab exists for component: INREXT   
 Liver Enzymes Recent Labs  
  01/27/19 2034  
TP 9.0* ALB 4.6 * SGOT 18 ABG No results found for: PH, PHI, PCO2, PCO2I, PO2, PO2I, HCO3, HCO3I, FIO2, FIO2I Microbiology No results for input(s): CULT in the last 72 hours. Imaging: CXR Results  (Last 48 hours) 01/28/19 1254  XR CHEST PA LAT Final result Impression:  IMPRESSION:  
   
Negative study. Narrative:  EXAM:  Chest Frontal and Lateral.  
   
INDICATION:  SIRS. Vomiting and nausea. Generalized body pain. Abdominal  
pain. COMPARISON:  01/22/19. TECHNIQUE:  AP and lateral views. FINDINGS:   
   
- Both lungs are clear. The lungs are hypoinflated. - No pleural effusion or pneumothorax is detected. - The cardiac silhouette, mediastinum and hilar regions are unremarkable. CT Results  (Last 48 hours) None Assessment/Plan THIS IS A MEDICAL STUDENT NOTE FOR EDUCATIONAL PURPOSES ONLY. DO NOT USE FOR MEDICAL MANAGEMENT OR BILLING PURPOSES. SEE RESIDENT OR ATTENDING NOTE FOR PLAN. Ms Leticia Graff is a 51 yo female with PMH significant for T2DM, HTN, gastroparesis, gastritis, pancreatitis who presented for N/V, abdominal pain. Currently doing well and improving. N/V, Abdominal pain - Improving as pt no longer endorses N/V 
-Most likely 2/2 DM gastroparesis as CXR, flu, blood cx all neg 
- Cont metoclopramide 
-Start erythromycin  
- Cont Zofran PO, consider discharging pt with prn Zofran - Advance to regular diet as pt would prefer something other than clear liquids Anemia 
-May be dilutional anemia 2/2 fluids or may be GI bleed 
-Consider fecal occult blood test 
- Cont monitoring CBC and trend H/H Hyperglycemia 
-Improving. Last glu 125, down from 128.  
-Cont humalog SQ q6h Hemodynamic Instabillity 
-Tachycardia significantly improved. Range from high 80s to low 100s. Cont to monitor. -HTN significantly improved (112/71 this AM). -Afebrile (99.2F this AM) Electrolyte Abnormalities - Improving. Hypokalemia resolved - D/C fluids as pt now tolerating PO fluids Social Concerns 
-Pt admitted multiple times in short time frame for similar complaints. 
-Pt states she did not get her prescriptions of erythromycin and metoclopramide after she was discharged on 1/25/19 because she \"just got out of the hospital and didn't have time to fill it. \" Pt did state she should be able to get her meds now and has a pharmacy that she usually goes to.  
-Discuss more in-depth barriers to pt med adherence (incl. any food/transportation/monetary concerns) 
-Consider d/c tmo and printing prescriptions while in-patient [employer] Student

## 2019-01-29 NOTE — PROGRESS NOTES
There is two orders for stool samples to be sent down to lab, however, patient has not yet produced a bowel movement. The patient continues to be reminded to call nursing staff upon having bowel movement. The proper collection devices are in place in patient's room.

## 2019-01-29 NOTE — PROGRESS NOTES
NUTRITION Nursing Referral: [facility]  
  
RECOMMENDATIONS / PLAN:  
 
- Add supplement: Glucerrocio Shake BID 
- Add low fat and low fiber to diet to encourage tolerance 
- Continue RD inpatient monitoring and evaluation. NUTRITION INTERVENTIONS & DIAGNOSIS:  
 
[x] Meals/snacks: modified composition 
[x] Medical food supplement therapy: initiate Nutrition Diagnosis:  Inadequate oral intake related to nausea/vomiting and decreased appetite as evidenced by poor meal intake PTA. ASSESSMENT:  
 
Pt asleep at time of visit. Admitted with c/o nausea/vomiting and several loose stools x 1 day PTA. Was on clear liquid diet; advanced today to solid foods. Was recently discharged from previous admission; hx of gastroparesis, may tolerate a low fat, low fiber diet better Average po intake adequate to meet patients estimated nutritional needs:   [] Yes     [] No   [x] Unable to determine at this time Diet: DIET DIABETIC CONSISTENT CARB Regular Food Allergies:  None known Current Appetite:   [] Good     [] Fair     [] Poor     [x] Other: unknown Appetite/meal intake prior to admission:   [] Good     [] Fair     [x] Poor per chart hx    [] Other: 
Feeding Limitations:  [] Swallowing difficulty    [] Chewing difficulty    [] Other: 
Current Meal Intake:  
Patient Vitals for the past 100 hrs: 
 % Diet Eaten 01/29/19 1008 25 % BM: 1/28 - loose Skin Integrity:  No pressure injury or wound noted Edema:   [x] No     [] Yes Pertinent Medications: Reviewed: vitamin D3, SSI, reglan, zofran, protonix Recent Labs  
  01/29/19 
0318 01/28/19 
0515 01/27/19 
2034  139 140  
K 4.2 3.3* 3.4*  
* 105 107 CO2 22 23 20* * 128* 170* BUN 5* 11 11 CREA 1.07 1.47* 1.53* CA 8.1* 9.7 10.0 MG 1.9 1.6 1.7 ALB  --   --  4.6 SGOT  --   --  18 ALT  --   --  15 Intake/Output Summary (Last 24 hours) at 1/29/2019 1355 Last data filed at 1/29/2019 1313 Gross per 24 hour Intake 1200 ml Output 1525 ml Net -325 ml Anthropometrics: 
Ht Readings from Last 1 Encounters:  
01/12/19 5' 8\" (1.727 m) Last 3 Recorded Weights in this Encounter 01/28/19 0901 Weight: 86.2 kg (190 lb) Body mass index is 28.89 kg/m². Weight History:  Noted weight fluctuations PTA per chart hx; significant wt changes; question accuracy of weight trends. Net wt loss of 23 lb (10.8%) x 2.5 months PTA per chart hx 
 
Weight Metrics 1/28/2019 1/24/2019 1/12/2019 1/9/2019 11/22/2018 11/13/2018 11/16/2017 Weight 190 lb 200 lb 12.8 oz 210 lb 205 lb 7.5 oz 215 lb 12.8 oz 213 lb 9.6 oz 184 lb BMI 28.89 kg/m2 30.53 kg/m2 31.93 kg/m2 31.24 kg/m2 32.81 kg/m2 32.48 kg/m2 27.98 kg/m2 Admitting Diagnosis: Intractable nausea and vomiting Dehydration Hypokalemia Gastroparesis Intractable nausea and vomiting Intractable nausea and vomiting Dehydration Hypokalemia Gastroparesis Pertinent PMHx:  DM, gall stones, GERD, HTN, pancreatitis, gastroparesis, GI stromal tumor s/p resection, cholecystectomy Education Needs:        [x] None identified  [] Identified - Not appropriate at this time  []  Identified and addressed - refer to education log Learning Limitations:   [] None identified  [x] 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  
 
Calories: 7567-8924 kcal (25-30 kcal/kg) based on  [] Actual BW      [x] SBW: 79 kg Protein:  gm (0.8-1.2 gm/kg) based on  [x] Actual BW: 86 kg      [] IBW Fluid: 1 mL/kcal 
  
MONITORING & EVALUATION:  
 
Nutrition Goal(s): 1. Po intake of meals will meet >75% of patient estimated nutritional needs within the next 7 days.   Outcome:  [] Met/Ongoing    []  Not Met    [x] New/Initial Goal  
 
Monitoring:   [x] Food and beverage intake   [x] Diet order   [x] Nutrition-focused physical findings   [x] Treatment/therapy   [] Weight [] Enteral nutrition intake Previous Recommendations (for follow-up assessments only):     []   Implemented       []   Not Implemented (RD to address)      [] No Longer Appropriate     [] No Recommendation Made Discharge Planning:  Diabetic, low fat, low fiber diet [x] Participated in care planning, discharge planning, & interdisciplinary rounds as appropriate Lobito Torres RD Pager: [phone]

## 2019-01-29 NOTE — PROGRESS NOTES
PT eval order received and chart reviewed. Spoke with patient who reports they are at functional baseline for mobility. Patient reports she does not wish to participate in physical therapy. Therapist inquired reasoning and patient states, \"I just don't need it\". Patient also declined OT services at this time. Patient encouraged to continue to mobility with nursing during hospital stay and she acknowledged understanding. Will sign off per patient request and educated patient that if their functional mobility needs should change that they may have MD re-order PT at any time. Thank you for this referral. Robel Wisdom, PT, DPT.

## 2019-01-29 NOTE — PROGRESS NOTES
Intern Progress Note [address] Patient: Eve Scott MRN: [id number]  CSN: [id number] YOB: 1965  Age: 52 y.o. Sex: female DOA: 1/27/2019 LOS:  LOS: 1 day Subjective:  
 
Acute events: IRVING overnight. Patient reports improved lower abdominal pain. Denies n/v. BM yesterday. Review of Systems Constitutional: Negative for chills and fever. Eyes: Negative for pain. Blurred vision: blind in left eye. Cardiovascular: Negative for chest pain and palpitations. Gastrointestinal: Positive for abdominal pain. Negative for nausea and vomiting. Objective:  
  
Patient Vitals for the past 24 hrs: 
 Temp Pulse Resp BP SpO2  
01/29/19 0748 99.2 °F (37.3 °C) 100 18 112/71 100 % 01/29/19 0405 99 °F (37.2 °C) 89 20 113/74 98 % 01/28/19 2330 100 °F (37.8 °C) (!) 104 18 132/84 100 % 01/28/19 2018 99.2 °F (37.3 °C) 100 18 127/77 99 % 01/28/19 1644 98 °F (36.7 °C) 98 19 125/80 100 % 01/28/19 1412 98 °F (36.7 °C) 98 18 104/70 98 % 01/28/19 1124 99.5 °F (37.5 °C) (!) 116 14 99/66 99 % 01/28/19 1045  (!) 115 13 130/76 99 % 01/28/19 1030  (!) 116 19 130/70 99 % 01/28/19 1015  (!) 113 18 120/68 99 % 01/28/19 1003 99.5 °F (37.5 °C)      
01/28/19 1000 99.5 °F (37.5 °C) (!) 116 18 136/74 98 % 01/28/19 0945  (!) 113  102/61 99 % 01/28/19 0931   11    
01/28/19 0930  (!) 109  92/50 98 % 01/28/19 0915 (!) 101.2 °F (38.4 °C) (!) 105 18 97/53 98 % 01/28/19 0900  (!) 108 17 (!) 129/109 97 % Intake/Output Summary (Last 24 hours) at 1/29/2019 5579 Last data filed at 1/29/2019 0083 Gross per 24 hour Intake 0 ml Output 1525 ml Net -1525 ml Physical Exam:  
General:  Alert and Responsive and in No acute distress. HEENT: Conjunctiva pink, sclera anicteric. EOMI. Pharynx moist, nonerythematous. Moist mucous membranes. CV:  RRR, no murmurs. No visible pulsations or thrills. RESP:  Unlabored breathing. Lungs clear to auscultation. no wheeze, rales, or rhonchi. Equal expansion bilaterally. ABD:  Soft, nontender, nondistended. No hepatosplenomegaly. No suprapubic tenderness. No CVA tenderness. MS:  No joint deformity or instability. No atrophy. Neuro:  5/5 strength bilateral upper extremities and lower extremities. A+Ox3. Ext:  No edema. 2+ radial and dp pulses bilaterally. Missing 1st digit on left foot and 2nd digit on right foot. Skin:  No rashes, lesions, or ulcers. Good turgor. Lab/Data Reviewed: 
Recent Results (from the past 12 hour(s)) LACTIC ACID Collection Time: 01/28/19  8:53 PM  
Result Value Ref Range Lactic acid 1.6 0.4 - 2.0 MMOL/L  
GLUCOSE, POC Collection Time: 01/28/19 11:07 PM  
Result Value Ref Range Glucose (POC) 175 (H) 70 - 110 mg/dL METABOLIC PANEL, BASIC Collection Time: 01/29/19  3:18 AM  
Result Value Ref Range Sodium 140 136 - 145 mmol/L Potassium 4.2 3.5 - 5.5 mmol/L Chloride 112 (H) 100 - 108 mmol/L  
 CO2 22 21 - 32 mmol/L Anion gap 6 3.0 - 18 mmol/L Glucose 125 (H) 74 - 99 mg/dL BUN 5 (L) 7.0 - 18 MG/DL Creatinine 1.07 0.6 - 1.3 MG/DL  
 BUN/Creatinine ratio 5 (L) 12 - 20 GFR est AA >60 >60 ml/min/1.73m2 GFR est non-AA 53 (L) >60 ml/min/1.73m2 Calcium 8.1 (L) 8.5 - 10.1 MG/DL MAGNESIUM Collection Time: 01/29/19  3:18 AM  
Result Value Ref Range Magnesium 1.9 1.6 - 2.6 mg/dL LACTIC ACID Collection Time: 01/29/19  3:18 AM  
Result Value Ref Range Lactic acid 1.1 0.4 - 2.0 MMOL/L  
CBC WITH AUTOMATED DIFF Collection Time: 01/29/19  5:31 AM  
Result Value Ref Range WBC 9.8 4.6 - 13.2 K/uL  
 RBC 2.90 (L) 4.20 - 5.30 M/uL HGB 8.5 (L) 12.0 - 16.0 g/dL HCT 25.2 (L) 35.0 - 45.0 % MCV 86.9 74.0 - 97.0 FL  
 MCH 29.3 24.0 - 34.0 PG  
 MCHC 33.7 31.0 - 37.0 g/dL  
 RDW 14.8 (H) 11.6 - 14.5 % PLATELET 876 111 - 384 K/uL  MPV 9.9 9.2 - 11.8 FL  
 NEUTROPHILS 52 40 - 73 % LYMPHOCYTES 42 21 - 52 % MONOCYTES 6 3 - 10 % EOSINOPHILS 0 0 - 5 % BASOPHILS 0 0 - 2 %  
 ABS. NEUTROPHILS 5.0 1.8 - 8.0 K/UL  
 ABS. LYMPHOCYTES 4.1 (H) 0.9 - 3.6 K/UL  
 ABS. MONOCYTES 0.6 0.05 - 1.2 K/UL  
 ABS. EOSINOPHILS 0.0 0.0 - 0.4 K/UL  
 ABS. BASOPHILS 0.0 0.0 - 0.1 K/UL  
 DF AUTOMATED Scheduled Medications Reviewed: 
Current Facility-Administered Medications Medication Dose Route Frequency  influenza vaccine 2018-19 (6 mos+)(PF) (FLUARIX QUAD/FLULAVAL QUAD) injection 0.5 mL  0.5 mL IntraMUSCular PRIOR TO DISCHARGE  magnesium sulfate 2 g/50 ml IVPB (premix or compounded)  2 g IntraVENous ONCE  
 heparin (porcine) injection 5,000 Units  5,000 Units SubCUTAneous Q12H  pantoprazole (PROTONIX) 40 mg in sodium chloride 0.9% 10 mL injection  40 mg IntraVENous DAILY  insulin lispro (HUMALOG) injection   SubCUTAneous Q6H  
 0.9% sodium chloride infusion 1,000 mL  1,000 mL IntraVENous CONTINUOUS  
 dextrose 5% - 0.45% NaCl with KCl 20 mEq/L infusion  150 mL/hr IntraVENous CONTINUOUS  
 metoclopramide HCl (REGLAN) injection 10 mg  10 mg IntraVENous Q6H Imaging, microbiology, and EKG/Telemetry: Xr Chest Pa Lat Result Date: 1/28/2019 IMPRESSION: Negative study. Assessment/Plan  
52 y.o. female with PMH of IDDM2, HTN, HLD, anxiety, gastritis/esophagitis, GI stromal tumor s/p resection 7468, WHOGBOSVGEFFU, ELXLYBMZEHILQHI, JVTMEEZZWLDO, RHZNQ 1 diastolic dysfunction, now admitted with dehydration, intractable N/V. 
  
Dehydration. Intractable N/V. Patient admitted multiple times with recent admission on 1/25/19 for same symptoms. Patient had extensive workup with CT A/P, mesenteric doppler, RUQ U/S, CT head, UDS, lipase, alcohol screen (all done in Jan 2019) which were unremarkable. The patient has h/o esophagitis and gastritis found on upper endoscopy in Nov 2018 and was to have repeat EGD. Vitamin D was low on 1/7 at 12.5. On admit, WBC 12, UA negative, K 3.4, Cr 1.53, trop <0.02 
- on tele - clear liquid diet 
- IVF D5 1/2NS w/ 20mEq K @ 125cc/hr 
- Daily lab w/ CBC, BMP 
- Reglan - PPI 
- home med Carafate   
- Consider GI consult - Zofran PRN 
- FU BCx NGTD 
- FU stool Cx pending - Advance diet as tolerated 
  
Tachycardia/Elevated BP, resolved. Likely from dehydration and intractable N/V.  
- Hydralazine PRN for BP >200/100 - Will maintain sustained HR <120 
  
Acute on Chronic Kidney Injury 2/2 dehydration, resolved.  H/O CKD Stage 2. Creatinine 1.53 represents greater than 0.3mg/dL increase in serum creatinine and a Stage 1 CRYSTAL (KDIGO guidelines) Patient's baseline creatinine approximately 1.17 and GFR of 61. 
- IVFs as above 
- avoid nephrotoxic medications 
- renally dose antibiotics  
- Trend BMP 
  
T2DM with retinopathy and neuropathy. Most recent hemoglobin A1c 7.2 on 1/23/19 
- accuchecks q6hr with SSI while NPO 
- holding basal insulin (patient rx'd humulin 70/30 40 units bid but unable to verify med list with patient). Consider starting Lantus while inpatient and tolerating PO intake 
- adjust insulin based on requirements 
- diabetic education consult 
- hold home metformin 500 mg po daily while NPO and 2/2 CRYSTAL 
- hold home lisinopril 
- hold home humulin mix 70/30   
  
H/O HTN/HLD. BPs improved since initial ER presentation - Lisinopril was held - Goal to maintain BP <180/100 
- crestor 20mg qHS  
  
H/O Anxiety. 
- Paxil 40mg was held on previous admission due to interaction w/ Reglan. - Will hold Paxil while on Reglan 
  
Vit D deficiency 
- home med Vit D3 1000unit  
  
Diet: Clear liquid DVT Prophylaxis: [facility] Code Status: FULL Point of Contact: Alice Listen: LAUREN) [phone] Partha Pringle, [phone] 
  
Disposition and anticipated LOS: +/= 2 [place] MD Janak Manriquez PGY-1 
01/29/19 8:47 AM 
Pager: [phone]

## 2019-01-29 NOTE — PROGRESS NOTES
Problem: Self Care Deficits Care Plan (Adult) Goal: *Acute Goals and Plan of Care (Insert Text) Outcome: Resolved/Met Date Met: 01/29/19 Occupational Therapy EVALUATION/discharge Patient: Farhana Santos (52 y.o. female) Date: 1/29/2019 Primary Diagnosis: Intractable nausea and vomiting Dehydration Hypokalemia Gastroparesis Intractable nausea and vomiting Intractable nausea and vomiting Dehydration Hypokalemia Gastroparesis Precautions: N/A    
 
ASSESSMENT AND RECOMMENDATIONS: 
Pt cleared to participate in OT evaluation by RN. Upon entering room, pt supine with HOB elevated, drowsy, but agreeable to therapy session. Based on the objective data described below, the patient presents she is able to perform basic self-care/ADLs tasks independently but with supervision for LB dressing, grooming standing at sink level, and toileting tasks. She is able to ambulate to the bathroom without AD demonstrating Fair dynamic standing balance. Will defer to PT for functional balance and mobility tasks. Educated pt on the role of OT and evaluation process with the pt demonstrating good understanding. As the patient is able to perform basic self-care tasks independently, skilled occupational therapy is not indicated at this time. Discharge Recommendations: None Further Equipment Recommendations for Discharge: N/A Barriers to Learning/Limitations: None Compensate with: visual, verbal, tactile, kinesthetic cues/model COMPLEXITY Eval Complexity: History: MEDIUM Complexity : Expanded review of history including physical, cognitive and psychosocial  history ; Examination: LOW Complexity : 1-3 performance deficits relating to physical, cognitive , or psychosocial skils that result in activity limitations and / or participation restrictions ;  Decision Making:LOW Complexity : No comorbidities that affect functional and no verbal or physical assistance needed to complete eval tasks  Assessment: Low Complexity SUBJECTIVE:  
Patient was minimally conversational, however would say Ok when asked to perform task. OBJECTIVE DATA SUMMARY:  
 
Past Medical History:  
Diagnosis Date  Blind left eye  Cellulitis of great toe of right foot 10/19/2017  Diabetes ([address]  Diabetic retinopathy ([address]  Gall stones  GERD (gastroesophageal reflux disease)  Hammertoe  Hiatal hernia  History of mammogram 10/03/2017 No evidence of malignancy  Hypertension  Mass of abdomen  Neuropathy due to type 2 diabetes mellitus ([address]  Osteomyelitis of toe of right foot ([address] 10/19/2017  Pancreatitis Past Surgical History:  
Procedure Laterality Date  HX AMPUTATION Left 07/21/2017  
 left 2nd toe  HX CHOLECYSTECTOMY  10/15/2014  HX GI Benign GI Stromal Tumor excision  HX HEENT Sx for detached retina  HX MYOMECTOMY x5 removed  HX OTHER SURGICAL    
 upper endoscopy Prior Level of Function/Home Situation: Pt reports she was (I) with ADLs PTA. Home Situation Home Environment: Private residence One/Two Story Residence: One story Living Alone: No 
Support Systems: Family member(s), Friends \ neighbors Patient Expects to be Discharged to[de-identified] Private residence Current DME Used/Available at Home: Glucometer [x]     Right hand dominant   []     Left hand dominantCognitive/Behavioral Status: 
Neurologic State: Drowsy Orientation Level: Oriented X4 Cognition: Follows commands Safety/Judgement: Awareness of environment Skin: Visible skin appeared intact Edema: None noted Vision/Perceptual:   
Acuity: Within Defined Limits(R eye; Per pt and medical chart, she is blind in L eye) Coordination: 
Coordination: Within functional limits(BUEs) Fine Motor Skills-Upper: Left Intact; Right Intact Gross Motor Skills-Upper: Left Intact; Right Intact Balance: 
Sitting: Intact Standing: Without support Standing - Static: Good Standing - Dynamic : Fair Strength: 
Strength: Generally decreased, functional(BUEs) Tone & Sensation: 
Tone: Normal(BUEs) Sensation: Intact(BUEs) Range of Motion: 
AROM: Within functional limits(BUEs) Functional Mobility and Transfers for ADLs: 
Bed Mobility: 
Supine to Sit: Supervision Sit to Supine: Supervision Scooting: Supervision(to EOB) Transfers: 
Sit to Stand: Supervision Toilet Transfer : Supervision Bathroom Mobility: Supervision/set up ADL Assessment: 
Feeding: Independent Oral Facial Hygiene/Grooming: Supervision(standing at sink level) Bathing: Supervision Upper Body Dressing: Independent Lower Body Dressing: Supervision Toileting: Supervision ADL Intervention: 
Cognitive Retraining Safety/Judgement: Awareness of environment Pain: 
Pt reports no pain or discomfort prior to or post- treatment.   
Activity Tolerance:  
Fair Please refer to the flowsheet for vital signs taken during this treatment. After treatment:  
[]  Patient left in no apparent distress sitting up in chair 
[x]  Patient left in no apparent distress in bed 
[x]  Call bell left within reach 
[]  Nursing notified 
[]  Caregiver present 
[]  Bed alarm activated COMMUNICATION/EDUCATION:  
Communication/Collaboration: 
[]      Home safety education was provided and the patient/caregiver indicated understanding. [x]      Patient/family have participated as able and agree with findings and recommendations. []      Patient is unable to participate in plan of care at this time. Shravan Shah OT Time Calculation: 8 mins

## 2019-01-30 VITALS
TEMPERATURE: 99.2 F | DIASTOLIC BLOOD PRESSURE: 75 MMHG | WEIGHT: 190 LBS | OXYGEN SATURATION: 100 % | HEART RATE: 103 BPM | SYSTOLIC BLOOD PRESSURE: 121 MMHG | RESPIRATION RATE: 18 BRPM | BODY MASS INDEX: 28.89 KG/M2

## 2019-01-30 LAB
ANION GAP SERPL CALC-SCNC: 7 MMOL/L (ref 3–18)
BASOPHILS # BLD: 0 K/UL (ref 0–0.1)
BASOPHILS NFR BLD: 0 % (ref 0–2)
BUN SERPL-MCNC: 9 MG/DL (ref 7–18)
BUN/CREAT SERPL: 7 (ref 12–20)
CALCIUM SERPL-MCNC: 8.6 MG/DL (ref 8.5–10.1)
CHLORIDE SERPL-SCNC: 105 MMOL/L (ref 100–108)
CO2 SERPL-SCNC: 24 MMOL/L (ref 21–32)
CREAT SERPL-MCNC: 1.3 MG/DL (ref 0.6–1.3)
DIFFERENTIAL METHOD BLD: ABNORMAL
EOSINOPHIL # BLD: 0.1 K/UL (ref 0–0.4)
EOSINOPHIL NFR BLD: 1 % (ref 0–5)
ERYTHROCYTE [DISTWIDTH] IN BLOOD BY AUTOMATED COUNT: 14.4 % (ref 11.6–14.5)
GLUCOSE BLD STRIP.AUTO-MCNC: 168 MG/DL (ref 70–110)
GLUCOSE BLD STRIP.AUTO-MCNC: 196 MG/DL (ref 70–110)
GLUCOSE BLD STRIP.AUTO-MCNC: 208 MG/DL (ref 70–110)
GLUCOSE SERPL-MCNC: 164 MG/DL (ref 74–99)
HCT VFR BLD AUTO: 26.4 % (ref 35–45)
HGB BLD-MCNC: 9 G/DL (ref 12–16)
LYMPHOCYTES # BLD: 3.5 K/UL (ref 0.9–3.6)
LYMPHOCYTES NFR BLD: 35 % (ref 21–52)
MAGNESIUM SERPL-MCNC: 2 MG/DL (ref 1.6–2.6)
MCH RBC QN AUTO: 29.3 PG (ref 24–34)
MCHC RBC AUTO-ENTMCNC: 34.1 G/DL (ref 31–37)
MCV RBC AUTO: 86 FL (ref 74–97)
MONOCYTES # BLD: 0.8 K/UL (ref 0.05–1.2)
MONOCYTES NFR BLD: 8 % (ref 3–10)
NEUTS SEG # BLD: 5.5 K/UL (ref 1.8–8)
NEUTS SEG NFR BLD: 56 % (ref 40–73)
PLATELET # BLD AUTO: 263 K/UL (ref 135–420)
PMV BLD AUTO: 10.5 FL (ref 9.2–11.8)
POTASSIUM SERPL-SCNC: 4.2 MMOL/L (ref 3.5–5.5)
RBC # BLD AUTO: 3.07 M/UL (ref 4.2–5.3)
SODIUM SERPL-SCNC: 136 MMOL/L (ref 136–145)
WBC # BLD AUTO: 9.9 K/UL (ref 4.6–13.2)

## 2019-01-30 PROCEDURE — 74011250637 HC RX REV CODE- 250/637: Performed by: STUDENT IN AN ORGANIZED HEALTH CARE EDUCATION/TRAINING PROGRAM

## 2019-01-30 PROCEDURE — 36415 COLL VENOUS BLD VENIPUNCTURE: CPT

## 2019-01-30 PROCEDURE — 74011000250 HC RX REV CODE- 250: Performed by: STUDENT IN AN ORGANIZED HEALTH CARE EDUCATION/TRAINING PROGRAM

## 2019-01-30 PROCEDURE — 82962 GLUCOSE BLOOD TEST: CPT

## 2019-01-30 PROCEDURE — 83735 ASSAY OF MAGNESIUM: CPT

## 2019-01-30 PROCEDURE — 74011250636 HC RX REV CODE- 250/636: Performed by: STUDENT IN AN ORGANIZED HEALTH CARE EDUCATION/TRAINING PROGRAM

## 2019-01-30 PROCEDURE — 80048 BASIC METABOLIC PNL TOTAL CA: CPT

## 2019-01-30 PROCEDURE — 85025 COMPLETE CBC W/AUTO DIFF WBC: CPT

## 2019-01-30 PROCEDURE — 74011636637 HC RX REV CODE- 636/637: Performed by: STUDENT IN AN ORGANIZED HEALTH CARE EDUCATION/TRAINING PROGRAM

## 2019-01-30 RX ORDER — PANTOPRAZOLE SODIUM 40 MG/1
40 TABLET, DELAYED RELEASE ORAL
Qty: 30 TAB | Refills: 2 | Status: SHIPPED | OUTPATIENT
Start: 2019-01-30 | End: 2020-01-01

## 2019-01-30 RX ORDER — ERYTHROMYCIN 250 MG/1
250 TABLET, COATED ORAL
Qty: 84 TAB | Refills: 0 | Status: SHIPPED | OUTPATIENT
Start: 2019-01-30 | End: 2019-01-30

## 2019-01-30 RX ORDER — ONDANSETRON 4 MG/1
4 TABLET, ORALLY DISINTEGRATING ORAL
Qty: 20 TAB | Refills: 0 | Status: SHIPPED | OUTPATIENT
Start: 2019-01-30 | End: 2020-01-01

## 2019-01-30 RX ORDER — CEPHALEXIN 500 MG/1
500 CAPSULE ORAL EVERY 12 HOURS
Qty: 10 CAP | Refills: 0 | Status: SHIPPED | OUTPATIENT
Start: 2019-01-30 | End: 2020-01-01

## 2019-01-30 RX ORDER — PAROXETINE HYDROCHLORIDE 20 MG/1
40 TABLET, FILM COATED ORAL DAILY
Status: DISCONTINUED | OUTPATIENT
Start: 2019-01-30 | End: 2019-01-30 | Stop reason: HOSPADM

## 2019-01-30 RX ORDER — METOCLOPRAMIDE 5 MG/1
5 TABLET ORAL
Qty: 90 TAB | Refills: 3 | Status: SHIPPED | OUTPATIENT
Start: 2019-01-30 | End: 2019-01-30 | Stop reason: SDUPTHER

## 2019-01-30 RX ORDER — METOCLOPRAMIDE 5 MG/1
5 TABLET ORAL
Qty: 90 TAB | Refills: 3 | Status: SHIPPED | OUTPATIENT
Start: 2019-01-30 | End: 2020-01-01

## 2019-01-30 RX ADMIN — VITAMIN D, TAB 1000IU (100/BT) 1000 UNITS: 25 TAB at 08:44

## 2019-01-30 RX ADMIN — CEFTRIAXONE SODIUM 1 G: 1 INJECTION, POWDER, FOR SOLUTION INTRAMUSCULAR; INTRAVENOUS at 16:27

## 2019-01-30 RX ADMIN — METOCLOPRAMIDE HYDROCHLORIDE 10 MG: 5 TABLET ORAL at 08:27

## 2019-01-30 RX ADMIN — INSULIN LISPRO 3 UNITS: 100 INJECTION, SOLUTION INTRAVENOUS; SUBCUTANEOUS at 16:31

## 2019-01-30 RX ADMIN — INSULIN LISPRO 3 UNITS: 100 INJECTION, SOLUTION INTRAVENOUS; SUBCUTANEOUS at 08:32

## 2019-01-30 RX ADMIN — SUCRALFATE 1 G: 1 TABLET ORAL at 08:44

## 2019-01-30 RX ADMIN — ONDANSETRON 4 MG: 4 TABLET, ORALLY DISINTEGRATING ORAL at 08:27

## 2019-01-30 RX ADMIN — ERYTHROMYCIN 250 MG: 250 TABLET, FILM COATED ORAL at 16:26

## 2019-01-30 RX ADMIN — INSULIN LISPRO 6 UNITS: 100 INJECTION, SOLUTION INTRAVENOUS; SUBCUTANEOUS at 11:56

## 2019-01-30 RX ADMIN — PANTOPRAZOLE SODIUM 40 MG: 40 TABLET, DELAYED RELEASE ORAL at 08:33

## 2019-01-30 RX ADMIN — HEPARIN SODIUM 5000 UNITS: 5000 INJECTION INTRAVENOUS; SUBCUTANEOUS at 08:44

## 2019-01-30 RX ADMIN — ERYTHROMYCIN 250 MG: 250 TABLET, FILM COATED ORAL at 11:53

## 2019-01-30 RX ADMIN — SUCRALFATE 1 G: 1 TABLET ORAL at 16:25

## 2019-01-30 RX ADMIN — PAROXETINE HYDROCHLORIDE 40 MG: 20 TABLET, FILM COATED ORAL at 09:51

## 2019-01-30 RX ADMIN — ERYTHROMYCIN 250 MG: 250 TABLET, FILM COATED ORAL at 08:44

## 2019-01-30 RX ADMIN — METOCLOPRAMIDE HYDROCHLORIDE 10 MG: 5 TABLET ORAL at 16:26

## 2019-01-30 RX ADMIN — METOCLOPRAMIDE HYDROCHLORIDE 10 MG: 5 TABLET ORAL at 11:53

## 2019-01-30 RX ADMIN — ONDANSETRON 4 MG: 4 TABLET, ORALLY DISINTEGRATING ORAL at 16:26

## 2019-01-30 NOTE — ROUTINE PROCESS
Bedside and Verbal shift change report given to Bernie Monroy RN (oncoming nurse) by Jamal Robertson RN (offgoing nurse). Report included the following information SBAR, Kardex, MAR and Recent Results. SITUATION: 
Code Status: Full Code Reason for Admission: Intractable nausea and vomiting Dehydration Hypokalemia Gastroparesis Intractable nausea and vomiting Intractable nausea and vomiting Dehydration Hypokalemia Gastroparesis Hospital day: 2 Problem List:  
   
Hospital Problems  Date Reviewed: 11/16/2017 Codes Class Noted POA Gastroparesis ICD-10-CM: K31.84 ICD-9-CM: 536.3  1/28/2019 Unknown Hypokalemia ICD-10-CM: E87.6 ICD-9-CM: 276.8  1/28/2019 Unknown * (Principal) Intractable nausea and vomiting ICD-10-CM: R11.2 ICD-9-CM: 536.2  1/28/2019 Unknown Dehydration ICD-10-CM: E86.0 ICD-9-CM: 276.51  4/20/2014 Unknown BACKGROUND: 
 Past Medical History:  
Past Medical History:  
Diagnosis Date  Blind left eye  Cellulitis of great toe of right foot 10/19/2017  Diabetes ([address]  Diabetic retinopathy ([address]  Gall stones  GERD (gastroesophageal reflux disease)  Hammertoe  Hiatal hernia  History of mammogram 10/03/2017 No evidence of malignancy  Hypertension  Mass of abdomen  Neuropathy due to type 2 diabetes mellitus ([address]  Osteomyelitis of toe of right foot ([address] 10/19/2017  Pancreatitis Patient taking anticoagulants yes Patient has a defibrillator: no  
 History of shots YES for example, flu, pneumonia, tetanus Isolation History NO for example, MRSA, CDiff ASSESSMENT: 
Changes in Assessment Throughout Shift: None Significant Changes in 24 hours (for example, RR/code, fall) Patient has Central Line: no Reasons if yes: N/A Patient has Mari Cath: no Reasons if yes: N/A Mobility Issues PT 
IV Patency OR Checklist 
Pending Tests Last Vitals: 
Vitals w/ MEWS Score (last day) Date/Time MEWS Score Pulse Resp Temp BP Level of Consciousness SpO2  
 01/30/19 0400  1  75  18  100.3 °F (37.9 °C)  116/65  Alert  99 % 01/29/19 2319  1  85  18  98.6 °F (37 °C)  110/68  Alert  99 % 01/29/19 1941  1  87  18  98.7 °F (37.1 °C)  103/65  Alert  99 % 01/29/19 1511  1  97  18  99.3 °F (37.4 °C)  131/87  Alert  97 % 01/29/19 1113  2  101  (Abnormal)   20  98.3 °F (36.8 °C)  139/71  Alert    
 01/29/19 0748  1  100  18  99.2 °F (37.3 °C)  112/71  Alert  100 % 01/29/19 0405  1  89  20  99 °F (37.2 °C)  113/74  Alert  98 % PAIN Pain Assessment Pain Intensity 1: 0 (01/30/19 0400) Pain Intervention(s) 1: Other (comment)(refused prn pain med) Patient Stated Pain Goal: 0 Intervention effective: N/A Time of last intervention: N/A Reassessment Completed: N/A Other actions taken for pain: N/A Last 3 Weights: 
Last 3 Recorded Weights in this Encounter 01/28/19 0901 Weight: 86.2 kg (190 lb) Weight change: INTAKE/OUPUT Current Shift: No intake/output data recorded. Last three shifts: 01/28 0701 - 01/29 1900 In: 1920 [P.O.:1920] Out: 1875 [KEWPT:1487] RECOMMENDATIONS AND DISCHARGE PLANNING Patient needs and requests: Wants to take her Paxil Pending tests/procedures: Stool culture and occult blood but pt has no BM Discharge plan for patient: Home Discharge planning Needs or Barriers: None Estimated Discharge Date: 1/30/19 Posted on Whiteboard in Patients Room: no   
  
\"HEALS\" SAFETY CHECK A safety check occurred in the patient's room between off going nurse and oncoming nurse listed above. The safety check included the below items: 
 
H High Alert Medications Verify all high alert medication drips (heparin, PCA, etc.) E Equipment Suction is set up for ALL patients (with arsenio) Red plugs utilized for all equipment (IV pumps, etc.) WOWs wiped down at end of shift. Room stocked with oxygen, suction, and other unit-specific supplies A Alarms Bed alarm is set for fall risk patients Ensure chair alarm is in place and activated if patient is up in a chair L Lines Check IV for any infiltration Mari bag is empty if patient has a Mari Tubing and IV bags are labeled Gabriela Dowd Safety Room is clean, patient is clean, and equipment is clean. Hallways are clear from equipment besides carts. Fall bracelet on for fall risk patients Ensure room is clear and free of clutter Suction is set up for ALL patients (with arsenio) Hallways are clear from equipment besides carts. Isolation precautions followed, supplies available outside room, sign posted Megan Singh RN

## 2019-01-30 NOTE — PROGRESS NOTES
*ATTENTION:  This note has been created by a medical student for educational purposes only. Please do not refer to the content of this note for clinical decision-making, billing, or other purposes. Please see attending physicians note to obtain clinical information on this patient. * MEDICAL STUDENT Progress Note [address] THIS IS A MEDICAL STUDENT NOTE FOR EDUCATIONAL PURPOSES ONLY. DO NOT USE FOR MEDICAL MANAGEMENT OR BILLING PURPOSES. SEE RESIDENT OR ATTENDING NOTE FOR CARE. Patient: Dequan Angel MRN: [id number]  CSN: [id number] YOB: 1965  Age: 52 y.o. Sex: female DOA: 1/27/2019 LOS:  LOS: 2 days Subjective:  
 
Ms. Yolanda Philip is a 52 y.o. female with PMH significant for T2DM, HTN, HLD, anxiety, gastritis/esophagitis, GI stromal tumor s/p resection in 2014, cholecystectomy, pancreatitis, per H&P HPI, who presented with N/V and abdominal pain/heartburn that began on Saturday per pt. Pt was recently discharged for similar symptoms on 1/25/19.  
  
No significant events overnight. Yesterday afternoon note of nursing discussed diabetes education and medication administration with patient and 2 adult family members. This AM pt states she is doing very well and feels better only concern of mild lower abdominal pain, \"the usual.\"  
 
 
Objective:  
  
Patient Vitals for the past 24 hrs: 
 Temp Pulse Resp BP SpO2  
01/30/19 0722 99.2 °F (37.3 °C) (!) 105 18 118/82 100 % 01/30/19 0400 100.3 °F (37.9 °C) 75 18 116/65 99 % 01/29/19 2319 98.6 °F (37 °C) 85 18 110/68 99 % 01/29/19 1941 98.7 °F (37.1 °C) 87 18 103/65 99 % 01/29/19 1511 99.3 °F (37.4 °C) 97 18 131/87 97 % 01/29/19 1113 98.3 °F (36.8 °C) (!) 101 20 139/71  Intake/Output Summary (Last 24 hours) at 1/30/2019 0848 Last data filed at 1/30/2019 9547 Gross per 24 hour Intake 2020 ml Output 350 ml Net 1670 ml Vital Signs:   
Visit Vitals /82 (BP 1 Location: Left arm, BP Patient Position: Sitting) Pulse (!) 105 Temp 99.2 °F (37.3 °C) Resp 18 Wt 86.2 kg (190 lb) LMP 10/06/2015 (Exact Date) SpO2 100% Breastfeeding? No  
BMI 28.89 kg/m² O2 Device: Room air Temp (24hrs), Av.1 °F (37.3 °C), Min:98.3 °F (36.8 °C), Max:100.3 °F (37.9 °C) Intake/Output:  
Last shift:      701 - 1900 In: 100 [P.O.:100] Out: - Last 3 shifts: 1901 -  0700 In: 1920 [P.O.:1920] Out:  [BUBSV:5480] Intake/Output Summary (Last 24 hours) at 2019 0848 Last data filed at 2019 8472 Gross per 24 hour Intake 2020 ml Output 350 ml Net 1670 ml Physical Exam: 
 
General: NAD, AO x 3, appears stated age, intermittently walking around room (states 2/2 anxiety/Paxil) and shutting L eye during interview HEENT:  Anicteric sclerae; pink palpebral conjunctivae; blind in L eye; mucosa moist 
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  
 
Scheduled Medications Reviewed: 
Current Facility-Administered Medications Medication Dose Route Frequency  PARoxetine (PAXIL) tablet 40 mg  40 mg Oral DAILY  influenza vaccine 2018- (6 mos+)(PF) (FLUARIX QUAD/FLULAVAL QUAD) injection 0.5 mL  0.5 mL IntraMUSCular PRIOR TO DISCHARGE  pantoprazole (PROTONIX) tablet 40 mg  40 mg Oral ACB  sucralfate (CARAFATE) tablet 1 g  1 g Oral ACB&D  
 metoclopramide HCl (REGLAN) tablet 10 mg  10 mg Oral TID WITH MEALS  
  insulin lispro (HUMALOG) injection   SubCUTAneous AC&HS  rosuvastatin (CRESTOR) tablet 20 mg  20 mg Oral QHS  cholecalciferol (VITAMIN D3) tablet 1,000 Units  1,000 Units Oral DAILY  heparin (porcine) injection 5,000 Units  5,000 Units SubCUTAneous TID  erythromycin base (E-MYCIN) tablet 250 mg  250 mg Oral TID WITH MEALS  cefTRIAXone (ROCEPHIN) 1 g in sterile water (preservative free) 10 mL IV syringe  1 g IntraVENous Q24H  
 
DATA:  
 
Current Facility-Administered Medications Medication Dose Route Frequency  PARoxetine (PAXIL) tablet 40 mg  40 mg Oral DAILY  influenza vaccine 2018-19 (6 mos+)(PF) (FLUARIX QUAD/FLULAVAL QUAD) injection 0.5 mL  0.5 mL IntraMUSCular PRIOR TO DISCHARGE  pantoprazole (PROTONIX) tablet 40 mg  40 mg Oral ACB  sucralfate (CARAFATE) tablet 1 g  1 g Oral ACB&D  
 metoclopramide HCl (REGLAN) tablet 10 mg  10 mg Oral TID WITH MEALS  insulin lispro (HUMALOG) injection   SubCUTAneous AC&HS  rosuvastatin (CRESTOR) tablet 20 mg  20 mg Oral QHS  cholecalciferol (VITAMIN D3) tablet 1,000 Units  1,000 Units Oral DAILY  heparin (porcine) injection 5,000 Units  5,000 Units SubCUTAneous TID  erythromycin base (E-MYCIN) tablet 250 mg  250 mg Oral TID WITH MEALS  cefTRIAXone (ROCEPHIN) 1 g in sterile water (preservative free) 10 mL IV syringe  1 g IntraVENous Q24H  
 ondansetron (ZOFRAN ODT) tablet 4 mg  4 mg Oral Q8H PRN  
 glucose chewable tablet 16 g  4 Tab Oral PRN  
 glucagon (GLUCAGEN) injection 1 mg  1 mg IntraMUSCular PRN  
 dextrose (D50W) injection syrg 12.5-25 g  25-50 mL IntraVENous PRN  
 acetaminophen (TYLENOL) suppository 500 mg  500 mg Rectal Q6H PRN Labs: Results:  
   
Chemistry Recent Labs  
  01/30/19 
0225 01/29/19 
6188 01/28/19 
0515 01/27/19 2034 * 125* 128* 170*  140 139 140  
K 4.2 4.2 3.3* 3.4*  
 112* 105 107 CO2 24 22 23 20* BUN 9 5* 11 11 CREA 1.30 1.07 1.47* 1.53* CA 8.6 8.1* 9.7 10.0 AGAP 7 6 11 13 BUCR 7* 5* 7* 7* AP  --   --   --  150* TP  --   --   --  9.0* ALB  --   --   --  4.6 GLOB  --   --   --  4.4* AGRAT  --   --   --  1.0  
  
CBC w/Diff Recent Labs  
  01/30/19 
0225 01/29/19 
0531 01/28/19 
0515 WBC 9.9 9.8 12.3 RBC 3.07* 2.90* 3.66* HGB 9.0* 8.5* 10.8* HCT 26.4* 25.2* 30.7*  244 353 GRANS 56 52 84* LYMPH 35 42 13* EOS 1 0 0 Coagulation No results for input(s): PTP, INR, APTT in the last 72 hours. No lab exists for component: INREXT Liver Enzymes Recent Labs  
  01/27/19 
2034  
TP 9.0* ALB 4.6 * SGOT 18 ABG No results found for: PH, PHI, PCO2, PCO2I, PO2, PO2I, HCO3, HCO3I, FIO2, FIO2I Microbiology Recent Labs  
  01/28/19 
0530 01/28/19 
0515 01/27/19 
2232 CULT NO GROWTH 2 DAYS NO GROWTH 2 DAYS >100,000 COLONIES/mL GRAM NEGATIVE RODS*  30202 COLONIES/mL POSSIBLE 2ND GRAM NEGATIVE DAYNA* Imaging: CXR Results  (Last 48 hours) 01/28/19 1254  XR CHEST PA LAT Final result Impression:  IMPRESSION:  
   
Negative study. Narrative:  EXAM:  Chest Frontal and Lateral.  
   
INDICATION:  SIRS. Vomiting and nausea. Generalized body pain. Abdominal  
pain. COMPARISON:  01/22/19. TECHNIQUE:  AP and lateral views. FINDINGS:   
   
- Both lungs are clear. The lungs are hypoinflated. - No pleural effusion or pneumothorax is detected. - The cardiac silhouette, mediastinum and hilar regions are unremarkable. CT Results  (Last 48 hours) None Assessment/Plan THIS IS A MEDICAL STUDENT NOTE FOR EDUCATIONAL PURPOSES ONLY. DO NOT USE FOR MEDICAL MANAGEMENT OR BILLING PURPOSES. SEE RESIDENT OR ATTENDING NOTE FOR PLAN.  
 
  
Ms. Dewayne Wilson is a 53 yo female with PMH significant for T2DM, HTN, gastroparesis, gastritis, pancreatitis who presented for N/V and abdominal pain.  Currently doing well and improving.  
  
 N/V, Abdominal pain - Improving as pt no longer endorses N/V 
-Most likely 2/2 DM gastroparesis as CXR, flu, blood cx all negative - Cont metoclopramide, pt states tolerating well - Cont erythromycin, pt states tolerating well - Cont Zofran PO, consider discharging pt with prn Zofran 
- Cont regular DM diet, pt tolerating PO well - Cont monitoring for BM (may help relieve lower abdm pain, pt states when she takes protonix or Metformin at home, her BM comes regularly) 
  Anemia 
-Improving. H/H 9.0/26.4 this AM compared to 8.5/25.2 on 1/29/19 
- May be dilutional anemia 2/2 fluids or may be GI bleed - Consider fecal occult blood test, pt has not had BM yet, difficult to asses - Cont monitoring CBC and trend H/H if still in-patient 
  
Hyperglycemia 
-Slightly increased to 164 from 125, but stable 
-Cont humalog SQ q6h while in-patient 
  
Hemodynamic Instabillity 
-Tachycardia significantly improved. Range from high 80s to low 100s. Cont to monitor. -HTN improved and stable (118/82 this AM). -Afebrile (99.2F this AM) 
  
Electrolyte Abnormalities - Improving. Hypokalemia resolved - D/C fluids as pt now tolerating PO fluids  
  
Social Concerns 
-Pt admitted multiple times in short time frame for similar complaints. 
-Pt states she did not get her prescriptions of erythromycin and metoclopramide after she was discharged on 1/25/19 because she \"just got out of the hospital and didn't have time to fill it. \" She elaborated that she put the printed prescriptions in her discharge folder and gets rides from family to go various locations.  
-Pt did state she should be able to get her meds now and has a pharmacy that she usually goes to (per pt [facility] [place] [id number] in [place]).  She did state that \"[facility] takes [facility] to fill [facility],\" so recommended calling in Rx so that they are ready when her ride brings her or getting it before d/c in hospital.  
 -Pt states with medicaid and gov't assistance, does not really have any significant food/transportation/monetary concerns, but will contact PCP should she be in a position requiring any further assistance.  
-Consider d/c today with home health and printing prescriptions while in-patient and filling in outpatient pharmacy [facility]

## 2019-01-30 NOTE — PROGRESS NOTES
Intern Progress Note [address] Patient: Brittni Hammonds MRN: [id number]  CSN: [id number] YOB: 1965  Age: 52 y.o. Sex: female DOA: 1/27/2019 LOS:  LOS: 2 days Subjective:  
 
Acute events: IRVING overnight. Patient endorses feeling 'fizzy' in her stomach. Denies n/v. Review of Systems Constitutional: Negative for chills and fever. Eyes: Negative for pain. Blurred vision: blind in left eye. Cardiovascular: Negative for chest pain and palpitations. Gastrointestinal: Positive for abdominal pain (discomfort). Negative for nausea and vomiting. Objective:  
  
Patient Vitals for the past 24 hrs: 
 Temp Pulse Resp BP SpO2  
01/30/19 0400 100.3 °F (37.9 °C) 75 18 116/65 99 % 01/29/19 2319 98.6 °F (37 °C) 85 18 110/68 99 % 01/29/19 1941 98.7 °F (37.1 °C) 87 18 103/65 99 % 01/29/19 1511 99.3 °F (37.4 °C) 97 18 131/87 97 % 01/29/19 1113 98.3 °F (36.8 °C) (!) 101 20 139/71   
01/29/19 0748 99.2 °F (37.3 °C) 100 18 112/71 100 % Intake/Output Summary (Last 24 hours) at 1/30/2019 0300 Last data filed at 1/29/2019 1758 Gross per 24 hour Intake 1920 ml Output 675 ml Net 1245 ml Physical Exam:  
General:  Alert and Responsive and in No acute distress. HEENT: Conjunctiva pink, sclera anicteric. EOMI. Pharynx moist, nonerythematous. Moist mucous membranes. CV:  RRR, no murmurs. No visible pulsations or thrills. RESP:  Unlabored breathing. Lungs clear to auscultation. no wheeze, rales, or rhonchi. Equal expansion bilaterally. ABD:  Soft, lower abdominal tenderness, nondistended. No hepatosplenomegaly. No suprapubic tenderness. No CVA tenderness. MS:  No joint deformity or instability. No atrophy. Neuro:  5/5 strength bilateral upper extremities and lower extremities. A+Ox3. Ext:  No edema. 2+ radial and dp pulses bilaterally. Missing 1st digit on left foot and 2nd digit on right foot. Skin:  No rashes, lesions, or ulcers. Good turgor. Lab/Data Reviewed: 
Recent Results (from the past 12 hour(s)) GLUCOSE, POC Collection Time: 01/29/19  9:40 PM  
Result Value Ref Range Glucose (POC) 148 (H) 70 - 110 mg/dL MAGNESIUM Collection Time: 01/30/19  2:25 AM  
Result Value Ref Range Magnesium 2.0 1.6 - 2.6 mg/dL CBC WITH AUTOMATED DIFF Collection Time: 01/30/19  2:25 AM  
Result Value Ref Range WBC 9.9 4.6 - 13.2 K/uL  
 RBC 3.07 (L) 4.20 - 5.30 M/uL HGB 9.0 (L) 12.0 - 16.0 g/dL HCT 26.4 (L) 35.0 - 45.0 % MCV 86.0 74.0 - 97.0 FL  
 MCH 29.3 24.0 - 34.0 PG  
 MCHC 34.1 31.0 - 37.0 g/dL  
 RDW 14.4 11.6 - 14.5 % PLATELET 053 871 - 710 K/uL MPV 10.5 9.2 - 11.8 FL  
 NEUTROPHILS 56 40 - 73 % LYMPHOCYTES 35 21 - 52 % MONOCYTES 8 3 - 10 % EOSINOPHILS 1 0 - 5 % BASOPHILS 0 0 - 2 %  
 ABS. NEUTROPHILS 5.5 1.8 - 8.0 K/UL  
 ABS. LYMPHOCYTES 3.5 0.9 - 3.6 K/UL  
 ABS. MONOCYTES 0.8 0.05 - 1.2 K/UL  
 ABS. EOSINOPHILS 0.1 0.0 - 0.4 K/UL  
 ABS. BASOPHILS 0.0 0.0 - 0.1 K/UL  
 DF AUTOMATED METABOLIC PANEL, BASIC Collection Time: 01/30/19  2:25 AM  
Result Value Ref Range Sodium 136 136 - 145 mmol/L Potassium 4.2 3.5 - 5.5 mmol/L Chloride 105 100 - 108 mmol/L  
 CO2 24 21 - 32 mmol/L Anion gap 7 3.0 - 18 mmol/L Glucose 164 (H) 74 - 99 mg/dL BUN 9 7.0 - 18 MG/DL Creatinine 1.30 0.6 - 1.3 MG/DL  
 BUN/Creatinine ratio 7 (L) 12 - 20 GFR est AA 52 (L) >60 ml/min/1.73m2 GFR est non-AA 43 (L) >60 ml/min/1.73m2 Calcium 8.6 8.5 - 10.1 MG/DL Scheduled Medications Reviewed: 
Current Facility-Administered Medications Medication Dose Route Frequency  influenza vaccine 2018-19 (6 mos+)(PF) (FLUARIX QUAD/FLULAVAL QUAD) injection 0.5 mL  0.5 mL IntraMUSCular PRIOR TO DISCHARGE  pantoprazole (PROTONIX) tablet 40 mg  40 mg Oral ACB  sucralfate (CARAFATE) tablet 1 g  1 g Oral ACB&D  
  metoclopramide HCl (REGLAN) tablet 10 mg  10 mg Oral TID WITH MEALS  insulin lispro (HUMALOG) injection   SubCUTAneous AC&HS  rosuvastatin (CRESTOR) tablet 20 mg  20 mg Oral QHS  cholecalciferol (VITAMIN D3) tablet 1,000 Units  1,000 Units Oral DAILY  heparin (porcine) injection 5,000 Units  5,000 Units SubCUTAneous TID  erythromycin base (E-MYCIN) tablet 250 mg  250 mg Oral TID WITH MEALS  cefTRIAXone (ROCEPHIN) 1 g in sterile water (preservative free) 10 mL IV syringe  1 g IntraVENous Q24H Imaging, microbiology, and EKG/Telemetry: Xr Chest Pa Lat Result Date: 1/28/2019 IMPRESSION: Negative study. Assessment/Plan  
52 y.o. female with PMH of IDDM2, HTN, HLD, anxiety, gastritis/esophagitis, GI stromal tumor s/p resection 8229, [id number], [id number], ARJDAYHZUTIG, SJZFJ 1 diastolic dysfunction, now admitted with dehydration, intractable N/V. 
  
Dehydration. Intractable N/V. Patient admitted multiple times with recent admission on 1/25/19 for same symptoms. Patient had extensive workup with CT A/P, mesenteric doppler, RUQ U/S, CT head, UDS, lipase, alcohol screen (all done in Jan 2019) which were unremarkable. The patient has h/o esophagitis and gastritis found on upper endoscopy in Nov 2018 and was to have repeat EGD. Vitamin D was low on 1/7 at 12.5. On admit, WBC 12, UA negative, K 3.4, Cr 1.53, trop <0.02 
- on tele 
- diabetic diet 
- Daily lab w/ CBC, BMP 
- Reglan 
- erythromycin - PPI 
- home med Carafate   
- Consider GI consult - Zofran PRN 
- FU BCx NGTD 
- FU stool Cx pending - Advance diet as tolerated 
  
UTI 
-ceftriaxone Tachycardia/Elevated BP, resolved. Likely from dehydration and intractable N/V.  
- Hydralazine PRN for BP >200/100 - Will maintain sustained HR <120 
  
Acute on Chronic Kidney Injury 2/2 dehydration, resolved.  H/O CKD Stage 2.  Creatinine 1.53 represents greater than 0.3mg/dL increase in serum creatinine and a Stage 1 CRYSTAL (KDIGO guidelines) Patient's baseline creatinine approximately 1.17 and GFR of 61. 
- IVFs as above 
- avoid nephrotoxic medications 
- renally dose antibiotics  
- Trend BMP 
  
T2DM with retinopathy and neuropathy. Most recent hemoglobin A1c 7.2 on 1/23/19 
- accuchecks achs 
- cdi 
- holding basal insulin (patient rx'd humulin 70/30 40 units bid but unable to verify med list with patient). Consider starting Lantus while inpatient and tolerating PO intake 
- adjust insulin based on requirements 
- diabetic education consult 
- hold home metformin 500 mg po 
- hold home lisinopril 
- hold home humulin mix 70/30   
  
H/O HTN/HLD. BPs improved since initial ER presentation - Lisinopril was held - Goal to maintain BP <180/100 
- crestor 20mg qHS  
  
H/O Anxiety. 
- Paxil 40mg restarted with permission of patient, discussed risks and benefits with taking it with reglan  
  
Vit D deficiency 
- home med Vit D3 1000unit  
  
Diet: Clear liquid DVT Prophylaxis: SQH Code Status: FULL Point of Contact: Livier Ware: [id number]) [phone] Fallondena Soria, [phone] 
  
Disposition and anticipated LOS: +/= 2 [place] MD Janak Burton PGY-1 
01/30/19 8:47 AM 
Pager: [phone]

## 2019-01-30 NOTE — DIABETES MGMT
Glycemic Control Plan of Care 
 
T2DM with current A1c of 7.2% (1/23/2019). See separate notes, 01/29/2019, for assessment of home diabetes management and education. Two supportive family members where visiting and actively participated because they plan to help the patient. They also plan to help patient obtain ReliOn Primer at [facility] which is a talking BG meter due to blindness on left eye. Patient reported that she's using a pen insulin. Discussed with patient for clarification how she's able to determine that she's dialing the right dose. Patient stated that she's listening to the number of clicks. Discussed safety and recommendation to get a magnifying glass to make sure that she's dialing the correct dose. Her family reported that the patient already have the magnifying glass at home but she just need to use it. Patient also reported that she's drinking a lot of [place] milk throught out the day. She's lactose intol. Educated patient on how to read food label: look for serving size, 1 carb is equivalent to 15 grams of carbohydrate. Patient will also check lactaid milk to see if she can tolerate it. Home diabetes medication: Novolin 70/30 mix insulin 40 units twice daily before meals (breakfast and lunch). POC BG range on 01/29/2019: 107-173 mg/dL. POC BG report on 01/30/2019 at time of review: 168 mg/dL. Recommendation(s): 
1.) Continue BG monitoring and current diabetes med: Correctional lispro insulin. Assessment: 
Patient is 52year old with past medical history including type 2 diabetes mellitus with retinopathy, blindness left eye, hypertension, hyperlipidemia, GERD, foot ulcer due to osteomyelitis, diastolic heart failure, and pancreatitis - was admitted on 01/27/2019 with report of nausea, vomiting and abd pain. Noted: 
Dehydration. UTI. Acute on chronic kidney injury. CKD stage 2. T2DM with retinopathy with current A1c of 7.2% (1/23/2019). Most recent blood glucose values: Results for Lashell Martines (MRN [id number]) as of 1/30/2019 12:01 Ref. Range 1/29/2019 11:16 1/29/2019 16:06 1/29/2019 21:40  
GLUCOSE,FAST - POC Latest Ref Range: 70 - 110 mg/dL 173 (H) 107 148 (H) Results for Lashell Martines (MRN [id number]) as of 1/30/2019 12:01 Ref. Range 1/30/2019 07:24 GLUCOSE,FAST - POC Latest Ref Range: 70 - 110 mg/dL 168 (H) Current A1C: 7.2% (1/23/2019) which is equivalent to estimated to average blood glucose of 123 mg/dL during the past 2-3 months. Current hospital diabetes medications: 
Correctional lispro insulin ACHS. Normal sensitivity dose. Total daily dose insulin requirement previous day: 01/29/2019 Lispro: 3 units Home diabetes medications: Patient reported on 01/29/2019: 
Novolin 70/30 mix insulin 40 units twice daily before meals: breakfast and lunch. Diet: Diabetic consistent carb regular; nutr suppl: glucerna shake with breakfast and dinner. Goals:  Blood glucose will be within target range of  mg/dL by 02/02/2019. Education:  __X_  Refer to Diabetes Education Record: 1/29/2019 
           ___  Education not indicated at this time Stephanie Butler RN [facility] Pager: [phone]

## 2019-01-30 NOTE — PROGRESS NOTES
Discharge Planning: · Pt will return home with family support · Pt was provided [facility] for home health order · Pt chose [facility] who will assist with this pt psycho-social issues related to medication difficulty and adjustment · Pt will be transported home by family members ·

## 2019-01-31 LAB
BACTERIA SPEC CULT: ABNORMAL
BACTERIA SPEC CULT: ABNORMAL
SERVICE CMNT-IMP: ABNORMAL

## 2019-01-31 NOTE — DISCHARGE INSTRUCTIONS
Patient Education      MyChart Activation    Thank you for requesting access to [address] Please follow the instructions below to securely access and download your online medical record. [facility] allows you to send messages to your doctor, view your test results, renew your prescriptions, schedule appointments, and more. How Do I Sign Up? 1. In your internet browser, go to [email or web address]  2. Click on the First Time User? Click Here link in the Sign In box. You will be redirect to the New Member Sign Up page. 3. Enter your [facility] Access Code exactly as it appears below. You will not need to use this code after youve completed the sign-up process. If you do not sign up before the expiration date, you must request a new code. [facility] Access Code: [email or web address]  Expires: 3/1/2019  1:50 PM (This is the date your [facility] access code will )    4. Enter the last four digits of your Social Security Number (xxxx) and Date of Birth (mm/dd/yyyy) as indicated and click Submit. You will be taken to the next sign-up page. 5. Create a [facility] ID. This will be your [facility] login ID and cannot be changed, so think of one that is secure and easy to remember. 6. Create a [facility] password. You can change your password at any time. 7. Enter your Password Reset Question and Answer. This can be used at a later time if you forget your password. 8. Enter your e-mail address. You will receive e-mail notification when new information is available in [address] 9. Click Sign Up. You can now view and download portions of your medical record. 10. Click the Download Summary menu link to download a portable copy of your medical information. Additional Information    If you have questions, please visit the Frequently Asked Questions section of the [facility] website at [email or web address] Remember, [facility] is NOT to be used for urgent needs. For medical emergencies, dial 911.   Patient armband removed and shredded  DISCHARGE SUMMARY from Nurse    PATIENT INSTRUCTIONS:    After general anesthesia or intravenous sedation, for 24 hours or while taking prescription Narcotics:  · Limit your activities  · Do not drive and operate hazardous machinery  · Do not make important personal or business decisions  · Do  not drink alcoholic beverages  · If you have not urinated within 8 hours after discharge, please contact your surgeon on call. Report the following to your surgeon:  · Excessive pain, swelling, redness or odor of or around the surgical area  · Temperature over 100.5  · Nausea and vomiting lasting longer than 4 hours or if unable to take medications  · Any signs of decreased circulation or nerve impairment to extremity: change in color, persistent  numbness, tingling, coldness or increase pain  · Any questions    What to do at Home:  Recommended activity: Activity as tolerated, Home Health will Follow patient. If you experience any of the following symptoms Pain and nausea not relieved by reglan please follow up with primary care doctor. .    *  Please give a list of your current medications to your Primary Care Provider. *  Please update this list whenever your medications are discontinued, doses are      changed, or new medications (including over-the-counter products) are added. *  Please carry medication information at all times in case of emergency situations. These are general instructions for a healthy lifestyle:    No smoking/ No tobacco products/ Avoid exposure to second hand smoke  Surgeon General's Warning:  Quitting smoking now greatly reduces serious risk to your health.     Obesity, smoking, and sedentary lifestyle greatly increases your risk for illness    A healthy diet, regular physical exercise & weight monitoring are important for maintaining a healthy lifestyle    You may be retaining fluid if you have a history of heart failure or if you experience any of the following symptoms:  Weight gain of 3 pounds or more overnight or 5 pounds in a week, increased swelling in our hands or feet or shortness of breath while lying flat in bed. Please call your doctor as soon as you notice any of these symptoms; do not wait until your next office visit. Recognize signs and symptoms of STROKE:    F-face looks uneven    A-arms unable to move or move unevenly    S-speech slurred or non-existent    T-time-call 911 as soon as signs and symptoms begin-DO NOT go       Back to bed or wait to see if you get better-TIME IS BRAIN. Warning Signs of HEART ATTACK     Call 911 if you have these symptoms:   Chest discomfort. Most heart attacks involve discomfort in the center of the chest that lasts more than a few minutes, or that goes away and comes back. It can feel like uncomfortable pressure, squeezing, fullness, or pain.  Discomfort in other areas of the upper body. Symptoms can include pain or discomfort in one or both arms, the back, neck, jaw, or stomach.  Shortness of breath with or without chest discomfort.  Other signs may include breaking out in a cold sweat, nausea, or lightheadedness. Don't wait more than five minutes to call 911 - MINUTES MATTER! Fast action can save your life. Calling 911 is almost always the fastest way to get lifesaving treatment. Emergency Medical Services staff can begin treatment when they arrive -- up to an hour sooner than if someone gets to the hospital by car. The discharge information has been reviewed with the patient. The patient verbalized understanding. Discharge medications reviewed with the {Dishcarge meds reviewed POUU:55322} and appropriate educational materials and side effects teaching were provided.   ___________________________________________________________________________________________________________________________________  DISCHARGE SUMMARY from Nurse    PATIENT INSTRUCTIONS:    After general anesthesia or intravenous sedation, for 24 hours or while taking prescription Narcotics:  · Limit your activities  · Do not drive and operate hazardous machinery  · Do not make important personal or business decisions  · Do  not drink alcoholic beverages  · If you have not urinated within 8 hours after discharge, please contact your surgeon on call. Report the following to your surgeon:  · Excessive pain, swelling, redness or odor of or around the surgical area  · Temperature over 100.5  · Nausea and vomiting lasting longer than 4 hours or if unable to take medications  · Any signs of decreased circulation or nerve impairment to extremity: change in color, persistent  numbness, tingling, coldness or increase pain  · Any questions    What to do at Home:  Recommended activity: {discharge activity:66591}, ***    If you experience any of the following symptoms ***, please follow up with ***. *  Please give a list of your current medications to your Primary Care Provider. *  Please update this list whenever your medications are discontinued, doses are      changed, or new medications (including over-the-counter products) are added. *  Please carry medication information at all times in case of emergency situations. These are general instructions for a healthy lifestyle:    No smoking/ No tobacco products/ Avoid exposure to second hand smoke  Surgeon General's Warning:  Quitting smoking now greatly reduces serious risk to your health. Obesity, smoking, and sedentary lifestyle greatly increases your risk for illness    A healthy diet, regular physical exercise & weight monitoring are important for maintaining a healthy lifestyle    You may be retaining fluid if you have a history of heart failure or if you experience any of the following symptoms:  Weight gain of 3 pounds or more overnight or 5 pounds in a week, increased swelling in our hands or feet or shortness of breath while lying flat in bed.   Please call your doctor as soon as you notice any of these symptoms; do not wait until your next office visit. Recognize signs and symptoms of STROKE:    F-face looks uneven    A-arms unable to move or move unevenly    S-speech slurred or non-existent    T-time-call 911 as soon as signs and symptoms begin-DO NOT go       Back to bed or wait to see if you get better-TIME IS BRAIN. Warning Signs of HEART ATTACK     Call 911 if you have these symptoms:   Chest discomfort. Most heart attacks involve discomfort in the center of the chest that lasts more than a few minutes, or that goes away and comes back. It can feel like uncomfortable pressure, squeezing, fullness, or pain.  Discomfort in other areas of the upper body. Symptoms can include pain or discomfort in one or both arms, the back, neck, jaw, or stomach.  Shortness of breath with or without chest discomfort.  Other signs may include breaking out in a cold sweat, nausea, or lightheadedness. Don't wait more than five minutes to call 911 - MINUTES MATTER! Fast action can save your life. Calling 911 is almost always the fastest way to get lifesaving treatment. Emergency Medical Services staff can begin treatment when they arrive -- up to an hour sooner than if someone gets to the hospital by car. The discharge information has been reviewed with the {PATIENT PARENT GUARDIAN:86396}. The {PATIENT PARENT GUARDIAN:79293} verbalized understanding. Discharge medications reviewed with the {Dishcarge meds reviewed NJDU:90308} and appropriate educational materials and side effects teaching were provided. ___________________________________________________________________________________________________________________________________    Dehydration: Care Instructions  Your Care Instructions  Dehydration happens when your body loses too much fluid. This might happen when you do not drink enough water or you lose large amounts of fluids from your body because of diarrhea, vomiting, or sweating. Severe dehydration can be life-threatening. Water and minerals called electrolytes help put your body fluids back in balance. Learn the early signs of fluid loss, and drink more fluids to prevent dehydration. Follow-up care is a key part of your treatment and safety. Be sure to make and go to all appointments, and call your doctor if you are having problems. It's also a good idea to know your test results and keep a list of the medicines you take. How can you care for yourself at home? · To prevent dehydration, drink plenty of fluids, enough so that your urine is light yellow or clear like water. Choose water and other caffeine-free clear liquids until you feel better. If you have kidney, heart, or liver disease and have to limit fluids, talk with your doctor before you increase the amount of fluids you drink. · If you do not feel like eating or drinking, try taking small sips of water, sports drinks, or other rehydration drinks. · Get plenty of rest.  To prevent dehydration  · Add more fluids to your diet and daily routine, unless your doctor has told you not to. · During hot weather, drink more fluids. Drink even more fluids if you exercise a lot. Stay away from drinks with alcohol or caffeine. · Watch for the symptoms of dehydration. These include:  ? A dry, sticky mouth. ? Dark yellow urine, and not much of it. ? Dry and sunken eyes. ? Feeling very tired. · Learn what problems can lead to dehydration. These include:  ? Diarrhea, fever, and vomiting. ? Any illness with a fever, such as pneumonia or the flu. ? Activities that cause heavy sweating, such as endurance races and heavy outdoor work in hot or humid weather. ? Alcohol or drug abuse or withdrawal.  ? Certain medicines, such as cold and allergy pills (antihistamines), diet pills (diuretics), and laxatives. ? Certain diseases, such as diabetes, cancer, and heart or kidney disease. When should you call for help?   Call 911 anytime you think you may need emergency care. For example, call if:    · You passed out (lost consciousness).    Call your doctor now or seek immediate medical care if:    · You are confused and cannot think clearly.     · You are dizzy or lightheaded, or you feel like you may faint.     · You have signs of needing more fluids. You have sunken eyes and a dry mouth, and you pass only a little dark urine.     · You cannot keep fluids down.    Watch closely for changes in your health, and be sure to contact your doctor if:    · You are not making tears.     · Your skin is very dry and sags slowly back into place after you pinch it.     · Your mouth and eyes are very dry. Where can you learn more? Go to http://linda-sivan.info/. Enter N874 in the search box to learn more about \"Dehydration: Care Instructions. \"  Current as of: September 23, 2018  Content Version: 11.9  © 8150-7846 [employer]. Care instructions adapted under license by [employer] (which disclaims liability or warranty for this information). If you have questions about a medical condition or this instruction, always ask your healthcare professional. [place] [address] any warranty or liability for your use of this information. Patient Education        Elevated Blood Pressure: Care Instructions  Your Care Instructions    Blood pressure is a measure of how hard the blood pushes against the walls of your arteries. It's normal for blood pressure to go up and down throughout the day. But if it stays up over time, you have high blood pressure. Two numbers tell you your blood pressure. The first number is the systolic pressure. It shows how hard the blood pushes when your heart is pumping. The second number is the diastolic pressure. It shows how hard the blood pushes between heartbeats, when your heart is relaxed and filling with blood.  An ideal blood pressure in adults is less than 120/80 (say \"120 over 80\"). High blood pressure is 140/90 or higher. You have high blood pressure if your top number is 140 or higher or your bottom number is 90 or higher, or both. The main test for high blood pressure is simple, fast, and painless. To diagnose high blood pressure, your doctor will test your blood pressure at different times. After testing your blood pressure, your doctor may ask you to test it again when you are home. If you are diagnosed with high blood pressure, you can work with your doctor to make a long-term plan to manage it. Follow-up care is a key part of your treatment and safety. Be sure to make and go to all appointments, and call your doctor if you are having problems. It's also a good idea to know your test results and keep a list of the medicines you take. How can you care for yourself at home? · Do not smoke. Smoking increases your risk for heart attack and stroke. If you need help quitting, talk to your doctor about stop-smoking programs and medicines. These can increase your chances of quitting for good. · Stay at a healthy weight. · Try to limit how much sodium you eat to less than 2,300 milligrams (mg) a day. Your doctor may ask you to try to eat less than 1,500 mg a day. · Be physically active. Get at least 30 minutes of exercise on most days of the week. Walking is a good choice. You also may want to do other activities, such as running, swimming, cycling, or playing tennis or team sports. · Avoid or limit alcohol. Talk to your doctor about whether you can drink any alcohol. · Eat plenty of fruits, vegetables, and low-fat dairy products. Eat less saturated and total fats. · Learn how to check your blood pressure at home. When should you call for help? Call your doctor now or seek immediate medical care if:  ? · Your blood pressure is much higher than normal (such as 180/110 or higher). ? · You think high blood pressure is causing symptoms such as:  ¨ Severe headache.   ¨ Blurry vision. ? Watch closely for changes in your health, and be sure to contact your doctor if:  ? · You do not get better as expected. Where can you learn more? Go to http://linda-sivan.info/. Enter E093 in the search box to learn more about \"Elevated Blood Pressure: Care Instructions. \"  Current as of: September 21, 2016  Content Version: 11.4  © 3731-2845 [employer]. Care instructions adapted under license by [employer] (which disclaims liability or warranty for this information). If you have questions about a medical condition or this instruction, always ask your healthcare professional. [place] [address] any warranty or liability for your use of this information. Patient Education        Gastroparesis: Care Instructions  Your Care Instructions    When you have gastroparesis, your stomach takes a lot longer to empty. This delay can cause belly pain, bloating, and belching. It also can cause hiccups, heartburn, nausea or vomiting. You may not feel like eating. These symptoms may come and go. They most often occur during and after meals. You may feel full after only a few bites of food. This condition occurs when the nerves to the stomach don't work properly. Diabetes is the most common cause of this nerve damage. Gastroparesis can make it harder to control your blood sugar levels. But keeping your blood sugar levels under control may help with your symptoms. Parkinson's disease, stroke, and some medicines can also cause this condition. Home treatment can often help. Follow-up care is a key part of your treatment and safety. Be sure to make and go to all appointments, and call your doctor if you are having problems. It's also a good idea to know your test results and keep a list of the medicines you take. How can you care for yourself at home? · Eat several small meals each day rather than three large meals.   · Eat foods that are low in fiber and fat. · If your doctor suggests it, take medicines that help the stomach empty more quickly. These are called motility agents. When should you call for help? Call your doctor now or seek immediate medical care if:    · You are vomiting.     · You have new or worse belly pain.     · You have a fever.     · You cannot pass stools or gas.    Watch closely for changes in your health, and be sure to contact your doctor if you have any problems. Where can you learn more? Go to http://linda-sivan.info/. Enter M106 in the search box to learn more about \"Gastroparesis: Care Instructions. \"  Current as of: March 27, 2018  Content Version: 11.9  © 4093-3448 [employer]. Care instructions adapted under license by [employer] (which disclaims liability or warranty for this information). If you have questions about a medical condition or this instruction, always ask your healthcare professional. [place] [address] any warranty or liability for your use of this information. Patient Education        Hypokalemia: Care Instructions  Your Care Instructions    Hypokalemia (say \"ta-je-wou-FAISAL-jean marie-uh\") is a low level of potassium. The heart, muscles, kidneys, and nervous system all need potassium to work well. This problem has many different causes. Kidney problems, diet, and medicines like diuretics and laxatives can cause it. So can vomiting or diarrhea. In some cases, cancer is the cause. Your doctor may do tests to find the cause of your low potassium levels. You may need medicines to bring your potassium levels back to normal. You may also need regular blood tests to check your potassium. If you have very low potassium, you may need intravenous (IV) medicines. You also may need tests to check the electrical activity of your heart. Heart problems caused by low potassium levels can be very serious. Follow-up care is a key part of your treatment and safety. Be sure to make and go to all appointments, and call your doctor if you are having problems. It's also a good idea to know your test results and keep a list of the medicines you take. How can you care for yourself at home? · If your doctor recommends it, eat foods that have a lot of potassium. These include fresh fruits, juices, and vegetables. They also include nuts, beans, and milk. · Be safe with medicines. If your doctor prescribes medicines or potassium supplements, take them exactly as directed. Call your doctor if you have any problems with your medicines. · Get your potassium levels tested as often as your doctor tells you. When should you call for help? Call 911 anytime you think you may need emergency care. For example, call if:    · You feel like your heart is missing beats. Heart problems caused by low potassium can cause death.     · You passed out (lost consciousness).     · You have a seizure.    Call your doctor now or seek immediate medical care if:    · You feel weak or unusually tired.     · You have severe arm or leg cramps.     · You have tingling or numbness.     · You feel sick to your stomach, or you vomit.     · You have belly cramps.     · You feel bloated or constipated.     · You have to urinate a lot.     · You feel very thirsty most of the time.     · You are dizzy or lightheaded, or you feel like you may faint.     · You feel depressed, or you lose touch with reality.    Watch closely for changes in your health, and be sure to contact your doctor if:    · You do not get better as expected. Where can you learn more? Go to http://linda-sivan.info/. Enter G358 in the search box to learn more about \"Hypokalemia: Care Instructions. \"  Current as of: March 14, 2018  Content Version: 11.9  © 8669-5480 [employer]. Care instructions adapted under license by [employer] (which disclaims liability or warranty for this information).  If you have questions about a medical condition or this instruction, always ask your healthcare professional. [address] any warranty or liability for your use of this information. Patient Education        Nausea and Vomiting: Care Instructions  Your Care Instructions    When you are nauseated, you may feel weak and sweaty and notice a lot of saliva in your mouth. Nausea often leads to vomiting. Most of the time you do not need to worry about nausea and vomiting, but they can be signs of other illnesses. Two common causes of nausea and vomiting are stomach flu and food poisoning. Nausea and vomiting from viral stomach flu will usually start to improve within 24 hours. Nausea and vomiting from food poisoning may last from 12 to 48 hours. The doctor has checked you carefully, but problems can develop later. If you notice any problems or new symptoms, get medical treatment right away. Follow-up care is a key part of your treatment and safety. Be sure to make and go to all appointments, and call your doctor if you are having problems. It's also a good idea to know your test results and keep a list of the medicines you take. How can you care for yourself at home? · To prevent dehydration, drink plenty of fluids, enough so that your urine is light yellow or clear like water. Choose water and other caffeine-free clear liquids until you feel better. If you have kidney, heart, or liver disease and have to limit fluids, talk with your doctor before you increase the amount of fluids you drink. · Rest in bed until you feel better. · When you are able to eat, try clear soups, mild foods, and liquids until all symptoms are gone for 12 to 48 hours. Other good choices include dry toast, crackers, cooked cereal, and gelatin dessert, such as Jell-O. When should you call for help? Call 911 anytime you think you may need emergency care.  For example, call if:    · You passed out (lost consciousness).    Call your doctor now or seek immediate medical care if:    · You have symptoms of dehydration, such as:  ? Dry eyes and a dry mouth. ? Passing only a little dark urine. ? Feeling thirstier than usual.     · You have new or worsening belly pain.     · You have a new or higher fever.     · You vomit blood or what looks like coffee grounds.    Watch closely for changes in your health, and be sure to contact your doctor if:    · You have ongoing nausea and vomiting.     · Your vomiting is getting worse.     · Your vomiting lasts longer than 2 days.     · You are not getting better as expected. Where can you learn more? Go to http://linda-sivan.info/. Enter 25 [id number] in the search box to learn more about \"Nausea and Vomiting: Care Instructions. \"  Current as of: September 23, 2018  Content Version: 11.9  © 5198-0675 [employer]. Care instructions adapted under license by [employer] (which disclaims liability or warranty for this information). If you have questions about a medical condition or this instruction, always ask your healthcare professional. [place] [address] any warranty or liability for your use of this information. Patient Education        Oral Rehydration: Care Instructions  Your Care Instructions    Dehydration occurs when your body loses too much water. This can happen if you do not drink enough fluids or lose a lot of fluid due to diarrhea, vomiting, or sweating. Being dehydrated can cause health problems and can even be life-threatening. To replace lost fluids, you need to drink liquid that contains special chemicals called electrolytes. Electrolytes keep your body working well. Plain water does not have electrolytes. You also need to rest to prevent more fluid loss. Replacing water and electrolytes (oral rehydration) completely takes about 36 hours. But you should feel better within a few hours.   Follow-up care is a key part of your treatment and safety. Be sure to make and go to all appointments, and call your doctor if you are having problems. It's also a good idea to know your test results and keep a list of the medicines you take. How can you care for yourself at home? · Take frequent sips of a drink such as Gatorade, Powerade, or other rehydration drinks that your doctor suggests. These replace both fluid and important chemicals (electrolytes) you need for balance in your blood. · Drink 2 quarts of cool liquid over 2 to 4 hours. You should have at least 10 glasses of liquid a day to replace lost fluid. If you have kidney, heart, or liver disease and have to limit fluids, talk with your doctor before you increase the amount of fluids you drink. · Make your own drink. Measure everything carefully. The drink may not work well or may even be harmful if the amounts are off. ? 1 quart water  ? ½ teaspoon salt  ? 6 teaspoons sugar  · Do not drink liquid with caffeine, such as coffee and nahid. · Do not drink any alcohol. It can make you dehydrated. · Drink plenty of fluids, enough so that your urine is light yellow or clear like water. If you have kidney, heart, or liver disease and have to limit fluids, talk with your doctor before you increase the amount of fluids you drink. When should you call for help? Call 911 anytime you think you may need emergency care. For example, call if:    · You have signs of severe dehydration, such as:  ? You are confused or unable to stay awake.  ? You passed out (lost consciousness).    Call your doctor now or seek immediate medical care if:    · You still have signs of dehydration. You have sunken eyes and a dry mouth, and you pass only a little dark urine.     · You are dizzy or lightheaded, or you feel like you may faint.     · You are not able to keep down fluids.    Watch closely for changes in your health, and be sure to contact your doctor if:    · You do not get better as expected.    Where can you learn more? Go to http://linda-sivan.info/. Enter I040 in the search box to learn more about \"Oral Rehydration: Care Instructions. \"  Current as of: September 23, 2018  Content Version: 11.9  © 2006-2018 [email or web address]. Care instructions adapted under license by [employer] (which disclaims liability or warranty for this information). If you have questions about a medical condition or this instruction, always ask your healthcare professional. [place] [address] any warranty or liability for your use of this information. General Discharge Instructions    Patient ID:  Shawnee Dempsey  [id number]  73 y.o.  1965    Discharge to: Home with home health  Discharge Diagnosis: Gastroparesis    Discharge Condition: Stable  Allergies: Allergies   Allergen Reactions    Levaquin [Levofloxacin] Hives    Promethazine Nausea and Vomiting     \"the phenergan made me more nauseated\"         Patient Instructions  Take Reglan before every meal, three times a day. Continue to take protonix. Take zofran and Carafate as needed. Stop taking metformin due to nausea, vomiting and lactic acidosis. Drink glucerna twice a day with meals. Please take all medications as prescribed. Please go to follow-up appointments as described below.     What to do at Home    Recommended diet: Low Fat Low Fiber Diet    Recommended activity: Activity as tolerated    Please see a doctor if you experience any of the following symptoms:   · Fever greater than 101 F  · Lightheadedness, dizziness, or fainting  · Sudden change in vision or difficulty speaking  · Sudden weakness or numbness on one side of your body   · Severe headache that does not get better with medications  · Chest pain   · Difficulty breathing   · Severe back or abdominal pain   · Nausea and vomiting that does not get better in 1 day  · Decreased amount of urine   · Pain with urination or blood in urine   · Blood in stool or black tarry stools  · New or worsened leg swelling    Follow-up with Dr Ivan Pelaez within one week. Patient Education        Dehydration: Care Instructions  Your Care Instructions  Dehydration happens when your body loses too much fluid. This might happen when you do not drink enough water or you lose large amounts of fluids from your body because of diarrhea, vomiting, or sweating. Severe dehydration can be life-threatening. Water and minerals called electrolytes help put your body fluids back in balance. Learn the early signs of fluid loss, and drink more fluids to prevent dehydration. Follow-up care is a key part of your treatment and safety. Be sure to make and go to all appointments, and call your doctor if you are having problems. It's also a good idea to know your test results and keep a list of the medicines you take. How can you care for yourself at home? · To prevent dehydration, drink plenty of fluids, enough so that your urine is light yellow or clear like water. Choose water and other caffeine-free clear liquids until you feel better. If you have kidney, heart, or liver disease and have to limit fluids, talk with your doctor before you increase the amount of fluids you drink. · If you do not feel like eating or drinking, try taking small sips of water, sports drinks, or other rehydration drinks. · Get plenty of rest.  To prevent dehydration  · Add more fluids to your diet and daily routine, unless your doctor has told you not to. · During hot weather, drink more fluids. Drink even more fluids if you exercise a lot. Stay away from drinks with alcohol or caffeine. · Watch for the symptoms of dehydration. These include:  ? A dry, sticky mouth. ? Dark yellow urine, and not much of it. ? Dry and sunken eyes. ? Feeling very tired. · Learn what problems can lead to dehydration. These include:  ? Diarrhea, fever, and vomiting. ?  Any illness with a fever, such as pneumonia or the flu.  ? Activities that cause heavy sweating, such as endurance races and heavy outdoor work in hot or humid weather. ? Alcohol or drug abuse or withdrawal.  ? Certain medicines, such as cold and allergy pills (antihistamines), diet pills (diuretics), and laxatives. ? Certain diseases, such as diabetes, cancer, and heart or kidney disease. When should you call for help? Call 911 anytime you think you may need emergency care. For example, call if:    · You passed out (lost consciousness).    Call your doctor now or seek immediate medical care if:    · You are confused and cannot think clearly.     · You are dizzy or lightheaded, or you feel like you may faint.     · You have signs of needing more fluids. You have sunken eyes and a dry mouth, and you pass only a little dark urine.     · You cannot keep fluids down.    Watch closely for changes in your health, and be sure to contact your doctor if:    · You are not making tears.     · Your skin is very dry and sags slowly back into place after you pinch it.     · Your mouth and eyes are very dry. Where can you learn more? Go to http://linda-sivan.info/. Enter V669 in the search box to learn more about \"Dehydration: Care Instructions. \"  Current as of: September 23, 2018  Content Version: 11.9  © 3870-7204 [employer]. Care instructions adapted under license by [employer] (which disclaims liability or warranty for this information). If you have questions about a medical condition or this instruction, always ask your healthcare professional. [place] [address] any warranty or liability for your use of this information. Patient Education        Gastroparesis: Care Instructions  Your Care Instructions    When you have gastroparesis, your stomach takes a lot longer to empty. This delay can cause belly pain, bloating, and belching. It also can cause hiccups, heartburn, nausea or vomiting.  You may not feel like eating. These symptoms may come and go. They most often occur during and after meals. You may feel full after only a few bites of food. This condition occurs when the nerves to the stomach don't work properly. Diabetes is the most common cause of this nerve damage. Gastroparesis can make it harder to control your blood sugar levels. But keeping your blood sugar levels under control may help with your symptoms. Parkinson's disease, stroke, and some medicines can also cause this condition. Home treatment can often help. Follow-up care is a key part of your treatment and safety. Be sure to make and go to all appointments, and call your doctor if you are having problems. It's also a good idea to know your test results and keep a list of the medicines you take. How can you care for yourself at home? · Eat several small meals each day rather than three large meals. · Eat foods that are low in fiber and fat. · If your doctor suggests it, take medicines that help the stomach empty more quickly. These are called motility agents. When should you call for help? Call your doctor now or seek immediate medical care if:    · You are vomiting.     · You have new or worse belly pain.     · You have a fever.     · You cannot pass stools or gas.    Watch closely for changes in your health, and be sure to contact your doctor if you have any problems. Where can you learn more? Go to http://linda-sivan.info/. Enter M106 in the search box to learn more about \"Gastroparesis: Care Instructions. \"  Current as of: March 27, 2018  Content Version: 11.9  © 7399-4421 [employer]. Care instructions adapted under license by [employer] (which disclaims liability or warranty for this information).  If you have questions about a medical condition or this instruction, always ask your healthcare professional. [address] any warranty or liability for your use of this information.

## 2019-01-31 NOTE — PROGRESS NOTES
Problem: Falls - Risk of 
Goal: *Absence of Falls Document John Shows Fall Risk and appropriate interventions in the flowsheet. Outcome: Progressing Towards Goal 
Fall Risk Interventions: 
  
 
  
 
Medication Interventions: Patient to call before getting OOB Elimination Interventions: Bed/chair exit alarm, Call light in reach

## 2020-01-01 ENCOUNTER — APPOINTMENT (OUTPATIENT)
Dept: GENERAL RADIOLOGY | Age: 55
DRG: 637 | End: 2020-01-01
Attending: FAMILY MEDICINE
Payer: MEDICARE

## 2020-01-01 ENCOUNTER — APPOINTMENT (OUTPATIENT)
Dept: MRI IMAGING | Age: 55
DRG: 637 | End: 2020-01-01
Attending: STUDENT IN AN ORGANIZED HEALTH CARE EDUCATION/TRAINING PROGRAM
Payer: MEDICARE

## 2020-01-01 ENCOUNTER — APPOINTMENT (OUTPATIENT)
Dept: CT IMAGING | Age: 55
DRG: 637 | End: 2020-01-01
Attending: STUDENT IN AN ORGANIZED HEALTH CARE EDUCATION/TRAINING PROGRAM
Payer: MEDICARE

## 2020-01-01 ENCOUNTER — HOSPITAL ENCOUNTER (INPATIENT)
Dept: INTERVENTIONAL RADIOLOGY/VASCULAR | Age: 55
Discharge: HOME | DRG: 637 | End: 2020-09-10
Attending: STUDENT IN AN ORGANIZED HEALTH CARE EDUCATION/TRAINING PROGRAM | Admitting: FAMILY MEDICINE
Payer: MEDICARE

## 2020-01-01 ENCOUNTER — HOSPITAL ENCOUNTER (OUTPATIENT)
Dept: INTERVENTIONAL RADIOLOGY/VASCULAR | Age: 55
Discharge: HOME | DRG: 637 | End: 2020-09-15
Attending: STUDENT IN AN ORGANIZED HEALTH CARE EDUCATION/TRAINING PROGRAM | Admitting: FAMILY MEDICINE
Payer: MEDICARE

## 2020-01-01 ENCOUNTER — HOSPITAL ENCOUNTER (EMERGENCY)
Age: 55
End: 2020-10-08
Attending: EMERGENCY MEDICINE
Payer: MEDICARE

## 2020-01-01 ENCOUNTER — HOSPITAL ENCOUNTER (INPATIENT)
Age: 55
LOS: 24 days | Discharge: NURSING, REHAB OR LONG-TERM CARE | DRG: 637 | End: 2020-10-02
Attending: EMERGENCY MEDICINE | Admitting: FAMILY MEDICINE
Payer: MEDICARE

## 2020-01-01 ENCOUNTER — ANESTHESIA (OUTPATIENT)
Dept: MRI IMAGING | Age: 55
DRG: 637 | End: 2020-01-01
Payer: MEDICARE

## 2020-01-01 ENCOUNTER — APPOINTMENT (OUTPATIENT)
Dept: MRI IMAGING | Age: 55
DRG: 637 | End: 2020-01-01
Attending: PSYCHIATRY & NEUROLOGY
Payer: MEDICARE

## 2020-01-01 ENCOUNTER — APPOINTMENT (OUTPATIENT)
Dept: GENERAL RADIOLOGY | Age: 55
DRG: 637 | End: 2020-01-01
Attending: STUDENT IN AN ORGANIZED HEALTH CARE EDUCATION/TRAINING PROGRAM
Payer: MEDICARE

## 2020-01-01 ENCOUNTER — APPOINTMENT (OUTPATIENT)
Dept: INTERVENTIONAL RADIOLOGY/VASCULAR | Age: 55
DRG: 637 | End: 2020-01-01
Attending: STUDENT IN AN ORGANIZED HEALTH CARE EDUCATION/TRAINING PROGRAM
Payer: MEDICARE

## 2020-01-01 ENCOUNTER — ANESTHESIA EVENT (OUTPATIENT)
Dept: MRI IMAGING | Age: 55
DRG: 637 | End: 2020-01-01
Payer: MEDICARE

## 2020-01-01 ENCOUNTER — APPOINTMENT (OUTPATIENT)
Dept: GENERAL RADIOLOGY | Age: 55
DRG: 637 | End: 2020-01-01
Attending: EMERGENCY MEDICINE
Payer: MEDICARE

## 2020-01-01 ENCOUNTER — HOSPITAL ENCOUNTER (OUTPATIENT)
Dept: MRI IMAGING | Age: 55
Discharge: HOME | DRG: 637 | End: 2020-09-11
Attending: STUDENT IN AN ORGANIZED HEALTH CARE EDUCATION/TRAINING PROGRAM | Admitting: FAMILY MEDICINE
Payer: MEDICARE

## 2020-01-01 ENCOUNTER — APPOINTMENT (OUTPATIENT)
Dept: ULTRASOUND IMAGING | Age: 55
DRG: 637 | End: 2020-01-01
Attending: STUDENT IN AN ORGANIZED HEALTH CARE EDUCATION/TRAINING PROGRAM
Payer: MEDICARE

## 2020-01-01 VITALS
SYSTOLIC BLOOD PRESSURE: 125 MMHG | RESPIRATION RATE: 11 BRPM | DIASTOLIC BLOOD PRESSURE: 60 MMHG | HEART RATE: 90 BPM | OXYGEN SATURATION: 98 %

## 2020-01-01 VITALS
SYSTOLIC BLOOD PRESSURE: 128 MMHG | RESPIRATION RATE: 18 BRPM | BODY MASS INDEX: 29.93 KG/M2 | TEMPERATURE: 98 F | OXYGEN SATURATION: 99 % | DIASTOLIC BLOOD PRESSURE: 95 MMHG | HEIGHT: 68 IN | HEART RATE: 97 BPM | WEIGHT: 197.5 LBS

## 2020-01-01 DIAGNOSIS — N17.9 ACUTE RENAL FAILURE, UNSPECIFIED ACUTE RENAL FAILURE TYPE (HCC): ICD-10-CM

## 2020-01-01 DIAGNOSIS — G93.41 ACUTE METABOLIC ENCEPHALOPATHY: ICD-10-CM

## 2020-01-01 DIAGNOSIS — E87.6 HYPOKALEMIA: ICD-10-CM

## 2020-01-01 DIAGNOSIS — I46.9 CARDIAC ARREST (HCC): Primary | ICD-10-CM

## 2020-01-01 DIAGNOSIS — R41.82 ALTERED MENTAL STATUS: ICD-10-CM

## 2020-01-01 DIAGNOSIS — A41.9 SEPSIS SECONDARY TO UTI (HCC): ICD-10-CM

## 2020-01-01 DIAGNOSIS — E87.20 LACTIC ACID ACIDOSIS: ICD-10-CM

## 2020-01-01 DIAGNOSIS — E86.0 DEHYDRATION: ICD-10-CM

## 2020-01-01 DIAGNOSIS — G47.00 INSOMNIA, UNSPECIFIED TYPE: Primary | ICD-10-CM

## 2020-01-01 DIAGNOSIS — N39.0 SEPSIS SECONDARY TO UTI (HCC): ICD-10-CM

## 2020-01-01 DIAGNOSIS — R41.82 ALTERED MENTAL STATUS, UNSPECIFIED ALTERED MENTAL STATUS TYPE: ICD-10-CM

## 2020-01-01 DIAGNOSIS — R73.9 HYPERGLYCEMIA: ICD-10-CM

## 2020-01-01 DIAGNOSIS — D72.829 LEUKOCYTOSIS, UNSPECIFIED TYPE: ICD-10-CM

## 2020-01-01 LAB
25(OH)D3 SERPL-MCNC: 31.7 NG/ML (ref 30–100)
6MAM UR QL SCN: NEGATIVE NG/ML
ACTIN IGG SERPL-ACNC: 6 UNITS (ref 0–19)
ALBUMIN SERPL-MCNC: 3.1 G/DL (ref 3.4–5)
ALBUMIN SERPL-MCNC: 3.2 G/DL (ref 3.4–5)
ALBUMIN SERPL-MCNC: 3.3 G/DL (ref 3.4–5)
ALBUMIN SERPL-MCNC: 3.3 G/DL (ref 3.4–5)
ALBUMIN SERPL-MCNC: 3.4 G/DL (ref 3.4–5)
ALBUMIN SERPL-MCNC: 3.5 G/DL (ref 3.4–5)
ALBUMIN SERPL-MCNC: 3.6 G/DL (ref 3.4–5)
ALBUMIN SERPL-MCNC: 3.8 G/DL (ref 3.4–5)
ALBUMIN SERPL-MCNC: 3.8 G/DL (ref 3.4–5)
ALBUMIN SERPL-MCNC: 3.9 G/DL (ref 3.4–5)
ALBUMIN SERPL-MCNC: 4.2 G/DL (ref 3.4–5)
ALBUMIN SERPL-MCNC: 4.9 G/DL (ref 3.4–5)
ALBUMIN/GLOB SERPL: 0.8 {RATIO} (ref 0.8–1.7)
ALBUMIN/GLOB SERPL: 0.9 {RATIO} (ref 0.8–1.7)
ALBUMIN/GLOB SERPL: 1 {RATIO} (ref 0.8–1.7)
ALBUMIN/GLOB SERPL: 1.1 {RATIO} (ref 0.8–1.7)
ALDOLASE SERPL-CCNC: 12.6 U/L (ref 3.3–10.3)
ALP SERPL-CCNC: 102 U/L (ref 45–117)
ALP SERPL-CCNC: 103 U/L (ref 45–117)
ALP SERPL-CCNC: 103 U/L (ref 45–117)
ALP SERPL-CCNC: 105 U/L (ref 45–117)
ALP SERPL-CCNC: 106 U/L (ref 45–117)
ALP SERPL-CCNC: 108 U/L (ref 45–117)
ALP SERPL-CCNC: 109 U/L (ref 45–117)
ALP SERPL-CCNC: 110 U/L (ref 45–117)
ALP SERPL-CCNC: 111 U/L (ref 45–117)
ALP SERPL-CCNC: 116 U/L (ref 45–117)
ALP SERPL-CCNC: 116 U/L (ref 45–117)
ALP SERPL-CCNC: 117 U/L (ref 45–117)
ALP SERPL-CCNC: 123 U/L (ref 45–117)
ALP SERPL-CCNC: 161 U/L (ref 45–117)
ALP SERPL-CCNC: 85 U/L (ref 45–117)
ALP SERPL-CCNC: 88 U/L (ref 45–117)
ALP SERPL-CCNC: 89 U/L (ref 45–117)
ALP SERPL-CCNC: 93 U/L (ref 45–117)
ALP SERPL-CCNC: 94 U/L (ref 45–117)
ALP SERPL-CCNC: 95 U/L (ref 45–117)
ALP SERPL-CCNC: 95 U/L (ref 45–117)
ALP SERPL-CCNC: 96 U/L (ref 45–117)
ALP SERPL-CCNC: 97 U/L (ref 45–117)
ALP SERPL-CCNC: 99 U/L (ref 45–117)
ALT SERPL-CCNC: 18 U/L (ref 13–56)
ALT SERPL-CCNC: 19 U/L (ref 13–56)
ALT SERPL-CCNC: 20 U/L (ref 13–56)
ALT SERPL-CCNC: 21 U/L (ref 13–56)
ALT SERPL-CCNC: 22 U/L (ref 13–56)
ALT SERPL-CCNC: 24 U/L (ref 13–56)
ALT SERPL-CCNC: 27 U/L (ref 13–56)
ALT SERPL-CCNC: 29 U/L (ref 13–56)
ALT SERPL-CCNC: 31 U/L (ref 13–56)
ALT SERPL-CCNC: 31 U/L (ref 13–56)
ALT SERPL-CCNC: 32 U/L (ref 13–56)
ALT SERPL-CCNC: 32 U/L (ref 13–56)
ALT SERPL-CCNC: 33 U/L (ref 13–56)
ALT SERPL-CCNC: 35 U/L (ref 13–56)
ALT SERPL-CCNC: 35 U/L (ref 13–56)
ALT SERPL-CCNC: 37 U/L (ref 13–56)
ALT SERPL-CCNC: 37 U/L (ref 13–56)
ALT SERPL-CCNC: 41 U/L (ref 13–56)
ALT SERPL-CCNC: 43 U/L (ref 13–56)
ALT SERPL-CCNC: 45 U/L (ref 13–56)
ALT SERPL-CCNC: 49 U/L (ref 13–56)
ALT SERPL-CCNC: 51 U/L (ref 13–56)
ALT SERPL-CCNC: 51 U/L (ref 13–56)
ALT SERPL-CCNC: 54 U/L (ref 13–56)
AMMONIA PLAS-SCNC: 15 UMOL/L (ref 11–32)
AMMONIA PLAS-SCNC: 17 UMOL/L (ref 11–32)
AMPHET UR QL SCN: NEGATIVE
AMPHETAMINE SCREEN, URINE, 734836: NEGATIVE NG/ML
ANA TITR SER IF: NEGATIVE {TITER}
ANION GAP SERPL CALC-SCNC: 10 MMOL/L (ref 3–18)
ANION GAP SERPL CALC-SCNC: 17 MMOL/L (ref 3–18)
ANION GAP SERPL CALC-SCNC: 4 MMOL/L (ref 3–18)
ANION GAP SERPL CALC-SCNC: 4 MMOL/L (ref 3–18)
ANION GAP SERPL CALC-SCNC: 5 MMOL/L (ref 3–18)
ANION GAP SERPL CALC-SCNC: 6 MMOL/L (ref 3–18)
ANION GAP SERPL CALC-SCNC: 7 MMOL/L (ref 3–18)
ANION GAP SERPL CALC-SCNC: 8 MMOL/L (ref 3–18)
ANION GAP SERPL CALC-SCNC: 9 MMOL/L (ref 3–18)
APPEARANCE CSF: CLEAR
APPEARANCE UR: ABNORMAL
APPEARANCE UR: CLEAR
ARSENIC BLD-MCNC: 7 UG/L (ref 2–23)
ARTERIAL PATENCY WRIST A: YES
AST SERPL-CCNC: 17 U/L (ref 10–38)
AST SERPL-CCNC: 21 U/L (ref 10–38)
AST SERPL-CCNC: 22 U/L (ref 10–38)
AST SERPL-CCNC: 23 U/L (ref 10–38)
AST SERPL-CCNC: 24 U/L (ref 10–38)
AST SERPL-CCNC: 31 U/L (ref 10–38)
AST SERPL-CCNC: 34 U/L (ref 10–38)
AST SERPL-CCNC: 34 U/L (ref 10–38)
AST SERPL-CCNC: 36 U/L (ref 10–38)
AST SERPL-CCNC: 36 U/L (ref 10–38)
AST SERPL-CCNC: 38 U/L (ref 10–38)
AST SERPL-CCNC: 42 U/L (ref 10–38)
AST SERPL-CCNC: 42 U/L (ref 10–38)
AST SERPL-CCNC: 45 U/L (ref 10–38)
AST SERPL-CCNC: 50 U/L (ref 10–38)
AST SERPL-CCNC: 55 U/L (ref 10–38)
AST SERPL-CCNC: 57 U/L (ref 10–38)
AST SERPL-CCNC: 59 U/L (ref 10–38)
AST SERPL-CCNC: 60 U/L (ref 10–38)
AST SERPL-CCNC: 61 U/L (ref 10–38)
AST SERPL-CCNC: 64 U/L (ref 10–38)
AST SERPL-CCNC: 68 U/L (ref 10–38)
AST SERPL-CCNC: 77 U/L (ref 10–38)
AST SERPL-CCNC: 82 U/L (ref 10–38)
ATRIAL RATE: 115 BPM
ATRIAL RATE: 119 BPM
ATRIAL RATE: 131 BPM
ATRIAL RATE: 132 BPM
ATRIAL RATE: 90 BPM
BACTERIA SPEC CULT: ABNORMAL
BACTERIA SPEC CULT: NORMAL
BACTERIA URNS QL MICRO: ABNORMAL /HPF
BACTERIA URNS QL MICRO: ABNORMAL /HPF
BARBITURATES UR QL SCN: NEGATIVE
BARBITURATES UR QL SCN: NEGATIVE NG/ML
BASE EXCESS BLD CALC-SCNC: 1 MMOL/L
BASOPHILS # BLD: 0 K/UL (ref 0–0.06)
BASOPHILS NFR BLD: 0 % (ref 0–3)
BDY SITE: ABNORMAL
BENZODIAZ UR QL: NEGATIVE
BENZODIAZ UR QL: NEGATIVE NG/ML
BILIRUB DIRECT SERPL-MCNC: 0.4 MG/DL (ref 0–0.2)
BILIRUB DIRECT SERPL-MCNC: 0.6 MG/DL (ref 0–0.2)
BILIRUB SERPL-MCNC: 1.3 MG/DL (ref 0.2–1)
BILIRUB SERPL-MCNC: 1.4 MG/DL (ref 0.2–1)
BILIRUB SERPL-MCNC: 1.5 MG/DL (ref 0.2–1)
BILIRUB SERPL-MCNC: 1.5 MG/DL (ref 0.2–1)
BILIRUB SERPL-MCNC: 1.6 MG/DL (ref 0.2–1)
BILIRUB SERPL-MCNC: 1.7 MG/DL (ref 0.2–1)
BILIRUB SERPL-MCNC: 1.8 MG/DL (ref 0.2–1)
BILIRUB SERPL-MCNC: 1.9 MG/DL (ref 0.2–1)
BILIRUB SERPL-MCNC: 2.2 MG/DL (ref 0.2–1)
BILIRUB SERPL-MCNC: 2.2 MG/DL (ref 0.2–1)
BILIRUB SERPL-MCNC: 2.3 MG/DL (ref 0.2–1)
BILIRUB SERPL-MCNC: 2.3 MG/DL (ref 0.2–1)
BILIRUB SERPL-MCNC: 2.5 MG/DL (ref 0.2–1)
BILIRUB SERPL-MCNC: 2.6 MG/DL (ref 0.2–1)
BILIRUB SERPL-MCNC: 2.7 MG/DL (ref 0.2–1)
BILIRUB SERPL-MCNC: 3.9 MG/DL (ref 0.2–1)
BILIRUB UR QL: NEGATIVE
BILIRUB UR QL: NEGATIVE
BLASTS NFR BLD MANUAL: 0 %
BODY TEMPERATURE: 36.9
BUN SERPL-MCNC: 10 MG/DL (ref 7–18)
BUN SERPL-MCNC: 11 MG/DL (ref 7–18)
BUN SERPL-MCNC: 11 MG/DL (ref 7–18)
BUN SERPL-MCNC: 12 MG/DL (ref 7–18)
BUN SERPL-MCNC: 13 MG/DL (ref 7–18)
BUN SERPL-MCNC: 13 MG/DL (ref 7–18)
BUN SERPL-MCNC: 21 MG/DL (ref 7–18)
BUN SERPL-MCNC: 29 MG/DL (ref 7–18)
BUN SERPL-MCNC: 30 MG/DL (ref 7–18)
BUN SERPL-MCNC: 30 MG/DL (ref 7–18)
BUN SERPL-MCNC: 32 MG/DL (ref 7–18)
BUN SERPL-MCNC: 5 MG/DL (ref 7–18)
BUN SERPL-MCNC: 6 MG/DL (ref 7–18)
BUN SERPL-MCNC: 6 MG/DL (ref 7–18)
BUN SERPL-MCNC: 7 MG/DL (ref 7–18)
BUN/CREAT SERPL: 10 (ref 12–20)
BUN/CREAT SERPL: 10 (ref 12–20)
BUN/CREAT SERPL: 11 (ref 12–20)
BUN/CREAT SERPL: 12 (ref 12–20)
BUN/CREAT SERPL: 14 (ref 12–20)
BUN/CREAT SERPL: 4 (ref 12–20)
BUN/CREAT SERPL: 4 (ref 12–20)
BUN/CREAT SERPL: 5 (ref 12–20)
BUN/CREAT SERPL: 6 (ref 12–20)
BUN/CREAT SERPL: 6 (ref 12–20)
BUN/CREAT SERPL: 7 (ref 12–20)
BUN/CREAT SERPL: 8 (ref 12–20)
BUPRENORPHINE, URINE: NEGATIVE NG/ML
BZE UR QL: NEGATIVE NG/ML
CALCIUM SERPL-MCNC: 10.3 MG/DL (ref 8.5–10.1)
CALCIUM SERPL-MCNC: 8.7 MG/DL (ref 8.5–10.1)
CALCIUM SERPL-MCNC: 8.8 MG/DL (ref 8.5–10.1)
CALCIUM SERPL-MCNC: 8.9 MG/DL (ref 8.5–10.1)
CALCIUM SERPL-MCNC: 9 MG/DL (ref 8.5–10.1)
CALCIUM SERPL-MCNC: 9.1 MG/DL (ref 8.5–10.1)
CALCIUM SERPL-MCNC: 9.2 MG/DL (ref 8.5–10.1)
CALCIUM SERPL-MCNC: 9.3 MG/DL (ref 8.5–10.1)
CALCIUM SERPL-MCNC: 9.4 MG/DL (ref 8.5–10.1)
CALCIUM SERPL-MCNC: 9.5 MG/DL (ref 8.5–10.1)
CALCULATED P AXIS, ECG09: 25 DEGREES
CALCULATED P AXIS, ECG09: 34 DEGREES
CALCULATED P AXIS, ECG09: 41 DEGREES
CALCULATED P AXIS, ECG09: 43 DEGREES
CALCULATED P AXIS, ECG09: 43 DEGREES
CALCULATED R AXIS, ECG10: 1 DEGREES
CALCULATED R AXIS, ECG10: 32 DEGREES
CALCULATED R AXIS, ECG10: 33 DEGREES
CALCULATED R AXIS, ECG10: 8 DEGREES
CALCULATED R AXIS, ECG10: 9 DEGREES
CALCULATED T AXIS, ECG11: -119 DEGREES
CALCULATED T AXIS, ECG11: 148 DEGREES
CALCULATED T AXIS, ECG11: 34 DEGREES
CALCULATED T AXIS, ECG11: 40 DEGREES
CALCULATED T AXIS, ECG11: 61 DEGREES
CANNABINOIDS UR QL SCN: NEGATIVE
CANNABINOIDS UR QL SCN: NEGATIVE NG/ML
CC UR VC: ABNORMAL
CC UR VC: NORMAL
CHLORIDE SERPL-SCNC: 103 MMOL/L (ref 100–111)
CHLORIDE SERPL-SCNC: 104 MMOL/L (ref 100–111)
CHLORIDE SERPL-SCNC: 105 MMOL/L (ref 100–111)
CHLORIDE SERPL-SCNC: 106 MMOL/L (ref 100–111)
CHLORIDE SERPL-SCNC: 107 MMOL/L (ref 100–111)
CHLORIDE SERPL-SCNC: 109 MMOL/L (ref 100–111)
CHLORIDE SERPL-SCNC: 110 MMOL/L (ref 100–111)
CHLORIDE SERPL-SCNC: 111 MMOL/L (ref 100–111)
CHLORIDE SERPL-SCNC: 116 MMOL/L (ref 100–111)
CHLORIDE SERPL-SCNC: 118 MMOL/L (ref 100–111)
CK MB CFR SERPL CALC: 0.6 % (ref 0–4)
CK MB CFR SERPL CALC: 0.8 % (ref 0–4)
CK MB SERPL-MCNC: 1.4 NG/ML (ref 5–25)
CK MB SERPL-MCNC: 7.6 NG/ML (ref 5–25)
CK SERPL-CCNC: 1039 U/L (ref 26–192)
CK SERPL-CCNC: 1057 U/L (ref 26–192)
CK SERPL-CCNC: 1140 U/L (ref 26–192)
CK SERPL-CCNC: 1177 U/L (ref 26–192)
CK SERPL-CCNC: 1268 U/L (ref 26–192)
CK SERPL-CCNC: 1559 U/L (ref 26–192)
CK SERPL-CCNC: 170 U/L (ref 26–192)
CK SERPL-CCNC: 1817 U/L (ref 26–192)
CK SERPL-CCNC: 1895 U/L (ref 26–192)
CK SERPL-CCNC: 1920 U/L (ref 26–192)
CK SERPL-CCNC: 1993 U/L (ref 26–192)
CK SERPL-CCNC: 2071 U/L (ref 26–192)
CK SERPL-CCNC: 2096 U/L (ref 26–192)
CK SERPL-CCNC: 2133 U/L (ref 26–192)
CK SERPL-CCNC: 2160 U/L (ref 26–192)
CK SERPL-CCNC: 2261 U/L (ref 26–192)
CK SERPL-CCNC: 479 U/L (ref 26–192)
CK SERPL-CCNC: 654 U/L (ref 26–192)
CK SERPL-CCNC: 807 U/L (ref 26–192)
CK SERPL-CCNC: 996 U/L (ref 26–192)
CO2 SERPL-SCNC: 21 MMOL/L (ref 21–32)
CO2 SERPL-SCNC: 23 MMOL/L (ref 21–32)
CO2 SERPL-SCNC: 24 MMOL/L (ref 21–32)
CO2 SERPL-SCNC: 25 MMOL/L (ref 21–32)
CO2 SERPL-SCNC: 26 MMOL/L (ref 21–32)
CO2 SERPL-SCNC: 27 MMOL/L (ref 21–32)
CO2 SERPL-SCNC: 28 MMOL/L (ref 21–32)
CO2 SERPL-SCNC: 28 MMOL/L (ref 21–32)
CO2 SERPL-SCNC: 29 MMOL/L (ref 21–32)
CO2 SERPL-SCNC: 30 MMOL/L (ref 21–32)
COCAINE UR QL SCN: NEGATIVE
COLOR CSF: COLORLESS
COLOR SPUN CSF: COLORLESS
COLOR UR: ABNORMAL
COLOR UR: YELLOW
COVID-19 RAPID TEST, COVR: NOT DETECTED
CREAT SERPL-MCNC: 0.92 MG/DL (ref 0.6–1.3)
CREAT SERPL-MCNC: 0.94 MG/DL (ref 0.6–1.3)
CREAT SERPL-MCNC: 0.95 MG/DL (ref 0.6–1.3)
CREAT SERPL-MCNC: 0.96 MG/DL (ref 0.6–1.3)
CREAT SERPL-MCNC: 0.97 MG/DL (ref 0.6–1.3)
CREAT SERPL-MCNC: 1.03 MG/DL (ref 0.6–1.3)
CREAT SERPL-MCNC: 1.07 MG/DL (ref 0.6–1.3)
CREAT SERPL-MCNC: 1.09 MG/DL (ref 0.6–1.3)
CREAT SERPL-MCNC: 1.11 MG/DL (ref 0.6–1.3)
CREAT SERPL-MCNC: 1.12 MG/DL (ref 0.6–1.3)
CREAT SERPL-MCNC: 1.14 MG/DL (ref 0.6–1.3)
CREAT SERPL-MCNC: 1.16 MG/DL (ref 0.6–1.3)
CREAT SERPL-MCNC: 1.19 MG/DL (ref 0.6–1.3)
CREAT SERPL-MCNC: 1.2 MG/DL (ref 0.6–1.3)
CREAT SERPL-MCNC: 1.22 MG/DL (ref 0.6–1.3)
CREAT SERPL-MCNC: 1.23 MG/DL (ref 0.6–1.3)
CREAT SERPL-MCNC: 1.26 MG/DL (ref 0.6–1.3)
CREAT SERPL-MCNC: 1.3 MG/DL (ref 0.6–1.3)
CREAT SERPL-MCNC: 1.36 MG/DL (ref 0.6–1.3)
CREAT SERPL-MCNC: 1.39 MG/DL (ref 0.6–1.3)
CREAT SERPL-MCNC: 1.76 MG/DL (ref 0.6–1.3)
CREAT SERPL-MCNC: 2.08 MG/DL (ref 0.6–1.3)
CREAT SERPL-MCNC: 2.62 MG/DL (ref 0.6–1.3)
CREAT SERPL-MCNC: 2.73 MG/DL (ref 0.6–1.3)
CREAT SERPL-MCNC: 3.56 MG/DL (ref 0.6–1.3)
CREAT UR-MCNC: 184.7 MG/DL (ref 20–300)
CRP SERPL-MCNC: 3.7 MG/DL (ref 0–0.3)
CRYPTOCOCCUS NEOFORMANS/GATTII, CRNEOG: NOT DETECTED
CYTOMEGALOVIRUS, CYMEG: NOT DETECTED
DEPRECATED FTI SERPL-MCNC: 2.2 UG/DL (ref 1.2–4.9)
DIAGNOSIS, 93000: NORMAL
DIFFERENTIAL METHOD BLD: ABNORMAL
EDDP UR QL: NEGATIVE NG/ML
ENTEROVIRUS, ENTVIR: NOT DETECTED
EOSINOPHIL # BLD: 0 K/UL (ref 0–0.4)
EOSINOPHIL # BLD: 0.1 K/UL (ref 0–0.4)
EOSINOPHIL # BLD: 0.2 K/UL (ref 0–0.4)
EOSINOPHIL # BLD: 0.3 K/UL (ref 0–0.4)
EOSINOPHIL # BLD: 0.3 K/UL (ref 0–0.4)
EOSINOPHIL # BLD: 0.4 K/UL (ref 0–0.4)
EOSINOPHIL NFR BLD: 0 % (ref 0–5)
EOSINOPHIL NFR BLD: 1 % (ref 0–5)
EOSINOPHIL NFR BLD: 2 % (ref 0–5)
EOSINOPHIL NFR BLD: 3 % (ref 0–5)
EPITH CASTS URNS QL MICRO: ABNORMAL /LPF (ref 0–5)
EPITH CASTS URNS QL MICRO: ABNORMAL /LPF (ref 0–5)
ERYTHROCYTE [DISTWIDTH] IN BLOOD BY AUTOMATED COUNT: 13.4 % (ref 11.6–14.5)
ERYTHROCYTE [DISTWIDTH] IN BLOOD BY AUTOMATED COUNT: 13.6 % (ref 11.6–14.5)
ERYTHROCYTE [DISTWIDTH] IN BLOOD BY AUTOMATED COUNT: 13.7 % (ref 11.6–14.5)
ERYTHROCYTE [DISTWIDTH] IN BLOOD BY AUTOMATED COUNT: 13.7 % (ref 11.6–14.5)
ERYTHROCYTE [DISTWIDTH] IN BLOOD BY AUTOMATED COUNT: 13.8 % (ref 11.6–14.5)
ERYTHROCYTE [DISTWIDTH] IN BLOOD BY AUTOMATED COUNT: 13.8 % (ref 11.6–14.5)
ERYTHROCYTE [DISTWIDTH] IN BLOOD BY AUTOMATED COUNT: 14 % (ref 11.6–14.5)
ERYTHROCYTE [DISTWIDTH] IN BLOOD BY AUTOMATED COUNT: 14.2 % (ref 11.6–14.5)
ERYTHROCYTE [DISTWIDTH] IN BLOOD BY AUTOMATED COUNT: 14.2 % (ref 11.6–14.5)
ERYTHROCYTE [DISTWIDTH] IN BLOOD BY AUTOMATED COUNT: 14.4 % (ref 11.6–14.5)
ERYTHROCYTE [DISTWIDTH] IN BLOOD BY AUTOMATED COUNT: 14.4 % (ref 11.6–14.5)
ERYTHROCYTE [DISTWIDTH] IN BLOOD BY AUTOMATED COUNT: 14.5 % (ref 11.6–14.5)
ERYTHROCYTE [DISTWIDTH] IN BLOOD BY AUTOMATED COUNT: 14.6 % (ref 11.6–14.5)
ERYTHROCYTE [DISTWIDTH] IN BLOOD BY AUTOMATED COUNT: 14.8 % (ref 11.6–14.5)
ERYTHROCYTE [DISTWIDTH] IN BLOOD BY AUTOMATED COUNT: 14.8 % (ref 11.6–14.5)
ERYTHROCYTE [DISTWIDTH] IN BLOOD BY AUTOMATED COUNT: 15.3 % (ref 11.6–14.5)
ERYTHROCYTE [DISTWIDTH] IN BLOOD BY AUTOMATED COUNT: 15.4 % (ref 11.6–14.5)
ERYTHROCYTE [DISTWIDTH] IN BLOOD BY AUTOMATED COUNT: 15.5 % (ref 11.6–14.5)
ERYTHROCYTE [DISTWIDTH] IN BLOOD BY AUTOMATED COUNT: 15.7 % (ref 11.6–14.5)
ERYTHROCYTE [DISTWIDTH] IN BLOOD BY AUTOMATED COUNT: 15.7 % (ref 11.6–14.5)
ERYTHROCYTE [DISTWIDTH] IN BLOOD BY AUTOMATED COUNT: 15.8 % (ref 11.6–14.5)
ERYTHROCYTE [DISTWIDTH] IN BLOOD BY AUTOMATED COUNT: 15.9 % (ref 11.6–14.5)
ERYTHROCYTE [DISTWIDTH] IN BLOOD BY AUTOMATED COUNT: 15.9 % (ref 11.6–14.5)
ERYTHROCYTE [SEDIMENTATION RATE] IN BLOOD: 32 MM/HR (ref 0–30)
ESCHERICHIA COLI K1, ECK1: NOT DETECTED
EST. AVERAGE GLUCOSE BLD GHB EST-MCNC: 154 MG/DL
ETHANOL CONFIRM: 0.42 %
ETHANOL SERPL-MCNC: <3 MG/DL (ref 0–3)
ETHANOL UR-MCNC: NORMAL %
ETHANOL,UR,DS18: POSITIVE
FERRITIN SERPL-MCNC: 129 NG/ML (ref 8–388)
FOLATE SERPL-MCNC: 10.2 NG/ML (ref 3.1–17.5)
GAS FLOW.O2 O2 DELIVERY SYS: ABNORMAL L/MIN
GGT SERPL-CCNC: 31 U/L (ref 5–55)
GLOBULIN SER CALC-MCNC: 3.1 G/DL (ref 2–4)
GLOBULIN SER CALC-MCNC: 3.2 G/DL (ref 2–4)
GLOBULIN SER CALC-MCNC: 3.2 G/DL (ref 2–4)
GLOBULIN SER CALC-MCNC: 3.5 G/DL (ref 2–4)
GLOBULIN SER CALC-MCNC: 3.6 G/DL (ref 2–4)
GLOBULIN SER CALC-MCNC: 3.7 G/DL (ref 2–4)
GLOBULIN SER CALC-MCNC: 3.7 G/DL (ref 2–4)
GLOBULIN SER CALC-MCNC: 3.8 G/DL (ref 2–4)
GLOBULIN SER CALC-MCNC: 3.9 G/DL (ref 2–4)
GLOBULIN SER CALC-MCNC: 3.9 G/DL (ref 2–4)
GLOBULIN SER CALC-MCNC: 4 G/DL (ref 2–4)
GLOBULIN SER CALC-MCNC: 4.1 G/DL (ref 2–4)
GLOBULIN SER CALC-MCNC: 4.3 G/DL (ref 2–4)
GLOBULIN SER CALC-MCNC: 4.3 G/DL (ref 2–4)
GLOBULIN SER CALC-MCNC: 5.3 G/DL (ref 2–4)
GLUCOSE BLD STRIP.AUTO-MCNC: 103 MG/DL (ref 70–110)
GLUCOSE BLD STRIP.AUTO-MCNC: 109 MG/DL (ref 70–110)
GLUCOSE BLD STRIP.AUTO-MCNC: 112 MG/DL (ref 70–110)
GLUCOSE BLD STRIP.AUTO-MCNC: 114 MG/DL (ref 70–110)
GLUCOSE BLD STRIP.AUTO-MCNC: 115 MG/DL (ref 70–110)
GLUCOSE BLD STRIP.AUTO-MCNC: 117 MG/DL (ref 70–110)
GLUCOSE BLD STRIP.AUTO-MCNC: 119 MG/DL (ref 70–110)
GLUCOSE BLD STRIP.AUTO-MCNC: 124 MG/DL (ref 70–110)
GLUCOSE BLD STRIP.AUTO-MCNC: 127 MG/DL (ref 70–110)
GLUCOSE BLD STRIP.AUTO-MCNC: 129 MG/DL (ref 70–110)
GLUCOSE BLD STRIP.AUTO-MCNC: 131 MG/DL (ref 70–110)
GLUCOSE BLD STRIP.AUTO-MCNC: 131 MG/DL (ref 70–110)
GLUCOSE BLD STRIP.AUTO-MCNC: 132 MG/DL (ref 70–110)
GLUCOSE BLD STRIP.AUTO-MCNC: 132 MG/DL (ref 70–110)
GLUCOSE BLD STRIP.AUTO-MCNC: 135 MG/DL (ref 70–110)
GLUCOSE BLD STRIP.AUTO-MCNC: 135 MG/DL (ref 70–110)
GLUCOSE BLD STRIP.AUTO-MCNC: 136 MG/DL (ref 70–110)
GLUCOSE BLD STRIP.AUTO-MCNC: 136 MG/DL (ref 70–110)
GLUCOSE BLD STRIP.AUTO-MCNC: 138 MG/DL (ref 70–110)
GLUCOSE BLD STRIP.AUTO-MCNC: 139 MG/DL (ref 70–110)
GLUCOSE BLD STRIP.AUTO-MCNC: 141 MG/DL (ref 70–110)
GLUCOSE BLD STRIP.AUTO-MCNC: 143 MG/DL (ref 70–110)
GLUCOSE BLD STRIP.AUTO-MCNC: 145 MG/DL (ref 70–110)
GLUCOSE BLD STRIP.AUTO-MCNC: 147 MG/DL (ref 70–110)
GLUCOSE BLD STRIP.AUTO-MCNC: 152 MG/DL (ref 70–110)
GLUCOSE BLD STRIP.AUTO-MCNC: 153 MG/DL (ref 70–110)
GLUCOSE BLD STRIP.AUTO-MCNC: 157 MG/DL (ref 70–110)
GLUCOSE BLD STRIP.AUTO-MCNC: 157 MG/DL (ref 70–110)
GLUCOSE BLD STRIP.AUTO-MCNC: 160 MG/DL (ref 70–110)
GLUCOSE BLD STRIP.AUTO-MCNC: 160 MG/DL (ref 70–110)
GLUCOSE BLD STRIP.AUTO-MCNC: 164 MG/DL (ref 70–110)
GLUCOSE BLD STRIP.AUTO-MCNC: 165 MG/DL (ref 70–110)
GLUCOSE BLD STRIP.AUTO-MCNC: 165 MG/DL (ref 70–110)
GLUCOSE BLD STRIP.AUTO-MCNC: 166 MG/DL (ref 70–110)
GLUCOSE BLD STRIP.AUTO-MCNC: 168 MG/DL (ref 70–110)
GLUCOSE BLD STRIP.AUTO-MCNC: 168 MG/DL (ref 70–110)
GLUCOSE BLD STRIP.AUTO-MCNC: 169 MG/DL (ref 70–110)
GLUCOSE BLD STRIP.AUTO-MCNC: 169 MG/DL (ref 70–110)
GLUCOSE BLD STRIP.AUTO-MCNC: 177 MG/DL (ref 70–110)
GLUCOSE BLD STRIP.AUTO-MCNC: 179 MG/DL (ref 70–110)
GLUCOSE BLD STRIP.AUTO-MCNC: 181 MG/DL (ref 70–110)
GLUCOSE BLD STRIP.AUTO-MCNC: 184 MG/DL (ref 70–110)
GLUCOSE BLD STRIP.AUTO-MCNC: 185 MG/DL (ref 70–110)
GLUCOSE BLD STRIP.AUTO-MCNC: 188 MG/DL (ref 70–110)
GLUCOSE BLD STRIP.AUTO-MCNC: 189 MG/DL (ref 70–110)
GLUCOSE BLD STRIP.AUTO-MCNC: 192 MG/DL (ref 70–110)
GLUCOSE BLD STRIP.AUTO-MCNC: 193 MG/DL (ref 70–110)
GLUCOSE BLD STRIP.AUTO-MCNC: 195 MG/DL (ref 70–110)
GLUCOSE BLD STRIP.AUTO-MCNC: 198 MG/DL (ref 70–110)
GLUCOSE BLD STRIP.AUTO-MCNC: 199 MG/DL (ref 70–110)
GLUCOSE BLD STRIP.AUTO-MCNC: 200 MG/DL (ref 70–110)
GLUCOSE BLD STRIP.AUTO-MCNC: 204 MG/DL (ref 70–110)
GLUCOSE BLD STRIP.AUTO-MCNC: 204 MG/DL (ref 70–110)
GLUCOSE BLD STRIP.AUTO-MCNC: 208 MG/DL (ref 70–110)
GLUCOSE BLD STRIP.AUTO-MCNC: 208 MG/DL (ref 70–110)
GLUCOSE BLD STRIP.AUTO-MCNC: 209 MG/DL (ref 70–110)
GLUCOSE BLD STRIP.AUTO-MCNC: 211 MG/DL (ref 70–110)
GLUCOSE BLD STRIP.AUTO-MCNC: 213 MG/DL (ref 70–110)
GLUCOSE BLD STRIP.AUTO-MCNC: 216 MG/DL (ref 70–110)
GLUCOSE BLD STRIP.AUTO-MCNC: 218 MG/DL (ref 70–110)
GLUCOSE BLD STRIP.AUTO-MCNC: 218 MG/DL (ref 70–110)
GLUCOSE BLD STRIP.AUTO-MCNC: 219 MG/DL (ref 70–110)
GLUCOSE BLD STRIP.AUTO-MCNC: 221 MG/DL (ref 70–110)
GLUCOSE BLD STRIP.AUTO-MCNC: 224 MG/DL (ref 70–110)
GLUCOSE BLD STRIP.AUTO-MCNC: 224 MG/DL (ref 70–110)
GLUCOSE BLD STRIP.AUTO-MCNC: 225 MG/DL (ref 70–110)
GLUCOSE BLD STRIP.AUTO-MCNC: 226 MG/DL (ref 70–110)
GLUCOSE BLD STRIP.AUTO-MCNC: 227 MG/DL (ref 70–110)
GLUCOSE BLD STRIP.AUTO-MCNC: 230 MG/DL (ref 70–110)
GLUCOSE BLD STRIP.AUTO-MCNC: 234 MG/DL (ref 70–110)
GLUCOSE BLD STRIP.AUTO-MCNC: 238 MG/DL (ref 70–110)
GLUCOSE BLD STRIP.AUTO-MCNC: 242 MG/DL (ref 70–110)
GLUCOSE BLD STRIP.AUTO-MCNC: 243 MG/DL (ref 70–110)
GLUCOSE BLD STRIP.AUTO-MCNC: 245 MG/DL (ref 70–110)
GLUCOSE BLD STRIP.AUTO-MCNC: 246 MG/DL (ref 70–110)
GLUCOSE BLD STRIP.AUTO-MCNC: 250 MG/DL (ref 70–110)
GLUCOSE BLD STRIP.AUTO-MCNC: 254 MG/DL (ref 70–110)
GLUCOSE BLD STRIP.AUTO-MCNC: 255 MG/DL (ref 70–110)
GLUCOSE BLD STRIP.AUTO-MCNC: 257 MG/DL (ref 70–110)
GLUCOSE BLD STRIP.AUTO-MCNC: 264 MG/DL (ref 70–110)
GLUCOSE BLD STRIP.AUTO-MCNC: 267 MG/DL (ref 70–110)
GLUCOSE BLD STRIP.AUTO-MCNC: 278 MG/DL (ref 70–110)
GLUCOSE BLD STRIP.AUTO-MCNC: 317 MG/DL (ref 70–110)
GLUCOSE BLD STRIP.AUTO-MCNC: 460 MG/DL (ref 70–110)
GLUCOSE BLD STRIP.AUTO-MCNC: 80 MG/DL (ref 70–110)
GLUCOSE BLD STRIP.AUTO-MCNC: 89 MG/DL (ref 70–110)
GLUCOSE BLD STRIP.AUTO-MCNC: 94 MG/DL (ref 70–110)
GLUCOSE CSF-MCNC: 70 MG/DL (ref 40–70)
GLUCOSE SERPL-MCNC: 104 MG/DL (ref 74–99)
GLUCOSE SERPL-MCNC: 108 MG/DL (ref 74–99)
GLUCOSE SERPL-MCNC: 123 MG/DL (ref 74–99)
GLUCOSE SERPL-MCNC: 132 MG/DL (ref 74–99)
GLUCOSE SERPL-MCNC: 135 MG/DL (ref 74–99)
GLUCOSE SERPL-MCNC: 139 MG/DL (ref 74–99)
GLUCOSE SERPL-MCNC: 142 MG/DL (ref 74–99)
GLUCOSE SERPL-MCNC: 149 MG/DL (ref 74–99)
GLUCOSE SERPL-MCNC: 150 MG/DL (ref 74–99)
GLUCOSE SERPL-MCNC: 156 MG/DL (ref 74–99)
GLUCOSE SERPL-MCNC: 158 MG/DL (ref 74–99)
GLUCOSE SERPL-MCNC: 164 MG/DL (ref 74–99)
GLUCOSE SERPL-MCNC: 182 MG/DL (ref 74–99)
GLUCOSE SERPL-MCNC: 198 MG/DL (ref 74–99)
GLUCOSE SERPL-MCNC: 200 MG/DL (ref 74–99)
GLUCOSE SERPL-MCNC: 223 MG/DL (ref 74–99)
GLUCOSE SERPL-MCNC: 225 MG/DL (ref 74–99)
GLUCOSE SERPL-MCNC: 227 MG/DL (ref 74–99)
GLUCOSE SERPL-MCNC: 298 MG/DL (ref 74–99)
GLUCOSE SERPL-MCNC: 470 MG/DL (ref 74–99)
GLUCOSE SERPL-MCNC: 91 MG/DL (ref 74–99)
GLUCOSE SERPL-MCNC: 92 MG/DL (ref 74–99)
GLUCOSE SERPL-MCNC: 98 MG/DL (ref 74–99)
GLUCOSE UR STRIP.AUTO-MCNC: >1000 MG/DL
GLUCOSE UR STRIP.AUTO-MCNC: NEGATIVE MG/DL
GRAM STN SPEC: NORMAL
HAEMOPHILUS INFLUENZAE, HAEFLU: NOT DETECTED
HAPTOGLOB SERPL-MCNC: 93 MG/DL (ref 30–200)
HAV IGM SER QL: NEGATIVE
HBA1C MFR BLD: 7 % (ref 4.2–5.6)
HBV CORE IGM SER QL: NEGATIVE
HBV SURFACE AG SER QL: <0.1 INDEX
HBV SURFACE AG SER QL: NEGATIVE
HCO3 BLD-SCNC: 26.6 MMOL/L (ref 22–26)
HCT VFR BLD AUTO: 24.8 % (ref 35–45)
HCT VFR BLD AUTO: 25.3 % (ref 35–45)
HCT VFR BLD AUTO: 25.5 % (ref 35–45)
HCT VFR BLD AUTO: 25.6 % (ref 35–45)
HCT VFR BLD AUTO: 25.6 % (ref 35–45)
HCT VFR BLD AUTO: 26.2 % (ref 35–45)
HCT VFR BLD AUTO: 26.2 % (ref 35–45)
HCT VFR BLD AUTO: 26.7 % (ref 35–45)
HCT VFR BLD AUTO: 27 % (ref 35–45)
HCT VFR BLD AUTO: 27.1 % (ref 35–45)
HCT VFR BLD AUTO: 27.2 % (ref 35–45)
HCT VFR BLD AUTO: 27.2 % (ref 35–45)
HCT VFR BLD AUTO: 27.3 % (ref 35–45)
HCT VFR BLD AUTO: 27.5 % (ref 35–45)
HCT VFR BLD AUTO: 27.6 % (ref 35–45)
HCT VFR BLD AUTO: 27.6 % (ref 35–45)
HCT VFR BLD AUTO: 28.8 % (ref 35–45)
HCT VFR BLD AUTO: 29.1 % (ref 35–45)
HCT VFR BLD AUTO: 29.5 % (ref 35–45)
HCT VFR BLD AUTO: 30.6 % (ref 35–45)
HCT VFR BLD AUTO: 30.8 % (ref 35–45)
HCT VFR BLD AUTO: 32.5 % (ref 35–45)
HCT VFR BLD AUTO: 38.1 % (ref 35–45)
HCV AB SER IA-ACNC: 0.14 INDEX
HCV AB SERPL QL IA: NEGATIVE
HCV COMMENT,HCGAC: NORMAL
HDSCOM,HDSCOM: NORMAL
HERPES SIMPLEX VIRUS 2, HSIMV2: NOT DETECTED
HGB BLD-MCNC: 10.2 G/DL (ref 12–16)
HGB BLD-MCNC: 10.4 G/DL (ref 12–16)
HGB BLD-MCNC: 10.4 G/DL (ref 12–16)
HGB BLD-MCNC: 10.7 G/DL (ref 12–16)
HGB BLD-MCNC: 11.3 G/DL (ref 12–16)
HGB BLD-MCNC: 13.5 G/DL (ref 12–16)
HGB BLD-MCNC: 8.4 G/DL (ref 12–16)
HGB BLD-MCNC: 8.5 G/DL (ref 12–16)
HGB BLD-MCNC: 8.6 G/DL (ref 12–16)
HGB BLD-MCNC: 8.8 G/DL (ref 12–16)
HGB BLD-MCNC: 8.8 G/DL (ref 12–16)
HGB BLD-MCNC: 8.9 G/DL (ref 12–16)
HGB BLD-MCNC: 9 G/DL (ref 12–16)
HGB BLD-MCNC: 9.1 G/DL (ref 12–16)
HGB BLD-MCNC: 9.1 G/DL (ref 12–16)
HGB BLD-MCNC: 9.2 G/DL (ref 12–16)
HGB BLD-MCNC: 9.4 G/DL (ref 12–16)
HGB BLD-MCNC: 9.5 G/DL (ref 12–16)
HGB BLD-MCNC: 9.8 G/DL (ref 12–16)
HGB BLD-MCNC: 9.9 G/DL (ref 12–16)
HGB UR QL STRIP: ABNORMAL
HGB UR QL STRIP: NEGATIVE
HISPANIC, LDP2T: NO
HIV 1+2 AB+HIV1 P24 AG SERPL QL IA: NONREACTIVE
HIV12 RESULT COMMENT, HHIVC: NORMAL
HSV 1 IGG, CSF, H1GCLT: 0.82 IV
HSV 1/2 AB, IGG, HSGCT: 4.17 IV
HSV 1/2 AB, IGM, HSMCT: 0.31 IV
HSV 2 IGG, H2GCLT: 0.03 IV
HSV1 DNA CSF QL NAA+PROBE: NOT DETECTED
HUMAN HERPESVIRUS 6, HUHV6: NOT DETECTED
HUMAN PARECHOVIRUS, HUPARV: NOT DETECTED
HYALINE CASTS URNS QL MICRO: ABNORMAL /LPF (ref 0–2)
INR PPP: 1 (ref 0.8–1.2)
IRON SATN MFR SERPL: 34 % (ref 20–50)
IRON SERPL-MCNC: 107 UG/DL (ref 50–175)
KETONES UR QL STRIP.AUTO: 15 MG/DL
KETONES UR QL STRIP.AUTO: NEGATIVE MG/DL
LACTATE BLD-SCNC: 3.27 MMOL/L (ref 0.4–2)
LACTATE BLD-SCNC: 3.59 MMOL/L (ref 0.4–2)
LACTATE SERPL-SCNC: 1.9 MMOL/L (ref 0.4–2)
LDH SERPL L TO P-CCNC: 423 U/L (ref 81–234)
LEAD BLD-MCNC: 1 UG/DL (ref 0–4)
LEUKOCYTE ESTERASE UR QL STRIP.AUTO: ABNORMAL
LEUKOCYTE ESTERASE UR QL STRIP.AUTO: NEGATIVE
LIPASE SERPL-CCNC: 95 U/L (ref 73–393)
LISTERIA MONOCYTOGENES, LISMON: NOT DETECTED
LYMPHOCYTES # BLD: 1.5 K/UL (ref 0.8–3.5)
LYMPHOCYTES # BLD: 2.4 K/UL (ref 0.8–3.5)
LYMPHOCYTES # BLD: 2.6 K/UL (ref 0.8–3.5)
LYMPHOCYTES # BLD: 2.7 K/UL (ref 0.8–3.5)
LYMPHOCYTES # BLD: 2.8 K/UL (ref 0.8–3.5)
LYMPHOCYTES # BLD: 2.9 K/UL (ref 0.8–3.5)
LYMPHOCYTES # BLD: 2.9 K/UL (ref 0.8–3.5)
LYMPHOCYTES # BLD: 3 K/UL (ref 0.8–3.5)
LYMPHOCYTES # BLD: 3.1 K/UL (ref 0.8–3.5)
LYMPHOCYTES # BLD: 3.1 K/UL (ref 0.8–3.5)
LYMPHOCYTES # BLD: 3.4 K/UL (ref 0.8–3.5)
LYMPHOCYTES # BLD: 3.4 K/UL (ref 0.8–3.5)
LYMPHOCYTES # BLD: 3.7 K/UL (ref 0.8–3.5)
LYMPHOCYTES # BLD: 3.8 K/UL (ref 0.8–3.5)
LYMPHOCYTES # BLD: 3.8 K/UL (ref 0.8–3.5)
LYMPHOCYTES # BLD: 3.9 K/UL (ref 0.8–3.5)
LYMPHOCYTES # BLD: 4.1 K/UL (ref 0.8–3.5)
LYMPHOCYTES # BLD: 4.2 K/UL (ref 0.8–3.5)
LYMPHOCYTES # BLD: 4.3 K/UL (ref 0.8–3.5)
LYMPHOCYTES # BLD: 4.4 K/UL (ref 0.8–3.5)
LYMPHOCYTES # BLD: 4.4 K/UL (ref 0.8–3.5)
LYMPHOCYTES # BLD: 4.6 K/UL (ref 0.8–3.5)
LYMPHOCYTES NFR BLD: 10 % (ref 20–51)
LYMPHOCYTES NFR BLD: 12 % (ref 20–51)
LYMPHOCYTES NFR BLD: 14 % (ref 20–51)
LYMPHOCYTES NFR BLD: 17 % (ref 20–51)
LYMPHOCYTES NFR BLD: 25 % (ref 20–51)
LYMPHOCYTES NFR BLD: 27 % (ref 20–51)
LYMPHOCYTES NFR BLD: 29 % (ref 20–51)
LYMPHOCYTES NFR BLD: 30 % (ref 20–51)
LYMPHOCYTES NFR BLD: 31 % (ref 20–51)
LYMPHOCYTES NFR BLD: 32 % (ref 20–51)
LYMPHOCYTES NFR BLD: 33 % (ref 20–51)
LYMPHOCYTES NFR BLD: 34 % (ref 20–51)
LYMPHOCYTES NFR BLD: 35 % (ref 20–51)
LYMPHOCYTES NFR BLD: 35 % (ref 20–51)
LYMPHOCYTES NFR BLD: 37 % (ref 20–51)
LYMPHOCYTES NFR BLD: 39 % (ref 20–51)
LYMPHOCYTES NFR BLD: 43 % (ref 20–51)
LYMPHOCYTES NFR BLD: 54 % (ref 20–51)
MAGNESIUM SERPL-MCNC: 1.5 MG/DL (ref 1.6–2.6)
MAGNESIUM SERPL-MCNC: 1.5 MG/DL (ref 1.6–2.6)
MAGNESIUM SERPL-MCNC: 1.7 MG/DL (ref 1.6–2.6)
MAGNESIUM SERPL-MCNC: 1.8 MG/DL (ref 1.6–2.6)
MAGNESIUM SERPL-MCNC: 1.9 MG/DL (ref 1.6–2.6)
MAGNESIUM SERPL-MCNC: 2 MG/DL (ref 1.6–2.6)
MAGNESIUM SERPL-MCNC: 2.1 MG/DL (ref 1.6–2.6)
MAGNESIUM SERPL-MCNC: 2.2 MG/DL (ref 1.6–2.6)
MANUAL DIFFERENTIAL PERFORMED BLD QL: ABNORMAL
MCH RBC QN AUTO: 28.7 PG (ref 24–34)
MCH RBC QN AUTO: 28.8 PG (ref 24–34)
MCH RBC QN AUTO: 28.8 PG (ref 24–34)
MCH RBC QN AUTO: 28.9 PG (ref 24–34)
MCH RBC QN AUTO: 29 PG (ref 24–34)
MCH RBC QN AUTO: 29.1 PG (ref 24–34)
MCH RBC QN AUTO: 29.2 PG (ref 24–34)
MCH RBC QN AUTO: 29.3 PG (ref 24–34)
MCH RBC QN AUTO: 29.3 PG (ref 24–34)
MCH RBC QN AUTO: 29.4 PG (ref 24–34)
MCH RBC QN AUTO: 29.5 PG (ref 24–34)
MCH RBC QN AUTO: 29.5 PG (ref 24–34)
MCH RBC QN AUTO: 29.7 PG (ref 24–34)
MCHC RBC AUTO-ENTMCNC: 32.8 G/DL (ref 31–37)
MCHC RBC AUTO-ENTMCNC: 32.8 G/DL (ref 31–37)
MCHC RBC AUTO-ENTMCNC: 33 G/DL (ref 31–37)
MCHC RBC AUTO-ENTMCNC: 33.1 G/DL (ref 31–37)
MCHC RBC AUTO-ENTMCNC: 33.3 G/DL (ref 31–37)
MCHC RBC AUTO-ENTMCNC: 33.3 G/DL (ref 31–37)
MCHC RBC AUTO-ENTMCNC: 33.5 G/DL (ref 31–37)
MCHC RBC AUTO-ENTMCNC: 33.5 G/DL (ref 31–37)
MCHC RBC AUTO-ENTMCNC: 33.6 G/DL (ref 31–37)
MCHC RBC AUTO-ENTMCNC: 33.7 G/DL (ref 31–37)
MCHC RBC AUTO-ENTMCNC: 33.7 G/DL (ref 31–37)
MCHC RBC AUTO-ENTMCNC: 33.8 G/DL (ref 31–37)
MCHC RBC AUTO-ENTMCNC: 33.8 G/DL (ref 31–37)
MCHC RBC AUTO-ENTMCNC: 33.9 G/DL (ref 31–37)
MCHC RBC AUTO-ENTMCNC: 34 G/DL (ref 31–37)
MCHC RBC AUTO-ENTMCNC: 34 G/DL (ref 31–37)
MCHC RBC AUTO-ENTMCNC: 34.1 G/DL (ref 31–37)
MCHC RBC AUTO-ENTMCNC: 34.7 G/DL (ref 31–37)
MCHC RBC AUTO-ENTMCNC: 34.8 G/DL (ref 31–37)
MCHC RBC AUTO-ENTMCNC: 35.4 G/DL (ref 31–37)
MCV RBC AUTO: 83.8 FL (ref 74–97)
MCV RBC AUTO: 83.9 FL (ref 74–97)
MCV RBC AUTO: 84.8 FL (ref 74–97)
MCV RBC AUTO: 85.5 FL (ref 74–97)
MCV RBC AUTO: 85.8 FL (ref 74–97)
MCV RBC AUTO: 85.9 FL (ref 74–97)
MCV RBC AUTO: 86 FL (ref 74–97)
MCV RBC AUTO: 86.1 FL (ref 74–97)
MCV RBC AUTO: 86.1 FL (ref 74–97)
MCV RBC AUTO: 86.2 FL (ref 74–97)
MCV RBC AUTO: 86.3 FL (ref 74–97)
MCV RBC AUTO: 86.5 FL (ref 74–97)
MCV RBC AUTO: 86.6 FL (ref 74–97)
MCV RBC AUTO: 86.6 FL (ref 74–97)
MCV RBC AUTO: 86.8 FL (ref 74–97)
MCV RBC AUTO: 86.8 FL (ref 74–97)
MCV RBC AUTO: 87.3 FL (ref 74–97)
MCV RBC AUTO: 87.3 FL (ref 74–97)
MCV RBC AUTO: 87.6 FL (ref 74–97)
MCV RBC AUTO: 87.7 FL (ref 74–97)
MCV RBC AUTO: 88.1 FL (ref 74–97)
MERCURY BLD-MCNC: <1 UG/L (ref 0–14.9)
METAMYELOCYTES NFR BLD MANUAL: 0 %
METHADONE UR QL SCN: NEGATIVE NG/ML
METHADONE UR QL: NEGATIVE
MONOCYTES # BLD: 0.2 K/UL (ref 0–1)
MONOCYTES # BLD: 0.3 K/UL (ref 0–1)
MONOCYTES # BLD: 0.4 K/UL (ref 0–1)
MONOCYTES # BLD: 0.6 K/UL (ref 0–1)
MONOCYTES # BLD: 0.6 K/UL (ref 0–1)
MONOCYTES # BLD: 0.7 K/UL (ref 0–1)
MONOCYTES # BLD: 0.8 K/UL (ref 0–1)
MONOCYTES # BLD: 0.8 K/UL (ref 0–1)
MONOCYTES # BLD: 0.9 K/UL (ref 0–1)
MONOCYTES # BLD: 1 K/UL (ref 0–1)
MONOCYTES # BLD: 1 K/UL (ref 0–1)
MONOCYTES # BLD: 1.2 K/UL (ref 0–1)
MONOCYTES NFR BLD: 13 % (ref 2–9)
MONOCYTES NFR BLD: 2 % (ref 2–9)
MONOCYTES NFR BLD: 3 % (ref 2–9)
MONOCYTES NFR BLD: 4 % (ref 2–9)
MONOCYTES NFR BLD: 5 % (ref 2–9)
MONOCYTES NFR BLD: 6 % (ref 2–9)
MONOCYTES NFR BLD: 7 % (ref 2–9)
MONOCYTES NFR BLD: 8 % (ref 2–9)
MONOCYTES NFR BLD: 9 % (ref 2–9)
MONOCYTES NFR BLD: 9 % (ref 2–9)
MUCOUS THREADS URNS QL MICRO: ABNORMAL /LPF
MYELOCYTES NFR BLD MANUAL: 0 %
MYOGLOBIN SERPL-MCNC: 1092 NG/ML (ref 13–71)
NEISSERIA MENINGITIDIS, NEIMEN: NOT DETECTED
NEUTS BAND NFR BLD MANUAL: 0 % (ref 0–5)
NEUTS BAND NFR BLD MANUAL: 1 % (ref 0–5)
NEUTS SEG # BLD: 11.4 K/UL (ref 1.8–8)
NEUTS SEG # BLD: 18.1 K/UL (ref 1.8–8)
NEUTS SEG # BLD: 20 K/UL (ref 1.8–8)
NEUTS SEG # BLD: 21.1 K/UL (ref 1.8–8)
NEUTS SEG # BLD: 3.4 K/UL (ref 1.8–8)
NEUTS SEG # BLD: 4.6 K/UL (ref 1.8–8)
NEUTS SEG # BLD: 4.9 K/UL (ref 1.8–8)
NEUTS SEG # BLD: 5.1 K/UL (ref 1.8–8)
NEUTS SEG # BLD: 5.6 K/UL (ref 1.8–8)
NEUTS SEG # BLD: 5.6 K/UL (ref 1.8–8)
NEUTS SEG # BLD: 5.7 K/UL (ref 1.8–8)
NEUTS SEG # BLD: 5.8 K/UL (ref 1.8–8)
NEUTS SEG # BLD: 6 K/UL (ref 1.8–8)
NEUTS SEG # BLD: 6 K/UL (ref 1.8–8)
NEUTS SEG # BLD: 6.3 K/UL (ref 1.8–8)
NEUTS SEG # BLD: 6.4 K/UL (ref 1.8–8)
NEUTS SEG # BLD: 6.5 K/UL (ref 1.8–8)
NEUTS SEG # BLD: 6.7 K/UL (ref 1.8–8)
NEUTS SEG # BLD: 7 K/UL (ref 1.8–8)
NEUTS SEG # BLD: 7.1 K/UL (ref 1.8–8)
NEUTS SEG # BLD: 7.4 K/UL (ref 1.8–8)
NEUTS SEG # BLD: 7.5 K/UL (ref 1.8–8)
NEUTS SEG # BLD: 7.8 K/UL (ref 1.8–8)
NEUTS SEG # BLD: 8.2 K/UL (ref 1.8–8)
NEUTS SEG # BLD: 8.4 K/UL (ref 1.8–8)
NEUTS SEG NFR BLD: 41 % (ref 42–75)
NEUTS SEG NFR BLD: 53 % (ref 42–75)
NEUTS SEG NFR BLD: 54 % (ref 42–75)
NEUTS SEG NFR BLD: 56 % (ref 42–75)
NEUTS SEG NFR BLD: 56 % (ref 42–75)
NEUTS SEG NFR BLD: 57 % (ref 42–75)
NEUTS SEG NFR BLD: 59 % (ref 42–75)
NEUTS SEG NFR BLD: 60 % (ref 42–75)
NEUTS SEG NFR BLD: 62 % (ref 42–75)
NEUTS SEG NFR BLD: 63 % (ref 42–75)
NEUTS SEG NFR BLD: 64 % (ref 42–75)
NEUTS SEG NFR BLD: 65 % (ref 42–75)
NEUTS SEG NFR BLD: 66 % (ref 42–75)
NEUTS SEG NFR BLD: 68 % (ref 42–75)
NEUTS SEG NFR BLD: 69 % (ref 42–75)
NEUTS SEG NFR BLD: 70 % (ref 42–75)
NEUTS SEG NFR BLD: 82 % (ref 42–75)
NEUTS SEG NFR BLD: 84 % (ref 42–75)
NEUTS SEG NFR BLD: 87 % (ref 42–75)
NITRITE UR QL STRIP.AUTO: NEGATIVE
NITRITE UR QL STRIP.AUTO: NEGATIVE
O2/TOTAL GAS SETTING VFR VENT: 21 %
OPIATES UR QL SCN: NEGATIVE NG/ML
OPIATES UR QL: NEGATIVE
OTHER CELLS NFR BLD MANUAL: 0 %
OXYCODONE+OXYMORPHONE UR QL SCN: NEGATIVE NG/ML
P-R INTERVAL, ECG05: 128 MS
P-R INTERVAL, ECG05: 136 MS
P-R INTERVAL, ECG05: 148 MS
P-R INTERVAL, ECG05: 158 MS
P-R INTERVAL, ECG05: 164 MS
PCO2 BLD: 47.8 MMHG (ref 35–45)
PCP UR QL: NEGATIVE
PCP UR QL: NEGATIVE NG/ML
PERIPHERAL SMEAR,PSM: NORMAL
PH BLD: 7.35 [PH] (ref 7.35–7.45)
PH UR STRIP: 6 [PH] (ref 5–8)
PH UR STRIP: 7 [PH] (ref 5–8)
PH UR: 5.5 [PH] (ref 4.5–8.9)
PHOSPHATE SERPL-MCNC: 1.6 MG/DL (ref 2.5–4.9)
PHOSPHATE SERPL-MCNC: 2.1 MG/DL (ref 2.5–4.9)
PHOSPHATE SERPL-MCNC: 2.3 MG/DL (ref 2.5–4.9)
PHOSPHATE SERPL-MCNC: 2.4 MG/DL (ref 2.5–4.9)
PHOSPHATE SERPL-MCNC: 2.4 MG/DL (ref 2.5–4.9)
PHOSPHATE SERPL-MCNC: 2.6 MG/DL (ref 2.5–4.9)
PHOSPHATE SERPL-MCNC: 2.7 MG/DL (ref 2.5–4.9)
PHOSPHATE SERPL-MCNC: 2.8 MG/DL (ref 2.5–4.9)
PHOSPHATE SERPL-MCNC: 2.9 MG/DL (ref 2.5–4.9)
PHOSPHATE SERPL-MCNC: 3.2 MG/DL (ref 2.5–4.9)
PHOSPHATE SERPL-MCNC: 3.2 MG/DL (ref 2.5–4.9)
PHOSPHATE SERPL-MCNC: 3.3 MG/DL (ref 2.5–4.9)
PHOSPHATE SERPL-MCNC: 3.3 MG/DL (ref 2.5–4.9)
PHOSPHATE SERPL-MCNC: 3.4 MG/DL (ref 2.5–4.9)
PHOSPHATE SERPL-MCNC: 3.4 MG/DL (ref 2.5–4.9)
PHOSPHATE SERPL-MCNC: 3.6 MG/DL (ref 2.5–4.9)
PHOSPHATE SERPL-MCNC: 3.7 MG/DL (ref 2.5–4.9)
PHOSPHATE SERPL-MCNC: 3.9 MG/DL (ref 2.5–4.9)
PHOSPHATE SERPL-MCNC: 4 MG/DL (ref 2.5–4.9)
PHOSPHATE SERPL-MCNC: 4.2 MG/DL (ref 2.5–4.9)
PHOSPHATE SERPL-MCNC: 4.2 MG/DL (ref 2.5–4.9)
PHOSPHATE SERPL-MCNC: 4.4 MG/DL (ref 2.5–4.9)
PLATELET # BLD AUTO: 212 K/UL (ref 135–420)
PLATELET # BLD AUTO: 219 K/UL (ref 135–420)
PLATELET # BLD AUTO: 224 K/UL (ref 135–420)
PLATELET # BLD AUTO: 229 K/UL (ref 135–420)
PLATELET # BLD AUTO: 234 K/UL (ref 135–420)
PLATELET # BLD AUTO: 237 K/UL (ref 135–420)
PLATELET # BLD AUTO: 243 K/UL (ref 135–420)
PLATELET # BLD AUTO: 244 K/UL (ref 135–420)
PLATELET # BLD AUTO: 251 K/UL (ref 135–420)
PLATELET # BLD AUTO: 252 K/UL (ref 135–420)
PLATELET # BLD AUTO: 255 K/UL (ref 135–420)
PLATELET # BLD AUTO: 257 K/UL (ref 135–420)
PLATELET # BLD AUTO: 262 K/UL (ref 135–420)
PLATELET # BLD AUTO: 262 K/UL (ref 135–420)
PLATELET # BLD AUTO: 286 K/UL (ref 135–420)
PLATELET # BLD AUTO: 290 K/UL (ref 135–420)
PLATELET # BLD AUTO: 304 K/UL (ref 135–420)
PLATELET # BLD AUTO: 329 K/UL (ref 135–420)
PLATELET # BLD AUTO: 382 K/UL (ref 135–420)
PLATELET # BLD AUTO: 388 K/UL (ref 135–420)
PLATELET # BLD AUTO: 389 K/UL (ref 135–420)
PLATELET # BLD AUTO: 392 K/UL (ref 135–420)
PLATELET # BLD AUTO: 396 K/UL (ref 135–420)
PLATELET # BLD AUTO: 412 K/UL (ref 135–420)
PLATELET # BLD AUTO: 415 K/UL (ref 135–420)
PLATELET COMMENTS,PCOM: ABNORMAL
PLEASE NOTE, 734348: NORMAL
PMV BLD AUTO: 10 FL (ref 9.2–11.8)
PMV BLD AUTO: 10.1 FL (ref 9.2–11.8)
PMV BLD AUTO: 10.2 FL (ref 9.2–11.8)
PMV BLD AUTO: 10.3 FL (ref 9.2–11.8)
PMV BLD AUTO: 10.3 FL (ref 9.2–11.8)
PMV BLD AUTO: 10.5 FL (ref 9.2–11.8)
PMV BLD AUTO: 10.5 FL (ref 9.2–11.8)
PMV BLD AUTO: 10.7 FL (ref 9.2–11.8)
PMV BLD AUTO: 10.8 FL (ref 9.2–11.8)
PMV BLD AUTO: 10.8 FL (ref 9.2–11.8)
PMV BLD AUTO: 10.9 FL (ref 9.2–11.8)
PMV BLD AUTO: 11 FL (ref 9.2–11.8)
PMV BLD AUTO: 11 FL (ref 9.2–11.8)
PMV BLD AUTO: 11.1 FL (ref 9.2–11.8)
PMV BLD AUTO: 11.1 FL (ref 9.2–11.8)
PMV BLD AUTO: 11.2 FL (ref 9.2–11.8)
PMV BLD AUTO: 11.2 FL (ref 9.2–11.8)
PMV BLD AUTO: 11.3 FL (ref 9.2–11.8)
PMV BLD AUTO: 11.3 FL (ref 9.2–11.8)
PMV BLD AUTO: 11.5 FL (ref 9.2–11.8)
PMV BLD AUTO: 11.7 FL (ref 9.2–11.8)
PMV BLD AUTO: 9.7 FL (ref 9.2–11.8)
PMV BLD AUTO: 9.9 FL (ref 9.2–11.8)
PO2 BLD: 67 MMHG (ref 80–100)
POTASSIUM SERPL-SCNC: 2.9 MMOL/L (ref 3.5–5.5)
POTASSIUM SERPL-SCNC: 2.9 MMOL/L (ref 3.5–5.5)
POTASSIUM SERPL-SCNC: 3 MMOL/L (ref 3.5–5.5)
POTASSIUM SERPL-SCNC: 3.1 MMOL/L (ref 3.5–5.5)
POTASSIUM SERPL-SCNC: 3.2 MMOL/L (ref 3.5–5.5)
POTASSIUM SERPL-SCNC: 3.2 MMOL/L (ref 3.5–5.5)
POTASSIUM SERPL-SCNC: 3.4 MMOL/L (ref 3.5–5.5)
POTASSIUM SERPL-SCNC: 3.5 MMOL/L (ref 3.5–5.5)
POTASSIUM SERPL-SCNC: 3.5 MMOL/L (ref 3.5–5.5)
POTASSIUM SERPL-SCNC: 3.6 MMOL/L (ref 3.5–5.5)
POTASSIUM SERPL-SCNC: 3.7 MMOL/L (ref 3.5–5.5)
POTASSIUM SERPL-SCNC: 3.8 MMOL/L (ref 3.5–5.5)
POTASSIUM SERPL-SCNC: 3.9 MMOL/L (ref 3.5–5.5)
POTASSIUM SERPL-SCNC: 3.9 MMOL/L (ref 3.5–5.5)
POTASSIUM SERPL-SCNC: 4 MMOL/L (ref 3.5–5.5)
POTASSIUM SERPL-SCNC: 4.2 MMOL/L (ref 3.5–5.5)
POTASSIUM SERPL-SCNC: 4.2 MMOL/L (ref 3.5–5.5)
POTASSIUM SERPL-SCNC: 4.4 MMOL/L (ref 3.5–5.5)
PROCALCITONIN SERPL-MCNC: 0.9 NG/ML
PROMYELOCYTES NFR BLD MANUAL: 0 %
PROPOXYPH UR QL: NEGATIVE NG/ML
PROT CSF-MCNC: 55 MG/DL (ref 15–45)
PROT SERPL-MCNC: 10.2 G/DL (ref 6.4–8.2)
PROT SERPL-MCNC: 6.3 G/DL (ref 6.4–8.2)
PROT SERPL-MCNC: 6.5 G/DL (ref 6.4–8.2)
PROT SERPL-MCNC: 6.5 G/DL (ref 6.4–8.2)
PROT SERPL-MCNC: 6.8 G/DL (ref 6.4–8.2)
PROT SERPL-MCNC: 6.8 G/DL (ref 6.4–8.2)
PROT SERPL-MCNC: 6.9 G/DL (ref 6.4–8.2)
PROT SERPL-MCNC: 7 G/DL (ref 6.4–8.2)
PROT SERPL-MCNC: 7.1 G/DL (ref 6.4–8.2)
PROT SERPL-MCNC: 7.3 G/DL (ref 6.4–8.2)
PROT SERPL-MCNC: 7.4 G/DL (ref 6.4–8.2)
PROT SERPL-MCNC: 7.4 G/DL (ref 6.4–8.2)
PROT SERPL-MCNC: 7.5 G/DL (ref 6.4–8.2)
PROT SERPL-MCNC: 7.8 G/DL (ref 6.4–8.2)
PROT SERPL-MCNC: 8.5 G/DL (ref 6.4–8.2)
PROT UR STRIP-MCNC: 300 MG/DL
PROT UR STRIP-MCNC: NEGATIVE MG/DL
PROTHROMBIN TIME: 13.2 SEC (ref 11.5–15.2)
PTH-INTACT SERPL-MCNC: 317.8 PG/ML (ref 18.4–88)
Q-T INTERVAL, ECG07: 274 MS
Q-T INTERVAL, ECG07: 294 MS
Q-T INTERVAL, ECG07: 320 MS
Q-T INTERVAL, ECG07: 342 MS
Q-T INTERVAL, ECG07: 392 MS
QRS DURATION, ECG06: 58 MS
QRS DURATION, ECG06: 62 MS
QRS DURATION, ECG06: 64 MS
QRS DURATION, ECG06: 72 MS
QRS DURATION, ECG06: 72 MS
QTC CALCULATION (BEZET), ECG08: 405 MS
QTC CALCULATION (BEZET), ECG08: 434 MS
QTC CALCULATION (BEZET), ECG08: 450 MS
QTC CALCULATION (BEZET), ECG08: 473 MS
QTC CALCULATION (BEZET), ECG08: 479 MS
RACE, 017371: NORMAL
RBC # BLD AUTO: 2.89 M/UL (ref 4.2–5.3)
RBC # BLD AUTO: 2.92 M/UL (ref 4.2–5.3)
RBC # BLD AUTO: 2.96 M/UL (ref 4.2–5.3)
RBC # BLD AUTO: 2.97 M/UL (ref 4.2–5.3)
RBC # BLD AUTO: 2.98 M/UL (ref 4.2–5.3)
RBC # BLD AUTO: 3 M/UL (ref 4.2–5.3)
RBC # BLD AUTO: 3 M/UL (ref 4.2–5.3)
RBC # BLD AUTO: 3.03 M/UL (ref 4.2–5.3)
RBC # BLD AUTO: 3.09 M/UL (ref 4.2–5.3)
RBC # BLD AUTO: 3.1 M/UL (ref 4.2–5.3)
RBC # BLD AUTO: 3.14 M/UL (ref 4.2–5.3)
RBC # BLD AUTO: 3.14 M/UL (ref 4.2–5.3)
RBC # BLD AUTO: 3.15 M/UL (ref 4.2–5.3)
RBC # BLD AUTO: 3.17 M/UL (ref 4.2–5.3)
RBC # BLD AUTO: 3.17 M/UL (ref 4.2–5.3)
RBC # BLD AUTO: 3.2 M/UL (ref 4.2–5.3)
RBC # BLD AUTO: 3.27 M/UL (ref 4.2–5.3)
RBC # BLD AUTO: 3.38 M/UL (ref 4.2–5.3)
RBC # BLD AUTO: 3.44 M/UL (ref 4.2–5.3)
RBC # BLD AUTO: 3.55 M/UL (ref 4.2–5.3)
RBC # BLD AUTO: 3.56 M/UL (ref 4.2–5.3)
RBC # BLD AUTO: 3.58 M/UL (ref 4.2–5.3)
RBC # BLD AUTO: 3.63 M/UL (ref 4.2–5.3)
RBC # BLD AUTO: 3.88 M/UL (ref 4.2–5.3)
RBC # BLD AUTO: 4.54 M/UL (ref 4.2–5.3)
RBC # CSF: 0 /CU MM
RBC #/AREA URNS HPF: ABNORMAL /HPF (ref 0–5)
RBC #/AREA URNS HPF: NEGATIVE /HPF (ref 0–5)
RBC MORPH BLD: ABNORMAL
RETICS/RBC NFR AUTO: 3.6 % (ref 0.5–2.3)
SALICYLATES SERPL-MCNC: <1.7 MG/DL (ref 2.8–20)
SAO2 % BLD: 92 % (ref 92–97)
SARS-COV-2, COV2NT: NOT DETECTED
SERVICE CMNT-IMP: ABNORMAL
SERVICE CMNT-IMP: ABNORMAL
SERVICE CMNT-IMP: NORMAL
SODIUM SERPL-SCNC: 137 MMOL/L (ref 136–145)
SODIUM SERPL-SCNC: 138 MMOL/L (ref 136–145)
SODIUM SERPL-SCNC: 138 MMOL/L (ref 136–145)
SODIUM SERPL-SCNC: 139 MMOL/L (ref 136–145)
SODIUM SERPL-SCNC: 139 MMOL/L (ref 136–145)
SODIUM SERPL-SCNC: 140 MMOL/L (ref 136–145)
SODIUM SERPL-SCNC: 141 MMOL/L (ref 136–145)
SODIUM SERPL-SCNC: 142 MMOL/L (ref 136–145)
SODIUM SERPL-SCNC: 143 MMOL/L (ref 136–145)
SODIUM SERPL-SCNC: 150 MMOL/L (ref 136–145)
SODIUM SERPL-SCNC: 151 MMOL/L (ref 136–145)
SOURCE, COVRS: NORMAL
SOURCE, COVRS: NORMAL
SP GR UR REFRACTOMETRY: 1.01 (ref 1–1.03)
SP GR UR REFRACTOMETRY: 1.02 (ref 1–1.03)
SP1: NORMAL
SP2: NORMAL
SP3: NORMAL
SPECIMEN SOURCE: NORMAL
SPECIMEN TYPE, XMCV1T: NORMAL
SPECIMEN TYPE, XMCV1T: NORMAL
SPECIMEN TYPE: ABNORMAL
STREPTOCOCCUS AGALACTIAE, SAGA: NOT DETECTED
STREPTOCOCCUS PNEUMONIAE, STRPNE: NOT DETECTED
T PALLIDUM AB SER QL IA: NONREACTIVE
T3 SERPL-MCNC: 124 NG/DL (ref 71–180)
T3RU NFR SERPL: 27 % (ref 24–39)
T4 SERPL-MCNC: 8.2 UG/DL (ref 4.5–12)
TEST PURPOSE, LDP4T: NORMAL
TIBC SERPL-MCNC: 319 UG/DL (ref 250–450)
TOTAL RESP. RATE, ITRR: 15
TROPONIN I SERPL-MCNC: 0.02 NG/ML (ref 0–0.04)
TROPONIN I SERPL-MCNC: <0.02 NG/ML (ref 0–0.04)
TSH SERPL DL<=0.005 MIU/L-ACNC: 1.5 UIU/ML (ref 0.45–4.5)
TSH SERPL DL<=0.05 MIU/L-ACNC: 0.57 UIU/ML (ref 0.36–3.74)
TUBE # CSF: 1
TUBE # CSF: 1
TUBE # CSF: 3
URATE SERPL-MCNC: 8.7 MG/DL (ref 2.6–7.2)
UROBILINOGEN UR QL STRIP.AUTO: 0.2 EU/DL (ref 0.2–1)
UROBILINOGEN UR QL STRIP.AUTO: 1 EU/DL (ref 0.2–1)
VARICELLA ZOSTER VIRUS, VAZOVI: NOT DETECTED
VENTRICULAR RATE, ECG03: 115 BPM
VENTRICULAR RATE, ECG03: 119 BPM
VENTRICULAR RATE, ECG03: 131 BPM
VENTRICULAR RATE, ECG03: 132 BPM
VENTRICULAR RATE, ECG03: 90 BPM
VIT B1 BLD-SCNC: 65 NMOL/L (ref 66.5–200)
VIT B1 BLD-SCNC: 72.1 NMOL/L (ref 66.5–200)
VIT B12 SERPL-MCNC: 517 PG/ML (ref 211–911)
WBC # BLD AUTO: 10 K/UL (ref 4.6–13.2)
WBC # BLD AUTO: 10.1 K/UL (ref 4.6–13.2)
WBC # BLD AUTO: 10.7 K/UL (ref 4.6–13.2)
WBC # BLD AUTO: 10.9 K/UL (ref 4.6–13.2)
WBC # BLD AUTO: 11.2 K/UL (ref 4.6–13.2)
WBC # BLD AUTO: 11.3 K/UL (ref 4.6–13.2)
WBC # BLD AUTO: 11.4 K/UL (ref 4.6–13.2)
WBC # BLD AUTO: 11.7 K/UL (ref 4.6–13.2)
WBC # BLD AUTO: 11.9 K/UL (ref 4.6–13.2)
WBC # BLD AUTO: 12.9 K/UL (ref 4.6–13.2)
WBC # BLD AUTO: 13.1 K/UL (ref 4.6–13.2)
WBC # BLD AUTO: 13.2 K/UL (ref 4.6–13.2)
WBC # BLD AUTO: 16.6 K/UL (ref 4.6–13.2)
WBC # BLD AUTO: 22.1 K/UL (ref 4.6–13.2)
WBC # BLD AUTO: 23.7 K/UL (ref 4.6–13.2)
WBC # BLD AUTO: 24.2 K/UL (ref 4.6–13.2)
WBC # BLD AUTO: 8.2 K/UL (ref 4.6–13.2)
WBC # BLD AUTO: 8.6 K/UL (ref 4.6–13.2)
WBC # BLD AUTO: 8.6 K/UL (ref 4.6–13.2)
WBC # BLD AUTO: 8.7 K/UL (ref 4.6–13.2)
WBC # BLD AUTO: 8.8 K/UL (ref 4.6–13.2)
WBC # BLD AUTO: 8.9 K/UL (ref 4.6–13.2)
WBC # BLD AUTO: 9.1 K/UL (ref 4.6–13.2)
WBC # BLD AUTO: 9.6 K/UL (ref 4.6–13.2)
WBC # BLD AUTO: 9.7 K/UL (ref 4.6–13.2)
WBC # CSF: 1 /CU MM
WBC MORPH BLD: ABNORMAL
WBC URNS QL MICRO: ABNORMAL /HPF (ref 0–4)
WBC URNS QL MICRO: ABNORMAL /HPF (ref 0–4)
YEAST URNS QL MICRO: ABNORMAL

## 2020-01-01 PROCEDURE — 74011250636 HC RX REV CODE- 250/636: Performed by: INTERNAL MEDICINE

## 2020-01-01 PROCEDURE — 74011636637 HC RX REV CODE- 636/637: Performed by: FAMILY MEDICINE

## 2020-01-01 PROCEDURE — 65270000029 HC RM PRIVATE

## 2020-01-01 PROCEDURE — 87186 SC STD MICRODIL/AGAR DIL: CPT

## 2020-01-01 PROCEDURE — 74011250637 HC RX REV CODE- 250/637: Performed by: INTERNAL MEDICINE

## 2020-01-01 PROCEDURE — 84157 ASSAY OF PROTEIN OTHER: CPT

## 2020-01-01 PROCEDURE — 80053 COMPREHEN METABOLIC PANEL: CPT

## 2020-01-01 PROCEDURE — 65660000004 HC RM CVT STEPDOWN

## 2020-01-01 PROCEDURE — 74011250637 HC RX REV CODE- 250/637: Performed by: STUDENT IN AN ORGANIZED HEALTH CARE EDUCATION/TRAINING PROGRAM

## 2020-01-01 PROCEDURE — 85027 COMPLETE CBC AUTOMATED: CPT

## 2020-01-01 PROCEDURE — 74011250637 HC RX REV CODE- 250/637: Performed by: PSYCHIATRY & NEUROLOGY

## 2020-01-01 PROCEDURE — 85045 AUTOMATED RETICULOCYTE COUNT: CPT

## 2020-01-01 PROCEDURE — 82962 GLUCOSE BLOOD TEST: CPT

## 2020-01-01 PROCEDURE — 36415 COLL VENOUS BLD VENIPUNCTURE: CPT

## 2020-01-01 PROCEDURE — 83605 ASSAY OF LACTIC ACID: CPT

## 2020-01-01 PROCEDURE — 2709999900 HC NON-CHARGEABLE SUPPLY

## 2020-01-01 PROCEDURE — 82945 GLUCOSE OTHER FLUID: CPT

## 2020-01-01 PROCEDURE — 82607 VITAMIN B-12: CPT

## 2020-01-01 PROCEDURE — 74011250636 HC RX REV CODE- 250/636: Performed by: STUDENT IN AN ORGANIZED HEALTH CARE EDUCATION/TRAINING PROGRAM

## 2020-01-01 PROCEDURE — 83690 ASSAY OF LIPASE: CPT

## 2020-01-01 PROCEDURE — 84100 ASSAY OF PHOSPHORUS: CPT

## 2020-01-01 PROCEDURE — 51798 US URINE CAPACITY MEASURE: CPT

## 2020-01-01 PROCEDURE — 96366 THER/PROPH/DIAG IV INF ADDON: CPT

## 2020-01-01 PROCEDURE — C9113 INJ PANTOPRAZOLE SODIUM, VIA: HCPCS | Performed by: STUDENT IN AN ORGANIZED HEALTH CARE EDUCATION/TRAINING PROGRAM

## 2020-01-01 PROCEDURE — 83735 ASSAY OF MAGNESIUM: CPT

## 2020-01-01 PROCEDURE — 71250 CT THORAX DX C-: CPT

## 2020-01-01 PROCEDURE — 74011250636 HC RX REV CODE- 250/636: Performed by: PSYCHIATRY & NEUROLOGY

## 2020-01-01 PROCEDURE — 97535 SELF CARE MNGMENT TRAINING: CPT

## 2020-01-01 PROCEDURE — 83970 ASSAY OF PARATHORMONE: CPT

## 2020-01-01 PROCEDURE — 74011636637 HC RX REV CODE- 636/637: Performed by: STUDENT IN AN ORGANIZED HEALTH CARE EDUCATION/TRAINING PROGRAM

## 2020-01-01 PROCEDURE — 82306 VITAMIN D 25 HYDROXY: CPT

## 2020-01-01 PROCEDURE — 74220 X-RAY XM ESOPHAGUS 1CNTRST: CPT

## 2020-01-01 PROCEDURE — 97530 THERAPEUTIC ACTIVITIES: CPT

## 2020-01-01 PROCEDURE — 36592 COLLECT BLOOD FROM PICC: CPT

## 2020-01-01 PROCEDURE — 83540 ASSAY OF IRON: CPT

## 2020-01-01 PROCEDURE — 86696 HERPES SIMPLEX TYPE 2 TEST: CPT

## 2020-01-01 PROCEDURE — 70450 CT HEAD/BRAIN W/O DYE: CPT

## 2020-01-01 PROCEDURE — 74011000250 HC RX REV CODE- 250: Performed by: STUDENT IN AN ORGANIZED HEALTH CARE EDUCATION/TRAINING PROGRAM

## 2020-01-01 PROCEDURE — 82550 ASSAY OF CK (CPK): CPT

## 2020-01-01 PROCEDURE — 82175 ASSAY OF ARSENIC: CPT

## 2020-01-01 PROCEDURE — 74011000250 HC RX REV CODE- 250: Performed by: FAMILY MEDICINE

## 2020-01-01 PROCEDURE — 65660000000 HC RM CCU STEPDOWN

## 2020-01-01 PROCEDURE — 87040 BLOOD CULTURE FOR BACTERIA: CPT

## 2020-01-01 PROCEDURE — 73060 X-RAY EXAM OF HUMERUS: CPT

## 2020-01-01 PROCEDURE — 99231 SBSQ HOSP IP/OBS SF/LOW 25: CPT | Performed by: PSYCHIATRY & NEUROLOGY

## 2020-01-01 PROCEDURE — 82977 ASSAY OF GGT: CPT

## 2020-01-01 PROCEDURE — 97116 GAIT TRAINING THERAPY: CPT

## 2020-01-01 PROCEDURE — 86694 HERPES SIMPLEX NES ANTBDY: CPT

## 2020-01-01 PROCEDURE — 93005 ELECTROCARDIOGRAM TRACING: CPT

## 2020-01-01 PROCEDURE — 009U3ZX DRAINAGE OF SPINAL CANAL, PERCUTANEOUS APPROACH, DIAGNOSTIC: ICD-10-PCS | Performed by: RADIOLOGY

## 2020-01-01 PROCEDURE — 92526 ORAL FUNCTION THERAPY: CPT

## 2020-01-01 PROCEDURE — 74011000255 HC RX REV CODE- 255: Performed by: FAMILY MEDICINE

## 2020-01-01 PROCEDURE — 74011250637 HC RX REV CODE- 250/637: Performed by: EMERGENCY MEDICINE

## 2020-01-01 PROCEDURE — 87483 CNS DNA AMP PROBE TYPE 12-25: CPT

## 2020-01-01 PROCEDURE — 85610 PROTHROMBIN TIME: CPT

## 2020-01-01 PROCEDURE — 74018 RADEX ABDOMEN 1 VIEW: CPT

## 2020-01-01 PROCEDURE — 74011250636 HC RX REV CODE- 250/636: Performed by: RADIOLOGY

## 2020-01-01 PROCEDURE — 97166 OT EVAL MOD COMPLEX 45 MIN: CPT

## 2020-01-01 PROCEDURE — 73030 X-RAY EXAM OF SHOULDER: CPT

## 2020-01-01 PROCEDURE — 74011250637 HC RX REV CODE- 250/637: Performed by: FAMILY MEDICINE

## 2020-01-01 PROCEDURE — 97110 THERAPEUTIC EXERCISES: CPT

## 2020-01-01 PROCEDURE — 77030038269 HC DRN EXT URIN PURWCK BARD -A

## 2020-01-01 PROCEDURE — A9575 INJ GADOTERATE MEGLUMI 0.1ML: HCPCS | Performed by: PSYCHIATRY & NEUROLOGY

## 2020-01-01 PROCEDURE — 87015 SPECIMEN INFECT AGNT CONCNTJ: CPT

## 2020-01-01 PROCEDURE — 77001 FLUOROGUIDE FOR VEIN DEVICE: CPT

## 2020-01-01 PROCEDURE — 74011250636 HC RX REV CODE- 250/636: Performed by: FAMILY MEDICINE

## 2020-01-01 PROCEDURE — 05PYX3Z REMOVAL OF INFUSION DEVICE FROM UPPER VEIN, EXTERNAL APPROACH: ICD-10-PCS | Performed by: RADIOLOGY

## 2020-01-01 PROCEDURE — 80320 DRUG SCREEN QUANTALCOHOLS: CPT

## 2020-01-01 PROCEDURE — 70546 MR ANGIOGRAPH HEAD W/O&W/DYE: CPT

## 2020-01-01 PROCEDURE — 97162 PT EVAL MOD COMPLEX 30 MIN: CPT

## 2020-01-01 PROCEDURE — 83036 HEMOGLOBIN GLYCOSYLATED A1C: CPT

## 2020-01-01 PROCEDURE — 82728 ASSAY OF FERRITIN: CPT

## 2020-01-01 PROCEDURE — 74011250636 HC RX REV CODE- 250/636

## 2020-01-01 PROCEDURE — 82140 ASSAY OF AMMONIA: CPT

## 2020-01-01 PROCEDURE — 84425 ASSAY OF VITAMIN B-1: CPT

## 2020-01-01 PROCEDURE — 83010 ASSAY OF HAPTOGLOBIN QUANT: CPT

## 2020-01-01 PROCEDURE — 70030 X-RAY EYE FOR FOREIGN BODY: CPT

## 2020-01-01 PROCEDURE — 74011250636 HC RX REV CODE- 250/636: Performed by: EMERGENCY MEDICINE

## 2020-01-01 PROCEDURE — 84443 ASSAY THYROID STIM HORMONE: CPT

## 2020-01-01 PROCEDURE — 97164 PT RE-EVAL EST PLAN CARE: CPT

## 2020-01-01 PROCEDURE — 84132 ASSAY OF SERUM POTASSIUM: CPT

## 2020-01-01 PROCEDURE — 87086 URINE CULTURE/COLONY COUNT: CPT

## 2020-01-01 PROCEDURE — 36600 WITHDRAWAL OF ARTERIAL BLOOD: CPT

## 2020-01-01 PROCEDURE — 76705 ECHO EXAM OF ABDOMEN: CPT

## 2020-01-01 PROCEDURE — 70250 X-RAY EXAM OF SKULL: CPT

## 2020-01-01 PROCEDURE — 05HB33Z INSERTION OF INFUSION DEVICE INTO RIGHT BASILIC VEIN, PERCUTANEOUS APPROACH: ICD-10-PCS | Performed by: RADIOLOGY

## 2020-01-01 PROCEDURE — 82803 BLOOD GASES ANY COMBINATION: CPT

## 2020-01-01 PROCEDURE — 71045 X-RAY EXAM CHEST 1 VIEW: CPT

## 2020-01-01 PROCEDURE — 81001 URINALYSIS AUTO W/SCOPE: CPT

## 2020-01-01 PROCEDURE — 77030019905 HC CATH URETH INTMIT MDII -A

## 2020-01-01 PROCEDURE — 85652 RBC SED RATE AUTOMATED: CPT

## 2020-01-01 PROCEDURE — 74011000258 HC RX REV CODE- 258: Performed by: INTERNAL MEDICINE

## 2020-01-01 PROCEDURE — 70553 MRI BRAIN STEM W/O & W/DYE: CPT

## 2020-01-01 PROCEDURE — 96375 TX/PRO/DX INJ NEW DRUG ADDON: CPT

## 2020-01-01 PROCEDURE — 86038 ANTINUCLEAR ANTIBODIES: CPT

## 2020-01-01 PROCEDURE — 86140 C-REACTIVE PROTEIN: CPT

## 2020-01-01 PROCEDURE — 95816 EEG AWAKE AND DROWSY: CPT | Performed by: PSYCHIATRY & NEUROLOGY

## 2020-01-01 PROCEDURE — 70551 MRI BRAIN STEM W/O DYE: CPT

## 2020-01-01 PROCEDURE — 77010033678 HC OXYGEN DAILY

## 2020-01-01 PROCEDURE — 74011250636 HC RX REV CODE- 250/636: Performed by: NURSE ANESTHETIST, CERTIFIED REGISTERED

## 2020-01-01 PROCEDURE — 74011000636 HC RX REV CODE- 636: Performed by: FAMILY MEDICINE

## 2020-01-01 PROCEDURE — 83516 IMMUNOASSAY NONANTIBODY: CPT

## 2020-01-01 PROCEDURE — 62270 DX LMBR SPI PNXR: CPT

## 2020-01-01 PROCEDURE — 84436 ASSAY OF TOTAL THYROXINE: CPT

## 2020-01-01 PROCEDURE — 92610 EVALUATE SWALLOWING FUNCTION: CPT

## 2020-01-01 PROCEDURE — 82085 ASSAY OF ALDOLASE: CPT

## 2020-01-01 PROCEDURE — 80076 HEPATIC FUNCTION PANEL: CPT

## 2020-01-01 PROCEDURE — 76060000035 HC ANESTHESIA 2 TO 2.5 HR

## 2020-01-01 PROCEDURE — 89050 BODY FLUID CELL COUNT: CPT

## 2020-01-01 PROCEDURE — 94762 N-INVAS EAR/PLS OXIMTRY CONT: CPT

## 2020-01-01 PROCEDURE — 82248 BILIRUBIN DIRECT: CPT

## 2020-01-01 PROCEDURE — 86695 HERPES SIMPLEX TYPE 1 TEST: CPT

## 2020-01-01 PROCEDURE — 87389 HIV-1 AG W/HIV-1&-2 AB AG IA: CPT

## 2020-01-01 PROCEDURE — 92950 HEART/LUNG RESUSCITATION CPR: CPT

## 2020-01-01 PROCEDURE — 84550 ASSAY OF BLOOD/URIC ACID: CPT

## 2020-01-01 PROCEDURE — 97168 OT RE-EVAL EST PLAN CARE: CPT

## 2020-01-01 PROCEDURE — 76937 US GUIDE VASCULAR ACCESS: CPT

## 2020-01-01 PROCEDURE — 71260 CT THORAX DX C+: CPT

## 2020-01-01 PROCEDURE — 99232 SBSQ HOSP IP/OBS MODERATE 35: CPT | Performed by: PSYCHIATRY & NEUROLOGY

## 2020-01-01 PROCEDURE — 96365 THER/PROPH/DIAG IV INF INIT: CPT

## 2020-01-01 PROCEDURE — 87635 SARS-COV-2 COVID-19 AMP PRB: CPT

## 2020-01-01 PROCEDURE — 74011000258 HC RX REV CODE- 258: Performed by: STUDENT IN AN ORGANIZED HEALTH CARE EDUCATION/TRAINING PROGRAM

## 2020-01-01 PROCEDURE — 84145 PROCALCITONIN (PCT): CPT

## 2020-01-01 PROCEDURE — 74011000250 HC RX REV CODE- 250: Performed by: EMERGENCY MEDICINE

## 2020-01-01 PROCEDURE — 87077 CULTURE AEROBIC IDENTIFY: CPT

## 2020-01-01 PROCEDURE — 99284 EMERGENCY DEPT VISIT MOD MDM: CPT

## 2020-01-01 PROCEDURE — 80307 DRUG TEST PRSMV CHEM ANLYZR: CPT

## 2020-01-01 PROCEDURE — 83615 LACTATE (LD) (LDH) ENZYME: CPT

## 2020-01-01 PROCEDURE — 77030040830 HC CATH URETH FOL MDII -A

## 2020-01-01 PROCEDURE — 83874 ASSAY OF MYOGLOBIN: CPT

## 2020-01-01 PROCEDURE — 99285 EMERGENCY DEPT VISIT HI MDM: CPT

## 2020-01-01 PROCEDURE — 85025 COMPLETE CBC W/AUTO DIFF WBC: CPT

## 2020-01-01 PROCEDURE — 74011636637 HC RX REV CODE- 636/637: Performed by: EMERGENCY MEDICINE

## 2020-01-01 PROCEDURE — 80074 ACUTE HEPATITIS PANEL: CPT

## 2020-01-01 PROCEDURE — 86780 TREPONEMA PALLIDUM: CPT

## 2020-01-01 PROCEDURE — 87205 SMEAR GRAM STAIN: CPT

## 2020-01-01 PROCEDURE — 72125 CT NECK SPINE W/O DYE: CPT

## 2020-01-01 PROCEDURE — 02HV33Z INSERTION OF INFUSION DEVICE INTO SUPERIOR VENA CAVA, PERCUTANEOUS APPROACH: ICD-10-PCS | Performed by: RADIOLOGY

## 2020-01-01 PROCEDURE — 97161 PT EVAL LOW COMPLEX 20 MIN: CPT

## 2020-01-01 RX ORDER — FENTANYL CITRATE 50 UG/ML
INJECTION, SOLUTION INTRAMUSCULAR; INTRAVENOUS
Status: COMPLETED
Start: 2020-01-01 | End: 2020-01-01

## 2020-01-01 RX ORDER — QUETIAPINE FUMARATE 25 MG/1
25 TABLET, FILM COATED ORAL
Status: DISCONTINUED | OUTPATIENT
Start: 2020-01-01 | End: 2020-01-01

## 2020-01-01 RX ORDER — POLYETHYLENE GLYCOL 3350 17 G/17G
17 POWDER, FOR SOLUTION ORAL DAILY
Status: DISCONTINUED | OUTPATIENT
Start: 2020-01-01 | End: 2020-01-01

## 2020-01-01 RX ORDER — HALOPERIDOL 2 MG/1
2 TABLET ORAL ONCE
Status: COMPLETED | OUTPATIENT
Start: 2020-01-01 | End: 2020-01-01

## 2020-01-01 RX ORDER — TAMSULOSIN HYDROCHLORIDE 0.4 MG/1
0.8 CAPSULE ORAL DAILY
Status: DISCONTINUED | OUTPATIENT
Start: 2020-01-01 | End: 2020-01-01 | Stop reason: HOSPADM

## 2020-01-01 RX ORDER — VALPROIC ACID 250 MG/1
250 CAPSULE, LIQUID FILLED ORAL
Status: DISCONTINUED | OUTPATIENT
Start: 2020-01-01 | End: 2020-01-01 | Stop reason: HOSPADM

## 2020-01-01 RX ORDER — HALOPERIDOL 5 MG/ML
2.5 INJECTION INTRAMUSCULAR ONCE
Status: DISCONTINUED | OUTPATIENT
Start: 2020-01-01 | End: 2020-01-01

## 2020-01-01 RX ORDER — ONDANSETRON 2 MG/ML
4 INJECTION INTRAMUSCULAR; INTRAVENOUS
Status: COMPLETED | OUTPATIENT
Start: 2020-01-01 | End: 2020-01-01

## 2020-01-01 RX ORDER — MIDAZOLAM HYDROCHLORIDE 1 MG/ML
INJECTION, SOLUTION INTRAMUSCULAR; INTRAVENOUS
Status: COMPLETED
Start: 2020-01-01 | End: 2020-01-01

## 2020-01-01 RX ORDER — QUETIAPINE FUMARATE 25 MG/1
25 TABLET, FILM COATED ORAL ONCE
Status: DISCONTINUED | OUTPATIENT
Start: 2020-01-01 | End: 2020-01-01

## 2020-01-01 RX ORDER — POTASSIUM CHLORIDE 7.45 MG/ML
10 INJECTION INTRAVENOUS
Status: DISPENSED | OUTPATIENT
Start: 2020-01-01 | End: 2020-01-01

## 2020-01-01 RX ORDER — ACETAMINOPHEN 325 MG/1
650 TABLET ORAL
Status: DISCONTINUED | OUTPATIENT
Start: 2020-01-01 | End: 2020-01-01 | Stop reason: HOSPADM

## 2020-01-01 RX ORDER — FENTANYL CITRATE 50 UG/ML
12.5-5 INJECTION, SOLUTION INTRAMUSCULAR; INTRAVENOUS
Status: DISCONTINUED | OUTPATIENT
Start: 2020-01-01 | End: 2020-01-01 | Stop reason: ALTCHOICE

## 2020-01-01 RX ORDER — ACETAMINOPHEN 650 MG/1
650 SUPPOSITORY RECTAL
Status: DISCONTINUED | OUTPATIENT
Start: 2020-01-01 | End: 2020-01-01 | Stop reason: HOSPADM

## 2020-01-01 RX ORDER — LORAZEPAM 2 MG/ML
INJECTION INTRAMUSCULAR
Status: COMPLETED
Start: 2020-01-01 | End: 2020-01-01

## 2020-01-01 RX ORDER — INSULIN GLARGINE 100 [IU]/ML
7 INJECTION, SOLUTION SUBCUTANEOUS DAILY
Status: DISCONTINUED | OUTPATIENT
Start: 2020-01-01 | End: 2020-01-01

## 2020-01-01 RX ORDER — PANTOPRAZOLE SODIUM 40 MG/1
40 TABLET, DELAYED RELEASE ORAL DAILY
Status: DISCONTINUED | OUTPATIENT
Start: 2020-01-01 | End: 2020-01-01 | Stop reason: HOSPADM

## 2020-01-01 RX ORDER — POTASSIUM CHLORIDE 20 MEQ/1
40 TABLET, EXTENDED RELEASE ORAL
Status: DISCONTINUED | OUTPATIENT
Start: 2020-01-01 | End: 2020-01-01

## 2020-01-01 RX ORDER — LORAZEPAM 2 MG/ML
1 INJECTION INTRAMUSCULAR
Status: DISCONTINUED | OUTPATIENT
Start: 2020-01-01 | End: 2020-01-01

## 2020-01-01 RX ORDER — INSULIN GLARGINE 100 [IU]/ML
10 INJECTION, SOLUTION SUBCUTANEOUS DAILY
Status: DISCONTINUED | OUTPATIENT
Start: 2020-01-01 | End: 2020-01-01 | Stop reason: HOSPADM

## 2020-01-01 RX ORDER — QUETIAPINE FUMARATE 25 MG/1
25 TABLET, FILM COATED ORAL 2 TIMES DAILY
Status: DISCONTINUED | OUTPATIENT
Start: 2020-01-01 | End: 2020-01-01

## 2020-01-01 RX ORDER — QUETIAPINE FUMARATE 25 MG/1
25 TABLET, FILM COATED ORAL
Status: COMPLETED | OUTPATIENT
Start: 2020-01-01 | End: 2020-01-01

## 2020-01-01 RX ORDER — QUETIAPINE FUMARATE 25 MG/1
12.5 TABLET, FILM COATED ORAL 2 TIMES DAILY
Status: DISCONTINUED | OUTPATIENT
Start: 2020-01-01 | End: 2020-01-01

## 2020-01-01 RX ORDER — LABETALOL HCL 20 MG/4 ML
15 SYRINGE (ML) INTRAVENOUS ONCE
Status: COMPLETED | OUTPATIENT
Start: 2020-01-01 | End: 2020-01-01

## 2020-01-01 RX ORDER — HALOPERIDOL 5 MG/ML
2.5 INJECTION INTRAMUSCULAR ONCE
Status: COMPLETED | OUTPATIENT
Start: 2020-01-01 | End: 2020-01-01

## 2020-01-01 RX ORDER — POTASSIUM CHLORIDE 29.8 MG/ML
20 INJECTION INTRAVENOUS
Status: DISCONTINUED | OUTPATIENT
Start: 2020-01-01 | End: 2020-01-01

## 2020-01-01 RX ORDER — HALOPERIDOL 5 MG/ML
2 INJECTION INTRAMUSCULAR
Status: DISCONTINUED | OUTPATIENT
Start: 2020-01-01 | End: 2020-01-01

## 2020-01-01 RX ORDER — GRANULES FOR ORAL 3 G/1
3 POWDER ORAL ONCE
Status: COMPLETED | OUTPATIENT
Start: 2020-01-01 | End: 2020-01-01

## 2020-01-01 RX ORDER — LORAZEPAM 2 MG/ML
1 INJECTION INTRAMUSCULAR
Status: COMPLETED | OUTPATIENT
Start: 2020-01-01 | End: 2020-01-01

## 2020-01-01 RX ORDER — ONDANSETRON 2 MG/ML
4 INJECTION INTRAMUSCULAR; INTRAVENOUS
Status: DISCONTINUED | OUTPATIENT
Start: 2020-01-01 | End: 2020-01-01 | Stop reason: HOSPADM

## 2020-01-01 RX ORDER — MAGNESIUM SULFATE 100 %
16 CRYSTALS MISCELLANEOUS AS NEEDED
Status: DISCONTINUED | OUTPATIENT
Start: 2020-01-01 | End: 2020-01-01 | Stop reason: HOSPADM

## 2020-01-01 RX ORDER — ZOLPIDEM TARTRATE 5 MG/1
5 TABLET ORAL
Status: DISCONTINUED | OUTPATIENT
Start: 2020-01-01 | End: 2020-01-01 | Stop reason: HOSPADM

## 2020-01-01 RX ORDER — INSULIN GLARGINE 100 [IU]/ML
INJECTION, SOLUTION SUBCUTANEOUS
Qty: 1 VIAL | Refills: 0 | Status: SHIPPED | OUTPATIENT
Start: 2020-01-01

## 2020-01-01 RX ORDER — MAGNESIUM SULFATE HEPTAHYDRATE 40 MG/ML
2 INJECTION, SOLUTION INTRAVENOUS ONCE
Status: COMPLETED | OUTPATIENT
Start: 2020-01-01 | End: 2020-01-01

## 2020-01-01 RX ORDER — SODIUM CHLORIDE, SODIUM LACTATE, POTASSIUM CHLORIDE, CALCIUM CHLORIDE 600; 310; 30; 20 MG/100ML; MG/100ML; MG/100ML; MG/100ML
INJECTION, SOLUTION INTRAVENOUS
Status: DISCONTINUED | OUTPATIENT
Start: 2020-01-01 | End: 2020-01-01 | Stop reason: HOSPADM

## 2020-01-01 RX ORDER — GLYCERIN ADULT
1 SUPPOSITORY, RECTAL RECTAL
Status: COMPLETED | OUTPATIENT
Start: 2020-01-01 | End: 2020-01-01

## 2020-01-01 RX ORDER — LORAZEPAM 2 MG/ML
INJECTION INTRAMUSCULAR
Status: DISPENSED
Start: 2020-01-01 | End: 2020-01-01

## 2020-01-01 RX ORDER — HALOPERIDOL 5 MG/ML
2 INJECTION INTRAMUSCULAR
Status: COMPLETED | OUTPATIENT
Start: 2020-01-01 | End: 2020-01-01

## 2020-01-01 RX ORDER — LANOLIN ALCOHOL/MO/W.PET/CERES
100 CREAM (GRAM) TOPICAL DAILY
Qty: 30 TAB | Refills: 0 | Status: SHIPPED | OUTPATIENT
Start: 2020-01-01 | End: 2020-11-02

## 2020-01-01 RX ORDER — POTASSIUM CHLORIDE 20 MEQ/1
40 TABLET, EXTENDED RELEASE ORAL 2 TIMES DAILY
Status: DISCONTINUED | OUTPATIENT
Start: 2020-01-01 | End: 2020-01-01

## 2020-01-01 RX ORDER — LORAZEPAM 2 MG/ML
2 INJECTION INTRAMUSCULAR ONCE
Status: COMPLETED | OUTPATIENT
Start: 2020-01-01 | End: 2020-01-01

## 2020-01-01 RX ORDER — INSULIN LISPRO 100 [IU]/ML
INJECTION, SOLUTION INTRAVENOUS; SUBCUTANEOUS
Status: DISCONTINUED | OUTPATIENT
Start: 2020-01-01 | End: 2020-01-01 | Stop reason: HOSPADM

## 2020-01-01 RX ORDER — FLUOXETINE HYDROCHLORIDE 20 MG/1
20 CAPSULE ORAL DAILY
Status: DISCONTINUED | OUTPATIENT
Start: 2020-01-01 | End: 2020-01-01 | Stop reason: HOSPADM

## 2020-01-01 RX ORDER — MIDAZOLAM HYDROCHLORIDE 1 MG/ML
INJECTION, SOLUTION INTRAMUSCULAR; INTRAVENOUS AS NEEDED
Status: DISCONTINUED | OUTPATIENT
Start: 2020-01-01 | End: 2020-01-01 | Stop reason: HOSPADM

## 2020-01-01 RX ORDER — SODIUM CHLORIDE, SODIUM LACTATE, POTASSIUM CHLORIDE, CALCIUM CHLORIDE 600; 310; 30; 20 MG/100ML; MG/100ML; MG/100ML; MG/100ML
100 INJECTION, SOLUTION INTRAVENOUS CONTINUOUS
Status: DISCONTINUED | OUTPATIENT
Start: 2020-01-01 | End: 2020-01-01

## 2020-01-01 RX ORDER — SODIUM,POTASSIUM PHOSPHATES 280-250MG
2 POWDER IN PACKET (EA) ORAL
Status: DISCONTINUED | OUTPATIENT
Start: 2020-01-01 | End: 2020-01-01

## 2020-01-01 RX ORDER — SODIUM CHLORIDE 9 MG/ML
125 INJECTION, SOLUTION INTRAVENOUS CONTINUOUS
Status: DISCONTINUED | OUTPATIENT
Start: 2020-01-01 | End: 2020-01-01

## 2020-01-01 RX ORDER — POTASSIUM CHLORIDE 20 MEQ/1
40 TABLET, EXTENDED RELEASE ORAL 2 TIMES DAILY
Status: COMPLETED | OUTPATIENT
Start: 2020-01-01 | End: 2020-01-01

## 2020-01-01 RX ORDER — MIDAZOLAM HYDROCHLORIDE 1 MG/ML
1 INJECTION, SOLUTION INTRAMUSCULAR; INTRAVENOUS
Status: DISCONTINUED | OUTPATIENT
Start: 2020-01-01 | End: 2020-01-01 | Stop reason: ALTCHOICE

## 2020-01-01 RX ORDER — TAMSULOSIN HYDROCHLORIDE 0.4 MG/1
0.4 CAPSULE ORAL DAILY
Status: DISCONTINUED | OUTPATIENT
Start: 2020-01-01 | End: 2020-01-01

## 2020-01-01 RX ORDER — HALOPERIDOL 5 MG/ML
3 INJECTION INTRAMUSCULAR
Status: DISCONTINUED | OUTPATIENT
Start: 2020-01-01 | End: 2020-01-01

## 2020-01-01 RX ORDER — BENZTROPINE MESYLATE 1 MG/ML
1 INJECTION INTRAMUSCULAR; INTRAVENOUS ONCE
Status: COMPLETED | OUTPATIENT
Start: 2020-01-01 | End: 2020-01-01

## 2020-01-01 RX ORDER — INSULIN GLARGINE 100 [IU]/ML
5 INJECTION, SOLUTION SUBCUTANEOUS DAILY
Status: DISCONTINUED | OUTPATIENT
Start: 2020-01-01 | End: 2020-01-01

## 2020-01-01 RX ORDER — LANOLIN ALCOHOL/MO/W.PET/CERES
100 CREAM (GRAM) TOPICAL DAILY
Status: DISCONTINUED | OUTPATIENT
Start: 2020-01-01 | End: 2020-01-01 | Stop reason: HOSPADM

## 2020-01-01 RX ORDER — HEPARIN SODIUM 5000 [USP'U]/ML
5000 INJECTION, SOLUTION INTRAVENOUS; SUBCUTANEOUS EVERY 8 HOURS
Status: DISCONTINUED | OUTPATIENT
Start: 2020-01-01 | End: 2020-01-01

## 2020-01-01 RX ORDER — PAROXETINE HYDROCHLORIDE 20 MG/1
60 TABLET, FILM COATED ORAL DAILY
Status: DISCONTINUED | OUTPATIENT
Start: 2020-01-01 | End: 2020-01-01

## 2020-01-01 RX ORDER — LABETALOL HCL 20 MG/4 ML
10 SYRINGE (ML) INTRAVENOUS
Status: DISCONTINUED | OUTPATIENT
Start: 2020-01-01 | End: 2020-01-01

## 2020-01-01 RX ORDER — MELATONIN
1000 DAILY
Status: DISCONTINUED | OUTPATIENT
Start: 2020-01-01 | End: 2020-01-01 | Stop reason: HOSPADM

## 2020-01-01 RX ORDER — ROSUVASTATIN CALCIUM 10 MG/1
20 TABLET, COATED ORAL
Status: DISCONTINUED | OUTPATIENT
Start: 2020-01-01 | End: 2020-01-01 | Stop reason: HOSPADM

## 2020-01-01 RX ORDER — POTASSIUM CHLORIDE 7.45 MG/ML
10 INJECTION INTRAVENOUS
Status: COMPLETED | OUTPATIENT
Start: 2020-01-01 | End: 2020-01-01

## 2020-01-01 RX ORDER — SODIUM CHLORIDE 0.9 % (FLUSH) 0.9 %
5-40 SYRINGE (ML) INJECTION EVERY 8 HOURS
Status: DISCONTINUED | OUTPATIENT
Start: 2020-01-01 | End: 2020-01-01

## 2020-01-01 RX ORDER — PROPOFOL 10 MG/ML
INJECTION, EMULSION INTRAVENOUS
Status: COMPLETED
Start: 2020-01-01 | End: 2020-01-01

## 2020-01-01 RX ORDER — TAMSULOSIN HYDROCHLORIDE 0.4 MG/1
0.8 CAPSULE ORAL DAILY
Qty: 60 CAP | Refills: 0 | Status: SHIPPED | OUTPATIENT
Start: 2020-01-01 | End: 2020-11-02

## 2020-01-01 RX ORDER — CEPHALEXIN 250 MG/1
500 CAPSULE ORAL EVERY 12 HOURS
Status: DISCONTINUED | OUTPATIENT
Start: 2020-01-01 | End: 2020-01-01

## 2020-01-01 RX ORDER — SODIUM CHLORIDE AND POTASSIUM CHLORIDE .9; .15 G/100ML; G/100ML
SOLUTION INTRAVENOUS CONTINUOUS
Status: DISCONTINUED | OUTPATIENT
Start: 2020-01-01 | End: 2020-01-01

## 2020-01-01 RX ORDER — LORAZEPAM 2 MG/ML
2 INJECTION INTRAMUSCULAR
Status: DISCONTINUED | OUTPATIENT
Start: 2020-01-01 | End: 2020-01-01

## 2020-01-01 RX ORDER — FLUOXETINE HYDROCHLORIDE 20 MG/1
20 CAPSULE ORAL DAILY
Qty: 30 CAP | Refills: 0 | Status: SHIPPED | OUTPATIENT
Start: 2020-01-01 | End: 2020-11-02

## 2020-01-01 RX ORDER — LORAZEPAM 2 MG/ML
0.5 INJECTION INTRAMUSCULAR ONCE
Status: DISCONTINUED | OUTPATIENT
Start: 2020-01-01 | End: 2020-01-01

## 2020-01-01 RX ORDER — INSULIN LISPRO 100 [IU]/ML
INJECTION, SOLUTION INTRAVENOUS; SUBCUTANEOUS EVERY 6 HOURS
Status: DISCONTINUED | OUTPATIENT
Start: 2020-01-01 | End: 2020-01-01

## 2020-01-01 RX ORDER — HALOPERIDOL 5 MG/ML
5 INJECTION INTRAMUSCULAR
Status: COMPLETED | OUTPATIENT
Start: 2020-01-01 | End: 2020-01-01

## 2020-01-01 RX ORDER — FLUOXETINE HYDROCHLORIDE 20 MG/1
20 CAPSULE ORAL DAILY
Status: DISCONTINUED | OUTPATIENT
Start: 2020-01-01 | End: 2020-01-01

## 2020-01-01 RX ORDER — CALCITRIOL 0.25 UG/1
0.25 CAPSULE ORAL DAILY
Qty: 30 CAP | Refills: 0 | Status: SHIPPED | OUTPATIENT
Start: 2020-01-01 | End: 2020-11-01

## 2020-01-01 RX ORDER — ZOLPIDEM TARTRATE 5 MG/1
5 TABLET ORAL
Qty: 5 TAB | Refills: 0 | Status: SHIPPED | OUTPATIENT
Start: 2020-01-01 | End: 2020-10-07

## 2020-01-01 RX ORDER — POLYETHYLENE GLYCOL 3350 17 G/17G
17 POWDER, FOR SOLUTION ORAL DAILY
Status: DISCONTINUED | OUTPATIENT
Start: 2020-01-01 | End: 2020-01-01 | Stop reason: HOSPADM

## 2020-01-01 RX ORDER — QUETIAPINE FUMARATE 25 MG/1
50 TABLET, FILM COATED ORAL ONCE
Status: COMPLETED | OUTPATIENT
Start: 2020-01-01 | End: 2020-01-01

## 2020-01-01 RX ORDER — HALOPERIDOL 5 MG/ML
2 INJECTION INTRAMUSCULAR ONCE
Status: COMPLETED | OUTPATIENT
Start: 2020-01-01 | End: 2020-01-01

## 2020-01-01 RX ORDER — METOCLOPRAMIDE 10 MG/1
5 TABLET ORAL
Status: DISCONTINUED | OUTPATIENT
Start: 2020-01-01 | End: 2020-01-01

## 2020-01-01 RX ORDER — BENZTROPINE MESYLATE 1 MG/1
1 TABLET ORAL 2 TIMES DAILY
Status: DISCONTINUED | OUTPATIENT
Start: 2020-01-01 | End: 2020-01-01

## 2020-01-01 RX ORDER — HALOPERIDOL 2 MG/1
2 TABLET ORAL 3 TIMES DAILY
Qty: 90 TAB | Refills: 0 | Status: SHIPPED | OUTPATIENT
Start: 2020-01-01 | End: 2020-11-01

## 2020-01-01 RX ORDER — SODIUM,POTASSIUM PHOSPHATES 280-250MG
2 POWDER IN PACKET (EA) ORAL
Status: COMPLETED | OUTPATIENT
Start: 2020-01-01 | End: 2020-01-01

## 2020-01-01 RX ORDER — SODIUM,POTASSIUM PHOSPHATES 280-250MG
2 POWDER IN PACKET (EA) ORAL ONCE
Status: COMPLETED | OUTPATIENT
Start: 2020-01-01 | End: 2020-01-01

## 2020-01-01 RX ORDER — QUETIAPINE FUMARATE 25 MG/1
50 TABLET, FILM COATED ORAL
Status: DISCONTINUED | OUTPATIENT
Start: 2020-01-01 | End: 2020-01-01

## 2020-01-01 RX ORDER — SODIUM CHLORIDE 0.9 % (FLUSH) 0.9 %
5-40 SYRINGE (ML) INJECTION AS NEEDED
Status: DISCONTINUED | OUTPATIENT
Start: 2020-01-01 | End: 2020-01-01 | Stop reason: HOSPADM

## 2020-01-01 RX ORDER — SODIUM CHLORIDE 0.9 % (FLUSH) 0.9 %
5-10 SYRINGE (ML) INJECTION AS NEEDED
Status: DISCONTINUED | OUTPATIENT
Start: 2020-01-01 | End: 2020-01-01 | Stop reason: HOSPADM

## 2020-01-01 RX ORDER — VALPROIC ACID 250 MG/1
250 CAPSULE, LIQUID FILLED ORAL
Qty: 90 CAP | Refills: 0 | Status: SHIPPED | OUTPATIENT
Start: 2020-01-01 | End: 2020-11-01

## 2020-01-01 RX ORDER — HALOPERIDOL 5 MG/ML
INJECTION INTRAMUSCULAR
Status: DISCONTINUED
Start: 2020-01-01 | End: 2020-01-01

## 2020-01-01 RX ORDER — QUETIAPINE FUMARATE 25 MG/1
25 TABLET, FILM COATED ORAL DAILY
Status: DISCONTINUED | OUTPATIENT
Start: 2020-01-01 | End: 2020-01-01

## 2020-01-01 RX ORDER — QUETIAPINE FUMARATE 25 MG/1
75 TABLET, FILM COATED ORAL
Status: DISCONTINUED | OUTPATIENT
Start: 2020-01-01 | End: 2020-01-01

## 2020-01-01 RX ORDER — DEXTROSE 50 % IN WATER (D50W) INTRAVENOUS SYRINGE
25-50 AS NEEDED
Status: DISCONTINUED | OUTPATIENT
Start: 2020-01-01 | End: 2020-01-01 | Stop reason: HOSPADM

## 2020-01-01 RX ORDER — LANOLIN ALCOHOL/MO/W.PET/CERES
3 CREAM (GRAM) TOPICAL
Status: DISCONTINUED | OUTPATIENT
Start: 2020-01-01 | End: 2020-01-01 | Stop reason: SDUPTHER

## 2020-01-01 RX ORDER — LORAZEPAM 2 MG/ML
1 INJECTION INTRAMUSCULAR ONCE
Status: COMPLETED | OUTPATIENT
Start: 2020-01-01 | End: 2020-01-01

## 2020-01-01 RX ORDER — QUETIAPINE FUMARATE 25 MG/1
63 TABLET, FILM COATED ORAL
Status: DISCONTINUED | OUTPATIENT
Start: 2020-01-01 | End: 2020-01-01

## 2020-01-01 RX ORDER — HALOPERIDOL 5 MG/ML
5 INJECTION INTRAMUSCULAR
Status: DISCONTINUED | OUTPATIENT
Start: 2020-01-01 | End: 2020-01-01 | Stop reason: HOSPADM

## 2020-01-01 RX ORDER — POLYETHYLENE GLYCOL 3350 17 G/17G
17 POWDER, FOR SOLUTION ORAL DAILY PRN
Status: DISCONTINUED | OUTPATIENT
Start: 2020-01-01 | End: 2020-01-01 | Stop reason: SDUPTHER

## 2020-01-01 RX ORDER — PROPOFOL 10 MG/ML
VIAL (ML) INTRAVENOUS
Status: DISCONTINUED | OUTPATIENT
Start: 2020-01-01 | End: 2020-01-01 | Stop reason: HOSPADM

## 2020-01-01 RX ORDER — GADOTERATE MEGLUMINE 376.9 MG/ML
20 INJECTION INTRAVENOUS
Status: COMPLETED | OUTPATIENT
Start: 2020-01-01 | End: 2020-01-01

## 2020-01-01 RX ORDER — LORAZEPAM 2 MG/ML
0.5 INJECTION INTRAMUSCULAR
Status: COMPLETED | OUTPATIENT
Start: 2020-01-01 | End: 2020-01-01

## 2020-01-01 RX ORDER — HALOPERIDOL 2 MG/1
2 TABLET ORAL 3 TIMES DAILY
Status: DISCONTINUED | OUTPATIENT
Start: 2020-01-01 | End: 2020-01-01 | Stop reason: HOSPADM

## 2020-01-01 RX ORDER — BISACODYL 5 MG
5 TABLET, DELAYED RELEASE (ENTERIC COATED) ORAL DAILY PRN
Status: DISCONTINUED | OUTPATIENT
Start: 2020-01-01 | End: 2020-01-01 | Stop reason: HOSPADM

## 2020-01-01 RX ORDER — INSULIN GLARGINE 100 [IU]/ML
5 INJECTION, SOLUTION SUBCUTANEOUS
Status: COMPLETED | OUTPATIENT
Start: 2020-01-01 | End: 2020-01-01

## 2020-01-01 RX ORDER — FLUCONAZOLE 200 MG/1
200 TABLET ORAL
Status: DISCONTINUED | OUTPATIENT
Start: 2020-01-01 | End: 2020-01-01 | Stop reason: SDUPTHER

## 2020-01-01 RX ORDER — PANTOPRAZOLE SODIUM 40 MG/1
40 TABLET, DELAYED RELEASE ORAL DAILY
Qty: 30 TAB | Refills: 0 | Status: SHIPPED | OUTPATIENT
Start: 2020-01-01 | End: 2020-11-02

## 2020-01-01 RX ORDER — LORAZEPAM 1 MG/1
2 TABLET ORAL
Status: DISCONTINUED | OUTPATIENT
Start: 2020-01-01 | End: 2020-01-01

## 2020-01-01 RX ORDER — POTASSIUM CHLORIDE 20 MEQ/1
40 TABLET, EXTENDED RELEASE ORAL
Status: COMPLETED | OUTPATIENT
Start: 2020-01-01 | End: 2020-01-01

## 2020-01-01 RX ORDER — QUETIAPINE FUMARATE 25 MG/1
12.5 TABLET, FILM COATED ORAL
Status: DISCONTINUED | OUTPATIENT
Start: 2020-01-01 | End: 2020-01-01

## 2020-01-01 RX ORDER — MAGNESIUM SULFATE 1 G/100ML
1 INJECTION INTRAVENOUS ONCE
Status: COMPLETED | OUTPATIENT
Start: 2020-01-01 | End: 2020-01-01

## 2020-01-01 RX ORDER — FLUCONAZOLE 200 MG/1
200 TABLET ORAL ONCE
Status: COMPLETED | OUTPATIENT
Start: 2020-01-01 | End: 2020-01-01

## 2020-01-01 RX ORDER — POLYETHYLENE GLYCOL 3350 17 G/17G
17 POWDER, FOR SOLUTION ORAL 2 TIMES DAILY
Status: DISCONTINUED | OUTPATIENT
Start: 2020-01-01 | End: 2020-01-01

## 2020-01-01 RX ORDER — QUETIAPINE FUMARATE 25 MG/1
25 TABLET, FILM COATED ORAL ONCE
Status: COMPLETED | OUTPATIENT
Start: 2020-01-01 | End: 2020-01-01

## 2020-01-01 RX ORDER — POTASSIUM CHLORIDE AND SODIUM CHLORIDE 450; 150 MG/100ML; MG/100ML
INJECTION, SOLUTION INTRAVENOUS CONTINUOUS
Status: DISCONTINUED | OUTPATIENT
Start: 2020-01-01 | End: 2020-01-01

## 2020-01-01 RX ORDER — QUETIAPINE FUMARATE 25 MG/1
50 TABLET, FILM COATED ORAL DAILY
Status: DISCONTINUED | OUTPATIENT
Start: 2020-01-01 | End: 2020-01-01

## 2020-01-01 RX ORDER — LORAZEPAM 2 MG/ML
2 INJECTION INTRAMUSCULAR ONCE
Status: DISCONTINUED | OUTPATIENT
Start: 2020-01-01 | End: 2020-01-01

## 2020-01-01 RX ORDER — POTASSIUM CHLORIDE 20 MEQ/1
40 TABLET, EXTENDED RELEASE ORAL ONCE
Status: DISCONTINUED | OUTPATIENT
Start: 2020-01-01 | End: 2020-01-01

## 2020-01-01 RX ORDER — QUETIAPINE FUMARATE 25 MG/1
37.5 TABLET, FILM COATED ORAL 2 TIMES DAILY
Status: DISCONTINUED | OUTPATIENT
Start: 2020-01-01 | End: 2020-01-01

## 2020-01-01 RX ORDER — DIPHENHYDRAMINE HYDROCHLORIDE 50 MG/ML
50 INJECTION, SOLUTION INTRAMUSCULAR; INTRAVENOUS ONCE
Status: DISCONTINUED | OUTPATIENT
Start: 2020-01-01 | End: 2020-01-01 | Stop reason: ALTCHOICE

## 2020-01-01 RX ORDER — FENTANYL CITRATE 50 UG/ML
INJECTION, SOLUTION INTRAMUSCULAR; INTRAVENOUS AS NEEDED
Status: DISCONTINUED | OUTPATIENT
Start: 2020-01-01 | End: 2020-01-01 | Stop reason: HOSPADM

## 2020-01-01 RX ORDER — PANTOPRAZOLE SODIUM 40 MG/10ML
40 INJECTION, POWDER, LYOPHILIZED, FOR SOLUTION INTRAVENOUS DAILY
Status: DISCONTINUED | OUTPATIENT
Start: 2020-01-01 | End: 2020-01-01

## 2020-01-01 RX ADMIN — Medication 1 TABLET: at 10:45

## 2020-01-01 RX ADMIN — FLUOXETINE HYDROCHLORIDE 20 MG: 20 CAPSULE ORAL at 09:34

## 2020-01-01 RX ADMIN — INSULIN LISPRO 6 UNITS: 100 INJECTION, SOLUTION INTRAVENOUS; SUBCUTANEOUS at 22:42

## 2020-01-01 RX ADMIN — PIPERACILLIN AND TAZOBACTAM 3.38 G: 3; .375 INJECTION, POWDER, LYOPHILIZED, FOR SOLUTION INTRAVENOUS at 03:04

## 2020-01-01 RX ADMIN — HEPARIN SODIUM 5000 UNITS: 5000 INJECTION INTRAVENOUS; SUBCUTANEOUS at 23:35

## 2020-01-01 RX ADMIN — PANTOPRAZOLE 40 MG: 40 TABLET, DELAYED RELEASE ORAL at 09:38

## 2020-01-01 RX ADMIN — INSULIN LISPRO 4 UNITS: 100 INJECTION, SOLUTION INTRAVENOUS; SUBCUTANEOUS at 15:56

## 2020-01-01 RX ADMIN — Medication 1 TABLET: at 08:25

## 2020-01-01 RX ADMIN — FLUOXETINE HYDROCHLORIDE 20 MG: 20 CAPSULE ORAL at 09:36

## 2020-01-01 RX ADMIN — Medication 10 ML: at 22:14

## 2020-01-01 RX ADMIN — Medication 10 ML: at 06:32

## 2020-01-01 RX ADMIN — HEPARIN SODIUM 5000 UNITS: 5000 INJECTION INTRAVENOUS; SUBCUTANEOUS at 10:05

## 2020-01-01 RX ADMIN — QUETIAPINE FUMARATE 50 MG: 25 TABLET ORAL at 21:08

## 2020-01-01 RX ADMIN — FLUOXETINE HYDROCHLORIDE 20 MG: 20 CAPSULE ORAL at 08:38

## 2020-01-01 RX ADMIN — POLYETHYLENE GLYCOL 3350 17 G: 17 POWDER, FOR SOLUTION ORAL at 17:43

## 2020-01-01 RX ADMIN — POLYETHYLENE GLYCOL 3350 17 G: 17 POWDER, FOR SOLUTION ORAL at 09:01

## 2020-01-01 RX ADMIN — PANTOPRAZOLE 40 MG: 40 TABLET, DELAYED RELEASE ORAL at 09:11

## 2020-01-01 RX ADMIN — Medication 10 ML: at 06:46

## 2020-01-01 RX ADMIN — PIPERACILLIN AND TAZOBACTAM 2.25 G: 2; .25 INJECTION, POWDER, LYOPHILIZED, FOR SOLUTION INTRAVENOUS at 03:24

## 2020-01-01 RX ADMIN — HEPARIN SODIUM 5000 UNITS: 5000 INJECTION INTRAVENOUS; SUBCUTANEOUS at 17:40

## 2020-01-01 RX ADMIN — QUETIAPINE FUMARATE 50 MG: 25 TABLET ORAL at 21:56

## 2020-01-01 RX ADMIN — Medication 3 MG: at 21:52

## 2020-01-01 RX ADMIN — MIDAZOLAM HYDROCHLORIDE 2 MG: 2 INJECTION, SOLUTION INTRAMUSCULAR; INTRAVENOUS at 14:45

## 2020-01-01 RX ADMIN — POTASSIUM CHLORIDE 10 MEQ: 7.46 INJECTION, SOLUTION INTRAVENOUS at 04:24

## 2020-01-01 RX ADMIN — TAMSULOSIN HYDROCHLORIDE 0.8 MG: 0.4 CAPSULE ORAL at 09:38

## 2020-01-01 RX ADMIN — HALOPERIDOL 2 MG: 2 TABLET ORAL at 21:46

## 2020-01-01 RX ADMIN — POLYETHYLENE GLYCOL 3350 17 G: 17 POWDER, FOR SOLUTION ORAL at 09:41

## 2020-01-01 RX ADMIN — PIPERACILLIN AND TAZOBACTAM 3.38 G: 3; .375 INJECTION, POWDER, LYOPHILIZED, FOR SOLUTION INTRAVENOUS at 15:45

## 2020-01-01 RX ADMIN — ACETAMINOPHEN 650 MG: 325 TABLET ORAL at 05:52

## 2020-01-01 RX ADMIN — TAMSULOSIN HYDROCHLORIDE 0.8 MG: 0.4 CAPSULE ORAL at 09:02

## 2020-01-01 RX ADMIN — INSULIN LISPRO 9 UNITS: 100 INJECTION, SOLUTION INTRAVENOUS; SUBCUTANEOUS at 23:05

## 2020-01-01 RX ADMIN — LORAZEPAM 2 MG: 1 TABLET ORAL at 20:21

## 2020-01-01 RX ADMIN — POTASSIUM CHLORIDE 20 MEQ: 400 INJECTION, SOLUTION INTRAVENOUS at 09:13

## 2020-01-01 RX ADMIN — INSULIN GLARGINE 5 UNITS: 100 INJECTION, SOLUTION SUBCUTANEOUS at 09:38

## 2020-01-01 RX ADMIN — FLUOXETINE HYDROCHLORIDE 20 MG: 20 CAPSULE ORAL at 09:39

## 2020-01-01 RX ADMIN — PANTOPRAZOLE 40 MG: 40 TABLET, DELAYED RELEASE ORAL at 08:26

## 2020-01-01 RX ADMIN — INSULIN LISPRO 6 UNITS: 100 INJECTION, SOLUTION INTRAVENOUS; SUBCUTANEOUS at 09:37

## 2020-01-01 RX ADMIN — INSULIN LISPRO 4 UNITS: 100 INJECTION, SOLUTION INTRAVENOUS; SUBCUTANEOUS at 05:40

## 2020-01-01 RX ADMIN — SODIUM CHLORIDE AND POTASSIUM CHLORIDE: 4.5; 1.49 INJECTION, SOLUTION INTRAVENOUS at 21:22

## 2020-01-01 RX ADMIN — HEPARIN SODIUM 5000 UNITS: 5000 INJECTION INTRAVENOUS; SUBCUTANEOUS at 23:59

## 2020-01-01 RX ADMIN — PIPERACILLIN AND TAZOBACTAM 2.25 G: 2; .25 INJECTION, POWDER, LYOPHILIZED, FOR SOLUTION INTRAVENOUS at 09:56

## 2020-01-01 RX ADMIN — INSULIN LISPRO 3 UNITS: 100 INJECTION, SOLUTION INTRAVENOUS; SUBCUTANEOUS at 09:31

## 2020-01-01 RX ADMIN — Medication 3 MG: at 21:08

## 2020-01-01 RX ADMIN — HALOPERIDOL LACTATE 2 MG: 5 INJECTION INTRAMUSCULAR at 21:03

## 2020-01-01 RX ADMIN — FENTANYL CITRATE 25 MCG: 50 INJECTION, SOLUTION INTRAMUSCULAR; INTRAVENOUS at 14:56

## 2020-01-01 RX ADMIN — FOSFOMYCIN TROMETHAMINE 3 G: 3 POWDER ORAL at 14:42

## 2020-01-01 RX ADMIN — Medication 10 ML: at 05:12

## 2020-01-01 RX ADMIN — Medication 10 ML: at 21:18

## 2020-01-01 RX ADMIN — QUETIAPINE FUMARATE 12.5 MG: 25 TABLET ORAL at 14:06

## 2020-01-01 RX ADMIN — Medication 10 ML: at 13:34

## 2020-01-01 RX ADMIN — FLUOXETINE HYDROCHLORIDE 20 MG: 20 CAPSULE ORAL at 10:24

## 2020-01-01 RX ADMIN — Medication 1 TABLET: at 10:04

## 2020-01-01 RX ADMIN — LORAZEPAM 2 MG: 2 INJECTION, SOLUTION INTRAMUSCULAR; INTRAVENOUS at 09:55

## 2020-01-01 RX ADMIN — Medication 1 TABLET: at 09:14

## 2020-01-01 RX ADMIN — INSULIN LISPRO 3 UNITS: 100 INJECTION, SOLUTION INTRAVENOUS; SUBCUTANEOUS at 17:22

## 2020-01-01 RX ADMIN — INSULIN LISPRO 2 UNITS: 100 INJECTION, SOLUTION INTRAVENOUS; SUBCUTANEOUS at 12:22

## 2020-01-01 RX ADMIN — HALOPERIDOL LACTATE 2 MG: 5 INJECTION, SOLUTION INTRAMUSCULAR at 19:45

## 2020-01-01 RX ADMIN — POTASSIUM & SODIUM PHOSPHATES POWDER PACK 280-160-250 MG 2 PACKET: 280-160-250 PACK at 09:13

## 2020-01-01 RX ADMIN — QUETIAPINE FUMARATE 12.5 MG: 25 TABLET ORAL at 21:28

## 2020-01-01 RX ADMIN — WATER 10 MG: 1 INJECTION INTRAMUSCULAR; INTRAVENOUS; SUBCUTANEOUS at 19:05

## 2020-01-01 RX ADMIN — GADOTERATE MEGLUMINE 20 ML: 376.9 INJECTION INTRAVENOUS at 16:15

## 2020-01-01 RX ADMIN — POLYETHYLENE GLYCOL 3350 17 G: 17 POWDER, FOR SOLUTION ORAL at 09:38

## 2020-01-01 RX ADMIN — POTASSIUM CHLORIDE 10 MEQ: 7.46 INJECTION, SOLUTION INTRAVENOUS at 02:50

## 2020-01-01 RX ADMIN — INSULIN LISPRO 2 UNITS: 100 INJECTION, SOLUTION INTRAVENOUS; SUBCUTANEOUS at 13:13

## 2020-01-01 RX ADMIN — Medication 1 TABLET: at 09:11

## 2020-01-01 RX ADMIN — HEPARIN SODIUM 5000 UNITS: 5000 INJECTION INTRAVENOUS; SUBCUTANEOUS at 23:28

## 2020-01-01 RX ADMIN — SODIUM CHLORIDE 40 MG: 9 INJECTION, SOLUTION INTRAMUSCULAR; INTRAVENOUS; SUBCUTANEOUS at 09:57

## 2020-01-01 RX ADMIN — Medication 3 MG: at 23:06

## 2020-01-01 RX ADMIN — POTASSIUM CHLORIDE 20 MEQ: 400 INJECTION, SOLUTION INTRAVENOUS at 12:14

## 2020-01-01 RX ADMIN — QUETIAPINE FUMARATE 63 MG: 25 TABLET ORAL at 23:16

## 2020-01-01 RX ADMIN — Medication 100 MG: at 13:56

## 2020-01-01 RX ADMIN — SODIUM CHLORIDE, SODIUM LACTATE, POTASSIUM CHLORIDE, AND CALCIUM CHLORIDE 150 ML/HR: 600; 310; 30; 20 INJECTION, SOLUTION INTRAVENOUS at 00:20

## 2020-01-01 RX ADMIN — Medication 10 ML: at 13:19

## 2020-01-01 RX ADMIN — HEPARIN SODIUM 5000 UNITS: 5000 INJECTION INTRAVENOUS; SUBCUTANEOUS at 10:03

## 2020-01-01 RX ADMIN — TAMSULOSIN HYDROCHLORIDE 0.4 MG: 0.4 CAPSULE ORAL at 09:11

## 2020-01-01 RX ADMIN — LORAZEPAM 2 MG: 1 TABLET ORAL at 12:08

## 2020-01-01 RX ADMIN — TAMSULOSIN HYDROCHLORIDE 0.4 MG: 0.4 CAPSULE ORAL at 10:46

## 2020-01-01 RX ADMIN — PIPERACILLIN AND TAZOBACTAM 3.38 G: 3; .375 INJECTION, POWDER, LYOPHILIZED, FOR SOLUTION INTRAVENOUS at 09:16

## 2020-01-01 RX ADMIN — INSULIN GLARGINE 10 UNITS: 100 INJECTION, SOLUTION SUBCUTANEOUS at 08:51

## 2020-01-01 RX ADMIN — SODIUM CHLORIDE, SODIUM LACTATE, POTASSIUM CHLORIDE, AND CALCIUM CHLORIDE 100 ML/HR: 600; 310; 30; 20 INJECTION, SOLUTION INTRAVENOUS at 22:13

## 2020-01-01 RX ADMIN — ACETAMINOPHEN 650 MG: 325 TABLET ORAL at 02:27

## 2020-01-01 RX ADMIN — HEPARIN SODIUM 5000 UNITS: 5000 INJECTION INTRAVENOUS; SUBCUTANEOUS at 15:30

## 2020-01-01 RX ADMIN — HALOPERIDOL 2 MG: 2 TABLET ORAL at 14:08

## 2020-01-01 RX ADMIN — FLUOXETINE HYDROCHLORIDE 20 MG: 20 CAPSULE ORAL at 09:12

## 2020-01-01 RX ADMIN — INSULIN LISPRO 3 UNITS: 100 INJECTION, SOLUTION INTRAVENOUS; SUBCUTANEOUS at 10:47

## 2020-01-01 RX ADMIN — Medication 1 TABLET: at 09:38

## 2020-01-01 RX ADMIN — INSULIN LISPRO 6 UNITS: 100 INJECTION, SOLUTION INTRAVENOUS; SUBCUTANEOUS at 17:08

## 2020-01-01 RX ADMIN — QUETIAPINE FUMARATE 50 MG: 25 TABLET ORAL at 21:04

## 2020-01-01 RX ADMIN — LORAZEPAM 1 MG: 2 INJECTION INTRAMUSCULAR at 11:50

## 2020-01-01 RX ADMIN — HEPARIN SODIUM 5000 UNITS: 5000 INJECTION INTRAVENOUS; SUBCUTANEOUS at 16:04

## 2020-01-01 RX ADMIN — INSULIN LISPRO 6 UNITS: 100 INJECTION, SOLUTION INTRAVENOUS; SUBCUTANEOUS at 21:50

## 2020-01-01 RX ADMIN — Medication 10 ML: at 15:01

## 2020-01-01 RX ADMIN — HALOPERIDOL LACTATE 5 MG: 5 INJECTION, SOLUTION INTRAMUSCULAR at 15:53

## 2020-01-01 RX ADMIN — DIBASIC SODIUM PHOSPHATE, MONOBASIC POTASSIUM PHOSPHATE AND MONOBASIC SODIUM PHOSPHATE 1 TABLET: 852; 155; 130 TABLET ORAL at 17:41

## 2020-01-01 RX ADMIN — MULTIPLE VITAMINS W/ MINERALS TAB 1 TABLET: TAB at 09:11

## 2020-01-01 RX ADMIN — Medication 10 ML: at 14:59

## 2020-01-01 RX ADMIN — INSULIN LISPRO 6 UNITS: 100 INJECTION, SOLUTION INTRAVENOUS; SUBCUTANEOUS at 08:28

## 2020-01-01 RX ADMIN — QUETIAPINE FUMARATE 63 MG: 25 TABLET ORAL at 21:20

## 2020-01-01 RX ADMIN — INSULIN LISPRO 2 UNITS: 100 INJECTION, SOLUTION INTRAVENOUS; SUBCUTANEOUS at 15:55

## 2020-01-01 RX ADMIN — HEPARIN SODIUM 5000 UNITS: 5000 INJECTION INTRAVENOUS; SUBCUTANEOUS at 09:11

## 2020-01-01 RX ADMIN — HEPARIN SODIUM 5000 UNITS: 5000 INJECTION INTRAVENOUS; SUBCUTANEOUS at 01:24

## 2020-01-01 RX ADMIN — POLYETHYLENE GLYCOL 3350 17 G: 17 POWDER, FOR SOLUTION ORAL at 08:42

## 2020-01-01 RX ADMIN — ONDANSETRON 4 MG: 2 INJECTION INTRAMUSCULAR; INTRAVENOUS at 12:47

## 2020-01-01 RX ADMIN — Medication 3 MG: at 23:22

## 2020-01-01 RX ADMIN — PIPERACILLIN AND TAZOBACTAM 3.38 G: 3; .375 INJECTION, POWDER, LYOPHILIZED, FOR SOLUTION INTRAVENOUS at 21:25

## 2020-01-01 RX ADMIN — INSULIN LISPRO 9 UNITS: 100 INJECTION, SOLUTION INTRAVENOUS; SUBCUTANEOUS at 17:39

## 2020-01-01 RX ADMIN — CEPHALEXIN 500 MG: 250 CAPSULE ORAL at 09:38

## 2020-01-01 RX ADMIN — HEPARIN SODIUM 5000 UNITS: 5000 INJECTION INTRAVENOUS; SUBCUTANEOUS at 00:52

## 2020-01-01 RX ADMIN — FLUOXETINE HYDROCHLORIDE 20 MG: 20 CAPSULE ORAL at 08:51

## 2020-01-01 RX ADMIN — Medication 3 MG: at 21:20

## 2020-01-01 RX ADMIN — INSULIN GLARGINE 5 UNITS: 100 INJECTION, SOLUTION SUBCUTANEOUS at 16:00

## 2020-01-01 RX ADMIN — INSULIN LISPRO 3 UNITS: 100 INJECTION, SOLUTION INTRAVENOUS; SUBCUTANEOUS at 12:35

## 2020-01-01 RX ADMIN — Medication 100 MG: at 08:38

## 2020-01-01 RX ADMIN — HEPARIN SODIUM 5000 UNITS: 5000 INJECTION INTRAVENOUS; SUBCUTANEOUS at 18:40

## 2020-01-01 RX ADMIN — INSULIN LISPRO 3 UNITS: 100 INJECTION, SOLUTION INTRAVENOUS; SUBCUTANEOUS at 10:05

## 2020-01-01 RX ADMIN — POTASSIUM & SODIUM PHOSPHATES POWDER PACK 280-160-250 MG 2 PACKET: 280-160-250 PACK at 21:53

## 2020-01-01 RX ADMIN — PANTOPRAZOLE 40 MG: 40 TABLET, DELAYED RELEASE ORAL at 09:00

## 2020-01-01 RX ADMIN — QUETIAPINE FUMARATE 25 MG: 25 TABLET ORAL at 10:08

## 2020-01-01 RX ADMIN — Medication 3 MG: at 23:16

## 2020-01-01 RX ADMIN — HALOPERIDOL LACTATE 2 MG: 5 INJECTION, SOLUTION INTRAMUSCULAR at 14:46

## 2020-01-01 RX ADMIN — QUETIAPINE FUMARATE 25 MG: 25 TABLET ORAL at 10:24

## 2020-01-01 RX ADMIN — POTASSIUM CHLORIDE 10 MEQ: 7.46 INJECTION, SOLUTION INTRAVENOUS at 01:25

## 2020-01-01 RX ADMIN — VALPROIC ACID 250 MG: 250 CAPSULE, LIQUID FILLED ORAL at 14:08

## 2020-01-01 RX ADMIN — DIBASIC SODIUM PHOSPHATE, MONOBASIC POTASSIUM PHOSPHATE AND MONOBASIC SODIUM PHOSPHATE 1 TABLET: 852; 155; 130 TABLET ORAL at 08:51

## 2020-01-01 RX ADMIN — INSULIN GLARGINE 7 UNITS: 100 INJECTION, SOLUTION SUBCUTANEOUS at 09:50

## 2020-01-01 RX ADMIN — DIBASIC SODIUM PHOSPHATE, MONOBASIC POTASSIUM PHOSPHATE AND MONOBASIC SODIUM PHOSPHATE 1 TABLET: 852; 155; 130 TABLET ORAL at 17:08

## 2020-01-01 RX ADMIN — ZOLPIDEM TARTRATE 5 MG: 5 TABLET ORAL at 22:41

## 2020-01-01 RX ADMIN — LORAZEPAM 2 MG: 1 TABLET ORAL at 11:55

## 2020-01-01 RX ADMIN — INSULIN LISPRO 2 UNITS: 100 INJECTION, SOLUTION INTRAVENOUS; SUBCUTANEOUS at 18:21

## 2020-01-01 RX ADMIN — Medication 1 TABLET: at 08:51

## 2020-01-01 RX ADMIN — QUETIAPINE FUMARATE 63 MG: 25 TABLET ORAL at 22:10

## 2020-01-01 RX ADMIN — BARIUM SULFATE 700 MG: 700 TABLET ORAL at 09:00

## 2020-01-01 RX ADMIN — PIPERACILLIN AND TAZOBACTAM 3.38 G: 3; .375 INJECTION, POWDER, LYOPHILIZED, FOR SOLUTION INTRAVENOUS at 02:47

## 2020-01-01 RX ADMIN — HEPARIN SODIUM 5000 UNITS: 5000 INJECTION INTRAVENOUS; SUBCUTANEOUS at 09:30

## 2020-01-01 RX ADMIN — QUETIAPINE FUMARATE 50 MG: 25 TABLET ORAL at 08:38

## 2020-01-01 RX ADMIN — INSULIN LISPRO 2 UNITS: 100 INJECTION, SOLUTION INTRAVENOUS; SUBCUTANEOUS at 21:57

## 2020-01-01 RX ADMIN — Medication 10 ML: at 15:20

## 2020-01-01 RX ADMIN — Medication 10 ML: at 05:42

## 2020-01-01 RX ADMIN — POLYETHYLENE GLYCOL 3350 17 G: 17 POWDER, FOR SOLUTION ORAL at 09:34

## 2020-01-01 RX ADMIN — Medication 100 MG: at 09:38

## 2020-01-01 RX ADMIN — Medication 3 MG: at 23:01

## 2020-01-01 RX ADMIN — PIPERACILLIN AND TAZOBACTAM 2.25 G: 2; .25 INJECTION, POWDER, LYOPHILIZED, FOR SOLUTION INTRAVENOUS at 19:44

## 2020-01-01 RX ADMIN — QUETIAPINE FUMARATE 25 MG: 25 TABLET ORAL at 08:26

## 2020-01-01 RX ADMIN — INSULIN LISPRO 3 UNITS: 100 INJECTION, SOLUTION INTRAVENOUS; SUBCUTANEOUS at 22:52

## 2020-01-01 RX ADMIN — INSULIN LISPRO 3 UNITS: 100 INJECTION, SOLUTION INTRAVENOUS; SUBCUTANEOUS at 13:55

## 2020-01-01 RX ADMIN — SODIUM CHLORIDE AND POTASSIUM CHLORIDE: 4.5; 1.49 INJECTION, SOLUTION INTRAVENOUS at 04:24

## 2020-01-01 RX ADMIN — LABETALOL HYDROCHLORIDE 10 MG: 5 INJECTION, SOLUTION INTRAVENOUS at 13:35

## 2020-01-01 RX ADMIN — MULTIPLE VITAMINS W/ MINERALS TAB 1 TABLET: TAB at 09:36

## 2020-01-01 RX ADMIN — Medication 10 ML: at 14:07

## 2020-01-01 RX ADMIN — SODIUM CHLORIDE 40 MG: 9 INJECTION, SOLUTION INTRAMUSCULAR; INTRAVENOUS; SUBCUTANEOUS at 08:20

## 2020-01-01 RX ADMIN — PIPERACILLIN AND TAZOBACTAM 2.25 G: 2; .25 INJECTION, POWDER, LYOPHILIZED, FOR SOLUTION INTRAVENOUS at 01:26

## 2020-01-01 RX ADMIN — Medication 3 MG: at 21:18

## 2020-01-01 RX ADMIN — INSULIN LISPRO 4 UNITS: 100 INJECTION, SOLUTION INTRAVENOUS; SUBCUTANEOUS at 12:00

## 2020-01-01 RX ADMIN — DIBASIC SODIUM PHOSPHATE, MONOBASIC POTASSIUM PHOSPHATE AND MONOBASIC SODIUM PHOSPHATE 1 TABLET: 852; 155; 130 TABLET ORAL at 09:13

## 2020-01-01 RX ADMIN — ZOLPIDEM TARTRATE 5 MG: 5 TABLET ORAL at 21:46

## 2020-01-01 RX ADMIN — FOSFOMYCIN TROMETHAMINE 3 G: 3 POWDER ORAL at 14:26

## 2020-01-01 RX ADMIN — POTASSIUM CHLORIDE 10 MEQ: 10 INJECTION, SOLUTION INTRAVENOUS at 13:33

## 2020-01-01 RX ADMIN — INSULIN LISPRO 2 UNITS: 100 INJECTION, SOLUTION INTRAVENOUS; SUBCUTANEOUS at 17:58

## 2020-01-01 RX ADMIN — LABETALOL HYDROCHLORIDE 15 MG: 5 INJECTION, SOLUTION INTRAVENOUS at 23:02

## 2020-01-01 RX ADMIN — POTASSIUM CHLORIDE 40 MEQ: 1500 TABLET, EXTENDED RELEASE ORAL at 13:33

## 2020-01-01 RX ADMIN — SODIUM CHLORIDE, SODIUM LACTATE, POTASSIUM CHLORIDE, AND CALCIUM CHLORIDE 150 ML/HR: 600; 310; 30; 20 INJECTION, SOLUTION INTRAVENOUS at 13:17

## 2020-01-01 RX ADMIN — INSULIN LISPRO 9 UNITS: 100 INJECTION, SOLUTION INTRAVENOUS; SUBCUTANEOUS at 23:11

## 2020-01-01 RX ADMIN — PANTOPRAZOLE 40 MG: 40 TABLET, DELAYED RELEASE ORAL at 08:51

## 2020-01-01 RX ADMIN — HEPARIN SODIUM 5000 UNITS: 5000 INJECTION INTRAVENOUS; SUBCUTANEOUS at 16:00

## 2020-01-01 RX ADMIN — INSULIN LISPRO 3 UNITS: 100 INJECTION, SOLUTION INTRAVENOUS; SUBCUTANEOUS at 21:57

## 2020-01-01 RX ADMIN — Medication 10 ML: at 16:18

## 2020-01-01 RX ADMIN — HEPARIN SODIUM 5000 UNITS: 5000 INJECTION INTRAVENOUS; SUBCUTANEOUS at 10:46

## 2020-01-01 RX ADMIN — FLUOXETINE HYDROCHLORIDE 20 MG: 20 CAPSULE ORAL at 09:02

## 2020-01-01 RX ADMIN — BENZTROPINE MESYLATE 1 MG: 1 INJECTION INTRAMUSCULAR; INTRAVENOUS at 13:34

## 2020-01-01 RX ADMIN — HEPARIN SODIUM 5000 UNITS: 5000 INJECTION INTRAVENOUS; SUBCUTANEOUS at 02:02

## 2020-01-01 RX ADMIN — POTASSIUM CHLORIDE 10 MEQ: 7.46 INJECTION, SOLUTION INTRAVENOUS at 03:33

## 2020-01-01 RX ADMIN — DIBASIC SODIUM PHOSPHATE, MONOBASIC POTASSIUM PHOSPHATE AND MONOBASIC SODIUM PHOSPHATE 1 TABLET: 852; 155; 130 TABLET ORAL at 17:12

## 2020-01-01 RX ADMIN — Medication 1 TABLET: at 09:52

## 2020-01-01 RX ADMIN — INSULIN LISPRO 4 UNITS: 100 INJECTION, SOLUTION INTRAVENOUS; SUBCUTANEOUS at 17:37

## 2020-01-01 RX ADMIN — LORAZEPAM 2 MG: 1 TABLET ORAL at 00:25

## 2020-01-01 RX ADMIN — Medication 10 ML: at 05:40

## 2020-01-01 RX ADMIN — PIPERACILLIN AND TAZOBACTAM 2.25 G: 2; .25 INJECTION, POWDER, LYOPHILIZED, FOR SOLUTION INTRAVENOUS at 21:04

## 2020-01-01 RX ADMIN — SODIUM CHLORIDE, SODIUM LACTATE, POTASSIUM CHLORIDE, AND CALCIUM CHLORIDE 100 ML/HR: 600; 310; 30; 20 INJECTION, SOLUTION INTRAVENOUS at 09:29

## 2020-01-01 RX ADMIN — QUETIAPINE FUMARATE 25 MG: 25 TABLET ORAL at 08:20

## 2020-01-01 RX ADMIN — Medication 10 ML: at 21:07

## 2020-01-01 RX ADMIN — POLYETHYLENE GLYCOL 3350 17 G: 17 POWDER, FOR SOLUTION ORAL at 08:52

## 2020-01-01 RX ADMIN — MULTIPLE VITAMINS W/ MINERALS TAB 1 TABLET: TAB at 08:51

## 2020-01-01 RX ADMIN — DIBASIC SODIUM PHOSPHATE, MONOBASIC POTASSIUM PHOSPHATE AND MONOBASIC SODIUM PHOSPHATE 1 TABLET: 852; 155; 130 TABLET ORAL at 09:38

## 2020-01-01 RX ADMIN — TAMSULOSIN HYDROCHLORIDE 0.8 MG: 0.4 CAPSULE ORAL at 10:04

## 2020-01-01 RX ADMIN — HALOPERIDOL LACTATE 2.5 MG: 5 INJECTION, SOLUTION INTRAMUSCULAR at 03:33

## 2020-01-01 RX ADMIN — HEPARIN SODIUM 5000 UNITS: 5000 INJECTION INTRAVENOUS; SUBCUTANEOUS at 18:05

## 2020-01-01 RX ADMIN — PIPERACILLIN AND TAZOBACTAM 3.38 G: 3; .375 INJECTION, POWDER, LYOPHILIZED, FOR SOLUTION INTRAVENOUS at 16:09

## 2020-01-01 RX ADMIN — POLYVINYL ALCOHOL, POVIDONE 1 DROP: 14; 6 SOLUTION/ DROPS OPHTHALMIC at 18:12

## 2020-01-01 RX ADMIN — Medication 10 ML: at 05:53

## 2020-01-01 RX ADMIN — INSULIN LISPRO 4 UNITS: 100 INJECTION, SOLUTION INTRAVENOUS; SUBCUTANEOUS at 00:52

## 2020-01-01 RX ADMIN — POTASSIUM CHLORIDE 10 MEQ: 10 INJECTION, SOLUTION INTRAVENOUS at 12:13

## 2020-01-01 RX ADMIN — PIPERACILLIN AND TAZOBACTAM 3.38 G: 3; .375 INJECTION, POWDER, LYOPHILIZED, FOR SOLUTION INTRAVENOUS at 20:33

## 2020-01-01 RX ADMIN — ACETAMINOPHEN 650 MG: 325 TABLET ORAL at 21:53

## 2020-01-01 RX ADMIN — HEPARIN SODIUM 5000 UNITS: 5000 INJECTION INTRAVENOUS; SUBCUTANEOUS at 09:25

## 2020-01-01 RX ADMIN — ACETAMINOPHEN 650 MG: 325 TABLET ORAL at 20:21

## 2020-01-01 RX ADMIN — VALPROIC ACID 250 MG: 250 CAPSULE, LIQUID FILLED ORAL at 15:00

## 2020-01-01 RX ADMIN — Medication 1 TABLET: at 08:48

## 2020-01-01 RX ADMIN — PIPERACILLIN AND TAZOBACTAM 2.25 G: 2; .25 INJECTION, POWDER, LYOPHILIZED, FOR SOLUTION INTRAVENOUS at 13:21

## 2020-01-01 RX ADMIN — FENTANYL CITRATE 50 MCG: 50 INJECTION, SOLUTION INTRAMUSCULAR; INTRAVENOUS at 08:40

## 2020-01-01 RX ADMIN — SODIUM CHLORIDE 40 MG: 9 INJECTION, SOLUTION INTRAMUSCULAR; INTRAVENOUS; SUBCUTANEOUS at 10:13

## 2020-01-01 RX ADMIN — MULTIPLE VITAMINS W/ MINERALS TAB 1 TABLET: TAB at 13:56

## 2020-01-01 RX ADMIN — Medication 1 TABLET: at 09:37

## 2020-01-01 RX ADMIN — LORAZEPAM 1 MG: 2 INJECTION INTRAMUSCULAR; INTRAVENOUS at 17:41

## 2020-01-01 RX ADMIN — LORAZEPAM 2 MG: 2 INJECTION INTRAMUSCULAR; INTRAVENOUS at 07:30

## 2020-01-01 RX ADMIN — HEPARIN SODIUM 5000 UNITS: 5000 INJECTION INTRAVENOUS; SUBCUTANEOUS at 10:04

## 2020-01-01 RX ADMIN — Medication 1 TABLET: at 09:02

## 2020-01-01 RX ADMIN — INSULIN LISPRO 2 UNITS: 100 INJECTION, SOLUTION INTRAVENOUS; SUBCUTANEOUS at 17:46

## 2020-01-01 RX ADMIN — SODIUM CHLORIDE 1000 MG: 900 INJECTION, SOLUTION INTRAVENOUS at 05:08

## 2020-01-01 RX ADMIN — INSULIN LISPRO 2 UNITS: 100 INJECTION, SOLUTION INTRAVENOUS; SUBCUTANEOUS at 05:49

## 2020-01-01 RX ADMIN — INSULIN GLARGINE 10 UNITS: 100 INJECTION, SOLUTION SUBCUTANEOUS at 10:29

## 2020-01-01 RX ADMIN — Medication 1 TABLET: at 10:24

## 2020-01-01 RX ADMIN — DIBASIC SODIUM PHOSPHATE, MONOBASIC POTASSIUM PHOSPHATE AND MONOBASIC SODIUM PHOSPHATE 1 TABLET: 852; 155; 130 TABLET ORAL at 17:49

## 2020-01-01 RX ADMIN — Medication 10 ML: at 21:25

## 2020-01-01 RX ADMIN — PANTOPRAZOLE 40 MG: 40 TABLET, DELAYED RELEASE ORAL at 09:14

## 2020-01-01 RX ADMIN — Medication 3 MG: at 21:25

## 2020-01-01 RX ADMIN — FLUCONAZOLE 200 MG: 200 TABLET ORAL at 13:26

## 2020-01-01 RX ADMIN — INSULIN LISPRO 2 UNITS: 100 INJECTION, SOLUTION INTRAVENOUS; SUBCUTANEOUS at 21:50

## 2020-01-01 RX ADMIN — TAMSULOSIN HYDROCHLORIDE 0.8 MG: 0.4 CAPSULE ORAL at 08:40

## 2020-01-01 RX ADMIN — PANTOPRAZOLE 40 MG: 40 TABLET, DELAYED RELEASE ORAL at 09:36

## 2020-01-01 RX ADMIN — QUETIAPINE FUMARATE 25 MG: 25 TABLET ORAL at 10:04

## 2020-01-01 RX ADMIN — MULTIPLE VITAMINS W/ MINERALS TAB 1 TABLET: TAB at 09:38

## 2020-01-01 RX ADMIN — PIPERACILLIN AND TAZOBACTAM 2.25 G: 2; .25 INJECTION, POWDER, LYOPHILIZED, FOR SOLUTION INTRAVENOUS at 02:37

## 2020-01-01 RX ADMIN — Medication 100 MG: at 08:51

## 2020-01-01 RX ADMIN — Medication 10 ML: at 21:35

## 2020-01-01 RX ADMIN — FENTANYL CITRATE 50 MCG: 50 INJECTION, SOLUTION INTRAMUSCULAR; INTRAVENOUS at 14:45

## 2020-01-01 RX ADMIN — INSULIN HUMAN 40 UNITS: 100 INJECTION, SUSPENSION SUBCUTANEOUS at 15:23

## 2020-01-01 RX ADMIN — LORAZEPAM 1 MG: 2 INJECTION INTRAMUSCULAR at 22:33

## 2020-01-01 RX ADMIN — HALOPERIDOL LACTATE 2.5 MG: 5 INJECTION INTRAMUSCULAR at 00:40

## 2020-01-01 RX ADMIN — Medication 10 ML: at 05:54

## 2020-01-01 RX ADMIN — HALOPERIDOL LACTATE 2.5 MG: 5 INJECTION INTRAMUSCULAR at 21:01

## 2020-01-01 RX ADMIN — INSULIN LISPRO 6 UNITS: 100 INJECTION, SOLUTION INTRAVENOUS; SUBCUTANEOUS at 12:12

## 2020-01-01 RX ADMIN — DIBASIC SODIUM PHOSPHATE, MONOBASIC POTASSIUM PHOSPHATE AND MONOBASIC SODIUM PHOSPHATE 1 TABLET: 852; 155; 130 TABLET ORAL at 13:56

## 2020-01-01 RX ADMIN — FLUOXETINE HYDROCHLORIDE 20 MG: 20 CAPSULE ORAL at 10:04

## 2020-01-01 RX ADMIN — Medication 10 ML: at 13:41

## 2020-01-01 RX ADMIN — QUETIAPINE FUMARATE 25 MG: 25 TABLET ORAL at 22:46

## 2020-01-01 RX ADMIN — Medication 10 ML: at 23:06

## 2020-01-01 RX ADMIN — QUETIAPINE FUMARATE 50 MG: 25 TABLET ORAL at 23:06

## 2020-01-01 RX ADMIN — QUETIAPINE FUMARATE 25 MG: 25 TABLET ORAL at 15:10

## 2020-01-01 RX ADMIN — HEPARIN SODIUM 5000 UNITS: 5000 INJECTION INTRAVENOUS; SUBCUTANEOUS at 09:02

## 2020-01-01 RX ADMIN — Medication 3 MG: at 21:04

## 2020-01-01 RX ADMIN — PANTOPRAZOLE 40 MG: 40 TABLET, DELAYED RELEASE ORAL at 09:34

## 2020-01-01 RX ADMIN — TAMSULOSIN HYDROCHLORIDE 0.8 MG: 0.4 CAPSULE ORAL at 08:51

## 2020-01-01 RX ADMIN — POTASSIUM CHLORIDE 40 MEQ: 1500 TABLET, EXTENDED RELEASE ORAL at 17:08

## 2020-01-01 RX ADMIN — Medication 3 MG: at 21:16

## 2020-01-01 RX ADMIN — VALPROIC ACID 250 MG: 250 CAPSULE, LIQUID FILLED ORAL at 16:03

## 2020-01-01 RX ADMIN — INSULIN LISPRO 2 UNITS: 100 INJECTION, SOLUTION INTRAVENOUS; SUBCUTANEOUS at 22:10

## 2020-01-01 RX ADMIN — POTASSIUM CHLORIDE 10 MEQ: 7.46 INJECTION, SOLUTION INTRAVENOUS at 23:25

## 2020-01-01 RX ADMIN — INSULIN LISPRO 4 UNITS: 100 INJECTION, SOLUTION INTRAVENOUS; SUBCUTANEOUS at 17:04

## 2020-01-01 RX ADMIN — Medication 10 ML: at 14:00

## 2020-01-01 RX ADMIN — INSULIN LISPRO 3 UNITS: 100 INJECTION, SOLUTION INTRAVENOUS; SUBCUTANEOUS at 09:50

## 2020-01-01 RX ADMIN — DIBASIC SODIUM PHOSPHATE, MONOBASIC POTASSIUM PHOSPHATE AND MONOBASIC SODIUM PHOSPHATE 1 TABLET: 852; 155; 130 TABLET ORAL at 18:41

## 2020-01-01 RX ADMIN — HEPARIN SODIUM 5000 UNITS: 5000 INJECTION INTRAVENOUS; SUBCUTANEOUS at 09:39

## 2020-01-01 RX ADMIN — INSULIN GLARGINE 5 UNITS: 100 INJECTION, SOLUTION SUBCUTANEOUS at 08:31

## 2020-01-01 RX ADMIN — HALOPERIDOL LACTATE 2.5 MG: 5 INJECTION, SOLUTION INTRAMUSCULAR at 23:01

## 2020-01-01 RX ADMIN — Medication 100 MG: at 09:11

## 2020-01-01 RX ADMIN — VALPROIC ACID 250 MG: 250 CAPSULE, LIQUID FILLED ORAL at 08:51

## 2020-01-01 RX ADMIN — HEPARIN SODIUM 5000 UNITS: 5000 INJECTION INTRAVENOUS; SUBCUTANEOUS at 09:57

## 2020-01-01 RX ADMIN — SODIUM CHLORIDE, SODIUM LACTATE, POTASSIUM CHLORIDE, AND CALCIUM CHLORIDE 100 ML/HR: 600; 310; 30; 20 INJECTION, SOLUTION INTRAVENOUS at 19:00

## 2020-01-01 RX ADMIN — Medication 1 TABLET: at 08:26

## 2020-01-01 RX ADMIN — POTASSIUM CHLORIDE 40 MEQ: 1500 TABLET, EXTENDED RELEASE ORAL at 12:42

## 2020-01-01 RX ADMIN — IOPAMIDOL 100 ML: 612 INJECTION, SOLUTION INTRAVENOUS at 15:00

## 2020-01-01 RX ADMIN — POTASSIUM CHLORIDE 40 MEQ: 1500 TABLET, EXTENDED RELEASE ORAL at 09:02

## 2020-01-01 RX ADMIN — FENTANYL CITRATE 50 MCG: 50 INJECTION, SOLUTION INTRAMUSCULAR; INTRAVENOUS at 08:35

## 2020-01-01 RX ADMIN — TAMSULOSIN HYDROCHLORIDE 0.4 MG: 0.4 CAPSULE ORAL at 09:14

## 2020-01-01 RX ADMIN — QUETIAPINE FUMARATE 50 MG: 25 TABLET ORAL at 21:53

## 2020-01-01 RX ADMIN — Medication 10 ML: at 05:29

## 2020-01-01 RX ADMIN — INSULIN LISPRO 3 UNITS: 100 INJECTION, SOLUTION INTRAVENOUS; SUBCUTANEOUS at 09:03

## 2020-01-01 RX ADMIN — POTASSIUM CHLORIDE 10 MEQ: 10 INJECTION, SOLUTION INTRAVENOUS at 12:34

## 2020-01-01 RX ADMIN — HEPARIN SODIUM 5000 UNITS: 5000 INJECTION INTRAVENOUS; SUBCUTANEOUS at 16:05

## 2020-01-01 RX ADMIN — SODIUM CHLORIDE, SODIUM LACTATE, POTASSIUM CHLORIDE, AND CALCIUM CHLORIDE 150 ML/HR: 600; 310; 30; 20 INJECTION, SOLUTION INTRAVENOUS at 07:04

## 2020-01-01 RX ADMIN — Medication 10 ML: at 05:06

## 2020-01-01 RX ADMIN — Medication 10 ML: at 23:00

## 2020-01-01 RX ADMIN — HALOPERIDOL 2 MG: 2 TABLET ORAL at 18:48

## 2020-01-01 RX ADMIN — Medication 1 TABLET: at 08:40

## 2020-01-01 RX ADMIN — VALPROIC ACID 250 MG: 250 CAPSULE, LIQUID FILLED ORAL at 22:42

## 2020-01-01 RX ADMIN — HEPARIN SODIUM 5000 UNITS: 5000 INJECTION INTRAVENOUS; SUBCUTANEOUS at 09:10

## 2020-01-01 RX ADMIN — VALPROIC ACID 250 MG: 250 CAPSULE, LIQUID FILLED ORAL at 09:12

## 2020-01-01 RX ADMIN — CEFTRIAXONE SODIUM 1 G: 1 INJECTION, POWDER, FOR SOLUTION INTRAMUSCULAR; INTRAVENOUS at 12:50

## 2020-01-01 RX ADMIN — HEPARIN SODIUM 5000 UNITS: 5000 INJECTION INTRAVENOUS; SUBCUTANEOUS at 00:00

## 2020-01-01 RX ADMIN — Medication 10 ML: at 05:13

## 2020-01-01 RX ADMIN — LORAZEPAM 1 MG: 2 INJECTION INTRAMUSCULAR; INTRAVENOUS at 16:20

## 2020-01-01 RX ADMIN — Medication 1 TABLET: at 13:00

## 2020-01-01 RX ADMIN — PANTOPRAZOLE 40 MG: 40 TABLET, DELAYED RELEASE ORAL at 08:40

## 2020-01-01 RX ADMIN — INSULIN LISPRO 4 UNITS: 100 INJECTION, SOLUTION INTRAVENOUS; SUBCUTANEOUS at 16:01

## 2020-01-01 RX ADMIN — LORAZEPAM 1 MG: 2 INJECTION INTRAMUSCULAR; INTRAVENOUS at 21:32

## 2020-01-01 RX ADMIN — HEPARIN SODIUM 5000 UNITS: 5000 INJECTION INTRAVENOUS; SUBCUTANEOUS at 16:30

## 2020-01-01 RX ADMIN — Medication 10 ML: at 03:34

## 2020-01-01 RX ADMIN — HALOPERIDOL 2 MG: 2 TABLET ORAL at 22:41

## 2020-01-01 RX ADMIN — DIBASIC SODIUM PHOSPHATE, MONOBASIC POTASSIUM PHOSPHATE AND MONOBASIC SODIUM PHOSPHATE 1 TABLET: 852; 155; 130 TABLET ORAL at 08:37

## 2020-01-01 RX ADMIN — PIPERACILLIN AND TAZOBACTAM 2.25 G: 2; .25 INJECTION, POWDER, LYOPHILIZED, FOR SOLUTION INTRAVENOUS at 20:21

## 2020-01-01 RX ADMIN — QUETIAPINE FUMARATE 75 MG: 25 TABLET ORAL at 21:07

## 2020-01-01 RX ADMIN — POTASSIUM CHLORIDE 10 MEQ: 10 INJECTION, SOLUTION INTRAVENOUS at 13:13

## 2020-01-01 RX ADMIN — QUETIAPINE FUMARATE 25 MG: 25 TABLET ORAL at 18:09

## 2020-01-01 RX ADMIN — DIBASIC SODIUM PHOSPHATE, MONOBASIC POTASSIUM PHOSPHATE AND MONOBASIC SODIUM PHOSPHATE 1 TABLET: 852; 155; 130 TABLET ORAL at 09:37

## 2020-01-01 RX ADMIN — INSULIN HUMAN 12 UNITS: 100 INJECTION, SOLUTION PARENTERAL at 12:34

## 2020-01-01 RX ADMIN — INSULIN LISPRO 2 UNITS: 100 INJECTION, SOLUTION INTRAVENOUS; SUBCUTANEOUS at 11:49

## 2020-01-01 RX ADMIN — Medication 3 MG: at 21:54

## 2020-01-01 RX ADMIN — BARIUM SULFATE 30 ML: 980 POWDER, FOR SUSPENSION ORAL at 08:59

## 2020-01-01 RX ADMIN — MULTIPLE VITAMINS W/ MINERALS TAB 1 TABLET: TAB at 08:40

## 2020-01-01 RX ADMIN — SODIUM CHLORIDE, SODIUM LACTATE, POTASSIUM CHLORIDE, AND CALCIUM CHLORIDE 100 ML/HR: 600; 310; 30; 20 INJECTION, SOLUTION INTRAVENOUS at 21:00

## 2020-01-01 RX ADMIN — HALOPERIDOL 2 MG: 2 TABLET ORAL at 08:51

## 2020-01-01 RX ADMIN — SODIUM CHLORIDE, SODIUM LACTATE, POTASSIUM CHLORIDE, AND CALCIUM CHLORIDE 100 ML/HR: 600; 310; 30; 20 INJECTION, SOLUTION INTRAVENOUS at 05:17

## 2020-01-01 RX ADMIN — HEPARIN SODIUM 5000 UNITS: 5000 INJECTION INTRAVENOUS; SUBCUTANEOUS at 00:12

## 2020-01-01 RX ADMIN — QUETIAPINE FUMARATE 25 MG: 25 TABLET ORAL at 09:38

## 2020-01-01 RX ADMIN — HEPARIN SODIUM 5000 UNITS: 5000 INJECTION INTRAVENOUS; SUBCUTANEOUS at 23:05

## 2020-01-01 RX ADMIN — PROPOFOL 20 MCG/KG/MIN: 10 INJECTION, EMULSION INTRAVENOUS at 15:03

## 2020-01-01 RX ADMIN — INSULIN GLARGINE 5 UNITS: 100 INJECTION, SOLUTION SUBCUTANEOUS at 08:57

## 2020-01-01 RX ADMIN — WATER 10 MG: 1 INJECTION INTRAMUSCULAR; INTRAVENOUS; SUBCUTANEOUS at 23:52

## 2020-01-01 RX ADMIN — SODIUM CHLORIDE AND POTASSIUM CHLORIDE: 4.5; 1.49 INJECTION, SOLUTION INTRAVENOUS at 09:42

## 2020-01-01 RX ADMIN — INSULIN LISPRO 9 UNITS: 100 INJECTION, SOLUTION INTRAVENOUS; SUBCUTANEOUS at 17:06

## 2020-01-01 RX ADMIN — Medication 100 MG: at 09:36

## 2020-01-01 RX ADMIN — POTASSIUM CHLORIDE 10 MEQ: 10 INJECTION, SOLUTION INTRAVENOUS at 09:58

## 2020-01-01 RX ADMIN — QUETIAPINE FUMARATE 12.5 MG: 25 TABLET ORAL at 17:58

## 2020-01-01 RX ADMIN — TAMSULOSIN HYDROCHLORIDE 0.4 MG: 0.4 CAPSULE ORAL at 10:04

## 2020-01-01 RX ADMIN — GLYCERIN 1 SUPPOSITORY: 2 SUPPOSITORY RECTAL at 16:17

## 2020-01-01 RX ADMIN — INSULIN LISPRO 3 UNITS: 100 INJECTION, SOLUTION INTRAVENOUS; SUBCUTANEOUS at 12:55

## 2020-01-01 RX ADMIN — QUETIAPINE FUMARATE 25 MG: 25 TABLET ORAL at 10:46

## 2020-01-01 RX ADMIN — TAMSULOSIN HYDROCHLORIDE 0.4 MG: 0.4 CAPSULE ORAL at 10:24

## 2020-01-01 RX ADMIN — DIBASIC SODIUM PHOSPHATE, MONOBASIC POTASSIUM PHOSPHATE AND MONOBASIC SODIUM PHOSPHATE 1 TABLET: 852; 155; 130 TABLET ORAL at 08:26

## 2020-01-01 RX ADMIN — INSULIN LISPRO 2 UNITS: 100 INJECTION, SOLUTION INTRAVENOUS; SUBCUTANEOUS at 19:02

## 2020-01-01 RX ADMIN — Medication 10 ML: at 21:46

## 2020-01-01 RX ADMIN — INSULIN LISPRO 2 UNITS: 100 INJECTION, SOLUTION INTRAVENOUS; SUBCUTANEOUS at 13:27

## 2020-01-01 RX ADMIN — QUETIAPINE FUMARATE 50 MG: 25 TABLET ORAL at 21:52

## 2020-01-01 RX ADMIN — LORAZEPAM 0.5 MG: 2 INJECTION INTRAMUSCULAR; INTRAVENOUS at 12:46

## 2020-01-01 RX ADMIN — TAMSULOSIN HYDROCHLORIDE 0.8 MG: 0.4 CAPSULE ORAL at 08:25

## 2020-01-01 RX ADMIN — POTASSIUM CHLORIDE 10 MEQ: 7.46 INJECTION, SOLUTION INTRAVENOUS at 00:22

## 2020-01-01 RX ADMIN — INSULIN GLARGINE 5 UNITS: 100 INJECTION, SOLUTION SUBCUTANEOUS at 09:00

## 2020-01-01 RX ADMIN — INSULIN LISPRO 2 UNITS: 100 INJECTION, SOLUTION INTRAVENOUS; SUBCUTANEOUS at 12:46

## 2020-01-01 RX ADMIN — INSULIN GLARGINE 5 UNITS: 100 INJECTION, SOLUTION SUBCUTANEOUS at 09:43

## 2020-01-01 RX ADMIN — POTASSIUM CHLORIDE 10 MEQ: 10 INJECTION, SOLUTION INTRAVENOUS at 11:00

## 2020-01-01 RX ADMIN — HEPARIN SODIUM 5000 UNITS: 5000 INJECTION INTRAVENOUS; SUBCUTANEOUS at 16:02

## 2020-01-01 RX ADMIN — WATER 10 MG: 1 INJECTION INTRAMUSCULAR; INTRAVENOUS; SUBCUTANEOUS at 10:28

## 2020-01-01 RX ADMIN — INSULIN LISPRO 2 UNITS: 100 INJECTION, SOLUTION INTRAVENOUS; SUBCUTANEOUS at 11:58

## 2020-01-01 RX ADMIN — QUETIAPINE FUMARATE 25 MG: 25 TABLET ORAL at 09:02

## 2020-01-01 RX ADMIN — Medication 10 ML: at 23:02

## 2020-01-01 RX ADMIN — CEPHALEXIN 500 MG: 250 CAPSULE ORAL at 12:39

## 2020-01-01 RX ADMIN — PIPERACILLIN AND TAZOBACTAM 2.25 G: 2; .25 INJECTION, POWDER, LYOPHILIZED, FOR SOLUTION INTRAVENOUS at 08:19

## 2020-01-01 RX ADMIN — INSULIN GLARGINE 5 UNITS: 100 INJECTION, SOLUTION SUBCUTANEOUS at 09:12

## 2020-01-01 RX ADMIN — POLYETHYLENE GLYCOL 3350 17 G: 17 POWDER, FOR SOLUTION ORAL at 09:00

## 2020-01-01 RX ADMIN — MIDAZOLAM 1 MG: 1 INJECTION INTRAMUSCULAR; INTRAVENOUS at 08:40

## 2020-01-01 RX ADMIN — POTASSIUM & SODIUM PHOSPHATES POWDER PACK 280-160-250 MG 2 PACKET: 280-160-250 PACK at 20:00

## 2020-01-01 RX ADMIN — WATER 10 MG: 1 INJECTION INTRAMUSCULAR; INTRAVENOUS; SUBCUTANEOUS at 21:48

## 2020-01-01 RX ADMIN — SODIUM CHLORIDE 1000 MG: 900 INJECTION, SOLUTION INTRAVENOUS at 09:31

## 2020-01-01 RX ADMIN — INSULIN LISPRO 3 UNITS: 100 INJECTION, SOLUTION INTRAVENOUS; SUBCUTANEOUS at 18:57

## 2020-01-01 RX ADMIN — WATER 10 MG: 1 INJECTION INTRAMUSCULAR; INTRAVENOUS; SUBCUTANEOUS at 22:11

## 2020-01-01 RX ADMIN — Medication 3 MG: at 22:10

## 2020-01-01 RX ADMIN — Medication 3 MG: at 21:45

## 2020-01-01 RX ADMIN — SODIUM CHLORIDE, SODIUM LACTATE, POTASSIUM CHLORIDE, AND CALCIUM CHLORIDE 150 ML/HR: 600; 310; 30; 20 INJECTION, SOLUTION INTRAVENOUS at 18:13

## 2020-01-01 RX ADMIN — HEPARIN SODIUM 5000 UNITS: 5000 INJECTION INTRAVENOUS; SUBCUTANEOUS at 08:26

## 2020-01-01 RX ADMIN — MAGNESIUM SULFATE HEPTAHYDRATE 2 G: 40 INJECTION, SOLUTION INTRAVENOUS at 09:43

## 2020-01-01 RX ADMIN — QUETIAPINE FUMARATE 25 MG: 25 TABLET ORAL at 13:10

## 2020-01-01 RX ADMIN — QUETIAPINE FUMARATE 25 MG: 25 TABLET ORAL at 09:37

## 2020-01-01 RX ADMIN — HALOPERIDOL LACTATE 2.5 MG: 5 INJECTION, SOLUTION INTRAMUSCULAR at 07:33

## 2020-01-01 RX ADMIN — FLUOXETINE HYDROCHLORIDE 20 MG: 20 CAPSULE ORAL at 10:46

## 2020-01-01 RX ADMIN — INSULIN LISPRO 4 UNITS: 100 INJECTION, SOLUTION INTRAVENOUS; SUBCUTANEOUS at 08:42

## 2020-01-01 RX ADMIN — CEPHALEXIN 500 MG: 250 CAPSULE ORAL at 21:20

## 2020-01-01 RX ADMIN — ACETAMINOPHEN 650 MG: 325 TABLET ORAL at 05:57

## 2020-01-01 RX ADMIN — DIBASIC SODIUM PHOSPHATE, MONOBASIC POTASSIUM PHOSPHATE AND MONOBASIC SODIUM PHOSPHATE 1 TABLET: 852; 155; 130 TABLET ORAL at 09:33

## 2020-01-01 RX ADMIN — ACETAMINOPHEN 650 MG: 325 TABLET ORAL at 20:49

## 2020-01-01 RX ADMIN — WATER 10 MG: 1 INJECTION INTRAMUSCULAR; INTRAVENOUS; SUBCUTANEOUS at 03:59

## 2020-01-01 RX ADMIN — HALOPERIDOL LACTATE 5 MG: 5 INJECTION, SOLUTION INTRAMUSCULAR at 14:27

## 2020-01-01 RX ADMIN — LORAZEPAM 2 MG: 1 TABLET ORAL at 02:37

## 2020-01-01 RX ADMIN — HEPARIN SODIUM 5000 UNITS: 5000 INJECTION INTRAVENOUS; SUBCUTANEOUS at 00:05

## 2020-01-01 RX ADMIN — Medication 10 ML: at 16:19

## 2020-01-01 RX ADMIN — SODIUM CHLORIDE 1000 ML: 900 INJECTION, SOLUTION INTRAVENOUS at 11:45

## 2020-01-01 RX ADMIN — DIBASIC SODIUM PHOSPHATE, MONOBASIC POTASSIUM PHOSPHATE AND MONOBASIC SODIUM PHOSPHATE 1 TABLET: 852; 155; 130 TABLET ORAL at 18:48

## 2020-01-01 RX ADMIN — MIDAZOLAM HYDROCHLORIDE 1 MG: 2 INJECTION, SOLUTION INTRAMUSCULAR; INTRAVENOUS at 15:02

## 2020-01-01 RX ADMIN — QUETIAPINE FUMARATE 25 MG: 25 TABLET ORAL at 00:54

## 2020-01-01 RX ADMIN — HEPARIN SODIUM 5000 UNITS: 5000 INJECTION INTRAVENOUS; SUBCUTANEOUS at 23:24

## 2020-01-01 RX ADMIN — POLYETHYLENE GLYCOL 3350 17 G: 17 POWDER, FOR SOLUTION ORAL at 08:26

## 2020-01-01 RX ADMIN — POLYETHYLENE GLYCOL 3350 17 G: 17 POWDER, FOR SOLUTION ORAL at 16:18

## 2020-01-01 RX ADMIN — HEPARIN SODIUM 5000 UNITS: 5000 INJECTION INTRAVENOUS; SUBCUTANEOUS at 16:31

## 2020-01-01 RX ADMIN — LORAZEPAM 2 MG: 1 TABLET ORAL at 10:31

## 2020-01-01 RX ADMIN — SODIUM CHLORIDE 125 ML/HR: 900 INJECTION, SOLUTION INTRAVENOUS at 12:48

## 2020-01-01 RX ADMIN — FLUOXETINE HYDROCHLORIDE 20 MG: 20 CAPSULE ORAL at 08:25

## 2020-01-01 RX ADMIN — QUETIAPINE FUMARATE 63 MG: 25 TABLET ORAL at 22:19

## 2020-01-01 RX ADMIN — POLYETHYLENE GLYCOL 3350 17 G: 17 POWDER, FOR SOLUTION ORAL at 17:08

## 2020-01-01 RX ADMIN — SODIUM CHLORIDE AND POTASSIUM CHLORIDE: 4.5; 1.49 INJECTION, SOLUTION INTRAVENOUS at 21:12

## 2020-01-01 RX ADMIN — HALOPERIDOL 2 MG: 2 TABLET ORAL at 16:31

## 2020-01-01 RX ADMIN — SODIUM CHLORIDE 1000 ML: 900 INJECTION, SOLUTION INTRAVENOUS at 14:12

## 2020-01-01 RX ADMIN — FLUOXETINE HYDROCHLORIDE 20 MG: 20 CAPSULE ORAL at 09:38

## 2020-01-01 RX ADMIN — LORAZEPAM 2 MG: 1 TABLET ORAL at 17:30

## 2020-01-01 RX ADMIN — TAMSULOSIN HYDROCHLORIDE 0.8 MG: 0.4 CAPSULE ORAL at 09:33

## 2020-01-01 RX ADMIN — HEPARIN SODIUM 5000 UNITS: 5000 INJECTION INTRAVENOUS; SUBCUTANEOUS at 15:10

## 2020-01-01 RX ADMIN — CEFTRIAXONE SODIUM 1 G: 1 INJECTION, POWDER, FOR SOLUTION INTRAMUSCULAR; INTRAVENOUS at 11:49

## 2020-01-01 RX ADMIN — MULTIPLE VITAMINS W/ MINERALS TAB 1 TABLET: TAB at 08:26

## 2020-01-01 RX ADMIN — Medication 10 ML: at 15:19

## 2020-01-01 RX ADMIN — PANTOPRAZOLE 40 MG: 40 TABLET, DELAYED RELEASE ORAL at 09:02

## 2020-01-01 RX ADMIN — INSULIN LISPRO 6 UNITS: 100 INJECTION, SOLUTION INTRAVENOUS; SUBCUTANEOUS at 23:35

## 2020-01-01 RX ADMIN — SODIUM CHLORIDE, SODIUM ACETATE ANHYDROUS, SODIUM GLUCONATE, POTASSIUM CHLORIDE, AND MAGNESIUM CHLORIDE 1000 ML: 526; 222; 502; 37; 30 INJECTION, SOLUTION INTRAVENOUS at 23:06

## 2020-01-01 RX ADMIN — LORAZEPAM 2 MG: 1 TABLET ORAL at 15:53

## 2020-01-01 RX ADMIN — POLYETHYLENE GLYCOL 3350 17 G: 17 POWDER, FOR SOLUTION ORAL at 18:41

## 2020-01-01 RX ADMIN — HEPARIN SODIUM 5000 UNITS: 5000 INJECTION INTRAVENOUS; SUBCUTANEOUS at 00:34

## 2020-01-01 RX ADMIN — HALOPERIDOL LACTATE 2.5 MG: 5 INJECTION, SOLUTION INTRAMUSCULAR at 03:22

## 2020-01-01 RX ADMIN — VALPROIC ACID 250 MG: 250 CAPSULE, LIQUID FILLED ORAL at 12:12

## 2020-01-01 RX ADMIN — PANTOPRAZOLE 40 MG: 40 TABLET, DELAYED RELEASE ORAL at 10:03

## 2020-01-01 RX ADMIN — PANTOPRAZOLE 40 MG: 40 TABLET, DELAYED RELEASE ORAL at 10:24

## 2020-01-01 RX ADMIN — Medication 1 TABLET: at 09:34

## 2020-01-01 RX ADMIN — QUETIAPINE FUMARATE 50 MG: 25 TABLET ORAL at 09:39

## 2020-01-01 RX ADMIN — DIBASIC SODIUM PHOSPHATE, MONOBASIC POTASSIUM PHOSPHATE AND MONOBASIC SODIUM PHOSPHATE 1 TABLET: 852; 155; 130 TABLET ORAL at 17:42

## 2020-01-01 RX ADMIN — PIPERACILLIN AND TAZOBACTAM 2.25 G: 2; .25 INJECTION, POWDER, LYOPHILIZED, FOR SOLUTION INTRAVENOUS at 13:33

## 2020-01-01 RX ADMIN — INSULIN LISPRO 9 UNITS: 100 INJECTION, SOLUTION INTRAVENOUS; SUBCUTANEOUS at 10:32

## 2020-01-01 RX ADMIN — ACETAMINOPHEN 650 MG: 325 TABLET ORAL at 21:07

## 2020-01-01 RX ADMIN — MAGNESIUM SULFATE 1 G: 1 INJECTION INTRAVENOUS at 09:25

## 2020-01-01 RX ADMIN — QUETIAPINE FUMARATE 75 MG: 25 TABLET ORAL at 23:22

## 2020-01-01 RX ADMIN — LORAZEPAM 1 MG: 2 INJECTION INTRAMUSCULAR; INTRAVENOUS at 05:57

## 2020-01-01 RX ADMIN — HEPARIN SODIUM 5000 UNITS: 5000 INJECTION INTRAVENOUS; SUBCUTANEOUS at 08:42

## 2020-01-01 RX ADMIN — HEPARIN SODIUM 5000 UNITS: 5000 INJECTION INTRAVENOUS; SUBCUTANEOUS at 01:10

## 2020-01-01 RX ADMIN — HEPARIN SODIUM 5000 UNITS: 5000 INJECTION INTRAVENOUS; SUBCUTANEOUS at 16:17

## 2020-01-01 RX ADMIN — HEPARIN SODIUM 5000 UNITS: 5000 INJECTION INTRAVENOUS; SUBCUTANEOUS at 08:19

## 2020-01-01 RX ADMIN — INSULIN LISPRO 4 UNITS: 100 INJECTION, SOLUTION INTRAVENOUS; SUBCUTANEOUS at 16:34

## 2020-01-01 RX ADMIN — INSULIN LISPRO 4 UNITS: 100 INJECTION, SOLUTION INTRAVENOUS; SUBCUTANEOUS at 21:31

## 2020-01-01 RX ADMIN — HALOPERIDOL LACTATE 2 MG: 5 INJECTION, SOLUTION INTRAMUSCULAR at 17:16

## 2020-01-01 RX ADMIN — QUETIAPINE FUMARATE 63 MG: 25 TABLET ORAL at 21:16

## 2020-01-01 RX ADMIN — CEPHALEXIN 500 MG: 250 CAPSULE ORAL at 21:17

## 2020-01-01 RX ADMIN — Medication 10 ML: at 23:17

## 2020-01-01 RX ADMIN — FENTANYL CITRATE 25 MCG: 50 INJECTION, SOLUTION INTRAMUSCULAR; INTRAVENOUS at 15:02

## 2020-01-01 RX ADMIN — LORAZEPAM 1 MG: 2 INJECTION INTRAMUSCULAR; INTRAVENOUS at 18:58

## 2020-01-01 RX ADMIN — HEPARIN SODIUM 5000 UNITS: 5000 INJECTION INTRAVENOUS; SUBCUTANEOUS at 19:04

## 2020-01-01 RX ADMIN — INSULIN LISPRO 6 UNITS: 100 INJECTION, SOLUTION INTRAVENOUS; SUBCUTANEOUS at 17:48

## 2020-01-01 RX ADMIN — Medication 100 MG: at 09:35

## 2020-01-01 RX ADMIN — INSULIN LISPRO 2 UNITS: 100 INJECTION, SOLUTION INTRAVENOUS; SUBCUTANEOUS at 19:03

## 2020-01-01 RX ADMIN — INSULIN GLARGINE 10 UNITS: 100 INJECTION, SOLUTION SUBCUTANEOUS at 09:13

## 2020-01-01 RX ADMIN — HEPARIN SODIUM 5000 UNITS: 5000 INJECTION INTRAVENOUS; SUBCUTANEOUS at 09:40

## 2020-01-01 RX ADMIN — Medication 3 MG: at 22:18

## 2020-01-01 RX ADMIN — Medication 100 MG: at 08:26

## 2020-01-01 RX ADMIN — POTASSIUM CHLORIDE 10 MEQ: 10 INJECTION, SOLUTION INTRAVENOUS at 10:58

## 2020-01-01 RX ADMIN — TAMSULOSIN HYDROCHLORIDE 0.8 MG: 0.4 CAPSULE ORAL at 09:36

## 2020-01-01 RX ADMIN — Medication 10 ML: at 15:52

## 2020-01-01 RX ADMIN — QUETIAPINE FUMARATE 25 MG: 25 TABLET ORAL at 20:42

## 2020-01-01 RX ADMIN — INSULIN LISPRO 8 UNITS: 100 INJECTION, SOLUTION INTRAVENOUS; SUBCUTANEOUS at 17:39

## 2020-01-01 RX ADMIN — BARIUM SULFATE 20 G: 960 POWDER, FOR SUSPENSION ORAL at 09:00

## 2020-01-01 RX ADMIN — HEPARIN SODIUM 5000 UNITS: 5000 INJECTION INTRAVENOUS; SUBCUTANEOUS at 10:25

## 2020-01-01 RX ADMIN — POTASSIUM CHLORIDE 10 MEQ: 10 INJECTION, SOLUTION INTRAVENOUS at 10:03

## 2020-01-01 RX ADMIN — INSULIN LISPRO 6 UNITS: 100 INJECTION, SOLUTION INTRAVENOUS; SUBCUTANEOUS at 21:58

## 2020-01-01 RX ADMIN — Medication 10 ML: at 06:00

## 2020-01-01 RX ADMIN — LORAZEPAM 2 MG: 1 TABLET ORAL at 19:52

## 2020-01-01 RX ADMIN — QUETIAPINE FUMARATE 25 MG: 25 TABLET ORAL at 11:31

## 2020-01-01 RX ADMIN — INSULIN LISPRO 4 UNITS: 100 INJECTION, SOLUTION INTRAVENOUS; SUBCUTANEOUS at 13:26

## 2020-01-01 RX ADMIN — INSULIN LISPRO 9 UNITS: 100 INJECTION, SOLUTION INTRAVENOUS; SUBCUTANEOUS at 22:30

## 2020-01-01 RX ADMIN — Medication 3 MG: at 22:00

## 2020-01-01 RX ADMIN — LORAZEPAM 2 MG: 1 TABLET ORAL at 04:27

## 2020-01-01 RX ADMIN — HEPARIN SODIUM 5000 UNITS: 5000 INJECTION INTRAVENOUS; SUBCUTANEOUS at 15:55

## 2020-01-01 RX ADMIN — HEPARIN SODIUM 5000 UNITS: 5000 INJECTION INTRAVENOUS; SUBCUTANEOUS at 00:09

## 2020-01-01 RX ADMIN — HEPARIN SODIUM 5000 UNITS: 5000 INJECTION INTRAVENOUS; SUBCUTANEOUS at 00:54

## 2020-01-01 RX ADMIN — MIDAZOLAM HYDROCHLORIDE 1 MG: 2 INJECTION, SOLUTION INTRAMUSCULAR; INTRAVENOUS at 14:56

## 2020-01-01 RX ADMIN — Medication 3 MG: at 21:50

## 2020-01-01 RX ADMIN — HEPARIN SODIUM 5000 UNITS: 5000 INJECTION INTRAVENOUS; SUBCUTANEOUS at 17:09

## 2020-01-01 RX ADMIN — INSULIN LISPRO 2 UNITS: 100 INJECTION, SOLUTION INTRAVENOUS; SUBCUTANEOUS at 06:48

## 2020-01-01 RX ADMIN — Medication 3 MG: at 21:56

## 2020-01-01 RX ADMIN — HALOPERIDOL 2 MG: 2 TABLET ORAL at 13:39

## 2020-01-01 RX ADMIN — DIBASIC SODIUM PHOSPHATE, MONOBASIC POTASSIUM PHOSPHATE AND MONOBASIC SODIUM PHOSPHATE 1 TABLET: 852; 155; 130 TABLET ORAL at 17:48

## 2020-01-01 RX ADMIN — SODIUM CHLORIDE 1000 ML: 900 INJECTION, SOLUTION INTRAVENOUS at 12:00

## 2020-01-01 RX ADMIN — ANTACID/ANTIFLATULENT 4 G: 380; 550; 10; 10 GRANULE, EFFERVESCENT ORAL at 08:59

## 2020-01-01 RX ADMIN — HEPARIN SODIUM 5000 UNITS: 5000 INJECTION INTRAVENOUS; SUBCUTANEOUS at 08:27

## 2020-01-01 RX ADMIN — Medication 10 ML: at 07:31

## 2020-01-01 RX ADMIN — HALOPERIDOL 2 MG: 2 TABLET ORAL at 09:09

## 2020-01-01 RX ADMIN — MULTIPLE VITAMINS W/ MINERALS TAB 1 TABLET: TAB at 09:33

## 2020-01-01 RX ADMIN — QUETIAPINE FUMARATE 50 MG: 25 TABLET ORAL at 17:22

## 2020-01-01 RX ADMIN — SODIUM CHLORIDE, SODIUM LACTATE, POTASSIUM CHLORIDE, AND CALCIUM CHLORIDE 100 ML/HR: 600; 310; 30; 20 INJECTION, SOLUTION INTRAVENOUS at 05:57

## 2020-01-01 RX ADMIN — QUETIAPINE FUMARATE 25 MG: 25 TABLET ORAL at 09:33

## 2020-01-01 RX ADMIN — SODIUM CHLORIDE 40 MG: 9 INJECTION, SOLUTION INTRAMUSCULAR; INTRAVENOUS; SUBCUTANEOUS at 09:10

## 2020-01-01 RX ADMIN — DIBASIC SODIUM PHOSPHATE, MONOBASIC POTASSIUM PHOSPHATE AND MONOBASIC SODIUM PHOSPHATE 1 TABLET: 852; 155; 130 TABLET ORAL at 17:23

## 2020-01-01 RX ADMIN — LORAZEPAM 1 MG: 2 INJECTION INTRAMUSCULAR at 16:30

## 2020-01-01 RX ADMIN — INSULIN LISPRO 2 UNITS: 100 INJECTION, SOLUTION INTRAVENOUS; SUBCUTANEOUS at 23:07

## 2020-01-01 RX ADMIN — VALPROIC ACID 250 MG: 250 CAPSULE, LIQUID FILLED ORAL at 16:31

## 2020-01-01 RX ADMIN — SODIUM CHLORIDE, SODIUM LACTATE, POTASSIUM CHLORIDE, AND CALCIUM CHLORIDE: 600; 310; 30; 20 INJECTION, SOLUTION INTRAVENOUS at 14:41

## 2020-01-01 RX ADMIN — INSULIN GLARGINE 7 UNITS: 100 INJECTION, SOLUTION SUBCUTANEOUS at 09:37

## 2020-01-01 RX ADMIN — TAMSULOSIN HYDROCHLORIDE 0.8 MG: 0.4 CAPSULE ORAL at 09:10

## 2020-01-01 RX ADMIN — SODIUM CHLORIDE 40 MG: 9 INJECTION, SOLUTION INTRAMUSCULAR; INTRAVENOUS; SUBCUTANEOUS at 09:29

## 2020-01-01 RX ADMIN — HEPARIN SODIUM 5000 UNITS: 5000 INJECTION INTRAVENOUS; SUBCUTANEOUS at 17:46

## 2020-01-01 RX ADMIN — PIPERACILLIN AND TAZOBACTAM 3.38 G: 3; .375 INJECTION, POWDER, LYOPHILIZED, FOR SOLUTION INTRAVENOUS at 10:20

## 2020-01-01 RX ADMIN — WATER 10 MG: 1 INJECTION INTRAMUSCULAR; INTRAVENOUS; SUBCUTANEOUS at 13:00

## 2020-01-01 RX ADMIN — INSULIN LISPRO 6 UNITS: 100 INJECTION, SOLUTION INTRAVENOUS; SUBCUTANEOUS at 11:40

## 2020-01-01 RX ADMIN — PANTOPRAZOLE 40 MG: 40 TABLET, DELAYED RELEASE ORAL at 10:04

## 2020-01-01 RX ADMIN — HEPARIN SODIUM 5000 UNITS: 5000 INJECTION INTRAVENOUS; SUBCUTANEOUS at 23:17

## 2020-01-01 RX ADMIN — MIDAZOLAM 1 MG: 1 INJECTION INTRAMUSCULAR; INTRAVENOUS at 08:35

## 2020-01-01 RX ADMIN — INSULIN LISPRO 3 UNITS: 100 INJECTION, SOLUTION INTRAVENOUS; SUBCUTANEOUS at 09:41

## 2020-01-01 RX ADMIN — HEPARIN SODIUM 5000 UNITS: 5000 INJECTION INTRAVENOUS; SUBCUTANEOUS at 09:29

## 2020-01-01 RX ADMIN — SODIUM CHLORIDE, SODIUM LACTATE, POTASSIUM CHLORIDE, AND CALCIUM CHLORIDE 50 ML/HR: 600; 310; 30; 20 INJECTION, SOLUTION INTRAVENOUS at 12:14

## 2020-01-01 RX ADMIN — INSULIN LISPRO 6 UNITS: 100 INJECTION, SOLUTION INTRAVENOUS; SUBCUTANEOUS at 13:31

## 2020-09-08 PROBLEM — G93.41 METABOLIC ENCEPHALOPATHY: Status: ACTIVE | Noted: 2020-01-01

## 2020-09-08 PROBLEM — R73.9 HYPERGLYCEMIA: Status: ACTIVE | Noted: 2020-01-01

## 2020-09-08 PROBLEM — N17.9 ACUTE RENAL FAILURE (ARF) (HCC): Status: ACTIVE | Noted: 2020-01-01

## 2020-09-08 PROBLEM — R41.82 ALTERED MENTAL STATUS: Status: ACTIVE | Noted: 2020-01-01

## 2020-09-08 PROBLEM — G93.41 ACUTE METABOLIC ENCEPHALOPATHY: Status: ACTIVE | Noted: 2020-01-01

## 2020-09-08 NOTE — ED NOTES
Attempted by this RN x1 to obtain IV access, 2 additional attempts made by staff. Provider notified. Dr. Emy Lam attempted x2 to obtain EJ PIV unsuccessfully.

## 2020-09-08 NOTE — ED NOTES
Rounded on pt Pt shouting Pt had taken off b/p cuff Pt hysterical and swinging and pushing CNA Assist to bed pan Pt not cooperating PT not following commands Pt fighting and agitated Pt placed in soft wrist restraints per Hospitalist verbal order Informed about plan of care Will continue to monitor and assess

## 2020-09-08 NOTE — H&P
Admission History and Physical 
[address] [facility] Patient: Genaro Franks MRN: [id number]  CSN: [id number] YOB: 1965  Age: 60 y.o. Sex: female Admission Date: 9/8/2020 HPI:  
 
Genaro Franks is a 60 y.o. 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 Pt was noted to have nausea, emesis, and complained of being thirsty. History was limited because patient was altered. Pt was given ativan by ED for agitation and restlessness. Spoke to son over the phone: 
Patient's right eye with worsening vision the past two weeks. Her son notes that she was saying it was cloudy/dim in the right eye. In addition patient w R eye pain since yesterday, worse headache in her life, She states tylenol helped with headache pain. Patient with recurrent emesis and abdominal pain yesterday. Denies any blood in emesis. No sick contacts. Per son patient is complaint on insulin. She takes 50U BID of NPH. ED course: 
Labs: wbc 23, - 500, anion gap 17, bicarb 21, K 2.9 , Cr 3.56, UA  wbc 6-8, bacteria 2+,RBC 8-12,  blood cultures, urine cultures, troponin, magnesium Medication administered: IVF 3L, potassium PO 40 meq  and IV 20 meq, 12U NPH, Rocephin X 1, ativan 0.5mg, Zofran 4 mg X 1 Review of Systems: 
Unable to Past Medical History:  
Diagnosis Date  Blind left eye  Cellulitis of great toe of right foot 10/19/2017  Diabetes ([address]  Diabetic retinopathy ([address]  Gall stones  GERD (gastroesophageal reflux disease)  Hammertoe  Hiatal hernia  History of mammogram 10/03/2017 No evidence of malignancy  Hypertension  Mass of abdomen  Neuropathy due to type 2 diabetes mellitus ([address]  Osteomyelitis of toe of right foot ([address] 10/19/2017  Pancreatitis Past Surgical History:  
Procedure Laterality Date  HX AMPUTATION Left 2017  
 left 2nd toe  HX CHOLECYSTECTOMY  10/15/2014  HX GI Benign GI Stromal Tumor excision  HX HEENT Sx for detached retina  HX MYOMECTOMY x5 removed  HX OTHER SURGICAL    
 upper endoscopy Family History Adopted: Yes  
Problem Relation Age of Onset  Heart Failure Mother 76  
 Other Father 70  
     blood clot after surgery  Diabetes Paternal Aunt  Diabetes Paternal Uncle Social History Socioeconomic History  Marital status: SINGLE Spouse name: Not on file  Number of children: Not on file  Years of education: Not on file  Highest education level: Not on file Tobacco Use  Smoking status: Former Smoker Packs/day: 0.20 Years: 8.00 Pack years: 1.60 Types: Cigarettes Last attempt to quit: 12/3/1995 Years since quittin.7  Smokeless tobacco: Never Used Substance and Sexual Activity  Alcohol use: No  
  Comment: Drinks 3-4xs year generally wine  Drug use: No  
 Sexual activity: Not Currently Other Topics Concern   Service No  
 Blood Transfusions No  
 Caffeine Concern No  
 Occupational Exposure No  
 Hobby Hazards No  
 Sleep Concern No  
 Stress Concern No  
 Weight Concern No  
 Special Diet Yes  Back Care No  
 Exercise No  
 Bike Helmet No  
 Seat Belt Yes  Self-Exams Yes Social History Narrative Lives with 16year old son  with ex   Does not have health insurance Allergies Allergen Reactions  Levaquin [Levofloxacin] Hives  Promethazine Nausea and Vomiting \"the phenergan made me more nauseated\" Prior to Admission Medications Prescriptions Last Dose Informant Patient Reported? Taking? PARoxetine (PAXIL) 40 mg tablet   Yes No  
Sig: Take 40 mg by mouth daily. cephALEXin (KEFLEX) 500 mg capsule   No No  
Sig: Take 1 Cap by mouth every twelve (12) hours every twelve (12) hours. cholecalciferol (VITAMIN D3) 1,000 unit tablet   No No  
Sig: Take 1 Tab by mouth daily. insulin NPH/insulin regular (NOVOLIN 70/30, HUMULIN 70/30) 100 unit/mL (70-30) injection   No No  
Si Units by SubCUTAneous route two (2) times a day. magnesium 250 mg tab   Yes No  
Sig: Take 250 mg by mouth.  
metoclopramide HCl (REGLAN) 5 mg tablet   No No  
Sig: Take 1 Tab by mouth Before breakfast, lunch, and dinner. Take before meals. ondansetron (ZOFRAN ODT) 4 mg disintegrating tablet   No No  
Sig: Take 1 Tab by mouth every eight (8) hours as needed. pantoprazole (PROTONIX) 40 mg tablet   No No  
Sig: Take 1 Tab by mouth Daily (before breakfast). potassium chloride (KLOR-CON M20) 20 mEq tablet   Yes No  
Sig: Take 20 mEq by mouth two (2) times a day. rosuvastatin (CRESTOR) 20 mg tablet   No No  
Sig: Take 1 Tab by mouth nightly. Facility-Administered Medications: None Physical Exam:  
 
Patient Vitals for the past 24 hrs: 
 Pulse Resp BP SpO2  
20 1100 (!) 122 16    
20 1053 (!) 135 29  100 % 20 1045 (!) 156 29  93 % 20 1030 (!) 137 18 (!) 154/107   
20 1018   (!) 149/109   
20 1016    97 % 20 1015    (!) 81 % Physical Exam: 
General:  AXOx 2 to name and place, Pt was answering yes to everything, but not following commands HEENT: R eye erythema, no drainage,  R and left eye was sluggish to react to light, moist mucous membranes. CV:  RRR, no murmurs. No visible pulsations or thrills. RESP:  Unlabored lungs clear to auscultation without adventitious breath sounds. Equal expansion bilaterally. ABD:  Pt had some grimacing on palpation of the abdomen, diffuse tenderness,  nondistended. BS (+). MS:  No joint deformity or instability. No atrophy. Neuro:  Pt no facial droop, no deviation of tongue, unable to evaluate EOM because patient does not follow commands, moves all four extremities, Lower extremity bilaterally rigid tone, negative babinski Ext:  R foot the first digit is amputated, Left second digit amputated, No edema. 2+ radial and dp pulses bilaterally. Skin:  No rashes, lesions, or ulcers. Chemistry Recent Labs  
  09/08/20 
1130 *   
K 2.9*  
 CO2 21 BUN 30* CREA 3.56* CA 10.3* MG 2.0 AGAP 17 BUCR 8*  
* TP 10.2* ALB 4.9  
GLOB 5.3* AGRAT 0.9  
  
 
CBC w/Diff Recent Labs  
  09/08/20 
1130 WBC 23.7*  
RBC 4.54  
HGB 13.5 HCT 38.1  GRANS 84* LYMPH 12* EOS 0 Liver Enzymes Protein, total  
Date Value Ref Range Status 09/08/2020 10.2 (H) 6.4 - 8.2 g/dL Final  
 
Albumin Date Value Ref Range Status 09/08/2020 4.9 3.4 - 5.0 g/dL Final  
 
Globulin Date Value Ref Range Status 09/08/2020 5.3 (H) 2.0 - 4.0 g/dL Final  
 
A-G Ratio Date Value Ref Range Status 09/08/2020 0.9 0.8 - 1.7   Final  
 
Alk. phosphatase Date Value Ref Range Status 09/08/2020 161 (H) 45 - 117 U/L Final  
 
Recent Labs  
  09/08/20 
1130 TP 10.2* ALB 4.9  
GLOB 5.3* AGRAT 0.9 * Lactic Acid Lactic acid Date Value Ref Range Status 01/29/2019 1.3 0.4 - 2.0 MMOL/L Final  
 
No results for input(s): LAC in the last 72 hours. BNP No results found for: BNP, BNPP, XBNPT Cardiac Enzymes Lab Results Component Value Date/Time  09/08/2020 11:30 AM  
 CKMB 1.4 09/08/2020 11:30 AM  
 CKND1 0.8 09/08/2020 11:30 AM  
 TROIQ <0.02 09/08/2020 11:30 AM  
  
 
Coagulation No results for input(s): PTP, INR, APTT, INREXT in the last 72 hours. Thyroid  Lab Results Component Value Date/Time TSH 1.22 01/22/2019 06:22 PM  
    
 
Lipid Panel Lab Results Component Value Date/Time  Cholesterol, total 170 08/07/2017 01:27 PM  
 HDL Cholesterol 61 (H) 08/07/2017 01:27 PM  
 LDL, calculated 79.6 08/07/2017 01:27 PM  
 VLDL, calculated 29.4 08/07/2017 01:27 PM  
 Triglyceride 147 08/07/2017 01:27 PM  
 CHOL/HDL Ratio 2.8 08/07/2017 01:27 PM  
  
 
ABG No results for input(s): PHI, PHI, POC2, PCO2I, PO2, PO2I, HCO3, HCO3I, FIO2, FIO2I in the last 72 hours. Urinalysis Lab Results Component Value Date/Time Color DARK YELLOW 09/08/2020 12:11 PM  
 Appearance CLOUDY 09/08/2020 12:11 PM  
 Specific gravity 1.021 09/08/2020 12:11 PM  
 pH (UA) 6.0 09/08/2020 12:11 PM  
 Protein 300 (A) 09/08/2020 12:11 PM  
 Glucose >1,000 (A) 09/08/2020 12:11 PM  
 Ketone 15 (A) 09/08/2020 12:11 PM  
 Bilirubin Negative 09/08/2020 12:11 PM  
 Urobilinogen 1.0 09/08/2020 12:11 PM  
 Nitrites Negative 09/08/2020 12:11 PM  
 Leukocyte Esterase Negative 09/08/2020 12:11 PM  
 Epithelial cells 4+ 09/08/2020 12:11 PM  
 Bacteria 2+ (A) 09/08/2020 12:11 PM  
 WBC 6 to 8 09/08/2020 12:11 PM  
 RBC 8 to 12 09/08/2020 12:11 PM  
  
 
Micro No results for input(s): SDES, CULT in the last 72 hours. No results for input(s): CULT in the last 72 hours. Imaging: 
XR (Most Recent). Results from [facility] Encounter encounter on 09/08/20 XR CHEST PORT Narrative EXAM: CHEST PORTABLE CLINICAL HISTORY/INDICATION: Altered mental status COMPARISON: Multiple prior chest radiographs, most recently dating 1/28/2019. TECHNIQUE: Portable AP view was obtained. FINDINGS:  
 
LINES/DEVICES: EKG leads overlie the patient. HEART/MEDIASTINUM: The cardiomediastinal silhouette is unremarkable in size. LUNGS: No focal airspace opacity suggestive of pneumonia, pulmonary edema, 
pneumothorax, or pleural effusion. BONES: Unremarkable for age. Impression IMPRESSION: 
1. No radiographic evidence of acute cardiopulmonary disease. Thank you for enabling us to participate in the care of this patient. CT (Most Recent) [facility] CT SPINE CERV WO CONT Narrative CT Cervical Spine CPT CODE: 68043 VOLUMETRIC DATA WAS ACQUIRED THROUGH THE CERVICAL SPINE REGION WITH A MULTISLICE 
SCANNER. THIS WAS RECONSTRUCTED IN THE AXIAL CORONAL AND SAGITTAL PLANES. INDICATION: Pain following fall. COMPARISON: November 16, 2018. FINDINGS: 
 
. There is slight reversal of the normal cervical lordosis in the lower cervical 
segments associated with disc disease. Disc narrowing is noted most prominent at C5-C6 with marginal osteophyte 
formation, unchanged. No osteolytic or osteoblastic lesions are noted. Thomasena Dilling No fracture or subluxation is evident. Bone mineralization seems Normal for age. . 
  
 Impression IMPRESSION: 
 
. Negative for acute sequelae of trauma. All CT scans at this facility are performed using dose optimization technique as 
appropriate to the performed exam, to include automated exposure control, 
adjustment of the mA and/or kV according to patient's size (Including 
appropriate matching for site-specific examinations), or use of iterative 
reconstruction technique. ECHO No results found for this or any previous visit. EKG Results for orders placed or performed in visit on 06/20/14 AMB POC EKG ROUTINE W/ 12 LEADS, INTER & REP     Status: None Impression See progress note. Recent Results (from the past 12 hour(s)) GLUCOSE, POC Collection Time: 09/08/20 11:13 AM  
Result Value Ref Range Glucose (POC) 460 (HH) 70 - 110 mg/dL CBC WITH AUTOMATED DIFF Collection Time: 09/08/20 11:30 AM  
Result Value Ref Range WBC 23.7 (H) 4.6 - 13.2 K/uL  
 RBC 4.54 4.20 - 5.30 M/uL  
 HGB 13.5 12.0 - 16.0 g/dL HCT 38.1 35.0 - 45.0 % MCV 83.9 74.0 - 97.0 FL  
 MCH 29.7 24.0 - 34.0 PG  
 MCHC 35.4 31.0 - 37.0 g/dL  
 RDW 13.7 11.6 - 14.5 % PLATELET 282 132 - 476 K/uL  MPV 10.7 9.2 - 11.8 FL  
 NEUTROPHILS 84 (H) 42 - 75 % LYMPHOCYTES 12 (L) 20 - 51 % MONOCYTES 4 2 - 9 % EOSINOPHILS 0 0 - 5 % BASOPHILS 0 0 - 3 %  
 ABS. NEUTROPHILS 20.0 (H) 1.8 - 8.0 K/UL  
 ABS. LYMPHOCYTES 2.8 0.8 - 3.5 K/UL  
 ABS. MONOCYTES 0.9 0 - 1.0 K/UL  
 ABS. EOSINOPHILS 0.0 0.0 - 0.4 K/UL  
 ABS. BASOPHILS 0.0 0.0 - 0.06 K/UL  
 DF MANUAL PLATELET COMMENTS ADEQUATE PLATELETS    
 RBC COMMENTS NORMOCYTIC, NORMOCHROMIC METABOLIC PANEL, COMPREHENSIVE Collection Time: 09/08/20 11:30 AM  
Result Value Ref Range Sodium 141 136 - 145 mmol/L Potassium 2.9 (LL) 3.5 - 5.5 mmol/L Chloride 103 100 - 111 mmol/L  
 CO2 21 21 - 32 mmol/L Anion gap 17 3.0 - 18 mmol/L Glucose 470 (HH) 74 - 99 mg/dL BUN 30 (H) 7.0 - 18 MG/DL Creatinine 3.56 (H) 0.6 - 1.3 MG/DL  
 BUN/Creatinine ratio 8 (L) 12 - 20 GFR est AA 16 (L) >60 ml/min/1.73m2 GFR est non-AA 13 (L) >60 ml/min/1.73m2 Calcium 10.3 (H) 8.5 - 10.1 MG/DL Bilirubin, total 3.9 (H) 0.2 - 1.0 MG/DL  
 ALT (SGPT) 19 13 - 56 U/L  
 AST (SGOT) 17 10 - 38 U/L Alk. phosphatase 161 (H) 45 - 117 U/L Protein, total 10.2 (H) 6.4 - 8.2 g/dL Albumin 4.9 3.4 - 5.0 g/dL Globulin 5.3 (H) 2.0 - 4.0 g/dL A-G Ratio 0.9 0.8 - 1.7 MAGNESIUM Collection Time: 09/08/20 11:30 AM  
Result Value Ref Range Magnesium 2.0 1.6 - 2.6 mg/dL CARDIAC PANEL,(CK, CKMB & TROPONIN) Collection Time: 09/08/20 11:30 AM  
Result Value Ref Range CK - MB 1.4 <3.6 ng/ml CK-MB Index 0.8 0.0 - 4.0 %  26 - 192 U/L Troponin-I, QT <0.02 0.0 - 0.045 NG/ML  
URINALYSIS W/ RFLX MICROSCOPIC Collection Time: 09/08/20 12:11 PM  
Result Value Ref Range Color DARK YELLOW Appearance CLOUDY Specific gravity 1.021 1.005 - 1.030    
 pH (UA) 6.0 5.0 - 8.0 Protein 300 (A) NEG mg/dL Glucose >1,000 (A) NEG mg/dL Ketone 15 (A) NEG mg/dL Bilirubin Negative NEG Blood MODERATE (A) NEG Urobilinogen 1.0 0.2 - 1.0 EU/dL Nitrites Negative NEG Leukocyte Esterase Negative NEG    
URINE MICROSCOPIC ONLY Collection Time: 09/08/20 12:11 PM  
Result Value Ref Range WBC 6 to 8 0 - 4 /hpf  
 RBC 8 to 12 0 - 5 /hpf Epithelial cells 4+ 0 - 5 /lpf Bacteria 2+ (A) NEG /hpf Mucus 2+ (A) NEG /lpf Hyaline cast 2 to 4 0 - 2 /lpf Assessment/Plan:  
60 y.o. 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 Metabolic encephalopathy likely secondary to  HHS, also concern for sepsis Ddx: Infectious (UTI, bacteriemia, vs R eye pain (Sinuitis) vs cholecystitis bilirubin 3.9) vs non-compliance insulin vs  MI vs pancreatitis - Tachycardia 120s-130s, HDS but  leucocytosis (23), pending lactic acid Does not meet DKA criteria hyperglycemia (460), with slight anion gap 17 but bicarbonate urine positive for ketones 15 but normal bicarb 21. Thirsty, nausea, emesis)) Plan: - Admit to tele 
- infectious labs: [address], and UCX pending,  UA wbc 6-8+, bacteria+, neg LE, neg Nitrites, ketones 15),Chest Xray wnml 
- EKG pending, trops X 1 neg 
 -Rocephin, NPH 40 U BID ( home med is NPH 50 U BID) - POC glucose q6hr,  ordered EKG, BMP q4hr, Phosphorous q4hr, lipase, direct bilirubin/ indirect bilirubin, ggt, UDS, ETOH serum, CT head  
- maybe consider US AB if direct bilirubin elevated CRYSTAL CR 3.56 ( baseline Cr 1.09 1/2019) in setting of CKD stage 2  likely pre-renal vs sepsis S/p 3 L IVF Plan:   
125 cc/hr w/ KCL 20 meq R eye pain + Right eye vision loss X 2 weeks 
ddx pink eye vs acute angle glaucoma vs central retinal artery occlusion vs retinal detachment  () vs sinusitis Pupil reacts sluggishly bilaterally Plan:  
- CT head  
- will try to find an ophthalmoscope to exam  
- No opthalmology here  
 
 
hypokalemia (2.9) electrolyte imbalance (hypokalemia, corrected Ca wnml )  
- replete electrolyte as needed, Mg, Phos Hyperbilirubinemia (3.9) Possibly sepsis vs medication vs pancreatitis  
- GGT, direct Bili 
- consider US if doesn't improve Diabetes ( last A1c 7.2) neuropathy and L retinopathy  
- cont NPH 40U BID (hold home NPH 50 U BID) HTN 
- not on meds HLD 
- cont rosuvastatin Gastroparesis  
- held metoclopramide 5 mg GERD 
- cont  omeprazole 40 mg delayed capsule Anxiety 
- held  paroxetine 60 mg daily For additional problem list, assessment, and plan please see intern note. Diet NPO till pass bedside eval  
DVT Prophylaxis heparin GI Prophylaxis Omperazole Code status Full code Disposition unknown Point of [address] [facility] Relationship: 
[phone] Emnet David PGY-1  
[facility] Pager: [phone] September 8, 2020, 1:56 PM  
 
 
 
 
Senior Addendum to History and Physical 
[address] [place] [address] Patient: Remington Granger MRN: [id number]  CSN: [id number] YOB: 1965  Age: 60 y.o. Sex: female DOA: 9/8/2020 HPI:  
 
Remington Granger is a 60 y.o. female with PMH IDDM2, HTN, HLD, anxiety, gastritis/esophagitis, GI stromal tumor s/p resection 4275, [id number], [id number], IBDJFDKELTES, IMRBC 1 diastolic dysfunction, now presenting with complaint of nausea, vomiting and abdominal pain. Patient unable to provide history given altered mental status. Obtained history from son. Patient developed n/v and abdominal pain yesterday with subjective fevers. Patient complaining for blurry and cloudy vision in her right eye for the past two weeks. She has a history of retinopathy. Patient has not seen the physician since 8/2019. Discussed case the nursing, patient was yelling, trying to pull out her lines and uncooperative while in the ED. She was given a dose of Ativan. HPI, ROS, PMH, PSH, Family Hx, Social Hx, Home medications, and allergies as above in intern H&P. Which has been reviewed in full. Additional comments to subjective history include: 
 
Physical Exam:  
 
Physical Exam: 
General:  Alert and Responsive and in No acute distress. HEENT: Conjunctiva pink, sclera icteric on right. PERRL. EOMI. Pharynx moist, nonerythematous. Moist mucous membranes. Thyroid not enlarged, no nodules. No cervical, supraclavicular, occipital or submandibular lymphadenopathy. No other gross abnormalities present. CV:  RRR, no murmurs. PMI not displaced. No visible pulsations or thrills. No carotid bruits. RESP:  Unlabored breathing. Lungs clear to auscultation. no wheeze, rales, or rhonchi. Equal expansion bilaterally. ABD:  Soft, mild tenderness, nondistended. Normoactive bowel sounds. MS:  No joint deformity or instability. No atrophy. Neuro:  5/5 strength bilateral upper extremities and lower extremities. CN II-XII intact. Sensation grossly intact. 2+ patellar reflexes bilaterally. Patient knows her name and birth date. Ext:  No edema. 2+ radial and dp pulses bilaterally. Right foot s/p 1st digit amputation. Left foot s/p 2nd digit amputation. Skin:  No rashes, lesions, or ulcers. Good turgor. Labs and imaging reviewed Assessment/Plan:  
60 y.o. female with PMH IDDM2, HTN, HLD, anxiety, gastritis/esophagitis, GI stromal tumor s/p resection 9335, [id number], [id number], PKUJVFVARPOW, IRSUN 1 diastolic dysfunction, now admitted with AMS and HHS. Active Problems: 
  Leukocytosis (7/19/2017) Hypokalemia (1/28/2019) Altered mental status (9/8/2020) Hyperglycemia (9/8/2020) Acute renal failure (ARF) ([facility] (9/8/2020) Acute metabolic encephalopathy (6/3/7110) Acute Metabolic Encephalopathy, HHS, Hypokalemia Patient presents with acute nausea, vomiting and altered mental status. On admission, , CO2 21, Urine Ketones present, AG 17. Received Rocephin, 1L NS Bolus x 3 and potassium while in the ED. HHS vs DKA. Normal bicarb does not fit DKA picture, however patient has hyperglycemia, anion gap and urine ketones. Most likely HHS, anion gap may be due to lactic acidosis or uremic acidosis from renal failure. Hyperglycemia 2/2 poor medication compliance vs infectious etiology (UTI) vs CVA. Patient complained of worsening headache and blurry vision, obtain CT Head to r/o hemorrhage.  
- admit to telemetry 
- vitals per routine - cardiac monitoring 
- CBC daily - BMP, Phos q2h 
- lactic acid 
- lipase 
- UDS 
- Ethyl Alcohol - BCx, UCx 
- SSI, POC Glucose q6h 
- NS w/ KCl 20 mEq @ 125 cc/hr 
- NPO 
- strict I & Os 
- replete electrolytes per protocol 
- EKG 
- CT Head 
- PT/OT/CM 
- Aspiration and fall precautions - Consider MRI Suspected UTI 
WBC 23. UA showed mod blood, 2+ bacteria. - Monitor UOP 
- Ceftriaxone daily Tachycardia Likely due to hyperglycemia, dehydration and intractable n/v. 
- mIVF 
- consider lopressor Right eye pain Complains of blurry and cloudy vision. Hx of retinal detachment in left eye. Conjunctivitis vs scleritis vs foreign body. - Monitor 
-Continue abx and IVF Acute on Chronic Kidney Injury, H/O CKD Stage 2 On admission, creatinine 3.56  Patient's baseline creatinine approximately 1.17 and GFR of 61. 
- IVFs as above 
- avoid nephrotoxic medications 
- renally dose antibiotics  
- Trend BMP 
  
T2DM with retinopathy and neuropathy Most recent hemoglobin A1c 6.7 on 8/15/19. Retinal detachment left eye. Followed by Endocrinology. - HgbA1c 
- Lipid panel - Humulin 70/30 40 units BID (Son reports that she is on 50 BID) - accuchecks achs 
- cdi 
- adjust insulin based on requirements 
- diabetic education consult H/O HTN/HLD 
- Goal to maintain BP <180/100 
- crestor 20mg qHS  
  
H/O Anxiety 
- Paxil 40mg, 1.5 tablets daily  
  
 Vit D deficiency 
- home med Vit D3 1000unit For additional problem list, assessment, and plan see intern note Mona Hagen MD 
9/8/2020, 2:32 PM

## 2020-09-08 NOTE — PROGRESS NOTES
Pt agitated. Trying to remove IV and attack staff. Evaluated patient and placed on soft restraints for safety

## 2020-09-08 NOTE — ED TRIAGE NOTES
Patient presents with complaints of vomitting. Upon entering patient room, patient is loudly stating she needs to use a bedpan. Patient has decreased command following and continues to repeat \"I need to use the bathroom\" over and over again even once sitting on a bedpan. Patient reminded that she can use the bedpan now. Patient begins rolling around in the bed and will not follow directions or demonstrate understanding. Unable to assess for orientation level at this time.

## 2020-09-08 NOTE — ED PROVIDER NOTES
EMERGENCY DEPARTMENT HISTORY AND PHYSICAL EXAM 
 
10:38 AM 
 
 
Date: 2020 Patient Name: Sharon Connolly History of Presenting Illness Chief Complaint Patient presents with  Vomiting History Provided By: EMS Additional History (Context): Sharon Connolly is a 60 y.o. female with Past medical history of diabetes, hypertension, pancreatitis who presents with chief complaint by EMS of altered mental status. EMS states that patient's blood sugar was 503 in the field. Other symptoms nausea and vomiting. HPI otherwise limited due to patient condition. PCP: Tonya De La Rosa MD 
 
 
 
Past History Past Medical History: 
Past Medical History:  
Diagnosis Date  Blind left eye  Cellulitis of great toe of right foot 10/19/2017  Diabetes ([address]  Diabetic retinopathy ([address]  Gall stones  GERD (gastroesophageal reflux disease)  Hammertoe  Hiatal hernia  History of mammogram 10/03/2017 No evidence of malignancy  Hypertension  Mass of abdomen  Neuropathy due to type 2 diabetes mellitus ([address]  Osteomyelitis of toe of right foot ([address] 10/19/2017  Pancreatitis Past Surgical History: 
Past Surgical History:  
Procedure Laterality Date  HX AMPUTATION Left 2017  
 left 2nd toe  HX CHOLECYSTECTOMY  10/15/2014  HX GI Benign GI Stromal Tumor excision  HX HEENT Sx for detached retina  HX MYOMECTOMY x5 removed  HX OTHER SURGICAL    
 upper endoscopy Family History: 
Family History Adopted: Yes  
Problem Relation Age of Onset  Heart Failure Mother 76  
 Other Father 70  
     blood clot after surgery  Diabetes Paternal Aunt  Diabetes Paternal Uncle Social History: 
Social History Tobacco Use  Smoking status: Former Smoker Packs/day: 0.20 Years: 8.00 Pack years: 1.60 Types: Cigarettes Last attempt to quit: 12/3/1995 Years since quittin.7  Smokeless tobacco: Never Used Substance Use Topics  Alcohol use: No  
  Comment: Drinks 3-4xs year generally wine  Drug use: No  
 
 
Allergies: Allergies Allergen Reactions  Levaquin [Levofloxacin] Hives  Promethazine Nausea and Vomiting \"the phenergan made me more nauseated\" Review of Systems Review of Systems Unable to perform ROS: Acuity of condition Physical Exam  
 
Visit Vitals BP (!) 138/94 Pulse (!) 129 Resp 16 LMP 10/06/2015 (Exact Date) SpO2 99% Physical Exam 
Vitals signs and nursing note reviewed. Constitutional:   
   General: She is not in acute distress. Appearance: She is well-developed. She is ill-appearing. She is not diaphoretic. HENT:  
   Head: Normocephalic and atraumatic. Eyes:  
   General: No scleral icterus. Conjunctiva/sclera: Conjunctivae normal.  
   Pupils: Pupils are equal, round, and reactive to light. Neck: Musculoskeletal: Normal range of motion and neck supple. Cardiovascular:  
   Rate and Rhythm: Tachycardia present. Heart sounds: No murmur. No gallop. Comments: Capillary refill < 3 seconds Pulmonary:  
   Effort: Pulmonary effort is normal. No respiratory distress. Breath sounds: Normal breath sounds. No stridor. No wheezing or rales. Abdominal:  
   General: Bowel sounds are normal. There is no distension. Palpations: Abdomen is soft. Tenderness: There is no abdominal tenderness. Musculoskeletal: Normal range of motion. General: No tenderness. Right lower leg: No edema. Left lower leg: No edema. Lymphadenopathy:  
   Cervical: No cervical adenopathy. Skin: 
   General: Skin is warm and dry. Coloration: Skin is pale. Skin is not jaundiced. Neurological:  
   General: No focal deficit present. Mental Status: She is alert. Cranial Nerves: No cranial nerve deficit. Motor: No weakness. Coordination: Coordination normal.  
   Comments: Moving upper and lower extremities equally with equal strength EOMI Somewhat lethargic but intermittently talking for example states \"that hurts\" as we are attempting to get an IV, and also states \"I am thirsty\" and \"cannot have something to drink\". [facility]:  
   Comments: Unable to assess due to acuity of condition Diagnostic Study Results Labs - Recent Results (from the past 12 hour(s)) GLUCOSE, POC Collection Time: 09/08/20 11:13 AM  
Result Value Ref Range Glucose (POC) 460 (HH) 70 - 110 mg/dL CBC WITH AUTOMATED DIFF Collection Time: 09/08/20 11:30 AM  
Result Value Ref Range WBC 23.7 (H) 4.6 - 13.2 K/uL  
 RBC 4.54 4.20 - 5.30 M/uL  
 HGB 13.5 12.0 - 16.0 g/dL HCT 38.1 35.0 - 45.0 % MCV 83.9 74.0 - 97.0 FL  
 MCH 29.7 24.0 - 34.0 PG  
 MCHC 35.4 31.0 - 37.0 g/dL  
 RDW 13.7 11.6 - 14.5 % PLATELET 274 953 - 768 K/uL MPV 10.7 9.2 - 11.8 FL  
 NEUTROPHILS 84 (H) 42 - 75 % LYMPHOCYTES 12 (L) 20 - 51 % MONOCYTES 4 2 - 9 % EOSINOPHILS 0 0 - 5 % BASOPHILS 0 0 - 3 %  
 ABS. NEUTROPHILS 20.0 (H) 1.8 - 8.0 K/UL  
 ABS. LYMPHOCYTES 2.8 0.8 - 3.5 K/UL  
 ABS. MONOCYTES 0.9 0 - 1.0 K/UL  
 ABS. EOSINOPHILS 0.0 0.0 - 0.4 K/UL  
 ABS. BASOPHILS 0.0 0.0 - 0.06 K/UL  
 DF MANUAL PLATELET COMMENTS ADEQUATE PLATELETS    
 RBC COMMENTS NORMOCYTIC, NORMOCHROMIC METABOLIC PANEL, COMPREHENSIVE Collection Time: 09/08/20 11:30 AM  
Result Value Ref Range Sodium 141 136 - 145 mmol/L Potassium 2.9 (LL) 3.5 - 5.5 mmol/L Chloride 103 100 - 111 mmol/L  
 CO2 21 21 - 32 mmol/L Anion gap 17 3.0 - 18 mmol/L Glucose 470 (HH) 74 - 99 mg/dL BUN 30 (H) 7.0 - 18 MG/DL Creatinine 3.56 (H) 0.6 - 1.3 MG/DL  
 BUN/Creatinine ratio 8 (L) 12 - 20 GFR est AA 16 (L) >60 ml/min/1.73m2 GFR est non-AA 13 (L) >60 ml/min/1.73m2  Calcium 10.3 (H) 8.5 - 10.1 MG/DL  
 Bilirubin, total 3.9 (H) 0.2 - 1.0 MG/DL  
 ALT (SGPT) 19 13 - 56 U/L  
 AST (SGOT) 17 10 - 38 U/L Alk. phosphatase 161 (H) 45 - 117 U/L Protein, total 10.2 (H) 6.4 - 8.2 g/dL Albumin 4.9 3.4 - 5.0 g/dL Globulin 5.3 (H) 2.0 - 4.0 g/dL A-G Ratio 0.9 0.8 - 1.7 MAGNESIUM Collection Time: 09/08/20 11:30 AM  
Result Value Ref Range Magnesium 2.0 1.6 - 2.6 mg/dL CARDIAC PANEL,(CK, CKMB & TROPONIN) Collection Time: 09/08/20 11:30 AM  
Result Value Ref Range CK - MB 1.4 <3.6 ng/ml CK-MB Index 0.8 0.0 - 4.0 %  26 - 192 U/L Troponin-I, QT <0.02 0.0 - 0.045 NG/ML  
HEMOGLOBIN A1C WITH EAG Collection Time: 09/08/20 11:30 AM  
Result Value Ref Range Hemoglobin A1c 7.0 (H) 4.2 - 5.6 % Est. average glucose 154 mg/dL URINALYSIS W/ RFLX MICROSCOPIC Collection Time: 09/08/20 12:11 PM  
Result Value Ref Range Color DARK YELLOW Appearance CLOUDY Specific gravity 1.021 1.005 - 1.030    
 pH (UA) 6.0 5.0 - 8.0 Protein 300 (A) NEG mg/dL Glucose >1,000 (A) NEG mg/dL Ketone 15 (A) NEG mg/dL Bilirubin Negative NEG Blood MODERATE (A) NEG Urobilinogen 1.0 0.2 - 1.0 EU/dL Nitrites Negative NEG Leukocyte Esterase Negative NEG    
URINE MICROSCOPIC ONLY Collection Time: 09/08/20 12:11 PM  
Result Value Ref Range WBC 6 to 8 0 - 4 /hpf  
 RBC 8 to 12 0 - 5 /hpf Epithelial cells 4+ 0 - 5 /lpf Bacteria 2+ (A) NEG /hpf Mucus 2+ (A) NEG /lpf Hyaline cast 2 to 4 0 - 2 /lpf  
GLUCOSE, POC Collection Time: 09/08/20  2:23 PM  
Result Value Ref Range Glucose (POC) 200 (H) 70 - 110 mg/dL LIPASE Collection Time: 09/08/20  4:40 PM  
Result Value Ref Range Lipase 95 73 - 393 U/L  
ETHYL ALCOHOL Collection Time: 09/08/20  4:40 PM  
Result Value Ref Range ALCOHOL(ETHYL),SERUM <3 0 - 3 MG/DL  
METABOLIC PANEL, BASIC Collection Time: 09/08/20  4:40 PM  
Result Value Ref Range  Sodium 143 136 - 145 mmol/L  
 Potassium 3.1 (L) 3.5 - 5.5 mmol/L Chloride 110 100 - 111 mmol/L  
 CO2 23 21 - 32 mmol/L Anion gap 10 3.0 - 18 mmol/L Glucose 298 (H) 74 - 99 mg/dL BUN 29 (H) 7.0 - 18 MG/DL Creatinine 2.73 (H) 0.6 - 1.3 MG/DL  
 BUN/Creatinine ratio 11 (L) 12 - 20 GFR est AA 22 (L) >60 ml/min/1.73m2 GFR est non-AA 18 (L) >60 ml/min/1.73m2 Calcium 9.3 8.5 - 10.1 MG/DL  
CBC WITH MANUAL DIFF Collection Time: 09/08/20  4:40 PM  
Result Value Ref Range WBC 24.2 (H) 4.6 - 13.2 K/uL  
 RBC 3.88 (L) 4.20 - 5.30 M/uL  
 HGB 11.3 (L) 12.0 - 16.0 g/dL HCT 32.5 (L) 35.0 - 45.0 % MCV 83.8 74.0 - 97.0 FL  
 MCH 29.1 24.0 - 34.0 PG  
 MCHC 34.8 31.0 - 37.0 g/dL  
 RDW 13.6 11.6 - 14.5 % PLATELET 582 046 - 397 K/uL MPV 11.0 9.2 - 11.8 FL  
 DIFFERENTIAL MANUAL DIFFERENTIAL ORDERED    
 NEUTROPHILS PENDING % LYMPHOCYTES PENDING % MONOCYTES PENDING % EOSINOPHILS PENDING % BASOPHILS PENDING % BAND NEUTROPHILS PENDING % PROMYELOCYTES PENDING % MYELOCYTES PENDING % METAMYELOCYTES PENDING % BLASTS PENDING % OTHER CELL PENDING   
 ABS. NEUTROPHILS PENDING K/UL  
 ABS. LYMPHOCYTES PENDING K/UL  
 ABS. MONOCYTES PENDING K/UL  
 ABS. EOSINOPHILS PENDING K/UL  
 ABS. BASOPHILS PENDING K/UL  
 RBC COMMENTS PENDING   
 DF PENDING   
PHOSPHORUS Collection Time: 09/08/20  4:40 PM  
Result Value Ref Range Phosphorus 1.6 (L) 2.5 - 4.9 MG/DL POC LACTIC ACID Collection Time: 09/08/20  5:08 PM  
Result Value Ref Range Lactic Acid (POC) 3.27 (HH) 0.40 - 2.00 mmol/L  
GLUCOSE, POC Collection Time: 09/08/20  5:49 PM  
Result Value Ref Range Glucose (POC) 317 (H) 70 - 110 mg/dL Radiologic Studies -  
XR CHEST PORT Final Result IMPRESSION:  
1. No radiographic evidence of acute cardiopulmonary disease. Thank you for enabling us to participate in the care of this patient. Medical Decision Making I am the first provider for this patient. I reviewed the vital signs, available nursing notes, past medical history, past surgical history, family history and social history. Vital Signs-Reviewed the patient's vital signs. Pulse Oximetry Analysis -  100% on room air (Interpretation) normal 
 
Cardiac Monitor: 
Rate: 115 Rhythm:  Sinus Tachycardia Records Reviewed: Nursing Notes (Time of Review: 10:38 AM) Provider Notes (Medical Decision Making): DDX: Hyperglycemia, metabolic, DKA, dehydration, infectious Get labs, give IV fluid boluses, chest x-ray MDM Medications  
potassium chloride 10 mEq in 100 ml IVPB (10 mEq IntraVENous New Bag 9/8/20 1234)  
0.9% sodium chloride infusion (125 mL/hr IntraVENous New Bag 9/8/20 1248) sodium chloride (NS) flush 5-10 mL (has no administration in time range)  
sodium chloride 0.9 % bolus infusion 1,000 mL (has no administration in time range)  
sodium chloride 0.9 % bolus infusion 1,000 mL (1,000 mL IntraVENous New Bag 9/8/20 1145)  
sodium chloride 0.9 % bolus infusion 1,000 mL (1,000 mL IntraVENous New Bag 9/8/20 1200) insulin regular (NOVOLIN R, HUMULIN R) injection 12 Units (12 Units IntraVENous Given 9/8/20 1234) potassium chloride (K-DUR, KLOR-CON) SR tablet 40 mEq (40 mEq Oral Given 9/8/20 1242) ondansetron (ZOFRAN) injection 4 mg (4 mg IntraVENous Given 9/8/20 1247) LORazepam (ATIVAN) injection 0.5 mg (0.5 mg IntraVENous Given 9/8/20 1246) cefTRIAXone (ROCEPHIN) 1 g in sterile water (preservative free) 10 mL IV syringe (1 g IntraVENous Given 9/8/20 1250) ED Course: Progress Notes, Reevaluation, and Consults: 
Blood glucose 470, gave insulin, patient getting IV fluid boluses WBC 23 BUN 30 Creatinine 3.6 Do not see any recent lab work on patient, but creatinine January 2019 was normal 
 
Potassium 2.9, gave emergent p.o. and IV potassium Magnesium normal 
 
UA: Negative nitrites, negative leukocytes, +2 bacteria, 8 wbc's,  but also has +4 epithelial cells possibly unclean catch. Urine is cultured. Did give antibiotic Rocephin Elevated white blood cell count could be from stress phase of her significant hyperglycemia and acute renal failure, dehydration Repeat glucose 200 Consult:  Discussed care with [facility] family medicine resident, Specialty: On-call for attending Dr. Tiny Sullivan standard discussion; including history of patients chief complaint, available diagnostic results, and treatment course. Accepts admission 1:56  PM, 9/8/2020 I did the sepsis reassessment 4:25 PM 
Jersey Truong DO 
 
Critical care time:  
I have spent 50 minutes of critical care time involved in lab review, consultations with specialist, family decision making, and documentation. During this entire length of time I was immediately available to the patient. Critical care: The reason for providing this level of medical care for this critically ill patient was due to a critical illness that impaired one or more vital organ systems such that there was a high probability of imminent or life threatening deterioration in the patients condition. This care involved high complexity decision making to assess, manipulate and support vital system functions, to treat this degree vital organ system failure and to prevent further life threatening deterioration of the patient's condition. Diagnosis Clinical Impression: 1. Hyperglycemia 2. Altered mental status, unspecified altered mental status type 3. Acute metabolic encephalopathy 4. Acute renal failure, unspecified acute renal failure type ([address] 5. Leukocytosis, unspecified type 6. Hypokalemia 7. Dehydration 8. Sepsis secondary to UTI ([address] 9. Lactic acid acidosis Disposition: Admitted Follow-up Information None Patient's Medications Start Taking No medications on file Continue Taking CEPHALEXIN (KEFLEX) 500 MG CAPSULE    Take 1 Cap by mouth every twelve (12) hours every twelve (12) hours. CHOLECALCIFEROL (VITAMIN D3) 1,000 UNIT TABLET    Take 1 Tab by mouth daily. INSULIN NPH/INSULIN REGULAR (NOVOLIN 70/30, HUMULIN 70/30) 100 UNIT/ML (70-30) INJECTION    40 Units by SubCUTAneous route two (2) times a day. MAGNESIUM 250 MG TAB    Take 250 mg by mouth. METOCLOPRAMIDE HCL (REGLAN) 5 MG TABLET    Take 1 Tab by mouth Before breakfast, lunch, and dinner. Take before meals. ONDANSETRON (ZOFRAN ODT) 4 MG DISINTEGRATING TABLET    Take 1 Tab by mouth every eight (8) hours as needed. PANTOPRAZOLE (PROTONIX) 40 MG TABLET    Take 1 Tab by mouth Daily (before breakfast). PAROXETINE (PAXIL) 40 MG TABLET    Take 40 mg by mouth daily. POTASSIUM CHLORIDE (KLOR-CON M20) 20 MEQ TABLET    Take 20 mEq by mouth two (2) times a day. ROSUVASTATIN (CRESTOR) 20 MG TABLET    Take 1 Tab by mouth nightly. These Medications have changed No medications on file Stop Taking No medications on file DO Nikolas Hutchinson medical dictation software was used for portions of this report. Unintended transcription errors may occur. My signature above authenticates this document and my orders, the final   
diagnosis (es), discharge prescription (s), and instructions in the Epic   
record.

## 2020-09-08 NOTE — ED NOTES
Continuing to attempt to obtain blood cultures and repeat labs. First attempt at [facility] IV unsuccessful.

## 2020-09-09 NOTE — PROGRESS NOTES
Kinetic Dosing- Initial Progress Note Pharmacy Consult ordered by Dr. Citlalli Lees Indication: sepsis Patient clinical status and labs ordered/reviewed. Pt Weight Weight: 90.3 kg (199 lb) Serum Creatinine Lab Results Component Value Date/Time Creatinine 2.73 (H) 09/08/2020 04:40 PM  
 Creatinine, POC 1.5 (H) 09/03/2014 06:37 AM  
   
Creatinine Clearance Estimated Creatinine Clearance: 26.7 mL/min (A) (based on SCr of 2.73 mg/dL (H)). BUN Lab Results Component Value Date/Time BUN 29 (H) 09/08/2020 04:40 PM  
 BUN, POC 15 11/20/2018 11:19 AM  
   
WBC Lab Results Component Value Date/Time WBC PENDING 09/09/2020 07:48 AM  
  
Temperature Temp: 99.9 °F (37.7 °C) HR Pulse (Heart Rate): (!) 116 BP BP: 117/65 Drug Levels:  
Vancomycin No results for input(s): VANCP, VANCT, VANCR, VANRA in the last 72 hours. Gentamicin No results for input(s): GENP, GENT in the last 72 hours. No lab exists for component:  GENR Tobramycin No results for input(s): TOBP, TOBT, TOBR in the last 72 hours. Amikacin No results for input(s): Remus Bethanie in the last 72 hours. No lab exists for component:  Jania Fermin Dose for naïve patient was initiated at: Vancomycin 2gm loading (1gm x2), further dosing per labs/levels Continue to monitor Sign: RISHABH Correa [facility] Date: 9/9/2020 Time: 8:13 AM

## 2020-09-09 NOTE — PROGRESS NOTES
Pt is still agitated. Renewed soft restraints. Pt seen continues to be agitated. Ordered haldol 2 mg

## 2020-09-09 NOTE — PROGRESS NOTES
0730:Bedside and Verbal shift change report given to [address] [place] [address] St (oncoming nurse) by Tereza Arciniega RN (offgoing nurse). Report included the following information SBAR, Kardex and Cardiac Rhythm ST.  
 
0930:BG @ 0623 was 132, pt NPO, held 40units dose of Novolin 1050:Spoke with PFM in regards to runs of potassium ordered and patient with poor IV access. Requested possible midline access due to patient being a difficult IV stick. New orders received. 1150:Patient's IV site swollen, stopped potassium infusion until new site established. Will continue to monitor. 1400:Patient off unit for ultrasound. 1500:Patient back on unit. 1545:New PIV site established to KAT. Potassium infusion restarted. Will continue to monitor. 1650:Called and spoke with PFM regarding restraints renewal for patient, also patient increasingly agitated despite getting 1mg Ativan, new orders placed. 1728:Patient off unit to CT. 1830:Patient back on unit

## 2020-09-09 NOTE — PROGRESS NOTES
[address] [facility] Progress Note Patient: Erasmo Moya MRN: [id number] SSN: [id number]  YOB: 1965 Age: 60 y.o. Sex: female Admit Date: 9/8/2020 LOS: 1 day Chief Complaint Patient presents with  Vomiting Subjective:  
 
Pt not cooperative, closed eye and resting in bed ROS Pt nonverbal 
 
Objective:  
 
Visit Vitals /65 Pulse (!) 116 Temp 99.9 °F (37.7 °C) Resp 20 Wt 90.3 kg (199 lb) LMP 10/06/2015 (Exact Date) SpO2 98% BMI 30.26 kg/m² Physical Exam: 
General:  AXOx 2 to name and place, Pt  not following commands HEENT: R eye erythema, no drainage,  R not reactive dilated, Left eye sluggish but react to light, moist mucous membranes. CV:  RRR, no murmurs. No visible pulsations or thrills. RESP:  Unlabored lungs clear to auscultation without adventitious breath sounds. Equal expansion bilaterally. ABD:  Pt had some grimacing on palpation of the abdomen, diffuse tenderness,  nondistended. BS (+). MS:  No joint deformity or instability. No atrophy. Neuro:  Pt no facial droop, no deviation of tongue, unable to evaluate EOM because patient does not follow commands, moves all four extremities, Lower extremity bilaterally rigid tone, negative babinski Ext:  R foot the first digit is amputated, Left second digit amputated, No edema. 2+ radial and dp pulses bilaterally. Skin:  No rashes, lesions, or ulcers.  
  
Intake and Output: 
Current Shift: No intake/output data recorded. Last three shifts: 09/07 1901 - 09/09 0700 In: 3100 [I.V.:3100] Out: -  
 
Lab/Data Review: 
Recent Results (from the past 12 hour(s)) POC LACTIC ACID Collection Time: 09/09/20  1:48 AM  
Result Value Ref Range Lactic Acid (POC) 3.59 (HH) 0.40 - 2.00 mmol/L  
GLUCOSE, POC Collection Time: 09/09/20  6:23 AM  
Result Value Ref Range Glucose (POC) 132 (H) 70 - 110 mg/dL RECENT RESULTS MODALITY IMPRESSION  
 XR Results from Hospital Encounter encounter on 09/08/20 XR CHEST PORT Narrative EXAM: CHEST PORTABLE CLINICAL HISTORY/INDICATION: Altered mental status COMPARISON: Multiple prior chest radiographs, most recently dating 1/28/2019. TECHNIQUE: Portable AP view was obtained. FINDINGS:  
 
LINES/DEVICES: EKG leads overlie the patient. HEART/MEDIASTINUM: The cardiomediastinal silhouette is unremarkable in size. LUNGS: No focal airspace opacity suggestive of pneumonia, pulmonary edema, 
pneumothorax, or pleural effusion. BONES: Unremarkable for age. Impression IMPRESSION: 
1. No radiographic evidence of acute cardiopulmonary disease. Thank you for enabling us to participate in the care of this patient. CT Results from Hospital Encounter encounter on 09/08/20 CT HEAD WO CONT Narrative EXAMINATION: CT head without contrast 
 
INDICATION: Right eye pain, headache COMPARISON: CT 1/4/2019 TECHNIQUE: CT head performed without contrast with multiplanar reformations. All 
CT scans at this facility are performed using dose optimization technique as 
appropriate to a performed exam, to include automated exposure control, 
adjustment of the mA and/or kV according to patient size (including appropriate 
matching first site specific examinations), or use of iterative reconstruction 
technique. FINDINGS: 
 
No focal mass effect or shift of midline structures. No abnormal extra-axial 
collection identified. Slightly prominent lateral ventricles, particularly the 
posterior horns similar to prior. No acute intracranial hemorrhage identified. No focal suspicious parenchymal lesions. Calvarium intact. Imaged paranasal sinuses and mastoids are well-aerated. Superficial soft tissues unremarkable. Impression IMPRESSION: 
 
No clearly acute intracranial findings.  
 
Mild prominence of the lateral ventricles, present on multiple priors, but 
 perhaps slightly increased compared to more remote priors, probably due to 
central atrophy. However correlate clinically for possibility of normal pressure 
hydrocephalus. MRI Results from [place] encounter on 07/18/17 MRI FOOT LT WO CONT Narrative EXAMINATION: MRI left foot without contrast 
 
INDICATION: Left second toe infection, possible osteomyelitis COMPARISON: Radiographs 7/18/2017 TECHNIQUE: Multiplanar multiecho sequences of the left forefoot performed 
without contrast. 
 
FINDINGS: 
 
Bones/joints: Second toe distal phalanx is poorly delineated, likely due to 
extensive marrow infiltration and osseous destruction. Mild marrow edema in the 
second toe middle phalanx with T1 marrow signal largely preserved. Extensive 
associated second toe soft tissue edema. No suspicious marrow signal or acute 
fracture elsewhere. Ligaments: Lisfranc ligament intact. Imaged collateral ligaments are 
unremarkable. Tendons: Ill-definition of the second toe flexor tendon near distal insertion. Otherwise flexor and extensor tendons in the forefoot appear largely intact. Miscellaneous: Extensive soft tissue edema in the second toe, and mild patchy 
soft tissue edema throughout the forefoot. Impression IMPRESSION: 
 
1. Findings are consistent with osteomyelitis in the second toe distal phalanx. Marked soft tissue swelling in the second toe. See additional details above. ULTRASOUND Results from Hospital Encounter encounter on 01/22/19 US ABD LTD Impression IMPRESSION: 
 
Mild heterogeneous echogenicity of the liver is nonspecific. This is commonly 
seen with steatosis hepatic disease. Limited visualization of the pancreas. Results from [address] [place] [address] Encounter encounter on 04/29/14 US RETROPERITONEUM COMP Impression IMPRESSION: 
Mildly echogenic kidneys suggesting chronic medical renal disease. Cardiology Procedures/Testing: MODALITY RESULTS EKG Results for orders placed or performed during the hospital encounter of 09/08/20 EKG, 12 LEAD, INITIAL Result Value Ref Range Ventricular Rate 132 BPM  
 Atrial Rate 132 BPM  
 P-R Interval 158 ms QRS Duration 72 ms Q-T Interval 274 ms QTC Calculation (Bezet) 405 ms Calculated P Axis 43 degrees Calculated R Axis 33 degrees Calculated T Axis 148 degrees Diagnosis Sinus tachycardia Nonspecific T wave abnormality Abnormal ECG When compared with ECG of 08-SEP-2020 15:04, No significant change was found Results for orders placed or performed in visit on 06/20/14 AMB POC EKG ROUTINE W/ 12 LEADS, INTER & REP Impression See progress note. ECHO 01/03/19 ECHO ADULT COMPLETE 01/04/2019 1/4/2019 Narrative · Estimated left ventricular ejection fraction is 61 - 65%. Left  
ventricular mild concentric hypertrophy. Mild (grade 1) left ventricular  
diastolic dysfunction. · Mild tricuspid valve regurgitation is present. Signed by: Ruperto Carbajal MD  
  
 
Special Testing/Procedures: MODALITY RESULTS MICRO All Micro Results Procedure Component Value Units Date/Time CULTURE, BLOOD [[id number]] Collected:  09/08/20 1640 Order Status:  Completed Specimen:  Blood Updated:  09/09/20 9515 Special Requests: NO SPECIAL REQUESTS Culture result: NO GROWTH AFTER 14 HOURS     
 CULTURE, BLOOD [[id number]] Collected:  09/08/20 1640 Order Status:  Completed Specimen:  Blood Updated:  09/09/20 2933 Special Requests: NO SPECIAL REQUESTS Culture result: NO GROWTH AFTER 14 HOURS     
 CULTURE, URINE [[id number]] Collected:  09/08/20 1211 Order Status:  Completed Specimen:  Urine from Clean catch Updated:  09/08/20 2356 UA No results found for this or any previous visit. PATH none Assessment and Plan:  
60 y.o. 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   
  
Encephalopathy Metabolic:  
-Sepsis unclear source? Persistent leucocytosis, persistent tachycardia, persistent lactic acidosis ( last 3.59), headache, nausea, emesis, (Ddx: Infectious (UTI, bacteriemia, vs R eye pain (Sinuitis) vs cholecystitis bilirubin 3.9 vs mennigitis) -HHS (resolved) -Hypernatremia (151) Cultures: -9/8 BCX : NG  and UCX pending 
-Not impressive UA wbc 6-8+, bacteria+, neg LE, neg Nitrites, ketones 15), no sites of skin wound ( hx osteomyelitis)  imaging: Chest Xray wnml, CT head ( as stated below), consider abdominal CT Pt elevated Tcb ~3.9, 0.6 direct bili LP ? Drug withdrawal or intoxication ?  lower extremities rigidity and night team notes tremors, R eye pupil dilated and sluggish today   
-Pt on meds metoclopramide and paroxetine 
- negative UDS and ETOH serum (9/8) Intracranial abnormality  
-NPH vs cerebral atrophy :9/8 CT head showed mild prominence: mild enlargement of lateral ventricles, vision loss R eye, unable walk and incontinence 
-Malignancy, paraneoplastic syndrome ? Hx GIST tumor, Labs negative for Negative troponin, EKG no ST elevation or depression, lipase normal  
Hx of n/v lactic acidosis, ST, WBC improves with aggressive fluid resuscitation, hpokalemia  but no AMS Plan:  
- cont empirical  Ceftriaxone, vancomycin 
-  cc/hr  
- continue trending lactic acid  
-Consider abdominal CT if Cr okay ( reason stated above), GGT  
-Consider consulting neurology if workup negative for metabolic causes - opthalmoscope 
  
  
CRYSTAL CR 3.56 ( baseline Cr 1.09 1/2019) in setting of CKD stage 2  likely pre-renal vs sepsis S/p 3 L IVF CK was 1177, no concern for rhabdo Plan:   
Ck 
125 cc/hr w/ KCL 20 meq  
  
  
R eye pain + Right eye vision loss X 2 weeks ddx pink eye vs acute angle glaucoma vs central retinal artery occlusion vs retinal detachment  () vs sinusitis Pupil reacts sluggishly bilaterally Plan:  
- will try to find an ophthalmoscope to exam  
- No opthalmology here  
  
  
hypokalemia 
electrolyte imbalance (hypokalemia, corrected Ca wnml )  
- replete electrolyte as needed, Mg, Phos 
  
Hyperbilirubinemia (3.9) Possibly sepsis vs medication vs pancreatitis  
- GGT, direct Bili 
- consider US if doesn't improve 
  
  
 Diabetes ( last A1c 7.2) neuropathy and L retinopathy  
- cont NPH 40U BID (hold home NPH 50 U BID) 
  
 HTN 
- not on meds 
  
 HLD 
- cont rosuvastatin  
  
Gastroparesis  
 
- held metoclopramide 5 mg  
  
GERD 
- cont  omeprazole 40 mg delayed capsule 
  
  
Anxiety 
- held  paroxetine 60 mg daily Diet NPO till pass bedside eval  
DVT Prophylaxis heparin GI Prophylaxis Omperazole Code status Full code Disposition unknown  
  
Point [facility] [address] [facility] Relationship: 
[phone] Julius Hernandez PGY-1  
[address] Pager: [phone] September 9, 2020, 7:35 AM

## 2020-09-09 NOTE — PROGRESS NOTES
[facility]ren[facility] for med[facility], it appears her medicaid has lapsed.   Not active per portal.

## 2020-09-09 NOTE — ED NOTES
Patient without IV access due to IV infiltrating. PFM made aware Dr. Mckay Price. Attempts made by staff to try ultrasound guided IV in ED unsuccessful.

## 2020-09-09 NOTE — CONSULTS
Infectious Disease Consultation Note Reason: sepsis, worsening leukocytosis Current abx Prior abx Ceftriaxone, vancomycin since 9/8/20 Lines:  
 
 
Assessment : 
 
47-year-old lady with history of type 2 diabetes, diabetic retinopathy, diabetic neuropathy admitted to [facility] on 9/8/2020 with altered mental status. Now with persistent leukocytosis, altered mentation, acute kidney injury, right eye pain, elevated LFTs Patient presents with highly complex clinical picture. Etiology of altered mentation and clinical presentation not entirely clear at this time. Differential diagnosis includes-AMS from recent HHS versus sepsis due to undiagnosed infection Elevated LFTs, abdominal pain/tenderness on admission - rule out undiagnosed hepatobiliary pathology No evidence of bacterial meningitis/encephalitis. No hypoxia. No infiltrates noted on cxr argues against covid-19 infection. Will monitor. Etiology of eye pain, vision impairment right eye not clear- ?glaucoma, ?episcleritis Recommendations: 
 
1. D/c ceftriaxone, vancomycin. Start piperacillin/tazobactam 
2. Obtain CT scan chest/abdomen/pelvis to evaluate for occult infection, cholecystitis 3. Obtain MRI brain to rule out undiagnosed CNS pathology as a cause of altered mentation 4. Follow-up blood cultures 5. Management of right eye pain per primary team 
 
Thank you for consultation request. Above plan was discussed in details with dr Beata Bolaños. Please call me if any further questions or concerns. Will continue to participate in the care of this patient. HPI: 
 
47-year-old lady with history of type 2 diabetes, diabetic retinopathy, diabetic neuropathy admitted to [facility] on 9/8/2020 with altered mental status. Currently patient is unable to provide any history since she is confused. I Obtained history from review of records, talking to the primary team. Patient's right eye with worsening vision the past two weeks.  Her son notes that she was saying it was cloudy/dim in the right eye. In addition patient w R eye pain since 9/7, headaches. tylenol helped with headache, pain. Patient with recurrent emesis and abdominal pain for one day prior to presentation. Per son patient is complaint on insulin. She takes 50U BID of NPH. ED course: Labs: wbc 23, - 500, anion gap 17, bicarb 21, K 2.9 , Cr 3.56, UA  wbc 6-8, bacteria 2+,RBC 8-12.  Patient was started on ceftriaxone, vancomycin. Patient continues to have worsening confusion. WBC count 20 2K. There is concern for sepsis. I have been consulted for further recommendations. Patient was very confused at the time of my exam.  She was trying to get out of the bed. She was in restraints. Detailed review of systems not feasible. Past Medical History:  
Diagnosis Date  Blind left eye  Cellulitis of great toe of right foot 10/19/2017  Diabetes ([address]  Diabetic retinopathy ([address]  Gall stones  GERD (gastroesophageal reflux disease)  Hammertoe  Hiatal hernia  History of mammogram 10/03/2017 No evidence of malignancy  Hypertension  Mass of abdomen  Neuropathy due to type 2 diabetes mellitus ([address]  Osteomyelitis of toe of right foot ([address] 10/19/2017  Pancreatitis Past Surgical History:  
Procedure Laterality Date  HX AMPUTATION Left 07/21/2017  
 left 2nd toe  HX CHOLECYSTECTOMY  10/15/2014  HX GI Benign GI Stromal Tumor excision  HX HEENT Sx for detached retina  HX MYOMECTOMY x5 removed  HX OTHER SURGICAL    
 upper endoscopy  
 
 
home Medication List  
 Details  
metoclopramide HCl (REGLAN) 5 mg tablet Take 1 Tab by mouth Before breakfast, lunch, and dinner. Take before meals. Qty: 90 Tab, Refills: 3  
  
insulin NPH/insulin regular (NOVOLIN 70/30, HUMULIN 70/30) 100 unit/mL (70-30) injection 40 Units by SubCUTAneous route two (2) times a day. Qty: 10 mL, Refills: 3 Associated Diagnoses: Type 2 diabetes mellitus with complication, with long-term current use of insulin ([address] PARoxetine (PAXIL) 40 mg tablet Take 40 mg by mouth daily. rosuvastatin (CRESTOR) 20 mg tablet Take 1 Tab by mouth nightly. Qty: 90 Tab, Refills: 3 Current Facility-Administered Medications Medication Dose Route Frequency  LORazepam (ATIVAN) injection 1 mg  1 mg IntraMUSCular Q4H PRN  
 VANCOMYCIN INFORMATION NOTE   Other Rx Dosing/Monitoring  0.45% sodium chloride with KCl 20 mEq/L infusion   IntraVENous CONTINUOUS  
 haloperidol lactate (HALDOL) injection 2 mg  2 mg IntraVENous QHS  sodium chloride (NS) flush 5-10 mL  5-10 mL IntraVENous PRN  
 insulin NPH (NOVOLIN N, HUMULIN N) injection 40 Units  40 Units SubCUTAneous ACB&D  
 insulin lispro (HUMALOG) injection   SubCUTAneous Q6H  
 glucose chewable tablet 16 g  16 g Oral PRN  
 glucagon (GLUCAGEN) injection 1 mg  1 mg IntraMUSCular PRN  
 dextrose (D50W) injection syrg 12.5-25 g  25-50 mL IntraVENous PRN  
 sodium chloride (NS) flush 5-40 mL  5-40 mL IntraVENous Q8H  
 sodium chloride (NS) flush 5-40 mL  5-40 mL IntraVENous PRN  
 acetaminophen (TYLENOL) tablet 650 mg  650 mg Oral Q6H PRN Or  
 acetaminophen (TYLENOL) suppository 650 mg  650 mg Rectal Q6H PRN  polyethylene glycol (MIRALAX) packet 17 g  17 g Oral DAILY PRN  
 heparin (porcine) injection 5,000 Units  5,000 Units SubCUTAneous Q8H  
 [Held by provider] PARoxetine (PAXIL) tablet 60 mg  60 mg Oral DAILY  [Held by provider] pantoprazole (PROTONIX) tablet 40 mg  40 mg Oral DAILY  [Held by provider] rosuvastatin (CRESTOR) tablet 20 mg  20 mg Oral QHS  cefTRIAXone (ROCEPHIN) 1 g in sterile water (preservative free) 10 mL IV syringe  1 g IntraVENous Q24H  
 ondansetron (ZOFRAN) injection 4 mg  4 mg IntraVENous Q6H PRN  pantoprazole (PROTONIX) 40 mg in 0.9% sodium chloride 10 mL injection  40 mg IntraVENous DAILY Allergies: Levaquin [levofloxacin] and Promethazine Family History Adopted: Yes  
Problem Relation Age of Onset  Heart Failure Mother 76  
 Other Father 70  
     blood clot after surgery  Diabetes Paternal Aunt  Diabetes Paternal Uncle Social History Socioeconomic History  Marital status: SINGLE Spouse name: Not on file  Number of children: Not on file  Years of education: Not on file  Highest education level: Not on file Occupational History  Not on file Social Needs  Financial resource strain: Not on file  Food insecurity Worry: Not on file Inability: Not on file  Transportation needs Medical: Not on file Non-medical: Not on file Tobacco Use  Smoking status: Former Smoker Packs/day: 0.20 Years: 8.00 Pack years: 1.60 Types: Cigarettes Last attempt to quit: 12/3/1995 Years since quittin.7  Smokeless tobacco: Never Used Substance and Sexual Activity  Alcohol use: No  
  Comment: Drinks 3-4xs year generally wine  Drug use: No  
 Sexual activity: Not Currently Lifestyle  Physical activity Days per week: Not on file Minutes per session: Not on file  Stress: Not on file Relationships  Social connections Talks on phone: Not on file Gets together: Not on file Attends Holiness service: Not on file Active member of club or organization: Not on file Attends meetings of clubs or organizations: Not on file Relationship status: Not on file  Intimate partner violence Fear of current or ex partner: Not on file Emotionally abused: Not on file Physically abused: Not on file Forced sexual activity: Not on file Other Topics Concern   Service No  
 Blood Transfusions No  
 Caffeine Concern No  
 Occupational Exposure No  
 Hobby Hazards No  
 Sleep Concern No  
 Stress Concern No  
 Weight Concern No  
 Special Diet Yes  Back Care No  
 Exercise No  
 Bike Helmet No  
 Seat Belt Yes  Self-Exams Yes Social History Narrative Lives with 16year old son  with ex   Does not have health insurance Social History Tobacco Use Smoking Status Former Smoker  Packs/day: 0.20  Years: 8.00  
 Pack years: 1.60  Types: Cigarettes  Last attempt to quit: 12/3/1995  Years since quittin.7 Smokeless Tobacco Never Used Temp (24hrs), Av.2 °F (37.3 °C), Min:98.5 °F (36.9 °C), Max:99.9 °F (37.7 °C) Visit Vitals /90 (BP 1 Location: Left arm) Pulse (!) 116 Temp 99 °F (37.2 °C) Resp 20 Ht 5' 8\" (1.727 m) Wt 90.3 kg (199 lb) LMP 10/06/2015 (Exact Date) SpO2 99% BMI 30.26 kg/m² ROS: 12 point ROS obtained in details. Pertinent positives as mentioned in HPI,  
otherwise negative Physical Exam: 
 
General:  confused. Trying to get out of bed, in no respiratory distress HEENT: R eye erythema, no drainage,  R and left eye was sluggish to react to light, moist mucous membranes. no nuchal rigidity CV:  RRR, no murmurs. No visible pulsations or thrills. RESP:  Unlabored lungs clear to auscultation without adventitious breath sounds. Equal expansion bilaterally. ABD:  soft, no tenderness,  nondistended. BS (+). MS:  No joint deformity or instability. No atrophy. Neuro:  Pt no facial droop, no deviation of tongue, unable to evaluate EOM because patient does not follow commands, moves all four extremities, Lower extremity bilaterally rigid tone, negative babinski Ext:  R foot the first digit is amputated, Left second digit amputated, No edema. Skin:  No rashes, lesions, or ulcers. Psychiatry: appropriate mood and affect 
  
 
RADIOLOGY: 
 
All available imaging studies/reports in [facility] for this admission were reviewed Dr. Diane Valdivia, Infectious Disease Specialist 
[phone] 2020 
3:38 PM

## 2020-09-09 NOTE — PROGRESS NOTES
MRI Safety Screening form needs to be filled out and FAXED to [phone] MRI can be scheduled. If unable to acquire information from patient  [facility]OA must be contacted, or screening xrays will need to be ordered.    
IF pt is Claustrophobic or will need Pain Meds please have these ordered in advance to help facilitate MRI exam.

## 2020-09-09 NOTE — PROGRESS NOTES
[address] [facility] Brief Progress Note Pt seen at bedside throughout the evening for agitation and for persistent tachycardia. Received administrations of 0.5 mg ativan and 1 mg ativan earlier in the evening and had decent response for 1-2 hours as far as her combativeness and agitation were concerned. Patient not oriented and not responding to commands again at approximately 1030PM. Orders for additional fluid bolus, 2.5 mg haloperidol and 15 mg labetalol and abx expanded to include vancomycin. Order for sitter placed. UDS added to previous urine collection. Lactic acid still elevated at last check and given persistent tachcyardia will place order for repeat cardiac panel. Status upgraded to stepdown. Communicated plan to RN and reminded we are available throughout the evening. Placed additional 1 mg prn ativan order should patient become more combative. Tachycardia did improve from mid-130s to 120 with conservative labetalol administration. Orders for repeat EKG placed but suspect she is still in sinus tach. Would like to evaluate with CT head but do not think patient would cooperate at this time and no neuro deficits have been found on examination aside from orientation concerns. Jessica Lynch DO, PGY-3  
[facility] Medicine Senior Pager: [phone] September 8, 2020, 11:27 PM

## 2020-09-09 NOTE — PROGRESS NOTES
Speech Therapy: 
 
Evaluation orders received. Upon completion of chart review and discussion with RN, pt not appropriate for SLP evaluation this date. Currently, patient is: 
 
[] Tolerating current po diet (per RN report) 
[] Tolerating current po diet; however, poor po intake (per RN report) [] Receiving nutrition via tube feeding  
[x] Lethargic, solomnent, or difficult to arouse for safe po trials [] Unable to manage secretions 
[] Receiving intervention for respiratory distress 
[] < 6 hours s/p extubation  
[x] Other: Unable to alert to hard sternal rub, not following commands. Please contact SLP with questions/concerns. SLP will follow up next day. Thank you for this referral. 
 
Lata Venegas M.S. CCC-SLP/L Speech-Language Pathologist

## 2020-09-09 NOTE — MED STUDENT NOTES
*ATTENTION:  This note has been created by a medical student for educational purposes only. Please do not refer to the content of this note for clinical decision-making, billing, or other purposes. Please see attending physicians note to obtain clinical information on this patient. * Medical Student Progress Note [address] **Medical Student Note for Educational Purposes Only** Patient: Tejinder Mcgregor MRN: [id number]  CSN: [id number] YOB: 1965  Age: 60 y.o. Sex: female DOA: 9/8/2020 LOS:  LOS: 1 day Subjective: Interval history: Altered mental status: Overnight, the patient had persistent tachycardia (HR 120s-130s), combativeness, and agitation. She was given labetalol 15mg for her tachycardia with improvement ( at 0631). For her agitation, she was given lorazepam 1mg x2 without significant response, but improved with haloperidol 2.5 mg. She was temporarily placed in 2-point wrist restraints, which were removed. Her agitation resolved, but she remained unresponsive to commands. UDS negative. Lactic acid resulted and was elevated (1.9) so antibiotics were continued. Subjective exam was limited by the patient mental status. Per RN, the patient has only been able to answer occasional yes/no questions. Patient states she feels fine. Denies nausea or vomiting. Review of Systems Unable to perform ROS: Mental status change Per RN, the patient has only been able to answer yes/no questions infrequently. Objective:  
  
Patient Vitals for the past 24 hrs: 
 Temp Pulse Resp BP SpO2  
09/09/20 1117 99 °F (37.2 °C) (!) 116 20 129/90 99 % 09/09/20 0806 98.5 °F (36.9 °C) (!) 111 15 137/77 99 % 09/09/20 0631  (!) 116 20 117/65 98 % 09/09/20 0516 99.9 °F (37.7 °C) (!) 123 20 147/68 98 % 09/09/20 0400  (!) 110  131/68 96 % 09/09/20 0345  (!) 109  134/78 94 % 09/09/20 0130  (!) 116  (!) 139/91 100 % 09/09/20 0019  (!) 115  111/71 100 % 09/08/20 2345  (!) 120  108/72 98 % 09/08/20 2308  (!) 126  139/88 100 % 09/08/20 2302  (!) 123     
09/08/20 2300  (!) 137  119/50 100 % 09/08/20 2228  (!) 132  (!) 161/99   
09/08/20 1725 99.3 °F (37.4 °C)      
09/08/20 1430  (!) 132  (!) 146/98 99 % 09/08/20 1415  (!) 131  134/78 98 % 09/08/20 1400  (!) 130  126/80 98 % 09/08/20 1345  (!) 129  (!) 138/94 99 % Intake/Output Summary (Last 24 hours) at 9/9/2020 1339 Last data filed at 9/8/2020 1512 Gross per 24 hour Intake 1100 ml Output  Net 1100 ml Physical Exam:  
Physical exam was limited by the patient's mental status. General:  Somnolent on exam, responds occasionally to yes/no questions with brief periods of alertness. Nontoxic appearing. HEENT: Conjunctival injection present on the right side. No facial droop. CV:  Normal S1/S2, tachycardic, regular rhythm. No gallops, rubs, or murmurs. RESP:  Unlabored breathing. Lungs clear to auscultation. no wheeze, rales, or rhonchi. Equal expansion bilaterally. ABD:  Soft, nontender, nondistended. No grimacing observed with superficial or deep palpation. Negative Vaughn's sign. Bowel sounds active in all 4 quadrants. MS:  No joint deformity or instability. No atrophy. Neuro:  No facial droop observed. Moves all four extremities independently. Limited due to patient's ability to follow commands. Negative Kernig and Brudzinski signs. Negative Babinski sign bilaterally. Ext:  Left second digit is amputated. Right first digit is amputated. Scars are well-healed and approximated. No lower extremity pitting edema. Skin:  No rashes, lesions, or ulcers. Lab/Data Reviewed: 
Lab results reviewed. For significant abnormal values and values requiring intervention, see assessment and plan. Scheduled Medications Reviewed: 
Current Facility-Administered Medications Medication Dose Route Frequency  VANCOMYCIN INFORMATION NOTE   Other Rx Dosing/Monitoring  0.45% sodium chloride with KCl 20 mEq/L infusion   IntraVENous CONTINUOUS  
 potassium chloride 10 mEq in 100 ml IVPB  10 mEq IntraVENous Q1H  
 haloperidol lactate (HALDOL) injection 2 mg  2 mg IntraVENous QHS  insulin NPH (NOVOLIN N, HUMULIN N) injection 40 Units  40 Units SubCUTAneous ACB&D  
 insulin lispro (HUMALOG) injection   SubCUTAneous Q6H  
 sodium chloride (NS) flush 5-40 mL  5-40 mL IntraVENous Q8H  
 heparin (porcine) injection 5,000 Units  5,000 Units SubCUTAneous Q8H  
 [Held by provider] PARoxetine (PAXIL) tablet 60 mg  60 mg Oral DAILY  [Held by provider] pantoprazole (PROTONIX) tablet 40 mg  40 mg Oral DAILY  [Held by provider] rosuvastatin (CRESTOR) tablet 20 mg  20 mg Oral QHS  cefTRIAXone (ROCEPHIN) 1 g in sterile water (preservative free) 10 mL IV syringe  1 g IntraVENous Q24H  pantoprazole (PROTONIX) 40 mg in 0.9% sodium chloride 10 mL injection  40 mg IntraVENous DAILY Imaging, microbiology, and EKG/Telemetry: 
 
EKG 9/8: 
Sinus tachycardia with nonspecific T wave abnormality BCx x 2 9/8: No growth CT Results  (Last 48 hours) 09/09/20 0334  CT HEAD WO CONT Final result Impression:  IMPRESSION:  
   
No clearly acute intracranial findings. Mild prominence of the lateral ventricles, present on multiple priors, but  
perhaps slightly increased compared to more remote priors, probably due to  
central atrophy. However correlate clinically for possibility of normal pressure  
hydrocephalus. Narrative:  EXAMINATION: CT head without contrast  
   
INDICATION: Right eye pain, headache COMPARISON: CT 1/4/2019 TECHNIQUE: CT head performed without contrast with multiplanar reformations.  All  
CT scans at this facility are performed using dose optimization technique as  
appropriate to a performed exam, to include automated exposure control,  
 adjustment of the mA and/or kV according to patient size (including appropriate  
matching first site specific examinations), or use of iterative reconstruction  
technique. FINDINGS:  
   
No focal mass effect or shift of midline structures. No abnormal extra-axial  
collection identified. Slightly prominent lateral ventricles, particularly the  
posterior horns similar to prior. No acute intracranial hemorrhage identified. No focal suspicious parenchymal lesions. Calvarium intact. Imaged paranasal sinuses and mastoids are well-aerated. Superficial soft tissues unremarkable. CXR Results  (Last 48 hours) 09/08/20 1243  XR CHEST PORT Final result Impression:  IMPRESSION:  
1. No radiographic evidence of acute cardiopulmonary disease. Thank you for enabling us to participate in the care of this patient. Narrative:  EXAM: CHEST PORTABLE CLINICAL HISTORY/INDICATION: Altered mental status COMPARISON: Multiple prior chest radiographs, most recently dating 1/28/2019. TECHNIQUE: Portable AP view was obtained. FINDINGS:   
   
LINES/DEVICES: EKG leads overlie the patient. HEART/MEDIASTINUM: The cardiomediastinal silhouette is unremarkable in size. LUNGS: No focal airspace opacity suggestive of pneumonia, pulmonary edema,  
pneumothorax, or pleural effusion. BONES: Unremarkable for age. Assessment/Plan SUMMARY: Ms. Toyin Molina is a 60 y.o.  -American female with PMHx significant for T2D with gastroparesis, neuropathy, and diabetic foot ulcers with osteomyelitis (s/p toe amputations), hypokalemia, CKD-2, left retinal detachment, HTN, HLD, GERD, and anxiety who presented to the ED on 9/8 with altered mental status, nausea, vomiting, and increased thirst. Of note, the patient had 5 previous hospital admissions for similar presentations, where she was found to have sepsis or meet SIRS criteria, and was treated concurrently for infection and gastroparesis. In the ED, she was found to meet sepsis criteria due to tachycardia, leukocytosis, and elevated lactate. She also presented with altered mental status and agitation, which was new for her. She was admitted on empiric CTX/Vanco for further work-up. Altered mental status A broad differential is considered, including sepsis, hyperosmolar hyperglycemic state, serotonin syndrome, drug withdrawal or intoxication, and intracranial abnormality. At this time, sepsis is at the top of our differential given her tachycardia (120s-130s), leukocytosis (WBC 22.1), and elevated lactate (1.9). Of note, she had 5 hospitalizations for similar presentations from 2018 to 2019. During these presentations, she was found to have sepsis due to an infectious cause (including UTI, gastritis, or esophagitis) or was found to meet SIRS criteria without evidence of infection. During these visits, she was also treated for gastroparesis with metoclopramide alone or with metoclopramide plus azithromycin. Given her previous presentations, this is most likely to be sepsis from an infectious course, compounded by gastroparesis symptoms. However, the AMS is new for her. · Hepatobiliary: Abdominal ultrasound from 1/24/2019 revealed mild heterogeneous echogenicity of the liver suspicious for hepatic steatosis. Total bili (2.6), direct bili (0.4), and alk phos (123) are elevated, raising suspicion for possible cholecystitis or biliary tree infection. Repeat abdominal ultrasound ordered on 9/9. · Cardiopulmonary: CXR negative for cardiopulmonary disease. · Urinary: UA revealed moderate bacteria and WBC's but negative for nitrites. · R/O Serotonin syndrome: CK on 9/9 was found to be elevated (1,177).  Patient is taking both metoclopramide and paroxetine, and her vitals are significant for a low-grade fever and tachycardia, raising suspicion for serotonin syndrome. Assess for clonus or hypertonia, monitor for new tremor, diaphoresis, or temperature >38C. Hold metoclopramide and paroxetine for now. Additionally, overnight team noted rigidity of the lower extremities and tremor. · R/O intracranial abnormality: Head CT revealed mild prominence of the lateral ventricles possibly increased compared to priors, likely due to atrophy. Correlate clinically with abnormal gait and incontinence if suspect normal pressure hydrocephalus. Alternatively, patient has a history of GIST (s/p resection in 2014) so consider possible neoplasm; CT negative for masses at this time. · R/O drug withdrawal or intoxication: UDS and serum ETOH negative (9/8). · Sepsis bundle initiated in the ED. Continue on CTX/Vanco. Vanco dosing per Pharm recommendations. · Continue trending lactic acid · BCx on 9/8 negative for growth. · Give 0.45% NS at 125 cc/hr · Keep patient NPO with aspiration precautions. SLP consult placed for swallow study. · Consider abdominal CT if Cr improves · Consider Neurology consult if workup negative for metabolic cause. · Labetalol given for tachycardia 9/8. Elevated creatine kinase CK 1,177 on 9/9. Low suspicion for rhabdomyolysis. Consider serotonin syndrome, MI, infectious myopathy, diabetic muscle infarction, or idiopathic increase. · EKG 9/8 revealed sinus tachycardia · Serial troponins <0.02 and 0.02. 
· CK-MB 7.6 · Held metoclopramide and paroxetine Acute kidney injury Cr 3.56 on admission. Baseline 1.09. UA positive for blood, glucosuria, 2+ bacteria, and hyaline casts. UA most consistent with prerenal CRYSTAL given high specific gravity and hyaline casts, which correlates with her HHS. BUN/Cr ratio is <15, so intrinsic CRYSTAL should not be ruled out. However, no muddy brown granular casts seen on UA so this is less likely. · CK 1,177 and RBC's present in UA on 9/9. May be contributing to her CRYSTAL. No concern for rhabdo but currently ruling out serotonin syndrome as outlined above. · Continue to check CK · 0.45% NaCl at 125 cc/hr with KCL 20 mEq Hypokalemia K+ 2.9 on admission, stable at 2.9 on 9/9. Electrolyte imbalance likely secondary to insulin administration or emesis. · For replacement, administer 0.45% NaCl with KCl infusion · Check CMP, magnesium, and phosphate daily Hyperbilirubinemia Total bili (2.6), direct bili (0.4), and alk phos (123) are elevated. Consider sepsis vs. medication effect vs. Pancreatitis · Consider abdominal ultrasound if no improvement Right eye pain and Right vision loss x 2 weeks Patient demonstrated conjunctival injection on physical exam and noted right vision loss for 2 weeks prior to admission. Previous physical exams reveal pupil reacts sluggishly bilaterally. · Will try to do ophthalmoscope for eye exam 
· No Ophtho here HHS (resolved) in the setting of History of Type 2 diabetes with diabetic gastroparesis, retinopathy, and diabetic foot ulcer (s/p amputation) On ED arrival, glucose was elevated to 460. On insulin NPH taken before meals at home. Now trending down (180 on 9/9). · No new foot ulcers visualized on physical exam. 
· Home NPH was held. NPH 40U BID was administered instead. Continue for now. Hypertension · Not on medication. Hyperlipidemia · Continue home rosuvastatin 20mg when cleared for PO 
 
GERD · Home omeprazole held. · IV pantoprazole 40mg until cleared for PO. Anxiety · Hold paroxetine 60mg Diet:  NPO until passes swallow test 
DVT Prophylaxis:  Heparin Code Status:  Full code Point of Contact:  Bailee Hernandez ([phone]) Disposition:  Pending Esther Bishop, MS-3 K[facility] [place] of 2022

## 2020-09-09 NOTE — ED NOTES
TRANSFER - OUT REPORT: 
 
Verbal report given to PETRONA Anderson(name) on Tejinder Oas  being transferred to [facility](unit) for routine progression of care Report consisted of patients Situation, Background, Assessment and  
Recommendations(SBAR). Information from the following report(s) SBAR, ED Summary, Intake/Output, MAR and Recent Results was reviewed with the receiving nurse. Lines:    
 
Opportunity for questions and clarification was provided. Patient transported with: 
 Monitor Registered Nurse

## 2020-09-09 NOTE — PROGRESS NOTES
Brief Progress Note SUBJECTIVE:  
Performed PM check d/t persistent tachycardia and AMS. Per handoff report, pt has been agitated and received two doses ativan (0.5 mg followed by 1 mg). Pt resting in bed when we first reported to room, however about 15 minutes later, she was sitting on the end of the bed, naked, with soft wrist restraints still attached, pulling her arms backwards. Removed wrist restraints and positioned patient back in bed. Pt remains altered without being able to meaningfully participate in conversation or follow commands. Pt was left sleeping on her side in the bed with the rails up. Wrist restraints were left untied to bed. Over next hour the nurse called reporting increased agitation and persistent tachycardia. OBJECTIVE: 
Visit Vitals BP (!) 146/98 Pulse (!) 132 Temp 99.3 °F (37.4 °C) Resp 16 SpO2 99% Physical Exam 
No evidence of injury from pulling on restraints. Pt remains tachycardic. ASSESSMENT & PLAN: 
Tachycardia- pt remains tachycardic with sinus rhythm in 130s 
- labetolol 15mg IV, one dose AMS - pt remains agitated and requires soft wrist restraints. Concern for  
- haldol 2.5 mg for agitation Deisy Miller MD, PGY-1 
[facility] Medicine 09/08/20 8:28 PM

## 2020-09-09 NOTE — PROGRESS NOTES
Problem: Diabetes Self-Management Goal: *Disease process and treatment process Description: Define diabetes and identify own type of diabetes; list 3 options for treating diabetes. Outcome: Not Progressing Towards Goal 
Goal: *Incorporating nutritional management into lifestyle Description: Describe effect of type, amount and timing of food on blood glucose; list 3 methods for planning meals. Outcome: Not Progressing Towards Goal 
Goal: *Incorporating physical activity into lifestyle Description: State effect of exercise on blood glucose levels. Outcome: Not Progressing Towards Goal 
Goal: *Developing strategies to promote health/change behavior Description: Define the ABC's of diabetes; identify appropriate screenings, schedule and personal plan for screenings. Outcome: Not Progressing Towards Goal 
Goal: *Using medications safely Description: State effect of diabetes medications on diabetes; name diabetes medication taking, action and side effects. Outcome: Not Progressing Towards Goal 
Goal: *Monitoring blood glucose, interpreting and using results Description: Identify recommended blood glucose targets  and personal targets. Outcome: Not Progressing Towards Goal 
Goal: *Prevention, detection, treatment of acute complications Description: List symptoms of hyper- and hypoglycemia; describe how to treat low blood sugar and actions for lowering  high blood glucose level. Outcome: Not Progressing Towards Goal 
Goal: *Prevention, detection and treatment of chronic complications Description: Define the natural course of diabetes and describe the relationship of blood glucose levels to long term complications of diabetes. Outcome: Not Progressing Towards Goal 
Goal: *Developing strategies to address psychosocial issues Description: Describe feelings about living with diabetes; identify support needed and support network Outcome: Not Progressing Towards Goal 
Goal: *Insulin pump training Outcome: Not Progressing Towards Goal 
Goal: *Sick day guidelines Outcome: Not Progressing Towards Goal 
Goal: *Patient Specific Goal (EDIT GOAL, INSERT TEXT) Outcome: Not Progressing Towards Goal 
  
Problem: Patient Education: Go to Patient Education Activity Goal: Patient/Family Education Outcome: Not Progressing Towards Goal 
  
Problem: Non-Violent Restraints Goal: *Removal from restraints as soon as assessed to be safe Outcome: Not Progressing Towards Goal 
Goal: *No harm/injury to patient while restraints in use Outcome: Not Progressing Towards Goal 
Goal: *Patient's dignity will be maintained Outcome: Not Progressing Towards Goal 
Goal: *Patient Specific Goal (EDIT GOAL, INSERT TEXT) Outcome: Not Progressing Towards Goal 
Goal: Non-violent Restaints:Standard Interventions Outcome: Not Progressing Towards Goal 
Goal: Non-violent Restraints:Patient Interventions Outcome: Not Progressing Towards Goal 
Goal: Patient/Family Education Outcome: Not Progressing Towards Goal 
  
Problem: Falls - Risk of 
Goal: *Absence of Falls Description: Document Rafaela Alcantar Fall Risk and appropriate interventions in the flowsheet. Outcome: Not Progressing Towards Goal 
Note: Fall Risk Interventions: 
Mobility Interventions: Bed/chair exit alarm, Communicate number of staff needed for ambulation/transfer, OT consult for ADLs, PT Consult for mobility concerns Mentation Interventions: Adequate sleep, hydration, pain control, Bed/chair exit alarm, Door open when patient unattended, Family/sitter at bedside, More frequent rounding, Reorient patient, Room close to nurse's station Medication Interventions: Bed/chair exit alarm, Utilize gait belt for transfers/ambulation Elimination Interventions: Bed/chair exit alarm, Call light in reach, Toileting schedule/hourly rounds Problem: Pressure Injury - Risk of 
Goal: *Prevention of pressure injury Description: Document Vasile Scale and appropriate interventions in the flowsheet. Outcome: Not Progressing Towards Goal 
Note: Pressure Injury Interventions: 
Sensory Interventions: Assess changes in LOC, Avoid rigorous massage over bony prominences, Keep linens dry and wrinkle-free, Minimize linen layers Moisture Interventions: Absorbent underpads, Minimize layers Activity Interventions: Pressure redistribution bed/mattress(bed type), PT/OT evaluation Mobility Interventions: Pressure redistribution bed/mattress (bed type), PT/OT evaluation Nutrition Interventions: Document food/fluid/supplement intake Friction and Shear Interventions: Lift sheet, Sit at 90-degree angle, Minimize layers Problem: Patient Education: Go to Patient Education Activity Goal: Patient/Family Education Outcome: Not Progressing Towards Goal

## 2020-09-09 NOTE — ED NOTES
Assumed care of patient from [facility]. Patient in bilateral soft wrist restraints.

## 2020-09-09 NOTE — PROGRESS NOTES
0730: Bedside and Verbal shift change report given to [facility] [place] (oncoming nurse) by Shawn Carnes (offgoing nurse). Report included the following information SBAR, Kardex, MAR and Recent Results.

## 2020-09-10 NOTE — PROGRESS NOTES
Infectious Disease progress Note Reason: sepsis, worsening leukocytosis Current abx Prior abx Ceftriaxone, vancomycin 9/8/20-9/9/20 Zosyn since 9/10/20 Lines:  
 
 
Assessment : 
 
40-year-old lady with history of type 2 diabetes, diabetic retinopathy, diabetic neuropathy admitted to [facility] on 9/8/2020 with altered mental status. Now with persistent leukocytosis, altered mentation, acute kidney injury, right eye pain, elevated LFTs Patient presents with highly complex clinical picture. Etiology of altered mentation and clinical presentation not entirely clear at this time. No definitive evidence of sepsis/infection identified on exam, radiological imaging. Improved leukocytosis noted today could be due to improving renal function, hyperglycemia. Elevated LFTs, abdominal pain/tenderness on admission - no  hepatobiliary pathology identified on CT scan 9/9/20. Persistent elevated bilirubin 
 
etiology of eye pain, vision impairment right eye - etiology unclear Recommendations: 1. Cont. Piperacillin/tazobactam for now 2. Obtain CT scan chest/abdomen/pelvis to evaluate for occult infection, cholecystitis 3. Obtain MRI brain to rule out undiagnosed CNS pathology as a cause of altered mentation 4. Follow-up blood cultures 5. Management of right eye pain per primary team- recommend ophthalmology evaluation 6. Recommend GI consult for unexplained hyperbilirubinemia. Above plan was discussed in details with dr Cathy Hartley. Please call me if any further questions or concerns. Will continue to participate in the care of this patient. HPI: 
 
 
Patient was sleepy s/p seroquel at the time of my exam.  Detailed review of systems not feasible. 
 
 
 
 
home Medication List  
 Details  
metoclopramide HCl (REGLAN) 5 mg tablet Take 1 Tab by mouth Before breakfast, lunch, and dinner. Take before meals. Qty: 90 Tab, Refills: 3 insulin NPH/insulin regular (NOVOLIN 70/30, HUMULIN 70/30) 100 unit/mL (70-30) injection 40 Units by SubCUTAneous route two (2) times a day. Qty: 10 mL, Refills: 3 Associated Diagnoses: Type 2 diabetes mellitus with complication, with long-term current use of insulin ([facility] PARoxetine (PAXIL) 40 mg tablet Take 40 mg by mouth daily. rosuvastatin (CRESTOR) 20 mg tablet Take 1 Tab by mouth nightly. Qty: 90 Tab, Refills: 3 Current Facility-Administered Medications Medication Dose Route Frequency  potassium chloride 10 mEq in 100 ml IVPB  10 mEq IntraVENous Q1H  
 benztropine (COGENTIN) injection 1 mg  1 mg IntraMUSCular ONCE  
 0.45% sodium chloride with KCl 20 mEq/L infusion   IntraVENous CONTINUOUS  
 labetaloL (NORMODYNE;TRANDATE) 20 mg/4 mL (5 mg/mL) injection 10 mg  10 mg IntraVENous BID PRN  
 haloperidol lactate (HALDOL) injection 2 mg  2 mg IntraMUSCular QHS  piperacillin-tazobactam (ZOSYN) 2.25 g in 0.9% sodium chloride (MBP/ADV) 50 mL MBP  2.25 g IntraVENous Q6H  
 sodium chloride (NS) flush 5-10 mL  5-10 mL IntraVENous PRN  
 [Held by provider] insulin NPH (NOVOLIN N, HUMULIN N) injection 40 Units  40 Units SubCUTAneous ACB&D  
 insulin lispro (HUMALOG) injection   SubCUTAneous Q6H  
 glucose chewable tablet 16 g  16 g Oral PRN  
 glucagon (GLUCAGEN) injection 1 mg  1 mg IntraMUSCular PRN  
 dextrose (D50W) injection syrg 12.5-25 g  25-50 mL IntraVENous PRN  
 sodium chloride (NS) flush 5-40 mL  5-40 mL IntraVENous Q8H  
 sodium chloride (NS) flush 5-40 mL  5-40 mL IntraVENous PRN  
 acetaminophen (TYLENOL) tablet 650 mg  650 mg Oral Q6H PRN Or  
 acetaminophen (TYLENOL) suppository 650 mg  650 mg Rectal Q6H PRN  polyethylene glycol (MIRALAX) packet 17 g  17 g Oral DAILY PRN  
 heparin (porcine) injection 5,000 Units  5,000 Units SubCUTAneous Q8H  
 [Held by provider] PARoxetine (PAXIL) tablet 60 mg  60 mg Oral DAILY  [Held by provider] pantoprazole (PROTONIX) tablet 40 mg  40 mg Oral DAILY  [Held by provider] rosuvastatin (CRESTOR) tablet 20 mg  20 mg Oral QHS  ondansetron (ZOFRAN) injection 4 mg  4 mg IntraVENous Q6H PRN  pantoprazole (PROTONIX) 40 mg in 0.9% sodium chloride 10 mL injection  40 mg IntraVENous DAILY Allergies: Levaquin [levofloxacin] and Promethazine Temp (24hrs), Av.1 °F (36.7 °C), Min:97 °F (36.1 °C), Max:99 °F (37.2 °C) Visit Vitals /75 (BP 1 Location: Left arm, BP Patient Position: At rest) Pulse 99 Temp 97 °F (36.1 °C) Resp 18 Ht 5' 8\" (1.727 m) Wt 91.9 kg (202 lb 8 oz) LMP 10/06/2015 (Exact Date) SpO2 100% BMI 30.79 kg/m² ROS: 12 point ROS obtained in details. Pertinent positives as mentioned in HPI,  
otherwise negative Physical Exam: 
 
General: sleepy, in no respiratory distress HEENT: R eye erythema, no drainage,  R and left eye was sluggish to react to light, moist mucous membranes. no nuchal rigidity CV:  RRR. No visible pulsations or thrills. RESP:  Unlabored lungs clear to auscultation without adventitious breath sounds. Equal expansion bilaterally. ABD:  soft, no tenderness,  nondistended. BS (+). MS:  No joint deformity or instability. No atrophy. Neuro:  Pt no facial droop, no deviation of tongue, unable to evaluate EOM because patient does not follow commands, moves all four extremities, Lower extremity bilaterally rigid tone, negative babinski Ext:  R foot the first digit is amputated, Left second digit amputated, No edema. Skin:  No rashes, lesions, or ulcers. Psychiatry: appropriate mood and affect 
  
 
RADIOLOGY: 
 
All available imaging studies/reports in [facility] for this admission were reviewed Total time spent >35 minutes. High complexity decision making was performed during the evaluation of this patient at high risk for decompensation with multiple organ involvement Above mentioned total time spent on reviewing the case/medical record/data/notes/EMR/patient examination/documentation/coordinating care with nurse/consultants, exclusive of procedures with complex decision making performed and > 50% time spent in face to face evaluation. Dr. Svetlana Faulkner, Infectious Disease Specialist 
[phone] September 10, 2020 
3:38 PM

## 2020-09-10 NOTE — PROGRESS NOTES
Problem: Dysphagia (Adult) Goal: *Acute Goals and Plan of Care (Insert Text) Description: Patient will: 1. Tolerate PO trials with 0 s/s overt distress in 4/5 trials 2. Utilize compensatory swallow strategies/maneuvers (decrease bite/sip, size/rate, alt. liq/sol) with min cues in 4/5 trials 3. Perform oral-motor/laryngeal exercises to increase oropharyngeal swallow function with min cues 4. Complete an objective swallow study (i.e., MBSS) to assess swallow integrity, r/o aspiration, and determine of safest LRD, min A Rec:    
Puree with thin liquids Aspiration precautions HOB >45 during po intake, remain >30 for 30-45 minutes after po Small bites/sips; alternate liquid/solid with slow feeding rate Oral care TID Meds crushed in puree Assistance with all PO Outcome: Progressing Towards Goal 
  
 
SPEECH LANGUAGE PATHOLOGY BEDSIDE SWALLOW EVALUATION/TREATMENT Patient: Lucretia Chapman (85 y.o. female) Date: 9/10/2020 Primary Diagnosis: Hyperglycemia [R73.9] Altered mental status [R41.82] Acute metabolic encephalopathy [U37.38] Acute renal failure (ARF) (HCC) [N17.9] Leukocytosis [D72.829] Hypokalemia [E87.6] Metabolic encephalopathy [U62.19] Precautions: aspiration PLOF: As per H&P 
 
ASSESSMENT : 
Based on the objective data described below, the patient presents with min oropharyngeal dysphagia. Pt extremely agitated upon SLP arrival. She was restless, irritable, screaming, and trying to get out of bed; however, she was accepting to PO. Pt unable to follow commands or answer orientation questions. She tolerated large serial sips of thin via straw (~8 oz) and 4 oz puree with no overt s/sx of aspiration and positive oral clearance. No solid trials given secondary to AMS. Pt appears safe for initiation of puree diet with thin liquids, meds crushed, oral care TID, and aspiration precautions.  She will require assistance with PO. ST will continue to follow for further dysphagia management. TREATMENT : 
Skilled therapy initiated; Attempted cyclic ingestion with minimally improved tolerance of PO. ST will continue to follow. Patient will benefit from skilled intervention to address the above impairments. Patient's rehabilitation potential is considered to be Fair Factors which may influence rehabilitation potential include:  
[]            None noted [x]            Mental ability/status [x]            Medical condition []            Home/family situation and support systems [x]            Safety awareness 
[]            Pain tolerance/management []            Other: PLAN : 
Recommendations and Planned Interventions: See above Frequency/Duration: Patient will be followed by speech-language pathology 1-2 times per day/4-7 days per week to address goals. Discharge Recommendations: To Be Determined SUBJECTIVE:  
Patient stated Let me out! . OBJECTIVE:  
 
Past Medical History:  
Diagnosis Date Blind left eye Cellulitis of great toe of right foot 10/19/2017 Diabetes ([address] Diabetic retinopathy ([address] Gall stones GERD (gastroesophageal reflux disease) [place] Hiatal hernia History of mammogram 10/03/2017 No evidence of malignancy Hypertension Mass of abdomen Neuropathy due to type 2 diabetes mellitus ([address] Osteomyelitis of toe of right foot ([address] 10/19/2017 Pancreatitis Past Surgical History:  
Procedure Laterality Date HX AMPUTATION Left 07/21/2017  
 left 2nd toe HX CHOLECYSTECTOMY  10/15/2014 HX GI Benign GI Stromal Tumor excision HX HEENT Sx for detached retina HX MYOMECTOMY x5 removed HX OTHER SURGICAL    
 upper endoscopy Prior Level of Function/Home Situation: unknown Diet prior to admission: unknown Current Diet:  puree with thin  
Cognitive and Communication Status: 
Neurologic State: Alert, Confused, Restless, Agitated Orientation Level: Unable to verbalize Cognition: Decreased attention/concentration, Poor safety awareness, Impulsive Oral Assessment: 
Oral Assessment Labial: No impairment Dentition: Natural;Intact Oral Hygiene: adequate Lingual: No impairment Velum: No impairment Mandible: No impairment P.O. Trials: 
Patient Position: 60 at [facility][1] Vocal quality prior to P.O.: No impairment Consistency Presented: Thin liquid;Puree How Presented: Self-fed/presented;Cup/sip;Spoon;Straw Bolus Acceptance: No impairment Bolus Formation/Control: No impairment Propulsion: No impairment Oral Residue: None Initiation of Swallow: No impairment Laryngeal Elevation: Functional 
Aspiration Signs/Symptoms: None Pharyngeal Phase Characteristics: No impairment, issues, or problems Effective Modifications: None Cues for Modifications: Moderate-maximal 
 
Oral Phase Severity: Mild Pharyngeal Phase Severity : Mild PAIN: 
Start of Eval: 0 End of Eval: 0 After treatment:  
[]            Patient left in no apparent distress sitting up in chair 
[x]            Patient left in no apparent distress in bed 
[x]            Call bell left within reach [x]            Nursing notified []            Family present 
[]            Caregiver present 
[]            Bed alarm activated COMMUNICATION/EDUCATION:  
[x]            Aspiration precautions; swallow safety; compensatory techniques. []            Patient/family have participated as able in goal setting and plan of care. []            Patient/family agree to work toward stated goals and plan of care. []            Patient understands intent and goals of therapy; neutral about participation. [x]            Patient unable to participate in goal setting/plan of care; educ ongoing with interdisciplinary staff [x]         Posted safety precautions in patient's room. Thank you for this referral. 
 
Todd Morfin M.S. CCC-SLP/L Speech-Language Pathologist

## 2020-09-10 NOTE — PROGRESS NOTES
Called to bedside for increased yelling and thrashing due to delirium. Pt will answer questions and states nothing hurts. Asks for her clothes. We replaced pt's gown and blankets. Pt is sometimes quieted by people in the room, but often more agitated when others are present. Ordered 1mg ativan & 2.5 haldol, renewed roll belt for pt safety. Michelle Evans MD PGY-1 
PFM

## 2020-09-10 NOTE — PROGRESS NOTES
Called to bedside for agitation/ delirium a little after midnight. Pt is yelling and thrashing in bed, laying at the head of bed. She has thrown her blankets and gown off and repeatedly tried to sit up. Pulling at restraints. Nursing has tried trendelenburg bed positioning to assist in keeping pt on bed. She is slightly more oriented, will answer questions and ask for water. Due to pt's movements, she is in danger of hurting herself. Will add roll belt. Ordered 2.5 of haldol. Gisela Andino MD, PGY1 PFM

## 2020-09-10 NOTE — PROGRESS NOTES
1900 - Bedside and Verbal shift change report given to Jh Rincon RN (oncoming nurse) by Kam Chew RN (offgoing nurse). Report included the following information SBAR, Kardex, Intake/Output, MAR, Recent Results and Cardiac Rhythm ST.  
 
0000 - Call made to LALA WYNNE on call to inform that member continues to scream out and almost gets OOB with two point wrist restraints on. MD informed that unit will be visited for assessment. Will continue to monitor.

## 2020-09-10 NOTE — PROGRESS NOTES
Due to pt's agitation// delirium, nursing and lab has been unable to draw labs including potassium recheck. Came to bedside, ordered 2.5 mg haldol to assist in calming patient enough for lab draw. Pt is yelling every few minutes, generally not making sense but will answer to her name. Juan Carlos Weber MD, PGY1

## 2020-09-10 NOTE — PROGRESS NOTES
[address] [facility] Progress Note Patient: Italo Valdez MRN: [id number] SSN: [id number]  YOB: 1965 Age: 60 y.o. Sex: female Admit Date: 9/8/2020 LOS: 2 days Chief Complaint Patient presents with  Vomiting Subjective:  
 
Pt not cooperative, closed eye and resting in bed ROS Pt nonverbal 
 
Objective:  
 
Visit Vitals /75 (BP 1 Location: Left arm, BP Patient Position: At rest) Pulse 99 Temp 97 °F (36.1 °C) Resp 18 Ht 5' 8\" (1.727 m) Wt 91.9 kg (202 lb 8 oz) LMP 10/06/2015 (Exact Date) SpO2 100% BMI 30.79 kg/m² Physical Exam: 
General:  AXOx 2 to name and place, Pt  not following commands HEENT: R eye erythema, no drainage,  R not reactive dilated, Left eye sluggish but react to light, moist mucous membranes. CV:  RRR, no murmurs. No visible pulsations or thrills. RESP:  Unlabored lungs clear to auscultation without adventitious breath sounds. Equal expansion bilaterally. ABD:  Pt had some grimacing on palpation of the abdomen, diffuse tenderness,  nondistended. BS (+). MS:  No joint deformity or instability. No atrophy. Neuro:  Pt no facial droop, no deviation of tongue, unable to evaluate EOM because patient does not follow commands, moves all four extremities, Lower extremity bilaterally rigid tone, negative babinski Ext:  R foot the first digit is amputated, Left second digit amputated, No edema. 2+ radial and dp pulses bilaterally. Skin:  No rashes, lesions, or ulcers.  
  
Intake and Output: 
Current Shift: No intake/output data recorded. Last three shifts: No intake/output data recorded. Lab/Data Review: 
Recent Results (from the past 12 hour(s)) CK Collection Time: 09/09/20  9:14 PM  
Result Value Ref Range CK 2,096 (H) 26 - 192 U/L FERRITIN Collection Time: 09/09/20  9:14 PM  
Result Value Ref Range  Ferritin 129 8 - 388 NG/ML  
IRON PROFILE  
 Collection Time: 09/09/20  9:14 PM  
Result Value Ref Range Iron 107 50 - 175 ug/dL TIBC 319 250 - 450 ug/dL Iron % saturation 34 20 - 50 % POTASSIUM Collection Time: 09/09/20  9:14 PM  
Result Value Ref Range Potassium 3.8 3.5 - 5.5 mmol/L  
PTH INTACT Collection Time: 09/09/20  9:14 PM  
Result Value Ref Range Calcium 9.0 8.5 - 10.1 MG/DL  
 PTH, Intact PENDING pg/mL LD Collection Time: 09/09/20  9:14 PM  
Result Value Ref Range  (H) 81 - 234 U/L  
URIC ACID Collection Time: 09/09/20  9:14 PM  
Result Value Ref Range Uric acid 8.7 (H) 2.6 - 7.2 MG/DL  
GLUCOSE, POC Collection Time: 09/09/20  9:48 PM  
Result Value Ref Range Glucose (POC) 119 (H) 70 - 110 mg/dL GLUCOSE, POC Collection Time: 09/09/20 11:09 PM  
Result Value Ref Range Glucose (POC) 131 (H) 70 - 110 mg/dL GLUCOSE, POC Collection Time: 09/10/20  5:47 AM  
Result Value Ref Range Glucose (POC) 185 (H) 70 - 110 mg/dL CBC WITH MANUAL DIFF Collection Time: 09/10/20  6:22 AM  
Result Value Ref Range WBC 16.6 (H) 4.6 - 13.2 K/uL  
 RBC 3.55 (L) 4.20 - 5.30 M/uL  
 HGB 10.4 (L) 12.0 - 16.0 g/dL HCT 30.8 (L) 35.0 - 45.0 % MCV 86.8 74.0 - 97.0 FL  
 MCH 29.3 24.0 - 34.0 PG  
 MCHC 33.8 31.0 - 37.0 g/dL  
 RDW 14.2 11.6 - 14.5 % PLATELET 648 945 - 285 K/uL MPV 11.5 9.2 - 11.8 FL  
 DIFFERENTIAL MANUAL DIFFERENTIAL ORDERED    
 NEUTROPHILS PENDING % LYMPHOCYTES PENDING % MONOCYTES PENDING % EOSINOPHILS PENDING % BASOPHILS PENDING % BAND NEUTROPHILS PENDING % PROMYELOCYTES PENDING % MYELOCYTES PENDING % METAMYELOCYTES PENDING % BLASTS PENDING % OTHER CELL PENDING   
 ABS. NEUTROPHILS PENDING K/UL  
 ABS. LYMPHOCYTES PENDING K/UL  
 ABS. MONOCYTES PENDING K/UL  
 ABS. EOSINOPHILS PENDING K/UL  
 ABS. BASOPHILS PENDING K/UL  
 RBC COMMENTS PENDING   
 DF PENDING   
METABOLIC PANEL, COMPREHENSIVE  Collection Time: 09/10/20  6:22 AM  
 Result Value Ref Range Sodium 150 (H) 136 - 145 mmol/L Potassium 3.0 (L) 3.5 - 5.5 mmol/L Chloride 116 (H) 100 - 111 mmol/L  
 CO2 24 21 - 32 mmol/L Anion gap 10 3.0 - 18 mmol/L Glucose 158 (H) 74 - 99 mg/dL BUN 30 (H) 7.0 - 18 MG/DL Creatinine 2.08 (H) 0.6 - 1.3 MG/DL  
 BUN/Creatinine ratio 14 12 - 20 GFR est AA 30 (L) >60 ml/min/1.73m2 GFR est non-AA 25 (L) >60 ml/min/1.73m2 Calcium 8.7 8.5 - 10.1 MG/DL Bilirubin, total 2.5 (H) 0.2 - 1.0 MG/DL  
 ALT (SGPT) 27 13 - 56 U/L  
 AST (SGOT) 60 (H) 10 - 38 U/L Alk. phosphatase 116 45 - 117 U/L Protein, total 7.5 6.4 - 8.2 g/dL Albumin 3.9 3.4 - 5.0 g/dL Globulin 3.6 2.0 - 4.0 g/dL A-G Ratio 1.1 0.8 - 1.7 PHOSPHORUS Collection Time: 09/10/20  6:22 AM  
Result Value Ref Range Phosphorus 3.2 2.5 - 4.9 MG/DL MAGNESIUM Collection Time: 09/10/20  6:22 AM  
Result Value Ref Range Magnesium 1.7 1.6 - 2.6 mg/dL CK Collection Time: 09/10/20  6:22 AM  
Result Value Ref Range CK 2,133 (H) 26 - 192 U/L  
 
 
RECENT RESULTS MODALITY IMPRESSION  
XR Results from Hospital Encounter encounter on 09/08/20 XR ORBIT BI FOREIGN BODY Narrative EXAM: ORBITS LIMITED CLINICAL HISTORY/INDICATION: , blurred vision with nausea, vomiting, and 
abdominal pain, severe agitation, altered mental status, diabetic retinopathy 
due to type 2 diabetes [facility] mri screen. COMPARISON: None. TECHNIQUE: modified ramos view(s) of the orbits obtained. FINDINGS: 
 
There are no radiopaque metallic foreign bodies within the orbits. There is no 
aneurysm clip. The visualized  paranasal sinuses are normal. 
  
 Impression IMPRESSION: 
 
Negative orbital screening prior to MRI. CT Results from Hospital Encounter encounter on 09/08/20 CT CHEST ABD PELV WO CONT Narrative EXAMINATION: CT chest/abdomen/pelvis without contrast 
 
INDICATION: Sepsis, leukocytosis COMPARISON: CT abdomen 1/4/2019 TECHNIQUE: CT of the chest, abdomen, and pelvis performed without contrast. 
Multiplanar reformations obtained. All CT scans at this facility are performed 
using dose optimization technique as appropriate to a performed exam, to include 
automated exposure control, adjustment of the mA and/or kV according to patient 
size (including appropriate matching first site specific examinations), or use 
of iterative reconstruction technique. FINDINGS: 
 
Evaluation of soft tissues and vessels limited without vascular enhancement. Streak artifact from arms also limits evaluation. CHEST: 
 
Cardiovascular: Major vessels unremarkable. Heart size normal. 
 
Mediastinum: Imaged thyroid unremarkable. No adenopathy by size criteria. Esophagus nondistended. Pleura: No effusion. No pneumothorax. Lungs/airways: Bronchial tree unremarkable. No confluent consolidation. Miscellaneous: Superficial soft tissues unremarkable. Bones: T6 mild superior endplate depression, age indeterminate. ABDOMEN/PELVIS: 
 
Hepatobiliary: Gallbladder absent. Liver unremarkable. No biliary duct 
dilatation. Pancreas: Unremarkable. Spleen: Unremarkable. Adrenals: Unremarkable. Genitourinary: Right kidney unremarkable. Left kidney unremarkable. Bladder with 
mild wall thickening. Multilobulated uterus containing multiple calcifications. Gastrointestinal: Stomach unremarkable. Small bowel loops nondilated. The colon 
unremarkable. Appendix unremarkable. Mesentery/vessels/nodes: No free air. No free fluid. Major vessels unremarkable. No adenopathy by size criteria. Miscellaneous: Superficial soft tissues unremarkable. Bones: No acute osseous findings. Impression IMPRESSION: 
 
Chest: 
-No clearly acute findings. -Age-indeterminate mild T6 compression deformity. Abdomen/pelvis: 
-Bladder with mild wall thickening. Correlate clinically for UTI/cystitis. 
-Fibroid uterus. MRI Results from [place] encounter on 07/18/17 MRI FOOT LT WO CONT Narrative EXAMINATION: MRI left foot without contrast 
 
INDICATION: Left second toe infection, possible osteomyelitis COMPARISON: Radiographs 7/18/2017 TECHNIQUE: Multiplanar multiecho sequences of the left forefoot performed 
without contrast. 
 
FINDINGS: 
 
Bones/joints: Second toe distal phalanx is poorly delineated, likely due to 
extensive marrow infiltration and osseous destruction. Mild marrow edema in the 
second toe middle phalanx with T1 marrow signal largely preserved. Extensive 
associated second toe soft tissue edema. No suspicious marrow signal or acute 
fracture elsewhere. Ligaments: Lisfranc ligament intact. Imaged collateral ligaments are 
unremarkable. Tendons: Ill-definition of the second toe flexor tendon near distal insertion. Otherwise flexor and extensor tendons in the forefoot appear largely intact. Miscellaneous: Extensive soft tissue edema in the second toe, and mild patchy 
soft tissue edema throughout the forefoot. Impression IMPRESSION: 
 
1. Findings are consistent with osteomyelitis in the second toe distal phalanx. Marked soft tissue swelling in the second toe. See additional details above. ULTRASOUND Results from Hospital Encounter encounter on 09/08/20 US ABD LTD Impression IMPRESSION:    
 
1. Status post cholecystectomy. 2.  No significant common bile duct dilation. 3.  Increased hepatic parenchymal echogenicity with somewhat heterogeneous 
appearance, potentially suggestive of hepatosteatosis. Partial visualization of 
the liver. Results from [facility] [place] on [place]/22/19 US ABD LTD Impression IMPRESSION: 
 
Mild heterogeneous echogenicity of the liver is nonspecific. This is commonly 
seen with steatosis hepatic disease. Limited visualization of the pancreas. Cardiology Procedures/Testing: MODALITY RESULTS  
EKG Results for orders placed or performed during the hospital encounter of 09/08/20 EKG, 12 LEAD, INITIAL Result Value Ref Range Ventricular Rate 132 BPM  
 Atrial Rate 132 BPM  
 P-R Interval 158 ms QRS Duration 72 ms Q-T Interval 274 ms QTC Calculation (Bezet) 405 ms Calculated P Axis 43 degrees Calculated R Axis 33 degrees Calculated T Axis 148 degrees Diagnosis Sinus tachycardia Nonspecific T wave abnormality Abnormal ECG When compared with ECG of 08-SEP-2020 15:04, No significant change was found Confirmed by Alcon Moran MD, Hiram Renner ([id number]) on 9/9/2020 7:48:30 AM 
  
Results for orders placed or performed in visit on 06/20/14 AMB POC EKG ROUTINE W/ 12 LEADS, INTER & REP Impression See progress note. ECHO 01/03/19 ECHO ADULT COMPLETE 01/04/2019 1/4/2019 Narrative · Estimated left ventricular ejection fraction is 61 - 65%. Left  
ventricular mild concentric hypertrophy. Mild (grade 1) left ventricular  
diastolic dysfunction. · Mild tricuspid valve regurgitation is present. Signed by: Gertrude Da Silva MD  
  
 
Special Testing/Procedures: MODALITY RESULTS MICRO All Micro Results Procedure Component Value Units Date/Time CULTURE, BLOOD [[id number]] Collected:  09/08/20 1640 Order Status:  Completed Specimen:  Blood Updated:  09/10/20 2207 Special Requests: NO SPECIAL REQUESTS Culture result: NO GROWTH 2 DAYS     
 CULTURE, BLOOD [[id number]] Collected:  09/08/20 1640 Order Status:  Completed Specimen:  Blood Updated:  09/10/20 9468 Special Requests: NO SPECIAL REQUESTS Culture result: NO GROWTH 2 DAYS     
 CULTURE, URINE [[id number]] Collected:  09/08/20 1211 Order Status:  Completed Specimen:  Urine from Clean catch Updated:  09/09/20 1811 Special Requests: NO SPECIAL REQUESTS [place] Count --     
  >100,000 COLONIES/mL Culture result:    
  MIXED UROGENITAL AUDRA ISOLATED UA No results found for this or any previous visit. PATH none Assessment and Plan:  
60 y.o. 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   
  
Metabolic Encephalopathy secondary to UTI Improving Sepsis improving leucocytosis, still persistent tachycardia, persistent lactic acidosis resolved Cultures: -9/8 BCX : NG  and UCX >100, 000 
- 9/9 CT Chest/AB/Pelvis : Bladder with mild wall thickening cystitis. No cholecystitis  
-HHS (resolved) -Hypernatremia (151--> 150) improving 
- negative UDS and ETOH serum (9/8) -r/o  intracranial abnormality unlikely NPH more likely cerebral atrophy :9/8 CT head showed mild prominence: mild enlargement of lateral ventricles Labs negative for Negative troponin, EKG no ST elevation or depression, lipase normal  
- cont empirical  Ceftriaxone  (9/8-/9) Plan:  
- started vancomycin (9/8-  And switched to zoysn (9/9- ) 
- hold meds metoclopramide and paroxetine 
- cont  cc/hr  
- MRI head , need meds agitation # Agitation likely 2/2 UTI  
- restrains, role belt 
- consider scheduling haldol or ativan  
  
  
CRYSTAL CR 3.56 ( baseline Cr 1.09 1/2019) in setting of CKD stage 2  likely pre-renal vs sepsis vs rhabdo S/p 3 L IVF Iron profile normal 
CK was 1177--> 2133 Plan:   
Trend Ck 
125 cc/hr w/ KCL 20 meq Myoglobin , Alb and CR ratio,  
  
  
R eye pain + Right eye vision loss X 2 weeks Unlikely papilledema based on bedside ultrasound ( no enlarged optic sheath seen), likely floater vs retinal detachment vs retinopathy vs pink eye Plan:  
- needs to follow up outpatient with opto 
  
Hypokalemia (resolved) 
electrolyte imbalance (hypokalemia, corrected Ca wnml )  
- replete electrolyte as needed, Mg, Phos 
  
 Hyperbilirubinemia (3.9) likely secondary to sepsis CT AB no gall bladder disease 
  
  
 Diabetes ( last A1c 7.2) neuropathy and L retinopathy  
- held NPH 40U BID (hold home NPH 50 U BID) because patient NPO 
  
 HTN 
- not on meds 
  
 HLD 
- cont rosuvastatin  
  
Gastroparesis  
 
- held metoclopramide 5 mg  
  
GERD 
- cont  omeprazole 40 mg delayed capsule 
  
  
Anxiety 
- held  paroxetine 60 mg daily Diet NPO till pass bedside eval  
DVT Prophylaxis heparin GI Prophylaxis Omperazole Code status Full code Disposition unknown  
  
[facility] [address] [facility] Relationship: 
[phone] Julius Hernandez PGY-1  
[address] Pager: [phone] September 10, 2020, 7:35 AM

## 2020-09-10 NOTE — PROGRESS NOTES
Problem: Diabetes Self-Management Goal: *Disease process and treatment process Description: Define diabetes and identify own type of diabetes; list 3 options for treating diabetes. Outcome: Progressing Towards Goal 
Goal: *Incorporating nutritional management into lifestyle Description: Describe effect of type, amount and timing of food on blood glucose; list 3 methods for planning meals. Outcome: Progressing Towards Goal 
Goal: *Incorporating physical activity into lifestyle Description: State effect of exercise on blood glucose levels. Outcome: Progressing Towards Goal 
Goal: *Developing strategies to promote health/change behavior Description: Define the ABC's of diabetes; identify appropriate screenings, schedule and personal plan for screenings. Outcome: Progressing Towards Goal 
Goal: *Using medications safely Description: State effect of diabetes medications on diabetes; name diabetes medication taking, action and side effects. Outcome: Progressing Towards Goal 
Goal: *Monitoring blood glucose, interpreting and using results Description: Identify recommended blood glucose targets  and personal targets. Outcome: Progressing Towards Goal 
Goal: *Prevention, detection, treatment of acute complications Description: List symptoms of hyper- and hypoglycemia; describe how to treat low blood sugar and actions for lowering  high blood glucose level. Outcome: Progressing Towards Goal 
Goal: *Prevention, detection and treatment of chronic complications Description: Define the natural course of diabetes and describe the relationship of blood glucose levels to long term complications of diabetes. Outcome: Progressing Towards Goal 
Goal: *Developing strategies to address psychosocial issues Description: Describe feelings about living with diabetes; identify support needed and support network Outcome: Progressing Towards Goal 
Goal: *Insulin pump training Outcome: Progressing Towards Goal 
 Goal: *Sick day guidelines Outcome: Progressing Towards Goal 
Goal: *Patient Specific Goal (EDIT GOAL, INSERT TEXT) Outcome: Progressing Towards Goal 
  
Problem: Falls - Risk of 
Goal: *Absence of Falls Description: Document Maria A Moss Fall Risk and appropriate interventions in the flowsheet. Outcome: Progressing Towards Goal 
Note: Fall Risk Interventions: 
Mobility Interventions: Bed/chair exit alarm Mentation Interventions: Bed/chair exit alarm, Door open when patient unattended, Family/sitter at bedside, More frequent rounding, Reorient patient, Room close to nurse's station Medication Interventions: Bed/chair exit alarm Elimination Interventions: Bed/chair exit alarm, Call light in reach, Toileting schedule/hourly rounds Problem: Patient Education: Go to Patient Education Activity Goal: Patient/Family Education Outcome: Progressing Towards Goal 
  
Problem: Violent Restraints Goal: *Removal from restraints as soon as assessed to be safe Outcome: Progressing Towards Goal 
Goal: *No harm/injury to patient while restraints in use Outcome: Progressing Towards Goal 
Goal: *Patient's dignity will be maintained Outcome: Progressing Towards Goal 
Goal: *STG: Regains control of behavior Outcome: Progressing Towards Goal 
Goal: *STG: Patient uses nondestructive means to ventilate frustration Outcome: Progressing Towards Goal 
Goal: *STG: Patient tolerates environmental stimuli without aggressive behavior Outcome: Progressing Towards Goal 
Goal: *STG/LTG: Complies with medication therapy Outcome: Progressing Towards Goal 
Goal: *Patient Specific Goal (EDIT GOAL, INSERT TEXT) Outcome: Progressing Towards Goal 
Goal: Violent Restraints:Standard Intervention Outcome: Progressing Towards Goal 
Goal: Violent Restraints: Patient Interventions Outcome: Progressing Towards Goal 
  
Problem: Patient Education: Go to Patient Education Activity Goal: Patient/Family Education Outcome: Progressing Towards Goal

## 2020-09-10 NOTE — MED STUDENT NOTES
*ATTENTION:  This note has been created by a medical student for educational purposes only. Please do not refer to the content of this note for clinical decision-making, billing, or other purposes. Please see attending physicians note to obtain clinical information on this patient. * Medical Student Progress Note [address] **Medical Student Note for Educational Purposes Only** Patient: Rolo Ibarra MRN: [id number]  CSN: [id number] YOB: 1965  Age: 60 y.o. Sex: female DOA: 9/8/2020 LOS:  LOS: 2 days Subjective:  
 
Rolo Ibarra is a 60 y.o.  female with PMHx significant for T2D with gastroparesis, neuropathy, and diabetic foot ulcers with osteomyelitis (s/p toe amputations), hypokalemia, CKD-2, left retinal detachment, HTN, HLD, GERD, and anxiety who was admitted for altered mental status and agitation secondary to possible sepsis. Interval history: Abdominal ultrasound was conducted in the afternoon and resulted negative for acute hepatobiliary pathology. Abdomen/pelvis CT revealed mild bladder wall thickening consistent with cystitis. UCx grew >100,000 colonies. Seen by ID and switched to Vanco/Zosyn. WBC count trending down (22.1 --> 16.6). CK continues to trend upward (1177 --> 2133). Patient remained agitated during the day and overnight, requiring 2.5 mg boluses of haloperidol x 3, and an additional 2.0 mg bolus. Overnight she was placed in 2-point wrist restraints due to increased agitation, and a roll belt was added. Her mental status was noted to improve, as she was able to answer some questions and verbalize some requests by AM. 
 
Subjective:  
Subjective exam limited by the patient's mental status. Patient requests restraint removal and was informed they were needed for her safety. She did not verbalize any acute concerns. Past Medical History:  
Diagnosis Date  Blind left eye  Cellulitis of great toe of right foot 10/19/2017  Diabetes ([facility]  Diabetic retinopathy ([address]  Gall stones  GERD (gastroesophageal reflux disease)  Hammertoe  Hiatal hernia  History of mammogram 10/03/2017 No evidence of malignancy  Hypertension  Mass of abdomen  Neuropathy due to type 2 diabetes mellitus ([address]  Osteomyelitis of toe of right foot ([address] 10/19/2017  Pancreatitis Past Surgical History:  
Procedure Laterality Date  HX AMPUTATION Left 2017  
 left 2nd toe  HX CHOLECYSTECTOMY  10/15/2014  HX GI Benign GI Stromal Tumor excision  HX HEENT Sx for detached retina  HX MYOMECTOMY x5 removed  HX OTHER SURGICAL    
 upper endoscopy Family History Adopted: Yes  
Problem Relation Age of Onset  Heart Failure Mother 76  
 Other Father 70  
     blood clot after surgery  Diabetes Paternal Aunt  Diabetes Paternal Uncle Social History Tobacco Use  Smoking status: Former Smoker Packs/day: 0.20 Years: 8.00 Pack years: 1.60 Types: Cigarettes Last attempt to quit: 12/3/1995 Years since quittin.7  Smokeless tobacco: Never Used Substance Use Topics  Alcohol use: No  
  Comment: Drinks 3-4xs year generally wine Prior to Admission medications Medication Sig Start Date End Date Taking? Authorizing Provider  
metoclopramide HCl (REGLAN) 5 mg tablet Take 1 Tab by mouth Before breakfast, lunch, and dinner. Take before meals. 19   Channing GUERRERO MD  
insulin NPH/insulin regular (NOVOLIN 70/30, HUMULIN 70/30) 100 unit/mL (70-30) injection 40 Units by SubCUTAneous route two (2) times a day. 19   Cy Ferrari PA-C  
PARoxetine (PAXIL) 40 mg tablet Take 40 mg by mouth daily. Provider, Historical  
rosuvastatin (CRESTOR) 20 mg tablet Take 1 Tab by mouth nightly. 17   Anne-Marie Guy NP Allergies Allergen Reactions  Levaquin [Levofloxacin] Hives  Promethazine Nausea and Vomiting \"the phenergan made me more nauseated\" Review of Systems : 
Review of Systems Unable to perform ROS: Mental status change Objective:  
 
Visit Vitals /89 Pulse (!) 116 Temp 98.2 °F (36.8 °C) Resp 17 Ht 5' 8\" (1.727 m) Wt 91.9 kg (202 lb 8 oz) LMP 10/06/2015 (Exact Date) SpO2 98% BMI 30.79 kg/m² Physical Examination:  
CONSTITUTIONAL:                              Well-developed, well-nourished female. Alternates between agitated and somnolent and frequently attempts to free self from restraints. Not responsive to redirection attempts. Able to answer some yes/no questions. EYES:                      
ENT:     Limited by patient mental status. Right conjunctiva appears injected and right lens appears cloudy. NECK:                            Supple, grossly normal ROM  
CV:                                 Normal S1/S2, Regular rate and rhythm, no murmurs/rubs/gallops RESPIRATORY: Clear to auscultation bilaterally, normal respiratory effort. GASTROINTESTINAL:                              Soft and nondistended. No grimacing observed with superficial or deep palpation. No masses palpated MUSCULOSKELETAL: Left 2nd toe and right great toe are amputated. Scar is healed and well-approximated. No edema or atrophy. NEURO/PSYCH: Alternates between agitated and somnolent. Oriented to place. Attempts to state her name and identify current [place] president. Not oriented to month of the year. Cranial nerve exam could not be performed due to patient mental status. No facial droop observed. No clonus or lead pipe rigidity of the lower extremities. SKIN:                             See MSK. No additional rashes or lesions. Laboratory Studies: 
Complete Blood Count with Differential  
Recent Labs  
  09/10/20 
0622 09/09/20 
7795 WBC 16.6* 22.1*  
HCT 30.8* 30.8* BANDS 0 0  
  
 
 Basic Metabolic Profile Recent Labs  
  09/10/20 
0622 09/09/20 
5734 * 151* CO2 24 26 BUN 30* 32* Hepatic Function Panel No results for input(s): ALBUMIN in the last 72 hours. No lab exists for component: SGOTAST, SGPTALT, BILIT, BILID, TOTPR, ALKPHOS, AMYASE, LIPASE Coagulation No results for input(s): APTT, INR, INREXT, INREXT in the last 72 hours. No lab exists for component: PT Imaging: CXR 9/8: No radiographic evidence of acute cardiopulmonary disease. EKG 9/8: 
Sinus tachycardia. Nonspecific T wave abnormality. Abnormal ECG  
 
CT Head 9/9: 
No clearly acute intracranial findings. Mild prominence of the lateral ventricles, present on multiple priors, but 
perhaps slightly increased compared to more remote priors, probably due to 
central atrophy. However correlate clinically for possibility of normal pressure 
hydrocephalus. US Abdomen 9/9: 
1.  Status post cholecystectomy. 2.  No significant common bile duct dilation. 3.  Increased hepatic parenchymal echogenicity with somewhat heterogeneous 
appearance, potentially suggestive of hepatosteatosis. Partial visualization of 
the liver. CT Chest/Abd/Pelvis 9/9: 
Chest: 
-No clearly acute findings. -Age-indeterminate mild T6 compression deformity. Abdomen/pelvis: 
-Bladder with mild wall thickening. Correlate clinically for UTI/cystitis. 
-Fibroid uterus. XR Abdomen 9/9: 
Mildly limited exam as described above. However within these limitations no 
radiopaque foreign body is seen. XR Orbit 9/9: 
Negative orbital screening prior to MRI. XR Skull A/P 9/9: 
No intracranial foreign body. Assessment and Plan:  
Italo Vladez is an 60 y.o.  -American female with PMHx signifcant for T2D with gastroparesis, neuropathy, and diabetic foot ulcers with osteomyelitis (s/p toe amputations), hypokalemia, CKD-2, left retinal detachment, HTN, HLD, GERD, and anxiety who presented to the ED on 9/8 with altered mental status, nausea, vomiting, and increased thirst. In the ED, she was found to meet sepsis criteria and showed no improvement in her mental status, so she was admitted on empiric antibiotics for further work-up. 
  
Altered mental status Of note, the patient had 5 previous ED visits for similar presentations and was treated for sepsis or SIRS. One of these visits was sepsis due to a UTI. Most likely metabolic encephalopathy secondary to sepsis given her tachycardia, leukocytosis, and elevated lactate (1.9) on admission. Patient's son states she has demonstrated altered mental status during previous infections. BCx on 9/8 negative for growth. Imaging negative for hepatobiliary, pulmonary, or intracranial sources of infection. Lipase was normal. EKG negative for ST elevation or depression. UDS and serum ETOH were negative. UA from 9/8 grew >100,000 colonies and was confirmed clean catch, which was correlated with CT findings of bladder wall thickening indicative of cystitis, suggesting this is most likely urosepsis. ? Appreciate ID consult. Started on Vanco/CTX on 9/8. Switched to [employer] on 9/10. Continue antibiotics pending urine culture sensitivities. ? IVF infusion rate increased to 150 cc/hr 
? Haloperidol ordered PRN for agitation. Consider trialing 2nd generation antipsychotic such as risperidone or quetiapine if cost is not an issue, given their lower risk of side effects. ? Goal is to remove restraints. ? Keep patient NPO with aspiration precautions. SLP consult placed for swallow study. ? Brain MRI ordered. Patient has no current ACP documents indicating medical power of , so we will need to contact the patient's family to determine if MRI is indicated. 
  
Elevated creatine kinase CK 1177 on 9/9. Continues to trend upward (2096 --> 2133 on 9/10). Differential includes rhabdomyolysis vs. Serotonin syndrome vs. NMS.  Our current suspicion is rhabdomyolysis given her upward trending CK, moderate blood in the urine, and attempts to struggle against her restraints. Although she does have risk factors for serotonin syndrome (use of SSRI and metoclopramide), as well as NMS (haloperidol), and meets some of the lab criteria (upward trending CK and leukocytosis), these two diagnoses are less likely because she does not demonstrate cogwheel rigidity, hypertonia, tremor, or autonomic instability on physical exam. 
? EKG 9/8 negative for ST elevation or depression. Serial troponins <0.02 and 0.02. CK-MB 7.6. 
? Iron profile normal 
? Metoclopramide and paroxetine were held. ? One-time order of benztropine 1mg given on 9/10. 
? Increased IVF infusion rate from 125 to 150 cc/hr 
? Consider switching haloperidol to risperidone (see above for rationale) ? Continue to trend CK 
  
Acute kidney injury in the setting of Chronic kidney disease stage 2 Cr 3.56 on admission. Baseline 1.09. UA positive for blood, glucosuria, 2+ bacteria, and hyaline casts. UA most consistent with prerenal CRYSTAL given high specific gravity and hyaline casts, which correlates with her HHS. BUN/Cr ratio is <15, so intrinsic CRYSTAL should not be ruled out. However, no muddy brown granular casts seen on UA so this is less likely. Iron profile normal. CK continues to trend upward (1177 --> 2133) and BUN/Cr ratio <15 on 9/10, suggesting possible intrinsic component due to rhabdo. ? Trend CK ? Check myoglobin, albumin, and BUN/Cr ratio. ? Continue fluids - 0.45% NaCl at 150 cc/hr with KCL 20 mEq Hypernatremia Na+ 151-150. Most likely secondary to IVF infusion. 
  
Hypokalemia (resolved). K+ 2.9 on admission, stable at 2.9 on 9/9. Corrected with 0.45% NaCl with KCl infusion. Electrolyte imbalance likely secondary to insulin administration or emesis. ? Check CMP, magnesium, and phosphate daily  
  
Hyperbilirubinemia Total bili (2.6), direct bili (0.4), and alk phos (123) were elevated on 9/9. Abdominal US negative for gallbladder or biliary tree pathology. Given her general presentation, this is most likely secondary to sepsis. ? Continue to monitor bilirubin 
  
Right eye pain and Right vision loss x 2 weeks Patient noted right vision loss for 2 weeks prior to admission. Patient has a longstanding history of retinopathy, and according to son, receives injections as outpatient. Physical exam on 9/10 reveals conjunctival injection with cloudy iris. Bedside ultrasound revealed floaters, but negative for papilledema. XR negative for FB. Differential includes idiopathic vitreous floaters, retinal detachment, vitreous inflammation, uveitis, and scleritis. ? Patient will need to follow up with Optho as outpatient. 
  
HHS (resolved) in the setting of History of Type 2 diabetes with diabetic gastroparesis, retinopathy, and diabetic foot ulcer (s/p amputation) On ED arrival, glucose was elevated to 460. On insulin NPH taken before meals at home. Now resolved, with blood glucose stable in 100's. ? No new foot ulcers visualized on physical exam. 
? Continue NPH 40U BID 
  
Hypertension ? Not on medication. 
  
Hyperlipidemia ? Continue home rosuvastatin 20mg when cleared for PO 
  
GERD ? Home omeprazole held. ? IV pantoprazole 40mg until cleared for PO. 
  
Anxiety ? Hold paroxetine 60mg Diet:  NPO until passes bedside swallow eval 
DVT Prophylaxis:  Heparin GI Prophylaxis:  Pantoprazole Code Status:  Full code (no ACP docs on file) Point of Contact:  Soraida Young (Relationship: Son, [phone]) Disposition:  Pending Estelita Christianson, MS-[address] [place] 9/10/2020, 207:24 AM

## 2020-09-10 NOTE — PROGRESS NOTES
Spoke with patient's son whom stated patient has insurance, gave permission to go in patients wallet to find insurance card. Medicare A&B   [id number] Medicaid [id number] Patient currently confused, agitated and in restraints. Reason for Admission:  Hyperglycemia [R73.9] Altered mental status [R41.82] Acute metabolic encephalopathy [X69.82] Acute renal failure (ARF) (HCC) [N17.9] Leukocytosis [D72.829] Hypokalemia [E87.6] Metabolic encephalopathy [R54.68] RUR Score:    20% Plan for utilizing home health:    TBD based on patient's condition moving forward Likelihood of Readmission:   MODERATE Transition of Care Plan:         
 
 
Initial assessment completed with son, Mary Miles. Cognitive status of patient: disoriented. Per son, this is not patient's baseline. Face sheet information confirmed:  yes. The patient designates son Mary Miles to participate in her discharge plan and to receive any needed information. This patient lives in a apartment with son, Mary Miles. Patient is able to navigate steps as needed. Prior to hospitalization, patient was considered to be independent with ADLs/IADLS : yes . Son reports that PTA patient was independent \"but it was better if they helped her out with things\" Patient has a current ACP document on file: no The son or medical transport will be available to transport patient home upon discharge. The patient has no medical equipment available in the home. Patient is not currently active with home health. Patient has not stayed in a skilled nursing facility or rehab. This patient is on dialysis :no 
 
Currently, the discharge plan is UNABLE TO DETERMINE based on patient's clinical presentation moving forward. The patient states that she can obtain her medications from the pharmacy, and take her medications as directed. Patient's current insurance is Medicare A&B,   Possible medicaid? Care Management Interventions PCP Verified by CM: Yes 
Palliative Care Criteria Met (RRAT>21 & CHF Dx)?: No 
Mode of Transport at Discharge: [facility] Transition of Care Consult (CM Consult): Discharge Planning R[facility] [phone]

## 2020-09-10 NOTE — CONSULTS
Consult Note Consult requested by: Dr. Darius Delgadillo Yashira Villaseñor is a 60 y.o. female [address] who is being seen on consult for CRYSTAL Chief Complaint Patient presents with  Vomiting Admission diagnosis: Altered mental status 58y F with PMH DM type 2, HTN CKD presented with HHS following for CRYSTAL Impression & Plan:  
IMPRESSION:  
Acute kidney injury, pre renal, severe dehydration for HHS, rhabdo 
CKD, h/o CRYSTAL in past, last available creatinine is at 1.9mg/dl last year Hypernatremia /Hyperchloremia Sepsis Ketoacidosis, improving Hypokalemia  
rhabdomylysis DM type 2 Altered mental status, supect metabolic and septic encephalopathy Diastolic heart failure, grade 1 PLAN:  
 Ms. Dustin Pak has severe CRYSTAL on admission from multiple risk factor mention above. Her renal function is improving with IV hydration. Agree with fluid choice given her hypernatremia. Continue K supplement. Decrease IV fluid to 100cc/hr. Monitor CK level I suspect she has underlying CKD, follow up urine protien study and PTH. Antibiotics per ID colleague Thank you very much for allowing me to participate in the care of this patient. We will continue to monitor with you and make recommendations as needed. 
  
Discussed with primary team.  
 
 
 
Ms. Jackson Hadley received haldol this morning, sleeping comfortable, not able to provide any history was obtained by chart review and discussion with primary team.  
HPI:  58y F with PMH  DM, HTN, admitted for altered mental status, severe hyperglycemia , sepsis, CRYSTAL. She was severely dehydrated , in ER her creatinine was higher at 3.5mg/dl. She was started on approprite treatement for HHS, her renal function started to recover, nephrology service consulted for further assistance. She was agitated last night received haldol, appears comfortable this monring. On review of her lab in past, her creatinien was at 1.9mg/dl last year. She had episode of CRYSTAL in past.  
Past Medical History:  
Diagnosis Date  Blind left eye  Cellulitis of great toe of right foot 10/19/2017  Diabetes ([facility])  Diabetic retinopathy ([facility])  Gall stones  GERD (gastroesophageal reflux disease)  Hammertoe  Hiatal hernia  History of mammogram 10/03/2017 No evidence of malignancy  Hypertension  Mass of abdomen  Neuropathy due to type 2 diabetes mellitus ([facility])  Osteomyelitis of toe of right foot ([facility]) 10/19/2017  Pancreatitis Past Surgical History:  
Procedure Laterality Date  HX AMPUTATION Left 2017  
 left 2nd toe  HX CHOLECYSTECTOMY  10/15/2014  HX GI Benign GI Stromal Tumor excision  HX HEENT Sx for detached retina  HX MYOMECTOMY x5 removed  HX OTHER SURGICAL    
 upper endoscopy Social History Socioeconomic History  Marital status: SINGLE Spouse name: Not on file  Number of children: Not on file  Years of education: Not on file  Highest education level: Not on file Occupational History  Not on file Social Needs  Financial resource strain: Not on file  Food insecurity Worry: Not on file Inability: Not on file  Transportation needs Medical: Not on file Non-medical: Not on file Tobacco Use  Smoking status: Former Smoker Packs/day: 0.20 Years: 8.00 Pack years: 1.60 Types: Cigarettes Last attempt to quit: 12/3/1995 Years since quittin.7  Smokeless tobacco: Never Used Substance and Sexual Activity  Alcohol use: No  
  Comment: Drinks 3-4xs year generally wine  Drug use: No  
 Sexual activity: Not Currently Lifestyle  Physical activity Days per week: Not on file Minutes per session: Not on file  Stress: Not on file Relationships  Social connections Talks on phone: Not on file Gets together: Not on file Attends [employer] service: Not on file Active member of club or organization: Not on file Attends meetings of clubs or organizations: Not on file Relationship status: Not on file  Intimate partner violence Fear of current or ex partner: Not on file Emotionally abused: Not on file Physically abused: Not on file Forced sexual activity: Not on file Other Topics Concern   Service No  
 Blood Transfusions No  
 Caffeine Concern No  
 Occupational Exposure No  
 Hobby Hazards No  
 Sleep Concern No  
 Stress Concern No  
 Weight Concern No  
 Special Diet Yes  Back Care No  
 Exercise No  
 Bike Helmet No  
 Seat Belt Yes  Self-Exams Yes Social History Narrative Lives with 16year old son  with ex   Does not have health insurance Family History Adopted: Yes  
Problem Relation Age of Onset  Heart Failure Mother 76  
 Other Father 70  
     blood clot after surgery  Diabetes Paternal Aunt  Diabetes Paternal Uncle Allergies Allergen Reactions  Levaquin [Levofloxacin] Hives  Promethazine Nausea and Vomiting \"the phenergan made me more nauseated\" Home Medications:  
 
Prior to Admission Medications Prescriptions Last Dose Informant Patient Reported? Taking? PARoxetine (PAXIL) 40 mg tablet   Yes No  
Sig: Take 40 mg by mouth daily. insulin NPH/insulin regular (NOVOLIN 70/30, HUMULIN 70/30) 100 unit/mL (70-30) injection   No No  
Si Units by SubCUTAneous route two (2) times a day. metoclopramide HCl (REGLAN) 5 mg tablet   No No  
Sig: Take 1 Tab by mouth Before breakfast, lunch, and dinner. Take before meals. rosuvastatin (CRESTOR) 20 mg tablet   No No  
Sig: Take 1 Tab by mouth nightly. Facility-Administered Medications: None Current Facility-Administered Medications Medication Dose Route Frequency  potassium chloride 10 mEq in 100 ml IVPB  10 mEq IntraVENous Q1H  
  benztropine (COGENTIN) injection 1 mg  1 mg IntraMUSCular ONCE  
 0.45% sodium chloride with KCl 20 mEq/L infusion   IntraVENous CONTINUOUS  
 labetaloL (NORMODYNE;TRANDATE) 20 mg/4 mL (5 mg/mL) injection 10 mg  10 mg IntraVENous BID PRN  
 haloperidol lactate (HALDOL) injection 2 mg  2 mg IntraMUSCular QHS  piperacillin-tazobactam (ZOSYN) 2.25 g in 0.9% sodium chloride (MBP/ADV) 50 mL MBP  2.25 g IntraVENous Q6H  
 sodium chloride (NS) flush 5-10 mL  5-10 mL IntraVENous PRN  
 [Held by provider] insulin NPH (NOVOLIN N, HUMULIN N) injection 40 Units  40 Units SubCUTAneous ACB&D  
 insulin lispro (HUMALOG) injection   SubCUTAneous Q6H  
 glucose chewable tablet 16 g  16 g Oral PRN  
 glucagon (GLUCAGEN) injection 1 mg  1 mg IntraMUSCular PRN  
 dextrose (D50W) injection syrg 12.5-25 g  25-50 mL IntraVENous PRN  
 sodium chloride (NS) flush 5-40 mL  5-40 mL IntraVENous Q8H  
 sodium chloride (NS) flush 5-40 mL  5-40 mL IntraVENous PRN  
 acetaminophen (TYLENOL) tablet 650 mg  650 mg Oral Q6H PRN Or  
 acetaminophen (TYLENOL) suppository 650 mg  650 mg Rectal Q6H PRN  polyethylene glycol (MIRALAX) packet 17 g  17 g Oral DAILY PRN  
 heparin (porcine) injection 5,000 Units  5,000 Units SubCUTAneous Q8H  
 [Held by provider] PARoxetine (PAXIL) tablet 60 mg  60 mg Oral DAILY  [Held by provider] pantoprazole (PROTONIX) tablet 40 mg  40 mg Oral DAILY  [Held by provider] rosuvastatin (CRESTOR) tablet 20 mg  20 mg Oral QHS  ondansetron (ZOFRAN) injection 4 mg  4 mg IntraVENous Q6H PRN  pantoprazole (PROTONIX) 40 mg in 0.9% sodium chloride 10 mL injection  40 mg IntraVENous DAILY Review of Systems:  
 
 Complete 10-point review of systems were obtained and discussed in length 
with the patient. Complete review of systems was negative/unremarkable 
except as mentioned in HPI section. Data Review: 
 
Labs: Results:  
   
Chemistry Recent Labs  
  09/10/20 
0622 09/09/20 2114 09/09/20 0748 09/08/20 
1640 09/08/20 
1130 *  --  139* 298* 470* *  --  151* 143 141  
K 3.0* 3.8 2.9* 3.1* 2.9*  
*  --  118* 110 103 CO2 24  --  26 23 21 BUN 30*  --  32* 29* 30* CREA 2.08*  --  2.62* 2.73* 3.56* CA 8.7 9.0 9.1 9.3 10.3* AGAP 10  --  7 10 17 BUCR 14  --  12 11* 8*   --  123*  --  161* TP 7.5  --  8.5*  --  10.2* ALB 3.9  --  4.2  --  4.9 GLOB 3.6  --  4.3*  --  5.3* AGRAT 1.1  --  1.0  --  0.9 CBC w/Diff Recent Labs  
  09/10/20 
0622 09/09/20 
0748 09/08/20 
1640 WBC 16.6* 22.1* 24.2*  
RBC 3.55* 3.63* 3.88* HGB 10.4* 10.7* 11.3* HCT 30.8* 30.8* 32.5*  
 262 304 GRANS PENDING 82* 87* LYMPH PENDING 14* 10* EOS PENDING 0 0 Coagulation No results for input(s): PTP, INR, APTT, INREXT in the last 72 hours. Iron/Ferritin Recent Labs  
  09/09/20 2114 IRON 107 BNP No results for input(s): BNPP in the last 72 hours. Cardiac Enzymes Recent Labs  
  09/10/20 
0622 09/09/20 
2114 09/09/20 
0748 09/08/20 
1130 CPK 2,133* 2,096* 1,177* 170 CKND1  --   --  0.6 0.8 Liver Enzymes Recent Labs  
  09/10/20 
0622 TP 7.5 ALB 3.9  Thyroid Studies Lab Results Component Value Date/Time TSH 0.57 09/08/2020 04:40 PM  
    
 EKG: sinus Physical Assessment:  
 
Visit Vitals /75 (BP 1 Location: Left arm, BP Patient Position: At rest) Pulse 99 Temp 97 °F (36.1 °C) Resp 18 Ht 5' 8\" (1.727 m) Wt 91.9 kg (202 lb 8 oz) SpO2 100% BMI 30.79 kg/m² Weight change: 1.588 kg (3 lb 8 oz) No intake or output data in the 24 hours ending 09/10/20 0950 Physical Exam:  
General: no respi distress HEENT sclera anicteric, supple neck, no thyromegaly CVS: S1S2 heard,  no rub RS: + air entry b/l, Abd: Soft, Non tender Neuro: sleeping Extrm: No edema, no cyanosis, clubbing Skin: no visible  Rash Musculoskeletal: No gross joints or bone deformities Procedures/imaging: see electronic medical records for all procedures, Xrays and details which were not copied into this note but were reviewed prior to creation of Mell Ojeda MD 
September 10, 2020 [facility] [phone]

## 2020-09-10 NOTE — PROGRESS NOTES
SLP Note: Attempted bedside swallow evaluation this am.  Pt in process of being transferred off the unit. Will re-attempt later this date. Thank you for this referral, Nilda Nelson M.S., CCC-SLP Speech-Language Pathologist

## 2020-09-10 NOTE — DIABETES MGMT
Glycemic Control Plan of Care 
 
T2DM with current A1c of 7.0% (9/08/2020) Patient confused, agitated, on bilateral soft restraints. DCP: to be determined per CM Home diabetes medications: Unverified Novolin 70/30 mix insulin 40 units BID Recommendation(s): 
1.) cont to hold BID NPH insulin order 2.) cont the sliding scale lispro insulin 3.) cont to monitor BG readings 4.) monitor po intake Glycemic assessment:  
 
Patient seen this afternoon on soft wrist restraints, very confused/agitated. Noted patient evaluated by SLP and palced on dysphagia pureed diet. POC BG 9/09: 188 POC BG 9/10 at time of review: 185, 157 Current A1C: 7.0% (9/08/2020) which is equivalent to estimated average blood glucose of 154 mg/dL during the past 2-3 months Current hospital diabetes medications: 
Correctional lispro insulin every 6 hours. Normal sensitivity dose [currently on hold] NPH insulin 40 units BID AC Total daily dose insulin requirement previous day: 9/09: 
Lispro: 4 units Diet: Dysphagia pureed Goals:  Blood glucose will be within target range of  mg/dL by 9/13/2020 Education:  ___  Refer to Diabetes Education Record 
           __X_  Education not indicated at this time Jamaal Centeno RN [facility] Pager: [phone]

## 2020-09-10 NOTE — PROGRESS NOTES
MRI Screening form needs to be filled out and faxed to [phone] MRI can be scheduled. If unable to obtain information from pt, [facility] needs to be contacted.  If pt is claustro or will need pain meds, please have ordered in advance in order to facilitate exam.

## 2020-09-10 NOTE — PROGRESS NOTES
Problem: Diabetes Self-Management Goal: *Disease process and treatment process Description: Define diabetes and identify own type of diabetes; list 3 options for treating diabetes. 9/10/2020 1912 by Christie Ram Outcome: Progressing Towards Goal 
9/10/2020 1157 by Christie Ram Outcome: Progressing Towards Goal 
Goal: *Incorporating nutritional management into lifestyle Description: Describe effect of type, amount and timing of food on blood glucose; list 3 methods for planning meals. Outcome: Progressing Towards Goal 
Goal: *Incorporating physical activity into lifestyle Description: State effect of exercise on blood glucose levels. Outcome: Progressing Towards Goal 
Goal: *Developing strategies to promote health/change behavior Description: Define the ABC's of diabetes; identify appropriate screenings, schedule and personal plan for screenings. Outcome: Progressing Towards Goal 
Goal: *Using medications safely Description: State effect of diabetes medications on diabetes; name diabetes medication taking, action and side effects. Outcome: Progressing Towards Goal 
Goal: *Monitoring blood glucose, interpreting and using results Description: Identify recommended blood glucose targets  and personal targets. Outcome: Progressing Towards Goal 
Goal: *Prevention, detection, treatment of acute complications Description: List symptoms of hyper- and hypoglycemia; describe how to treat low blood sugar and actions for lowering  high blood glucose level. Outcome: Progressing Towards Goal 
Goal: *Prevention, detection and treatment of chronic complications Description: Define the natural course of diabetes and describe the relationship of blood glucose levels to long term complications of diabetes. Outcome: Progressing Towards Goal 
Goal: *Developing strategies to address psychosocial issues Description: Describe feelings about living with diabetes; identify support needed and support network Outcome: Progressing Towards Goal 
Goal: *Insulin pump training Outcome: Progressing Towards Goal 
Goal: *Sick day guidelines Outcome: Progressing Towards Goal 
Goal: *Patient Specific Goal (EDIT GOAL, INSERT TEXT) Outcome: Progressing Towards Goal 
  
Problem: Patient Education: Go to Patient Education Activity Goal: Patient/Family Education 9/10/2020 1912 by Sia Escobar Outcome: Progressing Towards Goal 
9/10/2020 1157 by Sia Ramirezar Outcome: Progressing Towards Goal 
  
Problem: Non-Violent Restraints Goal: *Removal from restraints as soon as assessed to be safe 9/10/2020 1912 by Sia Escobar Outcome: Not Progressing Towards Goal 
9/10/2020 1157 by Sia Escobar Outcome: Progressing Towards Goal 
Goal: *No harm/injury to patient while restraints in use 9/10/2020 1912 by Sia Escobar Outcome: Progressing Towards Goal 
9/10/2020 1157 by Sia Escobar Outcome: Progressing Towards Goal 
Goal: *Patient's dignity will be maintained 9/10/2020 1912 by Sia Escobar Outcome: Progressing Towards Goal 
9/10/2020 1157 by Sia Escobar Outcome: Progressing Towards Goal 
Goal: *Patient Specific Goal (EDIT GOAL, INSERT TEXT) Outcome: Progressing Towards Goal 
Goal: Non-violent Restaints:Standard Interventions 9/10/2020 1912 by Sia Ramirezar Outcome: Progressing Towards Goal 
9/10/2020 1157 by Sia Escobar Outcome: Progressing Towards Goal 
Goal: Non-violent Restraints:Patient Interventions 9/10/2020 1912 by Sia Escobar Outcome: Progressing Towards Goal 
9/10/2020 1157 by Sia Escobar Outcome: Progressing Towards Goal 
Goal: Patient/Family Education 9/10/2020 1912 by Sia Escobar Outcome: Progressing Towards Goal 
9/10/2020 1157 by Sia Shawn Outcome: Progressing Towards Goal 
  
Problem: Falls - Risk of 
Goal: *Absence of Falls Description: Document Brittanie Cho Fall Risk and appropriate interventions in the flowsheet. 9/10/2020 1912 by Sia Escobar Outcome: Progressing Towards Goal 
Note: Fall Risk Interventions: Mobility Interventions: Bed/chair exit alarm, Strengthening exercises (ROM-active/passive) Mentation Interventions: Door open when patient unattended, Adequate sleep, hydration, pain control, More frequent rounding, Reorient patient, Room close to nurse's station Medication Interventions: Bed/chair exit alarm, Evaluate medications/consider consulting pharmacy, Utilize gait belt for transfers/ambulation Elimination Interventions: Bed/chair exit alarm, Call light in reach, Toileting schedule/hourly rounds 9/10/2020 1157 by Lance Colon Outcome: Progressing Towards Goal 
Note: Fall Risk Interventions: 
Mobility Interventions: Bed/chair exit alarm Mentation Interventions: Bed/chair exit alarm, Door open when patient unattended, Family/sitter at bedside, More frequent rounding, Reorient patient, Room close to nurse's station Medication Interventions: Bed/chair exit alarm Elimination Interventions: Bed/chair exit alarm, Call light in reach, Toileting schedule/hourly rounds Problem: Patient Education: Go to Patient Education Activity Goal: Patient/Family Education 9/10/2020 1912 by Lance Colon Outcome: Progressing Towards Goal 
9/10/2020 1157 by Lance Colon Outcome: Progressing Towards Goal 
  
Problem: Pressure Injury - Risk of 
Goal: *Prevention of pressure injury Description: Document Vasile Scale and appropriate interventions in the flowsheet. 9/10/2020 1912 by Lance Colon Outcome: Progressing Towards Goal 
Note: Pressure Injury Interventions: 
Sensory Interventions: Assess changes in LOC, Check visual cues for pain, Maintain/enhance activity level, Keep linens dry and wrinkle-free, Minimize linen layers Moisture Interventions: Absorbent underpads, Apply protective barrier, creams and emollients, Check for incontinence Q2 hours and as needed, Minimize layers Activity Interventions: Pressure redistribution bed/mattress(bed type), PT/OT evaluation Mobility Interventions: Pressure redistribution bed/mattress (bed type), PT/OT evaluation, Assess need for specialty bed Nutrition Interventions: Offer support with meals,snacks and hydration Friction and Shear Interventions: Foam dressings/transparent film/skin sealants 9/10/2020 1157 by Valma Libman Outcome: Progressing Towards Goal 
Note: Pressure Injury Interventions: 
Sensory Interventions: Assess changes in LOC, Minimize linen layers, Pressure redistribution bed/mattress (bed type) Moisture Interventions: Apply protective barrier, creams and emollients, Minimize layers Activity Interventions: Pressure redistribution bed/mattress(bed type) Mobility Interventions: Pressure redistribution bed/mattress (bed type) Nutrition Interventions: Document food/fluid/supplement intake Friction and Shear Interventions: HOB 30 degrees or less Problem: Patient Education: Go to Patient Education Activity Goal: Patient/Family Education 9/10/2020 1912 by Valma Libman Outcome: Progressing Towards Goal 
9/10/2020 1157 by Valma Libman Outcome: Progressing Towards Goal 
  
Problem: Violent Restraints Goal: *Removal from restraints as soon as assessed to be safe 9/10/2020 1912 by Valma Libman Outcome: Not Progressing Towards Goal 
9/10/2020 1157 by Valma Libman Outcome: Progressing Towards Goal 
Goal: *No harm/injury to patient while restraints in use 9/10/2020 1912 by Valma Libman Outcome: Progressing Towards Goal 
9/10/2020 1157 by Valma Libman Outcome: Progressing Towards Goal 
Goal: *Patient's dignity will be maintained 9/10/2020 1912 by Valma Libman Outcome: Progressing Towards Goal 
9/10/2020 1157 by Valma Libman Outcome: Progressing Towards Goal 
Goal: *STG: Regains control of behavior 9/10/2020 1912 by Valma Libman Outcome: Not Progressing Towards Goal 
9/10/2020 1157 by Valma Libman Outcome: Progressing Towards Goal 
 Goal: *STG: Patient uses nondestructive means to ventilate frustration 9/10/2020 1912 by Pamela James Outcome: Not Progressing Towards Goal 
9/10/2020 1157 by Pamela James Outcome: Progressing Towards Goal 
Goal: *STG: Patient tolerates environmental stimuli without aggressive behavior 9/10/2020 1912 by Pamela James Outcome: Not Progressing Towards Goal 
9/10/2020 1157 by Pamela James Outcome: Progressing Towards Goal 
Goal: *STG/LTG: Complies with medication therapy 9/10/2020 1912 by Pamela James Outcome: Progressing Towards Goal 
9/10/2020 1157 by Pamela James Outcome: Progressing Towards Goal 
Goal: *Patient Specific Goal (EDIT GOAL, INSERT TEXT) Outcome: Progressing Towards Goal 
Goal: Violent Restraints:Standard Intervention 9/10/2020 1912 by Pamela James Outcome: Progressing Towards Goal 
9/10/2020 1157 by Pamela James Outcome: Progressing Towards Goal 
Goal: Violent Restraints: Patient Interventions 9/10/2020 1912 by Pamela James Outcome: Progressing Towards Goal 
9/10/2020 1157 by Pamela James Outcome: Progressing Towards Goal 
  
Problem: Patient Education: Go to Patient Education Activity Goal: Patient/Family Education 9/10/2020 1912 by Pamela James Outcome: Progressing Towards Goal 
9/10/2020 1157 by Pamela James Outcome: Progressing Towards Goal 
  
Problem: Patient Education: Go to Patient Education Activity Goal: Patient/Family Education Outcome: Progressing Towards Goal

## 2020-09-10 NOTE — PROGRESS NOTES
0700:Bedside and Verbal shift change report given to [address] [place] [address] St (oncoming nurse) by Rachel Anne RN (offgoing nurse). Report included the following information SBAR, Kardex and Cardiac Rhythm sinus tach. 0800:Patient in bed, agitated and yelling. She now has roll belt on as well as bilateral wrist restraints. Roll belt removed. Patient scheduled for PICC placement, gave dose of Ativan and Haldol to calm patient. 0805:Patient transferred off unit to . 
 
0859:Patient transferred back on unit from  with single lumen PICC to left upper arm. Patient still agitated and trying to get out of bed but fell asleep after a while. 1200:Removed bilateral wrist restraints to assess patient without, patient attempting to pull out IV and get out of bed, replaced restraints. 1330:Patient yelling and screaming for help and for a franklin bear. Charge RN made franklin bear out of pillowcases for patient. No sedative available, administered dose of Cogentin and gave labetalol for increased HR. 
 
1400:Spoke with PFM regarding administering dose of Seroquel earlier than 1600. Got verbal to give dose of Seroquel now, also received phone order with read back to renew patient's wrists restraints. 1500:Resident on unit to assess patient who ha been yelling and screaming for over an hour. New orders received. Discussed moving patient to another unit being that she is not step down status and requires a sitter, which we do not have. 1515:Patient asleep after getting 12.5mg of Seroquel. 1630:Patient awake, yelling and agitated, bed wet. 1758:Gave another dose of Seroquel to patient, PFM on unit assisting with getting patient cleaned due to bed being wet, and also helped to feed patient. 1858:Patient due for MRI, gave 1mg ativan. Patient still not calm after getting both the seroquel and the ativan. Made PFM aware that may not be safe for patient to have MRI tonight due to heightened agitated state.

## 2020-09-10 NOTE — PROGRESS NOTES
conducted an initial consultation and Spiritual Assessment for Charlotte Good, who is a 60 y.o.,female. Patient's Primary Language is: [place]. According to the patient's EMR Sabianism Affiliation is: [facility].  
 
The reason the Patient came to the hospital is:  
Patient Active Problem List  
 Diagnosis Date Noted  Altered mental status 09/08/2020  Hyperglycemia 09/08/2020  Acute renal failure (ARF) ([address] 09/08/2020  Acute metabolic encephalopathy 63/83/4242  Metabolic encephalopathy 80/04/4782  Gastroparesis 01/28/2019  Hypokalemia 01/28/2019  Intractable nausea and vomiting 01/28/2019  Tachycardia 01/23/2019  SIRS (systemic inflammatory response syndrome) (HCC) 01/23/2019  Abdominal pain 01/04/2019  Abnormal LFTs 11/19/2018  CRYSTAL (acute kidney injury) ([address] 11/17/2018  Type 2 diabetes mellitus with retinopathy, with long-term current use of insulin ([address] 09/05/2017  Diabetes ([address]  Hypertension  Mass of abdomen  Blind left eye  Sepsis ([address] 07/19/2017  Foot ulcer due to secondary DM ([address] 07/19/2017  Uncontrolled type 2 diabetes mellitus with chronic kidney disease ([address] 07/19/2017  Diabetic retinopathy ([address] 07/19/2017  Anxiety 07/19/2017  Leukocytosis 07/19/2017  Renal insufficiency 07/19/2017  Osteomyelitis ([address] 07/18/2017  Nausea with vomiting 10/22/2014  GERD (gastroesophageal reflux disease) 06/18/2014  Nausea and vomiting 04/29/2014  Systemic inflammatory response syndrome (SIRS) (HCC) 04/29/2014  Vitamin D deficiency 04/20/2014  HLD (hyperlipidemia) 04/20/2014  Dehydration 04/20/2014  Essential hypertension, benign 12/03/2013  Diabetes mellitus type 2, controlled ([address] 12/03/2013 The patient at this time is not able to comprehend, and therefore, was unable to conduct spiritual assessment  [facility] [phone]

## 2020-09-10 NOTE — PROGRESS NOTES
Bedside and Verbal shift change report given to Denise Diaz RN (oncoming nurse) by Eneida Rodriguez RN (offgoing nurse). Report included the following information SBAR, Kardex, Intake/Output, MAR, Recent Results and Cardiac Rhythm ST.

## 2020-09-11 NOTE — PROGRESS NOTES
Problem: Dysphagia (Adult) Goal: *Acute Goals and Plan of Care (Insert Text) Description: Patient will: 1. Tolerate PO trials with 0 s/s overt distress in 4/5 trials 2. Utilize compensatory swallow strategies/maneuvers (decrease bite/sip, size/rate, alt. liq/sol) with min cues in 4/5 trials 3. Perform oral-motor/laryngeal exercises to increase oropharyngeal swallow function with min cues 4. Complete an objective swallow study (i.e., MBSS) to assess swallow integrity, r/o aspiration, and determine of safest LRD, min A Rec:    
Puree with thin liquids Aspiration precautions HOB >45 during po intake, remain >30 for 30-45 minutes after po Small bites/sips; alternate liquid/solid with slow feeding rate Oral care TID Meds crushed in puree Assistance with all PO Outcome: Progressing Towards Goal 
 
SPEECH LANGUAGE PATHOLOGY DYSPHAGIA TREATMENT Patient: Valentine Pak (78 y.o. female) Date: 9/11/2020 Diagnosis: Hyperglycemia [R73.9] Altered mental status [R41.82] Acute metabolic encephalopathy [C95.98] Acute renal failure (ARF) (HCC) [N17.9] Leukocytosis [D72.829] Hypokalemia [E87.6] Metabolic encephalopathy [Z85.71] Altered mental status Precautions: aspiration PLOF:as per H&P  
 
ASSESSMENT: 
Pt seen b/s for dysphagia tx, pt able to open eyes intermittently throughout session, more so when name called. Pt accepted single presentation of mechanical soft sdolid with mod- severely prolonged mastication, moderate lingual spread with mod cues t maintain arousal to complete mastication, pt eventually able to clear majority of oral residue following several liquid washes, pt with fairly timely swallow response with good laryngeal elevation, no overt/ soft/ silent s/sx aspiration following any trials.   Given level arousal, rec continue puree solids with thin liquids only when awake and alert, aspiration precautions, assist with feeding as needed. SLP will continue to follow for possible diet advancement. Progression toward goals: 
[]         Improving appropriately and progressing toward goals 
[]         Improving slowly and progressing toward goals 
[]         Not making progress toward goals and plan of care will be adjusted PLAN: 
Recommendations and Planned Interventions: 
continue puree solids with thin liquids only when awake and alert, aspiration precautions, assist with feeding as needed. Patient continues to benefit from skilled intervention to address the above impairments. Continue treatment per established plan of care. Discharge Recommendations: To Be Determined SUBJECTIVE:  
Patient stated Brittany Richmond.  OBJECTIVE:  
Cognitive and Communication Status: 
Neurologic State: Drowsy Orientation Level: Oriented to person Cognition: Follows commands Perception: (unable to determine) Perseveration: No perseveration noted Safety/Judgement: Fall prevention, Decreased insight into deficits Dysphagia Treatment: 
Oral Assessment: 
Oral Assessment Labial: (?left labial weakness) Dentition: Intact, Natural 
Oral Hygiene: good Lingual: No impairment Velum: No impairment Mandible: No impairment P.O. Trials: 
 Patient Position: HOB 50* Vocal quality prior to P.O.: No impairment Consistency Presented: Mechanical soft, Thin liquid How Presented: SLP-fed/presented, Straw, Successive swallows Bolus Acceptance: No impairment Bolus Formation/Control: Impaired Type of Impairment: Mastication Propulsion: Discoordination, Delayed (# of seconds) Oral Residue: 10-50% of bolus, Lingual 
 Initiation of Swallow: No impairment Laryngeal Elevation: Functional 
 Aspiration Signs/Symptoms: None Pharyngeal Phase Characteristics: No impairment, issues, or problems Effective Modifications: Alternate liquids/solids, Small sips and bites Cues for Modifications: Moderate Oral Phase Severity: Mild-moderate Pharyngeal Phase Severity : No impairment PAIN: 
Pain level pre-treatment: 0/10 Pain level post-treatment: 0/10 Pain Intervention(s): Medication (see MAR); Rest, Ice, Repositioning Response to intervention: Nurse notified, See doc flow After treatment:  
[]              Patient left in no apparent distress sitting up in chair 
[x]              Patient left in no apparent distress in bed 
[x]              Call bell left within reach [x]              Nursing notified 
[]              Family present 
[]              Caregiver present 
[]              Bed alarm activated COMMUNICATION/EDUCATION:  
[x] Aspiration precautions; swallow safety; compensatory techniques [x]        Patient unable to participate in education; education ongoing with staff 
[]  Posted safety precautions in patient's room. [] Oral-motor/laryngeal strengthening exercises Osborn Brittle, MS, CCC/SLP Time Calculation: 15 mins

## 2020-09-11 NOTE — PROGRESS NOTES
Comprehensive Nutrition Assessment Type and Reason for Visit: Initial, Positive nutrition screen Nutrition Recommendations/Plan: - Add supplements: Glucerna Shake, TID 
- Nursing to assist with all meals as needed and encourage po intake. - IVF per Nephrology. Nutrition Assessment:  Admitted with hyperglycemia, severe agitation and AMS. Unable to provide nutritional hx today. Only consumed a few bites of pureed breakfast this morning with full assistance, SLP following. Receiving 1/2 NS IVF with K at 100 mL/hr per Nephrology. (CRYSTAL, pre renal, severe dehydration). Malnutrition Assessment: 
Malnutrition Status: At risk for malnutrition (specify)(AMS, poor intake since admission) Nutrition History and Allergies: PMH DM with retinopathy, gastroparesis, HTN, GERD, anxiety. N/V PTA per H&P. No significant changes in weight noted per chart. NKFA Estimated Daily Nutrient Needs: 
Energy (kcal):  5854-6919 Protein (g):  75-94 Fluid (ml/day):  0546-5752 Nutrition Related Findings:  AMS. Sitter at bedside and pt requiring restraints. BM PTA, active bowel sounds. Mg replaced, phos 2.3 today, will monitor (daily phos ordered). + edema. Recent BG levels 164-221 mg/dL, SSI. Wounds:   
None Current Nutrition Therapies: DIET DYSPHAGIA PUREED (NDD1) DIET NPO Anthropometric Measures: 
· Height:  5' 8\" (172.7 cm) · Current Body Wt:  94.3 kg (207 lb 14.3 oz) · Admission Body Wt:  199 lb · Usual Body Wt:  200 lb(per chart) · Ideal Body Wt:  140 lbs:  148.5 % · BMI Category:  Obese class 1 (BMI 30.0-34. 9) Nutrition Diagnosis:  
· Inadequate oral intake related to cognitive or neurological impairment, swallowing difficulty as evidenced by intake 0-25% Nutrition Interventions:  
Food and/or Nutrient Delivery: Continue current diet, Start oral nutrition supplement, IV fluid delivery Nutrition Education and Counseling: Education not indicated Coordination of Nutrition Care: Continued inpatient monitoring, Feeding assistance/environmental change, Speech therapy Goals: 
PO nutrition intake will meet >75% of patient estimated nutritional needs within the next 7 days. Nutrition Monitoring and Evaluation:  
Food/Nutrient Intake Outcomes: Diet advancement/tolerance, Food and nutrient intake, Supplement intake, IVF intake Physical Signs/Symptoms Outcomes: Biochemical data, Chewing or swallowing, GI status, Meal time behavior, Nutrition focused physical findings Discharge Planning: Too soon to determine Electronically signed by Patrice Martin RD on 9/11/2020 at 12:47 PM 
 
Contact:[phone]

## 2020-09-11 NOTE — PROGRESS NOTES
Progress Note 58y F with PMH DM type 2, HTN CKD presented with HHS following for CRYSTAL Subjective Overnight event noted No accurate urine output documentation. Remain sleepy, requiring sitter IMPRESSION:  
Acute kidney injury, pre renal, severe dehydration for HHS, rhabdo 
CKD, h/o CRYSTAL in past, last available creatinine is at 1.9mg/dl last year Hypernatremia /Hyperchloremia Sepsis Ketoacidosis, improving Hypokalemia  
rhabdomylysis DM type 2 Altered mental status, supect metabolic and septic encephalopathy Diastolic heart failure, grade 1 PLAN:  
Marked improvement in renal function compare to admission, her sodium is improving, CK relatively on higher side. I would suggest to continue IV hydration with Half NS @100cc/hr for today. Later can be discontinue. She has very higher PTH suspect underlying CKD, Start on calcitriol once more awake and alert. Awaiting for urine protein study.   
 
  
Discussed with primary team.  
 
 
 
 
 
Current Facility-Administered Medications Medication Dose Route Frequency  haloperidol lactate (HALDOL) injection 3 mg  3 mg IntraMUSCular ONCE PRN  cholecalciferol (VITAMIN D3) (1000 Units /25 mcg) tablet 1 Tab  1,000 Units Oral DAILY  QUEtiapine (SEROquel) tablet 37.5 mg  37.5 mg Oral BID  insulin lispro (HUMALOG) injection   SubCUTAneous AC&HS  
 0.45% sodium chloride with KCl 20 mEq/L infusion   IntraVENous CONTINUOUS  piperacillin-tazobactam (ZOSYN) 2.25 g in 0.9% sodium chloride (MBP/ADV) 50 mL MBP  2.25 g IntraVENous Q6H  
 sodium chloride (NS) flush 5-10 mL  5-10 mL IntraVENous PRN  
 [Held by provider] insulin NPH (NOVOLIN N, HUMULIN N) injection 40 Units  40 Units SubCUTAneous ACB&D  
 glucose chewable tablet 16 g  16 g Oral PRN  
 glucagon (GLUCAGEN) injection 1 mg  1 mg IntraMUSCular PRN  
 dextrose (D50W) injection syrg 12.5-25 g  25-50 mL IntraVENous PRN  
  sodium chloride (NS) flush 5-40 mL  5-40 mL IntraVENous Q8H  
 sodium chloride (NS) flush 5-40 mL  5-40 mL IntraVENous PRN  
 acetaminophen (TYLENOL) tablet 650 mg  650 mg Oral Q6H PRN Or  
 acetaminophen (TYLENOL) suppository 650 mg  650 mg Rectal Q6H PRN  polyethylene glycol (MIRALAX) packet 17 g  17 g Oral DAILY PRN  
 heparin (porcine) injection 5,000 Units  5,000 Units SubCUTAneous Q8H  
 [Held by provider] PARoxetine (PAXIL) tablet 60 mg  60 mg Oral DAILY  [Held by provider] pantoprazole (PROTONIX) tablet 40 mg  40 mg Oral DAILY  [Held by provider] rosuvastatin (CRESTOR) tablet 20 mg  20 mg Oral QHS  ondansetron (ZOFRAN) injection 4 mg  4 mg IntraVENous Q6H PRN  pantoprazole (PROTONIX) 40 mg in 0.9% sodium chloride 10 mL injection  40 mg IntraVENous DAILY Review of Systems: As above Data Review: 
 
Labs: Results:  
   
Chemistry Recent Labs  
  09/11/20 
0550 09/10/20 
1903 09/10/20 
0622 09/09/20 
2114 09/09/20 
2746 *  --  158*  --  139*   --  150*  --  151*  
K 3.7 3.2* 3.0* 3.8 2.9*  
  --  116*  --  118* CO2 23  --  24  --  26 BUN 21*  --  30*  --  32* CREA 1.76*  --  2.08*  --  2.62* CA 8.7  --  8.7 9.0 9.1 AGAP 7  --  10  --  7  
BUCR 12  --  14  --  12  
  --  116  --  123* TP 7.1  --  7.5  --  8.5* ALB 3.5  --  3.9  --  4.2 GLOB 3.6  --  3.6  --  4.3* AGRAT 1.0  --  1.1  --  1.0  
  
CBC w/Diff Recent Labs  
  09/11/20 
0550 09/10/20 
0622 09/09/20 
1630 WBC 11.9 16.6* 22.1*  
RBC 3.38* 3.55* 3.63* HGB 9.8* 10.4* 10.7* HCT 29.1* 30.8* 30.8*  262 262 GRANS 59 69 82* LYMPH 37 25 14* EOS 1 0 0 Coagulation No results for input(s): PTP, INR, APTT, INREXT, INREXT in the last 72 hours. Iron/Ferritin Recent Labs  
  09/09/20 
2114 IRON 107 BNP No results for input(s): BNPP in the last 72 hours. Cardiac Enzymes Recent Labs  
  09/11/20 
0550 09/10/20 
1900  09/09/20 
7699 CPK 1,817* 2,071*   < > 1,177* CKND1  --   --   --  0.6  
 < > = values in this interval not displayed. Liver Enzymes Recent Labs  
  09/11/20 
0550 TP 7.1 ALB 3.5  Thyroid Studies Lab Results Component Value Date/Time TSH 0.57 09/08/2020 04:40 PM  
    
 EKG: sinus Physical Assessment:  
 
Visit Vitals /83 (BP 1 Location: Right arm, BP Patient Position: At rest) Pulse 66 Temp 98.8 °F (37.1 °C) Resp 16 Ht 5' 8\" (1.727 m) Wt 94.3 kg (207 lb 12.8 oz) SpO2 97% Breastfeeding No  
BMI 31.60 kg/m² Weight change: 2.404 kg (5 lb 4.8 oz) Intake/Output Summary (Last 24 hours) at 9/11/2020 1156 Last data filed at 9/11/2020 0800 Gross per 24 hour Intake 2063.33 ml Output 200 ml Net 1863.33 ml Physical Exam:  
General: no respi distress HEENT sclera anicteric, supple neck, no thyromegaly CVS: S1S2 heard,  no rub RS: + air entry b/l, Abd: Soft, Non tender Neuro: sleeping Extrm: No edema, no cyanosis, clubbing Skin: no visible  Rash Musculoskeletal: No gross joints or bone deformities Procedures/imaging: see electronic medical records for all procedures, Xrays and details which were not copied into this note but were reviewed prior to creation of Darryl Hightower MD 
September 11, 2020 [facility] [phone]

## 2020-09-11 NOTE — MED STUDENT NOTES
*ATTENTION:  This note has been created by a medical student for educational purposes only. Please do not refer to the content of this note for clinical decision-making, billing, or other purposes. Please see attending physicians note to obtain clinical information on this patient. * Medical Student Progress Note [address] **Medical Student Note for Educational Purposes Only** Patient: Armand Cook MRN: [id number]  CSN: [id number] YOB: 1965  Age: 60 y.o. Sex: female DOA: 9/8/2020 LOS:  LOS: 3 days Subjective:  
 
Armand Cook is a 60 y.o.  female with PMHx significant for T2D with gastroparesis, neuropathy, and diabetic foot ulcers with osteomyelitis (s/p toe amputations), hypokalemia, CKD-2, left retinal detachment, HTN, HLD, GERD, and anxiety who was admitted for altered mental status and agitation in the setting of HHS and possible sepsis. Interval History: 
Patient was started on seroquel 12.5mg PRN and received 3 doses total. She had one episode of agitation last night, requiring one dose of haldol 2.5mg, which improved from 3 episodes on 9/9. She was scheduled for a brain MRI, which was postponed as the patient developed agitation that did not improve with haldol. Leukocytosis resolved (16.6 --> 11.9). Differential pending. Blood glucose 129 --> 221. Na+ improved (150 --> 141). K+ improving (3.0 --> 3.2). CK improving (2133 --> 2071 --> 1817). Subjective: The patient had waxing and waning somnolence, with periods of tugging at her lines and blood pressure cuff. She was not able to answer questions or verbalize her needs. Past Medical History:  
Diagnosis Date  Blind left eye  Cellulitis of great toe of right foot 10/19/2017  Diabetes ([address]  Diabetic retinopathy ([address]  Gall stones  GERD (gastroesophageal reflux disease)  Hammertoe  Hiatal hernia  History of mammogram 10/03/2017 No evidence of malignancy  Hypertension  Mass of abdomen  Neuropathy due to type 2 diabetes mellitus [facility]  Osteomyelitis of toe of right foot ([facility] 10/19/2017  Pancreatitis Past Surgical History:  
Procedure Laterality Date  HX AMPUTATION Left 2017  
 left 2nd toe  HX CHOLECYSTECTOMY  10/15/2014  HX GI Benign GI Stromal Tumor excision  HX HEENT Sx for detached retina  HX MYOMECTOMY x5 removed  HX OTHER SURGICAL    
 upper endoscopy Family History Adopted: Yes  
Problem Relation Age of Onset  Heart Failure Mother 76  
 Other Father 70  
     blood clot after surgery  Diabetes Paternal Aunt  Diabetes Paternal Uncle Social History Tobacco Use  Smoking status: Former Smoker Packs/day: 0.20 Years: 8.00 Pack years: 1.60 Types: Cigarettes Last attempt to quit: 12/3/1995 Years since quittin.7  Smokeless tobacco: Never Used Substance Use Topics  Alcohol use: No  
  Comment: Drinks 3-4xs year generally wine Prior to Admission medications Medication Sig Start Date End Date Taking? Authorizing Provider  
metoclopramide HCl (REGLAN) 5 mg tablet Take 1 Tab by mouth Before breakfast, lunch, and dinner. Take before meals. 19   Hilary GUERRERO MD  
insulin NPH/insulin regular (NOVOLIN 70/30, HUMULIN 70/30) 100 unit/mL (70-30) injection 40 Units by SubCUTAneous route two (2) times a day. 19   Tessa Tejeda PA-C  
PARoxetine (PAXIL) 40 mg tablet Take 40 mg by mouth daily. Provider, Historical  
rosuvastatin (CRESTOR) 20 mg tablet Take 1 Tab by mouth nightly. 17   Driss Head NP Allergies Allergen Reactions  Levaquin [Levofloxacin] Hives  Promethazine Nausea and Vomiting \"the phenergan made me more nauseated\" Review of Systems : 
Review of Systems Unable to perform ROS: Mental status change Objective:  
 
Visit Vitals /83 (BP 1 Location: Right arm, BP Patient Position: At rest) Pulse 66 Temp 98.8 °F (37.1 °C) Resp 16 Ht 5' 8\" (1.727 m) Wt 94.3 kg (207 lb 12.8 oz) LMP 10/06/2015 (Exact Date) SpO2 97% Breastfeeding No  
BMI 31.60 kg/m² Physical Examination:  
CONSTITUTIONAL:                              Patient is in bed with 2-point restraints in place. Demonstrates waxing and waning somnolence on exam. Occasionally attempts to free self from 2-point restraints and tugs at blood pressure cuff and tubing. EYES:                      
ENT:     Right eye demonstrates conjunctival injection and cloudy lens. No appreciable change since previous exam. Pupillary light reflex limited by patient cooperation. NECK:                            Supple, normal ROM  
CV:                                 Regular rate and rhythm, no murmurs/rubs/gallops RESPIRATORY: Clear to auscultation bilaterally, normal respiratory effort. MUSCULOSKELETAL: Right great toe and left second toe amputated. Scars well-healed and approximated. No atrophy or joint instability. NEURO: See Psych. Cranial nerve exam limited by patient mental status. No facial droop. Normal muscle tone of lower extremities. PSYCH: Somnolent on exam, not oriented to person, place, or time. Occasionally responsive to verbal and tactile cues. Not redirectable. SKIN:                             Ecchymoses (approx. 6 cm in width) found on the dorsal surface of both wrists. Laboratory Studies: 
Complete Blood Count with Differential  
Recent Labs  
  09/11/20 
0550 09/10/20 
6886 WBC 11.9 16.6* HCT 29.1* 30.8* BANDS 0 0 Basic Metabolic Profile Recent Labs  
  09/11/20 
0550 09/10/20 
8349  150* CO2 23 24 BUN 21* 30* Imaging: CXR 9/8: No radiographic evidence of acute cardiopulmonary disease. 
  
EKG 9/8: 
Sinus tachycardia. Nonspecific T wave abnormality.  Abnormal ECG  
  
 CT Head 9/9: 
No clearly acute intracranial findings. Mild prominence of the lateral ventricles, present on multiple priors, but 
perhaps slightly increased compared to more remote priors, probably due to 
central atrophy. However correlate clinically for possibility of normal pressure 
hydrocephalus. 
  
US Abdomen 9/9: 
1.  Status post cholecystectomy. 2.  No significant common bile duct dilation. 3.  Increased hepatic parenchymal echogenicity with somewhat heterogeneous 
appearance, potentially suggestive of hepatosteatosis.  Partial visualization of 
the liver. 
  
CT Chest/Abd/Pelvis 9/9: 
Chest: 
-No clearly acute findings. -Age-indeterminate mild T6 compression deformity. Abdomen/pelvis: 
-Bladder with mild wall thickening. Correlate clinically for UTI/cystitis. 
-Fibroid uterus. 
  
XR Abdomen 9/9: 
Mildly limited exam as described above. However within these limitations no 
radiopaque foreign body is seen. 
  
XR Orbit 9/9: 
Negative orbital screening prior to MRI. 
  
XR Skull A/P 9/9: 
No intracranial foreign body. Assessment and Plan:  
Lavell Randall is an 60 y.o. -American female with PMHx signifcant for T2D with gastroparesis, neuropathy, and diabetic foot ulcers with osteomyelitis (s/p toe amputations), hypokalemia, CKD-2, left retinal detachment, HTN, HLD, GERD, and anxiety who was admitted for altered mental status, HHS, and possible sepsis. Altered mental status Most likely metabolic encephalopathy. Patient's son states she has demonstrated altered mental status during previous infections, so she was initially thought to have sepsis from an infectious source. We initially considered urosepsis given her UA findings (>100,000 colonies) and CT findings (bladder wall thickening) vs. serotonin syndrome given her previous use of serotonergic drugs.  However, the patient's mental status did not improve with antibiotics or discontinuation of serotonergic drugs, despite resolution of her leukocytosis, suggesting another source. We would like to rule out CNS pathology, including encephalitis (viral vs. paraneoplastic vs. autoimmune), inflammation, or neoplasia. · Appreciate ID consult. ID suggests UTI is not the source of the patient's AMS. Agrees with obtaining brain MRI. · Continue IV Vanco/Zosyn · Haloperidol ordered PRN for agitation. We started her on quetiapine and titrated her dose up to 37.5mg  
· Goal is to remove restraints. Sitter ordered for during the day in order to facilitate restraint removal. 
· Per SLP, cleared for thin liquids and purees with aspiration precautions · Brain MRI ordered to evaluate for possible encephalitis or structural abnormality. Brain MRI was postponed due to agitation. We contacted Neurology for recommendations. If MRI is not suitable, consider LP. Elevated creatine kinase CK 1177 --> 2133 --> 1817. Differential includes rhabdomyolysis vs. serotonin syndrome vs. NMS. Our current suspicion is rhabdomyolysis given her elevated CK, hemoglobinuria, and use of restraints. · Cardiac work-up negative · Iron profile normal 
· Metoclopramide and paroxetine were held in case of possible serotonin syndrome. At this time, patient does not meet full criteria due to lack of hypertonia, rigidity, or autonomic instability. · Appreciate Nephrology consult - continue gentle IVF hydration. Recommend urine protein study. Start on calcitriol once more awake and alert. · One-time order of benztropine 1mg given on 9/10, without significant improvement. Although we were able to trial her on benztropine, we are not sure if cyproheptadine would be safe given her ocular symptoms and recent use of benztropine. · Continue gentle IVF hydration to prevent intrinsic kidney injury · Added seroquel (see above) to avoid haloperidol overdose 
  
Acute kidney injury in the setting of Chronic kidney disease stage 2 
 Cr 3.56 on admission. Baseline 1.09. Hyaline casts most consistent with prerenal CRYSTAL, but BUN/Cr ratio more consistent with intrinsic. Cr now trending downward. Concern for rhabdo given her elevated CK and myoglobinuria. · Continue to trend CK · Appreciate Nephro consult - OK to continue gentle IVF hydration: 0.45% NaCl with 20 mEq/L KCl at 100 cc/hr 
  
Hypernatremia (resolved) Na+ 151-150. Most likely secondary to IVF infusion. · Check CMP daily 
  
Hypokalemia (resolved) K+ 2.9 on admission. Corrected with 0.45% NaCl with KCl infusion. Electrolyte imbalance likely secondary to insulin administration or emesis. · Check CMP, magnesium, and phosphate daily  
  
Conjugated hyperbilirubinemia Total bili (2.6), direct bili (0.4), and alk phos (123) were elevated on 9/9. Abdominal US negative for gallbladder or biliary tree pathology. Her hyperbilirubinemia did not resolve with antibiotics and the patient has met SIRS criteria and has been NPO, so we cannot rule out possible Gilbert syndrome. · Continue to monitor bilirubin · , haptoglobin normal, aldolase pending 
  
Right eye pain and Right vision loss x 2 weeks Differential includes idiopathic vitreous floaters, retinal detachment, vitreous inflammation, uveitis, and scleritis. · Bedside ultrasound revealed floaters, but negative for papilledema. · 9/10 XR negative for FB. · Optho consulted for inpatient eval 
  
HHS (resolved) in the setting of History of Type 2 diabetes with diabetic gastroparesis, retinopathy, and diabetic foot ulcer (s/p amputation) · Insulin lispro QID before meals and at bedtime · Check glucose QID before meals and at bedtime 
  
Hypertension Well controlled. · Not on medication 
  
Hyperlipidemia · Held home rosuvastatin 20mg 
  
GERD · Held home omeprazole · Continue IV pantoprazole 40mg 
  
Anxiety · Held paroxetine 60mg Diet:  Puree with thin liquids, aspiration precautions DVT Prophylaxis:  Heparin GI Prophylaxis:  Pantoprazole Code Status:  Full code Point of Contact:  Wolfgang Crews (Relationship: Yoni, [phone]) Disposition:  Pending Charito Griffin, MS-3 [address] 9/11/2020, 207:19 AM

## 2020-09-11 NOTE — PROGRESS NOTES
[address] [facility] Progress Note Patient: Sharon [facility] [place] MRN: [id number] SSN: [id number]  YOB: 1965 Age: 60 y.o. Sex: female Admit Date: 9/8/2020 LOS: 3 days Chief Complaint Patient presents with  Vomiting Subjective:  
 
Overnight: Patient remained agitated but agitation was improved from previous night. Pt required haldol 2.5 mg overnight and additional 12.5 mg Seroquel ( total 37.5 Seroquel) . Pt was unable to get MRI head overnight even with ativan, and haldol. Pt is not able to provide history because of altered mental status. Pt stated that she was in the hospital, but not able to state name. ROS Unable to evaluate because patient will not answer questions, altered mental status Objective:  
 
Visit Vitals /83 Pulse 93 Temp 98.9 °F (37.2 °C) Resp 17 Ht 5' 8\" (1.727 m) Wt 94.3 kg (207 lb 12.8 oz) LMP 10/06/2015 (Exact Date) SpO2 100% BMI 31.60 kg/m² Physical Exam: 
General:  AXOx1, Pt follows command intermittently, eyes closed , keeps trying to get out of bed HEENT: R eye erythema, no drainage,  R not reactive dilated, Left eye sluggish but react to light, moist mucous membranes. CV:  RRR, no murmurs. No visible pulsations or thrills. RESP:  Unlabored lungs clear to auscultation without adventitious breath sounds. Equal expansion bilaterally. ABD:  soft abdomen, non-tender,  nondistended. BS (+). MS:  No joint deformity or instability. No atrophy. Neuro:  Pt no facial droop, no deviation of tongue, unable to evaluate EOM because patient does not follow commands, moves all four extremities, Lower extremity bilaterally rigid tone, negative babinski Ext:  R foot the first digit is amputated, Left second digit amputated, No edema. 2+ radial and dp pulses bilaterally. Skin:  No rashes, lesions, or ulcers.  
  
Intake and Output: 
Current Shift: 09/10 1901 - 09/11 0700 In: 360 [P.O.:360] Out: 200 [Urine:200] Last three shifts: 09/09 0701 - 09/10 1900 In: 350 [I.V.:350] Out: -  
 
Lab/Data Review: 
Recent Results (from the past 12 hour(s)) GLUCOSE, POC Collection Time: 09/10/20  5:22 PM  
Result Value Ref Range Glucose (POC) 164 (H) 70 - 110 mg/dL CK Collection Time: 09/10/20  7:00 PM  
Result Value Ref Range CK 2,071 (H) 26 - 192 U/L  
POTASSIUM Collection Time: 09/10/20  7:03 PM  
Result Value Ref Range Potassium 3.2 (L) 3.5 - 5.5 mmol/L  
AMMONIA Collection Time: 09/10/20  7:55 PM  
Result Value Ref Range Ammonia 17 11 - 32 UMOL/L  
GLUCOSE, POC Collection Time: 09/10/20 11:33 PM  
Result Value Ref Range Glucose (POC) 129 (H) 70 - 110 mg/dL RECENT RESULTS MODALITY IMPRESSION  
XR Results from Hospital Encounter encounter on 09/08/20 XR ORBIT BI FOREIGN BODY Narrative EXAM: ORBITS LIMITED CLINICAL HISTORY/INDICATION: , blurred vision with nausea, vomiting, and 
abdominal pain, severe agitation, altered mental status, diabetic retinopathy 
due to type 2 diabetes [facility] mri screen. COMPARISON: None. TECHNIQUE: modified ramos view(s) of the orbits obtained. FINDINGS: 
 
There are no radiopaque metallic foreign bodies within the orbits. There is no 
aneurysm clip. The visualized  paranasal sinuses are normal. 
  
 Impression IMPRESSION: 
 
Negative orbital screening prior to MRI. CT Results from Hospital Encounter encounter on 09/08/20 CT CHEST ABD PELV WO CONT Narrative EXAMINATION: CT chest/abdomen/pelvis without contrast 
 
INDICATION: Sepsis, leukocytosis COMPARISON: CT abdomen 1/4/2019 TECHNIQUE: CT of the chest, abdomen, and pelvis performed without contrast. 
Multiplanar reformations obtained. All CT scans at this facility are performed 
using dose optimization technique as appropriate to a performed exam, to include 
automated exposure control, adjustment of the mA and/or kV according to patient size (including appropriate matching first site specific examinations), or use 
of iterative reconstruction technique. FINDINGS: 
 
Evaluation of soft tissues and vessels limited without vascular enhancement. Streak artifact from arms also limits evaluation. CHEST: 
 
Cardiovascular: Major vessels unremarkable. Heart size normal. 
 
Mediastinum: Imaged thyroid unremarkable. No adenopathy by size criteria. Esophagus nondistended. Pleura: No effusion. No pneumothorax. Lungs/airways: Bronchial tree unremarkable. No confluent consolidation. Miscellaneous: Superficial soft tissues unremarkable. Bones: T6 mild superior endplate depression, age indeterminate. ABDOMEN/PELVIS: 
 
Hepatobiliary: Gallbladder absent. Liver unremarkable. No biliary duct 
dilatation. Pancreas: Unremarkable. Spleen: Unremarkable. Adrenals: Unremarkable. Genitourinary: Right kidney unremarkable. Left kidney unremarkable. Bladder with 
mild wall thickening. Multilobulated uterus containing multiple calcifications. Gastrointestinal: Stomach unremarkable. Small bowel loops nondilated. The colon 
unremarkable. Appendix unremarkable. Mesentery/vessels/nodes: No free air. No free fluid. Major vessels unremarkable. No adenopathy by size criteria. Miscellaneous: Superficial soft tissues unremarkable. Bones: No acute osseous findings. Impression IMPRESSION: 
 
Chest: 
-No clearly acute findings. -Age-indeterminate mild T6 compression deformity. Abdomen/pelvis: 
-Bladder with mild wall thickening. Correlate clinically for UTI/cystitis. 
-Fibroid uterus. MRI Results from [place] encounter on 07/18/17 MRI FOOT LT WO CONT Narrative EXAMINATION: MRI left foot without contrast 
 
INDICATION: Left second toe infection, possible osteomyelitis COMPARISON: Radiographs 7/18/2017 TECHNIQUE: Multiplanar multiecho sequences of the left forefoot performed 
without contrast. 
 
FINDINGS: 
 Bones/joints: Second toe distal phalanx is poorly delineated, likely due to 
extensive marrow infiltration and osseous destruction. Mild marrow edema in the 
second toe middle phalanx with T1 marrow signal largely preserved. Extensive 
associated second toe soft tissue edema. No suspicious marrow signal or acute 
fracture elsewhere. Ligaments: Lisfranc ligament intact. Imaged collateral ligaments are 
unremarkable. Tendons: Ill-definition of the second toe flexor tendon near distal insertion. Otherwise flexor and extensor tendons in the forefoot appear largely intact. Miscellaneous: Extensive soft tissue edema in the second toe, and mild patchy 
soft tissue edema throughout the forefoot. Impression IMPRESSION: 
 
1. Findings are consistent with osteomyelitis in the second toe distal phalanx. Marked soft tissue swelling in the second toe. See additional details above. ULTRASOUND Results from Hospital Encounter encounter on 09/08/20 US ABD LTD Impression IMPRESSION:    
 
1. Status post cholecystectomy. 2.  No significant common bile duct dilation. 3.  Increased hepatic parenchymal echogenicity with somewhat heterogeneous 
appearance, potentially suggestive of hepatosteatosis. Partial visualization of 
the liver. Results from [facility] Encounter encounter on 01/22/19 US ABD LTD Impression IMPRESSION: 
 
Mild heterogeneous echogenicity of the liver is nonspecific. This is commonly 
seen with steatosis hepatic disease. Limited visualization of the pancreas. Cardiology Procedures/Testing: MODALITY RESULTS  
EKG Results for orders placed or performed during the hospital encounter of 09/08/20 EKG, 12 LEAD, INITIAL Result Value Ref Range Ventricular Rate 132 BPM  
 Atrial Rate 132 BPM  
 P-R Interval 158 ms QRS Duration 72 ms Q-T Interval 274 ms QTC Calculation (Bezet) 405 ms Calculated P Axis 43 degrees Calculated R Axis 33 degrees Calculated T Axis 148 degrees Diagnosis Sinus tachycardia Nonspecific T wave abnormality Abnormal ECG When compared with ECG of 08-SEP-2020 15:04, No significant change was found Confirmed by Campos Benavides MD, Joey Mac ([id number]) on 9/9/2020 7:48:30 AM 
  
Results for orders placed or performed in visit on 06/20/14 AMB POC EKG ROUTINE W/ 12 LEADS, INTER & REP Impression See progress note. ECHO 01/03/19 ECHO ADULT COMPLETE 01/04/2019 1/4/2019 Narrative · Estimated left ventricular ejection fraction is 61 - 65%. Left  
ventricular mild concentric hypertrophy. Mild (grade 1) left ventricular  
diastolic dysfunction. · Mild tricuspid valve regurgitation is present. Signed by: Nasreen Leblanc MD  
  
 
Special Testing/Procedures: MODALITY RESULTS MICRO All Micro Results Procedure Component Value Units Date/Time CULTURE, BLOOD [[id number]] Collected:  09/08/20 1640 Order Status:  Completed Specimen:  Blood Updated:  09/10/20 6477 Special Requests: NO SPECIAL REQUESTS Culture result: NO GROWTH 2 DAYS     
 CULTURE, BLOOD [[id number]] Collected:  09/08/20 1640 Order Status:  Completed Specimen:  Blood Updated:  09/10/20 3487 Special Requests: NO SPECIAL REQUESTS Culture result: NO GROWTH 2 DAYS     
 CULTURE, URINE [[id number]] Collected:  09/08/20 1211 Order Status:  Completed Specimen:  Urine from Clean catch Updated:  09/09/20 1811 Special Requests: NO SPECIAL REQUESTS [place] Count --     
  >100,000 COLONIES/mL Culture result:    
  MIXED UROGENITAL AUDRA ISOLATED  
     
  
  
UA No results found for this or any previous visit. PATH none Assessment and Plan:  
60 y.o. 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   
  
Metabolic Encephalopathy of unclear etiology? Possible drug induced/withdrawal (serotonin syndrome) Came with a -460 BG, She was treated with IV fluids . She had an leucocytosis, and lactic acidosis that improved. Unlikely sepsis, leucocytosis could be due to HHS or reactive So far blood culture on 9/8 was no growth. Pt's UA only wbc 6-8, 2+ bacteria, LE neg/Nitrite neg. Also, urine culture >100, 000 colony mixed urogenital kyle. Her  9/9 CT Chest/AB/Pelvis showed bladder with mild wall thickening cystitis unlikely UTI. Spoke with ID and unlikely UTI Possible UTI but unclear picture because mental status still not improving with IV antibiotics -Hypernatremia (151--> 150--> 141) resolved 
- negative UDS and ETOH serum (9/8) - 9/8 CT head showed mild prominence: mild enlargement of lateral ventricles Labs negative for Negative troponin, EKG no ST elevation or depression, lipase normal, ammonia nml  
- cont empirical  Ceftriaxone  (9/8-/9) Plan:  
- started vancomycin (9/8-  and zoysn (9/9- ) 
- hold meds metoclopramide and paroxetine 
- cont IVF  
- MRI head ( will try again patient too agitated, 3 mg haldol) 
- consider LP if patient does not improve # Agitation Wax and waning 
- restrains, role belt 
- consider scheduling haldol or ativan  
- scheduled Seroquel 37.5mg BID long acting 
- consider haldol 2 mg as needed short acting 
- haldol 3mg IV   for MRI head  
  
  
CRYSTAL CR 3.56 ( baseline Cr 1.09 1/2019) in setting of CKD stage 2  likely pre-renal vs sepsis vs rhabdo S/p 3 L IVF Iron profile normal 
CK was 1177--> 2133--> 2,071 Myoglobin 1,092 postive .8 elevated and vitamin D on low side of normal corrected Ca normal , Vitamin D nml , P nm Plan:   
Trend Ck Nephrology following appreciate recs IVF F/u microalb/cr ratio 
 
  
 
R eye pain + Right eye vision loss X 2 weeks Unlikely papilledema based on bedside ultrasound ( no enlarged optic sheath seen), likely floater vs retinal detachment vs pink eye vs acute angle gluacoma Plan: - Needs optho eval 
  
Hypokalemia (resolved) 
electrolyte imbalance (hypokalemia, corrected Ca wnml )  
- replete electrolyte as needed, Mg, Phos 
  
Hyperbilirubinemia (3.9) w/ 0.6 direct likely majority is unconjugated  likely secondary to [place] syndrome, previously elevated bili unlikely hemolysis CT AB/US no gall bladder disease appreciated , haptoglobin normal, aldolase pending  
  
  
 Diabetes ( last A1c 7.2) neuropathy and L retinopathy  
- held NPH 40U BID (hold home NPH 50 U BID) because patient NPO 
  
 HTN 
- not on meds 
  
 HLD 
- cont rosuvastatin  
  
Gastroparesis  
- held metoclopramide 5 mg  
  
GERD 
- cont  omeprazole 40 mg delayed capsule 
  
  
Anxiety 
- held  paroxetine 60 mg daily Diet Advance as tolerated per speech DVT Prophylaxis heparin GI Prophylaxis Omperazole Code status Full code Disposition unknown  
  
Point of [address] [employer] [facility] Relationship: 
[phone] Julius Hernandez PGY-1  
[address] Pager: [phone] September 11, 2020, 7:35 AM

## 2020-09-11 NOTE — PROGRESS NOTES
MEWS 5 Pt is agitated,confused and screaming. /78  MD made aware. Will continue to monitor pt. 
2030 Pt is restless/confused. Breathing regular and non labored. Will continue to moniotr pt. 
2130 Pt yelling/screaming. Pt is alert nd oriented to name. Breathing regular and non labored. Will continue to monitor pt. 
2200 Pt is screaming/yelling MD here and examined pt. Breathing regular and non labored. 2215 Pt is resting quietly. Respiration regular and non labored. 2345 Pt is sleeping. Breathing regular and non labored. 0230 Pt started yelling and agitated. Md made aware with order received. She rarely opening her eyes. Respiration regular and non labored. 0430 Pt is resting in bed. Respiration regular and non labored. 0710 Bedside shift change report given to Shae (oncoming nurse) by Lizzette Rebolledo (offgoing nurse). Report given with SBAR, Kardex, Intake/Output, MAR and Recent Results.

## 2020-09-11 NOTE — PROGRESS NOTES
0730: Bedside report received from [facility]. Sitter at bedside. Restraints intact. 0800: Pt responds to verbal commands. No signs of agitation and is very lethargic. 1000: Removed R upper arm IV due to infiltration. L midline still intact. 1200: Pt asleep. 1450: MRI stated anesthesia is unavailable to sedate Pt for MRI. Notified [facility] [place] and received approval to use PRN halidol to sedate Pt for procedure. 1520: Departed floor with Pt to MRI with PRN halidol. 1700: Pt returned to floor. 1830: Bladder scanned Pt. Pt's retaining >988 mL. Received orders to straight cath Pt.  
 
1930: Bedside shift change report given to Ethyl Holstein, RN (oncoming nurse) by Kiersten Govea RN (offgoing nurse). Report included the following information SBAR, Kardex, Intake/Output, MAR, Recent Results and Med Rec Status.

## 2020-09-11 NOTE — CONSULTS
Neurology Consult Patient ID: 
Lavell Randall [id number] 
60 y.o. 
1965 Subjective:  
 
Date of Consultation:  September 11, 2020 Reason for Consultation:  Altered mental status, ?seretonin syndrome History of Present Illness:  
Lavell Randall is a 60 y.o.  AA female with a hx of  retinopathy, gastroparesis, hypertension, GERD, anxiety, presents to ER with altered mental status on 09/08. History is only obtained from chart, and provided by Dr Rafael Cornelius and nursing and I have no other resources. According to the record, she was brought to ER where noted she was quite confused, but conversant, able to express to needs, like asked for bedpan, etc. Notes indicate, \"Patient meets SIRS criteria, attempt to obtain blood cultures and lactic acid unsuccessful x2.\" Since admission, she becomes more agitated and delirium. She remains afebrile. SARS COV2 was not tested. She was given seroquel and haldol periodically. Her mental function does not seem to improve. She was on Paxil prior and it was discontinued. Past Medical History:  
Diagnosis Date  Blind left eye  Cellulitis of great toe of right foot 10/19/2017  Diabetes ([address]  Diabetic retinopathy ([address]  Gall stones  GERD (gastroesophageal reflux disease)  Hammertoe  Hiatal hernia  History of mammogram 10/03/2017 No evidence of malignancy  Hypertension  Mass of abdomen  Neuropathy due to type 2 diabetes mellitus ([address]  Osteomyelitis of toe of right foot ([address] 10/19/2017  Pancreatitis Past Surgical History:  
Procedure Laterality Date  HX AMPUTATION Left 07/21/2017  
 left 2nd toe  HX CHOLECYSTECTOMY  10/15/2014  HX GI Benign GI Stromal Tumor excision  HX HEENT Sx for detached retina  HX MYOMECTOMY x5 removed  HX OTHER SURGICAL    
 upper endoscopy Family History Adopted: Yes  
Problem Relation Age of Onset  Heart Failure Mother 76  
 Other Father 70  
 blood clot after surgery  Diabetes Paternal Aunt  Diabetes Paternal Uncle Social History Tobacco Use  Smoking status: Former Smoker Packs/day: 0.20 Years: 8.00 Pack years: 1.60 Types: Cigarettes Last attempt to quit: 12/3/1995 Years since quittin.7  Smokeless tobacco: Never Used Substance Use Topics  Alcohol use: No  
  Comment: Drinks 3-4xs year generally wine Allergies Allergen Reactions  Levaquin [Levofloxacin] Hives  Promethazine Nausea and Vomiting \"the phenergan made me more nauseated\" Prior to Admission medications Medication Sig Start Date End Date Taking? Authorizing Provider  
metoclopramide HCl (REGLAN) 5 mg tablet Take 1 Tab by mouth Before breakfast, lunch, and dinner. Take before meals. 19   Patrick GUERRERO MD  
insulin NPH/insulin regular (NOVOLIN 70/30, HUMULIN 70/30) 100 unit/mL (70-30) injection 40 Units by SubCUTAneous route two (2) times a day. 19   Aurora Masterson PA-C  
PARoxetine (PAXIL) 40 mg tablet Take 40 mg by mouth daily. Provider, Historical  
rosuvastatin (CRESTOR) 20 mg tablet Take 1 Tab by mouth nightly. 17   Stevan Habermann, NP Review of Systems: 
Unable, due mental status change. Objective:  
 
Patient Vitals for the past 8 hrs: 
 BP Temp Pulse Resp SpO2 Weight  
20 1059 133/83 98.8 °F (37.1 °C) 66 16 97 %   
20 0800     100 %   
20 0726 106/74 98.2 °F (36.8 °C) 94 16 100 %   
20 0436   93 17 100 %   
20 0435      94.3 kg (207 lb 12.8 oz) Neurological Exam 
Quite lethargic, responded to noxious stimulation with grimacing and moaning, gesture movement. Positive oculocephalic reflex. Visual acuity is not testable. No facial paresis Moving limbs on commands, hand  and raising legs. CT head: no acute change, slight enlarge ventricular system. Labs:  
Recent Results (from the past 12 hour(s)) GLUCOSE, POC Collection Time: 09/11/20  5:32 AM  
Result Value Ref Range Glucose (POC) 221 (H) 70 - 110 mg/dL CBC WITH MANUAL DIFF Collection Time: 09/11/20  5:50 AM  
Result Value Ref Range WBC 11.9 4.6 - 13.2 K/uL  
 RBC 3.38 (L) 4.20 - 5.30 M/uL HGB 9.8 (L) 12.0 - 16.0 g/dL HCT 29.1 (L) 35.0 - 45.0 % MCV 86.1 74.0 - 97.0 FL  
 MCH 29.0 24.0 - 34.0 PG  
 MCHC 33.7 31.0 - 37.0 g/dL  
 RDW 14.0 11.6 - 14.5 % PLATELET 877 599 - 728 K/uL MPV 11.7 9.2 - 11.8 FL  
 DIFFERENTIAL MANUAL DIFFERENTIAL ORDERED    
 NEUTROPHILS 59 42 - 75 % BAND NEUTROPHILS 0 0 - 5 % LYMPHOCYTES 37 20 - 51 % MONOCYTES 3 2 - 9 % EOSINOPHILS 1 0 - 5 % BASOPHILS 0 0 - 3 % METAMYELOCYTES 0 0 % MYELOCYTES 0 0 % PROMYELOCYTES 0 0 % BLASTS 0 0 % OTHER CELL 0 0    
 ABS. NEUTROPHILS 7.0 1.8 - 8.0 K/UL  
 ABS. LYMPHOCYTES 4.4 (H) 0.8 - 3.5 K/UL  
 ABS. MONOCYTES 0.4 0 - 1.0 K/UL  
 ABS. EOSINOPHILS 0.1 0.0 - 0.4 K/UL  
 ABS. BASOPHILS 0.0 0.0 - 0.06 K/UL  
 DF MANUAL PLATELET COMMENTS ADEQUATE PLATELETS    
 RBC COMMENTS NORMOCYTIC, NORMOCHROMIC METABOLIC PANEL, COMPREHENSIVE Collection Time: 09/11/20  5:50 AM  
Result Value Ref Range Sodium 141 136 - 145 mmol/L Potassium 3.7 3.5 - 5.5 mmol/L Chloride 111 100 - 111 mmol/L  
 CO2 23 21 - 32 mmol/L Anion gap 7 3.0 - 18 mmol/L Glucose 198 (H) 74 - 99 mg/dL BUN 21 (H) 7.0 - 18 MG/DL Creatinine 1.76 (H) 0.6 - 1.3 MG/DL  
 BUN/Creatinine ratio 12 12 - 20 GFR est AA 36 (L) >60 ml/min/1.73m2 GFR est non-AA 30 (L) >60 ml/min/1.73m2 Calcium 8.7 8.5 - 10.1 MG/DL Bilirubin, total 2.3 (H) 0.2 - 1.0 MG/DL  
 ALT (SGPT) 35 13 - 56 U/L  
 AST (SGOT) 57 (H) 10 - 38 U/L Alk. phosphatase 106 45 - 117 U/L Protein, total 7.1 6.4 - 8.2 g/dL Albumin 3.5 3.4 - 5.0 g/dL Globulin 3.6 2.0 - 4.0 g/dL A-G Ratio 1.0 0.8 - 1.7 PHOSPHORUS Collection Time: 09/11/20  5:50 AM  
Result Value Ref Range Phosphorus 2.3 (L) 2.5 - 4.9 MG/DL MAGNESIUM Collection Time: 09/11/20  5:50 AM  
Result Value Ref Range Magnesium 1.5 (L) 1.6 - 2.6 mg/dL CK Collection Time: 09/11/20  5:50 AM  
Result Value Ref Range CK 1,817 (H) 26 - 192 U/L  
GLUCOSE, POC Collection Time: 09/11/20 11:03 AM  
Result Value Ref Range Glucose (POC) 164 (H) 70 - 110 mg/dL Assessment:  
Acute encephalopathy, ?etiology, Differential inlcuded toxic/metabolic, ID Plan: MRI brain to r/o structural pathology. Reviewed CT, slight enlarge ventricles, NPH is not likely though. Seretonin syndrome is included in differential, but not likely Avoid using neuroleptics, either typical or atypical. 
Restore circadian cycle,  
Try  non-pharmacotherapy, or use low effective dose  of sedative, prn for behavioral control. EEG Signed By:  Sintia Boyd MD   
 September 11, 2020

## 2020-09-11 NOTE — PROGRESS NOTES
Brief Progress Note SUBJECTIVE:  
Called by nursing for agitation. Pt remains delirious, frequently yelling or calling out. She is able to answer questions appropriately, follow simple commands, and is oriented to person and place. She is still rarely opening her eyes and frequently pulling at her restraints, the pulse ox, lines, etc. Within a few minutes of leaving the room, she begins yelling again. OBJECTIVE: 
Visit Vitals /83 Pulse 93 Temp 98.9 °F (37.2 °C) Resp 17 Ht 5' 8\" (1.727 m) Wt 94.3 kg (207 lb 12.8 oz) SpO2 100% BMI 31.60 kg/m² ASSESSMENT & PLAN: 
1. Agitation and delirium - slow improvement in symptoms, remains agitated. No sitter available this evening. Per nursing, they cannot see the order in the chart.  
- 2.5 mg haldol single dose - placed second order for sitter 
- will reassess if further agitated For full assessment and plan, please see daily progress note. Latia Lopes MD, PGY-1 
[facility] Medicine 09/11/20 6:16 AM

## 2020-09-11 NOTE — PROGRESS NOTES
Violent restraint order was a mistake, non violent restraints were ordered. Care- 
Problem: Violent Restraints Goal: *Removal from restraints as soon as assessed to be safe Outcome: Resolved/Met Goal: *No harm/injury to patient while restraints in use Outcome: Resolved/Met Goal: *Patient's dignity will be maintained Outcome: Resolved/Met Goal: *STG: Regains control of behavior Outcome: Resolved/Met Goal: *STG: Patient uses nondestructive means to ventilate frustration Outcome: Resolved/Met Goal: *STG: Patient tolerates environmental stimuli without aggressive behavior Outcome: Resolved/Met Goal: *STG/LTG: Complies with medication therapy Outcome: Resolved/Met Goal: Violent Restraints:Standard Intervention Outcome: Resolved/Met Goal: Violent Restraints: Patient Interventions Outcome: Resolved/Met Problem: Patient Education: Go to Patient Education Activity Goal: Patient/Family Education Outcome: Resolved/Met 
 plan resolved.

## 2020-09-11 NOTE — PROGRESS NOTES
Infectious Disease progress Note Reason: sepsis, worsening leukocytosis Current abx Prior abx Ceftriaxone, vancomycin 9/8/20-9/9/20 Zosyn since 9/9/20 Lines:  
 
 
Assessment : 
 
24-year-old lady with history of type 2 diabetes, diabetic retinopathy, diabetic neuropathy admitted to [facility] on 9/8/2020 with altered mental status. Now with persistent leukocytosis, altered mentation, acute kidney injury, right eye pain, elevated LFTs Patient presents with highly complex clinical picture. Etiology of altered mentation and clinical presentation not entirely clear at this time. No definitive evidence of sepsis/infection identified on exam, radiological imaging. Gradually improving mentation. Improved leukocytosis could be due to improving renal function, hyperglycemia. Elevated LFTs, abdominal pain/tenderness on admission - no  hepatobiliary pathology identified on CT scan 9/9/20. Persistent elevated bilirubin 
 
etiology of eye pain, vision impairment right eye - etiology unclear Recommendations: 1. Cont. Piperacillin/tazobactam for now 2. Obtain MRI brain to rule out undiagnosed CNS pathology as a cause of altered mentation 3. Management of right eye pain per primary team- recommend ophthalmology evaluation 4. Recommend GI consult for unexplained hyperbilirubinemia. 5.  Follow-up nephrology recommendations 6. Follow-up CBC, clinically 7. Duration of antibiotics based on the above test results and clinical course Above plan was discussed in details with dr Kia Lundberg. Please call me if any further questions or concerns. Will continue to participate in the care of this patient. HPI: 
 
 
Patient was sleepy  at the time of my exam.  Detailed review of systems not feasible. 
 
 
 
 
home Medication List  
 Details  
metoclopramide HCl (REGLAN) 5 mg tablet Take 1 Tab by mouth Before breakfast, lunch, and dinner. Take before meals. Qty: 90 Tab, Refills: 3 insulin NPH/insulin regular (NOVOLIN 70/30, HUMULIN 70/30) 100 unit/mL (70-30) injection 40 Units by SubCUTAneous route two (2) times a day. Qty: 10 mL, Refills: 3 Associated Diagnoses: Type 2 diabetes mellitus with complication, with long-term current use of insulin ([facility] PARoxetine (PAXIL) 40 mg tablet Take 40 mg by mouth daily. rosuvastatin (CRESTOR) 20 mg tablet Take 1 Tab by mouth nightly. Qty: 90 Tab, Refills: 3 Current Facility-Administered Medications Medication Dose Route Frequency  haloperidol lactate (HALDOL) injection 3 mg  3 mg IntraMUSCular ONCE PRN  
 magnesium sulfate 1 g/100 ml IVPB (premix or compounded)  1 g IntraVENous ONCE  
 QUEtiapine (SEROquel) tablet 25 mg  25 mg Oral BID  
 0.45% sodium chloride with KCl 20 mEq/L infusion   IntraVENous CONTINUOUS  piperacillin-tazobactam (ZOSYN) 2.25 g in 0.9% sodium chloride (MBP/ADV) 50 mL MBP  2.25 g IntraVENous Q6H  
 sodium chloride (NS) flush 5-10 mL  5-10 mL IntraVENous PRN  
 [Held by provider] insulin NPH (NOVOLIN N, HUMULIN N) injection 40 Units  40 Units SubCUTAneous ACB&D  
 insulin lispro (HUMALOG) injection   SubCUTAneous Q6H  
 glucose chewable tablet 16 g  16 g Oral PRN  
 glucagon (GLUCAGEN) injection 1 mg  1 mg IntraMUSCular PRN  
 dextrose (D50W) injection syrg 12.5-25 g  25-50 mL IntraVENous PRN  
 sodium chloride (NS) flush 5-40 mL  5-40 mL IntraVENous Q8H  
 sodium chloride (NS) flush 5-40 mL  5-40 mL IntraVENous PRN  
 acetaminophen (TYLENOL) tablet 650 mg  650 mg Oral Q6H PRN Or  
 acetaminophen (TYLENOL) suppository 650 mg  650 mg Rectal Q6H PRN  polyethylene glycol (MIRALAX) packet 17 g  17 g Oral DAILY PRN  
 heparin (porcine) injection 5,000 Units  5,000 Units SubCUTAneous Q8H  
 [Held by provider] PARoxetine (PAXIL) tablet 60 mg  60 mg Oral DAILY  [Held by provider] pantoprazole (PROTONIX) tablet 40 mg  40 mg Oral DAILY  [Held by provider] rosuvastatin (CRESTOR) tablet 20 mg  20 mg Oral QHS  ondansetron (ZOFRAN) injection 4 mg  4 mg IntraVENous Q6H PRN  pantoprazole (PROTONIX) 40 mg in 0.9% sodium chloride 10 mL injection  40 mg IntraVENous DAILY Allergies: Levaquin [levofloxacin] and Promethazine Temp (24hrs), Av.3 °F (36.8 °C), Min:97.9 °F (36.6 °C), Max:98.9 °F (37.2 °C) Visit Vitals /74 (BP 1 Location: Right arm, BP Patient Position: At rest) Comment: got v\s with v\s machine Pulse 94 Temp 98.2 °F (36.8 °C) Resp 16 Ht 5' 8\" (1.727 m) Wt 94.3 kg (207 lb 12.8 oz) LMP 10/06/2015 (Exact Date) SpO2 100% BMI 31.60 kg/m² ROS: 12 point ROS obtained in details. Pertinent positives as mentioned in HPI,  
otherwise negative Physical Exam: 
 
General: sleepy, in no respiratory distress HEENT: R eye erythema, no drainage,  R and left eye was sluggish to react to light, moist mucous membranes. no nuchal rigidity CV:  RRR. No visible pulsations or thrills. RESP:  Unlabored lungs clear to auscultation without adventitious breath sounds. Equal expansion bilaterally. ABD:  soft, no tenderness,  nondistended. BS (+). MS:  No joint deformity or instability. No atrophy. Neuro:  Pt no facial droop, no deviation of tongue, unable to evaluate EOM because patient does not follow commands, moves all four extremities, Lower extremity bilaterally rigid tone, negative babinski Ext:  R foot the first digit is amputated, Left second digit amputated, No edema. Skin:  No rashes, lesions, or ulcers. Psychiatry: appropriate mood and affect 
  
 
RADIOLOGY: 
 
All available imaging studies/reports in [facility] for this admission were reviewed Total time spent >35 minutes. High complexity decision making was performed during the evaluation of this patient at high risk for decompensation with multiple organ involvement Above mentioned total time spent on reviewing the case/medical record/data/notes/EMR/patient examination/documentation/coordinating care with nurse/consultants, exclusive of procedures with complex decision making performed and > 50% time spent in face to face evaluation. Dr. Maricarmen Strong, Infectious Disease Specialist 
[phone] September 11, 2020 
3:38 PM

## 2020-09-11 NOTE — PROGRESS NOTES
When confusion/agitation subsides will need PT/OT evals. Patient lives with son, Chantel Rowell completed on this date for possible placement, plan is SNF vs home with son and home health. Left message for Ms Dorita Corona at  and faxed [employer] to try and get patient LTC medicaid. Nelia Alvarado MSW Case Management [phone]

## 2020-09-11 NOTE — DIABETES MGMT
Glycemic Control Plan of Care 
 
T2DM with current A1c of 7.0% (9/08/2020) Patient confused, agitated, on bilateral soft restraints. DCP: to be determined per CM Home diabetes medications: Unverified Novolin 70/30 mix insulin 40 units BID Recommendation(s): 
1.) cont to hold BID NPH insulin order 2.) cont the sliding scale lispro insulin 3.) cont to monitor BG readings 4.) monitor po intake Glycemic assessment:  
 
Seen patient this morning, sleeping, and sitter at bedside reported patient only ate small bites for breakfast. Patient on aspiration precaution. She is on dysphagia pureed diet and need assistance with feeding. POC BG 9/10: 185 POC BG 9/11 at time of review: 221, 164 Lab FBG 9/11: 198 Current A1C: 7.0% (9/08/2020) which is equivalent to estimated average blood glucose of 154 mg/dL during the past 2-3 months Current hospital diabetes medications: 
Correctional lispro insulin every 6 hours. Normal sensitivity dose [currently on hold] NPH insulin 40 units BID AC Total daily dose insulin requirement previous day: 9/10: 
Lispro: 6 units Diet: Dysphagia pureed Goals:  Blood glucose will be within target range of  mg/dL by 9/14/2020 Education:  ___  Refer to Diabetes Education Record 
           __X_  Education not indicated at this time Leola Nixon RN [facility] Pager: [phone]

## 2020-09-12 NOTE — PROGRESS NOTES
Progress Note 58y F with PMH DM type 2, HTN CKD presented with HHS following for CRYSTAL Subjective Overnight event noted Remain confused IMPRESSION:  
Acute kidney injury, pre renal, severe dehydration for HHS, rhabdo 
CKD, h/o CRYSTAL in past, last available creatinine is at 1.9mg/dl last year Hypernatremia /Hyperchloremia Sepsis Ketoacidosis, improving Hypokalemia  
rhabdomylysis DM type 2 Altered mental status, supect metabolic and septic encephalopathy Diastolic heart failure, grade 1 PLAN:  
Her renal function is imporivng, CK is also trending down. Her IV fluid can be discontinued once able to take PO well. I would suggest to decrease rate to 50cc/hr. Start on calcitril 0.25mcg on discharge. Pending MRI study.    
 
  
Discussed with primary team.  
 
 
 
 
 
Current Facility-Administered Medications Medication Dose Route Frequency  piperacillin-tazobactam (ZOSYN) 3.375 g in 0.9% sodium chloride (MBP/ADV) 100 mL MBP  3.375 g IntraVENous Q6H  
 cholecalciferol (VITAMIN D3) (1000 Units /25 mcg) tablet 1 Tab  1,000 Units Oral DAILY  insulin lispro (HUMALOG) injection   SubCUTAneous AC&HS  
 LORazepam (ATIVAN) tablet 2 mg  2 mg Oral Q4H PRN  
 melatonin tablet 3 mg  3 mg Oral QHS  
 0.45% sodium chloride with KCl 20 mEq/L infusion   IntraVENous CONTINUOUS  
 sodium chloride (NS) flush 5-10 mL  5-10 mL IntraVENous PRN  
 [Held by provider] insulin NPH (NOVOLIN N, HUMULIN N) injection 40 Units  40 Units SubCUTAneous ACB&D  
 glucose chewable tablet 16 g  16 g Oral PRN  
 glucagon (GLUCAGEN) injection 1 mg  1 mg IntraMUSCular PRN  
 dextrose (D50W) injection syrg 12.5-25 g  25-50 mL IntraVENous PRN  
 sodium chloride (NS) flush 5-40 mL  5-40 mL IntraVENous Q8H  
 sodium chloride (NS) flush 5-40 mL  5-40 mL IntraVENous PRN  
 acetaminophen (TYLENOL) tablet 650 mg  650 mg Oral Q6H PRN  Or  
 acetaminophen (TYLENOL) suppository 650 mg  650 mg Rectal Q6H PRN  
  polyethylene glycol (MIRALAX) packet 17 g  17 g Oral DAILY PRN  
 heparin (porcine) injection 5,000 Units  5,000 Units SubCUTAneous Q8H  
 [Held by provider] PARoxetine (PAXIL) tablet 60 mg  60 mg Oral DAILY  [Held by provider] pantoprazole (PROTONIX) tablet 40 mg  40 mg Oral DAILY  [Held by provider] rosuvastatin (CRESTOR) tablet 20 mg  20 mg Oral QHS  ondansetron (ZOFRAN) injection 4 mg  4 mg IntraVENous Q6H PRN  pantoprazole (PROTONIX) 40 mg in 0.9% sodium chloride 10 mL injection  40 mg IntraVENous DAILY Review of Systems: As above Data Review: 
 
Labs: Results:  
   
Chemistry Recent Labs  
  09/12/20 0214 09/11/20 
0550 09/10/20 
1903 09/10/20 
9787 * 198*  --  158*  141  --  150*  
K 4.0 3.7 3.2* 3.0*  
 111  --  116* CO2 26 23  --  24 BUN 13 21*  --  30* CREA 1.36* 1.76*  --  2.08* CA 8.8 8.7  --  8.7 AGAP 6 7  --  10  
BUCR 10* 12  --  14  
 106  --  116  
TP 7.5 7.1  --  7.5 ALB 3.6 3.5  --  3.9 GLOB 3.9 3.6  --  3.6 AGRAT 0.9 1.0  --  1.1  
  
CBC w/Diff Recent Labs  
  09/12/20 0214 09/11/20 
0550 09/10/20 
7052 WBC 11.3 11.9 16.6*  
RBC 3.56* 3.38* 3.55* HGB 10.2* 9.8* 10.4* HCT 30.8* 29.1* 30.8*  212 262 GRANS 63 59 69 LYMPH 30 37 25 EOS 1 1 0 Coagulation No results for input(s): PTP, INR, APTT, INREXT, INREXT in the last 72 hours. Iron/Ferritin Recent Labs  
  09/09/20 
2114 IRON 107 BNP No results for input(s): BNPP in the last 72 hours. Cardiac Enzymes Recent Labs  
  09/12/20 0214 09/11/20 
2350 CPK 1,039* 1,140* Liver Enzymes Recent Labs  
  09/12/20 
0214 TP 7.5 ALB 3.6  Thyroid Studies Lab Results Component Value Date/Time TSH 0.57 09/08/2020 04:40 PM  
    
 EKG: sinus Physical Assessment:  
 
Visit Vitals /78 (BP 1 Location: Right arm, BP Patient Position: At rest;Head of bed elevated (Comment degrees)) Pulse 82 Temp 97.8 °F (36.6 °C) Resp 15 Ht 5' 8\" (1.727 m) Wt 92 kg (202 lb 12.8 oz) SpO2 100% Breastfeeding No  
BMI 30.84 kg/m² Weight change: -2.268 kg (-5 lb) Intake/Output Summary (Last 24 hours) at 9/12/2020 1124 Last data filed at 9/12/2020 1002 Gross per 24 hour Intake 1120 ml Output 800 ml Net 320 ml Physical Exam:  
General: no respi distress HEENT sclera anicteric, supple neck, no thyromegaly CVS: S1S2 heard,  no rub RS: + air entry b/l, Abd: Soft, Non tender Neuro: sleeping Extrm: No edema, no cyanosis, clubbing Skin: no visible  Rash Musculoskeletal: No gross joints or bone deformities Procedures/imaging: see electronic medical records for all procedures, Xrays and details which were not copied into this note but were reviewed prior to creation of Sae Wheeler MD 
September 12, 2020 [facility] Nephrology Office [phone]

## 2020-09-12 NOTE — PROGRESS NOTES
Bedside and Verbal shift change report given to Rachid Deluca RN (oncoming nurse) by Max Muñoz RN (offgoing nurse). Report included the following information SBAR, Kardex, Intake/Output, MAR, Recent Results and Cardiac Rhythm NSR.

## 2020-09-12 NOTE — PROGRESS NOTES
[address] [facility] Progress Note Patient: Lavell Randall MRN: [id number] SSN: [id number]  YOB: 1965 Age: 60 y.o. Sex: female Admit Date: 9/8/2020 LOS: 4 days Chief Complaint Patient presents with  Vomiting Subjective:  
 
Overnight: Pt improved agitation, not requiring meds. Pt off restraints with sitter bedside Pt was able to get MRI Pt improving mental status. Pt follows commands intermittently. She is able to say she is in the hospital but unable to say her name. Pt eyes closed and sleeps between interviews ROS Unable to evaluate because patient will not answer questions, altered mental status Objective:  
 
Visit Vitals BP (!) 140/87 Pulse 87 Temp 98.5 °F (36.9 °C) Resp 15 Ht 5' 8\" (1.727 m) Wt 92 kg (202 lb 12.8 oz) LMP 10/06/2015 (Exact Date) SpO2 100% Breastfeeding No  
BMI 30.84 kg/m² Physical Exam: 
General:  AXOx1, Pt follows command intermittently, eyes closed , keeps trying to get out of bed HEENT: R eye erythema, no drainage,  R not reactive dilated, Left eye sluggish but react to light, moist mucous membranes. CV:  RRR, no murmurs. No visible pulsations or thrills. RESP:  Unlabored lungs clear to auscultation without adventitious breath sounds. Equal expansion bilaterally. ABD:  soft abdomen, non-tender,  nondistended. BS (+). MS:  No joint deformity or instability. No atrophy. Neuro:  Pt no facial droop, no deviation of tongue, unable to evaluate EOM because patient does not follow commands, moves all four extremities, Lower extremity bilaterally rigid tone, negative babinski Ext:  R foot the first digit is amputated, Left second digit amputated, No edema. 2+ radial and dp pulses bilaterally. Skin:  No rashes, lesions, or ulcers.  
  
Intake and Output: 
Current Shift: No intake/output data recorded. Last three shifts: 09/10 0701 - 09/11 1900 In: 3413.3 [P.O.:360; I.V.:3053.3] Out: 1000 [Urine:1000] Lab/Data Review: 
Recent Results (from the past 12 hour(s)) GLUCOSE, POC Collection Time: 09/11/20  9:53 PM  
Result Value Ref Range Glucose (POC) 147 (H) 70 - 110 mg/dL CK Collection Time: 09/11/20 11:50 PM  
Result Value Ref Range CK 1,140 (H) 26 - 192 U/L  
CBC WITH MANUAL DIFF Collection Time: 09/12/20  2:14 AM  
Result Value Ref Range WBC 11.3 4.6 - 13.2 K/uL  
 RBC 3.56 (L) 4.20 - 5.30 M/uL  
 HGB 10.2 (L) 12.0 - 16.0 g/dL HCT 30.8 (L) 35.0 - 45.0 % MCV 86.5 74.0 - 97.0 FL  
 MCH 28.7 24.0 - 34.0 PG  
 MCHC 33.1 31.0 - 37.0 g/dL  
 RDW 13.7 11.6 - 14.5 % PLATELET 486 289 - 934 K/uL MPV 10.7 9.2 - 11.8 FL  
 DIFFERENTIAL MANUAL DIFFERENTIAL ORDERED    
 NEUTROPHILS PENDING % LYMPHOCYTES PENDING % MONOCYTES PENDING % EOSINOPHILS PENDING % BASOPHILS PENDING % BAND NEUTROPHILS PENDING % PROMYELOCYTES PENDING % MYELOCYTES PENDING % METAMYELOCYTES PENDING % BLASTS PENDING % OTHER CELL PENDING   
 ABS. NEUTROPHILS PENDING K/UL  
 ABS. LYMPHOCYTES PENDING K/UL  
 ABS. MONOCYTES PENDING K/UL  
 ABS. EOSINOPHILS PENDING K/UL  
 ABS. BASOPHILS PENDING K/UL  
 RBC COMMENTS PENDING   
 DF PENDING   
METABOLIC PANEL, COMPREHENSIVE Collection Time: 09/12/20  2:14 AM  
Result Value Ref Range Sodium 141 136 - 145 mmol/L Potassium 4.0 3.5 - 5.5 mmol/L Chloride 109 100 - 111 mmol/L  
 CO2 26 21 - 32 mmol/L Anion gap 6 3.0 - 18 mmol/L Glucose 150 (H) 74 - 99 mg/dL BUN 13 7.0 - 18 MG/DL Creatinine 1.36 (H) 0.6 - 1.3 MG/DL  
 BUN/Creatinine ratio 10 (L) 12 - 20 GFR est AA 49 (L) >60 ml/min/1.73m2 GFR est non-AA 40 (L) >60 ml/min/1.73m2 Calcium 8.8 8.5 - 10.1 MG/DL Bilirubin, total 2.2 (H) 0.2 - 1.0 MG/DL  
 ALT (SGPT) 32 13 - 56 U/L  
 AST (SGOT) 42 (H) 10 - 38 U/L Alk. phosphatase 116 45 - 117 U/L Protein, total 7.5 6.4 - 8.2 g/dL Albumin 3.6 3.4 - 5.0 g/dL Globulin 3.9 2.0 - 4.0 g/dL A-G Ratio 0.9 0.8 - 1.7 PHOSPHORUS Collection Time: 09/12/20  2:14 AM  
Result Value Ref Range Phosphorus 2.9 2.5 - 4.9 MG/DL MAGNESIUM Collection Time: 09/12/20  2:14 AM  
Result Value Ref Range Magnesium 1.8 1.6 - 2.6 mg/dL CK Collection Time: 09/12/20  2:14 AM  
Result Value Ref Range CK 1,039 (H) 26 - 192 U/L  
 
 
RECENT RESULTS MODALITY IMPRESSION  
XR Results from Hospital Encounter encounter on 09/08/20 XR ORBIT BI FOREIGN BODY Narrative EXAM: ORBITS LIMITED CLINICAL HISTORY/INDICATION: , blurred vision with nausea, vomiting, and 
abdominal pain, severe agitation, altered mental status, diabetic retinopathy 
due to type [facility] COMPARISON: None. TECHNIQUE: modified ramos view(s) of the orbits obtained. FINDINGS: 
 
There are no radiopaque metallic foreign bodies within the orbits. There is no 
aneurysm clip. The visualized  paranasal sinuses are normal. 
  
 Impression IMPRESSION: 
 
Negative orbital screening prior to MRI. CT Results from Hospital Encounter encounter on 09/08/20 CT CHEST ABD PELV WO CONT Narrative EXAMINATION: CT chest/abdomen/pelvis without contrast 
 
INDICATION: Sepsis, leukocytosis COMPARISON: CT abdomen 1/4/2019 TECHNIQUE: CT of the chest, abdomen, and pelvis performed without contrast. 
Multiplanar reformations obtained. All CT scans at this facility are performed 
using dose optimization technique as appropriate to a performed exam, to include 
automated exposure control, adjustment of the mA and/or kV according to patient 
size (including appropriate matching first site specific examinations), or use 
of iterative reconstruction technique. FINDINGS: 
 
Evaluation of soft tissues and vessels limited without vascular enhancement. Streak artifact from arms also limits evaluation. CHEST: 
 
Cardiovascular: Major vessels unremarkable.  Heart size normal. 
 
 Mediastinum: Imaged thyroid unremarkable. No adenopathy by size criteria. Esophagus nondistended. Pleura: No effusion. No pneumothorax. Lungs/airways: Bronchial tree unremarkable. No confluent consolidation. Miscellaneous: Superficial soft tissues unremarkable. Bones: T6 mild superior endplate depression, age indeterminate. ABDOMEN/PELVIS: 
 
Hepatobiliary: Gallbladder absent. Liver unremarkable. No biliary duct 
dilatation. Pancreas: Unremarkable. Spleen: Unremarkable. Adrenals: Unremarkable. Genitourinary: Right kidney unremarkable. Left kidney unremarkable. Bladder with 
mild wall thickening. Multilobulated uterus containing multiple calcifications. Gastrointestinal: Stomach unremarkable. Small bowel loops nondilated. The colon 
unremarkable. Appendix unremarkable. Mesentery/vessels/nodes: No free air. No free fluid. Major vessels unremarkable. No adenopathy by size criteria. Miscellaneous: Superficial soft tissues unremarkable. Bones: No acute osseous findings. Impression IMPRESSION: 
 
Chest: 
-No clearly acute findings. -Age-indeterminate mild T6 compression deformity. Abdomen/pelvis: 
-Bladder with mild wall thickening. Correlate clinically for UTI/cystitis. 
-Fibroid uterus. MRI Results from [place] encounter on 07/18/17 MRI FOOT LT WO CONT Narrative EXAMINATION: MRI left foot without contrast 
 
INDICATION: Left second toe infection, possible osteomyelitis COMPARISON: Radiographs 7/18/2017 TECHNIQUE: Multiplanar multiecho sequences of the left forefoot performed 
without contrast. 
 
FINDINGS: 
 
Bones/joints: Second toe distal phalanx is poorly delineated, likely due to 
extensive marrow infiltration and osseous destruction. Mild marrow edema in the 
second toe middle phalanx with T1 marrow signal largely preserved. Extensive 
associated second toe soft tissue edema. No suspicious marrow signal or acute 
fracture elsewhere. Ligaments: Lisfranc ligament intact. Imaged collateral ligaments are 
unremarkable. Tendons: Ill-definition of the second toe flexor tendon near distal insertion. Otherwise flexor and extensor tendons in the forefoot appear largely intact. Miscellaneous: Extensive soft tissue edema in the second toe, and mild patchy 
soft tissue edema throughout the forefoot. Impression IMPRESSION: 
 
1. Findings are consistent with osteomyelitis in the second toe distal phalanx. Marked soft tissue swelling in the second toe. See additional details above. ULTRASOUND Results from Hospital Encounter encounter on 09/08/20 US ABD LTD Impression IMPRESSION:    
 
1. Status post cholecystectomy. 2.  No significant common bile duct dilation. 3.  Increased hepatic parenchymal echogenicity with somewhat heterogeneous 
appearance, potentially suggestive of hepatosteatosis. Partial visualization of 
the liver. Results [place] [facility] [place]19 US ABD LTD Impression IMPRESSION: 
 
Mild heterogeneous echogenicity of the liver is nonspecific. This is commonly 
seen with steatosis hepatic disease. Limited visualization of the pancreas. Cardiology Procedures/Testing: MODALITY RESULTS  
EKG Results for orders placed or performed during the hospital encounter of 09/08/20 EKG, 12 LEAD, INITIAL Result Value Ref Range Ventricular Rate 132 BPM  
 Atrial Rate 132 BPM  
 P-R Interval 158 ms QRS Duration 72 ms Q-T Interval 274 ms QTC Calculation (Bezet) 405 ms Calculated P Axis 43 degrees Calculated R Axis 33 degrees Calculated T Axis 148 degrees Diagnosis Sinus tachycardia Nonspecific T wave abnormality Abnormal ECG When compared with ECG of 08-SEP-2020 15:04, No significant change was found Confirmed by Issac Petty MD, Aden Rainey ([id number]) on 9/9/2020 7:48:30 AM 
  
Results for orders placed or performed in visit on 06/20/14 AMB POC EKG ROUTINE W/ 12 LEADS, INTER & REP Impression See progress note. ECHO 01/03/19 ECHO ADULT COMPLETE 01/04/2019 1/4/2019 Narrative · Estimated left ventricular ejection fraction is 61 - 65%. Left  
ventricular mild concentric hypertrophy. Mild (grade 1) left ventricular  
diastolic dysfunction. · Mild tricuspid valve regurgitation is present. Signed by: Jean Claude Servin MD  
  
 
Special Testing/Procedures: MODALITY RESULTS MICRO All Micro Results Procedure Component Value Units Date/Time CULTURE, BLOOD [[id number]] Collected:  09/08/20 1640 Order Status:  Completed Specimen:  Blood Updated:  09/11/20 1386 Special Requests: NO SPECIAL REQUESTS Culture result: NO GROWTH 3 DAYS     
 CULTURE, BLOOD [[id number]] Collected:  09/08/20 1640 Order Status:  Completed Specimen:  Blood Updated:  09/11/20 1774 Special Requests: NO SPECIAL REQUESTS Culture result: NO GROWTH 3 DAYS     
 CULTURE, URINE [[id number]] Collected:  09/08/20 1211 Order Status:  Completed Specimen:  Urine from Clean catch Updated:  09/09/20 1811 Special Requests: NO SPECIAL REQUESTS [place] Count --     
  >100,000 COLONIES/mL Culture result:    
  MIXED UROGENITAL AUDRA ISOLATED  
     
  
  
UA No results found for this or any previous visit. PATH none Assessment and Plan:  
60 y.o. 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   
  
Metabolic Encephalopathy of unclear etiology? Likely multifactorial- improving Came with a -460 BG, She was treated with IV fluids . She had a leucocytosis, and lactic acidosis that improved with IV antibiotics unclear source UTI? So far blood culture on 9/8 was no growth.  Pt's UA only wbc 6-8, 2+ bacteria, LE neg/Nitrite neg. Also, urine culture >100, 000 colony mixed urogenital kyle. Her  9/9 CT Chest/AB/Pelvis showed bladder with mild wall thickening cystitis unlikely UTI. Hypernatremia (151--> 150--> 141) resolved. Pt was negative UDS and ETOH serum (9/8) On  9/8 CT head showed mild prominence: mild enlargement of lateral ventricles, cerebral atrophy Labs negative for Negative troponin, EKG no ST elevation or depression, lipase normal, ammonia nml  
- s/p empirical  abx Ceftriaxone  (9/8-/9) Plan:  
-appreciate ID recs- cont empiric abx  vancomycin (9/8-  and zoysn (9/9- ) 
- concern serotonin syndrome- hold meds metoclopramide and paroxetine - appreciated neurology recs- avoid antipsychotics per neuro, prn benzo for anxiety and restlessness, EEG and MRI follow up read # Agitation - better controlled Wax and waning 
- restrains, role belt 
- avoid antipsychotics - prn Benzo for anxiety and restlessness 
  
  
CRYSTAL CR 3.56 ( baseline Cr 1.09 1/2019) in setting of CKD stage 2  likely pre-julian and rhabdo (improving steadily ) Iron profile normal 
CK was 1177--> 2133--> 2,071 Myoglobin 1,092 postive CK has down trended to 1K from 2K .8 elevated and vitamin D on low side of normal corrected Ca normal , Vitamin D nml , P nm  
 microalb/cr ratio- wnml Plan:   
Cont IVF Nephrology following appreciate recs- calcitril 0.25mcg on discharge R eye pain + Right eye vision loss X 2 weeks Unlikely papilledema based on bedside ultrasound ( no enlarged optic sheath seen), likely floater vs retinal detachment vs pink eye vs acute angle gluacoma Plan:  
- spoke to optho on 9/10, Dr. Carrero stated that he will come to see patient and will make recommendations 
  
Hypokalemia (resolved) 
electrolyte imbalance (hypokalemia, corrected Ca wnml )  
- replete electrolyte as needed, Mg, Phos 
  
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  
  
  
 Diabetes ( last A1c 7.2) neuropathy and L retinopathy  
- held NPH 40U BID (hold home NPH 50 U BID) because patient NPO 
  
 HTN 
- not on meds 
  
 HLD 
- cont rosuvastatin  
  
Gastroparesis  
- held metoclopramide 5 mg  
  
GERD 
- cont  omeprazole 40 mg delayed capsule 
  
  
Anxiety 
- held  paroxetine 60 mg daily Diet Advance as tolerated per speech DVT Prophylaxis heparin GI Prophylaxis Omperazole Code status Full code Disposition unknown  
  
[facility] [address] [employer] [facility] Relationship: 
[phone] Julius Hernandez PGY-1  
[facility] Joe Jacskon Pager: [phone] September 12, 2020, 7:35 AM

## 2020-09-12 NOTE — PROGRESS NOTES
1900 - Bedside and Verbal shift change report given to Jh Rincon RN (oncoming nurse) by Vicki Rueda RN/PETRONA Ceballos (offgoing nurse). Report included the following information SBAR, Kardex, Intake/Output, MAR, Recent Results and Cardiac Rhythm ST.  
 
2000 - Patient restless and yelling; Incontinence care provided; gown, sheets, bedpad, purewick/collection cannister changed; medications administered; patient continued with restlessness and agitation. Will continue to monitor. 2100 - Patient continued with restlessness, unable to redirect or calm; LALA WYNNE paged for assistance. Will continue to monitor. 2120 - Dr Rea Duarte came to assess patient for requested assistance. No additional orders received; Will continue to monitor. 0000 - Vitals taken; incontinence/geoff care performed; purewick, gown, bedpad changed; Will continue to monitor. 0500 - Incontinence care performed multiple time throughout shift as patient remained restless causing incontinence interventions to fail; bedpads, gown, sheets changed following bath pack/geoff care each time; patient continued to call for unknown persons, unable to be redirected beyond a few moments. Will continue to monitor. 0700 - Bedside and Verbal shift change report given to Monse De Jesus RN (oncoming nurse) by Jh Rincon RN (offgoing nurse). Report included the following information SBAR, Kardex, Intake/Output, MAR, Recent Results and Cardiac Rhythm SR/ST.

## 2020-09-12 NOTE — MED STUDENT NOTES
*ATTENTION:  This note has been created by a medical student for educational purposes only. Please do not refer to the content of this note for clinical decision-making, billing, or other purposes. Please see attending physicians note to obtain clinical information on this patient. * Intern Progress Note [address] Patient: Ryan Schmidt MRN: [id number]  CSN: [id number] YOB: 1965  Age: 60 y.o. Sex: female DOA: 9/8/2020 LOS:  LOS: 4 days Subjective:  
 
Acute events: Patient did not have any major events overnight. 2mg Ativan was given at 10pm and 2am for slight agitation. Was able to to get MRI- read is pending. Mrs. Jeanne Horan is a 55y/o with PMH T2DM w/ gastroparesis, neuropathy, CKD2, left retinal detachment, HTN, HLP,  and s/p left toe amputation, who was admitted for altered mental status and possible sepsis. Her mental status has been improving and is able to intermittently follow simple verbal commands. She is still a bit groggy, but nurses report she has been less agitated at night. ROS Unable to evaluate due to patient's altered mental status and inability to verbally articulate. Objective:  
  
Patient Vitals for the past 24 hrs: 
 Temp Pulse Resp BP SpO2  
09/12/20 0752 97.8 °F (36.6 °C) 82 15 131/78 100 % 09/12/20 0421 98.5 °F (36.9 °C) 87 15 (!) 140/87 100 % 09/11/20 2357 98.2 °F (36.8 °C) 91 19 131/85 100 % 09/11/20 2000 98.2 °F (36.8 °C) 96 15 (!) 187/94 100 % 09/11/20 1727 98.2 °F (36.8 °C) 88 16 136/74 100 % 09/11/20 1703  77 16  100 % Intake/Output Summary (Last 24 hours) at 9/12/2020 1136 Last data filed at 9/12/2020 1002 Gross per 24 hour Intake 1120 ml Output 800 ml Net 320 ml Physical Exam: 
General:  Alert and Responsive x2. Not oriented to time. No acute distress. HEENT: Mild conjunctival injection in R eye.  EOMI in R eye, L eye movement slightly reduced. Moist mucous membranes. No cervical, supraclavicular, occipital or submandibular lymphadenopathy. No other gross abnormalities present. CV:  RRR, no murmurs. No visible pulsations or thrills. RESP:  Unlabored breathing. Lungs clear to auscultation. no wheeze, rales, or rhonchi. Equal expansion bilaterally. ABD:  Soft, nontender, nondistended. No hepatosplenomegaly. No suprapubic tenderness. MS:  No joint deformity or instability. No atrophy. Unable to assess strength due to mental status. Neuro: Normal movement in upper and lower extremities bilaterally. Unable to assess CN due to mental status. Ext:  No edema. 2+ radial and dp pulses bilaterally. R foot has first digit amputation. L foot has second digit amputation. Sensation intact. Skin:  No rashes, lesions, or ulcers. Good turgor. Mild ecchymosis along forearms bilaterally. Lab/Data Reviewed: 
Recent Results (from the past 12 hour(s)) CK Collection Time: 09/11/20 11:50 PM  
Result Value Ref Range CK 1,140 (H) 26 - 192 U/L  
CBC WITH MANUAL DIFF Collection Time: 09/12/20  2:14 AM  
Result Value Ref Range WBC 11.3 4.6 - 13.2 K/uL  
 RBC 3.56 (L) 4.20 - 5.30 M/uL  
 HGB 10.2 (L) 12.0 - 16.0 g/dL HCT 30.8 (L) 35.0 - 45.0 % MCV 86.5 74.0 - 97.0 FL  
 MCH 28.7 24.0 - 34.0 PG  
 MCHC 33.1 31.0 - 37.0 g/dL  
 RDW 13.7 11.6 - 14.5 % PLATELET 716 096 - 580 K/uL MPV 10.7 9.2 - 11.8 FL  
 DIFFERENTIAL MANUAL DIFFERENTIAL ORDERED    
 NEUTROPHILS 63 42 - 75 % BAND NEUTROPHILS 0 0 - 5 % LYMPHOCYTES 30 20 - 51 % MONOCYTES 6 2 - 9 % EOSINOPHILS 1 0 - 5 % BASOPHILS 0 0 - 3 % METAMYELOCYTES 0 0 % MYELOCYTES 0 0 % PROMYELOCYTES 0 0 % BLASTS 0 0 % OTHER CELL 0 0    
 ABS. NEUTROPHILS 7.1 1.8 - 8.0 K/UL  
 ABS. LYMPHOCYTES 3.4 0.8 - 3.5 K/UL  
 ABS. MONOCYTES 0.7 0 - 1.0 K/UL  
 ABS. EOSINOPHILS 0.1 0.0 - 0.4 K/UL  
 ABS. BASOPHILS 0.0 0.0 - 0.06 K/UL  
 DF MANUAL PLATELET COMMENTS ADEQUATE PLATELETS    
 RBC COMMENTS NORMOCYTIC, NORMOCHROMIC METABOLIC PANEL, COMPREHENSIVE Collection Time: 09/12/20  2:14 AM  
Result Value Ref Range Sodium 141 136 - 145 mmol/L Potassium 4.0 3.5 - 5.5 mmol/L Chloride 109 100 - 111 mmol/L  
 CO2 26 21 - 32 mmol/L Anion gap 6 3.0 - 18 mmol/L Glucose 150 (H) 74 - 99 mg/dL BUN 13 7.0 - 18 MG/DL Creatinine 1.36 (H) 0.6 - 1.3 MG/DL  
 BUN/Creatinine ratio 10 (L) 12 - 20 GFR est AA 49 (L) >60 ml/min/1.73m2 GFR est non-AA 40 (L) >60 ml/min/1.73m2 Calcium 8.8 8.5 - 10.1 MG/DL Bilirubin, total 2.2 (H) 0.2 - 1.0 MG/DL  
 ALT (SGPT) 32 13 - 56 U/L  
 AST (SGOT) 42 (H) 10 - 38 U/L Alk. phosphatase 116 45 - 117 U/L Protein, total 7.5 6.4 - 8.2 g/dL Albumin 3.6 3.4 - 5.0 g/dL Globulin 3.9 2.0 - 4.0 g/dL A-G Ratio 0.9 0.8 - 1.7 PHOSPHORUS Collection Time: 09/12/20  2:14 AM  
Result Value Ref Range Phosphorus 2.9 2.5 - 4.9 MG/DL MAGNESIUM Collection Time: 09/12/20  2:14 AM  
Result Value Ref Range Magnesium 1.8 1.6 - 2.6 mg/dL CK Collection Time: 09/12/20  2:14 AM  
Result Value Ref Range CK 1,039 (H) 26 - 192 U/L  
GLUCOSE, POC Collection Time: 09/12/20  7:55 AM  
Result Value Ref Range Glucose (POC) 141 (H) 70 - 110 mg/dL RECENT RESULTS MODALITY IMPRESSION  
XR Results from Hospital Encounter encounter on 09/08/20 XR ORBIT BI FOREIGN BODY Narrative EXAM: ORBITS LIMITED CLINICAL HISTORY/INDICATION: , blurred vision with nausea, vomiting, and 
abdominal pain, severe agitation, altered mental status, diabetic retinopathy 
due to type 2 diabetes [facility] mri screen. COMPARISON: None. TECHNIQUE: modified ramos view(s) of the orbits obtained. FINDINGS: 
 
There are no radiopaque metallic foreign bodies within the orbits. There is no 
aneurysm clip.   The visualized  paranasal sinuses are normal. 
  
 Impression IMPRESSION: 
 
 Negative orbital screening prior to MRI. CT Results from Hospital Encounter encounter on 09/08/20 CT CHEST ABD PELV WO CONT Narrative EXAMINATION: CT chest/abdomen/pelvis without contrast 
 
INDICATION: Sepsis, leukocytosis COMPARISON: CT abdomen 1/4/2019 TECHNIQUE: CT of the chest, abdomen, and pelvis performed without contrast. 
Multiplanar reformations obtained. All CT scans at this facility are performed 
using dose optimization technique as appropriate to a performed exam, to include 
automated exposure control, adjustment of the mA and/or kV according to patient 
size (including appropriate matching first site specific examinations), or use 
of iterative reconstruction technique. FINDINGS: 
 
Evaluation of soft tissues and vessels limited without vascular enhancement. Streak artifact from arms also limits evaluation. CHEST: 
 
Cardiovascular: Major vessels unremarkable. Heart size normal. 
 
Mediastinum: Imaged thyroid unremarkable. No adenopathy by size criteria. Esophagus nondistended. Pleura: No effusion. No pneumothorax. Lungs/airways: Bronchial tree unremarkable. No confluent consolidation. Miscellaneous: Superficial soft tissues unremarkable. Bones: T6 mild superior endplate depression, age indeterminate. ABDOMEN/PELVIS: 
 
Hepatobiliary: Gallbladder absent. Liver unremarkable. No biliary duct 
dilatation. Pancreas: Unremarkable. Spleen: Unremarkable. Adrenals: Unremarkable. Genitourinary: Right kidney unremarkable. Left kidney unremarkable. Bladder with 
mild wall thickening. Multilobulated uterus containing multiple calcifications. Gastrointestinal: Stomach unremarkable. Small bowel loops nondilated. The colon 
unremarkable. Appendix unremarkable. Mesentery/vessels/nodes: No free air. No free fluid. Major vessels unremarkable. No adenopathy by size criteria. Miscellaneous: Superficial soft tissues unremarkable. Bones: No acute osseous findings. Impression IMPRESSION: 
 
Chest: 
-No clearly acute findings. -Age-indeterminate mild T6 compression deformity. Abdomen/pelvis: 
-Bladder with mild wall thickening. Correlate clinically for UTI/cystitis. 
-Fibroid uterus. ULTRASOUND Results from Hospital Encounter encounter on 09/08/20 US ABD LTD Impression IMPRESSION:    
 
1. Status post cholecystectomy. 2.  No significant common bile duct dilation. 3.  Increased hepatic parenchymal echogenicity with somewhat heterogeneous 
appearance, potentially suggestive of hepatosteatosis. Partial visualization of 
the liver. Results from [facility] Encounter encounter on 01/22/19 US ABD LTD Impression IMPRESSION: 
 
Mild heterogeneous echogenicity of the liver is nonspecific. This is commonly 
seen with steatosis hepatic disease. Limited visualization of the pancreas. Cardiology Procedures/Testing: MODALITY RESULTS  
EKG Results for orders placed or performed during the hospital encounter of 09/08/20 EKG, 12 LEAD, INITIAL Result Value Ref Range Ventricular Rate 132 BPM  
 Atrial Rate 132 BPM  
 P-R Interval 158 ms QRS Duration 72 ms Q-T Interval 274 ms QTC Calculation (Bezet) 405 ms Calculated P Axis 43 degrees Calculated R Axis 33 degrees Calculated T Axis 148 degrees Diagnosis Sinus tachycardia Nonspecific T wave abnormality Abnormal ECG When compared with ECG of 08-SEP-2020 15:04, No significant change was found Confirmed by Chandrakant Woodard MD, Kinza Mauro ([id number]) on 9/9/2020 7:48:30 AM 
  
Results for orders placed or performed in visit on 06/20/14 AMB POC EKG ROUTINE W/ 12 LEADS, INTER & REP Impression See progress note. ECHO 01/03/19 ECHO ADULT COMPLETE 01/04/2019 1/4/2019 Narrative · Estimated left ventricular ejection fraction is 61 - 65%. Left  
ventricular mild concentric hypertrophy. Mild (grade 1) left ventricular diastolic dysfunction. · Mild tricuspid valve regurgitation is present. Signed by: Iman Michele MD  
  
 
Special Testing/Procedures: MODALITY RESULTS MICRO All Micro Results Procedure Component Value Units Date/Time CULTURE, BLOOD [[id number]] Collected:  09/08/20 1640 Order Status:  Completed Specimen:  Blood Updated:  09/12/20 6837 Special Requests: NO SPECIAL REQUESTS Culture result: NO GROWTH 4 DAYS     
 CULTURE, BLOOD [[id number]] Collected:  09/08/20 1640 Order Status:  Completed Specimen:  Blood Updated:  09/12/20 6753 Special Requests: NO SPECIAL REQUESTS Culture result: NO GROWTH 4 DAYS     
 CULTURE, URINE [[id number]] Collected:  09/08/20 1211 Order Status:  Completed Specimen:  Urine from Clean catch Updated:  09/09/20 1811 Special Requests: NO SPECIAL REQUESTS Dennis Count --     
  >100,000 COLONIES/mL Culture result:    
  MIXED UROGENITAL AUDRA ISOLATED  
     
  
  
UA No results found for this or any previous visit. Scheduled Medications Reviewed: 
Current Facility-Administered Medications Medication Dose Route Frequency  piperacillin-tazobactam (ZOSYN) 3.375 g in 0.9% sodium chloride (MBP/ADV) 100 mL MBP  3.375 g IntraVENous Q6H  
 cholecalciferol (VITAMIN D3) (1000 Units /25 mcg) tablet 1 Tab  1,000 Units Oral DAILY  insulin lispro (HUMALOG) injection   SubCUTAneous AC&HS  
 melatonin tablet 3 mg  3 mg Oral QHS  
 0.45% sodium chloride with KCl 20 mEq/L infusion   IntraVENous CONTINUOUS  
 [Held by provider] insulin NPH (NOVOLIN N, HUMULIN N) injection 40 Units  40 Units SubCUTAneous ACB&D  
 sodium chloride (NS) flush 5-40 mL  5-40 mL IntraVENous Q8H  
 heparin (porcine) injection 5,000 Units  5,000 Units SubCUTAneous Q8H  
 [Held by provider] PARoxetine (PAXIL) tablet 60 mg  60 mg Oral DAILY  [Held by provider] pantoprazole (PROTONIX) tablet 40 mg  40 mg Oral DAILY  [Held by provider] rosuvastatin (CRESTOR) tablet 20 mg  20 mg Oral QHS  pantoprazole (PROTONIX) 40 mg in 0.9% sodium chloride 10 mL injection  40 mg IntraVENous DAILY Assessment/Plan  
 
Mrs. Alona Piper is 55y/o with PMH T2DM w/ gastroparesis, neuropathy, CKD2, left retinal detachment, HTN, HLP,  and s/p left toe amputation, brought by EMS for AMS due to unclear etiology. Metabolic encephalopathy of unclear etiology Differentials included encephalopathy due to HHS, sepsis, or UTI. However, mental status has not improved since controlling blood glucose and administration of antibiotics. WBC has also stabilized from 23.7 to 11.3. Serotonin syndrome could also be considered at one point during hospital stay and symptoms have slowly improved since stopping antipsychotics. - f/u MRI head read - per neuro, stopped antipsychotics, will use prn Ativan 2mg for anxiety and agitation 
- hold metoclopramide and paroxetine - EEG ordered by neuro 
- continue to follow with neuro CRYSTAL (resolved) Considered pre-renal vs. Sepsis vs. Rhabdomyolysis. Cr has improved from 3.56 to 1.36 (baseline 1.09) with IVF. CK has improved from 1140 to 1039. Elevated PTH of 317.8 so should be further evaluated for possible CKD. Vit D and Ca normal. Nephrology recommended to decrease IVF. - decrease IVF to 50cc/hr rate per nephro 
- continue to trend CK, can consider removing restraints with sitter 
- continue antibiotics R eye pain Beside US showed no papilledema (no enlarged optic sheath), but possible retinal detachment. Will consult optho for further evaluation. 
- f/u with optho consult Hyperbilirubinemia Total bili 2.6 and direct 0.6. [de-identified] indirect bili with no appreciable liver or gallbladder disease on CT AB and AB US. LD elevated at 423, haptoglobin normal, and aldolase elevated at 12.6. Unlikely hemolysis. Rhabdo cannot be ruled out.  Isabelle Phlegm syndrome can also be considered due to recent inflammatory state and lack of symptoms. - continue monitoring bili 
- follow for any new symptoms or jaundice T2DM with gastroparesis, neuropathy, and L retinopathy Last A1c 7.2 and . Currently being managed with Lispro. NPH has been held since patient is NPO. Can consider restarting NPH as she advances oral diet. Pt has not had any major symptoms. - continue Lispro 
- hold NPH 
- continue advancing oral diet HTN 
BP stable at 131/78 without meds. Pt does not endorse any related symptoms. - continue to monitor HLP Rosuvastatin has been held due to NPO. Can consider restarting as she advances diet. - hold rosuvastatin 20mg Gastroparesis Pt does not endorse any related symptoms. Has had bowel movements. Can consider restarting metoclopramide once Serotonin Syndrome ruled out. - hold metoclopramide 5mg  
  
GERD Pt does not endorse any related symptoms. Omeprazole has been held due to NPO. Can consider restarting as she advances diet. - hold omeprazole 40mg delayed capsule 
  
Anxiety Has been managed with prn Ativan 2mg. Episodes of agitation have improved, but still waxes and wanes. Can consider stopping Ativan and restarting home paroxetine once encephalopathy improves. - hold paroxetine 60mg  
 
  
Diet Advance as tolerated per speech DVT Prophylaxis heparin GI Prophylaxis Omperazole- held Code status Full code Disposition unknown  
  
Point of Contact Prieto Relationship: 
[phone]  
 
   
Cal Anya , [id number] September 12, 2020, 11:36 AM

## 2020-09-12 NOTE — PROGRESS NOTES
Bedside and Verbal shift change report given to Jh Rincon RN (oncoming nurse) by Ricky Swift RN (offgoing nurse). Report included the following information SBAR, Kardex, Intake/Output, MAR, Recent Results and Cardiac Rhythm NSR/ST.

## 2020-09-12 NOTE — PROGRESS NOTES
Bedside, Verbal and Written shift change report given to Jh Rincon RN (oncoming nurse) by Claudell Junes, RN (offgoing nurse). Report included the following information SBAR, Kardex, Procedure Summary, Intake/Output, MAR, Recent Results, Med Rec Status and Cardiac Rhythm NSR.

## 2020-09-12 NOTE — PROGRESS NOTES
Bedside and Verbal shift change report given to Torey Zayas (oncoming nurse) by Jh Rincon RN (offgoing nurse). Report included the following information SBAR, Kardex, Intake/Output, MAR, Recent Results and Cardiac Rhythm NSR/SB.

## 2020-09-12 NOTE — PROGRESS NOTES
0730 patient asleep in bed, vitals table. Sitter at bedside. 0800 patient asleep in bed, vitals stable. 0900 cleaned patient of incontinence and attached purewick. Patient becoming agitated, stating she needs to get out of bed to use the restroom, yelling out her name. Very confused 1031 patient received Ativan. 1045 cleaned patient of incontinence, patient still confused but laying in bed, not currently trying to get up. 1115 patient off floor for EEG 
 
1250 patient returned to floor. Cleaned patient of incontinence, pt agitated and confused. Vitals stable. 1350 Patient vitals stable. Agitated and confused. 1410 Vitals stable, patient asleep. 1500 patient asleep, vitals stable. Sitter left, no longer at bedside. 1610 patient agitated, yelling out. Administered Ativan PRN. Linens changed and patient cleaned for incontinence. 1630 patient currently asleep, vitals stable 1730 patient currently asleep, vitals stable 1811 patient awake, goes between trying to get out of bed and yelling out her name to calmly resting in bed.

## 2020-09-12 NOTE — PROGRESS NOTES
Problem: Diabetes Self-Management Goal: *Disease process and treatment process Description: Define diabetes and identify own type of diabetes; list 3 options for treating diabetes. Outcome: Progressing Towards Goal 
Goal: *Monitoring blood glucose, interpreting and using results Description: Identify recommended blood glucose targets  and personal targets. Outcome: Progressing Towards Goal 
Goal: *Prevention, detection, treatment of acute complications Description: List symptoms of hyper- and hypoglycemia; describe how to treat low blood sugar and actions for lowering  high blood glucose level. Outcome: Progressing Towards Goal 
Goal: *Prevention, detection and treatment of chronic complications Description: Define the natural course of diabetes and describe the relationship of blood glucose levels to long term complications of diabetes. Outcome: Progressing Towards Goal 
Goal: *Developing strategies to address psychosocial issues Description: Describe feelings about living with diabetes; identify support needed and support network Outcome: Progressing Towards Goal 
  
Problem: Non-Violent Restraints Goal: *Removal from restraints as soon as assessed to be safe Outcome: Progressing Towards Goal 
Goal: *No harm/injury to patient while restraints in use Outcome: Progressing Towards Goal 
Goal: *Patient's dignity will be maintained Outcome: Progressing Towards Goal 
Goal: *Patient Specific Goal (EDIT GOAL, INSERT TEXT) Outcome: Progressing Towards Goal 
Goal: Non-violent Restaints:Standard Interventions Outcome: Progressing Towards Goal 
Goal: Non-violent Restraints:Patient Interventions Outcome: Progressing Towards Goal 
Goal: Patient/Family Education Outcome: Progressing Towards Goal 
  
Problem: Falls - Risk of 
Goal: *Absence of Falls Description: Document Ramya Villafana Fall Risk and appropriate interventions in the flowsheet. Outcome: Progressing Towards Goal 
Note: Fall Risk Interventions: Mobility Interventions: Bed/chair exit alarm Mentation Interventions: Bed/chair exit alarm Medication Interventions: Bed/chair exit alarm Elimination Interventions: Bed/chair exit alarm Problem: Patient Education: Go to Patient Education Activity Goal: Patient/Family Education Outcome: Progressing Towards Goal 
  
Problem: Pressure Injury - Risk of 
Goal: *Prevention of pressure injury Description: Document Vasile Scale and appropriate interventions in the flowsheet. Outcome: Progressing Towards Goal 
Note: Pressure Injury Interventions: 
Sensory Interventions: Assess changes in LOC Moisture Interventions: Absorbent underpads Activity Interventions: Pressure redistribution bed/mattress(bed type) Mobility Interventions: Pressure redistribution bed/mattress (bed type) Nutrition Interventions: Document food/fluid/supplement intake Friction and Shear Interventions: Minimize layers Problem: Violent Restraints Goal: *Patient Specific Goal (EDIT GOAL, INSERT TEXT) Outcome: Progressing Towards Goal 
  
Problem: Patient Education: Go to Patient Education Activity Goal: Patient/Family Education Outcome: Progressing Towards Goal 
  
Problem: Nutrition Deficit Goal: *Optimize nutritional status Outcome: Progressing Towards Goal

## 2020-09-13 NOTE — PROGRESS NOTES
[facility] Progress Note Patient: Avani Meza MRN: [id number] SSN: [id number]  YOB: 1965 Age: 60 y.o. Sex: female Admit Date: 9/8/2020 LOS: 5 days Chief Complaint Patient presents with  Vomiting Subjective:  
 
Overt night: No acute events. Pt is alert and oriented to name, sons name. She does not know where she is. Sometimes incoherent ROS: Pt unable to answer questions. She starts muttering not clearly. Objective:  
 
Visit Vitals BP (!) 150/96 (BP 1 Location: Left arm, BP Patient Position: At rest) Pulse (!) 110 Temp 98.9 °F (37.2 °C) Resp 18 Ht 5' 8\" (1.727 m) Wt 92 kg (202 lb 12.8 oz) LMP 10/06/2015 (Exact Date) SpO2 (!) 10% Breastfeeding No  
BMI 30.84 kg/m² Physical Exam: 
General:  AXOx1, Pt follows command intermittently, eyes closed , keeps trying to get out of bed HEENT: R eye erythema, no drainage,  R not reactive dilated, Left eye sluggish but react to light, moist mucous membranes.   
CV:  RRR, no murmurs. No visible pulsations or thrills.   
RESP:  Unlabored lungs clear to auscultation without adventitious breath sounds. Equal expansion bilaterally.   
ABD:  soft abdomen, non-tender,  nondistended. BS (+).   
MS:  No joint deformity or instability.  No atrophy. Neuro:  Pt no facial droop, no deviation of tongue, unable to evaluate EOM because patient does not follow commands, moves all four extremities, Tardive akathisia Lower extremity bilaterally rigid tone, negative babinski  
Ext:  R foot the first digit is amputated, Left second digit amputated, No edema.  2+ radial and dp pulses bilaterally. Skin:  No rashes, lesions, or ulcers. Intake and Output: 
Current Shift: 09/12 1901 - 09/13 0700 In: -  
Out: 1241 [YVZNO:0704] Last three shifts: 09/11 0701 - 09/12 1900 In: 1343.3 [P.O.:240; I.V.:1103.3] Out: 2350 [Urine:2350] Lab/Data Review: 
Recent Results (from the past 12 hour(s)) GLUCOSE, POC Collection Time: 09/12/20  3:37 PM  
Result Value Ref Range Glucose (POC) 213 (H) 70 - 110 mg/dL CK Collection Time: 09/12/20  5:34 PM  
Result Value Ref Range  (H) 26 - 192 U/L  
GLUCOSE, POC Collection Time: 09/12/20 11:07 PM  
Result Value Ref Range Glucose (POC) 160 (H) 70 - 110 mg/dL RECENT RESULTS MODALITY IMPRESSION  
XR Results from Hospital Encounter encounter on 09/08/20 XR ORBIT BI FOREIGN BODY Narrative EXAM: ORBITS LIMITED CLINICAL HISTORY/INDICATION: , blurred vision with nausea, vomiting, and 
abdominal pain, severe agitation, altered mental status, diabetic retinopathy 
due to type 2 diabetes Ilene Frisco mri screen. COMPARISON: None. TECHNIQUE: modified ramos view(s) of the orbits obtained. FINDINGS: 
 
There are no radiopaque metallic foreign bodies within the orbits. There is no 
aneurysm clip. The visualized  paranasal sinuses are normal. 
  
 Impression IMPRESSION: 
 
Negative orbital screening prior to MRI. CT Results from Hospital Encounter encounter on 09/08/20 CT CHEST ABD PELV WO CONT Narrative EXAMINATION: CT chest/abdomen/pelvis without contrast 
 
INDICATION: Sepsis, leukocytosis COMPARISON: CT abdomen 1/4/2019 TECHNIQUE: CT of the chest, abdomen, and pelvis performed without contrast. 
Multiplanar reformations obtained. All CT scans at this facility are performed 
using dose optimization technique as appropriate to a performed exam, to include 
automated exposure control, adjustment of the mA and/or kV according to patient 
size (including appropriate matching first site specific examinations), or use 
of iterative reconstruction technique. FINDINGS: 
 
Evaluation of soft tissues and vessels limited without vascular enhancement. Streak artifact from arms also limits evaluation. CHEST: 
 
Cardiovascular: Major vessels unremarkable.  Heart size normal. 
 
 Mediastinum: Imaged thyroid unremarkable. No adenopathy by size criteria. Esophagus nondistended. Pleura: No effusion. No pneumothorax. Lungs/airways: Bronchial tree unremarkable. No confluent consolidation. Miscellaneous: Superficial soft tissues unremarkable. Bones: T6 mild superior endplate depression, age indeterminate. ABDOMEN/PELVIS: 
 
Hepatobiliary: Gallbladder absent. Liver unremarkable. No biliary duct 
dilatation. Pancreas: Unremarkable. Spleen: Unremarkable. Adrenals: Unremarkable. Genitourinary: Right kidney unremarkable. Left kidney unremarkable. Bladder with 
mild wall thickening. Multilobulated uterus containing multiple calcifications. Gastrointestinal: Stomach unremarkable. Small bowel loops nondilated. The colon 
unremarkable. Appendix unremarkable. Mesentery/vessels/nodes: No free air. No free fluid. Major vessels unremarkable. No adenopathy by size criteria. Miscellaneous: Superficial soft tissues unremarkable. Bones: No acute osseous findings. Impression IMPRESSION: 
 
Chest: 
-No clearly acute findings. -Age-indeterminate mild T6 compression deformity. Abdomen/pelvis: 
-Bladder with mild wall thickening. Correlate clinically for UTI/cystitis. 
-Fibroid uterus. MRI Results from [place] encounter on 09/11/20 MRI BRAIN WO CONT Narrative Description:  MRI brain without contrast 
 
TECHNIQUE: Multiplanar, multisequence MR imaging of the brain without contrast 
 
Clinical Indication:  Altered mental status. Comparison: September 9, 2020. Findings: Motion degraded exam. 
 
Midline structures of the brain to include the corpus callosum, pituitary gland 
and clivus demonstrate normal morphology and signal intensity. The craniocervical junction appears normal. 
There is normal signal intensity within the superior sagittal sinus.  
There is a focal narrowing at the right occipital horn, presumed congenital 
 variant. Otherwise, the brain demonstrates normal morphology and signal 
intensity throughout. There are no areas of restricted diffusion. There are normal flow voids at the base of the brain. Trace right mastoid effusion. Left mastoids are clear. Paranasal sinuses are 
clear. There is abnormal signal intensity within the left globe. This appears to be 
chronic. Impression Impression: Motion degraded exam. No acute intracranial findings. Abnormal appearance of the left globe, chronic and stable. Clinical correlation 
needed. ULTRASOUND Results from Hospital Encounter encounter on 09/08/20 US ABD LTD Impression IMPRESSION:    
 
1. Status post cholecystectomy. 2.  No significant common bile duct dilation. 3.  Increased hepatic parenchymal echogenicity with somewhat heterogeneous 
appearance, potentially suggestive of hepatosteatosis. Partial visualization of 
the liver. Results from [facility] Encounter encounter on 01/22/19 US ABD LTD Impression IMPRESSION: 
 
Mild heterogeneous echogenicity of the liver is nonspecific. This is commonly 
seen with steatosis hepatic disease. Limited visualization of the pancreas. Cardiology Procedures/Testing: MODALITY RESULTS  
EKG Results for orders placed or performed during the hospital encounter of 09/08/20 EKG, 12 LEAD, INITIAL Result Value Ref Range Ventricular Rate 132 BPM  
 Atrial Rate 132 BPM  
 P-R Interval 158 ms QRS Duration 72 ms Q-T Interval 274 ms QTC Calculation (Bezet) 405 ms Calculated P Axis 43 degrees Calculated R Axis 33 degrees Calculated T Axis 148 degrees Diagnosis Sinus tachycardia Nonspecific T wave abnormality Abnormal ECG When compared with ECG of 08-SEP-2020 15:04, No significant change was found Confirmed by Pauline Banegas MD, Corrie Sarmiento ([id number]) on 9/9/2020 7:48:30 AM 
  
Results for orders placed or performed in visit on 06/20/14 AMB POC EKG ROUTINE W/ 12 LEADS, INTER & REP Impression See progress note. ECHO 01/03/19 ECHO ADULT COMPLETE 01/04/2019 1/4/2019 Narrative · Estimated left ventricular ejection fraction is 61 - 65%. Left  
ventricular mild concentric hypertrophy. Mild (grade 1) left ventricular  
diastolic dysfunction. · Mild tricuspid valve regurgitation is present. Signed by: Jac Sue MD  
  
 
Special Testing/Procedures: MODALITY RESULTS MICRO All Micro Results Procedure Component Value Units Date/Time CULTURE, BLOOD [[id number]] Collected:  09/08/20 1640 Order Status:  Completed Specimen:  Blood Updated:  09/12/20 3306 Special Requests: NO SPECIAL REQUESTS Culture result: NO GROWTH 4 DAYS     
 CULTURE, BLOOD [[id number]] Collected:  09/08/20 1640 Order Status:  Completed Specimen:  Blood Updated:  09/12/20 4698 Special Requests: NO SPECIAL REQUESTS Culture result: NO GROWTH 4 DAYS     
 CULTURE, URINE [[id number]] Collected:  09/08/20 1211 Order Status:  Completed Specimen:  Urine from Clean catch Updated:  09/09/20 1811 Special Requests: NO SPECIAL REQUESTS Wilson Count --     
  >100,000 COLONIES/mL Culture result:    
  MIXED UROGENITAL AUDRA ISOLATED  
     
  
  
UA No results found for this or any previous visit. PATH none Assessment and Plan:  
 
60 y. o. 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   
  
Metabolic Encephalopathy of unclear etiology? Likely multifactorial- improving Came with a -460 BG, She was treated with IV fluids . She had a leucocytosis, and lactic acidosis that improved with IV antibiotics unclear source UTI? So far blood culture on 9/8 was no growth.  Pt's UA only wbc 6-8, 2+ bacteria, LE neg/Nitrite neg. Also, urine culture >100, 000 colony mixed urogenital kyle. Her  9/9 CT Chest/AB/Pelvis showed bladder with mild wall thickening cystitis unlikely UTI. Hypernatremia (151--> 150--> 141) resolved. Pt was negative UDS and ETOH serum (9/8) On  9/8 CT head showed mild prominence: mild enlargement of lateral ventricles, cerebral atrophy Labs negative for Negative troponin, EKG no ST elevation or depression, lipase normal, ammonia nml  
- s/p empirical  abx Ceftriaxone  (9/8-/9) - MRI head 9/11 - no acute intracranial finding Plan:  
- f/u Urine culture, consider CT AB w / contrast  
-appreciate ID recs- cont empiric abx  vancomycin (9/8-  and zoysn (9/9- ) 
- concern serotonin syndrome- hold meds metoclopramide and paroxetine - appreciated neurology recs- avoid antipsychotics per neuro, prn benzo for anxiety and restlessness, EEG 
- out of bed to chair, place mitten, try to take of restraints 
  
  
# Agitation - better controlled Wax and waning 
- restrains, role belt 
- avoid antipsychotics - prn Benzo for anxiety and restlessness #Tardive akathisia- restlessness 
- initially also had dystonia, EPS symptoms  
- likely related with chronic metoclopramide use  
- started today benztropine 1mg BID daily   
  
  
CRYSTAL CR 3.56 ( baseline Cr 1.09 1/2019) in setting of CKD stage 2  likely pre-julian and rhabdo (CK increasing again) Iron profile normal 
CK 2K Myoglobin 1,092 postive CK has down trended to 1K from 2K .8 elevated and vitamin D on low side of normal corrected Ca normal , Vitamin D nml , P nm  
 microalb/cr ratio- wnml 
  
Plan:   
Cont IVF Trend CK Nephrology following appreciate recs- calcitril 0.25mcg on discharge 
  
  
R eye pain + Right eye vision loss X 2 weeks Unlikely papilledema based on bedside ultrasound ( no enlarged optic sheath seen), likely floater vs retinal detachment vs pink eye vs acute angle gluacoma Plan: - spoke to optho on 9/10, Dr. Merle Reinoso stated that he will come to see patient and will make recommendations 
  
Hypokalemia (resolved) 
electrolyte imbalance (hypokalemia, corrected Ca wnml )  
- replete electrolyte as needed, Mg, Phos 
  
Hyperbilirubinemia (3.9) w/ 0.6 direct likely majority is unconjugated  likely secondary to [place] syndrome, previously elevated bili unlikely hemolysis CT AB/US no gall bladder disease appreciated , haptoglobin normal, aldolase pending  
  
  
 Diabetes ( last A1c 7.2) (-245)  neuropathy and L retinopathy - not well controlled 
- held NPH 40U BID (hold home NPH 50 U BID) -given lantus 5 U daily  
 HTN 
- not on meds 
  
 HLD 
- cont rosuvastatin  
  
Gastroparesis  
- held metoclopramide 5 mg  
  
GERD 
- cont  omeprazole 40 mg delayed capsule 
  
  
Anxiety 
- held  paroxetine 60 mg daily 
  
Diet Advance as tolerated per speech DVT Prophylaxis heparin GI Prophylaxis Omperazole Code status Full code Disposition unknown  
  
Point of Contact [place] Relationship: 
[phone]  
  
 
Julius Hernandez PGY-1  
[address] Pager: [phone] September 14, 2020, 8:12 AM

## 2020-09-13 NOTE — DISCHARGE SUMMARY
[facility] Discharge Summary Patient: Cristian Thompson MRN: [id number]  CSN: [id number] YOB: 1965  Age: 60 y.o. Sex: female Admission Date: 9/8/2020 Discharge Date: 10/02/20 Attending: Rojas Weston MD PCP: Evan Joseph MD  
 
=================================================================== Reason for Admission: Hyperglycemia [R73.9] Altered mental status [R41.82] Acute metabolic encephalopathy [F30.53] Acute renal failure (ARF) (HCC) [N17.9] Leukocytosis [D72.829] Hypokalemia [E87.6] Metabolic encephalopathy [Q10.13] Discharge Diagnoses: - Multifactorial encephalopathy - CRYSTAL  
- Rhabdomyolysis - Hypokalemia - R eye pain and vision loss - Hyperbilirubinemia 
- Diabetes - Hypertension - GERD 
- Anxiety Important notes to PCP/ follow-up studies and evaluations - CT chest and ab: incidental finding of small hypodense nodule in right thyroid lobe. This can be better evaluated by 
thyroid ultrasound out-patient - Elevated PTH likely due to kidney disease per nephro. Patient was given a script for calcitril 0.25mcg on discharge - Follow up with opthalmology for R eye pain and vision loss Pending labs and studies: none Operative Procedures:  none Discharge Medications:    
Current Discharge Medication List  
  
START taking these medications Details FLUoxetine (PROzac) 20 mg capsule Take 1 Cap by mouth daily for 30 days. Qty: 30 Cap, Refills: 0  
  
haloperidoL (HALDOL) 2 mg tablet Take 1 Tab by mouth three (3) times daily for 30 days. Qty: 90 Tab, Refills: 0  
  
pantoprazole (PROTONIX) 40 mg tablet Take 1 Tab by mouth daily for 30 days. Qty: 30 Tab, Refills: 0  
  
tamsulosin (FLOMAX) 0.4 mg capsule Take 2 Caps by mouth daily for 30 days. Qty: 60 Cap, Refills: 0  
  
valproic acid (DEPAKENE) 250 mg capsule Take 1 Cap by mouth three (3) times daily (with meals) for 30 days. Qty: 90 Cap, Refills: 0 zolpidem (AMBIEN) 5 mg tablet Take 1 Tab by mouth nightly for 5 days. Max Daily Amount: 5 mg. Qty: 5 Tab, Refills: 0 Associated Diagnoses: Insomnia, unspecified type  
  
calcitRIOL (ROCALTROL) 0.25 mcg capsule Take 1 Cap by mouth daily for 30 days. Qty: 30 Cap, Refills: 0  
  
insulin glargine (LANTUS) 100 unit/mL injection 10 units once daily 
Qty: 1 Vial, Refills: 0  
  
multivitamin, tx-iron-ca-min (THERA-M w/ IRON) 9 mg iron-400 mcg tab tablet Take 1 Tab by mouth daily for 30 days. Qty: 30 Tab, Refills: 0 phosphorus (K PHOS NEUTRAL) 250 mg tablet Take 1 Tab by mouth two (2) times a day for 30 days. Qty: 60 Tab, Refills: 0  
  
thiamine HCL (B-1) 100 mg tablet Take 1 Tab by mouth daily for 30 days. Qty: 30 Tab, Refills: 0 STOP taking these medications  
  
 metoclopramide HCl (REGLAN) 5 mg tablet Comments:  
Reason for Stopping: motor reslessness  
   
 insulin NPH/insulin regular (NOVOLIN 70/30, HUMULIN 70/30) 100 unit/mL (70-30) injection Comments:  
Reason for Stopping: Switch to lantus 10 units daily PARoxetine (PAXIL) 40 mg tablet Comments:  
Reason for Stopping: Patient on fluoxetine  
   
 rosuvastatin (CRESTOR) 20 mg tablet Comments:  
Reason for Stopping: Risk of statin-induced rhabo, pt with elevated CK Disposition: [facility],[address] [facility] in [place] Consultants:   
- Neurology -  ID 
- Psychiatry Brief Hospital Course Reason for Admission: Altered Mental Status and HHS Ms. Marko Peng is a 60 y. o. 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 On admission, Ms. Nilda Chandler was thought to be multifactorial and/or also unclear etiology.   Patient initially came in with [facility], with blood sugars between 500-460 range. She was treated with IV fluids and insulin. Her mental status marginally improved but she still remained confused and agitated. She had a septic picture on presentation with leucocytosis to 24.2, and lactic acidosis to 3.59. Patient was treated with empiric IV antibiotics; however, there was no clear source of infection identified. Infectious etiology work-up: Infectious disease was consulted. Pt's repeat blood and urine cultures showed no growth. COVID-19 was negative. CSF results were negative for bacterial/viral meningitis. HSV 1&2 serology was negative. Both HIV and RPR results were negative. Patient had imaging, including CT AB/Pelvis and chest w/ IV contrast that did not show any source of infection. Patient's IV antibiotics were discontinued after a seven day course. She remained afebrile, with no leucocytosis but her mental status did not improve back to baseline. Autoimmune etiology workup: GRACE and anti-sm results were negative. Thyroid function test was within normal limits. Neurology work-up: Neurology was consulted. Patient's LP opening pressures were elevated to 34 mm Hg. However,  patient was prone when opening pressures were measured and she was under sedation (this makes opening pressures inaccurate). Per Neurology, it is unlikely that patient had psuedocerebri tumor. Patient's CT head showed mild enlargement of lateral ventricles; This is less likely secondary to NPH and more likely due to cerebral atrophy. EEG showed nonspecific encephalopathy and no seizure activity. MRI head with and without contrast did not show any acute intracranial findings or CNS infection. MRV of head did not show any cerebral sagittal sinus thrombosis. There was initially concerns for serotonin/NMS syndrome; as a result, her home meds of metoclopramide and paroxetine were held.   Although patient's motor restlessness and EPS symptoms improved after discontinuing metoclopramide, patient's AMS did not improve. Neurology initially recommended discontinuing antipsychotics for agitation and instead treating with benzos for anxiety and restlessness. However, patient's benzos were discontinued and she was treated with only antipsychotics due to the risk of CNS depression of benzodiazepines on older adults. Despite discontinuing benzodiazepines, patient's mentation continued to be altered. Pt also had negative UDS and ETOH serum, which reduced suspicion for substance-induced AMS. Psychiatry work-up: Patient was also seen by psychiatry and recommendations were made to start fluoxetine. Psychiatry thought that patient's delirium may have worsened by rapidly stopping paroxetine, which could potentially cause withdrawal symptoms. However patient's mental status was still not at baseline despite starting fluoxetine. She was placed on Seroquel 75 mg qhs and 50 mg in the morning (her nighttime Seroquel was gradually increased while in-patient as episodes of agitation occurred during the evening). She was initially on Geodon prn for severe agitation q12h, but this was changed to scheduled Geodon due to increasing episodes of agitation on 9/29. To avoid antipsychotic polypharmacy, psychiatry discontinued Seroquel and Geodon, and started patient on Halodol 2 mg TID and Depakote 250 mg TID for agitation related to delirium. She was also given Ambien 5 mg qhs for insomnia. Patient was started on 1:1 sitter and placed on restraints (roll belt and side rails x4) for waxing and waning agitation. Fall precautions were in place. On October 1, 2020 at noon, patient was given a trial off of restraints and sitter for 24 hours starting. There were no concerns for falls or agitations during this 24-hour period. Other problems addressed during this admission: 1.  CRYSTAL in the setting of CKD II: Patient's hospital course was also complicated by rhabdomyalysis and CRYSTAL with creatinine elevated to 3.56 on admission (baseline creatinine for patient is 1.09 in the setting of CKD stage 2, as per chart review). Her kidney function improved with IVF. Her CK  down trended to 654 from 1269 on admission. Patient's CK levels were likely still high due to patient's prolonged immobilization and history of being on restraints. Nephrology was consulted. Her PTH was elevated to 317.8 and vitamin D was on the low side of normal (31) . Her corrected calcium was normal and phosphorus was also normal. She was treated with 1000U of  Vitamin D. Nephrology thought her elevated PTH was likely due to CKD, consistent with tertiary hyperparathyroidism. Nephrology recommended starting calcitril 0.25mcg on discharge.  
  
2. Right eye pain and vision loss: Unlikely to be papilledema based on bedside ultrasound (no enlarged optic sheath seen). Vision loss was likely due to retinal detachment vs acute angle gluacoma. Opthomology, Dr. Derrick Acosta, was consulted on 9/10, and reported that he will come by to see patient and will make recommendations. However, he later stated that he will not able to come see patient at [facility] and that patient likely needs follow up outpatient for her vision loss. 3. Hyperbilirubinemia (TcB 3.9) : The majority of Total bili was unconjugated bilirubin,  likely secondary to [place] an[place]i syndrome. Patient had elevated total bilirubin during previous hospitalizations. Elevated T bili was unlikely due to hemolysis because LD was 423, haptoglobin was normal, and CT AB/US showed no evidence of gall bladder disease. Total bilirubin was 1.8 on discharge. 4. Urinary retention and UTI: UA on 9/22 showed evidence of new UTI, although patient did not have obvious suprapubic tenderness or urinary symptoms. Urine culture grew 75 000 cfu of VRE. Given her increasing level of agitation, she was given 2 doses of fosfomycin.   Due to patient's propensity for urinary retention and frequent straight catheterizations, patient was started on flomax 0.8 mg daily, with scheduled bladder scans q6h and scheduled commode time. 5. Possible Dysphagia: CT chest, abdomen and pelvis with contrast on 9/15 showed mild circumferential esophageal wall prominence with collapsed lumen. Barium swallow was indicative of narrowed appearance of distal esophagus and EJ junction but without mass, but possible small sliding scale hernia. Nursing instructions were given to provide assistance with meals due to aspiration risk. Chronic issues addressed during this admission: 1. Type 2 DM ( last A1c 7.2) with neuropathy and L retinopathy - Patient was continued on lantus 10 units daily and correctional mealtime insulin while in-patient. 2. HLD - Patient's Rosuvastatin was held in-patient in light of elevated CK levels and risk of statin-induced rhabdomyolysis. 4. Gastroparesis - held metoclopramide 5 mg in-patient due to initial motor restlessness. Patient received Zofran as needed while in-patient for nausea/vomiting. 5. GERD - Patient received omeprazole 40 mg daily while in-patient 6. Anxiety - Paroxetine 60 mg daily was held. Patient was continued on Fluoxetine in-patient, as per psych recommendations.  
 
CURRENT ADMISSION IMAGING RESULTS Xr Shoulder Rt Ap/lat Min 2 V Result Date: 9/26/2020 IMPRESSION: 1. No radiographic evidence of acute osseous abnormality. Xr Humerus Rt Result Date: 9/26/2020 IMPRESSION: 1. No radiographic evidence of acute osseous abnormality. Xr Abd (kub) Result Date: 9/21/2020 IMPRESSION: 1.  Non-obstructive bowel gas pattern. Thank you for enabling us to participate in the care of this patient. Xr Ba Swallow Esophogram 
 
Result Date: 9/25/2020 Impression: 1.  Mildly narrowed appearance of the distal esophagus/EG junction but no discrete mass, may represent postsurgical changes; however, limited study as described. It is difficult to exclude underlying mild esophageal mucosal abnormalities. 2. Prominent cricopharyngeal impression without obstruction. 3. Esophageal dysmotility. 4. Probably small sliding hiatal hernia. Ir Picc Insert Wo Port Over 5 Years Wo Img Result Date: 9/15/2020 IMPRESSION: Successful exchange of basilic vein midline for a left dual lumen PICC catheter. Ir Spinal Puncture Lumbar Diagnostic Result Date: 9/15/2020 IMPRESSION: Lumbar puncture performed under fluoroscopy. Laboratory results pending. Opening pressure of 34 and closing pressure of 12 cm of water measured in the prone position. 16 cc of crystal clear CSF collected Mri Brain W Wo Cont Result Date: 9/18/2020 IMPRESSION: No acute intracranial abnormality. No mass, hemorrhage, or acute infarct. Postsurgical changes left optic globe consistent with previous retinal detachment procedure. Ct Head Wo Cont Result Date: 9/30/2020 IMPRESSION: No acute findings or interval change given limitations. Ct Head Wo Cont Result Date: 9/25/2020 IMPRESSION: No change or acute intracranial findings. Motion limitations. Ct Chest Abd Pelv W Cont Result Date: 9/15/2020 IMPRESSION: 1. Marked bladder distention up to the level above the umbilicus. Recommend clinical correlation for urinary outlet obstruction. 2. No CT evidence of infection or inflammatory process. No abscess seen. 3. Tiny hypodense nodule in right thyroid lobe. This can be better evaluated by thyroid ultrasound. 4. Mild and circumferential esophageal wall prominence with collapsed the lumen. If symptomatic, barium swallow can be performed for better evaluation. 5. Fluid-filled nondilated distal colon and rectum as nonspecific finding and could represent diarrhea. 6. Multiple partially calcified fibroids. Thank you for this referral.  
 
Ct Spine Cerv Wo Cont Result Date: 9/25/2020 IMPRESSION: 1. No CT signs of cervical spine trauma. No significant change to prior. 2. Degenerative disc disease greatest at C5-6 with mild spinal stenosis. Mrv Brain W Wo Cont Result Date: 9/18/2020 IMPRESSION: No findings of major dural sinus or deep cerebral venous thrombosis or stenosis. Cardiology Procedures/Testing: MODALITY RESULTS  
EKG Results for orders placed or performed during the hospital encounter of 09/08/20 EKG, 12 LEAD, INITIAL Result Value Ref Range Ventricular Rate 90 BPM  
 Atrial Rate 90 BPM  
 P-R Interval 128 ms QRS Duration 72 ms Q-T Interval 392 ms QTC Calculation (Bezet) 479 ms Calculated P Axis 25 degrees Calculated R Axis 9 degrees Calculated T Axis 61 degrees Diagnosis Normal sinus rhythm Cannot rule out Anterior infarct , age undetermined Abnormal ECG When compared with ECG of 18-SEP-2020 11:43, No significant change was found Confirmed by Lauren Sow MD, ----- [phone]) on 10/1/2020 10:32:26 AM 
  
Results for orders placed or performed in visit on 06/20/14 AMB POC EKG ROUTINE W/ 12 LEADS, INTER & REP Impression See progress note. ECHO 01/03/19 ECHO ADULT COMPLETE 01/04/2019 1/4/2019 Narrative · Estimated left ventricular ejection fraction is 61 - 65%. Left  
ventricular mild concentric hypertrophy. Mild (grade 1) left ventricular  
diastolic dysfunction. · Mild tricuspid valve regurgitation is present. Signed by: Jaqueline Li MD  
  
Nuclear Medicine Results from [facility] En[place] encounter on 04/29/14 NM GASTRIC EMPTY STDY Impression IMPRESSION: 
 
Examination within normal limits. Apollo Henderson Results from [facility] Encounter encounter on 04/20/14 NM HEPATOBILIARY W INTERVENTION Impression IMPRESSION: 
 
No scintigraphic evidence of acute cholecystitis. Markedly abnormal with no injection demonstrated following stimulation.  
Thank you for your referral.  
  
 IR Results from Hospital Encounter encounter on 09/08/20 IR PICC INSERT WO PORT OVER 5 YEARS WO IMG Impression IMPRESSION:  
Successful exchange of basilic vein midline for a left dual lumen PICC catheter. IR SPINAL PUNCTURE LUMBAR DIAGNOSTIC Impression IMPRESSION: Lumbar puncture performed under fluoroscopy. Laboratory results 
pending. Opening pressure of 34 and closing pressure of 12 cm of water measured 
in the prone position. 16 cc of crystal clear CSF collected IR Messi Impression IMPRESSION:  
 
Successful placement of a single-lumen right basilic  vein midline venous access 
catheter. IV access is ready for immediate use. CATH Special Testing/Procedures: MODALITY RESULTS MICRO All Micro Results Procedure Component Value Units Date/Time CULTURE, URINE [[id number]]  (Abnormal)  (Susceptibility) Collected:  09/22/20 1355 Order Status:  Completed Specimen:  Urine from Clean catch Updated:  09/26/20 9777 Special Requests: NO SPECIAL REQUESTS [place] Count --     
  [id number] COLONIES/mL Culture result:    
  [facility] RESISTANT ENTEROCOCCUS FAECIUM *  
     
 CULTURE, CSF [place] STAIN [[id number]] Collected:  09/15/20 0848 Order Status:  Completed Specimen:  Cerebrospinal Fluid Updated:  09/22/20 1045 Special Requests: CEREBROSPINAL FLUID     
  GRAM STAIN NO WBC'S SEEN     
   NO ORGANISMS SEEN Smear Reviewed by Microbiology 9/15/20 AT 1408 TA. Culture result:    
  NO GROWTH ON SOLID MEDIA AT 4 DAYS  
     
      
  NO GROWTH IN THIO BROTH AT 7 DAYS  
     
 CULTURE, BLOOD [[id number]] Collected:  09/15/20 1002 Order Status:  Completed Specimen:  Blood Updated:  09/21/20 0735 Special Requests: NO SPECIAL REQUESTS Culture result: NO GROWTH 6 DAYS     
 CULTURE, BLOOD [[id number]] Collected:  09/15/20 1005 Order Status:  Completed Specimen:  Blood Updated:  09/21/20 0735 Special Requests: NO SPECIAL REQUESTS Culture result: NO GROWTH 6 DAYS     
 CULTURE, URINE [[id number]] Collected:  09/15/20 1850 Order Status:  Completed Specimen:  Urine from Clean catch Updated:  09/17/20 1046 Special Requests: NO SPECIAL REQUESTS Culture result: No growth (<1,000 CFU/ML) MENINGITIS PATHOGENS PANEL, CSF (BY PCR) [[id number]] Collected:  09/15/20 0848 Order Status:  Completed Specimen:  Cerebrospinal Fluid Updated:  09/15/20 1510 Escherichia coli K1 Not detected Haemophilus Influenzae Not detected Listeria Monocytogenes Not detected Neisseria Meningitidis Not detected Streptococcus Agalactiae Not detected Streptococcus Pneumoniae Not detected Cytomegalovirus Not detected Enterovirus Not detected Herpes Simplex Virus 1 Not detected Comment: In patients who have negative herpes simplex 1 and 2 PCR results, do not modify treatment, confirm with alternate testing. Herpes Simplex Virus 2 Not detected Comment: In patients who have negative herpes simplex 1 and 2 PCR results, do not modify treatment, confirm with alternate testing. Human Herpesvirus 6 Not detected Human Parechovirus Not detected Varicella Zoster Virus Not detected Crypto. neoformans/gattii Not detected CULTURE, BLOOD [[id number]] Collected:  09/08/20 1640 Order Status:  Completed Specimen:  Blood Updated:  09/14/20 8052 Special Requests: NO SPECIAL REQUESTS Culture result: NO GROWTH 6 DAYS     
 CULTURE, BLOOD [[id number]] Collected:  09/08/20 1640 Order Status:  Completed Specimen:  Blood Updated:  09/14/20 5834 Special Requests: NO SPECIAL REQUESTS Culture result: NO GROWTH 6 DAYS     
 CULTURE, URINE [[id number]] Collected:  09/08/20 1211 Order Status:  Completed Specimen:  Urine from Clean catch Updated:  09/09/20 1811 Special Requests: NO SPECIAL REQUESTS [place] Count --     
  >100,000 COLONIES/mL Culture result:    
  MIXED UROGENITAL AUDRA ISOLATED ABG Lab Results Component Value Date/Time pH (POC) 7.35 09/15/2020 11:53 AM  
 pCO2 (POC) 47.8 (H) 09/15/2020 11:53 AM  
 pO2 (POC) 67 (L) 09/15/2020 11:53 AM  
 HCO3 (POC) 26.6 (H) 09/15/2020 11:53 AM  
 FIO2 (POC) 21 09/15/2020 11:53 AM  
  
UA No results found for this or any previous visit. ENDO   
PATH Laboratory Results: 
LABORATORY RESULTS  
HEMATOLOGY Lab Results Component Value Date/Time WBC 8.9 10/02/2020 03:11 AM  
 Hemoglobin, POC 13.6 11/20/2018 11:19 AM  
 HGB 9.5 (L) 10/02/2020 03:11 AM  
 Hematocrit, POC 40 11/20/2018 11:19 AM  
 HCT 28.8 (L) 10/02/2020 03:11 AM  
 PLATELET 055 34/77/4637 03:11 AM  
 MCV 88.1 10/02/2020 03:11 AM  
   
CHEMISTRIES Lab Results Component Value Date/Time Sodium 140 10/02/2020 03:11 AM  
 Potassium 3.6 10/02/2020 03:11 AM  
 Chloride 109 10/02/2020 03:11 AM  
 CO2 23 10/02/2020 03:11 AM  
 Anion gap 8 10/02/2020 03:11 AM  
 Glucose 142 (H) 10/02/2020 03:11 AM  
 BUN 12 10/02/2020 03:11 AM  
 Creatinine 0.97 10/02/2020 03:11 AM  
 BUN/Creatinine ratio 12 10/02/2020 03:11 AM  
 GFR est AA >60 10/02/2020 03:11 AM  
 GFR est non-AA 59 (L) 10/02/2020 03:11 AM  
 Calcium 9.2 10/02/2020 03:11 AM  
  
HEPATIC FUNCTION Lab Results Component Value Date/Time Albumin 3.5 10/02/2020 03:11 AM  
 Bilirubin, direct 0.4 (H) 09/09/2020 07:48 AM  
 Bilirubin, total 1.8 (H) 10/02/2020 03:11 AM  
 Protein, total 7.8 10/02/2020 03:11 AM  
 Globulin 4.3 (H) 10/02/2020 03:11 AM  
 A-G Ratio 0.8 10/02/2020 03:11 AM  
 ALT (SGPT) 21 10/02/2020 03:11 AM  
 Alk. phosphatase 117 10/02/2020 03:11 AM  
   
LACTIC ACID Lab Results Component Value Date/Time Lactic acid 1.9 09/09/2020 10:00 AM  
 Lactic acid 1.3 01/29/2019 03:55 PM  
 Lactic acid 2.0 01/29/2019 09:30 AM  
  
CARDIAC PANEL Lab Results Component Value Date/Time  (H) 09/22/2020 05:34 AM  
 CK - MB 7.6 (H) 09/09/2020 07:48 AM  
 CK-MB Index 0.6 09/09/2020 07:48 AM  
 Troponin-I, QT 0.02 09/09/2020 07:48 AM  
  
NT-proBNP No results found for: BNP, BNPP, BNPPPOC, XBNPT, BNPNT  
THYROID Lab Results Component Value Date/Time TSH 1.500 09/17/2020 05:38 AM  
 T3 Uptake 27 09/17/2020 05:38 AM  
 Triiodothyronine (T3), free 3.4 01/22/2019 06:22 PM  
 T4, Free 1.3 03/19/2010 02:01 PM  
 T4, Total 8.2 09/17/2020 05:38 AM  
  
LIPID PANEL Lab Results Component Value Date/Time Cholesterol, total 170 08/07/2017 01:27 PM  
 HDL Cholesterol 61 (H) 08/07/2017 01:27 PM  
 LDL, calculated 79.6 08/07/2017 01:27 PM  
 VLDL, calculated 29.4 08/07/2017 01:27 PM  
 Triglyceride 147 08/07/2017 01:27 PM  
 CHOL/HDL Ratio 2.8 08/07/2017 01:27 PM  
   
 
RISK CALCULATORS: 
SCORE RESULT  
ASCVD The ASCVD Risk score (Cristian Grullon, et al., 2013) failed to calculate for the following reasons: 
  ASCVD risk score not calculated [id number] HAS-BLED READMISSION RISK SCORE High Risk   
      
 24 Total Score 3 Has Seen PCP in Last 6 Months (Yes=3, No=0) 3 Patient Length of Stay (>5 days = 3)  
 5 Pt. Coverage (Medicare=5 , Medicaid, or Self-Pay=4) 13 Charlson Comorbidity Score (Age + Comorbid Conditions) Criteria that do not apply:  
 . Living with Significant Other. Assisted Living. LTAC. SNF. or  
Rehab  
 IP Visits Last 12 Months (1-3=4, 4=9, >4=11) Functional status and cognitive function:   
Ambulates with assistance Status: alert, cooperative, no distress, appears stated age Condition: STABLE Disposition: SNF ([address] [facility] in [place]) Diet: Consistency Modified:   Pureed Code status and advanced care plan: Full [employer] of [employer] Relationship: 
[phone] Follow-up:  
Follow-up Information Follow up With Specialties Details Why Contact Info [address] [facility] Serafin Woodruff In 2 days  [address] [address] 
[phone]  
  
 
 
================================================================= Jessa Stain PGY1 [address] [facility] Intern Pager: [phone] October 2, 2020, 4:35 AM  
 
I have personally seen and evaluated this patient. I have personally reviewed the resident note. I have personally discussed the management and plan of care of this patient. I agree with the note as written.  
 
Latoya Rush MD 
10/2/2020, 12:26 PM

## 2020-09-13 NOTE — PROGRESS NOTES
Progress Note 58y F with PMH DM type 2, HTN CKD presented with HHS following for CRYSTAL Subjective Overnight event noted Remain confused IMPRESSION:  
Acute kidney injury, pre renal, severe dehydration for HHS, rhabdo 
CKD, h/o CRYSTAL in past, last available creatinine is at 1.9mg/dl last year Hypernatremia /Hyperchloremia Sepsis Ketoacidosis, improving Hypokalemia  
rhabdomylysis DM type 2 Altered mental status, supect metabolic and septic encephalopathy Diastolic heart failure, grade 1 PLAN:  
Her renal function is recovering but her CK trending upward, ? Seizure activity or haldol related ( less common) . Restart IV fluid, with  Ringer lactate ,monitor CK. Follow up neurology colleague input. Start on calcitril 0.25mcg on discharge. Current Facility-Administered Medications Medication Dose Route Frequency  piperacillin-tazobactam (ZOSYN) 3.375 g in 0.9% sodium chloride (MBP/ADV) 100 mL MBP  3.375 g IntraVENous Q6H  
 cholecalciferol (VITAMIN D3) (1000 Units /25 mcg) tablet 1 Tab  1,000 Units Oral DAILY  insulin lispro (HUMALOG) injection   SubCUTAneous AC&HS  
 LORazepam (ATIVAN) tablet 2 mg  2 mg Oral Q4H PRN  
 melatonin tablet 3 mg  3 mg Oral QHS  
 0.45% sodium chloride with KCl 20 mEq/L infusion   IntraVENous CONTINUOUS  
 sodium chloride (NS) flush 5-10 mL  5-10 mL IntraVENous PRN  
 [Held by provider] insulin NPH (NOVOLIN N, HUMULIN N) injection 40 Units  40 Units SubCUTAneous ACB&D  
 glucose chewable tablet 16 g  16 g Oral PRN  
 glucagon (GLUCAGEN) injection 1 mg  1 mg IntraMUSCular PRN  
 dextrose (D50W) injection syrg 12.5-25 g  25-50 mL IntraVENous PRN  
 sodium chloride (NS) flush 5-40 mL  5-40 mL IntraVENous Q8H  
 sodium chloride (NS) flush 5-40 mL  5-40 mL IntraVENous PRN  
 acetaminophen (TYLENOL) tablet 650 mg  650 mg Oral Q6H PRN  Or  
 acetaminophen (TYLENOL) suppository 650 mg  650 mg Rectal Q6H PRN  
  polyethylene glycol (MIRALAX) packet 17 g  17 g Oral DAILY PRN  
 heparin (porcine) injection 5,000 Units  5,000 Units SubCUTAneous Q8H  
 [Held by provider] PARoxetine (PAXIL) tablet 60 mg  60 mg Oral DAILY  [Held by provider] pantoprazole (PROTONIX) tablet 40 mg  40 mg Oral DAILY  [Held by provider] rosuvastatin (CRESTOR) tablet 20 mg  20 mg Oral QHS  ondansetron (ZOFRAN) injection 4 mg  4 mg IntraVENous Q6H PRN  pantoprazole (PROTONIX) 40 mg in 0.9% sodium chloride 10 mL injection  40 mg IntraVENous DAILY Review of Systems: As above Data Review: 
 
Labs: Results:  
   
Chemistry Recent Labs  
  09/13/20 
4876 09/12/20 
0214 09/11/20 
0550 * 150* 198*  141 141  
K 3.7 4.0 3.7  109 111 CO2 25 26 23 BUN 10 13 21* CREA 1.20 1.36* 1.76* CA 9.0 8.8 8.7 AGAP 10 6 7 BUCR 8* 10* 12  
 116 106  
TP 7.3 7.5 7.1 ALB 3.8 3.6 3.5 GLOB 3.5 3.9 3.6 AGRAT 1.1 0.9 1.0  
  
CBC w/Diff Recent Labs  
  09/13/20 
0653 09/12/20 
0214 09/11/20 
0550 WBC 11.7 11.3 11.9 RBC 3.58* 3.56* 3.38* HGB 10.4* 10.2* 9.8* HCT 30.6* 30.8* 29.1*  
 224 212 GRANS 56 63 59 LYMPH 35 30 37 EOS 3 1 1 Coagulation No results for input(s): PTP, INR, APTT, INREXT, INREXT in the last 72 hours. Iron/Ferritin No results for input(s): IRON in the last 72 hours. No lab exists for component: TIBCCALC BNP No results for input(s): BNPP in the last 72 hours. Cardiac Enzymes Recent Labs  
  09/13/20 
0653 09/12/20 
1734 CPK 1,895* 807* Liver Enzymes Recent Labs  
  09/13/20 
0068 TP 7.3 ALB 3.8  Thyroid Studies Lab Results Component Value Date/Time TSH 0.57 09/08/2020 04:40 PM  
    
 EKG: sinus Physical Assessment:  
 
Visit Vitals /87 (BP 1 Location: Left arm, BP Patient Position: At rest;Head of bed elevated (Comment degrees)) Pulse (!) 108 Temp 98.8 °F (37.1 °C) Resp 20 Ht 5' 8\" (1.727 m) Wt 90.9 kg (200 lb 4.8 oz) SpO2 100% Breastfeeding No  
BMI 30.46 kg/m² Weight change: -1.134 kg (-2 lb 8 oz) Intake/Output Summary (Last 24 hours) at 9/13/2020 1128 Last data filed at 9/13/2020 5169 Gross per 24 hour Intake 3300.8 ml Output 3250 ml Net 50.8 ml Physical Exam:  
General: no respi distress HEENT sclera anicteric, supple neck, no thyromegaly CVS: S1S2 heard,  no rub RS: + air entry b/l, Abd: Soft, Non tender Neuro: sleeping Extrm: No edema, no cyanosis, clubbing Skin: no visible  Rash Musculoskeletal: No gross joints or bone deformities Procedures/imaging: see electronic medical records for all procedures, Xrays and details which were not copied into this note but were reviewed prior to creation of Shayan Reynolds MD 
September 13, 2020 [facility] [phone]

## 2020-09-13 NOTE — PROGRESS NOTES
Problem: Diabetes Self-Management Goal: *Disease process and treatment process Description: Define diabetes and identify own type of diabetes; list 3 options for treating diabetes. Outcome: Progressing Towards Goal 
Goal: *Incorporating nutritional management into lifestyle Description: Describe effect of type, amount and timing of food on blood glucose; list 3 methods for planning meals. Outcome: Progressing Towards Goal 
Goal: *Incorporating physical activity into lifestyle Description: State effect of exercise on blood glucose levels. Outcome: Progressing Towards Goal 
Goal: *Developing strategies to promote health/change behavior Description: Define the ABC's of diabetes; identify appropriate screenings, schedule and personal plan for screenings. Outcome: Progressing Towards Goal 
Goal: *Using medications safely Description: State effect of diabetes medications on diabetes; name diabetes medication taking, action and side effects. Outcome: Progressing Towards Goal 
Goal: *Monitoring blood glucose, interpreting and using results Description: Identify recommended blood glucose targets  and personal targets. Outcome: Progressing Towards Goal 
Goal: *Prevention, detection, treatment of acute complications Description: List symptoms of hyper- and hypoglycemia; describe how to treat low blood sugar and actions for lowering  high blood glucose level. Outcome: Progressing Towards Goal 
Goal: *Prevention, detection and treatment of chronic complications Description: Define the natural course of diabetes and describe the relationship of blood glucose levels to long term complications of diabetes. Outcome: Progressing Towards Goal 
Goal: *Developing strategies to address psychosocial issues Description: Describe feelings about living with diabetes; identify support needed and support network Outcome: Progressing Towards Goal 
Goal: *Insulin pump training Outcome: Progressing Towards Goal 
 Goal: *Sick day guidelines Outcome: Progressing Towards Goal 
Goal: *Patient Specific Goal (EDIT GOAL, INSERT TEXT) Outcome: Progressing Towards Goal 
  
Problem: Patient Education: Go to Patient Education Activity Goal: Patient/Family Education Outcome: Progressing Towards Goal 
  
Problem: Non-Violent Restraints Goal: *Removal from restraints as soon as assessed to be safe Outcome: Progressing Towards Goal 
Goal: *No harm/injury to patient while restraints in use Outcome: Progressing Towards Goal 
Goal: *Patient's dignity will be maintained Outcome: Progressing Towards Goal 
Goal: *Patient Specific Goal (EDIT GOAL, INSERT TEXT) Outcome: Progressing Towards Goal 
Goal: Non-violent Restaints:Standard Interventions Outcome: Progressing Towards Goal 
Goal: Non-violent Restraints:Patient Interventions Outcome: Progressing Towards Goal 
Goal: Patient/Family Education Outcome: Progressing Towards Goal 
  
Problem: Falls - Risk of 
Goal: *Absence of Falls Description: Document Maria A Moss Fall Risk and appropriate interventions in the flowsheet. Outcome: Progressing Towards Goal 
Note: Fall Risk Interventions: 
Mobility Interventions: Bed/chair exit alarm Mentation Interventions: Bed/chair exit alarm, Family/sitter at bedside, Reorient patient Medication Interventions: Bed/chair exit alarm Elimination Interventions: Bed/chair exit alarm, Toileting schedule/hourly rounds Problem: Patient Education: Go to Patient Education Activity Goal: Patient/Family Education Outcome: Progressing Towards Goal 
  
Problem: Pressure Injury - Risk of 
Goal: *Prevention of pressure injury Description: Document Vasile Scale and appropriate interventions in the flowsheet. Outcome: Progressing Towards Goal 
Note: Pressure Injury Interventions: 
Sensory Interventions: Assess changes in LOC Moisture Interventions: Apply protective barrier, creams and emollients, Internal/External urinary devices, Minimize layers Activity Interventions: Pressure redistribution bed/mattress(bed type) Mobility Interventions: Pressure redistribution bed/mattress (bed type) Nutrition Interventions: Document food/fluid/supplement intake Friction and Shear Interventions: Minimize layers Problem: Patient Education: Go to Patient Education Activity Goal: Patient/Family Education Outcome: Progressing Towards Goal 
  
Problem: Violent Restraints Goal: *Patient Specific Goal (EDIT GOAL, INSERT TEXT) Outcome: Progressing Towards Goal 
  
Problem: Patient Education: Go to Patient Education Activity Goal: Patient/Family Education Outcome: Progressing Towards Goal 
  
Problem: Nutrition Deficit Goal: *Optimize nutritional status Outcome: Progressing Towards Goal

## 2020-09-13 NOTE — PROGRESS NOTES
0700 Bedside and Verbal shift change report given to Rainer Silva RN (oncoming nurse) by Daniel Parr RN (offgoing nurse). Report included the following information SBAR, Kardex, Intake/Output, MAR, Recent Results and Cardiac Rhythm NSR. 
 
0800 patient in bed, vitals stable. 0900 patient in bed, vitals stable. 1000 patient in bed, vitals stable. Sitter at bedside. 1100 patient in bed, vitals stable. 1130 patient ate all of lunch plate, including few sips of tea. No aspiration. Sitter at bedside. Patient switches between pleasant and cooperative to agitated and yelling. Oriented to who she is and where she is, but unaware of the date/day of the week. 1315 bladder scan patient, >241ml. Patient resting, vitals stable. Sitter at bedside. 1430 patient resting, vitals stable. Sitter at bedside. 1600 patient lying in bed, vitals stable. Switches between calm and sleeping and yelling out names and asking to get out of bed. Sitter no longer at bedside. 1700 patient asleep in bed, vitals stable. 1730 patient waking up, screaming and asking how the nurse got under her bed. Administered Ativan 2mg. Patient ate about half of dinner plate and was able to communicate about things such as favorite fruit and favorite food but afterwards continued to call out names and try to get out of bed. Patient spoke to family member on phone and stated that she was here for covid, continued to try to get out of bed while on the phone. Was not able to communicate well with family member on phone. 1800 changed and cleaned patient for incontinence. [address] [place] patients son Giovany Quintana is at bedside to visit, she is resting but is communicating with him calmy. He will be visiting for an hour. 1907 patient has remained calm with son in the room, has no yelled out. 1938 Bedside, Verbal and Written shift change report given to Daniel Parr RN (oncoming nurse) by Rainer Silva RN (offgoing nurse).  Report included the following information SBAR, Kardex, Procedure Summary, Intake/Output, MAR, Recent Results and Cardiac Rhythm NSR.

## 2020-09-13 NOTE — PROGRESS NOTES
Discussed case with nursing supervisor. Patient with persistent delirium requiring physical restraints and sedating medications who would benefit from a familial visit. Agreed to let visitor come and see patient for a brief period of time and determine if it adds benefit to her care. Son, Kenzie Skinner, agreed to come today between 6-7 pm to see patient for 30-45 minutes. He knows to wear a mask and come through the front entrance. Security desk and patient's nurse were informed. PFM team plans to speak with son regarding patient's care. Susana Hernandez MD  
[facility] Joe Jackson PGY-3 
09/13/20 4:27 PM 
Pager: [phone]

## 2020-09-13 NOTE — PROGRESS NOTES
1128: Per nursing, hold OT evaluation d/t pt not medically appropriate e.g. b/l soft wrist restraints, disorientated, receiving Ativan q 4 hrs and legally blind, not appropriate for functional mobility at this time. OT to follow.   
 
Abby Byrd, MS OTR/L

## 2020-09-13 NOTE — PROGRESS NOTES
S: called to patient room due to increased agitation, shouting out and interrupting other patients. She was just given Ativan 1.5 hours ago. O:  
Visit Vitals BP (!) 150/96 (BP 1 Location: Left arm, BP Patient Position: At rest) Pulse (!) 110 Temp 98.9 °F (37.2 °C) Resp 18 Ht 5' 8\" (1.727 m) Wt 92 kg (202 lb 12.8 oz) SpO2 (!) 10% Breastfeeding No  
BMI 30.84 kg/m² GEN: Disoriented, trying to get out of bed, no acute distress, no increased work of breathing, answers questions, and follows most commands. In soft wrist restraints. A/P: Pt's condition is overall unchanged from this morning. She was redirectable but only stayed still for a short time. Unfortunately, she did not get out of bed to sit in a chair today, had restraints on all day. Per nurse, they will not allow a sitter to come if she is written for restraints. Apparently, there is no sitter available at this time even if the restraints were off. Discussed the importance of working to get off the restraints in order to plan discharge for the patient. 1. No additional sedative medications at this time. 2. Goal tomorrow to get out of bed to chair and give patient a break from the restraints. Tre Lawrence D.O. PGY-3 
[facility] Medicine

## 2020-09-13 NOTE — PROGRESS NOTES
120 [facility] Progress Note Patient: Heidi Breen MRN: [id number] SSN: [id number]  YOB: 1965 Age: 60 y.o. Sex: female Admit Date: 2020 LOS: 5 days Chief Complaint Patient presents with  Vomiting Subjective:  
 
Overnight: Pt had persistent agitation, requiring Ativan. On restraints due to no sitter being available. Pt with slowly improving mental status. Pt follows commands intermittently. She is able to say she is in the hospital. She was loudly calling out her sons name last night. ROS Unable to evaluate because patient will not answer questions, altered mental status Objective:  
 
Visit Vitals /81 (BP 1 Location: Left arm, BP Patient Position: At rest;Head of bed elevated (Comment degrees)) Pulse (!) 110 Temp 98.9 °F (37.2 °C) Resp 24 Ht 5' 8\" (1.727 m) Wt 90.9 kg (200 lb 4.8 oz) LMP 10/06/2015 (Exact Date) SpO2 98% Breastfeeding No  
BMI 30.46 kg/m² Physical Exam: 
General:  Appears well, eyes closed , keeps trying to get out of bed HEENT: R eye erythema, no drainage, Left eye sluggish but react to light, moist mucous membranes. CV:  RRR, no murmurs. No visible pulsations or thrills. RESP:  Unlabored lungs clear to auscultation without adventitious breath sounds. Equal expansion bilaterally. ABD:  soft abdomen, non-tender,  nondistended. BS (+). MS:  No joint deformity or instability. No atrophy. Neuro:  Pt no facial droop, patient does not follow commands, moves all four extremities. Ext:  R foot the first digit is amputated, Left second digit amputated, No edema. 2+ radial and dp pulses bilaterally. Skin:  No rashes, lesions, or ulcers.  
  
Intake and Output: 
Current Shift: 701 - 1900 In: 50.8 [I.V.:50.8] Out: - Last three shifts: 1901 -  0700 In: 3250 [P.O.:240; I.V.:3010] Out: 3250 [NB] Lab/Data Review: Recent Results (from the past 12 hour(s)) GLUCOSE, POC Collection Time: 09/12/20 11:07 PM  
Result Value Ref Range Glucose (POC) 160 (H) 70 - 110 mg/dL CBC WITH MANUAL DIFF Collection Time: 09/13/20  6:53 AM  
Result Value Ref Range WBC 11.7 4.6 - 13.2 K/uL  
 RBC 3.58 (L) 4.20 - 5.30 M/uL  
 HGB 10.4 (L) 12.0 - 16.0 g/dL HCT 30.6 (L) 35.0 - 45.0 % MCV 85.5 74.0 - 97.0 FL  
 MCH 29.1 24.0 - 34.0 PG  
 MCHC 34.0 31.0 - 37.0 g/dL  
 RDW 13.4 11.6 - 14.5 % PLATELET 624 928 - 703 K/uL MPV 11.3 9.2 - 11.8 FL  
 DIFFERENTIAL MANUAL DIFFERENTIAL ORDERED    
 NEUTROPHILS PENDING % LYMPHOCYTES PENDING % MONOCYTES PENDING % EOSINOPHILS PENDING % BASOPHILS PENDING % BAND NEUTROPHILS PENDING % PROMYELOCYTES PENDING % MYELOCYTES PENDING % METAMYELOCYTES PENDING % BLASTS PENDING % OTHER CELL PENDING   
 ABS. NEUTROPHILS PENDING K/UL  
 ABS. LYMPHOCYTES PENDING K/UL  
 ABS. MONOCYTES PENDING K/UL  
 ABS. EOSINOPHILS PENDING K/UL  
 ABS. BASOPHILS PENDING K/UL  
 RBC COMMENTS PENDING   
 DF PENDING   
METABOLIC PANEL, COMPREHENSIVE Collection Time: 09/13/20  6:53 AM  
Result Value Ref Range Sodium 140 136 - 145 mmol/L Potassium 3.7 3.5 - 5.5 mmol/L Chloride 105 100 - 111 mmol/L  
 CO2 25 21 - 32 mmol/L Anion gap 10 3.0 - 18 mmol/L Glucose 123 (H) 74 - 99 mg/dL BUN 10 7.0 - 18 MG/DL Creatinine 1.20 0.6 - 1.3 MG/DL  
 BUN/Creatinine ratio 8 (L) 12 - 20 GFR est AA 56 (L) >60 ml/min/1.73m2 GFR est non-AA 46 (L) >60 ml/min/1.73m2 Calcium 9.0 8.5 - 10.1 MG/DL Bilirubin, total PENDING MG/DL  
 ALT (SGPT) 35 13 - 56 U/L  
 AST (SGOT) 55 (H) 10 - 38 U/L Alk. phosphatase PENDING U/L Protein, total PENDING g/dL Albumin 3.8 3.4 - 5.0 g/dL Globulin PENDING g/dL A-G Ratio PENDING    
MAGNESIUM Collection Time: 09/13/20  6:53 AM  
Result Value Ref Range Magnesium 1.7 1.6 - 2.6 mg/dL GLUCOSE, POC  
 Collection Time: 09/13/20  7:50 AM  
Result Value Ref Range Glucose (POC) 139 (H) 70 - 110 mg/dL RECENT RESULTS MODALITY IMPRESSION  
XR Results from Hospital Encounter encounter on 09/08/20 XR ORBIT BI FOREIGN BODY Narrative EXAM: ORBITS LIMITED CLINICAL HISTORY/INDICATION: , blurred vision with nausea, vomiting, and 
abdominal pain, severe agitation, altered mental status, diabetic retinopathy 
due to type 2 diabetes [facility] [place] mri screen. COMPARISON: None. TECHNIQUE: modified ramos view(s) of the orbits obtained. FINDINGS: 
 
There are no radiopaque metallic foreign bodies within the orbits. There is no 
aneurysm clip. The visualized  paranasal sinuses are normal. 
  
 Impression IMPRESSION: 
 
Negative orbital screening prior to MRI. CT Results from Hospital Encounter encounter on 09/08/20 CT CHEST ABD PELV WO CONT Narrative EXAMINATION: CT chest/abdomen/pelvis without contrast 
 
INDICATION: Sepsis, leukocytosis COMPARISON: CT abdomen 1/4/2019 TECHNIQUE: CT of the chest, abdomen, and pelvis performed without contrast. 
Multiplanar reformations obtained. All CT scans at this facility are performed 
using dose optimization technique as appropriate to a performed exam, to include 
automated exposure control, adjustment of the mA and/or kV according to patient 
size (including appropriate matching first site specific examinations), or use 
of iterative reconstruction technique. FINDINGS: 
 
Evaluation of soft tissues and vessels limited without vascular enhancement. Streak artifact from arms also limits evaluation. CHEST: 
 
Cardiovascular: Major vessels unremarkable. Heart size normal. 
 
Mediastinum: Imaged thyroid unremarkable. No adenopathy by size criteria. Esophagus nondistended. Pleura: No effusion. No pneumothorax. Lungs/airways: Bronchial tree unremarkable. No confluent consolidation. Miscellaneous: Superficial soft tissues unremarkable. Bones: T6 mild superior endplate depression, age indeterminate. ABDOMEN/PELVIS: 
 
Hepatobiliary: Gallbladder absent. Liver unremarkable. No biliary duct 
dilatation. Pancreas: Unremarkable. Spleen: Unremarkable. Adrenals: Unremarkable. Genitourinary: Right kidney unremarkable. Left kidney unremarkable. Bladder with 
mild wall thickening. Multilobulated uterus containing multiple calcifications. Gastrointestinal: Stomach unremarkable. Small bowel loops nondilated. The colon 
unremarkable. Appendix unremarkable. Mesentery/vessels/nodes: No free air. No free fluid. Major vessels unremarkable. No adenopathy by size criteria. Miscellaneous: Superficial soft tissues unremarkable. Bones: No acute osseous findings. Impression IMPRESSION: 
 
Chest: 
-No clearly acute findings. -Age-indeterminate mild T6 compression deformity. Abdomen/pelvis: 
-Bladder with mild wall thickening. Correlate clinically for UTI/cystitis. 
-Fibroid uterus. MRI Results from [place] encounter on 09/11/20 MRI BRAIN WO CONT Narrative Description:  MRI brain without contrast 
 
TECHNIQUE: Multiplanar, multisequence MR imaging of the brain without contrast 
 
Clinical Indication:  Altered mental status. Comparison: September 9, 2020. Findings: Motion degraded exam. 
 
Midline structures of the brain to include the corpus callosum, pituitary gland 
and clivus demonstrate normal morphology and signal intensity. The craniocervical junction appears normal. 
There is normal signal intensity within the superior sagittal sinus. There is a focal narrowing at the right occipital horn, presumed congenital 
variant. Otherwise, the brain demonstrates normal morphology and signal 
intensity throughout. There are no areas of restricted diffusion. There are normal flow voids at the base of the brain. Trace right mastoid effusion. Left mastoids are clear. Paranasal sinuses are 
clear. There is abnormal signal intensity within the left globe. This appears to be 
chronic. Impression Impression: Motion degraded exam. No acute intracranial findings. Abnormal appearance of the left globe, chronic and stable. Clinical correlation 
needed. ULTRASOUND Results from Hospital Encounter encounter on 09/08/20 US ABD LTD Impression IMPRESSION:    
 
1. Status post cholecystectomy. 2.  No significant common bile duct dilation. 3.  Increased hepatic parenchymal echogenicity with somewhat heterogeneous 
appearance, potentially suggestive of hepatosteatosis. Partial visualization of 
the liver. Results from [facility] Encounter encounter on 01/22/19 US ABD LTD Impression IMPRESSION: 
 
Mild heterogeneous echogenicity of the liver is nonspecific. This is commonly 
seen with steatosis hepatic disease. Limited visualization of the pancreas. Cardiology Procedures/Testing: MODALITY RESULTS  
EKG Results for orders placed or performed during the hospital encounter of 09/08/20 EKG, 12 LEAD, INITIAL Result Value Ref Range Ventricular Rate 132 BPM  
 Atrial Rate 132 BPM  
 P-R Interval 158 ms QRS Duration 72 ms Q-T Interval 274 ms QTC Calculation (Bezet) 405 ms Calculated P Axis 43 degrees Calculated R Axis 33 degrees Calculated T Axis 148 degrees Diagnosis Sinus tachycardia Nonspecific T wave abnormality Abnormal ECG When compared with ECG of 08-SEP-2020 15:04, No significant change was found Confirmed by Alcon Moran MD, Hiram Renner ([id number]) on 9/9/2020 7:48:30 AM 
  
Results for orders placed or performed in visit on 06/20/14 AMB POC EKG ROUTINE W/ 12 LEADS, INTER & REP Impression See progress note. ECHO 01/03/19 ECHO ADULT COMPLETE 01/04/2019 1/4/2019 Narrative · Estimated left ventricular ejection fraction is 61 - 65%. Left  
ventricular mild concentric hypertrophy. Mild (grade 1) left ventricular diastolic dysfunction. · Mild tricuspid valve regurgitation is present. Signed by: Nasreen Leblanc MD  
  
 
Special Testing/Procedures: MODALITY RESULTS MICRO All Micro Results Procedure Component Value Units Date/Time CULTURE, BLOOD [[id number]] Collected:  09/08/20 1640 Order Status:  Completed Specimen:  Blood Updated:  09/13/20 4821 Special Requests: NO SPECIAL REQUESTS Culture result: NO GROWTH 5 DAYS     
 CULTURE, BLOOD [[id number]] Collected:  09/08/20 1640 Order Status:  Completed Specimen:  Blood Updated:  09/13/20 2418 Special Requests: NO SPECIAL REQUESTS Culture result: NO GROWTH 5 DAYS     
 CULTURE, URINE [[id number]] Collected:  09/08/20 1211 Order Status:  Completed Specimen:  Urine from Clean catch Updated:  09/09/20 1811 Special Requests: NO SPECIAL REQUESTS [place] Count --     
  >100,000 COLONIES/mL Culture result:    
  MIXED UROGENITAL AUDRA ISOLATED  
     
  
  
UA No results found for this or any previous visit. PATH none Assessment and Plan:  
60 y.o. 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   
 1. Metabolic Encephalopathy of unclear etiology, toxic vs metabolic, Likely multifactorial- improving Unfortunately, the MRI had motion degrade. No acute intracranial findings. Abnormal appearance of left globe is chronic and stable. Completed Ceftriaxone  (9/8-/9). Plan:  
- appreciate ID recs- cont zoysn (9/9- ), vanc discontinued 
- concern serotonin syndrome- hold meds metoclopramide and paroxetine - appreciated neurology recs- avoid antipsychotics, prn benzo for anxiety and restlessness, EEG follow up read 2. Agitation - Wax and waning.  Nursing supervisor will help with getting a sitter for patient so she can sit in a chair today. - restrains, role belt as needed 
- avoid antipsychotics - prn Benzo for anxiety and restlessness 
  
3. CRYSTAL:  Significantly improved, CR 1.20 today ( baseline Cr 1.09 1/2019) Iron profile normal, CK downtrending Myoglobin 1,092 postive CK has down trended to 1K from 2K .8 elevated and vitamin D on low side of normal corrected Ca normal , Vitamin D nml , P nm  
 microalb/cr ratio- wnml Plan:   
Cont IVF Nephrology following appreciate recs- calcitril 0.25mcg on discharge 4. R eye pain + Right eye vision loss X 2 weeks Unlikely papilledema based on bedside ultrasound ( no enlarged optic sheath seen), likely floater vs retinal detachment vs pink eye vs acute angle gluacoma Plan:  
- spoke to optho on 9/10, Dr. Remi Saint stated that he will come to see patient and will make recommendations 
  
5. Hypokalemia (resolved) 
electrolyte imbalance (hypokalemia, corrected Ca wnml )  
- replete electrolyte as needed, Mg, Phos 
  
7. 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  
  
  
 8. Diabetes ( last A1c 7.2) neuropathy and L retinopathy  
- held NPH 40U BID (hold home NPH 50 U BID) because patient NPO 
  
 9. HTN 
- not on meds 
  
 10. HLD 
- cont rosuvastatin  
  
11. Gastroparesis  
- held metoclopramide 5 mg  
  
12. GERD 
- cont  omeprazole 40 mg delayed capsule 
  
 13. Anxiety 
- held  paroxetine 60 mg daily Diet Advance as tolerated per speech DVT Prophylaxis heparin GI Prophylaxis Omperazole Code status Full code Disposition unknown  
  
Point of [address] [facility] Relationship: 
[phone] Dave Young D.O. PGY-3 
[facility]

## 2020-09-14 NOTE — PROGRESS NOTES
Problem: Self Care Deficits Care Plan (Adult) Goal: *Acute Goals and Plan of Care (Insert Text) Description: Occupational Therapy Goals Initiated 2020 within 7 day(s). 1.  Patient will perform bed mobility in preparation for self-care with minimal assistance/contact guard assist.  
2.  Patient will perform grooming with supervision/set-up using compensatory techniques. 3.  Patient will perform upper body dressing with supervision/set-up. 4.  Patient will perform toilet transfers with minimal assistance/contact guard assist. 
5.  Patient will perform all aspects of toileting with minimal assistance/contact guard assist. 
6.  Patient will participate in upper extremity therapeutic exercise/activities with independence for 8-10 minutes. 7.  Patient will utilize energy conservation techniques during functional activities with verbal cues. 8.  Patient will perform lower body dressing in preparation for self-care with minimal assistance/contact guard assistance Prior Level of Function: Patient was independent with self-care and functional mobility PTA. Outcome: Progressing Towards Goal 
  
OCCUPATIONAL THERAPY EVALUATION Patient: Manjinder Rincon (63 y.o. female) Date: 2020 Primary Diagnosis: Hyperglycemia [R73.9] Altered mental status [R41.82] Acute metabolic encephalopathy [M28.83] Acute renal failure (ARF) (HCC) [N17.9] Leukocytosis [D72.829] Hypokalemia [E87.6] Metabolic encephalopathy [M05.51] Precautions:Fall, Aspiration ASSESSMENT : 
Patient cleared to participate in OT evaluation by RN. Upon entering the room, the patient was supine in bed with mitten on R hand only and roll belt donned. Patient alert, and agreeable to participate in OT evaluation with OTS present. Patient was seen with PT to maximize patient safety, participation, and functional mobility in preparation for self-care tasks.  Patient oriented to person, place,  and year this session however presents with intermittent confusion throughout evaluation. Patient with recent decreased vision in RUE x 2 weeks per chart and unable to identify shadows/ number of fingers on OT being held up this session. Patient with preference of eyes closed but does open to command. Patient max assist for supine <> half sit EOB, max assist for lower body dressing in long sit and contact guard assist for scooting her bottom back into bed using bridge method. Patient required max. verbal cues this session for completion of task and physical/tactile touch as well. Patient demos decreased attention/command following. BP taken twice this session, initially 143/125 and 157/145 upon retake. SPO2 remained above 99% this session and HR between 100 bpm - 107bpm. RN notified of vitals. Patient left semi-reclined in bed, roll bell and mittens re-donned and bed alarm donned. Based on the objective data described below, the patient presents with decreased strength, decreased independence, decreased safety awareness, decreased functional balance, and decreased functional mobility, which impedes pt performance in basic self-care/ADL tasks. Patient would benefit from skilled OT to restore PLOF and maximize function. Patient will benefit from skilled intervention to address the above impairments. Patient's rehabilitation potential is considered to be Fair Factors which may influence rehabilitation potential include:  
[]             None noted [x]             Mental ability/status [x]             Medical condition [x]             Home/family situation and support systems [x]             Safety awareness []             Pain tolerance/management 
[]             Other: PLAN : 
Recommendations and Planned Interventions:  
[x]               Self Care Training                  [x]      Therapeutic Activities [x]               Functional Mobility Training   [x]      Cognitive Retraining [x]               Therapeutic Exercises           [x]      Endurance Activities [x]               Balance Training                    [x]      Neuromuscular Re-Education [x]               Visual/Perceptual Training     [x]      Home Safety Training 
[x]               Patient Education                   [x]      Family Training/Education []               Other (comment): Frequency/Duration: Patient will be followed by occupational therapy 1-2 times per day/4-7 days per week to address goals. Discharge Recommendations: Rehab vs. Home with 24/7 Supervision/Assistance Further Equipment Recommendations for Discharge: TBD pending patient's progress/participation SUBJECTIVE:  
Patient stated I can take off my sock OBJECTIVE DATA SUMMARY:  
 
Past Medical History:  
Diagnosis Date  Blind left eye  Cellulitis of great toe of right foot 10/19/2017  Diabetes ([address]  Diabetic retinopathy ([address]  Gall stones  GERD (gastroesophageal reflux disease)  Hammertoe  Hiatal hernia  History of mammogram 10/03/2017 No evidence of malignancy  Hypertension  Mass of abdomen  Neuropathy due to type 2 diabetes mellitus ([address]  Osteomyelitis of toe of right foot ([address] 10/19/2017  Pancreatitis Past Surgical History:  
Procedure Laterality Date  HX AMPUTATION Left 07/21/2017  
 left 2nd toe  HX CHOLECYSTECTOMY  10/15/2014  HX GI Benign GI Stromal Tumor excision  HX HEENT Sx for detached retina  HX MYOMECTOMY x5 removed  HX OTHER SURGICAL    
 upper endoscopy Barriers to Learning/Limitations: yes;  altered mental status (i.e.Sedation, Confusion) Compensate with: visual, verbal, tactile, kinesthetic cues/model Home Situation:  
Home Situation Home Environment: Apartment # Steps to Enter: 13 One/Two Story Residence: One story Living Alone: Yes Support Systems: Other (comments)(Unsure, pt unreliable historian) Patient Expects to be Discharged to[de-identified] Private residence Current DME Used/Available at Home: None Tub or Shower Type: (pt did not say) [x]  Right hand dominant   []  Left hand dominant Cognitive/Behavioral Status: 
Neurologic State: Drowsy; Eyes open spontaneously Orientation Level: Oriented to place Cognition: Decreased command following;Decreased attention/concentration;Poor safety awareness Safety/Judgement: Decreased insight into deficits; Decreased awareness of environment Skin: Intact Edema: None noted Vision/Perceptual:    
Acuity: Impaired near vision; Impaired far vision(pt states she has vision impairment; unable to see shadows) Coordination: BUE Fine Motor Skills-Upper: Left Intact; Right Intact Gross Motor Skills-Upper: Left Intact; Right Intact Balance: 
Sitting: Impaired; Without support(longsit) Sitting - Static: Good (unsupported) Sitting - Dynamic: Fair (occasional)(pt flinging self back into bed, CGA for balance) Strength: BUE Strength: Generally decreased, functional 
 
Tone & Sensation: BUE Tone: Normal 
Sensation: Intact Range of Motion: BUE 
AROM: Generally decreased, functional 
 
 
Functional Mobility and Transfers for ADLs: 
Bed Mobility: 
Supine to Sit: Maximum assistance;Assist x2(half sit EOB) Sit to Supine: Maximum assistance;Assist x2 Scooting: Contact guard assistance(scooting hips towards HOB) ADL Assessment:  
Feeding: Minimum assistance Oral Facial Hygiene/Grooming: Moderate assistance Bathing: Maximum assistance Upper Body Dressing: Moderate assistance Lower Body Dressing: Maximum assistance Toileting: Maximum assistance ADL Intervention: Lower Body Dressing Assistance Dressing Assistance: Maximum assistance Socks: Maximum assistance Leg Crossed Method Used: Yes Position Performed: Long sitting on bed Cues: Verbal cues provided;Visual cues provided;Physical assistance Cognitive Retraining Safety/Judgement: Decreased insight into deficits; Decreased awareness of environment Pain: 
Pain level pre-treatment: 0/10 Pain level post-treatment: 0/10 Pain Intervention(s): Medication (see MAR); Response to intervention: Nurse notified, See doc flow Activity Tolerance:  
Fair- 
 
Please refer to the flowsheet for vital signs taken during this treatment. After treatment:  
[] Patient left in no apparent distress sitting up in chair 
[x] Patient left in no apparent distress in bed 
[x] Call bell left within reach [x] Nursing notified 
[] Caregiver present [x] Bed alarm activated COMMUNICATION/EDUCATION:  
[x] Role of Occupational Therapy in the acute care setting 
[x] Home safety education was provided and the patient/caregiver indicated understanding. [x] Patient/family have participated as able in goal setting and plan of care. [x] Patient/family agree to work toward stated goals and plan of care. [] Patient understands intent and goals of therapy, but is neutral about his/her participation. [] Patient is unable to participate in goal setting and plan of care. Thank you for this referral. 
RINKU Magdaleno/L Time Calculation: 25 mins Eval Complexity: History: MEDIUM Complexity : Expanded review of history including physical, cognitive and psychosocial  history ; Examination: HIGH Complexity : 5 or more performance deficits relating to physical, cognitive , or psychosocial skils that result in activity limitations and / or participation restrictions; Decision Making:HIGH Complexity : Patient presents with comorbidities that affect occupational performance. Signifigant modification of tasks or assistance (eg, physical or verbal) with assessment (s) is necessary to enable patient to complete evaluation

## 2020-09-14 NOTE — PROGRESS NOTES
Problem: Mobility Impaired (Adult and Pediatric) Goal: *Acute Goals and Plan of Care (Insert Text) Description: Physical Therapy Goals Initiated 2020 and to be accomplished within 7 day(s) 1. Patient will move from supine to sit and sit to supine  in bed with modified independence. 2.  Patient will transfer from bed to chair and chair to bed with modified independence using the least restrictive device. 3.  Patient will perform sit to stand with modified independence. 4.  Patient will ambulate with modified independence for 150 feet with the least restrictive device. 5.  Patient will ascend/descend 14 stairs with B handrail(s) with modified independence. 6.  Patient will follow 2-step commands 75% of the time. PLOF: Pt reports living alone in a apartment with 14 stairs to enter. She was independent with all mobility PTA and did not use an AD. She states she lives alone. At this time, patient is drowsy and unreliable historian. Follow up as patient progresses. Outcome: Progressing Towards Goal 
  
PHYSICAL THERAPY EVALUATION Patient: Tejinder Mcgregor (43 y.o. female) Date: 2020 Primary Diagnosis: Hyperglycemia [R73.9] Altered mental status [R41.82] Acute metabolic encephalopathy [Y95.48] Acute renal failure (ARF) (HCC) [N17.9] Leukocytosis [D72.829] Hypokalemia [E87.6] Metabolic encephalopathy [E25.09] Precautions:  Fall, Aspiration ASSESSMENT : 
Based on the objective data described below, the patient presents with decreased command following, poor orientation, decreased endurance functional mobility. RN cleared for PT to work with pt. Pt seen with both PT and OT to maximize pt safety and mobility. Pt found supine in bed with HOB elevated, roll belt intact, and R soft mitten on R hand. Pt able to state her name and  slowly. Unable to state where she is. Pt tended to keep her eyes closed but opens upon cueing.  BP supine in bed: 143/125 (diastolic high due to BP cuff placement). HR stayed around 116 throughout PT session. Pt reports no pain. Pt able to move all 4 limbs spontaneously but required max verbal and tactile cueing to move. Pt has sufficient strength to perform mobility tasks but drowsiness and fatigue limit her. Pt able to follow 1-step commands 50% of the time with max cueing, able to slowly bring B LE's over to left side of bed with maxAx1. She was able to hold herself steady during long sitting, but eventually declined to sit EOB due to fatigue. Pt helped back supine in bed. Pt able to scoot up towards [facility] independently with increased time. Pt BP at end of session 157/43. Pt left supine in bed, HOB elevated, roll belt and R hand mitten in tact, call bell nearby, no new questions or concerns. Pt requires acute PT to address all mobility. At this time, recommend rehab vs home health with 24/7 supervision depending on patients progress cognitively and alertness-wise. RN made aware of pts performance. Patient will benefit from skilled intervention to address the above impairments. Patient's rehabilitation potential is considered to be Fair Factors which may influence rehabilitation potential include:  
[]         None noted 
[]         Mental ability/status []         Medical condition 
[x]         Home/family situation and support systems 
[x]         Safety awareness 
[]         Pain tolerance/management 
[]         Other: PLAN : 
Recommendations and Planned Interventions:  
[x]           Bed Mobility Training             [x]    Neuromuscular Re-Education 
[x]           Transfer Training                   []    Orthotic/Prosthetic Training 
[x]           Gait Training                          []    Modalities [x]           Therapeutic Exercises           []    Edema Management/Control 
[x]           Therapeutic Activities            [x]    Family Training/Education 
[x]           Patient Education []           Other (comment): Frequency/Duration: Patient will be followed by physical therapy 1-2 times per day/4-7 days per week to address goals. Discharge Recommendations: Home Health with 24/7 supervision vs rehab Further Equipment Recommendations for Discharge: N/A  
 
SUBJECTIVE:  
Patient stated i'm just tired.  OBJECTIVE DATA SUMMARY:  
 
Past Medical History:  
Diagnosis Date Blind left eye Cellulitis of great toe of right foot 10/19/2017 Diabetes ([address] Diabetic retinopathy ([address] Gall stones GERD (gastroesophageal reflux disease) [place] Hiatal hernia History of mammogram 10/03/2017 No evidence of malignancy Hypertension Mass of abdomen Neuropathy due to type 2 diabetes mellitus ([address] Osteomyelitis of toe of right foot ([address] 10/19/2017 Pancreatitis Past Surgical History:  
Procedure Laterality Date HX AMPUTATION Left 07/21/2017  
 left 2nd toe HX CHOLECYSTECTOMY  10/15/2014 HX GI Benign GI Stromal Tumor excision HX HEENT Sx for detached retina HX MYOMECTOMY x5 removed HX OTHER SURGICAL    
 upper endoscopy Barriers to Learning/Limitations: yes;  altered mental status (i.e.Sedation, Confusion) Compensate with: Visual Cues, Verbal Cues, and Tactile Cues Home Situation: 
Home Situation Home Environment: Apartment # Steps to Enter: 13 One/Two Story Residence: One story Living Alone: Yes Support Systems: Other (comments)(Unsure, pt unreliable historian) Patient Expects to be Discharged to[de-identified] Private residence Current DME Used/Available at Home: None Critical Behavior: 
Neurologic State: Drowsy; Eyes open to voice; Eyes open spontaneously Orientation Level: Oriented to person Cognition: Decreased command following;Decreased attention/concentration Safety/Judgement: Decreased insight into deficits Psychosocial 
Patient Behaviors: Calm; Cooperative Family  Behaviors: Appropriate for situation;Calm; Cooperative;Supportive Visitor Behaviors: Appropriate for situation;Calm; Cooperative;Supportive Needs Expressed: Educational 
Purposeful Interaction: Yes Pt Identified Daily Priority: Clinical issues (comment) [employer] Process: Nurture loving kindness;Establish trust;Teaching/learning; Attend basic human needs Caring Interventions: Therapeutic modalities Reassure: Therapeutic listening Therapeutic Modalities: Intentional therapeutic touch Other Caring Modalities: Rounding Family  Behaviors: Appropriate for situation;Calm; Cooperative;Supportive Strength:   
Strength: Generally decreased, functional 
 
Range Of Motion: 
AROM: Generally decreased, functional 
 
Functional Mobility: 
Bed Mobility: 
  
Supine to Sit: Maximum assistance;Assist x1 Sit to Supine: Maximum assistance;Assist x1 Balance:  
Sitting: Intact; Without support(Long sitting) Pain: 
Pain level pre-treatment: 0/10 Pain level post-treatment: 0/10 Pain Intervention(s) : Medication (see MAR); Rest,  Repositioning Response to intervention: Nurse notified, See doc flow Activity Tolerance:  
Pt unable to tolerate much mobility today 2/2 fatigue and drowsiness Please refer to the flowsheet for vital signs taken during this treatment. After treatment:  
[]         Patient left in no apparent distress sitting up in chair 
[x]         Patient left in no apparent distress in bed 
[x]         Call bell left within reach [x]         Nursing notified 
[]         Caregiver present 
[]         Bed alarm activated 
[]         SCDs applied COMMUNICATION/EDUCATION:  
[x]         Role of Physical Therapy in the acute care setting. [x]         Fall prevention education was provided and the patient/caregiver indicated understanding. [x]         Patient/family have participated as able in goal setting and plan of care.  
[]         Patient/family agree to work toward stated goals and plan of care. 
[]         Patient understands intent and goals of therapy, but is neutral about his/her participation. []         Patient is unable to participate in goal setting/plan of care: ongoing with therapy staff. 
[]         Other: Thank you for this referral. 
Savita Lopez Time Calculation: 24 mins Eval Complexity: History: MEDIUM  Complexity : 1-2 comorbidities / personal factors will impact the outcome/ POC Exam:MEDIUM Complexity : 3 Standardized tests and measures addressing body structure, function, activity limitation and / or participation in recreation  Presentation: MEDIUM Complexity : Evolving with changing characteristics  Clinical Decision Making:Medium Complexity    Overall Complexity:MEDIUM

## 2020-09-14 NOTE — PROGRESS NOTES
Shift Summary Pt very confused, and still attempting to get out of bed. Pt only oriented to self. She can not state the place, time, or situation. Pt continues to yell out names from her room, and does not follow commands. Staff RN in room continuously reminding pt to stay in bed, and re-orienting. However, no signs of understanding or learning assessed or seen. Per nursing judgement, pt not appropriate to get up to sit in chair due to high fall risk and need for restraints. RN made primary care team aware of pt behavior, pt monitored throughout the shift hourly.

## 2020-09-14 NOTE — PROGRESS NOTES
Bedside, Verbal and Written shift change report given to Gentry Garcia RN (oncoming nurse) by Diane Turner RN (offgoing nurse). Report included the following information SBAR, Kardex, Intake/Output, MAR, Recent Results and Cardiac Rhythm NSR.

## 2020-09-14 NOTE — PROGRESS NOTES
Problem: Dysphagia (Adult) Goal: *Acute Goals and Plan of Care (Insert Text) Description: Patient will: 1. Tolerate PO trials with 0 s/s overt distress in 4/5 trials 2. Utilize compensatory swallow strategies/maneuvers (decrease bite/sip, size/rate, alt. liq/sol) with min cues in 4/5 trials 3. Perform oral-motor/laryngeal exercises to increase oropharyngeal swallow function with min cues 4. Complete an objective swallow study (i.e., MBSS) to assess swallow integrity, r/o aspiration, and determine of safest LRD, min A Rec:    
Puree with thin liquids Aspiration precautions HOB >45 during po intake, remain >30 for 30-45 minutes after po Small bites/sips; alternate liquid/solid with slow feeding rate Oral care TID Meds crushed in puree Assistance with all PO Outcome: Not Progressing Towards Goal 
 
SPEECH LANGUAGE PATHOLOGY DYSPHAGIA TREATMENT Patient: Genaro Franks (1 y.o. female) Date: 9/14/2020 Diagnosis: Hyperglycemia [R73.9] Altered mental status [R41.82] Acute metabolic encephalopathy [H57.11] Acute renal failure (ARF) (HCC) [N17.9] Leukocytosis [D72.829] Hypokalemia [E87.6] Metabolic encephalopathy [Y25.49] Altered mental status Precautions:  Fall, Aspiration PLOF: As per H&P   
 
ASSESSMENT: 
Pt was seen at bedside for follow up dysphagia management. She was extremely confused, oriented to person and place only. Pt lethargic and required mod-max cues to redirect to task throughout treatment. She required 5-6 cues per bolus presentation of solids to initiate swallow. Improved tolerance of puree with thin with no overt s/sx of aspiration observed. Laryngeal elevation appeared functional to palpation. Pt demo inadequate sensory/motor awareness/strength for safe intake of solids. Recommend continuation of puree with thin liquids, meds crushed, aspiration precautions, and oral care TID. Rec continued assistance with feeds. ST will continue to follow. Progression toward goals: 
[]         Improving appropriately and progressing toward goals 
[]         Improving slowly and progressing toward goals [x]         Not making progress toward goals - continue to monitor PLAN: 
Recommendations and Planned Interventions: See above Patient continues to benefit from skilled intervention to address the above impairments. Continue treatment per established plan of care. Discharge Recommendations:  Serafin Woodruff and To Be Determined SUBJECTIVE:  
Patient stated That's good. OBJECTIVE:  
Cognitive and Communication Status: 
Neurologic State: Drowsy, Eyes open spontaneously Orientation Level: Oriented to place Cognition: Decreased command following, Decreased attention/concentration, Poor safety awareness Perception: (unable to determine; per chart pt legally blind) Perseveration: Perseverates during ADLS Safety/Judgement: Decreased insight into deficits, Decreased awareness of environment Dysphagia Treatment: 
Dysphagia Treatment: 
Oral Assessment: 
Oral Assessment Labial: (?left labial weakness) Dentition: Intact, Natural 
Oral Hygiene: good Lingual: No impairment Velum: No impairment Mandible: No impairment P.O. Trials: 
        Patient Position: HOB 50* Vocal quality prior to P.O.: No impairment Consistency Presented: Mechanical soft, Thin liquid How Presented: SLP-fed/presented, Straw, Successive swallows Bolus Acceptance: No impairment Bolus Formation/Control: Impaired Type of Impairment: Mastication Propulsion: Discoordination, Delayed (# of seconds) Oral Residue: 10-50% of bolus, Lingual 
        Initiation of Swallow: No impairment Laryngeal Elevation: Functional 
        Aspiration Signs/Symptoms: None Pharyngeal Phase Characteristics: No impairment, issues, or problems Effective Modifications: Alternate liquids/solids, Small sips and bites Cues for Modifications: Moderate Oral Phase Severity: Mild-moderate Pharyngeal Phase Severity : No impairment PAIN: 
Start of Tx: 0 End of Tx: 0 After treatment:  
[]              Patient left in no apparent distress sitting up in chair 
[x]              Patient left in no apparent distress in bed 
[x]              Call bell left within reach [x]              Nursing notified 
[]              Family present 
[]              Caregiver present 
[]              Bed alarm activated COMMUNICATION/EDUCATION:  
[x] Aspiration precautions; swallow safety; compensatory techniques []        Patient/family able to participate in training and education  
[x]  Patient unable to participate in training and education, education ongoing with staff  
[] Patient understands goals and intent of therapy; neutral about participation Thank you for this referral. 
 
Mally Ji M.S. CCC-SLP/L Speech-Language Pathologist

## 2020-09-14 NOTE — DIABETES MGMT
Glycemic Control Plan of Care 
 
T2DM with current A1c of 7.0% (9/08/2020) Patient confused, agitated, on bilateral soft restraints. DCP: to be determined per CM Home diabetes medications: Unverified Novolin 70/30 mix insulin 40 units BID Recommendation(s): 
1.) cont to hold BID NPH insulin order 2.) add basal lantus insulin 5 units daily. MD entered order. Glycemic assessment:  
 
Seen patient this afternoon with bilat wrist soft restraints and sitter at bedside. Sitter reported patient ate 100%, fed. POC BG 9/13: 245, 218 POC BG 9/14 at time of review: 224, 138, 242. Received a one time dose of lantus insulin 5 units today Lab FBG 9/14: 225 Current A1C: 7.0% (9/08/2020) which is equivalent to estimated average blood glucose of 154 mg/dL during the past 2-3 months Current hospital diabetes medications: 
Basal lantus insulin 5 units daily Correctional lispro insulin every 6 hours. Normal sensitivity dose [currently on hold] NPH insulin 40 units BID AC Total daily dose insulin requirement previous day: 9/13: 
Lispro: 10 units Diet: Dysphagia pureed; nutr suppl: glucerna shake with all meals Goals:  Blood glucose will be within target range of  mg/dL by 9/17/2020 Education:  ___  Refer to Diabetes Education Record 
           __X_  Education not indicated at this time Purnima Conroy RN [facility] Pager: [phone]

## 2020-09-14 NOTE — PROGRESS NOTES
Problem: Diabetes Self-Management Goal: *Disease process and treatment process Description: Define diabetes and identify own type of diabetes; list 3 options for treating diabetes. Outcome: Progressing Towards Goal 
Goal: *Incorporating nutritional management into lifestyle Description: Describe effect of type, amount and timing of food on blood glucose; list 3 methods for planning meals. Outcome: Progressing Towards Goal 
Goal: *Incorporating physical activity into lifestyle Description: State effect of exercise on blood glucose levels. Outcome: Progressing Towards Goal 
Goal: *Developing strategies to promote health/change behavior Description: Define the ABC's of diabetes; identify appropriate screenings, schedule and personal plan for screenings. Outcome: Progressing Towards Goal 
Goal: *Using medications safely Description: State effect of diabetes medications on diabetes; name diabetes medication taking, action and side effects. Outcome: Progressing Towards Goal 
Goal: *Monitoring blood glucose, interpreting and using results Description: Identify recommended blood glucose targets  and personal targets. Outcome: Progressing Towards Goal 
Goal: *Prevention, detection, treatment of acute complications Description: List symptoms of hyper- and hypoglycemia; describe how to treat low blood sugar and actions for lowering  high blood glucose level. Outcome: Progressing Towards Goal 
Goal: *Prevention, detection and treatment of chronic complications Description: Define the natural course of diabetes and describe the relationship of blood glucose levels to long term complications of diabetes. Outcome: Progressing Towards Goal 
Goal: *Developing strategies to address psychosocial issues Description: Describe feelings about living with diabetes; identify support needed and support network Outcome: Progressing Towards Goal 
Goal: *Insulin pump training Outcome: Progressing Towards Goal 
 Goal: *Sick day guidelines Outcome: Progressing Towards Goal 
Goal: *Patient Specific Goal (EDIT GOAL, INSERT TEXT) Outcome: Progressing Towards Goal 
  
Problem: Patient Education: Go to Patient Education Activity Goal: Patient/Family Education Outcome: Progressing Towards Goal 
  
Problem: Non-Violent Restraints Goal: *Removal from restraints as soon as assessed to be safe Outcome: Progressing Towards Goal 
Goal: *No harm/injury to patient while restraints in use Outcome: Progressing Towards Goal 
Goal: *Patient's dignity will be maintained Outcome: Progressing Towards Goal 
Goal: *Patient Specific Goal (EDIT GOAL, INSERT TEXT) Outcome: Progressing Towards Goal 
Goal: Non-violent Restaints:Standard Interventions Outcome: Progressing Towards Goal 
Goal: Non-violent Restraints:Patient Interventions Outcome: Progressing Towards Goal 
Goal: Patient/Family Education Outcome: Progressing Towards Goal 
  
Problem: Falls - Risk of 
Goal: *Absence of Falls Description: Document Albino Messing Fall Risk and appropriate interventions in the flowsheet. Outcome: Progressing Towards Goal 
Note: Fall Risk Interventions: 
Mobility Interventions: Bed/chair exit alarm Mentation Interventions: Adequate sleep, hydration, pain control Medication Interventions: Bed/chair exit alarm Elimination Interventions: Bed/chair exit alarm Problem: Pressure Injury - Risk of 
Goal: *Prevention of pressure injury Description: Document Vasile Scale and appropriate interventions in the flowsheet. Outcome: Progressing Towards Goal 
Note: Pressure Injury Interventions: 
Sensory Interventions: Assess changes in LOC Moisture Interventions: Absorbent underpads Activity Interventions: Pressure redistribution bed/mattress(bed type) Mobility Interventions: Pressure redistribution bed/mattress (bed type) Nutrition Interventions: Document food/fluid/supplement intake Friction and Shear Interventions: Minimize layers Problem: Patient Education: Go to Patient Education Activity Goal: Patient/Family Education Outcome: Progressing Towards Goal 
  
Problem: Nutrition Deficit Goal: *Optimize nutritional status Outcome: Progressing Towards Goal

## 2020-09-14 NOTE — PROGRESS NOTES
Went in to change patient of incontinence, patient had her upper body up over the bed. Patient continues to pull and bite her mitts off. She became verbally and physically combative. Continued to yell out and said she is trying to escape. Patient was put back into soft wrist restraints.

## 2020-09-14 NOTE — PROGRESS NOTES
1700 Patient asleep, vitals stable. 1800 administered eye drops due to pt complaining of dry eyes, administered COVID rule out. Patient resting in bed, vitals stable. 1842 bladder scan done on pt, 161ml Bedside, Verbal and Written shift change report given to PETRONA Sellers (oncoming nurse) by Mason Glover RN (offgoing nurse). Report included the following information SBAR, Kardex, Procedure Summary, Intake/Output, MAR and Cardiac Rhythm NSR.

## 2020-09-14 NOTE — PROGRESS NOTES
Progress Note 58y F with PMH DM type 2, HTN CKD presented with HHS following for CRYSTAL Subjective Overnight event noted Remain confused IMPRESSION:  
Acute kidney injury, back to baseline CKD, h/o CRYSTAL in past, last available creatinine is at 1.9mg/dl last yea Ketoacidosis, resolved Hypokalemia  
rhabdomylysis DM type 2 Altered mental status, Diastolic heart failure, grade 1 PLAN:  
Her renal function is back to baseline 
cpk is elevated. ? Etiology. statins stopped Start on calcitril 0.25mcg on discharge. Current Facility-Administered Medications Medication Dose Route Frequency  benztropine (COGENTIN) tablet 1 mg  1 mg Oral BID  QUEtiapine (SEROquel) tablet 25 mg  25 mg Oral TID PRN  
 lactated Ringers infusion  150 mL/hr IntraVENous CONTINUOUS  
 polyvinyl alcohol-povidon(PF) (REFRESH CLASSIC) 1.4-0.6 % ophthalmic solution 1 Drop  1 Drop Both Eyes PRN  cholecalciferol (VITAMIN D3) (1000 Units /25 mcg) tablet 1 Tab  1,000 Units Oral DAILY  insulin lispro (HUMALOG) injection   SubCUTAneous AC&HS  
 melatonin tablet 3 mg  3 mg Oral QHS  sodium chloride (NS) flush 5-10 mL  5-10 mL IntraVENous PRN  
 [Held by provider] insulin NPH (NOVOLIN N, HUMULIN N) injection 40 Units  40 Units SubCUTAneous ACB&D  
 glucose chewable tablet 16 g  16 g Oral PRN  
 glucagon (GLUCAGEN) injection 1 mg  1 mg IntraMUSCular PRN  
 dextrose (D50W) injection syrg 12.5-25 g  25-50 mL IntraVENous PRN  
 sodium chloride (NS) flush 5-40 mL  5-40 mL IntraVENous Q8H  
 sodium chloride (NS) flush 5-40 mL  5-40 mL IntraVENous PRN  
 acetaminophen (TYLENOL) tablet 650 mg  650 mg Oral Q6H PRN Or  
 acetaminophen (TYLENOL) suppository 650 mg  650 mg Rectal Q6H PRN  polyethylene glycol (MIRALAX) packet 17 g  17 g Oral DAILY PRN  
 heparin (porcine) injection 5,000 Units  5,000 Units SubCUTAneous Q8H  
 [Held by provider] PARoxetine (PAXIL) tablet 60 mg  60 mg Oral DAILY  pantoprazole (PROTONIX) tablet 40 mg  40 mg Oral DAILY  [Held by provider] rosuvastatin (CRESTOR) tablet 20 mg  20 mg Oral QHS  ondansetron (ZOFRAN) injection 4 mg  4 mg IntraVENous Q6H PRN Review of Systems: As above Data Review: 
 
Labs: Results:  
   
Chemistry Recent Labs  
  09/14/20 
1000 09/14/20 0622 09/13/20 
5790 09/12/20 
0214 GLU  --  225* 123* 150* NA  --  139 140 141  
K 3.6 3.2* 3.7 4.0  
CL  --  105 105 109 CO2  --  29 25 26 BUN  --  10 10 13 CREA  --  1.39* 1.20 1.36* CA  --  9.0 9.0 8.8 AGAP  --  5 10 6 BUCR  --  7* 8* 10* AP  --  108 111 116 TP  --  7.3 7.3 7.5 ALB  --  3.4 3.8 3.6 GLOB  --  3.9 3.5 3.9 AGRAT  --  0.9 1.1 0.9  
  
CBC w/Diff Recent Labs  
  09/14/20 0622 09/13/20 
0653 09/12/20 
0214 WBC 11.4 11.7 11.3 RBC 3.44* 3.58* 3.56* HGB 9.9* 10.4* 10.2* HCT 29.5* 30.6* 30.8*  244 224 GRANS 59 56 63 LYMPH 34 35 30 EOS 2 3 1 Coagulation No results for input(s): PTP, INR, APTT, INREXT, INREXT in the last 72 hours. Iron/Ferritin No results for input(s): IRON in the last 72 hours. No lab exists for component: TIBCCALC BNP No results for input(s): BNPP in the last 72 hours. Cardiac Enzymes Recent Labs  
  09/14/20 0622 09/13/20 
1614 CPK 2,261* 2,160* Liver Enzymes Recent Labs  
  09/14/20 0622 TP 7.3 ALB 3.4  Thyroid Studies Lab Results Component Value Date/Time TSH 0.57 09/08/2020 04:40 PM  
    
 EKG: sinus Physical Assessment:  
 
Visit Vitals BP (!) 151/92 (BP 1 Location: Right arm, BP Patient Position: At rest;Head of bed elevated (Comment degrees)) Pulse 93 Temp 97.8 °F (36.6 °C) Resp 26 Ht 5' 8\" (1.727 m) Wt 94.8 kg (208 lb 14.4 oz) SpO2 93% Breastfeeding No  
BMI 31.76 kg/m² Weight change: 0.454 kg (1 lb) Intake/Output Summary (Last 24 hours) at 9/14/2020 1451 Last data filed at 9/14/2020 1440 Gross per 24 hour Intake 773.33 ml  
 Output 290 ml Net 483.33 ml Physical Exam:  
General: no respi distress HEENT sclera anicteric, supple neck, no thyromegaly CVS: S1S2 heard,  no rub RS: + air entry b/l, Abd: Soft, Non tender Neuro: sleeping Extrm: No edema, no cyanosis, clubbing Skin: no visible  Rash Musculoskeletal: No gross joints or bone deformities Procedures/imaging: see electronic medical records for all procedures, Xrays and details which were not copied into this note but were reviewed prior to creation of Plan Pau Adamson MD 
September 14, 2020 [facility] Nephrology Office [phone]

## 2020-09-14 NOTE — PROGRESS NOTES
Patient remains agitated/confused in restraints. Patient disoriented to all spheres. Patient was living with son prior, not appropriate to work with PT/OT currently. Case management continuing to follow along for d/c planning when appropriate.

## 2020-09-14 NOTE — PROGRESS NOTES
Infectious Disease progress Note Reason: sepsis, worsening leukocytosis Current abx Prior abx Ceftriaxone, vancomycin 9/8/20-9/9/20 Zosyn since 9/9/20 Lines:  
 
 
Assessment : 
 
49-year-old lady with history of type 2 diabetes, diabetic retinopathy, diabetic neuropathy admitted to [facility] on 9/8/2020 with altered mental status. Now with persistent leukocytosis, altered mentation, acute kidney injury, right eye pain, elevated LFTs Patient presents with highly complex clinical picture. Etiology of altered mentation and clinical presentation not entirely clear at this time. No definitive evidence of sepsis/infection identified on exam, radiological imaging. Improved leukocytosis could be due to improving renal function, hyperglycemia. Elevated LFTs, abdominal pain/tenderness on admission - no  hepatobiliary pathology identified on CT scan 9/9/20. Persistent elevated bilirubin 
 
etiology of eye pain, vision impairment right eye - etiology unclear-improved right eye pain today per patient. Await ophthalmology recommendations. MRI brain 9/11-no CNS pathology identified. Gradually improving mentation. Recommendations: 1. Discontinue piperacillin/tazobactam -monitor off antibiotics 2. Management of right eye pain per primary team-await ophthalmology evaluation 3. Recommend GI consult for unexplained hyperbilirubinemia. 4.  Follow-up nephrology recommendations We will sign off. Please call me if any new questions or concerns. Thanks Above plan was discussed in details with primary team.  
 
HPI: 
 
 
Improved right eye pain. Denies chest pain, abdominal pain, shortness of breath. Does not answer all questions asked. Detailed review of systems not feasible. 
 
 
 
 
home Medication List  
 Details  
metoclopramide HCl (REGLAN) 5 mg tablet Take 1 Tab by mouth Before breakfast, lunch, and dinner. Take before meals. Qty: 90 Tab, Refills: 3 insulin NPH/insulin regular (NOVOLIN 70/30, HUMULIN 70/30) 100 unit/mL (70-30) injection 40 Units by SubCUTAneous route two (2) times a day. Qty: 10 mL, Refills: 3 Associated Diagnoses: Type 2 diabetes mellitus with complication, with long-term current use of insulin ([facility] PARoxetine (PAXIL) 40 mg tablet Take 40 mg by mouth daily. rosuvastatin (CRESTOR) 20 mg tablet Take 1 Tab by mouth nightly. Qty: 90 Tab, Refills: 3 Current Facility-Administered Medications Medication Dose Route Frequency  insulin glargine (LANTUS) injection 5 Units  5 Units SubCUTAneous NOW  potassium chloride 20 mEq in 50 ml IVPB  20 mEq IntraVENous Q2H  
 lactated Ringers infusion  150 mL/hr IntraVENous CONTINUOUS  
 polyvinyl alcohol-povidon(PF) (REFRESH CLASSIC) 1.4-0.6 % ophthalmic solution 1 Drop  1 Drop Both Eyes PRN  piperacillin-tazobactam (ZOSYN) 3.375 g in 0.9% sodium chloride (MBP/ADV) 100 mL MBP  3.375 g IntraVENous Q6H  
 cholecalciferol (VITAMIN D3) (1000 Units /25 mcg) tablet 1 Tab  1,000 Units Oral DAILY  insulin lispro (HUMALOG) injection   SubCUTAneous AC&HS  
 LORazepam (ATIVAN) tablet 2 mg  2 mg Oral Q4H PRN  
 melatonin tablet 3 mg  3 mg Oral QHS  sodium chloride (NS) flush 5-10 mL  5-10 mL IntraVENous PRN  
 [Held by provider] insulin NPH (NOVOLIN N, HUMULIN N) injection 40 Units  40 Units SubCUTAneous ACB&D  
 glucose chewable tablet 16 g  16 g Oral PRN  
 glucagon (GLUCAGEN) injection 1 mg  1 mg IntraMUSCular PRN  
 dextrose (D50W) injection syrg 12.5-25 g  25-50 mL IntraVENous PRN  
 sodium chloride (NS) flush 5-40 mL  5-40 mL IntraVENous Q8H  
 sodium chloride (NS) flush 5-40 mL  5-40 mL IntraVENous PRN  
 acetaminophen (TYLENOL) tablet 650 mg  650 mg Oral Q6H PRN Or  
 acetaminophen (TYLENOL) suppository 650 mg  650 mg Rectal Q6H PRN  polyethylene glycol (MIRALAX) packet 17 g  17 g Oral DAILY PRN  
  heparin (porcine) injection 5,000 Units  5,000 Units SubCUTAneous Q8H  
 [Held by provider] PARoxetine (PAXIL) tablet 60 mg  60 mg Oral DAILY  pantoprazole (PROTONIX) tablet 40 mg  40 mg Oral DAILY  [Held by provider] rosuvastatin (CRESTOR) tablet 20 mg  20 mg Oral QHS  ondansetron (ZOFRAN) injection 4 mg  4 mg IntraVENous Q6H PRN Allergies: Levaquin [levofloxacin] and Promethazine Temp (24hrs), Av.8 °F (37.1 °C), Min:98.2 °F (36.8 °C), Max:99.1 °F (37.3 °C) Visit Vitals /75 (BP 1 Location: Left arm, BP Patient Position: At rest;Head of bed elevated (Comment degrees)) Pulse 93 Temp 98.8 °F (37.1 °C) Resp 20 Ht 5' 8\" (1.727 m) Wt 94.8 kg (208 lb 14.4 oz) LMP 10/06/2015 (Exact Date) SpO2 100% Breastfeeding No  
BMI 31.76 kg/m² ROS: Unable to obtain Physical Exam: 
 
General:  Laying on the bed, in restraints, more alert and communicative compared to prior exam, in no respiratory distress HEENT: R eye erythema, no drainage,  R and left eye was sluggish to react to light, moist mucous membranes. no nuchal rigidity CV:  RRR. No visible pulsations or thrills. RESP:  Unlabored lungs clear to auscultation without adventitious breath sounds. Equal expansion bilaterally. ABD:  soft, no tenderness,  nondistended. BS (+). MS:  No joint deformity or instability. No atrophy. Neuro:  Pt no facial droop, no deviation of tongue, unable to evaluate EOM because patient does not follow commands, moves all four extremities, Lower extremity bilaterally rigid tone, negative babinski Ext:  R foot the first digit is amputated, Left second digit amputated, No edema. Skin:  No rashes, lesions, or ulcers. Psychiatry: Unable to assess 
  
 
RADIOLOGY: 
 
All available imaging studies/reports in [facility] for this admission were reviewed Dr. Svetlana Faulkner, Infectious Disease Specialist 
[phone] 2020 
3:38 PM

## 2020-09-14 NOTE — PROGRESS NOTES
Bedside and Verbal shift change report given to Jh Rincon RN (oncoming nurse) by Lo Knight RN/Dayanara RN (offgoing nurse). Report included the following information SBAR, Kardex, Intake/Output, MAR, Recent Results and Cardiac Rhythm NSR. 
 
2000 - Following pt's son's visit, restraints assessed, no s/s of injury, continued requests to remove from pt as pt continues to attempt to get OOB, remove coverings/gown and loudly calls names of unknown people; pt is continually reoriented to place and situation; incontinence care performed; purewick reapplied as it also is removed repeatedly; staff at bedside for safety, calming and reorientation; will continue to monitor. 2200 - Restraints assessed, no s/s of injury; confusion continues and pt is repeatedly reoriented to place and situation without evidence of understanding as pt continues to scream her son's and others names; staff at bedside for safety, calming and reorientation; will continue to monitor. 0000 - Restraints assessed, no s/s of injury; confusion continues and pt is repeatedly reoriented to place and situation without evidence of understanding as pt continues to yell suddenly for people and things with staff at bedside for safety, calming and reorientation; incontinence care performed, bedpads and gown changed, Percilla Anes changed, found out of place d/t continued patient movement while restrained; will continue to monitor. 0200 - Pt found attempting to place purewick device in her mouth per staff RN passing room, item removed from pt hand and replaced to appropriate place for use; restraints assessed, no s/s of injury; confusion continues and pt is repeatedly reoriented to place and situation without evidence of understanding as pt continues to yell suddenly for people and things with staff at bedside for safety, calming and reorientation; will continue to monitor.  
 
0400 - Restraints assessed, no s/s of injury; confusion continues and pt is repeatedly reoriented to place and situation without evidence of understanding as pt continues to yell suddenly for people and things with staff at bedside for safety, calming and reorientation; incontinence care performed, bedpads changed, purewick out of place again d/t continued movement; will continue to monitor. 0600 - Restraints assessed, no s/s of injury; confusion continues and pt is repeatedly reoriented to place and situation without evidence of understanding as pt continues to yell suddenly for people and things with staff at bedside for safety, calming and reorientation; will continue to monitor. 0700 - Bedside and Verbal shift change report given to PETRONA Ceballos/PETRONA Nichole (oncoming nurse) by Sukhjinder Gu RN (offgoing nurse). Report included the following information SBAR, Kardex, Intake/Output, MAR, Recent Results and Cardiac Rhythm NSR.

## 2020-09-14 NOTE — PROGRESS NOTES
0800 - Received telephone orders to remove bilateral wrist restraints, and replace with roll belt and bilateral soft wrist restraints. Pt oriented to self, place, and time. Follows some commands when cleaning up, but mentation waxes and wanes. 0900 - Restraints applied per verbal orders, will monitor pt's response and behavior. Pt slowly tried to bite on cords and stated that she's eating cheetos chips. Attempted to re-orient pt reminding her she's in the hospital and she is not holding a cheetos chip. Pt smilled and said alright. 1053 - Found patient hovering over bed railing, bilateral soft-wrist restraints removed by pt, channel pulled off the pump by pt. Pt now not able to state place, or time. Re-oriented pt, and fixed pads underneath that was pulled off.  
 
1230 - Staff called RN for help to assist patient back on the bed, pt has managed to toss her upper body off the bed, appearing very agitated. She is also attempting to bite her mitts off, and becoming verbally and physically combative with staff members. Pt is yelling out of her room and demanding to be let out. PRN Ativan given per MD orders for restlessness and agitation. 1245 - Pt still continues to get out of bed and removed her mitts. Soft-wrist restraints bilaterally re-applied due to pt pulling on other forms of restraints, and is at risk for harm when trying to get out of bed. Pt was verbally abusive stating she wants to hit staff, and was swinging her arms becoming very combative. IV site appears reddened, may be irritation from potassium infusion. MD aware of events.

## 2020-09-14 NOTE — PROGRESS NOTES
0700 patient confused, yelling out. Vitals stable. 0800 changed patient of incontinence, oriented to self/place/year, disoriented to situation. Does remember son coming last night to visit. 0830 soft wrist restraints removed. Belt and mitten restraints placed. 0900 PT arrived to work with patient. Pt did not find it appropriate to get her out of bed due to her mental status. [id number] patient calm and resting in bed. Does continue to remove mittens. Vitals stable.

## 2020-09-14 NOTE — PROGRESS NOTES
Patient is agitated and yelling. Keeps trying to get out of bed. Patient was given 2mg of Ativan. Patient continues to pull off mitts and try to get out of bed.

## 2020-09-14 NOTE — PROGRESS NOTES
[facility] Brief Progress Note Patient seen at bedside with son. Discussed hospital course and patient's progress. Son reports that his mother had a similar episode with prolonged encephalopathy/delirium once previously which coincided with an infection. He also noted that prior to this hospitalization his mother was complaining of a severe headache and increased urinary frequency. All questions answered to sons satisfaction and he left the unit thereafter. Patient appeared much more comfortable. No agitation. She does respond to direct questioning appropriately but decreased activity likely 2/2 sedatives. She had received ativan approximately 2 hours prior to encounter. Would recommend patient be tried out of physical restraints today given uptrending CK and more directable behaviors. As alternative to upper extremity restraints, if patient proves to pose a danger to herself, would try mittens and a role belt over current upper extremity physical restraints. Fredi Melendez DO, PGY-3  
[facility] Medicine Senior Pager: [phone] September 14, 2020, 6:43 AM

## 2020-09-14 NOTE — PROGRESS NOTES
Patient yelling in room, cleaned and changed linens of incontinence, new purewick attached. Seroquel 25mg PRN administered. Vitals stable.

## 2020-09-14 NOTE — PROGRESS NOTES
1030 patient continues to remove mittens, did not know where she was or the year. Only oriented to self. Patient also removed pads from underneath her and the channel off the IV alaris pump by pulling on it.

## 2020-09-15 NOTE — PROCEDURES
[address] [place] [address]  
EEG Name:  Yuri Madsen 
MR#:   [id number] :  1965 ACCOUNT #:  [de-identified] DATE OF SERVICE:  2020 EEG Number:  Dyane Countess CLINICAL HISTORY:  A 57-year-old female with encephalopathy. MEDICATIONS:  Glucagon, insulin, Protonix, lorazepam, Crestor. EEG REPORT:  This is a 16-channel routine EEG done using the international 10-20 electrode placement system. The predominant background consists of 5-6 Hz diffuse irregular and symmetric slowing. There are periods of central vertex waves, K-complexes, and sleep spindles consistent with stage II sleep. There were no abnormal photoparoxysmal responses. No focal slowing or epileptiform abnormalities were observed. IMPRESSION:  This awake and asleep electroencephalogram shows the presence of encephalopathy. MD MARIANNA Crawford/[email or web address] 
D:  09/15/2020 12:57 
T:  09/15/2020 14:40 
JOB #:  [id number]

## 2020-09-15 NOTE — PROGRESS NOTES
Bedside and Verbal shift change report given to PETRONA Jordan (oncoming nurse) by Abi Galloway RN (offgoing nurse). Report included the following information SBAR, Kardex, Intake/Output, MAR, Recent Results and Cardiac Rhythm NSR to Sinus Tachy. Pt in bilat wrist restraints and sitter at bedside.

## 2020-09-15 NOTE — PROGRESS NOTES
9/15/2020 9:01 AM 
 
SSN: [id number] Subjective:   49-year-old male who was seen on September 11 by neurology 3 days after admission because of changes in mental status. Reportedly she was admitted and was confused, was noted to be agitated and delirious, with an initial impression of encephalopathy potentially due to a UTI or a toxic metabolic etiology, as she was getting IV lorazepam.  She was reportedly tachycardic initially and her blood pressure was elevated and although a febrile a serotonin syndrome was also in the differential and the Paxil was discontinued. She has received antibiotics, her serology seemingly has improved, and she has not been having any additional fevers after a UA which initially showed mixed kyle and that was underwhelmingly supportive of a true UTI, but she has remained confused and delirious and therefore neurology was again consulted. I have evaluated the patient with Dr. Nemesio Larkin at the bedside, who reports that today although she is still confused and restless is a little bit better than yesterday. Over the phone yesterday I recommended to stop Lorazepam, she had received approximately 6 mg on a as needed basis the 24 hours prior, and to give quetiapine 25 mg 3 times a day on a as needed basis for significant agitation. Social History Socioeconomic History  Marital status: SINGLE Spouse name: Not on file  Number of children: Not on file  Years of education: Not on file  Highest education level: Not on file Occupational History  Not on file Social Needs  Financial resource strain: Not on file  Food insecurity Worry: Not on file Inability: Not on file  Transportation needs Medical: Not on file Non-medical: Not on file Tobacco Use  Smoking status: Former Smoker Packs/day: 0.20 Years: 8.00 Pack years: 1.60 Types: Cigarettes Last attempt to quit: 12/3/1995 Years since quittin.8  Smokeless tobacco: Never Used Substance and Sexual Activity  Alcohol use: No  
  Comment: Drinks 3-4xs year generally wine  Drug use: No  
 Sexual activity: Not Currently Lifestyle  Physical activity Days per week: Not on file Minutes per session: Not on file  Stress: Not on file Relationships  Social connections Talks on phone: Not on file Gets together: Not on file Attends Mandaen service: Not on file Active member of club or organization: Not on file Attends meetings of clubs or organizations: Not on file Relationship status: Not on file  Intimate partner violence Fear of current or ex partner: Not on file Emotionally abused: Not on file Physically abused: Not on file Forced sexual activity: Not on file Other Topics Concern   Service No  
 Blood Transfusions No  
 Caffeine Concern No  
 Occupational Exposure No  
 Hobby Hazards No  
 Sleep Concern No  
 Stress Concern No  
 Weight Concern No  
 Special Diet Yes  Back Care No  
 Exercise No  
 Bike Helmet No  
 Seat Belt Yes  Self-Exams Yes Social History Narrative Lives with 16year old son  with ex   Does not have health insurance Family History Adopted: Yes  
Problem Relation Age of Onset  Heart Failure Mother 76  
 Other Father 70  
     blood clot after surgery  Diabetes Paternal Aunt  Diabetes Paternal Uncle Current Facility-Administered Medications Medication Dose Route Frequency Provider Last Rate Last Dose  magnesium sulfate 2 g/50 ml IVPB (premix or compounded)  2 g IntraVENous ONCE Julius Hernandez MD      
 QUEtiapine (SEROquel) tablet 25 mg  25 mg Oral TID PRN Julius Hernandez MD   25 mg at 20 9451  
 insulin glargine (LANTUS) injection 5 Units  5 Units SubCUTAneous DAILY Esteban Chao MD      
  lactated Ringers infusion  150 mL/hr IntraVENous CONTINUOUS Sheralyn Meckel D,  mL/hr at 09/14/20 1813 150 mL/hr at 09/14/20 1813  polyvinyl alcohol-povidon(PF) (REFRESH CLASSIC) 1.4-0.6 % ophthalmic solution 1 Drop  1 Drop Both Eyes MANDI Butler MD   1 Drop at 09/14/20 8446  cholecalciferol (VITAMIN D3) (1000 Units /25 mcg) tablet 1 Tab  1,000 Units Oral DAILY Julius Hernandez MD   1 Tab at 09/14/20 0851  
 insulin lispro (HUMALOG) injection   SubCUTAneous AC&HS Bela Duran MD   Stopped at 09/14/20 2200  
 melatonin tablet 3 mg  3 mg Oral QHS Sheralyn Meckel D, MD   3 mg at 09/14/20 2118  sodium chloride (NS) flush 5-10 mL  5-10 mL IntraVENous PRN Julius Hernandez MD      
 [Held by provider] insulin NPH (NOVOLIN N, HUMULIN N) injection 40 Units  40 Units SubCUTAneous ACB&D Julius Hernandez MD   Stopped at 09/09/20 0930  
 glucose chewable tablet 16 g  16 g Oral PRN Julius Hernandez MD      
 glucagon (GLUCAGEN) injection 1 mg  1 mg IntraMUSCular PRN Julius Hernandez MD      
 dextrose (D50W) injection syrg 12.5-25 g  25-50 mL IntraVENous PRN Julius Hernandez MD      
 sodium chloride (NS) flush 5-40 mL  5-40 mL IntraVENous Q8H Julius Hernandez MD   10 mL at 09/15/20 0953  sodium chloride (NS) flush 5-40 mL  5-40 mL IntraVENous PRN Julius Hernandez MD      
 acetaminophen (TYLENOL) tablet 650 mg  650 mg Oral Q6H PRN Julius Hernandez MD   650 mg at 09/11/20 2021 Or  acetaminophen (TYLENOL) suppository 650 mg  650 mg Rectal Q6H PRN Julius Hernandez MD      
 polyethylene glycol (MIRALAX) packet 17 g  17 g Oral DAILY PRN Julius Hernandez MD      
 [Held by provider] PARoxetine (PAXIL) tablet 60 mg  60 mg Oral DAILY Julius Hernandez MD      
 pantoprazole (PROTONIX) tablet 40 mg  40 mg Oral DAILY Julius Hernandez MD   40 mg at 09/14/20 5358  [Held by provider] rosuvastatin (CRESTOR) tablet 20 mg  20 mg Oral QHS Gina Coffey MD      
  ondansetron (ZOFRAN) injection 4 mg  4 mg IntraVENous Q6H PRN Julius Hernandez MD      
 
Facility-Administered Medications Ordered in Other Encounters Medication Dose Route Frequency Provider Last Rate Last Dose  midazolam (VERSED) injection 1 mg  1 mg IntraVENous Rad Multiple Haider Pelletier MD   1 mg at 09/15/20 0840  
 fentaNYL citrate (PF) injection 12.5-50 mcg  12.5-50 mcg IntraVENous Multiple Chioma Long MD   50 mcg at 09/15/20 0840  diphenhydrAMINE (BENADRYL) injection 50 mg  50 mg IntraVENous ONCE Haider Pelletier MD      
 
 
Past Medical History:  
Diagnosis Date  Blind left eye  Cellulitis of great toe of right foot 10/19/2017  Diabetes ([address]  Diabetic retinopathy ([address]  Gall stones  GERD (gastroesophageal reflux disease)  Hammertoe  Hiatal hernia  History of mammogram 10/03/2017 No evidence of malignancy  Hypertension  Mass of abdomen  Neuropathy due to type 2 diabetes mellitus ([address]  Osteomyelitis of toe of right foot ([address] 10/19/2017  Pancreatitis Past Surgical History:  
Procedure Laterality Date  HX AMPUTATION Left 07/21/2017  
 left 2nd toe  HX CHOLECYSTECTOMY  10/15/2014  HX GI Benign GI Stromal Tumor excision  HX HEENT Sx for detached retina  HX MYOMECTOMY x5 removed  HX OTHER SURGICAL    
 upper endoscopy Allergies Allergen Reactions  Levaquin [Levofloxacin] Hives  Promethazine Nausea and Vomiting \"the phenergan made me more nauseated\" Vital signs:   
Visit Vitals BP (!) 145/87 (BP 1 Location: Right arm, BP Patient Position: At rest;Head of bed elevated (Comment degrees)) Pulse 99 Temp 99.3 °F (37.4 °C) Resp 23 Ht 5' 8\" (1.727 m) Wt 94.8 kg (208 lb 14.4 oz) LMP 10/06/2015 (Exact Date) SpO2 100% Breastfeeding No  
BMI 31.76 kg/m² Review of Systems:  
Unobtainable Recent Results (from the past 24 hour(s)) HEPATITIS PANEL, ACUTE  
 Collection Time: 09/14/20 10:00 AM  
Result Value Ref Range Hepatitis A, IgM Negative NEG    
 __ Hepatitis B surface Ag <0.10 <1.00 Index Hep B surface Ag Interp. Negative NEG    
 __ Hepatitis B core, IgM Negative NEG    
 __ Hepatitis C virus Ab 0.14 <0.80 Index Hep C  virus Ab Interp. Negative NEG Hep C  virus Ab comment HIV 1/2 AG/AB, 4TH GENERATION,W RFLX CONFIRM Collection Time: 09/14/20 10:00 AM  
Result Value Ref Range HIV 1/2 Interpretation NONREACTIVE NR    
 HIV 1/2 result comment SEE NOTE T PALLIDUM AB Collection Time: 09/14/20 10:00 AM  
Result Value Ref Range T. pallidum interpretation. NONREACTIVE NR    
VITAMIN B12 & FOLATE Collection Time: 09/14/20 10:00 AM  
Result Value Ref Range Vitamin B12 517 211 - 911 pg/mL Folate 10.2 3.10 - 17.50 ng/mL POTASSIUM Collection Time: 09/14/20 10:00 AM  
Result Value Ref Range Potassium 3.6 3.5 - 5.5 mmol/L  
GLUCOSE, POC Collection Time: 09/14/20 11:16 AM  
Result Value Ref Range Glucose (POC) 138 (H) 70 - 110 mg/dL GLUCOSE, POC Collection Time: 09/14/20  3:53 PM  
Result Value Ref Range Glucose (POC) 242 (H) 70 - 110 mg/dL SED RATE (ESR) Collection Time: 09/14/20  4:34 PM  
Result Value Ref Range Sed rate, automated 32 (H) 0 - 30 mm/hr C REACTIVE PROTEIN, QT Collection Time: 09/14/20  4:34 PM  
Result Value Ref Range C-Reactive protein 3.7 (H) 0 - 0.3 mg/dL CK Collection Time: 09/14/20  4:34 PM  
Result Value Ref Range CK 1,993 (H) 26 - 192 U/L  
PROTHROMBIN TIME + INR Collection Time: 09/14/20  5:40 PM  
Result Value Ref Range Prothrombin time 13.2 11.5 - 15.2 sec INR 1.0 0.8 - 1.2 SARS-COV-2 Collection Time: 09/14/20  5:45 PM  
Result Value Ref Range SARS-CoV-2 PENDING Specimen source Nasopharyngeal    
 Specimen type NP Swab GLUCOSE, POC Collection Time: 09/14/20  9:14 PM  
Result Value Ref Range Glucose (POC) 127 (H) 70 - 110 mg/dL CBC WITH MANUAL DIFF Collection Time: 09/15/20  6:18 AM  
Result Value Ref Range WBC 13.1 4.6 - 13.2 K/uL  
 RBC 3.17 (L) 4.20 - 5.30 M/uL HGB 9.2 (L) 12.0 - 16.0 g/dL HCT 27.2 (L) 35.0 - 45.0 % MCV 85.8 74.0 - 97.0 FL  
 MCH 29.0 24.0 - 34.0 PG  
 MCHC 33.8 31.0 - 37.0 g/dL  
 RDW 13.8 11.6 - 14.5 % PLATELET 253 424 - 627 K/uL MPV 11.2 9.2 - 11.8 FL  
 DIFFERENTIAL MANUAL DIFFERENTIAL ORDERED    
 NEUTROPHILS PENDING % LYMPHOCYTES PENDING % MONOCYTES PENDING % EOSINOPHILS PENDING % BASOPHILS PENDING % BAND NEUTROPHILS PENDING % PROMYELOCYTES PENDING % MYELOCYTES PENDING % METAMYELOCYTES PENDING % BLASTS PENDING % OTHER CELL PENDING   
 ABS. NEUTROPHILS PENDING K/UL  
 ABS. LYMPHOCYTES PENDING K/UL  
 ABS. MONOCYTES PENDING K/UL  
 ABS. EOSINOPHILS PENDING K/UL  
 ABS. BASOPHILS PENDING K/UL  
 RBC COMMENTS PENDING   
 DF PENDING   
METABOLIC PANEL, COMPREHENSIVE Collection Time: 09/15/20  6:18 AM  
Result Value Ref Range Sodium 141 136 - 145 mmol/L Potassium 3.8 3.5 - 5.5 mmol/L Chloride 107 100 - 111 mmol/L  
 CO2 28 21 - 32 mmol/L Anion gap 6 3.0 - 18 mmol/L Glucose 104 (H) 74 - 99 mg/dL BUN 7 7.0 - 18 MG/DL Creatinine 0.94 0.6 - 1.3 MG/DL  
 BUN/Creatinine ratio 7 (L) 12 - 20 GFR est AA >60 >60 ml/min/1.73m2 GFR est non-AA >60 >60 ml/min/1.73m2 Calcium 8.9 8.5 - 10.1 MG/DL Bilirubin, total 1.7 (H) 0.2 - 1.0 MG/DL  
 ALT (SGPT) 45 13 - 56 U/L  
 AST (SGOT) 77 (H) 10 - 38 U/L Alk. phosphatase 93 45 - 117 U/L Protein, total 6.5 6.4 - 8.2 g/dL Albumin 3.3 (L) 3.4 - 5.0 g/dL Globulin 3.2 2.0 - 4.0 g/dL A-G Ratio 1.0 0.8 - 1.7 PHOSPHORUS Collection Time: 09/15/20  6:18 AM  
Result Value Ref Range Phosphorus 2.8 2.5 - 4.9 MG/DL MAGNESIUM Collection Time: 09/15/20  6:18 AM  
Result Value Ref Range Magnesium 1.5 (L) 1.6 - 2.6 mg/dL CK  
 Collection Time: 09/15/20  6:18 AM  
Result Value Ref Range CK 1,920 (H) 26 - 192 U/L  
GLUCOSE, POC Collection Time: 09/15/20  7:29 AM  
Result Value Ref Range Glucose (POC) 112 (H) 70 - 110 mg/dL An MRI of the brain was reviewed, done on September 11, but this was motion degraded. There was no obvious acute changes, areas of restricted diffusion, there was signal intensity abnormality in the left lobe that appeared to be chronic and of unknown etiology. Her last chest x-ray was in September 8 and unremarkable, her last UA was also on September 8. EXAM: Alert, restless, has to be told multiple times to follow very simple commands, when I asked her where she is she says [facility], but they cannot tell me the date, tells me that the year is 1776. She is not sure why she is here. She has to be covered by her sheets, she sits up and lays back down restlessly. Heart is regular. EOM's are full, no gaze preference, pupils are sluggishly reactive at all, no facial asymmetries. Rest of the cranial nerves could not be performed. There is no lateralizing paresis and she squeeze symmetrically and moves feet bilaterally and symmetrically with equal strength. Tone is not increased. DTRs are +2, gait was deferred. Assessment/Plan: Encephalopathy, etiology is unclear, could have been secondary to a UTI now treated on admission complicated by changes in environment along with nephrogenic from the use of abundant amounts of lorazepam.  According to primary team today she may be improving. Recommended repeating urinalysis and chest x-ray, ABGs to check for acid-base imbalances, and a lumbar puncture under fluoroscopy to consider the less likely possibility that she has a CNS infection is underway. Recommend continued avoidance of sedating drugs as much as possible including opiates and benzodiazepines. Neurology will follow after above. 20 out of 35 minutes were spent in for time reviewing extensive serology, imaging, notes, and discussing the case with Dr. Manjeet Mcneill. PLEASE NOTE:  
Portions of this document may have been produced using voice recognition software. Unrecognized errors in transcription may be present. This note will not be viewable in [address] murmur loudness: I/VI murmur loudness: II/VI

## 2020-09-15 NOTE — PROGRESS NOTES
Problem: Diabetes Self-Management Goal: *Disease process and treatment process Description: Define diabetes and identify own type of diabetes; list 3 options for treating diabetes. Outcome: Progressing Towards Goal 
Goal: *Incorporating nutritional management into lifestyle Description: Describe effect of type, amount and timing of food on blood glucose; list 3 methods for planning meals. Outcome: Progressing Towards Goal 
Goal: *Incorporating physical activity into lifestyle Description: State effect of exercise on blood glucose levels. Outcome: Progressing Towards Goal 
Goal: *Developing strategies to promote health/change behavior Description: Define the ABC's of diabetes; identify appropriate screenings, schedule and personal plan for screenings. Outcome: Progressing Towards Goal 
Goal: *Using medications safely Description: State effect of diabetes medications on diabetes; name diabetes medication taking, action and side effects. Outcome: Progressing Towards Goal 
Goal: *Monitoring blood glucose, interpreting and using results Description: Identify recommended blood glucose targets  and personal targets. Outcome: Progressing Towards Goal 
Goal: *Prevention, detection, treatment of acute complications Description: List symptoms of hyper- and hypoglycemia; describe how to treat low blood sugar and actions for lowering  high blood glucose level. Outcome: Progressing Towards Goal 
Goal: *Prevention, detection and treatment of chronic complications Description: Define the natural course of diabetes and describe the relationship of blood glucose levels to long term complications of diabetes. Outcome: Progressing Towards Goal 
Goal: *Developing strategies to address psychosocial issues Description: Describe feelings about living with diabetes; identify support needed and support network Outcome: Progressing Towards Goal 
Goal: *Insulin pump training Outcome: Progressing Towards Goal 
 Goal: *Sick day guidelines Outcome: Progressing Towards Goal 
Goal: *Patient Specific Goal (EDIT GOAL, INSERT TEXT) Outcome: Progressing Towards Goal 
  
Problem: Patient Education: Go to Patient Education Activity Goal: Patient/Family Education Outcome: Progressing Towards Goal 
  
Problem: Non-Violent Restraints Goal: *Removal from restraints as soon as assessed to be safe Outcome: Progressing Towards Goal 
Goal: *No harm/injury to patient while restraints in use Outcome: Progressing Towards Goal 
Goal: *Patient's dignity will be maintained Outcome: Progressing Towards Goal 
Goal: *Patient Specific Goal (EDIT GOAL, INSERT TEXT) Outcome: Progressing Towards Goal 
Goal: Non-violent Restaints:Standard Interventions Outcome: Progressing Towards Goal 
Goal: Non-violent Restraints:Patient Interventions Outcome: Progressing Towards Goal 
Goal: Patient/Family Education Outcome: Progressing Towards Goal 
  
Problem: Falls - Risk of 
Goal: *Absence of Falls Description: Document Ivmichele Webb Fall Risk and appropriate interventions in the flowsheet. Outcome: Progressing Towards Goal 
Note: Fall Risk Interventions: 
Mobility Interventions: Communicate number of staff needed for ambulation/transfer, Bed/chair exit alarm Mentation Interventions: Adequate sleep, hydration, pain control, Bed/chair exit alarm, Door open when patient unattended, More frequent rounding, Reorient patient, Room close to nurse's station, Toileting rounds, Update white board Medication Interventions: Evaluate medications/consider consulting pharmacy Elimination Interventions: Call light in reach, Bed/chair exit alarm, Patient to call for help with toileting needs, Toileting schedule/hourly rounds Problem: Patient Education: Go to Patient Education Activity Goal: Patient/Family Education Outcome: Progressing Towards Goal 
  
Problem: Pressure Injury - Risk of 
Goal: *Prevention of pressure injury Description: Document Vasile Scale and appropriate interventions in the flowsheet. Outcome: Progressing Towards Goal 
Note: Pressure Injury Interventions: 
Sensory Interventions: Assess changes in LOC Moisture Interventions: Absorbent underpads Activity Interventions: Pressure redistribution bed/mattress(bed type) Mobility Interventions: HOB 30 degrees or less Nutrition Interventions: Document food/fluid/supplement intake Friction and Shear Interventions: Lift team/patient mobility team, Minimize layers, Transferring/repositioning devices Problem: Patient Education: Go to Patient Education Activity Goal: Patient/Family Education Outcome: Progressing Towards Goal 
  
Problem: Patient Education: Go to Patient Education Activity Goal: Patient/Family Education Outcome: Progressing Towards Goal 
  
Problem: Nutrition Deficit Goal: *Optimize nutritional status Outcome: Progressing Towards Goal 
  
Problem: Patient Education: Go to Patient Education Activity Goal: Patient/Family Education Outcome: Progressing Towards Goal 
  
Problem: Patient Education: Go to Patient Education Activity Goal: Patient/Family Education Outcome: Progressing Towards Goal 
  
Problem: Patient Education:  Go to Education Activity Goal: Patient/Family Education Outcome: Progressing Towards Goal

## 2020-09-15 NOTE — PROGRESS NOTES
Left message for patient's son to discuss d/c planning. Patient still confused/restrained, however PT was able to work with patient minimally, recommending home health w/ 24/7 supervision vs rehab. RICHARD Chisholm Case Management [phone]

## 2020-09-15 NOTE — PROGRESS NOTES
Bedside and Verbal shift change report given to Jh Rincon RN (oncoming nurse) by Jorge Blank RN (offgoing nurse). Report included the following information SBAR, Kardex, Intake/Output, MAR, Recent Results and Cardiac Rhythm SR/ST. 2000 - Pt in soft wrist restraints, resting in bed, yelling out to staff continually; attempts made to re-orient patient unsuccessful; pt found pulling garcia catheter, kicking off bedsheets/bedpads, removing monitor leads; bedpads, sheets and leads replaced; will continue to monitor. 2200 - Wrist restraints checked, no s/s injury; pt continues to yell out to unknown persons even with continued redirection and reorientation to place and situation; sheets found on floor, some leads found detached, pt pulling on garcia catheter; attempt made to disguise catheter tubing, sheets/bedpads/leads replaced; will continue to monitor. 0000 - Wrist restraints checked, no s/s injury; pt still yelling out to unknown persons unable to be redirected; medication administered; will continue to monitor. 0200 - Wrist restraints checked, no s/s injury; pt resting quietly in bed, will continue to monitor. 0400 - Wrist restraints checked, no s/s injury; leads removed, pt bathed with basin, soap and warm water; tolerated well; leads replaced, sheets/bedpads/gown changed, leg securing device replaced to right leg, blankets replaced; will continue to monitor. 0600 - Wrist restraints checked, no s/s injury; attempts to redirect, soothe, and reorient unsuccessful; 800 mL red tinged urine removed from garcia collection bag, garcia catheter tubing found in pt's hand being pulled; tubing removed from pt hand, attempted to remind pt of harm that can come from pulling on tubing; bed pads/blankets found on floor, replaced for pt comfort; will continue to monitor.  
 
Roby Guzman ([facility]) paged and made aware of pt change of condition regarding garcia catheter output color and pulling issue r/t continued confusion. MD informed matter will be discussed with team for intervention decisions. 0700 - Bedside and Verbal shift change report given to Nikki RN (oncoming nurse) by Ellie Wolfe RN (offgoing nurse). Report included the following information SBAR, Kardex, Intake/Output, MAR and Cardiac Rhythm ST.

## 2020-09-15 NOTE — PROGRESS NOTES
Attempted to see patient for PT treatment however she is currently on 3 hours of bedrest s/p lumbar puncture. Will continue to follow.   Memo Palacios, PT

## 2020-09-15 NOTE — DIABETES MGMT
Glycemic Control Plan of Care 
 
T2DM with current A1c of 7.0% (9/08/2020) Patient confused, agitated, on bilateral soft restraints. DCP: to be determined per CM Home diabetes medications: Unverified Novolin 70/30 mix insulin 40 units BID Recommendation(s): 
1.) cont to hold BID NPH insulin order 2.) cont basal lantus insulin 5 units daily and sliding scale lispro Glycemic assessment:  
 
Seen patient this afternoon. Sleeping with bilat wrist soft restraints and sitter at bedside. Status post lumbar puncture and PICC line procedure today. POC BG 9/14: 224, 242 POC BG 9/15 at time of review: 112, 124 Lab FBG 9/15: 104 Current A1C: 7.0% (9/08/2020) which is equivalent to estimated average blood glucose of 154 mg/dL during the past 2-3 months Current hospital diabetes medications: 
Basal lantus insulin 5 units daily Correctional lispro insulin every 6 hours. Normal sensitivity dose [currently on hold] NPH insulin 40 units BID AC Total daily dose insulin requirement previous day: 9/14: 
Lantus: 5 units Lispro: 8 units TDD insulin: 13 units Diet: Dysphagia pureed; nutr suppl: glucerna shake with all meals Goals:  Blood glucose will be within target range of  mg/dL by 9/18/2020 Education:  ___  Refer to Diabetes Education Record 
           __X_  Education not indicated at this time Aaron Akins RN [facility] Pager: [phone]

## 2020-09-15 NOTE — PROGRESS NOTES
Bedside and Verbal shift change report given to PETRONA Rosa (oncoming nurse) by Praful Schreiber RN (offgoing nurse). Report included the following information SBAR, Kardex, Intake/Output, MAR, Recent Results and Cardiac Rhythm Sinus Tachy.

## 2020-09-15 NOTE — PROGRESS NOTES
[facility] Progress Note Patient: Italo Valdez MRN: [id number] SSN: [id number]  YOB: 1965 Age: 60 y.o. Sex: female Admit Date: 9/8/2020 LOS: 7 days Chief Complaint Patient presents with  Vomiting Subjective:  
 
Pt states her name, states she is in hospital, and not able to say the year. She states that she needs help. \"please help me. \" States that she wants to get up. Pt not able to say why she is in hospital  
 
ROS Pt not able to answer questions Objective:  
 
Visit Vitals BP (!) 145/87 (BP 1 Location: Right arm, BP Patient Position: At rest;Head of bed elevated (Comment degrees)) Pulse (!) 112 Temp 99.3 °F (37.4 °C) Resp 21 Ht 5' 8\" (1.727 m) Wt 94.8 kg (208 lb 14.4 oz) LMP 10/06/2015 (Exact Date) SpO2 100% Breastfeeding No  
BMI 31.76 kg/m² Physical Exam: 
General:  AXOx 2 , Pt follows command intermittently, eyes closed , keeps trying to get out of bed HEENT: R eye erythema, no drainage,  R not reactive dilated, Left eye sluggish but react to light, moist mucous membranes.   
CV:  RRR, no murmurs. No visible pulsations or thrills.   
RESP:  Unlabored lungs clear to auscultation without adventitious breath sounds. Equal expansion bilaterally.   
ABD:  soft abdomen, non-tender,  nondistended. BS (+).   
MS:  No joint deformity or instability.  No atrophy. Neuro:  Pt no facial droop, no deviation of tongue, unable to evaluate EOM because patient does not follow commands, moves all four extremities, Tardive akathisia Lower extremity bilaterally rigid tone, negative babinski  
Ext:  R foot the first digit is amputated, Left second digit amputated, No edema.  2+ radial and dp pulses bilaterally. Skin:  No rashes, lesions, or ulcers. Intake and Output: 
Current Shift: No intake/output data recorded. Last three shifts: 09/13 1901 - 09/15 0700 In: 3405 [P.O.:720; I.V.:2685] Out: 400 [Urine:400] Lab/Data Review: 
Recent Results (from the past 12 hour(s)) GLUCOSE, POC Collection Time: 09/14/20  9:14 PM  
Result Value Ref Range Glucose (POC) 127 (H) 70 - 110 mg/dL CBC WITH MANUAL DIFF Collection Time: 09/15/20  6:18 AM  
Result Value Ref Range WBC 13.1 4.6 - 13.2 K/uL  
 RBC 3.17 (L) 4.20 - 5.30 M/uL HGB 9.2 (L) 12.0 - 16.0 g/dL HCT 27.2 (L) 35.0 - 45.0 % MCV 85.8 74.0 - 97.0 FL  
 MCH 29.0 24.0 - 34.0 PG  
 MCHC 33.8 31.0 - 37.0 g/dL  
 RDW 13.8 11.6 - 14.5 % PLATELET 337 082 - 853 K/uL MPV 11.2 9.2 - 11.8 FL  
 DIFFERENTIAL MANUAL DIFFERENTIAL ORDERED    
 NEUTROPHILS PENDING % LYMPHOCYTES PENDING % MONOCYTES PENDING % EOSINOPHILS PENDING % BASOPHILS PENDING % BAND NEUTROPHILS PENDING % PROMYELOCYTES PENDING % MYELOCYTES PENDING % METAMYELOCYTES PENDING % BLASTS PENDING % OTHER CELL PENDING   
 ABS. NEUTROPHILS PENDING K/UL  
 ABS. LYMPHOCYTES PENDING K/UL  
 ABS. MONOCYTES PENDING K/UL  
 ABS. EOSINOPHILS PENDING K/UL  
 ABS. BASOPHILS PENDING K/UL  
 RBC COMMENTS PENDING   
 DF PENDING   
GLUCOSE, POC Collection Time: 09/15/20  7:29 AM  
Result Value Ref Range Glucose (POC) 112 (H) 70 - 110 mg/dL RECENT RESULTS MODALITY IMPRESSION  
XR Results from Hospital Encounter encounter on 09/08/20 XR ORBIT BI FOREIGN BODY Narrative EXAM: ORBITS LIMITED CLINICAL HISTORY/INDICATION: , blurred vision with nausea, vomiting, and 
abdominal pain, severe agitation, altered mental status, diabetic retinopathy 
due to type 2 diabetes Tonye Cogan mri screen. COMPARISON: None. TECHNIQUE: modified ramos view(s) of the orbits obtained. FINDINGS: 
 
There are no radiopaque metallic foreign bodies within the orbits. There is no 
aneurysm clip. The visualized  paranasal sinuses are normal. 
  
 Impression IMPRESSION: 
 
Negative orbital screening prior to MRI. CT Results from Hospital Encounter encounter on 09/08/20 CT CHEST ABD PELV WO CONT Narrative EXAMINATION: CT chest/abdomen/pelvis without contrast 
 
INDICATION: Sepsis, leukocytosis COMPARISON: CT abdomen 1/4/2019 TECHNIQUE: CT of the chest, abdomen, and pelvis performed without contrast. 
Multiplanar reformations obtained. All CT scans at this facility are performed 
using dose optimization technique as appropriate to a performed exam, to include 
automated exposure control, adjustment of the mA and/or kV according to patient 
size (including appropriate matching first site specific examinations), or use 
of iterative reconstruction technique. FINDINGS: 
 
Evaluation of soft tissues and vessels limited without vascular enhancement. Streak artifact from arms also limits evaluation. CHEST: 
 
Cardiovascular: Major vessels unremarkable. Heart size normal. 
 
Mediastinum: Imaged thyroid unremarkable. No adenopathy by size criteria. Esophagus nondistended. Pleura: No effusion. No pneumothorax. Lungs/airways: Bronchial tree unremarkable. No confluent consolidation. Miscellaneous: Superficial soft tissues unremarkable. Bones: T6 mild superior endplate depression, age indeterminate. ABDOMEN/PELVIS: 
 
Hepatobiliary: Gallbladder absent. Liver unremarkable. No biliary duct 
dilatation. Pancreas: Unremarkable. Spleen: Unremarkable. Adrenals: Unremarkable. Genitourinary: Right kidney unremarkable. Left kidney unremarkable. Bladder with 
mild wall thickening. Multilobulated uterus containing multiple calcifications. Gastrointestinal: Stomach unremarkable. Small bowel loops nondilated. The colon 
unremarkable. Appendix unremarkable. Mesentery/vessels/nodes: No free air. No free fluid. Major vessels unremarkable. No adenopathy by size criteria. Miscellaneous: Superficial soft tissues unremarkable. Bones: No acute osseous findings. Impression IMPRESSION: 
 
Chest: 
-No clearly acute findings. -Age-indeterminate mild T6 compression deformity. Abdomen/pelvis: 
-Bladder with mild wall thickening. Correlate clinically for UTI/cystitis. 
-Fibroid uterus. MRI Results from [place] encounter on 09/11/20 MRI BRAIN WO CONT Narrative Description:  MRI brain without contrast 
 
TECHNIQUE: Multiplanar, multisequence MR imaging of the brain without contrast 
 
Clinical Indication:  Altered mental status. Comparison: September 9, 2020. Findings: Motion degraded exam. 
 
Midline structures of the brain to include the corpus callosum, pituitary gland 
and clivus demonstrate normal morphology and signal intensity. The craniocervical junction appears normal. 
There is normal signal intensity within the superior sagittal sinus. There is a focal narrowing at the right occipital horn, presumed congenital 
variant. Otherwise, the brain demonstrates normal morphology and signal 
intensity throughout. There are no areas of restricted diffusion. There are normal flow voids at the base of the brain. Trace right mastoid effusion. Left mastoids are clear. Paranasal sinuses are 
clear. There is abnormal signal intensity within the left globe. This appears to be 
chronic. Impression Impression: Motion degraded exam. No acute intracranial findings. Abnormal appearance of the left globe, chronic and stable. Clinical correlation 
needed. ULTRASOUND Results from Hospital Encounter encounter on 09/08/20 US ABD LTD Impression IMPRESSION:    
 
1. Status post cholecystectomy. 2.  No significant common bile duct dilation. 3.  Increased hepatic parenchymal echogenicity with somewhat heterogeneous 
appearance, potentially suggestive of hepatosteatosis. Partial visualization of 
the liver. Results from [facility] Encounter encounter on 01/22/19 US ABD LTD  Impression IMPRESSION: 
 
 Mild heterogeneous echogenicity of the liver is nonspecific. This is commonly 
seen with steatosis hepatic disease. Limited visualization of the pancreas. Cardiology Procedures/Testing: MODALITY RESULTS  
EKG Results for orders placed or performed during the hospital encounter of 09/08/20 EKG, 12 LEAD, INITIAL Result Value Ref Range Ventricular Rate 132 BPM  
 Atrial Rate 132 BPM  
 P-R Interval 158 ms QRS Duration 72 ms Q-T Interval 274 ms QTC Calculation (Bezet) 405 ms Calculated P Axis 43 degrees Calculated R Axis 33 degrees Calculated T Axis 148 degrees Diagnosis Sinus tachycardia Nonspecific T wave abnormality Abnormal ECG When compared with ECG of 08-SEP-2020 15:04, No significant change was found Confirmed by Clara Coon MD, Dimple Dough ([id number]) on 9/9/2020 7:48:30 AM 
  
Results for orders placed or performed in visit on 06/20/14 AMB POC EKG ROUTINE W/ 12 LEADS, INTER & REP Impression See progress note. ECHO 01/03/19 ECHO ADULT COMPLETE 01/04/2019 1/4/2019 Narrative · Estimated left ventricular ejection fraction is 61 - 65%. Left  
ventricular mild concentric hypertrophy. Mild (grade 1) left ventricular  
diastolic dysfunction. · Mild tricuspid valve regurgitation is present. Signed by: Vitaliy Polk MD  
  
 
Special Testing/Procedures: MODALITY RESULTS MICRO All Micro Results Procedure Component Value Units Date/Time CULTURE, BLOOD [[id number]] Order Status:  Sent Specimen:  Blood CULTURE, BLOOD [[id number]] Order Status:  Sent Specimen:  Blood CULTURE, URINE [[id number]] Order Status:  Sent Specimen:  Urine from Clean catch MENINGITIS PATHOGENS PANEL, CSF (BY PCR) [[id number]] Order Status:  Sent Specimen:  Cerebrospinal Fluid CULTURE, CSF Suszanne Brake STAIN [[id number]] Order Status:  Sent Specimen:  Cerebrospinal Fluid CULTURE, BLOOD [[id number]] Collected:  09/08/20 1640 Order Status:  Completed Specimen:  Blood Updated:  09/14/20 2296 Special Requests: NO SPECIAL REQUESTS Culture result: NO GROWTH 6 DAYS     
 CULTURE, BLOOD [[id number]] Collected:  09/08/20 1640 Order Status:  Completed Specimen:  Blood Updated:  09/14/20 8192 Special Requests: NO SPECIAL REQUESTS Culture result: NO GROWTH 6 DAYS     
 CULTURE, URINE [[id number]] Collected:  09/08/20 1211 Order Status:  Completed Specimen:  Urine from Clean catch Updated:  09/09/20 1811 Special Requests: NO SPECIAL REQUESTS [place] Count --     
  >100,000 COLONIES/mL Culture result:    
  MIXED UROGENITAL KYLE ISOLATED  
     
  
  
UA No results found for this or any previous visit. PATH none Telemetry NONE Oxygen NONE Assessment and Plan:  
60 y. o. 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   
  
Metabolic Encephalopathy of unclear etiology? Likely multifactorial- improving Came with a UMD [phone] BG, She was treated with IV fluids . She had a leucocytosis, and lactic acidosis that improved with IV antibiotics unclear source UTI? leucocytosis initially, So far blood culture on 9/8 was no growth. Pt's UA only wbc 6-8, 2+ bacteria, LE neg/Nitrite neg.  Also, urine culture >100, 000 colony mixed urogenital kyle. Her  9/9 CT Chest/AB/Pelvis showed bladder with mild wall thickening cystitis unlikely UTI.   Hypernatremia (151--> 150--> 141) resolved. Pt was negative UDS and ETOH serum (9/8) On  9/8 CT head showed mild prominence: mild enlargement of lateral ventricles, cerebral atrophy Labs negative for Negative troponin, EKG no ST elevation or depression, lipase normal, ammonia nml, B12 and folate nmlm,  
- s/p empirical  abx Ceftriaxone  (9/8-/9) - s/p empiric abx  vancomycin (9/8-9/14)  and zoysn (9/9-9/14 ) - MRI head 9/11 - no acute intracranial finding  
- HIV negative, RPR negative Plan:  
- re-culture Urine culture, blood culture, LP  
- B1, GRACE, smooth muscle, RPR pending -EEG ( Pt too restless) -appreciate ID recs- 
- concern serotonin syndrome- hold meds metoclopramide and paroxetine - appreciated neurology recs- avoid antipsychotics per neuro, prn benzo for anxiety and restlessness,  
 
  
 Agitation - better controlled Wax and waning 
- restrains, role belt 
- avoid antipsychotics - prn Benzo for anxiety and restlessness 
  
Tardive akathisia- restlessness 
- initially also had dystonia, EPS symptoms  
- likely related with chronic metoclopramide use  
- started today benztropine 1mg BID daily Normocytic anemia (hg 9-10)  
 B12 and folate normal 
Plan: FOBT  
  
CRYSTAL CR 3.56 ( baseline Cr 1.09 1/2019) in setting of CKD stage 2  likely pre-julian and rhabdo (CK increasing again) Iron profile normal 
CK 2K Myoglobin 1,092 postive CK has down trended to 1K from 2K .8 elevated and vitamin D on low side of normal corrected Ca normal , Vitamin D nml , P nm  
 microalb/cr ratio- wnml 
  
Plan:   
Cont IVF  
Trend CK Nephrology following appreciate recs- calcitril 0.25mcg on discharge 
  
  
R eye pain + Right eye vision loss X 2 weeks Unlikely papilledema based on bedside ultrasound ( no enlarged optic sheath seen), likely floater vs retinal detachment vs pink eye vs acute angle gluacoma Plan:  
- spoke to optho on 9/10, Dr. Jagjit Hernandez stated that he will come to see patient and will make recommendations 
  
Hypokalemia (resolved) 
electrolyte imbalance (hypokalemia, corrected Ca wnml )  
- replete electrolyte as needed, Mg, Phos 
  
Hyperbilirubinemia (3.9) w/ 0.6 direct likely majority is unconjugated  likely secondary to [place] syndrome, previously elevated bili unlikely hemolysis CT AB/US no gall bladder disease appreciated , haptoglobin normal, aldolase pending  
  
  
 Diabetes ( last A1c 7.2) (-245)  neuropathy and L retinopathy - not well controlled 
- held NPH 40U BID (hold home NPH 50 U BID) -given lantus 5 U daily  
 HTN 
- not on meds 
  
 HLD 
- cont rosuvastatin  
  
Gastroparesis  
- held metoclopramide 5 mg  
  
GERD 
- cont  omeprazole 40 mg delayed capsule 
  
  
Anxiety 
- held  paroxetine 60 mg daily 
  
Diet Advance as tolerated per speech DVT Prophylaxis heparin GI Prophylaxis Omperazole Code status Full code Disposition unknown  
  
Point of Contact Prieto Relationship: 
[phone] Julius Hernandez PGY-1  
[address] Pager: [phone] September 15, 2020, 7:48 AM

## 2020-09-15 NOTE — PROGRESS NOTES
Progress Note 58y F with PMH DM type 2, HTN CKD presented with HHS following for CRYSTAL Subjective Overnight event noted Remain confused IMPRESSION:  
Acute kidney injury, back to baseline CKD, h/o CRYSTAL in past, last available creatinine is at 1.9mg/dl last yea Ketoacidosis, resolved Hypokalemia ,resolved 
rhabdomylysis ,mild DM type 2 Altered mental status, Diastolic heart failure, grade 1 PLAN:  
Her renal function is back to baseline 
cpk is elevated. ? Etiology. statins stopped Current Facility-Administered Medications Medication Dose Route Frequency  QUEtiapine (SEROquel) tablet 25 mg  25 mg Oral TID PRN  
 insulin glargine (LANTUS) injection 5 Units  5 Units SubCUTAneous DAILY  lactated Ringers infusion  150 mL/hr IntraVENous CONTINUOUS  
 polyvinyl alcohol-povidon(PF) (REFRESH CLASSIC) 1.4-0.6 % ophthalmic solution 1 Drop  1 Drop Both Eyes PRN  cholecalciferol (VITAMIN D3) (1000 Units /25 mcg) tablet 1 Tab  1,000 Units Oral DAILY  insulin lispro (HUMALOG) injection   SubCUTAneous AC&HS  
 melatonin tablet 3 mg  3 mg Oral QHS  sodium chloride (NS) flush 5-10 mL  5-10 mL IntraVENous PRN  
 [Held by provider] insulin NPH (NOVOLIN N, HUMULIN N) injection 40 Units  40 Units SubCUTAneous ACB&D  
 glucose chewable tablet 16 g  16 g Oral PRN  
 glucagon (GLUCAGEN) injection 1 mg  1 mg IntraMUSCular PRN  
 dextrose (D50W) injection syrg 12.5-25 g  25-50 mL IntraVENous PRN  
 sodium chloride (NS) flush 5-40 mL  5-40 mL IntraVENous Q8H  
 sodium chloride (NS) flush 5-40 mL  5-40 mL IntraVENous PRN  
 acetaminophen (TYLENOL) tablet 650 mg  650 mg Oral Q6H PRN Or  
 acetaminophen (TYLENOL) suppository 650 mg  650 mg Rectal Q6H PRN  polyethylene glycol (MIRALAX) packet 17 g  17 g Oral DAILY PRN  
 [Held by provider] PARoxetine (PAXIL) tablet 60 mg  60 mg Oral DAILY  pantoprazole (PROTONIX) tablet 40 mg  40 mg Oral DAILY  [Held by provider] rosuvastatin (CRESTOR) tablet 20 mg  20 mg Oral QHS  ondansetron (ZOFRAN) injection 4 mg  4 mg IntraVENous Q6H PRN Review of Systems: As above Data Review: 
 
Labs: Results:  
   
Chemistry Recent Labs  
  09/15/20 
0618 09/14/20 
1000 09/14/20 
0622 09/13/20 
2926 *  --  225* 123*   --  139 140  
K 3.8 3.6 3.2* 3.7   --  105 105 CO2 28  --  29 25 BUN 7  --  10 10 CREA 0.94  --  1.39* 1.20 CA 8.9  --  9.0 9.0 AGAP 6  --  5 10 BUCR 7*  --  7* 8* AP 93  --  108 111  
TP 6.5  --  7.3 7.3 ALB 3.3*  --  3.4 3.8 GLOB 3.2  --  3.9 3.5 AGRAT 1.0  --  0.9 1.1  
  
CBC w/Diff Recent Labs  
  09/15/20 
0618 09/14/20 
0622 09/13/20 
9492 WBC 13.1 11.4 11.7 RBC 3.17* 3.44* 3.58* HGB 9.2* 9.9* 10.4* HCT 27.2* 29.5* 30.6*  251 244 GRANS 60 59 56 LYMPH 33 34 35 EOS 2 2 3 Coagulation Recent Labs  
  09/14/20 
1740 PTP 13.2 INR 1.0 Iron/Ferritin No results for input(s): IRON in the last 72 hours. No lab exists for component: TIBCCALC BNP No results for input(s): BNPP in the last 72 hours. Cardiac Enzymes Recent Labs  
  09/15/20 
0618 09/14/20 
1634 CPK 1,920* 1,993* Liver Enzymes Recent Labs  
  09/15/20 
0618 TP 6.5 ALB 3.3* AP 93 Thyroid Studies Lab Results Component Value Date/Time TSH 0.57 09/08/2020 04:40 PM  
    
 EKG: sinus Physical Assessment:  
 
Visit Vitals /60 Pulse 90 Temp 98.4 °F (36.9 °C) Resp 15 Ht 5' 8\" (1.727 m) Wt 94.8 kg (208 lb 14.4 oz) SpO2 100% Breastfeeding No  
BMI 31.76 kg/m² Weight change: 3.447 kg (7 lb 9.6 oz) Intake/Output Summary (Last 24 hours) at 9/15/2020 1329 Last data filed at 9/14/2020 1815 Gross per 24 hour Intake 480 ml Output 400 ml Net 80 ml Physical Exam:  
General: no respi distress HEENT sclera anicteric, supple neck, no thyromegaly CVS: S1S2 heard,  no rub RS: + air entry b/l, Abd: Soft, Non tender Neuro: lethargic Extrm: No edema, no cyanosis, clubbing Skin: no visible  Rash Procedures/imaging: see electronic medical records for all procedures, Xrays and details which were not copied into this note but were reviewed prior to creation of Plan Gabriele Velazco MD 
September 15, 2020 [facility] [phone]

## 2020-09-15 NOTE — PROCEDURES
Interventional Radiology Brief Procedure Note Patient: Phil Ritter MRN: [id number]  SSN: [id number] YOB: 1965  Age: 60 y.o. Sex: female Date of Procedure: 9/15/2020 Procedure(s): Fluoroscopic Lumbar Puncture with Opening an Closing Pressures Performed By: ESTEBAN Oleary Assistant:none Anesthesia: Moderate Sedation, 1% lidocaine locally Estimated Blood Loss: Less than 10ml Specimens: 16 cc clear CSF Findings: Elevated opening pressure at 34 cm H20. Closing pressure of 12 cm H20. 16 cc crystal clear CSF obtained and sent for specimen. Implants: None Plan: Remain supine 3 hours post procedure Signed By: ESTEBAN Oleary September 15, 2020

## 2020-09-15 NOTE — PROCEDURES
INTERVENTIONAL RADIOLOGY POST PICC LINE NOTE September 15, 2020       9:41 AM  
 
Preoperative Diagnosis:   IV Access Postoperative Diagnosis:  Same. :  Yoon Quevedo PA-C Assistant:  None. Attending Physician: Dr. Rosa Monae Type of Anesthesia:  1% Lidocaine local 
 
Procedure/Description: Existing midline access of left basilic vein utilized to exchange for an upper extremity PICC Line. Findings:  left basilic 5 Romanian catheter from existing midline access. Tip at SVC/RA junction. OK to use. Length 42 cm. Estimated blood Loss: Minimal 
 
Specimen Removed: None Drains: None Complications:  None. Condition:  Stable. Discharge Plan:  continue present therapy

## 2020-09-15 NOTE — PROGRESS NOTES
Pre Procedure Assessment Patient: Italo Valdez           Sex: female       DOA: 9/8/2020 YOB: 1965      Age:  60 y.o.     LOS:  LOS: 7 days Indication:  
 
Italo Valdez is a 60 y.o. female who has altered mental status of unknown etiology with improving leukocytosis, negative UDS, unremarkable MRI, and improved DKA, here for a lumbar puncture with moderate sedation due to agitation and midline transition to PICC for IV access. Consent was obtained from her son. Past Medical History:  
Diagnosis Date  Blind left eye  Cellulitis of great toe of right foot 10/19/2017  Diabetes ([address]  Diabetic retinopathy ([address]  Gall stones  GERD (gastroesophageal reflux disease)  Hammertoe  Hiatal hernia  History of mammogram 10/03/2017 No evidence of malignancy  Hypertension  Mass of abdomen  Neuropathy due to type 2 diabetes mellitus ([address]  Osteomyelitis of toe of right foot ([address] 10/19/2017  Pancreatitis Past Surgical History:  
Procedure Laterality Date  HX AMPUTATION Left 07/21/2017  
 left 2nd toe  HX CHOLECYSTECTOMY  10/15/2014  HX GI Benign GI Stromal Tumor excision  HX HEENT Sx for detached retina  HX MYOMECTOMY x5 removed  HX OTHER SURGICAL    
 upper endoscopy Family History Adopted: Yes  
Problem Relation Age of Onset  Heart Failure Mother 76  
 Other Father 70  
     blood clot after surgery  Diabetes Paternal Aunt  Diabetes Paternal Uncle Social History Socioeconomic History  Marital status: SINGLE Spouse name: Not on file  Number of children: Not on file  Years of education: Not on file  Highest education level: Not on file Tobacco Use  Smoking status: Former Smoker Packs/day: 0.20 Years: 8.00 Pack years: 1.60 Types: Cigarettes Last attempt to quit: 12/3/1995 Years since quittin.8  Smokeless tobacco: Never Used Substance and Sexual Activity  Alcohol use: No  
  Comment: Drinks 3-4xs year generally wine  Drug use: No  
 Sexual activity: Not Currently Other Topics Concern   Service No  
 Blood Transfusions No  
 Caffeine Concern No  
 Occupational Exposure No  
 Hobby Hazards No  
 Sleep Concern No  
 Stress Concern No  
 Weight Concern No  
 Special Diet Yes  Back Care No  
 Exercise No  
 Bike Helmet No  
 Seat Belt Yes  Self-Exams Yes Social History Narrative Lives with 16year old son  with ex   Does not have health insurance Prior to Admission medications Medication Sig Start Date End Date Taking? Authorizing Provider  
metoclopramide HCl (REGLAN) 5 mg tablet Take 1 Tab by mouth Before breakfast, lunch, and dinner. Take before meals. 19   Bishop Kennedy GUERRERO MD  
insulin NPH/insulin regular (NOVOLIN 70/30, HUMULIN 70/30) 100 unit/mL (70-30) injection 40 Units by SubCUTAneous route two (2) times a day. 19   Alli Bolden PA-C  
PARoxetine (PAXIL) 40 mg tablet Take 40 mg by mouth daily. Provider, Historical  
rosuvastatin (CRESTOR) 20 mg tablet Take 1 Tab by mouth nightly. 17   Benitez Guevara NP Allergies Allergen Reactions  Levaquin [Levofloxacin] Hives  Promethazine Nausea and Vomiting \"the phenergan made me more nauseated\" Review of Systems A comprehensive review of systems was negative. Physical Exam:  
  
Visit Vitals BP (!) 145/87 (BP 1 Location: Right arm, BP Patient Position: At rest;Head of bed elevated (Comment degrees)) Pulse 99 Temp 99.3 °F (37.4 °C) Resp 23 Ht 5' 8\" (1.727 m) Wt 94.8 kg (208 lb 14.4 oz) SpO2 100% Breastfeeding No  
BMI 31.76 kg/m² Physical Exam: 
Mallampati 2 ASA 3 
 General: Awake and lethargic, occasionally follows commands, NAD Heart: RRR Lungs: Normal work of breathing on room air Labs Reviewed: All lab results for the last 24 hours reviewed. Assessment/Plan Principal Problem: 
  Altered mental status (9/8/2020) Active Problems: 
  Leukocytosis (7/19/2017) Hypokalemia (1/28/2019) Hyperglycemia (9/8/2020) Acute renal failure (ARF) ([facility] (9/8/2020) Acute metabolic encephalopathy (0/2/5973) Metabolic encephalopathy (1/9/7256) The patient is an appropriate candidate to undergo the planned procedure and sedation.  
 
ESTEBAN Adair

## 2020-09-15 NOTE — PROGRESS NOTES
Nutrition Assessment Type and Reason for Visit: Reassess, Positive nutrition screen Nutrition Recommendations/Plan: - Decrease frequency of Glucerna Shake to once daily. 
- Nursing to assist with meals as needed. - IVF per MD order. Nutrition Assessment:  Pt remains confused, in restraints and requiring meal assistance. Diet resumed this afternoon after NPO for lumbar puncture & PICC placement. Eating 100% of meals per chart, verified with CNA & also reports variable consumption of supplements. Malnutrition Assessment: 
Malnutrition Status: No malnutrition Estimated Daily Nutrient Needs: 
Energy (kcal):  5891-0711 Protein (g):  75-94 Fluid (ml/day):  3055-4081 Nutrition Related Findings:  Loose BM 9/14. 2 gm Mg given today. Phos WNL. Vitamin D3, LR at 100 mL/hr. Recent BG levels 104-124 mg/dL Current Nutrition Therapies: DIET NUTRITIONAL SUPPLEMENTS Breakfast, Lunch, Dinner, All Meals; Glucerna Shake DIET DYSPHAGIA PUREED (NDD1)  Consistent Carb 1800kcal 
 
Anthropometric Measures: 
· Height:  5' 8\" (172.7 cm) · Current Body Wt:  94.3 kg (207 lb 14.3 oz) · BMI: 31.6 Nutrition Diagnosis: · (Excessive energy intake) related to (current diet & supplement order in relation to pt's estimated energy needs) as evidenced by (excellent intake of meals and supplements) Nutrition Intervention: 
Food and/or Nutrient Delivery: Continue current diet, Modify oral nutrition supplement, IV fluid delivery Nutrition Education and Counseling: Education not indicated Coordination of Nutrition Care: Continued inpatient monitoring, Feeding assistance/environmental change, Speech therapy(discussed with nursing) Goals: 
PO nutrition intake will continue to meet >75% of patients estimated nutritional needs over the next 7 days. Nutrition Monitoring and Evaluation:  
Food/Nutrient Intake Outcomes: Diet advancement/tolerance, Food and nutrient intake, Supplement intake, IVF intake Physical Signs/Symptoms Outcomes: Biochemical data, Chewing or swallowing, Meal time behavior, Nutrition focused physical findings Discharge Planning: Too soon to determine Electronically signed by Abbi Mackay RD on 9/15/2020 at 2:44 PM 
 
Contact Number: [phone]

## 2020-09-16 NOTE — ROUTINE PROCESS
Anesthesia can accommodate the MRI / MRV head to be done under Anesthesia tomorrow, 9/17/2020 @ 230pm.  Please make sure the patient is NPO after midnight.

## 2020-09-16 NOTE — PROGRESS NOTES
MRI Screening form needs to be filled out and faxed to [phone] MRI can be scheduled. If unable to obtain information from pt, [facility] needs to be contacted.  If pt is claustro or will need pain meds, please have ordered in advance in order to facilitate exam.

## 2020-09-16 NOTE — DIABETES MGMT
GLYCEMIC CONTROL PLAN OF CARE Assessment/Recommendations: 
Blood glucose 135 mg/dl this am  
Continue basal and corrective insulin coverage as ordered Will continue inpatient monitoring. Most recent blood glucose values: 
 
 
Results for Enos Lefort (MRN [id number]) as of 9/16/2020 13:18 Ref. Range 9/15/2020 11:33 9/15/2020 16:33 9/15/2020 21:47 9/16/2020 07:56 9/16/2020 11:23 GLUCOSE,FAST - POC Latest Ref Range: 70 - 110 mg/dL 124 (H) 114 (H) 267 (H) 135 (H) 168 (H) Current A1C of 7.0 % is equivalent to average blood glucose of 154 mg/dl over the past 2-3 months. Current hospital diabetes medications:  
Lantus 5 units daily Lispro corrective insulin coverage AC&HS Previous day's insulin requirements:  
Lantus 5 units Lispro 6 units corrective insulin Home diabetes medications: 
Novolin 70/30 mix insulin 40 units BID Diet:   
Currently NPO Education:  ____Refer to Diabetes Education Record  
          _x__Education not indicated at this time Bryan Szymanski RN CDE Ext [phone]

## 2020-09-16 NOTE — PROGRESS NOTES
Physical Therapy Note: Patient screaming out into the hallway at 840 am; she is confused and agitated. Patient not appropriate for skilled PT session at this time. Re-attempted at [phone], RN Nikki requested to hold PT today. Will follow up as schedule permits.   
 
Leanne Spencer PT, DPT

## 2020-09-16 NOTE — PROGRESS NOTES
Problem: Self Care Deficits Care Plan (Adult) Goal: *Acute Goals and Plan of Care (Insert Text) Description: Occupational Therapy Goals Initiated 9/14/2020 within 7 day(s). 1.  Patient will perform bed mobility in preparation for self-care with minimal assistance/contact guard assist.  
2.  Patient will perform grooming with supervision/set-up using compensatory techniques. 3.  Patient will perform upper body dressing with supervision/set-up. 4.  Patient will perform toilet transfers with minimal assistance/contact guard assist. 
5.  Patient will perform all aspects of toileting with minimal assistance/contact guard assist. 
6.  Patient will participate in upper extremity therapeutic exercise/activities with independence for 8-10 minutes. 7.  Patient will utilize energy conservation techniques during functional activities with verbal cues. 8.  Patient will perform lower body dressing in preparation for self-care with minimal assistance/contact guard assistance Prior Level of Function: Patient was independent with self-care and functional mobility PTA. Outcome: Not Progressing Towards Goal 
  
Problem: Patient Education: Go to Patient Education Activity Goal: Patient/Family Education Outcome: Not Progressing Towards Goal 
 OCCUPATIONAL THERAPY TREATMENT Patient: Rashel Gamble (13 y.o. female) Date: 9/16/2020 Diagnosis: Hyperglycemia [R73.9] Altered mental status [R41.82] Acute metabolic encephalopathy [E82.62] Acute renal failure (ARF) (HCC) [N17.9] Leukocytosis [D72.829] Hypokalemia [E87.6] Metabolic encephalopathy [J92.57] Altered mental status Precautions: Fall, Aspiration PLOF: Patient was independent with self-care and functional mobility PTA. Chart, occupational therapy assessment, plan of care, and goals were reviewed.  
ASSESSMENT: 
Upon therapist arrival patient yelling out \"I need to pee\" with B LE's off bed. Therapist assisted RN to reposition pt properly in bed 2/2 SAHARA soft wrist restraints and agitation requiring MIN A x 2 with constant vc's for redirection. Pt educated on pursed lip breathing technique for EC/WS, pt will require reinforcement. Pt remained sidelying to R in bed with all needs left within reach. Pt maybe appropriate for 3 day trial pending behaviors. Progression toward goals: 
[]          Improving appropriately and progressing toward goals 
[]          Improving slowly and progressing toward goals [x]          Not making progress toward goals and plan of care will be adjusted PLAN: 
Patient continues to benefit from skilled intervention to address the above impairments. Continue treatment per established plan of care. Discharge Recommendations:   Rehab vs. Home with 24/7 Supervision/Assistance Further Equipment Recommendations for Discharge:  TBA pending progress SUBJECTIVE:  
Patient stated I have to pee\" OBJECTIVE DATA SUMMARY:  
Cognitive/Behavioral Status: 
Neurologic State: Agitated, Confused, Restless Orientation Level: Oriented to person Cognition: Decreased command following, Impulsive, Poor safety awareness Safety/Judgement: Decreased insight into deficits, Decreased awareness of environment Functional Mobility and Transfers for ADLs: 
 Bed Mobility: 
Rolling: Minimum assistance;Assist x2 Scooting: Minimum assistance;Assist x2(2/2 pt in soft wrist restraints) Pain: 
Pain level pre-treatment: 0/10 Pain level post-treatment: 0/10 Activity Tolerance:   
Poor Please refer to the flowsheet for vital signs taken during this treatment. After treatment:  
[]  Patient left in no apparent distress sitting up in chair 
[x]  Patient left in no apparent distress in bed 
[x]  Call bell left within reach [x]  Nursing notified 
[]  Caregiver present [x]  Bed alarm activated COMMUNICATION/EDUCATION:  
[] Role of Occupational Therapy in the acute care setting [] Home safety education was provided and the patient/caregiver indicated understanding. [] Patient/family have participated as able in working towards goals and plan of care. [] Patient/family agree to work toward stated goals and plan of care. [x] Patient understands intent and goals of therapy, but is neutral about his/her participation. [] Patient is unable to participate in goal setting and plan of care. Thank you for this referral. 
JUSTIN Shields Time Calculation: 11 mins

## 2020-09-16 NOTE — PROGRESS NOTES
Bedside and Verbal shift change report given to [address] (oncoming nurse) by Alex Juarez RN 
 (offgoing nurse). Report given with SBAR, Kardex, MAR and Recent Results.

## 2020-09-16 NOTE — PROGRESS NOTES
TRANSFER - OUT REPORT: 
 
Verbal report given to PETRONA Gandhi(name) on Phil Ritter  being transferred to [facility](unit) for urgent transfer Report consisted of patients Situation, Background, Assessment and  
Recommendations(SBAR). Information from the following report(s) SBAR, Kardex, Intake/Output, MAR, Recent Results and Cardiac Rhythm Sinus Tachy was reviewed with the receiving nurse. Lines: PICC Double Lumen 60/03/97 Left;Basilic (Active) Central Line Being Utilized Yes 09/16/20 1200 Criteria for Appropriate Use Limited/no vessel suitable for conventional peripheral access 09/16/20 1200 Site Assessment Clean, dry, & intact 09/16/20 1200 Phlebitis Assessment 0 09/16/20 1200 Infiltration Assessment 0 09/16/20 0846 Date of Last Dressing Change 09/15/20 09/16/20 1200 Dressing Status Clean, dry, & intact 09/16/20 1200 Action Taken Open ports on tubing capped 09/16/20 1200 Dressing Type Disk with Chlorhexadine gluconate (CHG); Transparent 09/16/20 1200 Hub Color/Line Status Red;Cap end changed 09/16/20 1200 Positive Blood Return (Site #1) Yes 09/16/20 1200 Hub Color/Line Status Purple;Cap end changed; Infusing 09/16/20 1200 Positive Blood Return (Site #2) Yes 09/16/20 1200 Alcohol Cap Used Yes 09/16/20 1200 Opportunity for questions and clarification was provided.

## 2020-09-16 NOTE — PROGRESS NOTES
9/16/2020 9:01 AM 
 
SSN: [id number] Subjective:   75-year-old female who was casually seen on September 11 by neurology 3 days after admission because of changes in mental status. Reportedly she was admitted and was confused, was noted to be agitated and delirious, with an initial impression of encephalopathy potentially due to a UTI or a toxic metabolic etiology, as she was getting IV lorazepam.  She was reportedly tachycardic initially and her blood pressure was elevated and although a febrile a serotonin syndrome was also in the differential and the Paxil was discontinued. She received antibiotics, her CBC has shown normalization of the white blood cell count, no obvious ongoing infectious source has been obvious, and neurology was reconsulted because of ongoing confusion and agitation. Further expanding on her history she also as per director, she cannot provide history, has a 2-week history of right visual loss. Reportedly ophthalmology has been consulted. Also reportedly an ultrasound was done and that did not show changes in the optic nerve sheath to suspect papilledema based on ultrasound. On admission the patient had nausea, vomiting, abdominal pain, and blurred vision, and there was mention that patient had right eye pain since a day prior to admission with the \"worst headache of her life\". Since I saw her yesterday she has remained confused, in restraints, trying to get out of bed and wanting to go home. Social History Socioeconomic History  Marital status: SINGLE Spouse name: Not on file  Number of children: Not on file  Years of education: Not on file  Highest education level: Not on file Occupational History  Not on file Social Needs  Financial resource strain: Not on file  Food insecurity Worry: Not on file Inability: Not on file  Transportation needs Medical: Not on file Non-medical: Not on file Tobacco Use  Smoking status: Former Smoker Packs/day: 0.20 Years: 8.00 Pack years: 1.60 Types: Cigarettes Last attempt to quit: 12/3/1995 Years since quittin.8  Smokeless tobacco: Never Used Substance and Sexual Activity  Alcohol use: No  
  Comment: Drinks 3-4xs year generally wine  Drug use: No  
 Sexual activity: Not Currently Lifestyle  Physical activity Days per week: Not on file Minutes per session: Not on file  Stress: Not on file Relationships  Social connections Talks on phone: Not on file Gets together: Not on file Attends Hoahaoism service: Not on file Active member of club or organization: Not on file Attends meetings of clubs or organizations: Not on file Relationship status: Not on file  Intimate partner violence Fear of current or ex partner: Not on file Emotionally abused: Not on file Physically abused: Not on file Forced sexual activity: Not on file Other Topics Concern   Service No  
 Blood Transfusions No  
 Caffeine Concern No  
 Occupational Exposure No  
 Hobby Hazards No  
 Sleep Concern No  
 Stress Concern No  
 Weight Concern No  
 Special Diet Yes  Back Care No  
 Exercise No  
 Bike Helmet No  
 Seat Belt Yes  Self-Exams Yes Social History Narrative Lives with 16year old son  with ex   Does not have health insurance Family History Adopted: Yes  
Problem Relation Age of Onset  Heart Failure Mother 76  
 Other Father 70  
     blood clot after surgery  Diabetes Paternal Aunt  Diabetes Paternal Uncle Current Facility-Administered Medications Medication Dose Route Frequency Provider Last Rate Last Dose  LORazepam (ATIVAN) injection 2 mg  2 mg IntraVENous ONCE Abril Rodriguez MD      
 heparin (porcine) injection 5,000 Units  5,000 Units SubCUTAneous Q8H Elizabeth Muñoz MD   5,000 Units at 09/16/20 1851  QUEtiapine (SEROquel) tablet 25 mg  25 mg Oral TID PRN Julius Hernandez MD   25 mg at 09/16/20 0054  insulin glargine (LANTUS) injection 5 Units  5 Units SubCUTAneous DAILY Julius Hernandez MD   5 Units at 09/15/20 0552  lactated Ringers infusion  100 mL/hr IntraVENous CONTINUOUS Derrell Schwartz  mL/hr at 09/15/20 2213 100 mL/hr at 09/15/20 2213  polyvinyl alcohol-povidon(PF) (REFRESH CLASSIC) 1.4-0.6 % ophthalmic solution 1 Drop  1 Drop Both Eyes PRN Candido King MD   1 Drop at 09/14/20 6392  cholecalciferol (VITAMIN D3) (1000 Units /25 mcg) tablet 1 Tab  1,000 Units Oral DAILY Elizabeth Muñoz MD   1 Tab at 09/15/20 6341  insulin lispro (HUMALOG) injection   SubCUTAneous AC&HS Dina Bustillos MD   6 Units at 09/15/20 2150  melatonin tablet 3 mg  3 mg Oral QHS Maribeth GUERRERO MD   3 mg at 09/15/20 2150  sodium chloride (NS) flush 5-10 mL  5-10 mL IntraVENous PRN Julius Hernandez MD      
 [Held by provider] insulin NPH (NOVOLIN N, HUMULIN N) injection 40 Units  40 Units SubCUTAneous ACB&D Julius Hernandez MD   Stopped at 09/09/20 0930  
 glucose chewable tablet 16 g  16 g Oral PRN Julius Hernandez MD      
 glucagon (GLUCAGEN) injection 1 mg  1 mg IntraMUSCular PRN Julius Hernandez MD      
 dextrose (D50W) injection syrg 12.5-25 g  25-50 mL IntraVENous PRN Julius Hernandez MD      
 sodium chloride (NS) flush 5-40 mL  5-40 mL IntraVENous Q8H Julius Hernandez MD   10 mL at 09/16/20 1413  sodium chloride (NS) flush 5-40 mL  5-40 mL IntraVENous PRN Julius Hernandez MD   10 mL at 09/15/20 2214  acetaminophen (TYLENOL) tablet 650 mg  650 mg Oral Q6H PRN Julius Hernandez MD   650 mg at 09/11/20 2021 Or  acetaminophen (TYLENOL) suppository 650 mg  650 mg Rectal Q6H PRN Julius Hernandez MD      
 polyethylene glycol (MIRALAX) packet 17 g  17 g Oral DAILY PRN Elizabeth Muñoz MD      
  [Held by provider] PARoxetine (PAXIL) tablet 60 mg  60 mg Oral DAILY Julius Hernandez MD      
 pantoprazole (PROTONIX) tablet 40 mg  40 mg Oral DAILY Julius Hernandez MD   40 mg at 09/15/20 3410  [Held by provider] rosuvastatin (CRESTOR) tablet 20 mg  20 mg Oral QHS Julius Hernandez MD      
 ondansetron (ZOFRAN) injection 4 mg  4 mg IntraVENous Q6H PRN Julius Hernandez MD      
 
 
Past Medical History:  
Diagnosis Date  Blind left eye  Cellulitis of great toe of right foot 10/19/2017  Diabetes ([address]  Diabetic retinopathy ([address]  Gall stones  GERD (gastroesophageal reflux disease)  Hammertoe  Hiatal hernia  History of mammogram 10/03/2017 No evidence of malignancy  Hypertension  Mass of abdomen  Neuropathy due to type 2 diabetes mellitus ([address]  Osteomyelitis of toe of right foot ([address] 10/19/2017  Pancreatitis Past Surgical History:  
Procedure Laterality Date  HX AMPUTATION Left 07/21/2017  
 left 2nd toe  HX CHOLECYSTECTOMY  10/15/2014  HX GI Benign GI Stromal Tumor excision  HX HEENT Sx for detached retina  HX MYOMECTOMY x5 removed  HX OTHER SURGICAL    
 upper endoscopy Allergies Allergen Reactions  Levaquin [Levofloxacin] Hives  Promethazine Nausea and Vomiting \"the phenergan made me more nauseated\" Vital signs:   
Visit Vitals BP (!) 162/85 Pulse (!) 115 Temp 99.6 °F (37.6 °C) Resp 11 Ht 5' 8\" (1.727 m) Wt 95.8 kg (211 lb 1.6 oz) LMP 10/06/2015 (Exact Date) SpO2 100% Breastfeeding No  
BMI 32.10 kg/m² Review of Systems:  
Unobtainable Recent Results (from the past 24 hour(s)) GLUCOSE, CSF Collection Time: 09/15/20  8:48 AM  
Result Value Ref Range Tube No. 1    
 Glucose,CSF 70 40 - 70 MG/DL  
CELL COUNT, CSF Collection Time: 09/15/20  8:48 AM  
Result Value Ref Range CSF TUBE NO. 3    
 CSF COLOR COLORLESS    
 SPUN COLOR COLORLESS CSF APPEARANCE CLEAR    
 CSF RBCS 0 0 /cu mm  
 CSF WBCS 1 <5 /cu mm PROTEIN, CSF Collection Time: 09/15/20  8:48 AM  
Result Value Ref Range Tube No. 1    
 Protein,CSF 55 (H) 15 - 45 MG/DL MENINGITIS PATHOGENS PANEL, CSF (BY PCR) Collection Time: 09/15/20  8:48 AM  
Result Value Ref Range Escherichia coli K1 Not detected NOTD Haemophilus Influenzae Not detected NOTD Listeria Monocytogenes Not detected NOTD Neisseria Meningitidis Not detected NOTD Streptococcus Agalactiae Not detected NOTD Streptococcus Pneumoniae Not detected NOTD Cytomegalovirus Not detected NOTD Enterovirus Not detected NOTD Herpes Simplex Virus 1 Not detected NOTD Herpes Simplex Virus 2 Not detected NOTD Human Herpesvirus 6 Not detected NOTD Human Parechovirus Not detected NOTD Varicella Zoster Virus Not detected NOTD Crypto. neoformans/gattii Not detected NOTD CULTURE, CSF W GRAM STAIN Collection Time: 09/15/20  8:48 AM  
 Specimen: Cerebrospinal Fluid Result Value Ref Range Special Requests: CEREBROSPINAL FLUID    
 GRAM STAIN NO WBC'S SEEN    
 GRAM STAIN NO ORGANISMS SEEN    
 GRAM STAIN Smear Reviewed by Microbiology 9/15/20 AT 1408 TA. Culture result: PENDING   
CULTURE, BLOOD Collection Time: 09/15/20 10:02 AM  
 Specimen: Blood Result Value Ref Range Special Requests: NO SPECIAL REQUESTS Culture result: NO GROWTH AFTER 20 HOURS    
CULTURE, BLOOD Collection Time: 09/15/20 10:05 AM  
 Specimen: Blood Result Value Ref Range Special Requests: NO SPECIAL REQUESTS Culture result: NO GROWTH AFTER 20 HOURS    
GLUCOSE, POC Collection Time: 09/15/20 11:33 AM  
Result Value Ref Range Glucose (POC) 124 (H) 70 - 110 mg/dL POC G3 Collection Time: 09/15/20 11:53 AM  
Result Value Ref Range Device: ROOM AIR    
 FIO2 (POC) 21 % pH (POC) 7.35 7.35 - 7.45    
 pCO2 (POC) 47.8 (H) 35.0 - 45.0 MMHG pO2 (POC) 67 (L) 80 - 100 MMHG  
 HCO3 (POC) 26.6 (H) 22 - 26 MMOL/L  
 sO2 (POC) 92 92 - 97 % Base excess (POC) 1 mmol/L Allens test (POC) YES Total resp. rate 15 Site RIGHT RADIAL Patient temp. 36.9 Specimen type (POC) ARTERIAL Performed by Jessie Gonzalez GLUCOSE, POC Collection Time: 09/15/20  4:33 PM  
Result Value Ref Range Glucose (POC) 114 (H) 70 - 110 mg/dL CK Collection Time: 09/15/20  7:53 PM  
Result Value Ref Range CK 1,559 (H) 26 - 192 U/L  
GLUCOSE, POC Collection Time: 09/15/20  9:47 PM  
Result Value Ref Range Glucose (POC) 267 (H) 70 - 110 mg/dL CBC WITH MANUAL DIFF Collection Time: 09/16/20  5:00 AM  
Result Value Ref Range WBC 13.2 4.6 - 13.2 K/uL  
 RBC 2.98 (L) 4.20 - 5.30 M/uL HGB 8.6 (L) 12.0 - 16.0 g/dL HCT 25.6 (L) 35.0 - 45.0 % MCV 85.9 74.0 - 97.0 FL  
 MCH 28.9 24.0 - 34.0 PG  
 MCHC 33.6 31.0 - 37.0 g/dL  
 RDW 14.0 11.6 - 14.5 % PLATELET 680 956 - 234 K/uL MPV 11.1 9.2 - 11.8 FL  
 DIFFERENTIAL MANUAL DIFFERENTIAL ORDERED    
 NEUTROPHILS 63 42 - 75 % BAND NEUTROPHILS 1 0 - 5 % LYMPHOCYTES 29 20 - 51 % MONOCYTES 5 2 - 9 % EOSINOPHILS 2 0 - 5 % BASOPHILS 0 0 - 3 % METAMYELOCYTES 0 0 % MYELOCYTES 0 0 % PROMYELOCYTES 0 0 % BLASTS 0 0 % OTHER CELL 0 0    
 ABS. NEUTROPHILS 8.4 (H) 1.8 - 8.0 K/UL  
 ABS. LYMPHOCYTES 3.8 (H) 0.8 - 3.5 K/UL  
 ABS. MONOCYTES 0.7 0 - 1.0 K/UL  
 ABS. EOSINOPHILS 0.3 0.0 - 0.4 K/UL  
 ABS. BASOPHILS 0.0 0.0 - 0.06 K/UL  
 DF MANUAL PLATELET COMMENTS ADEQUATE PLATELETS    
 RBC COMMENTS POLYCHROMASIA 1+ 
    
 RBC COMMENTS HYPOCHROMIA 1+ METABOLIC PANEL, COMPREHENSIVE Collection Time: 09/16/20  5:00 AM  
Result Value Ref Range Sodium 138 136 - 145 mmol/L Potassium 3.6 3.5 - 5.5 mmol/L Chloride 104 100 - 111 mmol/L  
 CO2 29 21 - 32 mmol/L Anion gap 5 3.0 - 18 mmol/L Glucose 142 (H) 74 - 99 mg/dL  BUN 7 7.0 - 18 MG/DL  
 Creatinine 0.95 0.6 - 1.3 MG/DL  
 BUN/Creatinine ratio 7 (L) 12 - 20 GFR est AA >60 >60 ml/min/1.73m2 GFR est non-AA >60 >60 ml/min/1.73m2 Calcium 8.8 8.5 - 10.1 MG/DL Bilirubin, total 1.7 (H) 0.2 - 1.0 MG/DL  
 ALT (SGPT) 54 13 - 56 U/L  
 AST (SGOT) 82 (H) 10 - 38 U/L Alk. phosphatase 94 45 - 117 U/L Protein, total 6.8 6.4 - 8.2 g/dL Albumin 3.2 (L) 3.4 - 5.0 g/dL Globulin 3.6 2.0 - 4.0 g/dL A-G Ratio 0.9 0.8 - 1.7 PHOSPHORUS Collection Time: 09/16/20  5:00 AM  
Result Value Ref Range Phosphorus 2.4 (L) 2.5 - 4.9 MG/DL MAGNESIUM Collection Time: 09/16/20  5:00 AM  
Result Value Ref Range Magnesium 2.0 1.6 - 2.6 mg/dL CK Collection Time: 09/16/20  5:00 AM  
Result Value Ref Range CK 1,268 (H) 26 - 192 U/L  
GLUCOSE, POC Collection Time: 09/16/20  7:56 AM  
Result Value Ref Range Glucose (POC) 135 (H) 70 - 110 mg/dL An MRI of the brain was reviewed, done on September 11, but this was motion degraded. There was no obvious acute changes, areas of restricted diffusion, there was signal intensity abnormality in the left lobe that appeared to be chronic and of unknown etiology. Her last chest x-ray was in September 8 and unremarkable, her last UA was also on September 8. EXAM: Alert, restless, has to be told multiple times to follow very simple commands, when I asked her where she is she says [facility] view, but they cannot tell me the date, not sure why she is here. She sits up and lays back down restlessly. Heart is regular. EOM's are full, no gaze preference, pupils are sluggishly reactive if at all, unable to visualize fundi since she is so restless no facial asymmetries. Rest of the cranial nerves could not be performed. There is no lateralizing paresis and she squeeze symmetrically and moves feet bilaterally and symmetrically with equal strength. Tone is not increased. DTRs are +2, gait was deferred. Patient had an LP under fluoroscopy yesterday, on the prone position had an opening pressure of 36 with a closing pressure of 12. Recent Results (from the past 24 hour(s)) GLUCOSE, CSF Collection Time: 09/15/20  8:48 AM  
Result Value Ref Range Tube No. 1    
 Glucose,CSF 70 40 - 70 MG/DL  
CELL COUNT, CSF Collection Time: 09/15/20  8:48 AM  
Result Value Ref Range CSF TUBE NO. 3    
 CSF COLOR COLORLESS    
 SPUN COLOR COLORLESS    
 CSF APPEARANCE CLEAR    
 CSF RBCS 0 0 /cu mm  
 CSF WBCS 1 <5 /cu mm PROTEIN, CSF Collection Time: 09/15/20  8:48 AM  
Result Value Ref Range Tube No. 1    
 Protein,CSF 55 (H) 15 - 45 MG/DL MENINGITIS PATHOGENS PANEL, CSF (BY PCR) Collection Time: 09/15/20  8:48 AM  
Result Value Ref Range Escherichia coli K1 Not detected NOTD Haemophilus Influenzae Not detected NOTD Listeria Monocytogenes Not detected NOTD Neisseria Meningitidis Not detected NOTD Streptococcus Agalactiae Not detected NOTD Streptococcus Pneumoniae Not detected NOTD Cytomegalovirus Not detected NOTD Enterovirus Not detected NOTD Herpes Simplex Virus 1 Not detected NOTD Herpes Simplex Virus 2 Not detected NOTD Human Herpesvirus 6 Not detected NOTD Human Parechovirus Not detected NOTD Varicella Zoster Virus Not detected NOTD Crypto. neoformans/gattii Not detected NOTD CULTURE, CSF W GRAM STAIN Collection Time: 09/15/20  8:48 AM  
 Specimen: Cerebrospinal Fluid Result Value Ref Range Special Requests: CEREBROSPINAL FLUID    
 GRAM STAIN NO WBC'S SEEN    
 GRAM STAIN NO ORGANISMS SEEN    
 GRAM STAIN Smear Reviewed by Microbiology 9/15/20 AT 1408 TA. Culture result: PENDING   
CULTURE, BLOOD Collection Time: 09/15/20 10:02 AM  
 Specimen: Blood Result Value Ref Range Special Requests: NO SPECIAL REQUESTS Culture result: NO GROWTH AFTER 20 HOURS    
CULTURE, BLOOD Collection Time: 09/15/20 10:05 AM  
 Specimen: Blood Result Value Ref Range Special Requests: NO SPECIAL REQUESTS Culture result: NO GROWTH AFTER 20 HOURS    
GLUCOSE, POC Collection Time: 09/15/20 11:33 AM  
Result Value Ref Range Glucose (POC) 124 (H) 70 - 110 mg/dL POC G3 Collection Time: 09/15/20 11:53 AM  
Result Value Ref Range Device: ROOM AIR    
 FIO2 (POC) 21 % pH (POC) 7.35 7.35 - 7.45    
 pCO2 (POC) 47.8 (H) 35.0 - 45.0 MMHG  
 pO2 (POC) 67 (L) 80 - 100 MMHG  
 HCO3 (POC) 26.6 (H) 22 - 26 MMOL/L  
 sO2 (POC) 92 92 - 97 % Base excess (POC) 1 mmol/L Allens test (POC) YES Total resp. rate 15 Site RIGHT RADIAL Patient temp. 36.9 Specimen type (POC) ARTERIAL Performed by Azam Thompson GLUCOSE, POC Collection Time: 09/15/20  4:33 PM  
Result Value Ref Range Glucose (POC) 114 (H) 70 - 110 mg/dL CK Collection Time: 09/15/20  7:53 PM  
Result Value Ref Range CK 1,559 (H) 26 - 192 U/L  
GLUCOSE, POC Collection Time: 09/15/20  9:47 PM  
Result Value Ref Range Glucose (POC) 267 (H) 70 - 110 mg/dL CBC WITH MANUAL DIFF Collection Time: 09/16/20  5:00 AM  
Result Value Ref Range WBC 13.2 4.6 - 13.2 K/uL  
 RBC 2.98 (L) 4.20 - 5.30 M/uL HGB 8.6 (L) 12.0 - 16.0 g/dL HCT 25.6 (L) 35.0 - 45.0 % MCV 85.9 74.0 - 97.0 FL  
 MCH 28.9 24.0 - 34.0 PG  
 MCHC 33.6 31.0 - 37.0 g/dL  
 RDW 14.0 11.6 - 14.5 % PLATELET 379 284 - 558 K/uL MPV 11.1 9.2 - 11.8 FL  
 DIFFERENTIAL MANUAL DIFFERENTIAL ORDERED    
 NEUTROPHILS 63 42 - 75 % BAND NEUTROPHILS 1 0 - 5 % LYMPHOCYTES 29 20 - 51 % MONOCYTES 5 2 - 9 % EOSINOPHILS 2 0 - 5 % BASOPHILS 0 0 - 3 % METAMYELOCYTES 0 0 % MYELOCYTES 0 0 % PROMYELOCYTES 0 0 % BLASTS 0 0 % OTHER CELL 0 0    
 ABS. NEUTROPHILS 8.4 (H) 1.8 - 8.0 K/UL  
 ABS. LYMPHOCYTES 3.8 (H) 0.8 - 3.5 K/UL  
 ABS. MONOCYTES 0.7 0 - 1.0 K/UL  
 ABS. EOSINOPHILS 0.3 0.0 - 0.4 K/UL ABS. BASOPHILS 0.0 0.0 - 0.06 K/UL  
 DF MANUAL PLATELET COMMENTS ADEQUATE PLATELETS    
 RBC COMMENTS POLYCHROMASIA 1+ 
    
 RBC COMMENTS HYPOCHROMIA 1+ METABOLIC PANEL, COMPREHENSIVE Collection Time: 09/16/20  5:00 AM  
Result Value Ref Range Sodium 138 136 - 145 mmol/L Potassium 3.6 3.5 - 5.5 mmol/L Chloride 104 100 - 111 mmol/L  
 CO2 29 21 - 32 mmol/L Anion gap 5 3.0 - 18 mmol/L Glucose 142 (H) 74 - 99 mg/dL BUN 7 7.0 - 18 MG/DL Creatinine 0.95 0.6 - 1.3 MG/DL  
 BUN/Creatinine ratio 7 (L) 12 - 20 GFR est AA >60 >60 ml/min/1.73m2 GFR est non-AA >60 >60 ml/min/1.73m2 Calcium 8.8 8.5 - 10.1 MG/DL Bilirubin, total 1.7 (H) 0.2 - 1.0 MG/DL  
 ALT (SGPT) 54 13 - 56 U/L  
 AST (SGOT) 82 (H) 10 - 38 U/L Alk. phosphatase 94 45 - 117 U/L Protein, total 6.8 6.4 - 8.2 g/dL Albumin 3.2 (L) 3.4 - 5.0 g/dL Globulin 3.6 2.0 - 4.0 g/dL A-G Ratio 0.9 0.8 - 1.7 PHOSPHORUS Collection Time: 09/16/20  5:00 AM  
Result Value Ref Range Phosphorus 2.4 (L) 2.5 - 4.9 MG/DL MAGNESIUM Collection Time: 09/16/20  5:00 AM  
Result Value Ref Range Magnesium 2.0 1.6 - 2.6 mg/dL CK Collection Time: 09/16/20  5:00 AM  
Result Value Ref Range CK 1,268 (H) 26 - 192 U/L  
GLUCOSE, POC Collection Time: 09/16/20  7:56 AM  
Result Value Ref Range Glucose (POC) 135 (H) 70 - 110 mg/dL Assessment/Plan: Encephalopathy, etiology not yet clear, but given the presenting history of headaches, right-sided visual loss, confusion, and an elevated intracranial pressure that does not appear to be coming from either a CNS infection or a subarachnoid hemorrhage we need to consider the possibility of cerebral sagittal sinus thrombosis which could have been missed by an MRI which was degraded by motion and did not include venography.   With this in mind I have ordered an MRI of the brain with and without gadolinium with MR venography after sedation with lorazepam up to 4 mg as needed. She is also hypoxic and this likely will contribute to her encephalopathy. Recommend work-up for the cause of her hypoxia. Her AST is elevated, so I recommend checking ammonia levels, although this could be a result of medications since the elevation is slight. Recommend continuing quetiapine as needed, avoiding benzodiazepines as much as possible. Will provide further recommendations after MRI brain and venography completed. 20 out of 35 minutes were spent in floor time reviewing extensive serology, imaging, notes, lumbar puncture results, and discussing the case with Dr. Jacob Sanchez. PLEASE NOTE:  
Portions of this document may have been produced using voice recognition software. Unrecognized errors in transcription may be present. This note will not be viewable in [address]

## 2020-09-16 NOTE — PROGRESS NOTES
Pt is still agitated and needs to be in restraints. Renewed them. Julius Hernandez PGY-1  
[address] Pager: [phone] September 16, 2020, 12:48 PM

## 2020-09-16 NOTE — PROGRESS NOTES
[address] [facility] Progress Note Patient: Charlotte oGod MRN: [id number] SSN: [id number]  YOB: 1965 Age: 60 y.o. Sex: female Admit Date: 9/8/2020 LOS: 8 days Chief Complaint Patient presents with  Vomiting Subjective:  
 
Pt states her name, otherwise confused. Pt still states \"please help me. \" She wants to go out for a walk ROS Pt not able to answer questions Objective:  
 
Visit Vitals BP (!) 162/85 Pulse (!) 115 Temp 99.6 °F (37.6 °C) Resp 11 Ht 5' 8\" (1.727 m) Wt 95.8 kg (211 lb 1.6 oz) LMP 10/06/2015 (Exact Date) SpO2 100% Breastfeeding No  
BMI 32.10 kg/m² Physical Exam: 
General:  AXOx 2 , Pt follows command intermittently, eyes closed , keeps trying to get out of bed HEENT: R eye erythema, no drainage,  R not reactive dilated, Left eye sluggish but react to light, moist mucous membranes.   
CV:  RRR, no murmurs. No visible pulsations or thrills.   
RESP:  Unlabored lungs clear to auscultation without adventitious breath sounds. Equal expansion bilaterally.   
ABD:  soft abdomen, non-tender,  nondistended. BS (+).   
MS:  No joint deformity or instability.  No atrophy. Neuro:  Pt no facial droop, no deviation of tongue, unable to evaluate EOM because patient does not follow commands, moves all four extremities, Tardive akathisia Lower extremity bilaterally rigid tone, negative babinski  
Ext:  R foot the first digit is amputated, Left second digit amputated, No edema.  2+ radial and dp pulses bilaterally. Skin:  No rashes, lesions, or ulcers. Intake and Output: 
Current Shift: No intake/output data recorded. Last three shifts: 09/14 1901 - 09/16 0700 In: 2311.7 [P.O.:240; I.V.:2071.7] Out: 3400 [DLDMK:0325] Lab/Data Review: 
Recent Results (from the past 12 hour(s)) GLUCOSE, POC Collection Time: 09/15/20  9:47 PM  
Result Value Ref Range Glucose (POC) 267 (H) 70 - 110 mg/dL CBC WITH MANUAL DIFF  
 Collection Time: 09/16/20  5:00 AM  
Result Value Ref Range WBC 13.2 4.6 - 13.2 K/uL  
 RBC 2.98 (L) 4.20 - 5.30 M/uL HGB 8.6 (L) 12.0 - 16.0 g/dL HCT 25.6 (L) 35.0 - 45.0 % MCV 85.9 74.0 - 97.0 FL  
 MCH 28.9 24.0 - 34.0 PG  
 MCHC 33.6 31.0 - 37.0 g/dL  
 RDW 14.0 11.6 - 14.5 % PLATELET 214 223 - 126 K/uL MPV 11.1 9.2 - 11.8 FL  
 DIFFERENTIAL MANUAL DIFFERENTIAL ORDERED    
 NEUTROPHILS 63 42 - 75 % BAND NEUTROPHILS 1 0 - 5 % LYMPHOCYTES 29 20 - 51 % MONOCYTES 5 2 - 9 % EOSINOPHILS 2 0 - 5 % BASOPHILS 0 0 - 3 % METAMYELOCYTES 0 0 % MYELOCYTES 0 0 % PROMYELOCYTES 0 0 % BLASTS 0 0 % OTHER CELL 0 0    
 ABS. NEUTROPHILS 8.4 (H) 1.8 - 8.0 K/UL  
 ABS. LYMPHOCYTES 3.8 (H) 0.8 - 3.5 K/UL  
 ABS. MONOCYTES 0.7 0 - 1.0 K/UL  
 ABS. EOSINOPHILS 0.3 0.0 - 0.4 K/UL  
 ABS. BASOPHILS 0.0 0.0 - 0.06 K/UL  
 DF MANUAL PLATELET COMMENTS ADEQUATE PLATELETS    
 RBC COMMENTS POLYCHROMASIA 1+ 
    
 RBC COMMENTS HYPOCHROMIA 1+ METABOLIC PANEL, COMPREHENSIVE Collection Time: 09/16/20  5:00 AM  
Result Value Ref Range Sodium 138 136 - 145 mmol/L Potassium 3.6 3.5 - 5.5 mmol/L Chloride 104 100 - 111 mmol/L  
 CO2 29 21 - 32 mmol/L Anion gap 5 3.0 - 18 mmol/L Glucose 142 (H) 74 - 99 mg/dL BUN 7 7.0 - 18 MG/DL Creatinine 0.95 0.6 - 1.3 MG/DL  
 BUN/Creatinine ratio 7 (L) 12 - 20 GFR est AA >60 >60 ml/min/1.73m2 GFR est non-AA >60 >60 ml/min/1.73m2 Calcium 8.8 8.5 - 10.1 MG/DL Bilirubin, total 1.7 (H) 0.2 - 1.0 MG/DL  
 ALT (SGPT) 54 13 - 56 U/L  
 AST (SGOT) 82 (H) 10 - 38 U/L Alk. phosphatase 94 45 - 117 U/L Protein, total 6.8 6.4 - 8.2 g/dL Albumin 3.2 (L) 3.4 - 5.0 g/dL Globulin 3.6 2.0 - 4.0 g/dL A-G Ratio 0.9 0.8 - 1.7 PHOSPHORUS Collection Time: 09/16/20  5:00 AM  
Result Value Ref Range Phosphorus 2.4 (L) 2.5 - 4.9 MG/DL MAGNESIUM Collection Time: 09/16/20  5:00 AM  
Result Value Ref Range Magnesium 2.0 1.6 - 2.6 mg/dL CK Collection Time: 09/16/20  5:00 AM  
Result Value Ref Range CK 1,268 (H) 26 - 192 U/L  
GLUCOSE, POC Collection Time: 09/16/20  7:56 AM  
Result Value Ref Range Glucose (POC) 135 (H) 70 - 110 mg/dL RECENT RESULTS MODALITY IMPRESSION  
XR Results from Hospital Encounter encounter on 09/08/20 XR ORBIT BI FOREIGN BODY Narrative EXAM: ORBITS LIMITED CLINICAL HISTORY/INDICATION: , blurred vision with nausea, vomiting, and 
abdominal pain, severe agitation, altered mental status, diabetic retinopathy 
due to type 2 diabetes Juan Conway mri screen. COMPARISON: None. TECHNIQUE: modified ramos view(s) of the orbits obtained. FINDINGS: 
 
There are no radiopaque metallic foreign bodies within the orbits. There is no 
aneurysm clip. The visualized  paranasal sinuses are normal. 
  
 Impression IMPRESSION: 
 
Negative orbital screening prior to MRI. CT Results from Hospital Encounter encounter on 09/08/20 CT CHEST ABD PELV W CONT Narrative CT chest, abdomen and pelvis with contrast  
 
CLINICAL HISTORY: Leukocytosis. CT evaluation of potential infection versus 
abscess COMPARISON: None. TECHNIQUE: Helical scan to the chest, abdomen and pelvis are obtained from the 
thoracic inlet to the symphysis pubis after IV contrast administration. All CT scans at this facility performed using dose optimization techniques as 
appreciated to a performed exam, to include automated exposure control, 
adjustment of the mA and or KU according to patient size (including appropriate 
matching for site specific examination), or use of iterative reconstruction 
technique. FINDINGS: Moderate motion artifact noted and limits the detail evaluation. CT CHEST:  
 
Lung Parenchyma: Minimal bibasilar atelectasis. No acute airspace disease or 
consolidation. Thyroid: Small hypodense nodule in the right lobe measuring about 5 mm. Mediastinum: No adenopathy. Mild and circumferential esophageal wall prominence 
with collapsed the lumen. Heart: The heart and the pericardium appear normal. 
 
Vasculature: The aorta and the great vessels are unremarkable. Pleural Space And Chest Wall: No significant pleural pathology. CT ABDOMEN AND PELVIS: 
 
Liver: Normal. 
 
Gallbladder: Surgically absent. Biliary System: No ductal dilatation. Spleen: Normal. 
 
Pancreas: Normal. 
 
Adrenal Glands: Normal. 
 
Kidneys: Normal. 
 
Bowel: The small and large bowel are nondilated. Normal appendix. Fluid-filled 
sigmoid colon and rectum with air-fluid level without dilatation. Lower genitourinary system: The bladder is markedly distended up to the level 
above the umbilicus, 76.8 cm in cc dimension. No definite bladder wall lesion 
seen. The uterus appears spherical and heterogeneous with several partially 
calcified fibroids present. No adnexal mass. Peritoneum/Retroperitoneum: No adenopathy. Vasculature: Unremarkable for age. Other: No free fluid. CT OSSEOUS STRUCTURES:   
 
Unremarkable for age. Impression IMPRESSION:  
 
1. Marked bladder distention up to the level above the umbilicus. Recommend 
clinical correlation for urinary outlet obstruction. 2. No CT evidence of infection or inflammatory process. No abscess seen. 3. Tiny hypodense nodule in right thyroid lobe. This can be better evaluated by 
thyroid ultrasound. 4. Mild and circumferential esophageal wall prominence with collapsed the lumen. If symptomatic, barium swallow can be performed for better evaluation. 5. Fluid-filled nondilated distal colon and rectum as nonspecific finding and 
could represent diarrhea. 6. Multiple partially calcified fibroids. Thank you for this referral.   
  
MRI Results from Hospital Encounter encounter on 09/11/20 MRI BRAIN WO CONT  Narrative Description:  MRI brain without contrast 
 
 TECHNIQUE: Multiplanar, multisequence MR imaging of the brain without contrast 
 
Clinical Indication:  Altered mental status. Comparison: September 9, 2020. Findings: Motion degraded exam. 
 
Midline structures of the brain to include the corpus callosum, pituitary gland 
and clivus demonstrate normal morphology and signal intensity. The craniocervical junction appears normal. 
There is normal signal intensity within the superior sagittal sinus. There is a focal narrowing at the right occipital horn, presumed congenital 
variant. Otherwise, the brain demonstrates normal morphology and signal 
intensity throughout. There are no areas of restricted diffusion. There are normal flow voids at the base of the brain. Trace right mastoid effusion. Left mastoids are clear. Paranasal sinuses are 
clear. There is abnormal signal intensity within the left globe. This appears to be 
chronic. Impression Impression: Motion degraded exam. No acute intracranial findings. Abnormal appearance of the left globe, chronic and stable. Clinical correlation 
needed. ULTRASOUND Results from Hospital Encounter encounter on 09/08/20 US ABD LTD Impression IMPRESSION:    
 
1. Status post cholecystectomy. 2.  No significant common bile duct dilation. 3.  Increased hepatic parenchymal echogenicity with somewhat heterogeneous 
appearance, potentially suggestive of hepatosteatosis. Partial visualization of 
the liver. Results from [facility] [place] Encounter encounter on 01/22/19 US ABD LTD Impression IMPRESSION: 
 
Mild heterogeneous echogenicity of the liver is nonspecific. This is commonly 
seen with steatosis hepatic disease. Limited visualization of the pancreas. Cardiology Procedures/Testing: MODALITY RESULTS  
EKG Results for orders placed or performed during the hospital encounter of 09/08/20 EKG, 12 LEAD, INITIAL Result Value Ref Range Ventricular Rate 132 BPM  
 Atrial Rate 132 BPM  
 P-R Interval 158 ms QRS Duration 72 ms Q-T Interval 274 ms QTC Calculation (Bezet) 405 ms Calculated P Axis 43 degrees Calculated R Axis 33 degrees Calculated T Axis 148 degrees Diagnosis Sinus tachycardia Nonspecific T wave abnormality Abnormal ECG When compared with ECG of 08-SEP-2020 15:04, No significant change was found Confirmed by Vi Berry MD, Dung Lacy ([id number]) on 9/9/2020 7:48:30 AM 
  
Results for orders placed or performed in visit on 06/20/14 AMB POC EKG ROUTINE W/ 12 LEADS, INTER & REP Impression See progress note. ECHO 01/03/19 ECHO ADULT COMPLETE 01/04/2019 1/4/2019 Narrative · Estimated left ventricular ejection fraction is 61 - 65%. Left  
ventricular mild concentric hypertrophy. Mild (grade 1) left ventricular  
diastolic dysfunction. · Mild tricuspid valve regurgitation is present. Signed by: Lawanda Sterling MD  
  
 
Special Testing/Procedures: MODALITY RESULTS MICRO All Micro Results Procedure Component Value Units Date/Time CULTURE, BLOOD [[id number]] Collected:  09/15/20 1002 Order Status:  Completed Specimen:  Blood Updated:  09/16/20 0754 Special Requests: NO SPECIAL REQUESTS Culture result: NO GROWTH AFTER 20 HOURS     
 CULTURE, BLOOD [[id number]] Collected:  09/15/20 1005 Order Status:  Completed Specimen:  Blood Updated:  09/16/20 0754 Special Requests: NO SPECIAL REQUESTS Culture result: NO GROWTH AFTER 20 HOURS     
 CULTURE, URINE [[id number]] Collected:  09/15/20 1850 Order Status:  Completed Specimen:  Urine from Clean catch Updated:  09/15/20 1933 MENINGITIS PATHOGENS PANEL, CSF (BY PCR) [[id number]] Collected:  09/15/20 0848 Order Status:  Completed Specimen:  Cerebrospinal Fluid Updated:  09/15/20 1510 Escherichia coli K1 Not detected Haemophilus Influenzae Not detected Listeria Monocytogenes Not detected Neisseria Meningitidis Not detected Streptococcus Agalactiae Not detected Streptococcus Pneumoniae Not detected Cytomegalovirus Not detected Enterovirus Not detected Herpes Simplex Virus 1 Not detected Comment: In patients who have negative herpes simplex 1 and 2 PCR results, do not modify treatment, confirm with alternate testing. Herpes Simplex Virus 2 Not detected Comment: In patients who have negative herpes simplex 1 and 2 PCR results, do not modify treatment, confirm with alternate testing. Human Herpesvirus 6 Not detected Human Parechovirus Not detected Varicella Zoster Virus Not detected Crypto. neoformans/gattii Not detected CULTURE, CSF Florecita Guevara [[id number]] Collected:  09/15/20 0848 Order Status:  Completed Specimen:  Cerebrospinal Fluid Updated:  09/15/20 1409 Special Requests: CEREBROSPINAL FLUID     
  GRAM STAIN NO WBC'S SEEN     
   NO ORGANISMS SEEN Smear Reviewed by Microbiology 9/15/20 AT 1408 TA. Culture result: PENDING  
 CULTURE, BLOOD [[id number]] Collected:  09/08/20 1640 Order Status:  Completed Specimen:  Blood Updated:  09/14/20 4322 Special Requests: NO SPECIAL REQUESTS Culture result: NO GROWTH 6 DAYS     
 CULTURE, BLOOD [[id number]] Collected:  09/08/20 1640 Order Status:  Completed Specimen:  Blood Updated:  09/14/20 6983 Special Requests: NO SPECIAL REQUESTS Culture result: NO GROWTH 6 DAYS     
 CULTURE, URINE [[id number]] Collected:  09/08/20 1211 Order Status:  Completed Specimen:  Urine from Clean catch Updated:  09/09/20 1811 Special Requests: NO SPECIAL REQUESTS [place] Count --     
  >100,000 COLONIES/mL Culture result:    
  MIXED UROGENITAL AUDRA ISOLATED  
     
  
  
UA No results found for this or any previous visit. PATH none Telemetry NONE Oxygen NONE Assessment and Plan: 60 y.o. 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   
  
Metabolic Encephalopathy of unclear etiology? Likely multifactorial- improving Came with a CWW 181-879 BG, She was treated with IV fluids . She had a leucocytosis, and lactic acidosis that improved with IV antibiotics unclear source UTI? leucocytosis initially, So far blood culture on 9/8 was no growth. Pt's UA only wbc 6-8, 2+ bacteria, LE neg/Nitrite neg.  Also, urine culture >100, 000 colony mixed urogenital kyle. Her  9/9 CT Chest/AB/Pelvis showed bladder with mild wall thickening cystitis unlikely UTI.   Hypernatremia (151--> 150--> 141) resolved. Pt was negative UDS and ETOH serum (9/8) On  9/8 CT head showed mild prominence: mild enlargement of lateral ventricles, cerebral atrophy Labs negative for Negative troponin, EKG no ST elevation or depression, lipase normal, ammonia nml, B12 and folate nmlm,  
- s/p empirical  abx Ceftriaxone  (9/8-/9) 
- s/p empiric abx  vancomycin (9/8-9/14)  and zoysn (9/9-9/14 ) - MRI head 9/11 - no acute intracranial finding  
- HIV negative, RPR negative, RPR negative - ESR 32 and CRP 3.7  elevated 
- EEG- encephalopathy Plan: - MRI Brain w w/o  And MRV brain w wo- r/o cerebral sagittal sinus thrombosis, will need sedation due to agitation 
- re-culture Urine culture, blood culture, LP  
- B1, GRACE, smooth muscle,  covid pending  
- concern serotonin syndrome- hold meds metoclopramide and paroxetine - appreciated neurology recs- avoid antipsychotics per neuro, prn benzo for anxiety and restlessness,  
 
  
 Agitation - better controlled Wax and waning 
- restrains, role belt 
- avoid antipsychotics - prn Benzo for anxiety and restlessness Urinary retention s/p garcia  W/ trauma Has some hematuria will continue to monitor Hg last 8.8  
 
  
Tardive akathisia- restlessness 
- initially also had dystonia, EPS symptoms  
- likely related with chronic metoclopramide use  
- started today benztropine 1mg BID daily Normocytic anemia (hg 9-10)  
 B12 and folate normal 
Plan: FOBT  
  
CRYSTAL CR 3.56 ( baseline Cr 1.09 1/2019) in setting of CKD stage 2  likely pre-julian and rhabdo (CK increasing again) Iron profile normal 
CK 1K Myoglobin 1,092 postive CK has down trended to 1K from 2K .8 elevated and vitamin D on low side of normal corrected Ca normal , Vitamin D nml , P nm  
 microalb/cr ratio- wnml 
  
Plan:   
Cont IVF  
Trend CK Nephrology following appreciate recs- calcitril 0.25mcg on discharge 
  
  
R eye pain + Right eye vision loss X 2 weeks Unlikely papilledema based on bedside ultrasound ( no enlarged optic sheath seen), likely floater vs retinal detachment vs pink eye vs acute angle gluacoma Plan:  
- spoke to optho on 9/10, Dr. Derrick Acosta stated that he will come to see patient and will make recommendations 
  
Hypokalemia (resolved) 
electrolyte imbalance (hypokalemia, corrected Ca wnml )  
- replete electrolyte as needed, Mg, Phos 
  
Hyperbilirubinemia (3.9) w/ 0.6 direct likely majority is unconjugated  likely secondary to [place] syndrome, previously elevated bili unlikely hemolysis CT AB/US no gall bladder disease appreciated , haptoglobin normal, aldolase pending  
  
  
 Diabetes ( last A1c 7.2) (-245)  neuropathy and L retinopathy - not well controlled 
- held NPH 40U BID (hold home NPH 50 U BID) -given lantus 5 U daily  
 HTN 
- not on meds 
  
 HLD 
- cont rosuvastatin  
  
Gastroparesis  
- held metoclopramide 5 mg  
  
GERD 
- cont  omeprazole 40 mg delayed capsule 
  
  
Anxiety 
- held  paroxetine 60 mg daily 
  
Diet Advance as tolerated per speech DVT Prophylaxis heparin GI Prophylaxis Omperazole Code status Full code Disposition unknown  
  
 [facility] [place] [employer] [place] Relationship: 
[phone] Julius Hernandez PGY-1  
[address] Pager: [phone] September 16, 2020, 7:48 AM

## 2020-09-16 NOTE — PROGRESS NOTES
Progress Note 58y F with PMH DM type 2, HTN CKD presented with HHS following for CRYSTAL Subjective Overnight event noted Remain confused IMPRESSION:  
Acute kidney injury, back to baseline CKD, h/o CRYSTAL in past, last available creatinine is at 1.9mg/dl last yea Ketoacidosis, resolved Hypokalemia ,resolved 
rhabdomylysis ,mild DM type 2 Altered mental status, Diastolic heart failure, grade 1 PLAN:  
Her renal function is back to baseline 
cpk is elevated. ? Etiology. statins stopped Urinary retention,placed garcia cath yesterday,start flomax Hematuria,? Pulling on garcia cath,needs irrigation . suggest urology consult Current Facility-Administered Medications Medication Dose Route Frequency  [START ON 9/17/2020] tamsulosin (FLOMAX) capsule 0.4 mg  0.4 mg Oral DAILY  heparin (porcine) injection 5,000 Units  5,000 Units SubCUTAneous Q8H  
 QUEtiapine (SEROquel) tablet 25 mg  25 mg Oral TID PRN  
 insulin glargine (LANTUS) injection 5 Units  5 Units SubCUTAneous DAILY  lactated Ringers infusion  100 mL/hr IntraVENous CONTINUOUS  
 polyvinyl alcohol-povidon(PF) (REFRESH CLASSIC) 1.4-0.6 % ophthalmic solution 1 Drop  1 Drop Both Eyes PRN  cholecalciferol (VITAMIN D3) (1000 Units /25 mcg) tablet 1 Tab  1,000 Units Oral DAILY  insulin lispro (HUMALOG) injection   SubCUTAneous AC&HS  
 melatonin tablet 3 mg  3 mg Oral QHS  sodium chloride (NS) flush 5-10 mL  5-10 mL IntraVENous PRN  
 [Held by provider] insulin NPH (NOVOLIN N, HUMULIN N) injection 40 Units  40 Units SubCUTAneous ACB&D  
 glucose chewable tablet 16 g  16 g Oral PRN  
 glucagon (GLUCAGEN) injection 1 mg  1 mg IntraMUSCular PRN  
 dextrose (D50W) injection syrg 12.5-25 g  25-50 mL IntraVENous PRN  
 sodium chloride (NS) flush 5-40 mL  5-40 mL IntraVENous Q8H  
 sodium chloride (NS) flush 5-40 mL  5-40 mL IntraVENous PRN  
 acetaminophen (TYLENOL) tablet 650 mg  650 mg Oral Q6H PRN  Or  
  acetaminophen (TYLENOL) suppository 650 mg  650 mg Rectal Q6H PRN  polyethylene glycol (MIRALAX) packet 17 g  17 g Oral DAILY PRN  
 [Held by provider] PARoxetine (PAXIL) tablet 60 mg  60 mg Oral DAILY  pantoprazole (PROTONIX) tablet 40 mg  40 mg Oral DAILY  [Held by provider] rosuvastatin (CRESTOR) tablet 20 mg  20 mg Oral QHS  ondansetron (ZOFRAN) injection 4 mg  4 mg IntraVENous Q6H PRN Review of Systems: As above Data Review: 
 
Labs: Results:  
   
Chemistry Recent Labs  
  09/16/20 
0500 09/15/20 
0618 09/14/20 
1000 09/14/20 
2278 * 104*  --  225*  141  --  139  
K 3.6 3.8 3.6 3.2*  
 107  --  105 CO2 29 28  --  29 BUN 7 7  --  10  
CREA 0.95 0.94  --  1.39* CA 8.8 8.9  --  9.0 AGAP 5 6  --  5  
BUCR 7* 7*  --  7* AP 94 93  --  108 TP 6.8 6.5  --  7.3 ALB 3.2* 3.3*  --  3.4 GLOB 3.6 3.2  --  3.9 AGRAT 0.9 1.0  --  0.9 CBC w/Diff Recent Labs  
  09/16/20 
0500 09/15/20 
0618 09/14/20 
5988 WBC 13.2 13.1 11.4 RBC 2.98* 3.17* 3.44* HGB 8.6* 9.2* 9.9*  
HCT 25.6* 27.2* 29.5*  
 237 251 GRANS 63 60 59 LYMPH 29 33 34 EOS 2 2 2 Coagulation Recent Labs  
  09/14/20 
1740 PTP 13.2 INR 1.0 Iron/Ferritin No results for input(s): IRON in the last 72 hours. No lab exists for component: TIBCCALC BNP No results for input(s): BNPP in the last 72 hours. Cardiac Enzymes Recent Labs  
  09/16/20 
0500 09/15/20 
1953 CPK 1,268* 1,559* Liver Enzymes Recent Labs  
  09/16/20 
0500 TP 6.8 ALB 3.2* AP 94 Thyroid Studies Lab Results Component Value Date/Time TSH 0.57 09/08/2020 04:40 PM  
    
 EKG: sinus Physical Assessment:  
 
Visit Vitals /83 Pulse (!) 128 Temp 99.5 °F (37.5 °C) Resp 18 Ht 5' 8\" (1.727 m) Wt 95.8 kg (211 lb 1.6 oz) SpO2 100% Breastfeeding No  
BMI 32.10 kg/m² Weight change: 0.998 kg (2 lb 3.2 oz) Intake/Output Summary (Last 24 hours) at 9/16/2020 1342 Last data filed at 9/16/2020 1028 Gross per 24 hour Intake 2261.67 ml Output 3550 ml Net -1288.33 ml Physical Exam:  
General: no respi distress HEENT sclera anicteric, supple neck, no thyromegaly CVS: S1S2 heard,  no rub RS: + air entry b/l, Abd: Soft, Non tender Neuro: lethargic Extrm: No edema, no cyanosis, clubbing Skin: no visible  Rash Procedures/imaging: see electronic medical records for all procedures, Xrays and details which were not copied into this note but were reviewed prior to creation of Plan Yogesh Crump MD 
September 16, 2020 [facility] [phone]

## 2020-09-16 NOTE — PROGRESS NOTES
Bedside and Verbal shift change report given to PETRONA Jordan (oncoming nurse) by Bigg Cuevas RN (offgoing nurse). Report included the following information SBAR, Kardex, Intake/Output, MAR, Recent Results and Cardiac Rhythm Sinus Tachy. Pt has been agitated and pulling at her garcia catheter throughout the night, per nightshift RN. UOP is blood-tinged. MD aware.

## 2020-09-16 NOTE — PROGRESS NOTES
Problem: Diabetes Self-Management Goal: *Disease process and treatment process Description: Define diabetes and identify own type of diabetes; list 3 options for treating diabetes. Outcome: Progressing Towards Goal 
Goal: *Incorporating nutritional management into lifestyle Description: Describe effect of type, amount and timing of food on blood glucose; list 3 methods for planning meals. Outcome: Progressing Towards Goal 
Goal: *Incorporating physical activity into lifestyle Description: State effect of exercise on blood glucose levels. Outcome: Progressing Towards Goal 
Goal: *Developing strategies to promote health/change behavior Description: Define the ABC's of diabetes; identify appropriate screenings, schedule and personal plan for screenings. Outcome: Progressing Towards Goal 
Goal: *Using medications safely Description: State effect of diabetes medications on diabetes; name diabetes medication taking, action and side effects. Outcome: Progressing Towards Goal 
Goal: *Monitoring blood glucose, interpreting and using results Description: Identify recommended blood glucose targets  and personal targets. Outcome: Progressing Towards Goal 
Goal: *Prevention, detection, treatment of acute complications Description: List symptoms of hyper- and hypoglycemia; describe how to treat low blood sugar and actions for lowering  high blood glucose level. Outcome: Progressing Towards Goal 
Goal: *Prevention, detection and treatment of chronic complications Description: Define the natural course of diabetes and describe the relationship of blood glucose levels to long term complications of diabetes. Outcome: Progressing Towards Goal 
Goal: *Developing strategies to address psychosocial issues Description: Describe feelings about living with diabetes; identify support needed and support network Outcome: Progressing Towards Goal 
Goal: *Insulin pump training Outcome: Progressing Towards Goal 
 Goal: *Sick day guidelines Outcome: Progressing Towards Goal 
Goal: *Patient Specific Goal (EDIT GOAL, INSERT TEXT) Outcome: Progressing Towards Goal 
  
Problem: Patient Education: Go to Patient Education Activity Goal: Patient/Family Education Outcome: Progressing Towards Goal 
  
Problem: Non-Violent Restraints Goal: *Removal from restraints as soon as assessed to be safe Outcome: Progressing Towards Goal 
Goal: *No harm/injury to patient while restraints in use Outcome: Progressing Towards Goal 
Goal: *Patient's dignity will be maintained Outcome: Progressing Towards Goal 
Goal: *Patient Specific Goal (EDIT GOAL, INSERT TEXT) Outcome: Progressing Towards Goal 
Goal: Non-violent Restaints:Standard Interventions Outcome: Progressing Towards Goal 
Goal: Non-violent Restraints:Patient Interventions Outcome: Progressing Towards Goal 
Goal: Patient/Family Education Outcome: Progressing Towards Goal 
  
Problem: Falls - Risk of 
Goal: *Absence of Falls Description: Document Orlin Denise Fall Risk and appropriate interventions in the flowsheet. Outcome: Progressing Towards Goal 
Note: Fall Risk Interventions: 
Mobility Interventions: Bed/chair exit alarm Mentation Interventions: Adequate sleep, hydration, pain control Medication Interventions: Bed/chair exit alarm, Patient to call before getting OOB Elimination Interventions: Bed/chair exit alarm, Call light in reach Problem: Patient Education: Go to Patient Education Activity Goal: Patient/Family Education Outcome: Progressing Towards Goal 
  
Problem: Pressure Injury - Risk of 
Goal: *Prevention of pressure injury Description: Document Vasile Scale and appropriate interventions in the flowsheet. Outcome: Progressing Towards Goal 
Note: Pressure Injury Interventions: 
Sensory Interventions: Assess changes in LOC Moisture Interventions: Absorbent underpads, Apply protective barrier, creams and emollients Activity Interventions: Pressure redistribution bed/mattress(bed type) Mobility Interventions: Pressure redistribution bed/mattress (bed type) Nutrition Interventions: Document food/fluid/supplement intake Friction and Shear Interventions: Apply protective barrier, creams and emollients Problem: Patient Education: Go to Patient Education Activity Goal: Patient/Family Education Outcome: Progressing Towards Goal 
  
Problem: Patient Education: Go to Patient Education Activity Goal: Patient/Family Education Outcome: Progressing Towards Goal 
  
Problem: Nutrition Deficit Goal: *Optimize nutritional status Outcome: Progressing Towards Goal 
  
Problem: Patient Education: Go to Patient Education Activity Goal: Patient/Family Education Outcome: Progressing Towards Goal 
  
Problem: Patient Education: Go to Patient Education Activity Goal: Patient/Family Education Outcome: Progressing Towards Goal 
  
Problem: Risk for Spread of Infection Goal: Prevent transmission of infectious organism to others Description: Prevent the transmission of infectious organisms to other patients, staff members, and visitors. Outcome: Progressing Towards Goal 
  
Problem: Patient Education:  Go to Education Activity Goal: Patient/Family Education Outcome: Progressing Towards Goal

## 2020-09-16 NOTE — PROGRESS NOTES
RN called PFM in regards to MRI ordered. Pt is agitated and restless post-seroquel administration. RN called in regards to possibly a sedated MRI. Resident to call anesthesia and MRI.

## 2020-09-16 NOTE — PROGRESS NOTES
[facility] asked to screen for LTC medicaid. Patient remains confused/hallucinating/restrained/sedated. Will be difficult to place until this subsides. RICHARD Gonzáles Case Management [phone]

## 2020-09-17 NOTE — PROGRESS NOTES
PT withheld 2/2 pt attempting OOB in restraints, oriented to self only and not following commands, is now off the floor. Pt not appropriate for therapy at this time. Will continue to follow.  Alison Larsen, PT, DPT

## 2020-09-17 NOTE — PROGRESS NOTES
SLP Note: Pt currently NPO for testing later this date. Will hold dysphagia management and continue to follow per POC. Sofiya Jackson M.S., CCC-SLP Speech-Language Pathologist

## 2020-09-17 NOTE — CONSULTS
1. Hyperglycemia 2. Altered mental status, unspecified altered mental status type 3. Acute metabolic encephalopathy 4. Acute renal failure, unspecified acute renal failure type ([address] 5. Leukocytosis, unspecified type 6. Hypokalemia 7. Dehydration 8. Sepsis secondary to UTI ([address] 9. Lactic acid acidosis ASSESSMENT: Heidi Breen is a 60 y.o. female: 1. Multiple issues resolved acute renal injury 2. Confused and agitated 3. Mari trauma Patient's chart reviewed and laboratory tests and imaging reviewed. Creatinine back to baseline Urine culture on 9/8/2020 as greater than 100,000 colonies likely colonized Urine culture 915 and 916 pending blood culture negative. PLAN: 
1. Discontinue Mari when no longer indicated. Consider pure wick vs diaper 2. No acute urological intervention needed at this time Can follow-up as outpatient Nayeli Marr MD 
 
Office: [phone] Pager: [phone] September 16, 2020 Chief Complaint Patient presents with  Vomiting HISTORY OF PRESENT ILLNESS:  Heidi Breen is a 60 y.o. female who is seen in consultation as referred by [facility]  for possible catheter trauma Patient has multiple medical issues and is currently confused and agitated. Mari catheter was placed yesterday. Acute kidney injury on earlier admission is now resolved. Urine culture is testing There was reports of Mari being pulled by patient when she is agitated and confused. Initial gross hematuria which is now resolved and draining. She has over 3 L of urine in the bag today. With no reports of fevers or chills. No flowsheet data found. Past Medical History:  
Diagnosis Date  Blind left eye  Cellulitis of great toe of right foot 10/19/2017  Diabetes ([address]  Diabetic retinopathy ([address]  Gall stones  GERD (gastroesophageal reflux disease)  [place]  Hiatal hernia  History of mammogram 10/03/2017 No evidence of malignancy  Hypertension  Mass of abdomen  Neuropathy due to type 2 diabetes mellitus [address]  Osteomyelitis of toe of right foot ([facility] 10/19/2017  Pancreatitis Past Surgical History:  
Procedure Laterality Date  HX AMPUTATION Left 2017  
 left 2nd toe  HX CHOLECYSTECTOMY  10/15/2014  HX GI Benign GI Stromal Tumor excision  HX HEENT Sx for detached retina  HX MYOMECTOMY x5 removed  HX OTHER SURGICAL    
 upper endoscopy Social History Tobacco Use  Smoking status: Former Smoker Packs/day: 0.20 Years: 8.00 Pack years: 1.60 Types: Cigarettes Last attempt to quit: 12/3/1995 Years since quittin.8  Smokeless tobacco: Never Used Substance Use Topics  Alcohol use: No  
  Comment: Drinks 3-4xs year generally wine  Drug use: No  
 
 
Allergies Allergen Reactions  Levaquin [Levofloxacin] Hives  Promethazine Nausea and Vomiting \"the phenergan made me more nauseated\" Family History Adopted: Yes  
Problem Relation Age of Onset  Heart Failure Mother 76  
 Other Father 70  
     blood clot after surgery  Diabetes Paternal Aunt  Diabetes Paternal Uncle Current Facility-Administered Medications Medication Dose Route Frequency Provider Last Rate Last Dose  [START ON 2020] tamsulosin (FLOMAX) capsule 0.4 mg  0.4 mg Oral DAILY Derrell Schwartz MD      
 QUEtiapine (SEROquel) tablet 50 mg  50 mg Oral QHS Julius Hernandez MD   50 mg at 20 2153  ziprasidone (GEODON) 10 mg in sterile water (preservative free) 0.5 mL injection  10 mg IntraMUSCular Q12H PRN Julius Hernandez MD      
 heparin (porcine) injection 5,000 Units  5,000 Units SubCUTAneous Q8H Julius Hernandez MD   5,000 Units at 20 1746  
 insulin glargine (LANTUS) injection 5 Units  5 Units SubCUTAneous DAILY Elizabeth Muñoz MD   5 Units at 09/16/20 0900  
 lactated Ringers infusion  100 mL/hr IntraVENous CONTINUOUS Derrell Schwartz  mL/hr at 09/16/20 1900 100 mL/hr at 09/16/20 1900  polyvinyl alcohol-povidon(PF) (REFRESH CLASSIC) 1.4-0.6 % ophthalmic solution 1 Drop  1 Drop Both Eyes PRN Candido King MD   1 Drop at 09/14/20 9494  cholecalciferol (VITAMIN D3) (1000 Units /25 mcg) tablet 1 Tab  1,000 Units Oral DAILY Julius Hernandez MD   1 Tab at 09/16/20 0826  
 insulin lispro (HUMALOG) injection   SubCUTAneous AC&HS Dina Bustillos MD   2 Units at 09/16/20 2150  melatonin tablet 3 mg  3 mg Oral QHS Maribeth GUERRERO MD   3 mg at 09/16/20 2145  sodium chloride (NS) flush 5-10 mL  5-10 mL IntraVENous PRN Julius Hernandez MD      
 [Held by provider] insulin NPH (NOVOLIN N, HUMULIN N) injection 40 Units  40 Units SubCUTAneous ACB&D Julius Hernandez MD   Stopped at 09/09/20 0930  
 glucose chewable tablet 16 g  16 g Oral PRN Julius Hernandez MD      
 glucagon (GLUCAGEN) injection 1 mg  1 mg IntraMUSCular PRN Julius Hernandez MD      
 dextrose (D50W) injection syrg 12.5-25 g  25-50 mL IntraVENous PRN Julius Hernandez MD      
 sodium chloride (NS) flush 5-40 mL  5-40 mL IntraVENous Q8H Julius Hernandez MD   10 mL at 09/16/20 2146  sodium chloride (NS) flush 5-40 mL  5-40 mL IntraVENous PRN Julius Hernandez MD   10 mL at 09/15/20 2214  acetaminophen (TYLENOL) tablet 650 mg  650 mg Oral Q6H PRN Julius Hernandez MD   650 mg at 09/11/20 2021 Or  acetaminophen (TYLENOL) suppository 650 mg  650 mg Rectal Q6H PRN Julius Hernandez MD      
 polyethylene glycol (MIRALAX) packet 17 g  17 g Oral DAILY PRN Julius Hernandez MD      
 [Held by provider] PARoxetine (PAXIL) tablet 60 mg  60 mg Oral DAILY Julius Hernandez MD      
 pantoprazole (PROTONIX) tablet 40 mg  40 mg Oral DAILY Julius Hernandez MD   40 mg at 09/16/20 0826  
 [Held by provider] rosuvastatin (CRESTOR) tablet 20 mg  20 mg Oral QHS Zeinab Pelayo MD      
 ondansetron (ZOFRAN) injection 4 mg  4 mg IntraVENous Q6H PRN Julius Hernandez MD      
 
 
Review of Systems Not obtainable due to patient factors agitated and confused Negative for: Ophthalmologic issues, ENT issues, Cardiovascular issues, respiratory issues, GI issues, neurologic issues, hematoogic issues, skin lesions, musculoskeletal issues, psychiatric issues** PHYSICAL EXAMINATION:  
Visit Vitals BP (!) 139/94 (BP 1 Location: Right arm, BP Patient Position: At rest) Pulse (!) 106 Temp 98 °F (36.7 °C) Resp 19 Ht 5' 8\" (1.727 m) Wt 211 lb 1.6 oz (95.8 kg) LMP 10/06/2015 (Exact Date) SpO2 100% Breastfeeding No  
BMI 32.10 kg/m² Constitutional: Well developed, agitated and confused. No acute distress. With self-restraint HEENT: Normocephalic, CV:  RRR Respiratory: No respiratory distress or difficulties breathing Abdomen:  No abdominal masses or tenderness.  Female:  CVA tenderness:none Mari: clear Skin: No evidence of jaundice. Normal color Neuro/Psych:  Alert and confused. Lymphatic:   No enlarged inguinal lymph nodes. REVIEW OF LABS AND IMAGING:  All labs and images reviewed Labs: Results:  
Chemistry Recent Labs  
  09/16/20 
0500 09/15/20 
0618 09/14/20 
1000 09/14/20 
0622 * 104*  --  225*  141  --  139  
K 3.6 3.8 3.6 3.2*  
 107  --  105 CO2 29 28  --  29 BUN 7 7  --  10  
CREA 0.95 0.94  --  1.39* CA 8.8 8.9  --  9.0 AGAP 5 6  --  5  
BUCR 7* 7*  --  7* AP 94 93  --  108 TP 6.8 6.5  --  7.3 ALB 3.2* 3.3*  --  3.4 GLOB 3.6 3.2  --  3.9 AGRAT 0.9 1.0  --  0.9 CBC w/Diff Recent Labs  
  09/16/20 
0500 09/15/20 
0618 09/14/20 
0506 WBC 13.2 13.1 11.4 RBC 2.98* 3.17* 3.44* HGB 8.6* 9.2* 9.9*  
HCT 25.6* 27.2* 29.5*  
 237 251 GRANS 63 60 59 LYMPH 29 33 34 EOS 2 2 2 Cultures Recent Labs  
  09/15/20 
1005 09/15/20 1002 09/15/20 
0848 CULT NO GROWTH AFTER 20 HOURS NO GROWTH AFTER 20 HOURS NO GROWTH AFTER 21 HOURS All Micro Results Procedure Component Value Units Date/Time Junior Schmitz [[id number]] Collected:  09/15/20 1850 Order Status:  Completed Specimen:  Urine from Clean catch Updated:  09/16/20 1403 CULTURE, CSF Florecita Nipper [[id number]] Collected:  09/15/20 0848 Order Status:  Completed Specimen:  Cerebrospinal Fluid Updated:  09/16/20 1106 Special Requests: CEREBROSPINAL FLUID     
  GRAM STAIN NO WBC'S SEEN     
   NO ORGANISMS SEEN Smear Reviewed by Microbiology 9/15/20 AT 1408 TA. Culture result: NO GROWTH AFTER 21 HOURS     
 CULTURE, BLOOD [[id number]] Collected:  09/15/20 1002 Order Status:  Completed Specimen:  Blood Updated:  09/16/20 0754 Special Requests: NO SPECIAL REQUESTS Culture result: NO GROWTH AFTER 20 HOURS     
 CULTURE, BLOOD [[id number]] Collected:  09/15/20 1005 Order Status:  Completed Specimen:  Blood Updated:  09/16/20 0754 Special Requests: NO SPECIAL REQUESTS Culture result: NO GROWTH AFTER 20 HOURS MENINGITIS PATHOGENS PANEL, CSF (BY PCR) [[id number]] Collected:  09/15/20 0848 Order Status:  Completed Specimen:  Cerebrospinal Fluid Updated:  09/15/20 1510 Escherichia coli K1 Not detected Haemophilus Influenzae Not detected Listeria Monocytogenes Not detected Neisseria Meningitidis Not detected Streptococcus Agalactiae Not detected Streptococcus Pneumoniae Not detected Cytomegalovirus Not detected Enterovirus Not detected Herpes Simplex Virus 1 Not detected Comment: In patients who have negative herpes simplex 1 and 2 PCR results, do not modify treatment, confirm with alternate testing. Herpes Simplex Virus 2 Not detected Comment:  In patients who have negative herpes simplex 1 and 2 PCR results, do not modify treatment, confirm with alternate testing. Human Herpesvirus 6 Not detected Human Parechovirus Not detected Varicella Zoster Virus Not detected Crypto. neoformans/gattii Not detected CULTURE, BLOOD [[id number]] Collected:  09/08/20 1640 Order Status:  Completed Specimen:  Blood Updated:  09/14/20 3086 Special Requests: NO SPECIAL REQUESTS Culture result: NO GROWTH 6 DAYS     
 CULTURE, BLOOD [[id number]] Collected:  09/08/20 1640 Order Status:  Completed Specimen:  Blood Updated:  09/14/20 8672 Special Requests: NO SPECIAL REQUESTS Culture result: NO GROWTH 6 DAYS     
 CULTURE, URINE [[id number]] Collected:  09/08/20 1211 Order Status:  Completed Specimen:  Urine from Clean catch Updated:  09/09/20 1811 Special Requests: NO SPECIAL REQUESTS [place] Count --     
  >100,000 COLONIES/mL Culture result:    
  MIXED UROGENITAL AUDRA ISOLATED Urinalysis Color Date Value Ref Range Status 09/08/2020 DARK YELLOW   Final  
 
Appearance Date Value Ref Range Status 09/08/2020 CLOUDY   Final  
 
Specific gravity Date Value Ref Range Status 09/08/2020 1.021 1.005 - 1.030   Final  
 
pH (UA) Date Value Ref Range Status 09/08/2020 6.0 5.0 - 8.0   Final  
 
Protein Date Value Ref Range Status 09/08/2020 300 (A) NEG mg/dL Final  
 
Ketone Date Value Ref Range Status 09/08/2020 15 (A) NEG mg/dL Final  
 
Bilirubin Date Value Ref Range Status 09/08/2020 Negative NEG   Final  
 
Blood Date Value Ref Range Status 09/08/2020 MODERATE (A) NEG   Final  
 
Urobilinogen Date Value Ref Range Status 09/08/2020 1.0 0.2 - 1.0 EU/dL Final  
 
Nitrites Date Value Ref Range Status 09/08/2020 Negative NEG   Final  
 
Leukocyte Esterase Date Value Ref Range Status 09/08/2020 Negative NEG   Final  
 
Potassium Date Value Ref Range Status 09/16/2020 3.6 3.5 - 5.5 mmol/L Final  
 
 Creatinine Date Value Ref Range Status 09/16/2020 0.95 0.6 - 1.3 MG/DL Final  
 
BUN Date Value Ref Range Status 09/16/2020 7 7.0 - 18 MG/DL Final  
  
PSA No results for input(s): PSA in the last 72 hours. Coagulation Lab Results Component Value Date/Time Prothrombin time 13.2 09/14/2020 05:40 PM  
 Prothrombin time 13.6 07/19/2017 03:05 AM  
 INR 1.0 09/14/2020 05:40 PM  
 INR 1.1 07/19/2017 03:05 AM  
 aPTT 28.6 07/19/2017 03:05 AM  
 aPTT 25.2 10/06/2014 11:21 AM  
   
 
Micro  Recent Labs  
  09/15/20 
1005 09/15/20 
1002 09/15/20 
0848 CULT NO GROWTH AFTER 20 HOURS NO GROWTH AFTER 20 HOURS NO GROWTH AFTER 21 HOURS Recent Labs  
  09/15/20 
1005 09/15/20 
1002 09/15/20 
0848 CULT NO GROWTH AFTER 20 HOURS NO GROWTH AFTER 20 HOURS NO GROWTH AFTER 21 HOURS  
  
 
US Results: (Most Recent) Results from Hospital Encounter encounter on 09/08/20 US ABD LTD Narrative EXAM:  Ultrasound Abdomen Limited INDICATION:  Abdominal pain during palpation physical exam.  History of 
cholecystectomy. Clinical suspicion for orbital infection. Assess for dilated 
duct. COMPARISON:  None. FINDINGS:   
 
Liver:  Parenchymal echogenicity appears heterogeneous but overall increased. The liver is only partially visualized. No intrahepatic ductal dilatation is 
observed. No distinct solid mass or cystic lesion is detected in the visualized 
segments of the liver. Portal Vein:  The portal venous flow pattern is interrogated to exclude portal 
venous pathology such as thrombosis as a potential cause of or potential 
complications of the underlying disease process. Monophasic hepatopetal flow is 
observed with expected low resistance waveform tracing. Gallbladder:  Removed. Common Bile Duct:  0.54 cm in diameter. Pancreas:  Partial visualization. The visualized portions appear grossly 
unremarkable. IVC/ Aorta: Kidney:  The right kidney measures 9.1 x 4.0 x 5.0 cm. No evidence of cystic or 
solid renal mass, hydronephrosis or ectopic calcification is detected. Impression IMPRESSION:    
 
1. Status post cholecystectomy. 2.  No significant common bile duct dilation. 3.  Increased hepatic parenchymal echogenicity with somewhat heterogeneous 
appearance, potentially suggestive of hepatosteatosis. Partial visualization of 
the liver. CT (Most Recent) Results from [facility] Encounter encounter on 09/08/20 CT CHEST ABD PELV W CONT Narrative CT chest, abdomen and pelvis with contrast  
 
CLINICAL HISTORY: Leukocytosis. CT evaluation of potential infection versus 
abscess COMPARISON: None. TECHNIQUE: Helical scan to the chest, abdomen and pelvis are obtained from the 
thoracic inlet to the symphysis pubis after IV contrast administration. All CT scans at this facility performed using dose optimization techniques as 
appreciated to a performed exam, to include automated exposure control, 
adjustment of the mA and or KU according to patient size (including appropriate 
matching for site specific examination), or use of iterative reconstruction 
technique. FINDINGS: Moderate motion artifact noted and limits the detail evaluation. CT CHEST:  
 
Lung Parenchyma: Minimal bibasilar atelectasis. No acute airspace disease or 
consolidation. Thyroid: Small hypodense nodule in the right lobe measuring about 5 mm. Mediastinum: No adenopathy. Mild and circumferential esophageal wall prominence 
with collapsed the lumen. Heart: The heart and the pericardium appear normal. 
 
Vasculature: The aorta and the great vessels are unremarkable. Pleural Space And Chest Wall: No significant pleural pathology. CT ABDOMEN AND PELVIS: 
 
Liver: Normal. 
 
Gallbladder: Surgically absent. Biliary System: No ductal dilatation.  
 
Spleen: Normal. 
 
Pancreas: Normal. 
 
 Adrenal Glands: Normal. 
 
Kidneys: Normal. 
 
Bowel: The small and large bowel are nondilated. Normal appendix. Fluid-filled 
sigmoid colon and rectum with air-fluid level without dilatation. Lower genitourinary system: The bladder is markedly distended up to the level 
above the umbilicus, 04.6 cm in cc dimension. No definite bladder wall lesion 
seen. The uterus appears spherical and heterogeneous with several partially 
calcified fibroids present. No adnexal mass. Peritoneum/Retroperitoneum: No adenopathy. Vasculature: Unremarkable for age. Other: No free fluid. CT OSSEOUS STRUCTURES:   
 
Unremarkable for age. Impression IMPRESSION:  
 
1. Marked bladder distention up to the level above the umbilicus. Recommend 
clinical correlation for urinary outlet obstruction. 2. No CT evidence of infection or inflammatory process. No abscess seen. 3. Tiny hypodense nodule in right thyroid lobe. This can be better evaluated by 
thyroid ultrasound. 4. Mild and circumferential esophageal wall prominence with collapsed the lumen. If symptomatic, barium swallow can be performed for better evaluation. 5. Fluid-filled nondilated distal colon and rectum as nonspecific finding and 
could represent diarrhea. 6. Multiple partially calcified fibroids. Thank you for this referral.   
  
 
 
 
Current Facility-Administered Medications Medication Dose Route Frequency  [START ON 9/17/2020] tamsulosin (FLOMAX) capsule 0.4 mg  0.4 mg Oral DAILY  QUEtiapine (SEROquel) tablet 50 mg  50 mg Oral QHS  ziprasidone (GEODON) 10 mg in sterile water (preservative free) 0.5 mL injection  10 mg IntraMUSCular Q12H PRN  
 heparin (porcine) injection 5,000 Units  5,000 Units SubCUTAneous Q8H  
 insulin glargine (LANTUS) injection 5 Units  5 Units SubCUTAneous DAILY  lactated Ringers infusion  100 mL/hr IntraVENous CONTINUOUS  
 polyvinyl alcohol-povidon(PF) (REFRESH CLASSIC) 1.4-0.6 % ophthalmic solution 1 Drop  1 Drop Both Eyes PRN  cholecalciferol (VITAMIN D3) (1000 Units /25 mcg) tablet 1 Tab  1,000 Units Oral DAILY  insulin lispro (HUMALOG) injection   SubCUTAneous AC&HS  
 melatonin tablet 3 mg  3 mg Oral QHS  sodium chloride (NS) flush 5-10 mL  5-10 mL IntraVENous PRN  
 [Held by provider] insulin NPH (NOVOLIN N, HUMULIN N) injection 40 Units  40 Units SubCUTAneous ACB&D  
 glucose chewable tablet 16 g  16 g Oral PRN  
 glucagon (GLUCAGEN) injection 1 mg  1 mg IntraMUSCular PRN  
 dextrose (D50W) injection syrg 12.5-25 g  25-50 mL IntraVENous PRN  
 sodium chloride (NS) flush 5-40 mL  5-40 mL IntraVENous Q8H  
 sodium chloride (NS) flush 5-40 mL  5-40 mL IntraVENous PRN  
 acetaminophen (TYLENOL) tablet 650 mg  650 mg Oral Q6H PRN Or  
 acetaminophen (TYLENOL) suppository 650 mg  650 mg Rectal Q6H PRN  polyethylene glycol (MIRALAX) packet 17 g  17 g Oral DAILY PRN  
 [Held by provider] PARoxetine (PAXIL) tablet 60 mg  60 mg Oral DAILY  pantoprazole (PROTONIX) tablet 40 mg  40 mg Oral DAILY  [Held by provider] rosuvastatin (CRESTOR) tablet 20 mg  20 mg Oral QHS  ondansetron (ZOFRAN) injection 4 mg  4 mg IntraVENous Q6H PRN

## 2020-09-17 NOTE — ANESTHESIA PREPROCEDURE EVALUATION
Anesthetic History PONV Review of Systems / Medical History Patient summary reviewed and pertinent labs reviewed Pulmonary Smoker Neuro/Psych Psychiatric history Cardiovascular Hypertension: well controlled Exercise tolerance: >4 METS Comments: Acute metabolic encephalopathy GI/Hepatic/Renal 
  
GERD: poorly controlled Renal disease: CRI and ARF Hiatal hernia Comments: Abdominal pain,Nausea and vomiting Endo/Other Diabetes: well controlled, type 1, using insulin Obesity Comments: H/O GIST resection Other Findings Comments: Dg: 
Essential hypertension, benign (Chronic)  ICD-10-CM: I10  
ICD-9-CM: 401.1  12/03/2013      
   Diabetes mellitus type 2, controlled (HCC) (Chronic)  ICD-10-CM: E11.9 ICD-9-CM: 250.00  12/03/2013     
   Vitamin D deficiency (Chronic)  ICD-10-CM: E55.9 ICD-9-CM: 268.9  04/20/2014     
   HLD (hyperlipidemia) (Chronic)  ICD-10-CM: D10.4 ICD-9-CM: 272.4  04/20/2014     
   Nausea and vomiting (Chronic)  ICD-10-CM: R11.2 ICD-9-CM: 787.01  04/29/2014     
   Dehydration  ICD-10-CM: E86.0 ICD-9-CM: 276.51  04/20/2014     
   Systemic inflammatory response syndrome (SIRS) (HCC)  ICD-10-CM: R65.10 ICD-9-CM: 995.90  04/29/2014     
   GERD (gastroesophageal reflux disease)  ICD-10-CM: K21.9 ICD-9-CM: 530.81  06/18/2014     
   Nausea with vomiting  ICD-10-CM: R11.2 ICD-9-CM: 787.01  10/22/2014     
   Osteomyelitis ([facility]  ICD-10-CM: M86.9 ICD-9-CM: 730.20  07/18/2017     
   Sepsis ([facility]  ICD-10-CM: A41.9 ICD-9-CM: 038.9, 995.91  07/19/2017     
   Foot ulcer due to secondary DM ([facility]  ICD-10-CM: E13.621, I52.477 ICD-9-CM: 249.80, 707.15  07/19/2017     
   Uncontrolled type 2 diabetes mellitus with chronic kidney disease ([facility]  ICD-10-CM: E11.22, E11.65 ICD-9-CM: 250.42, 585.9  07/19/2017     
   Diabetic retinopathy ([facility]  ICD-10-CM: E11.319 ICD-9-CM: 250.50, 362.01  07/19/2017     
    Anxiety  ICD-10-CM: F41.9 ICD-9-CM: 300.00  07/19/2017     
   Leukocytosis  ICD-10-CM: N95.839 ICD-9-CM: 288.60  07/19/2017     
   Renal insufficiency  ICD-10-CM: N28.9 ICD-9-CM: 593.9  07/19/2017     
   Diabetes ([facility]  ICD-10-CM: E11.9 ICD-9-CM: 250.00  Unknown     
   Hypertension  ICD-10-CM: I10  
ICD-9-CM: 401.9  Unknown     
   Mass of abdomen  ICD-10-CM: R19.00 ICD-9-CM: 789.30  Unknown     
   Blind left eye  ICD-10-CM: H54.40 ICD-9-CM: 369.60  Unknown     
   Type 2 diabetes mellitus with retinopathy, with long-term current use of insulin (HCC)  ICD-10-CM: E11.319, Z79.4 ICD-9-CM: 250.50, 362.01, V58.67  09/05/2017     
   CRYSTAL (acute kidney injury) ([facility]  ICD-10-CM: N17.9 ICD-9-CM: 584.9  11/17/2018     
   Abnormal LFTs  ICD-10-CM: R94.5 ICD-9-CM: 790.6  11/19/2018     
   Abdominal pain  ICD-10-CM: R10.9 ICD-9-CM: 789.00  01/04/2019     
   Tachycardia  ICD-10-CM: R00.0 ICD-9-CM: 785.0  01/23/2019     
   SIRS (systemic inflammatory response syndrome) (HCC)  ICD-10-CM: R65.10 ICD-9-CM: 995.90  01/23/2019     
   Gastroparesis  ICD-10-CM: T85.90 ICD-9-CM: 536.3  01/28/2019     
   Hypokalemia  ICD-10-CM: E87.6 ICD-9-CM: 276.8  01/28/2019     
   Intractable nausea and vomiting  ICD-10-CM: R11.2 ICD-9-CM: 536.2  01/28/2019     
   Altered mental status  ICD-10-CM: R41.82  
ICD-9-CM: 780.97  09/08/2020     
   Hyperglycemia  ICD-10-CM: R73.9 ICD-9-CM: 790.29  09/08/2020     
   Acute renal failure (ARF) ([facility]  I Physical Exam 
 
Airway Mallampati: III 
TM Distance: 4 - 6 cm Neck ROM: normal range of motion Mouth opening: Normal 
 
 Cardiovascular Regular rate and rhythm,  S1 and S2 normal,  no murmur, click, rub, or gallop Dental 
No notable dental hx Pulmonary Breath sounds clear to auscultation Abdominal 
GI exam deferred Other Findings Anesthetic Plan ASA: 3 Anesthesia type: MAC 
 
 
 
 
 Induction: Intravenous Anesthetic plan and risks discussed with: Patient

## 2020-09-17 NOTE — PROGRESS NOTES
Problem: Diabetes Self-Management Goal: *Disease process and treatment process Description: Define diabetes and identify own type of diabetes; list 3 options for treating diabetes. Outcome: Progressing Towards Goal 
Goal: *Incorporating nutritional management into lifestyle Description: Describe effect of type, amount and timing of food on blood glucose; list 3 methods for planning meals. Outcome: Progressing Towards Goal 
Goal: *Incorporating physical activity into lifestyle Description: State effect of exercise on blood glucose levels. Outcome: Progressing Towards Goal 
Goal: *Developing strategies to promote health/change behavior Description: Define the ABC's of diabetes; identify appropriate screenings, schedule and personal plan for screenings. Outcome: Progressing Towards Goal 
Goal: *Using medications safely Description: State effect of diabetes medications on diabetes; name diabetes medication taking, action and side effects. Outcome: Progressing Towards Goal 
Goal: *Monitoring blood glucose, interpreting and using results Description: Identify recommended blood glucose targets  and personal targets. Outcome: Progressing Towards Goal 
Goal: *Prevention, detection, treatment of acute complications Description: List symptoms of hyper- and hypoglycemia; describe how to treat low blood sugar and actions for lowering  high blood glucose level. Outcome: Progressing Towards Goal 
Goal: *Prevention, detection and treatment of chronic complications Description: Define the natural course of diabetes and describe the relationship of blood glucose levels to long term complications of diabetes. Outcome: Progressing Towards Goal 
Goal: *Developing strategies to address psychosocial issues Description: Describe feelings about living with diabetes; identify support needed and support network Outcome: Progressing Towards Goal 
Goal: *Insulin pump training Outcome: Progressing Towards Goal 
 Goal: *Sick day guidelines Outcome: Progressing Towards Goal 
Goal: *Patient Specific Goal (EDIT GOAL, INSERT TEXT) Outcome: Progressing Towards Goal 
  
Problem: Patient Education: Go to Patient Education Activity Goal: Patient/Family Education Outcome: Progressing Towards Goal 
  
Problem: Non-Violent Restraints Goal: *Removal from restraints as soon as assessed to be safe Outcome: Progressing Towards Goal 
Goal: *No harm/injury to patient while restraints in use Outcome: Progressing Towards Goal 
Goal: *Patient's dignity will be maintained Outcome: Progressing Towards Goal 
Goal: *Patient Specific Goal (EDIT GOAL, INSERT TEXT) Outcome: Progressing Towards Goal 
Goal: Non-violent Restaints:Standard Interventions Outcome: Progressing Towards Goal 
Goal: Non-violent Restraints:Patient Interventions Outcome: Progressing Towards Goal 
Goal: Patient/Family Education Outcome: Progressing Towards Goal 
  
Problem: Falls - Risk of 
Goal: *Absence of Falls Description: Document Rafal Verdugo Fall Risk and appropriate interventions in the flowsheet. Outcome: Progressing Towards Goal 
Note: Fall Risk Interventions: 
Mobility Interventions: Bed/chair exit alarm Mentation Interventions: Adequate sleep, hydration, pain control Medication Interventions: Teach patient to arise slowly, Patient to call before getting OOB, Evaluate medications/consider consulting pharmacy, Bed/chair exit alarm Elimination Interventions: Bed/chair exit alarm, Call light in reach Problem: Patient Education: Go to Patient Education Activity Goal: Patient/Family Education Outcome: Progressing Towards Goal 
  
Problem: Pressure Injury - Risk of 
Goal: *Prevention of pressure injury Description: Document Vasile Scale and appropriate interventions in the flowsheet.  
Outcome: Progressing Towards Goal 
Note: Pressure Injury Interventions: 
Sensory Interventions: Assess changes in LOC, Minimize linen layers, Turn and reposition approx. every two hours (pillows and wedges if needed) Moisture Interventions: Absorbent underpads, Internal/External urinary devices Activity Interventions: Pressure redistribution bed/mattress(bed type) Mobility Interventions: HOB 30 degrees or less, Chair cushion, Turn and reposition approx. every two hours(pillow and wedges) Nutrition Interventions: Document food/fluid/supplement intake Friction and Shear Interventions: Apply protective barrier, creams and emollients Problem: Patient Education: Go to Patient Education Activity Goal: Patient/Family Education Outcome: Progressing Towards Goal 
  
Problem: Patient Education: Go to Patient Education Activity Goal: Patient/Family Education Outcome: Progressing Towards Goal 
  
Problem: Nutrition Deficit Goal: *Optimize nutritional status Outcome: Progressing Towards Goal 
  
Problem: Patient Education: Go to Patient Education Activity Goal: Patient/Family Education Outcome: Progressing Towards Goal 
  
Problem: Patient Education: Go to Patient Education Activity Goal: Patient/Family Education Outcome: Progressing Towards Goal 
  
Problem: Risk for Spread of Infection Goal: Prevent transmission of infectious organism to others Description: Prevent the transmission of infectious organisms to other patients, staff members, and visitors. Outcome: Progressing Towards Goal 
  
Problem: Patient Education:  Go to Education Activity Goal: Patient/Family Education Outcome: Progressing Towards Goal

## 2020-09-17 NOTE — ROUTINE PROCESS
Bedside shift change report given to Lazarus Barnes (oncoming nurse) by Alba Jimenez RN (offgoing nurse). Report included the following information SBAR, Procedure Summary, MAR and Recent Results.

## 2020-09-17 NOTE — PROGRESS NOTES
Progress Note 58y F with PMH DM type 2, HTN CKD presented with HHS following for CRYSTAL Subjective Overnight event noted Remains confused IMPRESSION:  
Acute kidney injury, back to baseline CKD, h/o CRYSTAL in past, last available creatinine is at 1.9mg/dl last yea Ketoacidosis, resolved Hypokalemia ,resolved 
rhabdomylysis ,mild DM type 2 Altered mental status, Diastolic heart failure, grade 1 PLAN:  
Her renal function is back to baseline 
cpk is elevated. ? Etiology. statins stopped. improving Urinary retention,reccurent,started flomax Current Facility-Administered Medications Medication Dose Route Frequency  tamsulosin (FLOMAX) capsule 0.4 mg  0.4 mg Oral DAILY  QUEtiapine (SEROquel) tablet 50 mg  50 mg Oral QHS  ziprasidone (GEODON) 10 mg in sterile water (preservative free) 0.5 mL injection  10 mg IntraMUSCular Q12H PRN  
 heparin (porcine) injection 5,000 Units  5,000 Units SubCUTAneous Q8H  
 insulin glargine (LANTUS) injection 5 Units  5 Units SubCUTAneous DAILY  lactated Ringers infusion  100 mL/hr IntraVENous CONTINUOUS  
 polyvinyl alcohol-povidon(PF) (REFRESH CLASSIC) 1.4-0.6 % ophthalmic solution 1 Drop  1 Drop Both Eyes PRN  cholecalciferol (VITAMIN D3) (1000 Units /25 mcg) tablet 1 Tab  1,000 Units Oral DAILY  insulin lispro (HUMALOG) injection   SubCUTAneous AC&HS  
 melatonin tablet 3 mg  3 mg Oral QHS  sodium chloride (NS) flush 5-10 mL  5-10 mL IntraVENous PRN  
 [Held by provider] insulin NPH (NOVOLIN N, HUMULIN N) injection 40 Units  40 Units SubCUTAneous ACB&D  
 glucose chewable tablet 16 g  16 g Oral PRN  
 glucagon (GLUCAGEN) injection 1 mg  1 mg IntraMUSCular PRN  
 dextrose (D50W) injection syrg 12.5-25 g  25-50 mL IntraVENous PRN  
 sodium chloride (NS) flush 5-40 mL  5-40 mL IntraVENous Q8H  
 sodium chloride (NS) flush 5-40 mL  5-40 mL IntraVENous PRN  
 acetaminophen (TYLENOL) tablet 650 mg  650 mg Oral Q6H PRN  
 Or  
 acetaminophen (TYLENOL) suppository 650 mg  650 mg Rectal Q6H PRN  polyethylene glycol (MIRALAX) packet 17 g  17 g Oral DAILY PRN  
 [Held by provider] PARoxetine (PAXIL) tablet 60 mg  60 mg Oral DAILY  pantoprazole (PROTONIX) tablet 40 mg  40 mg Oral DAILY  [Held by provider] rosuvastatin (CRESTOR) tablet 20 mg  20 mg Oral QHS  ondansetron (ZOFRAN) injection 4 mg  4 mg IntraVENous Q6H PRN Review of Systems: As above Data Review: 
 
Labs: Results:  
   
Chemistry Recent Labs  
  09/17/20 
0538 09/16/20 
0500 09/15/20 
0561 * 142* 104*  138 141  
K 3.6 3.6 3.8  104 107 CO2 30 29 28 BUN 5* 7 7 CREA 0.92 0.95 0.94  
CA 8.8 8.8 8.9 AGAP 4 5 6 BUCR 5* 7* 7* AP 88 94 93  
TP 7.0 6.8 6.5 ALB 3.2* 3.2* 3.3*  
GLOB 3.8 3.6 3.2 AGRAT 0.8 0.9 1.0  
  
CBC w/Diff Recent Labs  
  09/17/20 
0538 09/16/20 
0500 09/15/20 
3254 WBC 12.9 13.2 13.1 RBC 2.97* 2.98* 3.17* HGB 8.6* 8.6* 9.2* HCT 25.6* 25.6* 27.2*  
 219 237 GRANS 63 63 60  
LYMPH 29 29 33 EOS 0 2 2 Coagulation Recent Labs  
  09/14/20 
1740 PTP 13.2 INR 1.0 Iron/Ferritin No results for input(s): IRON in the last 72 hours. No lab exists for component: TIBCCALC BNP No results for input(s): BNPP in the last 72 hours. Cardiac Enzymes Recent Labs  
  09/17/20 
0538 09/16/20 
2319 * 1,057* Liver Enzymes Recent Labs  
  09/17/20 
0538 TP 7.0 ALB 3.2* AP 88 Thyroid Studies Lab Results Component Value Date/Time TSH 0.57 09/08/2020 04:40 PM  
    
 EKG: sinus Physical Assessment:  
 
Visit Vitals /73 (BP 1 Location: Right arm, BP Patient Position: At rest) Pulse (!) 115 Temp 99.2 °F (37.3 °C) Resp 20 Ht 5' 8\" (1.727 m) Wt 93.4 kg (206 lb) SpO2 98% Breastfeeding No  
BMI 31.32 kg/m² Weight change: 0.046 kg (1.6 oz) Intake/Output Summary (Last 24 hours) at 9/17/2020 1309 Last data filed at 9/17/2020 0825 Gross per 24 hour Intake  Output 1950 ml Net -1950 ml Physical Exam:  
General: no respi distress HEENT sclera anicteric, supple neck, no thyromegaly CVS: S1S2 heard,  no rub RS: + air entry b/l, Abd: Soft, Non tender Neuro: lethargic Extrm: No edema, no cyanosis, clubbing Skin: no visible  Rash Procedures/imaging: see electronic medical records for all procedures, Xrays and details which were not copied into this note but were reviewed prior to creation of Plan Gabriele Velazco MD 
September 17, 2020 [facility] [phone]

## 2020-09-17 NOTE — PROGRESS NOTES
OT withheld 2/2 pt attempting OOB in restraints, oriented to self only and not following commands. Pt not appropriate for therapy at this time. Will continue to follow.

## 2020-09-17 NOTE — PROGRESS NOTES
Problem: Non-Violent Restraints Goal: *No harm/injury to patient while restraints in use Outcome: Progressing Towards Goal

## 2020-09-17 NOTE — PROGRESS NOTES
*ATTENTION:  This note has been created by a medical student for educational purposes only. Please do not refer to the content of this note for clinical decision-making, billing, or other purposes. Please see attending physicians note to obtain clinical information on this patient. * Medical Student Progress Note [address] **Medical Student Note for Educational Purposes Only** Patient: Lavell Randall MRN: [id number]  CSN: [id number] YOB: 1965  Age: 60 y.o. Sex: female DOA: 9/8/2020 LOS:  LOS: 9 days Subjective:  
 
Acute events: Pt became agitated overnight and required restraints to be placed. Pt states that she is doing better today. She is still fairly restless and states that she wants to [facility] on a walk\". Pt also mentions that she is hungry and is hoping for something tasty for breakfast. 
 
Review of Systems Reason unable to perform ROS: ROS limited by patient's neuropsychiatric disease status. Respiratory: Negative for shortness of breath. Cardiovascular: Negative for chest pain and palpitations. Gastrointestinal: Negative for abdominal pain. Genitourinary: Negative for dysuria. Musculoskeletal: Negative for myalgias. Neurological: Positive for headaches. Psychiatric/Behavioral: Negative for hallucinations. Denies auditory/visual hallucinations, but states that she often sees \"floaters\". Objective:  
  
Patient Vitals for the past 24 hrs: 
 Temp Pulse Resp BP SpO2  
09/17/20 0428 99.3 °F (37.4 °C) (!) 116 20 133/76 97 % 09/17/20 0018 99.2 °F (37.3 °C) (!) 124 20 (!) 147/88 100 % 09/16/20 2014 98 °F (36.7 °C) (!) 106 19 (!) 139/94 100 % 09/16/20 1653 98.5 °F (36.9 °C) (!) 101 20 (!) 116/57 99 % 09/16/20 1344  (!) 122 26    
09/16/20 1244  (!) 108 13    
09/16/20 1134  (!) 127     
09/16/20 1129  (!) 131   100 % 09/16/20 1117 99.5 °F (37.5 °C) (!) 128 18 127/83 100 % 09/16/20 1029  (!) 112 14  100 % 09/16/20 0929  (!) 138 23    
09/16/20 0829  (!) 124 17  100 % 09/16/20 0754 99.6 °F (37.6 °C) (!) 115 11 (!) 162/85 100 % Intake/Output Summary (Last 24 hours) at 9/17/2020 7342 Last data filed at 9/17/2020 1050 Gross per 24 hour Intake 1323.33 ml Output 1950 ml Net -626.67 ml Physical Exam:  
General:  Awake, Alert & oriented to person, place, but not to time. Sitting restlessly in bed in no acute distress. HEENT:  Eyes closed (possible bilateral ptosis), but able to open eyes. R pupil non-reactive to light, L pupil is sluggishly reactive. No cervical lymphadenopathy. Moist mucous membranes. CNs unable to assessed as pt is uncooperative. CV:  Tachycardic HR with regular rhythm and normal S1/S2. No murmurs, rubs, or gallops. RESP:  Unlabored breathing. Lungs clear to auscultation. No wheezes, rales, or rhonchi. Equal expansion bilaterally. ABD:  Soft, nontender, nondistended. No hepatosplenomegaly. No suprapubic tenderness. MS:  No joint deformity or instability. No atrophy. Neuro:  No facial asymmetry or tongue deviation. Motor function appears to be intact as pt can move all extremities. Strength and reflex testing unable to assessed because pt is uncooperative and in restraints. Babinski sign negative. Ext:  1st digit of R foot is amputated, 2nd digit of L foot as well. No edema in BLE. Some mild-moderate non-pitting edema in hands bilaterally. Skin:  No rashes, lesions, or ulcers. Psych/Mental Status: agitated, confused, disoriented to time, uncooperative, mood is \"alright\" with somewhat constricted affect. Lab/Data Reviewed: 
CMP:  
Lab Results Component Value Date/Time   09/17/2020 05:38 AM  
 K 3.6 09/17/2020 05:38 AM  
  09/17/2020 05:38 AM  
 CO2 30 09/17/2020 05:38 AM  
 AGAP 4 09/17/2020 05:38 AM  
  (H) 09/17/2020 05:38 AM  
 BUN 5 (L) 09/17/2020 05:38 AM  
 CREA 0.92 09/17/2020 05:38 AM  
 GFRAA >60 09/17/2020 05:38 AM  
 GFRNA >60 09/17/2020 05:38 AM  
 CA 8.8 09/17/2020 05:38 AM  
 MG 1.8 09/17/2020 05:38 AM  
 PHOS 3.6 09/17/2020 05:38 AM  
 ALB 3.2 (L) 09/17/2020 05:38 AM  
 TP 7.0 09/17/2020 05:38 AM  
 GLOB 3.8 09/17/2020 05:38 AM  
 AGRAT 0.8 09/17/2020 05:38 AM  
 ALT 51 09/17/2020 05:38 AM  
 
CBC:  
Lab Results Component Value Date/Time WBC 12.9 09/17/2020 05:38 AM  
 HGB 8.6 (L) 09/17/2020 05:38 AM  
 HCT 25.6 (L) 09/17/2020 05:38 AM  
  09/17/2020 05:38 AM  
 
All Cardiac Markers in the last 24 hours:  
Lab Results Component Value Date/Time  (H) 09/17/2020 05:38 AM  
 CPK 1,057 (H) 09/16/2020 11:19 PM  
 
Recent Glucose Results:  
Lab Results Component Value Date/Time  (H) 09/17/2020 05:38 AM  
 
Liver Panel:  
Lab Results Component Value Date/Time ALB 3.2 (L) 09/17/2020 05:38 AM  
 TP 7.0 09/17/2020 05:38 AM  
 GLOB 3.8 09/17/2020 05:38 AM  
 AGRAT 0.8 09/17/2020 05:38 AM  
 ALT 51 09/17/2020 05:38 AM  
 AP 88 09/17/2020 05:38 AM  
 
 
 
Scheduled Medications Reviewed: 
Current Facility-Administered Medications Medication Dose Route Frequency  tamsulosin (FLOMAX) capsule 0.4 mg  0.4 mg Oral DAILY  QUEtiapine (SEROquel) tablet 50 mg  50 mg Oral QHS  heparin (porcine) injection 5,000 Units  5,000 Units SubCUTAneous Q8H  
 insulin glargine (LANTUS) injection 5 Units  5 Units SubCUTAneous DAILY  lactated Ringers infusion  100 mL/hr IntraVENous CONTINUOUS  cholecalciferol (VITAMIN D3) (1000 Units /25 mcg) tablet 1 Tab  1,000 Units Oral DAILY  insulin lispro (HUMALOG) injection   SubCUTAneous AC&HS  
 melatonin tablet 3 mg  3 mg Oral QHS  [Held by provider] insulin NPH (NOVOLIN N, HUMULIN N) injection 40 Units  40 Units SubCUTAneous ACB&D  
 sodium chloride (NS) flush 5-40 mL  5-40 mL IntraVENous Q8H  
 [Held by provider] PARoxetine (PAXIL) tablet 60 mg  60 mg Oral DAILY  pantoprazole (PROTONIX) tablet 40 mg  40 mg Oral DAILY  [Held by provider] rosuvastatin (CRESTOR) tablet 20 mg  20 mg Oral QHS Imaging, microbiology, and EKG/Telemetry: 
Imaging: MODALITY IMPRESSION  
XR Results from Hospital Encounter encounter on 09/08/20 XR ORBIT BI FOREIGN BODY Narrative EXAM: ORBITS LIMITED CLINICAL HISTORY/INDICATION: , blurred vision with nausea, vomiting, and 
abdominal pain, severe agitation, altered mental status, diabetic retinopathy 
due to type 2 diabetes Osie Ra mri screen. COMPARISON: None. TECHNIQUE: modified ramos view(s) of the orbits obtained. FINDINGS: 
 
There are no radiopaque metallic foreign bodies within the orbits. There is no 
aneurysm clip. The visualized  paranasal sinuses are normal. 
  
 Impression IMPRESSION: 
 
Negative orbital screening prior to MRI. CT Results from Hospital Encounter encounter on 09/08/20 CT CHEST ABD PELV W CONT Narrative CT chest, abdomen and pelvis with contrast  
 
CLINICAL HISTORY: Leukocytosis. CT evaluation of potential infection versus 
abscess COMPARISON: None. TECHNIQUE: Helical scan to the chest, abdomen and pelvis are obtained from the 
thoracic inlet to the symphysis pubis after IV contrast administration. All CT scans at this facility performed using dose optimization techniques as 
appreciated to a performed exam, to include automated exposure control, 
adjustment of the mA and or KU according to patient size (including appropriate 
matching for site specific examination), or use of iterative reconstruction 
technique. FINDINGS: Moderate motion artifact noted and limits the detail evaluation. CT CHEST:  
 
Lung Parenchyma: Minimal bibasilar atelectasis. No acute airspace disease or 
consolidation. Thyroid: Small hypodense nodule in the right lobe measuring about 5 mm. Mediastinum: No adenopathy. Mild and circumferential esophageal wall prominence with collapsed the lumen. Heart: The heart and the pericardium appear normal. 
 
Vasculature: The aorta and the great vessels are unremarkable. Pleural Space And Chest Wall: No significant pleural pathology. CT ABDOMEN AND PELVIS: 
 
Liver: Normal. 
 
Gallbladder: Surgically absent. Biliary System: No ductal dilatation. Spleen: Normal. 
 
Pancreas: Normal. 
 
Adrenal Glands: Normal. 
 
Kidneys: Normal. 
 
Bowel: The small and large bowel are nondilated. Normal appendix. Fluid-filled 
sigmoid colon and rectum with air-fluid level without dilatation. Lower genitourinary system: The bladder is markedly distended up to the level 
above the umbilicus, 78.8 cm in cc dimension. No definite bladder wall lesion 
seen. The uterus appears spherical and heterogeneous with several partially 
calcified fibroids present. No adnexal mass. Peritoneum/Retroperitoneum: No adenopathy. Vasculature: Unremarkable for age. Other: No free fluid. CT OSSEOUS STRUCTURES:   
 
Unremarkable for age. Impression IMPRESSION:  
 
1. Marked bladder distention up to the level above the umbilicus. Recommend 
clinical correlation for urinary outlet obstruction. 2. No CT evidence of infection or inflammatory process. No abscess seen. 3. Tiny hypodense nodule in right thyroid lobe. This can be better evaluated by 
thyroid ultrasound. 4. Mild and circumferential esophageal wall prominence with collapsed the lumen. If symptomatic, barium swallow can be performed for better evaluation. 5. Fluid-filled nondilated distal colon and rectum as nonspecific finding and 
could represent diarrhea. 6. Multiple partially calcified fibroids. Thank you for this referral.   
  
MRI Results from Hospital Encounter encounter on 09/11/20 MRI BRAIN WO CONT  Narrative Description:  MRI brain without contrast 
 
TECHNIQUE: Multiplanar, multisequence MR imaging of the brain without contrast 
 
 Clinical Indication:  Altered mental status. Comparison: September 9, 2020. Findings: Motion degraded exam. 
 
Midline structures of the brain to include the corpus callosum, pituitary gland 
and clivus demonstrate normal morphology and signal intensity. The craniocervical junction appears normal. 
There is normal signal intensity within the superior sagittal sinus. There is a focal narrowing at the right occipital horn, presumed congenital 
variant. Otherwise, the brain demonstrates normal morphology and signal 
intensity throughout. There are no areas of restricted diffusion. There are normal flow voids at the base of the brain. Trace right mastoid effusion. Left mastoids are clear. Paranasal sinuses are 
clear. There is abnormal signal intensity within the left globe. This appears to be 
chronic. Impression Impression: Motion degraded exam. No acute intracranial findings. Abnormal appearance of the left globe, chronic and stable. Clinical correlation 
needed. ULTRASOUND Results from Hospital Encounter encounter on 09/08/20 US ABD LTD Impression IMPRESSION:    
 
1. Status post cholecystectomy. 2.  No significant common bile duct dilation. 3.  Increased hepatic parenchymal echogenicity with somewhat heterogeneous 
appearance, potentially suggestive of hepatosteatosis. Partial visualization of 
the liver. Results from [facility] [place] on 01/22/19 US ABD LTD Impression IMPRESSION: 
 
Mild heterogeneous echogenicity of the liver is nonspecific. This is commonly 
seen with steatosis hepatic disease. Limited visualization of the pancreas. Cardiology Procedures/Testing: MODALITY RESULTS  
EKG Results for orders placed or performed during the hospital encounter of 09/08/20 EKG, 12 LEAD, INITIAL Result Value Ref Range Ventricular Rate 132 BPM  
 Atrial Rate 132 BPM  
 P-R Interval 158 ms QRS Duration 72 ms Q-T Interval 274 ms QTC Calculation (Bezet) 405 ms Calculated P Axis 43 degrees Calculated R Axis 33 degrees Calculated T Axis 148 degrees Diagnosis Sinus tachycardia Nonspecific T wave abnormality Abnormal ECG When compared with ECG of 08-SEP-2020 15:04, No significant change was found Confirmed by Caleb Saldana MD, Michael Conroy ([id number]) on 9/9/2020 7:48:30 AM 
  
Results for orders placed or performed in visit on 06/20/14 AMB POC EKG ROUTINE W/ 12 LEADS, INTER & REP Impression See progress note. ECHO 01/03/19 ECHO ADULT COMPLETE 01/04/2019 1/4/2019 Narrative · Estimated left ventricular ejection fraction is 61 - 65%. Left  
ventricular mild concentric hypertrophy. Mild (grade 1) left ventricular  
diastolic dysfunction. · Mild tricuspid valve regurgitation is present. Signed by: Keturah Ambrosio MD  
  
 
Special Testing/Procedures: MODALITY RESULTS MICRO All Micro Results Procedure Component Value Units Date/Time CULTURE, BLOOD [[id number]] Collected:  09/15/20 1002 Order Status:  Completed Specimen:  Blood Updated:  09/17/20 5558 Special Requests: NO SPECIAL REQUESTS Culture result: NO GROWTH 2 DAYS     
 CULTURE, BLOOD [[id number]] Collected:  09/15/20 1005 Order Status:  Completed Specimen:  Blood Updated:  09/17/20 7151 Special Requests: NO SPECIAL REQUESTS Culture result: NO GROWTH 2 DAYS     
 CULTURE, URINE [[id number]] Collected:  09/15/20 1850 Order Status:  Completed Specimen:  Urine from Clean catch Updated:  09/16/20 1403 CULTURE, CSF Rafael Herron [[id number]] Collected:  09/15/20 0848 Order Status:  Completed Specimen:  Cerebrospinal Fluid Updated:  09/16/20 1106 Special Requests: CEREBROSPINAL FLUID     
  GRAM STAIN NO WBC'S SEEN     
   NO ORGANISMS SEEN Smear Reviewed by Microbiology 9/15/20 AT 1408 TA.   
     
  Culture result: NO GROWTH AFTER 21 HOURS     
 MENINGITIS PATHOGENS PANEL, CSF (BY PCR) [[id number]] Collected:  09/15/20 0848 Order Status:  Completed Specimen:  Cerebrospinal Fluid Updated:  09/15/20 1510 Escherichia coli K1 Not detected Haemophilus Influenzae Not detected Listeria Monocytogenes Not detected Neisseria Meningitidis Not detected Streptococcus Agalactiae Not detected Streptococcus Pneumoniae Not detected Cytomegalovirus Not detected Enterovirus Not detected Herpes Simplex Virus 1 Not detected Comment: In patients who have negative herpes simplex 1 and 2 PCR results, do not modify treatment, confirm with alternate testing. Herpes Simplex Virus 2 Not detected Comment: In patients who have negative herpes simplex 1 and 2 PCR results, do not modify treatment, confirm with alternate testing. Human Herpesvirus 6 Not detected Human Parechovirus Not detected Varicella Zoster Virus Not detected Crypto. neoformans/gattii Not detected CULTURE, BLOOD [[id number]] Collected:  09/08/20 1640 Order Status:  Completed Specimen:  Blood Updated:  09/14/20 9001 Special Requests: NO SPECIAL REQUESTS Culture result: NO GROWTH 6 DAYS     
 CULTURE, BLOOD [[id number]] Collected:  09/08/20 1640 Order Status:  Completed Specimen:  Blood Updated:  09/14/20 3719 Special Requests: NO SPECIAL REQUESTS Culture result: NO GROWTH 6 DAYS     
 CULTURE, URINE [[id number]] Collected:  09/08/20 1211 Order Status:  Completed Specimen:  Urine from Clean catch Updated:  09/09/20 1811 Special Requests: NO SPECIAL REQUESTS Bastian Count --     
  >100,000 COLONIES/mL Culture result:    
  MIXED UROGENITAL AUDRA ISOLATED  
     
  
  
UA No results found for this or any previous visit. PATH Assessment/Plan Avani Meza is an 60 y.o. female with PMH of hypokalemia, T2DM, retinal detachment, CKD2, GIST s/p resection (2014), HTN, and HLD who was admitted on 9/08/2020 for altered mental status. Altered Mental Status - metabolic encephalopathy of unclear etiology (likely multifactorial) [] Pt initially presented with HHS (BG of 460-500), which was treated and has resolved. Now with BG < 200 consistently euglycemic. [] Initial labs were concerning for infection with leukocytosis and lactic acidosis of unclear source that resolved with IV Abx. Possible UTI was the leading DDx because of a UA that was notable for wbc 6-8, +2 bacteria, neg nitrites/LE, UCx with >100,000 colonies of mixed urogenital kyle, but a CT chest/abd/pelvis (9/09) that shows bladder with mild wall thickening concerning for cystitis/UTI. [] At admission, CT Head (9/08) showed mildly enlarged ventricles that could be concerning for cerebral atrophy vs normal pressure hydrocephalus. [] Pt developed Hypernatremia (9/09) of 151; this was treated and has resolved. [] Also of note, UDS and serum EtOH were negative at admission. Troponin has been negative and EKG with no ST elevation or depression. Serum lipase, ammonia, B12, and folate levels have all been WNL as well. HIV and RPR also negative. - Neurology on board, appreciate their recs - s/p empirical Abx:  Ceftriaxone (9/8 -9/9),  Vancomycin (9/8 - 9/14),  Zosyn (9/9 - 9/14) - Elevated inflammatory markers:  ESR 32 and CRP 3.7 (9/14) - MRI head (9/11):  no acute intracranial findings 
- EEG (9/15):  presence of encephalopathy 
- B1, GRACE, smooth muscle antibodies, and covid were all negative PLAN: 
- F/U UCx, BCx, and CSF-Cx 
- Thyroid panel (although thyroid assessed earlier, only TSH was checked) **to r/o cerebral sagittal sinus thrombosis - MRI with & without contrast  +  MRI venogram  (as per neurology) - Consider starting Steroids vs Mannitol (hydrocephalus/elevated ICP) - As per neurology, avoid benzodiazepines for sedation;  instead start Ziprasidone (geodon) 10mg IM PRN for severe agitation 
- Consider Psychiatry consult to assess for Excited Catatonia 
- Continue Quetiapine (seroquel) 5mg QHS 
- Hold metoclopramide and paroxetine (concern for serotonin syndrome) Extrapyramidal Symptoms - Tardive Dyskinesia, Akasthesia, Dystonia Pt's development of EPS is likely due to use of antipsychotics, and chronic metoclopramide use could also be related. - Currently on physical restraints + roll belt - As per neuro, avoid antipsychotics - As per neuro, Ziprasidone (geodon) PRN for severe agitation Hematuria Due to urinary retention earlier in hospitalization, Pt had a garcia catheter placed. The leading DDx for the hematuria is catheter-related trauma to the urinary tract. - Urology consulted; they rec'd discontinuing garcia and placing Pt in a diaper - Last Hgb 8.6 Normocytic anemia (hg 9-13.5)  
- B12 and folate both wnl 
- FOBT to assess for GI bleed 
  
 
CRYSTAL in setting of Stage 2 CKD Creatinine 3.56 early on during this admission (baseline Cr 1.09 (2019)). Etiology is likely prerenal CRYSTAL with rhabdomyolysis. Iron studies were wnl CPK trendin--> 996 Myoglobin 1,092 positive .8 elevated and vitamin D on low side of normal corrected Ca normal , Vitamin D nml , P nm  
 microalb/cr ratio- wnml 
  
Plan:   
Cont IVF  
Trend CK Nephrology following appreciate recs- calcitril 0.25mcg on discharge 
  
  
R eye pain + Right eye vision loss X 2 weeks Unlikely papilledema based on bedside ultrasound ( no enlarged optic sheath seen), likely floater vs retinal detachment vs pink eye vs acute angle gluacoma Plan:  
- spoke to optho on 9/10, Dr. Gustavo Pandya - not able to come 
  
 
Hypokalemia (resolved) 
electrolyte imbalance (hypokalemia, corrected Ca wnml )  
- replete electrolyte as needed, Mg, Phos 
  
 
Hyperbilirubinemia (3.9) w/ 0.6 direct likely majority is unconjugated  likely secondary to [employer] syndrome, previously elevated bili unlikely hemolysis (improved) CT AB/US no gall bladder disease appreciated , haptoglobin normal, aldolase pending  
  
  
 Diabetes ( last A1c 7.2) (-245)  neuropathy and L retinopathy - not well controlled 
- held NPH 40U BID (hold home NPH 50 U BID)  
-given lantus 5 U daily  
 
  
 HLD 
- cont rosuvastatin  
  
  
GERD 
- cont  omeprazole 40 mg delayed capsule 
  
  
Anxiety 
- held  paroxetine 60 mg daily Diet:  Diabetic diet, advance diet as tolerated by ST 
DVT Prophylaxis:  SQH 
GI Prophylaxis:  Omeprazole Code Status:  Full Point of Contact:  Jose Lan, [phone] (Relationship:  ) Disposition:  >2 MN Tabatha Rodney, MS-[phone]

## 2020-09-17 NOTE — ANESTHESIA POSTPROCEDURE EVALUATION
* No procedures listed *. MAC Anesthesia Post Evaluation Multimodal analgesia: multimodal analgesia used between 6 hours prior to anesthesia start to PACU discharge Patient location during evaluation: bedside Patient participation: complete - patient participated Level of consciousness: awake Pain management: adequate Airway patency: patent Anesthetic complications: no 
Cardiovascular status: stable Respiratory status: acceptable Hydration status: acceptable Post anesthesia nausea and vomiting:  controlled INITIAL Post-op Vital signs: No vitals data found for the desired time range.

## 2020-09-17 NOTE — PROGRESS NOTES
conducted a Follow up consultation and Spiritual Assessment for Heidi Breen, who is a 60 y.o.,female. The  provided the following Interventions: 
Continued the relationship of care and support. Listened empathically. Offered prayer and assurance of continued prayer on patients behalf. Chart reviewed. The following outcomes were achieved: 
Patient expressed gratitude for 's visit. Assessment: 
There are no further spiritual or Yarsanism issues which require Spiritual Care Services interventions at this time. Plan: 
Chaplains will continue to follow and will provide pastoral care on an as needed/requested basis.  recommends bedside caregivers page  on duty if patient shows signs of acute spiritual or emotional distress. Chaplain Emma Jacobson Clinical Certified  Spiritual Care  
[phone]

## 2020-09-17 NOTE — PROGRESS NOTES
7069 Garcia removed per telephone order 1100 Pt had large incontinent episode post garcia removal, complete linen, gown, and pad change. Urine appears yellow in color.

## 2020-09-17 NOTE — PROGRESS NOTES
9/17/2020 9:01 AM 
 
SSN: [id number] Subjective:   49-year-old female who was initially seen on September 11 by neurology 3 days after admission because of changes in mental status. Reportedly she was admitted and was confused, was noted to be agitated and delirious, with an initial impression of encephalopathy potentially due to a UTI or a toxic metabolic etiology, as she was getting IV lorazepam.  She was reportedly tachycardic initially and her blood pressure was elevated and although a febrile a serotonin syndrome was also in the differential and the Paxil was discontinued. She received antibiotics, her CBC has shown normalization of the white blood cell count, no obvious ongoing infectious source has been obvious, and neurology was reconsulted because of ongoing confusion and agitation. She had a 2-week history of right visual loss severe headache, reported as the worst of her life. Ophthalmology was consulted on September 10, according to the records they were going to come and see the patient, but this apparently has not happened. She also had nausea, vomiting, abdominal pain and was blurred vision on her admission. She remains confused, in restraints, trying to get out of bed and wanting to go home. I discussed the case with Dr. Praneeth Carpenter, she has continued to receive benzodiazepines because of concerns that some of the tachycardia and psychotic symptoms including apparent hallucinations were related to Seroquel. Social History Socioeconomic History  Marital status: SINGLE Spouse name: Not on file  Number of children: Not on file  Years of education: Not on file  Highest education level: Not on file Occupational History  Not on file Social Needs  Financial resource strain: Not on file  Food insecurity Worry: Not on file Inability: Not on file  Transportation needs Medical: Not on file Non-medical: Not on file Tobacco Use  Smoking status: Former Smoker Packs/day: 0.20 Years: 8.00 Pack years: 1.60 Types: Cigarettes Last attempt to quit: 12/3/1995 Years since quittin.8  Smokeless tobacco: Never Used Substance and Sexual Activity  Alcohol use: No  
  Comment: Drinks 3-4xs year generally wine  Drug use: No  
 Sexual activity: Not Currently Lifestyle  Physical activity Days per week: Not on file Minutes per session: Not on file  Stress: Not on file Relationships  Social connections Talks on phone: Not on file Gets together: Not on file Attends Orthodoxy service: Not on file Active member of club or organization: Not on file Attends meetings of clubs or organizations: Not on file Relationship status: Not on file  Intimate partner violence Fear of current or ex partner: Not on file Emotionally abused: Not on file Physically abused: Not on file Forced sexual activity: Not on file Other Topics Concern   Service No  
 Blood Transfusions No  
 Caffeine Concern No  
 Occupational Exposure No  
 Hobby Hazards No  
 Sleep Concern No  
 Stress Concern No  
 Weight Concern No  
 Special Diet Yes  Back Care No  
 Exercise No  
 Bike Helmet No  
 Seat Belt Yes  Self-Exams Yes Social History Narrative Lives with 16year old son  with ex   Does not have health insurance Family History Adopted: Yes  
Problem Relation Age of Onset  Heart Failure Mother 76  
 Other Father 70  
     blood clot after surgery  Diabetes Paternal Aunt  Diabetes Paternal Uncle Current Facility-Administered Medications Medication Dose Route Frequency Provider Last Rate Last Dose  tamsulosin (FLOMAX) capsule 0.4 mg  0.4 mg Oral DAILY Arik Hoyt MD   0.4 mg at 20  QUEtiapine (SEROquel) tablet 50 mg  50 mg Oral QHS Julius Hernandez MD   50 mg at 09/16/20 2153  ziprasidone (GEODON) 10 mg in sterile water (preservative free) 0.5 mL injection  10 mg IntraMUSCular Q12H PRN Julius Hernandez MD      
 heparin (porcine) injection 5,000 Units  5,000 Units SubCUTAneous Q8H Julius Hernandez MD   5,000 Units at 09/17/20 0911  
 insulin glargine (LANTUS) injection 5 Units  5 Units SubCUTAneous DAILY Julius Hernandez MD   5 Units at 09/17/20 1400  lactated Ringers infusion  100 mL/hr IntraVENous CONTINUOUS Matty Lopez  mL/hr at 09/17/20 0557 100 mL/hr at 09/17/20 0557  polyvinyl alcohol-povidon(PF) (REFRESH CLASSIC) 1.4-0.6 % ophthalmic solution 1 Drop  1 Drop Both Eyes MANDI Penaloza MD   1 Drop at 09/14/20 2026  cholecalciferol (VITAMIN D3) (1000 Units /25 mcg) tablet 1 Tab  1,000 Units Oral DAILY Julius Hernandez MD   1 Tab at 09/17/20 0911  
 insulin lispro (HUMALOG) injection   SubCUTAneous AC&HS Raleigh Burton MD   Stopped at 09/17/20 0911  
 melatonin tablet 3 mg  3 mg Oral QHS Riaz GUERRERO MD   3 mg at 09/16/20 2145  sodium chloride (NS) flush 5-10 mL  5-10 mL IntraVENous PRN Julius Hernandez MD      
 [Held by provider] insulin NPH (NOVOLIN N, HUMULIN N) injection 40 Units  40 Units SubCUTAneous ACB&D Julius Hernandez MD   Stopped at 09/09/20 0930  
 glucose chewable tablet 16 g  16 g Oral PRN Julius Hernandez MD      
 glucagon (GLUCAGEN) injection 1 mg  1 mg IntraMUSCular PRN Julius Hernandez MD      
 dextrose (D50W) injection syrg 12.5-25 g  25-50 mL IntraVENous PRN Julius Hernandez MD      
 sodium chloride (NS) flush 5-40 mL  5-40 mL IntraVENous Q8H Julius Hernandez MD   10 mL at 09/17/20 0542  sodium chloride (NS) flush 5-40 mL  5-40 mL IntraVENous PRN Julius Hernandez MD   10 mL at 09/15/20 2214  acetaminophen (TYLENOL) tablet 650 mg  650 mg Oral Q6H PRN Julius Hernandez MD   650 mg at 09/17/20 0227 Or  acetaminophen (TYLENOL) suppository 650 mg  650 mg Rectal Q6H PRN Vivien Paul MD      
  polyethylene glycol (MIRALAX) packet 17 g  17 g Oral DAILY PRN Julius Hernandez MD      
 [Held by provider] PARoxetine (PAXIL) tablet 60 mg  60 mg Oral DAILY Julius Hernandez MD      
 pantoprazole (PROTONIX) tablet 40 mg  40 mg Oral DAILY Julius Hernandez MD   40 mg at 09/17/20 3436  [Held by provider] rosuvastatin (CRESTOR) tablet 20 mg  20 mg Oral QHS Julius Hernandez MD      
 ondansetron (ZOFRAN) injection 4 mg  4 mg IntraVENous Q6H PRN Julius Hernandez MD      
 
 
Past Medical History:  
Diagnosis Date  Blind left eye  Cellulitis of great toe of right foot 10/19/2017  Diabetes ([address]  Diabetic retinopathy ([address]  Gall stones  GERD (gastroesophageal reflux disease)  Hammertoe  Hiatal hernia  History of mammogram 10/03/2017 No evidence of malignancy  Hypertension  Mass of abdomen  Neuropathy due to type 2 diabetes mellitus ([address]  Osteomyelitis of toe of right foot ([address] 10/19/2017  Pancreatitis Past Surgical History:  
Procedure Laterality Date  HX AMPUTATION Left 07/21/2017  
 left 2nd toe  HX CHOLECYSTECTOMY  10/15/2014  HX GI Benign GI Stromal Tumor excision  HX HEENT Sx for detached retina  HX MYOMECTOMY x5 removed  HX OTHER SURGICAL    
 upper endoscopy Allergies Allergen Reactions  Levaquin [Levofloxacin] Hives  Promethazine Nausea and Vomiting \"the phenergan made me more nauseated\" Vital signs:   
Visit Vitals /75 (BP 1 Location: Right arm, BP Patient Position: At rest) Pulse (!) 112 Temp 99.2 °F (37.3 °C) Resp 20 Ht 5' 8\" (1.727 m) Wt 93.4 kg (206 lb) LMP 10/06/2015 (Exact Date) SpO2 98% Breastfeeding No  
BMI 31.32 kg/m² Review of Systems:  
Unobtainable Recent Results (from the past 24 hour(s)) GLUCOSE, POC Collection Time: 09/16/20 11:23 AM  
Result Value Ref Range Glucose (POC) 168 (H) 70 - 110 mg/dL AMMONIA  Collection Time: 09/16/20  3:36 PM  
 Result Value Ref Range Ammonia 15 11 - 32 UMOL/L  
GLUCOSE, POC Collection Time: 09/16/20  5:19 PM  
Result Value Ref Range Glucose (POC) 179 (H) 70 - 110 mg/dL CK Collection Time: 09/16/20 11:19 PM  
Result Value Ref Range CK 1,057 (H) 26 - 192 U/L  
CBC WITH MANUAL DIFF Collection Time: 09/17/20  5:38 AM  
Result Value Ref Range WBC 12.9 4.6 - 13.2 K/uL  
 RBC 2.97 (L) 4.20 - 5.30 M/uL HGB 8.6 (L) 12.0 - 16.0 g/dL HCT 25.6 (L) 35.0 - 45.0 % MCV 86.2 74.0 - 97.0 FL  
 MCH 29.0 24.0 - 34.0 PG  
 MCHC 33.6 31.0 - 37.0 g/dL  
 RDW 14.2 11.6 - 14.5 % PLATELET 494 514 - 087 K/uL MPV 10.5 9.2 - 11.8 FL  
 DIFFERENTIAL MANUAL DIFFERENTIAL ORDERED    
 NEUTROPHILS 63 42 - 75 % BAND NEUTROPHILS 0 0 - 5 % LYMPHOCYTES 29 20 - 51 % MONOCYTES 8 2 - 9 % EOSINOPHILS 0 0 - 5 % BASOPHILS 0 0 - 3 % METAMYELOCYTES 0 0 % MYELOCYTES 0 0 % PROMYELOCYTES 0 0 % BLASTS 0 0 % OTHER CELL 0 0    
 ABS. NEUTROPHILS 8.2 (H) 1.8 - 8.0 K/UL  
 ABS. LYMPHOCYTES 3.7 (H) 0.8 - 3.5 K/UL  
 ABS. MONOCYTES 1.0 0 - 1.0 K/UL  
 ABS. EOSINOPHILS 0.0 0.0 - 0.4 K/UL  
 ABS. BASOPHILS 0.0 0.0 - 0.06 K/UL  
 DF MANUAL PLATELET COMMENTS ADEQUATE PLATELETS    
 RBC COMMENTS ANISOCYTOSIS 1+ 
    
 RBC COMMENTS POLYCHROMASIA 1+ METABOLIC PANEL, COMPREHENSIVE Collection Time: 09/17/20  5:38 AM  
Result Value Ref Range Sodium 140 136 - 145 mmol/L Potassium 3.6 3.5 - 5.5 mmol/L Chloride 106 100 - 111 mmol/L  
 CO2 30 21 - 32 mmol/L Anion gap 4 3.0 - 18 mmol/L Glucose 108 (H) 74 - 99 mg/dL BUN 5 (L) 7.0 - 18 MG/DL Creatinine 0.92 0.6 - 1.3 MG/DL  
 BUN/Creatinine ratio 5 (L) 12 - 20 GFR est AA >60 >60 ml/min/1.73m2 GFR est non-AA >60 >60 ml/min/1.73m2 Calcium 8.8 8.5 - 10.1 MG/DL Bilirubin, total 1.8 (H) 0.2 - 1.0 MG/DL  
 ALT (SGPT) 51 13 - 56 U/L  
 AST (SGOT) 68 (H) 10 - 38 U/L Alk.  phosphatase 88 45 - 117 U/L  
 Protein, total 7.0 6.4 - 8.2 g/dL Albumin 3.2 (L) 3.4 - 5.0 g/dL Globulin 3.8 2.0 - 4.0 g/dL A-G Ratio 0.8 0.8 - 1.7 PHOSPHORUS Collection Time: 09/17/20  5:38 AM  
Result Value Ref Range Phosphorus 3.6 2.5 - 4.9 MG/DL MAGNESIUM Collection Time: 09/17/20  5:38 AM  
Result Value Ref Range Magnesium 1.8 1.6 - 2.6 mg/dL CK Collection Time: 09/17/20  5:38 AM  
Result Value Ref Range  (H) 26 - 192 U/L  
 
 
MRI of the brain done on September 11 was motion degraded with no obvious acute changes, areas of restricted diffusion, there was signal intensity abnormality in the left lobe that appeared to be chronic and of unknown etiology. Her last chest x-ray was in September 8 and unremarkable, her last UA was also on September 8. A chest, abdomen, pelvic CT was unremarkable EXAM: Alert, restless, disoriented, seems more pleasant today than yesterday,  cannot tell me the date, not sure why she is here. She sits up and lays back down restlessly. Heart is regular. EOM's are full, no gaze preference, pupils are sluggishly reactive if at all, unable to visualize fundi, no facial asymmetries. Rest of the cranial nerves could not be performed. There is no lateralizing paresis. Tone is not increased. DTRs are +2, gait was deferred. Patient had an LP under fluoroscopy Tuesday. , on the prone position had an opening pressure of 36 with a closing pressure of 12. Recent Results (from the past 24 hour(s)) GLUCOSE, POC Collection Time: 09/16/20 11:23 AM  
Result Value Ref Range Glucose (POC) 168 (H) 70 - 110 mg/dL AMMONIA Collection Time: 09/16/20  3:36 PM  
Result Value Ref Range Ammonia 15 11 - 32 UMOL/L  
GLUCOSE, POC Collection Time: 09/16/20  5:19 PM  
Result Value Ref Range Glucose (POC) 179 (H) 70 - 110 mg/dL CK Collection Time: 09/16/20 11:19 PM  
Result Value Ref Range  CK 1,057 (H) 26 - 192 U/L  
CBC WITH MANUAL DIFF  
 Collection Time: 09/17/20  5:38 AM  
Result Value Ref Range WBC 12.9 4.6 - 13.2 K/uL  
 RBC 2.97 (L) 4.20 - 5.30 M/uL HGB 8.6 (L) 12.0 - 16.0 g/dL HCT 25.6 (L) 35.0 - 45.0 % MCV 86.2 74.0 - 97.0 FL  
 MCH 29.0 24.0 - 34.0 PG  
 MCHC 33.6 31.0 - 37.0 g/dL  
 RDW 14.2 11.6 - 14.5 % PLATELET 247 447 - 197 K/uL MPV 10.5 9.2 - 11.8 FL  
 DIFFERENTIAL MANUAL DIFFERENTIAL ORDERED    
 NEUTROPHILS 63 42 - 75 % BAND NEUTROPHILS 0 0 - 5 % LYMPHOCYTES 29 20 - 51 % MONOCYTES 8 2 - 9 % EOSINOPHILS 0 0 - 5 % BASOPHILS 0 0 - 3 % METAMYELOCYTES 0 0 % MYELOCYTES 0 0 % PROMYELOCYTES 0 0 % BLASTS 0 0 % OTHER CELL 0 0    
 ABS. NEUTROPHILS 8.2 (H) 1.8 - 8.0 K/UL  
 ABS. LYMPHOCYTES 3.7 (H) 0.8 - 3.5 K/UL  
 ABS. MONOCYTES 1.0 0 - 1.0 K/UL  
 ABS. EOSINOPHILS 0.0 0.0 - 0.4 K/UL  
 ABS. BASOPHILS 0.0 0.0 - 0.06 K/UL  
 DF MANUAL PLATELET COMMENTS ADEQUATE PLATELETS    
 RBC COMMENTS ANISOCYTOSIS 1+ 
    
 RBC COMMENTS POLYCHROMASIA 1+ METABOLIC PANEL, COMPREHENSIVE Collection Time: 09/17/20  5:38 AM  
Result Value Ref Range Sodium 140 136 - 145 mmol/L Potassium 3.6 3.5 - 5.5 mmol/L Chloride 106 100 - 111 mmol/L  
 CO2 30 21 - 32 mmol/L Anion gap 4 3.0 - 18 mmol/L Glucose 108 (H) 74 - 99 mg/dL BUN 5 (L) 7.0 - 18 MG/DL Creatinine 0.92 0.6 - 1.3 MG/DL  
 BUN/Creatinine ratio 5 (L) 12 - 20 GFR est AA >60 >60 ml/min/1.73m2 GFR est non-AA >60 >60 ml/min/1.73m2 Calcium 8.8 8.5 - 10.1 MG/DL Bilirubin, total 1.8 (H) 0.2 - 1.0 MG/DL  
 ALT (SGPT) 51 13 - 56 U/L  
 AST (SGOT) 68 (H) 10 - 38 U/L Alk. phosphatase 88 45 - 117 U/L Protein, total 7.0 6.4 - 8.2 g/dL Albumin 3.2 (L) 3.4 - 5.0 g/dL Globulin 3.8 2.0 - 4.0 g/dL A-G Ratio 0.8 0.8 - 1.7 PHOSPHORUS Collection Time: 09/17/20  5:38 AM  
Result Value Ref Range Phosphorus 3.6 2.5 - 4.9 MG/DL MAGNESIUM Collection Time: 09/17/20  5:38 AM  
Result Value Ref Range Magnesium 1.8 1.6 - 2.6 mg/dL CK Collection Time: 09/17/20  5:38 AM  
Result Value Ref Range  (H) 26 - 192 U/L Assessment/Plan: Encephalopathy, etiology not yet clear, but given the presenting history of headaches, right-sided visual loss, confusion, and an elevated intracranial pressure that does not appear to be coming from either a CNS infection or a subarachnoid hemorrhage we need to consider the possibility of cerebral sagittal sinus thrombosis which could have been missed by an MRI which was degraded by motion and did not include venography. MRV and detailed MRI of the brain with and without gadolinium are planned for this afternoon under anesthesia in order to get appropriate test. 
 
As far as sedation goes, I discussed this with Dr. jose hopkins. I continue to advised to refrain from benzodiazepines which may be causing paradoxical agitation. I suggested to use quetiapine nightly as a standing dose along with Geodon 20 mg IV twice a day as needed for severe agitation. She has not had alkalosis decreased p.o. to on blood gases, unclear how much this is contributing to the encephalopathy, recommend continued monitoring. 15 out of 25 minutes minutes were spent in floor time reviewing extensive serology, imaging, notes, l and discussing the case with Dr. Sherry Saavedra. PLEASE NOTE:  
Portions of this document may have been produced using voice recognition software. Unrecognized errors in transcription may be present. This note will not be viewable in [address]

## 2020-09-17 NOTE — PROGRESS NOTES
[facility] Progress Note Patient: Cristian Thompson MRN: [id number] SSN: [id number]  YOB: 1965 Age: 60 y.o. Sex: female Admit Date: 9/8/2020 LOS: 9 days Chief Complaint Patient presents with  Vomiting Subjective:  
 
Pt states her name, and states where she is \"[place]. \"  Pt does not know why she is here. She also was asking where the portable ventilator is. ROS Pt denies any sob and cp. No fever and chills. Objective:  
 
Visit Vitals /76 (BP 1 Location: Right arm, BP Patient Position: At rest) Pulse (!) 116 Temp 99.3 °F (37.4 °C) Resp 20 Ht 5' 8\" (1.727 m) Wt 93.4 kg (206 lb) LMP 10/06/2015 (Exact Date) SpO2 97% Breastfeeding No  
BMI 31.32 kg/m² Physical Exam: 
General:  AXOx 2 , Pt follows command intermittently, eyes closed , keeps trying to get out of bed HEENT: R eye erythema, no drainage,  R not reactive dilated, Left eye sluggish but react to light, moist mucous membranes.   
CV:  RRR, no murmurs. No visible pulsations or thrills.   
RESP:  Unlabored lungs clear to auscultation without adventitious breath sounds. Equal expansion bilaterally.   
ABD:  soft abdomen, non-tender,  nondistended. BS (+).   
MS:  No joint deformity or instability.  No atrophy. Neuro:  Pt no facial droop, no deviation of tongue, unable to evaluate EOM because patient does not follow commands, moves all four extremities, Tardive akathisia Lower extremity bilaterally rigid tone, negative babinski  
Ext:  R foot the first digit is amputated, Left second digit amputated, No edema.  2+ radial and dp pulses bilaterally. Skin:  No rashes, lesions, or ulcers. Intake and Output: 
Current Shift: No intake/output data recorded. Last three shifts: 09/15 1901 - 09/17 0700 In: 1323.3 [I.V.:1323.3] Out: 4850 [ESNVN:0307] Lab/Data Review: 
Recent Results (from the past 12 hour(s)) CK  Collection Time: 09/16/20 11:19 PM  
 Result Value Ref Range CK 1,057 (H) 26 - 192 U/L  
CBC WITH MANUAL DIFF Collection Time: 09/17/20  5:38 AM  
Result Value Ref Range WBC 12.9 4.6 - 13.2 K/uL  
 RBC 2.97 (L) 4.20 - 5.30 M/uL HGB 8.6 (L) 12.0 - 16.0 g/dL HCT 25.6 (L) 35.0 - 45.0 % MCV 86.2 74.0 - 97.0 FL  
 MCH 29.0 24.0 - 34.0 PG  
 MCHC 33.6 31.0 - 37.0 g/dL  
 RDW 14.2 11.6 - 14.5 % PLATELET 184 655 - 193 K/uL MPV 10.5 9.2 - 11.8 FL  
 DIFFERENTIAL MANUAL DIFFERENTIAL ORDERED    
 NEUTROPHILS 63 42 - 75 % BAND NEUTROPHILS 0 0 - 5 % LYMPHOCYTES 29 20 - 51 % MONOCYTES 8 2 - 9 % EOSINOPHILS 0 0 - 5 % BASOPHILS 0 0 - 3 % METAMYELOCYTES 0 0 % MYELOCYTES 0 0 % PROMYELOCYTES 0 0 % BLASTS 0 0 % OTHER CELL 0 0    
 ABS. NEUTROPHILS 8.2 (H) 1.8 - 8.0 K/UL  
 ABS. LYMPHOCYTES 3.7 (H) 0.8 - 3.5 K/UL  
 ABS. MONOCYTES 1.0 0 - 1.0 K/UL  
 ABS. EOSINOPHILS 0.0 0.0 - 0.4 K/UL  
 ABS. BASOPHILS 0.0 0.0 - 0.06 K/UL  
 DF MANUAL PLATELET COMMENTS ADEQUATE PLATELETS    
 RBC COMMENTS ANISOCYTOSIS 1+ 
    
 RBC COMMENTS POLYCHROMASIA 1+ METABOLIC PANEL, COMPREHENSIVE Collection Time: 09/17/20  5:38 AM  
Result Value Ref Range Sodium 140 136 - 145 mmol/L Potassium 3.6 3.5 - 5.5 mmol/L Chloride 106 100 - 111 mmol/L  
 CO2 30 21 - 32 mmol/L Anion gap 4 3.0 - 18 mmol/L Glucose 108 (H) 74 - 99 mg/dL BUN 5 (L) 7.0 - 18 MG/DL Creatinine 0.92 0.6 - 1.3 MG/DL  
 BUN/Creatinine ratio 5 (L) 12 - 20 GFR est AA >60 >60 ml/min/1.73m2 GFR est non-AA >60 >60 ml/min/1.73m2 Calcium 8.8 8.5 - 10.1 MG/DL Bilirubin, total 1.8 (H) 0.2 - 1.0 MG/DL  
 ALT (SGPT) 51 13 - 56 U/L  
 AST (SGOT) 68 (H) 10 - 38 U/L Alk. phosphatase 88 45 - 117 U/L Protein, total 7.0 6.4 - 8.2 g/dL Albumin 3.2 (L) 3.4 - 5.0 g/dL Globulin 3.8 2.0 - 4.0 g/dL A-G Ratio 0.8 0.8 - 1.7 PHOSPHORUS Collection Time: 09/17/20  5:38 AM  
Result Value Ref Range  Phosphorus 3.6 2.5 - 4.9 MG/DL  
 MAGNESIUM Collection Time: 09/17/20  5:38 AM  
Result Value Ref Range Magnesium 1.8 1.6 - 2.6 mg/dL CK Collection Time: 09/17/20  5:38 AM  
Result Value Ref Range  (H) 26 - 192 U/L  
 
 
RECENT RESULTS MODALITY IMPRESSION  
XR Results from Hospital Encounter encounter on 09/08/20 XR ORBIT BI FOREIGN BODY Narrative EXAM: ORBITS LIMITED CLINICAL HISTORY/INDICATION: , blurred vision with nausea, vomiting, and 
abdominal pain, severe agitation, altered mental status, diabetic retinopathy 
due to type 2 diabetes [facility] [facility] mri screen. COMPARISON: None. TECHNIQUE: modified ramos view(s) of the orbits obtained. FINDINGS: 
 
There are no radiopaque metallic foreign bodies within the orbits. There is no 
aneurysm clip. The visualized  paranasal sinuses are normal. 
  
 Impression IMPRESSION: 
 
Negative orbital screening prior to MRI. CT Results from Hospital Encounter encounter on 09/08/20 CT CHEST ABD PELV W CONT Narrative CT chest, abdomen and pelvis with contrast  
 
CLINICAL HISTORY: Leukocytosis. CT evaluation of potential infection versus 
abscess COMPARISON: None. TECHNIQUE: Helical scan to the chest, abdomen and pelvis are obtained from the 
thoracic inlet to the symphysis pubis after IV contrast administration. All CT scans at this facility performed using dose optimization techniques as 
appreciated to a performed exam, to include automated exposure control, 
adjustment of the mA and or KU according to patient size (including appropriate 
matching for site specific examination), or use of iterative reconstruction 
technique. FINDINGS: Moderate motion artifact noted and limits the detail evaluation. CT CHEST:  
 
Lung Parenchyma: Minimal bibasilar atelectasis. No acute airspace disease or 
consolidation. Thyroid: Small hypodense nodule in the right lobe measuring about 5 mm. Mediastinum: No adenopathy.  Mild and circumferential esophageal wall prominence 
with collapsed the lumen. Heart: The heart and the pericardium appear normal. 
 
Vasculature: The aorta and the great vessels are unremarkable. Pleural Space And Chest Wall: No significant pleural pathology. CT ABDOMEN AND PELVIS: 
 
Liver: Normal. 
 
Gallbladder: Surgically absent. Biliary System: No ductal dilatation. Spleen: Normal. 
 
Pancreas: Normal. 
 
Adrenal Glands: Normal. 
 
Kidneys: Normal. 
 
Bowel: The small and large bowel are nondilated. Normal appendix. Fluid-filled 
sigmoid colon and rectum with air-fluid level without dilatation. Lower genitourinary system: The bladder is markedly distended up to the level 
above the umbilicus, 98.0 cm in cc dimension. No definite bladder wall lesion 
seen. The uterus appears spherical and heterogeneous with several partially 
calcified fibroids present. No adnexal mass. Peritoneum/Retroperitoneum: No adenopathy. Vasculature: Unremarkable for age. Other: No free fluid. CT OSSEOUS STRUCTURES:   
 
Unremarkable for age. Impression IMPRESSION:  
 
1. Marked bladder distention up to the level above the umbilicus. Recommend 
clinical correlation for urinary outlet obstruction. 2. No CT evidence of infection or inflammatory process. No abscess seen. 3. Tiny hypodense nodule in right thyroid lobe. This can be better evaluated by 
thyroid ultrasound. 4. Mild and circumferential esophageal wall prominence with collapsed the lumen. If symptomatic, barium swallow can be performed for better evaluation. 5. Fluid-filled nondilated distal colon and rectum as nonspecific finding and 
could represent diarrhea. 6. Multiple partially calcified fibroids. Thank you for this referral.   
  
MRI Results from Hospital Encounter encounter on 09/11/20 MRI BRAIN WO CONT  Narrative Description:  MRI brain without contrast 
 
TECHNIQUE: Multiplanar, multisequence MR imaging of the brain without contrast 
 
 Clinical Indication:  Altered mental status. Comparison: September 9, 2020. Findings: Motion degraded exam. 
 
Midline structures of the brain to include the corpus callosum, pituitary gland 
and clivus demonstrate normal morphology and signal intensity. The craniocervical junction appears normal. 
There is normal signal intensity within the superior sagittal sinus. There is a focal narrowing at the right occipital horn, presumed congenital 
variant. Otherwise, the brain demonstrates normal morphology and signal 
intensity throughout. There are no areas of restricted diffusion. There are normal flow voids at the base of the brain. Trace right mastoid effusion. Left mastoids are clear. Paranasal sinuses are 
clear. There is abnormal signal intensity within the left globe. This appears to be 
chronic. Impression Impression: Motion degraded exam. No acute intracranial findings. Abnormal appearance of the left globe, chronic and stable. Clinical correlation 
needed. ULTRASOUND Results from Hospital Encounter encounter on 09/08/20 US ABD LTD Impression IMPRESSION:    
 
1. Status post cholecystectomy. 2.  No significant common bile duct dilation. 3.  Increased hepatic parenchymal echogenicity with somewhat heterogeneous 
appearance, potentially suggestive of hepatosteatosis. Partial visualization of 
the liver. Results from [facility] Encounter encounter on 01/22/19 US ABD LTD Impression IMPRESSION: 
 
Mild heterogeneous echogenicity of the liver is nonspecific. This is commonly 
seen with steatosis hepatic disease. Limited visualization of the pancreas. Cardiology Procedures/Testing: MODALITY RESULTS  
EKG Results for orders placed or performed during the hospital encounter of 09/08/20 EKG, 12 LEAD, INITIAL Result Value Ref Range Ventricular Rate 132 BPM  
 Atrial Rate 132 BPM  
 P-R Interval 158 ms QRS Duration 72 ms Q-T Interval 274 ms QTC Calculation (Bezet) 405 ms Calculated P Axis 43 degrees Calculated R Axis 33 degrees Calculated T Axis 148 degrees Diagnosis Sinus tachycardia Nonspecific T wave abnormality Abnormal ECG When compared with ECG of 08-SEP-2020 15:04, No significant change was found Confirmed by Tevin Armenta MD, Subha Martin ([id number]) on 9/9/2020 7:48:30 AM 
  
Results for orders placed or performed in visit on 06/20/14 AMB POC EKG ROUTINE W/ 12 LEADS, INTER & REP Impression See progress note. ECHO 01/03/19 ECHO ADULT COMPLETE 01/04/2019 1/4/2019 Narrative · Estimated left ventricular ejection fraction is 61 - 65%. Left  
ventricular mild concentric hypertrophy. Mild (grade 1) left ventricular  
diastolic dysfunction. · Mild tricuspid valve regurgitation is present. Signed by: Barbara Nagel MD  
  
 
Special Testing/Procedures: MODALITY RESULTS MICRO All Micro Results Procedure Component Value Units Date/Time CULTURE, BLOOD [[id number]] Collected:  09/15/20 1002 Order Status:  Completed Specimen:  Blood Updated:  09/17/20 9935 Special Requests: NO SPECIAL REQUESTS Culture result: NO GROWTH 2 DAYS     
 CULTURE, BLOOD [[id number]] Collected:  09/15/20 1005 Order Status:  Completed Specimen:  Blood Updated:  09/17/20 5294 Special Requests: NO SPECIAL REQUESTS Culture result: NO GROWTH 2 DAYS     
 CULTURE, URINE [[id number]] Collected:  09/15/20 1850 Order Status:  Completed Specimen:  Urine from Clean catch Updated:  09/16/20 1403 CULTURE, CSF Clemencia Gong [[id number]] Collected:  09/15/20 0848 Order Status:  Completed Specimen:  Cerebrospinal Fluid Updated:  09/16/20 1106 Special Requests: CEREBROSPINAL FLUID     
  GRAM STAIN NO WBC'S SEEN     
   NO ORGANISMS SEEN Smear Reviewed by Microbiology 9/15/20 AT 1408 TA.   
     
  Culture result: NO GROWTH AFTER 21 HOURS     
 MENINGITIS PATHOGENS PANEL, CSF (BY PCR) [[id number]] Collected:  09/15/20 0848 Order Status:  Completed Specimen:  Cerebrospinal Fluid Updated:  09/15/20 1510 Escherichia coli K1 Not detected Haemophilus Influenzae Not detected Listeria Monocytogenes Not detected Neisseria Meningitidis Not detected Streptococcus Agalactiae Not detected Streptococcus Pneumoniae Not detected Cytomegalovirus Not detected Enterovirus Not detected Herpes Simplex Virus 1 Not detected Comment: In patients who have negative herpes simplex 1 and 2 PCR results, do not modify treatment, confirm with alternate testing. Herpes Simplex Virus 2 Not detected Comment: In patients who have negative herpes simplex 1 and 2 PCR results, do not modify treatment, confirm with alternate testing. Human Herpesvirus 6 Not detected Human Parechovirus Not detected Varicella Zoster Virus Not detected Crypto. neoformans/gattii Not detected CULTURE, BLOOD [[id number]] Collected:  09/08/20 1640 Order Status:  Completed Specimen:  Blood Updated:  09/14/20 2758 Special Requests: NO SPECIAL REQUESTS Culture result: NO GROWTH 6 DAYS     
 CULTURE, BLOOD [[id number]] Collected:  09/08/20 1640 Order Status:  Completed Specimen:  Blood Updated:  09/14/20 6563 Special Requests: NO SPECIAL REQUESTS Culture result: NO GROWTH 6 DAYS     
 CULTURE, URINE [[id number]] Collected:  09/08/20 1211 Order Status:  Completed Specimen:  Urine from Clean catch Updated:  09/09/20 1811 Special Requests: NO SPECIAL REQUESTS Bantam Count --     
  >100,000 COLONIES/mL Culture result:    
  MIXED UROGENITAL AUDRA ISOLATED  
     
  
  
UA No results found for this or any previous visit. PATH none Telemetry NONE Oxygen NONE Assessment and Plan:  
60 y. o. 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   
  
Metabolic Encephalopathy of unclear etiology? Likely multifactorial- improving Came with a NCT 558-318 BG, She was treated with IV fluids . She had a leucocytosis, and lactic acidosis that improved with IV antibiotics unclear source UTI? leucocytosis initially, So far blood culture on 9/8 was no growth. Pt's UA only wbc 6-8, 2+ bacteria, LE neg/Nitrite neg.  Also, urine culture >100, 000 colony mixed urogenital kyle. Her  9/9 CT Chest/AB/Pelvis showed bladder with mild wall thickening cystitis unlikely UTI.   Hypernatremia (151--> 150--> 141) resolved. Pt was negative UDS and ETOH serum (9/8) On  9/8 CT head showed mild prominence: mild enlargement of lateral ventricles, cerebral atrophy Labs negative for Negative troponin, EKG no ST elevation or depression, lipase normal, ammonia nml, B12 and folate nmlm,  
- s/p empirical  abx Ceftriaxone  (9/8-/9) 
- s/p empiric abx  vancomycin (9/8-9/14)  and zoysn (9/9-9/14 ) - MRI head 9/11 - no acute intracranial finding  
- HIV negative, RPR negative, RPR negative, ammonia 15  
- ESR 32 and CRP 3.7  elevated 
- EEG- encephalopathy 
- blood culture- NG 
- CSF wbc 1 , protein 55, glucose 70, elevated opening 34 mmhg - GRACE- negative 
-- CT AB/PELVIS/CHEST IV : Marked bladder distention up to the level above the umbilicus. recommend clinical correlation for urinary outlet obstruction. No CT evidence of infection or inflammatory process. No abscess seen. Tiny hypodense nodule in right thyroid lobe. This can be better evaluated by thyroid ultrasound. Mild and circumferential esophageal wall prominence with collapsed the lumen. If symptomatic, barium swallow can be performed for better evaluation.  Fluid-filled nondilated distal colon and rectum as nonspecific finding and 
 could represent diarrhea. Multiple partially calcified fibroids. -COVID-19 negative 
  
Plan:  
-F/u FOBT, TSH thyroid profile. EKG 
- pending HSV 1/2 ABS, CSF 
- anti-smooth muscle, B1,  
- pending Urine culture 
- concern serotonin syndrome- hold meds metoclopramide and paroxetine - appreciated neurology recs - MRI Brain w w/o  And MRV brain w wo- r/o cerebral sagittal sinus thrombosis, will need sedation due to agitation - Geodon for severe agitation as needed  q12h and seroquel 50 mg qhs 
  
 Agitation - better controlled Wax and waning 
- restrains, role belt 
- avoid antipsychotics - Geodon for severe agitation as needed  q12h and seroquel 50 mg qhs 
  
 
Urinary retention s/p garcia  W/ trauma Has some hematuria will continue to monitor Hg last 8.8  
- Urology saw patient said to DC garcia, and place in diaper 
 
  
Tardive akathisia- restlessness 
- initially also had dystonia, EPS symptoms  
- likely related with chronic metoclopramide use  
- held reglan and  
 
Normocytic anemia (hg 9-13.5)  
 B12 and folate normal 
Plan: FOBT  
  
CRYSTAL CR 3.56 ( baseline Cr 1.09 1/2019) in setting of CKD stage 2  likely pre-julian and rhabdo (CK increasing again) Iron profile normal 
CK 1,057--> 996 Myoglobin 1,092 positive .8 elevated and vitamin D on low side of normal corrected Ca normal , Vitamin D nml , P nm  
 microalb/cr ratio- wnml 
  
Plan:   
Cont IVF  
Trend CK Nephrology following appreciate recs- calcitril 0.25mcg on discharge 
  
  
R eye pain + Right eye vision loss X 2 weeks Unlikely papilledema based on bedside ultrasound ( no enlarged optic sheath seen), likely floater vs retinal detachment vs pink eye vs acute angle gluacoma Plan:  
- spoke to optho on 9/10, Dr. Bernabe Hallmark - not able to come 
  
Hypokalemia (resolved) 
electrolyte imbalance (hypokalemia, corrected Ca wnml )  
- replete electrolyte as needed, Mg, Phos 
  
 Hyperbilirubinemia (3.9) w/ 0.6 direct likely majority is unconjugated  likely secondary to [place] syndrome, previously elevated bili unlikely hemolysis (improved) CT AB/US no gall bladder disease appreciated , haptoglobin normal, aldolase pending  
  
  
 Diabetes ( last A1c 7.2) (-245)  neuropathy and L retinopathy - not well controlled 
- held NPH 40U BID (hold home NPH 50 U BID) -given lantus 5 U daily  
 HTN 
- not on meds 
  
 HLD 
- cont rosuvastatin  
  
Gastroparesis  
- held metoclopramide 5 mg  
  
GERD 
- cont  omeprazole 40 mg delayed capsule 
  
  
Anxiety 
- held  paroxetine 60 mg daily 
  
Diet Advance as tolerated per speech DVT Prophylaxis heparin GI Prophylaxis Omperazole Code status Full code Disposition unknown  
  
Point of Contact [place] Relationship: 
[phone] Julius Hernandez PGY-1  
[address] Pager: [phone] September 17, 2020, 7:48 AM

## 2020-09-18 NOTE — PROGRESS NOTES
9/18/2020 9:01 AM 
 
SSN: [id number] Subjective:   70-year-old female who was initially seen on September 11 by neurology 3 days after admission because of changes in mental status. Reportedly she was admitted and was confused, was noted to be agitated and delirious, with an initial impression of encephalopathy potentially due to a UTI or a toxic metabolic etiology, as she was getting IV lorazepam.  She was reportedly tachycardic initially and her blood pressure was elevated and although a febrile a serotonin syndrome was also in the differential and the Paxil was discontinued. She received antibiotics, her CBC has shown normalization of the white blood cell count, no obvious ongoing infectious source has been obvious, and neurology was reconsulted because of ongoing confusion and agitation. She had a 2-week history of right visual loss severe headache, reported as the worst of her life. Ophthalmology was consulted on September 10, according to the records they were going to come and see the patient, but this apparently has not happened. She also had nausea, vomiting, abdominal pain and was blurred vision on her admission. She remains confused, in restraints, trying to get out of bed and wanting to go home. She has been reportedly much more calm today and 
 
 
MRI of the brain done September 16 was personally reviewed, not showing any intracranial anomalies, there are postsurgical changes related to a previous retinal detachment of the left optic globe. There is no radiographic support for presence of increased intracranial pressure. An MRV did not shown any major dural sinus or deep cerebral venous thrombosis or stenosis. Social History Socioeconomic History  Marital status: SINGLE Spouse name: Not on file  Number of children: Not on file  Years of education: Not on file  Highest education level: Not on file Occupational History  Not on file Social Needs  Financial resource strain: Not on file  Food insecurity Worry: Not on file Inability: Not on file  Transportation needs Medical: Not on file Non-medical: Not on file Tobacco Use  Smoking status: Former Smoker Packs/day: 0.20 Years: 8.00 Pack years: 1.60 Types: Cigarettes Last attempt to quit: 12/3/1995 Years since quittin.8  Smokeless tobacco: Never Used Substance and Sexual Activity  Alcohol use: No  
  Comment: Drinks 3-4xs year generally wine  Drug use: No  
 Sexual activity: Not Currently Lifestyle  Physical activity Days per week: Not on file Minutes per session: Not on file  Stress: Not on file Relationships  Social connections Talks on phone: Not on file Gets together: Not on file Attends [employer] service: Not on file Active member of club or organization: Not on file Attends meetings of clubs or organizations: Not on file Relationship status: Not on file  Intimate partner violence Fear of current or ex partner: Not on file Emotionally abused: Not on file Physically abused: Not on file Forced sexual activity: Not on file Other Topics Concern   Service No  
 Blood Transfusions No  
 Caffeine Concern No  
 Occupational Exposure No  
 Hobby Hazards No  
 Sleep Concern No  
 Stress Concern No  
 Weight Concern No  
 Special Diet Yes  Back Care No  
 Exercise No  
 Bike Helmet No  
 Seat Belt Yes  Self-Exams Yes Social History Narrative Lives with 16year old son  with ex   Does not have health insurance Family History Adopted: Yes  
Problem Relation Age of Onset  Heart Failure Mother 76  
 Other Father 70  
     blood clot after surgery  Diabetes Paternal Aunt  Diabetes Paternal Uncle Current Facility-Administered Medications Medication Dose Route Frequency Provider Last Rate Last Dose  tamsulosin (FLOMAX) capsule 0.4 mg  0.4 mg Oral DAILY Rick Alegria MD   0.4 mg at 09/18/20 5508  QUEtiapine (SEROquel) tablet 50 mg  50 mg Oral QHS Julius Hernandez MD   50 mg at 09/17/20 2152  ziprasidone (GEODON) 10 mg in sterile water (preservative free) 0.5 mL injection  10 mg IntraMUSCular Q12H PRN Julius Hernandez MD   10 mg at 09/17/20 2211  heparin (porcine) injection 5,000 Units  5,000 Units SubCUTAneous Q8H Julius Hernandez MD   5,000 Units at 09/18/20 4740  [Held by provider] insulin glargine (LANTUS) injection 5 Units  5 Units SubCUTAneous DAILY Julius Hernandez MD   5 Units at 09/17/20 4992  lactated Ringers infusion  100 mL/hr IntraVENous CONTINUOUS Rick Alegria  mL/hr at 09/18/20 0517 100 mL/hr at 09/18/20 6992  polyvinyl alcohol-povidon(PF) (REFRESH CLASSIC) 1.4-0.6 % ophthalmic solution 1 Drop  1 Drop Both Eyes PRN Fitz Butler MD   1 Drop at 09/14/20 1570  cholecalciferol (VITAMIN D3) (1000 Units /25 mcg) tablet 1 Tab  1,000 Units Oral DAILY Julius Hernandez MD   1 Tab at 09/18/20 6095  insulin lispro (HUMALOG) injection   SubCUTAneous AC&HS Bela Duran MD   2 Units at 09/18/20 1222  
 melatonin tablet 3 mg  3 mg Oral QHS Sheralyn Meckel D, MD   3 mg at 09/17/20 2152  sodium chloride (NS) flush 5-10 mL  5-10 mL IntraVENous PRN Julius Hernandez MD      
 [Held by provider] insulin NPH (NOVOLIN N, HUMULIN N) injection 40 Units  40 Units SubCUTAneous ACB&D Julius Hernandez MD   Stopped at 09/09/20 0930  
 glucose chewable tablet 16 g  16 g Oral PRN Julius Hernandez MD      
 glucagon (GLUCAGEN) injection 1 mg  1 mg IntraMUSCular PRN Julius Hernandez MD      
 dextrose (D50W) injection syrg 12.5-25 g  25-50 mL IntraVENous PRN Julius Hernandez MD      
 sodium chloride (NS) flush 5-40 mL  5-40 mL IntraVENous Q8H Julius Hernandez MD   10 mL at 09/18/20 9609  sodium chloride (NS) flush 5-40 mL  5-40 mL IntraVENous PRN Julius Hernandez MD   10 mL at 09/15/20 2214  acetaminophen (TYLENOL) tablet 650 mg  650 mg Oral Q6H PRN Julius Hernandez MD   650 mg at 09/17/20 2153 Or  acetaminophen (TYLENOL) suppository 650 mg  650 mg Rectal Q6H PRN Julius Hernandez MD      
 polyethylene glycol (MIRALAX) packet 17 g  17 g Oral DAILY PRN Julius Hernandez MD      
 [Held by provider] PARoxetine (PAXIL) tablet 60 mg  60 mg Oral DAILY Julius Hernandez MD      
 pantoprazole (PROTONIX) tablet 40 mg  40 mg Oral DAILY Julius Hernandez MD   40 mg at 09/18/20 4185  [Held by provider] rosuvastatin (CRESTOR) tablet 20 mg  20 mg Oral QHS Julius Hernandez MD   Stopped at 09/17/20 2200  
 ondansetron (ZOFRAN) injection 4 mg  4 mg IntraVENous Q6H PRN Julisu Hernandez MD      
 
 
Past Medical History:  
Diagnosis Date  Blind left eye  Cellulitis of great toe of right foot 10/19/2017  Diabetes ([address]  Diabetic retinopathy ([address]  Gall stones  GERD (gastroesophageal reflux disease)  Hammertoe  Hiatal hernia  History of mammogram 10/03/2017 No evidence of malignancy  Hypertension  Mass of abdomen  Neuropathy due to type 2 diabetes mellitus ([address]  Osteomyelitis of toe of right foot ([address] 10/19/2017  Pancreatitis Past Surgical History:  
Procedure Laterality Date  HX AMPUTATION Left 07/21/2017  
 left 2nd toe  HX CHOLECYSTECTOMY  10/15/2014  HX GI Benign GI Stromal Tumor excision  HX HEENT Sx for detached retina  HX MYOMECTOMY x5 removed  HX OTHER SURGICAL    
 upper endoscopy Allergies Allergen Reactions  Levaquin [Levofloxacin] Hives  Promethazine Nausea and Vomiting \"the phenergan made me more nauseated\" Vital signs:   
Visit Vitals /70 (BP 1 Location: Left arm) Pulse 100 Temp 98.7 °F (37.1 °C) Comment: 98.7 Resp 20 Ht 5' 8\" (1.727 m) Wt 94.3 kg (207 lb 14.3 oz) LMP 10/06/2015 (Exact Date) SpO2 100% Breastfeeding No  
BMI 31.61 kg/m² Review of Systems:  
Unobtainable Recent Results (from the past 24 hour(s)) GLUCOSE, POC Collection Time: 09/17/20  9:55 PM  
Result Value Ref Range Glucose (POC) 135 (H) 70 - 110 mg/dL CBC WITH MANUAL DIFF Collection Time: 09/18/20  5:15 AM  
Result Value Ref Range WBC 10.9 4.6 - 13.2 K/uL  
 RBC 2.89 (L) 4.20 - 5.30 M/uL HGB 8.4 (L) 12.0 - 16.0 g/dL HCT 24.8 (L) 35.0 - 45.0 % MCV 85.8 74.0 - 97.0 FL  
 MCH 29.1 24.0 - 34.0 PG  
 MCHC 33.9 31.0 - 37.0 g/dL  
 RDW 14.4 11.6 - 14.5 % PLATELET 025 305 - 196 K/uL MPV 10.9 9.2 - 11.8 FL  
 DIFFERENTIAL MANUAL DIFFERENTIAL ORDERED    
 NEUTROPHILS 68 42 - 75 % BAND NEUTROPHILS 0 0 - 5 % LYMPHOCYTES 27 20 - 51 % MONOCYTES 4 2 - 9 % EOSINOPHILS 1 0 - 5 % BASOPHILS 0 0 - 3 % METAMYELOCYTES 0 0 % MYELOCYTES 0 0 % PROMYELOCYTES 0 0 % BLASTS 0 0 % OTHER CELL 0 0    
 ABS. NEUTROPHILS 7.5 1.8 - 8.0 K/UL  
 ABS. LYMPHOCYTES 2.9 0.8 - 3.5 K/UL  
 ABS. MONOCYTES 0.4 0 - 1.0 K/UL  
 ABS. EOSINOPHILS 0.1 0.0 - 0.4 K/UL  
 ABS. BASOPHILS 0.0 0.0 - 0.06 K/UL  
 DF MANUAL PLATELET COMMENTS ADEQUATE PLATELETS    
 RBC COMMENTS ANISOCYTOSIS 
1+ METABOLIC PANEL, COMPREHENSIVE Collection Time: 09/18/20  5:15 AM  
Result Value Ref Range Sodium 140 136 - 145 mmol/L Potassium 3.7 3.5 - 5.5 mmol/L Chloride 105 100 - 111 mmol/L  
 CO2 26 21 - 32 mmol/L Anion gap 9 3.0 - 18 mmol/L Glucose 142 (H) 74 - 99 mg/dL BUN 7 7.0 - 18 MG/DL Creatinine 0.96 0.6 - 1.3 MG/DL  
 BUN/Creatinine ratio 7 (L) 12 - 20 GFR est AA >60 >60 ml/min/1.73m2 GFR est non-AA 60 (L) >60 ml/min/1.73m2 Calcium 8.7 8.5 - 10.1 MG/DL Bilirubin, total 1.7 (H) 0.2 - 1.0 MG/DL  
 ALT (SGPT) 49 13 - 56 U/L  
 AST (SGOT) 59 (H) 10 - 38 U/L Alk. phosphatase 95 45 - 117 U/L Protein, total 6.3 (L) 6.4 - 8.2 g/dL Albumin 3.2 (L) 3.4 - 5.0 g/dL Globulin 3.1 2.0 - 4.0 g/dL A-G Ratio 1.0 0.8 - 1.7 PHOSPHORUS Collection Time: 09/18/20  5:15 AM  
Result Value Ref Range Phosphorus 4.2 2.5 - 4.9 MG/DL MAGNESIUM Collection Time: 09/18/20  5:15 AM  
Result Value Ref Range Magnesium 2.0 1.6 - 2.6 mg/dL GLUCOSE, POC Collection Time: 09/18/20  8:11 AM  
Result Value Ref Range Glucose (POC) 143 (H) 70 - 110 mg/dL EKG, 12 LEAD, SUBSEQUENT Collection Time: 09/18/20 11:43 AM  
Result Value Ref Range Ventricular Rate 119 BPM  
 Atrial Rate 119 BPM  
 P-R Interval 136 ms QRS Duration 62 ms Q-T Interval 320 ms QTC Calculation (Bezet) 450 ms Calculated P Axis 41 degrees Calculated R Axis 8 degrees Calculated T Axis 40 degrees Diagnosis Sinus tachycardia Low voltage QRS Nonspecific T wave abnormality Abnormal ECG When compared with ECG of 17-SEP-2020 11:16, 
Criteria for Anterior infarct are no longer present GLUCOSE, POC Collection Time: 09/18/20 12:04 PM  
Result Value Ref Range Glucose (POC) 189 (H) 70 - 110 mg/dL MRI of the brain done on September 11 was motion degraded with no obvious acute changes, areas of restricted diffusion, there was signal intensity abnormality in the left lobe that appeared to be chronic and of unknown etiology. Her last chest x-ray was in September 8 and unremarkable, her last UA was also on September 8. A chest, abdomen, pelvic CT was unremarkable EXAM: Alert, restless, disoriented, seems more pleasant today than yesterday,  cannot tell me the date, not sure why she is here. She sits up and lays back down restlessly. Heart is regular. EOM's are full, no gaze preference, pupils are sluggishly reactive if at all, unable to visualize fundi, no facial asymmetries. Rest of the cranial nerves could not be performed. There is no lateralizing paresis. Tone is not increased. DTRs are +2, gait was deferred. Patient had an LP under fluoroscopy Tuesday. , on the prone position had an opening pressure of 36 with a closing pressure of 12. Recent Results (from the past 24 hour(s)) GLUCOSE, POC Collection Time: 09/17/20  9:55 PM  
Result Value Ref Range Glucose (POC) 135 (H) 70 - 110 mg/dL CBC WITH MANUAL DIFF Collection Time: 09/18/20  5:15 AM  
Result Value Ref Range WBC 10.9 4.6 - 13.2 K/uL  
 RBC 2.89 (L) 4.20 - 5.30 M/uL HGB 8.4 (L) 12.0 - 16.0 g/dL HCT 24.8 (L) 35.0 - 45.0 % MCV 85.8 74.0 - 97.0 FL  
 MCH 29.1 24.0 - 34.0 PG  
 MCHC 33.9 31.0 - 37.0 g/dL  
 RDW 14.4 11.6 - 14.5 % PLATELET 403 934 - 650 K/uL MPV 10.9 9.2 - 11.8 FL  
 DIFFERENTIAL MANUAL DIFFERENTIAL ORDERED    
 NEUTROPHILS 68 42 - 75 % BAND NEUTROPHILS 0 0 - 5 % LYMPHOCYTES 27 20 - 51 % MONOCYTES 4 2 - 9 % EOSINOPHILS 1 0 - 5 % BASOPHILS 0 0 - 3 % METAMYELOCYTES 0 0 % MYELOCYTES 0 0 % PROMYELOCYTES 0 0 % BLASTS 0 0 % OTHER CELL 0 0    
 ABS. NEUTROPHILS 7.5 1.8 - 8.0 K/UL  
 ABS. LYMPHOCYTES 2.9 0.8 - 3.5 K/UL  
 ABS. MONOCYTES 0.4 0 - 1.0 K/UL  
 ABS. EOSINOPHILS 0.1 0.0 - 0.4 K/UL  
 ABS. BASOPHILS 0.0 0.0 - 0.06 K/UL  
 DF MANUAL PLATELET COMMENTS ADEQUATE PLATELETS    
 RBC COMMENTS ANISOCYTOSIS 
1+ METABOLIC PANEL, COMPREHENSIVE Collection Time: 09/18/20  5:15 AM  
Result Value Ref Range Sodium 140 136 - 145 mmol/L Potassium 3.7 3.5 - 5.5 mmol/L Chloride 105 100 - 111 mmol/L  
 CO2 26 21 - 32 mmol/L Anion gap 9 3.0 - 18 mmol/L Glucose 142 (H) 74 - 99 mg/dL BUN 7 7.0 - 18 MG/DL Creatinine 0.96 0.6 - 1.3 MG/DL  
 BUN/Creatinine ratio 7 (L) 12 - 20 GFR est AA >60 >60 ml/min/1.73m2 GFR est non-AA 60 (L) >60 ml/min/1.73m2 Calcium 8.7 8.5 - 10.1 MG/DL Bilirubin, total 1.7 (H) 0.2 - 1.0 MG/DL  
 ALT (SGPT) 49 13 - 56 U/L  
 AST (SGOT) 59 (H) 10 - 38 U/L Alk. phosphatase 95 45 - 117 U/L Protein, total 6.3 (L) 6.4 - 8.2 g/dL Albumin 3.2 (L) 3.4 - 5.0 g/dL Globulin 3.1 2.0 - 4.0 g/dL A-G Ratio 1.0 0.8 - 1.7 PHOSPHORUS Collection Time: 09/18/20  5:15 AM  
Result Value Ref Range Phosphorus 4.2 2.5 - 4.9 MG/DL MAGNESIUM Collection Time: 09/18/20  5:15 AM  
Result Value Ref Range Magnesium 2.0 1.6 - 2.6 mg/dL GLUCOSE, POC Collection Time: 09/18/20  8:11 AM  
Result Value Ref Range Glucose (POC) 143 (H) 70 - 110 mg/dL EKG, 12 LEAD, SUBSEQUENT Collection Time: 09/18/20 11:43 AM  
Result Value Ref Range Ventricular Rate 119 BPM  
 Atrial Rate 119 BPM  
 P-R Interval 136 ms QRS Duration 62 ms Q-T Interval 320 ms QTC Calculation (Bezet) 450 ms Calculated P Axis 41 degrees Calculated R Axis 8 degrees Calculated T Axis 40 degrees Diagnosis Sinus tachycardia Low voltage QRS Nonspecific T wave abnormality Abnormal ECG When compared with ECG of 17-SEP-2020 11:16, 
Criteria for Anterior infarct are no longer present GLUCOSE, POC Collection Time: 09/18/20 12:04 PM  
Result Value Ref Range Glucose (POC) 189 (H) 70 - 110 mg/dL Assessment/Plan: Encephalopathy, etiology unclear, initially may have been a UTI, may have had iatrogenic paradoxical and cephalopathy from taking too much benzodiazepines particularly early during her admission note, her mentation is improving. She has had issues with the hypernatremia now normal, the rest of the serological indicator appeared good, she is improving, and her MRI of the brain and MRV did not show any CNS pathology to explain her confusion. I suspect that the elevation of intracranial pressure was artifactual and secondary to the fact that he was done in the prone position, she has not had support for pseudotumor cerebri (left sided visual loss secondary to a retinal detachment), CNS infection, intracerebral hemorrhage, or a sagittal sinus thrombosis.   At this point I would recommend continuation of quetiapine at 50 mg at bedtime for now. Once she is medically stable she can be discharged on this medication with a follow-up with neurology 2 to 3 weeks after discharge. I will have any additional inpatient neurological recommendations, so we will sign off, please reconsult as needed. 15 out of 25 minutes were spent in floor time reviewing serology, interim notes, and neuroimaging results personally. PLEASE NOTE:  
Portions of this document may have been produced using voice recognition software. Unrecognized errors in transcription may be present. This note will not be viewable in [address]

## 2020-09-18 NOTE — PROGRESS NOTES
120 [facility] Progress Note Patient: Carmelita Mcgill MRN: [id number] SSN: [id number]  YOB: 1965 Age: 60 y.o. Sex: female Admit Date: 9/8/2020 LOS: 10 days Chief Complaint Patient presents with  Vomiting Subjective:  
 
Pt states her name, year, but unable to say where she is. Pt does not know why she is here. ROS Pt denies any sob and cp. No fever and chills. Objective:  
 
Visit Vitals /70 Pulse (!) 101 Temp 97 °F (36.1 °C) Resp 19 Ht 5' 8\" (1.727 m) Wt 94.3 kg (207 lb 14.3 oz) LMP 10/06/2015 (Exact Date) SpO2 100% Breastfeeding No  
BMI 31.61 kg/m² Physical Exam: 
General:  AXOx 2 , Pt follows command intermittently, eyes closed , keeps trying to get out of bed HEENT: R eye erythema, no drainage,  R not reactive dilated, Left eye sluggish but react to light, moist mucous membranes.   
CV:  RRR, no murmurs. No visible pulsations or thrills.   
RESP:  Unlabored lungs clear to auscultation without adventitious breath sounds. Equal expansion bilaterally.   
ABD:  soft abdomen, non-tender,  nondistended. BS (+).   
MS:  No joint deformity or instability.  No atrophy. Neuro:  Pt no facial droop, no deviation of tongue, unable to evaluate EOM because patient does not follow commands, moves all four extremities, Tardive akathisia Lower extremity bilaterally rigid tone, negative babinski  
Ext:  R foot the first digit is amputated, Left second digit amputated, No edema.  2+ radial and dp pulses bilaterally. Skin:  No rashes, lesions, or ulcers. Intake and Output: 
Current Shift: No intake/output data recorded. Last three shifts: 09/16 1901 - 09/18 0700 In: 2060 [P.O.:360; I.V.:1700] Out: 1950 [[facility]:[id number]] Lab/Data Review: 
Recent Results (from the past 12 hour(s)) CBC WITH MANUAL DIFF Collection Time: 09/18/20  5:15 AM  
Result Value Ref Range WBC 10.9 4.6 - 13.2 K/uL  
 RBC 2.89 (L) 4.20 - 5.30 M/uL HGB 8.4 (L) 12.0 - 16.0 g/dL HCT 24.8 (L) 35.0 - 45.0 % MCV 85.8 74.0 - 97.0 FL  
 MCH 29.1 24.0 - 34.0 PG  
 MCHC 33.9 31.0 - 37.0 g/dL  
 RDW 14.4 11.6 - 14.5 % PLATELET 728 548 - 102 K/uL MPV 10.9 9.2 - 11.8 FL  
 DIFFERENTIAL MANUAL DIFFERENTIAL ORDERED    
 NEUTROPHILS 68 42 - 75 % BAND NEUTROPHILS 0 0 - 5 % LYMPHOCYTES 27 20 - 51 % MONOCYTES 4 2 - 9 % EOSINOPHILS 1 0 - 5 % BASOPHILS 0 0 - 3 % METAMYELOCYTES 0 0 % MYELOCYTES 0 0 % PROMYELOCYTES 0 0 % BLASTS 0 0 % OTHER CELL 0 0    
 ABS. NEUTROPHILS 7.5 1.8 - 8.0 K/UL  
 ABS. LYMPHOCYTES 2.9 0.8 - 3.5 K/UL  
 ABS. MONOCYTES 0.4 0 - 1.0 K/UL  
 ABS. EOSINOPHILS 0.1 0.0 - 0.4 K/UL  
 ABS. BASOPHILS 0.0 0.0 - 0.06 K/UL  
 DF MANUAL PLATELET COMMENTS ADEQUATE PLATELETS    
 RBC COMMENTS ANISOCYTOSIS 
1+ METABOLIC PANEL, COMPREHENSIVE Collection Time: 09/18/20  5:15 AM  
Result Value Ref Range Sodium 140 136 - 145 mmol/L Potassium 3.7 3.5 - 5.5 mmol/L Chloride 105 100 - 111 mmol/L  
 CO2 26 21 - 32 mmol/L Anion gap 9 3.0 - 18 mmol/L Glucose 142 (H) 74 - 99 mg/dL BUN 7 7.0 - 18 MG/DL Creatinine 0.96 0.6 - 1.3 MG/DL  
 BUN/Creatinine ratio 7 (L) 12 - 20 GFR est AA >60 >60 ml/min/1.73m2 GFR est non-AA 60 (L) >60 ml/min/1.73m2 Calcium 8.7 8.5 - 10.1 MG/DL Bilirubin, total 1.7 (H) 0.2 - 1.0 MG/DL  
 ALT (SGPT) 49 13 - 56 U/L  
 AST (SGOT) 59 (H) 10 - 38 U/L Alk. phosphatase 95 45 - 117 U/L Protein, total 6.3 (L) 6.4 - 8.2 g/dL Albumin 3.2 (L) 3.4 - 5.0 g/dL Globulin 3.1 2.0 - 4.0 g/dL A-G Ratio 1.0 0.8 - 1.7 PHOSPHORUS Collection Time: 09/18/20  5:15 AM  
Result Value Ref Range Phosphorus 4.2 2.5 - 4.9 MG/DL MAGNESIUM Collection Time: 09/18/20  5:15 AM  
Result Value Ref Range Magnesium 2.0 1.6 - 2.6 mg/dL GLUCOSE, POC Collection Time: 09/18/20  8:11 AM  
Result Value Ref Range Glucose (POC) 143 (H) 70 - 110 mg/dL RECENT RESULTS MODALITY IMPRESSION  
XR Results from Hospital Encounter encounter on 09/08/20 XR ORBIT BI FOREIGN BODY Narrative EXAM: ORBITS LIMITED CLINICAL HISTORY/INDICATION: , blurred vision with nausea, vomiting, and 
abdominal pain, severe agitation, altered mental status, diabetic retinopathy 
due to type 2 [facility] COMPARISON: None. TECHNIQUE: modified ramos view(s) of the orbits obtained. FINDINGS: 
 
There are no radiopaque metallic foreign bodies within the orbits. There is no 
aneurysm clip. The visualized  paranasal sinuses are normal. 
  
 Impression IMPRESSION: 
 
Negative orbital screening prior to MRI. CT Results from Hospital Encounter encounter on 09/08/20 CT CHEST ABD PELV W CONT Narrative CT chest, abdomen and pelvis with contrast  
 
CLINICAL HISTORY: Leukocytosis. CT evaluation of potential infection versus 
abscess COMPARISON: None. TECHNIQUE: Helical scan to the chest, abdomen and pelvis are obtained from the 
thoracic inlet to the symphysis pubis after IV contrast administration. All CT scans at this facility performed using dose optimization techniques as 
appreciated to a performed exam, to include automated exposure control, 
adjustment of the mA and or KU according to patient size (including appropriate 
matching for site specific examination), or use of iterative reconstruction 
technique. FINDINGS: Moderate motion artifact noted and limits the detail evaluation. CT CHEST:  
 
Lung Parenchyma: Minimal bibasilar atelectasis. No acute airspace disease or 
consolidation. Thyroid: Small hypodense nodule in the right lobe measuring about 5 mm. Mediastinum: No adenopathy. Mild and circumferential esophageal wall prominence 
with collapsed the lumen. Heart: The heart and the pericardium appear normal. 
 
Vasculature: The aorta and the great vessels are unremarkable. Pleural Space And Chest Wall: No significant pleural pathology. CT ABDOMEN AND PELVIS: 
 
Liver: Normal. 
 
Gallbladder: Surgically absent. Biliary System: No ductal dilatation. Spleen: Normal. 
 
Pancreas: Normal. 
 
Adrenal Glands: Normal. 
 
Kidneys: Normal. 
 
Bowel: The small and large bowel are nondilated. Normal appendix. Fluid-filled 
sigmoid colon and rectum with air-fluid level without dilatation. Lower genitourinary system: The bladder is markedly distended up to the level 
above the umbilicus, 49.9 cm in cc dimension. No definite bladder wall lesion 
seen. The uterus appears spherical and heterogeneous with several partially 
calcified fibroids present. No adnexal mass. Peritoneum/Retroperitoneum: No adenopathy. Vasculature: Unremarkable for age. Other: No free fluid. CT OSSEOUS STRUCTURES:   
 
Unremarkable for age. Impression IMPRESSION:  
 
1. Marked bladder distention up to the level above the umbilicus. Recommend 
clinical correlation for urinary outlet obstruction. 2. No CT evidence of infection or inflammatory process. No abscess seen. 3. Tiny hypodense nodule in right thyroid lobe. This can be better evaluated by 
thyroid ultrasound. 4. Mild and circumferential esophageal wall prominence with collapsed the lumen. If symptomatic, barium swallow can be performed for better evaluation. 5. Fluid-filled nondilated distal colon and rectum as nonspecific finding and 
could represent diarrhea. 6. Multiple partially calcified fibroids. Thank you for this referral.   
  
MRI Results from Hospital Encounter encounter on 09/08/20 MRI BRAIN W WO CONT Narrative MRI BRAIN WITH AND WITHOUT CONTRAST Provided Reason for Exam: Confusion, visual loss right eye for 2 weeks, LP with 
elevated opening pressure Additional History: Patient admitted with confusion Comparison Studies: Brain MRI 9/11/2020, head CT 9/9/2020 Imaging Technique:  
  Sequences:  Sagittal and axial T1 weighted, Diffusion Weighted (DWI), Axial T2 
 weighted, Axial T2 FLAIR, and GRE MRI images of the brain. Post contrast axial 
and coronal T1 images. Contrast Material:  20 mL Dotarem IV Limiting Factors/Major Artifacts: None. FINDINGS: 
 
Brain Parenchyma: Diffusion sequence negative. No cytotoxic edema or acute 
infarct. Cerebral white matter is normal.  No chronic cortical infarcts or 
chronic lacunar infarcts. No visible masses or midline shift. No abnormal 
parenchymal or meningeal enhancement. CSF Spaces: There are some mild prominence of the posterior horns of the 
lateral ventricles, but the major the ventricles are normal size. No findings of 
ventricular trapping. Appears to be developmental, similar findings on the head CT from 2013. Vascular System:  Grossly patent flow in basilar and internal cerebral arteries. No findings of dural sinus thrombosis. Hemorrhage:  No acute hemorrhage. No chronic microhemorrhage. Other Structures: 
Calvarium: No suspicious marrow signal. 
Sella: Normal. 
Visualized Upper Cervical Spine: Normal craniocervical junction, no Chiari 
malformation. Orbits: Postsurgical changes in the left ocular globe, appears to be silicone 
injection, unchanged from 2014 head CT. Paranasal Sinuses: No significant paranasal sinus disease. Mastoid Air Cells:  Clear. Impression IMPRESSION: 
 
No acute intracranial abnormality. No mass, hemorrhage, or acute infarct. Postsurgical changes left optic globe consistent with previous retinal 
detachment procedure. ULTRASOUND Results from Hospital Encounter encounter on 09/08/20 US ABD LTD Impression IMPRESSION:    
 
1. Status post cholecystectomy. 2.  No significant common bile duct dilation. 3.  Increased hepatic parenchymal echogenicity with somewhat heterogeneous 
appearance, potentially suggestive of hepatosteatosis. Partial visualization of 
the liver. Results from [facility] Encounter encounter on 01/22/19 US ABD LTD Impression IMPRESSION: 
 
Mild heterogeneous echogenicity of the liver is nonspecific. This is commonly 
seen with steatosis hepatic disease. Limited visualization of the pancreas. Cardiology Procedures/Testing: MODALITY RESULTS  
EKG Results for orders placed or performed during the hospital encounter of 09/08/20 EKG, 12 LEAD, INITIAL Result Value Ref Range Ventricular Rate 115 BPM  
 Atrial Rate 115 BPM  
 P-R Interval 148 ms QRS Duration 58 ms Q-T Interval 342 ms QTC Calculation (Bezet) 473 ms Calculated P Axis 34 degrees Calculated R Axis 1 degrees Calculated T Axis 34 degrees Diagnosis Sinus tachycardia Cannot exclude anterior MI versus lead placement issue. Abnormal ECG When compared with ECG of 08-SEP-2020 15:05, Anterior infarct is now present Nonspecific T wave abnormality no longer evident in Inferior leads Nonspecific T wave abnormality, improved in Anterolateral leads Confirmed by Mina Quintana MD, Linh Damico ([id number]) on 9/17/2020 3:04:13 PM 
  
Results for orders placed or performed in visit on 06/20/14 AMB POC EKG ROUTINE W/ 12 LEADS, INTER & REP Impression See progress note. ECHO 01/03/19 ECHO ADULT COMPLETE 01/04/2019 1/4/2019 Narrative · Estimated left ventricular ejection fraction is 61 - 65%. Left  
ventricular mild concentric hypertrophy. Mild (grade 1) left ventricular  
diastolic dysfunction. · Mild tricuspid valve regurgitation is present. Signed by: Roverto Woods MD  
  
 
Special Testing/Procedures: MODALITY RESULTS MICRO All Micro Results Procedure Component Value Units Date/Time CULTURE, CSF Ronald Fernandez [[id number]] Collected:  09/15/20 0848 Order Status:  Completed Specimen:  Cerebrospinal Fluid Updated:  09/18/20 9190 Special Requests: CEREBROSPINAL FLUID     
  GRAM STAIN NO WBC'S SEEN     
   NO ORGANISMS SEEN Smear Reviewed by Microbiology 9/15/20 AT 1408 TA. Culture result: NO GROWTH 3 DAYS     
 CULTURE, BLOOD [[id number]] Collected:  09/15/20 1005 Order Status:  Completed Specimen:  Blood Updated:  09/18/20 7290 Special Requests: NO SPECIAL REQUESTS Culture result: NO GROWTH 3 DAYS     
 CULTURE, BLOOD [[id number]] Collected:  09/15/20 1002 Order Status:  Completed Specimen:  Blood Updated:  09/18/20 8202 Special Requests: NO SPECIAL REQUESTS Culture result: NO GROWTH 3 DAYS     
 CULTURE, URINE [[id number]] Collected:  09/15/20 1850 Order Status:  Completed Specimen:  Urine from Clean catch Updated:  09/17/20 1046 Special Requests: NO SPECIAL REQUESTS Culture result: No growth (<1,000 CFU/ML) MENINGITIS PATHOGENS PANEL, CSF (BY PCR) [[id number]] Collected:  09/15/20 0848 Order Status:  Completed Specimen:  Cerebrospinal Fluid Updated:  09/15/20 1510 Escherichia coli K1 Not detected Haemophilus Influenzae Not detected Listeria Monocytogenes Not detected Neisseria Meningitidis Not detected Streptococcus Agalactiae Not detected Streptococcus Pneumoniae Not detected Cytomegalovirus Not detected Enterovirus Not detected Herpes Simplex Virus 1 Not detected Comment: In patients who have negative herpes simplex 1 and 2 PCR results, do not modify treatment, confirm with alternate testing. Herpes Simplex Virus 2 Not detected Comment: In patients who have negative herpes simplex 1 and 2 PCR results, do not modify treatment, confirm with alternate testing. Human Herpesvirus 6 Not detected Human Parechovirus Not detected Varicella Zoster Virus Not detected Crypto. neoformans/gattii Not detected CULTURE, BLOOD [[id number]] Collected:  09/08/20 1640 Order Status:  Completed Specimen:  Blood Updated:  09/14/20 6246 Special Requests: NO SPECIAL REQUESTS Culture result: NO GROWTH 6 DAYS CULTURE, BLOOD [[id number]] Collected:  09/08/20 1640 Order Status:  Completed Specimen:  Blood Updated:  09/14/20 9411 Special Requests: NO SPECIAL REQUESTS Culture result: NO GROWTH 6 DAYS     
 CULTURE, URINE [[id number]] Collected:  09/08/20 1211 Order Status:  Completed Specimen:  Urine from Clean catch Updated:  09/09/20 1811 Special Requests: NO SPECIAL REQUESTS [place] Count --     
  >100,000 COLONIES/mL Culture result:    
  MIXED UROGENITAL KYLE ISOLATED  
     
  
  
UA No results found for this or any previous visit. PATH none Telemetry NONE Oxygen NONE Assessment and Plan:  
60 y. o. 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   
  
Metabolic Encephalopathy of unclear etiology? Likely multifactorial, not returning to baseline Came with a DRY 056-103 BG, She was treated with IV fluids . She had a leucocytosis, and lactic acidosis that improved with IV antibiotics. leucocytosis initially, So far blood culture on 9/8 was no growth. Pt's UA only wbc 6-8, 2+ bacteria, LE neg/Nitrite neg.  Also, urine culture >100, 000 colony mixed urogenital kyle, likely contaminant. On 9/9 CT Chest/AB/Pelvis showed bladder with mild wall thickening cystitis unlikely UTI. Repeat cultures remain negative   Hypernatremia (151--> 150--> 141) resolved. Pt was negative UDS and ETOH serum (9/8). On  9/8 CT head showed mild prominence: mild enlargement of lateral ventricles, cerebral atrophy . On admission labs negative for Negative troponin, EKG no ST elevation or depression, lipase normal, ammonia nml, B12 and folate nmlm,  
- s/p empirical  abx Ceftriaxone  (9/8-/9) 
- s/p empiric abx  vancomycin (9/8-9/14)  and zoysn (9/9-9/14 ) - MRI head 9/11 - no acute intracranial finding - HIV negative, RPR negative, ammonia 15  
- ESR 32 and CRP 3.7  elevated 
- EEG- encephalopathy 
- blood culture- NG 
- CSF wbc 1 , protein 55, glucose 70, elevated opening 34 mmhg, likely because pt in prone possition and also had sedation. Unlikely pseudotumor cerebri per neurology , negative meningitis panel - GRACE- negative - 9/15 CT AB/PELVIS/CHEST IV : Marked bladder distention up to the level above the umbilicus. recommend clinical correlation for urinary outlet obstruction. No CT evidence of infection or inflammatory process. No abscess seen. Tiny hypodense nodule in right thyroid lobe. This can be better evaluated by thyroid ultrasound. Mild and circumferential esophageal wall prominence with collapsed the lumen. If symptomatic, barium swallow can be performed for better evaluation. Fluid-filled nondilated distal colon and rectum as nonspecific finding and could represent diarrhea. Multiple partially calcified fibroids. -COVID-19 negative -TSH thyroid profile- nml  
-9/17 MRI Brain w w/o  And MRV brain w wo- no cerebral sagittal sinus thrombosis, ruled out  CNS infection, intracerebral hemorrhage, or a sagittal sinus thrombosis Plan:  
-F/u FOBT 
-will repeat EKG - likely lead placement issue 
- consulted psychiatry - place sitter, remove restraints 
- consider adding multivitamin 
- f/u PT and OT recs for placement 
- pending HSV 1/2 ABS, CSF 
- anti-smooth muscle, B1,  
- concern serotonin syndrome- hold meds metoclopramide and paroxetine - appreciated neurology recs - sign off, no other recs - Geodon for severe agitation as needed  q12h and seroquel 50 mg qhs 
  
 Agitation - better controlled Wax and waning 
- restrains, role belt 
- avoid antipsychotics - Geodon for severe agitation as needed  q12h and seroquel 50 mg qhs 
  
 
Urinary retention s/p garcia  W/ trauma Has some hematuria will continue to monitor Hg last 8.8  
- Urology saw patient said to DC garcia, and place in diaper 
 
  
 Tardive akathisia- restlessness 
- initially also had dystonia, EPS symptoms  
- likely related with chronic metoclopramide use  
- held reglan and  
 
Normocytic anemia (hg 9-13.5)  
 B12 and folate normal 
Plan: FOBT  
  
CRYSTAL CR 3.56 ( baseline Cr 1.09 1/2019) in setting of CKD stage 2  likely pre-julian and rhabdo (CK increasing again) Iron profile normal 
CK 1,057--> 996 Myoglobin 1,092 positive .8 elevated and vitamin D on low side of normal corrected Ca normal , Vitamin D nml , P nm  
 microalb/cr ratio- wnml 
  
Plan:   
Cont IVF  
Trend [facility] Nephrology following appreciate recs- calcitril 0.25mcg on discharge 
  
  
R eye pain + Right eye vision loss X 2 weeks Unlikely papilledema based on bedside ultrasound ( no enlarged optic sheath seen), likely floater vs retinal detachment vs pink eye vs acute angle gluacoma Plan:  
- spoke to optho on 9/10, Dr. Weller Orn - not able to come 
  
Hypokalemia (resolved) 
electrolyte imbalance (hypokalemia, corrected Ca wnml )  
- replete electrolyte as needed, Mg, Phos 
  
Hyperbilirubinemia (3.9) w/ 0.6 direct likely majority is unconjugated  likely secondary to [place] syndrome, previously elevated bili unlikely hemolysis (improved) CT AB/US no gall bladder disease appreciated , haptoglobin normal, aldolase pending  
  
  
 Diabetes ( last A1c 7.2) (-245)  neuropathy and L retinopathy - not well controlled 
- held NPH 40U BID (hold home NPH 50 U BID) -given lantus 5 U daily  
 HTN 
- not on meds 
  
 HLD 
- cont rosuvastatin  
  
Gastroparesis  
- held metoclopramide 5 mg  
  
GERD 
- cont  omeprazole 40 mg delayed capsule 
  
  
Anxiety 
- held  paroxetine 60 mg daily 
  
Diet Advance as tolerated per speech DVT Prophylaxis heparin GI Prophylaxis Omperazole Code status Full code Disposition unknown  
  
Point of Contact Prieto Relationship: 
[phone] Julius Hernandez PGY-1  
[address] Pager: [phone] September 18, 2020, 7:48 AM

## 2020-09-18 NOTE — PROGRESS NOTES
Physician Progress Note Tierra Banda 
CSN #:                  [id number] :                       1965 ADMIT DATE:       2020 10:10 AM 
DISCH DATE: 
RESPONDING 
PROVIDER #:        Mykel GRIJALVA 
 
 
 
 
QUERY TEXT: 
 
Pt admitted with sepsis. Noted documentation of rhabdomyolysis on  PN by ordered nephrology consultant. If possible, please document in progress notes and discharge summary: The medical record reflects the following: 
Risk Factors: Ketoacidosis, CKD, DM Clinical Indicators: Per  Neph PN Rhabdomyolysis Myoglobin 1092 Treatment: IV fluids, PT/OT Thank you, Angelica Woo, [facility], [address] [place] [address] Dr Options provided: 
-- Rhabdomyolysis confirmed POA 
-- Rhabdomyolysis confirmed not POA 
-- Rhabdomyolysis ruled out 
-- Defer to nephrology consultant documentation regarding rhabdomyolysis -- Other - I will add my own diagnosis -- Disagree - Not applicable / Not valid -- Disagree - Clinically unable to determine / Unknown 
-- Refer to Clinical Documentation Reviewer PROVIDER RESPONSE TEXT: 
 
The diagnosis of rhabdomyolysis was confirmed as POA.  
 
Query created by: Florecita Benavides on 2020 11:48 AM 
 
 
Electronically signed by:  Mykel Craig 46 2020 1:03 PM

## 2020-09-18 NOTE — PROGRESS NOTES
Bedside shift change report given to Corbin (oncoming nurse) by Nita Tellez (offgoing nurse). Report included the following information SBAR.

## 2020-09-18 NOTE — PROGRESS NOTES
*ATTENTION:  This note has been created by a medical student for educational purposes only. Please do not refer to the content of this note for clinical decision-making, billing, or other purposes. Please see attending physicians note to obtain clinical information on this patient. * Medical Student Progress Note [address] **Medical Student Note for Educational Purposes Only** Patient: Italo Valdez MRN: [id number]  CSN: [id number] YOB: 1965  Age: 60 y.o. Sex: female DOA: 9/8/2020 LOS:  LOS: 10 days Subjective:  
 
Acute events:  NAEO. Pt now has a sitter currently in preparation for trial off of restraints today. Pt is alert this morning. Pt still with AMS, but she seem a bit less agitated and slightly less confused today. As per the sitter, Pt at a full breakfast and tolerated well. This AM, Pt still complains of HA, and she seems better to able to describe the HA; she states the HA is bilateral and most prominent in the frontal and occipital regions and less prominent in parietal region. Pt still endorses eye pain, stating that her L eye hurts more the R (the opposite report of previous days). Also of note, Pt presents of new buttocks pain. Review of Systems Reason unable to perform ROS: ROS limited by status of patient's acute neuropsychiatric illness. Constitutional: Negative for chills and fever. HENT: Negative. Eyes: Positive for pain and redness. Respiratory: Negative for cough and shortness of breath. Cardiovascular: Negative for chest pain and palpitations. Gastrointestinal: Negative for abdominal pain. Musculoskeletal: Positive for back pain and joint pain. Neurological: Positive for headaches. Objective:  
  
Patient Vitals for the past 24 hrs: 
 Temp Pulse Resp BP SpO2  
09/17/20 1708  (!) 104 14 138/71 100 % 09/17/20 1700     100 % 09/17/20 1658 97.1 °F (36.2 °C) (!) 103 16 124/73 97 % 09/17/20 1206 99.2 °F (37.3 °C) (!) 115 20 137/73 98 % 09/17/20 0759 99.2 °F (37.3 °C) (!) 112 20 135/75 98 % Intake/Output Summary (Last 24 hours) at 9/18/2020 3704 Last data filed at 9/17/2020 1642 Gross per 24 hour Intake 500 ml Output 650 ml Net -150 ml Physical Exam:  
General:  Awake, Alert & oriented to person and time, but not to place (a notable difference from previous days). Notably less restless appearing, but otherwise in bed in no acute distress. More cooperative with examination today. HEENT:  Prominent conjunctival erythema of R eye. No cervical, supraclavicular, occipital or submandibular lymphadenopathy. CV:  Tachycardic heart rate with regular rhythm. No murmurs, rubs, or gallops. RESP:  Unlabored breathing. Lungs clear to auscultation. No wheezes, rales, or rhonchi. Equal expansion bilaterally. ABD:  Soft, nontender, nondistended. No hepatosplenomegaly. No suprapubic tenderness. No CVA tenderness. RECTAL:  deferred MS:  No joint deformity or instability. No atrophy. Neuro:  No facial asymmetry or tongue deviation. Motor function appears to be intact as pt can move all extremities. Strength and reflex testing unable to assessed because pt is uncooperative and in restraints. Babinski sign negative. Ext:  1st digit of R foot is amputated, 2nd digit of L foot as well. No edema in BLE. Some mild-moderate non-pitting edema in hands bilaterally. Skin:  No rashes, lesions, or ulcers. Pt wearing adult diaper. Unable to assess skin of buttocks due to restraints (asked the nurse to call me when she rolls Pt). Psych/Mental Status:  Mental Status: affect inappropriate (laughing/smiling), euphoric, confused, disoriented to place, drowsy, inappropriate safety awareness. Amrit Bravo Lab/Data Reviewed: 
CMP:  
Lab Results Component Value Date/Time   09/18/2020 05:15 AM  
 K 3.7 09/18/2020 05:15 AM  
  09/18/2020 05:15 AM  
 CO2 26 09/18/2020 05:15 AM  
 AGAP 9 09/18/2020 05:15 AM  
  (H) 09/18/2020 05:15 AM  
 BUN 7 09/18/2020 05:15 AM  
 CREA 0.96 09/18/2020 05:15 AM  
 GFRAA >60 09/18/2020 05:15 AM  
 GFRNA 60 (L) 09/18/2020 05:15 AM  
 CA 8.7 09/18/2020 05:15 AM  
 MG 2.0 09/18/2020 05:15 AM  
 PHOS 4.2 09/18/2020 05:15 AM  
 ALB 3.2 (L) 09/18/2020 05:15 AM  
 TP 6.3 (L) 09/18/2020 05:15 AM  
 GLOB 3.1 09/18/2020 05:15 AM  
 AGRAT 1.0 09/18/2020 05:15 AM  
 ALT 49 09/18/2020 05:15 AM  
 
CBC:  
Lab Results Component Value Date/Time WBC 10.9 09/18/2020 05:15 AM  
 HGB 8.4 (L) 09/18/2020 05:15 AM  
 HCT 24.8 (L) 09/18/2020 05:15 AM  
  09/18/2020 05:15 AM  
 
Recent Glucose Results:  
Lab Results Component Value Date/Time  (H) 09/18/2020 05:15 AM  
 
Liver Panel:  
Lab Results Component Value Date/Time ALB 3.2 (L) 09/18/2020 05:15 AM  
 TP 6.3 (L) 09/18/2020 05:15 AM  
 GLOB 3.1 09/18/2020 05:15 AM  
 AGRAT 1.0 09/18/2020 05:15 AM  
 ALT 49 09/18/2020 05:15 AM  
 AP 95 09/18/2020 05:15 AM  
 
  
 
Scheduled Medications Reviewed: 
Current Facility-Administered Medications Medication Dose Route Frequency  tamsulosin (FLOMAX) capsule 0.4 mg  0.4 mg Oral DAILY  QUEtiapine (SEROquel) tablet 50 mg  50 mg Oral QHS  heparin (porcine) injection 5,000 Units  5,000 Units SubCUTAneous Q8H  
 [Held by provider] insulin glargine (LANTUS) injection 5 Units  5 Units SubCUTAneous DAILY  lactated Ringers infusion  100 mL/hr IntraVENous CONTINUOUS  cholecalciferol (VITAMIN D3) (1000 Units /25 mcg) tablet 1 Tab  1,000 Units Oral DAILY  insulin lispro (HUMALOG) injection   SubCUTAneous AC&HS  
 melatonin tablet 3 mg  3 mg Oral QHS  [Held by provider] insulin NPH (NOVOLIN N, HUMULIN N) injection 40 Units  40 Units SubCUTAneous ACB&D  
 sodium chloride (NS) flush 5-40 mL  5-40 mL IntraVENous Q8H  
  [Held by provider] PARoxetine (PAXIL) tablet 60 mg  60 mg Oral DAILY  pantoprazole (PROTONIX) tablet 40 mg  40 mg Oral DAILY  [Held by provider] rosuvastatin (CRESTOR) tablet 20 mg  20 mg Oral QHS Imaging, microbiology, and EKG/Telemetry: 
Imaging: MODALITY IMPRESSION  
XR Results from Hospital Encounter encounter on 09/08/20 XR ORBIT BI FOREIGN BODY Narrative EXAM: ORBITS LIMITED CLINICAL HISTORY/INDICATION: , blurred vision with nausea, vomiting, and 
abdominal pain, severe agitation, altered mental status, diabetic retinopathy 
due to type 2 diabetes Thomasena Dilling mri screen. COMPARISON: None. TECHNIQUE: modified ramos view(s) of the orbits obtained. FINDINGS: 
 
There are no radiopaque metallic foreign bodies within the orbits. There is no 
aneurysm clip. The visualized  paranasal sinuses are normal. 
  
 Impression IMPRESSION: 
 
Negative orbital screening prior to MRI. CT Results from Hospital Encounter encounter on 09/08/20 CT CHEST ABD PELV W CONT Narrative CT chest, abdomen and pelvis with contrast  
 
CLINICAL HISTORY: Leukocytosis. CT evaluation of potential infection versus 
abscess COMPARISON: None. TECHNIQUE: Helical scan to the chest, abdomen and pelvis are obtained from the 
thoracic inlet to the symphysis pubis after IV contrast administration. All CT scans at this facility performed using dose optimization techniques as 
appreciated to a performed exam, to include automated exposure control, 
adjustment of the mA and or KU according to patient size (including appropriate 
matching for site specific examination), or use of iterative reconstruction 
technique. FINDINGS: Moderate motion artifact noted and limits the detail evaluation. CT CHEST:  
 
Lung Parenchyma: Minimal bibasilar atelectasis. No acute airspace disease or 
consolidation. Thyroid: Small hypodense nodule in the right lobe measuring about 5 mm. Mediastinum: No adenopathy. Mild and circumferential esophageal wall prominence 
with collapsed the lumen. Heart: The heart and the pericardium appear normal. 
 
Vasculature: The aorta and the great vessels are unremarkable. Pleural Space And Chest Wall: No significant pleural pathology. CT ABDOMEN AND PELVIS: 
 
Liver: Normal. 
 
Gallbladder: Surgically absent. Biliary System: No ductal dilatation. Spleen: Normal. 
 
Pancreas: Normal. 
 
Adrenal Glands: Normal. 
 
Kidneys: Normal. 
 
Bowel: The small and large bowel are nondilated. Normal appendix. Fluid-filled 
sigmoid colon and rectum with air-fluid level without dilatation. Lower genitourinary system: The bladder is markedly distended up to the level 
above the umbilicus, 23.3 cm in cc dimension. No definite bladder wall lesion 
seen. The uterus appears spherical and heterogeneous with several partially 
calcified fibroids present. No adnexal mass. Peritoneum/Retroperitoneum: No adenopathy. Vasculature: Unremarkable for age. Other: No free fluid. CT OSSEOUS STRUCTURES:   
 
Unremarkable for age. Impression IMPRESSION:  
 
1. Marked bladder distention up to the level above the umbilicus. Recommend 
clinical correlation for urinary outlet obstruction. 2. No CT evidence of infection or inflammatory process. No abscess seen. 3. Tiny hypodense nodule in right thyroid lobe. This can be better evaluated by 
thyroid ultrasound. 4. Mild and circumferential esophageal wall prominence with collapsed the lumen. If symptomatic, barium swallow can be performed for better evaluation. 5. Fluid-filled nondilated distal colon and rectum as nonspecific finding and 
could represent diarrhea. 6. Multiple partially calcified fibroids. Thank you for this referral.   
  
MRI Results from Hospital Encounter encounter on 09/11/20 MRI BRAIN WO CONT  Narrative Description:  MRI brain without contrast 
 
 TECHNIQUE: Multiplanar, multisequence MR imaging of the brain without contrast 
 
Clinical Indication:  Altered mental status. Comparison: September 9, 2020. Findings: Motion degraded exam. 
 
Midline structures of the brain to include the corpus callosum, pituitary gland 
and clivus demonstrate normal morphology and signal intensity. The craniocervical junction appears normal. 
There is normal signal intensity within the superior sagittal sinus. There is a focal narrowing at the right occipital horn, presumed congenital 
variant. Otherwise, the brain demonstrates normal morphology and signal 
intensity throughout. There are no areas of restricted diffusion. There are normal flow voids at the base of the brain. Trace right mastoid effusion. Left mastoids are clear. Paranasal sinuses are 
clear. There is abnormal signal intensity within the left globe. This appears to be 
chronic. Impression Impression: Motion degraded exam. No acute intracranial findings. Abnormal appearance of the left globe, chronic and stable. Clinical correlation 
needed. ULTRASOUND Results from Hospital Encounter encounter on 09/08/20 US ABD LTD Impression IMPRESSION:    
 
1. Status post cholecystectomy. 2.  No significant common bile duct dilation. 3.  Increased hepatic parenchymal echogenicity with somewhat heterogeneous 
appearance, potentially suggestive of hepatosteatosis. Partial visualization of 
the liver. Results from [facility] Encounter encounter on 01/22/19 US ABD LTD Impression IMPRESSION: 
 
Mild heterogeneous echogenicity of the liver is nonspecific. This is commonly 
seen with steatosis hepatic disease. Limited visualization of the pancreas. Cardiology Procedures/Testing: MODALITY RESULTS  
EKG Results for orders placed or performed during the hospital encounter of 09/08/20 EKG, 12 LEAD, INITIAL Result Value Ref Range Ventricular Rate 115 BPM  
 Atrial Rate 115 BPM  
 P-R Interval 148 ms QRS Duration 58 ms Q-T Interval 342 ms QTC Calculation (Bezet) 473 ms Calculated P Axis 34 degrees Calculated R Axis 1 degrees Calculated T Axis 34 degrees Diagnosis Sinus tachycardia Cannot exclude anterior MI versus lead placement issue. Abnormal ECG When compared with ECG of 08-SEP-2020 15:05, Anterior infarct is now present Nonspecific T wave abnormality no longer evident in Inferior leads Nonspecific T wave abnormality, improved in Anterolateral leads Confirmed by Loren Palma MD, Blayne Alysha ([id number]) on 9/17/2020 3:04:13 PM 
  
Results for orders placed or performed in visit on 06/20/14 AMB POC EKG ROUTINE W/ 12 LEADS, INTER & REP Impression See progress note. ECHO 01/03/19 ECHO ADULT COMPLETE 01/04/2019 1/4/2019 Narrative · Estimated left ventricular ejection fraction is 61 - 65%. Left  
ventricular mild concentric hypertrophy. Mild (grade 1) left ventricular  
diastolic dysfunction. · Mild tricuspid valve regurgitation is present. Signed by: Dulce Zuniga MD  
  
 
Special Testing/Procedures: MODALITY RESULTS MICRO All Micro Results Procedure Component Value Units Date/Time CULTURE, BLOOD [[id number]] Collected:  09/15/20 1005 Order Status:  Completed Specimen:  Blood Updated:  09/18/20 4690 Special Requests: NO SPECIAL REQUESTS Culture result: NO GROWTH 3 DAYS     
 CULTURE, BLOOD [[id number]] Collected:  09/15/20 1002 Order Status:  Completed Specimen:  Blood Updated:  09/18/20 5422 Special Requests: NO SPECIAL REQUESTS Culture result: NO GROWTH 3 DAYS     
 CULTURE, CSF Rozanna Mortimer STAIN [[id number]] Collected:  09/15/20 0848 Order Status:  Completed Specimen:  Cerebrospinal Fluid Updated:  09/17/20 1436   Special Requests: CEREBROSPINAL FLUID     
  GRAM STAIN NO WBC'S SEEN     
   NO ORGANISMS SEEN     
      
 Smear Reviewed by Microbiology 9/15/20 AT 1408 TA. Culture result: NO GROWTH 2 DAYS     
 CULTURE, URINE [[id number]] Collected:  09/15/20 1850 Order Status:  Completed Specimen:  Urine from Clean catch Updated:  09/17/20 1046 Special Requests: NO SPECIAL REQUESTS Culture result: No growth (<1,000 CFU/ML) MENINGITIS PATHOGENS PANEL, CSF (BY PCR) [[id number]] Collected:  09/15/20 0848 Order Status:  Completed Specimen:  Cerebrospinal Fluid Updated:  09/15/20 1510 Escherichia coli K1 Not detected Haemophilus Influenzae Not detected Listeria Monocytogenes Not detected Neisseria Meningitidis Not detected Streptococcus Agalactiae Not detected Streptococcus Pneumoniae Not detected Cytomegalovirus Not detected Enterovirus Not detected Herpes Simplex Virus 1 Not detected Comment: In patients who have negative herpes simplex 1 and 2 PCR results, do not modify treatment, confirm with alternate testing. Herpes Simplex Virus 2 Not detected Comment: In patients who have negative herpes simplex 1 and 2 PCR results, do not modify treatment, confirm with alternate testing. Human Herpesvirus 6 Not detected Human Parechovirus Not detected Varicella Zoster Virus Not detected Crypto. neoformans/gattii Not detected CULTURE, BLOOD [[id number]] Collected:  09/08/20 1640 Order Status:  Completed Specimen:  Blood Updated:  09/14/20 7148 Special Requests: NO SPECIAL REQUESTS Culture result: NO GROWTH 6 DAYS     
 CULTURE, BLOOD [[id number]] Collected:  09/08/20 1640 Order Status:  Completed Specimen:  Blood Updated:  09/14/20 1442 Special Requests: NO SPECIAL REQUESTS Culture result: NO GROWTH 6 DAYS     
 CULTURE, URINE [[id number]] Collected:  09/08/20 1211 Order Status:  Completed Specimen:  Urine from Clean catch Updated:  09/09/20 1811 Special Requests: NO SPECIAL REQUESTS [place] Count --     
  >100,000 COLONIES/mL Culture result:    
  MIXED UROGENITAL KYLE ISOLATED  
     
  
  
UA No results found for this or any previous visit. PATH Assessment/Plan Italo Valdez is an 60 y.o. female with PMH of hypokalemia, retinal detachment, stage 2 CKD, GIST s/p resection (2014), HTN, HLD who was admitted on 9/08/20 for AMS and HHS. Altered Mental Status - metabolic encephalopathy of unclear etiology (likely multifactorial) [] Pt initially presented with HHS (BG of 460-500), which was treated and has resolved. Now with BG < 200 consistently euglycemic. [] Initial labs were concerning for infection with leukocytosis and lactic acidosis of unclear source that resolved with IV Abx. Possible UTI was the leading DDx because of a UA that was notable for wbc 6-8, +2 bacteria, neg nitrites/LE, UCx with >100,000 colonies of mixed urogenital kyle, but a CT chest/abd/pelvis (9/09) that shows bladder with mild wall thickening concerning for cystitis/UTI. [] At admission, CT Head (9/08) showed mildly enlarged ventricles that could be concerning for cerebral atrophy vs normal pressure hydrocephalus. [] Pt developed Hypernatremia (9/09) of 151; this was treated and has resolved. [] Also of note, UDS and serum EtOH were negative at admission. Troponin has been negative and EKG with no ST elevation or depression. Serum lipase, ammonia, B12, and folate levels have all been WNL as well. HIV and RPR also negative. - Neurology on board, appreciate their recs - s/p empirical Abx:  Ceftriaxone (9/8 -9/9),  Vancomycin (9/8 - 9/14),  Zosyn (9/9 - 9/14) - Elevated inflammatory markers:  ESR 32 and CRP 3.7 (9/14) - MRI head (9/11):  no acute intracranial findings 
- EEG (9/15):  presence of encephalopathy 
- B1, GRACE, smooth muscle antibodies, and covid were all negative PLAN:  
 - Psychiatry Consult  (consider Catatonia (excited subtype) and assistance with medical management) - Thyroid panel WNL 
- MRI (9/17): No acute intracranial findings, no evidence of dural venous thrombosis - F/U UCx, BCx, and CSF-Cx  (all with NGTD) - Consider starting Steroids vs Mannitol (hydrocephalus/elevated ICP) - As per neurology, avoid benzodiazepines for sedation;  continue Ziprasidone (geodon) 10mg IM PRN for severe agitation 
- Continue Quetiapine (seroquel) 5mg QHS 
- Hold metoclopramide and paroxetine (concern for serotonin syndrome) 
  
  
EPS - Tardive dyskinesia, Akathisia, and Dystonia Pt's development of EPS is likely due to use of antipsychotics, and chronic metoclopramide use could also be related. - Currently on physical restraints ; sitter present today for a trial off of restraints this afternoon - As per neuro, avoid antipsychotics - As per neuro, Ziprasidone (geodon) PRN for severe agitation 
  
  
Hematuria Due to urinary retention earlier in hospitalization, Pt had a garcia catheter placed. The leading DDx for the hematuria is catheter-related trauma to the urinary tract. - Urology consulted; they rec'd discontinuing garcia and placing Pt in a diaper R Eye Pain + Vision Loss Pt c/o a 2-week history of R eye pain associated with vision loss. Based on a bedside U/S, papilledema seems unlikely (as no enlarged optic sheath was seen). DDx:  Floaters vs Retinal Detachment vs Conjuctivitis vs Acute Angle-Closure Glaucoma - Discussed with Ophthalmology (Dr. Alee De Santiago) - not able to come and see Pt Hyperbilirubinemia (3.9) w/ 0.6 direct likely majority is unconjugated  likely secondary to [place] syndrome, previously elevated bili unlikely hemolysis (improved) CT AB/US no gall bladder disease appreciated , haptoglobin normal, aldolase pending Diet:  Diabetic diet, advance diet as tolerated by ST 
DVT Prophylaxis:  SQH 
GI Prophylaxis:  Omeprazole Code Status:  Full Point of Contact:  Jaida Wellington, [phone] (Relationship:  ) 
  
Disposition:  >2 MN Rashida Isaacs, MS-4 [facility]

## 2020-09-18 NOTE — PROGRESS NOTES
PT attempted for follow-up treatment session. Unable to see patient at this time as she does not wish to participate at this time despite max encouragement. Pt in restraints with sitter at bedside, waking up/sleepy upon arrival. Will follow up as patient schedule allows. Thank you for this referral. Aleta Finney, PT, DPT.

## 2020-09-18 NOTE — PROGRESS NOTES
120 [facility] Brief Progress Note I attest patient requires restraints in the form of roll belt and siderails x 4 as activity/agitation is causing interference with medical treatment; in addition, she at times poses imminent risk to self, fall risk due to agitation/delerium. Restraints to be reevaluated q24h at a minimum. Reminded RN LALA is available to check on Ms. Juancarlos De Jesus throughout the evening should they have concerns about her safety. Sylvia Vera DO, PGY-3  
[facility] Medicine Senior Pager: [phone] September 18, 2020, 7:02 PM

## 2020-09-18 NOTE — PROGRESS NOTES
Progress Note 58y F with PMH DM type 2, HTN CKD presented with HHS following for CRYSTAL Subjective Overnight event noted IMPRESSION:  
Acute kidney injury, back to baseline CKD, h/o CRYSTAL in past, last available creatinine is at 1.9mg/dl last yea Ketoacidosis, resolved Hypokalemia ,resolved 
rhabdomylysis ,mild DM type 2 Altered mental status, Diastolic heart failure, grade 1 PLAN:  
Her renal function is back to baseline. I will sign off.available if needed 
cpk is elevated. ? Etiology. statins stopped. improving. stop ivf,encourage po intake Urinary retention,reccurent,started flomax Current Facility-Administered Medications Medication Dose Route Frequency  [START ON 9/19/2020] FLUoxetine (PROzac) capsule 20 mg  20 mg Oral DAILY  tamsulosin (FLOMAX) capsule 0.4 mg  0.4 mg Oral DAILY  QUEtiapine (SEROquel) tablet 50 mg  50 mg Oral QHS  ziprasidone (GEODON) 10 mg in sterile water (preservative free) 0.5 mL injection  10 mg IntraMUSCular Q12H PRN  
 heparin (porcine) injection 5,000 Units  5,000 Units SubCUTAneous Q8H  
 [Held by provider] insulin glargine (LANTUS) injection 5 Units  5 Units SubCUTAneous DAILY  lactated Ringers infusion  100 mL/hr IntraVENous CONTINUOUS  
 polyvinyl alcohol-povidon(PF) (REFRESH CLASSIC) 1.4-0.6 % ophthalmic solution 1 Drop  1 Drop Both Eyes PRN  cholecalciferol (VITAMIN D3) (1000 Units /25 mcg) tablet 1 Tab  1,000 Units Oral DAILY  insulin lispro (HUMALOG) injection   SubCUTAneous AC&HS  
 melatonin tablet 3 mg  3 mg Oral QHS  sodium chloride (NS) flush 5-10 mL  5-10 mL IntraVENous PRN  
 [Held by provider] insulin NPH (NOVOLIN N, HUMULIN N) injection 40 Units  40 Units SubCUTAneous ACB&D  
 glucose chewable tablet 16 g  16 g Oral PRN  
 glucagon (GLUCAGEN) injection 1 mg  1 mg IntraMUSCular PRN  
 dextrose (D50W) injection syrg 12.5-25 g  25-50 mL IntraVENous PRN  
  sodium chloride (NS) flush 5-40 mL  5-40 mL IntraVENous Q8H  
 sodium chloride (NS) flush 5-40 mL  5-40 mL IntraVENous PRN  
 acetaminophen (TYLENOL) tablet 650 mg  650 mg Oral Q6H PRN Or  
 acetaminophen (TYLENOL) suppository 650 mg  650 mg Rectal Q6H PRN  polyethylene glycol (MIRALAX) packet 17 g  17 g Oral DAILY PRN  pantoprazole (PROTONIX) tablet 40 mg  40 mg Oral DAILY  [Held by provider] rosuvastatin (CRESTOR) tablet 20 mg  20 mg Oral QHS  ondansetron (ZOFRAN) injection 4 mg  4 mg IntraVENous Q6H PRN Review of Systems: As above Data Review: 
 
Labs: Results:  
   
Chemistry Recent Labs  
  09/18/20 
0515 09/17/20 
0538 09/16/20 
0500 * 108* 142*  140 138  
K 3.7 3.6 3.6  106 104 CO2 26 30 29 BUN 7 5* 7 CREA 0.96 0.92 0.95  
CA 8.7 8.8 8.8 AGAP 9 4 5 BUCR 7* 5* 7* AP 95 88 94  
TP 6.3* 7.0 6.8 ALB 3.2* 3.2* 3.2*  
GLOB 3.1 3.8 3.6 AGRAT 1.0 0.8 0.9  
  
CBC w/Diff Recent Labs  
  09/18/20 
0515 09/17/20 
0538 09/16/20 
0500 WBC 10.9 12.9 13.2 RBC 2.89* 2.97* 2.98* HGB 8.4* 8.6* 8.6* HCT 24.8* 25.6* 25.6*  229 219 GRANS 68 63 63 LYMPH 27 29 29 EOS 1 0 2 Coagulation No results for input(s): PTP, INR, APTT, INREXT, INREXT in the last 72 hours. Iron/Ferritin No results for input(s): IRON in the last 72 hours. No lab exists for component: TIBCCALC BNP No results for input(s): BNPP in the last 72 hours. Cardiac Enzymes Recent Labs  
  09/17/20 
0538 09/16/20 
2319 * 1,057* Liver Enzymes Recent Labs  
  09/18/20 
0515 TP 6.3* ALB 3.2* AP 95 Thyroid Studies Lab Results Component Value Date/Time T4, Total 8.2 09/17/2020 05:38 AM  
 T3 Uptake 27 09/17/2020 05:38 AM  
 TSH 1.500 09/17/2020 05:38 AM  
    
 EKG: sinus Physical Assessment:  
 
Visit Vitals /70 (BP 1 Location: Left arm) Pulse 100 Temp 98.7 °F (37.1 °C) Resp 20 Ht 5' 8\" (1.727 m) Wt 94.3 kg (207 lb 14.3 oz) SpO2 100% Breastfeeding No  
BMI 31.61 kg/m² Weight change: -1.5 kg (-3 lb 4.9 oz) Intake/Output Summary (Last 24 hours) at 9/18/2020 1452 Last data filed at 9/18/2020 0700 Gross per 24 hour Intake 2060 ml Output  Net 2060 ml Physical Exam:  
General: no respi distress HEENT sclera anicteric, supple neck, no thyromegaly CVS: S1S2 heard,  no rub RS: + air entry b/l, Abd: Soft, Non tender Extrm: No edema, no cyanosis, clubbing Skin: no visible  Rash Procedures/imaging: see electronic medical records for all procedures, Xrays and details which were not copied into this note but were reviewed prior to creation of Plan Humera Jha MD 
September 18, 2020 [facility] Nephrology Office [phone]

## 2020-09-18 NOTE — PROGRESS NOTES
Problem: Self Care Deficits Care Plan (Adult) Goal: *Acute Goals and Plan of Care (Insert Text) Description: Occupational Therapy Goals Initiated 9/14/2020 within 7 day(s). 1.  Patient will perform bed mobility in preparation for self-care with minimal assistance/contact guard assist.  
2.  Patient will perform grooming with supervision/set-up using compensatory techniques. 3.  Patient will perform upper body dressing with supervision/set-up. 4.  Patient will perform toilet transfers with minimal assistance/contact guard assist. 
5.  Patient will perform all aspects of toileting with minimal assistance/contact guard assist. 
6.  Patient will participate in upper extremity therapeutic exercise/activities with independence for 8-10 minutes. 7.  Patient will utilize energy conservation techniques during functional activities with verbal cues. 8.  Patient will perform lower body dressing in preparation for self-care with minimal assistance/contact guard assistance Prior Level of Function: Patient was independent with self-care and functional mobility PTA. Outcome: Progressing Towards Goal 
 OCCUPATIONAL THERAPY TREATMENT Patient: León Seen (38 y.o. female) Date: 9/18/2020 Diagnosis: Hyperglycemia [R73.9] Altered mental status [R41.82] Acute metabolic encephalopathy [O57.46] Acute renal failure (ARF) (HCC) [N17.9] Leukocytosis [D72.829] Hypokalemia [E87.6] Metabolic encephalopathy [T82.53] Altered mental status Precautions: Fall, Aspiration Chart, occupational therapy assessment, plan of care, and goals were reviewed. ASSESSMENT: 
Sitter feeding pt upon entry 2/2 pt remains in BUE wrist restraints. Removed RUE wrist restraint for pt to perform self-feeding ADL. Pt remains confused, oriented to self only. Pt declines food, but receptive to drink.  Pt performs ADL item retrieval from tray table w/SBA and vc's for location. Pt performs self-feeding for 1 trial only. Declining further OT intervention, requesting to sleep. Donned RUE wrist restraint. Progression toward goals: 
[]          Improving appropriately and progressing toward goals [x]          Improving slowly and progressing toward goals 
[]          Not making progress toward goals and plan of care will be adjusted PLAN: 
Patient continues to benefit from skilled intervention to address the above impairments. Continue treatment per established plan of care. Discharge Recommendations:  Rehab Further Equipment Recommendations for Discharge:  TBD by next level of care SUBJECTIVE:  
Patient stated I have that thing with my eyes, and I don't see good.  OBJECTIVE DATA SUMMARY:  
Cognitive/Behavioral Status: 
Neurologic State: Confused Orientation Level: Oriented to person Cognition: Decreased attention/concentration Safety/Judgement: Decreased insight into deficits, Decreased awareness of environment Functional Mobility and Transfers for ADLs: 
ADL Intervention: 
Feeding Drink to Mouth: Stand-by assistance Pain: 
Pain level pre-treatment: 0/10 Pain level post-treatment: 0/10 Activity Tolerance:   
Poor Please refer to the flowsheet for vital signs taken during this treatment. After treatment:  
[]  Patient left in no apparent distress sitting up in chair 
[x]  Patient left in no apparent distress in bed 
[]  Call bell left within reach 
[]  Nursing notified 
[x]  sitter present 
[]  Bed alarm activated COMMUNICATION/EDUCATION:  
[] Role of Occupational Therapy in the acute care setting 
[] Home safety education was provided and the patient/caregiver indicated understanding. [] Patient/family have participated as able in working towards goals and plan of care. [] Patient/family agree to work toward stated goals and plan of care. [] Patient understands intent and goals of therapy, but is neutral about his/her participation. [x] Patient is unable to participate in goal setting and plan of care 2/2 increase confusion. Thank you for this referral. 
JUSTIN Velásquez Time Calculation: 8 mins

## 2020-09-18 NOTE — CONSULTS
[address] Consultation Note Date of Service:  09/18/20 Historian(s): Patient, resident Referral Source: [facility] family medicine team 
 
Chief Complaint Altered mental status. History of Present Illness Jacky Gastelum is a 60 y.o. BLACK OR  female  with a history of  DM with retinopathy, gastroparesis, hypertension, GERD, anxiety  who was admitted for  nausea and vomiting and abdominal pain. Admitted for hyperglycemia glucose 470, severe agitation and altered mental status. She had leukocytosis with no obvious source of infection during hospital course so far and has also had extensive work-up to rule out causes of delirium. Paxil was instantly discontinued at time of admission due to concerns of serotonin syndrome. Neurology has been consulted and has recommended Seroquel 50 mg at bedtime and Geodon for severe agitation. Psychiatry is being consulted for additional input into management of encephalopathy. Patient was seen Johnathan Ocasio in her bed with restraints and upper extremities. She was somewhat cooperative with the interview and answer questions appropriately. She was oriented to self but not to place or time. She said she was at the doctor's office and today was Sunday. She denied hallucinations or paranoia. She said her mood was good and she has been sleeping well. She denied problems with appetite. She could not remember events that led to hospitalization. She says she came to the hospital for anxiety and her anxiety is getting better. Medical ROS: Patient denies chest pain, shortness of breath, abdominal pain, malaise at this time. ROS positive for confusion. All other systems reviewed and are negative at this time. Psychiatric Treatment History Patient denies history of prior inpatient psychiatric hospitalization for suicide attempt.   She says she has not been under the care of a psychiatrist or therapist.  Her primary care physician has been prescribing Paxil for the past 5 years for anxiety. Allergies Allergies Allergen Reactions  Levaquin [Levofloxacin] Hives  Promethazine Nausea and Vomiting \"the phenergan made me more nauseated\" Medical History Past Medical History:  
Diagnosis Date  Blind left eye  Cellulitis of great toe of right foot 10/19/2017  Diabetes ([address]  Diabetic retinopathy ([address]  Gall stones  GERD (gastroesophageal reflux disease)  Hammertoe  Hiatal hernia  History of mammogram 10/03/2017 No evidence of malignancy  Hypertension  Mass of abdomen  Neuropathy due to type 2 diabetes mellitus ([address]  Osteomyelitis of toe of right foot ([address] 10/19/2017  Pancreatitis Medication(s) No current facility-administered medications on file prior to encounter. Current Outpatient Medications on File Prior to Encounter Medication Sig Dispense Refill  metoclopramide HCl (REGLAN) 5 mg tablet Take 1 Tab by mouth Before breakfast, lunch, and dinner. Take before meals. 90 Tab 3  
 insulin NPH/insulin regular (NOVOLIN 70/30, HUMULIN 70/30) 100 unit/mL (70-30) injection 40 Units by SubCUTAneous route two (2) times a day. 10 mL 3  
 PARoxetine (PAXIL) 40 mg tablet Take 40 mg by mouth daily.  rosuvastatin (CRESTOR) 20 mg tablet Take 1 Tab by mouth nightly. 90 Tab 3 Substance Abuse History Tobacco: denied Alcohol: denied Marijuana: denied Cocaine: denied Opiate: denied Treatment hx:  Denied Family History Family History Adopted: Yes  
Problem Relation Age of Onset  Heart Failure Mother 76  
 Other Father 70  
     blood clot after surgery  Diabetes Paternal Aunt  Diabetes Paternal Uncle Psychiatric Family History The patient denied family history of mental illness or suicide attempts. Social History Patient states she was born and raised in [facility]. She was raised by her aunt and uncle because her parents  when she was very young. She denies having brothers or sisters. She is . She lives alone in [facility]. She does not have any children. She is currently on disability for blindness. Vitals/Labs Visit Vitals /70 (BP 1 Location: Left arm) Pulse 100 Temp 98.7 °F (37.1 °C) Resp 20 Ht 5' 8\" (1.727 m) Wt 94.3 kg (207 lb 14.3 oz) SpO2 100% Breastfeeding No  
BMI 31.61 kg/m² Labs:  
 
Mental Status Examination Appearance/Hygiene  patient looks well-nourished. She appears her stated age. She is mildly disheveled and dressed in hospital gown. She has soft restraints in upper extremities bilaterally. Attitude/Behavior/Social Relatedness Appropriate Musculoskeletal Gait/Station: This was not tested as patient was currently in restraints. Tone (flaccid, cogwheeling, spastic): Normal tone Psychomotor (hyperkinetic, hypokinetic): calm Involuntary movements (tics, dyskinesias, akathisa, stereotypies): none Speech   Rate, rhythm, volume, fluency and articulation are appropriate Mood   euthymic Affect    congruent Thought Process Linear and goal directed, concrete thought process. Associations are tight. No rambling or tangentiality noted. Thought Content and Perceptual Disturbances Denies auditory and visual hallucinations, denies suicidal ideation Sensorium and Cognition  alert and oriented to self. Her recent and remote memory is impaired. Her attention span is limited as well as her concentration. Her language is appropriate. She is able to name objects and repea phrases. Her fund of knowledge is diminished at this time and she does not appear to be aware of why she is in the hospital.  
Insight  impaired Judgment  impaired Assessment and Plan Psychiatric Diagnoses: Acute and hyperactive delirium due to multiple etiologies. Patient appears to be doing better today. Recommendation: Patient's delirium may have been worsened by rapid discontinuation of paroxetine which has a very short half-life and usually causes withdrawal symptoms if rapidly discontinued. I recommend starting patient on fluoxetine 20 mg daily for history of anxiety and also to mitigate any potential withdrawal symptoms she may be experiencing from discontinuation of paroxetine. I agree with continuing Seroquel 50 mg at bedtime. Thank you for the consultation. Jaylin Polanco MD 
Psychiatrist 
[facility]

## 2020-09-19 NOTE — PROGRESS NOTES
[address] Brief Progress Note SUBJECTIVE:  
Called by nursing for patient being extremely restless, trying to get out of bed, pulling off her roll belt, and spreading feces on herself and the bed. When I went to bedside, pt had pulled off her gown, had one leg out of bed and was trying to get out of her roll belt. She had had a bowel movement and put her hand in it, unintentionally spreading feces over herself and the bed. Pt was redirectable for brief periods, but repeatedly tried to climb over the siderails on her bed. Myself and the medical student assisted the nurse in cleaning her up and putting fresh sheets and pads on the bed. OBJECTIVE: 
Visit Vitals /69 Pulse (!) 108 Temp 97.1 °F (36.2 °C) Resp 20 Ht 5' 8\" (1.727 m) Wt 94.3 kg (207 lb 14.3 oz) SpO2 100% Breastfeeding No  
BMI 31.61 kg/m² ASSESSMENT & PLAN: 
Delirium- Pt requires soft wrist restraints in addition to roll belt and side rails for health and safety. She is unable to stand on her own and if able to get out of bed poses an imminent risk to herself. The RN will attempt another trial without restraints after her evening medications if she is less aggitated. Need for continued restraints will be evaluated at that time. Requested RN page PFM should Ms. Emerson Cedeno become more agitated. For full assessment and plan, please see daily progress note. Adin Parrish MD, PGY-1 
[facility] Medicine 9/18/2020 20:35

## 2020-09-19 NOTE — PROGRESS NOTES
1930: Assumed patient care from [place] [id number]. Patient is alert and oriented to person,but disoriented to place, time and situation. Respiratory status Is stable on room air. Vital signs are stable. MEWS score is a one. Patient denies any pain, discomfort, nausea vomiting dizziness or anxiety. White board and fall card is updated. Bed is locked and in lowest position. Call bell, water and personal belongings are within reach. Patient has no questions, comments or concerns after bedside shift report. 0700: Patient had an uneventful shift. Respiratory status, vital signs and MEWS score remained stable. Patient was resting quietly with no signs of distress noted. Bed locked and in lowest position. Call bell water and personal belongings were within reach. Patient had no questions, comments or concerns after bedside shift report.  Bedside report given to Yohan Barfield R.N.

## 2020-09-19 NOTE — PROGRESS NOTES
[address] [facility] Progress Note Patient: Heidi Breen MRN: [id number] SSN: [id number]  YOB: 1965 Age: 60 y.o. Sex: female Admit Date: 9/8/2020 LOS: 11 days Chief Complaint Patient presents with  Vomiting Subjective:  
 
Pt agitated this morning. She is sitting up trying to get out of bed. She has role belt and restraints. She knows her name but unable to tell me where she is and what year ROS Pt not cooperative answering questions Objective:  
 
Visit Vitals /79 Pulse 80 Temp 98.2 °F (36.8 °C) Resp 20 Ht 5' 8\" (1.727 m) Wt 94.3 kg (207 lb 14.3 oz) LMP 10/06/2015 (Exact Date) SpO2 99% Breastfeeding No  
BMI 31.61 kg/m² Physical Exam: 
General:  AXOx 1 , Pt follows command intermittently, eyes closed , keeps trying to get out of bed HEENT: R eye erythema, no drainage,  R not reactive dilated, Left eye sluggish but react to light, moist mucous membranes.   
CV:  RRR, no murmurs. No visible pulsations or thrills.   
RESP:  Unlabored lungs clear to auscultation without adventitious breath sounds. Equal expansion bilaterally.   
ABD:  soft abdomen, non-tender,  nondistended. BS (+).   
MS:  No joint deformity or instability.  No atrophy. Neuro:  Pt no facial droop, no deviation of tongue, unable to evaluate EOM because patient does not follow commands, moves all four extremities, Tardive akathisia Lower extremity bilaterally rigid tone, negative babinski  
Ext:  R foot the first digit is amputated, Left second digit amputated, No edema.  2+ radial and dp pulses bilaterally. Skin:  No rashes, lesions, or ulcers. Intake and Output: 
Current Shift: No intake/output data recorded. Last three shifts: 09/17 1901 - 09/19 0700 In: 2040 [P.O.:840; I.V.:1200] Out: 0 Lab/Data Review: 
Recent Results (from the past 12 hour(s)) GLUCOSE, POC Collection Time: 09/18/20 11:10 PM  
Result Value Ref Range Glucose (POC) 145 (H) 70 - 110 mg/dL CBC WITH MANUAL DIFF Collection Time: 09/19/20  5:38 AM  
Result Value Ref Range WBC 11.2 4.6 - 13.2 K/uL  
 RBC 3.17 (L) 4.20 - 5.30 M/uL HGB 9.2 (L) 12.0 - 16.0 g/dL HCT 27.5 (L) 35.0 - 45.0 % MCV 86.8 74.0 - 97.0 FL  
 MCH 29.0 24.0 - 34.0 PG  
 MCHC 33.5 31.0 - 37.0 g/dL  
 RDW 14.4 11.6 - 14.5 % PLATELET 960 738 - 582 K/uL MPV 11.1 9.2 - 11.8 FL  
 DIFFERENTIAL MANUAL DIFFERENTIAL ORDERED    
 NEUTROPHILS PENDING % LYMPHOCYTES PENDING % MONOCYTES PENDING % EOSINOPHILS PENDING % BASOPHILS PENDING % BAND NEUTROPHILS PENDING % PROMYELOCYTES PENDING % MYELOCYTES PENDING % METAMYELOCYTES PENDING % BLASTS PENDING % OTHER CELL PENDING   
 ABS. NEUTROPHILS PENDING K/UL  
 ABS. LYMPHOCYTES PENDING K/UL  
 ABS. MONOCYTES PENDING K/UL  
 ABS. EOSINOPHILS PENDING K/UL  
 ABS. BASOPHILS PENDING K/UL  
 RBC COMMENTS PENDING   
 DF PENDING   
METABOLIC PANEL, COMPREHENSIVE Collection Time: 09/19/20  5:38 AM  
Result Value Ref Range Sodium 139 136 - 145 mmol/L Potassium 3.8 3.5 - 5.5 mmol/L Chloride 106 100 - 111 mmol/L  
 CO2 25 21 - 32 mmol/L Anion gap 8 3.0 - 18 mmol/L Glucose 149 (H) 74 - 99 mg/dL BUN 6 (L) 7.0 - 18 MG/DL Creatinine 1.23 0.6 - 1.3 MG/DL  
 BUN/Creatinine ratio 5 (L) 12 - 20 GFR est AA 55 (L) >60 ml/min/1.73m2 GFR est non-AA 45 (L) >60 ml/min/1.73m2 Calcium 9.4 8.5 - 10.1 MG/DL Bilirubin, total 2.2 (H) 0.2 - 1.0 MG/DL  
 ALT (SGPT) 51 13 - 56 U/L  
 AST (SGOT) 64 (H) 10 - 38 U/L Alk. phosphatase 102 45 - 117 U/L Protein, total 7.5 6.4 - 8.2 g/dL Albumin 3.5 3.4 - 5.0 g/dL Globulin 4.0 2.0 - 4.0 g/dL A-G Ratio 0.9 0.8 - 1.7 PHOSPHORUS Collection Time: 09/19/20  5:38 AM  
Result Value Ref Range Phosphorus 3.3 2.5 - 4.9 MG/DL MAGNESIUM Collection Time: 09/19/20  5:38 AM  
Result Value Ref Range Magnesium 2.0 1.6 - 2.6 mg/dL RETICULOCYTE COUNT Collection Time: 09/19/20  5:38 AM  
Result Value Ref Range Reticulocyte count 3.6 (H) 0.5 - 2.3 % GLUCOSE, POC Collection Time: 09/19/20  6:40 AM  
Result Value Ref Range Glucose (POC) 152 (H) 70 - 110 mg/dL RECENT RESULTS MODALITY IMPRESSION  
XR Results from Hospital Encounter encounter on 09/08/20 XR ORBIT BI FOREIGN BODY Narrative EXAM: ORBITS LIMITED CLINICAL HISTORY/INDICATION: , blurred vision with nausea, vomiting, and 
abdominal pain, severe agitation, altered mental status, diabetic retinopathy 
due to type 2 diabetes [facility] mri screen. COMPARISON: None. TECHNIQUE: modified ramos view(s) of the orbits obtained. FINDINGS: 
 
There are no radiopaque metallic foreign bodies within the orbits. There is no 
aneurysm clip. The visualized  paranasal sinuses are normal. 
  
 Impression IMPRESSION: 
 
Negative orbital screening prior to MRI. CT Results from Hospital Encounter encounter on 09/08/20 CT CHEST ABD PELV W CONT Narrative CT chest, abdomen and pelvis with contrast  
 
CLINICAL HISTORY: Leukocytosis. CT evaluation of potential infection versus 
abscess COMPARISON: None. TECHNIQUE: Helical scan to the chest, abdomen and pelvis are obtained from the 
thoracic inlet to the symphysis pubis after IV contrast administration. All CT scans at this facility performed using dose optimization techniques as 
appreciated to a performed exam, to include automated exposure control, 
adjustment of the mA and or KU according to patient size (including appropriate 
matching for site specific examination), or use of iterative reconstruction 
technique. FINDINGS: Moderate motion artifact noted and limits the detail evaluation. CT CHEST:  
 
Lung Parenchyma: Minimal bibasilar atelectasis. No acute airspace disease or 
consolidation. Thyroid: Small hypodense nodule in the right lobe measuring about 5 mm. Mediastinum: No adenopathy. Mild and circumferential esophageal wall prominence 
with collapsed the lumen. Heart: The heart and the pericardium appear normal. 
 
Vasculature: The aorta and the great vessels are unremarkable. Pleural Space And Chest Wall: No significant pleural pathology. CT ABDOMEN AND PELVIS: 
 
Liver: Normal. 
 
Gallbladder: Surgically absent. Biliary System: No ductal dilatation. Spleen: Normal. 
 
Pancreas: Normal. 
 
Adrenal Glands: Normal. 
 
Kidneys: Normal. 
 
Bowel: The small and large bowel are nondilated. Normal appendix. Fluid-filled 
sigmoid colon and rectum with air-fluid level without dilatation. Lower genitourinary system: The bladder is markedly distended up to the level 
above the umbilicus, 91.0 cm in cc dimension. No definite bladder wall lesion 
seen. The uterus appears spherical and heterogeneous with several partially 
calcified fibroids present. No adnexal mass. Peritoneum/Retroperitoneum: No adenopathy. Vasculature: Unremarkable for age. Other: No free fluid. CT OSSEOUS STRUCTURES:   
 
Unremarkable for age. Impression IMPRESSION:  
 
1. Marked bladder distention up to the level above the umbilicus. Recommend 
clinical correlation for urinary outlet obstruction. 2. No CT evidence of infection or inflammatory process. No abscess seen. 3. Tiny hypodense nodule in right thyroid lobe. This can be better evaluated by 
thyroid ultrasound. 4. Mild and circumferential esophageal wall prominence with collapsed the lumen. If symptomatic, barium swallow can be performed for better evaluation. 5. Fluid-filled nondilated distal colon and rectum as nonspecific finding and 
could represent diarrhea. 6. Multiple partially calcified fibroids. Thank you for this referral.   
  
MRI Results from Hospital Encounter encounter on 09/08/20 MRI BRAIN W WO CONT Narrative MRI BRAIN WITH AND WITHOUT CONTRAST Provided Reason for Exam: Confusion, visual loss right eye for 2 weeks, LP with 
elevated opening pressure Additional History: Patient admitted with confusion Comparison Studies: Brain MRI 9/11/2020, head CT 9/9/2020 Imaging Technique:  
  Sequences:  Sagittal and axial T1 weighted, Diffusion Weighted (DWI), Axial T2 
weighted, Axial T2 FLAIR, and GRE MRI images of the brain. Post contrast axial 
and coronal T1 images. Contrast Material:  20 mL Dotarem IV Limiting Factors/Major Artifacts: None. FINDINGS: 
 
Brain Parenchyma: Diffusion sequence negative. No cytotoxic edema or acute 
infarct. Cerebral white matter is normal.  No chronic cortical infarcts or 
chronic lacunar infarcts. No visible masses or midline shift. No abnormal 
parenchymal or meningeal enhancement. CSF Spaces: There are some mild prominence of the posterior horns of the 
lateral ventricles, but the major the ventricles are normal size. No findings of 
ventricular trapping. Appears to be developmental, similar findings on the head CT from 2013. Vascular System:  Grossly patent flow in basilar and internal cerebral arteries. No findings of dural sinus thrombosis. Hemorrhage:  No acute hemorrhage. No chronic microhemorrhage. Other Structures: 
Calvarium: No suspicious marrow signal. 
Sella: Normal. 
Visualized Upper Cervical Spine: Normal craniocervical junction, no Chiari 
malformation. Orbits: Postsurgical changes in the left ocular globe, appears to be silicone 
injection, unchanged from 2014 head CT. Paranasal Sinuses: No significant paranasal sinus disease. Mastoid Air Cells:  Clear. Impression IMPRESSION: 
 
No acute intracranial abnormality. No mass, hemorrhage, or acute infarct. Postsurgical changes left optic globe consistent with previous retinal 
detachment procedure. ULTRASOUND Results from Hospital Encounter encounter on 09/08/20 US ABD LTD  Impression IMPRESSION:    
 
 1.  Status post cholecystectomy. 2.  No significant common bile duct dilation. 3.  Increased hepatic parenchymal echogenicity with somewhat heterogeneous 
appearance, potentially suggestive of hepatosteatosis. Partial visualization of 
the liver. Results from [facility] Encounter encounter on 01/22/19 US ABD LTD Impression IMPRESSION: 
 
Mild heterogeneous echogenicity of the liver is nonspecific. This is commonly 
seen with steatosis hepatic disease. Limited visualization of the pancreas. Cardiology Procedures/Testing: MODALITY RESULTS  
EKG Results for orders placed or performed during the hospital encounter of 09/08/20 EKG, 12 LEAD, INITIAL Result Value Ref Range Ventricular Rate 115 BPM  
 Atrial Rate 115 BPM  
 P-R Interval 148 ms QRS Duration 58 ms Q-T Interval 342 ms QTC Calculation (Bezet) 473 ms Calculated P Axis 34 degrees Calculated R Axis 1 degrees Calculated T Axis 34 degrees Diagnosis Sinus tachycardia Cannot exclude anterior MI versus lead placement issue. Abnormal ECG When compared with ECG of 08-SEP-2020 15:05, Anterior infarct is now present Nonspecific T wave abnormality no longer evident in Inferior leads Nonspecific T wave abnormality, improved in Anterolateral leads Confirmed by Tory Childs MD, Caity Sol ([id number]) on 9/17/2020 3:04:13 PM 
  
Results for orders placed or performed in visit on 06/20/14 AMB POC EKG ROUTINE W/ 12 LEADS, INTER & REP Impression See progress note. ECHO 01/03/19 ECHO ADULT COMPLETE 01/04/2019 1/4/2019 Narrative · Estimated left ventricular ejection fraction is 61 - 65%. Left  
ventricular mild concentric hypertrophy. Mild (grade 1) left ventricular  
diastolic dysfunction. · Mild tricuspid valve regurgitation is present. Signed by: Pollyann Krabbe, MD  
  
 
Special Testing/Procedures: MODALITY RESULTS MICRO All Micro Results Procedure Component Value Units Date/Time CULTURE, BLOOD [[id number]] Collected:  09/15/20 1002 Order Status:  Completed Specimen:  Blood Updated:  09/19/20 9161 Special Requests: NO SPECIAL REQUESTS Culture result: NO GROWTH 4 DAYS     
 CULTURE, BLOOD [[id number]] Collected:  09/15/20 1005 Order Status:  Completed Specimen:  Blood Updated:  09/19/20 3972 Special Requests: NO SPECIAL REQUESTS Culture result: NO GROWTH 4 DAYS     
 CULTURE, CSF Lennice Nissen STAIN [[id number]] Collected:  09/15/20 0848 Order Status:  Completed Specimen:  Cerebrospinal Fluid Updated:  09/18/20 4756 Special Requests: CEREBROSPINAL FLUID     
  GRAM STAIN NO WBC'S SEEN     
   NO ORGANISMS SEEN Smear Reviewed by Microbiology 9/15/20 AT 1408 TA. Culture result: NO GROWTH 3 DAYS     
 CULTURE, URINE [[id number]] Collected:  09/15/20 1850 Order Status:  Completed Specimen:  Urine from Clean catch Updated:  09/17/20 1046 Special Requests: NO SPECIAL REQUESTS Culture result: No growth (<1,000 CFU/ML) MENINGITIS PATHOGENS PANEL, CSF (BY PCR) [[id number]] Collected:  09/15/20 0848 Order Status:  Completed Specimen:  Cerebrospinal Fluid Updated:  09/15/20 1510 Escherichia coli K1 Not detected Haemophilus Influenzae Not detected Listeria Monocytogenes Not detected Neisseria Meningitidis Not detected Streptococcus Agalactiae Not detected Streptococcus Pneumoniae Not detected Cytomegalovirus Not detected Enterovirus Not detected Herpes Simplex Virus 1 Not detected Comment: In patients who have negative herpes simplex 1 and 2 PCR results, do not modify treatment, confirm with alternate testing. Herpes Simplex Virus 2 Not detected Comment: In patients who have negative herpes simplex 1 and 2 PCR results, do not modify treatment, confirm with alternate testing. Human Herpesvirus 6 Not detected Human Parechovirus Not detected Varicella Zoster Virus Not detected Crypto. neoformans/gattii Not detected CULTURE, BLOOD [[id number]] Collected:  09/08/20 1640 Order Status:  Completed Specimen:  Blood Updated:  09/14/20 9745 Special Requests: NO SPECIAL REQUESTS Culture result: NO GROWTH 6 DAYS     
 CULTURE, BLOOD [[id number]] Collected:  09/08/20 1640 Order Status:  Completed Specimen:  Blood Updated:  09/14/20 8210 Special Requests: NO SPECIAL REQUESTS Culture result: NO GROWTH 6 DAYS     
 CULTURE, URINE [[id number]] Collected:  09/08/20 1211 Order Status:  Completed Specimen:  Urine from Clean catch Updated:  09/09/20 1811 Special Requests: NO SPECIAL REQUESTS [place] Count --     
  >100,000 COLONIES/mL Culture result:    
  MIXED UROGENITAL KYLE ISOLATED  
     
  
  
UA No results found for this or any previous visit. PATH none Telemetry NONE Oxygen NONE Assessment and Plan:  
60 y. o. 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   
  
Metabolic Encephalopathy of unclear etiology? Likely multifactorial, not returning to baseline Came with a OTV 182-835 BG, She was treated with IV fluids . She had a leucocytosis, and lactic acidosis that improved with IV antibiotics. leucocytosis initially, So far blood culture on 9/8 was no growth. Pt's UA only wbc 6-8, 2+ bacteria, LE neg/Nitrite neg.  Also, urine culture >100, 000 colony mixed urogenital kyle, likely contaminant. On 9/9 CT Chest/AB/Pelvis showed bladder with mild wall thickening cystitis unlikely UTI. Repeat cultures remain negative   Hypernatremia (151--> 150--> 141) resolved.  Pt was negative UDS and ETOH serum (9/8). On  9/8 CT head showed mild prominence: mild enlargement of lateral ventricles, cerebral atrophy . On admission labs negative for Negative troponin, EKG no ST elevation or depression, lipase normal, ammonia nml, B12 and folate nmlm,  
- s/p empirical  abx Ceftriaxone  (9/8-/9) 
- s/p empiric abx  vancomycin (9/8-9/14)  and zoysn (9/9-9/14 ) - MRI head 9/11 - no acute intracranial finding  
- HIV negative, RPR negative, ammonia 15  
- ESR 32 and CRP 3.7  elevated 
- EEG- encephalopathy 
- blood culture- NG 
- CSF wbc 1 , protein 55, glucose 70, elevated opening 34 mmhg, likely because pt in prone possition and also had sedation. Unlikely pseudotumor cerebri per neurology , negative meningitis panel - GRACE- negative - 9/15 CT AB/PELVIS/CHEST IV : Marked bladder distention up to the level above the umbilicus. recommend clinical correlation for urinary outlet obstruction. No CT evidence of infection or inflammatory process. No abscess seen. Tiny hypodense nodule in right thyroid lobe. This can be better evaluated by thyroid ultrasound. Mild and circumferential esophageal wall prominence with collapsed the lumen. If symptomatic, barium swallow can be performed for better evaluation. Fluid-filled nondilated distal colon and rectum as nonspecific finding and could represent diarrhea. Multiple partially calcified fibroids. -COVID-19 negative -TSH thyroid profile- nml  
-9/17 MRI Brain w w/o  And MRV brain w wo- no cerebral sagittal sinus thrombosis, ruled out  CNS infection, intracerebral hemorrhage, or a sagittal sinus thrombosis - 9/18 repeat EKG -ST not different from prior EKG  
- anti-smooth muscle negative Plan:  
-psychiatry-  Stopping paroxetine may have caused some withdrawal sx and started fluoxetine 20 mg for anxiety and to mitigate potential withdrawal symptoms - place sitter, remove restraints 
- consider adding multivitamin 
-HSV 1/2 ABS and B1 pending - hold meds metoclopramide and paroxetine - appreciated neurology recs - sign off, no other recs - Geodon for severe agitation as needed  q12h and seroquel 50 mg qhs, and and started seroquel - Dispo: rehab per OT  
  
 Agitation + Sinus Tachycardia  - better controlled Wax and waning mental status 
-- 9/18 repeat EKG -ST not different from prior EKG   
- restrains, role belt 
- avoid antipsychotics - Geodon for severe agitation as needed  q12h and seroquel 50 mg qhs and started seroquel  25 mg AM 
  
 
Tardive akathisia- restlessness 
- initially also had dystonia, EPS symptoms  
- likely related with chronic metoclopramide use  
- held reglan and  
 
Normocytic anemia (hg 9.2 -13.5)  
 B12 and folate normal 
- Rct 3.6 %, 1.47 calculated  rct index, hypoprofliferative not increased Plan:  
F/u FOBT Urinary retention s/p garcia  W/ trauma (resolved) Has some hematuria will continue to monitor Hg last 8.8  
- pt voiding on own - Urology saw patient said to DC agrcia, and place in diaper 
 
  
  
CRYSTAL CR 3.56 ( baseline Cr 1.09 1/2019) in setting of CKD stage 2  likely pre-julian and rhabdo (resolved) Iron profile normal 
CK 1,057--> 996 Myoglobin 1,092 positive .8 elevated and vitamin D on low side of normal corrected Ca normal , Vitamin D nml , P nm  
 microalb/cr ratio- wnml 
  
Plan:   
Cont Vitamin D supplement Started  IVF  
Trend [facility] Nephrology following appreciate recs- calcitril 0.25mcg on discharge 
  
  
R eye pain + Right eye vision loss X 2 weeks Unlikely papilledema based on bedside ultrasound ( no enlarged optic sheath seen), likely floater vs retinal detachment vs pink eye vs acute angle gluacoma Plan:  
- spoke to optho on 9/10, Dr. Coby Martinez - not able to come 
  
Hypokalemia (resolved) 
electrolyte imbalance (hypokalemia, corrected Ca wnml )  
- replete electrolyte as needed, Mg, Phos 
  
Hyperbilirubinemia (3.9) w/ 0.6 direct likely majority is unconjugated  likely secondary to [employer] syndrome, previously elevated bili unlikely hemolysis (improved) CT AB/US no gall bladder disease appreciated , haptoglobin normal, aldolase pending  
  
  
 Diabetes ( last A1c 7.2) (-245)  neuropathy and L retinopathy - not well controlled 
- held NPH 40U BID (hold home NPH 50 U BID) -given lantus 5 U daily  
 HTN 
- not on meds 
  
 HLD 
- cont rosuvastatin  
  
Gastroparesis  
- held metoclopramide 5 mg  
  
GERD 
- cont  omeprazole 40 mg delayed capsule 
  
  
Anxiety 
- held  paroxetine 60 mg daily 
  
Diet Advance as tolerated per speech DVT Prophylaxis heparin GI Prophylaxis Omperazole Code status Full code Disposition unknown  
  
Point of Contact Pri[place] Relationship: 
[phone] Julius Hernandez PGY-1  
[address] Pager: [phone] September 19, 2020, 7:48 AM

## 2020-09-20 NOTE — PROGRESS NOTES
Problem: Self Care Deficits Care Plan (Adult) Goal: *Acute Goals and Plan of Care (Insert Text) Description: Occupational Therapy Goals Initiated 9/14/2020 within 7 day(s). 1.  Patient will perform bed mobility in preparation for self-care with minimal assistance/contact guard assist.  
2.  Patient will perform grooming with supervision/set-up using compensatory techniques. 3.  Patient will perform upper body dressing with supervision/set-up. 4.  Patient will perform toilet transfers with minimal assistance/contact guard assist. 
5.  Patient will perform all aspects of toileting with minimal assistance/contact guard assist. 
6.  Patient will participate in upper extremity therapeutic exercise/activities with independence for 8-10 minutes. 7.  Patient will utilize energy conservation techniques during functional activities with verbal cues. 8.  Patient will perform lower body dressing in preparation for self-care with minimal assistance/contact guard assistance Prior Level of Function: Patient was independent with self-care and functional mobility PTA. Outcome: Progressing Towards Goal 
OCCUPATIONAL THERAPY TREATMENT Patient: Kevin Araujo (67 y.o. female) Date: 9/20/2020 Diagnosis: Hyperglycemia [R73.9] Altered mental status [R41.82] Acute metabolic encephalopathy [S35.03] Acute renal failure (ARF) (HCC) [N17.9] Leukocytosis [D72.829] Hypokalemia [E87.6] Metabolic encephalopathy [A44.80] Altered mental status Precautions: Fall, Aspiration Chart, occupational therapy assessment, plan of care, and goals were reviewed. ASSESSMENT: 
Pt was cleared to participate in OT by nursing, upon entry pt is removing her gown and reports feeling hot and \"restricted\". Pt is confused, oriented to self-only and impulsive, making it not safe for functional OOB activities at this time.  Performed mult attempts to orient pt to place, time and situation, however, pt is not receptive. Pt agreed to brush her teeth. One soft restraint was removed from RUE for the task. Pt was able to complete task with CGA for initiation and sequencing of the task. Pt answers ~ 50% of the questions correctly, however, is very inconsistent with commands following. Following activity pt is attempting to reach towards LUE restraint and is very restless. RUE restraint was re-applied. Progression toward goals: 
[]          Improving appropriately and progressing toward goals [x]          Improving slowly and progressing toward goals 
[]          Not making progress toward goals PLAN: 
Patient continues to benefit from skilled intervention to address the above impairments. Continue treatment per established plan of care. Discharge Recommendations:  [address] with 24/7 Care Further Equipment Recommendations for Discharge:  N/A  
 
SUBJECTIVE:  
Patient stated I feel very restricted.  OBJECTIVE DATA SUMMARY:  
Cognitive/Behavioral Status: 
Neurologic State: Alert, Confused Orientation Level: Oriented to person Cognition: Decreased command following, Impaired decision making Safety/Judgement: Decreased insight into deficits, Decreased awareness of environment Functional Mobility and Transfers for ADLs: 
 Bed Mobility: 
Supine to Sit: Supervision(to achieve long sit on bed) Scooting: Supervision(pt is scooting self up and down on the bed) ADL Intervention: 
 Grooming Grooming Assistance: Contact guard assistance Position Performed: Long sitting on bed Washing Face: Contact guard assistance Brushing Teeth: Contact guard assistance Upper Body Dressing Assistance Hospital Gown: (pt independently doffed hospital gown) Pain: 
Pain level pre-treatment: not rated Pain level post-treatment: not rated Activity Tolerance:   
Fair Please refer to the flowsheet for vital signs taken during this treatment. After treatment:  
[]  Patient left in no apparent distress sitting up in chair 
[x]  Patient left in no apparent distress in bed 
[x]  Call bell left within reach [x]  Nursing notified 
[]  Caregiver present [x]  Bed alarm activated, soft wrist restraints donned COMMUNICATION/EDUCATION:  
[x] Role of Occupational Therapy in the acute care setting 
[x] Home safety education was provided and the patient/caregiver indicated understanding. [x] Patient/family have participated as able in working towards goals and plan of care. [] Patient/family agree to work toward stated goals and plan of care. [] Patient understands intent and goals of therapy, but is neutral about his/her participation. [] Patient is unable to participate in goal setting and plan of care. Thank you for this referral. 
SHOAIB Earl Time Calculation: 9 mins

## 2020-09-20 NOTE — PROGRESS NOTES
Pt MEWS score 3 related to elevated HR, pt resting in bed with no complaints of pain or other distress. Oral fluids and snack given. Will keep monitoring the pt. 
 
 09/19/20 2018 Vital Signs Temp 98.8 °F (37.1 °C) Temp Source Oral  
Pulse (Heart Rate) (!) 105 Heart Rate Source Brachial  
Resp Rate 21  
O2 Sat (%) 100 % Level of Consciousness Alert  
BP (!) 104/57 MAP (Calculated) 73 BP 1 Method Automatic  
BP 1 Location Left arm BP Patient Position At rest  
MEWS Score 3 Patient Observation Observations vitals

## 2020-09-20 NOTE — PROGRESS NOTES
TRANSFER - IN REPORT: 
 
Verbal report received from [place], RN(name) on Manjinder Rincon  being received from 4S(unit) for routine progression of care Report consisted of patients Situation, Background, Assessment and  
Recommendations(SBAR). Information from the following report(s) SBAR, Kardex, [facility] and Recent Results was reviewed with the receiving nurse. Opportunity for questions and clarification was provided. Patient arrived on unit via hospital bed. Patient resting comfortably on room air, does not appear to be in distress. Attempted to take patient's vitals, patient requested that they be taken at a later time because she was sleepy, also refusing to change position to allow access to hands. Only able to complete a BP and axillary temp. Patient's belongings included clothing and a purse, with a slip received from transferring unit listing patient's belongings held at security. Will reattempt vitals and assessment at a later time, when patient is more alert. Will continue to monitor patient.

## 2020-09-20 NOTE — PROGRESS NOTES
Problem: Non-Violent Restraints Goal: *No harm/injury to patient while restraints in use Outcome: Progressing Towards Goal 
  
Problem: Falls - Risk of 
Goal: *Absence of Falls Description: Document Rafal Verdugo Fall Risk and appropriate interventions in the flowsheet. Outcome: Progressing Towards Goal 
Note: Fall Risk Interventions: 
Mobility Interventions: Bed/chair exit alarm Mentation Interventions: Adequate sleep, hydration, pain control, Bed/chair exit alarm, Door open when patient unattended Medication Interventions: Bed/chair exit alarm Elimination Interventions: Bed/chair exit alarm, Toileting schedule/hourly rounds Problem: Pressure Injury - Risk of 
Goal: *Prevention of pressure injury Description: Document Vasile Scale and appropriate interventions in the flowsheet. Outcome: Progressing Towards Goal 
Note: Pressure Injury Interventions: 
Sensory Interventions: Assess changes in LOC, Check visual cues for pain, Keep linens dry and wrinkle-free, Maintain/enhance activity level, Minimize linen layers Moisture Interventions: Absorbent underpads, Apply protective barrier, creams and emollients, Check for incontinence Q2 hours and as needed Activity Interventions: Pressure redistribution bed/mattress(bed type) Mobility Interventions: HOB 30 degrees or less, Pressure redistribution bed/mattress (bed type) Nutrition Interventions: Document food/fluid/supplement intake Friction and Shear Interventions: HOB 30 degrees or less, Minimize layers

## 2020-09-20 NOTE — PROGRESS NOTES
Bedside shift change report given to Senia Juarez (oncoming nurse) by Sagar Neely RN (offgoing nurse). Report included the following information SBAR, Kardex, Intake/Output, MAR and Recent Results.

## 2020-09-20 NOTE — PROGRESS NOTES
New PT order received. Pt is currently on caseload. Will continue to follow per POC. Order acknowledged.  Thank you for this referral. Adriana Trimble, PT, DPT

## 2020-09-20 NOTE — PROGRESS NOTES
[facility] Intern Progress Note Patient: Manolo Carr MRN: [id number] SSN: [id number]  YOB: 1965 Age: 60 y.o. Sex: female Admit Date: 9/8/2020 LOS: 12 days Chief Complaint Patient presents with  Vomiting Subjective:  
 
Patient was pleasant this morning and not agitated. She has role belt and restraints. She was able to say her name and year, but was unable to communicate that she was in the hospital. She expressed that she was looking forward for breakfast.  
 
ROS denied chest pain, dyspnea, headaches, and abdo pain. No fevers or chills. Objective:  
 
Visit Vitals /69 (BP 1 Location: Left arm, BP Patient Position: At rest) Pulse (!) 114 Temp 98.4 °F (36.9 °C) Resp 22 Ht 5' 8\" (1.727 m) Wt 94.3 kg (207 lb 14.3 oz) LMP 10/06/2015 (Exact Date) SpO2 97% Breastfeeding No  
BMI 31.61 kg/m² Physical Exam:  
General appearance: A+O x 2. Patient was cooperative and polite during our conversation. Pt in no distress, and appears stated age. HEENT: Poorly reactive right eye and mildly erythematic. Left eye reactive to light. Moist mucous membranes. Lungs: Clear to auscultation bilaterally without adventitious sounds. Heart: Regular rate and rhythm, S1, S2 normal, no murmur, click, rub or gallop Abdomen: Soft, non-tender, non-distended. Bowel sounds normal. No masses,  no organomegaly. Skin: Skin color, texture, turgor normal. No rashes or lesions Neuro:  Normal without focal findings. Patient able to follow commands and moves all four extremities. No evidence of motor restlessness. Extremities: First digit of right foot amputated. Second digit of left foot amputated. No edema. Pedal pulses 2+ and symmetric. Intake and Output: 
Current Shift: No intake/output data recorded. Last three shifts: 09/18 1901 - 09/20 0700 In: 800 [P.O.:800] Out: 901 [Urine:900] Lab/Data Review: Recent Results (from the past 12 hour(s)) GLUCOSE, POC Collection Time: 09/20/20 12:28 AM  
Result Value Ref Range Glucose (POC) 230 (H) 70 - 110 mg/dL CBC WITH MANUAL DIFF Collection Time: 09/20/20  4:31 AM  
Result Value Ref Range WBC 10.0 4.6 - 13.2 K/uL  
 RBC 2.96 (L) 4.20 - 5.30 M/uL HGB 8.5 (L) 12.0 - 16.0 g/dL HCT 25.5 (L) 35.0 - 45.0 % MCV 86.1 74.0 - 97.0 FL  
 MCH 28.7 24.0 - 34.0 PG  
 MCHC 33.3 31.0 - 37.0 g/dL  
 RDW 14.5 11.6 - 14.5 % PLATELET 909 942 - 573 K/uL MPV 10.3 9.2 - 11.8 FL  
 DIFFERENTIAL MANUAL DIFFERENTIAL ORDERED    
 NEUTROPHILS 59 42 - 75 % BAND NEUTROPHILS 1 0 - 5 % LYMPHOCYTES 30 20 - 51 % MONOCYTES 8 2 - 9 % EOSINOPHILS 2 0 - 5 % BASOPHILS 0 0 - 3 % METAMYELOCYTES 0 0 % MYELOCYTES 0 0 % PROMYELOCYTES 0 0 % BLASTS 0 0 % OTHER CELL 0 0    
 ABS. NEUTROPHILS 6.0 1.8 - 8.0 K/UL  
 ABS. LYMPHOCYTES 3.0 0.8 - 3.5 K/UL  
 ABS. MONOCYTES 0.8 0 - 1.0 K/UL  
 ABS. EOSINOPHILS 0.2 0.0 - 0.4 K/UL  
 ABS. BASOPHILS 0.0 0.0 - 0.06 K/UL  
 DF MANUAL PLATELET COMMENTS ADEQUATE PLATELETS    
 RBC COMMENTS NORMOCYTIC, NORMOCHROMIC METABOLIC PANEL, COMPREHENSIVE Collection Time: 09/20/20  4:31 AM  
Result Value Ref Range Sodium 140 136 - 145 mmol/L Potassium 3.5 3.5 - 5.5 mmol/L Chloride 107 100 - 111 mmol/L  
 CO2 29 21 - 32 mmol/L Anion gap 4 3.0 - 18 mmol/L Glucose 98 74 - 99 mg/dL BUN 5 (L) 7.0 - 18 MG/DL Creatinine 1.14 0.6 - 1.3 MG/DL  
 BUN/Creatinine ratio 4 (L) 12 - 20 GFR est AA 60 (L) >60 ml/min/1.73m2 GFR est non-AA 49 (L) >60 ml/min/1.73m2 Calcium 8.8 8.5 - 10.1 MG/DL Bilirubin, total 1.5 (H) 0.2 - 1.0 MG/DL  
 ALT (SGPT) 43 13 - 56 U/L  
 AST (SGOT) 50 (H) 10 - 38 U/L Alk. phosphatase 85 45 - 117 U/L Protein, total 6.8 6.4 - 8.2 g/dL Albumin 3.1 (L) 3.4 - 5.0 g/dL Globulin 3.7 2.0 - 4.0 g/dL A-G Ratio 0.8 0.8 - 1.7 PHOSPHORUS  
 Collection Time: 09/20/20  4:31 AM  
Result Value Ref Range Phosphorus 2.6 2.5 - 4.9 MG/DL MAGNESIUM Collection Time: 09/20/20  4:31 AM  
Result Value Ref Range Magnesium 2.1 1.6 - 2.6 mg/dL CK Collection Time: 09/20/20  4:31 AM  
Result Value Ref Range  (H) 26 - 192 U/L  
GLUCOSE, POC Collection Time: 09/20/20  6:02 AM  
Result Value Ref Range Glucose (POC) 103 70 - 110 mg/dL Telemetry NONE Oxygen NONE Assessment and Plan:  
 
60 y. o. 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   
 1. Metabolic Encephalopathy of unclear etiology [likely multifactorial, still not at baseline] - On initial presentation, patient had HHS with -500 treated with IV fluids, as well as leukocytosis and lactic acidosis that improved with IV antibiotics.  On admission, labs showed normal lipase, ammonia, B12 and folate. Blood culture results from 9/8 showed no growth. UA showed 6-8 wbc, 2+ bacteria, but negative for leuk est and nitrites. UCx showed >100, 000 cfu of mixed urogenital kyle, likely a contaminant. On 9/9, CT Chest/Abd/Pelvis showed bladder with mild wall thickening. Repeat cultures remain negative. Pt was initially hypernatremic but this has resolved. UDS and serum ETOH (9/8) was negative. On  9/8, CT head showed mild mild enlargement of lateral ventricles and cerebral atrophy . - s/p empirical  abx Ceftriaxone  (9/8-/9) 
- s/p empiric abx  vancomycin (9/8-9/14)  and zoysn (9/9-9/14 ) - MRI head 9/11 - no acute intracranial finding  
- HIV negative, RPR negative. Blood culture and urine culture negative - ESR 32 and CRP 3.7, elevated on 9/14 
- EEG showed encephalopathy 
- CSF wbc 1 , protein 55, glucose 70, elevated opening 34 mmhg, likely as patient was in prone possition and was sedated. Unlikely pseudotumor cerebri per neurology. Negative meningitis panel - Autoimmune workup: GRACE negative, TSH wnl, anti-sm negative - COVID 19 negative - 9/15 CT AB/PELVIS/CHEST IV : Marked bladder distention up to the level above the umbilicus. No CT evidence of infection or inflammatory process. No abscess seen. Tiny hypodense nodule in right thyroid lobe - can be better evaluated by thyroid ultrasound. Mild and circumferential esophageal wall prominence with collapsed the lumen [if symptomatic, barium swallow can be performed for better evaluation]. Fluid-filled nondilated distal colon and rectum as nonspecific finding and could represent diarrhea. Multiple partially calcified fibroids. 
-9/17 MRI Brain w w/o  contrast and MRV brain w wo contrast:  no cerebral sagittal sinus thrombosis, ruled out  CNS infection, intracerebral hemorrhage, or a sagittal sinus thrombosis - 9/18 repeat EKG: no ST changes from prior EKG Plan: - Psych: D/c Metoclopramide. Stop paroxetine as this may have caused some withdrawal sx and started fluoxetine 20 mg for anxiety and to mitigate potential withdrawal symptoms - place sitter, remove restraints 
- consider adding multivitamin 
- HSV 1/2 ABS and B1 pending 
- Neuro signed off with no other recs - Geodon for severe agitation as needed  q12h and seroquel 50 mg qhs, and 25 mg in AM. No changes to Seroquel. Can trial removing restraints. - Dispo: rehab per OT  
  
2. Agitation + Sinus Tachycardia  - better controlled - 9/18 repeat EKG -ST not different from prior EKG   
- restrains, role belt 
- avoid antipsychotics - Plan: Geodon for severe agitation as needed  q12h and seroquel 50 mg qhs and started seroquel  25 mg AM 
  
  
3. Tardive akathisia 
- initially also had dystonia, EPS symptoms  
- likely related with chronic metoclopramide use  
- held metoclopramide - No motor restlessness today 
  
 4. Normocytic anemia (HgB 8.5, hct 25.5) 
 - B12 and folate normal 
- Rct 3.6 %, 1.47 calculated  rct index, hypoprofliferative not increased - Plan: FOBT follow up 
  
 
5. Urinary retention s/p garcia  W/ trauma (resolved) - pt voiding on own - Urology saw patient said to DC garcia, and place in diaper 
  
 
6. CRYSTAL CR 3.56 ( baseline Cr 1.09 1/2019) in setting of CKD stage 2  likely pre-renal and rhabdo (resolved) - Myoglobin initially 1092. Iron profile normal 
- Improving CK 1,057--> 996 --> 479 (9/20) 
- .8 elevated and vitamin D on low side of normal - corrected Ca normal , Vitamin D nml , Phosphorous wnl, microalb/cr ratio wnl 
  
Plan:   
- Cont Vitamin D supplement - Trend CK - Nephrology following appreciate recs- calcitril 0.25mcg on discharge 
  
 7. R eye pain + Right eye vision loss X 2 weeks - Unlikely papilledema based on bedside ultrasound ( no enlarged optic sheath seen), likely floater vs retinal detachment vs pink eye vs acute angle gluacoma Plan:  
- spoke to optho on 9/10, Dr. Luis Angel Hough - not able to come 
  
8. Hypokalemia (resolved) - electrolyte imbalance (hypokalemia, corrected Ca wml )  
- replete electrolyte as needed, Mg, Phos 
  
9. Hyperbilirubinemia (3.9) w/ 0.6 direct likely majority is unconjugated  likely secondary to [place] syndrome, previously elevated bili unlikely hemolysis (improved) - CT AB/US - no gall bladder disease appreciated - , haptoglobin normal, aldolase pending  
  
 10. Diabetes ( last A1c 7.2) (-245)  neuropathy and L retinopathy - not well controlled 
- held NPH 40U BID (hold home NPH 50 U BID)  
-given lantus 5 U daily  
  
11. HTN 
- not on any home medications  
  
 12. HLD 
- cont rosuvastatin  
  
13. Gastroparesis  
- held metoclopramide 5 mg  
  
14. GERD 
- cont  omeprazole 40 mg delayed capsule 
  
15. Anxiety 
- held  paroxetine 60 mg daily 
  
Diet Advance as tolerated per speech DVT Prophylaxis heparin GI Prophylaxis Omperazole Code status Full code Disposition unknown  
  
Point of Contact [place] Relationship: 
[phone] Abram Tovar MD PGY-1  
[address] [facility] Intern Pager: [phone] September 20, 2020, 8:22 AM

## 2020-09-20 NOTE — PROGRESS NOTES
Bedside and Verbal shift change report given to Duong Cooley RN (oncoming nurse) by Oneida Richard RN (offgoing nurse). Report included the following information SBAR, Kardex, ED Summary and Recent Results.

## 2020-09-20 NOTE — ROUTINE PROCESS
Bedside and Verbal shift change report given to Benedict Fraser (oncoming nurse) by Vandana Carter RN (offgoing nurse). Report included the following information SBAR, Kardex, Intake/Output, MAR and Recent Results.

## 2020-09-21 NOTE — ROUTINE PROCESS
Off restraint trial per MD. Dinorah Helton at bedside. PAtient is still agitated and want to get out of bed. Will give her night meds. Will continue to monitor. NO PIV MD aware, difficult stick

## 2020-09-21 NOTE — PROGRESS NOTES
[facility] Intern Progress Note Patient: Alex Bangura MRN: [id number] SSN: [id number]  YOB: 1965 Age: 60 y.o. Sex: female Admit Date: 9/8/2020 LOS: 13 days Chief Complaint Patient presents with  Vomiting Subjective:  
 
Patient was altered this morning when prompted with questions, but did not appear agitated. She has role belt and restraints. She was able to say her name but was not able to communicate the year or that she was in the hospital. She was off of restraints >12 hours yesterday but was placed back on side rails x4 and roll belt due to an acute episode of agitation last night around 11pm. 
  
ROS denied chest pain, dyspnea, headaches, and abdo pain. No fevers or chills. Objective:  
 
Visit Vitals /63 (BP 1 Location: Left arm, BP Patient Position: At rest) Pulse (!) 113 Temp 99.2 °F (37.3 °C) Resp 16 Ht 5' 8\" (1.727 m) Wt 94.3 kg (207 lb 14.3 oz) LMP 10/06/2015 (Exact Date) SpO2 99% Breastfeeding No  
BMI 31.61 kg/m² Physical Exam:  
General appearance: A+O x 2. Patient was cooperative and polite during our conversation. Pt in no distress, and appears stated age. HEENT: Right eye poorly reactive to light and mildly erythematic. Left eye reactive to light. Moist mucous membranes. Lungs: Clear to auscultation bilaterally without adventitious sounds. Heart: Regular rate and rhythm, S1, S2 normal, no murmur, click, rub or gallop Abdomen: Soft, non-tender, non-distended. Bowel sounds normal. No masses,  no organomegaly. Skin: Skin color, texture, turgor normal. No rashes or lesions Neuro:  Normal without focal findings. Patient able to follow commands and moves all four extremities. No evidence of motor restlessness. Extremities: First digit of right foot amputated. Second digit of left foot amputated. No edema. Pedal pulses 2+ and symmetric. Intake and Output: Current Shift: No intake/output data recorded. Last three shifts: 09/19 1901 - 09/21 0700 In: 680 [P.O.:680] Out: 100 [Urine:100] Lab/Data Review: 
Recent Results (from the past 12 hour(s)) GLUCOSE, POC Collection Time: 09/20/20  9:14 PM  
Result Value Ref Range Glucose (POC) 165 (H) 70 - 110 mg/dL CBC WITH MANUAL DIFF Collection Time: 09/21/20  4:50 AM  
Result Value Ref Range WBC 8.8 4.6 - 13.2 K/uL  
 RBC 2.92 (L) 4.20 - 5.30 M/uL HGB 8.6 (L) 12.0 - 16.0 g/dL HCT 25.3 (L) 35.0 - 45.0 % MCV 86.6 74.0 - 97.0 FL  
 MCH 29.5 24.0 - 34.0 PG  
 MCHC 34.0 31.0 - 37.0 g/dL  
 RDW 14.6 (H) 11.6 - 14.5 % PLATELET 335 011 - 539 K/uL MPV 10.8 9.2 - 11.8 FL  
 DIFFERENTIAL MANUAL DIFFERENTIAL ORDERED    
 NEUTROPHILS 56 42 - 75 % BAND NEUTROPHILS 0 0 - 5 % LYMPHOCYTES 34 20 - 51 % MONOCYTES 8 2 - 9 % EOSINOPHILS 2 0 - 5 % BASOPHILS 0 0 - 3 % METAMYELOCYTES 0 0 % MYELOCYTES 0 0 % PROMYELOCYTES 0 0 % BLASTS 0 0 % OTHER CELL 0 0    
 ABS. NEUTROPHILS 4.9 1.8 - 8.0 K/UL  
 ABS. LYMPHOCYTES 3.0 0.8 - 3.5 K/UL  
 ABS. MONOCYTES 0.7 0 - 1.0 K/UL  
 ABS. EOSINOPHILS 0.2 0.0 - 0.4 K/UL  
 ABS. BASOPHILS 0.0 0.0 - 0.06 K/UL  
 DF MANUAL PLATELET COMMENTS ADEQUATE PLATELETS    
 RBC COMMENTS NORMOCYTIC, NORMOCHROMIC METABOLIC PANEL, COMPREHENSIVE Collection Time: 09/21/20  4:50 AM  
Result Value Ref Range Sodium 138 136 - 145 mmol/L Potassium 3.6 3.5 - 5.5 mmol/L Chloride 107 100 - 111 mmol/L  
 CO2 26 21 - 32 mmol/L Anion gap 5 3.0 - 18 mmol/L Glucose 164 (H) 74 - 99 mg/dL BUN 5 (L) 7.0 - 18 MG/DL Creatinine 1.16 0.6 - 1.3 MG/DL  
 BUN/Creatinine ratio 4 (L) 12 - 20 GFR est AA 58 (L) >60 ml/min/1.73m2 GFR est non-AA 48 (L) >60 ml/min/1.73m2 Calcium 8.8 8.5 - 10.1 MG/DL Bilirubin, total 1.9 (H) 0.2 - 1.0 MG/DL  
 ALT (SGPT) 41 13 - 56 U/L  
 AST (SGOT) 42 (H) 10 - 38 U/L Alk.  phosphatase 89 45 - 117 U/L  
 Protein, total 6.9 6.4 - 8.2 g/dL Albumin 3.2 (L) 3.4 - 5.0 g/dL Globulin 3.7 2.0 - 4.0 g/dL A-G Ratio 0.9 0.8 - 1.7 PHOSPHORUS Collection Time: 09/21/20  4:50 AM  
Result Value Ref Range Phosphorus 3.2 2.5 - 4.9 MG/DL MAGNESIUM Collection Time: 09/21/20  4:50 AM  
Result Value Ref Range Magnesium 1.9 1.6 - 2.6 mg/dL GLUCOSE, POC Collection Time: 09/21/20  8:39 AM  
Result Value Ref Range Glucose (POC) 184 (H) 70 - 110 mg/dL RECENT RESULTS MODALITY IMPRESSION  
[place] [facility] [place] XR ORBIT BI FOREIGN BODY  
  Narrative EXAM: ORBITS LIMITED 
  
CLINICAL HISTORY/INDICATION: , blurred vision with nausea, vomiting, and 
abdominal pain, severe agitation, altered mental status, diabetic retinopathy 
due to type 2 diabetes Yesi Simpler mri screen. 
  
COMPARISON: None. 
  
TECHNIQUE: modified ramos view(s) of the orbits obtained.   
  
FINDINGS: 
  
There are no radiopaque metallic foreign bodies within the orbits. There is no 
aneurysm clip. The visualized  paranasal sinuses are normal. 
   
  Impression IMPRESSION: 
  
Negative orbital screening prior to MRI. [place] [facility] [place]20 CT CHEST ABD PELV W CONT  
  Narrative CT chest, abdomen and pelvis with contrast  
  
CLINICAL HISTORY: Leukocytosis.  CT evaluation of potential infection versus 
abscess 
  
COMPARISON: None. 
  
TECHNIQUE: Helical scan to the chest, abdomen and pelvis are obtained from the 
thoracic inlet to the symphysis pubis after IV contrast administration. 
  
All CT scans at this facility performed using dose optimization techniques as 
appreciated to a performed exam, to include automated exposure control, 
adjustment of the mA and or KU according to patient size (including appropriate 
matching for site specific examination), or use of iterative reconstruction 
technique. 
  
 FINDINGS: Moderate motion artifact noted and limits the detail evaluation. 
  
CT CHEST:  
  
Lung Parenchyma: Minimal bibasilar atelectasis. No acute airspace disease or 
consolidation. 
  
Thyroid: Small hypodense nodule in the right lobe measuring about 5 mm. 
  
Mediastinum: No adenopathy. Mild and circumferential esophageal wall prominence 
with collapsed the lumen. 
  
Heart: The heart and the pericardium appear normal. 
  
Vasculature: The aorta and the great vessels are unremarkable.  
  
Pleural Space And Chest Wall: No significant pleural pathology. 
  
  
CT ABDOMEN AND PELVIS: 
  
Liver: Normal. 
  
Gallbladder: Surgically absent.  
  
Biliary System: No ductal dilatation. 
  
Spleen: Normal. 
  
Pancreas: Normal. 
  
Adrenal Glands: Normal. 
  
Kidneys: Normal. 
  
Bowel: The small and large bowel are nondilated. Normal appendix. Fluid-filled 
sigmoid colon and rectum with air-fluid level without dilatation. 
  
Lower genitourinary system: The bladder is markedly distended up to the level 
above the umbilicus, 86.9 cm in cc dimension. No definite bladder wall lesion 
seen. The uterus appears spherical and heterogeneous with several partially 
calcified fibroids present. No adnexal mass.  
  
Peritoneum/Retroperitoneum: No adenopathy. 
  
Vasculature: Unremarkable for age.  
  
Other: No free fluid. 
  
CT OSSEOUS STRUCTURES:   
  
Unremarkable for age. 
   
  Impression IMPRESSION:  
  
1. Marked bladder distention up to the level above the umbilicus. Recommend 
clinical correlation for urinary outlet obstruction. 2. No CT evidence of infection or inflammatory process. No abscess seen. 3. Tiny hypodense nodule in right thyroid lobe. This can be better evaluated by 
thyroid ultrasound. 4. Mild and circumferential esophageal wall prominence with collapsed the lumen. If symptomatic, barium swallow can be performed for better evaluation. 5. Fluid-filled nondilated distal colon and rectum as nonspecific finding and 
could represent diarrhea. 6. Multiple partially calcified fibroids. 
  
Thank you for this referral.   
  
MRI Results from [facility] En[facility] encounter on 09/08/20 MRI BRAIN W WO CONT  
  Narrative MRI BRAIN WITH AND WITHOUT CONTRAST 
  
Provided Reason for Exam: Confusion, visual loss right eye for 2 weeks, LP with 
elevated opening pressure Additional History: Patient admitted with confusion Comparison Studies: Brain MRI 9/11/2020, head CT 9/9/2020 
  
Imaging Technique:  
  Sequences:  Sagittal and axial T1 weighted, Diffusion Weighted (DWI), Axial T2 
weighted, Axial T2 FLAIR, and GRE MRI images of the brain. Post contrast axial 
and coronal T1 images. 
  
Contrast Material:  20 mL Dotarem IV 
  
Limiting Factors/Major Artifacts: None. 
  
FINDINGS: 
  
Brain Parenchyma: Diffusion sequence negative. No cytotoxic edema or acute 
infarct. Cerebral white matter is normal.  No chronic cortical infarcts or 
chronic lacunar infarcts. No visible masses or midline shift. No abnormal 
parenchymal or meningeal enhancement. 
  
CSF Spaces: There are some mild prominence of the posterior horns of the 
lateral ventricles, but the major the ventricles are normal size. No findings of 
ventricular trapping. Appears to be developmental, similar findings on the head CT from 2013. Vascular System:  Grossly patent flow in basilar and internal cerebral arteries. No findings of dural sinus thrombosis.  
  
Hemorrhage:  No acute hemorrhage. No chronic microhemorrhage. 
  
Other Structures: 
Calvarium: No suspicious marrow signal. 
Sella: Normal. 
Visualized Upper Cervical Spine: Normal craniocervical junction, no Chiari 
malformation. Orbits: Postsurgical changes in the left ocular globe, appears to be silicone 
injection, unchanged from 2014 head CT. Paranasal Sinuses: No significant paranasal sinus disease. Mastoid Air Cells:  Clear.    
  Impression IMPRESSION: 
  
No acute intracranial abnormality. No mass, hemorrhage, or acute infarct. 
  
Postsurgical changes left optic globe consistent with previous retinal 
detachment procedure. ULTRASOUND Results from [facility] Encounter encounter on 09/08/20 US ABD LTD  
  Impression IMPRESSION:    
  
1.  Status post cholecystectomy. 
  
2. No significant common bile duct dilation. 
  
3. Increased hepatic parenchymal echogenicity with somewhat heterogeneous 
appearance, potentially suggestive of hepatosteatosis. Partial visualization of 
the liver. Results from [facility] Encounter encounter on 01/22/19 US ABD LTD  
  Impression IMPRESSION: 
  
Mild heterogeneous echogenicity of the liver is nonspecific. This is commonly 
seen with steatosis hepatic disease. Limited visualization of the pancreas.  
  
  
Cardiology Procedures/Testing: MODALITY RESULTS  
EKG Results for orders placed or performed during the hospital encounter of 09/08/20 EKG, 12 LEAD, INITIAL Result Value Ref Range  
  Ventricular Rate 115 BPM  
  Atrial Rate 115 BPM  
  P-R Interval 148 ms  
  QRS Duration 58 ms  
  Q-T Interval 342 ms  
  QTC Calculation (Bezet) 473 ms  
  Calculated P Axis 34 degrees  
  Calculated R Axis 1 degrees  
  Calculated T Axis 34 degrees  
  Diagnosis      
    Sinus tachycardia Cannot exclude anterior MI versus lead placement issue. Abnormal ECG When compared with ECG of 08-SEP-2020 15:05, Anterior infarct is now present Nonspecific T wave abnormality no longer evident in Inferior leads Nonspecific T wave abnormality, improved in Anterolateral leads Confirmed by Imani Thorpe MD, Leonidas Krabbe ([id number]) on 9/17/2020 3:04:13 PM 
   
Results for orders placed or performed in visit on 06/20/14 AMB POC EKG ROUTINE W/ 12 LEADS, INTER & REP  
  Impression  
  See progress note. ECHO     
01/03/19 ECHO ADULT COMPLETE 01/04/2019 1/4/2019   Narrative · Estimated left ventricular ejection fraction is 61 - 65%. Left  
ventricular mild concentric hypertrophy. Mild (grade 1) left ventricular  
diastolic dysfunction. · Mild tricuspid valve regurgitation is present. 
   
    Signed by: Desiree Salmon MD  
  
  
Special Testing/Procedures: MODALITY RESULTS MICRO All Micro Results   
  Procedure Component Value Units Date/Time  
  CULTURE, BLOOD [[id number]] Collected:  09/15/20 1002  
  Order Status:  Completed Specimen:  Blood Updated:  09/19/20 0642  
    Special Requests: NO SPECIAL REQUESTS      
    Culture result: NO GROWTH 4 DAYS      
  CULTURE, BLOOD [[id number]] Collected:  09/15/20 1005  
  Order Status:  Completed Specimen:  Blood Updated:  09/19/20 0642  
    Special Requests: NO SPECIAL REQUESTS      
    Culture result: NO GROWTH 4 DAYS      
  CULTURE, CSF Lonza Barban STAIN [[id number]] Collected:  09/15/20 0848  
  Order Status:  Completed Specimen:  Cerebrospinal Fluid Updated:  09/18/20 0914  
    Special Requests: CEREBROSPINAL FLUID      
    GRAM STAIN NO WBC'S SEEN      
      NO ORGANISMS SEEN      
           
    Smear Reviewed by Microbiology 9/15/20 AT 1408 TA.  
       
    Culture result: NO GROWTH 3 DAYS      
  CULTURE, URINE [[id number]] Collected:  09/15/20 1850  
  Order Status:  Completed Specimen:  Urine from Clean catch Updated:  09/17/20 1046  
    Special Requests: NO SPECIAL REQUESTS      
    Culture result: No growth (<1,000 CFU/ML)      
  MENINGITIS PATHOGENS PANEL, CSF (BY PCR) [[id number]] Collected:  09/15/20 0848  
  Order Status:  Completed Specimen:  Cerebrospinal Fluid Updated:  09/15/20 1510  
    Escherichia coli K1 Not detected      
    Haemophilus Influenzae Not detected      
    Listeria Monocytogenes Not detected      
    Neisseria Meningitidis Not detected      
    Streptococcus Agalactiae Not detected      
    Streptococcus Pneumoniae Not detected      
     Cytomegalovirus Not detected      
    Enterovirus Not detected      
    Herpes Simplex Virus 1 Not detected      
    Comment: In patients who have negative herpes simplex 1 and 2 PCR results, do not modify treatment, confirm with alternate testing.  
  
    Herpes Simplex Virus 2 Not detected      
    Comment: In patients who have negative herpes simplex 1 and 2 PCR results, do not modify treatment, confirm with alternate testing.  
  
    Human Herpesvirus 6 Not detected      
    Human Parechovirus Not detected      
    Varicella Zoster Virus Not detected      
    Crypto. neoformans/gattii Not detected      
  CULTURE, BLOOD [[id number]] Collected:  09/08/20 1640  
  Order Status:  Completed Specimen:  Blood Updated:  09/14/20 0702  
    Special Requests: NO SPECIAL REQUESTS      
    Culture result: NO GROWTH 6 DAYS      
  CULTURE, BLOOD [[id number]] Collected:  09/08/20 1640  
  Order Status:  Completed Specimen:  Blood Updated:  09/14/20 0702  
    Special Requests: NO SPECIAL REQUESTS      
    Culture result: NO GROWTH 6 DAYS      
  CULTURE, URINE [[id number]] Collected:  09/08/20 1211  
  Order Status:  Completed Specimen:  Urine from Clean catch Updated:  09/09/20 1811  
    Special Requests: NO SPECIAL REQUESTS      
    Dowell Count --      
    >100,000 COLONIES/mL 
   
    Culture result:      
    MIXED UROGENITAL AUDRA ISOLATED  
       
   
  
UA No results found for this or any previous visit. PATH none  
  
Telemetry NONE Oxygen NONE Assessment and Plan:  
 
60 y. o. 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   
 1. Metabolic Encephalopathy of unclear etiology [likely multifactorial, still not at baseline] - On initial presentation, patient had HHS with -500 treated with IV fluids, as well as leukocytosis and lactic acidosis that improved with IV antibiotics.  On admission, labs showed normal lipase, ammonia, B12 and folate. Blood culture results from 9/8 showed no growth. UA showed 6-8 wbc, 2+ bacteria, but negative for leuk est and nitrites. UCx showed >100, 000 cfu of mixed urogenital kyle, likely a contaminant. On 9/9, CT Chest/Abd/Pelvis showed bladder with mild wall thickening. Repeat cultures remain negative. Pt was initially hypernatremic but this has resolved. UDS and serum ETOH (9/8) was negative. On  9/8, CT head showed mild enlargement of lateral ventricles and cerebral atrophy . - s/p empirical  abx Ceftriaxone  (9/8-/9). S/p empiric abx  vancomycin (9/8-9/14)  and zoysn (9/9-9/14 ) 
- ESR 32 and CRP 3.7, elevated on 9/14. COVID 19 negative 
- CSF wbc 1 , protein 55, glucose 70, elevated opening 34 mmhg, likely as patient was in prone position and was sedated. Unlikely pseudotumor cerebri per neurology. 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 
- HIV negative, RPR negative. Blood culture and urine culture negative - Autoimmune workup: GRACE negative, TSH wnl, anti-sm negative - MRI head 9/11 - no acute intracranial finding. EEG showed encephalopathy  
- 9/15 CT AB/PELVIS/CHEST IV : Marked bladder distention up to the level above the umbilicus. No CT evidence of infection or inflammatory process. No abscess seen. Tiny hypodense nodule in right thyroid lobe - can be better evaluated by thyroid ultrasound. Mild and circumferential esophageal wall prominence with collapsed the lumen [if symptomatic, barium swallow can be performed for better evaluation]. Fluid-filled nondilated distal colon and rectum as nonspecific finding and could represent diarrhea.  Multiple partially calcified fibroids. 
-9/17 MRI Brain w w/o  contrast and MRV brain w wo contrast:  no cerebral sagittal sinus thrombosis, ruled out  CNS infection, intracerebral hemorrhage, or a sagittal sinus thrombosis - 9/18 repeat EKG: no ST changes from prior [facility] 
- Fall precautions - Aspiration precautions + consult speech/language pathology 
 
  
Plan: - Psych: Discontinued Metoclopramide and paroxetine. Started fluoxetine for anxiety and to mitigate potential withdrawal symptoms 
- Start 1:1 sitter and restrains (roll belt, side rails x4) - Updated neurology this morning on HSV1 & 2 IgG positive serology; appreciate further recs 
 - Scheduled Seroquel 50 mg qhs, and 25 mg in AM. Recommend conservatively increasing night Seroquel by 12.5 mg as she tends to have her episodes of agitation in the evening. Continue soft restraints today and attempt to wean off again this evening. Currently on Geodon for severe agitation as needed q12h  
- follow up on B1 and heavy metal results - Bladder scan and repeat UA and KUB today - Dispo: rehab vs SNF   
 
2. Tardive akathisia 
- initially also had dystonia, EPS symptoms  
- likely related with chronic metoclopramide use; held metoclopramide Plan: No motor restlessness today but continues to have elevated CK (479). This is likely because patient is still on restraints. Repeat CK tomorrow morning. 
  
4. Normocytic anemia (HgB 8.5, hct 25.5) 
 - B12 and folate normal 
- Rct 3.6 %, 1.47 calculated  rct index, hypoprofliferative not increased - Plan: FOBT follow up 
  
5. Urinary retention s/p garcia  W/ trauma (resolved)  
- pt voiding on own - Urology saw patient said to DC garcia, and place in diaper Plan: Attempt bladder scan today and consider need for garcia 
  
6. CRYSTAL CR 3.56 ( baseline Cr 1.09 1/2019) in setting of CKD stage 2  likely pre-renal and rhabdo (resolved)  
- Myoglobin initially 1092.  Iron profile normal 
- Improving CK 1,057--> 996 --> 479 (9/20) 
- .8 elevated and vitamin D on low side of normal - corrected Ca normal , Vitamin D nml , Phosphorous wnl, microalb/cr ratio wnl Plan:   
- Cont Vitamin D supplement - Trend CK - Nephrology following appreciate recs- calcitril 0.25mcg on discharge 
  
 7. R eye pain + Right eye vision loss X 2 weeks - Unlikely papilledema based on bedside ultrasound ( no enlarged optic sheath seen), likely floater vs retinal detachment vs pink eye vs acute angle gluacoma Plan:  
- spoke to optho on 9/10, Dr. Bernabe Hallmark - not able to come 
  
8. Hypokalemia (resolved) - electrolyte imbalance (hypokalemia, corrected Ca wml )  
- replete electrolyte as needed, Mg, Phos 
  
9. Hyperbilirubinemia (3.9) w/ 0.6 direct likely majority is unconjugated  likely secondary to [place] syndrome, previously elevated bili unlikely hemolysis (improved) - CT AB/US - no gall bladder disease appreciated - , haptoglobin normal, aldolase pending  
  
 10. Diabetes ( last A1c 7.2) (-245)  neuropathy and L retinopathy - not well controlled 
- held NPH 40U BID (hold home NPH 50 U BID)  
-given lantus 5 U daily  
  
11. HTN 
- not on any home medications  
  
 12. HLD 
- cont rosuvastatin  
  
13. Gastroparesis  
- held metoclopramide 5 mg  
  
14. GERD 
- cont  omeprazole 40 mg delayed capsule 
  
15. Anxiety 
- held  paroxetine 60 mg daily 
  
Diet Advance as tolerated per speech DVT Prophylaxis heparin GI Prophylaxis Omperazole Code status Full code Disposition unknown  
  
Point of Contact [place] Relationship: 
[phone]  
  
  
  
Sushil Jean Baptiste MD PGY-1  
[address] Intern Pager: [phone] September 20, 2020, 8:22 AM

## 2020-09-21 NOTE — PROGRESS NOTES
*ATTENTION:  This note has been created by a medical student for educational purposes only. Please do not refer to the content of this note for clinical decision-making, billing, or other purposes. Please see attending physicians note to obtain clinical information on this patient. * Medical Student Progress Note [address] **Medical Student Note for Educational Purposes Only** Patient: Ryan Schmidt MRN: [id number]  CSN: [id number] YOB: 1965  Age: 60 y.o. Sex: female DOA: 9/8/2020 LOS:  LOS: 13 days Subjective:  
 
Acute events:  Pt became agitated and tachycardic overnight. She required replacement of physical restraints as well as a single dose of her PRN Geodon for sedation. Pt was extremely drowsy this AM and could not be awoken. This is likely due to the use of the Geodon around 3:00am today. Review of Systems Unable to perform ROS: Psychiatric disorder Objective:  
  
Patient Vitals for the past 24 hrs: 
 Temp Pulse Resp BP SpO2  
09/21/20 1126 97.5 °F (36.4 °C) (!) 124 18 106/66 100 % 09/21/20 0834 99.2 °F (37.3 °C) (!) 113 16 118/63 99 % 09/21/20 0115 98.8 °F (37.1 °C) (!) 107 18 112/68   
09/20/20 1933 99.5 °F (37.5 °C) (!) 122 20 93/60 100 % 09/20/20 1609 97.4 °F (36.3 °C)  20 122/62  Intake/Output Summary (Last 24 hours) at 9/21/2020 1510 Last data filed at 9/21/2020 1351 Gross per 24 hour Intake 220 ml Output  Net 220 ml Physical Exam:  
Physical exam highly limited by Pt's acute drowsiness. General:  No acute distress. CV:  Normal heart rate with regular rhythm. No murmurs, rubs, or gallops. RESP:  Unlabored breathing. Lungs clear to auscultation. No wheezes, rales, or rhonchi. Equal expansion bilaterally. ABD:  Soft, nondistended. No hepatosplenomegaly. MS:  No atrophy. Ext:  No edema. Non-pitting edema of hands and wrists bilaterally. Several toe amputations noted. Skin:  No rashes, lesions, or ulcers. Lab/Data Reviewed: 
CMP:  
Lab Results Component Value Date/Time  09/21/2020 04:50 AM  
 K 3.6 09/21/2020 04:50 AM  
  09/21/2020 04:50 AM  
 CO2 26 09/21/2020 04:50 AM  
 AGAP 5 09/21/2020 04:50 AM  
  (H) 09/21/2020 04:50 AM  
 BUN 5 (L) 09/21/2020 04:50 AM  
 CREA 1.16 09/21/2020 04:50 AM  
 GFRAA 58 (L) 09/21/2020 04:50 AM  
 GFRNA 48 (L) 09/21/2020 04:50 AM  
 CA 8.8 09/21/2020 04:50 AM  
 MG 1.9 09/21/2020 04:50 AM  
 PHOS 3.2 09/21/2020 04:50 AM  
 ALB 3.2 (L) 09/21/2020 04:50 AM  
 TP 6.9 09/21/2020 04:50 AM  
 GLOB 3.7 09/21/2020 04:50 AM  
 AGRAT 0.9 09/21/2020 04:50 AM  
 ALT 41 09/21/2020 04:50 AM  
 
CBC:  
Lab Results Component Value Date/Time WBC 8.8 09/21/2020 04:50 AM  
 HGB 8.6 (L) 09/21/2020 04:50 AM  
 HCT 25.3 (L) 09/21/2020 04:50 AM  
  09/21/2020 04:50 AM  
 
Recent Glucose Results:  
Lab Results Component Value Date/Time  (H) 09/21/2020 04:50 AM  
 
Liver Panel:  
Lab Results Component Value Date/Time ALB 3.2 (L) 09/21/2020 04:50 AM  
 TP 6.9 09/21/2020 04:50 AM  
 GLOB 3.7 09/21/2020 04:50 AM  
 AGRAT 0.9 09/21/2020 04:50 AM  
 ALT 41 09/21/2020 04:50 AM  
 AP 89 09/21/2020 04:50 AM  
 
 
 
Scheduled Medications Reviewed: 
Current Facility-Administered Medications Medication Dose Route Frequency  [START ON 9/22/2020] tamsulosin (FLOMAX) capsule 0.8 mg  0.8 mg Oral DAILY  polyethylene glycol (MIRALAX) packet 17 g  17 g Oral DAILY  glycerin (adult) suppository 1 Suppository  1 Suppository Rectal NOW  
 QUEtiapine (SEROquel) tablet 25 mg  25 mg Oral DAILY  FLUoxetine (PROzac) capsule 20 mg  20 mg Oral DAILY  lactated Ringers infusion  100 mL/hr IntraVENous CONTINUOUS  
 QUEtiapine (SEROquel) tablet 50 mg  50 mg Oral QHS  heparin (porcine) injection 5,000 Units  5,000 Units SubCUTAneous Q8H  
  [Held by provider] insulin glargine (LANTUS) injection 5 Units  5 Units SubCUTAneous DAILY  cholecalciferol (VITAMIN D3) (1000 Units /25 mcg) tablet 1 Tab  1,000 Units Oral DAILY  insulin lispro (HUMALOG) injection   SubCUTAneous AC&HS  
 melatonin tablet 3 mg  3 mg Oral QHS  [Held by provider] insulin NPH (NOVOLIN N, HUMULIN N) injection 40 Units  40 Units SubCUTAneous ACB&D  
 sodium chloride (NS) flush 5-40 mL  5-40 mL IntraVENous Q8H  
 pantoprazole (PROTONIX) tablet 40 mg  40 mg Oral DAILY  [Held by provider] rosuvastatin (CRESTOR) tablet 20 mg  20 mg Oral QHS Imaging, microbiology, and EKG/Telemetry: 
Imaging: MODALITY IMPRESSION  
XR Results from Hospital Encounter encounter on 09/08/20 XR ORBIT BI FOREIGN BODY Narrative EXAM: ORBITS LIMITED CLINICAL HISTORY/INDICATION: , blurred vision with nausea, vomiting, and 
abdominal pain, severe agitation, altered mental status, diabetic retinopathy 
due to type 2 diabetes Meño Nadira mri screen. COMPARISON: None. TECHNIQUE: modified ramos view(s) of the orbits obtained. FINDINGS: 
 
There are no radiopaque metallic foreign bodies within the orbits. There is no 
aneurysm clip. The visualized  paranasal sinuses are normal. 
  
 Impression IMPRESSION: 
 
Negative orbital screening prior to MRI. CT Results from Hospital Encounter encounter on 09/08/20 CT CHEST ABD PELV W CONT Narrative CT chest, abdomen and pelvis with contrast  
 
CLINICAL HISTORY: Leukocytosis. CT evaluation of potential infection versus 
abscess COMPARISON: None. TECHNIQUE: Helical scan to the chest, abdomen and pelvis are obtained from the 
thoracic inlet to the symphysis pubis after IV contrast administration. All CT scans at this facility performed using dose optimization techniques as 
appreciated to a performed exam, to include automated exposure control, 
adjustment of the mA and or KU according to patient size (including appropriate matching for site specific examination), or use of iterative reconstruction 
technique. FINDINGS: Moderate motion artifact noted and limits the detail evaluation. CT CHEST:  
 
Lung Parenchyma: Minimal bibasilar atelectasis. No acute airspace disease or 
consolidation. Thyroid: Small hypodense nodule in the right lobe measuring about 5 mm. Mediastinum: No adenopathy. Mild and circumferential esophageal wall prominence 
with collapsed the lumen. Heart: The heart and the pericardium appear normal. 
 
Vasculature: The aorta and the great vessels are unremarkable. Pleural Space And Chest Wall: No significant pleural pathology. CT ABDOMEN AND PELVIS: 
 
Liver: Normal. 
 
Gallbladder: Surgically absent. Biliary System: No ductal dilatation. Spleen: Normal. 
 
Pancreas: Normal. 
 
Adrenal Glands: Normal. 
 
Kidneys: Normal. 
 
Bowel: The small and large bowel are nondilated. Normal appendix. Fluid-filled 
sigmoid colon and rectum with air-fluid level without dilatation. Lower genitourinary system: The bladder is markedly distended up to the level 
above the umbilicus, 83.7 cm in cc dimension. No definite bladder wall lesion 
seen. The uterus appears spherical and heterogeneous with several partially 
calcified fibroids present. No adnexal mass. Peritoneum/Retroperitoneum: No adenopathy. Vasculature: Unremarkable for age. Other: No free fluid. CT OSSEOUS STRUCTURES:   
 
Unremarkable for age. Impression IMPRESSION:  
 
1. Marked bladder distention up to the level above the umbilicus. Recommend 
clinical correlation for urinary outlet obstruction. 2. No CT evidence of infection or inflammatory process. No abscess seen. 3. Tiny hypodense nodule in right thyroid lobe. This can be better evaluated by 
thyroid ultrasound. 4. Mild and circumferential esophageal wall prominence with collapsed the lumen. If symptomatic, barium swallow can be performed for better evaluation. 5. Fluid-filled nondilated distal colon and rectum as nonspecific finding and 
could represent diarrhea. 6. Multiple partially calcified fibroids. Thank you for this referral.   
  
MRI Results from Hospital Encounter encounter on 09/08/20 MRI BRAIN W WO CONT Narrative MRI BRAIN WITH AND WITHOUT CONTRAST Provided Reason for Exam: Confusion, visual loss right eye for 2 weeks, LP with 
elevated opening pressure Additional History: Patient admitted with confusion Comparison Studies: Brain MRI 9/11/2020, head CT 9/9/2020 Imaging Technique:  
  Sequences:  Sagittal and axial T1 weighted, Diffusion Weighted (DWI), Axial T2 
weighted, Axial T2 FLAIR, and GRE MRI images of the brain. Post contrast axial 
and coronal T1 images. Contrast Material:  20 mL Dotarem IV Limiting Factors/Major Artifacts: None. FINDINGS: 
 
Brain Parenchyma: Diffusion sequence negative. No cytotoxic edema or acute 
infarct. Cerebral white matter is normal.  No chronic cortical infarcts or 
chronic lacunar infarcts. No visible masses or midline shift. No abnormal 
parenchymal or meningeal enhancement. CSF Spaces: There are some mild prominence of the posterior horns of the 
lateral ventricles, but the major the ventricles are normal size. No findings of 
ventricular trapping. Appears to be developmental, similar findings on the head CT from 2013. Vascular System:  Grossly patent flow in basilar and internal cerebral arteries. No findings of dural sinus thrombosis. Hemorrhage:  No acute hemorrhage. No chronic microhemorrhage. Other Structures: 
Calvarium: No suspicious marrow signal. 
Sella: Normal. 
Visualized Upper Cervical Spine: Normal craniocervical junction, no Chiari 
malformation. Orbits: Postsurgical changes in the left ocular globe, appears to be silicone 
injection, unchanged from 2014 head CT. Paranasal Sinuses: No significant paranasal sinus disease. Mastoid Air Cells:  Clear. Impression IMPRESSION: 
 
No acute intracranial abnormality. No mass, hemorrhage, or acute infarct. Postsurgical changes left optic globe consistent with previous retinal 
detachment procedure. ULTRASOUND Results from Hospital Encounter encounter on 09/08/20 US ABD LTD Impression IMPRESSION:    
 
1. Status post cholecystectomy. 2.  No significant common bile duct dilation. 3.  Increased hepatic parenchymal echogenicity with somewhat heterogeneous 
appearance, potentially suggestive of hepatosteatosis. Partial visualization of 
the liver. Results [place] [facility] [place] on [place]/22/19 US ABD LTD Impression IMPRESSION: 
 
Mild heterogeneous echogenicity of the liver is nonspecific. This is commonly 
seen with steatosis hepatic disease. Limited visualization of the pancreas. Cardiology Procedures/Testing: MODALITY RESULTS  
EKG Results for orders placed or performed during the hospital encounter of 09/08/20 EKG, 12 LEAD, INITIAL Result Value Ref Range Ventricular Rate 115 BPM  
 Atrial Rate 115 BPM  
 P-R Interval 148 ms QRS Duration 58 ms Q-T Interval 342 ms QTC Calculation (Bezet) 473 ms Calculated P Axis 34 degrees Calculated R Axis 1 degrees Calculated T Axis 34 degrees Diagnosis Sinus tachycardia Cannot exclude anterior MI versus lead placement issue. Abnormal ECG When compared with ECG of 08-SEP-2020 15:05, Anterior infarct is now present Nonspecific T wave abnormality no longer evident in Inferior leads Nonspecific T wave abnormality, improved in Anterolateral leads Confirmed by Ketty Lyn MD, Chhaya Finder ([id number]) on 9/17/2020 3:04:13 PM 
  
Results for orders placed or performed in visit on 06/20/14 AMB POC EKG ROUTINE W/ 12 LEADS, INTER & REP Impression See progress note. ECHO 01/03/19 ECHO ADULT COMPLETE 01/04/2019 1/4/2019 Narrative · Estimated left ventricular ejection fraction is 61 - 65%. Left  
ventricular mild concentric hypertrophy. Mild (grade 1) left ventricular  
diastolic dysfunction. · Mild tricuspid valve regurgitation is present. Signed by: Andrea Gan MD  
  
 
Special Testing/Procedures: MODALITY RESULTS MICRO All Micro Results Procedure Component Value Units Date/Time CULTURE, BLOOD [[id number]] Collected:  09/15/20 1002 Order Status:  Completed Specimen:  Blood Updated:  09/21/20 0735 Special Requests: NO SPECIAL REQUESTS Culture result: NO GROWTH 6 DAYS     
 CULTURE, BLOOD [[id number]] Collected:  09/15/20 1005 Order Status:  Completed Specimen:  Blood Updated:  09/21/20 0735 Special Requests: NO SPECIAL REQUESTS Culture result: NO GROWTH 6 DAYS     
 CULTURE, CSF Clarance Imani STAIN [[id number]] Collected:  09/15/20 0848 Order Status:  Completed Specimen:  Cerebrospinal Fluid Updated:  09/19/20 1144 Special Requests: CEREBROSPINAL FLUID     
  GRAM STAIN NO WBC'S SEEN     
   NO ORGANISMS SEEN Smear Reviewed by Microbiology 9/15/20 AT 1408 TA. Culture result:    
  NO GROWTH ON SOLID MEDIA AT 4 DAYS  
     
      
  NO GROWTH THUS FAR IN THIO BROTH, HOLDING 7 DAYS  
     
 CULTURE, URINE [[id number]] Collected:  09/15/20 1850 Order Status:  Completed Specimen:  Urine from Clean catch Updated:  09/17/20 1046 Special Requests: NO SPECIAL REQUESTS Culture result: No growth (<1,000 CFU/ML) MENINGITIS PATHOGENS PANEL, CSF (BY PCR) [[id number]] Collected:  09/15/20 0848 Order Status:  Completed Specimen:  Cerebrospinal Fluid Updated:  09/15/20 1510 Escherichia coli K1 Not detected Haemophilus Influenzae Not detected Listeria Monocytogenes Not detected Neisseria Meningitidis Not detected Streptococcus Agalactiae Not detected Streptococcus Pneumoniae Not detected Cytomegalovirus Not detected Enterovirus Not detected Herpes Simplex Virus 1 Not detected Comment: In patients who have negative herpes simplex 1 and 2 PCR results, do not modify treatment, confirm with alternate testing. Herpes Simplex Virus 2 Not detected Comment: In patients who have negative herpes simplex 1 and 2 PCR results, do not modify treatment, confirm with alternate testing. Human Herpesvirus 6 Not detected Human Parechovirus Not detected Varicella Zoster Virus Not detected Crypto. neoformans/gattii Not detected CULTURE, BLOOD [[id number]] Collected:  09/08/20 1640 Order Status:  Completed Specimen:  Blood Updated:  09/14/20 9584 Special Requests: NO SPECIAL REQUESTS Culture result: NO GROWTH 6 DAYS     
 CULTURE, BLOOD [[id number]] Collected:  09/08/20 1640 Order Status:  Completed Specimen:  Blood Updated:  09/14/20 7150 Special Requests: NO SPECIAL REQUESTS Culture result: NO GROWTH 6 DAYS     
 CULTURE, URINE [[id number]] Collected:  09/08/20 1211 Order Status:  Completed Specimen:  Urine from Clean catch Updated:  09/09/20 1811 Special Requests: NO SPECIAL REQUESTS [place] Count --     
  >100,000 COLONIES/mL Culture result:    
  MIXED UROGENITAL AUDRA ISOLATED  
     
  
  
UA No results found for this or any previous visit. PATH Assessment/Plan Remington Granger is an 60 y.o. female with PMH of hypokalemia, retinal detachment, stage 2 CKD, GIST s/p resection (2014), HTN, HLD who was admitted on 9/08/20 for AMS and HHS. Altered Mental Status - metabolic encephalopathy of unclear etiology (likely multifactorial) [] Pt initially presented with HHS (BG of 460-500), which was treated and has resolved.  Now with BG < 200 consistently euglycemic.  
[] Initial labs were concerning for infection with leukocytosis and lactic acidosis of unclear source that resolved with IV Abx. Possible UTI was the leading DDx because of a concerning UA and UCx with >100,000 colonies of mixed urogenital kyle, but a CT chest/abd/pelvis (9/09) that shows bladder with mild wall thickening concerning for cystitis/UTI. [] At admission, CT Head (9/08) showed mildly enlarged ventricles that could be concerning for cerebral atrophy vs normal pressure hydrocephalus. MRI head (9/11 + 9/17) showed no acute intracranial findings and no dural venous thrombosis. [] Pt developed Hypernatremia (9/09) of 151; this was treated and has resolved. [] Also of note, UDS and serum EtOH were negative at admission.  Troponin has been negative and EKG with no ST elevation or depression.  Serum lipase, ammonia, B12, and folate levels have all been WNL as well.  HIV and RPR also negative. B1, GRACE, smooth muscle antibodies, and covid were all negative - Neurology signed off - Psychiatry is on board, appreciate their recs - s/p empirical Abx:  Ceftriaxone (9/8 -9/9),  Vancomycin (9/8 - 9/14),  Zosyn (9/9 - 9/14) - Elevated inflammatory markers:  ESR 32 and CRP 3.7 (9/14) PLAN:  
- HSV1/2 IgG positive with IgM negative;  CSF HSV PCR (9/15) negative - F/U serum heavy metals panel - Obtain KUB and bladder scans (assess for urinary/fecal retention that could be contributory) - As per Psych, continue Fluoxetine 20mg daily - As per neurology, avoid benzodiazepines for sedation;  continue Ziprasidone (geodon) 10mg IM PRN for severe agitation 
- Continue Quetiapine (seroquel) 25mg QAM + 50mg QHS 
- Hold metoclopramide and paroxetine (concern for serotonin syndrome) 
  
  
EPS - Tardive dyskinesia, Akathisia, and Dystonia Pt's development of EPS is likely due to use of antipsychotics, and chronic metoclopramide use could also be related. Extrapyramidal symptoms have improved since admission. 
- Currently on physical restraints ; sitter present today - As per neuro, avoid additional antipsychotic use when able - As per neuro, Ziprasidone (geodon) PRN for severe agitation 
  
  
Hematuria Due to urinary retention earlier in hospitalization, Pt had a garcia catheter placed.  The leading DDx for the hematuria is catheter-related trauma to the urinary tract. - Urology was consulted; they transitioned Pt from a garcia to diaper and signed off 
  
  
R Eye Pain + Vision Loss Pt c/o a 2-week history of R eye pain associated with vision loss. Based on a bedside U/S, papilledema seems unlikely (as no enlarged optic sheath was seen). DDx:  Floaters vs Retinal Detachment vs Conjuctivitis vs Acute Angle-Closure Glaucoma - Discussed with Ophthalmology (Dr. Ti Colón) - not able to come and see Pt 
  
  
Hyperbilirubinemia (3.9) w/ 0.6 direct likely majority is unconjugated  likely secondary to [place] syndrome, previously elevated bili unlikely hemolysis (improved) CT AB/US no gall bladder disease appreciated , haptoglobin normal, aldolase pending 
  
  
  
Diet:  Diabetic diet, advance diet as tolerated by ST 
DVT Prophylaxis:  SQH 
GI Prophylaxis:  Omeprazole Code Status:  Full Point of Contact:  [place], [place]:  ) 
  
Disposition:  >2 [place], [facility]

## 2020-09-21 NOTE — ROUTINE PROCESS
MD informed about the patient trying to get out of bed. The sitter stop her, she almost push the sitter.

## 2020-09-21 NOTE — PROGRESS NOTES
Bedside shift [facility] nurse) by Stephon Hoff RN (offgoing nurse). Report included the following information SBAR, Kardex and MAR.

## 2020-09-21 NOTE — PROGRESS NOTES
OT chart reviewed. Pt not seen for skilled OT due to: 
[]  Nausea/vomiting 
[]  Eating 
[]  Pain 
[]  Pt lethargic 
[]  Off Unit 
[] Speaking with hospital staff member 
[x] Other:  
[phone]- Pt refusing stating, \"I don't feel like it\". [phone]- Pt sleeping Will f/u later as schedule allows. Thank you, Hodan Cano MS, OTR/L

## 2020-09-21 NOTE — PROGRESS NOTES
Speech Pathology: Attempted dysphagia follow up; pt currently off unit for testing. Will follow up at a later date/time or as medical condition permits. Thank you for this referral. 
 
Todd Morfin M.S. CCC-SLP/L Speech-Language Pathologist

## 2020-09-21 NOTE — PROGRESS NOTES
Pt not seen for skilled PT due to: 
 
[]  Nausea/vomiting 
[]  Eating 
[]  Pain 
[]  Pt lethargic 
[]  Off Unit Other: refusing at this time ([phone]), sleeping soundly (1401) Will f/u later as schedule allows. Thank you.  
Deniz Lunsford, PT, DPT

## 2020-09-21 NOTE — PROGRESS NOTES
Discharge planning: 
 
Patient continues to slowly progress towards discharge. Patient remains in restraints and she has a sitter, at the bedside. Patient's discharge plan remains uncertain. She is also refusing therapies. This writer will continue to monitor for discharge planning to ensure a safe discharge from [facility]. Cristopher Beasley. MS Care Manager Pager#: [phone]

## 2020-09-21 NOTE — PROGRESS NOTES
Problem: Diabetes Self-Management Goal: *Disease process and treatment process Description: Define diabetes and identify own type of diabetes; list 3 options for treating diabetes. Outcome: Progressing Towards Goal 
Goal: *Incorporating nutritional management into lifestyle Description: Describe effect of type, amount and timing of food on blood glucose; list 3 methods for planning meals. Outcome: Progressing Towards Goal 
Goal: *Incorporating physical activity into lifestyle Description: State effect of exercise on blood glucose levels. Outcome: Progressing Towards Goal 
Goal: *Developing strategies to promote health/change behavior Description: Define the ABC's of diabetes; identify appropriate screenings, schedule and personal plan for screenings. Outcome: Progressing Towards Goal 
Goal: *Using medications safely Description: State effect of diabetes medications on diabetes; name diabetes medication taking, action and side effects. Outcome: Progressing Towards Goal 
Goal: *Monitoring blood glucose, interpreting and using results Description: Identify recommended blood glucose targets  and personal targets. Outcome: Progressing Towards Goal 
Goal: *Prevention, detection, treatment of acute complications Description: List symptoms of hyper- and hypoglycemia; describe how to treat low blood sugar and actions for lowering  high blood glucose level. Outcome: Progressing Towards Goal 
Goal: *Prevention, detection and treatment of chronic complications Description: Define the natural course of diabetes and describe the relationship of blood glucose levels to long term complications of diabetes. Outcome: Progressing Towards Goal 
Goal: *Developing strategies to address psychosocial issues Description: Describe feelings about living with diabetes; identify support needed and support network Outcome: Progressing Towards Goal 
Goal: *Insulin pump training Outcome: Progressing Towards Goal 
 Goal: *Sick day guidelines Outcome: Progressing Towards Goal 
Goal: *Patient Specific Goal (EDIT GOAL, INSERT TEXT) Outcome: Progressing Towards Goal 
  
Problem: Patient Education: Go to Patient Education Activity Goal: Patient/Family Education Outcome: Progressing Towards Goal 
  
Problem: Non-Violent Restraints Goal: *Removal from restraints as soon as assessed to be safe Outcome: Progressing Towards Goal 
Goal: *No harm/injury to patient while restraints in use Outcome: Progressing Towards Goal 
Goal: *Patient's dignity will be maintained Outcome: Progressing Towards Goal 
Goal: *Patient Specific Goal (EDIT GOAL, INSERT TEXT) Outcome: Progressing Towards Goal 
Goal: Non-violent Restaints:Standard Interventions Outcome: Progressing Towards Goal 
Goal: Non-violent Restraints:Patient Interventions Outcome: Progressing Towards Goal 
Goal: Patient/Family Education Outcome: Progressing Towards Goal 
  
Problem: Falls - Risk of 
Goal: *Absence of Falls Description: Document Madeline Sharp Fall Risk and appropriate interventions in the flowsheet. Outcome: Progressing Towards Goal 
Note: Fall Risk Interventions: 
Mobility Interventions: Bed/chair exit alarm Mentation Interventions: Adequate sleep, hydration, pain control Medication Interventions: Bed/chair exit alarm Elimination Interventions: Call light in reach Problem: Patient Education: Go to Patient Education Activity Goal: Patient/Family Education Outcome: Progressing Towards Goal 
  
Problem: Pressure Injury - Risk of 
Goal: *Prevention of pressure injury Description: Document Vasile Scale and appropriate interventions in the flowsheet. Outcome: Progressing Towards Goal 
Note: Pressure Injury Interventions: 
Sensory Interventions: Assess changes in LOC Moisture Interventions: Absorbent underpads Activity Interventions: PT/OT evaluation Mobility Interventions: Turn and reposition approx.  every two hours(pillow and wedges), PT/OT evaluation Nutrition Interventions: Document food/fluid/supplement intake Friction and Shear Interventions: Lift sheet, Transferring/repositioning devices Problem: Patient Education: Go to Patient Education Activity Goal: Patient/Family Education Outcome: Progressing Towards Goal 
  
Problem: Patient Education: Go to Patient Education Activity Goal: Patient/Family Education Outcome: Progressing Towards Goal 
  
Problem: Nutrition Deficit Goal: *Optimize nutritional status Outcome: Progressing Towards Goal 
  
Problem: Patient Education: Go to Patient Education Activity Goal: Patient/Family Education Outcome: Progressing Towards Goal 
  
Problem: Patient Education: Go to Patient Education Activity Goal: Patient/Family Education Outcome: Progressing Towards Goal 
  
Problem: Risk for Spread of Infection Goal: Prevent transmission of infectious organism to others Description: Prevent the transmission of infectious organisms to other patients, staff members, and visitors. Outcome: Progressing Towards Goal 
  
Problem: Patient Education:  Go to Education Activity Goal: Patient/Family Education Outcome: Progressing Towards Goal

## 2020-09-21 NOTE — PROGRESS NOTES
Restraint type: Siderails x 4 and Roll [employer] Reason for restraints: Interference with medical treatment Duration: 24 hours Restraints must be removed when an alternative is available and effective and/or patient no longer meets criteria. Orders must be renewed every calendar day or when discontinued. The MD must conduct a face to face assessment within 1 calendar day of initiation when initial restraint order is verbal. 
 
Signed By: Sofia Ashton MD, PGY-2 
[facility] [address] September 20, 2020

## 2020-09-21 NOTE — ROUTINE PROCESS
Bedside shift change report given to  [address] (oncoming nurse) by  Fernando Frias (offgoing nurse). Report included the following information SBAR, Kardex, ED Summary and Recent Results.

## 2020-09-22 NOTE — PROGRESS NOTES
[address] Jeronimo to bedside and assessed pt and discussed condition with nurse. Nurse expressed concern with pt coming out of restraints and therefore restraints were not renewed. Order for a sitter was placed. Pt appeared to be in NAD. Marcia Young MD 
[address]

## 2020-09-22 NOTE — PROGRESS NOTES
Problem: Diabetes Self-Management Goal: *Disease process and treatment process Description: Define diabetes and identify own type of diabetes; list 3 options for treating diabetes. Outcome: Progressing Towards Goal 
Goal: *Incorporating nutritional management into lifestyle Description: Describe effect of type, amount and timing of food on blood glucose; list 3 methods for planning meals. Outcome: Progressing Towards Goal 
Goal: *Incorporating physical activity into lifestyle Description: State effect of exercise on blood glucose levels. Outcome: Progressing Towards Goal 
Goal: *Developing strategies to promote health/change behavior Description: Define the ABC's of diabetes; identify appropriate screenings, schedule and personal plan for screenings. Outcome: Progressing Towards Goal 
Goal: *Using medications safely Description: State effect of diabetes medications on diabetes; name diabetes medication taking, action and side effects. Outcome: Progressing Towards Goal 
Goal: *Monitoring blood glucose, interpreting and using results Description: Identify recommended blood glucose targets  and personal targets. Outcome: Progressing Towards Goal 
Goal: *Prevention, detection, treatment of acute complications Description: List symptoms of hyper- and hypoglycemia; describe how to treat low blood sugar and actions for lowering  high blood glucose level. Outcome: Progressing Towards Goal 
Goal: *Prevention, detection and treatment of chronic complications Description: Define the natural course of diabetes and describe the relationship of blood glucose levels to long term complications of diabetes. Outcome: Progressing Towards Goal 
Goal: *Developing strategies to address psychosocial issues Description: Describe feelings about living with diabetes; identify support needed and support network Outcome: Progressing Towards Goal 
Goal: *Sick day guidelines Outcome: Progressing Towards Goal 
 Goal: *Patient Specific Goal (EDIT GOAL, INSERT TEXT) Outcome: Progressing Towards Goal 
  
Problem: Patient Education: Go to Patient Education Activity Goal: Patient/Family Education Outcome: Progressing Towards Goal 
  
Problem: Falls - Risk of 
Goal: *Absence of Falls Description: Document Rafalsimran Verdugo Fall Risk and appropriate interventions in the flowsheet. Outcome: Progressing Towards Goal 
Note: Fall Risk Interventions: 
Mobility Interventions: Bed/chair exit alarm, Patient to call before getting OOB, Utilize walker, cane, or other assistive device Mentation Interventions: Adequate sleep, hydration, pain control, Bed/chair exit alarm, Door open when patient unattended, More frequent rounding, Reorient patient, Room close to nurse's station, Toileting rounds Medication Interventions: Bed/chair exit alarm, Patient to call before getting OOB, Teach patient to arise slowly Elimination Interventions: Bed/chair exit alarm, Call light in reach, Elevated toilet seat, Patient to call for help with toileting needs, Stay With Me (per policy), Toilet paper/wipes in reach, Toileting schedule/hourly rounds, Urinal in reach Problem: Patient Education: Go to Patient Education Activity Goal: Patient/Family Education Outcome: Progressing Towards Goal 
  
Problem: Pressure Injury - Risk of 
Goal: *Prevention of pressure injury Description: Document Vasile Scale and appropriate interventions in the flowsheet. Outcome: Progressing Towards Goal 
Note: Pressure Injury Interventions: 
Sensory Interventions: Assess changes in LOC, Check visual cues for pain, Float heels, Keep linens dry and wrinkle-free, Minimize linen layers Moisture Interventions: Absorbent underpads, Check for incontinence Q2 hours and as needed, Minimize layers, Offer toileting Q_hr, Moisture barrier Activity Interventions: Increase time out of bed Mobility Interventions: Float heels, HOB 30 degrees or less Nutrition Interventions: Document food/fluid/supplement intake Friction and Shear Interventions: HOB 30 degrees or less, Minimize layers Problem: Patient Education: Go to Patient Education Activity Goal: Patient/Family Education Outcome: Progressing Towards Goal 
  
Problem: Patient Education: Go to Patient Education Activity Goal: Patient/Family Education Outcome: Progressing Towards Goal 
  
Problem: Nutrition Deficit Goal: *Optimize nutritional status Outcome: Progressing Towards Goal 
  
Problem: Patient Education: Go to Patient Education Activity Goal: Patient/Family Education Outcome: Progressing Towards Goal 
  
Problem: Patient Education: Go to Patient Education Activity Goal: Patient/Family Education Outcome: Progressing Towards Goal 
  
Problem: Patient Education:  Go to Education Activity Goal: Patient/Family Education Outcome: Progressing Towards Goal

## 2020-09-22 NOTE — PROGRESS NOTES
Problem: Dysphagia (Adult) Goal: *Acute Goals and Plan of Care (Insert Text) Description: Patient will: 1. Tolerate PO trials with 0 s/s overt distress in 4/5 trials 2. Utilize compensatory swallow strategies/maneuvers (decrease bite/sip, size/rate, alt. liq/sol) with min cues in 4/5 trials 3. Perform oral-motor/laryngeal exercises to increase oropharyngeal swallow function with min cues 4. Complete an objective swallow study (i.e., MBSS) to assess swallow integrity, r/o aspiration, and determine of safest LRD, min A Rec:    
Puree with thin liquids Aspiration precautions HOB >45 during po intake, remain >30 for 30-45 minutes after po Small bites/sips; alternate liquid/solid with slow feeding rate Oral care TID Meds crushed in puree Assistance with all PO Outcome: Resolved/Met SPEECH LANGUAGE PATHOLOGY DYSPHAGIA TREATMENT & DISCHARGE Patient: Genaro Franks (69 y.o. female) Date: 9/22/2020 Diagnosis: Hyperglycemia [R73.9] Altered mental status [R41.82] Acute metabolic encephalopathy [T63.71] Acute renal failure (ARF) (HCC) [N17.9] Leukocytosis [D72.829] Hypokalemia [E87.6] Metabolic encephalopathy [Q44.79] Altered mental status Precautions:  Fall, Aspiration PLOF: As per H&P  
 
ASSESSMENT: 
Pt was seen at bedside for bedside swallow re-eval per MD orders. She tolerated reg solid, puree, and thin liquids serial swallows via stra (~8 oz) with no overt s/sx of aspiration and positive oral clearance. Mastication was appropriate with timely a-p transit. Positive oral clearance of all consistency trials observed. Laryngeal elevation was functional/timely to palpation. Recommend reg solid diet with thin liquids, aspiration precautions, oral care TID, and meds as tolerated. She may require assistance with PO and could benefit from finger foods.  No further skilled SLP services are indicated at this time as pt appears to be tolerating LRD without difficulty. Please re-consult if needed. Progression toward goals: 
[x]         Goals met/approximated 
[]         Not making progress/Not appropriate - will d/c POC PLAN: 
Recommendations and Planned Interventions: 
Maximum therapeutic gains met; safest, least restrictive diet achieved. D/C ST intervention at this time. Discharge Recommendations:  None SUBJECTIVE:  
Patient stated You know I love orange juice. OBJECTIVE:  
Cognitive and Communication Status: 
Neurologic State: Alert Orientation Level: Oriented to person, Disoriented to time, Disoriented to situation, Disoriented to place Cognition: Decreased command following Perception: (unable to determine; per chart pt legally blind) Perseveration: Perseverates during ADLS Safety/Judgement: Decreased insight into deficits, Decreased awareness of environment Dysphagia Treatment: 
Oral Assessment: 
Oral Assessment Labial: No impairment Dentition: Intact, Natural 
Oral Hygiene: adequate Lingual: No impairment Velum: No impairment Mandible: No impairment P.O. Trials: 
 Patient Position: 63 at [facility] Vocal quality prior to P.O.: No impairment Consistency Presented: Thin liquid, Solid How Presented: Self-fed/presented, Cup/sip, Spoon, Straw Bolus Acceptance: No impairment Bolus Formation/Control: No impairment Type of Impairment: Mastication Propulsion: No impairment Oral Residue: None Initiation of Swallow: No impairment Laryngeal Elevation: Functional 
 Aspiration Signs/Symptoms: None Pharyngeal Phase Characteristics: No impairment, issues, or problems Effective Modifications: None Cues for Modifications: None Oral Phase Severity: No impairment Pharyngeal Phase Severity : No impairment PAIN: 
Start of Tx: 0 End of Tx: 0 After treatment:  
[]              Patient left in no apparent distress sitting up in chair [x]              Patient left in no apparent distress in bed 
[x]              Call bell left within reach [x]              Nursing notified 
[]              Caregiver present 
[]              Bed alarm activated COMMUNICATION/EDUCATION:  
[x] Aspiration precautions; swallow safety; compensatory techniques [x]        Patient/family able to participate in training and education  
[]  Patient unable to participate in education; education ongoing with staff 
[] Patient understands goals/intent of therapy; neutral about participation Thank you for this referral. 
 
Brooks Fortune M.S. CCC-SLP/L Speech-Language Pathologist

## 2020-09-22 NOTE — PROGRESS NOTES
Problem: Mobility Impaired (Adult and Pediatric) Goal: *Acute Goals and Plan of Care (Insert Text) Description: Physical Therapy Goals Initiated 9/14/2020 and to be accomplished within 7 day(s) 1. Patient will move from supine to sit and sit to supine  in bed with modified independence. 2.  Patient will transfer from bed to chair and chair to bed with modified independence using the least restrictive device. 3.  Patient will perform sit to stand with modified independence. 4.  Patient will ambulate with modified independence for 150 feet with the least restrictive device. 5.  Patient will ascend/descend 14 stairs with B handrail(s) with modified independence. 6.  Patient will follow 2-step commands 75% of the time. PLOF: Pt reports living alone in a apartment with 14 stairs to enter. She was independent with all mobility PTA and did not use an AD. She states she lives alone. At this time, patient is drowsy and unreliable historian. Follow up as patient progresses. Outcome: Progressing Towards Goal 
  
PHYSICAL THERAPY RE-EVALUATION Patient: Charlotte Good (41 y.o. female) Date: 9/22/2020 Primary Diagnosis: Hyperglycemia [R73.9] Altered mental status [R41.82] Acute metabolic encephalopathy [A82.60] Acute renal failure (ARF) (HCC) [N17.9] Leukocytosis [D72.829] Hypokalemia [E87.6] Metabolic encephalopathy [D46.71] Precautions:   Fall, Aspiration ASSESSMENT : 
Based on the objective data described below, the patient presents in bed in NAD, off restraints this date and seen with OT to maximize functional mobility and safety during re-evaluation. Pt is oriented to person and place this date and is calm/cooperative during session. Pts LE strength and ROM are WFL. Pt requires SBA to come to standing but once up is provided CGA for safety.  She is able to progress to gait training this date however due to vision impairment she is given HHA with CGA x 1 to amb within the halls and given verbal/tactile cues for obstacle negotiation on where to go to for safety. Pt amb all the way down anthony approx 75 ft to stairwell where she goes up/down 8 steps with min A x 2 due to safety concerns and need for cues to get safely up and down, pt noted to be leaning into the wall and requires manual assist for keeping UEs in front of her upon descent as she kept leaving her LUE behind her. Pt became more uneasy with ambulating on way back to room so CGA x 2 given for safety reason. Slow/shuffling gait noted throughout all mobility but pt had good activity tolerance given this was the most activity she has done in weeks due to her AMS. Will continue to follow in the acute setting to ensure safe transition home. Recommend HH with 24/7 assist vs SNF/LTC pending available assist in the home. Patient will benefit from skilled intervention to address the above impairments. Patient's rehabilitation potential is considered to be Good Factors which may influence rehabilitation potential include:  
[]         None noted 
[x]         Mental ability/status [x]         Medical condition 
[x]         Home/family situation and support systems 
[x]         Safety awareness 
[]         Pain tolerance/management 
[]         Other: PLAN : 
Recommendations and Planned Interventions:  
[x]           Bed Mobility Training             [x]    Neuromuscular Re-Education 
[x]           Transfer Training                   []    Orthotic/Prosthetic Training 
[x]           Gait Training                          []    Modalities [x]           Therapeutic Exercises           []    Edema Management/Control 
[x]           Therapeutic Activities            [x]    Family Training/Education 
[x]           Patient Education 
[]           Other (comment): Frequency/Duration: Patient will be followed by physical therapy 1-2 times per day/4-7 days per week to address goals. Discharge Recommendations: Home Health with 24/7 assist vs SNF/LTC Further Equipment Recommendations for Discharge: N/A  
 
SUBJECTIVE:  
Patient stated I will get up with you.  OBJECTIVE DATA SUMMARY:  
Hospital course since last seen and reason for re-evaluation: pt off restraints and more oriented this date Past Medical History:  
Diagnosis Date Blind left eye Cellulitis of great toe of right foot 10/19/2017 Diabetes ([address] Diabetic retinopathy ([address] Gall stones GERD (gastroesophageal reflux disease) [place] Hiatal hernia History of mammogram 10/03/2017 No evidence of malignancy Hypertension Mass of abdomen Neuropathy due to type 2 diabetes mellitus ([address] Osteomyelitis of toe of right foot ([address] 10/19/2017 Pancreatitis Past Surgical History:  
Procedure Laterality Date HX AMPUTATION Left 07/21/2017  
 left 2nd toe HX CHOLECYSTECTOMY  10/15/2014 HX GI Benign GI Stromal Tumor excision HX HEENT Sx for detached retina HX MYOMECTOMY x5 removed HX OTHER SURGICAL    
 upper endoscopy Barriers to Learning/Limitations: yes;  altered mental status (i.e.Sedation, Confusion) Compensate with: Visual Cues, Verbal Cues, and Tactile Cues Home Situation:  
Home Situation Home Environment: Apartment # Steps to Enter: 13 One/Two Story Residence: One story Living Alone: Yes Support Systems: Other (comments)(Unsure, pt unreliable historian) Patient Expects to be Discharged to[de-identified] Private residence Current DME Used/Available at Home: None Tub or Shower Type: (pt did not say) Critical Behavior: 
Neurologic State: Alert Orientation Level: Oriented to person;Disoriented to time;Disoriented to situation;Disoriented to place Cognition: Decreased command following Strength:   
Strength: Within functional limits Tone & Sensation:  
Tone: Normal 
  
  
  
  
Sensation: Intact Range Of Motion: 
AROM: Within functional limits Functional Mobility: 
Bed Mobility: 
  
Supine to Sit: Stand-by assistance Sit to Supine: Minimum assistance Scooting: Supervision Transfers: 
Sit to Stand: Contact guard assistance;Stand-by assistance Stand to Sit: Contact guard assistance;Stand-by assistance Balance:  
Sitting: Impaired; With support Sitting - Static: Good (unsupported) Sitting - Dynamic: Fair (occasional) Ambulation/Gait Training: 
Distance (ft): 150 Feet (ft) Assistive Device: Other (comment)(B HHA ) Ambulation - Level of Assistance: Contact guard assistance;Assist x2 Gait Abnormalities: Ataxic;Decreased step clearance; Path deviations Base of Support: Narrowed; Center of gravity altered Stance: Right increased; Left increased Speed/[place]: Slow;Shuffled Step Length: Right shortened;Left shortened Interventions: Safety awareness training;Visual/Demos; Tactile cues; Verbal cues Stairs: 
Number of Stairs Trained: 8 Stairs - Level of Assistance: Minimum assistance;Assist X2 Rail Use: Right Pain: 
Pain level pre-treatment: 0/10 Pain level post-treatment: 0/10 Activity Tolerance:  
Good Please refer to the flowsheet for vital signs taken during this treatment. After treatment:  
[]         Patient left in no apparent distress sitting up in chair 
[x]         Patient left in no apparent distress in bed 
[x]         Call bell left within reach [x]         Nursing notified 
[]         Caregiver present [x]         Bed alarm activated 
[]         SCDs applied COMMUNICATION/EDUCATION:  
[x]         Role of Physical Therapy in the acute care setting. [x]         Fall prevention education was provided and the patient/caregiver indicated understanding. [x]         Patient/family have participated as able in goal setting and plan of care. [x]         Patient/family agree to work toward stated goals and plan of care. []         Patient understands intent and goals of therapy, but is neutral about his/her participation. []         Patient is unable to participate in goal setting/plan of care: ongoing with therapy staff. 
[]         Other: Thank you for this referral. 
Kerline Splinter Time Calculation: 26 mins

## 2020-09-22 NOTE — PROGRESS NOTES
Nutrition Assessment Type and Reason for Visit: Reassess, Positive nutrition screen Nutrition Recommendations/Plan: - Add supplements: Glucerna Shake TID. Monitor po intake and diet tolerance. Nutrition Assessment:  Altered mentation/confusion with fair appetite and variable meal intake, ate 25% of breakfast and consuming 50% of recent meals per nursing documentation. States she would like the Glucerna Shake supplements with meals, previously ordered. Would benefit from finger foods per SLP. Malnutrition Assessment: 
Malnutrition Status: No malnutrition Estimated Daily Nutrient Needs: 
Energy (kcal):  8681-7633 Protein (g):  75-94 Fluid (ml/day):  4958-3098 Nutrition Related Findings:  BM 9/19, received glycerin suppository yesterday and started back on miralax BID today. Current Nutrition Therapies: DIET REGULAR Anthropometric Measures: 
· Height:  5' 8\" (172.7 cm) · Current Body Wt:  95.9 kg (211 lb 6.7 oz) · BMI: 32.2 Nutrition Diagnosis:  
· Inadequate oral intake related to cognitive or neurological impairment as evidenced by intake 26-50% Nutrition Intervention: 
Food and/or Nutrient Delivery: Continue current diet, Start oral nutrition supplement, Vitamin supplement Nutrition Education and Counseling: Education not indicated Coordination of Nutrition Care: Continued inpatient monitoring, Coordination of community care Goals: 
PO nutrition intake will continue to meet >75% of patients estimated nutritional needs over the next 7 days. Nutrition Monitoring and Evaluation:  
Food/Nutrient Intake Outcomes: Diet advancement/tolerance, Food and nutrient intake, Supplement intake Physical Signs/Symptoms Outcomes: Chewing or swallowing, Constipation, Meal time behavior, Nutrition focused physical findings Discharge Planning:   
Continue current diet, Continue oral nutrition supplement Electronically signed by Alex Warren RD, [id number] on 9/22/2020 at 11:23 AM 
 
Contact Number: [phone]

## 2020-09-22 NOTE — PROGRESS NOTES
conducted a Follow up consultation and Spiritual Assessment for Kevin Araujo, who is a 60 y.o.,female. The  provided the following Interventions: 
I introduced myself and continued the relationship of care and support. I listened empathically as patient shared about her current health issues. She stated she is now able to connect with family/friends since she is feeling much better. I expressed silent prayers for peace and strength. I expressed pastoral care and support. The following outcomes were achieved: 
Patient expressed gratitude for 's visit. Assessment: 
There are no further spiritual or Quaker issues which require Spiritual Care Services interventions at this time. Plan: 
Chaplains will continue to follow and will provide pastoral care on an as needed/requested basis. Chaplain DANIS Silva Spiritual Care  
[phone]

## 2020-09-22 NOTE — PROGRESS NOTES
*ATTENTION:  This note has been created by a medical student for educational purposes only. Please do not refer to the content of this note for clinical decision-making, billing, or other purposes. Please see attending physicians note to obtain clinical information on this patient. * Medical Student Progress Note [address] **Medical Student Note for Educational Purposes Only** Patient: Manolo Carr MRN: [id number]  CSN: [id number] YOB: 1965  Age: 60 y.o. Sex: female DOA: 9/8/2020 LOS:  LOS: 14 days Subjective:  
 
Acute events:  Overnight, Pt escaped physical restraints on hr own. She was severely agitated and required a single dose of her PRN Geodon for sedation. Physical restraints were discontinued by nursing due to a concern about strangulation risk; instead had a sitter present. When I went to see Ms. Tish Oro this AM, no sitter was present and she was calmly sleeping in her bed without any restraints. Pt states that she is doing \"pretty well\" this AM.  Pt still complains of R eye pain, but she states that she no longer has a headache. Review of Systems Constitutional: Negative for chills and fever. HENT: Negative. Eyes: Positive for pain and redness. Respiratory: Negative for cough, hemoptysis, sputum production and shortness of breath. Cardiovascular: Negative for chest pain and palpitations. Gastrointestinal: Negative for abdominal pain, constipation, diarrhea and nausea. Genitourinary: Negative for dysuria, frequency and urgency. Musculoskeletal: Negative for back pain, joint pain and myalgias. Neurological: Negative for dizziness and headaches. Objective:  
  
Patient Vitals for the past 24 hrs: 
 Temp Pulse Resp BP SpO2  
09/22/20 0446 98.4 °F (36.9 °C) (!) 112 18 122/66 100 % 09/22/20 0121 98.8 °F (37.1 °C) (!) 101 20 (!) 142/88 98 % 09/21/20 2026 98.8 °F (37.1 °C) (!) 104 19 120/68 98 %  
09/21/20 1605 98.6 °F (37 °C) 98 16 103/62 99 % 09/21/20 1126 97.5 °F (36.4 °C) (!) 124 18 106/66 100 % 09/21/20 0834 99.2 °F (37.3 °C) (!) 113 16 118/63 99 % Intake/Output Summary (Last 24 hours) at 9/22/2020 6102 Last data filed at 9/22/2020 9773 Gross per 24 hour Intake 580 ml Output 2375 ml Net -1795 ml Physical Exam:  
General:  Comfortably sleeping in bed without restraints in NAD. Pt was responsive and cooperative with exam.  Oriented to person, place, but not to time (knew it was 2020, but thought it was January). HEENT:  Erythematous conjunctiva of R eye. R pupil is non-reactive to light, and L pupil is sluggishly reactive. Unable to assess extraocular movements due to vision loss and acute mental state. No lymphadenopathy. CV:  Normal heart rate with regular rhythm. No murmurs, rubs, or gallops. RESP:  Unlabored breathing. Lungs clear to auscultation. No wheezes, rales, or rhonchi. Equal expansion bilaterally. ABD:  Soft, nontender, nondistended. No hepatosplenomegaly. No suprapubic tenderness. Normoactive bowel sounds. MS:  No joint deformity or instability. No atrophy. Neuro:  5/5 strength bilateral upper extremities and lower extremities. Ext:  Nonpitting edema of UE bilaterally. Skin:  No rashes, lesions, or ulcers. Lab/Data Reviewed: 
CMP:  
Lab Results Component Value Date/Time   09/22/2020 05:34 AM  
 K 3.5 09/22/2020 05:34 AM  
  09/22/2020 05:34 AM  
 CO2 28 09/22/2020 05:34 AM  
 AGAP 6 09/22/2020 05:34 AM  
  (H) 09/22/2020 05:34 AM  
 BUN 5 (L) 09/22/2020 05:34 AM  
 CREA 1.07 09/22/2020 05:34 AM  
 GFRAA >60 09/22/2020 05:34 AM  
 GFRNA 53 (L) 09/22/2020 05:34 AM  
 CA 9.1 09/22/2020 05:34 AM  
 MG 1.8 09/22/2020 05:34 AM  
 PHOS 3.4 09/22/2020 05:34 AM  
 ALB 3.4 09/22/2020 05:34 AM  
 TP 7.4 09/22/2020 05:34 AM  
 GLOB 4.0 09/22/2020 05:34 AM  
 AGRAT 0.9 09/22/2020 05:34 AM  
 ALT 37 09/22/2020 05:34 AM  
 
CBC:  
Lab Results Component Value Date/Time WBC 10.7 09/22/2020 05:34 AM  
 HGB 9.1 (L) 09/22/2020 05:34 AM  
 HCT 27.2 (L) 09/22/2020 05:34 AM  
  09/22/2020 05:34 AM  
 
All Cardiac Markers in the last 24 hours:  
Lab Results Component Value Date/Time  (H) 09/22/2020 05:34 AM  
 
Recent Glucose Results:  
Lab Results Component Value Date/Time  (H) 09/22/2020 05:34 AM  
 
Liver Panel:  
Lab Results Component Value Date/Time ALB 3.4 09/22/2020 05:34 AM  
 TP 7.4 09/22/2020 05:34 AM  
 GLOB 4.0 09/22/2020 05:34 AM  
 AGRAT 0.9 09/22/2020 05:34 AM  
 ALT 37 09/22/2020 05:34 AM  
 AP 97 09/22/2020 05:34 AM  
 
  
 
Scheduled Medications Reviewed: 
Current Facility-Administered Medications Medication Dose Route Frequency  tamsulosin (FLOMAX) capsule 0.8 mg  0.8 mg Oral DAILY  polyethylene glycol (MIRALAX) packet 17 g  17 g Oral DAILY  QUEtiapine (SEROquel) tablet 25 mg  25 mg Oral DAILY  FLUoxetine (PROzac) capsule 20 mg  20 mg Oral DAILY  lactated Ringers infusion  100 mL/hr IntraVENous CONTINUOUS  
 QUEtiapine (SEROquel) tablet 50 mg  50 mg Oral QHS  heparin (porcine) injection 5,000 Units  5,000 Units SubCUTAneous Q8H  
 [Held by provider] insulin glargine (LANTUS) injection 5 Units  5 Units SubCUTAneous DAILY  cholecalciferol (VITAMIN D3) (1000 Units /25 mcg) tablet 1 Tab  1,000 Units Oral DAILY  insulin lispro (HUMALOG) injection   SubCUTAneous AC&HS  
 melatonin tablet 3 mg  3 mg Oral QHS  [Held by provider] insulin NPH (NOVOLIN N, HUMULIN N) injection 40 Units  40 Units SubCUTAneous ACB&D  
 sodium chloride (NS) flush 5-40 mL  5-40 mL IntraVENous Q8H  
 pantoprazole (PROTONIX) tablet 40 mg  40 mg Oral DAILY  [Held by provider] rosuvastatin (CRESTOR) tablet 20 mg  20 mg Oral QHS Imaging, microbiology, and EKG/Telemetry: 
Imaging: MODALITY IMPRESSION  
 XR Results from Hospital Encounter encounter on 09/08/20 XR ABD (KUB) Narrative EXAM: ABDOMEN 1 VIEW CLINICAL HISTORY/INDICATION: Altered mental status, evaluate for obstruction COMPARISON: 9/9/2020 TECHNIQUE: Portable AP view was obtained. FINDINGS:  
 
LINES/DEVICES: None. BOWEL GAS PATTERN: Non-obstructive bowel gas pattern. PATHOLOGIC CALCIFICATIONS: None. SOFT TISSUES: Unremarkable. BONES: Unremarkable for age. OTHER: The lung bases are clear. Caththais Chatman Impression IMPRESSION: 
1.  Non-obstructive bowel gas pattern. Thank you for enabling us to participate in the care of this patient. CT Results from Hospital Encounter encounter on 09/08/20 CT CHEST ABD PELV W CONT Narrative CT chest, abdomen and pelvis with contrast  
 
CLINICAL HISTORY: Leukocytosis. CT evaluation of potential infection versus 
abscess COMPARISON: None. TECHNIQUE: Helical scan to the chest, abdomen and pelvis are obtained from the 
thoracic inlet to the symphysis pubis after IV contrast administration. All CT scans at this facility performed using dose optimization techniques as 
appreciated to a performed exam, to include automated exposure control, 
adjustment of the mA and or KU according to patient size (including appropriate 
matching for site specific examination), or use of iterative reconstruction 
technique. FINDINGS: Moderate motion artifact noted and limits the detail evaluation. CT CHEST:  
 
Lung Parenchyma: Minimal bibasilar atelectasis. No acute airspace disease or 
consolidation. Thyroid: Small hypodense nodule in the right lobe measuring about 5 mm. Mediastinum: No adenopathy. Mild and circumferential esophageal wall prominence 
with collapsed the lumen. Heart: The heart and the pericardium appear normal. 
 
Vasculature: The aorta and the great vessels are unremarkable. Pleural Space And Chest Wall: No significant pleural pathology. CT ABDOMEN AND PELVIS: 
 
Liver: Normal. 
 
Gallbladder: Surgically absent. Biliary System: No ductal dilatation. Spleen: Normal. 
 
Pancreas: Normal. 
 
Adrenal Glands: Normal. 
 
Kidneys: Normal. 
 
Bowel: The small and large bowel are nondilated. Normal appendix. Fluid-filled 
sigmoid colon and rectum with air-fluid level without dilatation. Lower genitourinary system: The bladder is markedly distended up to the level 
above the umbilicus, 82.6 cm in cc dimension. No definite bladder wall lesion 
seen. The uterus appears spherical and heterogeneous with several partially 
calcified fibroids present. No adnexal mass. Peritoneum/Retroperitoneum: No adenopathy. Vasculature: Unremarkable for age. Other: No free fluid. CT OSSEOUS STRUCTURES:   
 
Unremarkable for age. Impression IMPRESSION:  
 
1. Marked bladder distention up to the level above the umbilicus. Recommend 
clinical correlation for urinary outlet obstruction. 2. No CT evidence of infection or inflammatory process. No abscess seen. 3. Tiny hypodense nodule in right thyroid lobe. This can be better evaluated by 
thyroid ultrasound. 4. Mild and circumferential esophageal wall prominence with collapsed the lumen. If symptomatic, barium swallow can be performed for better evaluation. 5. Fluid-filled nondilated distal colon and rectum as nonspecific finding and 
could represent diarrhea. 6. Multiple partially calcified fibroids. Thank you for this referral.   
  
MRI Results from Hospital Encounter encounter on 09/08/20 MRI BRAIN W WO CONT Narrative MRI BRAIN WITH AND WITHOUT CONTRAST Provided Reason for Exam: Confusion, visual loss right eye for 2 weeks, LP with 
elevated opening pressure Additional History: Patient admitted with confusion Comparison Studies: Brain MRI 9/11/2020, head CT 9/9/2020 Imaging Technique:  
  Sequences:  Sagittal and axial T1 weighted, Diffusion Weighted (DWI), Axial T2 
 weighted, Axial T2 FLAIR, and GRE MRI images of the brain. Post contrast axial 
and coronal T1 images. Contrast Material:  20 mL Dotarem IV Limiting Factors/Major Artifacts: None. FINDINGS: 
 
Brain Parenchyma: Diffusion sequence negative. No cytotoxic edema or acute 
infarct. Cerebral white matter is normal.  No chronic cortical infarcts or 
chronic lacunar infarcts. No visible masses or midline shift. No abnormal 
parenchymal or meningeal enhancement. CSF Spaces: There are some mild prominence of the posterior horns of the 
lateral ventricles, but the major the ventricles are normal size. No findings of 
ventricular trapping. Appears to be developmental, similar findings on the head CT from 2013. Vascular System:  Grossly patent flow in basilar and internal cerebral arteries. No findings of dural sinus thrombosis. Hemorrhage:  No acute hemorrhage. No chronic microhemorrhage. Other Structures: 
Calvarium: No suspicious marrow signal. 
Sella: Normal. 
Visualized Upper Cervical Spine: Normal craniocervical junction, no Chiari 
malformation. Orbits: Postsurgical changes in the left ocular globe, appears to be silicone 
injection, unchanged from 2014 head CT. Paranasal Sinuses: No significant paranasal sinus disease. Mastoid Air Cells:  Clear. Impression IMPRESSION: 
 
No acute intracranial abnormality. No mass, hemorrhage, or acute infarct. Postsurgical changes left optic globe consistent with previous retinal 
detachment procedure. ULTRASOUND Results from Hospital Encounter encounter on 09/08/20 US ABD LTD Impression IMPRESSION:    
 
1. Status post cholecystectomy. 2.  No significant common bile duct dilation. 3.  Increased hepatic parenchymal echogenicity with somewhat heterogeneous 
appearance, potentially suggestive of hepatosteatosis. Partial visualization of 
the liver. Results from [facility] Encounter encounter on 01/22/19 US ABD LTD Impression IMPRESSION: 
 
Mild heterogeneous echogenicity of the liver is nonspecific. This is commonly 
seen with steatosis hepatic disease. Limited visualization of the pancreas. Cardiology Procedures/Testing: MODALITY RESULTS  
EKG Results for orders placed or performed during the hospital encounter of 09/08/20 EKG, 12 LEAD, INITIAL Result Value Ref Range Ventricular Rate 115 BPM  
 Atrial Rate 115 BPM  
 P-R Interval 148 ms QRS Duration 58 ms Q-T Interval 342 ms QTC Calculation (Bezet) 473 ms Calculated P Axis 34 degrees Calculated R Axis 1 degrees Calculated T Axis 34 degrees Diagnosis Sinus tachycardia Cannot exclude anterior MI versus lead placement issue. Abnormal ECG When compared with ECG of 08-SEP-2020 15:05, Anterior infarct is now present Nonspecific T wave abnormality no longer evident in Inferior leads Nonspecific T wave abnormality, improved in Anterolateral leads Confirmed by Jennifer Hernandez MD, Dee Berry ([id number]) on 9/17/2020 3:04:13 PM 
  
Results for orders placed or performed in visit on 06/20/14 AMB POC EKG ROUTINE W/ 12 LEADS, INTER & REP Impression See progress note. ECHO 01/03/19 ECHO ADULT COMPLETE 01/04/2019 1/4/2019 Narrative · Estimated left ventricular ejection fraction is 61 - 65%. Left  
ventricular mild concentric hypertrophy. Mild (grade 1) left ventricular  
diastolic dysfunction. · Mild tricuspid valve regurgitation is present. Signed by: Adrien Tidwell MD  
  
 
Special Testing/Procedures: MODALITY RESULTS MICRO All Micro Results Procedure Component Value Units Date/Time CULTURE, BLOOD [[id number]] Collected:  09/15/20 1002 Order Status:  Completed Specimen:  Blood Updated:  09/21/20 0735 Special Requests: NO SPECIAL REQUESTS Culture result: NO GROWTH 6 DAYS     
 CULTURE, BLOOD [[id number]] Collected:  09/15/20 1005 Order Status:  Completed Specimen:  Blood Updated:  09/21/20 0798 Special Requests: NO SPECIAL REQUESTS Culture result: NO GROWTH 6 DAYS     
 CULTURE, CSF Eliel Keanu STAIN [[id number]] Collected:  09/15/20 0848 Order Status:  Completed Specimen:  Cerebrospinal Fluid Updated:  09/19/20 1144 Special Requests: CEREBROSPINAL FLUID     
  GRAM STAIN NO WBC'S SEEN     
   NO ORGANISMS SEEN Smear Reviewed by Microbiology 9/15/20 AT 1408 TA. Culture result:    
  NO GROWTH ON SOLID MEDIA AT 4 DAYS  
     
      
  NO GROWTH THUS FAR IN THIO BROTH, HOLDING 7 DAYS  
     
 CULTURE, URINE [[id number]] Collected:  09/15/20 1850 Order Status:  Completed Specimen:  Urine from Clean catch Updated:  09/17/20 1046 Special Requests: NO SPECIAL REQUESTS Culture result: No growth (<1,000 CFU/ML) MENINGITIS PATHOGENS PANEL, CSF (BY PCR) [[id number]] Collected:  09/15/20 0848 Order Status:  Completed Specimen:  Cerebrospinal Fluid Updated:  09/15/20 1510 Escherichia coli K1 Not detected Haemophilus Influenzae Not detected Listeria Monocytogenes Not detected Neisseria Meningitidis Not detected Streptococcus Agalactiae Not detected Streptococcus Pneumoniae Not detected Cytomegalovirus Not detected Enterovirus Not detected Herpes Simplex Virus 1 Not detected Comment: In patients who have negative herpes simplex 1 and 2 PCR results, do not modify treatment, confirm with alternate testing. Herpes Simplex Virus 2 Not detected Comment: In patients who have negative herpes simplex 1 and 2 PCR results, do not modify treatment, confirm with alternate testing. Human Herpesvirus 6 Not detected Human Parechovirus Not detected Varicella Zoster Virus Not detected Crypto. neoformans/gattii Not detected CULTURE, BLOOD [[id number]] Collected:  09/08/20 1640 Order Status:  Completed Specimen:  Blood Updated:  09/14/20 7494 Special Requests: NO SPECIAL REQUESTS Culture result: NO GROWTH 6 DAYS     
 CULTURE, BLOOD [[id number]] Collected:  09/08/20 1640 Order Status:  Completed Specimen:  Blood Updated:  09/14/20 5437 Special Requests: NO SPECIAL REQUESTS Culture result: NO GROWTH 6 DAYS     
 CULTURE, URINE [[id number]] Collected:  09/08/20 1211 Order Status:  Completed Specimen:  Urine from Clean catch Updated:  09/09/20 1811 Special Requests: NO SPECIAL REQUESTS Matador Count --     
  >100,000 COLONIES/mL Culture result:    
  MIXED UROGENITAL AUDRA ISOLATED  
     
  
  
UA No results found for this or any previous visit. PATH Assessment/Plan Serena Goldmann Elliott is an 60 y. o. female with PMH of hypokalemia, retinal detachment, stage 2 CKD, GIST s/p resection (2014), HTN, HLD who was admitted on 9/08/20 for AMS and HHS. Altered Mental Status - metabolic encephalopathy of unclear etiology (likely multifactorial) [] Pt initially presented with HHS (BG of 460-500), which was treated and has resolved.  Now with BG < 200 consistently euglycemic. [] Initial labs were concerning for infection with leukocytosis and lactic acidosis of unclear source that resolved with IV Abx. Possible UTI was initially the leading DDx because of a concerning UA, UCx, and a CT chest/abd/pelvis (9/09). Pt was treated with Abx. [] At admission, CT Head (9/08) showed mildly enlarged ventricles that could be concerning for cerebral atrophy vs normal pressure hydrocephalus. MRI head (9/11 + 9/17) showed no acute intracranial findings and no dural venous thrombosis. [] Pt developed Hypernatremia (9/09) of 151; this was treated and has resolved.  
[] Also of note, UDS and serum EtOH were negative at admission.  Troponin has been negative and EKG with no ST elevation or depression.  Serum lipase, ammonia, B12, and folate levels have all been WNL as well.  HIV and RPR also negative. B1, GRACE, smooth muscle antibodies, and covid were all negative. Elevated inflammatory markers of ESR 32 and CRP 3.7 (9/14). - Neurology signed off - Psychiatry is on board, appreciate their recs - s/p empirical Abx:  Ceftriaxone (9/8 -9/9),  Vancomycin (9/8 - 9/14),  Zosyn (9/9 - 9/14) 
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  
- Flomax dose increased yesterday; Bladder scans did not show any urinary retention - Add scheduled Miralax BID and Bisacodyl PRN (KUB showed significant stool burden without obstruction) - As per Psych, continue Fluoxetine 20mg daily - As per neurology, avoid benzodiazepines for sedation;  continue Ziprasidone (geodon) 10mg IM PRN for severe agitation 
- Continue Quetiapine (seroquel) 25mg QAM + 50mg QHS 
- Hold metoclopramide and paroxetine (concern for serotonin syndrome) 
  
  
EPS - Tardive dyskinesia, Akathisia, and Dystonia Pt's development of EPS is likely due to use of antipsychotics, and chronic metoclopramide use could also be related. Extrapyramidal symptoms have improved significantly since admission. 
- Currently off of physical restraints ; sitter present overnight, but not this AM 
- As per neuro, avoid additional antipsychotic use when able - As per neuro, Ziprasidone (geodon) PRN for severe agitation 
  
  
Possible UTI Repeat UA (9/22) was notable for moderate LE, few bacteria, 2+ epithelial cells, and 2+ yeast. 
- bacteria could be contaminant given presence of epithelial cells - Consider need for a pelvic exam to assess for yeast infection 
- Consider Fluconazole po for a possible vulvovaginal candidiasis Hematuria (resolved) Due to urinary retention earlier in hospitalization, Pt had a garcia catheter placed.  The leading DDx for the hematuria is catheter-related trauma to the urinary tract. - Urology was consulted; they transitioned Pt from a garcia to diaper and signed off - Repeat UA (9/22) was negative for blood 
  
  
R Eye Pain + Vision Loss Pt c/o a 2-week history of R eye pain associated with vision loss.  Based on a bedside U/S, papilledema seems unlikely (as no enlarged optic sheath was seen).  DDx:  Floaters vs Retinal Detachment vs Conjuctivitis vs Acute Angle-Closure Glaucoma - Discussed with Ophthalmology (Dr. Suzanna Gutierrez) - not able to come and see Pt 
  
  
Hyperbilirubinemia (3.9) w/ 0.6 direct likely majority is unconjugated  likely secondary to [place] syndrome, previously elevated bili unlikely hemolysis (improved) CT AB/US no gall bladder disease appreciated , haptoglobin normal, aldolase pending Diet:  diabetic diet,  ST plans to advance diet as tolerated DVT Prophylaxis:  SQH 
GI Prophylaxis:  Omeprazole Code Status:  Full Point of Contact:  Spenecr Michel, [phone] (Relationship:  son) Disposition:  > 2 MN,  eventually discharge to SN[facility], MS-4 [facility], M.D. Romelia Palacios of 4395

## 2020-09-22 NOTE — PROGRESS NOTES
SLP Note: 
 
New order received. Pt currently on caseload. Will continue to follow per POC. Dontae Prakash M.S., CCC-SLP Speech-Language Pathologist

## 2020-09-22 NOTE — ROUTINE PROCESS
Bedside and Verbal shift change report given to Lilia Sebastian RN (oncoming nurse) by Wilma Horne RN (offgoing nurse). Report included the following information SBAR, Kardex, MAR and Recent Results. SITUATION: 
Code Status: Full Code Reason for Admission: Hyperglycemia [R73.9] Altered mental status [R41.82] Acute metabolic encephalopathy [T83.09] Acute renal failure (ARF) (HCC) [N17.9] Leukocytosis [D72.829] Hypokalemia [E87.6] Metabolic encephalopathy [U39.34] Hospital day: 15 Problem List:  
   
Hospital Problems  Date Reviewed: 11/16/2017 Codes Class Noted POA * (Principal) Altered mental status ICD-10-CM: R41.82 
ICD-9-CM: 780.97  9/8/2020 Unknown Hyperglycemia ICD-10-CM: R73.9 ICD-9-CM: 790.29  9/8/2020 Unknown Acute renal failure (ARF) (HCC) ICD-10-CM: N17.9 ICD-9-CM: 584.9  9/8/2020 Unknown Acute metabolic encephalopathy AES-89-FC: G93.41 
ICD-9-CM: 348.31  9/8/2020 Unknown Metabolic encephalopathy UKQ-15-EB: G93.41 
ICD-9-CM: 348.31  9/8/2020 Unknown Hypokalemia ICD-10-CM: E87.6 ICD-9-CM: 276.8  1/28/2019 Unknown Leukocytosis ICD-10-CM: S43.572 ICD-9-CM: 288.60  7/19/2017 Unknown BACKGROUND: 
 Past Medical History:  
Past Medical History:  
Diagnosis Date  Blind left eye  Cellulitis of great toe of right foot 10/19/2017  Diabetes ([facility]  Diabetic retinopathy ([facility] [facility]  Gall stones  GERD (gastroesophageal reflux disease)  Hammertoe  Hiatal hernia  History of mammogram 10/03/2017 No evidence of malignancy  Hypertension  Mass of abdomen  Neuropathy due to type 2 diabetes mellitus ([address]  Osteomyelitis of toe of right foot ([facility] [address] 10/19/2017  Pancreatitis Patient taking anticoagulants yes Patient has a defibrillator: no  
 History of shots YES for example, flu, pneumonia, tetanus Isolation History NO for example, MRSA, CDiff ASSESSMENT: 
 Changes in Assessment Throughout Shift: NONE Significant Changes in 24 hours (for example, RR/code, fall) Patient has Central Line: no  
Patient has Mari Cath: no  
Mobility Issues PT 
IV Patency OR Checklist 
Pending Tests Last Vitals: 
Vitals w/ MEWS Score (last day) Date/Time MEWS Score Pulse Resp Temp BP Level of Consciousness SpO2  
 09/22/20 0446  3  (!) 112  18  98.4 °F (36.9 °C)  122/66  Alert  100 % 09/22/20 0121  2  (!) 101  20  98.8 °F (37.1 °C)  (!) 142/88  Alert  98 %  
 09/21/20 2026  2  (!) 104  19  98.8 °F (37.1 °C)  120/68  Alert  98 %  
 09/21/20 1605  1  98  16  98.6 °F (37 °C)  103/62  Alert  99 % 09/21/20 1126  3  (!) 124  18  97.5 °F (36.4 °C)  106/66  Alert  100 % 09/21/20 0834  3  (!) 113  16  99.2 °F (37.3 °C)  118/63  Alert  99 % 09/21/20 0115  2  (!) 107  18  98.8 °F (37.1 °C)  112/68  Alert   PAIN Pain Assessment Pain Intensity 1: 3 (09/22/20 0554) Pain Location 1: Head 
  Pain Intervention(s) 1: Medication (see MAR) Patient Stated Pain Goal: 0 Intervention effective: yes Time of last intervention: 0552 Reassessment Completed: yes Other actions taken for pain: Distraction Last 3 Weights: 
Last 3 Recorded Weights in this Encounter 09/18/20 0200 09/18/20 1930 09/22/20 0001 Weight: 94.3 kg (207 lb 14.3 oz) 94.3 kg (207 lb 14.3 oz) 95.9 kg (211 lb 6.4 oz) Weight change: INTAKE/OUPUT Current Shift: No intake/output data recorded. Last three shifts: 09/20 1901 - 09/22 0700 In: 580 [P.O.:580] Out: 2375 [Urine:2375] RECOMMENDATIONS AND DISCHARGE PLANNING Patient needs and requests: Assistance with ADL's, Safety with Mobility Pending tests/procedures: labs Discharge plan for patient: SNF Discharge planning Needs or Barriers: Placement Estimated Discharge Date: 9/24/2020 Posted on Whiteboard in [facility] Room: yes \"HEALS\" SAFETY CHECK 
 A safety check occurred in the patient's room between off going nurse and oncoming nurse listed above. The safety check included the below items: 
 
H High Alert Medications Verify all high alert medication drips (heparin, PCA, etc.) E Equipment Suction is set up for ALL patients (with arsenio) Red plugs utilized for all equipment (IV pumps, etc.) WOWs wiped down at end of shift. Room stocked with oxygen, suction, and other unit-specific supplies A Alarms Bed alarm is set for fall risk patients Ensure chair alarm is in place and activated if patient is up in a chair L Lines Check IV for any infiltration Mari bag is empty if patient has a Mari Tubing and IV bags are labeled Lakeisha Michael Safety Room is clean, patient is clean, and equipment is clean. Hallways are clear from equipment besides carts. Fall bracelet on for fall risk patients Ensure room is clear and free of clutter Suction is set up for ALL patients (with arsenio) Hallways are clear from equipment besides carts. Isolation precautions followed, supplies available outside room, sign posted Hayley Thomson RN

## 2020-09-22 NOTE — PROGRESS NOTES
Problem: Diabetes Self-Management Goal: *Disease process and treatment process Description: Define diabetes and identify own type of diabetes; list 3 options for treating diabetes. Outcome: Progressing Towards Goal 
Goal: *Incorporating nutritional management into lifestyle Description: Describe effect of type, amount and timing of food on blood glucose; list 3 methods for planning meals. Outcome: Progressing Towards Goal 
Goal: *Incorporating physical activity into lifestyle Description: State effect of exercise on blood glucose levels. Outcome: Progressing Towards Goal 
Goal: *Developing strategies to promote health/change behavior Description: Define the ABC's of diabetes; identify appropriate screenings, schedule and personal plan for screenings. Outcome: Progressing Towards Goal 
Goal: *Using medications safely Description: State effect of diabetes medications on diabetes; name diabetes medication taking, action and side effects. Outcome: Progressing Towards Goal 
Goal: *Monitoring blood glucose, interpreting and using results Description: Identify recommended blood glucose targets  and personal targets. Outcome: Progressing Towards Goal 
Goal: *Prevention, detection, treatment of acute complications Description: List symptoms of hyper- and hypoglycemia; describe how to treat low blood sugar and actions for lowering  high blood glucose level. Outcome: Progressing Towards Goal 
Goal: *Prevention, detection and treatment of chronic complications Description: Define the natural course of diabetes and describe the relationship of blood glucose levels to long term complications of diabetes. Outcome: Progressing Towards Goal 
Goal: *Developing strategies to address psychosocial issues Description: Describe feelings about living with diabetes; identify support needed and support network Outcome: Progressing Towards Goal 
Goal: *Sick day guidelines Outcome: Progressing Towards Goal 
 Goal: *Patient Specific Goal (EDIT GOAL, INSERT TEXT) Outcome: Progressing Towards Goal 
  
Problem: Patient Education: Go to Patient Education Activity Goal: Patient/Family Education Outcome: Progressing Towards Goal 
  
Problem: Falls - Risk of 
Goal: *Absence of Falls Description: Document Emiliana Turkann Fall Risk and appropriate interventions in the flowsheet. Outcome: Progressing Towards Goal 
Note: Fall Risk Interventions: 
Mobility Interventions: Bed/chair exit alarm, Patient to call before getting OOB, Utilize walker, cane, or other assistive device Mentation Interventions: Adequate sleep, hydration, pain control, Bed/chair exit alarm, Door open when patient unattended, More frequent rounding, Reorient patient, Room close to nurse's station, Toileting rounds Medication Interventions: Bed/chair exit alarm, Patient to call before getting OOB, Teach patient to arise slowly Elimination Interventions: Bed/chair exit alarm, Call light in reach, Elevated toilet seat, Patient to call for help with toileting needs, Stay With Me (per policy), Toilet paper/wipes in reach, Toileting schedule/hourly rounds, Urinal in reach Problem: Patient Education: Go to Patient Education Activity Goal: Patient/Family Education Outcome: Progressing Towards Goal 
  
Problem: Pressure Injury - Risk of 
Goal: *Prevention of pressure injury Description: Document Vasile Scale and appropriate interventions in the flowsheet. Outcome: Progressing Towards Goal 
Note: Pressure Injury Interventions: 
Sensory Interventions: Assess changes in LOC, Check visual cues for pain, Float heels, Keep linens dry and wrinkle-free, Minimize linen layers Moisture Interventions: Absorbent underpads, Check for incontinence Q2 hours and as needed, Minimize layers, Offer toileting Q_hr, Moisture barrier Activity Interventions: Increase time out of bed Mobility Interventions: Float heels, HOB 30 degrees or less Nutrition Interventions: Document food/fluid/supplement intake Friction and Shear Interventions: HOB 30 degrees or less, Minimize layers Problem: Patient Education: Go to Patient Education Activity Goal: Patient/Family Education Outcome: Progressing Towards Goal 
  
Problem: Patient Education: Go to Patient Education Activity Goal: Patient/Family Education Outcome: Progressing Towards Goal 
  
Problem: Nutrition Deficit Goal: *Optimize nutritional status Outcome: Progressing Towards Goal 
  
Problem: Patient Education: Go to Patient Education Activity Goal: Patient/Family Education Outcome: Progressing Towards Goal 
  
Problem: Patient Education: Go to Patient Education Activity Goal: Patient/Family Education Outcome: Progressing Towards Goal

## 2020-09-22 NOTE — PROGRESS NOTES
Called to give report to [address],  they said to call back that pt has no bed assignment yet.

## 2020-09-22 NOTE — PROGRESS NOTES
Patient was able to get out of the roll belt by herself with the roll belt still tied to the sides of the bed. Roll belt was not re-applied. Had spoken to Dr. Tameka Lee about it and she said she will come to evaluate the patient. At 2353, patient is agitated, screaming, wanting to get out of the unit. PRN administered. At 0100, patient remains agitated and restless and would not sleep. Placed patient on wheelchair and taken by the nurse station for closed monitoring.

## 2020-09-23 NOTE — PROGRESS NOTES
Pt resisted attempts to use BSC. HNV this shift. Uncooperative with assessment of urinary status. 0300-Affect/response re: urinary status unchanged. Negative for incontinence. HNV. 0600-Continues to refuse intervention re: urinary status. States she \"does not have to pee\". Fluids taken sparingly during shift.

## 2020-09-23 NOTE — PROGRESS NOTES
[facility] Intern Progress Note Patient: Manjinder Rincon MRN: [id number] SSN: [id number]  YOB: 1965 Age: 60 y.o. Sex: female Admit Date: 9/8/2020 LOS: 15 days Chief Complaint Patient presents with  Vomiting Subjective:  
 
Patient is doing well this morning. She is not on any restraints and no garcia in place. She was still quite altered this morning and was disoriented to place and time. She has been urinating and stooling appropriately. She is eating and drinking fluids without concern.  
  
ROS: No headaches/dizziness, no n/v, mild abdominal pain, no weakness in limbs. Objective:  
 
Visit Vitals /79 Pulse 100 Temp 97.4 °F (36.3 °C) Resp 20 Ht 5' 8\" (1.727 m) Wt 95.9 kg (211 lb 6.4 oz) LMP 10/06/2015 (Exact Date) SpO2 100% Breastfeeding No  
BMI 32.14 kg/m² Physical Exam:  
General appearance: A+O x 2. Patient was cooperative during our conversation. She was not any restraints. Pt is in no distress, and appears stated age.   
HEENT: Right eye poorly reactive to light and mildly erythematic. Left eye reactive to light. Moist mucous membranes.  
Lungs: Clear to auscultation bilaterally without adventitious sounds. Heart: Regular rate and rhythm, S1, S2 normal, no murmur, click, rub or gallop Abdomen: Soft, non-tender, non-distended. Bowel sounds normal. No masses,  no organomegaly. Skin: Skin color, texture, turgor normal. No rashes or lesions Neuro:  Normal without focal findings. Patient able to follow commands and moves all four extremities.  No evidence of motor restlessness. Extremities: First digit of right foot amputated. Second digit of left foot amputated. No edema. Pedal pulses 2+ and symmetric.  Intake and Output: 
Current Shift: No intake/output data recorded. Last three shifts: 09/21 1901 - 09/23 0700 In: 960 [P.O.:960] Out: 2150 [Urine:2150] Lab/Data Review: Recent Results (from the past 12 hour(s)) GLUCOSE, POC Collection Time: 09/22/20 10:03 PM  
Result Value Ref Range Glucose (POC) 278 (H) 70 - 110 mg/dL CBC WITH MANUAL DIFF Collection Time: 09/23/20  3:48 AM  
Result Value Ref Range WBC 8.6 4.6 - 13.2 K/uL  
 RBC 3.14 (L) 4.20 - 5.30 M/uL HGB 9.1 (L) 12.0 - 16.0 g/dL HCT 27.0 (L) 35.0 - 45.0 % MCV 86.0 74.0 - 97.0 FL  
 MCH 29.0 24.0 - 34.0 PG  
 MCHC 33.7 31.0 - 37.0 g/dL  
 RDW 14.8 (H) 11.6 - 14.5 % PLATELET 561 520 - 715 K/uL MPV 11.2 9.2 - 11.8 FL  
 DIFFERENTIAL MANUAL DIFFERENTIAL ORDERED    
 NEUTROPHILS 53 42 - 75 % BAND NEUTROPHILS 1 0 - 5 % LYMPHOCYTES 35 20 - 51 % MONOCYTES 9 2 - 9 % EOSINOPHILS 2 0 - 5 % BASOPHILS 0 0 - 3 % METAMYELOCYTES 0 0 % MYELOCYTES 0 0 % PROMYELOCYTES 0 0 % BLASTS 0 0 % OTHER CELL 0 0    
 ABS. NEUTROPHILS 4.6 1.8 - 8.0 K/UL  
 ABS. LYMPHOCYTES 3.0 0.8 - 3.5 K/UL  
 ABS. MONOCYTES 0.8 0 - 1.0 K/UL  
 ABS. EOSINOPHILS 0.2 0.0 - 0.4 K/UL  
 ABS. BASOPHILS 0.0 0.0 - 0.06 K/UL  
 DF MANUAL PLATELET COMMENTS ADEQUATE PLATELETS    
 RBC COMMENTS NORMOCYTIC, NORMOCHROMIC METABOLIC PANEL, COMPREHENSIVE Collection Time: 09/23/20  3:48 AM  
Result Value Ref Range Sodium 142 136 - 145 mmol/L Potassium 3.4 (L) 3.5 - 5.5 mmol/L Chloride 110 100 - 111 mmol/L  
 CO2 25 21 - 32 mmol/L Anion gap 7 3.0 - 18 mmol/L Glucose 92 74 - 99 mg/dL BUN 7 7.0 - 18 MG/DL Creatinine 1.09 0.6 - 1.3 MG/DL  
 BUN/Creatinine ratio 6 (L) 12 - 20 GFR est AA >60 >60 ml/min/1.73m2 GFR est non-AA 52 (L) >60 ml/min/1.73m2 Calcium 9.1 8.5 - 10.1 MG/DL Bilirubin, total 1.6 (H) 0.2 - 1.0 MG/DL  
 ALT (SGPT) 37 13 - 56 U/L  
 AST (SGOT) 36 10 - 38 U/L Alk. phosphatase 99 45 - 117 U/L Protein, total 7.5 6.4 - 8.2 g/dL Albumin 3.4 3.4 - 5.0 g/dL Globulin 4.1 (H) 2.0 - 4.0 g/dL A-G Ratio 0.8 0.8 - 1.7 PHOSPHORUS  Collection Time: 09/23/20  3:48 AM  
 Result Value Ref Range Phosphorus 2.1 (L) 2.5 - 4.9 MG/DL MAGNESIUM Collection Time: 09/23/20  3:48 AM  
Result Value Ref Range Magnesium 1.9 1.6 - 2.6 mg/dL GLUCOSE, POC Collection Time: 09/23/20  7:53 AM  
Result Value Ref Range Glucose (POC) 188 (H) 70 - 110 mg/dL Assessment and Plan:  
 
60 y. o. 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   
 1. Metabolic Encephalopathy of unclear etiology [likely multifactorial, still not at baseline] - On initial presentation, patient had HHS with -500 treated with IV fluids, as well as leukocytosis and lactic acidosis that improved with IV antibiotics.  On admission, labs showed normal lipase, ammonia, B12 and folate. Blood culture results from 9/8 showed no growth. UA showed 6-8 wbc, 2+ bacteria, but negative for leuk est and nitrites. UCx showed >100, 000 cfu of mixed urogenital kyle, likely a contaminant. On 9/9, CT Chest/Abd/Pelvis showed bladder with mild wall thickening. Repeat cultures remain negative. Pt was initially hypernatremic but this has resolved. UDS and serum ETOH (9/8) was negative. On  9/8, CT head showed mild enlargement of lateral ventricles and cerebral atrophy . - s/p empirical  abx Ceftriaxone  (9/8-/9). S/p empiric abx  vancomycin (9/8-9/14)  and zoysn (9/9-9/14 ) 
- ESR 32 and CRP 3.7, elevated on 9/14. COVID 19 negative 
- CSF wbc 1 , protein 55, glucose 70, elevated opening 34 mmhg, likely as patient was in prone position and was sedated. Unlikely pseudotumor cerebri per neurology. 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 
- HIV negative, RPR negative. Blood culture and urine culture negative  
- Autoimmune workup: GRACE negative, TSH wnl, anti-sm negative - MRI head 9/11 - no acute intracranial finding. EEG showed encephalopathy  
- 9/15 CT AB/PELVIS/CHEST IV : Marked bladder distention up to the level above the umbilicus.  No CT evidence of infection or inflammatory process. No abscess seen. Tiny hypodense nodule in right thyroid lobe - can be better evaluated by thyroid ultrasound. Mild and circumferential esophageal wall prominence with collapsed the lumen [if symptomatic, barium swallow can be performed for better evaluation]. Fluid-filled nondilated distal colon and rectum as nonspecific finding and could represent diarrhea. Multiple partially calcified fibroids. 
-9/17 MRI Brain w w/o  contrast and MRV brain w wo contrast:  no cerebral sagittal sinus thrombosis, ruled out  CNS infection, intracerebral hemorrhage, or a sagittal sinus thrombosis - 9/18 repeat EKG: no ST changes from prior EKG  
- Fall precautions - Aspiration precautions + consult speech/language pathology Plan:  
 - Psych: Discontinued Metoclopramide and paroxetine. Started fluoxetine for anxiety and to mitigate potential withdrawal symptoms 
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 
 - Scheduled Seroquel 50 mg qhs, and 25 mg in AM.  Increased night Seroquel by 12.5 mg as she tends to have her episodes of agitation in the evening. Continue soft restraints today and attempt to wean off again this evening.  Currently on Geodon for severe agitation as needed q12h  
- follow up on B1 results. Heavy metal results negative for lead, arsenic and mercury - Bladder scan showed >1000 ml of urine, but garcia deferred as patient urinated appropriately >1000 ml yesterday 
- KUB showed non-obstruction bowel gas consistent with stool burden. Start bowel regimen with Miralax BID and Bisacodyl 5 mg prn. Continue to monitor stool output. - UA showed few bacteria, 2+ yeast and 15-20 WBC.  Will hold off on treating for UTI with antibiotics as pt did not have significant pyuria and is afebrile, with no obvious suprapubic tenderness. Will order a urine culture to confirm. Start one dose of fluconazole today to treat for possible yeast infection.  
- Dispo: [facility]   
  
2. Tardive akathisia 
- initially also had dystonia, EPS symptoms  
- likely related with chronic metoclopramide use; held metoclopramide Plan: No motor restlessness today. Elevated CK on 9/22 (654).   This is likely because patient is still on restraints. Will continue to closely monitor.  
  
4. Normocytic anemia (HgB 8.5, hct 25.5) 
 - B12 and folate normal 
- Rct 3.6 %, 1.47 calculated  rct index, hypoprofliferative not increased  
  
5. Urinary retention s/p garcia  W/ trauma Rosiland Pranav 
- pt voiding on own - Urology saw patient said to DC garcia, and place in diaper Plan: Bladder scan showed >1000 ml urine, but patient urinated appropriately. Deferred garcia. Bladder scans q6h. 
  
6. CRYSTAL CR 3.56 ( baseline Cr 1.09 1/2019) in setting of CKD stage 2  likely pre-renal and rhabdo (resolved)  
- Myoglobin initially 1092. Iron profile normal 
- Improving CK 1,057--> 996 --> 479 (9/20) 
- .8 elevated and vitamin D on low side of normal - corrected Ca normal , Vitamin D nml , Phosphorous wnl, microalb/cr ratio wnl Plan:   
- Cont Vitamin D supplement 
- Nephrology following appreciate recs- calcitril 0.25mcg on discharge 
  
 7. R eye pain + Right eye vision loss X 2 weeks - Unlikely papilledema based on bedside ultrasound ( no enlarged optic sheath seen), likely floater vs retinal detachment vs pink eye vs acute angle gluacoma Plan: Spoke to optho on 9/10, Dr. Michael Dillon - not able to come 
  
8.  hypophosphatemia on 9/23 (2.1);  Hypokalemia (resolved) 
- monitor electrolytes 
- replete phosphorus today (9/23) 
  
9. Hyperbilirubinemia (3.9) w/ 0.6 direct likely majority is unconjugated  likely secondary to [employer] syndrome, previously elevated bili unlikely hemolysis (improved)  
- CT AB/US - no gall bladder disease appreciated  
- , haptoglobin normal, aldolase pending  
  
 10. Diabetes ( last A1c 7.2) (-245)  neuropathy and L retinopathy - not well controlled 
- held NPH 40U BID (hold home NPH 50 U BID)  
- continue lantus 5 U daily  
  
11. HTN (BP today 133/79 mmHg) 
- not on any home medications  
  
 12. HLD 
- cont rosuvastatin  
  
13. Gastroparesis  
- held metoclopramide 5 mg  
  
14. GERD 
- cont  omeprazole 40 mg delayed capsule 
  
15.  Anxiety 
- held  paroxetine 60 mg daily 
  
Diet Advance as tolerated per speech DVT Prophylaxis heparin GI Prophylaxis Omperazole Code status Full code Disposition unknown  
  
Point of Contact Pri[place] Relationship: 
[phone]  
  
 
Pedro Yin MD, PGY-1  
[address] Intern Pager: [phone] September 23, 2020, 9:21 AM

## 2020-09-23 NOTE — ROUTINE PROCESS
Bedside and Verbal shift change report given to Simone Quinones (oncoming nurse) by Erlinda Broussard RN (offgoing nurse). Report included the following information Kardex, ED Summary, Procedure Summary, Intake/Output, MAR and Recent Results.

## 2020-09-23 NOTE — PROGRESS NOTES
Problem: Diabetes Self-Management Goal: *Disease process and treatment process Description: Define diabetes and identify own type of diabetes; list 3 options for treating diabetes. Outcome: Progressing Towards Goal 
Goal: *Incorporating nutritional management into lifestyle Description: Describe effect of type, amount and timing of food on blood glucose; list 3 methods for planning meals. Outcome: Progressing Towards Goal 
Goal: *Incorporating physical activity into lifestyle Description: State effect of exercise on blood glucose levels. Outcome: Progressing Towards Goal 
Goal: *Developing strategies to promote health/change behavior Description: Define the ABC's of diabetes; identify appropriate screenings, schedule and personal plan for screenings. Outcome: Progressing Towards Goal 
Goal: *Using medications safely Description: State effect of diabetes medications on diabetes; name diabetes medication taking, action and side effects. Outcome: Progressing Towards Goal 
Goal: *Monitoring blood glucose, interpreting and using results Description: Identify recommended blood glucose targets  and personal targets. Outcome: Progressing Towards Goal 
Goal: *Prevention, detection, treatment of acute complications Description: List symptoms of hyper- and hypoglycemia; describe how to treat low blood sugar and actions for lowering  high blood glucose level. Outcome: Progressing Towards Goal 
Goal: *Prevention, detection and treatment of chronic complications Description: Define the natural course of diabetes and describe the relationship of blood glucose levels to long term complications of diabetes. Outcome: Progressing Towards Goal 
Goal: *Developing strategies to address psychosocial issues Description: Describe feelings about living with diabetes; identify support needed and support network Outcome: Progressing Towards Goal 
Goal: *Sick day guidelines Outcome: Progressing Towards Goal 
 Goal: *Patient Specific Goal (EDIT GOAL, INSERT TEXT) Outcome: Progressing Towards Goal 
  
Problem: Patient Education: Go to Patient Education Activity Goal: Patient/Family Education Outcome: Progressing Towards Goal 
  
Problem: Non-Violent Restraints Goal: *Removal from restraints as soon as assessed to be safe Outcome: Progressing Towards Goal 
Goal: *No harm/injury to patient while restraints in use Outcome: Progressing Towards Goal 
Goal: *Patient's dignity will be maintained Outcome: Progressing Towards Goal 
Goal: *Patient Specific Goal (EDIT GOAL, INSERT TEXT) Outcome: Progressing Towards Goal 
Goal: Non-violent Restaints:Standard Interventions Outcome: Progressing Towards Goal 
Goal: Non-violent Restraints:Patient Interventions Outcome: Progressing Towards Goal 
Goal: Patient/Family Education Outcome: Progressing Towards Goal 
  
Problem: Falls - Risk of 
Goal: *Absence of Falls Description: Document Orlin Denise Fall Risk and appropriate interventions in the flowsheet. Outcome: Progressing Towards Goal 
Note: Fall Risk Interventions: 
Mobility Interventions: Bed/chair exit alarm Mentation Interventions: Bed/chair exit alarm Medication Interventions: Bed/chair exit alarm Elimination Interventions: Bed/chair exit alarm Problem: Patient Education: Go to Patient Education Activity Goal: Patient/Family Education Outcome: Progressing Towards Goal 
  
Problem: Pressure Injury - Risk of 
Goal: *Prevention of pressure injury Description: Document Vasile Scale and appropriate interventions in the flowsheet. Outcome: Progressing Towards Goal 
Note: Pressure Injury Interventions: 
Sensory Interventions: Assess changes in LOC Moisture Interventions: Absorbent underpads Activity Interventions: Pressure redistribution bed/mattress(bed type), PT/OT evaluation Mobility Interventions: HOB 30 degrees or less, Pressure redistribution bed/mattress (bed type), PT/OT evaluation Nutrition Interventions: Document food/fluid/supplement intake, Offer support with meals,snacks and hydration Friction and Shear Interventions: HOB 30 degrees or less, Minimize layers Problem: Patient Education: Go to Patient Education Activity Goal: Patient/Family Education Outcome: Progressing Towards Goal 
  
Problem: Nutrition Deficit Goal: *Optimize nutritional status Outcome: Progressing Towards Goal 
  
Problem: Patient Education: Go to Patient Education Activity Goal: Patient/Family Education Outcome: Progressing Towards Goal 
  
Problem: Patient Education: Go to Patient Education Activity Goal: Patient/Family Education Outcome: Progressing Towards Goal

## 2020-09-23 NOTE — PROGRESS NOTES
Discharge planning update: 
 
Patient continues to have a sitter. This writer phoned patient's son to discuss discharge planning and only got his voicemail. This writer left a  for a return call. PT is recommending home with home health and 24 hour supervision or SNF/LTC. This writer will continue to monitor for discharge planning to ensure a safe discharge from [facility]. Cristopher Santana. MSW Care Manager Pager#: [phone]

## 2020-09-23 NOTE — PROGRESS NOTES
Problem: Self Care Deficits Care Plan (Adult) Goal: *Acute Goals and Plan of Care (Insert Text) Description: Occupational Therapy Goals Initiated 9/14/2020 within 7 day(s). All goals met. 1.  Patient will perform bed mobility in preparation for self-care with minimal assistance/contact guard assist.  
2.  Patient will perform grooming with supervision/set-up using compensatory techniques. 3.  Patient will perform upper body dressing with supervision/set-up. 4.  Patient will perform toilet transfers with minimal assistance/contact guard assist. 
5.  Patient will perform all aspects of toileting with minimal assistance/contact guard assist. 
6.  Patient will participate in upper extremity therapeutic exercise/activities with independence for 8-10 minutes. 7.  Patient will utilize energy conservation techniques during functional activities with verbal cues. 8.  Patient will perform lower body dressing in preparation for self-care with minimal assistance/contact guard assistance Prior Level of Function: Patient was independent with self-care and functional mobility PTA. Outcome: Resolved/Met OCCUPATIONAL THERAPY RE-EVALUATION/DISCHARGE Patient: Heidi Breen (11 y.o. female) Date: 9/22/2020 Primary Diagnosis: Hyperglycemia [R73.9] Altered mental status [R41.82] Acute metabolic encephalopathy [X49.92] Acute renal failure (ARF) (HCC) [N17.9] Leukocytosis [D72.829] Hypokalemia [E87.6] Metabolic encephalopathy [G08.01] Precautions:   Fall ASSESSMENT AND RECOMMENDATIONS: 
Based on the objective data described below, the patient is able to perform self care tasks with setup while seated. She requires CGA for functional standing and transfers secondary to low vision. Patient mildly confused but is oriented to self and place. She was cooperative and agreeable to therapy session. BUE strength/sensation intact.   Verbal and tactile cues given in standing and with ambulation for safety. Patient is fearful of falling but performed all tasks asked of her. She was seen with PT to maximize her functional ability and safety. Will defer to PT for further mobility training. Patient requires supervision at all times secondary to low vision and mild confusion which limit her safety. She has a supportive son at home but he is not there all the time. Recommend 24/7 supervision at home or SNF/LTC for safety. Skilled occupational therapy is not indicated at this time. Discharge Recommendations: [facility] Further Equipment Recommendations for Discharge: N/A   
 
SUBJECTIVE:  
Patient stated I have to do up 14 stairs to get in my apartment.  OBJECTIVE DATA SUMMARY:  
 
Past Medical History:  
Diagnosis Date Blind left eye Cellulitis of great toe of right foot 10/19/2017 Diabetes ([address] Diabetic retinopathy ([address] Gall stones GERD (gastroesophageal reflux disease) [place] Hiatal hernia History of mammogram 10/03/2017 No evidence of malignancy Hypertension Mass of abdomen Neuropathy due to type 2 diabetes mellitus ([address] Osteomyelitis of toe of right foot ([address] 10/19/2017 Pancreatitis Past Surgical History:  
Procedure Laterality Date HX AMPUTATION Left 07/21/2017  
 left 2nd toe HX CHOLECYSTECTOMY  10/15/2014 HX GI Benign GI Stromal Tumor excision HX HEENT Sx for detached retina HX MYOMECTOMY x5 removed HX OTHER SURGICAL    
 upper endoscopy Barriers to Learning/Limitations: yes;  altered mental status (i.e.Sedation, Confusion) Compensate with: visual, verbal, tactile, kinesthetic cues/model Home Situation:  
Home Situation Home Environment: Apartment # Steps to Enter: 13 One/Two Story Residence: One story Living Alone:  Yes 
 Support Systems: Other (comments)(Unsure, pt unreliable historian) Patient Expects to be Discharged to[de-identified] Private residence Current DME Used/Available at Home: None Tub or Shower Type: (pt did not say) [x]     Right hand dominant   []     Left hand dominant Cognitive/Behavioral Status: 
Neurologic State: Alert Orientation Level: Oriented to person;Oriented to place Cognition: Follows commands Safety/Judgement: Fall prevention Skin: Intact on UEs Edema: None noted in UEs Vision/Perceptual:    
Acuity: Impaired near vision; Impaired far vision(Pt is legally blind.) Coordination: BUE Coordination: Generally decreased, functional 
Fine Motor Skills-Upper: Left Intact; Right Intact Gross Motor Skills-Upper: Left Intact; Right Intact Balance: 
Sitting: Impaired; With support Sitting - Static: Good (unsupported) Sitting - Dynamic: Fair (occasional) Strength: BUE Strength: Within functional limits Tone & Sensation: BUE Tone: Normal 
Sensation: Intact Range of Motion: BUE 
AROM: Within functional limits Functional Mobility and Transfers for ADLs: 
Bed Mobility: 
Supine to Sit: Stand-by assistance Sit to Supine: Minimum assistance Scooting: Supervision Transfers: 
Sit to Stand: Contact guard assistance;Stand-by assistance Stand to Sit: Contact guard assistance;Stand-by assistance Toilet Transfer : Contact guard assistance(secondary to low vision) ADL Assessment: 
Feeding: Setup Oral Facial Hygiene/Grooming: Setup;Supervision Bathing: Contact guard assistance(for standing balance) Upper Body Dressing: Setup Lower Body Dressing: Contact guard assistance(for standing balance) Toileting: Supervision ADL Intervention: 
Patient practiced LB dressing while seated on EOB. Setup on clothing needed. Simulated pulling up pants in standing and standby to CGA needed for balance.   She was able to maneuver to the bathroom with CGA and tactile/verbal cues secondary to low vision in prep for self care tasks. Cognitive Retraining Safety/Judgement: Fall prevention Pain: 
Pain level pre-treatment: 0/10 Pain level post-treatment: 0/10 Pain Intervention(s): NA 
Response to intervention: NA Activity Tolerance:  
Good Please refer to the flowsheet for vital signs taken during this treatment. After treatment:  
[]  Patient left in no apparent distress sitting up in chair 
[x]  Patient left in no apparent distress in bed 
[x]  Call bell left within reach [x]  Nursing notified 
[]  Caregiver present 
[]  Bed alarm activated COMMUNICATION/EDUCATION:  
[x]      Role of Occupational Therapy in the acute care setting 
[x]      Home safety education was provided and the patient/caregiver indicated understanding. [x]      Patient/family have participated as able and agree with findings and recommendations. []      Patient is unable to participate in plan of care at this time. Thank you for this referral. 
Esther Amezcua MS OTR/L Time Calculation: 24 mins

## 2020-09-23 NOTE — PROGRESS NOTES
Attempted to see patient for PT treatment session. On arrival patient was laying in bed with sitter present. Pt became increasingly agitated with attempts to get her to participate in PT session repeatedly stating, \"just pray, just pray. \"  After multiple attempts treatment was held to prevent further agitation. Will continue to follow.   Aylin Barros, PT

## 2020-09-24 NOTE — PROGRESS NOTES
*ATTENTION:  This note has been created by a medical student for educational purposes only. Please do not refer to the content of this note for clinical decision-making, billing, or other purposes. Please see attending physicians note to obtain clinical information on this patient. * Medical Student Progress Note Han Kimbrough **Medical Student Note for Educational Purposes Only** Patient: Sandra Laurent MRN: [id number]  CSN: [id number] YOB: 1965  Age: 60 y.o. Sex: female DOA: 9/8/2020 LOS:  LOS: 16 days Subjective:  
 
Acute events: NAEO Pt states that she doing better today. Pt seems very sleepy this AM.  No new complaints today. Still confused. Review of Systems Constitutional: Negative for chills and fever. HENT: Negative. Respiratory: Negative for cough and shortness of breath. Cardiovascular: Negative. Negative for chest pain and palpitations. Gastrointestinal: Negative. Neurological: Negative for headaches. Objective:  
  
Patient Vitals for the past 24 hrs: 
 Temp Pulse Resp BP SpO2  
09/24/20 0417 97.5 °F (36.4 °C) 99 18 127/69 99 % 09/24/20 0000 98.2 °F (36.8 °C) 100 20 126/81 100 % 09/23/20 2011 100.1 °F (37.8 °C) (!) 115 20 118/67 100 % 09/23/20 1554 99.3 °F (37.4 °C) 100 20 126/82 100 % 09/23/20 1232 98.5 °F (36.9 °C) 99 18 113/70 100 % 09/23/20 0833 97.4 °F (36.3 °C) 100 20 133/79 100 % Intake/Output Summary (Last 24 hours) at 9/24/2020 9527 Last data filed at 9/23/2020 1410 Gross per 24 hour Intake  Output 650 ml Net -650 ml Physical Exam:  
General:  Comfortably sleeping in bed without restraints in NAD. Pt was responsive and cooperative with exam.  Oriented to person, place, but not to time. HEENT:  Erythematous conjunctiva of R eye. R pupil is non-reactive to light, and L pupil is sluggishly reactive.   Unable to assess extraocular movements due to vision loss and acute mental state. No lymphadenopathy. CV:  Normal heart rate with regular rhythm. No murmurs, rubs, or gallops. RESP:  Unlabored breathing. Lungs clear to auscultation. No wheezes, rales, or rhonchi. Equal expansion bilaterally. ABD:  Soft, nontender, nondistended. No hepatosplenomegaly. No suprapubic tenderness. Normoactive bowel sounds. MS:  No joint deformity or instability. No atrophy. Neuro:  5/5 strength bilateral upper extremities and lower extremities. Ext:  Nonpitting edema of UE bilaterally. Skin:  No rashes, lesions, or ulcers. Lab/Data Reviewed: 
CMP:  
Lab Results Component Value Date/Time  09/24/2020 07:52 AM  
 K 4.2 09/24/2020 07:52 AM  
  09/24/2020 07:52 AM  
 CO2 24 09/24/2020 07:52 AM  
 AGAP 8 09/24/2020 07:52 AM  
  (H) 09/24/2020 07:52 AM  
 BUN 6 (L) 09/24/2020 07:52 AM  
 CREA 1.11 09/24/2020 07:52 AM  
 GFRAA >60 09/24/2020 07:52 AM  
 GFRNA 51 (L) 09/24/2020 07:52 AM  
 CA 9.2 09/24/2020 07:52 AM  
 MG 1.9 09/24/2020 07:52 AM  
 PHOS 3.9 09/24/2020 07:52 AM  
 ALB 3.4 09/24/2020 07:52 AM  
 TP 7.4 09/24/2020 07:52 AM  
 GLOB 4.0 09/24/2020 07:52 AM  
 AGRAT 0.9 09/24/2020 07:52 AM  
 ALT 31 09/24/2020 07:52 AM  
 
CBC:  
Lab Results Component Value Date/Time WBC 8.2 09/24/2020 07:52 AM  
 HGB 8.6 (L) 09/24/2020 07:52 AM  
 HCT 26.2 (L) 09/24/2020 07:52 AM  
  09/24/2020 07:52 AM  
 
Recent Glucose Results:  
Lab Results Component Value Date/Time  (H) 09/24/2020 07:52 AM  
 
Liver Panel:  
Lab Results Component Value Date/Time ALB 3.4 09/24/2020 07:52 AM  
 TP 7.4 09/24/2020 07:52 AM  
 GLOB 4.0 09/24/2020 07:52 AM  
 AGRAT 0.9 09/24/2020 07:52 AM  
 ALT 31 09/24/2020 07:52 AM  
 AP 95 09/24/2020 07:52 AM  
 
  
 
Scheduled Medications Reviewed: 
Current Facility-Administered Medications Medication Dose Route Frequency  phosphorus (K PHOS NEUTRAL) 250 mg tablet 1 Tab  1 Tab Oral BID  thiamine HCL (B-1) tablet 100 mg  100 mg Oral DAILY  multivitamin, tx-iron-ca-min (THERA-M w/ IRON) tablet 1 Tab  1 Tab Oral DAILY  QUEtiapine (SEROquel) tablet 63 mg  63 mg Oral QHS  polyethylene glycol (MIRALAX) packet 17 g  17 g Oral BID  tamsulosin (FLOMAX) capsule 0.8 mg  0.8 mg Oral DAILY  QUEtiapine (SEROquel) tablet 25 mg  25 mg Oral DAILY  FLUoxetine (PROzac) capsule 20 mg  20 mg Oral DAILY  lactated Ringers infusion  100 mL/hr IntraVENous CONTINUOUS  
 heparin (porcine) injection 5,000 Units  5,000 Units SubCUTAneous Q8H  
 [Held by provider] insulin glargine (LANTUS) injection 5 Units  5 Units SubCUTAneous DAILY  cholecalciferol (VITAMIN D3) (1000 Units /25 mcg) tablet 1 Tab  1,000 Units Oral DAILY  insulin lispro (HUMALOG) injection   SubCUTAneous AC&HS  
 melatonin tablet 3 mg  3 mg Oral QHS  [Held by provider] insulin NPH (NOVOLIN N, HUMULIN N) injection 40 Units  40 Units SubCUTAneous ACB&D  
 sodium chloride (NS) flush 5-40 mL  5-40 mL IntraVENous Q8H  
 pantoprazole (PROTONIX) tablet 40 mg  40 mg Oral DAILY  [Held by provider] rosuvastatin (CRESTOR) tablet 20 mg  20 mg Oral QHS Imaging, microbiology, and EKG/Telemetry: 
Imaging: MODALITY IMPRESSION  
XR Results from Hospital Encounter encounter on 09/08/20 XR ABD (KUB) Narrative EXAM: ABDOMEN 1 VIEW CLINICAL HISTORY/INDICATION: Altered mental status, evaluate for obstruction COMPARISON: 9/9/2020 TECHNIQUE: Portable AP view was obtained. FINDINGS:  
 
LINES/DEVICES: None. BOWEL GAS PATTERN: Non-obstructive bowel gas pattern. PATHOLOGIC CALCIFICATIONS: None. SOFT TISSUES: Unremarkable. BONES: Unremarkable for age. OTHER: The lung bases are clear. Meño Waddell Impression IMPRESSION: 
1.  Non-obstructive bowel gas pattern. Thank you for enabling us to participate in the care of this patient. CT Results from Hospital Encounter encounter on 09/08/20 CT CHEST ABD PELV W CONT Narrative CT chest, abdomen and pelvis with contrast  
 
CLINICAL HISTORY: Leukocytosis. CT evaluation of potential infection versus 
abscess COMPARISON: None. TECHNIQUE: Helical scan to the chest, abdomen and pelvis are obtained from the 
thoracic inlet to the symphysis pubis after IV contrast administration. All CT scans at this facility performed using dose optimization techniques as 
appreciated to a performed exam, to include automated exposure control, 
adjustment of the mA and or KU according to patient size (including appropriate 
matching for site specific examination), or use of iterative reconstruction 
technique. FINDINGS: Moderate motion artifact noted and limits the detail evaluation. CT CHEST:  
 
Lung Parenchyma: Minimal bibasilar atelectasis. No acute airspace disease or 
consolidation. Thyroid: Small hypodense nodule in the right lobe measuring about 5 mm. Mediastinum: No adenopathy. Mild and circumferential esophageal wall prominence 
with collapsed the lumen. Heart: The heart and the pericardium appear normal. 
 
Vasculature: The aorta and the great vessels are unremarkable. Pleural Space And Chest Wall: No significant pleural pathology. CT ABDOMEN AND PELVIS: 
 
Liver: Normal. 
 
Gallbladder: Surgically absent. Biliary System: No ductal dilatation. Spleen: Normal. 
 
Pancreas: Normal. 
 
Adrenal Glands: Normal. 
 
Kidneys: Normal. 
 
Bowel: The small and large bowel are nondilated. Normal appendix. Fluid-filled 
sigmoid colon and rectum with air-fluid level without dilatation. Lower genitourinary system: The bladder is markedly distended up to the level 
above the umbilicus, 65.9 cm in cc dimension. No definite bladder wall lesion 
seen. The uterus appears spherical and heterogeneous with several partially 
calcified fibroids present. No adnexal mass. Peritoneum/Retroperitoneum: No adenopathy. Vasculature: Unremarkable for age. Other: No free fluid. CT OSSEOUS STRUCTURES:   
 
Unremarkable for age. Impression IMPRESSION:  
 
1. Marked bladder distention up to the level above the umbilicus. Recommend 
clinical correlation for urinary outlet obstruction. 2. No CT evidence of infection or inflammatory process. No abscess seen. 3. Tiny hypodense nodule in right thyroid lobe. This can be better evaluated by 
thyroid ultrasound. 4. Mild and circumferential esophageal wall prominence with collapsed the lumen. If symptomatic, barium swallow can be performed for better evaluation. 5. Fluid-filled nondilated distal colon and rectum as nonspecific finding and 
could represent diarrhea. 6. Multiple partially calcified fibroids. Thank you for this referral.   
  
MRI Results from Hospital Encounter encounter on 09/08/20 MRI BRAIN W WO CONT Narrative MRI BRAIN WITH AND WITHOUT CONTRAST Provided Reason for Exam: Confusion, visual loss right eye for 2 weeks, LP with 
elevated opening pressure Additional History: Patient admitted with confusion Comparison Studies: Brain MRI 9/11/2020, head CT 9/9/2020 Imaging Technique:  
  Sequences:  Sagittal and axial T1 weighted, Diffusion Weighted (DWI), Axial T2 
weighted, Axial T2 FLAIR, and GRE MRI images of the brain. Post contrast axial 
and coronal T1 images. Contrast Material:  20 mL Dotarem IV Limiting Factors/Major Artifacts: None. FINDINGS: 
 
Brain Parenchyma: Diffusion sequence negative. No cytotoxic edema or acute 
infarct. Cerebral white matter is normal.  No chronic cortical infarcts or 
chronic lacunar infarcts. No visible masses or midline shift. No abnormal 
parenchymal or meningeal enhancement. CSF Spaces: There are some mild prominence of the posterior horns of the 
lateral ventricles, but the major the ventricles are normal size.  No findings of 
 ventricular trapping. Appears to be developmental, similar findings on the head CT from 2013. Vascular System:  Grossly patent flow in basilar and internal cerebral arteries. No findings of dural sinus thrombosis. Hemorrhage:  No acute hemorrhage. No chronic microhemorrhage. Other Structures: 
Calvarium: No suspicious marrow signal. 
Sella: Normal. 
Visualized Upper Cervical Spine: Normal craniocervical junction, no Chiari 
malformation. Orbits: Postsurgical changes in the left ocular globe, appears to be silicone 
injection, unchanged from 2014 head CT. Paranasal Sinuses: No significant paranasal sinus disease. Mastoid Air Cells:  Clear. Impression IMPRESSION: 
 
No acute intracranial abnormality. No mass, hemorrhage, or acute infarct. Postsurgical changes left optic globe consistent with previous retinal 
detachment procedure. ULTRASOUND Results from Hospital Encounter encounter on 09/08/20 US ABD LTD Impression IMPRESSION:    
 
1. Status post cholecystectomy. 2.  No significant common bile duct dilation. 3.  Increased hepatic parenchymal echogenicity with somewhat heterogeneous 
appearance, potentially suggestive of hepatosteatosis. Partial visualization of 
the liver. Results from [facility] Encounter encounter on 01/22/19 US ABD LTD Impression IMPRESSION: 
 
Mild heterogeneous echogenicity of the liver is nonspecific. This is commonly 
seen with steatosis hepatic disease. Limited visualization of the pancreas. Cardiology Procedures/Testing: MODALITY RESULTS  
EKG Results for orders placed or performed during the hospital encounter of 09/08/20 EKG, 12 LEAD, INITIAL Result Value Ref Range Ventricular Rate 115 BPM  
 Atrial Rate 115 BPM  
 P-R Interval 148 ms QRS Duration 58 ms Q-T Interval 342 ms QTC Calculation (Bezet) 473 ms Calculated P Axis 34 degrees Calculated R Axis 1 degrees Calculated T Axis 34 degrees Diagnosis Sinus tachycardia Cannot exclude anterior MI versus lead placement issue. Abnormal ECG When compared with ECG of 08-SEP-2020 15:05, Anterior infarct is now present Nonspecific T wave abnormality no longer evident in Inferior leads Nonspecific T wave abnormality, improved in Anterolateral leads Confirmed by Janey Villarreal MD, Colleen Sicard ([id number]) on 9/17/2020 3:04:13 PM 
  
Results for orders placed or performed in visit on 06/20/14 AMB POC EKG ROUTINE W/ 12 LEADS, INTER & REP Impression See progress note. ECHO 01/03/19 ECHO ADULT COMPLETE 01/04/2019 1/4/2019 Narrative · Estimated left ventricular ejection fraction is 61 - 65%. Left  
ventricular mild concentric hypertrophy. Mild (grade 1) left ventricular  
diastolic dysfunction. · Mild tricuspid valve regurgitation is present. Signed by: Caroline Greenfield MD  
  
 
Special Testing/Procedures: MODALITY RESULTS MICRO All Micro Results Procedure Component Value Units Date/Time CULTURE, URINE [[id number]]  (Abnormal) Collected:  09/22/20 1355 Order Status:  Completed Specimen:  Urine from Clean catch Updated:  09/23/20 1445 Special Requests: NO SPECIAL REQUESTS [employer] Count --     
  [id number] COLONIES/mL Culture result: PROBABLE STREPTOCOCCUS SPECIES  
     
 CULTURE, CSF Senia Gonzales [[id number]] Collected:  09/15/20 0848 Order Status:  Completed Specimen:  Cerebrospinal Fluid Updated:  09/22/20 1045 Special Requests: CEREBROSPINAL FLUID     
  GRAM STAIN NO WBC'S SEEN     
   NO ORGANISMS SEEN Smear Reviewed by Microbiology 9/15/20 AT 1408 TA. Culture result:    
  NO GROWTH ON SOLID MEDIA AT 4 DAYS  
     
      
  NO GROWTH IN THIO BROTH AT 7 DAYS  
     
 CULTURE, BLOOD [[id number]] Collected:  09/15/20 1002 Order Status:  Completed Specimen:  Blood Updated:  09/21/20 0735 Special Requests: NO SPECIAL REQUESTS Culture result: NO GROWTH 6 DAYS CULTURE, BLOOD [[id number]] Collected:  09/15/20 1005 Order Status:  Completed Specimen:  Blood Updated:  09/21/20 0735 Special Requests: NO SPECIAL REQUESTS Culture result: NO GROWTH 6 DAYS     
 CULTURE, URINE [[id number]] Collected:  09/15/20 1850 Order Status:  Completed Specimen:  Urine from Clean catch Updated:  09/17/20 1046 Special Requests: NO SPECIAL REQUESTS Culture result: No growth (<1,000 CFU/ML) MENINGITIS PATHOGENS PANEL, CSF (BY PCR) [[id number]] Collected:  09/15/20 0848 Order Status:  Completed Specimen:  Cerebrospinal Fluid Updated:  09/15/20 1510 Escherichia coli K1 Not detected Haemophilus Influenzae Not detected Listeria Monocytogenes Not detected Neisseria Meningitidis Not detected Streptococcus Agalactiae Not detected Streptococcus Pneumoniae Not detected Cytomegalovirus Not detected Enterovirus Not detected Herpes Simplex Virus 1 Not detected Comment: In patients who have negative herpes simplex 1 and 2 PCR results, do not modify treatment, confirm with alternate testing. Herpes Simplex Virus 2 Not detected Comment: In patients who have negative herpes simplex 1 and 2 PCR results, do not modify treatment, confirm with alternate testing. Human Herpesvirus 6 Not detected Human Parechovirus Not detected Varicella Zoster Virus Not detected Crypto. neoformans/gattii Not detected CULTURE, BLOOD [[id number]] Collected:  09/08/20 1640 Order Status:  Completed Specimen:  Blood Updated:  09/14/20 2662 Special Requests: NO SPECIAL REQUESTS Culture result: NO GROWTH 6 DAYS     
 CULTURE, BLOOD [[id number]] Collected:  09/08/20 1640 Order Status:  Completed Specimen:  Blood Updated:  09/14/20 6477 Special Requests: NO SPECIAL REQUESTS Culture result: NO GROWTH 6 DAYS     
 CULTURE, URINE [[id number]] Collected:  09/08/20 1211 Order Status:  Completed Specimen:  Urine from Clean catch Updated:  09/09/20 1811 Special Requests: NO SPECIAL REQUESTS [place] Count --     
  >100,000 COLONIES/mL Culture result:    
  MIXED UROGENITAL AUDRA ISOLATED  
     
  
  
UA No results found for this or any previous visit. PATH Assessment/Plan Rolo Ibarra is an 60 y.o. female with PMH of hypokalemia, retinal detachment, stage 2 CKD, GIST s/p resection (2014), HTN, and HLD who was admitted on 9/08/20 for AMS and HHS. Altered Mental Status - metabolic encephalopathy of unclear etiology (likely multifactorial) [] Pt initially presented with HHS (BG of 460-500), which was treated and has resolved. [] Initial labs were concerning for infection of unclear source. Possible UTI was the leading DDx because of a concerning UA, UCx, and a CT (9/09). Pt was treated with Abx, leading to resolution of lactic acidosis and leukocytosis. - s/p empirical Abx:  Ceftriaxone (9/8 -9/9),  Vancomycin (9/8 - 9/14),  Zosyn (9/9 - 9/14) [] At admission, CT Head (9/08) showed mildly enlarged ventricles that could be concerning for cerebral atrophy vs normal pressure hydrocephalus. MRI head (9/11 + 9/17) showed no acute intracranial findings and no dural venous thrombosis. Neurology was consulted and has subsequently signed off following the MRI (9/17). [] Pt developed Hypernatremia (9/09) of 151; this was treated and has resolved. [] Elevated inflammatory markers of ESR 32 and CRP 3.7 (9/14). [] Also of note, UDS and serum EtOH were negative at admission.  EKG and troponin have been not been concerning MI. 
[] Serum lipase, ammonia, B12, and folate levels have all been WNL as well.  Additional negative studies include:  HIV, RPR, vitamin B1, GRACE, smooth muscle antibodies, & Covid. - Psychiatry is on board, appreciate their recs 
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 
- F/U serum Vitamin B1 
- Consider routine bladder scans and scheduled \"comode time\" - Continue scheduled Miralax and Flomax (prophylaxis for any contributory urinary/fecal retention) - Continue scheduled Seroquel 25mg QAM, 50mg QHS 
- Continue Geodon 10mg q12hrs PRN for severe agitation 
- Continue one-to-one sitter; try to avoid physical restraints as able Possible UTI and Urinary Retention Repeat UA (9/22) was notable for moderate LE, few bacteria, 2+ epithelial cells, and 2+ yeast. 
- bacteria could be contaminant given presence of epithelial cells - s/p single dose of Fluconazole - UCx (9/22): 75,000 colonies of possible streptococcus - Continue Flomax to help manage retention 
- Scheduled \"Comode Time\"  (hopefully to avoid need for garcia replacement) EPS - Tardive dyskinesia, Akathisia, and Dystonia Pt's development of EPS is likely due to use of antipsychotics, and chronic metoclopramide use could also be related.  Extrapyramidal symptoms have improved significantly since admission, almost resloved. - Currently off of physical restraints ; sitter present overnight, but not this AM 
- As per neuro, avoid additional antipsychotic use when able - As per neuro, Ziprasidone (geodon) PRN for severe agitation 
  
  
R Eye Pain + Vision Loss Pt c/o a 2-week history of R eye pain associated with vision loss.  Based on a bedside U/S, papilledema seems unlikely (as no enlarged optic sheath was seen). DDx:  Floaters vs Retinal Detachment vs Conjuctivitis vs Acute Angle-Closure Glaucoma - Discussed with Ophthalmology (Dr. Nat Gonzales) - not able to come and see Pt 
  
  
Indirect Hyperbilirubinemia (Tbili 3.9 / Dbili 0.6) Likely secondary to [facility] Syndrome. Initially some thought that this could be 2/2 to hemolysis, however this is unlikely as hemolysis has improved. - CT ABD and U/S show no gallbladder disease - , haptoglobin normal  
  
  
Diet:  diabetic diet,  ST plans to advance diet as tolerated DVT Prophylaxis:  SQH 
GI Prophylaxis:  Omeprazole Code Status:  Full Point of Contact:  [place]:  son) 
  
Disposition:  > 2 MN,  eventually discharge to SNF Kirsten Flores, MS-4 [facility], M.D. Oleg Elias of [id number]

## 2020-09-24 NOTE — ROUTINE PROCESS
Bedside shift change report given to Jody Colorado RN (oncoming nurse) by Jennifer Dyson and Carito Greenfield RN (offgoing nurse). Report included the following information SBAR, Kardex, MAR and Recent Results.

## 2020-09-24 NOTE — ROUTINE PROCESS
Bedside and Verbal shift change report given to Kari Antoine (oncoming nurse) by Anita Armstrong RN (offgoing nurse). Report included the following information Kardex, ED Summary, Procedure Summary, Intake/Output, MAR and Recent Results.

## 2020-09-24 NOTE — PROGRESS NOTES
Pt not seen for skilled PT due to: 
 
[]  Nausea/vomiting 
[]  Eating 
[]  Pain 
[]  Pt lethargic 
[]  Off Unit Other: Pt in bed resting, stating \"Im cold and just want to sleep\" Will f/u later as schedule allows. Thank you.  
Abbey Nguyen, PT, DPT

## 2020-09-24 NOTE — PROGRESS NOTES
Dr. Mendez Velazquez ([facility]) made aware of patient's bladder scan and post void.

## 2020-09-24 NOTE — PROGRESS NOTES
Prior to report bladder scan showed greater than 593mL. 120 [facility] ordered a one time straight catheterization. While this writer and Silvia Cunningham RN were in report pt urinated 700mL in bedside commode. This was not charted or reported to nurse. This writer and Silvia Cunningham RN straight cathed pt there was 30mL of urine.

## 2020-09-24 NOTE — PROGRESS NOTES
[facility] Intern Progress Note Patient: Carmelita Mcgill MRN: [id number] SSN: [id number]  YOB: 1965 Age: 60 y.o. Sex: female Admit Date: 9/8/2020 LOS: 16 days Chief Complaint Patient presents with  Vomiting Subjective:  
 
Patient seen at bedside. Ms. Luis Seo is doing well this morning with no acute events overnight. She is oriented to self and place this morning, but not to time. Bladder scan this morning showed >593 mL. Patient was given a one time straight catheterization. She is off restrains and continues to have 1:1 sitter. ROS: Denies headaches, chest pain, dyspnea, abdo pain, n/v, or weakness in extremities Objective:  
 
Visit Vitals BP (!) 109/55 (BP 1 Location: Left arm, BP Patient Position: At rest) Pulse (!) 111 Temp 99 °F (37.2 °C) Resp 18 Ht 5' 8\" (1.727 m) Wt 94.6 kg (208 lb 8 oz) LMP 10/06/2015 (Exact Date) SpO2 100% Breastfeeding No  
BMI 31.70 kg/m² Physical Exam:  
General appearance: A+O x 2. Patient was cooperative during our conversation. Oriented to self and place. She was not any restraints. Has 1:1 sitter. Pt in no distress, and appears stated age.   
HEENT: Right eye poorly reactive to light and erythematic. Left eye reactive to light. Moist mucous membranes.  
Lungs: Clear to auscultation bilaterally without adventitious sounds. Heart: Regular rate and rhythm, S1, S2 normal, no murmur, click, rub or gallop Abdomen: Soft and mildly tender, but non-distended. Bowel sounds normal. No masses,  no organomegaly. Skin: Skin color, texture, turgor normal. No rashes or lesions Neuro:  Normal without focal findings. Patient able to follow commands and moves all four extremities.  No evidence of motor restlessness. Extremities: First digit of right foot amputated. Second digit of left foot amputated. No edema. Pedal pulses 2+ and symmetric.  Intake and Output: Current Shift: 09/24 0701 - 09/24 1900 In: -  
Out: 730 [Urine:730] Last three shifts: 09/22 1901 - 09/24 0700 In: -  
Out: [place] Lab/Data Review: 
Recent Results (from the past 12 hour(s)) GLUCOSE, POC Collection Time: 09/23/20 10:05 PM  
Result Value Ref Range Glucose (POC) 165 (H) 70 - 110 mg/dL CBC WITH MANUAL DIFF Collection Time: 09/24/20  7:52 AM  
Result Value Ref Range WBC PENDING K/uL  
 RBC PENDING M/uL HGB PENDING g/dL HCT PENDING % MCV PENDING FL  
 MCH PENDING PG  
 MCHC PENDING g/dL RDW PENDING % PLATELET PENDING K/uL MPV PENDING FL  
 DIFFERENTIAL MANUAL DIFFERENTIAL ORDERED    
 NEUTROPHILS PENDING % LYMPHOCYTES PENDING % MONOCYTES PENDING % EOSINOPHILS PENDING % BASOPHILS PENDING % BAND NEUTROPHILS PENDING % PROMYELOCYTES PENDING % MYELOCYTES PENDING % METAMYELOCYTES PENDING % BLASTS PENDING % OTHER CELL PENDING   
 ABS. NEUTROPHILS PENDING K/UL  
 ABS. LYMPHOCYTES PENDING K/UL  
 ABS. MONOCYTES PENDING K/UL  
 ABS. EOSINOPHILS PENDING K/UL  
 ABS. BASOPHILS PENDING K/UL  
 RBC COMMENTS PENDING   
 DF PENDING   
GLUCOSE, POC Collection Time: 09/24/20  7:54 AM  
Result Value Ref Range Glucose (POC) 160 (H) 70 - 110 mg/dL Assessment and Plan:  
 
60 y. o. 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   
 1. Metabolic Encephalopathy of unclear etiology [likely multifactorial, still not at baseline] - On initial presentation, patient had HHS with -500 treated with IV fluids, as well as leukocytosis and lactic acidosis that improved with IV antibiotics.  On admission, labs showed normal lipase, ammonia, B12 and folate. Blood culture results from 9/8 showed no growth.  UA showed 6-8 wbc, 2+ bacteria, but negative for leuk est and nitrites. UCx showed >100, 000 cfu of mixed urogenital kyle, likely a contaminant. On 9/9, CT Chest/Abd/Pelvis showed bladder with mild wall thickening. Repeat cultures remain negative. Pt was initially hypernatremic but this has resolved. UDS and serum ETOH (9/8) was negative. On  9/8, CT head showed mild enlargement of lateral ventricles and cerebral atrophy . - Antibiotic treatment: s/p empirical  abx Ceftriaxone  (9/8-/9). S/p empiric abx  vancomycin (9/8-9/14)  and zoysn (9/9-9/14 ) - Inflammatory markers: ESR 32 and CRP 3.7, elevated on 9/14. COVID 19 negative 
- CSF results: wbc 1 , protein 55, glucose 70, elevated opening pressure of 34 mmhg, likely as patient was in prone position and was sedated. Unlikely pseudotumor cerebri per neurology. 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 Blood culture and urine culture negative  
- Autoimmune etiology workup: GRACE negative, TSH wnl, anti-sm negative 
- imaging results: A. MRI head 9/11 - no acute intracranial finding. EEG showed encephalopathy. 9/17 MRI Brain w w/o  contrast and MRV brain w wo contrast:  no cerebral sagittal sinus thrombosis, ruled out  CNS infection, intracerebral hemorrhage, or a sagittal sinus thrombosis B. CT AB/PELVIS/CHEST IV (9/15): Marked bladder distention up to the level above the umbilicus.  No CT evidence of infection or inflammatory process. No abscess seen. Tiny hypodense nodule in right thyroid lobe - can be better evaluated by thyroid ultrasound. Mild and circumferential esophageal wall prominence with collapsed the lumen [if symptomatic, barium swallow can be performed for better evaluation]. Fluid-filled nondilated distal colon and rectum as nonspecific finding and could represent diarrhea. Multiple partially calcified fibroids. C. 9/18 repeat EKG: no ST changes from prior EKG  
 
Plan:  
 - Psych: Discontinued Metoclopramide and paroxetine. Started fluoxetine for anxiety and to mitigate potential withdrawal symptoms 
- Start 1:1 sitter and consider restraints (roll belt, side rails x4) if patient becomes increasing agitated. Fall precautions in place. 
- Neurology aware of HSV1 & 2 IgG positive serology; appreciate further recs 
 - Scheduled Seroquel 50 mg qhs, and 25 mg in AM.  Increased night Seroquel by 12.5 mg as she tends to have her episodes of agitation in the evening. Continue soft restraints today and attempt to wean off again this evening.  Currently on Geodon for severe agitation as needed q12h  
- follow up on B1 results. Heavy metal results negative for lead, arsenic and mercury - Dispo: rehab vs SNF   
 
2. Possible UTI - UA showed few bacteria, 2+ yeast and 15-20 WBC. No obvious suprapubic tenderness on exam. Urine culture showed 75 000 cfu (probable streptococcus species). - Completed one dose of fluconazole to treat for possible yeast infection -  Bladder scan showed >500 ml this morning 9/24 - straight cath placed. Patient has scheduled bladder scans q6h and scheduled commode time to prevent garcia placement.  
- Start Zosyn 1g q24 for 3 days 
 
 3. R eye pain + Right eye vision loss X 2 weeks - Unlikely papilledema based on bedside ultrasound ( no enlarged optic sheath seen), likely floater vs retinal detachment vs pink eye vs acute angle gluacoma Plan: Spoke to optho on 9/10, Dr. Krzysztof Deng - not able to come 
  
4.  Electrolyte Imbalance: hypophosphatemia on 9/23 was 2.1 but resolved to 3.9 today;  Hypokalemia (resolved) 
- monitor electrolytes and replete as needed 
  
5. Hyperbilirubinemia (3.9) w/ 0.6 direct likely majority is unconjugated  likely secondary to [employer] syndrome, previously elevated bili unlikely hemolysis (improved)  
- CT AB/US - no gall bladder disease appreciated  
 - , haptoglobin normal, aldolase pending  
  
 6. Diabetes ( last A1c 7.2) with neuropathy and L eye retinopathy - not well controlled 
- held NPH 40U BID (hold home NPH 50 U BID)  
- continue lantus 5 U daily  
  
7.  HTN (BP today 109/55 mmHg) 
- not on any home medications  
  
 8. HLD 
- cont rosuvastatin  
  
9. Gastroparesis - KUB showed non-obstruction bowel gas consistent with stool burden 
- held metoclopramide 5 mg  
- Start bowel regimen with Miralax BID and Bisacodyl 5 mg prn.  
- Continue to monitor stool output. 
  
10. GERD 
- cont  omeprazole 40 mg delayed capsule 
  
11.  Anxiety 
- held  paroxetine 60 mg daily 12. Tardive akathisia (resolved)- initially  had dystonia, EPS symptoms, likely related with chronic metoclopramide use; held metoclopramide Plan: No motor restlessness today. Elevated CK on 9/22 (654).   This is likely because patient is still on restraints. Will continue to closely monitor.  
  
13. Normocytic anemia (HgB improved to 13.6) (resolved) 
 - B12 and folate normal 
  
14. Urinary retention s/p garcia  W/ trauma Michelle Changorro- Urology recommended DC garcia Plan:  Avoid garcia due to risk of worsening UTI. Bladder scans q6h and scheduled commode time.  
  
15. CRYSTAL CR 3.56 ( baseline Cr 1.09 1/2019) in setting of CKD stage 2  likely pre-renal and rhabdo (resolved)  
- Myoglobin initially 1092. Iron profile normal 
- Improving CK 1,057--> 996 --> 479 (9/20) 
- .8 elevated and vitamin D on low side of normal - corrected Ca normal , Vitamin D nml , Phosphorous wnl, microalb/cr ratio wnl Plan:   
- Cont Vitamin D supplement 
- Nephrology following appreciate recs- calcitril 0.25mcg on discharge 
  
 
  
Diet Advance as tolerated per speech DVT Prophylaxis heparin GI Prophylaxis Omperazole Code status Full code Disposition unknown  
  
Point of Contact [place] Relationship: 
[phone]  
  
  
Tu Jiang MD, PGY-1  
 [address] [facility] Intern Pager: [phone] September 24, 2020, 9:02 AM

## 2020-09-24 NOTE — PROGRESS NOTES
Problem: Falls - Risk of 
Goal: *Absence of Falls Description: Document Luis Alfredo Angel Fall Risk and appropriate interventions in the flowsheet. Outcome: Progressing Towards Goal 
Note: Fall Risk Interventions: 
Mobility Interventions: Bed/chair exit alarm Mentation Interventions: Bed/chair exit alarm Medication Interventions: Bed/chair exit alarm Elimination Interventions: Call light in reach, Bed/chair exit alarm Problem: Pressure Injury - Risk of 
Goal: *Prevention of pressure injury Description: Document Vasile Scale and appropriate interventions in the flowsheet. Outcome: Progressing Towards Goal 
Note: Pressure Injury Interventions: 
Sensory Interventions: Assess changes in LOC Moisture Interventions: Absorbent underpads Activity Interventions: Pressure redistribution bed/mattress(bed type) Mobility Interventions: Pressure redistribution bed/mattress (bed type) Nutrition Interventions: Document food/fluid/supplement intake Friction and Shear Interventions: HOB 30 degrees or less

## 2020-09-24 NOTE — PROGRESS NOTES
Discharge planning: This writer finally heard from patient's son and her son has stated that patient lives alone and there will be no one who can be with her 24 hours a day. Patient also does not have Medicaid. [employer] will reach out to this patient's son, to complete a medicaid application. The son has no preference of where patient is placed. The referral process has been started. This writer will continue to monitor for discharge planning to ensure a safe discharge from [facility]. Cristopher Mendez. MS Care Manager Pager#: [phone]

## 2020-09-24 NOTE — ROUTINE PROCESS
Dr. Redd Sesay ([address] [place] [address]) made aware of pt's elevated MEWS score of 3.

## 2020-09-25 NOTE — PROGRESS NOTES
Problem: Diabetes Self-Management Goal: *Disease process and treatment process Description: Define diabetes and identify own type of diabetes; list 3 options for treating diabetes. Outcome: Progressing Towards Goal 
Goal: *Incorporating nutritional management into lifestyle Description: Describe effect of type, amount and timing of food on blood glucose; list 3 methods for planning meals. Outcome: Progressing Towards Goal 
Goal: *Incorporating physical activity into lifestyle Description: State effect of exercise on blood glucose levels. Outcome: Progressing Towards Goal 
Goal: *Developing strategies to promote health/change behavior Description: Define the ABC's of diabetes; identify appropriate screenings, schedule and personal plan for screenings. Outcome: Progressing Towards Goal 
Goal: *Using medications safely Description: State effect of diabetes medications on diabetes; name diabetes medication taking, action and side effects. Outcome: Progressing Towards Goal 
Goal: *Monitoring blood glucose, interpreting and using results Description: Identify recommended blood glucose targets  and personal targets. Outcome: Progressing Towards Goal 
Goal: *Prevention, detection, treatment of acute complications Description: List symptoms of hyper- and hypoglycemia; describe how to treat low blood sugar and actions for lowering  high blood glucose level. Outcome: Progressing Towards Goal 
Goal: *Prevention, detection and treatment of chronic complications Description: Define the natural course of diabetes and describe the relationship of blood glucose levels to long term complications of diabetes. Outcome: Progressing Towards Goal 
Goal: *Developing strategies to address psychosocial issues Description: Describe feelings about living with diabetes; identify support needed and support network Outcome: Progressing Towards Goal 
Goal: *Sick day guidelines Outcome: Progressing Towards Goal 
 Goal: *Patient Specific Goal (EDIT GOAL, INSERT TEXT) Outcome: Progressing Towards Goal 
  
Problem: Patient Education: Go to Patient Education Activity Goal: Patient/Family Education Outcome: Progressing Towards Goal 
  
Problem: Non-Violent Restraints Goal: *Removal from restraints as soon as assessed to be safe Outcome: Progressing Towards Goal 
Goal: *No harm/injury to patient while restraints in use Outcome: Progressing Towards Goal 
Goal: *Patient's dignity will be maintained Outcome: Progressing Towards Goal 
Goal: *Patient Specific Goal (EDIT GOAL, INSERT TEXT) Outcome: Progressing Towards Goal 
Goal: Non-violent Restaints:Standard Interventions Outcome: Progressing Towards Goal 
Goal: Non-violent Restraints:Patient Interventions Outcome: Progressing Towards Goal 
Goal: Patient/Family Education Outcome: Progressing Towards Goal 
  
Problem: Falls - Risk of 
Goal: *Absence of Falls Description: Document Génesis Abarca Fall Risk and appropriate interventions in the flowsheet. Outcome: Progressing Towards Goal 
Note: Fall Risk Interventions: 
Mobility Interventions: Bed/chair exit alarm Mentation Interventions: Adequate sleep, hydration, pain control Medication Interventions: Bed/chair exit alarm Elimination Interventions: Call light in reach Problem: Patient Education: Go to Patient Education Activity Goal: Patient/Family Education Outcome: Progressing Towards Goal 
  
Problem: Pressure Injury - Risk of 
Goal: *Prevention of pressure injury Description: Document Vasile Scale and appropriate interventions in the flowsheet. Outcome: Progressing Towards Goal 
Note: Pressure Injury Interventions: 
Sensory Interventions: Assess changes in LOC Moisture Interventions: Absorbent underpads Activity Interventions: Pressure redistribution bed/mattress(bed type) Mobility Interventions: Pressure redistribution bed/mattress (bed type) Nutrition Interventions: Document food/fluid/supplement intake Friction and Shear Interventions: Minimize layers Problem: Patient Education: Go to Patient Education Activity Goal: Patient/Family Education Outcome: Progressing Towards Goal 
  
Problem: Nutrition Deficit Goal: *Optimize nutritional status Outcome: Progressing Towards Goal 
  
Problem: Patient Education: Go to Patient Education Activity Goal: Patient/Family Education Outcome: Progressing Towards Goal 
  
Problem: Patient Education: Go to Patient Education Activity Goal: Patient/Family Education Outcome: Progressing Towards Goal

## 2020-09-25 NOTE — ROUTINE PROCESS
MD aware. Bladder scan 595 ml. PAtient on and off to commode but doesn't pee yet. Give couple of hours  if patient will urinate per MD. If not let MD knows. Will monitor.

## 2020-09-25 NOTE — PROGRESS NOTES
Bedside shift change report given to Linda RN (oncoming nurse) by Golden Munoz RN (offgoing nurse). Report included the following information SBAR, Kardex and MAR.

## 2020-09-25 NOTE — PROGRESS NOTES
1622 Pt fell on floor. No injury noted. Vitals obtained. Assisted pt back to bed. Side rails X3. Bed alarm on.  
 
[address] [facility] made aware pt fell. 5 Dr. Rosa Mazariegos ([facility]) at bedside to assess pt.

## 2020-09-25 NOTE — PROGRESS NOTES
[facility] Intern Progress Note Patient: Ryan Schmidt MRN: [id number] SSN: [id number]  YOB: 1965 Age: 60 y.o. Sex: female Admit Date: 9/8/2020 LOS: 17 days Chief Complaint Patient presents with  Vomiting Subjective:  
 
Patient seen at bedside. Ms. Gaines July is doing well this morning with no acute events overnight. She is oriented to self and place this morning, but not to time. She seemed less altered this morning and was able to carry a conversation. Bladder scan this morning showed >593 mL. Encouraged schedule commode time. She is off restrains and continues to have 1:1 sitter. 
  
ROS: Denies headaches, chest pain, dyspnea, abdo pain, n/v, or weakness in extremities  
  
 
Objective:  
 
Visit Vitals BP (!) 145/71 (BP 1 Location: Left arm, BP Patient Position: At rest) Pulse (!) 112 Temp 99.3 °F (37.4 °C) Resp 19 Ht 5' 8\" (1.727 m) Wt 94.6 kg (208 lb 8 oz) LMP 10/06/2015 (Exact Date) SpO2 98% Breastfeeding No  
BMI 31.70 kg/m² Physical Exam:  
General appearance: A+O x 2. Patient was cooperative during our conversation. Oriented to self and place. She was not any restraints. Has 1:1 sitter. Pt in no distress, and appears stated age.   
HEENT: Right eye poorly reactive to light and erythematic. Left eye reactive to light. Moist mucous membranes.  
Lungs: Clear to auscultation bilaterally without adventitious sounds. Heart: Regular rate and rhythm, S1, S2 normal, no murmur, click, rub or gallop Abdomen: Soft and mildly tender, but non-distended. Bowel sounds normal. No masses,  no organomegaly. Skin: Skin color, texture, turgor normal. No rashes or lesions Neuro:  Normal without focal findings. Patient able to follow commands and moves all four extremities.  No evidence of motor restlessness. Extremities: First digit of right foot amputated.  Second digit of left foot amputated. No edema. Pedal pulses 2+ and symmetric.  Intake and Output: 
Current Shift: No intake/output data recorded. Last three shifts: 09/23 1901 - 09/25 0700 In: 200 [P.O.:200] Out: 0758 [[id number]] Lab/Data Review: 
Recent Results (from the past 12 hour(s)) GLUCOSE, POC Collection Time: 09/24/20  9:57 PM  
Result Value Ref Range Glucose (POC) 188 (H) 70 - 110 mg/dL GLUCOSE, POC Collection Time: 09/25/20  7:19 AM  
Result Value Ref Range Glucose (POC) 192 (H) 70 - 110 mg/dL Assessment and Plan:  
 
60 y. o. 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   
 1. Metabolic Encephalopathy of unclear etiology [likely multifactorial, still not at baseline] - On initial presentation, patient had HHS with -500 treated with IV fluids, as well as leukocytosis and lactic acidosis that improved with IV antibiotics.  On admission, labs showed normal lipase, ammonia, B12 and folate. Blood culture results from 9/8 showed no growth. UA showed 6-8 wbc, 2+ bacteria, but negative for leuk est and nitrites. UCx showed >100, 000 cfu of mixed urogenital kyle, likely a contaminant. On 9/9, CT Chest/Abd/Pelvis showed bladder with mild wall thickening. Repeat cultures remain negative. Pt was initially hypernatremic but this has resolved. UDS and serum ETOH (9/8) was negative. On  9/8, CT head showed mild enlargement of lateral ventricles and cerebral atrophy . - Antibiotic treatment: s/p empirical  abx Ceftriaxone  (9/8-/9). S/p empiric abx  vancomycin (9/8-9/14)  and zoysn (9/9-9/14 ) - Inflammatory markers: ESR 32 and CRP 3.7, elevated on 9/14. COVID 19 negative 
- CSF results: wbc 1 , protein 55, glucose 70, elevated opening pressure of 34 mmhg, likely as patient was in prone position and was sedated. Unlikely pseudotumor cerebri per neurology. 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 Blood culture and urine culture negative  
- Autoimmune etiology workup: GRACE negative, TSH wnl, anti-sm negative 
- imaging results: A. MRI head 9/11 - no acute intracranial finding. EEG showed encephalopathy. 9/17 MRI Brain w w/o  contrast and MRV brain w wo contrast:  no cerebral sagittal sinus thrombosis, ruled out  CNS infection, intracerebral hemorrhage, or a sagittal sinus thrombosis B. CT AB/PELVIS/CHEST IV (9/15): Marked bladder distention up to the level above the umbilicus.  No CT evidence of infection or inflammatory process. No abscess seen. Tiny hypodense nodule in right thyroid lobe - can be better evaluated by thyroid ultrasound. Mild and circumferential esophageal wall prominence with collapsed the lumen [if symptomatic, barium swallow can be performed for better evaluation]. Fluid-filled nondilated distal colon and rectum as nonspecific finding and could represent diarrhea. Multiple partially calcified fibroids. C. 9/18 repeat EKG: no ST changes from prior EKG  
  
Plan:  
 - Psych: Discontinued Metoclopramide and paroxetine. Started fluoxetine for anxiety and to mitigate potential withdrawal symptoms 
- Start 1:1 sitter and consider restraints (roll belt, side rails x4) if patient becomes increasing agitated. Fall precautions in place. 
- Neurology aware of HSV1 & 2 IgG positive serology; appreciate further recs 
 - Scheduled Seroquel 50 mg qhs, and 25 mg in AM.  Increased night Seroquel by 12.5 mg as she tends to have her episodes of agitation in the evening. Continue soft restraints today and attempt to wean off again this evening.  Currently on Geodon for severe agitation as needed q12h  
- follow up on B1 results. Heavy metal results negative for lead, arsenic and mercury - Dispo: rehab vs SNF   
  
2. UTI - UA showed few bacteria, 2+ yeast and 15-20 WBC. No obvious suprapubic tenderness on exam. Urine culture showed 75 000 cfu (probable streptococcus species). - Completed one dose of fluconazole to treat for possible yeast infection -  Bladder scan showed >500 ml this morning 9/24 - straight cath placed. Patient has scheduled bladder scans q6h and scheduled commode time to prevent garcia placement.  
- Start Zosyn 1g q24 for 3 days 
  
3. Possible Dysphagia - CT chest, abd, pelvis w contrast on 9/15 showed mild and circumferential esophageal wall prominence with collapsed the lumen. - Follow up with barium swallow today to assess for dysphagia 4. R eye pain + Right eye vision loss X 2 weeks - Unlikely papilledema based on bedside ultrasound ( no enlarged optic sheath seen), likely floater vs retinal detachment vs pink eye vs acute angle gluacoma Plan: Spoke to optho on 9/10, Dr. Laura Gomez - not able to come 
  
5.  Electrolyte Imbalance: hypophosphatemia on 9/23 was 2.1 but resolved to 3.9 today; Hypokalemia (resolved) 
- monitor electrolytes and replete as needed 
  
6. Hyperbilirubinemia (3.9) w/ 0.6 direct likely majority is unconjugated  likely secondary to [employer] syndrome, previously elevated bili unlikely hemolysis (improved)  
- CT AB/US - no gall bladder disease appreciated  
- , haptoglobin normal, aldolase pending  
  
7. Diabetes ( last A1c 7.2) with neuropathy and L eye retinopathy - not well controlled 
- held NPH 40U BID (hold home NPH 50 U BID)  
- continue lantus 5 U daily  
  
8.  HTN (BP today 109/55 mmHg) 
- not on any home medications  
  
9. HLD 
- cont rosuvastatin  
  
10. Gastroparesis - KUB showed non-obstruction bowel gas consistent with stool burden 
- held metoclopramide 5 mg  
- Start bowel regimen with Miralax BID and Bisacodyl 5 mg prn.  
- Continue to monitor stool output. 
  
11. GERD 
- cont  omeprazole 40 mg delayed capsule 
  
 12.  Anxiety 
- held  paroxetine 60 mg daily 
  
13. Tardive akathisia (resolved)- initially  had dystonia, EPS symptoms, likely related with chronic metoclopramide use; held metoclopramide Plan: No motor restlessness today. Elevated CK on 9/22 (654).    This is likely because patient is still on restraints. Will continue to closely monitor.  
  
14. Normocytic anemia (HgB improved to 13.6) (resolved) 
 - B12 and folate normal 
  
15. Urinary retention s/p garcia  W/ trauma Bhumika Rock- Urology recommended DC garcia Plan:  Avoid garcia due to risk of worsening UTI. Bladder scans q6h and scheduled commode time.  
  
16. CRYSTAL creatinine on admission 3.56 ( baseline Cr 1.09 1/2019) in setting of CKD stage 2  likely pre-renal and rhabdo (resolved)  
- Myoglobin initially 1092. Iron profile normal 
- Improving CK 1,057--> 996 --> 479 (9/20) 
- .8 elevated and vitamin D on low side of normal - corrected Ca normal , Vitamin D nml , Phosphorous wnl, microalb/cr ratio wnl Plan:   
- Cont Vitamin D supplement 
- Nephrology following appreciate recs- calcitril 0.25mcg on discharge 
  
 
  
Diet Advance as tolerated per speech DVT Prophylaxis heparin GI Prophylaxis Omperazole Code status Full code Disposition unknown  
  
Point of Contact [place] Relationship: 
[phone]  
  
  
Oleg Gallegos MD, PGY-1  
[address] Medicine Intern Pager: [phone] September 24, 2020, 9:02 AM

## 2020-09-25 NOTE — ROUTINE PROCESS
Bedside shift change report given to Tramaine Apodaca (oncoming nurse) by  Maryann Jacobson (offgoing nurse). Report included the following information SBAR, Kardex, ED Summary and Recent Results.

## 2020-09-25 NOTE — PROGRESS NOTES
FALL ASSESSMENT NOTE 
[facility] Service Patient: Lucretia Chapman MRN: [id number]: [id number]  YOB: 1965 Age: 60 y.o. Sex: female Admit date: 9/8/2020 Length of stay:  LOS: 17 days PCP: Yeni Doshi MD PP: Altered mental status Subjective:  
Called to bedside by nursing staff for fall evaluation. Per nursing, they heard her cry out, and found her laying on her right side, holding her head up off the ground. Pt remains encephalopathic and delirious, but states she wanted something to eat and drink, which is why she got up. She reports hitting her shoulder when she fell and complains that it still hurts. Pt originally states she hit the right side of her head, but then says she didn't. Objective:  
Vital Signs: 
Visit Vitals BP (!) 139/92 (BP 1 Location: Left arm, BP Patient Position: At rest) Pulse (!) 105 Temp 99.1 °F (37.3 °C) Resp 19 Ht 5' 8\" (1.727 m) Wt 94.6 kg (208 lb 8 oz) LMP 10/06/2015 (Exact Date) SpO2 100% Breastfeeding No  
BMI 31.70 kg/m² Physical Exam: 
General appearance: pt tearful and confused (no change from hospital baseline) HEENT: no gross abnormalities, bruising, or drainage from ears of nose. No swelling. Lungs: clear to auscultation bilaterally Heart: regular rate and rhythm, S1, S2 normal, no murmur, click, rub or gallop Abdomen: soft, non-tender. Bowel sounds normal. No masses, no organomegaly Pulses: 2+ and symmetric MSK: R shoulder and arm exam limited by pain with abduction. Skin: Skin color, texture, turgor normal. No rashes or lesions Neuro: normal without focal findings- cranial nerve exam limited by pt cooperation/ confusion and blindness. Pt at hospital baseline. Assessment and Plan:  
60 y.o. yo female admitted for Altered mental status s/p fall in house. Patient with R shoulder and upper arm pain following fall.  There are no new neurologic findings on my physical exam, with cranial nerves II-XII grossly intact to baseline, no neck tenderness to palpation, nor are there structural abnormalities. Pt displays no change in mental status/function from baseline. Pt has baseline ROM, muscle strength, and is without new paraesthesias with exception of R shoulder pain. Lastly, there is no lesion or gross trauma with inspection of the skin and area of contact with the floor. PFM is primary care team. Fall presumed to be mechanical in nature, as patient has known weakness and has been unable to stand/ and walk independently since admission. Vitals stable. Mentation and neurological exam unchanged from same day evaluation by PFM, no indication of acute neurological process. Head and neck CT w/o contrast ordered, as it is unclear if pt hit her head during fall. Right shoulder and arm radiographs have been ordered and pending. Sitter renewed, as sitter was just removed a few hours prior to the fall. Dr. Tedi Leyden, attending, has been notified and agrees with plan of care. Signed, Alondra Green MD, PGY-1 
[id number] [facility] 
09/25/20 4:52 PM

## 2020-09-25 NOTE — PROGRESS NOTES
*ATTENTION:  This note has been created by a medical student for educational purposes only. Please do not refer to the content of this note for clinical decision-making, billing, or other purposes. Please see attending physicians note to obtain clinical information on this patient. * Medical Student Progress Note [address] **Medical Student Note for Educational Purposes Only** Patient: Manjinder Rincon MRN: [id number]  CSN: [id number] YOB: 1965  Age: 60 y.o. Sex: female DOA: 9/8/2020 LOS:  LOS: 17 days Subjective:  
 
Acute events: NAEO Pt states that she doing better today. No new symptomatic complaints. Pt still confused this AM, but she seems more conversational and appropriate in responses. Review of Systems Constitutional: Negative for chills and fever. Eyes: Negative for pain. Respiratory: Negative for cough and shortness of breath. Cardiovascular: Negative for chest pain and palpitations. Gastrointestinal: Negative for abdominal pain, nausea and vomiting. Genitourinary: Negative for dysuria. Musculoskeletal: Negative for joint pain and myalgias. Neurological: Negative for headaches. Objective:  
  
Patient Vitals for the past 24 hrs: 
 Temp Pulse Resp BP SpO2  
09/25/20 0448 97.8 °F (36.6 °C) (!) 107 18 108/62 99 % 09/25/20 0009 99.7 °F (37.6 °C) (!) 106 18 131/68 100 % 09/24/20 2018 99 °F (37.2 °C) (!) 110 18 123/84 100 % 09/24/20 1552 98.4 °F (36.9 °C) 98 19 (!) 103/56 100 % 09/24/20 1240  (!) 117   98 %  
09/24/20 1230 98.3 °F (36.8 °C) (!) 122 20 111/70 99 % 09/24/20 0840 99 °F (37.2 °C) (!) 111 18 (!) 109/55 100 % Intake/Output Summary (Last 24 hours) at 9/25/2020 0049 Last data filed at 9/24/2020 2100 Gross per 24 hour Intake  Output 1580 ml Net -1580 ml Physical Exam:  
General:  Comfortably sleeping in bed without restraints in NAD.  Pt was responsive and cooperative with exam.  Oriented to person, place, but not to time.  HEENT:  Erythematous conjunctiva of R eye.  R pupil is non-reactive to light, and L pupil is sluggishly reactive.  Unable to assess extraocular movements due to vision loss and acute mental state.  No lymphadenopathy. CV:  Normal heart rate with regular rhythm. No murmurs, rubs, or gallops. RESP:  Unlabored breathing.  Lungs clear to auscultation. No wheezes, rales, or rhonchi.  Equal expansion bilaterally. ABD:  Soft, nontender, nondistended.   No hepatosplenomegaly.  No suprapubic tenderness.  Normoactive bowel sounds. MS:  No joint deformity or instability.  No atrophy. Neuro:  5/5 strength bilateral upper extremities and lower extremities. Ext:  Nonpitting edema of UE bilaterally.  
Skin:  No rashes, lesions, or ulcers. Lab/Data Reviewed: 
CMP:  
Lab Results Component Value Date/Time  09/24/2020 07:52 AM  
 K 4.2 09/24/2020 07:52 AM  
  09/24/2020 07:52 AM  
 CO2 24 09/24/2020 07:52 AM  
 AGAP 8 09/24/2020 07:52 AM  
  (H) 09/24/2020 07:52 AM  
 BUN 6 (L) 09/24/2020 07:52 AM  
 CREA 1.11 09/24/2020 07:52 AM  
 GFRAA >60 09/24/2020 07:52 AM  
 GFRNA 51 (L) 09/24/2020 07:52 AM  
 CA 9.2 09/24/2020 07:52 AM  
 MG 1.9 09/24/2020 07:52 AM  
 PHOS 3.9 09/24/2020 07:52 AM  
 ALB 3.4 09/24/2020 07:52 AM  
 TP 7.4 09/24/2020 07:52 AM  
 GLOB 4.0 09/24/2020 07:52 AM  
 AGRAT 0.9 09/24/2020 07:52 AM  
 ALT 31 09/24/2020 07:52 AM  
 
CBC:  
Lab Results Component Value Date/Time WBC 8.2 09/24/2020 07:52 AM  
 HGB 8.6 (L) 09/24/2020 07:52 AM  
 HCT 26.2 (L) 09/24/2020 07:52 AM  
  09/24/2020 07:52 AM  
 
Recent Glucose Results:  
Lab Results Component Value Date/Time  (H) 09/24/2020 07:52 AM  
 
Liver Panel:  
Lab Results Component Value Date/Time  ALB 3.4 09/24/2020 07:52 AM  
 TP 7.4 09/24/2020 07:52 AM  
 GLOB 4.0 09/24/2020 07:52 AM  
 AGRAT 0.9 09/24/2020 07:52 AM  
 ALT 31 09/24/2020 07:52 AM  
 AP 95 09/24/2020 07:52 AM  
 
  
 
Scheduled Medications Reviewed: 
Current Facility-Administered Medications Medication Dose Route Frequency  cephALEXin (KEFLEX) capsule 500 mg  500 mg Oral Q12H  phosphorus (K PHOS NEUTRAL) 250 mg tablet 1 Tab  1 Tab Oral BID  thiamine HCL (B-1) tablet 100 mg  100 mg Oral DAILY  multivitamin, tx-iron-ca-min (THERA-M w/ IRON) tablet 1 Tab  1 Tab Oral DAILY  QUEtiapine (SEROquel) tablet 63 mg  63 mg Oral QHS  polyethylene glycol (MIRALAX) packet 17 g  17 g Oral BID  tamsulosin (FLOMAX) capsule 0.8 mg  0.8 mg Oral DAILY  QUEtiapine (SEROquel) tablet 25 mg  25 mg Oral DAILY  FLUoxetine (PROzac) capsule 20 mg  20 mg Oral DAILY  lactated Ringers infusion  100 mL/hr IntraVENous CONTINUOUS  
 heparin (porcine) injection 5,000 Units  5,000 Units SubCUTAneous Q8H  
 [Held by provider] insulin glargine (LANTUS) injection 5 Units  5 Units SubCUTAneous DAILY  cholecalciferol (VITAMIN D3) (1000 Units /25 mcg) tablet 1 Tab  1,000 Units Oral DAILY  insulin lispro (HUMALOG) injection   SubCUTAneous AC&HS  
 melatonin tablet 3 mg  3 mg Oral QHS  [Held by provider] insulin NPH (NOVOLIN N, HUMULIN N) injection 40 Units  40 Units SubCUTAneous ACB&D  pantoprazole (PROTONIX) tablet 40 mg  40 mg Oral DAILY  [Held by provider] rosuvastatin (CRESTOR) tablet 20 mg  20 mg Oral QHS Imaging, microbiology, and EKG/Telemetry: 
Imaging: MODALITY IMPRESSION  
XR Results from Hospital Encounter encounter on 09/08/20 XR ABD (KUB) Narrative EXAM: ABDOMEN 1 VIEW CLINICAL HISTORY/INDICATION: Altered mental status, evaluate for obstruction COMPARISON: 9/9/2020 TECHNIQUE: Portable AP view was obtained. FINDINGS:  
 
LINES/DEVICES: None. BOWEL GAS PATTERN: Non-obstructive bowel gas pattern. PATHOLOGIC CALCIFICATIONS: None. SOFT TISSUES: Unremarkable. BONES: Unremarkable for age. OTHER: The lung bases are clear. Babs Boop Impression IMPRESSION: 
1.  Non-obstructive bowel gas pattern. Thank you for enabling us to participate in the care of this patient. CT Results from Hospital Encounter encounter on 09/08/20 CT CHEST ABD PELV W CONT Narrative CT chest, abdomen and pelvis with contrast  
 
CLINICAL HISTORY: Leukocytosis. CT evaluation of potential infection versus 
abscess COMPARISON: None. TECHNIQUE: Helical scan to the chest, abdomen and pelvis are obtained from the 
thoracic inlet to the symphysis pubis after IV contrast administration. All CT scans at this facility performed using dose optimization techniques as 
appreciated to a performed exam, to include automated exposure control, 
adjustment of the mA and or KU according to patient size (including appropriate 
matching for site specific examination), or use of iterative reconstruction 
technique. FINDINGS: Moderate motion artifact noted and limits the detail evaluation. CT CHEST:  
 
Lung Parenchyma: Minimal bibasilar atelectasis. No acute airspace disease or 
consolidation. Thyroid: Small hypodense nodule in the right lobe measuring about 5 mm. Mediastinum: No adenopathy. Mild and circumferential esophageal wall prominence 
with collapsed the lumen. Heart: The heart and the pericardium appear normal. 
 
Vasculature: The aorta and the great vessels are unremarkable. Pleural Space And Chest Wall: No significant pleural pathology. CT ABDOMEN AND PELVIS: 
 
Liver: Normal. 
 
Gallbladder: Surgically absent. Biliary System: No ductal dilatation. Spleen: Normal. 
 
Pancreas: Normal. 
 
Adrenal Glands: Normal. 
 
Kidneys: Normal. 
 
Bowel: The small and large bowel are nondilated. Normal appendix. Fluid-filled 
sigmoid colon and rectum with air-fluid level without dilatation. Lower genitourinary system: The bladder is markedly distended up to the level above the umbilicus, 43.6 cm in cc dimension. No definite bladder wall lesion 
seen. The uterus appears spherical and heterogeneous with several partially 
calcified fibroids present. No adnexal mass. Peritoneum/Retroperitoneum: No adenopathy. Vasculature: Unremarkable for age. Other: No free fluid. CT OSSEOUS STRUCTURES:   
 
Unremarkable for age. Impression IMPRESSION:  
 
1. Marked bladder distention up to the level above the umbilicus. Recommend 
clinical correlation for urinary outlet obstruction. 2. No CT evidence of infection or inflammatory process. No abscess seen. 3. Tiny hypodense nodule in right thyroid lobe. This can be better evaluated by 
thyroid ultrasound. 4. Mild and circumferential esophageal wall prominence with collapsed the lumen. If symptomatic, barium swallow can be performed for better evaluation. 5. Fluid-filled nondilated distal colon and rectum as nonspecific finding and 
could represent diarrhea. 6. Multiple partially calcified fibroids. Thank you for this referral.   
  
MRI Results from Hospital Encounter encounter on 09/08/20 MRI BRAIN W WO CONT Narrative MRI BRAIN WITH AND WITHOUT CONTRAST Provided Reason for Exam: Confusion, visual loss right eye for 2 weeks, LP with 
elevated opening pressure Additional History: Patient admitted with confusion Comparison Studies: Brain MRI 9/11/2020, head CT 9/9/2020 Imaging Technique:  
  Sequences:  Sagittal and axial T1 weighted, Diffusion Weighted (DWI), Axial T2 
weighted, Axial T2 FLAIR, and GRE MRI images of the brain. Post contrast axial 
and coronal T1 images. Contrast Material:  20 mL Dotarem IV Limiting Factors/Major Artifacts: None. FINDINGS: 
 
Brain Parenchyma: Diffusion sequence negative. No cytotoxic edema or acute 
infarct. Cerebral white matter is normal.  No chronic cortical infarcts or 
chronic lacunar infarcts. No visible masses or midline shift.  No abnormal 
 parenchymal or meningeal enhancement. CSF Spaces: There are some mild prominence of the posterior horns of the 
lateral ventricles, but the major the ventricles are normal size. No findings of 
ventricular trapping. Appears to be developmental, similar findings on the head CT from 2013. Vascular System:  Grossly patent flow in basilar and internal cerebral arteries. No findings of dural sinus thrombosis. Hemorrhage:  No acute hemorrhage. No chronic microhemorrhage. Other Structures: 
Calvarium: No suspicious marrow signal. 
Sella: Normal. 
Visualized Upper Cervical Spine: Normal craniocervical junction, no Chiari 
malformation. Orbits: Postsurgical changes in the left ocular globe, appears to be silicone 
injection, unchanged from 2014 head CT. Paranasal Sinuses: No significant paranasal sinus disease. Mastoid Air Cells:  Clear. Impression IMPRESSION: 
 
No acute intracranial abnormality. No mass, hemorrhage, or acute infarct. Postsurgical changes left optic globe consistent with previous retinal 
detachment procedure. ULTRASOUND Results from Hospital Encounter encounter on 09/08/20 US ABD LTD Impression IMPRESSION:    
 
1. Status post cholecystectomy. 2.  No significant common bile duct dilation. 3.  Increased hepatic parenchymal echogenicity with somewhat heterogeneous 
appearance, potentially suggestive of hepatosteatosis. Partial visualization of 
the liver. Results from [facility] Encounter encounter on 01/22/19 US ABD LTD Impression IMPRESSION: 
 
Mild heterogeneous echogenicity of the liver is nonspecific. This is commonly 
seen with steatosis hepatic disease. Limited visualization of the pancreas. Assessment/Plan Serena Goldmann Elliott is an 60 y. o. female with PMH of hypokalemia, retinal detachment, stage 2 CKD, GIST s/p resection (2014), HTN, and HLD who was admitted on 9/08/20 for AMS and HHS.  
  
  
 Altered Mental Status - metabolic encephalopathy of unclear etiology (likely multifactorial) [] Pt initially presented with HHS (BG of 460-500), which was treated and has resolved. [] Initial labs were concerning for infection of unclear source. Possible UTI was the leading DDx because of a concerning UA, UCx, and a CT (9/09). Pt was treated with Abx, leading to resolution of lactic acidosis and leukocytosis. - s/p empirical Abx:  Ceftriaxone (9/8 -9/9),  Vancomycin (9/8 - 9/14),  Zosyn (9/9 - 9/14) [] At admission, CT Head (9/08) showed mildly enlarged ventricles that could be concerning for cerebral atrophy vs normal pressure hydrocephalus. MRI head (9/11 + 9/17) showed no acute intracranial findings and no dural venous thrombosis. Neurology was consulted and has subsequently signed off following the MRI (9/17). [] Pt developed Hypernatremia (9/09) of 151; this was treated and has resolved. [] Elevated inflammatory markers of ESR 32 and CRP 3.7 (9/14). [] Also of note, UDS and serum EtOH were negative at admission.  EKG and troponin have been not been concerning MI. 
[] Serum lipase, ammonia, B12, and folate levels have all been WNL as well.  Additional negative studies include:  HIV, RPR, vitamin B1, GRACE, smooth muscle antibodies, & Covid. - Psychiatry is on board, appreciate their recs 
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 
- F/U serum Vitamin B1 
- Consider routine bladder scans and scheduled \"comode time\" - Continue scheduled Miralax and Flomax (prophylaxis for any contributory urinary/fecal retention) - Continue scheduled Seroquel 25mg QAM, 50mg QHS 
- Continue Geodon 10mg q12hrs PRN for severe agitation 
- Continue one-to-one sitter; try to avoid physical restraints as able 
  
  
Possible UTI and Urinary Retention Repeat UA (9/22) was notable for moderate LE, few bacteria, 2+ epithelial cells, and 2+ yeast. 
- bacteria could be contaminant given presence of epithelial cells - s/p single dose of Fluconazole - UCx (9/22): 75,000 colonies of possible streptococcus 
- Continue Flomax to help manage retention 
- Scheduled \"Comode Time\"  (hopefully to avoid need for garcia replacement) 
  
  
EPS - Tardive dyskinesia, Akathisia, and Dystonia Pt's development of EPS is likely due to use of antipsychotics, and chronic metoclopramide use could also be related.  Extrapyramidal symptoms have improved significantly since admission, almost resloved. - Currently off of physical restraints ; sitter present overnight, but not this AM 
- As per neuro, avoid additional antipsychotic use when able - As per neuro, Ziprasidone (geodon) PRN for severe agitation 
  
  
R Eye Pain + Vision Loss Pt c/o a 2-week history of R eye pain associated with vision loss.  Based on a bedside U/S, papilledema seems unlikely (as no enlarged optic sheath was seen). DDx:  Floaters vs Retinal Detachment vs Conjuctivitis vs Acute Angle-Closure Glaucoma - Discussed with Ophthalmology (Dr. Luis Angel Hough) - not able to come and see Pt 
  
  
Indirect Hyperbilirubinemia (Tbili 3.9 / Dbili 0.6) Likely secondary to [facility] Syndrome. Initially some thought that this could be 2/2 to hemolysis, however this is unlikely as hemolysis has improved. - CT ABD and U/S show no gallbladder disease - , haptoglobin normal  
  
  
Diet:  diabetic diet,  ST plans to advance diet as tolerated DVT Prophylaxis:  SQH 
GI Prophylaxis:  Omeprazole Code Status:  Full Point of Contact:  Neymar Iraan, [place]:  son) 
  
Disposition:  > 2 MN,  eventually discharge to SNF Zhanna Later, [phone]

## 2020-09-25 NOTE — DIABETES MGMT
Glycemic Control Plan of Care Note her lantus is on hold -  
FBG today 192 mg/dl. Recommend resuming her lantus 5 units daily. Currently receiving corrective lispro, very insulin resistant, 4 times daily. Gerardo Cuadra MPH RN CDE 
[phone]

## 2020-09-25 NOTE — PROGRESS NOTES
Problem: Mobility Impaired (Adult and Pediatric) Goal: *Acute Goals and Plan of Care (Insert Text) Description: Physical Therapy Goals Initiated 9/14/2020 and to be accomplished within 7 day(s) 1. Patient will move from supine to sit and sit to supine  in bed with modified independence. 2.  Patient will transfer from bed to chair and chair to bed with modified independence using the least restrictive device. 3.  Patient will perform sit to stand with modified independence. 4.  Patient will ambulate with modified independence for 150 feet with the least restrictive device. 5.  Patient will ascend/descend 14 stairs with B handrail(s) with modified independence. 6.  Patient will follow 2-step commands 75% of the time. PLOF: Pt reports living alone in a apartment with 14 stairs to enter. She was independent with all mobility PTA and did not use an AD. She states she lives alone. At this time, patient is drowsy and unreliable historian. Follow up as patient progresses. Outcome: Progressing Towards Goal 
  
PHYSICAL THERAPY TREATMENT Patient: Alex Bangura (12 y.o. female) Date: 9/25/2020 Diagnosis: Hyperglycemia [R73.9] Altered mental status [R41.82] Acute metabolic encephalopathy [B18.67] Acute renal failure (ARF) (HCC) [N17.9] Leukocytosis [D72.829] Hypokalemia [E87.6] Metabolic encephalopathy [S53.59] Altered mental status Precautions: Fall, Other (comment)(Decreased cognition) ASSESSMENT: 
Pt presents with decreased cognition, poor functional mobility, constant redirection during all mobility, and unsteadiness with gait. RN cleared for PT to work with pt. Pt found sitting EOB, sitter in room. Pt agreed to work with PT. Pt states she is blind but able to see shadows. Pt able to stand from bed with minAx1 and HHA with both hands for guidance.   Pt observed to be shaking with legs one-by-one with each steps she takes-unsure if patient was trying to dance or if she could not control it. She required verbal cueing and manual assist for each step taken to ensure she did not bump into obstacles. Pt at times could not comprehend the movement she was asked to do, poor coordination with EVANGELINA Medina during gait. Pt amb 30 feet to door and back to bed with several path deviations noted. Once back oriented to sit EOB, pt performed exercises per flow sheet. Pt requested to use the restroom, pt stood minAx1 HHA and guided to bathroom. Pt again needed max orientation and guidance with steps to get in front of toilet. Hand-over-hand cueing to help patient use side rails. Pt on toiled and voided large bowel movement. When finished, patient able to stand and perform pericare but required assist as patient unable to prevent soiled toilet paper from getting on herself. Pt dependently wiped with wet wipes to finish geoff-care. Pt then oriented to wash her hands in the sink, hand-overhand guidance to get soap and put hands in water. At one point, pt frustrated, saying Rumalda Clas! \" when asked to wipe off the soap with water. Once finished, pt again oriented/guided back to bed with several path deviation noticed. Pt back sitting in bed and performed sit-supine transfer into bed with SBA. Pt left supine in bed, sitter in room with her, no new questions or concerns. Pt perseverated on \"needing air\" several times throughout PT session. Unsure what this means as patient is on room air and not in need of supplemental oxygen. No observations of SOB. RN made aware of pts performance. At this time, pt will be placed on 3 day trial and acute PT is not really need, pt would benefit more from a behavioral care/LTC. Recommend discharge to SNF/LTC specialized in cognitive deficits. Progression toward goals:  
[]      Improving appropriately and progressing toward goals [x]      Improving slowly and progressing toward goals []      Not making progress toward goals and plan of care will be adjusted PLAN: 
Patient continues to benefit from skilled intervention to address the above impairments. Continue treatment per established plan of care. Discharge Recommendations:  Serafin Woodruff Further Equipment Recommendations for Discharge:  N/A  
 
SUBJECTIVE:  
Patient stated yes, okay, that's fabulous.  OBJECTIVE DATA SUMMARY:  
Critical Behavior: 
Neurologic State: Alert, Confused Orientation Level: Oriented to person, Disoriented to place, Disoriented to situation, Disoriented to time Cognition: Impaired decision making Safety/Judgement: Fall prevention Functional Mobility Training: 
Bed Mobility: 
  
  
Sit to Supine: Contact guard assistance Transfers: 
Sit to Stand: Minimum assistance(HHA) Stand to Sit: Minimum assistance(HHA) Balance: 
Sitting: Impaired; With support Sitting - Static: Good (unsupported) Sitting - Dynamic: Fair (occasional) Standing: Impaired; With support Standing - Static: Occasional 
Standing - Dynamic : Poor Ambulation/Gait Training: 
Distance (ft): 100 Feet (ft) Assistive Device: Other (comment)(HHA) Ambulation - Level of Assistance: Minimal assistance(HHA) Gait Abnormalities: Ataxic;Path deviations;Decreased step clearance Base of Support: Narrowed; Center of gravity altered Speed/Catrachita: Slow;Shuffled Step Length: Right shortened;Left shortened Therapeutic Exercises: Pt performed exercises sitting EOB at beginning of session EXERCISE Sets Reps Active Active Assist  
Passive Self ROM Comments Ankle Pumps    [] [] [] [] Quad Sets/Glut Sets    [] [] [] [] Hold for 5 secs Hamstring Sets   [] [] [] [] Short Arc Quads   [] [] [] [] Heel Slides   [] [] [] [] Straight Leg Raises   [] [] [] [] Hip Add   [] [] [] [] Hold for 5 secs, w/ pillow squeeze Long Arc Quads 1 15 [x] [] [] [] Seated Marching 1 20 [x] [] [] [] Standing Marching   [] [] [] []   
   [] [] [] []   
 
 
Pain: 
Pain level pre-treatment: 0/10 Pain level post-treatment: 0/10 Pain Intervention(s): Medication (see MAR); Rest, Repositioning Response to intervention: Nurse notified, See doc flow Activity Tolerance:  
Pt tolerated mobility fair, unsteady at times and needs constant redirection for all movements Please refer to the flowsheet for vital signs taken during this treatment. After treatment:  
[] Patient left in no apparent distress sitting up in chair 
[x] Patient left in no apparent distress in bed 
[x] Call bell left within reach [x] Nursing notified 
[x] Caregiver present- sitter 
[] Bed alarm activated 
[] SCDs applied COMMUNICATION/EDUCATION:  
[x]         Role of Physical Therapy in the acute care setting. [x]         Fall prevention education was provided and the patient/caregiver indicated understanding. [x]         Patient/family have participated as able in working toward goals and plan of care. []         Patient/family agree to work toward stated goals and plan of care. []         Patient understands intent and goals of therapy, but is neutral about his/her participation. []         Patient is unable to participate in stated goals/plan of care: ongoing with therapy staff. 
[]         Other: 
 
   
Enio Forman Time Calculation: 38 mins

## 2020-09-26 NOTE — PROGRESS NOTES
Brief Progress Note SUBJECTIVE:  
Called by nursing for pt abruptly becoming agitated, angry, and yelling. When I got to the bedside, pt was very agitated, alternately yelling and crying. She is very restless and and attempting to get out of bed repeatedly. She states she is being mistreated and that she wants to go outside because she can't breathe. States she wants to die and repeatedly yelled that we should let her die. Pt also making comments about \"hearing water\" and potentially responding to people who are not present. She denies any pain, headache, or dizzieness. Per nursing, change was very abrupt around 16:30, she used the bedside commode then became very angry that she couldn't walk to the regular toilet. Per nursing, she has been more restless today, but remained pleasant and redirectable prior to this incident. Since that time, she has been very restless in bed, agitated, angry, and attempting to get out of bed. Substantially less redirectable than any previous exam during this hospital stay. OBJECTIVE: 
Visit Vitals /75 Pulse (!) 120 Temp 98.1 °F (36.7 °C) Resp 22 Ht 5' 8\" (1.727 m) Wt 94.6 kg (208 lb 8 oz) SpO2 100% Breastfeeding No  
BMI 31.70 kg/m² Physical Exam 
Constitutional:   
   General: She is in acute distress. HENT:  
   Head: Normocephalic and atraumatic. Neck: Musculoskeletal: Normal range of motion. Cardiovascular:  
   Rate and Rhythm: Tachycardia present. Comments: Potentially irregular rhythm, though pt moving excessively, can not be fully appreciated. Pulmonary:  
   Effort: Pulmonary effort is normal. No respiratory distress. Breath sounds: Normal breath sounds. Abdominal:  
   General: There is no distension. Palpations: Abdomen is soft. Tenderness: There is no abdominal tenderness. Neurological:  
   Mental Status: She is alert. Comments: Oriented to person and place. States year is 2021. Most recent POC glucose 136 ASSESSMENT & PLAN: 
1. Increased agitation- pt with abrupt increase in agitation and restlessness. Pt is not aggressive towards others, but repeatedly trying to get out of bed, and is a danger to herself due to fall risk. DDX is variation of pt's know encephalopathy & delirium vs new traumatic, metabolic, or infective process. Pt with 2 falls last night, head and neck imaging without acute process following first fall. Though this would be atypical presentation of intracranial bleed, would be appropriate to repeat head CT given abrupt change in behavior and mood, but given pt's current level of agitation, imaging would not be possible. This morning's CBC and chemistry were fairly unremarkable without significant change from previous, lessening suspicion for infective or metabolic source of mentation change. - 50 mg seroquel given  
- evening dose adjusted to 25 mg 
- soft restraints and a roll belt may be necessary for pt safety or if she becomes a threat to others, but will try to avoid, as they are likely to further agitate her. - head CT w/o contrast when pt is calmer  
- continue 1:1 sitter  
- will reassess if current medication not effective For full assessment and plan, please see daily progress note.   
Gloria Silva MD, PGY-1 
[facility] 
09/26/20 5:21 PM

## 2020-09-26 NOTE — PROGRESS NOTES
Problem: Diabetes Self-Management Goal: *Disease process and treatment process Description: Define diabetes and identify own type of diabetes; list 3 options for treating diabetes. 9/26/2020 0934 by Patt Fajardo RN Outcome: Progressing Towards Goal 
9/26/2020 0933 by Patt Fajardo RN Outcome: Progressing Towards Goal 
Goal: *Incorporating nutritional management into lifestyle Description: Describe effect of type, amount and timing of food on blood glucose; list 3 methods for planning meals. 9/26/2020 0934 by Patt Fajardo RN Outcome: Progressing Towards Goal 
9/26/2020 0933 by Patt Fajardo RN Outcome: Progressing Towards Goal 
Goal: *Incorporating physical activity into lifestyle Description: State effect of exercise on blood glucose levels. 9/26/2020 0934 by Patt Fajardo RN Outcome: Progressing Towards Goal 
9/26/2020 0933 by Patt Fajardo RN Outcome: Progressing Towards Goal 
Goal: *Developing strategies to promote health/change behavior Description: Define the ABC's of diabetes; identify appropriate screenings, schedule and personal plan for screenings. 9/26/2020 0934 by Patt Fajardo RN Outcome: Progressing Towards Goal 
9/26/2020 0933 by Patt Fajardo RN Outcome: Progressing Towards Goal 
Goal: *Using medications safely Description: State effect of diabetes medications on diabetes; name diabetes medication taking, action and side effects. 9/26/2020 0934 by Patt Fajardo RN Outcome: Progressing Towards Goal 
9/26/2020 0933 by Patt Fajardo RN Outcome: Progressing Towards Goal 
Goal: *Monitoring blood glucose, interpreting and using results Description: Identify recommended blood glucose targets  and personal targets. 9/26/2020 0934 by Patt Fajardo RN Outcome: Progressing Towards Goal 
9/26/2020 0933 by Patt Fajardo RN Outcome: Progressing Towards Goal 
Goal: *Prevention, detection, treatment of acute complications Description: List symptoms of hyper- and hypoglycemia; describe how to treat low blood sugar and actions for lowering  high blood glucose level. 9/26/2020 0934 by Eliza Campbell RN Outcome: Progressing Towards Goal 
9/26/2020 0933 by Eliza Campbell RN Outcome: Progressing Towards Goal 
Goal: *Prevention, detection and treatment of chronic complications Description: Define the natural course of diabetes and describe the relationship of blood glucose levels to long term complications of diabetes. 9/26/2020 0934 by Eliza Campbell RN Outcome: Progressing Towards Goal 
9/26/2020 0933 by Eliza Campbell RN Outcome: Progressing Towards Goal 
Goal: *Developing strategies to address psychosocial issues Description: Describe feelings about living with diabetes; identify support needed and support network 9/26/2020 0934 by Eliza Campbell RN Outcome: Progressing Towards Goal 
9/26/2020 0933 by Eliza Campbell RN Outcome: Progressing Towards Goal 
Goal: *Sick day guidelines 9/26/2020 0934 by Eliza Campbell RN Outcome: Progressing Towards Goal 
9/26/2020 0933 by Eliza Campbell RN Outcome: Progressing Towards Goal 
Goal: *Patient Specific Goal (EDIT GOAL, INSERT TEXT) 
9/26/2020 0934 by Eliza Campbell RN Outcome: Progressing Towards Goal 
9/26/2020 0933 by Eliza Campbell RN Outcome: Progressing Towards Goal 
  
Problem: Patient Education: Go to Patient Education Activity Goal: Patient/Family Education 9/26/2020 0934 by Eliza Campbell RN Outcome: Progressing Towards Goal 
9/26/2020 0933 by Eliza Campbell RN Outcome: Progressing Towards Goal 
  
Problem: Non-Violent Restraints Goal: *Removal from restraints as soon as assessed to be safe 9/26/2020 0934 by Eliza Campbell RN Outcome: Progressing Towards Goal 
9/26/2020 0933 by Eliza Campbell RN Outcome: Progressing Towards Goal 
Goal: *No harm/injury to patient while restraints in use 9/26/2020 0934 by Jose Yang RN Outcome: Progressing Towards Goal 
9/26/2020 0933 by Jose Yang RN Outcome: Progressing Towards Goal 
Goal: *Patient's dignity will be maintained 9/26/2020 0934 by Jose Yang RN Outcome: Progressing Towards Goal 
9/26/2020 0933 by Jose Yang RN Outcome: Progressing Towards Goal 
Goal: *Patient Specific Goal (EDIT GOAL, INSERT TEXT) 
9/26/2020 0934 by Jose Yang RN Outcome: Progressing Towards Goal 
9/26/2020 0933 by Jose Yang RN Outcome: Progressing Towards Goal 
Goal: Non-violent Restaints:Standard Interventions 9/26/2020 0934 by Jose Yang RN Outcome: Progressing Towards Goal 
9/26/2020 0933 by Jose Yang RN Outcome: Progressing Towards Goal 
Goal: Non-violent Restraints:Patient Interventions 9/26/2020 0934 by Jose Yang RN Outcome: Progressing Towards Goal 
9/26/2020 0933 by Jose Yang RN Outcome: Progressing Towards Goal 
Goal: Patient/Family Education 9/26/2020 0934 by Jose Yang RN Outcome: Progressing Towards Goal 
9/26/2020 0933 by Jose Yang RN Outcome: Progressing Towards Goal 
  
Problem: Falls - Risk of 
Goal: *Absence of Falls Description: Document Ant Murphy Fall Risk and appropriate interventions in the flowsheet. 9/26/2020 0934 by Jose Yang RN Outcome: Progressing Towards Goal 
Note: Fall Risk Interventions: 
Mobility Interventions: Bed/chair exit alarm Mentation Interventions: Bed/chair exit alarm Medication Interventions: Bed/chair exit alarm Elimination Interventions: Bed/chair exit alarm 9/26/2020 0933 by Jose Yang RN Outcome: Progressing Towards Goal 
Note: Fall Risk Interventions: 
Mobility Interventions: Bed/chair exit alarm Mentation Interventions: Bed/chair exit alarm Medication Interventions: Bed/chair exit alarm Elimination Interventions: Bed/chair exit alarm Problem: Patient Education: Go to Patient Education Activity Goal: Patient/Family Education 9/26/2020 0934 by Waleska Laird RN Outcome: Progressing Towards Goal 
9/26/2020 0933 by Waleska Laird RN Outcome: Progressing Towards Goal 
  
Problem: Pressure Injury - Risk of 
Goal: *Prevention of pressure injury Description: Document Vasile Scale and appropriate interventions in the flowsheet. 9/26/2020 0934 by Waleska Laird RN Outcome: Progressing Towards Goal 
Note: Pressure Injury Interventions: 
Sensory Interventions: Assess changes in LOC Moisture Interventions: Absorbent underpads Activity Interventions: Pressure redistribution bed/mattress(bed type) Mobility Interventions: Pressure redistribution bed/mattress (bed type) Nutrition Interventions: Document food/fluid/supplement intake Friction and Shear Interventions: Minimize layers 9/26/2020 0933 by Waleska Laird RN Outcome: Progressing Towards Goal 
  
Problem: Patient Education: Go to Patient Education Activity Goal: Patient/Family Education 9/26/2020 0934 by Waleska Laird RN Outcome: Progressing Towards Goal 
9/26/2020 0933 by Waleska Laird RN Outcome: Progressing Towards Goal 
  
Problem: Nutrition Deficit Goal: *Optimize nutritional status 9/26/2020 0934 by Waleska Laird RN Outcome: Progressing Towards Goal 
9/26/2020 0933 by Waleska Laird RN Outcome: Progressing Towards Goal 
  
Problem: Patient Education: Go to Patient Education Activity Goal: Patient/Family Education 9/26/2020 0934 by Waleska Laird RN Outcome: Progressing Towards Goal 
9/26/2020 0933 by Waleska Laird RN Outcome: Progressing Towards Goal 
  
Problem: Patient Education: Go to Patient Education Activity Goal: Patient/Family Education 9/26/2020 0934 by Waleska Laird RN Outcome: Progressing Towards Goal 
9/26/2020 0933 by Waleska Laird RN Outcome: Progressing Towards Goal

## 2020-09-26 NOTE — ROUTINE PROCESS
Pt screaming into hallway. Pt accusing staff of not allowing her to use bathroom, while sitting in the bathroom, on commode. Pt screaming to stop treating her \"as a child\"  \"let me out!!!!\"  Dr. Perez Mp notified by this nurse. Sitter at bedside for safety.

## 2020-09-26 NOTE — PROGRESS NOTES
[facility] Fall Assessment Note Patient: Micki Bar MRN: [id number] SSN: [id number]  YOB: 1965 Age: 60 y.o. Sex: female Admit date: 9/8/2020 Length of stay:  LOS: 18 days PCP: Wilson Hammond MD PP: Altered mental status Subjective:  
Called to bedside by nursing staff for fall evaluation. Per nursing, they heard a loud noise and found her on her bottom beside the garbage bin. Patient reports she got up, felt weak and fell on her right shoulder and hit the back of her head. She endorses continued pain in her right shoulder and back of her head. Patient is confused at baseline and had a fall last evening with similar series of events. Objective:  
Vital Signs: 
Visit Vitals /75 Pulse (!) 110 Temp 97.8 °F (36.6 °C) Resp 17 Ht 5' 8\" (1.727 m) Wt 94.6 kg (208 lb 8 oz) LMP 10/06/2015 (Exact Date) SpO2 100% Breastfeeding No  
BMI 31.70 kg/m² Physical Exam: 
General appearance: no distress, confused HEENT: No swelling or bruising on scalp exam. N o gross abnormalities, bruising, or drainage from ears of nose. Lungs: clear to auscultation bilaterally Heart: regular rate and rhythm, S1, S2 normal, no murmur, click, rub or gallop Abdomen: soft, non-tender. Bowel sounds normal. No masses,  no organomegaly Pulses: 2+ and symmetric Skin: Skin color, texture, turgor normal. No rashes or lesions Neuro:  normal without focal findings - cranial nerve exam limited by pt confusion and cooperation MSK: 5/5 muscle strength in b/l upper extremities. R. Shoulder and arm exam limited by pain with ROM. Assessment and Plan:  
60 y.o. yo female admitted for Altered mental status s/p fall in house. Patient with R shoulder and upper arm pain following fall. Pt has baseline ROM, muscle strength, and is without new paraesthesias. Previous fall could be contributing to R. shoulder and upper arm pain.  XR shoulder and arm from earlier showed no evidence of fracture. CT head showed no acute intracranial findings. There are no new neurologic findings on my physical exam with cranial nerves II-XII grossly intact, no neck tenderness to palpation, nor are there structural abnormalities. Lastly, there is no lesion or gross trauma with inspection of the skin and area of contact with the floor. Per nursing, and my assessment, patient is stable and at baseline condition as prior to the fall. It is this writers assessment that the patient is without complications caused from this event. I recommend the patient be on fall precautions with sitter present, if agreed upon by the primary physician. Dr. Ted Solorzano, attending, has been notified. Reggie Alonzo MD, PGY1 [address] [facility] Pager: [phone] September 26, 2020, 3:52 AM

## 2020-09-26 NOTE — PROGRESS NOTES
Problem: Diabetes Self-Management Goal: *Disease process and treatment process Description: Define diabetes and identify own type of diabetes; list 3 options for treating diabetes. Outcome: Progressing Towards Goal 
Goal: *Incorporating nutritional management into lifestyle Description: Describe effect of type, amount and timing of food on blood glucose; list 3 methods for planning meals. Outcome: Progressing Towards Goal 
Goal: *Incorporating physical activity into lifestyle Description: State effect of exercise on blood glucose levels. Outcome: Progressing Towards Goal 
Goal: *Developing strategies to promote health/change behavior Description: Define the ABC's of diabetes; identify appropriate screenings, schedule and personal plan for screenings. Outcome: Progressing Towards Goal 
Goal: *Using medications safely Description: State effect of diabetes medications on diabetes; name diabetes medication taking, action and side effects. Outcome: Progressing Towards Goal 
Goal: *Monitoring blood glucose, interpreting and using results Description: Identify recommended blood glucose targets  and personal targets. Outcome: Progressing Towards Goal 
Goal: *Prevention, detection, treatment of acute complications Description: List symptoms of hyper- and hypoglycemia; describe how to treat low blood sugar and actions for lowering  high blood glucose level. Outcome: Progressing Towards Goal 
Goal: *Prevention, detection and treatment of chronic complications Description: Define the natural course of diabetes and describe the relationship of blood glucose levels to long term complications of diabetes. Outcome: Progressing Towards Goal 
Goal: *Developing strategies to address psychosocial issues Description: Describe feelings about living with diabetes; identify support needed and support network Outcome: Progressing Towards Goal 
Goal: *Sick day guidelines Outcome: Progressing Towards Goal 
 Goal: *Patient Specific Goal (EDIT GOAL, INSERT TEXT) Outcome: Progressing Towards Goal 
  
Problem: Patient Education: Go to Patient Education Activity Goal: Patient/Family Education Outcome: Progressing Towards Goal 
  
Problem: Non-Violent Restraints Goal: *Removal from restraints as soon as assessed to be safe Outcome: Progressing Towards Goal 
Goal: *No harm/injury to patient while restraints in use Outcome: Progressing Towards Goal 
Goal: *Patient's dignity will be maintained Outcome: Progressing Towards Goal 
Goal: *Patient Specific Goal (EDIT GOAL, INSERT TEXT) Outcome: Progressing Towards Goal 
Goal: Non-violent Restaints:Standard Interventions Outcome: Progressing Towards Goal 
Goal: Non-violent Restraints:Patient Interventions Outcome: Progressing Towards Goal 
Goal: Patient/Family Education Outcome: Progressing Towards Goal 
  
Problem: Falls - Risk of 
Goal: *Absence of Falls Description: Document Madeline Sharp Fall Risk and appropriate interventions in the flowsheet. Outcome: Progressing Towards Goal 
Note: Fall Risk Interventions: 
Mobility Interventions: Bed/chair exit alarm Mentation Interventions: Bed/chair exit alarm Medication Interventions: Bed/chair exit alarm Elimination Interventions: Bed/chair exit alarm Problem: Patient Education: Go to Patient Education Activity Goal: Patient/Family Education Outcome: Progressing Towards Goal 
  
Problem: Pressure Injury - Risk of 
Goal: *Prevention of pressure injury Description: Document Vasile Scale and appropriate interventions in the flowsheet. Outcome: Progressing Towards Goal 
  
Problem: Patient Education: Go to Patient Education Activity Goal: Patient/Family Education Outcome: Progressing Towards Goal 
  
Problem: Nutrition Deficit Goal: *Optimize nutritional status Outcome: Progressing Towards Goal 
  
Problem: Patient Education: Go to Patient Education Activity Goal: Patient/Family Education Outcome: Progressing Towards Goal 
  
Problem: Patient Education: Go to Patient Education Activity Goal: Patient/Family Education Outcome: Progressing Towards Goal

## 2020-09-26 NOTE — ROUTINE PROCESS
Unwitness fall. Nurse noted patient lying on the floor and get up  By  her self. Nurse assisted her on the bed. Vital signs taken. Call [facility]. No available one on one sitter on her  from 1 am to  7 am.  watch her over  with 2 patient need to watch on the monitor too. Chelsea was in the other patient's  room that time. @0049 MD in the room.

## 2020-09-26 NOTE — PROGRESS NOTES
Intern Progress Note [address] [facility] Patient: Flash Avalos MRN: [id number]  CSN: [id number] YOB: 1965  Age: 60 y.o. Sex: female DOA: 9/8/2020 LOS:  LOS: 18 days Subjective:  
 
Acute events: Pt had one fall in the evening and one fall overnight. Imaging of head, neck and right upper extremity performed. Head and neck imaging without acute processes. RUE imaging read pending, no apparent fractures on resident viewing. Pt states she feels ok, continues to endorse thirst. More active today, sitting up and trying to get out of bed more than yesterday. Review of Systems Constitutional: Negative. Respiratory: Negative for cough and shortness of breath. Cardiovascular: Negative for chest pain. Gastrointestinal: Negative for abdominal pain, nausea and vomiting. Musculoskeletal: Positive for falls. Negative for back pain, myalgias and neck pain. Skin: Negative. Neurological: Negative for headaches. Objective:  
  
Patient Vitals for the past 24 hrs: 
 Temp Pulse Resp BP SpO2  
09/26/20 0745 99.4 °F (37.4 °C) 79 20 108/75 100 % 09/26/20 0338 97.8 °F (36.6 °C) (!) 110 17 124/75 100 % 09/25/20 2149 98.7 °F (37.1 °C) (!) 105 18 136/76 98 %  
09/25/20 1622 97.1 °F (36.2 °C) 99 18 134/88 99 % 09/25/20 1101 99.1 °F (37.3 °C) (!) 105 19 (!) 139/92 100 % Intake/Output Summary (Last 24 hours) at 9/26/2020 0920 Last data filed at 9/26/2020 5183 Gross per 24 hour Intake  Output 1550 ml Net -1550 ml Physical Exam:  
General:  Alert and cooperative. Pt with 1:1 sitter HEENT: Conjunctiva pink, sclera anicteric. EOMI. Pharynx moist, nonerythematous. Moist mucous membranes. No other gross abnormalities present. CV:  RRR, no murmurs. No visible pulsations or thrills. RESP:  Unlabored breathing. Lungs clear to auscultation. no wheeze, rales, or rhonchi. Equal expansion bilaterally. ABD:  Soft, nontender, nondistended. No hepatosplenomegaly. No suprapubic tenderness. . 
MS:  No joint deformity or instability. No atrophy. Neuro:  5/5 strength bilateral upper extremities and lower extremities. A+Ox2- Oriented to persona and place. States it is 2007 and Cheko Blanchard is President Ext:  No edema. 2+ radial and dp pulses bilaterally. Skin:  No rashes, lesions, or ulcers. Good turgor. Lab/Data Reviewed: 
Recent Results (from the past 12 hour(s)) GLUCOSE, POC Collection Time: 09/25/20  9:28 PM  
Result Value Ref Range Glucose (POC) 219 (H) 70 - 110 mg/dL GLUCOSE, POC Collection Time: 09/26/20  7:42 AM  
Result Value Ref Range Glucose (POC) 238 (H) 70 - 110 mg/dL RECENT RESULTS Special Testing/Procedures: MODALITY RESULTS MICRO All Micro Results Procedure Component Value Units Date/Time CULTURE, URINE [[id number]]  (Abnormal)  (Susceptibility) Collected:  09/22/20 1355 Order Status:  Completed Specimen:  Urine from Clean catch Updated:  09/26/20 9978 Special Requests: NO SPECIAL REQUESTS [place] Count --     
  [address] COLONIES/mL Culture result:    
  [facility] RESISTANT ENTEROCOCCUS FAECIUM *  
     
 CULTURE, CSF [place] STAIN [[id number]] Collected:  09/15/20 0848 Order Status:  Completed Specimen:  Cerebrospinal Fluid Updated:  09/22/20 1045 Special Requests: CEREBROSPINAL FLUID     
  GRAM STAIN NO WBC'S SEEN     
   NO ORGANISMS SEEN Smear Reviewed by Microbiology 9/15/20 AT 1408 TA. Culture result:    
  NO GROWTH ON SOLID MEDIA AT 4 DAYS  
     
      
  NO GROWTH IN THIO BROTH AT 7 DAYS  
     
 CULTURE, BLOOD [[id number]] Collected:  09/15/20 1002 Order Status:  Completed Specimen:  Blood Updated:  09/21/20 0735 Special Requests: NO SPECIAL REQUESTS Culture result: NO GROWTH 6 DAYS     
 CULTURE, BLOOD [[id number]] Collected:  09/15/20 1005 Order Status:  Completed Specimen:  Blood Updated:  09/21/20 0735 Special Requests: NO SPECIAL REQUESTS Culture result: NO GROWTH 6 DAYS     
 CULTURE, URINE [[id number]] Collected:  09/15/20 1850 Order Status:  Completed Specimen:  Urine from Clean catch Updated:  09/17/20 1046 Special Requests: NO SPECIAL REQUESTS Culture result: No growth (<1,000 CFU/ML) MENINGITIS PATHOGENS PANEL, CSF (BY PCR) [[id number]] Collected:  09/15/20 0848 Order Status:  Completed Specimen:  Cerebrospinal Fluid Updated:  09/15/20 1510 Escherichia coli K1 Not detected Haemophilus Influenzae Not detected Listeria Monocytogenes Not detected Neisseria Meningitidis Not detected Streptococcus Agalactiae Not detected Streptococcus Pneumoniae Not detected Cytomegalovirus Not detected Enterovirus Not detected Herpes Simplex Virus 1 Not detected Comment: In patients who have negative herpes simplex 1 and 2 PCR results, do not modify treatment, confirm with alternate testing. Herpes Simplex Virus 2 Not detected Comment: In patients who have negative herpes simplex 1 and 2 PCR results, do not modify treatment, confirm with alternate testing. Human Herpesvirus 6 Not detected Human Parechovirus Not detected Varicella Zoster Virus Not detected Crypto. neoformans/gattii Not detected CULTURE, BLOOD [[id number]] Collected:  09/08/20 1640 Order Status:  Completed Specimen:  Blood Updated:  09/14/20 0481 Special Requests: NO SPECIAL REQUESTS Culture result: NO GROWTH 6 DAYS     
 CULTURE, BLOOD [[id number]] Collected:  09/08/20 1640 Order Status:  Completed Specimen:  Blood Updated:  09/14/20 5923 Special Requests: NO SPECIAL REQUESTS Culture result: NO GROWTH 6 DAYS     
 CULTURE, URINE [[id number]] Collected:  09/08/20 1211 Order Status:  Completed Specimen:  Urine from Clean catch Updated:  09/09/20 1819 Special Requests: NO SPECIAL REQUESTS [place] Count --     
  >100,000 COLONIES/mL Culture result:    
  MIXED UROGENITAL AUDRA ISOLATED  
     
  
  
UA No results found for this or any previous visit. PATH Scheduled Medications Reviewed: 
Current Facility-Administered Medications Medication Dose Route Frequency  insulin glargine (LANTUS) injection 5 Units  5 Units SubCUTAneous DAILY  cephALEXin (KEFLEX) capsule 500 mg  500 mg Oral Q12H  phosphorus (K PHOS NEUTRAL) 250 mg tablet 1 Tab  1 Tab Oral BID  thiamine HCL (B-1) tablet 100 mg  100 mg Oral DAILY  multivitamin, tx-iron-ca-min (THERA-M w/ IRON) tablet 1 Tab  1 Tab Oral DAILY  QUEtiapine (SEROquel) tablet 63 mg  63 mg Oral QHS  polyethylene glycol (MIRALAX) packet 17 g  17 g Oral BID  tamsulosin (FLOMAX) capsule 0.8 mg  0.8 mg Oral DAILY  QUEtiapine (SEROquel) tablet 25 mg  25 mg Oral DAILY  FLUoxetine (PROzac) capsule 20 mg  20 mg Oral DAILY  lactated Ringers infusion  100 mL/hr IntraVENous CONTINUOUS  
 heparin (porcine) injection 5,000 Units  5,000 Units SubCUTAneous Q8H  
 cholecalciferol (VITAMIN D3) (1000 Units /25 mcg) tablet 1 Tab  1,000 Units Oral DAILY  insulin lispro (HUMALOG) injection   SubCUTAneous AC&HS  
 melatonin tablet 3 mg  3 mg Oral QHS  pantoprazole (PROTONIX) tablet 40 mg  40 mg Oral DAILY  [Held by provider] rosuvastatin (CRESTOR) tablet 20 mg  20 mg Oral QHS Assessment/Plan  
 
60 y. o. 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   
 1. Metabolic Encephalopathy of unclear etiology [likely multifactorial, still not at baseline] - On initial presentation, patient had HHS with -500 treated with IV fluids, as well as leukocytosis and lactic acidosis that improved with IV antibiotics.  On admission, labs showed normal lipase, ammonia, B12 and folate. Blood culture results from 9/8 showed no growth. UA showed 6-8 wbc, 2+ bacteria, but negative for leuk est and nitrites. UCx showed >100, 000 cfu of mixed urogenital kyle, likely a contaminant. On 9/9, CT Chest/Abd/Pelvis showed bladder with mild wall thickening. Repeat cultures remain negative. Pt was initially hypernatremic but this has resolved. UDS and serum ETOH (9/8) was negative. On  9/8, CT head showed mild enlargement of lateral ventricles and cerebral atrophy . - Antibiotic treatment: s/p empirical  abx Ceftriaxone  (9/8-/9). S/p empiric abx  vancomycin (9/8-9/14)  and zoysn (9/9-9/14 ) - Inflammatory mrkers: ESR 32 and CRP 3.7, elevated on 9/14. COVID 19 negative 
- CSF results: wbc 1 , protein 55, glucose 70, elevated opening pressure of 34 mmhg, likely as patient was in prone position and was sedated. Unlikely pseudotumor cerebri per neurology. 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 Blood culture and urine culture negative  
- Autoimmune etiology workup: GRACE negative, TSH wnl, anti-sm negative 
- Heavy metal results negative for lead, arsenic and mercury - Imaging results: A. MRI head 9/11 - no acute intracranial finding. EEG showed encephalopathy.  9/17 MRI Brain w w/o  contrast and MRV brain w wo contrast:  no cerebral sagittal sinus thrombosis, ruled out  CNS infection, intracerebral hemorrhage, or a sagittal sinus thrombosis B. CT AB/PELVIS/CHEST IV (9/15): Marked bladder distention up to the level above the umbilicus.  No CT evidence of infection or inflammatory process. No abscess seen.  Tiny hypodense nodule in right thyroid lobe - can be better evaluated by thyroid ultrasound. Mild and circumferential esophageal wall prominence with collapsed the lumen [if symptomatic, barium swallow can be performed for better evaluation]. Fluid-filled nondilated distal colon and rectum as nonspecific finding and could represent diarrhea. Multiple partially calcified fibroids. C. 9/18 repeat EKG: no ST changes from prior EKG  
 - Psych: Discontinued Metoclopramide and paroxetine. Started fluoxetine for anxiety and to mitigate potential withdrawal symptoms 
- Neurology aware of HSV1 & 2 IgG positive serology; appreciate further recs Plan:  
- Continue 1:1 sitter and consider restraints (roll belt, side rails x4) if patient becomes increasing agitated. Fall precautions in place.  
 - Scheduled Seroquel 63 mg qhs, and 25 mg in AM.  
- Continue Geodon for severe agitation as needed q12h  
- Dispo: rehab vs SNF - Pt will need to be w/o restraints or sitter for 24 hours before discharge, however, not yet safe to remove sitter due to falls 
  
2. UTI - UA showed few bacteria, 2+ yeast and 15-20 WBC.  No obvious suprapubic tenderness on exam. Urine culture showed 75 000 cfu (probable streptococcus species). Completed one dose of fluconazole to treat for possible yeast infection Bladder scan showed >500 ml morning of 9/24 & was straight cathed. Has not needed additional cath since then- several bladder scans >500, but she has spontaneously voided w/in an hour or 2 each morning. Urine Cx positive for vanc resistant enterococcus (9/25)  
 - Continue patient has scheduled bladder scans q6h and scheduled commode time to prevent garcia placement.  
- Continue Keflex 500mg PO, last dose this evening.  
  
3. Possible Dysphagia. CT chest, abd, pelvis w contrast on 9/15 showed mild and circumferential esophageal wall prominence with collapsed lumen.  Barium swallow shows mildly narrowed distal esophagus/EG junction w/o mass, esophageal dysmotility, and probable small sliding hiatal hernia. - Made NPO except clear fluids and meds - Follow up with speech for additional diet recs  
 
4. R eye pain + Right eye vision loss X 2 weeks - Unlikely papilledema based on bedside ultrasound ( no enlarged optic sheath seen), likely floater vs retinal detachment vs pink eye vs acute angle gluacoma Plan: Spoke to optho on 9/10, Dr. Cira Prieto - not able to come 
  
5.  Electrolyte Imbalance: hypophosphatemia on 9/23 was 2.1 but resolved to 3.9; Hypokalemia (resolved) 
- monitor electrolytes and replete as needed 
  
6. Hyperbilirubinemia (3.9) w/ 0.6 direct likely majority is unconjugated  likely secondary to [employer] syndrome, previously elevated bili unlikely hemolysis (improved)  
- CT AB/US - no gall bladder disease appreciated  
- , haptoglobin normal, aldolase pending  
  
7. Diabetes ( last A1c 7.2) with neuropathy and L eye retinopathy - not well controlled 
- held NPH 40U BID (hold home NPH 50 U BID)  
- continue lantus 5 U daily  
  
8.  HTN (BP today 109/55 mmHg) 
- not on any home medications  
  
9. HLD 
- discontinued rosuvastatin due to statin induced myopathy 
  
10. Gastroparesis - KUB showed non-obstruction bowel gas consistent with stool burden 
- held metoclopramide 5 mg  
- continue bowel regimen with Miralax BID and Bisacodyl 5 mg prn.  
- Continue to monitor stool output. 
  
11. GERD 
- cont omeprazole 40 mg delayed capsule 
  
12.  Anxiety 
- held paroxetine 60 mg daily 
  
13. Tardive akathisia (resolved)- initially  had dystonia, EPS symptoms, likely related with chronic metoclopramide use; held metoclopramide Plan: Mild motor restlessness today. Will continue to closely monitor.  
  
14. Normocytic anemia (HgB improved to 13.6) (resolved) 
 - B12 and folate normal 
  
15. Urinary retention s/p gacria  W/ trauma Jelani Correa- Urology recommended DC garcia Plan:  Avoid garcia due to risk of worsening UTI. Bladder scans q6h and scheduled commode time.  
  
16. CRYSTAL creatinine on admission 3.56 ( baseline Cr 1.09 1/2019) in setting of CKD stage 2  likely pre-renal and rhabdo (resolved)  Myoglobin initially 1092. Iron profile normal. Improving CK 1,057--> 996 --> 479 (9/20).  .8 elevated and vitamin D on low side of normal - corrected Ca normal , Vitamin D nml , Phosphorous wnl, microalb/cr ratio wnl Plan:   
- Cont Vitamin D supplement 
- Nephrology following appreciate recs- calcitril 0.25mcg on discharge 
  
  
  
Diet Advance as tolerated per speech DVT Prophylaxis heparin GI Prophylaxis Omperazole Code status Full code Disposition unknown  
  
Point of Contact Prieto Relationship: 
[phone]  
  
  
Capri Louis MD , PGY-1  
[address] Medicine Intern Pager: [phone] September 26, 2020, 9:20 AM

## 2020-09-26 NOTE — ROUTINE PROCESS
Bedside shift change report given to  Daniel Metzger RN (oncoming nurse) by  Maryann Jacobson (offgoing nurse). Report included the following information SBAR, Kardex, ED Summary and Recent Results.

## 2020-09-26 NOTE — ROUTINE PROCESS
Pt rocking back and forth in bed. Pt states she \"needs to go!!\"  She wants to get up and leave. When asked if feeling ok, she states, \"no, I'm anxious, I wanna go! \"  Sitter observed at bedside for safety

## 2020-09-27 NOTE — PROGRESS NOTES
Intern Progress Note [address] [facility] Patient: Tejinder Mcgregor MRN: [id number]  CSN: [id number] YOB: 1965  Age: 60 y.o. Sex: female DOA: 9/8/2020 LOS:  LOS: 19 days Subjective:  
 
Acute events: pt remained with increased agitation overnight, frequently yelling out and very restless, though with slight improvement from yesterday afternoon when PFM was paged. No longer angry. She was agitated and confused this morning, oriented to place only, and endorsing vague generalized pain. At various times, she states her head, abdomin, shoulders, back, and legs hurt. ROS limited due to increased pt delirium. Objective:  
  
Patient Vitals for the past 24 hrs: 
 Temp Pulse Resp BP SpO2  
09/27/20 0756 99.2 °F (37.3 °C) (!) 106 18 138/77 99 % 09/26/20 2335 98.7 °F (37.1 °C) (!) 116 20 112/66 99 % 09/26/20 2102 98.1 °F (36.7 °C) (!) 153 22 125/78 94 % 09/26/20 1655 98.1 °F (36.7 °C) (!) 120 22  100 % 09/26/20 1443 98 °F (36.7 °C) 80 20 121/75 100 % Intake/Output Summary (Last 24 hours) at 9/27/2020 3686 Last data filed at 9/26/2020 1532 Gross per 24 hour Intake 360 ml Output 600 ml Net -240 ml Physical Exam:  
General:  Alert, restless. Pt with 1:1 sitter HEENT: Conjunctiva pink, sclera anicteric. EOMI. Pharynx moist, nonerythematous. Moist mucous membranes. No other gross abnormalities present. CV:  RRR, no murmurs. No visible pulsations or thrills. RESP:  Unlabored breathing. Lungs clear to auscultation. no wheeze, rales, or rhonchi. Equal expansion bilaterally. ABD:  Soft, nontender, nondistended. No hepatosplenomegaly. No suprapubic tenderness. . 
MS:  No joint deformity or instability. No atrophy. Neuro:  5/5 strength bilateral upper extremities and lower extremities. A+O x1- Oriented to person only, states \"I don't know\" and \"That's all I've got for you\" when asked further questions. Ext:  No edema. 2+ radial and dp pulses bilaterally. Skin:  No rashes, lesions, or ulcers. Good turgor. Lab/Data Reviewed: 
Recent Results (from the past 12 hour(s)) GLUCOSE, POC Collection Time: 09/26/20  9:54 PM  
Result Value Ref Range Glucose (POC) 199 (H) 70 - 110 mg/dL GLUCOSE, POC Collection Time: 09/27/20  8:27 AM  
Result Value Ref Range Glucose (POC) 204 (H) 70 - 110 mg/dL RECENT RESULTS MODALITY IMPRESSION  
XR Results from Hospital Encounter encounter on 09/08/20 XR HUMERUS RT Narrative EXAM: XR HUMERUS RT 
 
CLINICAL INDICATION: Right arm pain after fall. COMPARISON: Right shoulder radiograph 7/25/2020 TECHNIQUE: 2 views of the right humerus were obtained. FINDINGS:   
 
BONES: No evidence of acute fracture or dislocation. SOFT TISSUES: Unremarkable. Impression IMPRESSION: 
 
1. No radiographic evidence of acute osseous abnormality. CT Results from Hospital Encounter encounter on 09/08/20 CT SPINE CERV WO CONT Narrative CT SCAN CERVICAL SPINE WITHOUT CONTRAST HISTORY: Unwitnessed fall in an unreliable historian. COMPARISON: 1/4/2019 cervical spine CT. TECHNIQUE: Axial images through the cervical spine were obtained without 
intravenous contrast. Coronal and sagittal reformatted images were also obtained 
for better evaluation of regional anatomy and alignment. All CT scans are 
performed using dose optimization techniques as appropriate to the performed 
exam including the following: Automated exposure control, adjustment of mA 
and/or kV according to patient size, and use of iterative reconstructive 
technique. FINDINGS: 
 
Stable alignment. Straightening of the mid cervical curvature and grade I 
retrolisthesis of C5 on C6. Odontoid intact. Occipital condylar/C-1/C-2 
relationships normal. Vertebral body height preserved. Moderate-advanced disc 
space narrowing with endplate degenerative changes and osteophytes redemonstrated at C5-6, with mild spinal stenosis. Small osteophytes elsewhere. Multilevel facet hypertrophy. No acute fracture. No prevertebral soft tissue 
swelling. Impression IMPRESSION: 
 
1. No CT signs of cervical spine trauma. No significant change to prior. 2. Degenerative disc disease greatest at C5-6 with mild spinal stenosis. MRI Results from [place] encounter on 09/08/20 MRI BRAIN W WO CONT Narrative MRI BRAIN WITH AND WITHOUT CONTRAST Provided Reason for Exam: Confusion, visual loss right eye for 2 weeks, LP with 
elevated opening pressure Additional History: Patient admitted with confusion Comparison Studies: Brain MRI 9/11/2020, head CT 9/9/2020 Imaging Technique:  
  Sequences:  Sagittal and axial T1 weighted, Diffusion Weighted (DWI), Axial T2 
weighted, Axial T2 FLAIR, and GRE MRI images of the brain. Post contrast axial 
and coronal T1 images. Contrast Material:  20 mL Dotarem IV Limiting Factors/Major Artifacts: None. FINDINGS: 
 
Brain Parenchyma: Diffusion sequence negative. No cytotoxic edema or acute 
infarct. Cerebral white matter is normal.  No chronic cortical infarcts or 
chronic lacunar infarcts. No visible masses or midline shift. No abnormal 
parenchymal or meningeal enhancement. CSF Spaces: There are some mild prominence of the posterior horns of the 
lateral ventricles, but the major the ventricles are normal size. No findings of 
ventricular trapping. Appears to be developmental, similar findings on the head CT from 2013. Vascular System:  Grossly patent flow in basilar and internal cerebral arteries. No findings of dural sinus thrombosis. Hemorrhage:  No acute hemorrhage. No chronic microhemorrhage. Other Structures: 
Calvarium: No suspicious marrow signal. 
Sella: Normal. 
Visualized Upper Cervical Spine: Normal craniocervical junction, no Chiari 
malformation. Orbits: Postsurgical changes in the left ocular globe, appears to be silicone 
injection, unchanged from 2014 head CT. Paranasal Sinuses: No significant paranasal sinus disease. Mastoid Air Cells:  Clear. Impression IMPRESSION: 
 
No acute intracranial abnormality. No mass, hemorrhage, or acute infarct. Postsurgical changes left optic globe consistent with previous retinal 
detachment procedure. ULTRASOUND Results from Hospital Encounter encounter on 09/08/20 US ABD LTD Impression IMPRESSION:    
 
1. Status post cholecystectomy. 2.  No significant common bile duct dilation. 3.  Increased hepatic parenchymal echogenicity with somewhat heterogeneous 
appearance, potentially suggestive of hepatosteatosis. Partial visualization of 
the liver. Results from [facility] Encounter encounter on 01/22/19 US ABD LTD Impression IMPRESSION: 
 
Mild heterogeneous echogenicity of the liver is nonspecific. This is commonly 
seen with steatosis hepatic disease. Limited visualization of the pancreas. Cardiology Procedures/Testing: MODALITY RESULTS  
EKG Results for orders placed or performed during the hospital encounter of 09/08/20 EKG, 12 LEAD, INITIAL Result Value Ref Range Ventricular Rate 115 BPM  
 Atrial Rate 115 BPM  
 P-R Interval 148 ms QRS Duration 58 ms Q-T Interval 342 ms QTC Calculation (Bezet) 473 ms Calculated P Axis 34 degrees Calculated R Axis 1 degrees Calculated T Axis 34 degrees Diagnosis Sinus tachycardia Cannot exclude anterior MI versus lead placement issue. Abnormal ECG When compared with ECG of 08-SEP-2020 15:05, Anterior infarct is now present Nonspecific T wave abnormality no longer evident in Inferior leads Nonspecific T wave abnormality, improved in Anterolateral leads Confirmed by Morena Pacheco MD, [facility] ([id number]) on 9/17/2020 3:04:13 PM 
  
Results for orders placed or performed in visit on 06/20/14 AMB POC EKG ROUTINE W/ 12 LEADS, INTER & REP Impression See progress note. ECHO 01/03/19 ECHO ADULT COMPLETE 01/04/2019 1/4/2019 Narrative · Estimated left ventricular ejection fraction is 61 - 65%. Left  
ventricular mild concentric hypertrophy. Mild (grade 1) left ventricular  
diastolic dysfunction. · Mild tricuspid valve regurgitation is present. Signed by: Wilbert Marie MD  
  
 
Special Testing/Procedures: MODALITY RESULTS MICRO All Micro Results Procedure Component Value Units Date/Time CULTURE, URINE [[id number]]  (Abnormal)  (Susceptibility) Collected:  09/22/20 1355 Order Status:  Completed Specimen:  Urine from Clean catch Updated:  09/26/20 4663 Special Requests: NO SPECIAL REQUESTS [place] Count --     
  [id number] COLONIES/mL Culture result:    
  [facility] RESISTANT ENTEROCOCCUS FAECIUM *  
     
 CULTURE, CSF [place] STAIN [[id number]] Collected:  09/15/20 0848 Order Status:  Completed Specimen:  Cerebrospinal Fluid Updated:  09/22/20 1045 Special Requests: CEREBROSPINAL FLUID     
  GRAM STAIN NO WBC'S SEEN     
   NO ORGANISMS SEEN Smear Reviewed by Microbiology 9/15/20 AT 1408 TA. Culture result:    
  NO GROWTH ON SOLID MEDIA AT 4 DAYS  
     
      
  NO GROWTH IN THIO BROTH AT 7 DAYS  
     
 CULTURE, BLOOD [[id number]] Collected:  09/15/20 1002 Order Status:  Completed Specimen:  Blood Updated:  09/21/20 0735 Special Requests: NO SPECIAL REQUESTS Culture result: NO GROWTH 6 DAYS     
 CULTURE, BLOOD [[id number]] Collected:  09/15/20 1005 Order Status:  Completed Specimen:  Blood Updated:  09/21/20 0735 Special Requests: NO SPECIAL REQUESTS Culture result: NO GROWTH 6 DAYS     
 CULTURE, URINE [[id number]] Collected:  09/15/20 1850 Order Status:  Completed Specimen:  Urine from Clean catch Updated:  09/17/20 1046 Special Requests: NO SPECIAL REQUESTS Culture result: No growth (<1,000 CFU/ML) MENINGITIS PATHOGENS PANEL, CSF (BY PCR) [[id number]] Collected:  09/15/20 0848 Order Status:  Completed Specimen:  Cerebrospinal Fluid Updated:  09/15/20 1510 Escherichia coli K1 Not detected Haemophilus Influenzae Not detected Listeria Monocytogenes Not detected Neisseria Meningitidis Not detected Streptococcus Agalactiae Not detected Streptococcus Pneumoniae Not detected Cytomegalovirus Not detected Enterovirus Not detected Herpes Simplex Virus 1 Not detected Comment: In patients who have negative herpes simplex 1 and 2 PCR results, do not modify treatment, confirm with alternate testing. Herpes Simplex Virus 2 Not detected Comment: In patients who have negative herpes simplex 1 and 2 PCR results, do not modify treatment, confirm with alternate testing. Human Herpesvirus 6 Not detected Human Parechovirus Not detected Varicella Zoster Virus Not detected Crypto. neoformans/gattii Not detected CULTURE, BLOOD [[id number]] Collected:  09/08/20 1640 Order Status:  Completed Specimen:  Blood Updated:  09/14/20 9603 Special Requests: NO SPECIAL REQUESTS Culture result: NO GROWTH 6 DAYS     
 CULTURE, BLOOD [[id number]] Collected:  09/08/20 1640 Order Status:  Completed Specimen:  Blood Updated:  09/14/20 5022 Special Requests: NO SPECIAL REQUESTS Culture result: NO GROWTH 6 DAYS     
 CULTURE, URINE [[id number]] Collected:  09/08/20 1211 Order Status:  Completed Specimen:  Urine from Clean catch Updated:  09/09/20 1811 Special Requests: NO SPECIAL REQUESTS [place] Count --     
  >100,000 COLONIES/mL Culture result:    
  MIXED UROGENITAL AUDRA ISOLATED  
     
  
  
UA No results found for this or any previous visit. PATH Scheduled Medications Reviewed: 
Current Facility-Administered Medications Medication Dose Route Frequency  insulin glargine (LANTUS) injection 5 Units  5 Units SubCUTAneous DAILY  phosphorus (K PHOS NEUTRAL) 250 mg tablet 1 Tab  1 Tab Oral BID  thiamine HCL (B-1) tablet 100 mg  100 mg Oral DAILY  multivitamin, tx-iron-ca-min (THERA-M w/ IRON) tablet 1 Tab  1 Tab Oral DAILY  QUEtiapine (SEROquel) tablet 63 mg  63 mg Oral QHS  polyethylene glycol (MIRALAX) packet 17 g  17 g Oral BID  tamsulosin (FLOMAX) capsule 0.8 mg  0.8 mg Oral DAILY  QUEtiapine (SEROquel) tablet 25 mg  25 mg Oral DAILY  FLUoxetine (PROzac) capsule 20 mg  20 mg Oral DAILY  lactated Ringers infusion  100 mL/hr IntraVENous CONTINUOUS  
 heparin (porcine) injection 5,000 Units  5,000 Units SubCUTAneous Q8H  
 cholecalciferol (VITAMIN D3) (1000 Units /25 mcg) tablet 1 Tab  1,000 Units Oral DAILY  insulin lispro (HUMALOG) injection   SubCUTAneous AC&HS  
 melatonin tablet 3 mg  3 mg Oral QHS  pantoprazole (PROTONIX) tablet 40 mg  40 mg Oral DAILY  [Held by provider] rosuvastatin (CRESTOR) tablet 20 mg  20 mg Oral QHS Assessment/Plan  
 
60 y. o. 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   
 1. Metabolic Encephalopathy of unclear etiology [likely multifactorial, still not at baseline] - On initial presentation, patient had HHS with -500 treated with IV fluids, as well as leukocytosis and lactic acidosis that improved with IV antibiotics.  On admission, labs showed normal lipase, ammonia, B12 and folate. Blood culture results from 9/8 showed no growth. UA showed 6-8 wbc, 2+ bacteria, but negative for leuk est and nitrites. UCx showed >100, 000 cfu of mixed urogenital kyle, likely a contaminant. On 9/9, CT Chest/Abd/Pelvis showed bladder with mild wall thickening. Repeat cultures remain negative. Pt was initially hypernatremic but this has resolved. UDS and serum ETOH (9/8) was negative. On  9/8, CT head showed mild enlargement of lateral ventricles and cerebral atrophy. Worsening delirium and agitation in last 24 hours with 2 falls d/t pt climbing out of bed. - Antibiotic treatment: s/p empirical abx Ceftriaxone  (9/8-/9). S/p empiric abx vancomycin (9/8-9/14)  and zoysn (9/9-9/14 ) - Inflammatory mrkers: ESR 32 and CRP 3.7, elevated on 9/14. COVID 19 negative 
- CSF results: wbc 1 , protein 55, glucose 70, elevated opening pressure of 34 mmhg, likely as patient was in prone position and was sedated. Unlikely pseudotumor cerebri per neurology. 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 Blood culture and urine culture negative  
- Autoimmune etiology workup: GRACE negative, TSH wnl, anti-sm negative 
- Heavy metal results negative for lead, arsenic and mercury - Imaging results: A. MRI head 9/11 - no acute intracranial finding. EEG showed encephalopathy.  9/17 MRI Brain w w/o  contrast and MRV brain w wo contrast:  no cerebral sagittal sinus thrombosis, ruled out  CNS infection, intracerebral hemorrhage, or a sagittal sinus thrombosis B. CT AB/PELVIS/CHEST IV (9/15): Marked bladder distention up to the level above the umbilicus.  No CT evidence of infection or inflammatory process. No abscess seen. Tiny hypodense nodule in right thyroid lobe - can be better evaluated by thyroid ultrasound. Mild and circumferential esophageal wall prominence with collapsed the lumen [if symptomatic, barium swallow can be performed for better evaluation]. Fluid-filled nondilated distal colon and rectum as nonspecific finding and could represent diarrhea. Multiple partially calcified fibroids.  
C. 9/18 repeat EKG: no ST changes from prior EKG  
 - Psych: Discontinued Metoclopramide and paroxetine. Started fluoxetine for anxiety and to mitigate potential withdrawal symptoms 
- Neurology aware of HSV1 & 2 IgG positive serology; appreciate further recs 
  
Plan:  
- Continue 1:1 sitter and consider restraints (roll belt, side rails x4) if patient becomes increasing agitated. Fall precautions in place.  
- Scheduled Seroquel 63 mg qhs, and 25 mg in AM.  
- Continue Geodon for severe agitation as needed q12h  
- Dispo: rehab vs SNF - Pt will need to be w/o restraints or sitter for 24 hours before discharge, however, not yet safe to remove sitter due to falls 
  
2. UTI - UA showed few bacteria, 2+ yeast and 15-20 WBC.  No obvious suprapubic tenderness on exam. Urine culture showed 75 000 cfu (probable streptococcus species). Completed one dose of fluconazole to treat for possible yeast infection Bladder scan showed >500 ml morning of 9/24 & was straight cathed. Has not needed additional cath since then- several bladder scans >500, but she has spontaneously voided w/in an hour or 2 each morning. Urine Cx positive for vanc resistant enterococcus (9/25)  
 - Continue patient has scheduled bladder scans q6h and scheduled commode time to prevent garcia placement.  
- Keflex d/c yesterday and given 1 3g dose fosfomycin 9/26.  
- consider consult ID on Monday, 9/29 to ask if she needs 2nd dose.  
  
3. Possible Dysphagia. CT chest, abd, pelvis w contrast on 9/15 showed mild and circumferential esophageal wall prominence with collapsed lumen. Barium swallow shows mildly narrowed distal esophagus/EG junction w/o mass, esophageal dysmotility, and probable small sliding hiatal hernia. - Made NPO except clear fluids and meds - Follow up with speech for additional diet recs  
  
4. R eye pain + Right eye vision loss X 2 weeks - Unlikely papilledema based on bedside ultrasound ( no enlarged optic sheath seen), likely floater vs retinal detachment vs pink eye vs acute deandra hayden Plan: Spoke to optho on 9/10, Dr. Nic Palacios - not able to come 
  
5.  Electrolyte Imbalance: hypophosphatemia on 9/23 was 2.1 but resolved to 3.9; Hypokalemia (resolved)  
- monitor electrolytes and replete as needed 
  
6. Hyperbilirubinemia (3.9) w/ 0.6 direct likely majority is unconjugated likely secondary to [employer] syndrome, previously elevated bili unlikely hemolysis (improved)  
- CT AB/US - no gall bladder disease appreciated  
- , haptoglobin normal, aldolase pending  
  
7. Diabetes ( last A1c 7.2) with neuropathy and L eye retinopathy - not well controlled 
- held NPH 40U BID (hold home NPH 50 U BID)  
- continue lantus 5 U daily  
- continue POC glucose and SSI 
  
8.  HTN (BP today 109/55 mmHg) 
- not on any home medications  
  
9. HLD 
- held rosuvastatin due to suspected statin induced myopathy 
  
10. Gastroparesis - KUB showed non-obstruction bowel gas consistent with stool burden. Pt now with multiple soft, unformed stools daily- frequently puts her hands in stool. 
- held metoclopramide 5 mg  
- Bowel regimen with Miralax changed to daily from BID  
- Bisacodyl 5 mg prn.  
- Continue to monitor stool output. 
  
11. GERD 
- cont omeprazole 40 mg delayed capsule 
  
12.  Anxiety 
- held paroxetine 60 mg daily 
  
13. Tardive akathisia (resolved)- initially  had dystonia, EPS symptoms, likely related with chronic metoclopramide use; held metoclopramide Plan: Mild motor restlessness today. Will continue to closely monitor.  
  
14. Normocytic anemia (HgB improved to 13.6) (resolved) 
 - B12 and folate normal 
  
15. Urinary retention s/p garcia  W/ trauma Yumiko Matos- Urology recommended DC garcia Plan:  Avoid garcia due to risk of worsening UTI. Bladder scans q6h and scheduled commode time.  
  
16. CRYSTAL creatinine on admission 3.56 ( baseline Cr 1.09 1/2019) in setting of CKD stage 2  likely pre-renal and rhabdo (resolved)  Myoglobin initially 1092. Iron profile normal. Improving CK 1,057--> 996 --> 479 (9/20).  .8 elevated and vitamin D on low side of normal - corrected Ca normal , Vitamin D nml , Phosphorous wnl, microalb/cr ratio wnl Plan:   
- Cont Vitamin D supplement 
- Nephrology following appreciate recs- calcitril 0.25mcg on discharge 
   
Diet Advance as tolerated per speech DVT Prophylaxis  d/c due to multiple falls GI Prophylaxis Omperazole Code status Full code Disposition unknown  
  
Point of Contact [place] Relationship: 
[phone]  
  
 
  
Wei Larkin MD , PGY-1  
[address] Medicine Intern Pager: [phone] September 27, 2020, 9:19 AM

## 2020-09-27 NOTE — PROGRESS NOTES
Problem: Diabetes Self-Management Goal: *Disease process and treatment process Description: Define diabetes and identify own type of diabetes; list 3 options for treating diabetes. Outcome: Progressing Towards Goal 
Goal: *Incorporating nutritional management into lifestyle Description: Describe effect of type, amount and timing of food on blood glucose; list 3 methods for planning meals. Outcome: Progressing Towards Goal 
Goal: *Incorporating physical activity into lifestyle Description: State effect of exercise on blood glucose levels. Outcome: Progressing Towards Goal 
Goal: *Developing strategies to promote health/change behavior Description: Define the ABC's of diabetes; identify appropriate screenings, schedule and personal plan for screenings. Outcome: Progressing Towards Goal 
Goal: *Using medications safely Description: State effect of diabetes medications on diabetes; name diabetes medication taking, action and side effects. Outcome: Progressing Towards Goal 
Goal: *Monitoring blood glucose, interpreting and using results Description: Identify recommended blood glucose targets  and personal targets. Outcome: Progressing Towards Goal 
Goal: *Prevention, detection, treatment of acute complications Description: List symptoms of hyper- and hypoglycemia; describe how to treat low blood sugar and actions for lowering  high blood glucose level. Outcome: Progressing Towards Goal 
Goal: *Prevention, detection and treatment of chronic complications Description: Define the natural course of diabetes and describe the relationship of blood glucose levels to long term complications of diabetes. Outcome: Progressing Towards Goal 
Goal: *Developing strategies to address psychosocial issues Description: Describe feelings about living with diabetes; identify support needed and support network Outcome: Progressing Towards Goal 
Goal: *Sick day guidelines Outcome: Progressing Towards Goal 
 Goal: *Patient Specific Goal (EDIT GOAL, INSERT TEXT) Outcome: Progressing Towards Goal 
  
Problem: Patient Education: Go to Patient Education Activity Goal: Patient/Family Education Outcome: Progressing Towards Goal 
  
Problem: Non-Violent Restraints Goal: *Removal from restraints as soon as assessed to be safe Outcome: Progressing Towards Goal 
Goal: *No harm/injury to patient while restraints in use Outcome: Progressing Towards Goal 
Goal: *Patient's dignity will be maintained Outcome: Progressing Towards Goal 
Goal: *Patient Specific Goal (EDIT GOAL, INSERT TEXT) Outcome: Progressing Towards Goal 
Goal: Non-violent Restaints:Standard Interventions Outcome: Progressing Towards Goal 
Goal: Non-violent Restraints:Patient Interventions Outcome: Progressing Towards Goal 
Goal: Patient/Family Education Outcome: Progressing Towards Goal 
  
Problem: Falls - Risk of 
Goal: *Absence of Falls Description: Document Luz Bark Fall Risk and appropriate interventions in the flowsheet. Outcome: Progressing Towards Goal 
Note: Fall Risk Interventions: 
Mobility Interventions: Bed/chair exit alarm Mentation Interventions: Bed/chair exit alarm Medication Interventions: Bed/chair exit alarm Elimination Interventions: Bed/chair exit alarm Problem: Patient Education: Go to Patient Education Activity Goal: Patient/Family Education Outcome: Progressing Towards Goal 
  
Problem: Pressure Injury - Risk of 
Goal: *Prevention of pressure injury Description: Document Vasile Scale and appropriate interventions in the flowsheet. Outcome: Progressing Towards Goal 
Note: Pressure Injury Interventions: 
Sensory Interventions: Assess changes in LOC Moisture Interventions: Absorbent underpads Activity Interventions: Pressure redistribution bed/mattress(bed type) Mobility Interventions: Pressure redistribution bed/mattress (bed type) Nutrition Interventions: Document food/fluid/supplement intake Friction and Shear Interventions: Minimize layers Problem: Patient Education: Go to Patient Education Activity Goal: Patient/Family Education Outcome: Progressing Towards Goal 
  
Problem: Nutrition Deficit Goal: *Optimize nutritional status Outcome: Progressing Towards Goal 
  
Problem: Patient Education: Go to Patient Education Activity Goal: Patient/Family Education Outcome: Progressing Towards Goal 
  
Problem: Patient Education: Go to Patient Education Activity Goal: Patient/Family Education Outcome: Progressing Towards Goal

## 2020-09-27 NOTE — PROGRESS NOTES
Bedside shift change report given to Hafsa Ingram RN (oncoming nurse) by Memo Erwin RN (offgoing nurse). Report included the following information SBAR, Kardex and MAR.

## 2020-09-28 NOTE — PROGRESS NOTES
[facility] Inpatient Progress Note Date of Service: 09/28/20 Hospital Day: 20 Subjective/Interval History 09/28/20 Patient is a 57-year-old female who was admitted for altered mental status changes and hypoglycemia. This is a follow-up consult for management of agitation. Patient was initially seen for psych consult on 9/18. In the interim she has been doing better in terms of agitation however her agitation has worsened in the past 24 hours. Primary team is of the opinion that this could be due to delirium as a result of urinary tract infection. Patient was seen sitting on her bed. She was oriented to self and place but not to time and situation. She was irritable and very loud and did not want to participate in interview. She got quite agitated as the interview progressed. She reported feeling scared and afraid. Objective Visit Vitals /78 (BP 1 Location: Left arm, BP Patient Position: At rest) Pulse (!) 105 Temp 98.7 °F (37.1 °C) Resp 20 Ht 5' 8\" (1.727 m) Wt 94.6 kg (208 lb 8 oz) SpO2 100% Breastfeeding No  
BMI 31.70 kg/m² Recent Results (from the past 24 hour(s)) GLUCOSE, POC Collection Time: 09/27/20 12:48 PM  
Result Value Ref Range Glucose (POC) 225 (H) 70 - 110 mg/dL GLUCOSE, POC Collection Time: 09/27/20  6:02 PM  
Result Value Ref Range Glucose (POC) 136 (H) 70 - 110 mg/dL GLUCOSE, POC Collection Time: 09/27/20 10:25 PM  
Result Value Ref Range Glucose (POC) 250 (H) 70 - 110 mg/dL GLUCOSE, POC Collection Time: 09/28/20  6:05 AM  
Result Value Ref Range Glucose (POC) 189 (H) 70 - 110 mg/dL GLUCOSE, POC Collection Time: 09/28/20  8:03 AM  
Result Value Ref Range Glucose (POC) 208 (H) 70 - 110 mg/dL CBC WITH MANUAL DIFF Collection Time: 09/28/20  8:40 AM  
Result Value Ref Range WBC 8.7 4.6 - 13.2 K/uL  
 RBC 3.20 (L) 4.20 - 5.30 M/uL HGB 9.4 (L) 12.0 - 16.0 g/dL HCT 27.6 (L) 35.0 - 45.0 % MCV 86.3 74.0 - 97.0 FL  
 MCH 29.4 24.0 - 34.0 PG  
 MCHC 34.1 31.0 - 37.0 g/dL  
 RDW 15.7 (H) 11.6 - 14.5 % PLATELET 028 101 - 124 K/uL MPV 10.7 9.2 - 11.8 FL  
 DIFFERENTIAL MANUAL DIFFERENTIAL ORDERED    
 NEUTROPHILS 64 42 - 75 % BAND NEUTROPHILS 0 0 - 5 % LYMPHOCYTES 31 20 - 51 % MONOCYTES 5 2 - 9 % EOSINOPHILS 0 0 - 5 % BASOPHILS 0 0 - 3 % METAMYELOCYTES 0 0 % MYELOCYTES 0 0 % PROMYELOCYTES 0 0 % BLASTS 0 0 % OTHER CELL 0 0    
 ABS. NEUTROPHILS 5.6 1.8 - 8.0 K/UL  
 ABS. LYMPHOCYTES 2.7 0.8 - 3.5 K/UL  
 ABS. MONOCYTES 0.4 0 - 1.0 K/UL  
 ABS. EOSINOPHILS 0.0 0.0 - 0.4 K/UL  
 ABS. BASOPHILS 0.0 0.0 - 0.06 K/UL  
 DF MANUAL PLATELET COMMENTS ADEQUATE PLATELETS    
 RBC COMMENTS NORMOCYTIC, NORMOCHROMIC METABOLIC PANEL, COMPREHENSIVE Collection Time: 09/28/20  8:40 AM  
Result Value Ref Range Sodium 137 136 - 145 mmol/L Potassium 4.2 3.5 - 5.5 mmol/L Chloride 107 100 - 111 mmol/L  
 CO2 25 21 - 32 mmol/L Anion gap 5 3.0 - 18 mmol/L Glucose 200 (H) 74 - 99 mg/dL BUN 10 7.0 - 18 MG/DL Creatinine 1.26 0.6 - 1.3 MG/DL  
 BUN/Creatinine ratio 8 (L) 12 - 20 GFR est AA 53 (L) >60 ml/min/1.73m2 GFR est non-AA 44 (L) >60 ml/min/1.73m2 Calcium 9.0 8.5 - 10.1 MG/DL Bilirubin, total 1.6 (H) 0.2 - 1.0 MG/DL  
 ALT (SGPT) 31 13 - 56 U/L  
 AST (SGOT) 34 10 - 38 U/L Alk. phosphatase 105 45 - 117 U/L Protein, total 7.3 6.4 - 8.2 g/dL Albumin 3.8 3.4 - 5.0 g/dL Globulin 3.5 2.0 - 4.0 g/dL A-G Ratio 1.1 0.8 - 1.7 PHOSPHORUS Collection Time: 09/28/20  8:40 AM  
Result Value Ref Range Phosphorus 4.2 2.5 - 4.9 MG/DL MAGNESIUM Collection Time: 09/28/20  8:40 AM  
Result Value Ref Range Magnesium 1.9 1.6 - 2.6 mg/dL Mental Status Examination Appearance/Hygiene 60 y.o. BLACK OR  female Hygiene: Dressed in hospital gown Behavior/Social Relatedness  uncooperative, agitated Musculoskeletal Gait/Station: Not tested as patient is a fall risk currently on statin Tone (flaccid, cogwheeling, spastic): not assessed due to current mental status Psychomotor (hyperkinetic, hypokinetic): calm Involuntary movements (tics, dyskinesias, akathisa, stereotypies): none Speech   Rate, rhythm, volume, fluency and articulation are appropriate Mood   Irritable, \"scared and afraid. \" Affect    congruent Thought Process  concrete Thought Content and Perceptual Disturbances Denies self-injurious behavior (SIB), suicidal ideation (SI), aggressive behavior or homicidal ideation (HI) Denies auditory and visual hallucinations Sensorium and Cognition  Grossly intact Insight  impaired Judgment  impaired Assessment/Plan Psychiatric Diagnoses:  
Patient Active Problem List  
Code Delirium due to general medical condition Continue current treatment plan. Continue Seroquel 50 mg daily and 75 mg at bedtime. Continue fluoxetine 20 mg daily. Patient's mental status is expected to improve as the UTI is treated. Nona Morley MD DR. [facility]

## 2020-09-28 NOTE — PROGRESS NOTES
[facility] Fall Assessment Note Patient: Armand Cook MRN: [id number]: [id number]  YOB: 1965 Age: 60 y.o. Sex: female Admit date: 9/8/2020 Length of stay:  LOS: 20 days PCP: Felecia Peralta MD PP: Altered mental status Subjective:  
Called to bedside by nursing staff for fall evaluation. Patient was sitting on bed, agitated, and yelling, \"I don't want to die. \" As per sitter, patient fell twice, and hit her head and right shoulder on the table. She has been increasingly agitated from baseline since this weekend. Objective:  
Vital Signs: 
Visit Vitals /86 (BP 1 Location: Left arm, BP Patient Position: At rest) Pulse (!) 107 Temp 98 °F (36.7 °C) Resp 17 Ht 5' 8\" (1.727 m) Wt 94.6 kg (208 lb 8 oz) LMP 10/06/2015 (Exact Date) SpO2 99% Breastfeeding No  
BMI 31.70 kg/m² Physical Exam: 
General appearance: alert, cooperative, no distress, appears stated age Lungs: clear to auscultation bilaterally Heart: regular rate and rhythm, S1, S2 normal, no murmur, click, rub or gallop Abdomen: soft, non-tender. Bowel sounds normal. No masses,  no organomegaly Pulses: 2+ and symmetric Skin: Skin color, texture, turgor normal. No rashes or lesions Neuro: Mild tenderness on palpation of the head. CN II-12 grossly intact. Strength 5/5 in upper and lower extremities. Speech normal. Alert and oriented x 2, but unchanged from previous mentation. CHERYL. Reflexes normal and symmetric Assessment and Plan:  
60 y.o. yo female admitted for Altered mental status s/p fall in the hospital. 
 
Patient was without major complications caused from this event. Per patient report, nursing, and my assessment, patient is stable and at baseline condition as prior to the fall.  There are no focal neurologic findings on my physical exam, with cranial nerves II-XII grossly intact and no neck tenderness to palpation, nor are there structural abnormalities. Patient did report head tenderness diffusely on palpation. Fall presumed to be mechanical in nature as patient has been able to walk unassisted since admission. I will order CT head w/o contrast to confirm that there is no underlying neurological process secondary to fall considering new findings of head tenderness post-fall. No new changes in  mental status/function from prior to fall. Pt has baseline ROM, muscle strength, and is without new paraesthesias. Lastly, there is no lesion or gross trauma with inspection of the skin and area of contact with the floor. It is this writers assessment that the patient is without any major complications caused from this event. I recommend following up on CT head to confirm. Recommend fall precautions and soft restraints to prevent future falls. Attending physician, Dr. Dayanna Padgett, has been notified of fall and agrees with plan. Jade Dow PGY1 [address] Pager: [phone] September 28, 2020, 4:41 PM

## 2020-09-28 NOTE — PROGRESS NOTES
Patient repeatedly stating \"3, 2, 1, I'm going to die\" sitting in bed. Patient being observed by aide, seated in her room. Appears in no physical distress. Will continue to monitor.

## 2020-09-28 NOTE — PROGRESS NOTES
PT treatment attempted at 1145. Pt agitated and screaming in room with nursing present. Will follow up as schedule allows.   
 
Tahira Amezquita PT DPT

## 2020-09-28 NOTE — PROGRESS NOTES
Problem: Diabetes Self-Management Goal: *Disease process and treatment process Description: Define diabetes and identify own type of diabetes; list 3 options for treating diabetes. Outcome: Progressing Towards Goal 
Goal: *Incorporating nutritional management into lifestyle Description: Describe effect of type, amount and timing of food on blood glucose; list 3 methods for planning meals. Outcome: Progressing Towards Goal 
Goal: *Incorporating physical activity into lifestyle Description: State effect of exercise on blood glucose levels. Outcome: Progressing Towards Goal 
Goal: *Developing strategies to promote health/change behavior Description: Define the ABC's of diabetes; identify appropriate screenings, schedule and personal plan for screenings. Outcome: Progressing Towards Goal 
Goal: *Using medications safely Description: State effect of diabetes medications on diabetes; name diabetes medication taking, action and side effects. Outcome: Progressing Towards Goal 
Goal: *Monitoring blood glucose, interpreting and using results Description: Identify recommended blood glucose targets  and personal targets. Outcome: Progressing Towards Goal 
Goal: *Prevention, detection, treatment of acute complications Description: List symptoms of hyper- and hypoglycemia; describe how to treat low blood sugar and actions for lowering  high blood glucose level. Outcome: Progressing Towards Goal 
Goal: *Prevention, detection and treatment of chronic complications Description: Define the natural course of diabetes and describe the relationship of blood glucose levels to long term complications of diabetes. Outcome: Progressing Towards Goal 
Goal: *Developing strategies to address psychosocial issues Description: Describe feelings about living with diabetes; identify support needed and support network Outcome: Progressing Towards Goal 
Goal: *Sick day guidelines Outcome: Progressing Towards Goal 
 Goal: *Patient Specific Goal (EDIT GOAL, INSERT TEXT) Outcome: Progressing Towards Goal

## 2020-09-28 NOTE — PROGRESS NOTES
Bedside shift change report given to Sofia Rodriguez RN (oncoming nurse) by Sydnee Jimenez RN (offgoing nurse). Report included the following information SBAR, Kardex and MAR.

## 2020-09-28 NOTE — PROGRESS NOTES
[address] [facility] Intern Progress Note Patient: Jacky Gastelum MRN: [id number] SSN: [id number]  YOB: 1965 Age: 60 y.o. Sex: female Admit Date: 9/8/2020 LOS: 20 days Chief Complaint Patient presents with  Vomiting Subjective:  
 
Patient seen at bedside. She was agitated this morning but consolable. As per nurse, [facility] has not slept this weekend and was increasing agitated above her baseline. She currently has a 1:1 sitter for 24-hour supervision for safety. Her bowel movements and voids have been appropriate. She is tolerating PO intake without concerns. ROS: No headaches, lightheadedness, chest pain,dyspnea, abdominal pain, constipation, diarrhea, or weakness. Objective:  
 
Visit Vitals BP (!) 141/76 (BP 1 Location: Left arm, BP Patient Position: At rest) Pulse (!) 104 Temp 98.5 °F (36.9 °C) Resp 15 Ht 5' 8\" (1.727 m) Wt 94.6 kg (208 lb 8 oz) LMP 10/06/2015 (Exact Date) SpO2 100% Breastfeeding No  
BMI 31.70 kg/m² Physical Exam:  
General appearance: A+O x 2. Patient was cooperative during our conversation. Oriented to self and place. She was not any restraints. Has 1:1 sitter. Pt in no distress, and appears stated age.   
HEENT: Right eye poorly reactive to light and erythematic. Left eye reactive to light. Moist mucous membranes.  
Lungs: Clear to auscultation bilaterally without adventitious sounds. Heart: Regular rate and rhythm, S1, S2 normal, no murmur, click, rub or gallop Abdomen: Soft and mildly tender, but non-distended. Bowel sounds normal. No masses,  no organomegaly. Skin: Skin color, texture, turgor normal. No rashes or lesions Neuro:  Normal without focal findings. Patient able to follow commands and moves all four extremities.  No evidence of motor restlessness. Extremities: First digit of right foot amputated. Second digit of left foot amputated. No edema.  Pedal pulses 2+ and symmetric.  
 
 Intake and Output: 
Current Shift: No intake/output data recorded. Last three shifts: No intake/output data recorded. Lab/Data Review: 
Recent Results (from the past 12 hour(s)) GLUCOSE, POC Collection Time: 09/27/20 10:25 PM  
Result Value Ref Range Glucose (POC) 250 (H) 70 - 110 mg/dL GLUCOSE, POC Collection Time: 09/28/20  6:05 AM  
Result Value Ref Range Glucose (POC) 189 (H) 70 - 110 mg/dL GLUCOSE, POC Collection Time: 09/28/20  8:03 AM  
Result Value Ref Range Glucose (POC) 208 (H) 70 - 110 mg/dL Assessment and Plan:  
 
60 y. o. 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   
 1. Metabolic Encephalopathy of unclear etiology [likely multifactorial, still not at baseline] - On initial presentation, patient had HHS with -500 treated with IV fluids, as well as leukocytosis and lactic acidosis that improved with IV antibiotics.  On admission, labs showed normal lipase, ammonia, B12 and folate. Blood culture results from 9/8 showed no growth. UA showed 6-8 wbc, 2+ bacteria, but negative for leuk est and nitrites. UCx showed >100, 000 cfu of mixed urogenital kyle, likely a contaminant. On 9/9, CT Chest/Abd/Pelvis showed bladder with mild wall thickening. Repeat cultures remain negative. Pt was initially hypernatremic but this has resolved. UDS and serum ETOH (9/8) was negative. On  9/8, CT head showed mild enlargement of lateral ventricles and cerebral atrophy . - Antibiotic treatment: s/p empirical  abx Ceftriaxone  (9/8-/9). S/p empiric abx  vancomycin (9/8-9/14)  and zoysn (9/9-9/14 ) - Inflammatory markers: ESR 32 and CRP 3.7, elevated on 9/14. COVID 19 negative 
- CSF results: wbc 1 , protein 55, glucose 70, elevated opening pressure of 34 mmhg, likely as patient was in prone position and was sedated. Unlikely pseudotumor cerebri per neurology. 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 Blood culture and urine culture negative  
- Autoimmune etiology workup: GRACE negative, TSH wnl, anti-sm negative 
- imaging results: A. MRI head 9/11 - no acute intracranial finding. EEG showed encephalopathy.  9/17 MRI Brain w w/o  contrast and MRV brain w wo contrast:  no cerebral sagittal sinus thrombosis, ruled out  CNS infection, intracerebral hemorrhage, or a sagittal sinus thrombosis B. CT AB/PELVIS/CHEST IV (9/15): Marked bladder distention up to the level above the umbilicus.  No CT evidence of infection or inflammatory process. No abscess seen. Tiny hypodense nodule in right thyroid lobe - can be better evaluated by thyroid ultrasound. Mild and circumferential esophageal wall prominence with collapsed the lumen [if symptomatic, barium swallow can be performed for better evaluation]. Fluid-filled nondilated distal colon and rectum as nonspecific finding and could represent diarrhea. Multiple partially calcified fibroids. C. 9/18 repeat EKG: no ST changes from prior EKG  
 
Plan:  
 - Psych: Discontinued Metoclopramide and paroxetine. Currently on Fluoxetine for anxiety and to mitigate potential withdrawal symptoms 
- Start 1:1 sitter and consider restraints (roll belt, side rails x4) if patient becomes increasing agitated. Fall precautions in place. 
- Neurology aware of HSV1 & 2 IgG positive serology; appreciate further recs 
 - Increased Seroquel to 75 mg qhs, and 50 mg in AM.  Increased nighttime Seroquel as patient tends to get frequent episodes of agitation in the evening.  Continue soft restraints today and attempt to wean off again this evening.  Currently on Geodon for severe agitation as needed q12h   
 - follow up on B1 results. Heavy metal results negative for lead, arsenic and mercury - Dispo: rehab vs SNF   
  
2. UTI - UA showed few bacteria, 2+ yeast and 15-20 WBC.  No obvious suprapubic tenderness on exam. Urine culture showed 75 000 cfu (probable streptococcus species).   
- Completed one dose of fluconazole to treat for possible yeast infection - On flomax 0.8 mg daily -   Patient has scheduled bladder scans q6h and scheduled commode time due to propensity for urinary retention - Discontinue Keflex. Patient is more agitated this weekend, above her baseline. This is likely delirium from her new UTI. She was given 1 dose of fosfomycin over the weekend as urine culture was positive for VRE. If patient has persistently elevated temperatures and agitation, can consider CT abd/pelvis. 
  
3. Possible Dysphagia - CT chest, abd, pelvis w contrast on 9/15 showed mild and circumferential esophageal wall prominence with collapsed the lumen. - Barium swallow indicative of narrowed appearance of distal esophagus and EJ junction but without mass and possible small sliding hiatal hernia. -  Pt requires assistance with meals due to aspiration risk and concerning barium swallow 
  
4. R eye pain + Right eye vision loss x 2 weeks - Unlikely papilledema based on bedside ultrasound ( no enlarged optic sheath seen), likely floater vs retinal detachment vs pink eye vs acute angle gluacoma Plan: Spoke to optho on 9/10, Dr. Ti Colón - not able to come 
  
5.  Electrolyte Imbalance: hypophosphatemia on 9/23 was 2.1 but resolved; Hypokalemia (resolved) 
- monitor electrolytes and replete as needed 
  
6. 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 Patient has elevated bili at baseline. Unlikely hemolysis. T bili (1.6). LFTs wnl otherwise.  
- CT AB/US - no gall bladder disease appreciated  
- , haptoglobin normal, aldolase pending  
  
 7. Diabetes ( last A1c 7.2) with neuropathy and L eye retinopathy - not well controlled 
- held NPH 40U BID (hold home NPH 50 U BID)  
- continue lantus 5 U daily  
  
8.  HTN (BP today 109/55 mmHg) 
- not on any home medications  
  
9. HLD 
- cont rosuvastatin  
  
10. Gastroparesis - KUB showed non-obstruction bowel gas consistent with stool burden 
- held metoclopramide 5 mg  
- Start bowel regimen with Miralax daily and Bisacodyl 5 mg prn.  
- Continue to monitor stool output. 
  
11. GERD 
- cont  omeprazole 40 mg delayed capsule 
  
12.  Anxiety 
- held  paroxetine 60 mg daily 
- start prozac 20 mg daily 
  
13. Tardive akathisia (resolved)- initially  had dystonia, EPS symptoms, likely related with chronic metoclopramide use; held metoclopramide Plan: No motor restlessness today. Elevated CK on 9/22 (654).    This is likely because patient is still on restraints. Will continue to closely monitor.  
  
14. Normocytic anemia (HgB improved to 13.6) (resolved) 
 - B12 and folate normal 
  
15. CRYSTAL creatinine on admission 3.56 ( baseline Cr 1.09 1/2019) in setting of CKD stage 2  likely pre-renal and rhabdo (resolved)  
- Myoglobin initially 1092. Iron profile normal 
- Improving CK 1,057--> 996 --> 479 (9/20) 
- .8 elevated and vitamin D on low side of normal - corrected Ca normal , Vitamin D nml , Phosphorous wnl, microalb/cr ratio wnl Plan:   
- Cont Vitamin D supplement 
- Nephrology following appreciate recs- calcitril 0.25mcg on discharge 
  
  
  
Diet Advance as tolerated per speech DVT Prophylaxis heparin GI Prophylaxis Omperazole Code status Full code Disposition unknown  
  
Point of Contact [place] Relationship: 
[phone]  
  
  
 
 
Abram Tovar MD, PGY-1  
[address] Medicine Intern Pager: [phone] September 28, 2020, 8:45 AM

## 2020-09-28 NOTE — PROGRESS NOTES
Patient appears anxious, states \"I'm Leonie Mixer die\" \"please don't let me die\" and attempting to egt out of bed. Seen by aide getting out of bed and said \"I'm gonna die and that's alright\" and sat on the floor. [facility] made aware. Unit director made aware.

## 2020-09-29 NOTE — DIABETES MGMT
Glycemic Control Plan of Care Note that patient occassionally refuses BG monitoring and insulin. Currently receiving lantus 7 units daily and corrective lispro, very insulin resistant, 4 times daily. FBG = 182 this morning Consider increasing lantus to 10 units daily. TDD 9/28/2020 = 17 units. 7 units lantus, 10 units lispro. Jimmie Rashid MPH RN CDE 
[phone]

## 2020-09-29 NOTE — PROGRESS NOTES
Patient has an elevated mews score of 4. /77, Respiration rate 21, heart rate 117. The [place]. No new verbal orders. Will continue to monitor.

## 2020-09-29 NOTE — PROGRESS NOTES
Pt attempted to be seen for re-evaluation. Pt has been on and off agitated, confused and not appropriate to be seen for skilled PT to be able to safely participate. Pt has not made progress towards goals since start of care on 9/14/20. Thus will sign-off at this time and discharge orders. Please re-order if pt status improves and is able to participate in skilled PT services.  Thank you for this referral. Deisy Dow, PT, DPT

## 2020-09-29 NOTE — PROGRESS NOTES
[address] [facility] [address] Inpatient Progress Note Date of Service: 09/29/20 Hospital Day: 21 Subjective/Interval History 09/29/20 The patient is less agitated today. She is able to participate in interview and she is pleasant, not yelling and screaming like yesterday. She is oriented to person and place. She denies suicidal ideation, feelings of hopelessness, hallucinations and anxiety. She says she was feeling anxious yesterday. She is also uncomfortable with the roll belt but was able to verbalize understanding when explained that it was due to frequent falls. Nurse reports that she did not sleep well last night and was agitated, probably hallucinating and talking to herself in the room but is much calmer this morning. Objective Visit Vitals /80 (BP 1 Location: Left arm, BP Patient Position: At rest) Pulse (!) 107 Temp 98.4 °F (36.9 °C) Resp 16 Ht 5' 8\" (1.727 m) Wt 94.6 kg (208 lb 8 oz) SpO2 98% Breastfeeding No  
BMI 31.70 kg/m² Recent Results (from the past 24 hour(s)) GLUCOSE, POC Collection Time: 09/28/20 12:01 PM  
Result Value Ref Range Glucose (POC) 169 (H) 70 - 110 mg/dL GLUCOSE, POC Collection Time: 09/28/20  3:52 PM  
Result Value Ref Range Glucose (POC) 243 (H) 70 - 110 mg/dL GLUCOSE, POC Collection Time: 09/28/20  9:29 PM  
Result Value Ref Range Glucose (POC) 115 (H) 70 - 110 mg/dL CBC WITH MANUAL DIFF Collection Time: 09/29/20  4:40 AM  
Result Value Ref Range WBC 10.1 4.6 - 13.2 K/uL  
 RBC 3.09 (L) 4.20 - 5.30 M/uL HGB 8.9 (L) 12.0 - 16.0 g/dL HCT 27.1 (L) 35.0 - 45.0 % MCV 87.7 74.0 - 97.0 FL  
 MCH 28.8 24.0 - 34.0 PG  
 MCHC 32.8 31.0 - 37.0 g/dL  
 RDW 15.7 (H) 11.6 - 14.5 % PLATELET 341 153 - 027 K/uL MPV 10.1 9.2 - 11.8 FL  
 DIFFERENTIAL MANUAL DIFFERENTIAL ORDERED    
 NEUTROPHILS 62 42 - 75 % BAND NEUTROPHILS 0 0 - 5 % LYMPHOCYTES 29 20 - 51 % MONOCYTES 9 2 - 9 % EOSINOPHILS 0 0 - 5 % BASOPHILS 0 0 - 3 % METAMYELOCYTES 0 0 % MYELOCYTES 0 0 % PROMYELOCYTES 0 0 % BLASTS 0 0 % OTHER CELL 0 0    
 ABS. NEUTROPHILS 6.3 1.8 - 8.0 K/UL  
 ABS. LYMPHOCYTES 2.9 0.8 - 3.5 K/UL  
 ABS. MONOCYTES 0.9 0 - 1.0 K/UL  
 ABS. EOSINOPHILS 0.0 0.0 - 0.4 K/UL  
 ABS. BASOPHILS 0.0 0.0 - 0.06 K/UL  
 DF MANUAL PLATELET COMMENTS Increased Platelets RBC COMMENTS ANISOCYTOSIS 
1+ METABOLIC PANEL, COMPREHENSIVE Collection Time: 09/29/20  4:40 AM  
Result Value Ref Range Sodium 141 136 - 145 mmol/L Potassium 4.0 3.5 - 5.5 mmol/L Chloride 109 100 - 111 mmol/L  
 CO2 24 21 - 32 mmol/L Anion gap 8 3.0 - 18 mmol/L Glucose 182 (H) 74 - 99 mg/dL BUN 11 7.0 - 18 MG/DL Creatinine 1.12 0.6 - 1.3 MG/DL  
 BUN/Creatinine ratio 10 (L) 12 - 20 GFR est AA >60 >60 ml/min/1.73m2 GFR est non-AA 50 (L) >60 ml/min/1.73m2 Calcium 9.5 8.5 - 10.1 MG/DL Bilirubin, total 2.3 (H) 0.2 - 1.0 MG/DL  
 ALT (SGPT) 22 13 - 56 U/L  
 AST (SGOT) 24 10 - 38 U/L Alk. phosphatase 109 45 - 117 U/L Protein, total 7.1 6.4 - 8.2 g/dL Albumin 3.6 3.4 - 5.0 g/dL Globulin 3.5 2.0 - 4.0 g/dL A-G Ratio 1.0 0.8 - 1.7 PHOSPHORUS Collection Time: 09/29/20  4:40 AM  
Result Value Ref Range Phosphorus 3.7 2.5 - 4.9 MG/DL MAGNESIUM Collection Time: 09/29/20  4:40 AM  
Result Value Ref Range Magnesium 2.2 1.6 - 2.6 mg/dL GLUCOSE, POC Collection Time: 09/29/20  8:04 AM  
Result Value Ref Range Glucose (POC) 189 (H) 70 - 110 mg/dL GLUCOSE, POC Collection Time: 09/29/20 11:19 AM  
Result Value Ref Range Glucose (POC) 234 (H) 70 - 110 mg/dL Mental Status Examination Appearance/Hygiene 60 y.o. BLACK OR  female Hygiene: Dressed in hospital gown Behavior/Social Relatedness  pleasant and cooperative Musculoskeletal Gait/Station: Not tested as patient is a fall risk currently on statin Tone (flaccid, cogwheeling, spastic): not assessed due to current mental status Psychomotor (hyperkinetic, hypokinetic): calm Involuntary movements (tics, dyskinesias, akathisa, stereotypies): none Speech   Rate, rhythm, volume, fluency and articulation are appropriate Mood   \"my mood is fine\" Affect    blunted Thought Process  concrete Thought Content and Perceptual Disturbances Denies self-injurious behavior (SIB), suicidal ideation (SI), aggressive behavior or homicidal ideation (HI) Denies auditory and visual hallucinations Sensorium and Cognition  Grossly intact Insight  Limited Judgment  Limited Assessment/Plan Psychiatric Diagnoses:  
Patient Active Problem List  
Code Delirium due to general medical condition Continue current treatment plan. Continue Seroquel 50 mg daily and 75 mg at bedtime. Continue fluoxetine 20 mg daily. Patient's mental status is expected to improve with time. Esequiel Freeman MD [facility]

## 2020-09-29 NOTE — PROGRESS NOTES
spoke with nurse Joan Escobar: pt is unable to cooperate for test at this time. MD notified and nurse will call back when pt is able to cooperate

## 2020-09-29 NOTE — PROGRESS NOTES
[facility] Intern Progress Note [facility] [id number] SSN: [id number]  YOB: 1965 Age: 60 y.o. Sex: female Admit Date: 9/8/2020 LOS: 21 days Chief Complaint Patient presents with  Vomiting Subjective:  
 
Patient is doing well this morning. Less agitated compared to yesterday. As per nurse, she received one dose of Geodon last night for acute agitation. She was alert and oriented x2 this morning. She is urinating and stooling appropriately. Tolerating PO intake without any issues. ROS: No headaches, lightheadedness, chest pain, SOB, abdo pain, constipation or diarrhea. Objective:  
 
Visit Vitals /71 Pulse 66 Temp 98.7 °F (37.1 °C) Resp 16 Ht 5' 8\" (1.727 m) Wt 94.6 kg (208 lb 8 oz) LMP 10/06/2015 (Exact Date) SpO2 100% Breastfeeding No  
BMI 31.70 kg/m² Physical Exam:  
General appearance: A+O x 2. Patient was cooperative during our conversation. Oriented to self and place. She was not any restraints. Has 1:1 sitter. Pt in no distress, and appears stated age.   
HEENT: Right eye poorly reactive to light and erythematic. Left eye reactive to light. Moist mucous membranes.  
Lungs: Clear to auscultation bilaterally without adventitious sounds. Heart: Regular rate and rhythm, S1, S2 normal, no murmur, click, rub or gallop Abdomen: Soft and mildly tender, but non-distended. Bowel sounds normal. No masses,  no organomegaly. Skin: Skin color, texture, turgor normal. No rashes or lesions Neuro:  Normal without focal findings. Patient able to follow commands and moves all four extremities.  No evidence of motor restlessness. Extremities: First digit of right foot amputated. Second digit of left foot amputated. No edema. Pedal pulses 2+ and symmetric.  
Psych: Less agitated this morning. Intake and Output: 
Current Shift: No intake/output data recorded. Last three shifts: 09/27 1901 - 09/29 0700 In: 480 [P.O.:480] Out: -  
 
Lab/Data Review: 
Recent Results (from the past 12 hour(s)) GLUCOSE, POC Collection Time: 09/28/20  9:29 PM  
Result Value Ref Range Glucose (POC) 115 (H) 70 - 110 mg/dL CBC WITH MANUAL DIFF Collection Time: 09/29/20  4:40 AM  
Result Value Ref Range WBC 10.1 4.6 - 13.2 K/uL  
 RBC 3.09 (L) 4.20 - 5.30 M/uL HGB 8.9 (L) 12.0 - 16.0 g/dL HCT 27.1 (L) 35.0 - 45.0 % MCV 87.7 74.0 - 97.0 FL  
 MCH 28.8 24.0 - 34.0 PG  
 MCHC 32.8 31.0 - 37.0 g/dL  
 RDW 15.7 (H) 11.6 - 14.5 % PLATELET 213 579 - 151 K/uL MPV 10.1 9.2 - 11.8 FL  
 DIFFERENTIAL MANUAL DIFFERENTIAL ORDERED    
 NEUTROPHILS PENDING % LYMPHOCYTES PENDING % MONOCYTES PENDING % EOSINOPHILS PENDING % BASOPHILS PENDING % BAND NEUTROPHILS PENDING % PROMYELOCYTES PENDING % MYELOCYTES PENDING % METAMYELOCYTES PENDING % BLASTS PENDING % OTHER CELL PENDING   
 ABS. NEUTROPHILS PENDING K/UL  
 ABS. LYMPHOCYTES PENDING K/UL  
 ABS. MONOCYTES PENDING K/UL  
 ABS. EOSINOPHILS PENDING K/UL  
 ABS. BASOPHILS PENDING K/UL  
 RBC COMMENTS PENDING   
 DF PENDING   
METABOLIC PANEL, COMPREHENSIVE Collection Time: 09/29/20  4:40 AM  
Result Value Ref Range Sodium 141 136 - 145 mmol/L Potassium 4.0 3.5 - 5.5 mmol/L Chloride 109 100 - 111 mmol/L  
 CO2 24 21 - 32 mmol/L Anion gap 8 3.0 - 18 mmol/L Glucose 182 (H) 74 - 99 mg/dL BUN 11 7.0 - 18 MG/DL Creatinine 1.12 0.6 - 1.3 MG/DL  
 BUN/Creatinine ratio 10 (L) 12 - 20 GFR est AA >60 >60 ml/min/1.73m2 GFR est non-AA 50 (L) >60 ml/min/1.73m2 Calcium 9.5 8.5 - 10.1 MG/DL Bilirubin, total 2.3 (H) 0.2 - 1.0 MG/DL  
 ALT (SGPT) 22 13 - 56 U/L  
 AST (SGOT) 24 10 - 38 U/L Alk. phosphatase 109 45 - 117 U/L Protein, total 7.1 6.4 - 8.2 g/dL Albumin 3.6 3.4 - 5.0 g/dL Globulin 3.5 2.0 - 4.0 g/dL A-G Ratio 1.0 0.8 - 1.7 PHOSPHORUS  
 Collection Time: 09/29/20  4:40 AM  
Result Value Ref Range Phosphorus 3.7 2.5 - 4.9 MG/DL MAGNESIUM Collection Time: 09/29/20  4:40 AM  
Result Value Ref Range Magnesium 2.2 1.6 - 2.6 mg/dL Assessment and Plan:  
 
60 y. o. 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   
 1. Metabolic Encephalopathy of unclear etiology [likely multifactorial, still not at baseline] - On initial presentation, patient had HHS with -500 treated with IV fluids, as well as leukocytosis and lactic acidosis that improved with IV antibiotics.  On admission, labs showed normal lipase, ammonia, B12 and folate. Blood culture results from 9/8 showed no growth. UA showed 6-8 wbc, 2+ bacteria, but negative for leuk est and nitrites. UCx showed >100, 000 cfu of mixed urogenital kyle, likely a contaminant. On 9/9, CT Chest/Abd/Pelvis showed bladder with mild wall thickening. Repeat cultures remain negative. Pt was initially hypernatremic but this has resolved. UDS and serum ETOH (9/8) was negative. On  9/8, CT head showed mild enlargement of lateral ventricles and cerebral atrophy . - Antibiotic treatment: s/p empirical  abx Ceftriaxone  (9/8-/9). S/p empiric abx  vancomycin (9/8-9/14)  and zoysn (9/9-9/14 ) - Inflammatory markers: ESR 32 and CRP 3.7, elevated on 9/14. COVID 19 negative 
- CSF results: wbc 1 , protein 55, glucose 70, elevated opening pressure of 34 mmhg, likely as patient was in prone position and was sedated. Unlikely pseudotumor cerebri per neurology. 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  Blood culture and urine culture negative  
 - Autoimmune etiology workup: GRACE negative, TSH wnl, anti-sm negative 
- imaging results: A. MRI head 9/11 - no acute intracranial finding. EEG showed encephalopathy.  9/17 MRI Brain w w/o  contrast and MRV brain w wo contrast:  no cerebral sagittal sinus thrombosis, ruled out  CNS infection, intracerebral hemorrhage, or a sagittal sinus thrombosis B. CT AB/PELVIS/CHEST IV (9/15): Marked bladder distention up to the level above the umbilicus.  No CT evidence of infection or inflammatory process. No abscess seen. Tiny hypodense nodule in right thyroid lobe - can be better evaluated by thyroid ultrasound. Mild and circumferential esophageal wall prominence with collapsed the lumen [if symptomatic, barium swallow can be performed for better evaluation]. Fluid-filled nondilated distal colon and rectum as nonspecific finding and could represent diarrhea. Multiple partially calcified fibroids. C. 9/18 repeat EKG: no ST changes from prior EKG  
  
Plan:  
 - Psych: Discontinued Metoclopramide and paroxetine. Currently on Fluoxetine for anxiety and to mitigate potential withdrawal symptoms 
- Start 1:1 sitter and consider restraints (roll belt, side rails x4) if patient becomes increasing agitated. Fall precautions in place. 
- Neurology aware of HSV1 & 2 IgG positive serology; appreciate further recs 
 - Increased Seroquel to 75 mg qhs, and 50 mg in AM.  Increased nighttime Seroquel as patient tends to get frequent episodes of agitation in the evening. Continue soft restraints today and attempt to wean off again this evening.  Currently on Geodon for severe agitation as needed q12h   
- follow up on B1 results. Heavy metal results negative for lead, arsenic and mercury - Dispo: rehab vs SNF   
  
2. UTI - UA showed few bacteria, 2+ yeast and 15-20 WBC.  No obvious suprapubic tenderness on exam. Urine culture showed 75 000 cfu (probable streptococcus species).   
 - Completed one dose of fluconazole to treat for possible yeast infection - On flomax 0.8 mg daily -   Patient has scheduled bladder scans q6h and scheduled commode time due to propensity for urinary retention - Discontinue Keflex. Patient is more agitated this weekend, above her baseline. This is likely delirium from her new UTI. She was given 1 dose of fosfomycin over the weekend as urine culture was positive for VRE. If patient has persistently elevated temperatures and agitation, can consider CT abd/pelvis. 
  
3. Possible Dysphagia - CT chest, abd, pelvis w contrast on 9/15 showed mild and circumferential esophageal wall prominence with collapsed the lumen. - Barium swallow indicative of narrowed appearance of distal esophagus and EJ junction but without mass and possible small sliding hiatal hernia. -  Pt requires assistance with meals due to aspiration risk and concerning barium swallow 
  
4. R eye pain + Right eye vision loss x 2 weeks - Unlikely papilledema based on bedside ultrasound ( no enlarged optic sheath seen), likely floater vs retinal detachment vs pink eye vs acute angle gluacoma Plan: Spoke to optho on 9/10, Dr. Nic Palacios - not able to come 
  
5.  Electrolyte Imbalance: hypophosphatemia on 9/23 was 2.1 but resolved; Hypokalemia (resolved) 
- monitor electrolytes and replete as needed 
  
6. Hyperbilirubinemia -  likely secondary to [employer] syndrome. Patient has elevated bili at baseline. Unlikely hemolysis. T bili (1.6). LFTs wnl otherwise. - CT AB/US - no gall bladder disease appreciated  
- , haptoglobin normal, aldolase pending  
  
7. Diabetes ( last A1c 7.2) with neuropathy and L eye retinopathy - not well controlled 
- held NPH 40U BID (hold home NPH 50 U BID)  
- continue lantus 5 U daily  
  
8.  HTN (BP today 109/55 mmHg) 
- not on any home medications  
  
9. HLD 
- cont rosuvastatin  
  
10. Gastroparesis - KUB showed non-obstruction bowel gas consistent with stool burden 
- held metoclopramide 5 mg  
- Start bowel regimen with Miralax daily and Bisacodyl 5 mg prn.  
- Continue to monitor stool output. 
  
11. GERD 
- cont  omeprazole 40 mg delayed capsule 
  
12.  Anxiety 
- held  paroxetine 60 mg daily 
- start prozac 20 mg daily 
  
13. Tardive akathisia (resolved)- initially  had dystonia, EPS symptoms, likely related with chronic metoclopramide use; held metoclopramide Plan: No motor restlessness today. Elevated CK on 9/22 (654).    This is likely because patient is still on restraints. Will continue to closely monitor.  
  
14. Normocytic anemia (HgB improved to 13.6) (resolved) 
 - B12 and folate normal 
  
15. CRYSTAL creatinine on admission 3.56 ( baseline Cr 1.09 1/2019) in setting of CKD stage 2  likely pre-renal and rhabdo (resolved)  
- Myoglobin initially 1092. Iron profile normal 
- Improving CK 1,057--> 996 --> 479 (9/20) 
- .8 elevated and vitamin D on low side of normal - corrected Ca normal , Vitamin D nml , Phosphorous wnl, microalb/cr ratio wnl Plan:   
- Cont Vitamin D supplement 
- Nephrology following appreciate recs- calcitril 0.25mcg on discharge 
  
  
  
Diet Advance as tolerated per speech DVT Prophylaxis heparin GI Prophylaxis Omperazole Code status Full code Disposition unknown  
  
Point of Contact [place] Relationship: 
[phone]  
  
 
 
Marilyn Hendricks MD, PGY-1  
[address] Intern Pager: [phone] September 29, 2020, 7:17 AM

## 2020-09-29 NOTE — PROGRESS NOTES
Bedside shift change report given to Monty Plasencia (oncoming nurse) by Amara Benavidez RN (offgoing nurse). Report included the following information SBAR, Kardex, Intake/Output and MAR.

## 2020-09-29 NOTE — PROGRESS NOTES
conducted a Follow up consultation and Spiritual Assessment for Kevin Araujo, who is a 60 y.o.,female. The patient at this time is not able to comprehend. The  prayed for the patient and the compassionate sitter.  [facility] [phone]

## 2020-09-29 NOTE — PROGRESS NOTES
Patient experienced second fall today when she threw herself on the floor and pressed herself into the corner, screaming \"leave me alone, I just want to die! \" No injury noted. Aided back to bed by aide and this nurse. Witness by aide. MD aware. Will continue to monitor.

## 2020-09-29 NOTE — PROGRESS NOTES
Nutrition Assessment Type and Reason for Visit: Reassess, Positive nutrition screen Nutrition Recommendations/Plan:  
- Continue current nutrition interventions. Encourage po intake Nutrition Assessment:  Altered mentation/ confusion; was very agitated yesterday; less agitated/ better today. Has fair/good meal intake, eating % of meals per chart documentation. fair appetite. Malnutrition Assessment: 
Malnutrition Status: No malnutrition Estimated Daily Nutrient Needs: 
Energy (kcal):  3300-4123 Protein (g):  75-94 Fluid (ml/day):  5365-2109 Nutrition Related Findings:  BM 9/27 formed. taking vitamin D3, MVI, K phos neutral, thiamine supplements Current Nutrition Therapies: DIET NUTRITIONAL SUPPLEMENTS Breakfast, Lunch, Dinner; [employer] DIET DYSPHAGIA PUREED (NDD1) Anthropometric Measures: 
· Height:  5' 8\" (172.7 cm) · Current Body Wt:  95.9 kg (211 lb 6.7 oz) · BMI: 32.2 Nutrition Diagnosis:  
· Inadequate oral intake related to cognitive or neurological impairment as evidenced by intake 26-50%(recently improving) Nutrition Intervention: 
Food and/or Nutrient Delivery: Continue current diet, Continue oral nutrition supplement, Mineral supplement, Vitamin supplement Nutrition Education and Counseling: Education not indicated Coordination of Nutrition Care: Continued inpatient monitoring Goals: 
PO nutrition intake will continue to meet >75% of patients estimated nutritional needs over the next 7 days. Nutrition Monitoring and Evaluation: 
Food/Nutrient Intake Outcomes: Diet advancement/tolerance, Supplement intake, Food and nutrient intake, Vitamin/mineral intake Physical Signs/Symptoms Outcomes: Chewing or swallowing, Constipation, Meal time behavior, Nutrition focused physical findings Discharge Planning:   
Continue current diet, Continue oral nutrition supplement Electronically signed by Yadira Doss RD on 9/29/2020 at 1:20 PM 
 
Contact Number:  [phone]

## 2020-09-30 NOTE — PROGRESS NOTES
Bedside shift change report given to Jenaro Johnson RN (oncoming nurse) by Berenice Blanchard RN (offgoing nurse). Report included the following information SBAR, Kardex, Intake/Output, MAR and Recent Results.

## 2020-09-30 NOTE — ROUTINE PROCESS
Bedside shift change report given to [address] [place] (oncoming nurse) by  Bing Bhatti RN (offgoing nurse). Report included the following information SBAR, Kardex, ED Summary and Recent Results.

## 2020-09-30 NOTE — DIABETES MGMT
Glycemic Control Plan of Care 
 
lantus was increased to 10 units daily - to start this morning. Continues on corrective lispro, very insulin resistant, 4 times daily. Note patient remains confused - alert and oriented to self only. Will continue to monitor Sapphire Westbrook MPH RN CDE 
[phone]

## 2020-09-30 NOTE — PROGRESS NOTES
[address] [facility] Intern Progress Note Patient: Avani Meza MRN: [id number] SSN: [id number]  YOB: 1965 Age: 60 y.o. Sex: female Admit Date: 9/8/2020 LOS: 22 days Chief Complaint Patient presents with  Vomiting Subjective:  
 
Patient seen at bedside. Cooperative this morning. Patient did not appear agitated. She was alert and oriented x1 this morning (alert to self only). She is urinating and stooling appropriately. Tolerating PO intake without any issues.  
  
ROS: No headaches, lightheadedness, chest pain, SOB, abdo pain, constipation or diarrhea. Objective:  
 
Visit Vitals /68 Pulse (!) 109 Temp 97.8 °F (36.6 °C) Resp 18 Ht 5' 8\" (1.727 m) Wt 94.6 kg (208 lb 8 oz) LMP 10/06/2015 (Exact Date) SpO2 99% Breastfeeding No  
BMI 31.70 kg/m² Physical Exam:  
General appearance: A+O x 1. Patient was cooperative during our conversation. Oriented to self only. On soft restrains. Has 1:1 sitter. Pt in no distress, and appears stated age.   
HEENT: Right eye poorly reactive to light and erythematic, unchanged from yesterday. Left eye reactive to light. Moist mucous membranes.  
Lungs: Clear to auscultation bilaterally without adventitious sounds. Heart: Regular rate and rhythm, S1, S2 normal, no murmur, click, rub or gallop Abdomen: Soft, non-tender, non-distended. Bowel sounds normal. No masses,  no organomegaly. Skin: Skin color, texture, turgor normal. No rashes or lesions Neuro:  Normal without focal findings. Patient able to follow commands and moves all four extremities.  No evidence of motor restlessness. Extremities: First digit of right foot amputated. Second digit of left foot amputated. No edema. Pedal pulses 2+ and symmetric.  
Psych: Less agitated this morning. Intake and Output: 
Current Shift: No intake/output data recorded. Last three shifts: 09/28 1901 - 09/30 0700 In: 3031 [P.O.:1195] Out: 5439 [LCARJ:5096] Lab/Data Review: 
Recent Results (from the past 12 hour(s)) GLUCOSE, POC Collection Time: 09/29/20  9:56 PM  
Result Value Ref Range Glucose (POC) 255 (H) 70 - 110 mg/dL Telemetry NONE Oxygen NONE Assessment and Plan:  
 
60 y. o. 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   
 1. Metabolic Encephalopathy of unclear etiology [likely multifactorial, still not at baseline] - On initial presentation, patient had HHS with -500 treated with IV fluids, as well as leukocytosis and lactic acidosis that improved with IV antibiotics.  On admission, labs showed normal lipase, ammonia, B12 and folate. Blood culture results from 9/8 showed no growth. UA showed 6-8 wbc, 2+ bacteria, but negative for leuk est and nitrites. UCx showed >100, 000 cfu of mixed urogenital kyle, likely a contaminant. On 9/9, CT Chest/Abd/Pelvis showed bladder with mild wall thickening. Repeat cultures remain negative. Pt was initially hypernatremic but this has resolved. UDS and serum ETOH (9/8) was negative. On  9/8, CT head showed mild enlargement of lateral ventricles and cerebral atrophy . - Antibiotic treatment: s/p empirical  abx Ceftriaxone  (9/8-/9). S/p empiric abx  vancomycin (9/8-9/14)  and zoysn (9/9-9/14 ) - Inflammatory markers: ESR 32 and CRP 3.7, elevated on 9/14. COVID 19 negative 
- CSF results: wbc 1 , protein 55, glucose 70, elevated opening pressure of 34 mmhg, likely as patient was in prone position and was sedated. Unlikely pseudotumor cerebri per neurology. Negative meningitis panel; however, HSV 1 & 2 serology negative - Infectious etiology workup: HIV negative, RPR negative. Blood culture and urine culture negative  
- Autoimmune etiology workup: GRACE negative, TSH wnl, anti-sm negative - imaging results: A. MRI head 9/11 - no acute intracranial finding. EEG showed encephalopathy.  9/17 MRI Brain w w/o  contrast and MRV brain w wo contrast:  no cerebral sagittal sinus thrombosis, ruled out  CNS infection, intracerebral hemorrhage, or a sagittal sinus thrombosis B. CT AB/PELVIS/CHEST IV (9/15): Marked bladder distention up to the level above the umbilicus.  No CT evidence of infection or inflammatory process. No abscess seen. Tiny hypodense nodule in right thyroid lobe - can be better evaluated by thyroid ultrasound. Mild and circumferential esophageal wall prominence with collapsed the lumen [if symptomatic, barium swallow can be performed for better evaluation]. Fluid-filled nondilated distal colon and rectum as nonspecific finding and could represent diarrhea. Multiple partially calcified fibroids. C. 9/18 repeat EKG: no ST changes from prior EKG  
- Heavy metal results negative for lead, arsenic and mercury 
  
Plan:  
 - Psych: Discontinued Metoclopramide and paroxetine. Currently on Fluoxetine for anxiety and to mitigate potential withdrawal symptoms 
- Start 1:1 sitter and consider restraints (roll belt, side rails x4) if patient becomes increasing agitated. Fall precautions in place. 
 - ContinueSeroquel to 75 mg qhs, and 50 mg in AM.  Increased nighttime Seroquel as patient tends to get frequent episodes of agitation in the evening. Continue soft restraints today in light of recent falls and attempt to wean off again this evening.  Changed Geodon to scheduled dosing 10 mg IM for severe agitation daily - Dispo: [facility] vs inpatient psych   
  
2. UTI - UA showed few bacteria, 2+ yeast and 15-20 WBC.  No obvious suprapubic tenderness on exam. Urine culture showed 75 000 cfu (probable streptococcus species).   
- Completed one dose of fluconazole to treat for possible yeast infection - On flomax 0.8 mg daily -   Patient has scheduled bladder scans q6h and scheduled commode time due to propensity for urinary retention - Patient was more agitated this weekend. Agitation seems to have improved today. She was likely delirius from her new UTI. She was given 2 doses of fosfomycin (9/26, 9/29). 
  
3. Possible Dysphagia - CT chest, abd, pelvis w contrast on 9/15 showed mild and circumferential esophageal wall prominence with collapsed the lumen. 
- Barium swallow indicative of narrowed appearance of distal esophagus and EJ junction but without mass and possible small sliding hiatal hernia. -  Pt requires assistance with meals due to aspiration risk and concerning barium swallow 
  
4. R eye pain + Right eye vision loss x 2 weeks - Unlikely papilledema based on bedside ultrasound ( no enlarged optic sheath seen), likely floater vs retinal detachment vs pink eye vs acute angle gluacoma Plan: Spoke to optho on 9/10, Dr. Luis Angel Hough - not able to come 
  
5.  Electrolyte Imbalance: hypophosphatemia on 9/23 was 2.1 but resolved; Hypokalemia (resolved) 
- monitor electrolytes and replete as needed 
  
6. Hyperbilirubinemia -  likely secondary to [employer] syndrome. Patient has elevated bili at baseline. Unlikely hemolysis. T bili (1.6). LFTs wnl otherwise. - CT AB/US - no gall bladder disease appreciated  
- , haptoglobin normal, aldolase pending  
  
7. Diabetes ( last A1c 7.2) with neuropathy and L eye retinopathy - not well controlled 
- held NPH 40U BID (hold home NPH 50 U BID)  
- continue lantus 5 U daily  
  
8.  HTN (BP today 109/55 mmHg) 
- not on any home medications  
  
9. HLD 
- cont rosuvastatin  
  
10. Gastroparesis - KUB showed non-obstruction bowel gas consistent with stool burden 
- held metoclopramide 5 mg  
- Start bowel regimen with Miralax daily and Bisacodyl 5 mg prn.  
- Continue to monitor stool output. 
  
11. GERD 
- cont  omeprazole 40 mg delayed capsule 
  
12.  Anxiety 
- held  paroxetine 60 mg daily 
- start prozac 20 mg daily 
  
 13. Tardive akathisia (resolved)- initially  had dystonia, EPS symptoms, likely related with chronic metoclopramide use; held metoclopramide Plan: No motor restlessness today. Elevated CK on 9/22 (654).    This is likely because patient is still on restraints. Will continue to closely monitor.  
  
14. Normocytic anemia (HgB improved to 13.6) (resolved) 
 - B12 and folate normal 
  
15. CRYSTAL creatinine on admission 3.56 ( baseline Cr 1.09 1/2019) in setting of CKD stage 2  likely pre-renal and rhabdo (resolved)  
- Myoglobin initially 1092. Iron profile normal 
- Improving CK 1,057--> 996 --> 479 (9/20) 
- .8 elevated and vitamin D on low side of normal - corrected Ca normal , Vitamin D nml , Phosphorous wnl, microalb/cr ratio wnl Plan:   
- Cont Vitamin D supplement 
- Nephrology following appreciate recs- calcitril 0.25mcg on discharge 
  
  
  
Diet Advance as tolerated per speech DVT Prophylaxis heparin GI Prophylaxis Omperazole Code status Full code Disposition unknown  
  
Point of Contact [place] Relationship: 
[phone]

## 2020-09-30 NOTE — PROGRESS NOTES
Problem: Diabetes Self-Management Goal: *Disease process and treatment process Description: Define diabetes and identify own type of diabetes; list 3 options for treating diabetes. Outcome: Progressing Towards Goal 
Goal: *Incorporating nutritional management into lifestyle Description: Describe effect of type, amount and timing of food on blood glucose; list 3 methods for planning meals. Outcome: Progressing Towards Goal 
Goal: *Incorporating physical activity into lifestyle Description: State effect of exercise on blood glucose levels. Outcome: Progressing Towards Goal 
Goal: *Developing strategies to promote health/change behavior Description: Define the ABC's of diabetes; identify appropriate screenings, schedule and personal plan for screenings. Outcome: Progressing Towards Goal 
Goal: *Using medications safely Description: State effect of diabetes medications on diabetes; name diabetes medication taking, action and side effects. Outcome: Progressing Towards Goal 
Goal: *Monitoring blood glucose, interpreting and using results Description: Identify recommended blood glucose targets  and personal targets. Outcome: Progressing Towards Goal 
Goal: *Prevention, detection, treatment of acute complications Description: List symptoms of hyper- and hypoglycemia; describe how to treat low blood sugar and actions for lowering  high blood glucose level. Outcome: Progressing Towards Goal 
Goal: *Prevention, detection and treatment of chronic complications Description: Define the natural course of diabetes and describe the relationship of blood glucose levels to long term complications of diabetes. Outcome: Progressing Towards Goal 
Goal: *Developing strategies to address psychosocial issues Description: Describe feelings about living with diabetes; identify support needed and support network Outcome: Progressing Towards Goal 
Goal: *Sick day guidelines Outcome: Progressing Towards Goal 
 Goal: *Patient Specific Goal (EDIT GOAL, INSERT TEXT) Outcome: Progressing Towards Goal 
  
Problem: Patient Education: Go to Patient Education Activity Goal: Patient/Family Education Outcome: Progressing Towards Goal 
  
Problem: Non-Violent Restraints Goal: *Removal from restraints as soon as assessed to be safe Outcome: Progressing Towards Goal 
Goal: *No harm/injury to patient while restraints in use Outcome: Progressing Towards Goal 
Goal: *Patient's dignity will be maintained Outcome: Progressing Towards Goal 
Goal: *Patient Specific Goal (EDIT GOAL, INSERT TEXT) Outcome: Progressing Towards Goal 
Goal: Non-violent Restaints:Standard Interventions Outcome: Progressing Towards Goal 
Goal: Non-violent Restraints:Patient Interventions Outcome: Progressing Towards Goal 
Goal: Patient/Family Education Outcome: Progressing Towards Goal 
  
Problem: Falls - Risk of 
Goal: *Absence of Falls Description: Document Yulia Benoit Fall Risk and appropriate interventions in the flowsheet. Outcome: Progressing Towards Goal 
Note: Fall Risk Interventions: 
Mobility Interventions: Bed/chair exit alarm Mentation Interventions: Adequate sleep, hydration, pain control Medication Interventions: Bed/chair exit alarm Elimination Interventions: Bed/chair exit alarm History of Falls Interventions: Bed/chair exit alarm Problem: Patient Education: Go to Patient Education Activity Goal: Patient/Family Education Outcome: Progressing Towards Goal 
  
Problem: Pressure Injury - Risk of 
Goal: *Prevention of pressure injury Description: Document Vasile Scale and appropriate interventions in the flowsheet. Outcome: Progressing Towards Goal 
  
Problem: Patient Education: Go to Patient Education Activity Goal: Patient/Family Education Outcome: Progressing Towards Goal 
  
Problem: Nutrition Deficit Goal: *Optimize nutritional status Outcome: Progressing Towards Goal 
  
 Problem: Patient Education: Go to Patient Education Activity Goal: Patient/Family Education Outcome: Progressing Towards Goal 
  
Problem: Patient Education: Go to Patient Education Activity Goal: Patient/Family Education Outcome: Progressing Towards Goal

## 2020-09-30 NOTE — PROGRESS NOTES
Patient resting in bed. Patient confused and oriented X1. Patient has a sitter and is in a roll belt. Assess need for restraint q2hr. Side rails X4. Assist patient with feeding and incontinent care. Bed alarm on. Bed locked in lowest position. Call bell within reach. Problem: Diabetes Self-Management Goal: *Disease process and treatment process Description: Define diabetes and identify own type of diabetes; list 3 options for treating diabetes. Outcome: Progressing Towards Goal 
Goal: *Incorporating nutritional management into lifestyle Description: Describe effect of type, amount and timing of food on blood glucose; list 3 methods for planning meals. Outcome: Progressing Towards Goal 
Goal: *Incorporating physical activity into lifestyle Description: State effect of exercise on blood glucose levels. Outcome: Progressing Towards Goal 
Goal: *Developing strategies to promote health/change behavior Description: Define the ABC's of diabetes; identify appropriate screenings, schedule and personal plan for screenings. Outcome: Progressing Towards Goal 
Goal: *Using medications safely Description: State effect of diabetes medications on diabetes; name diabetes medication taking, action and side effects. Outcome: Progressing Towards Goal 
Goal: *Monitoring blood glucose, interpreting and using results Description: Identify recommended blood glucose targets  and personal targets. Outcome: Progressing Towards Goal 
Goal: *Prevention, detection, treatment of acute complications Description: List symptoms of hyper- and hypoglycemia; describe how to treat low blood sugar and actions for lowering  high blood glucose level. Outcome: Progressing Towards Goal 
Goal: *Prevention, detection and treatment of chronic complications Description: Define the natural course of diabetes and describe the relationship of blood glucose levels to long term complications of diabetes.  
Outcome: Progressing Towards Goal 
 Goal: *Developing strategies to address psychosocial issues Description: Describe feelings about living with diabetes; identify support needed and support network Outcome: Progressing Towards Goal 
Goal: *Sick day guidelines Outcome: Progressing Towards Goal 
Goal: *Patient Specific Goal (EDIT GOAL, INSERT TEXT) Outcome: Progressing Towards Goal 
  
Problem: Patient Education: Go to Patient Education Activity Goal: Patient/Family Education Outcome: Progressing Towards Goal 
  
Problem: Non-Violent Restraints Goal: *Removal from restraints as soon as assessed to be safe Outcome: Progressing Towards Goal 
Goal: *No harm/injury to patient while restraints in use Outcome: Progressing Towards Goal 
Goal: *Patient's dignity will be maintained Outcome: Progressing Towards Goal 
Goal: *Patient Specific Goal (EDIT GOAL, INSERT TEXT) Outcome: Progressing Towards Goal 
Goal: Non-violent Restaints:Standard Interventions Outcome: Progressing Towards Goal 
Goal: Non-violent Restraints:Patient Interventions Outcome: Progressing Towards Goal 
Goal: Patient/Family Education Outcome: Progressing Towards Goal 
  
Problem: Falls - Risk of 
Goal: *Absence of Falls Description: Document Miranda Kearney Fall Risk and appropriate interventions in the flowsheet. Outcome: Progressing Towards Goal 
Note: Fall Risk Interventions: 
Mobility Interventions: Bed/chair exit alarm Mentation Interventions: Adequate sleep, hydration, pain control, Bed/chair exit alarm, Family/sitter at bedside, More frequent rounding Medication Interventions: Bed/chair exit alarm Elimination Interventions: Bed/chair exit alarm History of Falls Interventions: Bed/chair exit alarm Problem: Patient Education: Go to Patient Education Activity Goal: Patient/Family Education Outcome: Progressing Towards Goal 
  
Problem: Pressure Injury - Risk of 
Goal: *Prevention of pressure injury Description: Document Vasile Scale and appropriate interventions in the flowsheet. Outcome: Progressing Towards Goal 
Note: Pressure Injury Interventions: 
Sensory Interventions: Assess changes in LOC, Avoid rigorous massage over bony prominences, Keep linens dry and wrinkle-free, Minimize linen layers Moisture Interventions: Minimize layers Activity Interventions: Increase time out of bed Mobility Interventions: HOB 30 degrees or less, Pressure redistribution bed/mattress (bed type) Nutrition Interventions: Document food/fluid/supplement intake Friction and Shear Interventions: HOB 30 degrees or less, Minimize layers Problem: Patient Education: Go to Patient Education Activity Goal: Patient/Family Education Outcome: Progressing Towards Goal 
  
Problem: Nutrition Deficit Goal: *Optimize nutritional status Outcome: Progressing Towards Goal 
  
Problem: Patient Education: Go to Patient Education Activity Goal: Patient/Family Education Outcome: Progressing Towards Goal 
  
Problem: Patient Education: Go to Patient Education Activity Goal: Patient/Family Education Outcome: Progressing Towards Goal

## 2020-09-30 NOTE — PROGRESS NOTES
[address] Inpatient Progress Note Date of Service: 09/30/20 Hospital Day: 22 Subjective/Interval History 09/30/20 Per nursing report, patient continues with intermittent episodes of agitation. She was very agitated last night and did a lot of yelling and screaming. She seems to be more agitated in the evenings. She is still physically restrained with roll belt. Patient was seen Erica Morales in bed. She was oriented to self only and thought that she was somewhere in [place]. She was drowsy and confused. She was able to provide brief responses to questions. So far no significant improvement in delirium as patient continues with intermittent periods of agitation. Objective Visit Vitals /75 (BP 1 Location: Left arm, BP Patient Position: Lying left side) Pulse (!) 109 Temp 97.8 °F (36.6 °C) Resp 20 Ht 5' 8\" (1.727 m) Wt 94.6 kg (208 lb 8 oz) SpO2 99% Breastfeeding No  
BMI 31.70 kg/m² Recent Results (from the past 24 hour(s)) GLUCOSE, POC Collection Time: 09/29/20  4:57 PM  
Result Value Ref Range Glucose (POC) 168 (H) 70 - 110 mg/dL GLUCOSE, POC Collection Time: 09/29/20  9:56 PM  
Result Value Ref Range Glucose (POC) 255 (H) 70 - 110 mg/dL GLUCOSE, POC Collection Time: 09/30/20  9:44 AM  
Result Value Ref Range Glucose (POC) 264 (H) 70 - 110 mg/dL CBC WITH MANUAL DIFF Collection Time: 09/30/20 10:20 AM  
Result Value Ref Range WBC 9.1 4.6 - 13.2 K/uL  
 RBC 3.10 (L) 4.20 - 5.30 M/uL HGB 9.0 (L) 12.0 - 16.0 g/dL HCT 27.3 (L) 35.0 - 45.0 % MCV 88.1 74.0 - 97.0 FL  
 MCH 29.0 24.0 - 34.0 PG  
 MCHC 33.0 31.0 - 37.0 g/dL  
 RDW 15.9 (H) 11.6 - 14.5 % PLATELET 417 957 - 471 K/uL MPV 9.7 9.2 - 11.8 FL  
 DIFFERENTIAL MANUAL DIFFERENTIAL ORDERED    
 NEUTROPHILS 70 42 - 75 % BAND NEUTROPHILS 0 0 - 5 % LYMPHOCYTES 17 (L) 20 - 51 % MONOCYTES 13 (H) 2 - 9 % EOSINOPHILS 0 0 - 5 % BASOPHILS 0 0 - 3 % METAMYELOCYTES 0 0 % MYELOCYTES 0 0 % PROMYELOCYTES 0 0 % BLASTS 0 0 % OTHER CELL 0 0    
 ABS. NEUTROPHILS 6.4 1.8 - 8.0 K/UL  
 ABS. LYMPHOCYTES 1.5 0.8 - 3.5 K/UL  
 ABS. MONOCYTES 1.2 (H) 0 - 1.0 K/UL  
 ABS. EOSINOPHILS 0.0 0.0 - 0.4 K/UL  
 ABS. BASOPHILS 0.0 0.0 - 0.06 K/UL  
 DF MANUAL PLATELET COMMENTS Increased Platelets RBC COMMENTS ANISOCYTOSIS 
1+ METABOLIC PANEL, COMPREHENSIVE Collection Time: 09/30/20 10:20 AM  
Result Value Ref Range Sodium 142 136 - 145 mmol/L Potassium 3.9 3.5 - 5.5 mmol/L Chloride 111 100 - 111 mmol/L  
 CO2 25 21 - 32 mmol/L Anion gap 6 3.0 - 18 mmol/L Glucose 227 (H) 74 - 99 mg/dL BUN 13 7.0 - 18 MG/DL Creatinine 1.22 0.6 - 1.3 MG/DL  
 BUN/Creatinine ratio 11 (L) 12 - 20 GFR est AA 55 (L) >60 ml/min/1.73m2 GFR est non-AA 45 (L) >60 ml/min/1.73m2 Calcium 9.0 8.5 - 10.1 MG/DL Bilirubin, total 1.6 (H) 0.2 - 1.0 MG/DL  
 ALT (SGPT) 24 13 - 56 U/L  
 AST (SGOT) 22 10 - 38 U/L Alk. phosphatase 110 45 - 117 U/L Protein, total 7.1 6.4 - 8.2 g/dL Albumin 3.6 3.4 - 5.0 g/dL Globulin 3.5 2.0 - 4.0 g/dL A-G Ratio 1.0 0.8 - 1.7 PHOSPHORUS Collection Time: 09/30/20 10:20 AM  
Result Value Ref Range Phosphorus 3.4 2.5 - 4.9 MG/DL MAGNESIUM Collection Time: 09/30/20 10:20 AM  
Result Value Ref Range Magnesium 2.0 1.6 - 2.6 mg/dL GLUCOSE, POC Collection Time: 09/30/20 11:20 AM  
Result Value Ref Range Glucose (POC) 246 (H) 70 - 110 mg/dL Mental Status Examination Appearance/Hygiene 60 y.o. BLACK OR  female Hygiene: Dressed in hospital gown Behavior/Social Relatedness  mildly irritated Musculoskeletal Gait/Station: Not tested as patient is a fall risk currently on statin Tone (flaccid, cogwheeling, spastic): not assessed due to current mental status Psychomotor (hyperkinetic, hypokinetic): calm Involuntary movements (tics, dyskinesias, akathisa, stereotypies): none Speech   Rate, rhythm, volume, fluency and articulation are appropriate Mood   \"my mood is fine\" Affect    blunted Thought Process  concrete Thought Content and Perceptual Disturbances Denies self-injurious behavior (SIB), suicidal ideation (SI), aggressive behavior or homicidal ideation (HI) Denies auditory and visual hallucinations Sensorium and Cognition  Grossly intact Insight  Limited Judgment  Limited Assessment/Plan Psychiatric Diagnoses:  
Patient Active Problem List  
Code Delirium due to general medical condition Recommend discontinue Seroquel and try Haldol 2 mg TID and Depakote 250mg TID for agitation related to delirium. Discontinue scheduled Geodon and use Haldol 5mg TID prn agitation to limit antipsychotic polypharmacy. EKG to check for QTc prolongation. Patient is not appropriate and does not meet criteria for inpatient psychiatric hospitalization. Delirium is exclusionary criterion for admission to inpatient psych unit. Case discussed with Dr. Paul Mirza. MD DR. LIN [facility]

## 2020-09-30 NOTE — PROGRESS NOTES
Discharge planning: This writer spoke with patient's son Benji Ariza) regarding bringing in verifications to [address] [place] [address], in order to process the medicaid application. Per Ulla Barthel, he brought in the verifications earlier today, to [facility]. This writer will continue to monitor for discharge planning to ensure a safe discharge from [facility]. Lemmie M. Eyvonne Bosworth. [facility] Care Manager Pager#: [phone]

## 2020-10-01 NOTE — PROGRESS NOTES
Problem: Diabetes Self-Management Goal: *Disease process and treatment process Description: Define diabetes and identify own type of diabetes; list 3 options for treating diabetes. Outcome: Progressing Towards Goal 
Goal: *Incorporating nutritional management into lifestyle Description: Describe effect of type, amount and timing of food on blood glucose; list 3 methods for planning meals. Outcome: Progressing Towards Goal 
Goal: *Incorporating physical activity into lifestyle Description: State effect of exercise on blood glucose levels. Outcome: Progressing Towards Goal 
Goal: *Developing strategies to promote health/change behavior Description: Define the ABC's of diabetes; identify appropriate screenings, schedule and personal plan for screenings. Outcome: Progressing Towards Goal 
Goal: *Using medications safely Description: State effect of diabetes medications on diabetes; name diabetes medication taking, action and side effects. Outcome: Progressing Towards Goal 
Goal: *Monitoring blood glucose, interpreting and using results Description: Identify recommended blood glucose targets  and personal targets. Outcome: Progressing Towards Goal 
Goal: *Prevention, detection, treatment of acute complications Description: List symptoms of hyper- and hypoglycemia; describe how to treat low blood sugar and actions for lowering  high blood glucose level. Outcome: Progressing Towards Goal 
Goal: *Prevention, detection and treatment of chronic complications Description: Define the natural course of diabetes and describe the relationship of blood glucose levels to long term complications of diabetes. Outcome: Progressing Towards Goal 
Goal: *Developing strategies to address psychosocial issues Description: Describe feelings about living with diabetes; identify support needed and support network Outcome: Progressing Towards Goal 
Goal: *Sick day guidelines Outcome: Progressing Towards Goal 
  
 Problem: Non-Violent Restraints Goal: *Removal from restraints as soon as assessed to be safe Outcome: Progressing Towards Goal 
Goal: *No harm/injury to patient while restraints in use Outcome: Progressing Towards Goal 
Goal: *Patient's dignity will be maintained Outcome: Progressing Towards Goal 
Goal: *Patient Specific Goal (EDIT GOAL, INSERT TEXT) Outcome: Progressing Towards Goal 
Goal: Non-violent Restaints:Standard Interventions Outcome: Progressing Towards Goal 
Goal: Non-violent Restraints:Patient Interventions Outcome: Progressing Towards Goal 
  
Problem: Falls - Risk of 
Goal: *Absence of Falls Description: Document Ag Watters Fall Risk and appropriate interventions in the flowsheet. Outcome: Progressing Towards Goal 
Note: Fall Risk Interventions: 
Mobility Interventions: Bed/chair exit alarm Mentation Interventions: Adequate sleep, hydration, pain control, Bed/chair exit alarm Medication Interventions: Bed/chair exit alarm Elimination Interventions: Bed/chair exit alarm, Call light in reach, Toileting schedule/hourly rounds History of Falls Interventions: Bed/chair exit alarm Problem: Pressure Injury - Risk of 
Goal: *Prevention of pressure injury Description: Document Vasile Scale and appropriate interventions in the flowsheet. Outcome: Progressing Towards Goal 
Note: Pressure Injury Interventions: 
Sensory Interventions: Assess changes in LOC, Maintain/enhance activity level, Keep linens dry and wrinkle-free, Minimize linen layers, Pressure redistribution bed/mattress (bed type) Moisture Interventions: Absorbent underpads, Offer toileting Q_hr Activity Interventions: Pressure redistribution bed/mattress(bed type) Mobility Interventions: Pressure redistribution bed/mattress (bed type), HOB 30 degrees or less Nutrition Interventions: Document food/fluid/supplement intake Friction and Shear Interventions: Minimize layers Problem: Nutrition Deficit Goal: *Optimize nutritional status Outcome: Progressing Towards Goal

## 2020-10-01 NOTE — ROUTINE PROCESS
Bedside shift change report given to Neeraj Bazan RN and  Carlos Eduardo Cosby RN (oncoming nurse) by Beth Garcia RN (offgoing nurse). Report included the following information SBAR, Kardex, MAR, Procedure Verification and Quality Measures.

## 2020-10-01 NOTE — PROGRESS NOTES
Problem: Non-Violent Restraints Goal: *Removal from restraints as soon as assessed to be safe Outcome: Progressing Towards Goal 
Goal: *No harm/injury to patient while restraints in use Outcome: Progressing Towards Goal 
Goal: *Patient's dignity will be maintained Outcome: Progressing Towards Goal 
Goal: Non-violent Restaints:Standard Interventions Outcome: Progressing Towards Goal 
Goal: Non-violent Restraints:Patient Interventions Outcome: Progressing Towards Goal 
  
Problem: Falls - Risk of 
Goal: *Absence of Falls Description: Document Timothy Going Fall Risk and appropriate interventions in the flowsheet. Outcome: Progressing Towards Goal 
Note: Fall Risk Interventions: 
Mobility Interventions: Bed/chair exit alarm Mentation Interventions: Adequate sleep, hydration, pain control, Bed/chair exit alarm, Room close to nurse's station Medication Interventions: Bed/chair exit alarm Elimination Interventions: Bed/chair exit alarm History of Falls Interventions: Bed/chair exit alarm Problem: Pressure Injury - Risk of 
Goal: *Prevention of pressure injury Description: Document Vasile Scale and appropriate interventions in the flowsheet. Outcome: Progressing Towards Goal 
Note: Pressure Injury Interventions: 
Sensory Interventions: Assess changes in LOC Moisture Interventions: Absorbent underpads Activity Interventions: Pressure redistribution bed/mattress(bed type) Mobility Interventions: HOB 30 degrees or less Nutrition Interventions: Document food/fluid/supplement intake Friction and Shear Interventions: HOB 30 degrees or less Problem: Nutrition Deficit Goal: *Optimize nutritional status Outcome: Progressing Towards Goal

## 2020-10-01 NOTE — PROGRESS NOTES
[facility] Brief Progress Note SUBJECTIVE Pt seen at bedside for PM check. Patient was sleeping comfortably with sitter in room. She did not appear restless or in any acute distress. Oxygen sat 96% on room air. OBJECTIVE Visit Vitals /71 Pulse (!) 107 Temp 97.5 °F (36.4 °C) Resp 20 Ht 5' 8\" (1.727 m) Wt 94.6 kg (208 lb 8 oz) SpO2 96% Breastfeeding No  
BMI 31.70 kg/m² Recent Results (from the past 12 hour(s)) CBC WITH MANUAL DIFF Collection Time: 09/30/20 10:20 AM  
Result Value Ref Range WBC 9.1 4.6 - 13.2 K/uL  
 RBC 3.10 (L) 4.20 - 5.30 M/uL HGB 9.0 (L) 12.0 - 16.0 g/dL HCT 27.3 (L) 35.0 - 45.0 % MCV 88.1 74.0 - 97.0 FL  
 MCH 29.0 24.0 - 34.0 PG  
 MCHC 33.0 31.0 - 37.0 g/dL  
 RDW 15.9 (H) 11.6 - 14.5 % PLATELET 894 419 - 941 K/uL MPV 9.7 9.2 - 11.8 FL  
 DIFFERENTIAL MANUAL DIFFERENTIAL ORDERED    
 NEUTROPHILS 70 42 - 75 % BAND NEUTROPHILS 0 0 - 5 % LYMPHOCYTES 17 (L) 20 - 51 % MONOCYTES 13 (H) 2 - 9 % EOSINOPHILS 0 0 - 5 % BASOPHILS 0 0 - 3 % METAMYELOCYTES 0 0 % MYELOCYTES 0 0 % PROMYELOCYTES 0 0 % BLASTS 0 0 % OTHER CELL 0 0    
 ABS. NEUTROPHILS 6.4 1.8 - 8.0 K/UL  
 ABS. LYMPHOCYTES 1.5 0.8 - 3.5 K/UL  
 ABS. MONOCYTES 1.2 (H) 0 - 1.0 K/UL  
 ABS. EOSINOPHILS 0.0 0.0 - 0.4 K/UL  
 ABS. BASOPHILS 0.0 0.0 - 0.06 K/UL  
 DF MANUAL PLATELET COMMENTS Increased Platelets RBC COMMENTS ANISOCYTOSIS 
1+ METABOLIC PANEL, COMPREHENSIVE Collection Time: 09/30/20 10:20 AM  
Result Value Ref Range Sodium 142 136 - 145 mmol/L Potassium 3.9 3.5 - 5.5 mmol/L Chloride 111 100 - 111 mmol/L  
 CO2 25 21 - 32 mmol/L Anion gap 6 3.0 - 18 mmol/L Glucose 227 (H) 74 - 99 mg/dL BUN 13 7.0 - 18 MG/DL Creatinine 1.22 0.6 - 1.3 MG/DL  
 BUN/Creatinine ratio 11 (L) 12 - 20 GFR est AA 55 (L) >60 ml/min/1.73m2 GFR est non-AA 45 (L) >60 ml/min/1.73m2  Calcium 9.0 8.5 - 10.1 MG/DL  
 Bilirubin, total 1.6 (H) 0.2 - 1.0 MG/DL  
 ALT (SGPT) 24 13 - 56 U/L  
 AST (SGOT) 22 10 - 38 U/L Alk. phosphatase 110 45 - 117 U/L Protein, total 7.1 6.4 - 8.2 g/dL Albumin 3.6 3.4 - 5.0 g/dL Globulin 3.5 2.0 - 4.0 g/dL A-G Ratio 1.0 0.8 - 1.7 PHOSPHORUS Collection Time: 09/30/20 10:20 AM  
Result Value Ref Range Phosphorus 3.4 2.5 - 4.9 MG/DL MAGNESIUM Collection Time: 09/30/20 10:20 AM  
Result Value Ref Range Magnesium 2.0 1.6 - 2.6 mg/dL GLUCOSE, POC Collection Time: 09/30/20 11:20 AM  
Result Value Ref Range Glucose (POC) 246 (H) 70 - 110 mg/dL GLUCOSE, POC Collection Time: 09/30/20  6:45 PM  
Result Value Ref Range Glucose (POC) 181 (H) 70 - 110 mg/dL Physical examination Consitutional: NAD Cardiovascular: well perfused Respiratory: Good respiratory effort, Unlabored breathing, Equal expansion bilaterally. ASSESSMENT/PLAN 
VSS. No change in management at this time. Sherry Woods MD, PGY1 [address] Senior Pager: [phone] September 30, 2020, 9:56 PM

## 2020-10-01 NOTE — ROUTINE PROCESS
Bedside shift change report given to Thiago Lopez RN (oncoming nurse) by Tam Chinchilla RN and Murphy Flores RN (offgoing nurse). Report included the following information SBAR, Kardex, MAR and Recent Results.

## 2020-10-01 NOTE — PROGRESS NOTES
[address] Inpatient Progress Note Date of Service: 10/01/20 Hospital Day: 21 Subjective/Interval History 10/01/20 No acute events overnight per nursing report. Pt slept throughout the night and did not have periods of agitation. Patient is oriented to self and place this morning. She is pleasant and cooperative with assessment although she gives brief responses. She denies feeling anxious. She denies hallucinations. She ate breakfast. 
 
Objective Visit Vitals /69 (BP 1 Location: Left arm, BP Patient Position: At rest) Pulse (!) 102 Temp 98.2 °F (36.8 °C) Resp 17 Ht 5' 8\" (1.727 m) Wt 91.4 kg (201 lb 9.6 oz) SpO2 100% Breastfeeding No  
BMI 30.65 kg/m² Recent Results (from the past 24 hour(s)) GLUCOSE, POC Collection Time: 09/30/20 11:20 AM  
Result Value Ref Range Glucose (POC) 246 (H) 70 - 110 mg/dL GLUCOSE, POC Collection Time: 09/30/20  6:45 PM  
Result Value Ref Range Glucose (POC) 181 (H) 70 - 110 mg/dL GLUCOSE, POC Collection Time: 09/30/20  9:57 PM  
Result Value Ref Range Glucose (POC) 216 (H) 70 - 110 mg/dL CBC WITH MANUAL DIFF Collection Time: 10/01/20  4:46 AM  
Result Value Ref Range WBC 8.6 4.6 - 13.2 K/uL  
 RBC 3.03 (L) 4.20 - 5.30 M/uL HGB 8.8 (L) 12.0 - 16.0 g/dL HCT 26.7 (L) 35.0 - 45.0 % MCV 88.1 74.0 - 97.0 FL  
 MCH 29.0 24.0 - 34.0 PG  
 MCHC 33.0 31.0 - 37.0 g/dL  
 RDW 15.8 (H) 11.6 - 14.5 % PLATELET 537 386 - 721 K/uL MPV 10.0 9.2 - 11.8 FL  
 DIFFERENTIAL MANUAL DIFFERENTIAL ORDERED    
 NEUTROPHILS 65 42 - 75 % BAND NEUTROPHILS 0 0 - 5 % LYMPHOCYTES 30 20 - 51 % MONOCYTES 4 2 - 9 % EOSINOPHILS 1 0 - 5 % BASOPHILS 0 0 - 3 % METAMYELOCYTES 0 0 % MYELOCYTES 0 0 % PROMYELOCYTES 0 0 % BLASTS 0 0 % OTHER CELL 0 0    
 ABS. NEUTROPHILS 5.6 1.8 - 8.0 K/UL  
 ABS. LYMPHOCYTES 2.6 0.8 - 3.5 K/UL  
 ABS. MONOCYTES 0.3 0 - 1.0 K/UL ABS. EOSINOPHILS 0.1 0.0 - 0.4 K/UL  
 ABS. BASOPHILS 0.0 0.0 - 0.06 K/UL  
 DF MANUAL PLATELET COMMENTS ADEQUATE PLATELETS    
 RBC COMMENTS ANISOCYTOSIS 
1+ METABOLIC PANEL, COMPREHENSIVE Collection Time: 10/01/20  4:46 AM  
Result Value Ref Range Sodium 142 136 - 145 mmol/L Potassium 3.9 3.5 - 5.5 mmol/L Chloride 110 100 - 111 mmol/L  
 CO2 25 21 - 32 mmol/L Anion gap 7 3.0 - 18 mmol/L Glucose 91 74 - 99 mg/dL BUN 11 7.0 - 18 MG/DL Creatinine 1.03 0.6 - 1.3 MG/DL  
 BUN/Creatinine ratio 11 (L) 12 - 20 GFR est AA >60 >60 ml/min/1.73m2 GFR est non-AA 55 (L) >60 ml/min/1.73m2 Calcium 9.0 8.5 - 10.1 MG/DL Bilirubin, total 1.6 (H) 0.2 - 1.0 MG/DL  
 ALT (SGPT) 18 13 - 56 U/L  
 AST (SGOT) 23 10 - 38 U/L Alk. phosphatase 103 45 - 117 U/L Protein, total 6.5 6.4 - 8.2 g/dL Albumin 3.3 (L) 3.4 - 5.0 g/dL Globulin 3.2 2.0 - 4.0 g/dL A-G Ratio 1.0 0.8 - 1.7 EKG, 12 LEAD, INITIAL Collection Time: 10/01/20  7:30 AM  
Result Value Ref Range Ventricular Rate 90 BPM  
 Atrial Rate 90 BPM  
 P-R Interval 128 ms QRS Duration 72 ms Q-T Interval 392 ms QTC Calculation (Bezet) 479 ms Calculated P Axis 25 degrees Calculated R Axis 9 degrees Calculated T Axis 61 degrees Diagnosis Normal sinus rhythm Cannot rule out Anterior infarct , age undetermined Abnormal ECG When compared with ECG of 18-SEP-2020 11:43, No significant change was found GLUCOSE, POC Collection Time: 10/01/20  8:05 AM  
Result Value Ref Range Glucose (POC) 131 (H) 70 - 110 mg/dL Mental Status Examination Appearance/Hygiene 60 y.o. BLACK OR  female Hygiene: Dressed in hospital gown Behavior/Social Relatedness  pleasant and cooperative Musculoskeletal Gait/Station: Not tested as patient is a fall risk currently on statin Tone (flaccid, cogwheeling, spastic): not assessed due to current mental status Psychomotor (hyperkinetic, hypokinetic): calm Involuntary movements (tics, dyskinesias, akathisa, stereotypies): none Speech   Rate, rhythm, volume, fluency and articulation are appropriate Mood   \"my mood is fine\" Affect    blunted Thought Process  concrete Thought Content and Perceptual Disturbances Denies self-injurious behavior (SIB), suicidal ideation (SI), aggressive behavior or homicidal ideation (HI) Denies auditory and visual hallucinations Sensorium and Cognition  oriented to self and place Insight  Limited Judgment  Limited Assessment/Plan Psychiatric Diagnoses:  
Patient Active Problem List  
Code Delirium due to general medical condition Continue current medication regimen. EKG was reviewed. Consider discontinuing restraints and keeping sitter in the room. Monty Caldwell MD DR. [facility]iatry

## 2020-10-01 NOTE — PROGRESS NOTES
120 [facility] Intern Progress Note Patient: Armand Cook MRN: [id number] SSN: [id number]  YOB: 1965 Age: 60 y.o. Sex: female Admit Date: 9/8/2020 LOS: 23 days Chief Complaint Patient presents with  Vomiting Subjective:  
 
Patient was sleeping peacefully this morning. She was pleasant during our conversation. No acute events overnight. She is urinating and stooling appropriately. Tolerating PO intake without any issues. ROS: No headaches, lightheadedness, chest pain, SOB, abdo pain, constipation or diarrhea. Objective:  
 
Visit Vitals /74 (BP 1 Location: Left arm, BP Patient Position: At rest) Pulse 88 Temp 97.6 °F (36.4 °C) Resp 18 Ht 5' 8\" (1.727 m) Wt 91.4 kg (201 lb 9.6 oz) LMP 10/06/2015 (Exact Date) SpO2 97% Breastfeeding No  
BMI 30.65 kg/m² Physical Exam:  
General appearance: A+O x 2. Patient was cooperative during our conversation.  On soft restrains. Has 1:1 sitter. Pt in no distress, and appears stated age.   
HEENT: Right eye poorly reactive to light and erythematic, unchanged from yesterday. Left eye reactive to light. Moist mucous membranes.  
Lungs: Clear to auscultation bilaterally without adventitious sounds. Heart: Regular rate and rhythm, S1, S2 normal, no murmur, click, rub or gallop Abdomen: Soft, non-tender, non-distended. Bowel sounds normal. No masses,  no organomegaly. Skin: Skin color, texture, turgor normal. No rashes or lesions Neuro:  Normal without focal findings. Patient able to follow commands and moves all four extremities.  No evidence of motor restlessness. Extremities: First digit of right foot amputated. Second digit of left foot amputated. No edema. Pedal pulses 2+ and symmetric.  
Psych: Less agitated this morning.  
  
 
Intake and Output: 
Current Shift: No intake/output data recorded. Last three shifts: 09/29 1901 - 10/01 0700 In: 475 [P.O.:475] Out: 8602 [GPEPT:1362] Lab/Data Review: 
Recent Results (from the past 12 hour(s)) GLUCOSE, POC Collection Time: 09/30/20  9:57 PM  
Result Value Ref Range Glucose (POC) 216 (H) 70 - 110 mg/dL CBC WITH MANUAL DIFF Collection Time: 10/01/20  4:46 AM  
Result Value Ref Range WBC 8.6 4.6 - 13.2 K/uL  
 RBC 3.03 (L) 4.20 - 5.30 M/uL HGB 8.8 (L) 12.0 - 16.0 g/dL HCT 26.7 (L) 35.0 - 45.0 % MCV 88.1 74.0 - 97.0 FL  
 MCH 29.0 24.0 - 34.0 PG  
 MCHC 33.0 31.0 - 37.0 g/dL  
 RDW 15.8 (H) 11.6 - 14.5 % PLATELET 330 200 - 384 K/uL MPV 10.0 9.2 - 11.8 FL  
 DIFFERENTIAL MANUAL DIFFERENTIAL ORDERED    
 NEUTROPHILS 65 42 - 75 % BAND NEUTROPHILS 0 0 - 5 % LYMPHOCYTES 30 20 - 51 % MONOCYTES 4 2 - 9 % EOSINOPHILS 1 0 - 5 % BASOPHILS 0 0 - 3 % METAMYELOCYTES 0 0 % MYELOCYTES 0 0 % PROMYELOCYTES 0 0 % BLASTS 0 0 % OTHER CELL 0 0    
 ABS. NEUTROPHILS 5.6 1.8 - 8.0 K/UL  
 ABS. LYMPHOCYTES 2.6 0.8 - 3.5 K/UL  
 ABS. MONOCYTES 0.3 0 - 1.0 K/UL  
 ABS. EOSINOPHILS 0.1 0.0 - 0.4 K/UL  
 ABS. BASOPHILS 0.0 0.0 - 0.06 K/UL  
 DF MANUAL PLATELET COMMENTS ADEQUATE PLATELETS    
 RBC COMMENTS ANISOCYTOSIS 
1+ METABOLIC PANEL, COMPREHENSIVE Collection Time: 10/01/20  4:46 AM  
Result Value Ref Range Sodium 142 136 - 145 mmol/L Potassium 3.9 3.5 - 5.5 mmol/L Chloride 110 100 - 111 mmol/L  
 CO2 25 21 - 32 mmol/L Anion gap 7 3.0 - 18 mmol/L Glucose 91 74 - 99 mg/dL BUN 11 7.0 - 18 MG/DL Creatinine 1.03 0.6 - 1.3 MG/DL  
 BUN/Creatinine ratio 11 (L) 12 - 20 GFR est AA >60 >60 ml/min/1.73m2 GFR est non-AA 55 (L) >60 ml/min/1.73m2 Calcium 9.0 8.5 - 10.1 MG/DL Bilirubin, total 1.6 (H) 0.2 - 1.0 MG/DL  
 ALT (SGPT) 18 13 - 56 U/L  
 AST (SGOT) 23 10 - 38 U/L Alk. phosphatase 103 45 - 117 U/L Protein, total 6.5 6.4 - 8.2 g/dL Albumin 3.3 (L) 3.4 - 5.0 g/dL Globulin 3.2 2.0 - 4.0 g/dL A-G Ratio 1.0 0.8 - 1.7 Telemetry NONE Oxygen NONE Assessment and Plan:  
 
60 y. o. 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   
 1. Metabolic Encephalopathy of unclear etiology [likely multifactorial, still not at baseline] - On initial presentation, patient had HHS with -500 treated with IV fluids, as well as leukocytosis and lactic acidosis that improved with IV antibiotics.  On admission, labs showed normal lipase, ammonia, B12 and folate. Blood culture results from 9/8 showed no growth. UA showed 6-8 wbc, 2+ bacteria, but negative for leuk est and nitrites. UCx showed >100, 000 cfu of mixed urogenital kyle, likely a contaminant. On 9/9, CT Chest/Abd/Pelvis showed bladder with mild wall thickening. Repeat cultures remain negative. Pt was initially hypernatremic but this has resolved. UDS and serum ETOH (9/8) was negative. On  9/8, CT head showed mild enlargement of lateral ventricles and cerebral atrophy . - Antibiotic treatment: s/p empirical  abx Ceftriaxone  (9/8-/9). S/p empiric abx  vancomycin (9/8-9/14)  and zoysn (9/9-9/14 ) - Inflammatory markers: ESR 32 and CRP 3.7, elevated on 9/14. COVID 19 negative 
- CSF results: wbc 1 , protein 55, glucose 70, elevated opening pressure of 34 mmhg, likely as patient was in prone position and was sedated. Unlikely pseudotumor cerebri per neurology. Negative meningitis panel; however, HSV 1 & 2 serology negative - Infectious etiology workup: HIV negative, RPR negative. Blood culture and urine culture negative  
- Autoimmune etiology workup: GRACE negative, TSH wnl, anti-sm negative 
- imaging results: A. MRI head 9/11 - no acute intracranial finding. EEG showed encephalopathy.  9/17 MRI Brain w w/o  contrast and MRV brain w wo contrast:  no cerebral sagittal sinus thrombosis, ruled out  CNS infection, intracerebral hemorrhage, or a sagittal sinus thrombosis B. CT AB/PELVIS/CHEST IV (9/15): Marked bladder distention up to the level above the umbilicus.  No CT evidence of infection or inflammatory process. No abscess seen. Tiny hypodense nodule in right thyroid lobe - can be better evaluated by thyroid ultrasound. Mild and circumferential esophageal wall prominence with collapsed the lumen [if symptomatic, barium swallow can be performed for better evaluation]. Fluid-filled nondilated distal colon and rectum as nonspecific finding and could represent diarrhea. Multiple partially calcified fibroids. C. 9/18 repeat EKG: no ST changes from prior EKG  
- Heavy metal results negative for lead, arsenic and mercury 
  
Plan:  
 - Psych: Discontinued Metoclopramide and paroxetine. Currently on Fluoxetine for anxiety and to mitigate potential withdrawal symptoms 
- Start 1:1 sitter and consider restraints (roll belt, side rails x4) if patient becomes increasing agitated. Fall precautions in place. 
- Start Haldol 2 mg TID and Depakote 250mg TID for agitation related to delirium, as per psych recs. D/c scheduled Geodon and Seroquel. - Ambien 5 mg qhs for insomnia 
- EKG completed as pt on QTC prolonging meds - normal with Qtc 479 
- Continue soft restraints today in light of recent falls and attempt to wean off again this evening.  
- Dispo: rehab vs SNF vs inpatient psych   
  
2. UTI - UA showed few bacteria, 2+ yeast and 15-20 WBC.  No obvious suprapubic tenderness on exam. Urine culture showed 75 000 cfu (probable streptococcus species).   
- Completed one dose of fluconazole to treat for possible yeast infection - On flomax 0.8 mg daily -   Patient has scheduled bladder scans q6h and scheduled commode time due to propensity for urinary retention - Patient was more agitated this weekend. Agitation seems to have improved today. She was likely delirius from her new UTI.  She was given 2 doses of fosfomycin (9/26, 9/29). 
  
3. Possible Dysphagia - CT chest, abd, pelvis w contrast on 9/15 showed mild and circumferential esophageal wall prominence with collapsed the lumen. 
- Barium swallow indicative of narrowed appearance of distal esophagus and EJ junction but without mass and possible small sliding hiatal hernia. -  Pt requires assistance with meals due to aspiration risk and concerning barium swallow 
  
4. R eye pain + Right eye vision loss x 2 weeks - Unlikely papilledema based on bedside ultrasound ( no enlarged optic sheath seen), likely floater vs retinal detachment vs pink eye vs acute angle gluacoma Plan: Spoke to optho on 9/10, Dr. Ashanti Gresham - not able to come 
  
5.  Electrolyte Imbalance: hypophosphatemia on 9/23 was 2.1 but resolved; Hypokalemia (resolved) 
- monitor electrolytes and replete as needed 
  
6. Hyperbilirubinemia -  likely secondary to [employer] syndrome. Patient has elevated bili at baseline. Unlikely hemolysis. T bili (1.6). LFTs wnl otherwise. - CT AB/US - no gall bladder disease appreciated  
- , haptoglobin normal, aldolase pending  
  
7. Diabetes ( last A1c 7.2) with neuropathy and L eye retinopathy - not well controlled 
- held NPH 40U BID (hold home NPH 50 U BID)  
- continue lantus 5 U daily  
  
8.  HTN (BP today 109/55 mmHg) 
- not on any home medications  
  
9. HLD 
- cont rosuvastatin  
  
10. Gastroparesis - KUB showed non-obstruction bowel gas consistent with stool burden 
- held metoclopramide 5 mg  
- Start bowel regimen with Miralax daily and Bisacodyl 5 mg prn.  
- Continue to monitor stool output. 
  
11. GERD 
- cont  omeprazole 40 mg delayed capsule 
  
12.  Anxiety 
- held  paroxetine 60 mg daily 
- start prozac 20 mg daily 
  
13. Tardive akathisia (resolved)- initially  had dystonia, EPS symptoms, likely related with chronic metoclopramide use; held metoclopramide Plan: No motor restlessness today. Elevated CK on 9/22 (654).    This is likely because patient is still on restraints. Will continue to closely monitor.  
  
14. Normocytic anemia (HgB improved to 13.6) (resolved) 
 - B12 and folate normal 
  
15. CRYSTAL creatinine on admission 3.56 ( baseline Cr 1.09 1/2019) in setting of CKD stage 2  likely pre-renal and rhabdo (resolved)  
- Myoglobin initially 1092. Iron profile normal 
- Improving CK 1,057--> 996 --> 479 (9/20) 
- .8 elevated and vitamin D on low side of normal - corrected Ca normal , Vitamin D nml , Phosphorous wnl, microalb/cr ratio wnl Plan:   
- Cont Vitamin D supplement 
- Nephrology following appreciate recs- calcitril 0.25mcg on discharge 
  
  
Diet Advance as tolerated per speech DVT Prophylaxis heparin GI Prophylaxis Omperazole Code status Full code Disposition unknown  
  
Point of Contact Pri[place] Relationship: 
[phone] Jennifer Rodriguez MD, PGY-1  
[facility] Medicine Intern Pager: [phone] October 1, 2020, 7:02 AM

## 2020-10-02 NOTE — PROGRESS NOTES
Called report to [facility] and spoke with Perlita Pelayo, nurse. Questions answered, informed of contact isolation and  time of 1500. Call back number given for any further questions.

## 2020-10-02 NOTE — PROGRESS NOTES
Problem: Diabetes Self-Management Goal: *Disease process and treatment process Description: Define diabetes and identify own type of diabetes; list 3 options for treating diabetes. Outcome: Progressing Towards Goal 
Goal: *Incorporating nutritional management into lifestyle Description: Describe effect of type, amount and timing of food on blood glucose; list 3 methods for planning meals. Outcome: Progressing Towards Goal 
Goal: *Incorporating physical activity into lifestyle Description: State effect of exercise on blood glucose levels. Outcome: Progressing Towards Goal 
Goal: *Developing strategies to promote health/change behavior Description: Define the ABC's of diabetes; identify appropriate screenings, schedule and personal plan for screenings. Outcome: Progressing Towards Goal 
  
Problem: Falls - Risk of 
Goal: *Absence of Falls Description: Document Brittanie Heads Fall Risk and appropriate interventions in the flowsheet. Outcome: Progressing Towards Goal 
Note: Fall Risk Interventions: 
Mobility Interventions: Bed/chair exit alarm Mentation Interventions: Bed/chair exit alarm Medication Interventions: Bed/chair exit alarm, Teach patient to arise slowly Elimination Interventions: Bed/chair exit alarm, Call light in reach, Patient to call for help with toileting needs History of Falls Interventions: Bed/chair exit alarm Problem: Pressure Injury - Risk of 
Goal: *Prevention of pressure injury Description: Document Vasile Scale and appropriate interventions in the flowsheet. Outcome: Progressing Towards Goal 
Note: Pressure Injury Interventions: 
Sensory Interventions: Assess changes in LOC Moisture Interventions: Absorbent underpads Activity Interventions: Pressure redistribution bed/mattress(bed type) Mobility Interventions: HOB 30 degrees or less Nutrition Interventions: Document food/fluid/supplement intake Friction and Shear Interventions: Minimize layers Problem: Nutrition Deficit Goal: *Optimize nutritional status Outcome: Progressing Towards Goal

## 2020-10-02 NOTE — PROGRESS NOTES
[address] Inpatient Progress Note Date of Service: 10/02/20 Hospital Day: 25 Subjective/Interval History 10/02/20 No acute events overnight per nursing report. Pt slept throughout the night and did not have periods of agitation. Patient is oriented to self and place this morning. She is pleasant and cooperative with assessment although she gives brief responses. She denies feeling anxious but appears anxious especially if left alone in the room. She denies hallucinations. She ate breakfast. Overall, it appears her mentation is improving. She is more aware of her surroundings and able to engage in meaningful conversation for longer periods of time. Roll belt restraint has also been discontinued and she was seen walking in the hallways with assistance from the nurse this morning. Objective Visit Vitals BP (!) 131/57 (BP 1 Location: Left arm, BP Patient Position: At rest) Pulse 96 Temp 98.7 °F (37.1 °C) Resp 16 Ht 5' 8\" (1.727 m) Wt 89.6 kg (197 lb 8 oz) SpO2 98% Breastfeeding No  
BMI 30.03 kg/m² Recent Results (from the past 24 hour(s)) GLUCOSE, POC Collection Time: 10/01/20  4:25 PM  
Result Value Ref Range Glucose (POC) 209 (H) 70 - 110 mg/dL GLUCOSE, POC Collection Time: 10/01/20  9:37 PM  
Result Value Ref Range Glucose (POC) 109 70 - 110 mg/dL CBC WITH MANUAL DIFF Collection Time: 10/02/20  3:11 AM  
Result Value Ref Range WBC 8.9 4.6 - 13.2 K/uL  
 RBC 3.27 (L) 4.20 - 5.30 M/uL HGB 9.5 (L) 12.0 - 16.0 g/dL HCT 28.8 (L) 35.0 - 45.0 % MCV 88.1 74.0 - 97.0 FL  
 MCH 29.1 24.0 - 34.0 PG  
 MCHC 33.0 31.0 - 37.0 g/dL  
 RDW 15.3 (H) 11.6 - 14.5 % PLATELET 726 604 - 222 K/uL MPV 10.3 9.2 - 11.8 FL  
 DIFFERENTIAL MANUAL DIFFERENTIAL ORDERED    
 NEUTROPHILS 57 42 - 75 % BAND NEUTROPHILS 0 0 - 5 % LYMPHOCYTES 34 20 - 51 % MONOCYTES 7 2 - 9 % EOSINOPHILS 2 0 - 5 % BASOPHILS 0 0 - 3 % METAMYELOCYTES 0 0 % MYELOCYTES 0 0 % PROMYELOCYTES 0 0 % BLASTS 0 0 % OTHER CELL 0 0    
 ABS. NEUTROPHILS 5.1 1.8 - 8.0 K/UL  
 ABS. LYMPHOCYTES 3.0 0.8 - 3.5 K/UL  
 ABS. MONOCYTES 0.6 0 - 1.0 K/UL  
 ABS. EOSINOPHILS 0.2 0.0 - 0.4 K/UL  
 ABS. BASOPHILS 0.0 0.0 - 0.06 K/UL  
 DF MANUAL PLATELET COMMENTS ADEQUATE PLATELETS    
 RBC COMMENTS ANISOCYTOSIS 
1+ METABOLIC PANEL, COMPREHENSIVE Collection Time: 10/02/20  3:11 AM  
Result Value Ref Range Sodium 140 136 - 145 mmol/L Potassium 3.6 3.5 - 5.5 mmol/L Chloride 109 100 - 111 mmol/L  
 CO2 23 21 - 32 mmol/L Anion gap 8 3.0 - 18 mmol/L Glucose 142 (H) 74 - 99 mg/dL BUN 12 7.0 - 18 MG/DL Creatinine 0.97 0.6 - 1.3 MG/DL  
 BUN/Creatinine ratio 12 12 - 20 GFR est AA >60 >60 ml/min/1.73m2 GFR est non-AA 59 (L) >60 ml/min/1.73m2 Calcium 9.2 8.5 - 10.1 MG/DL Bilirubin, total 1.8 (H) 0.2 - 1.0 MG/DL  
 ALT (SGPT) 21 13 - 56 U/L  
 AST (SGOT) 21 10 - 38 U/L Alk. phosphatase 117 45 - 117 U/L Protein, total 7.8 6.4 - 8.2 g/dL Albumin 3.5 3.4 - 5.0 g/dL Globulin 4.3 (H) 2.0 - 4.0 g/dL A-G Ratio 0.8 0.8 - 1.7 SARS-COV-2 Collection Time: 10/02/20 11:15 AM  
Result Value Ref Range Specimen source Nasopharyngeal    
 COVID-19 rapid test Not detected NOTD Specimen type NP Swab Mental Status Examination Appearance/Hygiene 60 y.o. BLACK OR  female Hygiene: Dressed in hospital gown Behavior/Social Relatedness  pleasant and cooperative Musculoskeletal Gait/Station: Pt ambulating with assistance from nurse. Tone (flaccid, cogwheeling, spastic): slightly spastic Psychomotor (hyperkinetic, hypokinetic): calm Involuntary movements (tics, dyskinesias, akathisa, stereotypies): none Speech   Rate, rhythm, volume, fluency and articulation are appropriate Mood   euthymic Affect    Mildly anxious Thought Process  concrete Thought Content and Perceptual Disturbances Denies self-injurious behavior (SIB), suicidal ideation (SI), aggressive behavior or homicidal ideation (HI) Denies auditory and visual hallucinations Sensorium and Cognition  oriented to self and place Insight  Limited Judgment  Limited Assessment/Plan Psychiatric Diagnoses:  
Patient Active Problem List  
Code Delirium due to general medical condition Continue current medication regimen. Discharge disposition most likely SNF. Aj Liu MD DR. [facility]

## 2020-10-02 NOTE — DISCHARGE INSTRUCTIONS
Patient Education     Altered Mental Status: Care Instructions  Your Care Instructions  Altered mental status is a change in how well your brain is working. As a result, you may be confused, be less alert than usual, or act in odd ways. This may include seeing or hearing things that aren't really there (hallucinations). A mental status change has many possible causes. For example, it may be the result of an infection, an imbalance of chemicals in the body, or a chronic disease such as diabetes or COPD. It can also be caused by things such as a head injury, taking certain medicines, or using alcohol or drugs. The doctor may do tests to look for the cause. These tests may include urine tests, blood tests, and imaging tests such as a CT scan. Sometimes a clear cause isn't found. But tests can help the doctor rule out a serious cause of your symptoms. A change in mental status can be scary. But mental status will often return to normal when the cause is treated. So it is important to get any follow-up testing or treatment the doctor has suggested. The doctor has checked you carefully, but problems can develop later. If you notice any problems or new symptoms, get medical treatment right away. Follow-up care is a key part of your treatment and safety. Be sure to make and go to all appointments, and call your doctor if you are having problems. It's also a good idea to know your test results and keep a list of the medicines you take. How can you care for yourself at home? · Be safe with medicines. Take your medicines exactly as prescribed. Call your doctor if you think you are having a problem with your medicine. · Have another adult stay with you until you are better. This can help keep you safe. Ask that person to watch for signs that your mental status is getting worse. When should you call for help? Call 911 anytime you think you may need emergency care.  For example, call if:  · You passed out (lost consciousness). Call your doctor now or seek immediate medical care if:  · Your mental status is getting worse. · You have new symptoms, such as a fever, chills, or shortness of breath. · You do not feel safe. Watch closely for changes in your health, and be sure to contact your doctor if:  · You do not get better as expected. Where can you learn more? Go to [employer]  Enter J452 in the search box to learn more about \"Altered Mental Status: Care Instructions. \"   © 5304-2199 Healthwise, Incorporated. Care instructions adapted under license by [employer] (which disclaims liability or warranty for this information). This care instruction is for use with your licensed healthcare professional. If you have questions about a medical condition or this instruction, always ask your healthcare professional. [address] any warranty or liability for your use of this information.   Content Version: 60.8.481052; Current as of: November 20, 2015

## 2020-10-02 NOTE — ROUTINE PROCESS
Bedside shift change report given to PETRONA Combs (oncoming nurse) by Nazia Matthew RN and Fabrice Llanes RN (offgoing nurse). Report included the following information SBAR, Kardex, MAR and Recent Results.

## 2020-10-02 NOTE — ROUTINE PROCESS
Discharge planning: 
 
Patient has been accepted to [facility] and they can accept this patient, today. Patient will need a COVID test within 5 days. Care management manager John Concepcion) has agreed to do a Rapid COVID test on patient, for placement today. This writer will now need the discharge order and the discharge summary, to finalize the discharge. This writer will continue to monitor for discharge planning to ensure a safe discharge from [facility]. Cristopher Subramanian. [facility] Care Manager Pager#: [phone]

## 2020-10-02 NOTE — PROGRESS NOTES
[facility] Intern Progress Note Patient: Марина Wagner MRN: [id number] SSN: [id number]  YOB: 1965 Age: 60 y.o. Sex: female Admit Date: 9/8/2020 LOS: 24 days Chief Complaint Patient presents with  Vomiting Subjective:  
 
Patient seen at bedside this morning. She was pleasant during our conversation. No acute events overnight. She is urinating appropriately. Tolerating PO intake without any issues.  
  
ROS: No headaches, lightheadedness, chest pain, SOB, abdo pain, constipation or diarrhea. Objective:  
 
Visit Vitals /69 (BP 1 Location: Left arm, BP Patient Position: At rest) Pulse (!) 107 Temp 98.7 °F (37.1 °C) Resp 17 Ht 5' 8\" (1.727 m) Wt 89.6 kg (197 lb 8 oz) LMP 10/06/2015 (Exact Date) SpO2 99% Breastfeeding No  
BMI 30.03 kg/m² Physical Exam:  
General appearance: A+O x 2. Patient was cooperative during our conversation. No restraints or sitter.  Pt in no acute distress, and appears stated age.   
HEENT: Right eye poorly reactive to light and erythematic, unchanged from yesterday. Left eye reactive to light. Moist mucous membranes.  
Lungs: Clear to auscultation bilaterally without adventitious sounds. Heart: Regular rate and rhythm, S1, S2 normal, no murmur, click, rub or gallop Abdomen: Soft, non-tender, non-distended. Bowel sounds normal. No masses,  no organomegaly. Skin: Skin color, texture, turgor normal. No rashes or lesions Neuro:  Normal without focal findings. Patient able to follow commands and moves all four extremities.  No evidence of motor restlessness. Extremities: First digit of right foot amputated. Second digit of left foot amputated. No edema. Pedal pulses 2+ and symmetric.  
Psych: Less agitated this morning.  Intake and Output: 
Current Shift: No intake/output data recorded. Last three shifts: 09/30 1901 - 10/02 0700 In: 720 [P.O.:720] Out: -  
 
Lab/Data Review: Recent Results (from the past 12 hour(s)) GLUCOSE, POC Collection Time: 10/01/20  9:37 PM  
Result Value Ref Range Glucose (POC) 109 70 - 110 mg/dL CBC WITH MANUAL DIFF Collection Time: 10/02/20  3:11 AM  
Result Value Ref Range WBC 8.9 4.6 - 13.2 K/uL  
 RBC 3.27 (L) 4.20 - 5.30 M/uL HGB 9.5 (L) 12.0 - 16.0 g/dL HCT 28.8 (L) 35.0 - 45.0 % MCV 88.1 74.0 - 97.0 FL  
 MCH 29.1 24.0 - 34.0 PG  
 MCHC 33.0 31.0 - 37.0 g/dL  
 RDW 15.3 (H) 11.6 - 14.5 % PLATELET 163 856 - 879 K/uL MPV 10.3 9.2 - 11.8 FL  
 DIFFERENTIAL MANUAL DIFFERENTIAL ORDERED    
 NEUTROPHILS 57 42 - 75 % BAND NEUTROPHILS 0 0 - 5 % LYMPHOCYTES 34 20 - 51 % MONOCYTES 7 2 - 9 % EOSINOPHILS 2 0 - 5 % BASOPHILS 0 0 - 3 % METAMYELOCYTES 0 0 % MYELOCYTES 0 0 % PROMYELOCYTES 0 0 % BLASTS 0 0 % OTHER CELL 0 0    
 ABS. NEUTROPHILS 5.1 1.8 - 8.0 K/UL  
 ABS. LYMPHOCYTES 3.0 0.8 - 3.5 K/UL  
 ABS. MONOCYTES 0.6 0 - 1.0 K/UL  
 ABS. EOSINOPHILS 0.2 0.0 - 0.4 K/UL  
 ABS. BASOPHILS 0.0 0.0 - 0.06 K/UL  
 DF MANUAL PLATELET COMMENTS ADEQUATE PLATELETS    
 RBC COMMENTS ANISOCYTOSIS 
1+ METABOLIC PANEL, COMPREHENSIVE Collection Time: 10/02/20  3:11 AM  
Result Value Ref Range Sodium 140 136 - 145 mmol/L Potassium 3.6 3.5 - 5.5 mmol/L Chloride 109 100 - 111 mmol/L  
 CO2 23 21 - 32 mmol/L Anion gap 8 3.0 - 18 mmol/L Glucose 142 (H) 74 - 99 mg/dL BUN 12 7.0 - 18 MG/DL Creatinine 0.97 0.6 - 1.3 MG/DL  
 BUN/Creatinine ratio 12 12 - 20 GFR est AA >60 >60 ml/min/1.73m2 GFR est non-AA 59 (L) >60 ml/min/1.73m2 Calcium 9.2 8.5 - 10.1 MG/DL Bilirubin, total 1.8 (H) 0.2 - 1.0 MG/DL  
 ALT (SGPT) 21 13 - 56 U/L  
 AST (SGOT) 21 10 - 38 U/L Alk. phosphatase 117 45 - 117 U/L Protein, total 7.8 6.4 - 8.2 g/dL Albumin 3.5 3.4 - 5.0 g/dL Globulin 4.3 (H) 2.0 - 4.0 g/dL A-G Ratio 0.8 0.8 - 1.7 Assessment and Plan: 60 y.o. 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   
 1. Metabolic Encephalopathy of unclear etiology [likely multifactorial, still not at baseline] - On initial presentation, patient had HHS with -500 treated with IV fluids, as well as leukocytosis and lactic acidosis that improved with IV antibiotics.  On admission, labs showed normal lipase, ammonia, B12 and folate. Blood culture results from 9/8 showed no growth. UA showed 6-8 wbc, 2+ bacteria, but negative for leuk est and nitrites. UCx showed >100, 000 cfu of mixed urogenital kyle, likely a contaminant. On 9/9, CT Chest/Abd/Pelvis showed bladder with mild wall thickening. Repeat cultures remain negative. Pt was initially hypernatremic but this has resolved. UDS and serum ETOH (9/8) was negative. On  9/8, CT head showed mild enlargement of lateral ventricles and cerebral atrophy . - Antibiotic treatment: s/p empirical  abx Ceftriaxone  (9/8-/9). S/p empiric abx  vancomycin (9/8-9/14)  and zoysn (9/9-9/14 ) - Inflammatory markers: ESR 32 and CRP 3.7, elevated on 9/14. COVID 19 negative 
- CSF results: wbc 1 , protein 55, glucose 70, elevated opening pressure of 34 mmhg, likely as patient was in prone position and was sedated. Unlikely pseudotumor cerebri per neurology. Negative meningitis panel; however, HSV 1 & 2 serology negative   
- Infectious etiology workup: HIV negative, RPR negative. Blood culture and urine culture negative  
- Autoimmune etiology workup: GRACE negative, TSH wnl, anti-sm negative 
- imaging results: A. MRI head 9/11 - no acute intracranial finding. EEG showed encephalopathy.  9/17 MRI Brain w w/o  contrast and MRV brain w wo contrast:  no cerebral sagittal sinus thrombosis, ruled out  CNS infection, intracerebral hemorrhage, or a sagittal sinus thrombosis B. CT AB/PELVIS/CHEST IV (9/15): Marked bladder distention up to the level above the umbilicus.  No CT evidence of infection or inflammatory process. No abscess seen. Tiny hypodense nodule in right thyroid lobe - can be better evaluated by thyroid ultrasound. Mild and circumferential esophageal wall prominence with collapsed the lumen [if symptomatic, barium swallow can be performed for better evaluation]. Fluid-filled nondilated distal colon and rectum as nonspecific finding and could represent diarrhea. Multiple partially calcified fibroids. C. 9/18 repeat EKG: no ST changes from prior EKG  
- Heavy metal results negative for lead, arsenic and mercury 
  
Plan:  
 - Psych: Discontinued Metoclopramide and paroxetine. Currently on Fluoxetine for anxiety and to mitigate potential withdrawal symptoms 
- Start 1:1 sitter and consider restraints (roll belt, side rails x4) if patient becomes increasing agitated. Fall precautions in place. 
- Continue Haldol 2 mg TID and Depakote 250mg TID for agitation related to delirium, as per psych recs. D/c scheduled Geodon and Seroquel. - Ambien 5 mg qhs for insomnia 
- EKG completed as pt on QTC prolonging meds - NSR with Qtc 479 
- Discontinue soft restraints and sitter - trial for 24 hours starting 10/1 at 2pm  
- Dispo: Anticipate discharge today to [facility] in [place]  
  
2. UTI - UA showed few bacteria, 2+ yeast and 15-20 WBC.  No obvious suprapubic tenderness on exam. Urine culture showed VRE 
-  On flomax 0.8 mg daily -  Scheduled bladder scans q6h and scheduled commode time due to propensity for urinary retention 
-  S/p 2 doses of fosfomycin (9/26, 9/29) for VRE UTI 
  
3. Possible Dysphagia - CT chest, abd, pelvis w contrast on 9/15 showed mild and circumferential esophageal wall prominence with collapsed the lumen. 
- Barium swallow indicative of narrowed appearance of distal esophagus and EJ junction but without mass and possible small sliding hiatal hernia. -  Plan: Pt requires assistance with meals due to aspiration risk and concerning barium swallow 
  
4. R eye pain + Right eye vision loss x 2 weeks - Unlikely papilledema based on bedside ultrasound ( no enlarged optic sheath seen), likely floater vs retinal detachment vs pink eye vs acute angle gluacoma Plan: Spoke to optho on 9/10, Dr. Carrero - not able to come 
  
5.  Electrolyte Imbalance: hypophosphatemia on 9/23 was 2.1 but resolved; Hypokalemia (resolved) 
- monitor electrolytes and replete as needed 
  
6. Hyperbilirubinemia -  likely secondary to [employer] syndrome. Patient has elevated bili at baseline. Unlikely hemolysis. T bili (1.6). LFTs wnl otherwise. - CT AB/US - no gall bladder disease appreciated  
- , haptoglobin normal, aldolase pending  
  
7. Diabetes ( last A1c 7.2) with neuropathy and L eye retinopathy - not well controlled 
- held NPH 40U BID  
- continue lantus 10 U daily and correctional insulin 
  
8.  HTN (BP CHUNK 951/87 mmHg) 
- not on any home medications  
  
9. HLD 
- Hold Crestor, due to risk of statin-induced rhabdomyolysis lory in light of elevated CK of 654 
  
10. Gastroparesis - KUB showed non-obstruction bowel gas consistent with stool burden 
- held metoclopramide 5 mg  
- Start bowel regimen with Miralax daily and Bisacodyl 5 mg prn.  
- Continue to monitor stool output. 
  
11. GERD 
- cont  omeprazole 40 mg delayed capsule 
  
12.  Anxiety 
- held  paroxetine 60 mg daily 
- Continue prozac 20 mg daily 
  
13. Tardive akathisia (resolved)- initially  had dystonia, EPS symptoms, likely related with chronic metoclopramide use; held metoclopramide Plan: No motor restlessness today. Elevated CK on 9/22 (654).   Likely due to prolonged immobilization and 2/2 restraints. Will continue to closely monitor.  
  
14. Normocytic anemia (HgB improved to 13.6) (resolved) 
 - B12 and folate normal 
  
 15. CRYSTAL creatinine on admission 3.56 ( baseline Cr 1.09 1/2019) in setting of CKD stage 2  likely pre-renal and rhabdo (resolved)  
- Myoglobin initially 1092. Iron profile normal 
- Improving CK 1,057--> 996 --> 479 (9/20) 
- .8 elevated and vitamin D on low side of normal - corrected Ca normal , Vitamin D nml , Phosphorous wnl, microalb/cr ratio wnl Plan:   
- Cont Vitamin D supplement 
- Nephrology following appreciate recs- calcitril 0.25mcg on discharge 
  
  
Diet Advance as tolerated per speech DVT Prophylaxis heparin GI Prophylaxis Omperazole Code status Full code Disposition unknown  
  
Point of Contact [place] Relationship: 
[phone]  
  
 
 
Kenn Brumfield MD, PGY-1  
[address] Intern Pager: [phone] October 2, 2020, 7:07 AM  
 
I have personally seen and evaluated this patient. I have personally reviewed the resident note. I have personally discussed the management and plan of care of this patient. I agree with the note as written.  
 
Vladimir Mac MD 
10/2/2020, 12:51 PM

## 2020-10-02 NOTE — PROGRESS NOTES
Transition of Care Plan to SNF/Rehab 
 
SNF/Rehab Transition: 
Patient has been accepted to [address] [place] [address] and meets criteria for admission. Patient will  be transported by [facility] and expected to leave at [facility]. Communication to Patient/Family: 
Family is agreeable to the transition plan. Communication to SNF/Rehab: 
Bedside RN, Mattie Flores, has been notified of the transition plan to the facility and to call report (phone number [phone]). Discharge information has been updated on the AVS. And communicated to facility via [facility], or CC link. SNF/Rehab Transition: PCP/Specialist:  
 
Nursing Please include all hard scripts for controlled substances, med rec and dc summary, and AVS in packet. Reviewed and confirmed with facility representative, Kenny Lim at [address] [place] [address], that they can manage the patient care needs for the following:  
 
Dale Ashton with (X) only those applicable: 
 
Medication: 
[x]  Medications will be available at the facility []  IV Antibiotics []  Controlled Substance - hard copy to be sent with patient  
[]  Weekly Labs Documents: 
[x] Hard RX  Number sent  
[] MAR 
[] Kardex [x] AVS 
[] Wound care note [x]Transfer Summary/Discharge Summary Equipment: 
[]  CPAP/BiPAP []  Wound Vacuum 
[]  Mari or Urinary Device 
[]  PICC/Central Line 
[]  Nebulizer 
[]  Ventilator Treatment: 
[x]Isolation (for MRSA, VRE, etc.) []Surgical Drain Management []Tracheostomy Care 
[]Dressing Changes []Dialysis with transportation and chair time. []PEG Care []Oxygen []Daily Weights for Heart Failure Dietary: 
[]Any diet limitations []Tube Feedings []Total Parenteral Management (TPN) Eligible for Medicaid Long Term Services and Supports Yes: 
[x] Eligible for medical assistance or will become eligible within 180 days and LTSS completed. [] Provider/Patient and/or support system has requested screening. [x] LTSS copy provided to patient or responsible party and to the facility. [] LTSS unavailable at discharge will send once processed to SNF provider. 
[] LTSS  unavailable at discharged mailed to patient 
[] LTSS performed by outside agency. No:  
[] Private pay and is not financially eligible for Medicaid within the next 180 days. [] Reside out-of-state. [] A residents of a state owned/operated facility that is licensed 
by [facility] and [facility] or [facility] 
[] Enrollment in [facility] hospice services 
[] [address] [place] [address] 
[] Patient /Family declines to have screening completed or provide financial information for screening Financial Resources: 
Medicaid   
[x] Initiated and application pending  
[] Full coverage Advanced Care Plan: 
[]Surrogate Decision Maker of Care 
[]POA [x]Communicated Code Status/ sent FULL (DDNR, POST, Advance Directive) Other Cristopher Hyde. [facility] Care Manager Pager#: [phone]

## 2020-10-02 NOTE — PROGRESS NOTES
Problem: Diabetes Self-Management Goal: *Disease process and treatment process Description: Define diabetes and identify own type of diabetes; list 3 options for treating diabetes. Outcome: Resolved/Met Goal: *Incorporating nutritional management into lifestyle Description: Describe effect of type, amount and timing of food on blood glucose; list 3 methods for planning meals. Outcome: Resolved/Met Goal: *Incorporating physical activity into lifestyle Description: State effect of exercise on blood glucose levels. Outcome: Resolved/Met Goal: *Developing strategies to promote health/change behavior Description: Define the ABC's of diabetes; identify appropriate screenings, schedule and personal plan for screenings. Outcome: Resolved/Met Goal: *Using medications safely Description: State effect of diabetes medications on diabetes; name diabetes medication taking, action and side effects. Outcome: Resolved/Met Goal: *Monitoring blood glucose, interpreting and using results Description: Identify recommended blood glucose targets  and personal targets. Outcome: Resolved/Met Goal: *Prevention, detection, treatment of acute complications Description: List symptoms of hyper- and hypoglycemia; describe how to treat low blood sugar and actions for lowering  high blood glucose level. Outcome: Resolved/Met Goal: *Prevention, detection and treatment of chronic complications Description: Define the natural course of diabetes and describe the relationship of blood glucose levels to long term complications of diabetes. Outcome: Resolved/Met Goal: *Developing strategies to address psychosocial issues Description: Describe feelings about living with diabetes; identify support needed and support network Outcome: Resolved/Met Goal: *Sick day guidelines Outcome: Resolved/Met Goal: *Patient Specific Goal (EDIT GOAL, INSERT TEXT) Outcome: Resolved/Met Problem: Patient Education: Go to Patient Education Activity Goal: Patient/Family Education Outcome: Resolved/Met Problem: Falls - Risk of 
Goal: *Absence of Falls Description: Document Brittanie Heads Fall Risk and appropriate interventions in the flowsheet. Outcome: Resolved/Met Problem: Patient Education: Go to Patient Education Activity Goal: Patient/Family Education Outcome: Resolved/Met Problem: Pressure Injury - Risk of 
Goal: *Prevention of pressure injury Description: Document Vasile Scale and appropriate interventions in the flowsheet. Outcome: Resolved/Met Problem: Patient Education: Go to Patient Education Activity Goal: Patient/Family Education Outcome: Resolved/Met Problem: Nutrition Deficit Goal: *Optimize nutritional status Outcome: Resolved/Met Problem: Patient Education: Go to Patient Education Activity Goal: Patient/Family Education Outcome: Resolved/Met Problem: Patient Education: Go to Patient Education Activity Goal: Patient/Family Education Outcome: Resolved/Met

## 2020-10-02 NOTE — PROGRESS NOTES
\"Important Message from [employer]" reviewed and explained with the patient and/or representative at bedside and signature was obtained. A signed copy provided to patient/representative. Original signed document placed in patient's chart. Cristopher Buchanan. MS Care Manager Pager#: [phone]

## 2020-10-06 NOTE — ED PROVIDER NOTES
Italo Valdez is a 60 y.o. female with past medical history of diabetes, hypertension, and neuropathy coming from [facility] for cardiac arrest.  CPR was started at [facility]. EMS reports that patient was in asystole on their arrival.  They state they initiated ACLS protocol and patient received multiple rounds of epinephrine, compressions, and bicarb. She was intubated. She did go into ventricular tachycardia and was defibrillated at least 6 times. She then deteriorated again into asystole and was transported here. Total arrest resuscitation greater than 1 hour at this point. Past Medical History:  
Diagnosis Date  Blind left eye  Cellulitis of great toe of right foot 10/19/2017  Diabetes ([address]  Diabetic retinopathy ([address]  Gall stones  GERD (gastroesophageal reflux disease)  Hammertoe  Hiatal hernia  History of mammogram 10/03/2017 No evidence of malignancy  Hypertension  Mass of abdomen  Neuropathy due to type 2 diabetes mellitus ([address]  Osteomyelitis of toe of right foot ([address] 10/19/2017  Pancreatitis Past Surgical History:  
Procedure Laterality Date  HX AMPUTATION Left 07/21/2017  
 left 2nd toe  HX CHOLECYSTECTOMY  10/15/2014  HX GI Benign GI Stromal Tumor excision  HX HEENT Sx for detached retina  HX MYOMECTOMY x5 removed  HX OTHER SURGICAL    
 upper endoscopy Family History: Adopted: Yes  
Problem Relation Age of Onset  Heart Failure Mother 76  
 Other Father 70  
     blood clot after surgery  Diabetes Paternal Aunt  Diabetes Paternal Uncle Social History Socioeconomic History  Marital status: SINGLE Spouse name: Not on file  Number of children: Not on file  Years of education: Not on file  Highest education level: Not on file Occupational History  Not on file Social Needs  Financial resource strain: Not on file  Food insecurity Worry: Not on file Inability: Not on file  Transportation needs Medical: Not on file Non-medical: Not on file Tobacco Use  Smoking status: Former Smoker Packs/day: 0.20 Years: 8.00 Pack years: 1.60 Types: Cigarettes Last attempt to quit: 12/3/1995 Years since quittin.8  Smokeless tobacco: Never Used Substance and Sexual Activity  Alcohol use: No  
  Comment: Drinks 3-4xs year generally wine  Drug use: No  
 Sexual activity: Not Currently Lifestyle  Physical activity Days per week: Not on file Minutes per session: Not on file  Stress: Not on file Relationships  Social connections Talks on phone: Not on file Gets together: Not on file Attends Orthodox service: Not on file Active member of club or organization: Not on file Attends meetings of clubs or organizations: Not on file Relationship status: Not on file  Intimate partner violence Fear of current or ex partner: Not on file Emotionally abused: Not on file Physically abused: Not on file Forced sexual activity: Not on file Other Topics Concern   Service No  
 Blood Transfusions No  
 Caffeine Concern No  
 Occupational Exposure No  
 Hobby Hazards No  
 Sleep Concern No  
 Stress Concern No  
 Weight Concern No  
 Special Diet Yes  Back Care No  
 Exercise No  
 Bike Helmet No  
 Seat Belt Yes  Self-Exams Yes Social History Narrative Lives with 16year old son  with ex   Does not have health insurance ALLERGIES: Levaquin [levofloxacin] and Promethazine Review of Systems Unable to perform ROS: Acuity of condition There were no vitals filed for this visit. Physical Exam 
Constitutional:   
   Appearance: She is obese. HENT:  
   Head: Normocephalic and atraumatic. Mouth/Throat:  
   Mouth: Mucous membranes are dry. Comments: Endotracheal tube in place. Eyes:  
   Comments: Pupils fixed and dilated bilaterally Cardiovascular:  
   Comments: Asystole, pulseless Pulmonary:  
   Comments: Breath sounds equal bilaterally with bagging. Abdominal:  
   General: There is distension. Palpations: Abdomen is soft. Musculoskeletal:     
   General: No deformity. Right lower leg: No edema. Left lower leg: No edema. Skin: 
   General: Skin is warm. Neurological:  
   Comments: GCS 3 MDM Number of Diagnoses or Management Options Cardiac arrest [facility]):  
Diagnosis management comments: Jacky Gastelum is a 60 y.o. female coming in in cardiac arrest.  ACLS is been performed for greater than 1 hour. Patient is in asystole. Bedside ultrasound shows complete cardiac standstill. I think this is futile to continue resuscitation at this point and code was called. Time of death 02:10 AM. Procedures Disposition: 
Diagnosis: 1. Cardiac arrest ([facility] [address] Disposition:  Follow-up Information None Patient's Medications Start Taking No medications on file Continue Taking CALCITRIOL (ROCALTROL) 0.25 MCG CAPSULE    Take 1 Cap by mouth daily for 30 days. FLUOXETINE (PROZAC) 20 MG CAPSULE    Take 1 Cap by mouth daily for 30 days. HALOPERIDOL (HALDOL) 2 MG TABLET    Take 1 Tab by mouth three (3) times daily for 30 days. INSULIN GLARGINE (LANTUS) 100 UNIT/ML INJECTION    10 units once daily MULTIVITAMIN, TX-IRON-CA-MIN (THERA-M W/ IRON) 9 MG IRON-400 MCG TAB TABLET    Take 1 Tab by mouth daily for 30 days. PANTOPRAZOLE (PROTONIX) 40 MG TABLET    Take 1 Tab by mouth daily for 30 days. PHOSPHORUS (K PHOS NEUTRAL) 250 MG TABLET    Take 1 Tab by mouth two (2) times a day for 30 days. TAMSULOSIN (FLOMAX) 0.4 MG CAPSULE    Take 2 Caps by mouth daily for 30 days. THIAMINE HCL (B-1) 100 MG TABLET    Take 1 Tab by mouth daily for 30 days. VALPROIC ACID (DEPAKENE) 250 MG CAPSULE    Take 1 Cap by mouth three (3) times daily (with meals) for 30 days. ZOLPIDEM (AMBIEN) 5 MG TABLET    Take 1 Tab by mouth nightly for 5 days. Max Daily Amount: 5 mg. These Medications have changed No medications on file Stop Taking No medications on file

## 2020-10-06 NOTE — PROGRESS NOTES
responded to Death of  Nilda Silva, who was a 60 y.o.,female, The  provided the following Interventions: 
Provided pastoral support. Plan: 
Chaplains will provide pastoral care on an as needed/requested basis and grief support for the family. [address] [facility]  
[phone]

## 2020-10-08 NOTE — ED TRIAGE NOTES
Patient was found unresponsive at nursing facility. Medics started CPR upon arrival. Patient was still in cardiac arrest when she arrived to ER.

## 2021-08-23 NOTE — PROGRESS NOTES
Assessment/Plan:       Diagnoses and all orders for this visit:    Essential hypertension  -     hydrALAZINE (APRESOLINE) 10 mg tablet; Take 1 tablet (10 mg total) by mouth daily    Acquired hypothyroidism    Familial hypercholesterolemia    Screening mammogram, encounter for  -     Cancel: Mammo screening bilateral w 3d & cad; Future    Chronic daily headache    Migraine without status migrainosus, not intractable, unspecified migraine type  -     Discontinue: butalbital-acetaminophen-caffeine (FIORICET,ESGIC) -40 mg per tablet; Take 1 tablet by mouth every 6 (six) hours as needed for headaches or migraine  -     butalbital-acetaminophen-caffeine (FIORICET,ESGIC) -40 mg per tablet; Take 1 tablet by mouth every 6 (six) hours as needed for headaches or migraine        Continue following through with medical marijuana use  Continue all current other medications as noted  Lab work is at goal   Patient is fully COVID vaccinated and will be here in the fall of influenza vaccine  Is considering booster when appropriate for COVID  Subjective:   Chief Complaint   Patient presents with    Follow-up     med check Pt would like you to change frequency of Bp meds      Patient ID: Robert Singletary is a 56 y o  female  56year old here for check up  A lot on her plate    Mom  Q at home  Hospice help at the end  Was difficult  [employer] [facility]  Sister showed up  Hugh Muse is the executor  Estranged from Son going through divorce--bipolar- admitted to psych unit  Went Dr Su Marie for  Medical marijuana card  Patient with longstanding anxiety disorder, chronic daily headaches, as well as insomnia  Also has intermittent  back pain thoracolumbar  These are all indications for alternative clinical benefit of medical marijuana            The following portions of the patient's history were reviewed and updated as appropriate: allergies, current medications, past family history, past medical history, past social Physician CDMP Response: I agree and will document in the progress notes. This patient has Uncontrolled diabetes with hyperglycemia    With POC glucose of 306, HbA1C of 10.1, and manifestations of uncontrolled diabetes including left diabetic toe infection, and left eye blindness.     Subsequent progress notes will reflect this documentation addendum     Mike KUHN   PGY-3 [address] [facility]  07/19/17 4:34 PM history, past surgical history and problem list       Review of Systems   Psychiatric/Behavioral: Positive for sleep disturbance  The patient is nervous/anxious            Objective:      /84 (BP Location: Left arm, Patient Position: Sitting, Cuff Size: Adult)   Pulse 88   Temp (!) 96 9 °F (36 1 °C) (Temporal)   Ht 5' 0 5" (1 537 m)   Wt 52 9 kg (116 lb 9 6 oz)   LMP  (LMP Unknown)   SpO2 98%   BMI 22 40 kg/m²       Component      Latest Ref Rng & Units 8/12/2021 8/12/2021 8/12/2021           8:56 AM  8:56 AM  8:56 AM   Glucose, Random      65 - 99 mg/dL  106 (H)    BUN      7 - 25 mg/dL  11    Creatinine      0 50 - 0 99 mg/dL  0 80    eGFR Non       > OR = 60 mL/min/1 73m2  79    eGFR       > OR = 60 mL/min/1 73m2  92    SL AMB BUN/CREATININE RATIO      6 - 22 (calc)  NOT APPLICABLE    Sodium      135 - 146 mmol/L  140    Potassium      3 5 - 5 3 mmol/L  4 2    Chloride      98 - 110 mmol/L  108    CO2      20 - 32 mmol/L  26    Calcium      8 6 - 10 4 mg/dL  9 1    Total Protein      6 1 - 8 1 g/dL  6 7    Albumin      3 6 - 5 1 g/dL  4 3    Globulin      1 9 - 3 7 g/dL (calc)  2 4    Albumin/Globulin Ratio      1 0 - 2 5 (calc)  1 8    TOTAL BILIRUBIN      0 2 - 1 2 mg/dL  0 3    Alkaline Phosphatase      37 - 153 U/L  76    AST      10 - 35 U/L  14    ALT      6 - 29 U/L  10    Cholesterol      <200 mg/dL 163     HDL      > OR = 50 mg/dL 64     Triglycerides      <150 mg/dL 82     LDL Calculated      mg/dL (calc) 83     Chol HDLC Ratio      <5 0 (calc) 2 5     Non-HDL Cholesterol      <130 mg/dL (calc) 99     Free T4      0 8 - 1 8 ng/dL   1 1   TSH, POC      0 40 - 4 50 mIU/L      Free T3      2 3 - 4 2 pg/mL        Component      Latest Ref Rng & Units 8/12/2021 8/12/2021           8:56 AM  8:56 AM   Glucose, Random      65 - 99 mg/dL     BUN      7 - 25 mg/dL     Creatinine      0 50 - 0 99 mg/dL     eGFR Non       > OR = 60 mL/min/1 73m2     eGFR       > OR = 60 mL/min/1 73m2     SL AMB BUN/CREATININE RATIO      6 - 22 (calc)     Sodium      135 - 146 mmol/L     Potassium      3 5 - 5 3 mmol/L     Chloride      98 - 110 mmol/L     CO2      20 - 32 mmol/L     Calcium      8 6 - 10 4 mg/dL     Total Protein      6 1 - 8 1 g/dL     Albumin      3 6 - 5 1 g/dL     Globulin      1 9 - 3 7 g/dL (calc)     Albumin/Globulin Ratio      1 0 - 2 5 (calc)     TOTAL BILIRUBIN      0 2 - 1 2 mg/dL     Alkaline Phosphatase      37 - 153 U/L     AST      10 - 35 U/L     ALT      6 - 29 U/L     Cholesterol      <200 mg/dL     HDL      > OR = 50 mg/dL     Triglycerides      <150 mg/dL     LDL Calculated      mg/dL (calc)     Chol HDLC Ratio      <5 0 (calc)     Non-HDL Cholesterol      <130 mg/dL (calc)     Free T4      0 8 - 1 8 ng/dL     TSH, POC      0 40 - 4 50 mIU/L 0 47    Free T3      2 3 - 4 2 pg/mL  3 0      Physical Exam  Constitutional:       General: She is not in acute distress  Appearance: She is well-developed  HENT:      Head: Normocephalic  Eyes:      Conjunctiva/sclera: Conjunctivae normal       Pupils: Pupils are equal, round, and reactive to light  Cardiovascular:      Rate and Rhythm: Normal rate and regular rhythm  Heart sounds: No murmur heard  Pulmonary:      Effort: Pulmonary effort is normal       Breath sounds: Normal breath sounds  Abdominal:      General: Bowel sounds are normal       Palpations: Abdomen is soft  There is no mass  Tenderness: There is no abdominal tenderness  Musculoskeletal:         General: Normal range of motion  Cervical back: Normal range of motion and neck supple  Skin:     General: Skin is warm and dry  Neurological:      Mental Status: She is alert and oriented to person, place, and time  Deep Tendon Reflexes: Reflexes are normal and symmetric  Psychiatric:         Mood and Affect: Mood is anxious  Speech: Speech is rapid and pressured           Judgment: Judgment normal

## 2023-01-25 NOTE — PROGRESS NOTES
*ATTENTION:  This note has been created by a medical student for educational purposes only. Please do not refer to the content of this note for clinical decision-making, billing, or other purposes. Please see attending physicians note to obtain clinical information on this patient. * Medical Student Progress Note [address] **Medical Student Note for Educational Purposes Only** Patient: Tejinder Mcgregor MRN: [id number]  CSN: [id number] YOB: 1965  Age: 60 y.o. Sex: female DOA: 9/8/2020 LOS:  LOS: 15 days Subjective:  
 
Acute events:  NAEO. Pt did not require use of any PRN psychotropic medications. Nurse mentions that Pt has not voided since the garcia was removed yesterday. Pt states that she doing \"pretty well\" this AM.  Pt is still highly confused based on responses. She seems to have less appetite this morning. Pt mentions a bilateral frontal HA and R eye pain, as she has done so previously during this admission. Review of Systems Constitutional: Negative for chills and fever. HENT: Negative for congestion and sore throat. Eyes: Positive for pain and redness. Respiratory: Negative for cough and shortness of breath. Cardiovascular: Negative for chest pain and palpitations. Gastrointestinal: Negative for abdominal pain, nausea and vomiting. Genitourinary: Negative for dysuria. Musculoskeletal: Negative for back pain and myalgias. Neurological: Positive for headaches. Objective:  
  
Patient Vitals for the past 24 hrs: 
 Temp Pulse Resp BP SpO2  
09/23/20 1232 98.5 °F (36.9 °C) 99 18 113/70 100 % 09/23/20 0833 97.4 °F (36.3 °C) 100 20 133/79 100 % 09/23/20 0030 97.8 °F (36.6 °C) 91 18 122/68 99 % 09/22/20 2359 98.4 °F (36.9 °C) (!) 140 19 118/71 100 % 09/22/20 2044 98.1 °F (36.7 °C) 95 17 115/73 100 % 09/22/20 1551 97 °F (36.1 °C) 100 18 112/69 100 % Intake/Output Summary (Last 24 hours) at 9/23/2020 1345 Last data filed at 9/22/2020 1805 Gross per 24 hour Intake 240 ml Output 1100 ml Net -860 ml Physical Exam:  
General:  Sitting up in bed, alert and responsive, and in no acute distress. No restraints present, but with sitter in the room. HEENT:  Erythematous R conjunctiva. Pupils non-reactive to light. Unable to properly assess EOM, but otherwise CNs 5,7,10,11,12 grossly intact. No cervical, supraclavicular, occipital or submandibular lymphadenopathy. CV:  Normal heart rate with regular rhythm. No murmurs, rubs, or gallops. RESP:  Unlabored breathing. Lungs clear to auscultation. No wheezes, rales, or rhonchi. Equal expansion bilaterally. ABD:  Soft, nontender, nondistended. No hepatosplenomegaly. No suprapubic tenderness. MS:  No joint deformity or instability. No atrophy. Neuro:  5/5 strength bilateral upper extremities and lower extremities. Sensation in all extremities intact. Ext:  No edema. Skin:  No rashes, lesions, or ulcers. Lab/Data Reviewed: 
CMP:  
Lab Results Component Value Date/Time  09/23/2020 03:48 AM  
 K 3.4 (L) 09/23/2020 03:48 AM  
  09/23/2020 03:48 AM  
 CO2 25 09/23/2020 03:48 AM  
 AGAP 7 09/23/2020 03:48 AM  
 GLU 92 09/23/2020 03:48 AM  
 BUN 7 09/23/2020 03:48 AM  
 CREA 1.09 09/23/2020 03:48 AM  
 GFRAA >60 09/23/2020 03:48 AM  
 GFRNA 52 (L) 09/23/2020 03:48 AM  
 CA 9.1 09/23/2020 03:48 AM  
 MG 1.9 09/23/2020 03:48 AM  
 PHOS 2.1 (L) 09/23/2020 03:48 AM  
 ALB 3.4 09/23/2020 03:48 AM  
 TP 7.5 09/23/2020 03:48 AM  
 GLOB 4.1 (H) 09/23/2020 03:48 AM  
 AGRAT 0.8 09/23/2020 03:48 AM  
 ALT 37 09/23/2020 03:48 AM  
 
CBC:  
Lab Results Component Value Date/Time WBC 8.6 09/23/2020 03:48 AM  
 HGB 9.1 (L) 09/23/2020 03:48 AM  
 HCT 27.0 (L) 09/23/2020 03:48 AM  
  09/23/2020 03:48 AM  
 
Recent Glucose Results:  
Lab Results Component Value Date/Time GLU 92 09/23/2020 03:48 AM  
 
Liver Panel:  
Lab Results Component Value Date/Time ALB 3.4 09/23/2020 03:48 AM  
 TP 7.5 09/23/2020 03:48 AM  
 GLOB 4.1 (H) 09/23/2020 03:48 AM  
 AGRAT 0.8 09/23/2020 03:48 AM  
 ALT 37 09/23/2020 03:48 AM  
 AP 99 09/23/2020 03:48 AM  
 
  
 
Scheduled Medications Reviewed: 
Current Facility-Administered Medications Medication Dose Route Frequency  potassium chloride (K-DUR, KLOR-CON) SR tablet 40 mEq  40 mEq Oral BID  phosphorus (K PHOS NEUTRAL) 250 mg tablet 1 Tab  1 Tab Oral BID  thiamine HCL (B-1) tablet 100 mg  100 mg Oral DAILY  multivitamin, tx-iron-ca-min (THERA-M w/ IRON) tablet 1 Tab  1 Tab Oral DAILY  QUEtiapine (SEROquel) tablet 63 mg  63 mg Oral QHS  polyethylene glycol (MIRALAX) packet 17 g  17 g Oral BID  tamsulosin (FLOMAX) capsule 0.8 mg  0.8 mg Oral DAILY  QUEtiapine (SEROquel) tablet 25 mg  25 mg Oral DAILY  FLUoxetine (PROzac) capsule 20 mg  20 mg Oral DAILY  lactated Ringers infusion  100 mL/hr IntraVENous CONTINUOUS  
 heparin (porcine) injection 5,000 Units  5,000 Units SubCUTAneous Q8H  
 [Held by provider] insulin glargine (LANTUS) injection 5 Units  5 Units SubCUTAneous DAILY  cholecalciferol (VITAMIN D3) (1000 Units /25 mcg) tablet 1 Tab  1,000 Units Oral DAILY  insulin lispro (HUMALOG) injection   SubCUTAneous AC&HS  
 melatonin tablet 3 mg  3 mg Oral QHS  [Held by provider] insulin NPH (NOVOLIN N, HUMULIN N) injection 40 Units  40 Units SubCUTAneous ACB&D  
 sodium chloride (NS) flush 5-40 mL  5-40 mL IntraVENous Q8H  
 pantoprazole (PROTONIX) tablet 40 mg  40 mg Oral DAILY  [Held by provider] rosuvastatin (CRESTOR) tablet 20 mg  20 mg Oral QHS Imaging, microbiology, and EKG/Telemetry: 
Imaging: MODALITY IMPRESSION  
XR Results from Hospital Encounter encounter on 09/08/20 XR ABD (KUB) Narrative EXAM: ABDOMEN 1 VIEW CLINICAL HISTORY/INDICATION: Altered mental status, evaluate for obstruction COMPARISON: 9/9/2020 TECHNIQUE: Portable AP view was obtained. FINDINGS:  
 
LINES/DEVICES: None. BOWEL GAS PATTERN: Non-obstructive bowel gas pattern. PATHOLOGIC CALCIFICATIONS: None. SOFT TISSUES: Unremarkable. BONES: Unremarkable for age. OTHER: The lung bases are clear. Ricka Distad Impression IMPRESSION: 
1.  Non-obstructive bowel gas pattern. Thank you for enabling us to participate in the care of this patient. CT Results from Hospital Encounter encounter on 09/08/20 CT CHEST ABD PELV W CONT Narrative CT chest, abdomen and pelvis with contrast  
 
CLINICAL HISTORY: Leukocytosis. CT evaluation of potential infection versus 
abscess COMPARISON: None. TECHNIQUE: Helical scan to the chest, abdomen and pelvis are obtained from the 
thoracic inlet to the symphysis pubis after IV contrast administration. All CT scans at this facility performed using dose optimization techniques as 
appreciated to a performed exam, to include automated exposure control, 
adjustment of the mA and or KU according to patient size (including appropriate 
matching for site specific examination), or use of iterative reconstruction 
technique. FINDINGS: Moderate motion artifact noted and limits the detail evaluation. CT CHEST:  
 
Lung Parenchyma: Minimal bibasilar atelectasis. No acute airspace disease or 
consolidation. Thyroid: Small hypodense nodule in the right lobe measuring about 5 mm. Mediastinum: No adenopathy. Mild and circumferential esophageal wall prominence 
with collapsed the lumen. Heart: The heart and the pericardium appear normal. 
 
Vasculature: The aorta and the great vessels are unremarkable. Pleural Space And Chest Wall: No significant pleural pathology. CT ABDOMEN AND PELVIS: 
 
Liver: Normal. 
 
Gallbladder: Surgically absent. Biliary System: No ductal dilatation.  
 
Spleen: Normal. 
 
Pancreas: Normal. 
 
Adrenal Glands: Normal. 
 
 Kidneys: Normal. 
 
Bowel: The small and large bowel are nondilated. Normal appendix. Fluid-filled 
sigmoid colon and rectum with air-fluid level without dilatation. Lower genitourinary system: The bladder is markedly distended up to the level 
above the umbilicus, 64.4 cm in cc dimension. No definite bladder wall lesion 
seen. The uterus appears spherical and heterogeneous with several partially 
calcified fibroids present. No adnexal mass. Peritoneum/Retroperitoneum: No adenopathy. Vasculature: Unremarkable for age. Other: No free fluid. CT OSSEOUS STRUCTURES:   
 
Unremarkable for age. Impression IMPRESSION:  
 
1. Marked bladder distention up to the level above the umbilicus. Recommend 
clinical correlation for urinary outlet obstruction. 2. No CT evidence of infection or inflammatory process. No abscess seen. 3. Tiny hypodense nodule in right thyroid lobe. This can be better evaluated by 
thyroid ultrasound. 4. Mild and circumferential esophageal wall prominence with collapsed the lumen. If symptomatic, barium swallow can be performed for better evaluation. 5. Fluid-filled nondilated distal colon and rectum as nonspecific finding and 
could represent diarrhea. 6. Multiple partially calcified fibroids. Thank you for this referral.   
  
MRI Results from Hospital Encounter encounter on 09/08/20 MRI BRAIN W WO CONT Narrative MRI BRAIN WITH AND WITHOUT CONTRAST Provided Reason for Exam: Confusion, visual loss right eye for 2 weeks, LP with 
elevated opening pressure Additional History: Patient admitted with confusion Comparison Studies: Brain MRI 9/11/2020, head CT 9/9/2020 Imaging Technique:  
  Sequences:  Sagittal and axial T1 weighted, Diffusion Weighted (DWI), Axial T2 
weighted, Axial T2 FLAIR, and GRE MRI images of the brain. Post contrast axial 
and coronal T1 images. Contrast Material:  20 mL Dotarem IV Limiting Factors/Major Artifacts: None.  
 
FINDINGS: 
 Brain Parenchyma: Diffusion sequence negative. No cytotoxic edema or acute 
infarct. Cerebral white matter is normal.  No chronic cortical infarcts or 
chronic lacunar infarcts. No visible masses or midline shift. No abnormal 
parenchymal or meningeal enhancement. CSF Spaces: There are some mild prominence of the posterior horns of the 
lateral ventricles, but the major the ventricles are normal size. No findings of 
ventricular trapping. Appears to be developmental, similar findings on the head CT from 2013. Vascular System:  Grossly patent flow in basilar and internal cerebral arteries. No findings of dural sinus thrombosis. Hemorrhage:  No acute hemorrhage. No chronic microhemorrhage. Other Structures: 
Calvarium: No suspicious marrow signal. 
Sella: Normal. 
Visualized Upper Cervical Spine: Normal craniocervical junction, no Chiari 
malformation. Orbits: Postsurgical changes in the left ocular globe, appears to be silicone 
injection, unchanged from 2014 head CT. Paranasal Sinuses: No significant paranasal sinus disease. Mastoid Air Cells:  Clear. Impression IMPRESSION: 
 
No acute intracranial abnormality. No mass, hemorrhage, or acute infarct. Postsurgical changes left optic globe consistent with previous retinal 
detachment procedure. ULTRASOUND Results from Hospital Encounter encounter on 09/08/20 US ABD LTD Impression IMPRESSION:    
 
1. Status post cholecystectomy. 2.  No significant common bile duct dilation. 3.  Increased hepatic parenchymal echogenicity with somewhat heterogeneous 
appearance, potentially suggestive of hepatosteatosis. Partial visualization of 
the liver. [place] [facility] [place] US ABD LTD Impression IMPRESSION: 
 
Mild heterogeneous echogenicity of the liver is nonspecific. This is commonly 
seen with steatosis hepatic disease. Limited visualization of the pancreas. Cardiology Procedures/Testing: MODALITY RESULTS  
EKG Results for orders placed or performed during the hospital encounter of 09/08/20 EKG, 12 LEAD, INITIAL Result Value Ref Range Ventricular Rate 115 BPM  
 Atrial Rate 115 BPM  
 P-R Interval 148 ms QRS Duration 58 ms Q-T Interval 342 ms QTC Calculation (Bezet) 473 ms Calculated P Axis 34 degrees Calculated R Axis 1 degrees Calculated T Axis 34 degrees Diagnosis Sinus tachycardia Cannot exclude anterior MI versus lead placement issue. Abnormal ECG When compared with ECG of 08-SEP-2020 15:05, Anterior infarct is now present Nonspecific T wave abnormality no longer evident in Inferior leads Nonspecific T wave abnormality, improved in Anterolateral leads Confirmed by Horacio Donohue MD, Tr Schroeder ([id number]) on 9/17/2020 3:04:13 PM 
  
Results for orders placed or performed in visit on 06/20/14 AMB POC EKG ROUTINE W/ 12 LEADS, INTER & REP Impression See progress note. Special Testing/Procedures: MODALITY RESULTS MICRO All Micro Results Procedure Component Value Units Date/Time CULTURE, URINE [[id number]] Collected:  09/22/20 1355 Order Status:  Completed Specimen:  Urine from Clean catch Updated:  09/22/20 2343 CULTURE, CSF Emile Levy [[id number]] Collected:  09/15/20 0848 Order Status:  Completed Specimen:  Cerebrospinal Fluid Updated:  09/22/20 1045 Special Requests: CEREBROSPINAL FLUID     
  GRAM STAIN NO WBC'S SEEN     
   NO ORGANISMS SEEN Smear Reviewed by Microbiology 9/15/20 AT 1408 TA. Culture result:    
  NO GROWTH ON SOLID MEDIA AT 4 DAYS  
     
      
  NO GROWTH IN THIO BROTH AT 7 DAYS  
     
 CULTURE, BLOOD [[id number]] Collected:  09/15/20 1002 Order Status:  Completed Specimen:  Blood Updated:  09/21/20 0735 Special Requests: NO SPECIAL REQUESTS Culture result: NO GROWTH 6 DAYS     
 CULTURE, BLOOD [[id number]] Collected:  09/15/20 1005 Order Status:  Completed Specimen:  Blood Updated:  09/21/20 0735 Special Requests: NO SPECIAL REQUESTS Culture result: NO GROWTH 6 DAYS     
 CULTURE, URINE [[id number]] Collected:  09/15/20 1850 Order Status:  Completed Specimen:  Urine from Clean catch Updated:  09/17/20 1046 Special Requests: NO SPECIAL REQUESTS Culture result: No growth (<1,000 CFU/ML) MENINGITIS PATHOGENS PANEL, CSF (BY PCR) [[id number]] Collected:  09/15/20 0848 Order Status:  Completed Specimen:  Cerebrospinal Fluid Updated:  09/15/20 1510 Escherichia coli K1 Not detected Haemophilus Influenzae Not detected Listeria Monocytogenes Not detected Neisseria Meningitidis Not detected Streptococcus Agalactiae Not detected Streptococcus Pneumoniae Not detected Cytomegalovirus Not detected Enterovirus Not detected Herpes Simplex Virus 1 Not detected Comment: In patients who have negative herpes simplex 1 and 2 PCR results, do not modify treatment, confirm with alternate testing. Herpes Simplex Virus 2 Not detected Comment: In patients who have negative herpes simplex 1 and 2 PCR results, do not modify treatment, confirm with alternate testing. Human Herpesvirus 6 Not detected Human Parechovirus Not detected Varicella Zoster Virus Not detected Crypto. neoformans/gattii Not detected CULTURE, BLOOD [[id number]] Collected:  09/08/20 1640 Order Status:  Completed Specimen:  Blood Updated:  09/14/20 5791 Special Requests: NO SPECIAL REQUESTS Culture result: NO GROWTH 6 DAYS     
 CULTURE, BLOOD [[id number]] Collected:  09/08/20 1640 Order Status:  Completed Specimen:  Blood Updated:  09/14/20 1842 Special Requests: NO SPECIAL REQUESTS Culture result: NO GROWTH 6 DAYS     
 CULTURE, URINE [[id number]] Collected:  09/08/20 1211 Order Status:  Completed Specimen:  Urine from Clean catch Updated:  09/09/20 1811 Special Requests: NO SPECIAL REQUESTS [place] Count --     
  >100,000 COLONIES/mL Culture result:    
  MIXED UROGENITAL AUDRA ISOLATED  
     
  
  
UA No results found for this or any previous visit. PATH Assessment/Plan Lucretia Chapman is an 60 y.o. female with PMH of hypokalemia, retinal detachment, stage 2 CKD, GIST s/p resection (2014), HTN, and HLD who was admitted on 9/08/20 for AMS and HHS. Altered Mental Status - metabolic encephalopathy of unclear etiology (likely multifactorial) [] Pt initially presented with HHS (BG of 460-500), which was treated and has resolved.  Now with BG < 200 consistently euglycemic. [] Initial labs were concerning for infection with leukocytosis and lactic acidosis of unclear source that resolved with IV Abx. Possible UTI was initially the leading DDx because of a concerning UA, UCx, and a CT chest/abd/pelvis (9/09). Pt was treated with Abx. [] At admission, CT Head (9/08) showed mildly enlarged ventricles that could be concerning for cerebral atrophy vs normal pressure hydrocephalus.  MRI head (9/11 + 9/17) showed no acute intracranial findings and no dural venous thrombosis. [] Pt developed Hypernatremia (9/09) of 151; this was treated and has resolved. [] Also of note, UDS and serum EtOH were negative at admission.  Troponin has been negative and EKG with no ST elevation or depression.  Serum lipase, ammonia, B12, and folate levels have all been WNL as well.  HIV and RPR also negative.  B1, GRACE, smooth muscle antibodies, and covid were all negative. Elevated inflammatory markers of ESR 32 and CRP 3.7 (9/14). - Neurology signed off - Psychiatry is on board, appreciate their recs - s/p empirical Abx:  Ceftriaxone (9/8 -9/9),  Vancomycin (9/8 - 9/14),  Zosyn (9/9 - 9/14) 
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  
- Consider routine bladder scans - Continue scheduled Miralax BID and Bisacodyl PRN 
- As per Psych, continue Fluoxetine 20mg daily - As per neurology, avoid benzodiazepines for sedation;  continue Ziprasidone (geodon) 10mg IM PRN for severe agitation 
- Continue Quetiapine (seroquel) 25mg QAM + 50mg QHS 
- Hold metoclopramide and paroxetine (concern for serotonin syndrome) 
  
  
EPS - Tardive dyskinesia, Akathisia, and Dystonia Pt's development of EPS is likely due to use of antipsychotics, and chronic metoclopramide use could also be related.  Extrapyramidal symptoms have improved significantly since admission. 
- Currently off of physical restraints ; sitter present overnight, but not this AM 
- As per neuro, avoid additional antipsychotic use when able - As per neuro, Ziprasidone (geodon) PRN for severe agitation 
  
  
Possible UTI and Urinary Retention Repeat UA (9/22) was notable for moderate LE, few bacteria, 2+ epithelial cells, and 2+ yeast. 
- bacteria could be contaminant given presence of epithelial cells - Gave a single dose of Fluconazole yesterday 
- Continue Flomax to help manage retention - F/U on UCx 
- Scheduled \"[place] Time\"  (hopefully to avoid need for garcia replacement) 
  
  
R Eye Pain + Vision Loss Pt c/o a 2-week history of R eye pain associated with vision loss.  Based on a bedside U/S, papilledema seems unlikely (as no enlarged optic sheath was seen).  DDx:  Floaters vs Retinal Detachment vs Conjuctivitis vs Acute Angle-Closure Glaucoma - Discussed with Ophthalmology (Dr. Vaishnavi Quesada) - not able to come and see Pt 
  
  
Hyperbilirubinemia (3.9) w/ 0.6 direct likely majority is unconjugated  likely secondary to [place] syndrome, previously elevated bili unlikely hemolysis (improved) CT AB/US no gall bladder disease appreciated , haptoglobin normal, aldolase pending Diet:  diabetic diet,  ST plans to advance diet as tolerated DVT Prophylaxis:  SQH 
GI Prophylaxis:  Omeprazole Code Status:  Full Point of Contact:  Socrates Cuenca, [phone] (Relationship:  son) 
  
Disposition:  > 2 MN,  eventually discharge to SNF Sandhya Castillo, MS-4 [facility], M.D. Rosemarie Ashley of [id number] Thalidomide Counseling: I discussed with the patient the risks of thalidomide including but not limited to birth defects, anxiety, weakness, chest pain, dizziness, cough and severe allergy.

## 2023-02-26 NOTE — PROGRESS NOTES
Encounter Date: 2/25/2023       History     Chief Complaint   Patient presents with    Flank Pain     Pt was diagnosed Wed with UTI and put on Bactrim, pt mother feels the antibiotic isnt working and pt now having flank pain and increased difficulty urinating. Pt having difficulty ambulating with pain. History of spina bifeda      17-year-old male with a history of spina bifida and VUR with recurrent UTIs who was recently seen on Wednesday of this week, diagnosed with the UTI and discharged home on Bactrim presents with a chief complaint of worsening dysuria back and abdominal pain.  The patient's mother says that he had a temperature of 100.1° yesterday, but has been afebrile since.  She says that he has since developed darker urine with worsening dysuria and is now complaining of abdominal pain.  The patient says that he does not think that he is had a bowel movement in 3 days, what says that he has been eating and drinking normally.  He denies any chest pain, shortness of breath, nausea, vomiting or new rashes.    The history is provided by the patient and a parent. No  was used.     Review of patient's allergies indicates:   Allergen Reactions    Latex, natural rubber      PMH: spina bifida, neurogenic bladder    No past surgical history on file.    No family history on file.     Review of Systems   Constitutional:  Positive for fever.   HENT:  Negative for sore throat.    Gastrointestinal:  Positive for abdominal distention and abdominal pain.   Genitourinary:  Positive for dysuria and flank pain.   Skin:  Negative for rash.     Physical Exam     Initial Vitals   BP Pulse Resp Temp SpO2   02/25/23 2015 02/25/23 2015 02/25/23 2015 02/25/23 2017 02/25/23 2015   119/68 90 16 97.8 °F (36.6 °C) 97 %      MAP       --                Physical Exam    Nursing note and vitals reviewed.  Constitutional: He appears well-developed and well-nourished. He is not diaphoretic. No distress.   HENT:   Head:  SLP Note: 
 
Pt NPO for testing; will hold dysphagia management accordingly and continue to follow per POC. Nickie Pacheco M.S., CCC-SLP Speech-Language Pathologist  
 
 Normocephalic.   Eyes: Pupils are equal, round, and reactive to light.   Neck: Neck supple.   Normal range of motion.  Cardiovascular:  Normal rate, regular rhythm, normal heart sounds and intact distal pulses.           Pulmonary/Chest: Breath sounds normal. He has no wheezes. He has no rhonchi. He has no rales.   Abdominal: Abdomen is soft. He exhibits no distension and no mass. There is abdominal tenderness. There is guarding. There is no rebound.   Genitourinary:    Genitourinary Comments: Mild left CVA tenderness     Musculoskeletal:         General: No tenderness or edema. Normal range of motion.      Cervical back: Normal range of motion and neck supple.     Neurological: He is alert and oriented to person, place, and time.   Skin: Skin is warm and dry. Capillary refill takes less than 2 seconds. No rash noted. No erythema. No pallor.   Psychiatric: He has a normal mood and affect. His behavior is normal. Judgment and thought content normal.       ED Course   Procedures  Labs Reviewed   CBC W/ AUTO DIFFERENTIAL - Abnormal; Notable for the following components:       Result Value    MPV 8.3 (*)     All other components within normal limits   COMPREHENSIVE METABOLIC PANEL - Abnormal; Notable for the following components:    Glucose 135 (*)     All other components within normal limits   URINALYSIS, REFLEX TO URINE CULTURE - Abnormal; Notable for the following components:    Appearance, UA Cloudy (*)     Protein, UA 1+ (*)     Ketones, UA Trace (*)     Occult Blood UA 2+ (*)     Leukocytes, UA 3+ (*)     All other components within normal limits    Narrative:     Specimen Source->Urine   URINALYSIS MICROSCOPIC - Abnormal; Notable for the following components:    RBC, UA >100 (*)     WBC, UA >100 (*)     Bacteria Many (*)     Hyaline Casts, UA 4 (*)     All other components within normal limits    Narrative:     Specimen Source->Urine   CULTURE, URINE   BASIC METABOLIC PANEL   MAGNESIUM   PHOSPHORUS   CBC W/ AUTO  DIFFERENTIAL          Imaging Results              CT Abdomen Pelvis With Contrast (Final result)  Result time 02/25/23 23:48:19      Final result by Praful Castellano MD (02/25/23 23:48:19)                   Impression:      Continued demonstration of irregular bladder wall contour with thickening and moderate hydroureteronephrosis.  Bilateral renal cortical scarring.  Findings are similar compared to prior CT when allowing for differences in imaging technique.  Correlate clinically for history of neurogenic bladder.    Large amount of stool burden within the colon.  Correlation for constipation recommended.    Fractured ventricular peritoneal shunt with interval migration of the distal component inferiorly.    Additional findings above.    Electronically signed by resident: Omar Doe  Date:    02/25/2023  Time:    23:01    Electronically signed by: Praful Castellano MD  Date:    02/25/2023  Time:    23:48               Narrative:    EXAMINATION:  CT ABDOMEN PELVIS WITH CONTRAST    CLINICAL HISTORY:  Abdominal pain, acute, nonlocalized;    TECHNIQUE:  Low dose axial images, sagittal and coronal reformations were obtained from the lung bases to the pubic symphysis following the IV administration of 75 mL of Omnipaque 350 .  Oral contrast was not administered.    COMPARISON:  Retroperitoneal ultrasound 02/22/2023, CT abdomen pelvis 05/31/2022    FINDINGS:  Heart: Normal in size. No pericardial effusion.    Lung Bases: No Consolidation or pleural effusion.    Liver: Normal in size and attenuation, with no focal hepatic lesions.    Gallbladder: No calcified gallstones.    Bile Ducts: No evidence of dilated ducts.    Pancreas: No mass or peripancreatic fat stranding.    Spleen: Normal size and attenuation.  Small accessory spleen.    Adrenals: Unremarkable. No focal lesions.    Kidneys/ Ureters: Chronic bilateral moderate hydroureteronephrosis in this patient with reported neurogenic bladder.  No perinephric  stranding.  Bilateral renal cortical scarring.    Bladder: Irregular contour of the bladder with mild wall thickening, similar to prior.    Reproductive organs: Unremarkable.    GI Tract/Mesentery: No evidence of bowel obstruction or inflammation.  The appendix appears within normal limits.  Large amount of stool burden.    Peritoneal Space: Fractured ventricular peritoneal shunt with migration of the distal portion inferiorly compared to the prior examination.  Likely present on prior examination.  No ascites.  No free air.    Retroperitoneum: No significant adenopathy.    Abdominal wall: Partially visualized ventriculoperitoneal shunt.    Vasculature: No significant atherosclerosis or aneurysm.    Bones: Levoscoliosis of the lumbar spine.  Chronic right L5 pars defect.  No acute fracture.  No osseous destructive lesions.                                       Medications   oxybutynin 24 hr tablet 15 mg (has no administration in time range)   tamsulosin 24 hr capsule 0.4 mg (has no administration in time range)   sodium chloride 0.9% flush 10 mL (has no administration in time range)   naloxone 0.4 mg/mL injection 0.02 mg (has no administration in time range)   glucose chewable tablet 16 g (has no administration in time range)   glucose chewable tablet 24 g (has no administration in time range)   dextrose 10% bolus 125 mL 125 mL (has no administration in time range)   dextrose 10% bolus 250 mL 250 mL (has no administration in time range)   glucagon (human recombinant) injection 1 mg (has no administration in time range)   oxyCODONE immediate release tablet 5 mg (has no administration in time range)   cefTRIAXone (ROCEPHIN) 2 g in dextrose 5 % in water (D5W) 5 % 50 mL IVPB (MB+) (has no administration in time range)   polyethylene glycol packet 17 g (has no administration in time range)   ketorolac injection 15 mg (15 mg Intravenous Given 2/25/23 2110)   iohexoL (OMNIPAQUE 350) injection 100 mL (75 mLs Intravenous  Given 2/25/23 2244)   cefTRIAXone (ROCEPHIN) 1 g in dextrose 5 % in water (D5W) 5 % 50 mL IVPB (MB+) (0 g Intravenous Stopped 2/26/23 0000)   HYDROcodone-acetaminophen 5-325 mg per tablet 1 tablet (1 tablet Oral Given 2/26/23 0110)     Medical Decision Making:   History:   I obtained history from: someone other than patient.       <> Summary of History: Mother   Old Medical Records: I decided to obtain old medical records.  Old Records Summarized: records from clinic visits and records from previous admission(s).       <> Summary of Records: Prior records reviewed for past medical history and current medications  Initial Assessment:   17-year-old male in no acute distress.  I treated his pain initially with Toradol.  Review of the patient's urine culture from Wednesday showed pansensitive Proteus antibiotic selection of Bactrim seems appropriate.  Patient does have new tenderness over the ribs and under the ribs in the right upper quadrant.  Will order CT scan to evaluate.  Differential Diagnosis:   Pyelonephritis vs simple UTI vs gallbladder pathology vs constipation  Clinical Tests:   Lab Tests: Reviewed  Radiological Study: Reviewed  ED Management:  UA showed persistent infection, labs did not show any evidence of renal impairment.  CT scan did not show any evidence of acute intra-abdominal pathology.  Patient was admitted to Hospital Medicine for failed outpatient treatment of UTI.  Other:   I have discussed this case with another health care provider.       <> Summary of the Discussion: Hospital medicine          Attending Attestation:   Physician Attestation Statement for Resident:  As the supervising MD   Physician Attestation Statement: I have personally seen and examined this patient.   I agree with the above history.  -:   As the supervising MD I agree with the above PE.     As the supervising MD I agree with the above treatment, course, plan, and disposition.    I have reviewed and agree with the  residents interpretation of the following: lab data and CT scans.                            Clinical Impression:   Final diagnoses:  [N12] Pyelonephritis        ED Disposition Condition    Observation                 Alejandro Gomez MD  Resident  02/26/23 0134       Sarai Torrez MD  02/27/23 0056

## 2023-10-08 NOTE — PROGRESS NOTES
Problem: Falls - Risk of 
Goal: *Absence of Falls Document Jenny Kappa Fall Risk and appropriate interventions in the flowsheet. Outcome: Progressing Towards Goal 
Fall Risk Interventions: 
Mobility Interventions: OT consult for ADLs, Patient to call before getting OOB, PT Consult for mobility concerns, PT Consult for assist device competence, Strengthening exercises (ROM-active/passive) Medication Interventions: Teach patient to arise slowly Elimination Interventions: Call light in reach, Elevated toilet seat, Patient to call for help with toileting needs, Toilet paper/wipes in reach, Toileting schedule/hourly rounds Given syncope and new LV dysfunction with SWMA recommend repeat LHC.    Start ASA 81 mg PO q day.    EP consult post LHC.    A total of 50 minutes was spent on this patient encounter.

## 2024-01-24 NOTE — H&P
Admission History and Physical 
[address] [facility] Patient: Satnam Singh MRN: [id number]  CSN: [id number] YOB: 1965  Age: 52 y.o. Sex: female DOA: 1/3/2019 HPI:  
 
Satnam Singh is a 52 y.o. female with PMH Type 2 DM with neuropathy, retinopathy, HTN, osteomyelitis, pancreatitis now presenting with complaint of nausea, vomiting, abdominal pain. Patient states she has been having n/v abdominal pain for several weeks. She reports being unable to keep food down. When asked if its food content that she is throwing up she states its \"acid\". She denies any blood in her vomit. Patient has had several admissions for this this fall. Last admission in November patient had EGD which showed gastritis and esophagiitis. She was treated with PPI and crafate. Patient also reports multiple falls at home. Last fall was last night and she states she hit her head. She reports headaches and neck pain as well. When asked why she falls she states she is just tired all of the time. She reports dizziness like the room is spining and also states she is blind in her left eye and says she has been for years. ED Course: In the ED patient was tachycardic, tachypnic, saturating 93% on room air. Lactic acid 2.45. WBC 20. Na 131, K 3.3, Cr 3.72. T bili 1.9. Alk Phos 173. Lipase 141. EKG sinus tachycardia. CT ab/pelvis negative. CXR pending read. Patient started on zosyn, 2 L NS, morphine 4 mg, zofran. Review of Systems Constitutional: Negative for fever, positive for chills HENT: Positive for sore throat Eyes: Negative for blurred vision and double vision. Respiratory: Negative for shortness of breath Cardiovascular: Negative for chest pain Gastrointestinal: Positive for nausea and vomiting. Negative for diarrhea, constipation Musculoskeletal: Negative for myalgias and joint pain. Skin: Negative for itching and rash. Outpatient Maternal-Fetal Medicine Consultation    Dear  Dr. Mayers    Thank you for requesting ultrasound evaluation and maternal fetal medicine consultation on your patient Mónica Earl.  As you are aware she is a 27 year old female  with a braden pregnancy and an Estimated Date of Delivery: 3/14/24.  A maternal-fetal medicine f/u is today.  Her prenatal records and labs were reviewed.    HISTORY  # 1 - Date: 08/10/14, Sex: Female, Weight: 5 lb 15 oz (2.693 kg), GA: 38w0d, Delivery: Normal spontaneous vaginal delivery, Apgar1: None, Apgar5: None, Living: Living, Birth Comments: None    # 2 - Date: 16, Sex: Female, Weight: 7 lb 5 oz (3.317 kg), GA: 38w1d, Delivery: Normal spontaneous vaginal delivery, Apgar1: 8, Apgar5: 9, Living: Living, Birth Comments: None    # 3 - Date: None, Sex: None, Weight: None, GA: None, Delivery: None, Apgar1: None, Apgar5: None, Living: None, Birth Comments: None      Past Medical History  The patient  has a past medical history of Anemia and Pre-diabetes.    Past Surgical History  The patient  has no past surgical history on file.    Family History  The patient She indicated that her mother is alive. She indicated that her father is alive. She indicated that her brother is alive. She indicated that her maternal grandmother is alive. She indicated that her paternal grandmother is alive. She indicated that the status of her neg is unknown.      Medications:   Current Outpatient Medications:     insulin detemir (LEVEMIR FLEXPEN) 100 UNIT/ML Subcutaneous Solution Pen-injector, Inject 14 Units into the skin every morning. Inject 72 units every night (divided in to two doses of 36 units each given at least two inches apart) (Patient taking differently: Inject 14 Units into the skin every morning. Inject 80 units every night (divided in to two doses of 40 units each given at least two inches apart)), Disp: 15 mL, Rfl: 1    insulin aspart 100 Units/mL Subcutaneous  Neurological: Positive for dizziness and headaches, weakness Psychiatric: Positive for anxiety Heme: negative for bleeding Past Medical History:  
Diagnosis Date  Blind left eye  Cellulitis of great toe of right foot 10/19/2017  Diabetes ([facility] [address]  Diabetic retinopathy ([facility] [address]  Gall stones  GERD (gastroesophageal reflux disease)  Hammertoe  Hiatal hernia  History of mammogram 10/03/2017 No evidence of malignancy  Hypertension  Mass of abdomen  Neuropathy due to type 2 diabetes mellitus ([facility] [address]  Osteomyelitis of toe of right foot ([facility] [address] 10/19/2017  Pancreatitis Past Surgical History:  
Procedure Laterality Date  HX AMPUTATION Left 2017  
 left 2nd toe  HX CHOLECYSTECTOMY  10/15/2014  HX GI Benign GI Stromal Tumor excision  HX HEENT Sx for detached retina  HX MYOMECTOMY x5 removed  HX OTHER SURGICAL    
 upper endoscopy Family History Adopted: Yes  
Problem Relation Age of Onset  Heart Failure Mother 76  
 Other Father 70  
     blood clot after surgery  Diabetes Paternal Aunt  Diabetes Paternal Uncle Social History Socioeconomic History  Marital status: SINGLE Spouse name: Not on file  Number of children: Not on file  Years of education: Not on file  Highest education level: Not on file Tobacco Use  Smoking status: Former Smoker Packs/day: 0.20 Years: 8.00 Pack years: 1.60 Types: Cigarettes Last attempt to quit: 12/3/1995 Years since quittin.1  Smokeless tobacco: Never Used Substance and Sexual Activity  Alcohol use: No  
  Comment: Drinks 3-4xs year generally wine  Drug use: No  
 Sexual activity: Not Currently Other Topics Concern   Service No  
 Blood Transfusions No  
 Caffeine Concern No  
 Occupational Exposure No  
 Hobby Hazards No  
 Sleep Concern No  
 Stress Concern No  
 Weight Concern No  
 Solution Pen-injector, Inject 24 Units into the skin daily with dinner. (Patient taking differently: Inject 28 Units into the skin daily with dinner.), Disp: 3 mL, Rfl: 3    Glucose Blood (ONETOUCH VERIO) In Vitro Strip, Check blood sugar 4x daily. (Patient not taking: Reported on 11/27/2023), Disp: 300 strip, Rfl: 3    Lancets Does not apply Misc, Check blood sugar 4x daily. (Patient not taking: Reported on 11/27/2023), Disp: 200 each, Rfl: 5    Insulin Pen Needle 32G X 4 MM Does not apply Misc, Use with flex pen as direct (Patient not taking: Reported on 11/27/2023), Disp: 100 each, Rfl: 11    Blood Glucose Monitoring Suppl (ONETOUCH VERIO FLEX SYSTEM) w/Device Does not apply Kit, 1 kit 4 (four) times daily. (Patient not taking: Reported on 11/27/2023), Disp: 1 kit, Rfl: 0    Glucose Blood (ONETOUCH VERIO) In Vitro Strip, 1 strip 4 (four) times daily. Use daily as directed. (Patient not taking: Reported on 11/27/2023), Disp: 100 strip, Rfl: 2    OneTouch Delica Lancets 33G Does not apply Misc, 1 Lancet by Finger stick route daily. May substitute for covered brand only if prescribed brand os not covered. (Patient not taking: Reported on 11/27/2023), Disp: 100 each, Rfl: 2    Blood Pressure Monitoring (BLOOD PRESSURE CUFF) Does not apply Misc, Take blood pressure once daily and record reading (Patient not taking: Reported on 11/27/2023), Disp: 1 each, Rfl: 0    aspirin (ASPIRIN LOW DOSE) 81 MG Oral Chew Tab, Chew 1.5 tablets (121.5 mg total) by mouth daily., Disp: 60 tablet, Rfl: 2    Multiple Vitamins-Minerals (MULTIVITAL OR), Take by mouth., Disp: , Rfl:   Allergies: No Known Allergies    PHYSICAL EXAMINATION:  /76   Pulse 92   Wt 235 lb (106.6 kg)   LMP 06/08/2023 (Exact Date)   BMI 36.81 kg/m²     General: alert and oriented,no acute distress  Abdomen: gravid, soft, non-tender      DISCUSSION  During her visit we discussed and reviewed the following issues:  NON-DIAGNOSTIC CELL-FREE FETAL DNA  RESULTS  History  Low fetal fraction on NIPT.  Lorelei criteria algorithm suggested an elevated risk for triploidy/trisomy 13/trisomy 18.    The lab reported that cfDNA was insufficient in the maternal serum to report an accurate NIPT evaluation.  SUMMARY  The patient is confident that she would not interrupt a pregnancy due to fetal aneuploidy and is aware of the limitations of ultrasound alone with respect to the diagnosis of fetal aneuploidy, hence she DECLINES invasive testing.  See previous note    OBESITY  This patient has obesity; her pre-gravid BMI is: 37  Obesity during pregnancy is associated with numerous maternal and  risks.  It is not clear whether obesity is a direct cause of adverse pregnancy outcome or whether the association between obesity and adverse pregnancy outcome is due to factors such as diabetes mellitus.   Data suggest that obese women should be encouraged to undertake a weight reduction program (diet, exercise, behavior modification, and possibly bariatric surgery in some cases) prior to attempting to conceive.            GESTATIONAL DIABETES    3 hour GTT  Lab Results   Component Value Date    GLUFG 96 (H) 2023    GLU1G 195 (H) 2023    GLU2G 179 (H) 2023    GLU3G 124 2023      Please see previous MFM detailed discussion.     Her capillary blood glucose records were reviewed today; her compliance with the recommended four times daily assessments (fasting and two-hour post-prandial) is good.    Her overall glucose control is good.      The patient is presently on diet therapy; her compliance with her diabetic diet regimen was reviewed and it is adequate.     Medical Regimen Recommendation:   Continue ADA diet    Continue  Levemir 14 units subcutaneous every morning  Levemir 72 units (divided into 2 injections of 36 units each placed at least 2 inches apart) subcutaneous every night with bedtime snack      novolog 24 units with dinner.       CHANGE   Special Diet Yes  Back Care No  
 Exercise No  
 Bike Helmet No  
 Seat Belt Yes  Self-Exams Yes Social History Narrative Lives with 16year old son  with ex   Does not have health insurance Allergies Allergen Reactions  Levaquin [Levofloxacin] Hives  Promethazine Nausea and Vomiting \"the phenergan made me more nauseated\" Prior to Admission Medications Prescriptions Last Dose Informant Patient Reported? Taking? PARoxetine (PAXIL) 30 mg tablet   No No  
Sig: TAKE 1 TABLET BY MOUTH DAILY  
cefdinir (OMNICEF) 300 mg capsule   No No  
Sig: Take 1 Cap by mouth two (2) times a day. insulin NPH/insulin regular (NOVOLIN 70/30, HUMULIN 70/30) 100 unit/mL (70-30) injection   No No  
Si Units by SubCUTAneous route two (2) times a day. metFORMIN ER (GLUCOPHAGE XR) 500 mg tablet   No No  
Sig: TAKE 1 TABLET BY MOUTH DAILY WITH DINNER  
metroNIDAZOLE (FLAGYL) 500 mg tablet   No No  
Sig: Take 1 Tab by mouth every eight (8) hours. pantoprazole (PROTONIX) 40 mg tablet   No No  
Sig: Take 1 Tab by mouth Daily (before breakfast). rosuvastatin (CRESTOR) 20 mg tablet   No No  
Sig: Take 1 Tab by mouth nightly. sucralfate (CARAFATE) 100 mg/mL suspension   No No  
Sig: Take 10 mL by mouth Before breakfast, lunch, dinner and at bedtime. Facility-Administered Medications: None Physical Exam:  
 
Patient Vitals for the past 24 hrs: 
 Temp Pulse Resp BP SpO2  
19 0200  93 17 100/76   
19 0145  98 25 (!) 112/96   
19 0030  (!) 112 14 117/89   
19 2245  (!) 118 26 109/57   
19 2221  (!) 118   93 % 19 2220 97.3 °F (36.3 °C) (!) 118 27 90/71 93 % Physical Exam:  
General:  Alert and Responsive, lying on her side in bed, hand over forehead appears to be in mild-mod discomfort HEENT: Conjunctiva pink, sclera anicteric.   Pharynx moist, INSULIN:  Levemir 14 units subcutaneous every morning  Levemir 74 units (divided into 2 injections of 37 units each placed at least 2 inches apart) subcutaneous every night with bedtime snack      novolog 24 units with dinner.      OB ULTRASOUND REPORT   See imaging tab for complete ultrasound report or in PACS    Single IUP in Breech presentation.    Placenta is posterior, high.   A 3 vessel cord is noted.  Cardiac activity is present at 138 bpm  EFW 2116 g ( 4 lb 11 oz); 50%.    SIMONE is  13.8 cm.  MVP is 5.2 cm        BIOPHYSICAL PROFILE:  Movement:    2/2  Tone:            2/2  Breathin/2  Fluid:             22  TOTAL:                 IMPRESSION:  IUP at 32w6d   BREECH  NIPT with low fetal fraction   GDMA2 - early diagnosis  Class II Obesity    RECOMMENDATIONS:  Continue care with Dr. Mayers  Limit weight gain to 11-20 pounds.  Weekly Maternal-Fetal Medicine review of capillary blood glucose values  Follow-up growth ultrasound every 4 weeks in the third trimester  Weekly NSTs at 32 weeks; twice weekly NST's at 34 weeks  Delivery at 38-39  weeks advised for medication controlled diabetes  Low-dose  mg (1.5 tabs) daily  Continue home BP monitoring    Thank you for allowing me to participate in the care of your patient.  Please do not hesitate to contact me if additional questions or concerns arise.      Lewis Gonzalez D.O.  Maternal Fetal Medicine     20  minutes spent in review of records, patient consultation, documentation and coordination of care.  The relevant clinical matter(s) are summarized above.     Note to patient and family  The 21st Century Cures Act makes medical notes available to patients in the interest of transparency.  However, please be advised that this is a medical document.  It is intended as [id number]-[id number] communication.  It is written and medical language may contain abbreviations or verbiage that are technical and unfamiliar.  It may appear blunt or direct.  Medical  nonerythematous. Moist mucous membranes. Thyroid not enlarged, no nodules. No cervical, supraclavicular, or submandibular lymphadenopathy. Small area of dried blood on right posterior scalp, some tenderness to palpation, no visible swelling or lesion, tenderness to palpation in cervical spine CV:  RRR. No murmurs, rubs, or gallops appreciated. RESP:  Unlabored breathing. Lungs clear to auscultation. No wheeze, rales, or rhonchi. ABD:  Soft, diffuse tenderness to palpation when asked if she is having pain, otherwise allows abdomen to be palpated without obvious discomfort, no rebound tenderness or garding, obese. Normoactive bowel sounds. No hepatosplenomegaly. MS:  No joint deformity or instability. No atrophy. Neuro:  5/5 strength bilateral upper extremities Ext:  No edema. 2+ radial and dp pulses bilaterally. Right big toe and left second toe amputated. Skin: multiple scabs on anterior knees bilaterally IMAGING: Ct Abd Pelv Wo Cont Result Date: 1/4/2019 IMPRESSION[de-identified] 1.  No acute findings within the abdomen or pelvis. 2. Previous hiatal hernia repair. No bowel obstruction. 3. Fibroid uterus. Thank you for this referral. 
 
 
Recent Results (from the past 24 hour(s)) CBC WITH AUTOMATED DIFF Collection Time: 01/03/19 11:26 PM  
Result Value Ref Range WBC 20.0 (H) 4.6 - 13.2 K/uL  
 RBC 4.83 4.20 - 5.30 M/uL  
 HGB 14.2 12.0 - 16.0 g/dL HCT 39.4 35.0 - 45.0 % MCV 81.6 74.0 - 97.0 FL  
 MCH 29.4 24.0 - 34.0 PG  
 MCHC 36.0 31.0 - 37.0 g/dL  
 RDW 13.5 11.6 - 14.5 % PLATELET 941 406 - 556 K/uL MPV 11.2 9.2 - 11.8 FL  
 NEUTROPHILS 83 (H) 40 - 73 % LYMPHOCYTES 13 (L) 21 - 52 % MONOCYTES 4 3 - 10 % EOSINOPHILS 0 0 - 5 % BASOPHILS 0 0 - 2 %  
 ABS. NEUTROPHILS 16.6 (H) 1.8 - 8.0 K/UL  
 ABS. LYMPHOCYTES 2.5 0.9 - 3.6 K/UL  
 ABS. MONOCYTES 0.9 0.05 - 1.2 K/UL  
 ABS. EOSINOPHILS 0.0 0.0 - 0.4 K/UL  
 ABS. BASOPHILS 0.0 0.0 - 0.1 K/UL  
 DF AUTOMATED METABOLIC PANEL, COMPREHENSIVE Collection Time: 01/03/19 11:26 PM  
Result Value Ref Range Sodium 131 (L) 136 - 145 mmol/L Potassium 3.3 (L) 3.5 - 5.5 mmol/L Chloride 92 (L) 100 - 108 mmol/L  
 CO2 24 21 - 32 mmol/L Anion gap 15 3.0 - 18 mmol/L Glucose 172 (H) 74 - 99 mg/dL BUN 59 (H) 7.0 - 18 MG/DL Creatinine 3.72 (H) 0.6 - 1.3 MG/DL  
 BUN/Creatinine ratio 16 12 - 20 GFR est AA 15 (L) >60 ml/min/1.73m2 GFR est non-AA 13 (L) >60 ml/min/1.73m2 Calcium 9.6 8.5 - 10.1 MG/DL Bilirubin, total 1.9 (H) 0.2 - 1.0 MG/DL  
 ALT (SGPT) 18 13 - 56 U/L  
 AST (SGOT) 44 (H) 15 - 37 U/L Alk. phosphatase 173 (H) 45 - 117 U/L Protein, total 8.9 (H) 6.4 - 8.2 g/dL Albumin 4.3 3.4 - 5.0 g/dL Globulin 4.6 (H) 2.0 - 4.0 g/dL A-G Ratio 0.9 0.8 - 1.7 POC LACTIC ACID Collection Time: 01/03/19 11:26 PM  
Result Value Ref Range Lactic Acid (POC) 2.45 (HH) 0.40 - 2.00 mmol/L  
EKG, 12 LEAD, INITIAL Collection Time: 01/04/19 12:34 AM  
Result Value Ref Range Ventricular Rate 107 BPM  
 Atrial Rate 107 BPM  
 P-R Interval 144 ms QRS Duration 74 ms Q-T Interval 386 ms QTC Calculation (Bezet) 515 ms Calculated P Axis 70 degrees Calculated R Axis 11 degrees Calculated T Axis 31 degrees Diagnosis Sinus tachycardia Anteroseptal infarct (cited on or before 09-MAY-2014) Abnormal ECG When compared with ECG of 16-NOV-2018 22:56, 
Questionable change in initial forces of Septal leads Nonspecific T wave abnormality no longer evident in Lateral leads URINALYSIS W/ RFLX MICROSCOPIC Collection Time: 01/04/19  1:41 AM  
Result Value Ref Range Color DARK YELLOW Appearance CLOUDY Specific gravity 1.016 1.005 - 1.030    
 pH (UA) 5.0 5.0 - 8.0 Protein TRACE (A) NEG mg/dL Glucose NEGATIVE  NEG mg/dL Ketone NEGATIVE  NEG mg/dL Bilirubin NEGATIVE  NEG Blood NEGATIVE  NEG Urobilinogen 1.0 0.2 - 1.0 EU/dL documents are intended to carry relevant information, facts as evident, and the clinical opinion of the practitioner.     Nitrites NEGATIVE  NEG Leukocyte Esterase NEGATIVE  NEG    
URINE MICROSCOPIC ONLY Collection Time: 01/04/19  1:41 AM  
Result Value Ref Range Epithelial cells FEW 0 - 5 /lpf Bacteria 2+ (A) NEG /hpf Amorphous Crystals 2+ (A) NEG  
 Granular cast 2 to 4 NEG /lpf Assessment/Plan:  
52 y.o. female with PMH Type 2 DM with neuropathy, retinopathy, GIST resected in 2014. HTN, now admitted with concern for sepsis with nasuea, vomiting, abdominal pain. Sepsis with presumed GI etiology Patient presents with tachycardia, tachypnea, and wbc count of 20. Afebrile. Possible source of infection includes gastritis. Patient has history of gastritis with several recent episodes of admission for similar presentation. Patient has history of GIST resected in 2014 Last episode found to have gastritis and esophagitis which was treated with PPI and carafate. Lactic acid 2.45. Lipase wnl.  
- admit to tele  
- NPO 
- NS @ 125 cc/hour 
- continue zosyn currently on  3.375 q6hr, switch to pharm to dose for renal impairment  
- reglan for nasuea 5 mg IV TID as needed  
- will give 500 cc bolus - cardiac panel and trend - FU Bcx  
- trend lactic acid  
- add urine cx   
- mg level  
- daily CBC, CMP this AM 
- can start back on carafate when taking PO  
- 1-2 mg q4 severe pain as needed  
- fall precautions  
- aspirtation  
- strict I/O  
- protonix 40 IV  
- consider surgery/GI consult  
- if pain not improved consider ct with contrast, mri or US of mesentery Possible syncope/presyncope with fall and head trauma Multiple falls at home. Etiology unclear may be due to peripheral neuropathy, cardiac etiology, visual impairment. Now with area of dried blood on posterior scalp, headaches, and neck pain. - neurochecks q4 hours   
- non con CT head - CT C spine  
- UDS  
- Vit B 12, Folate Hyponatremia 131 on admission. Likely in setting of nausea and vomiting - Fluids as above - daily BMP Hypokalemia 3.3 on admission.  
- will replace with 10 mEq IV over 1 hour x4 CRYSTAL Cr 3.72 on admission. Baseline appears to be 1.5-2.0  
- fluids as above  
- avoid nephrotoxic medications  
- renally dose antibiotics DM with retinopathy and neuropathy  
- accu checks q6 hours while NPO, switch to ACHS when taking PO  
- Lantus 15 daily - correctional scale lispro q6 hours while NPO 
- adjust insulin based on requirements  
- hold home metformin 
- hold home novolog mix 70/30 HTN  
- hold home lisinopril 20 mg daily while NPO Anxiety  
- hold home Paxil 40 mg daily while NPO Diet: NPO  
DVT Prophylaxis: consider SQH pending heme occult Code Status: Full Code Point of Contact: Jennifer Westbrook (Relationship: Friend) [phone]; Mi Campos (son) [phone] Disposition and anticipated LOS: 2 midnights Lennox Coop, MD PGY-1 
[facility]  
 
 
  
Senior Addendum to History and Physical 
[facility] 
  
  
Patient: Shawn Case MRN: [id number]  CSN: [id number] YOB: 1965  Age: 52 y.o. Sex: female   
  
DOA: 1/3/2019 HPI:  
  
Shawn Case is a 52 y.o. female with PMH DM2, HTN, HLD, anxiety, gastritis/esophagitis, GI stromal tumor s/p resection 2014, osteomyelitis, cholecystectomy, pancreatitis, now presenting with complaint of nausea/vomiting. 
  
Pt has had repeated admissions for intractable nausea/vomiting and has been diagnosed with gastritis/esophagitis and had an EGD in 11/2018. Pt reports that medication prescribed during recent hospitalizations stopped working and she has not been able to eat well and has had weakness and falls. Pt's last fall was last night and she reports hitting the back of her head on the toilet during the fall. She reports losing consciousness during the fall but cannot remember other details.  She denies tobacco, EtOH and drug use but is a poor historian with slurred speech.  
  
 ED course: lactate: 2.45 CBC: WBC: 20 
CMP: Na: 131, K: 3.3, Cr: 3.72, Bili: 1.9 EKG: Sinus tachycardia CT Abd/pelv: No acute findings within the abdomen or pelvis. Previous hiatal hernia repair. No bowel obstruction. Fibroid uterus. 
  
Physical Exam:  
  
Physical Exam: 
General:  Alert and Responsive and in No acute distress. HEENT: Conjunctiva pink, sclera anicteric. PERRL. EOMI. Pharynx moist, nonerythematous. Moist mucous membranes. Thyroid not enlarged, no nodules. No cervical, supraclavicular, occipital or submandibular lymphadenopathy. 4cm nodule on occiput CV:  RRR, no murmurs. PMI not displaced. No visible pulsations or thrills. No carotid bruits. RESP:  Unlabored breathing. Lungs clear to auscultation. no wheeze, rales, or rhonchi. Equal expansion bilaterally. ABD:  Soft, nondistended. Normoactive bowel sounds. No hepatosplenomegaly. No suprapubic tenderness. No CVA tenderness. Diffuse TTP, lap raul scars visible. RECTAL:  See intern note. MS:  No joint deformity or instability. No atrophy. Neuro:  5/5 strength bilateral upper extremities and lower extremities. CN II-XII intact. Sensation grossly intact. Poor recall, slurred speech. Ext:  No edema. 2+ radial and dp pulses bilaterally. Skin:  Multiple abrasions on bilateral knees and legs with healing lacerations. Good turgor. 
  
  
Assessment/Plan:  
52 y.o. female with PMH DM2, HTN, HLD, anxiety, gastritis/esophagitis, GI stromal tumor s/p resection 2014, osteomyelitis, cholecystectomy, pancreatitis, now admitted with intractable nausea and vomiting. 
  
SIRS, Intractable nausea and vomiting 2/2 likely diabetic gastroparesis- Pt w/o known source of infection; 3/4 SIRS criteria in ED w/'s, RR 20's; WBC's 20; UA showed trace blood but no bacteria, nitrites or leuk esterase; CXR negative; CT Abd/Pelv showed no acute pathology; lactic acid 2.45.  Pt received 2L NS bolus, as well as 4mg morphine and 4mg Zofran in ED.    
- Admit to medicine - Aspiration and fall precautions - Daily CBC, BMP  
- NPO    
- Reglan 5 mg IV TID PRN 
- IV protonix 40mg daily 
- Start carafate when able to tolerate PO  
- Blood cx pending - Tyl PRN mild pain 650mg q6h 
- NS @ 125 mL/hr + 500mL NS bolus  
- Continue Zosyn switching to 2.25g q6h for CrCl 24   
  
Recurrent falls/ head injury: pt reports feeling weak, falling, reports falling last night and hitting head on the toilet and losing consciousness, etiology diabetic retinopathy vs hypoglycemia vs intoxication. 
- Head, C spine CT 
- q4h neuro checks - UDS/EtOH 
- B12 & Folate 
  
CRYSTAL- Likely 2/2 to dehydration; Baseline Cr 1-1.2  
- Daily CMP  
- Continue IVF as above 
  
Type II DM w/retinopathy and neuropathy- Last HbA1c 6.5 11/9/18  
- Hold home Metformin and Insulin 70/30 40 units BID  
- Accuchecks q6hrs while NPO; will switch to ACHS when tolerating PO   
- Insulin Lantus 15 units daily - Correctional scale lispro q6hrs while NPO; will switch to ACHS when tolerating PO  
  
Hypokalemia- K+ 3.3 in ED  
- Replete per protocol  
  
Hyponatremia: Na: 131 in ED 
- IVF as above 
  
For additional problem list, assessment, and plan see intern note. 
Lee Conti MD, PGY-2 [employer] 01/04/19 2:34 AM

## 2024-03-04 NOTE — MED STUDENT NOTES
*ATTENTION:  This note has been created by a medical student for educational purposes only. Please do not refer to the content of this note for clinical decision-making, billing, or other purposes. Please see attending physicians note to obtain clinical information on this patient. * Admission History and Physical 
[address] Patient: Estelita Hannon MRN: [id number]  CSN: [id number] YOB: 1965  Age: 52 y.o. Sex: female DOA: 1/3/2019 HPI:  
 
Estelita Hannon is a 52 y.o. female with PMH significant for O7YD complicated by neuropathy and retinopathy, GIST s/p resection in 2014, HTN, anxiety, hyperlipidemia and gastritis/esophagitis who presents with abdominal pain, nausea and vomiting that has been ocurring since November, as well as significant weakness and multiple falls in the past few weeks. She complains of a fall yesterday which caused her to injure her posterior skull, reporting that the event caused her to bleed. Of note, patient is a poor historian. She says she has been feeling weak for a significant amount of time, has had poor PO intake for the past several days, being unable to say when her last meal was. She states that she vomits whatever she tries to drink or eat, and she repeats on multiple occasions that she feels like she is continuously vomiting stomach acid. She complains of significant abdominal pain that she says occurs continuously, noting that nothing she does helps her pain. Patient was hospitalized in November of 2018 for similar symptoms, for which she received an upper GI endoscopy that showed esophagitis and gastritis. She was provided with carafate and protonix during this admission and was d/c due to being able to tolerate meals without N/V. 
 
ED Course: CBC, CMP, blood cultures x2, lactic acid, UA, CXR, CT abd/pelvis, EKG, zosyn 3.375g q6h, NS 1L x2, morphine 4 mg, zofran 4 mg. Review of Systems Constitutional: Negative for fever, chills and diaphoresis. HENT: Negative for hearing loss and sore throat. Eyes: Negative for blurred vision and double vision. Respiratory: Negative for cough and hemoptysis. Cardiovascular: Negative for chest pain and palpitations. Gastrointestinal: Positive for nausea and vomiting, abdominal pain. Genitourinary: Negative for dysuria and hematuria. Musculoskeletal: Positive for joint pain, weakness. Skin: Negative for itching and rash. Neurological: Positive for dizziness, negative for headaches. Past Medical History:  
Diagnosis Date  Blind left eye  Cellulitis of great toe of right foot 10/19/2017  Diabetes ([address]  Diabetic retinopathy ([address]  Gall stones  GERD (gastroesophageal reflux disease)  Hammertoe  Hiatal hernia  History of mammogram 10/03/2017 No evidence of malignancy  Hypertension  Mass of abdomen  Neuropathy due to type 2 diabetes mellitus ([address]  Osteomyelitis of toe of right foot ([address] 10/19/2017  Pancreatitis Past Surgical History:  
Procedure Laterality Date  HX AMPUTATION Left 07/21/2017  
 left 2nd toe  HX CHOLECYSTECTOMY  10/15/2014  HX GI Benign GI Stromal Tumor excision  HX HEENT Sx for detached retina  HX MYOMECTOMY x5 removed  HX OTHER SURGICAL    
 upper endoscopy Family History Adopted: Yes  
Problem Relation Age of Onset  Heart Failure Mother 76  
 Other Father 70  
     blood clot after surgery  Diabetes Paternal Aunt  Diabetes Paternal Uncle Social History Socioeconomic History  Marital status: SINGLE Spouse name: Not on file  Number of children: Not on file  Years of education: Not on file  Highest education level: Not on file Tobacco Use  Smoking status: Former Smoker Packs/day: 0.20 Years: 8.00 Pack years: 1.60 Types: Cigarettes Last attempt to quit: 12/3/1995 Years since quittin.1  Smokeless tobacco: Never Used Substance and Sexual Activity  Alcohol use: No  
  Comment: Drinks 3-4xs year generally wine  Drug use: No  
 Sexual activity: Not Currently Other Topics Concern   Service No  
 Blood Transfusions No  
 Caffeine Concern No  
 Occupational Exposure No  
 Hobby Hazards No  
 Sleep Concern No  
 Stress Concern No  
 Weight Concern No  
 Special Diet Yes  Back Care No  
 Exercise No  
 Bike Helmet No  
 Seat Belt Yes  Self-Exams Yes Social History Narrative Lives with 16year old son  with ex   Does not have health insurance Now lives alone. Allergies Allergen Reactions  Levaquin [Levofloxacin] Hives  Promethazine Nausea and Vomiting \"the phenergan made me more nauseated\" Prior to Admission Medications Prescriptions Last Dose Informant Patient Reported? Taking? PARoxetine (PAXIL) 30 mg tablet   No No  
Sig: TAKE 1 TABLET BY MOUTH DAILY  
insulin NPH/insulin regular (NOVOLIN 70/30, HUMULIN 70/30) 100 unit/mL (70-30) injection   No No  
Si Units by SubCUTAneous route two (2) times a day. metFORMIN ER (GLUCOPHAGE XR) 500 mg tablet   No No  
Sig: TAKE 1 TABLET BY MOUTH DAILY WITH DINNER  
pantoprazole (PROTONIX) 40 mg tablet   No No  
Sig: Take 1 Tab by mouth Daily (before breakfast). rosuvastatin (CRESTOR) 20 mg tablet   No No  
Sig: Take 1 Tab by mouth nightly. sucralfate (CARAFATE) 100 mg/mL suspension   No No  
Sig: Take 10 mL by mouth Before breakfast, lunch, dinner and at bedtime. Facility-Administered Medications: None Lisinopril - 20 mg Physical Exam:  
 
Patient Vitals for the past 24 hrs: 
 Temp Pulse Resp BP SpO2  
19 0200  93 17 100/76   
19 0145  98 25 (!) 112/96   
19 0030  (!) 112 14 117/89   
19 2245  (!) 118 26 109/57   
19 2221  (!) 118   93 % 01/03/19 2220 97.3 °F (36.3 °C) (!) 118 27 90/71 93 % Physical Exam: 
General:  Alert and Responsive, in mild distress. HEENT: Sclera anicteric. Extraocular muscles difficult to assess due to blindness in left eye. Moist mucous membranes. Dried blood present on posterior aspect of skull. No other gross abnormalities appreciated. CV:  Tachycardic, regular rhythm. No murmurs heard on auscultation. RESP:  Unlabored breathing. Lungs clear to auscultation. No wheeze, rales, or rhonchi heard on auscultation. ABD: Soft, tender to palpation diffusely, though patient did not respond to palpation when distracted. No rebound or guarding. Bowel sounds present. MS:  Amputation of digit in left foot, strength grossly 5/5. Neuro:  Cranial nerves grossly intact, grossly moving upper and lower extremities. Decreased sensation in distal lower extremities. Ext:  No edema. 2+ radial and dp pulses bilaterally. Skin:  Multiple lesions noted on upper and lower extremities. IMAGING: Ct Abd Pelv Wo Cont Result Date: 1/4/2019 IMPRESSION[de-identified] 1.  No acute findings within the abdomen or pelvis. 2. Previous hiatal hernia repair. No bowel obstruction. 3. Fibroid uterus. Thank you for this referral. 
 
CXR pending. Recent Results (from the past 12 hour(s)) CBC WITH AUTOMATED DIFF Collection Time: 01/03/19 11:26 PM  
Result Value Ref Range WBC 20.0 (H) 4.6 - 13.2 K/uL  
 RBC 4.83 4.20 - 5.30 M/uL  
 HGB 14.2 12.0 - 16.0 g/dL HCT 39.4 35.0 - 45.0 % MCV 81.6 74.0 - 97.0 FL  
 MCH 29.4 24.0 - 34.0 PG  
 MCHC 36.0 31.0 - 37.0 g/dL  
 RDW 13.5 11.6 - 14.5 % PLATELET 658 361 - 917 K/uL MPV 11.2 9.2 - 11.8 FL  
 NEUTROPHILS 83 (H) 40 - 73 % LYMPHOCYTES 13 (L) 21 - 52 % MONOCYTES 4 3 - 10 % EOSINOPHILS 0 0 - 5 % BASOPHILS 0 0 - 2 %  
 ABS. NEUTROPHILS 16.6 (H) 1.8 - 8.0 K/UL  
 ABS. LYMPHOCYTES 2.5 0.9 - 3.6 K/UL  
 ABS. MONOCYTES 0.9 0.05 - 1.2 K/UL  
 ABS. EOSINOPHILS 0.0 0.0 - 0.4 K/UL ABS. BASOPHILS 0.0 0.0 - 0.1 K/UL  
 DF AUTOMATED METABOLIC PANEL, COMPREHENSIVE Collection Time: 01/03/19 11:26 PM  
Result Value Ref Range Sodium 131 (L) 136 - 145 mmol/L Potassium 3.3 (L) 3.5 - 5.5 mmol/L Chloride 92 (L) 100 - 108 mmol/L  
 CO2 24 21 - 32 mmol/L Anion gap 15 3.0 - 18 mmol/L Glucose 172 (H) 74 - 99 mg/dL BUN 59 (H) 7.0 - 18 MG/DL Creatinine 3.72 (H) 0.6 - 1.3 MG/DL  
 BUN/Creatinine ratio 16 12 - 20 GFR est AA 15 (L) >60 ml/min/1.73m2 GFR est non-AA 13 (L) >60 ml/min/1.73m2 Calcium 9.6 8.5 - 10.1 MG/DL Bilirubin, total 1.9 (H) 0.2 - 1.0 MG/DL  
 ALT (SGPT) 18 13 - 56 U/L  
 AST (SGOT) 44 (H) 15 - 37 U/L Alk. phosphatase 173 (H) 45 - 117 U/L Protein, total 8.9 (H) 6.4 - 8.2 g/dL Albumin 4.3 3.4 - 5.0 g/dL Globulin 4.6 (H) 2.0 - 4.0 g/dL A-G Ratio 0.9 0.8 - 1.7 POC LACTIC ACID Collection Time: 01/03/19 11:26 PM  
Result Value Ref Range Lactic Acid (POC) 2.45 (HH) 0.40 - 2.00 mmol/L  
LIPASE Collection Time: 01/03/19 11:26 PM  
Result Value Ref Range Lipase 141 73 - 393 U/L  
EKG, 12 LEAD, INITIAL Collection Time: 01/04/19 12:34 AM  
Result Value Ref Range Ventricular Rate 107 BPM  
 Atrial Rate 107 BPM  
 P-R Interval 144 ms QRS Duration 74 ms Q-T Interval 386 ms QTC Calculation (Bezet) 515 ms Calculated P Axis 70 degrees Calculated R Axis 11 degrees Calculated T Axis 31 degrees Diagnosis Sinus tachycardia Anteroseptal infarct (cited on or before 09-MAY-2014) Abnormal ECG When compared with ECG of 16-NOV-2018 22:56, 
Questionable change in initial forces of Septal leads Nonspecific T wave abnormality no longer evident in Lateral leads URINALYSIS W/ RFLX MICROSCOPIC Collection Time: 01/04/19  1:41 AM  
Result Value Ref Range Color DARK YELLOW Appearance CLOUDY Specific gravity 1.016 1.005 - 1.030    
 pH (UA) 5.0 5.0 - 8.0 Protein TRACE (A) NEG mg/dL Glucose NEGATIVE  NEG mg/dL Ketone NEGATIVE  NEG mg/dL Bilirubin NEGATIVE  NEG Blood NEGATIVE  NEG Urobilinogen 1.0 0.2 - 1.0 EU/dL Nitrites NEGATIVE  NEG Leukocyte Esterase NEGATIVE  NEG    
URINE MICROSCOPIC ONLY Collection Time: 01/04/19  1:41 AM  
Result Value Ref Range Epithelial cells FEW 0 - 5 /lpf Bacteria 2+ (A) NEG /hpf Amorphous Crystals 2+ (A) NEG  
 Granular cast 2 to 4 NEG /lpf Assessment/Plan:  
Perry Gallo is a 52 y.o. female with PMH significant for U7DD complicated by neuropathy and retinopathy, GIST s/p resection in 2014, HTN, anxiety, hyperlipidemia and gastritis/esophagitis who presents with abdominal pain, nausea and vomiting that has been ocurring since November, as well as significant weakness and multiple falls in the past few weeks. CRYSTAL Likely due to decreased PO intake as well as significant emesis. - BUN/Cr at ED was 59/3.72 
- Baseline BUN/Cr appears to be approximately 10/1 
- Received 1L NS bolus x2 in ED 
- Due to hyponatremia, start D5-NS at 125 ml/hr Falls Broadly, differential includes diabetic neuropathy, decreased vision, possible drug/alcohol usage, decreased proprioception 
- Head CT to rule out hematoma from fall on day prior to admission that resulted in posterior head bleeding - Assess vitamin B12 and folate levels to assess for possible neurodegeneration 
- UDS to assess for any illicit drug use Abdominal pain, nausea and vomiting Differential includes gastritis, diabetic gastroparesis, bowel obstruction though less likely since she does endorse bowel movements and the CT does not show obstruction, viral gastroenteritis though less likely due to her having these symptoms for approximately 2 months, possible recurrence of GIST though less likely due to negative findings on CT abdomen/pelvis.  
- Keep patient NPO 
- Patient has prolonged QTc, d/c zofran and start reglan IV 10 mg 
 - Continue to monitor abdominal pain - Acetaminophen 650 mg q6h for pain Hyponatremia 
- D5-NS at 125 ml/hr Hypokalemia - Repletion protocol, 10 mEq IV over 1 hr x4, recheck 2 hours after infusion is complete Diabetes 
- Start lantus at 15 units + correctional insulin Diet: NPO 
DVT Prophylaxis: Subq heparin Code Status: Full [place] Prudent, MS3 
BILLMS PFM 
01/04/19 
3:38 AM 
 
 details…

## 2025-05-29 NOTE — ED NOTES
Patient is very talkative and smiling. Patient states her abdominal pain is subsiding. Will continue to monitor patient. Side rails up, bed in lowest position, call bell within reach. Private Auto Walk in

## 2025-06-04 NOTE — PROGRESS NOTES
[facility] Intern Progress Note Patient: Quinten Floyd MRN: [id number] SSN: [id number]  YOB: 1965 Age: 60 y.o. Sex: female Admit Date: 9/8/2020 LOS: 14 days Chief Complaint Patient presents with  Vomiting Subjective:  
 
Patient is doing well this morning. She was kept off restraints last night. As per night intern, there was a page from nurse stating that patient was at risk of strangling herself with the restraints. She was not agitated this morning. She has been urinating appropriately. She reported that she had a small bowel movement last night since starting her on Miralax. She is eating and drinking fluids without concern. ROS: No headaches/dizziness, no n/v, mild abdominal pain, no weakness in limbs. Objective:  
 
Visit Vitals /63 Pulse (!) 112 Temp 97.7 °F (36.5 °C) Resp 18 Ht 5' 8\" (1.727 m) Wt 95.9 kg (211 lb 6.4 oz) LMP 10/06/2015 (Exact Date) SpO2 100% Breastfeeding No  
BMI 32.14 kg/m² Physical Exam:  
General appearance: A+O x 2. Patient was cooperative during on conversation. She was not any restraints. Pt in no distress, and appears stated age.   
HEENT: Right eye poorly reactive to light and mildly erythematic. Left eye reactive to light. Moist mucous membranes.  
Lungs: Clear to auscultation bilaterally without adventitious sounds. Heart: Regular rate and rhythm, S1, S2 normal, no murmur, click, rub or gallop Abdomen: Soft, non-tender, non-distended. Bowel sounds normal. No masses,  no organomegaly. Skin: Skin color, texture, turgor normal. No rashes or lesions Neuro:  Normal without focal findings. Patient able to follow commands and moves all four extremities.  No evidence of motor restlessness. Extremities: First digit of right foot amputated. Second digit of left foot amputated. No edema. Pedal pulses 2+ and symmetric. Intake and Output: Copied from CRM #[id number]. Topic: MW Referral/Order - MW Referral/Order Request  >> Jun 4, 2025  2:11 PM Shadia GARCIA wrote:  --DO NOT REPLY - Sent from PACT - If sent to wrong pool, reroute to P [facility] Reroute pool --    Referral Request  Name of Specialist: Dr. Fernando Upton  Provider's specialty: Pulmonary Medicine  Medical condition for referral:  Emphysema    Is this a NEW request? yes  Preferred Delivery Method Input in Epic - Call when ready  Callback #: [phone]  Can a detailed message be left? Yes - Voicemail   Caller has been advised this message will be addressed within:2-3 business days [routine]   Current Shift: No intake/output data recorded. Last three shifts: 09/20 1901 - 09/22 0700 In: 580 [P.O.:580] Out: 2375 [Urine:2375] Lab/Data Review: 
Recent Results (from the past 12 hour(s)) CBC WITH MANUAL DIFF Collection Time: 09/22/20  5:34 AM  
Result Value Ref Range WBC 10.7 4.6 - 13.2 K/uL  
 RBC 3.14 (L) 4.20 - 5.30 M/uL HGB 9.1 (L) 12.0 - 16.0 g/dL HCT 27.2 (L) 35.0 - 45.0 % MCV 86.6 74.0 - 97.0 FL  
 MCH 29.0 24.0 - 34.0 PG  
 MCHC 33.5 31.0 - 37.0 g/dL  
 RDW 14.8 (H) 11.6 - 14.5 % PLATELET 660 116 - 837 K/uL MPV 10.8 9.2 - 11.8 FL  
 DIFFERENTIAL MANUAL DIFFERENTIAL ORDERED    
 NEUTROPHILS 54 42 - 75 % BAND NEUTROPHILS 0 0 - 5 % LYMPHOCYTES 43 20 - 51 % MONOCYTES 3 2 - 9 % EOSINOPHILS 0 0 - 5 % BASOPHILS 0 0 - 3 % METAMYELOCYTES 0 0 % MYELOCYTES 0 0 % PROMYELOCYTES 0 0 % BLASTS 0 0 % OTHER CELL 0 0    
 ABS. NEUTROPHILS 5.8 1.8 - 8.0 K/UL  
 ABS. LYMPHOCYTES 4.6 (H) 0.8 - 3.5 K/UL  
 ABS. MONOCYTES 0.3 0 - 1.0 K/UL  
 ABS. EOSINOPHILS 0.0 0.0 - 0.4 K/UL  
 ABS. BASOPHILS 0.0 0.0 - 0.06 K/UL  
 DF MANUAL PLATELET COMMENTS ADEQUATE PLATELETS    
 RBC COMMENTS NORMOCYTIC, NORMOCHROMIC METABOLIC PANEL, COMPREHENSIVE Collection Time: 09/22/20  5:34 AM  
Result Value Ref Range Sodium 140 136 - 145 mmol/L Potassium 3.5 3.5 - 5.5 mmol/L Chloride 106 100 - 111 mmol/L  
 CO2 28 21 - 32 mmol/L Anion gap 6 3.0 - 18 mmol/L Glucose 132 (H) 74 - 99 mg/dL BUN 5 (L) 7.0 - 18 MG/DL Creatinine 1.07 0.6 - 1.3 MG/DL  
 BUN/Creatinine ratio 5 (L) 12 - 20 GFR est AA >60 >60 ml/min/1.73m2 GFR est non-AA 53 (L) >60 ml/min/1.73m2 Calcium 9.1 8.5 - 10.1 MG/DL Bilirubin, total 1.5 (H) 0.2 - 1.0 MG/DL  
 ALT (SGPT) 37 13 - 56 U/L  
 AST (SGOT) 45 (H) 10 - 38 U/L Alk. phosphatase 97 45 - 117 U/L Protein, total 7.4 6.4 - 8.2 g/dL Albumin 3.4 3.4 - 5.0 g/dL Globulin 4.0 2.0 - 4.0 g/dL A-G Ratio 0.9 0.8 - 1.7 PHOSPHORUS Collection Time: 09/22/20  5:34 AM  
Result Value Ref Range Phosphorus 3.4 2.5 - 4.9 MG/DL MAGNESIUM Collection Time: 09/22/20  5:34 AM  
Result Value Ref Range Magnesium 1.8 1.6 - 2.6 mg/dL CK Collection Time: 09/22/20  5:34 AM  
Result Value Ref Range  (H) 26 - 192 U/L  
URINALYSIS W/ RFLX MICROSCOPIC Collection Time: 09/22/20  6:20 AM  
Result Value Ref Range Color YELLOW Appearance CLEAR Specific gravity 1.007 1.005 - 1.030    
 pH (UA) 7.0 5.0 - 8.0 Protein Negative NEG mg/dL Glucose Negative NEG mg/dL Ketone Negative NEG mg/dL Bilirubin Negative NEG Blood Negative NEG Urobilinogen 0.2 0.2 - 1.0 EU/dL Nitrites Negative NEG Leukocyte Esterase MODERATE (A) NEG    
GLUCOSE, POC Collection Time: 09/22/20  8:01 AM  
Result Value Ref Range Glucose (POC) 153 (H) 70 - 110 mg/dL RECENT RESULTS MODALITY IMPRESSION  
XR        
Results from Hospital Encounter encounter on 09/08/20 XR ORBIT BI FOREIGN BODY  
  Narrative EXAM: ORBITS LIMITED 
  
CLINICAL HISTORY/INDICATION: , blurred vision with nausea, vomiting, and 
abdominal pain, severe agitation, altered mental status, diabetic retinopathy 
due to type 2 diabetes Onnie Cowing screen. 
  
COMPARISON: None. 
  
TECHNIQUE: modified ramos view(s) of the orbits obtained.   
  
FINDINGS: 
  
There are no radiopaque metallic foreign bodies within the orbits.  There is no 
aneurysm clip.  The visualized  paranasal sinuses are normal. 
   
  Impression IMPRESSION: 
  
Negative orbital screening prior to MRI. CT        
Results from [place] encounter on 09/08/20 CT CHEST ABD PELV W CONT  
  Narrative CT chest, abdomen and pelvis with contrast  
  
CLINICAL HISTORY: Leukocytosis.  CT evaluation of potential infection versus 
abscess 
  
COMPARISON: None. 
  
TECHNIQUE: Helical scan to the chest, abdomen and pelvis are obtained from the 
 thoracic inlet to the symphysis pubis after IV contrast administration. 
  
All CT scans at this facility performed using dose optimization techniques as 
appreciated to a performed exam, to include automated exposure control, 
adjustment of the mA and or KU according to patient size (including appropriate 
matching for site specific examination), or use of iterative reconstruction 
technique. 
  
FINDINGS: Moderate motion artifact noted and limits the detail evaluation. 
  
CT CHEST:  
  
Lung Parenchyma: Minimal bibasilar atelectasis. No acute airspace disease or 
consolidation. 
  
Thyroid: Small hypodense nodule in the right lobe measuring about 5 mm. 
  
Mediastinum: No adenopathy. Mild and circumferential esophageal wall prominence 
with collapsed the lumen. 
  
Heart: The heart and the pericardium appear normal. 
  
Vasculature: The aorta and the great vessels are unremarkable.  
  
Pleural Space And Chest Wall: No significant pleural pathology. 
  
  
CT ABDOMEN AND PELVIS: 
  
Liver: Normal. 
  
Gallbladder: Surgically absent.  
  
Biliary System: No ductal dilatation. 
  
Spleen: Normal. 
  
Pancreas: Normal. 
  
Adrenal Glands: Normal. 
  
Kidneys: Normal. 
  
Bowel: The small and large bowel are nondilated.  Normal appendix. Fluid-filled 
sigmoid colon and rectum with air-fluid level without dilatation. 
  
Lower genitourinary system: The bladder is markedly distended up to the level 
above the umbilicus, 98.5 cm in cc dimension. No definite bladder wall lesion 
seen. The uterus appears spherical and heterogeneous with several partially 
calcified fibroids present. No adnexal mass.  
  
Peritoneum/Retroperitoneum: No adenopathy. 
  
Vasculature: Unremarkable for age.  
  
Other: No free fluid. 
  
CT OSSEOUS STRUCTURES:   
  
Unremarkable for age. 
   
  Impression IMPRESSION:  
  
1. Marked bladder distention up to the level above the umbilicus. Recommend clinical correlation for urinary outlet obstruction. 2. No CT evidence of infection or inflammatory process. No abscess seen. 3. Tiny hypodense nodule in right thyroid lobe. This can be better evaluated by 
thyroid ultrasound. 4. Mild and circumferential esophageal wall prominence with collapsed the lumen. If symptomatic, barium swallow can be performed for better evaluation. 5. Fluid-filled nondilated distal colon and rectum as nonspecific finding and 
could represent diarrhea. 6. Multiple partially calcified fibroids. 
  
Thank you for this referral.   
  
MRI        
Results from Hospital Encounter encounter on 09/08/20 MRI BRAIN W WO CONT  
  Narrative MRI BRAIN WITH AND WITHOUT CONTRAST 
  
Provided Reason for Exam: Confusion, visual loss right eye for 2 weeks, LP with 
elevated opening pressure Additional History: Patient admitted with confusion Comparison Studies: Brain MRI 9/11/2020, head CT 9/9/2020 
  
Imaging Technique:  
  Sequences:  Sagittal and axial T1 weighted, Diffusion Weighted (DWI), Axial T2 
weighted, Axial T2 FLAIR, and GRE MRI images of the brain. Post contrast axial 
and coronal T1 images. 
  
Contrast Material:  20 mL Dotarem IV 
  
Limiting Factors/Major Artifacts: None. 
  
FINDINGS: 
  
Brain Parenchyma: Diffusion sequence negative. No cytotoxic edema or acute 
infarct. Cerebral white matter is normal.  No chronic cortical infarcts or 
chronic lacunar infarcts.  No visible masses or midline shift. No abnormal 
parenchymal or meningeal enhancement. 
  
CSF Spaces:  There are some mild prominence of the posterior horns of the 
lateral ventricles, but the major the ventricles are normal size. No findings of 
ventricular trapping. Appears to be developmental, similar findings on the head CT from 2013. Vascular System:  Grossly patent flow in basilar and internal cerebral arteries. No findings of dural sinus thrombosis.   
  
 Hemorrhage:  No acute hemorrhage. No chronic microhemorrhage. 
  
Other Structures: 
Calvarium: No suspicious marrow signal. 
Sella: Normal. 
Visualized Upper Cervical Spine: Normal craniocervical junction, no Chiari 
malformation. Orbits: Postsurgical changes in the left ocular globe, appears to be silicone 
injection, unchanged from 2014 head CT. Paranasal Sinuses: No significant paranasal sinus disease. Mastoid Air Cells:  Clear. 
   
  Impression IMPRESSION: 
  
No acute intracranial abnormality. No mass, hemorrhage, or acute infarct. 
  
Postsurgical changes left optic globe consistent with previous retinal 
detachment procedure. ULTRASOUND        
Results from Hospital Encounter encounter on 09/08/20 [employer]  
  Impression IMPRESSION:    
  
1.  Status post cholecystectomy. 
  
2.  No significant common bile duct dilation. 
  
3.  Increased hepatic parenchymal echogenicity with somewhat heterogeneous 
appearance, potentially suggestive of hepatosteatosis.  Partial visualization of 
the liver. 
                    
Results from Hospital Encounter encounter on 01/22/19 [employer]  
  Impression IMPRESSION: 
  
Mild heterogeneous echogenicity of the liver is nonspecific. This is commonly 
seen with steatosis hepatic disease.  
  Limited visualization of the pancreas.  
  
  
Cardiology Procedures/Testing: MODALITY RESULTS  
EKG            
Results for orders placed or performed during the hospital encounter of 09/08/20 EKG, 12 LEAD, INITIAL Result Value Ref Range  
  Ventricular Rate 115 BPM  
  Atrial Rate 115 BPM  
  P-R Interval 148 ms  
  QRS Duration 58 ms  
  Q-T Interval 342 ms  
  QTC Calculation (Bezet) 473 ms  
  Calculated P Axis 34 degrees  
  Calculated R Axis 1 degrees  
  Calculated T Axis 34 degrees  
  Diagnosis      
    Sinus tachycardia Cannot exclude anterior MI versus lead placement issue. Abnormal ECG When compared with ECG of 08-SEP-2020 15:05, 
 Anterior infarct is now present Nonspecific T wave abnormality no longer evident in Inferior leads Nonspecific T wave abnormality, improved in Anterolateral leads Confirmed by Mina Quintana MD, Linh Damico ([id number]) on 9/17/2020 3:04:13 PM 
   
Results for orders placed or performed in visit on 06/20/14 AMB POC EKG ROUTINE W/ 12 LEADS, INTER & REP  
  Impression  
  See progress note.  
   
ECHO        
01/03/19 ECHO ADULT COMPLETE 01/04/2019 1/4/2019  
  Narrative · Estimated left ventricular ejection fraction is 61 - 65%. Left  
ventricular mild concentric hypertrophy. Mild (grade 1) left ventricular  
diastolic dysfunction. · Mild tricuspid valve regurgitation is present. 
   
    Signed by: Roverto Woods MD  
  
  
 
Telemetry NONE Oxygen NONE  
  
 
 
Assessment and Plan:  
 
60 y. o. 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   
 1. Metabolic Encephalopathy of unclear etiology [likely multifactorial, still not at baseline] - On initial presentation, patient had HHS with -500 treated with IV fluids, as well as leukocytosis and lactic acidosis that improved with IV antibiotics.  On admission, labs showed normal lipase, ammonia, B12 and folate. Blood culture results from 9/8 showed no growth. UA showed 6-8 wbc, 2+ bacteria, but negative for leuk est and nitrites. UCx showed >100, 000 cfu of mixed urogenital kyle, likely a contaminant. On 9/9, CT Chest/Abd/Pelvis showed bladder with mild wall thickening. Repeat cultures remain negative. Pt was initially hypernatremic but this has resolved. UDS and serum ETOH (9/8) was negative. On  9/8, CT head showed mild enlargement of lateral ventricles and cerebral atrophy . - s/p empirical  abx Ceftriaxone  (9/8-/9). S/p empiric abx  vancomycin (9/8-9/14)  and zoysn (9/9-9/14 ) - ESR 32 and CRP 3.7, elevated on 9/14. COVID 19 negative 
- CSF wbc 1 , protein 55, glucose 70, elevated opening 34 mmhg, likely as patient was in prone position and was sedated. Unlikely pseudotumor cerebri per neurology. 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 
- HIV negative, RPR negative. Blood culture and urine culture negative  
- Autoimmune workup: GRACE negative, TSH wnl, anti-sm negative - MRI head 9/11 - no acute intracranial finding. EEG showed encephalopathy  
- 9/15 CT AB/PELVIS/CHEST IV : Marked bladder distention up to the level above the umbilicus.  No CT evidence of infection or inflammatory process. No abscess seen. Tiny hypodense nodule in right thyroid lobe - can be better evaluated by thyroid ultrasound. Mild and circumferential esophageal wall prominence with collapsed the lumen [if symptomatic, barium swallow can be performed for better evaluation]. Fluid-filled nondilated distal colon and rectum as nonspecific finding and could represent diarrhea. Multiple partially calcified fibroids. 
-9/17 MRI Brain w w/o  contrast and MRV brain w wo contrast:  no cerebral sagittal sinus thrombosis, ruled out  CNS infection, intracerebral hemorrhage, or a sagittal sinus thrombosis - 9/18 repeat EKG: no ST changes from prior EKG  
- Fall precautions - Aspiration precautions + consult speech/language pathology Plan:  
 - Psych: Discontinued Metoclopramide and paroxetine. Started fluoxetine for anxiety and to mitigate potential withdrawal symptoms 
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 
 - Scheduled Seroquel 50 mg qhs, and 25 mg in AM. Recommend conservatively increasing night Seroquel by 12.5 mg as she tends to have her episodes of agitation in the evening.  Continue soft restraints today and attempt to wean off again this evening. Currently on Geodon for severe agitation as needed q12h  
- follow up on B1 and heavy metal results - Bladder scan showed >1000 ml of urine, but garcia deferred as patient urinated appropriately >1000 ml yesterday 
- KUB showed non-obstruction bowel gas consistent with stool burden. Start bowel regimen with Miralax BID and Bisacodyl 5 mg prn. Continue to monitor stool output. - UA showed few bacteria, 2+ yeast and 15-20 WBC. Will hold off on treating for UTI with antibiotics as pt did not have significant pyuria and is afebrile, with no obvious suprapubic tenderness. Will order a urine culture to confirm. Start one dose of fluconazole today to treat for possible yeast infection.  
- Dispo: rehab vs SNF   
  
2. Tardive akathisia 
- initially also had dystonia, EPS symptoms  
- likely related with chronic metoclopramide use; held metoclopramide Plan: No motor restlessness today. Elevated CK on 9/22 (479). This is likely because patient is still on restraints.  
  
4. Normocytic anemia (HgB 8.5, hct 25.5) 
 - B12 and folate normal 
- Rct 3.6 %, 1.47 calculated  rct index, hypoprofliferative not increased  
- Plan: FOBT follow up 
  
5. Urinary retention s/p garcia  W/ trauma (resolved)  
- pt voiding on own - Urology saw patient said to DC garcia, and place in diaper Plan: Bladder scan showed >1000 ml urine, but patient urinated appropriately. Deferred garcia. 
  
6. CRYSTAL CR 3.56 ( baseline Cr 1.09 1/2019) in setting of CKD stage 2  likely pre-renal and rhabdo (resolved)  
- Myoglobin initially 1092. Iron profile normal 
- Improving CK 1,057--> 996 --> 479 (9/20) 
- .8 elevated and vitamin D on low side of normal - corrected Ca normal , Vitamin D nml , Phosphorous wnl, microalb/cr ratio wnl Plan:   
- Cont Vitamin D supplement 
- Nephrology following appreciate recs- calcitril 0.25mcg on discharge 
  
 7. R eye pain + Right eye vision loss X 2 weeks - Unlikely papilledema based on bedside ultrasound ( no enlarged optic sheath seen), likely floater vs retinal detachment vs pink eye vs acute angle gluacoma Plan:  
- spoke to optho on 9/10, Dr. Suzanna Gutierrez - not able to come 
  
8. Hypokalemia (resolved) 
- electrolyte imbalance (hypokalemia, corrected Ca wml )  
- replete electrolyte as needed, Mg, Phos 
  
9. Hyperbilirubinemia (3.9) w/ 0.6 direct likely majority is unconjugated  likely secondary to [employer] syndrome, previously elevated bili unlikely hemolysis (improved)  
- CT AB/US - no gall bladder disease appreciated  
- , haptoglobin normal, aldolase pending  
  
 10. Diabetes ( last A1c 7.2) (-245)  neuropathy and L retinopathy - not well controlled 
- held NPH 40U BID (hold home NPH 50 U BID)  
-given lantus 5 U daily  
  
11. HTN (BP today 121/63 mmHg) 
- not on any home medications  
  
 12. HLD 
- cont rosuvastatin  
  
13. Gastroparesis  
- held metoclopramide 5 mg  
  
14. GERD 
- cont  omeprazole 40 mg delayed capsule 
  
15.  Anxiety 
- held  paroxetine 60 mg daily 
  
Diet Advance as tolerated per speech DVT Prophylaxis heparin GI Prophylaxis Omperazole Code status Full code Disposition unknown  
  
Point of Contact [place] Relationship: 
[phone]  
  
  
Sharmin Hammonds MD PGY-1  
[address] Intern Pager: [phone] September 20, 2020, 8:22 AM

## (undated) DEVICE — 3M™ BAIR PAWS FLEX™ WARMING GOWN, STANDARD, 20 PER CASE 81003: Brand: BAIR PAWS™

## (undated) DEVICE — SOLUTION IV 1000ML 0.9% SOD CHL

## (undated) DEVICE — DRAPE,EXTREMITY,89X128,STERILE: Brand: MEDLINE

## (undated) DEVICE — CUFF TRNQT AD W4IN 18IN CIRC SURG GEL SGL PRT BLDR PNEUMAT

## (undated) DEVICE — 3M™ DURAPORE™ SURGICAL TAPE 1538-1, 1 INCH X 10 YARD (2,5CM X 9,1M), 12 ROLLS/BOX: Brand: 3M™ DURAPORE™

## (undated) DEVICE — FLEX ADVANTAGE 3000CC: Brand: FLEX ADVANTAGE

## (undated) DEVICE — KIT CLN UP BON SECOURS MARYV

## (undated) DEVICE — BANDAGE COMPR EXSANGUATION SGL LAYERED NO CLSR 9FT LEN 4IN W

## (undated) DEVICE — (D)GLOVE SURG ORTH 8 PWD LTX -- DISC BY MFR USE ITEM 278015

## (undated) DEVICE — SHOE POSTOP HK LOOP MED M -- NYL RUBBER

## (undated) DEVICE — STERILE POLYISOPRENE POWDER-FREE SURGICAL GLOVES: Brand: PROTEXIS

## (undated) DEVICE — CURITY NON-ADHERENT STRIPS: Brand: CURITY

## (undated) DEVICE — PACK PROCEDURE SURG MAJ W/ BASIN LF

## (undated) DEVICE — SHEET,DRAPE,70X100,STERILE: Brand: MEDLINE

## (undated) DEVICE — BASIN EMESIS 500CC ROSE 250/CS 60/PLT: Brand: MEDEGEN MEDICAL PRODUCTS, LLC

## (undated) DEVICE — GAUZE SPONGES,16 PLY: Brand: CURITY

## (undated) DEVICE — FLUFF AND POLYMER UNDERPAD,EXTRA HEAVY: Brand: WINGS

## (undated) DEVICE — BNDG ELAS COBAN 3INX5YD NS --

## (undated) DEVICE — BLADE SAW W5.5XL18MM THK0.38MM CUT THK0.43MM REPL S STL SAG

## (undated) DEVICE — 3M™ STERI-STRIP™ COMPOUND BENZOIN TINCTURE 40 BAGS/CARTON 4 CARTONS/CASE C1544: Brand: 3M™ STERI-STRIP™

## (undated) DEVICE — REM POLYHESIVE ADULT PATIENT RETURN ELECTRODE: Brand: VALLEYLAB

## (undated) DEVICE — TRAY PREP DRY W/ PREM GLV 2 APPL 6 SPNG 2 UNDPD 1 OVERWRAP

## (undated) DEVICE — MEDI-VAC SUCTION HIGH CAPACITY: Brand: CARDINAL HEALTH

## (undated) DEVICE — WRAP COMPR W4INXL5YD NONSTERILE TAN SELF ADH COBAN

## (undated) DEVICE — SYRINGE MED 25GA 3ML L5/8IN SUBQ PLAS W/ DETACH NDL SFTY

## (undated) DEVICE — KERLIX BANDAGE ROLL: Brand: KERLIX

## (undated) DEVICE — DERMACEA GAUZE ROLL: Brand: DERMACEA

## (undated) DEVICE — AIRLIFE™ NASAL OXYGEN CANNULA CURVED, FLARED TIP WITH 14 FOOT (4.3 M) CRUSH-RESISTANT TUBING, OVER-THE-EAR STYLE: Brand: AIRLIFE™

## (undated) DEVICE — FCPS RAD JAW 4LC 240CM W/NDL -- BX/20 RADIAL JAW 4

## (undated) DEVICE — THREE-QUARTER SHEET: Brand: CONVERTORS

## (undated) DEVICE — CATHETER SUCT TR FL TIP 14FR W/ O CTRL

## (undated) DEVICE — MEDI-VAC NON-CONDUCTIVE SUCTION TUBING: Brand: CARDINAL HEALTH

## (undated) DEVICE — BITE BLOCK ENDOSCP UNIV AD 6 TO 9.4 MM

## (undated) DEVICE — INTENDED FOR TISSUE SEPARATION, AND OTHER PROCEDURES THAT REQUIRE A SHARP SURGICAL BLADE TO PUNCTURE OR CUT.: Brand: BARD-PARKER SAFETY BLADES SIZE 10, STERILE

## (undated) DEVICE — SHOE POSTOP M WOMAN 6-8 UNIV FOAM TRICOT SEMI FLX SKID

## (undated) DEVICE — SYR 50ML SLIP TIP NSAF LF STRL --

## (undated) DEVICE — SUT PROL 3-0 18IN PS1 BLU --

## (undated) DEVICE — ENDOSCOPY PUMP TUBING/ CAP SET: Brand: ERBE

## (undated) DEVICE — STOCKINETTE,IMPERVIOUS,12X48,STERILE: Brand: MEDLINE

## (undated) DEVICE — SOLUTION IRRIG 1000ML H2O STRL BLT